# Patient Record
Sex: FEMALE | Race: WHITE | NOT HISPANIC OR LATINO | Employment: OTHER | URBAN - METROPOLITAN AREA
[De-identification: names, ages, dates, MRNs, and addresses within clinical notes are randomized per-mention and may not be internally consistent; named-entity substitution may affect disease eponyms.]

---

## 2017-01-20 ENCOUNTER — AMBULATORY - HEALTHEAST (OUTPATIENT)
Dept: NURSING | Facility: CLINIC | Age: 72
End: 2017-01-20

## 2017-01-20 DIAGNOSIS — M05.79 RHEUMATOID ARTHRITIS INVOLVING MULTIPLE SITES WITH POSITIVE RHEUMATOID FACTOR (H): ICD-10-CM

## 2017-02-16 ENCOUNTER — AMBULATORY - HEALTHEAST (OUTPATIENT)
Dept: NURSING | Facility: CLINIC | Age: 72
End: 2017-02-16

## 2017-02-16 ENCOUNTER — AMBULATORY - HEALTHEAST (OUTPATIENT)
Dept: LAB | Facility: CLINIC | Age: 72
End: 2017-02-16

## 2017-02-16 DIAGNOSIS — M05.9 RHEUMATOID ARTHRITIS WITH RHEUMATOID FACTOR (H): ICD-10-CM

## 2017-02-16 DIAGNOSIS — Z79.899 HIGH RISK MEDICATION USE: ICD-10-CM

## 2017-02-16 LAB
ALT SERPL W P-5'-P-CCNC: 15 U/L (ref 0–45)
CREAT SERPL-MCNC: 1.13 MG/DL (ref 0.6–1.1)
GFR SERPL CREATININE-BSD FRML MDRD: 48 ML/MIN/1.73M2

## 2017-03-16 ENCOUNTER — AMBULATORY - HEALTHEAST (OUTPATIENT)
Dept: NURSING | Facility: CLINIC | Age: 72
End: 2017-03-16

## 2017-03-30 ENCOUNTER — COMMUNICATION - HEALTHEAST (OUTPATIENT)
Dept: RHEUMATOLOGY | Facility: CLINIC | Age: 72
End: 2017-03-30

## 2017-03-30 DIAGNOSIS — M05.9 RHEUMATOID ARTHRITIS WITH RHEUMATOID FACTOR (H): ICD-10-CM

## 2017-04-13 ENCOUNTER — OFFICE VISIT - HEALTHEAST (OUTPATIENT)
Dept: RHEUMATOLOGY | Facility: CLINIC | Age: 72
End: 2017-04-13

## 2017-04-13 DIAGNOSIS — M05.79 RHEUMATOID ARTHRITIS INVOLVING MULTIPLE SITES WITH POSITIVE RHEUMATOID FACTOR (H): ICD-10-CM

## 2017-04-13 DIAGNOSIS — N18.30 CRF (CHRONIC RENAL FAILURE), STAGE 3 (MODERATE) (H): ICD-10-CM

## 2017-04-13 DIAGNOSIS — M05.9 RHEUMATOID ARTHRITIS WITH RHEUMATOID FACTOR (H): ICD-10-CM

## 2017-04-13 DIAGNOSIS — Z79.899 HIGH RISK MEDICATION USE: ICD-10-CM

## 2017-04-13 DIAGNOSIS — M54.50 CHRONIC MIDLINE LOW BACK PAIN WITHOUT SCIATICA: ICD-10-CM

## 2017-04-13 DIAGNOSIS — G89.29 CHRONIC MIDLINE LOW BACK PAIN WITHOUT SCIATICA: ICD-10-CM

## 2017-04-13 LAB
ALT SERPL W P-5'-P-CCNC: 18 U/L (ref 0–45)
CREAT SERPL-MCNC: 1.17 MG/DL (ref 0.6–1.1)
GFR SERPL CREATININE-BSD FRML MDRD: 46 ML/MIN/1.73M2

## 2017-05-11 ENCOUNTER — AMBULATORY - HEALTHEAST (OUTPATIENT)
Dept: NURSING | Facility: CLINIC | Age: 72
End: 2017-05-11

## 2017-05-26 ENCOUNTER — COMMUNICATION - HEALTHEAST (OUTPATIENT)
Dept: RHEUMATOLOGY | Facility: CLINIC | Age: 72
End: 2017-05-26

## 2017-05-26 DIAGNOSIS — M05.9 RHEUMATOID ARTHRITIS WITH RHEUMATOID FACTOR (H): ICD-10-CM

## 2017-06-05 ENCOUNTER — COMMUNICATION - HEALTHEAST (OUTPATIENT)
Dept: RHEUMATOLOGY | Facility: CLINIC | Age: 72
End: 2017-06-05

## 2017-06-05 DIAGNOSIS — M05.9 RHEUMATOID ARTHRITIS WITH RHEUMATOID FACTOR (H): ICD-10-CM

## 2017-06-05 DIAGNOSIS — M05.79 RHEUMATOID ARTHRITIS INVOLVING MULTIPLE SITES WITH POSITIVE RHEUMATOID FACTOR (H): ICD-10-CM

## 2017-06-08 ENCOUNTER — AMBULATORY - HEALTHEAST (OUTPATIENT)
Dept: LAB | Facility: CLINIC | Age: 72
End: 2017-06-08

## 2017-06-08 ENCOUNTER — AMBULATORY - HEALTHEAST (OUTPATIENT)
Dept: NURSING | Facility: CLINIC | Age: 72
End: 2017-06-08

## 2017-06-08 DIAGNOSIS — Z79.899 HIGH RISK MEDICATION USE: ICD-10-CM

## 2017-06-08 DIAGNOSIS — M05.9 RHEUMATOID ARTHRITIS WITH RHEUMATOID FACTOR (H): ICD-10-CM

## 2017-06-08 LAB
ALT SERPL W P-5'-P-CCNC: 14 U/L (ref 0–45)
CREAT SERPL-MCNC: 1.16 MG/DL (ref 0.6–1.1)
GFR SERPL CREATININE-BSD FRML MDRD: 46 ML/MIN/1.73M2

## 2017-07-06 ENCOUNTER — AMBULATORY - HEALTHEAST (OUTPATIENT)
Dept: NURSING | Facility: CLINIC | Age: 72
End: 2017-07-06

## 2017-07-06 DIAGNOSIS — M05.79 RHEUMATOID ARTHRITIS INVOLVING MULTIPLE SITES WITH POSITIVE RHEUMATOID FACTOR (H): ICD-10-CM

## 2017-08-10 ENCOUNTER — OFFICE VISIT - HEALTHEAST (OUTPATIENT)
Dept: RHEUMATOLOGY | Facility: CLINIC | Age: 72
End: 2017-08-10

## 2017-08-10 DIAGNOSIS — N18.30 CRF (CHRONIC RENAL FAILURE), STAGE 3 (MODERATE) (H): ICD-10-CM

## 2017-08-10 DIAGNOSIS — M05.79 RHEUMATOID ARTHRITIS INVOLVING MULTIPLE SITES WITH POSITIVE RHEUMATOID FACTOR (H): ICD-10-CM

## 2017-08-10 DIAGNOSIS — Z79.899 HIGH RISK MEDICATION USE: ICD-10-CM

## 2017-08-10 LAB
ALT SERPL W P-5'-P-CCNC: 17 U/L (ref 0–45)
CREAT SERPL-MCNC: 1.34 MG/DL (ref 0.6–1.1)
GFR SERPL CREATININE-BSD FRML MDRD: 39 ML/MIN/1.73M2

## 2017-08-10 ASSESSMENT — MIFFLIN-ST. JEOR: SCORE: 1419.14

## 2017-09-07 ENCOUNTER — AMBULATORY - HEALTHEAST (OUTPATIENT)
Dept: NURSING | Facility: CLINIC | Age: 72
End: 2017-09-07

## 2017-09-07 DIAGNOSIS — M05.79 RHEUMATOID ARTHRITIS INVOLVING MULTIPLE SITES WITH POSITIVE RHEUMATOID FACTOR (H): ICD-10-CM

## 2017-10-05 ENCOUNTER — AMBULATORY - HEALTHEAST (OUTPATIENT)
Dept: NURSING | Facility: CLINIC | Age: 72
End: 2017-10-05

## 2017-10-05 ENCOUNTER — AMBULATORY - HEALTHEAST (OUTPATIENT)
Dept: RHEUMATOLOGY | Facility: CLINIC | Age: 72
End: 2017-10-05

## 2017-10-05 DIAGNOSIS — M05.79 RHEUMATOID ARTHRITIS INVOLVING MULTIPLE SITES WITH POSITIVE RHEUMATOID FACTOR (H): ICD-10-CM

## 2017-10-05 LAB
ALT SERPL W P-5'-P-CCNC: 17 U/L (ref 0–45)
CREAT SERPL-MCNC: 1.11 MG/DL (ref 0.6–1.1)
GFR SERPL CREATININE-BSD FRML MDRD: 48 ML/MIN/1.73M2

## 2017-11-08 ENCOUNTER — AMBULATORY - HEALTHEAST (OUTPATIENT)
Dept: NURSING | Facility: CLINIC | Age: 72
End: 2017-11-08

## 2017-11-10 ENCOUNTER — RECORDS - HEALTHEAST (OUTPATIENT)
Dept: LAB | Facility: CLINIC | Age: 72
End: 2017-11-10

## 2017-11-10 LAB
CHOLEST SERPL-MCNC: 162 MG/DL
FASTING STATUS PATIENT QL REPORTED: YES
HDLC SERPL-MCNC: 54 MG/DL
LDLC SERPL CALC-MCNC: 83 MG/DL
TRIGL SERPL-MCNC: 124 MG/DL

## 2017-12-07 ENCOUNTER — COMMUNICATION - HEALTHEAST (OUTPATIENT)
Dept: RHEUMATOLOGY | Facility: CLINIC | Age: 72
End: 2017-12-07

## 2017-12-07 DIAGNOSIS — M05.79 RHEUMATOID ARTHRITIS INVOLVING MULTIPLE SITES WITH POSITIVE RHEUMATOID FACTOR (H): ICD-10-CM

## 2017-12-11 ENCOUNTER — AMBULATORY - HEALTHEAST (OUTPATIENT)
Dept: NURSING | Facility: CLINIC | Age: 72
End: 2017-12-11

## 2017-12-11 ENCOUNTER — AMBULATORY - HEALTHEAST (OUTPATIENT)
Dept: LAB | Facility: CLINIC | Age: 72
End: 2017-12-11

## 2017-12-11 DIAGNOSIS — M05.79 RHEUMATOID ARTHRITIS INVOLVING MULTIPLE SITES WITH POSITIVE RHEUMATOID FACTOR (H): ICD-10-CM

## 2017-12-11 LAB
ALT SERPL W P-5'-P-CCNC: 21 U/L (ref 0–45)
CREAT SERPL-MCNC: 1.16 MG/DL (ref 0.6–1.1)
GFR SERPL CREATININE-BSD FRML MDRD: 46 ML/MIN/1.73M2

## 2017-12-28 ENCOUNTER — AMBULATORY - HEALTHEAST (OUTPATIENT)
Dept: CARDIOLOGY | Facility: CLINIC | Age: 72
End: 2017-12-28

## 2017-12-28 ENCOUNTER — HOME CARE/HOSPICE - HEALTHEAST (OUTPATIENT)
Dept: HOME HEALTH SERVICES | Facility: HOME HEALTH | Age: 72
End: 2017-12-28

## 2017-12-28 DIAGNOSIS — I48.91 ATRIAL FIBRILLATION (H): ICD-10-CM

## 2018-01-04 ENCOUNTER — RECORDS - HEALTHEAST (OUTPATIENT)
Dept: LAB | Facility: CLINIC | Age: 73
End: 2018-01-04

## 2018-01-04 ENCOUNTER — HOSPITAL ENCOUNTER (OUTPATIENT)
Dept: CARDIOLOGY | Facility: CLINIC | Age: 73
Discharge: HOME | End: 2018-01-04
Attending: NURSE PRACTITIONER

## 2018-01-04 DIAGNOSIS — I48.91 ATRIAL FIBRILLATION (H): ICD-10-CM

## 2018-01-04 LAB
ANION GAP SERPL CALCULATED.3IONS-SCNC: 10 MMOL/L (ref 5–18)
BUN SERPL-MCNC: 37 MG/DL (ref 8–28)
CALCIUM SERPL-MCNC: 10.1 MG/DL (ref 8.5–10.5)
CHLORIDE BLD-SCNC: 102 MMOL/L (ref 98–107)
CO2 SERPL-SCNC: 29 MMOL/L (ref 22–31)
CREAT SERPL-MCNC: 1.66 MG/DL (ref 0.6–1.1)
GFR SERPL CREATININE-BSD FRML MDRD: 30 ML/MIN/1.73M2
GLUCOSE BLD-MCNC: 99 MG/DL (ref 70–125)
MAGNESIUM SERPL-MCNC: 2 MG/DL (ref 1.8–2.6)
POTASSIUM BLD-SCNC: 4.9 MMOL/L (ref 3.5–5)
SODIUM SERPL-SCNC: 141 MMOL/L (ref 136–145)
T4 FREE SERPL-MCNC: 1.4 NG/DL (ref 0.7–1.8)
TSH SERPL DL<=0.005 MIU/L-ACNC: 0.28 UIU/ML (ref 0.3–5)

## 2018-01-05 ENCOUNTER — RECORDS - HEALTHEAST (OUTPATIENT)
Dept: ADMINISTRATIVE | Facility: OTHER | Age: 73
End: 2018-01-05

## 2018-01-05 ENCOUNTER — AMBULATORY - HEALTHEAST (OUTPATIENT)
Dept: CARDIOLOGY | Facility: CLINIC | Age: 73
End: 2018-01-05

## 2018-01-12 ENCOUNTER — OFFICE VISIT - HEALTHEAST (OUTPATIENT)
Dept: CARDIOLOGY | Facility: CLINIC | Age: 73
End: 2018-01-12

## 2018-01-12 DIAGNOSIS — I48.0 PAROXYSMAL ATRIAL FIBRILLATION (H): ICD-10-CM

## 2018-01-12 DIAGNOSIS — I47.29 NONSUSTAINED VENTRICULAR TACHYCARDIA (H): ICD-10-CM

## 2018-01-12 ASSESSMENT — MIFFLIN-ST. JEOR: SCORE: 1334.14

## 2018-01-15 ENCOUNTER — OFFICE VISIT - HEALTHEAST (OUTPATIENT)
Dept: RHEUMATOLOGY | Facility: CLINIC | Age: 73
End: 2018-01-15

## 2018-01-15 DIAGNOSIS — D72.819 LEUKOPENIA: ICD-10-CM

## 2018-01-15 DIAGNOSIS — M05.9 SEROPOSITIVE RHEUMATOID ARTHRITIS (H): ICD-10-CM

## 2018-01-15 DIAGNOSIS — I48.0 PAROXYSMAL ATRIAL FIBRILLATION (H): ICD-10-CM

## 2018-01-15 DIAGNOSIS — M81.0 OSTEOPOROSIS: ICD-10-CM

## 2018-01-15 DIAGNOSIS — N28.9 RENAL INSUFFICIENCY: ICD-10-CM

## 2018-01-15 DIAGNOSIS — Z79.899 HIGH RISK MEDICATION USE: ICD-10-CM

## 2018-01-15 ASSESSMENT — MIFFLIN-ST. JEOR: SCORE: 1349.11

## 2018-01-23 ENCOUNTER — HOSPITAL ENCOUNTER (OUTPATIENT)
Dept: CARDIOLOGY | Facility: CLINIC | Age: 73
Discharge: HOME | End: 2018-01-23
Attending: NURSE PRACTITIONER

## 2018-01-29 ENCOUNTER — AMBULATORY - HEALTHEAST (OUTPATIENT)
Dept: CARDIOLOGY | Facility: CLINIC | Age: 73
End: 2018-01-29

## 2018-01-29 DIAGNOSIS — I48.0 PAROXYSMAL ATRIAL FIBRILLATION (H): ICD-10-CM

## 2018-02-01 ENCOUNTER — HOSPITAL ENCOUNTER (OUTPATIENT)
Dept: NUCLEAR MEDICINE | Facility: CLINIC | Age: 73
Discharge: HOME | End: 2018-02-01
Attending: NURSE PRACTITIONER

## 2018-02-01 ENCOUNTER — HOSPITAL ENCOUNTER (OUTPATIENT)
Dept: CARDIOLOGY | Facility: CLINIC | Age: 73
Discharge: HOME | End: 2018-02-01
Attending: NURSE PRACTITIONER

## 2018-02-01 DIAGNOSIS — I47.29 NONSUSTAINED VENTRICULAR TACHYCARDIA (H): ICD-10-CM

## 2018-02-01 DIAGNOSIS — I48.0 PAROXYSMAL ATRIAL FIBRILLATION (H): ICD-10-CM

## 2018-02-01 LAB
CV STRESS CURRENT BP HE: NORMAL
CV STRESS CURRENT HR HE: 101
CV STRESS CURRENT HR HE: 105
CV STRESS CURRENT HR HE: 106
CV STRESS CURRENT HR HE: 109
CV STRESS CURRENT HR HE: 109
CV STRESS CURRENT HR HE: 110
CV STRESS CURRENT HR HE: 110
CV STRESS CURRENT HR HE: 88
CV STRESS CURRENT HR HE: 90
CV STRESS CURRENT HR HE: 92
CV STRESS CURRENT HR HE: 93
CV STRESS CURRENT HR HE: 93
CV STRESS CURRENT HR HE: 95
CV STRESS CURRENT HR HE: 98
CV STRESS DEVIATION TIME HE: NORMAL
CV STRESS ECHO PERCENT HR HE: NORMAL
CV STRESS EXERCISE STAGE HE: NORMAL
CV STRESS FINAL RESTING BP HE: NORMAL
CV STRESS FINAL RESTING HR HE: 90
CV STRESS MAX HR HE: 111
CV STRESS MAX TREADMILL GRADE HE: 0
CV STRESS MAX TREADMILL SPEED HE: 0
CV STRESS PEAK DIA BP HE: NORMAL
CV STRESS PEAK SYS BP HE: NORMAL
CV STRESS PHASE HE: NORMAL
CV STRESS PROTOCOL HE: NORMAL
CV STRESS RESTING PT POSITION HE: NORMAL
CV STRESS ST DEVIATION AMOUNT HE: NORMAL
CV STRESS ST DEVIATION ELEVATION HE: NORMAL
CV STRESS ST EVELATION AMOUNT HE: NORMAL
CV STRESS TEST TYPE HE: NORMAL
CV STRESS TOTAL STAGE TIME MIN 1 HE: NORMAL
NUC STRESS EJECTION FRACTION: 75 %
STRESS ECHO BASELINE BP: NORMAL
STRESS ECHO BASELINE HR: 88
STRESS ECHO CALCULATED PERCENT HR: 74 %
STRESS ECHO LAST STRESS BP: NORMAL
STRESS ECHO LAST STRESS HR: 105

## 2018-02-14 ENCOUNTER — AMBULATORY - HEALTHEAST (OUTPATIENT)
Dept: LAB | Facility: CLINIC | Age: 73
End: 2018-02-14

## 2018-02-14 ENCOUNTER — AMBULATORY - HEALTHEAST (OUTPATIENT)
Dept: ENDOCRINOLOGY | Facility: CLINIC | Age: 73
End: 2018-02-14

## 2018-02-14 DIAGNOSIS — M05.79 RHEUMATOID ARTHRITIS INVOLVING MULTIPLE SITES WITH POSITIVE RHEUMATOID FACTOR (H): ICD-10-CM

## 2018-02-14 LAB
ALBUMIN SERPL-MCNC: 3.5 G/DL (ref 3.5–5)
ALT SERPL W P-5'-P-CCNC: 18 U/L (ref 0–45)
CREAT SERPL-MCNC: 1.02 MG/DL (ref 0.6–1.1)
ERYTHROCYTE [DISTWIDTH] IN BLOOD BY AUTOMATED COUNT: 12.9 % (ref 11–14.5)
GFR SERPL CREATININE-BSD FRML MDRD: 53 ML/MIN/1.73M2
HCT VFR BLD AUTO: 34.5 % (ref 35–47)
HGB BLD-MCNC: 11.8 G/DL (ref 12–16)
MCH RBC QN AUTO: 33.2 PG (ref 27–34)
MCHC RBC AUTO-ENTMCNC: 34.1 G/DL (ref 32–36)
MCV RBC AUTO: 97 FL (ref 80–100)
PLATELET # BLD AUTO: 336 THOU/UL (ref 140–440)
PMV BLD AUTO: 7.4 FL (ref 7–10)
RBC # BLD AUTO: 3.54 MILL/UL (ref 3.8–5.4)
WBC: 6 THOU/UL (ref 4–11)

## 2018-03-16 ENCOUNTER — COMMUNICATION - HEALTHEAST (OUTPATIENT)
Dept: ENDOCRINOLOGY | Facility: CLINIC | Age: 73
End: 2018-03-16

## 2018-03-16 ENCOUNTER — AMBULATORY - HEALTHEAST (OUTPATIENT)
Dept: ENDOCRINOLOGY | Facility: CLINIC | Age: 73
End: 2018-03-16

## 2018-04-13 ENCOUNTER — OFFICE VISIT - HEALTHEAST (OUTPATIENT)
Dept: CARDIOLOGY | Facility: CLINIC | Age: 73
End: 2018-04-13

## 2018-04-13 DIAGNOSIS — I48.0 PAROXYSMAL ATRIAL FIBRILLATION (H): ICD-10-CM

## 2018-04-13 ASSESSMENT — MIFFLIN-ST. JEOR: SCORE: 1385.39

## 2018-04-19 ENCOUNTER — AMBULATORY - HEALTHEAST (OUTPATIENT)
Dept: ENDOCRINOLOGY | Facility: CLINIC | Age: 73
End: 2018-04-19

## 2018-04-19 ENCOUNTER — AMBULATORY - HEALTHEAST (OUTPATIENT)
Dept: LAB | Facility: CLINIC | Age: 73
End: 2018-04-19

## 2018-04-19 DIAGNOSIS — M05.79 RHEUMATOID ARTHRITIS INVOLVING MULTIPLE SITES WITH POSITIVE RHEUMATOID FACTOR (H): ICD-10-CM

## 2018-04-19 LAB
ALBUMIN SERPL-MCNC: 3.4 G/DL (ref 3.5–5)
ALT SERPL W P-5'-P-CCNC: 14 U/L (ref 0–45)
CREAT SERPL-MCNC: 1.11 MG/DL (ref 0.6–1.1)
ERYTHROCYTE [DISTWIDTH] IN BLOOD BY AUTOMATED COUNT: 11.3 % (ref 11–14.5)
GFR SERPL CREATININE-BSD FRML MDRD: 48 ML/MIN/1.73M2
HCT VFR BLD AUTO: 37.9 % (ref 35–47)
HGB BLD-MCNC: 12.7 G/DL (ref 12–16)
MCH RBC QN AUTO: 32.6 PG (ref 27–34)
MCHC RBC AUTO-ENTMCNC: 33.6 G/DL (ref 32–36)
MCV RBC AUTO: 97 FL (ref 80–100)
PLATELET # BLD AUTO: 331 THOU/UL (ref 140–440)
PMV BLD AUTO: 7.2 FL (ref 7–10)
RBC # BLD AUTO: 3.9 MILL/UL (ref 3.8–5.4)
WBC: 7.1 THOU/UL (ref 4–11)

## 2018-05-03 ENCOUNTER — RECORDS - HEALTHEAST (OUTPATIENT)
Dept: LAB | Facility: CLINIC | Age: 73
End: 2018-05-03

## 2018-05-03 LAB
ANION GAP SERPL CALCULATED.3IONS-SCNC: 11 MMOL/L (ref 5–18)
BUN SERPL-MCNC: 28 MG/DL (ref 8–28)
CALCIUM SERPL-MCNC: 10.4 MG/DL (ref 8.5–10.5)
CHLORIDE BLD-SCNC: 103 MMOL/L (ref 98–107)
CO2 SERPL-SCNC: 26 MMOL/L (ref 22–31)
CREAT SERPL-MCNC: 1.25 MG/DL (ref 0.6–1.1)
GFR SERPL CREATININE-BSD FRML MDRD: 42 ML/MIN/1.73M2
GLUCOSE BLD-MCNC: 91 MG/DL (ref 70–125)
POTASSIUM BLD-SCNC: 4.2 MMOL/L (ref 3.5–5)
SODIUM SERPL-SCNC: 140 MMOL/L (ref 136–145)
T3 SERPL-MCNC: 120 NG/DL (ref 45–175)
T4 FREE SERPL-MCNC: 1.4 NG/DL (ref 0.7–1.8)
TSH SERPL DL<=0.005 MIU/L-ACNC: 0.48 UIU/ML (ref 0.3–5)

## 2018-05-17 ENCOUNTER — OFFICE VISIT - HEALTHEAST (OUTPATIENT)
Dept: RHEUMATOLOGY | Facility: CLINIC | Age: 73
End: 2018-05-17

## 2018-05-17 DIAGNOSIS — Z79.899 HIGH RISK MEDICATION USE: ICD-10-CM

## 2018-05-17 DIAGNOSIS — M05.79 RHEUMATOID ARTHRITIS INVOLVING MULTIPLE SITES WITH POSITIVE RHEUMATOID FACTOR (H): ICD-10-CM

## 2018-05-17 DIAGNOSIS — M19.90 OSTEOARTHRITIS: ICD-10-CM

## 2018-05-17 DIAGNOSIS — N18.30 CRF (CHRONIC RENAL FAILURE), STAGE 3 (MODERATE) (H): ICD-10-CM

## 2018-05-17 DIAGNOSIS — M05.9 SEROPOSITIVE RHEUMATOID ARTHRITIS (H): ICD-10-CM

## 2018-06-14 ENCOUNTER — AMBULATORY - HEALTHEAST (OUTPATIENT)
Dept: RHEUMATOLOGY | Facility: CLINIC | Age: 73
End: 2018-06-14

## 2018-07-12 ENCOUNTER — AMBULATORY - HEALTHEAST (OUTPATIENT)
Dept: RHEUMATOLOGY | Facility: CLINIC | Age: 73
End: 2018-07-12

## 2018-07-12 ENCOUNTER — AMBULATORY - HEALTHEAST (OUTPATIENT)
Dept: LAB | Facility: CLINIC | Age: 73
End: 2018-07-12

## 2018-07-12 DIAGNOSIS — M05.79 RHEUMATOID ARTHRITIS INVOLVING MULTIPLE SITES WITH POSITIVE RHEUMATOID FACTOR (H): ICD-10-CM

## 2018-07-12 LAB
ALBUMIN SERPL-MCNC: 3.7 G/DL (ref 3.5–5)
ALT SERPL W P-5'-P-CCNC: 15 U/L (ref 0–45)
CREAT SERPL-MCNC: 1.1 MG/DL (ref 0.6–1.1)
ERYTHROCYTE [DISTWIDTH] IN BLOOD BY AUTOMATED COUNT: 12.3 % (ref 11–14.5)
GFR SERPL CREATININE-BSD FRML MDRD: 49 ML/MIN/1.73M2
HCT VFR BLD AUTO: 39.9 % (ref 35–47)
HGB BLD-MCNC: 13.3 G/DL (ref 12–16)
MCH RBC QN AUTO: 31.7 PG (ref 27–34)
MCHC RBC AUTO-ENTMCNC: 33.4 G/DL (ref 32–36)
MCV RBC AUTO: 95 FL (ref 80–100)
PLATELET # BLD AUTO: 330 THOU/UL (ref 140–440)
PMV BLD AUTO: 7.3 FL (ref 7–10)
RBC # BLD AUTO: 4.19 MILL/UL (ref 3.8–5.4)
WBC: 5.9 THOU/UL (ref 4–11)

## 2018-08-09 ENCOUNTER — AMBULATORY - HEALTHEAST (OUTPATIENT)
Dept: RHEUMATOLOGY | Facility: CLINIC | Age: 73
End: 2018-08-09

## 2018-08-25 ENCOUNTER — COMMUNICATION - HEALTHEAST (OUTPATIENT)
Dept: RHEUMATOLOGY | Facility: CLINIC | Age: 73
End: 2018-08-25

## 2018-08-25 DIAGNOSIS — M05.79 RHEUMATOID ARTHRITIS INVOLVING MULTIPLE SITES WITH POSITIVE RHEUMATOID FACTOR (H): ICD-10-CM

## 2018-09-06 ENCOUNTER — AMBULATORY - HEALTHEAST (OUTPATIENT)
Dept: RHEUMATOLOGY | Facility: CLINIC | Age: 73
End: 2018-09-06

## 2018-10-04 ENCOUNTER — AMBULATORY - HEALTHEAST (OUTPATIENT)
Dept: LAB | Facility: CLINIC | Age: 73
End: 2018-10-04

## 2018-10-04 ENCOUNTER — AMBULATORY - HEALTHEAST (OUTPATIENT)
Dept: RHEUMATOLOGY | Facility: CLINIC | Age: 73
End: 2018-10-04

## 2018-10-04 DIAGNOSIS — M05.79 RHEUMATOID ARTHRITIS INVOLVING MULTIPLE SITES WITH POSITIVE RHEUMATOID FACTOR (H): ICD-10-CM

## 2018-10-04 LAB
ALBUMIN SERPL-MCNC: 3.4 G/DL (ref 3.5–5)
ALT SERPL W P-5'-P-CCNC: 14 U/L (ref 0–45)
CREAT SERPL-MCNC: 1.06 MG/DL (ref 0.6–1.1)
ERYTHROCYTE [DISTWIDTH] IN BLOOD BY AUTOMATED COUNT: 12 % (ref 11–14.5)
GFR SERPL CREATININE-BSD FRML MDRD: 51 ML/MIN/1.73M2
HCT VFR BLD AUTO: 36.7 % (ref 35–47)
HGB BLD-MCNC: 12.4 G/DL (ref 12–16)
MCH RBC QN AUTO: 32.5 PG (ref 27–34)
MCHC RBC AUTO-ENTMCNC: 33.6 G/DL (ref 32–36)
MCV RBC AUTO: 97 FL (ref 80–100)
PLATELET # BLD AUTO: 323 THOU/UL (ref 140–440)
PMV BLD AUTO: 7.4 FL (ref 7–10)
RBC # BLD AUTO: 3.8 MILL/UL (ref 3.8–5.4)
WBC: 7.4 THOU/UL (ref 4–11)

## 2018-10-19 ENCOUNTER — RECORDS - HEALTHEAST (OUTPATIENT)
Dept: LAB | Facility: CLINIC | Age: 73
End: 2018-10-19

## 2018-10-19 LAB
ALBUMIN SERPL-MCNC: 3.7 G/DL (ref 3.5–5)
ALP SERPL-CCNC: 81 U/L (ref 45–120)
ALT SERPL W P-5'-P-CCNC: 14 U/L (ref 0–45)
ANION GAP SERPL CALCULATED.3IONS-SCNC: 13 MMOL/L (ref 5–18)
AST SERPL W P-5'-P-CCNC: 16 U/L (ref 0–40)
BILIRUB SERPL-MCNC: 0.5 MG/DL (ref 0–1)
BUN SERPL-MCNC: 21 MG/DL (ref 8–28)
CALCIUM SERPL-MCNC: 10.2 MG/DL (ref 8.5–10.5)
CHLORIDE BLD-SCNC: 103 MMOL/L (ref 98–107)
CHOLEST SERPL-MCNC: 174 MG/DL
CO2 SERPL-SCNC: 26 MMOL/L (ref 22–31)
CREAT SERPL-MCNC: 1.1 MG/DL (ref 0.6–1.1)
FASTING STATUS PATIENT QL REPORTED: YES
GFR SERPL CREATININE-BSD FRML MDRD: 49 ML/MIN/1.73M2
GLUCOSE BLD-MCNC: 89 MG/DL (ref 70–125)
HDLC SERPL-MCNC: 67 MG/DL
LDLC SERPL CALC-MCNC: 90 MG/DL
POTASSIUM BLD-SCNC: 4.1 MMOL/L (ref 3.5–5)
PROT SERPL-MCNC: 6.3 G/DL (ref 6–8)
SODIUM SERPL-SCNC: 142 MMOL/L (ref 136–145)
TRIGL SERPL-MCNC: 86 MG/DL
TSH SERPL DL<=0.005 MIU/L-ACNC: 1.05 UIU/ML (ref 0.3–5)

## 2018-11-01 ENCOUNTER — COMMUNICATION - HEALTHEAST (OUTPATIENT)
Dept: ADMINISTRATIVE | Facility: CLINIC | Age: 73
End: 2018-11-01

## 2018-11-02 ENCOUNTER — AMBULATORY - HEALTHEAST (OUTPATIENT)
Dept: RHEUMATOLOGY | Facility: CLINIC | Age: 73
End: 2018-11-02

## 2018-11-27 ENCOUNTER — COMMUNICATION - HEALTHEAST (OUTPATIENT)
Dept: LAB | Facility: CLINIC | Age: 73
End: 2018-11-27

## 2018-11-27 DIAGNOSIS — M05.79 RHEUMATOID ARTHRITIS INVOLVING MULTIPLE SITES WITH POSITIVE RHEUMATOID FACTOR (H): ICD-10-CM

## 2018-11-30 ENCOUNTER — COMMUNICATION - HEALTHEAST (OUTPATIENT)
Dept: RHEUMATOLOGY | Facility: CLINIC | Age: 73
End: 2018-11-30

## 2018-11-30 DIAGNOSIS — M05.79 RHEUMATOID ARTHRITIS INVOLVING MULTIPLE SITES WITH POSITIVE RHEUMATOID FACTOR (H): ICD-10-CM

## 2018-12-03 ENCOUNTER — AMBULATORY - HEALTHEAST (OUTPATIENT)
Dept: LAB | Facility: CLINIC | Age: 73
End: 2018-12-03

## 2018-12-03 DIAGNOSIS — M05.79 RHEUMATOID ARTHRITIS INVOLVING MULTIPLE SITES WITH POSITIVE RHEUMATOID FACTOR (H): ICD-10-CM

## 2018-12-03 LAB
ALBUMIN SERPL-MCNC: 3.6 G/DL (ref 3.5–5)
ALT SERPL W P-5'-P-CCNC: 14 U/L (ref 0–45)
CREAT SERPL-MCNC: 0.94 MG/DL (ref 0.6–1.1)
ERYTHROCYTE [DISTWIDTH] IN BLOOD BY AUTOMATED COUNT: 12.1 % (ref 11–14.5)
GFR SERPL CREATININE-BSD FRML MDRD: 59 ML/MIN/1.73M2
HCT VFR BLD AUTO: 40.8 % (ref 35–47)
HGB BLD-MCNC: 13.5 G/DL (ref 12–16)
MCH RBC QN AUTO: 32.1 PG (ref 27–34)
MCHC RBC AUTO-ENTMCNC: 33.1 G/DL (ref 32–36)
MCV RBC AUTO: 97 FL (ref 80–100)
PLATELET # BLD AUTO: 309 THOU/UL (ref 140–440)
PMV BLD AUTO: 7.2 FL (ref 7–10)
RBC # BLD AUTO: 4.2 MILL/UL (ref 3.8–5.4)
WBC: 8.2 THOU/UL (ref 4–11)

## 2018-12-05 ENCOUNTER — OFFICE VISIT - HEALTHEAST (OUTPATIENT)
Dept: RHEUMATOLOGY | Facility: CLINIC | Age: 73
End: 2018-12-05

## 2018-12-05 DIAGNOSIS — N18.30 CRF (CHRONIC RENAL FAILURE), STAGE 3 (MODERATE) (H): ICD-10-CM

## 2018-12-05 DIAGNOSIS — Z79.899 HIGH RISK MEDICATION USE: ICD-10-CM

## 2018-12-05 DIAGNOSIS — M05.79 RHEUMATOID ARTHRITIS INVOLVING MULTIPLE SITES WITH POSITIVE RHEUMATOID FACTOR (H): ICD-10-CM

## 2018-12-05 DIAGNOSIS — M05.9 SEROPOSITIVE RHEUMATOID ARTHRITIS (H): ICD-10-CM

## 2019-01-07 ENCOUNTER — AMBULATORY - HEALTHEAST (OUTPATIENT)
Dept: RHEUMATOLOGY | Facility: CLINIC | Age: 74
End: 2019-01-07

## 2019-01-14 ENCOUNTER — AMBULATORY - HEALTHEAST (OUTPATIENT)
Dept: CARDIOLOGY | Facility: CLINIC | Age: 74
End: 2019-01-14

## 2019-02-04 ENCOUNTER — COMMUNICATION - HEALTHEAST (OUTPATIENT)
Dept: ADMINISTRATIVE | Facility: CLINIC | Age: 74
End: 2019-02-04

## 2019-02-07 ENCOUNTER — COMMUNICATION - HEALTHEAST (OUTPATIENT)
Dept: ADMINISTRATIVE | Facility: CLINIC | Age: 74
End: 2019-02-07

## 2019-02-11 ENCOUNTER — AMBULATORY - HEALTHEAST (OUTPATIENT)
Dept: RHEUMATOLOGY | Facility: CLINIC | Age: 74
End: 2019-02-11

## 2019-02-28 ENCOUNTER — COMMUNICATION - HEALTHEAST (OUTPATIENT)
Dept: RHEUMATOLOGY | Facility: CLINIC | Age: 74
End: 2019-02-28

## 2019-02-28 DIAGNOSIS — M05.79 RHEUMATOID ARTHRITIS INVOLVING MULTIPLE SITES WITH POSITIVE RHEUMATOID FACTOR (H): ICD-10-CM

## 2019-03-07 ENCOUNTER — COMMUNICATION - HEALTHEAST (OUTPATIENT)
Dept: ADMINISTRATIVE | Facility: CLINIC | Age: 74
End: 2019-03-07

## 2019-03-08 ENCOUNTER — COMMUNICATION - HEALTHEAST (OUTPATIENT)
Dept: RHEUMATOLOGY | Facility: CLINIC | Age: 74
End: 2019-03-08

## 2019-03-11 ENCOUNTER — AMBULATORY - HEALTHEAST (OUTPATIENT)
Dept: RHEUMATOLOGY | Facility: CLINIC | Age: 74
End: 2019-03-11

## 2019-03-11 ENCOUNTER — AMBULATORY - HEALTHEAST (OUTPATIENT)
Dept: LAB | Facility: CLINIC | Age: 74
End: 2019-03-11

## 2019-03-11 DIAGNOSIS — M05.79 RHEUMATOID ARTHRITIS INVOLVING MULTIPLE SITES WITH POSITIVE RHEUMATOID FACTOR (H): ICD-10-CM

## 2019-03-11 LAB
ALBUMIN SERPL-MCNC: 3.6 G/DL (ref 3.5–5)
ALT SERPL W P-5'-P-CCNC: 16 U/L (ref 0–45)
CREAT SERPL-MCNC: 1.01 MG/DL (ref 0.6–1.1)
ERYTHROCYTE [DISTWIDTH] IN BLOOD BY AUTOMATED COUNT: 12.7 % (ref 11–14.5)
GFR SERPL CREATININE-BSD FRML MDRD: 54 ML/MIN/1.73M2
HCT VFR BLD AUTO: 39.7 % (ref 35–47)
HGB BLD-MCNC: 13.6 G/DL (ref 12–16)
MCH RBC QN AUTO: 33.2 PG (ref 27–34)
MCHC RBC AUTO-ENTMCNC: 34.3 G/DL (ref 32–36)
MCV RBC AUTO: 97 FL (ref 80–100)
PLATELET # BLD AUTO: 293 THOU/UL (ref 140–440)
PMV BLD AUTO: 7.6 FL (ref 7–10)
RBC # BLD AUTO: 4.1 MILL/UL (ref 3.8–5.4)
WBC: 7.3 THOU/UL (ref 4–11)

## 2019-03-27 ENCOUNTER — RECORDS - HEALTHEAST (OUTPATIENT)
Dept: ADMINISTRATIVE | Facility: OTHER | Age: 74
End: 2019-03-27

## 2019-03-27 ENCOUNTER — AMBULATORY - HEALTHEAST (OUTPATIENT)
Dept: CARDIOLOGY | Facility: CLINIC | Age: 74
End: 2019-03-27

## 2019-04-08 ENCOUNTER — AMBULATORY - HEALTHEAST (OUTPATIENT)
Dept: RHEUMATOLOGY | Facility: CLINIC | Age: 74
End: 2019-04-08

## 2019-04-08 ENCOUNTER — OFFICE VISIT - HEALTHEAST (OUTPATIENT)
Dept: CARDIOLOGY | Facility: CLINIC | Age: 74
End: 2019-04-08

## 2019-04-08 DIAGNOSIS — I48.0 PAROXYSMAL ATRIAL FIBRILLATION (H): ICD-10-CM

## 2019-04-08 ASSESSMENT — MIFFLIN-ST. JEOR: SCORE: 1308.28

## 2019-04-22 ENCOUNTER — COMMUNICATION - HEALTHEAST (OUTPATIENT)
Dept: RHEUMATOLOGY | Facility: CLINIC | Age: 74
End: 2019-04-22

## 2019-04-22 DIAGNOSIS — M05.9 SEROPOSITIVE RHEUMATOID ARTHRITIS (H): ICD-10-CM

## 2019-04-26 ENCOUNTER — COMMUNICATION - HEALTHEAST (OUTPATIENT)
Dept: RHEUMATOLOGY | Facility: CLINIC | Age: 74
End: 2019-04-26

## 2019-05-03 ENCOUNTER — COMMUNICATION - HEALTHEAST (OUTPATIENT)
Dept: ADMINISTRATIVE | Facility: CLINIC | Age: 74
End: 2019-05-03

## 2019-05-13 ENCOUNTER — AMBULATORY - HEALTHEAST (OUTPATIENT)
Dept: RHEUMATOLOGY | Facility: CLINIC | Age: 74
End: 2019-05-13

## 2019-05-13 DIAGNOSIS — M05.9 SEROPOSITIVE RHEUMATOID ARTHRITIS (H): ICD-10-CM

## 2019-06-07 ENCOUNTER — COMMUNICATION - HEALTHEAST (OUTPATIENT)
Dept: RHEUMATOLOGY | Facility: CLINIC | Age: 74
End: 2019-06-07

## 2019-06-07 DIAGNOSIS — M05.79 RHEUMATOID ARTHRITIS INVOLVING MULTIPLE SITES WITH POSITIVE RHEUMATOID FACTOR (H): ICD-10-CM

## 2019-06-10 ENCOUNTER — AMBULATORY - HEALTHEAST (OUTPATIENT)
Dept: LAB | Facility: CLINIC | Age: 74
End: 2019-06-10

## 2019-06-10 ENCOUNTER — OFFICE VISIT - HEALTHEAST (OUTPATIENT)
Dept: RHEUMATOLOGY | Facility: CLINIC | Age: 74
End: 2019-06-10

## 2019-06-10 DIAGNOSIS — M15.0 PRIMARY OSTEOARTHRITIS INVOLVING MULTIPLE JOINTS: ICD-10-CM

## 2019-06-10 DIAGNOSIS — M05.79 RHEUMATOID ARTHRITIS INVOLVING MULTIPLE SITES WITH POSITIVE RHEUMATOID FACTOR (H): ICD-10-CM

## 2019-06-10 DIAGNOSIS — Z79.899 HIGH RISK MEDICATION USE: ICD-10-CM

## 2019-06-10 DIAGNOSIS — N18.30 CRF (CHRONIC RENAL FAILURE), STAGE 3 (MODERATE) (H): ICD-10-CM

## 2019-06-10 LAB
ALBUMIN SERPL-MCNC: 3.6 G/DL (ref 3.5–5)
ALT SERPL W P-5'-P-CCNC: 16 U/L (ref 0–45)
CREAT SERPL-MCNC: 1.02 MG/DL (ref 0.6–1.1)
ERYTHROCYTE [DISTWIDTH] IN BLOOD BY AUTOMATED COUNT: 11.8 % (ref 11–14.5)
GFR SERPL CREATININE-BSD FRML MDRD: 53 ML/MIN/1.73M2
HCT VFR BLD AUTO: 41 % (ref 35–47)
HGB BLD-MCNC: 13.7 G/DL (ref 12–16)
MCH RBC QN AUTO: 32.7 PG (ref 27–34)
MCHC RBC AUTO-ENTMCNC: 33.5 G/DL (ref 32–36)
MCV RBC AUTO: 98 FL (ref 80–100)
PLATELET # BLD AUTO: 330 THOU/UL (ref 140–440)
PMV BLD AUTO: 7.6 FL (ref 7–10)
RBC # BLD AUTO: 4.2 MILL/UL (ref 3.8–5.4)
WBC: 7.3 THOU/UL (ref 4–11)

## 2019-06-21 ENCOUNTER — RECORDS - HEALTHEAST (OUTPATIENT)
Dept: LAB | Facility: CLINIC | Age: 74
End: 2019-06-21

## 2019-06-21 LAB
ANION GAP SERPL CALCULATED.3IONS-SCNC: 10 MMOL/L (ref 5–18)
BUN SERPL-MCNC: 21 MG/DL (ref 8–28)
CALCIUM SERPL-MCNC: 10.1 MG/DL (ref 8.5–10.5)
CHLORIDE BLD-SCNC: 106 MMOL/L (ref 98–107)
CO2 SERPL-SCNC: 27 MMOL/L (ref 22–31)
CREAT SERPL-MCNC: 0.98 MG/DL (ref 0.6–1.1)
GFR SERPL CREATININE-BSD FRML MDRD: 56 ML/MIN/1.73M2
GLUCOSE BLD-MCNC: 83 MG/DL (ref 70–125)
POTASSIUM BLD-SCNC: 4.3 MMOL/L (ref 3.5–5)
SODIUM SERPL-SCNC: 143 MMOL/L (ref 136–145)

## 2019-07-08 ENCOUNTER — AMBULATORY - HEALTHEAST (OUTPATIENT)
Dept: RHEUMATOLOGY | Facility: CLINIC | Age: 74
End: 2019-07-08

## 2019-08-05 ENCOUNTER — AMBULATORY - HEALTHEAST (OUTPATIENT)
Dept: RHEUMATOLOGY | Facility: CLINIC | Age: 74
End: 2019-08-05

## 2019-09-04 ENCOUNTER — AMBULATORY - HEALTHEAST (OUTPATIENT)
Dept: RHEUMATOLOGY | Facility: CLINIC | Age: 74
End: 2019-09-04

## 2019-09-04 ENCOUNTER — AMBULATORY - HEALTHEAST (OUTPATIENT)
Dept: LAB | Facility: CLINIC | Age: 74
End: 2019-09-04

## 2019-09-04 DIAGNOSIS — M05.79 RHEUMATOID ARTHRITIS INVOLVING MULTIPLE SITES WITH POSITIVE RHEUMATOID FACTOR (H): ICD-10-CM

## 2019-09-04 LAB
ALBUMIN SERPL-MCNC: 3.6 G/DL (ref 3.5–5)
ALT SERPL W P-5'-P-CCNC: 14 U/L (ref 0–45)
CREAT SERPL-MCNC: 1.17 MG/DL (ref 0.6–1.1)
ERYTHROCYTE [DISTWIDTH] IN BLOOD BY AUTOMATED COUNT: 11.9 % (ref 11–14.5)
GFR SERPL CREATININE-BSD FRML MDRD: 45 ML/MIN/1.73M2
HCT VFR BLD AUTO: 40.5 % (ref 35–47)
HGB BLD-MCNC: 13.7 G/DL (ref 12–16)
MCH RBC QN AUTO: 32.9 PG (ref 27–34)
MCHC RBC AUTO-ENTMCNC: 33.8 G/DL (ref 32–36)
MCV RBC AUTO: 97 FL (ref 80–100)
PLATELET # BLD AUTO: 335 THOU/UL (ref 140–440)
PMV BLD AUTO: 7.4 FL (ref 7–10)
RBC # BLD AUTO: 4.16 MILL/UL (ref 3.8–5.4)
WBC: 6.6 THOU/UL (ref 4–11)

## 2019-09-06 ENCOUNTER — COMMUNICATION - HEALTHEAST (OUTPATIENT)
Dept: RHEUMATOLOGY | Facility: CLINIC | Age: 74
End: 2019-09-06

## 2019-09-06 DIAGNOSIS — M05.79 RHEUMATOID ARTHRITIS INVOLVING MULTIPLE SITES WITH POSITIVE RHEUMATOID FACTOR (H): ICD-10-CM

## 2019-10-02 ENCOUNTER — AMBULATORY - HEALTHEAST (OUTPATIENT)
Dept: RHEUMATOLOGY | Facility: CLINIC | Age: 74
End: 2019-10-02

## 2019-10-30 ENCOUNTER — AMBULATORY - HEALTHEAST (OUTPATIENT)
Dept: RHEUMATOLOGY | Facility: CLINIC | Age: 74
End: 2019-10-30

## 2019-11-04 ENCOUNTER — RECORDS - HEALTHEAST (OUTPATIENT)
Dept: LAB | Facility: CLINIC | Age: 74
End: 2019-11-04

## 2019-11-04 LAB
ALBUMIN SERPL-MCNC: 3.7 G/DL (ref 3.5–5)
ALP SERPL-CCNC: 78 U/L (ref 45–120)
ALT SERPL W P-5'-P-CCNC: 17 U/L (ref 0–45)
ANION GAP SERPL CALCULATED.3IONS-SCNC: 9 MMOL/L (ref 5–18)
AST SERPL W P-5'-P-CCNC: 23 U/L (ref 0–40)
BILIRUB SERPL-MCNC: 0.4 MG/DL (ref 0–1)
BUN SERPL-MCNC: 21 MG/DL (ref 8–28)
CALCIUM SERPL-MCNC: 9.7 MG/DL (ref 8.5–10.5)
CHLORIDE BLD-SCNC: 105 MMOL/L (ref 98–107)
CHOLEST SERPL-MCNC: 178 MG/DL
CO2 SERPL-SCNC: 28 MMOL/L (ref 22–31)
CREAT SERPL-MCNC: 1.01 MG/DL (ref 0.6–1.1)
FASTING STATUS PATIENT QL REPORTED: NORMAL
GFR SERPL CREATININE-BSD FRML MDRD: 54 ML/MIN/1.73M2
GLUCOSE BLD-MCNC: 89 MG/DL (ref 70–125)
HDLC SERPL-MCNC: 62 MG/DL
LDLC SERPL CALC-MCNC: 93 MG/DL
POTASSIUM BLD-SCNC: 3.9 MMOL/L (ref 3.5–5)
PROT SERPL-MCNC: 6.2 G/DL (ref 6–8)
SODIUM SERPL-SCNC: 142 MMOL/L (ref 136–145)
TRIGL SERPL-MCNC: 116 MG/DL
TSH SERPL DL<=0.005 MIU/L-ACNC: 0.47 UIU/ML (ref 0.3–5)

## 2019-11-21 ENCOUNTER — COMMUNICATION - HEALTHEAST (OUTPATIENT)
Dept: RHEUMATOLOGY | Facility: CLINIC | Age: 74
End: 2019-11-21

## 2019-11-21 DIAGNOSIS — M05.79 RHEUMATOID ARTHRITIS INVOLVING MULTIPLE SITES WITH POSITIVE RHEUMATOID FACTOR (H): ICD-10-CM

## 2019-12-04 ENCOUNTER — AMBULATORY - HEALTHEAST (OUTPATIENT)
Dept: ENDOCRINOLOGY | Facility: CLINIC | Age: 74
End: 2019-12-04

## 2019-12-30 ENCOUNTER — AMBULATORY - HEALTHEAST (OUTPATIENT)
Dept: LAB | Facility: CLINIC | Age: 74
End: 2019-12-30

## 2019-12-30 DIAGNOSIS — M05.79 RHEUMATOID ARTHRITIS INVOLVING MULTIPLE SITES WITH POSITIVE RHEUMATOID FACTOR (H): ICD-10-CM

## 2020-01-03 ENCOUNTER — AMBULATORY - HEALTHEAST (OUTPATIENT)
Dept: LAB | Facility: CLINIC | Age: 75
End: 2020-01-03

## 2020-01-03 ENCOUNTER — AMBULATORY - HEALTHEAST (OUTPATIENT)
Dept: ENDOCRINOLOGY | Facility: CLINIC | Age: 75
End: 2020-01-03

## 2020-01-03 DIAGNOSIS — M05.79 RHEUMATOID ARTHRITIS INVOLVING MULTIPLE SITES WITH POSITIVE RHEUMATOID FACTOR (H): ICD-10-CM

## 2020-01-03 LAB
ALBUMIN SERPL-MCNC: 3.7 G/DL (ref 3.5–5)
ALT SERPL W P-5'-P-CCNC: 20 U/L (ref 0–45)
CREAT SERPL-MCNC: 1.01 MG/DL (ref 0.6–1.1)
ERYTHROCYTE [DISTWIDTH] IN BLOOD BY AUTOMATED COUNT: 12.1 % (ref 11–14.5)
GFR SERPL CREATININE-BSD FRML MDRD: 54 ML/MIN/1.73M2
HCT VFR BLD AUTO: 41.1 % (ref 35–47)
HGB BLD-MCNC: 13.6 G/DL (ref 12–16)
MCH RBC QN AUTO: 33.1 PG (ref 27–34)
MCHC RBC AUTO-ENTMCNC: 33 G/DL (ref 32–36)
MCV RBC AUTO: 100 FL (ref 80–100)
PLATELET # BLD AUTO: 379 THOU/UL (ref 140–440)
PMV BLD AUTO: 7.4 FL (ref 7–10)
RBC # BLD AUTO: 4.1 MILL/UL (ref 3.8–5.4)
WBC: 7.4 THOU/UL (ref 4–11)

## 2020-01-04 ENCOUNTER — COMMUNICATION - HEALTHEAST (OUTPATIENT)
Dept: RHEUMATOLOGY | Facility: CLINIC | Age: 75
End: 2020-01-04

## 2020-01-04 DIAGNOSIS — M05.79 RHEUMATOID ARTHRITIS INVOLVING MULTIPLE SITES WITH POSITIVE RHEUMATOID FACTOR (H): ICD-10-CM

## 2020-01-05 ENCOUNTER — COMMUNICATION - HEALTHEAST (OUTPATIENT)
Dept: RHEUMATOLOGY | Facility: CLINIC | Age: 75
End: 2020-01-05

## 2020-01-05 DIAGNOSIS — M05.79 RHEUMATOID ARTHRITIS INVOLVING MULTIPLE SITES WITH POSITIVE RHEUMATOID FACTOR (H): ICD-10-CM

## 2020-01-13 ENCOUNTER — COMMUNICATION - HEALTHEAST (OUTPATIENT)
Dept: RHEUMATOLOGY | Facility: CLINIC | Age: 75
End: 2020-01-13

## 2020-01-13 DIAGNOSIS — M05.9 SEROPOSITIVE RHEUMATOID ARTHRITIS (H): ICD-10-CM

## 2020-01-23 ENCOUNTER — OFFICE VISIT - HEALTHEAST (OUTPATIENT)
Dept: RHEUMATOLOGY | Facility: CLINIC | Age: 75
End: 2020-01-23

## 2020-01-23 DIAGNOSIS — M15.0 PRIMARY OSTEOARTHRITIS INVOLVING MULTIPLE JOINTS: ICD-10-CM

## 2020-01-23 DIAGNOSIS — M05.79 RHEUMATOID ARTHRITIS INVOLVING MULTIPLE SITES WITH POSITIVE RHEUMATOID FACTOR (H): ICD-10-CM

## 2020-01-23 DIAGNOSIS — Z79.899 HIGH RISK MEDICATION USE: ICD-10-CM

## 2020-01-23 ASSESSMENT — MIFFLIN-ST. JEOR: SCORE: 1316.21

## 2020-01-31 ENCOUNTER — AMBULATORY - HEALTHEAST (OUTPATIENT)
Dept: ENDOCRINOLOGY | Facility: CLINIC | Age: 75
End: 2020-01-31

## 2020-02-14 ENCOUNTER — RECORDS - HEALTHEAST (OUTPATIENT)
Dept: ADMINISTRATIVE | Facility: OTHER | Age: 75
End: 2020-02-14

## 2020-02-14 ENCOUNTER — RECORDS - HEALTHEAST (OUTPATIENT)
Dept: LAB | Facility: CLINIC | Age: 75
End: 2020-02-14

## 2020-02-14 LAB
ANION GAP SERPL CALCULATED.3IONS-SCNC: 9 MMOL/L (ref 5–18)
BUN SERPL-MCNC: 20 MG/DL (ref 8–28)
CALCIUM SERPL-MCNC: 10.5 MG/DL (ref 8.5–10.5)
CHLORIDE BLD-SCNC: 104 MMOL/L (ref 98–107)
CO2 SERPL-SCNC: 27 MMOL/L (ref 22–31)
CREAT SERPL-MCNC: 1.06 MG/DL (ref 0.6–1.1)
GFR SERPL CREATININE-BSD FRML MDRD: 51 ML/MIN/1.73M2
GLUCOSE BLD-MCNC: 91 MG/DL (ref 70–125)
POTASSIUM BLD-SCNC: 4.3 MMOL/L (ref 3.5–5)
SODIUM SERPL-SCNC: 140 MMOL/L (ref 136–145)

## 2020-02-17 LAB — 25(OH)D3 SERPL-MCNC: 73.9 NG/ML (ref 30–80)

## 2020-03-02 ENCOUNTER — AMBULATORY - HEALTHEAST (OUTPATIENT)
Dept: RHEUMATOLOGY | Facility: CLINIC | Age: 75
End: 2020-03-02

## 2020-03-02 ENCOUNTER — RECORDS - HEALTHEAST (OUTPATIENT)
Dept: LAB | Facility: CLINIC | Age: 75
End: 2020-03-02

## 2020-03-02 LAB
FOLATE SERPL-MCNC: >20 NG/ML
VIT B12 SERPL-MCNC: 885 PG/ML (ref 213–816)

## 2020-03-03 ENCOUNTER — AMBULATORY - HEALTHEAST (OUTPATIENT)
Dept: RHEUMATOLOGY | Facility: CLINIC | Age: 75
End: 2020-03-03

## 2020-03-03 DIAGNOSIS — M05.79 RHEUMATOID ARTHRITIS INVOLVING MULTIPLE SITES WITH POSITIVE RHEUMATOID FACTOR (H): ICD-10-CM

## 2020-03-04 ENCOUNTER — COMMUNICATION - HEALTHEAST (OUTPATIENT)
Dept: RHEUMATOLOGY | Facility: CLINIC | Age: 75
End: 2020-03-04

## 2020-03-04 DIAGNOSIS — M05.9 SEROPOSITIVE RHEUMATOID ARTHRITIS (H): ICD-10-CM

## 2020-03-19 ENCOUNTER — COMMUNICATION - HEALTHEAST (OUTPATIENT)
Dept: RHEUMATOLOGY | Facility: CLINIC | Age: 75
End: 2020-03-19

## 2020-03-19 DIAGNOSIS — M05.79 RHEUMATOID ARTHRITIS INVOLVING MULTIPLE SITES WITH POSITIVE RHEUMATOID FACTOR (H): ICD-10-CM

## 2020-04-06 ENCOUNTER — AMBULATORY - HEALTHEAST (OUTPATIENT)
Dept: RHEUMATOLOGY | Facility: CLINIC | Age: 75
End: 2020-04-06

## 2020-05-05 ENCOUNTER — RECORDS - HEALTHEAST (OUTPATIENT)
Dept: ADMINISTRATIVE | Facility: OTHER | Age: 75
End: 2020-05-05

## 2020-05-07 ENCOUNTER — AMBULATORY - HEALTHEAST (OUTPATIENT)
Dept: ENDOCRINOLOGY | Facility: CLINIC | Age: 75
End: 2020-05-07

## 2020-05-27 ENCOUNTER — AMBULATORY - HEALTHEAST (OUTPATIENT)
Dept: CARDIOLOGY | Facility: CLINIC | Age: 75
End: 2020-05-27

## 2020-05-27 ENCOUNTER — RECORDS - HEALTHEAST (OUTPATIENT)
Dept: ADMINISTRATIVE | Facility: OTHER | Age: 75
End: 2020-05-27

## 2020-05-28 ENCOUNTER — OFFICE VISIT - HEALTHEAST (OUTPATIENT)
Dept: CARDIOLOGY | Facility: CLINIC | Age: 75
End: 2020-05-28

## 2020-05-28 DIAGNOSIS — I48.0 PAROXYSMAL ATRIAL FIBRILLATION (H): ICD-10-CM

## 2020-05-28 DIAGNOSIS — I10 BENIGN ESSENTIAL HYPERTENSION: ICD-10-CM

## 2020-06-03 ENCOUNTER — RECORDS - HEALTHEAST (OUTPATIENT)
Dept: LAB | Facility: CLINIC | Age: 75
End: 2020-06-03

## 2020-06-03 ENCOUNTER — AMBULATORY - HEALTHEAST (OUTPATIENT)
Dept: RHEUMATOLOGY | Facility: CLINIC | Age: 75
End: 2020-06-03

## 2020-06-03 ENCOUNTER — COMMUNICATION - HEALTHEAST (OUTPATIENT)
Dept: RHEUMATOLOGY | Facility: CLINIC | Age: 75
End: 2020-06-03

## 2020-06-03 ENCOUNTER — AMBULATORY - HEALTHEAST (OUTPATIENT)
Dept: LAB | Facility: CLINIC | Age: 75
End: 2020-06-03

## 2020-06-03 DIAGNOSIS — M05.79 RHEUMATOID ARTHRITIS INVOLVING MULTIPLE SITES WITH POSITIVE RHEUMATOID FACTOR (H): ICD-10-CM

## 2020-06-03 LAB
ALBUMIN SERPL-MCNC: 3.8 G/DL (ref 3.5–5)
ALT SERPL W P-5'-P-CCNC: 17 U/L (ref 0–45)
CK SERPL-CCNC: 36 U/L (ref 30–190)
CREAT SERPL-MCNC: 1.11 MG/DL (ref 0.6–1.1)
ERYTHROCYTE [DISTWIDTH] IN BLOOD BY AUTOMATED COUNT: 11.8 % (ref 11–14.5)
GFR SERPL CREATININE-BSD FRML MDRD: 48 ML/MIN/1.73M2
HCT VFR BLD AUTO: 39.7 % (ref 35–47)
HGB BLD-MCNC: 13.4 G/DL (ref 12–16)
MCH RBC QN AUTO: 33.2 PG (ref 27–34)
MCHC RBC AUTO-ENTMCNC: 33.7 G/DL (ref 32–36)
MCV RBC AUTO: 98 FL (ref 80–100)
PLATELET # BLD AUTO: 309 THOU/UL (ref 140–440)
PMV BLD AUTO: 7.6 FL (ref 7–10)
RBC # BLD AUTO: 4.03 MILL/UL (ref 3.8–5.4)
WBC: 7.1 THOU/UL (ref 4–11)

## 2020-06-04 ENCOUNTER — RECORDS - HEALTHEAST (OUTPATIENT)
Dept: LAB | Facility: CLINIC | Age: 75
End: 2020-06-04

## 2020-06-05 LAB
ERYTHROCYTE [DISTWIDTH] IN BLOOD BY AUTOMATED COUNT: 13.8 % (ref 11–14.5)
HCT VFR BLD AUTO: 41.3 % (ref 35–47)
HGB BLD-MCNC: 13.5 G/DL (ref 12–16)
MCH RBC QN AUTO: 32.7 PG (ref 27–34)
MCHC RBC AUTO-ENTMCNC: 32.7 G/DL (ref 32–36)
MCV RBC AUTO: 100 FL (ref 80–100)
PLATELET # BLD AUTO: 358 THOU/UL (ref 140–440)
PMV BLD AUTO: 10.7 FL (ref 8.5–12.5)
RBC # BLD AUTO: 4.13 MILL/UL (ref 3.8–5.4)
TSH SERPL DL<=0.005 MIU/L-ACNC: 0.93 UIU/ML (ref 0.3–5)
WBC: 6.7 THOU/UL (ref 4–11)

## 2020-06-23 ENCOUNTER — COMMUNICATION - HEALTHEAST (OUTPATIENT)
Dept: RHEUMATOLOGY | Facility: CLINIC | Age: 75
End: 2020-06-23

## 2020-06-23 ENCOUNTER — AMBULATORY - HEALTHEAST (OUTPATIENT)
Dept: PHARMACY | Facility: CLINIC | Age: 75
End: 2020-06-23

## 2020-06-23 DIAGNOSIS — M05.79 RHEUMATOID ARTHRITIS INVOLVING MULTIPLE SITES WITH POSITIVE RHEUMATOID FACTOR (H): ICD-10-CM

## 2020-07-01 ENCOUNTER — OFFICE VISIT - HEALTHEAST (OUTPATIENT)
Dept: RHEUMATOLOGY | Facility: CLINIC | Age: 75
End: 2020-07-01

## 2020-07-01 DIAGNOSIS — Z79.899 HIGH RISK MEDICATION USE: ICD-10-CM

## 2020-07-01 DIAGNOSIS — M05.79 RHEUMATOID ARTHRITIS INVOLVING MULTIPLE SITES WITH POSITIVE RHEUMATOID FACTOR (H): ICD-10-CM

## 2020-07-01 DIAGNOSIS — M15.0 PRIMARY OSTEOARTHRITIS INVOLVING MULTIPLE JOINTS: ICD-10-CM

## 2020-07-01 DIAGNOSIS — N18.30 CRF (CHRONIC RENAL FAILURE), STAGE 3 (MODERATE) (H): ICD-10-CM

## 2020-07-08 ENCOUNTER — COMMUNICATION - HEALTHEAST (OUTPATIENT)
Dept: RHEUMATOLOGY | Facility: CLINIC | Age: 75
End: 2020-07-08

## 2020-07-08 DIAGNOSIS — M05.9 SEROPOSITIVE RHEUMATOID ARTHRITIS (H): ICD-10-CM

## 2020-07-17 ENCOUNTER — RECORDS - HEALTHEAST (OUTPATIENT)
Dept: LAB | Facility: CLINIC | Age: 75
End: 2020-07-17

## 2020-07-17 LAB
ANION GAP SERPL CALCULATED.3IONS-SCNC: 6 MMOL/L (ref 5–18)
BUN SERPL-MCNC: 27 MG/DL (ref 8–28)
CALCIUM SERPL-MCNC: 10.7 MG/DL (ref 8.5–10.5)
CHLORIDE BLD-SCNC: 102 MMOL/L (ref 98–107)
CO2 SERPL-SCNC: 31 MMOL/L (ref 22–31)
CREAT SERPL-MCNC: 1.41 MG/DL (ref 0.6–1.1)
GFR SERPL CREATININE-BSD FRML MDRD: 36 ML/MIN/1.73M2
GLUCOSE BLD-MCNC: 101 MG/DL (ref 70–125)
POTASSIUM BLD-SCNC: 4.3 MMOL/L (ref 3.5–5)
SODIUM SERPL-SCNC: 139 MMOL/L (ref 136–145)

## 2020-07-29 ENCOUNTER — AMBULATORY - HEALTHEAST (OUTPATIENT)
Dept: RHEUMATOLOGY | Facility: CLINIC | Age: 75
End: 2020-07-29

## 2020-07-31 ENCOUNTER — RECORDS - HEALTHEAST (OUTPATIENT)
Dept: LAB | Facility: CLINIC | Age: 75
End: 2020-07-31

## 2020-07-31 LAB
ANION GAP SERPL CALCULATED.3IONS-SCNC: 10 MMOL/L (ref 5–18)
BUN SERPL-MCNC: 19 MG/DL (ref 8–28)
CALCIUM SERPL-MCNC: 9.7 MG/DL (ref 8.5–10.5)
CALCIUM, IONIZED MEASURED: 1.3 MMOL/L (ref 1.11–1.3)
CHLORIDE BLD-SCNC: 103 MMOL/L (ref 98–107)
CO2 SERPL-SCNC: 24 MMOL/L (ref 22–31)
CREAT SERPL-MCNC: 1.02 MG/DL (ref 0.6–1.1)
GFR SERPL CREATININE-BSD FRML MDRD: 53 ML/MIN/1.73M2
GLUCOSE BLD-MCNC: 79 MG/DL (ref 70–125)
ION CA PH 7.4: 1.22 MMOL/L (ref 1.11–1.3)
PH: 7.3 (ref 7.35–7.45)
POTASSIUM BLD-SCNC: 4.1 MMOL/L (ref 3.5–5)
PTH-INTACT SERPL-MCNC: 156 PG/ML (ref 10–86)
SODIUM SERPL-SCNC: 137 MMOL/L (ref 136–145)

## 2020-08-03 LAB — 25(OH)D3 SERPL-MCNC: 70.5 NG/ML (ref 30–80)

## 2020-08-26 ENCOUNTER — AMBULATORY - HEALTHEAST (OUTPATIENT)
Dept: RHEUMATOLOGY | Facility: CLINIC | Age: 75
End: 2020-08-26

## 2020-09-19 ENCOUNTER — COMMUNICATION - HEALTHEAST (OUTPATIENT)
Dept: RHEUMATOLOGY | Facility: CLINIC | Age: 75
End: 2020-09-19

## 2020-09-19 DIAGNOSIS — M05.79 RHEUMATOID ARTHRITIS INVOLVING MULTIPLE SITES WITH POSITIVE RHEUMATOID FACTOR (H): ICD-10-CM

## 2020-09-23 ENCOUNTER — AMBULATORY - HEALTHEAST (OUTPATIENT)
Dept: LAB | Facility: CLINIC | Age: 75
End: 2020-09-23

## 2020-09-23 ENCOUNTER — AMBULATORY - HEALTHEAST (OUTPATIENT)
Dept: RHEUMATOLOGY | Facility: CLINIC | Age: 75
End: 2020-09-23

## 2020-09-23 DIAGNOSIS — M05.79 RHEUMATOID ARTHRITIS INVOLVING MULTIPLE SITES WITH POSITIVE RHEUMATOID FACTOR (H): ICD-10-CM

## 2020-09-23 LAB
ALBUMIN SERPL-MCNC: 3.7 G/DL (ref 3.5–5)
ALT SERPL W P-5'-P-CCNC: 14 U/L (ref 0–45)
CREAT SERPL-MCNC: 0.99 MG/DL (ref 0.6–1.1)
ERYTHROCYTE [DISTWIDTH] IN BLOOD BY AUTOMATED COUNT: 10.8 % (ref 11–14.5)
GFR SERPL CREATININE-BSD FRML MDRD: 55 ML/MIN/1.73M2
HCT VFR BLD AUTO: 36.8 % (ref 35–47)
HGB BLD-MCNC: 12.3 G/DL (ref 12–16)
MCH RBC QN AUTO: 33.3 PG (ref 27–34)
MCHC RBC AUTO-ENTMCNC: 33.4 G/DL (ref 32–36)
MCV RBC AUTO: 100 FL (ref 80–100)
PLATELET # BLD AUTO: 307 THOU/UL (ref 140–440)
PMV BLD AUTO: 7.4 FL (ref 7–10)
RBC # BLD AUTO: 3.69 MILL/UL (ref 3.8–5.4)
WBC: 5.6 THOU/UL (ref 4–11)

## 2020-10-16 ENCOUNTER — RECORDS - HEALTHEAST (OUTPATIENT)
Dept: LAB | Facility: CLINIC | Age: 75
End: 2020-10-16

## 2020-10-16 LAB
CALCIUM, IONIZED MEASURED: 1.33 MMOL/L (ref 1.11–1.3)
ION CA PH 7.4: 1.26 MMOL/L (ref 1.11–1.3)
PH: 7.3 (ref 7.35–7.45)
PTH-INTACT SERPL-MCNC: 115 PG/ML (ref 10–86)

## 2020-10-19 LAB — 25(OH)D3 SERPL-MCNC: 58.2 NG/ML (ref 30–80)

## 2020-10-28 ENCOUNTER — AMBULATORY - HEALTHEAST (OUTPATIENT)
Dept: RHEUMATOLOGY | Facility: CLINIC | Age: 75
End: 2020-10-28

## 2020-10-28 DIAGNOSIS — M05.9 SEROPOSITIVE RHEUMATOID ARTHRITIS (H): ICD-10-CM

## 2020-11-18 ENCOUNTER — AMBULATORY - HEALTHEAST (OUTPATIENT)
Dept: RHEUMATOLOGY | Facility: CLINIC | Age: 75
End: 2020-11-18

## 2020-12-12 ENCOUNTER — COMMUNICATION - HEALTHEAST (OUTPATIENT)
Dept: RHEUMATOLOGY | Facility: CLINIC | Age: 75
End: 2020-12-12

## 2020-12-12 DIAGNOSIS — M05.79 RHEUMATOID ARTHRITIS INVOLVING MULTIPLE SITES WITH POSITIVE RHEUMATOID FACTOR (H): ICD-10-CM

## 2020-12-16 ENCOUNTER — AMBULATORY - HEALTHEAST (OUTPATIENT)
Dept: LAB | Facility: CLINIC | Age: 75
End: 2020-12-16

## 2020-12-16 ENCOUNTER — OFFICE VISIT - HEALTHEAST (OUTPATIENT)
Dept: RHEUMATOLOGY | Facility: CLINIC | Age: 75
End: 2020-12-16

## 2020-12-16 DIAGNOSIS — N18.30 CRF (CHRONIC RENAL FAILURE), STAGE 3 (MODERATE) (H): ICD-10-CM

## 2020-12-16 DIAGNOSIS — Z79.899 HIGH RISK MEDICATION USE: ICD-10-CM

## 2020-12-16 DIAGNOSIS — M05.79 RHEUMATOID ARTHRITIS INVOLVING MULTIPLE SITES WITH POSITIVE RHEUMATOID FACTOR (H): ICD-10-CM

## 2020-12-16 DIAGNOSIS — M15.0 PRIMARY OSTEOARTHRITIS INVOLVING MULTIPLE JOINTS: ICD-10-CM

## 2020-12-16 LAB
ALBUMIN SERPL-MCNC: 3.6 G/DL (ref 3.5–5)
ALT SERPL W P-5'-P-CCNC: 18 U/L (ref 0–45)
CREAT SERPL-MCNC: 0.96 MG/DL (ref 0.6–1.1)
ERYTHROCYTE [DISTWIDTH] IN BLOOD BY AUTOMATED COUNT: 11.1 % (ref 11–14.5)
GFR SERPL CREATININE-BSD FRML MDRD: 57 ML/MIN/1.73M2
HCT VFR BLD AUTO: 36.8 % (ref 35–47)
HGB BLD-MCNC: 12.5 G/DL (ref 12–16)
MCH RBC QN AUTO: 33.2 PG (ref 27–34)
MCHC RBC AUTO-ENTMCNC: 33.9 G/DL (ref 32–36)
MCV RBC AUTO: 98 FL (ref 80–100)
PLATELET # BLD AUTO: 302 THOU/UL (ref 140–440)
PMV BLD AUTO: 7.9 FL (ref 7–10)
RBC # BLD AUTO: 3.75 MILL/UL (ref 3.8–5.4)
WBC: 5.3 THOU/UL (ref 4–11)

## 2021-01-13 ENCOUNTER — RECORDS - HEALTHEAST (OUTPATIENT)
Dept: LAB | Facility: CLINIC | Age: 76
End: 2021-01-13

## 2021-01-13 ENCOUNTER — AMBULATORY - HEALTHEAST (OUTPATIENT)
Dept: RHEUMATOLOGY | Facility: CLINIC | Age: 76
End: 2021-01-13

## 2021-01-13 LAB
ALBUMIN SERPL-MCNC: 3.8 G/DL (ref 3.5–5)
ALP SERPL-CCNC: 95 U/L (ref 45–120)
ALT SERPL W P-5'-P-CCNC: 17 U/L (ref 0–45)
ANION GAP SERPL CALCULATED.3IONS-SCNC: 10 MMOL/L (ref 5–18)
AST SERPL W P-5'-P-CCNC: 22 U/L (ref 0–40)
BILIRUB SERPL-MCNC: 0.6 MG/DL (ref 0–1)
BUN SERPL-MCNC: 24 MG/DL (ref 8–28)
CALCIUM SERPL-MCNC: 9.9 MG/DL (ref 8.5–10.5)
CALCIUM, IONIZED MEASURED: 1.31 MMOL/L (ref 1.11–1.3)
CHLORIDE BLD-SCNC: 105 MMOL/L (ref 98–107)
CHOLEST SERPL-MCNC: 151 MG/DL
CO2 SERPL-SCNC: 27 MMOL/L (ref 22–31)
CREAT SERPL-MCNC: 0.93 MG/DL (ref 0.6–1.1)
FASTING STATUS PATIENT QL REPORTED: NORMAL
GFR SERPL CREATININE-BSD FRML MDRD: 59 ML/MIN/1.73M2
GLUCOSE BLD-MCNC: 94 MG/DL (ref 70–125)
HDLC SERPL-MCNC: 67 MG/DL
ION CA PH 7.4: 1.25 MMOL/L (ref 1.11–1.3)
LDLC SERPL CALC-MCNC: 66 MG/DL
PH: 7.33 (ref 7.35–7.45)
POTASSIUM BLD-SCNC: 3.9 MMOL/L (ref 3.5–5)
PROT SERPL-MCNC: 6.2 G/DL (ref 6–8)
PTH-INTACT SERPL-MCNC: 126 PG/ML (ref 10–86)
SODIUM SERPL-SCNC: 142 MMOL/L (ref 136–145)
TRIGL SERPL-MCNC: 92 MG/DL

## 2021-01-14 LAB — 25(OH)D3 SERPL-MCNC: 45.1 NG/ML (ref 30–80)

## 2021-02-08 ENCOUNTER — AMBULATORY - HEALTHEAST (OUTPATIENT)
Dept: RHEUMATOLOGY | Facility: CLINIC | Age: 76
End: 2021-02-08

## 2021-02-24 ENCOUNTER — COMMUNICATION - HEALTHEAST (OUTPATIENT)
Dept: RHEUMATOLOGY | Facility: CLINIC | Age: 76
End: 2021-02-24

## 2021-02-24 DIAGNOSIS — M05.9 SEROPOSITIVE RHEUMATOID ARTHRITIS (H): ICD-10-CM

## 2021-02-25 ENCOUNTER — COMMUNICATION - HEALTHEAST (OUTPATIENT)
Dept: RHEUMATOLOGY | Facility: CLINIC | Age: 76
End: 2021-02-25

## 2021-02-27 ENCOUNTER — COMMUNICATION - HEALTHEAST (OUTPATIENT)
Dept: RHEUMATOLOGY | Facility: CLINIC | Age: 76
End: 2021-02-27

## 2021-02-27 DIAGNOSIS — M05.79 RHEUMATOID ARTHRITIS INVOLVING MULTIPLE SITES WITH POSITIVE RHEUMATOID FACTOR (H): ICD-10-CM

## 2021-03-08 ENCOUNTER — AMBULATORY - HEALTHEAST (OUTPATIENT)
Dept: LAB | Facility: CLINIC | Age: 76
End: 2021-03-08

## 2021-03-08 ENCOUNTER — AMBULATORY - HEALTHEAST (OUTPATIENT)
Dept: RHEUMATOLOGY | Facility: CLINIC | Age: 76
End: 2021-03-08

## 2021-03-08 DIAGNOSIS — M05.79 RHEUMATOID ARTHRITIS INVOLVING MULTIPLE SITES WITH POSITIVE RHEUMATOID FACTOR (H): ICD-10-CM

## 2021-03-08 LAB
ALBUMIN SERPL-MCNC: 3.7 G/DL (ref 3.5–5)
ALT SERPL W P-5'-P-CCNC: 18 U/L (ref 0–45)
CREAT SERPL-MCNC: 0.99 MG/DL (ref 0.6–1.1)
ERYTHROCYTE [DISTWIDTH] IN BLOOD BY AUTOMATED COUNT: 14.5 % (ref 11–14.5)
GFR SERPL CREATININE-BSD FRML MDRD: 55 ML/MIN/1.73M2
HCT VFR BLD AUTO: 36.1 % (ref 35–47)
HGB BLD-MCNC: 11.9 G/DL (ref 12–16)
MCH RBC QN AUTO: 32.4 PG (ref 27–34)
MCHC RBC AUTO-ENTMCNC: 33 G/DL (ref 32–36)
MCV RBC AUTO: 98 FL (ref 80–100)
PLATELET # BLD AUTO: 266 THOU/UL (ref 140–440)
PMV BLD AUTO: 9.4 FL (ref 7–10)
RBC # BLD AUTO: 3.67 MILL/UL (ref 3.8–5.4)
WBC: 5.5 THOU/UL (ref 4–11)

## 2021-04-07 ENCOUNTER — AMBULATORY - HEALTHEAST (OUTPATIENT)
Dept: RHEUMATOLOGY | Facility: CLINIC | Age: 76
End: 2021-04-07

## 2021-04-30 ENCOUNTER — RECORDS - HEALTHEAST (OUTPATIENT)
Dept: LAB | Facility: CLINIC | Age: 76
End: 2021-04-30

## 2021-04-30 LAB
ANION GAP SERPL CALCULATED.3IONS-SCNC: 9 MMOL/L (ref 5–18)
BUN SERPL-MCNC: 19 MG/DL (ref 8–28)
CALCIUM SERPL-MCNC: 9.6 MG/DL (ref 8.5–10.5)
CALCIUM SERPL-MCNC: 9.6 MG/DL (ref 8.5–10.5)
CALCIUM, IONIZED MEASURED: 1.35 MMOL/L (ref 1.11–1.3)
CHLORIDE BLD-SCNC: 104 MMOL/L (ref 98–107)
CO2 SERPL-SCNC: 27 MMOL/L (ref 22–31)
CREAT SERPL-MCNC: 0.89 MG/DL (ref 0.6–1.1)
CREAT SERPL-MCNC: 0.89 MG/DL (ref 0.6–1.1)
GFR SERPL CREATININE-BSD FRML MDRD: >60 ML/MIN/1.73M2
GFR SERPL CREATININE-BSD FRML MDRD: >60 ML/MIN/1.73M2
GLUCOSE BLD-MCNC: 81 MG/DL (ref 70–125)
ION CA PH 7.4: 1.26 MMOL/L (ref 1.11–1.3)
PH: 7.27 (ref 7.35–7.45)
PHOSPHATE SERPL-MCNC: 3.3 MG/DL (ref 2.5–4.5)
POTASSIUM BLD-SCNC: 4.2 MMOL/L (ref 3.5–5)
PTH-INTACT SERPL-MCNC: 113 PG/ML (ref 10–86)
SODIUM SERPL-SCNC: 140 MMOL/L (ref 136–145)

## 2021-05-10 ENCOUNTER — AMBULATORY - HEALTHEAST (OUTPATIENT)
Dept: RHEUMATOLOGY | Facility: CLINIC | Age: 76
End: 2021-05-10

## 2021-05-11 ENCOUNTER — COMMUNICATION - HEALTHEAST (OUTPATIENT)
Dept: CARDIOLOGY | Facility: CLINIC | Age: 76
End: 2021-05-11

## 2021-05-12 ENCOUNTER — COMMUNICATION - HEALTHEAST (OUTPATIENT)
Dept: RHEUMATOLOGY | Facility: CLINIC | Age: 76
End: 2021-05-12

## 2021-05-13 ENCOUNTER — RECORDS - HEALTHEAST (OUTPATIENT)
Dept: RHEUMATOLOGY | Facility: CLINIC | Age: 76
End: 2021-05-13

## 2021-05-21 ENCOUNTER — COMMUNICATION - HEALTHEAST (OUTPATIENT)
Dept: RHEUMATOLOGY | Facility: CLINIC | Age: 76
End: 2021-05-21

## 2021-05-21 DIAGNOSIS — M05.9 SEROPOSITIVE RHEUMATOID ARTHRITIS (H): ICD-10-CM

## 2021-05-24 ENCOUNTER — RECORDS - HEALTHEAST (OUTPATIENT)
Dept: ADMINISTRATIVE | Facility: CLINIC | Age: 76
End: 2021-05-24

## 2021-05-24 ENCOUNTER — RECORDS - HEALTHEAST (OUTPATIENT)
Dept: ADMINISTRATIVE | Facility: OTHER | Age: 76
End: 2021-05-24

## 2021-05-25 ENCOUNTER — RECORDS - HEALTHEAST (OUTPATIENT)
Dept: ADMINISTRATIVE | Facility: CLINIC | Age: 76
End: 2021-05-25

## 2021-05-26 ENCOUNTER — OFFICE VISIT - HEALTHEAST (OUTPATIENT)
Dept: CARDIOLOGY | Facility: CLINIC | Age: 76
End: 2021-05-26

## 2021-05-26 ENCOUNTER — RECORDS - HEALTHEAST (OUTPATIENT)
Dept: ADMINISTRATIVE | Facility: CLINIC | Age: 76
End: 2021-05-26

## 2021-05-26 DIAGNOSIS — I10 BENIGN ESSENTIAL HYPERTENSION: ICD-10-CM

## 2021-05-26 DIAGNOSIS — I48.0 PAROXYSMAL ATRIAL FIBRILLATION (H): ICD-10-CM

## 2021-05-26 LAB
ATRIAL RATE - MUSE: 70 BPM
DIASTOLIC BLOOD PRESSURE - MUSE: NORMAL
INTERPRETATION ECG - MUSE: NORMAL
P AXIS - MUSE: 56 DEGREES
PR INTERVAL - MUSE: 214 MS
QRS DURATION - MUSE: 84 MS
QT - MUSE: 380 MS
QTC - MUSE: 410 MS
R AXIS - MUSE: 46 DEGREES
SYSTOLIC BLOOD PRESSURE - MUSE: NORMAL
T AXIS - MUSE: 18 DEGREES
VENTRICULAR RATE- MUSE: 70 BPM

## 2021-05-27 ENCOUNTER — RECORDS - HEALTHEAST (OUTPATIENT)
Dept: ADMINISTRATIVE | Facility: CLINIC | Age: 76
End: 2021-05-27

## 2021-05-27 NOTE — PATIENT INSTRUCTIONS - HE
Fatuma Henry,    It was a pleasure to see you today at the [facility].     My recommendations after this visit include:    Stop Xarelto 3 days before surgery and restart ASAP afterwards.  No bridging required.    Continue current medications.    Call if you have symptomatic A fib or resting heart rates consistently > 100.    Follow up in 1 year, or sooner if needed.    Teressa Cespedes, [facility], Electrophysiology  [phone]  Libby (nurse) [phone]

## 2021-05-28 NOTE — TELEPHONE ENCOUNTER
Medication: Cimzia  Sponsor: [employer]  Phone #: [phone]  Fax #: [phone]    [place] [facility].

## 2021-05-28 NOTE — TELEPHONE ENCOUNTER
Called into [employer] and spoke with Emili who confirmed that they received both fully completed application and the Cimzia prescription I faxed in today. Should have a determination by late tomorrow early Wednesday.

## 2021-05-28 NOTE — TELEPHONE ENCOUNTER
Cost of Care Estimate for Cimzia injected  In clinic through Medicare Part B: Copay estimate $655.36 per injection. Can check for supplemental insurance and also look into grants. Another idea is to go through Pharmacy benefits (test claim = $100.00). There are no grants available for RA at this time. Will Reach out to  to see if they can offer patient assistance.

## 2021-05-28 NOTE — TELEPHONE ENCOUNTER
[place] [facility] [place] informed application was denied because patient has Medicare Part D. Spoke with my 2 liaison leads at this didn't seem right to them either and was advised to submit letter of how patient cannot afford $100.00 copay along with a screenshot of pt's copay from the pharmacy's system. I faxed a letter along with the screenshot into [facility], as well as left message for Karyn Krause regional rep wendi Kennedy.

## 2021-05-28 NOTE — TELEPHONE ENCOUNTER
Pt received two letters from the free drug program, one states they need proof of pt and pts spouse income for the year. Pt is looking for her tax return from last year and will call back if she finds it and would like to bring it in so we can fax it to them. Pt will call back with an update.

## 2021-05-28 NOTE — TELEPHONE ENCOUNTER
Pt notified that we received Cimzia samples so she can continue medication until we get coverage sorted out. Pt coming in on Monday 5/13 at 11am for next Cimzia injection.    Lauren also informed me that pt was approved for Free Drug program and we will be getting first shipment here at the clinic on Tuesday 5/14/19. Pt was notified of this as well.

## 2021-05-28 NOTE — TELEPHONE ENCOUNTER
Submitted request for cost of care estimated online for clinic administered Cimzia. Turn around time is 24 -48 hours. I will call [phone], if no word by 4/24/19. No PA is required it is either covered or not covered, and from Medicare.gov it is covered.

## 2021-05-29 ENCOUNTER — RECORDS - HEALTHEAST (OUTPATIENT)
Dept: ADMINISTRATIVE | Facility: CLINIC | Age: 76
End: 2021-05-29

## 2021-05-29 NOTE — PROGRESS NOTES
ASSESSMENT AND PLAN:  Fatuma Henry 73 y.o. female is here for follow-up.  She has seropositive rheumatoid arthritis, osteoarthritis, intermittent renal impairment, on Cimzia, low-dose methotrexate, folic acid, doing great with this combination, recent labs done this morning only when she is going to check those labs in future couple days prior to her visit, she was given Cimzia injection here, she is to follow-up here in 6 months.  Labs every 3 months at [facility].  She is aware not to take nonsteroidals such as for OA related symptoms.      Diagnoses and all orders for this visit:    Rheumatoid arthritis involving multiple sites with positive rheumatoid factor (H)  -     methotrexate 2.5 MG tablet; TAKE 4 TABLETS BY MOUTH ONCE A WEEK  Dispense: 52 tablet; Refill: 0    CRF (chronic renal failure), stage 3 (moderate) (H)    High risk medication use    Primary osteoarthritis involving multiple joints      HISTORY OF PRESENTING ILLNESS:      Fatuma Henry, 73 y.o., female is here for seropositive rheumatoid arthritis.  This is associated with positive rheumatoid factor negative anti-CCP antibody.  She has osteoarthritis.  Intermittent renal impairment.  Overall she is doing great, she gets Cimzia injections here in the clinic and one was done today, she is on methotrexate modest dose, recent labs are reviewed, CBC is back in normal.  She does have renal impairment and aware of that and takes acetaminophen on as-needed basis.  For her back pain associated lumbar spondylosis she gets it more with activity.  Relief by rest.  She exercises and stretches.  She does not take nonsteroidals in view of the renal impairment.  She is aware of the option of taking acetaminophen.  She reports no mouth ulcers no photosensitivity there is no rash reported Symptoms.  She does not have a history of psoriasis herself for the family or that of inflammatory bowel disease.  She is a smoker for the past 20 years alcohol.  She does  not exercise.  She follows up at her primary physician's in [facility].  She has had a history of hypertension and cataracts. Further historical information and ADL limitations as noted in the multidimensional health assessment questionnaire attached in the EMR. She recalls it was approximately 6+ years ago when she started hurting.  She recalls  began to hurt her shoulders.  This was quite severe pain.  She then started hurting in her hands and wrists as well.  Fairly soon she was seen in rheumatology by Dr. Brooks labs were done.  She'll call for those suggested that she has rheumatoid arthritis.  She was given prednisone.  This did not help.  She remembers that all it did for her was to make her face swollen.  Then she took methotrexate for 3 months that was no good either.  She did tolerate this well.  She went on to have Orencia for 2 years.  That did very well during this time.  Than 1 time suddenly it stopped working she was then changed to Actemra.  Once again and the Actemra did the trick for her further to 2-1/2 years that she was on it.  Currently she is on Cimzia, and feels this has helped her quite nicely.   Of late she's been hurting in her lower back, legs.  This is worse with activity.  She wonders if this is because the current treatment is not working.  She also recalls an episode where she had slipped and landed on her back.  This was from a third step at her house.  And she lives with her .  Her sisters have had osteoarthritis and knee/hip replacement surgeries.  She rates her pain as moderately severe she still notices stiffness of 2 hours especially in the lower back.        ALLERGIES:Codeine    PAST MEDICAL/ACTIVE PROBLEMS/MEDICATION/ FAMILY HISTORY/SOCIAL DATA:  The patient has a family history of  Past Medical History:   Diagnosis Date     Atrial fibrillation (H)      CRF (chronic renal failure)      GERD (gastroesophageal reflux disease)      Hypertension       Hypovolemic shock (H)      Influenza A 2017     Rheumatoid arthritis (H)      Sepsis (H) 2017     Social History     Tobacco Use   Smoking Status Former Smoker     Packs/day: 0.50     Types: Cigarettes     Last attempt to quit: 2017     Years since quittin.4   Smokeless Tobacco Never Used     Patient Active Problem List   Diagnosis     Foot Pain (Soft Tissue)     Limb pain     Osteoarthritis     Osteoporosis dxa 2006      High risk medication use     Midline low back pain without sciatica     Rheumatoid arthritis involving multiple sites with positive rheumatoid factor (H)     Dyspnea     Reactive airway disease     Paroxysmal atrial fibrillation (H)     Nonsustained ventricular tachycardia (H)     CRF (chronic renal failure), stage 3 (moderate) (H)     Current Outpatient Medications   Medication Sig Dispense Refill     acetaminophen (TYLENOL) 500 MG tablet Take 1-2 tablets (500-1,000 mg total) by mouth every 6 (six) hours as needed.  0     calcium-vitamin D (CALCIUM-VITAMIN D) 500 mg(1,250mg) -200 unit per tablet Take 1 tablet by mouth 2 (two) times a day with meals.       certolizumab pegol (CIMZIA) 400 mg (200 mg x 2 vials) Kit injection Inject 400 mg under the skin every 28 days. 400 mg 5     compressor, for nebulizer Eva Nebulizer treatment qid prn 1 Device 0     flaxseed oil 1,000 mg cap Take 1 capsule by mouth daily.        folic acid (FOLVITE) 1 MG tablet Take one tab po qd except the day when taking methotrexate.. 90 tablet 1     FOLIC ACID/MULTIVITS-MIN/LUT (CENTRUM SILVER ORAL) Take 1 tablet by mouth daily.        furosemide (LASIX) 20 MG tablet Take 20 mg by mouth daily as needed (Leg swelling).       methotrexate 2.5 MG tablet TAKE 4 TABLETS BY MOUTH ONCE A WEEK 52 tablet 0     metoprolol succinate (TOPROL-XL) 100 MG 24 hr tablet Take 1 tablet (100 mg total) by mouth daily. 30 tablet 0     pravastatin (PRAVACHOL) 20 MG tablet Take 20 mg by mouth daily.       ranitidine (ZANTAC) 150  MG tablet Take 150 mg by mouth daily.        rivaroxaban (XARELTO) 20 mg Tab Take 1 tablet (20 mg total) by mouth daily. Take with a FULL meal. 90 tablet 3     ipratropium-albuterol (DUO-NEB) 0.5-2.5 mg/3 mL nebulizer Take 3 mL by nebulization every 4 (four) hours as needed (home reg). 30 vial 1     Current Facility-Administered Medications   Medication Dose Route Frequency Provider Last Rate Last Dose     certolizumab pegol injection 400 mg (CIMZIA)  400 mg Subcutaneous Q28 Days Bobby Rush MBBS   400 mg at 05/13/19 1146     DETAILED EXAMINATION  06/10/19  :  Vitals:    06/10/19 1141   BP: 130/68   Patient Site: Right Arm   Patient Position: Sitting   Cuff Size: Adult Large   Pulse: 80   Weight: 178 lb (80.7 kg)     Alert oriented. Head including the face is examined for malar rash, heliotropes, scarring, lupus pernio. Eyes examined for redness such as in episcleritis/scleritis, periorbital lesions.   Neck/ Face examined for parotid gland swelling, range of motion of neck.  Left upper and lower and right upper and lower extremities examined for tenderness, swelling, warmth of the appendicular joints, range of motion, edema, rash.  Some of the important findings included: she does not have evidence of synovitis in any of the palpable joints of the upper extremities.  No significant deformities of the digits.  No JLT effusion or warmth of the knees.  LAB / IMAGING DATA:  ALT   Date Value Ref Range Status   03/11/2019 16 0 - 45 U/L Final   12/03/2018 14 0 - 45 U/L Final   10/19/2018 14 0 - 45 U/L Final     Albumin   Date Value Ref Range Status   03/11/2019 3.6 3.5 - 5.0 g/dL Final   12/03/2018 3.6 3.5 - 5.0 g/dL Final   10/19/2018 3.7 3.5 - 5.0 g/dL Final     Creatinine   Date Value Ref Range Status   03/11/2019 1.01 0.60 - 1.10 mg/dL Final   12/03/2018 0.94 0.60 - 1.10 mg/dL Final   10/19/2018 1.10 0.60 - 1.10 mg/dL Final       WBC   Date Value Ref Range Status   03/11/2019 7.3 4.0 - 11.0 thou/uL Final   12/03/2018  8.2 4.0 - 11.0 thou/uL Final     Hemoglobin   Date Value Ref Range Status   03/11/2019 13.6 12.0 - 16.0 g/dL Final   12/03/2018 13.5 12.0 - 16.0 g/dL Final   10/04/2018 12.4 12.0 - 16.0 g/dL Final     Platelets   Date Value Ref Range Status   03/11/2019 293 140 - 440 thou/uL Final   12/03/2018 309 140 - 440 thou/uL Final   10/04/2018 323 140 - 440 thou/uL Final       Lab Results   Component Value Date    RF 62.5 (H) 03/02/2015    SEDRATE 34 (H) 12/09/2015

## 2021-05-30 VITALS — WEIGHT: 203 LBS | BODY MASS INDEX: 33.95 KG/M2

## 2021-05-31 ENCOUNTER — RECORDS - HEALTHEAST (OUTPATIENT)
Dept: ADMINISTRATIVE | Facility: CLINIC | Age: 76
End: 2021-05-31

## 2021-05-31 VITALS — BODY MASS INDEX: 30.6 KG/M2 | HEIGHT: 65 IN | WEIGHT: 183.7 LBS

## 2021-05-31 VITALS — BODY MASS INDEX: 31.16 KG/M2 | WEIGHT: 187 LBS | HEIGHT: 65 IN

## 2021-05-31 VITALS — WEIGHT: 203 LBS | BODY MASS INDEX: 33.82 KG/M2 | HEIGHT: 65 IN

## 2021-06-01 ENCOUNTER — RECORDS - HEALTHEAST (OUTPATIENT)
Dept: ADMINISTRATIVE | Facility: CLINIC | Age: 76
End: 2021-06-01

## 2021-06-01 VITALS — BODY MASS INDEX: 32.49 KG/M2 | WEIGHT: 195 LBS | HEIGHT: 65 IN

## 2021-06-01 VITALS — BODY MASS INDEX: 32.45 KG/M2 | WEIGHT: 195 LBS

## 2021-06-02 ENCOUNTER — RECORDS - HEALTHEAST (OUTPATIENT)
Dept: ADMINISTRATIVE | Facility: CLINIC | Age: 76
End: 2021-06-02

## 2021-06-02 ENCOUNTER — AMBULATORY - HEALTHEAST (OUTPATIENT)
Dept: LAB | Facility: CLINIC | Age: 76
End: 2021-06-02

## 2021-06-02 ENCOUNTER — OFFICE VISIT - HEALTHEAST (OUTPATIENT)
Dept: RHEUMATOLOGY | Facility: CLINIC | Age: 76
End: 2021-06-02

## 2021-06-02 VITALS — BODY MASS INDEX: 29.66 KG/M2 | WEIGHT: 178 LBS | HEIGHT: 65 IN

## 2021-06-02 VITALS — BODY MASS INDEX: 30.95 KG/M2 | WEIGHT: 186 LBS

## 2021-06-02 DIAGNOSIS — M15.0 PRIMARY OSTEOARTHRITIS INVOLVING MULTIPLE JOINTS: ICD-10-CM

## 2021-06-02 DIAGNOSIS — Z79.899 HIGH RISK MEDICATION USE: ICD-10-CM

## 2021-06-02 DIAGNOSIS — M05.9 SEROPOSITIVE RHEUMATOID ARTHRITIS (H): ICD-10-CM

## 2021-06-02 DIAGNOSIS — M05.79 RHEUMATOID ARTHRITIS INVOLVING MULTIPLE SITES WITH POSITIVE RHEUMATOID FACTOR (H): ICD-10-CM

## 2021-06-02 DIAGNOSIS — R76.8 RHEUMATOID FACTOR POSITIVE: ICD-10-CM

## 2021-06-02 LAB
ALBUMIN SERPL-MCNC: 3.8 G/DL (ref 3.5–5)
ALT SERPL W P-5'-P-CCNC: 17 U/L (ref 0–45)
CREAT SERPL-MCNC: 1.12 MG/DL (ref 0.6–1.1)
ERYTHROCYTE [DISTWIDTH] IN BLOOD BY AUTOMATED COUNT: 14.4 % (ref 11–14.5)
GFR SERPL CREATININE-BSD FRML MDRD: 48 ML/MIN/1.73M2
HCT VFR BLD AUTO: 40.7 % (ref 35–47)
HGB BLD-MCNC: 13.5 G/DL (ref 12–16)
MCH RBC QN AUTO: 32.8 PG (ref 27–34)
MCHC RBC AUTO-ENTMCNC: 33.2 G/DL (ref 32–36)
MCV RBC AUTO: 99 FL (ref 80–100)
PLATELET # BLD AUTO: 305 THOU/UL (ref 140–440)
PMV BLD AUTO: 9.4 FL (ref 7–10)
RBC # BLD AUTO: 4.11 MILL/UL (ref 3.8–5.4)
WBC: 5 THOU/UL (ref 4–11)

## 2021-06-02 RX ORDER — FOLIC ACID 1 MG/1
TABLET ORAL
Qty: 90 TABLET | Refills: 1 | Status: SHIPPED | OUTPATIENT
Start: 2021-06-02 | End: 2022-02-21

## 2021-06-02 ASSESSMENT — MIFFLIN-ST. JEOR: SCORE: 1237.73

## 2021-06-03 VITALS — WEIGHT: 178 LBS | BODY MASS INDEX: 29.62 KG/M2

## 2021-06-03 NOTE — TELEPHONE ENCOUNTER
Last office visit: 10/30/19    Last Lab: 9/4/19    Future office visit:1/23/2020    Future Lab: 12/30/2019

## 2021-06-04 VITALS
BODY MASS INDEX: 28.02 KG/M2 | DIASTOLIC BLOOD PRESSURE: 86 MMHG | WEIGHT: 171 LBS | RESPIRATION RATE: 18 BRPM | SYSTOLIC BLOOD PRESSURE: 128 MMHG

## 2021-06-04 VITALS
BODY MASS INDEX: 28.61 KG/M2 | HEIGHT: 66 IN | SYSTOLIC BLOOD PRESSURE: 132 MMHG | WEIGHT: 178 LBS | DIASTOLIC BLOOD PRESSURE: 62 MMHG

## 2021-06-04 VITALS — WEIGHT: 171 LBS | BODY MASS INDEX: 28.02 KG/M2 | SYSTOLIC BLOOD PRESSURE: 139 MMHG | DIASTOLIC BLOOD PRESSURE: 75 MMHG

## 2021-06-05 VITALS
DIASTOLIC BLOOD PRESSURE: 64 MMHG | SYSTOLIC BLOOD PRESSURE: 128 MMHG | BODY MASS INDEX: 28.19 KG/M2 | WEIGHT: 172 LBS | HEART RATE: 68 BPM

## 2021-06-05 NOTE — PROGRESS NOTES
ASSESSMENT AND PLAN:  Fatuma Henry 74 y.o. female is here for follow-up of rheumatoid arthritis, seropositive, osteoarthritis, doing very well with combination of Cimzia, methotrexate, folic acid.  Her recent labs are within acceptable range.  She is well aware of the management principles of OA.  Continue as now.  We will meet here in 6 months labs every 3.         Diagnoses and all orders for this visit:    Rheumatoid arthritis involving multiple sites with positive rheumatoid factor (H)  -     methotrexate 2.5 MG tablet; Take 4 tablets (10 mg total) by mouth once a week.  Dispense: 52 tablet; Refill: 0    Primary osteoarthritis involving multiple joints    High risk medication use      HISTORY OF PRESENTING ILLNESS:      Fatuma Henry, 74 y.o., female is here for seropositive rheumatoid arthritis.  This is associated with positive rheumatoid factor negative anti-CCP antibody.  She has osteoarthritis.  Intermittent renal impairment.  Overall she is doing great, she gets Cimzia injections here in the clinic, and gets methotrexate at 10 mg/week, folic acid.  Recent labs are within acceptable range.  She noted pain level to be 2.0/10.  Mild pain in the lower back, right elbow.  She is able to do all her day-to-day activities without difficulty with exception of recreational walking 2 miles.  She has noted stiffness in the morning lasting no more than a minute or 2.  In view of the intermittent renal impairment, she is aware of the option of taking acetaminophen.  She reports no mouth ulcers no photosensitivity there is no rash reported Symptoms.  She does not have a history of psoriasis herself for the family or that of inflammatory bowel disease.  She is a smoker for the past 20 years alcohol.  She does not exercise.  She follows up at her primary physician's in [facility].  She has had a history of hypertension and cataracts. Further historical information and ADL limitations as noted in the  multidimensional health assessment questionnaire attached in the EMR. She recalls it was approximately 6+ years ago when she started hurting.  She recalls  began to hurt her shoulders.  This was quite severe pain.  She then started hurting in her hands and wrists as well.  Fairly soon she was seen in rheumatology by Dr. Brooks labs were done.  She'll call for those suggested that she has rheumatoid arthritis.  She was given prednisone.  This did not help.  She remembers that all it did for her was to make her face swollen.  Then she took methotrexate for 3 months that was no good either.  She did tolerate this well.  She went on to have Orencia for 2 years.  That did very well during this time.  Than 1 time suddenly it stopped working she was then changed to Actemra.  Once again and the Actemra did the trick for her further to 2-1/2 years that she was on it.  Currently she is on Cimzia, and feels this has helped her quite nicely.   Of late she's been hurting in her lower back, legs.  This is worse with activity.  She wonders if this is because the current treatment is not working.  She also recalls an episode where she had slipped and landed on her back.  This was from a third step at her house.  And she lives with her .  Her sisters have had osteoarthritis and knee/hip replacement surgeries.  She rates her pain as moderately severe she still notices stiffness of 2 hours especially in the lower back.        ALLERGIES:Codeine    PAST MEDICAL/ACTIVE PROBLEMS/MEDICATION/ FAMILY HISTORY/SOCIAL DATA:  The patient has a family history of  Past Medical History:   Diagnosis Date     Atrial fibrillation (H)      CRF (chronic renal failure)      GERD (gastroesophageal reflux disease)      Hypertension      Hypovolemic shock (H)      Influenza A 12/2017     Rheumatoid arthritis (H)      Sepsis (H) 12/24/2017     Social History     Tobacco Use   Smoking Status Former Smoker     Packs/day: 0.50     Types: Cigarettes      Last attempt to quit: 2017     Years since quittin.0   Smokeless Tobacco Never Used     Patient Active Problem List   Diagnosis     Foot Pain (Soft Tissue)     Limb pain     Osteoporosis dxa 2006      High risk medication use     Midline low back pain without sciatica     Rheumatoid arthritis involving multiple sites with positive rheumatoid factor (H)     Dyspnea     Reactive airway disease     Paroxysmal atrial fibrillation (H)     Nonsustained ventricular tachycardia (H)     CRF (chronic renal failure), stage 3 (moderate) (H)     Primary osteoarthritis involving multiple joints     Current Outpatient Medications   Medication Sig Dispense Refill     acetaminophen (TYLENOL) 500 MG tablet Take 1-2 tablets (500-1,000 mg total) by mouth every 6 (six) hours as needed.  0     calcium-vitamin D (CALCIUM-VITAMIN D) 500 mg(1,250mg) -200 unit per tablet Take 1 tablet by mouth 2 (two) times a day with meals.       certolizumab pegol (CIMZIA) 400 mg (200 mg x 2 vials) Kit injection Inject 400 mg under the skin every 28 days. 400 mg 1     compressor, for nebulizer Eva Nebulizer treatment qid prn 1 Device 0     flaxseed oil 1,000 mg cap Take 1 capsule by mouth daily.        folic acid (FOLVITE) 1 MG tablet Take one tab po qd except the day when taking methotrexate.. 90 tablet 1     FOLIC ACID/MULTIVITS-MIN/LUT (CENTRUM SILVER ORAL) Take 1 tablet by mouth daily.        furosemide (LASIX) 20 MG tablet Take 20 mg by mouth daily as needed (Leg swelling).       ipratropium-albuterol (DUO-NEB) 0.5-2.5 mg/3 mL nebulizer Take 3 mL by nebulization every 4 (four) hours as needed (home reg). 30 vial 1     methotrexate 2.5 MG tablet TAKE 4 TABLETS BY MOUTH ONCE A WEEK 52 tablet 0     metoprolol succinate (TOPROL-XL) 100 MG 24 hr tablet Take 1 tablet (100 mg total) by mouth daily. 30 tablet 0     pravastatin (PRAVACHOL) 20 MG tablet Take 20 mg by mouth daily.       ranitidine (ZANTAC) 150 MG tablet Take 150 mg by mouth daily.  "       rivaroxaban (XARELTO) 20 mg Tab Take 1 tablet (20 mg total) by mouth daily. Take with a FULL meal. 90 tablet 3     Current Facility-Administered Medications   Medication Dose Route Frequency Provider Last Rate Last Dose     certolizumab pegol injection 400 mg (CIMZIA)  400 mg Subcutaneous Q28 Days Bobby RushMELODY   400 mg at 01/03/20 1412     DETAILED EXAMINATION  01/23/20  :  Vitals:    01/23/20 1127   BP: 132/62   Patient Site: Right Arm   Patient Position: Sitting   Cuff Size: Adult Regular   Weight: 178 lb (80.7 kg)   Height: 5' 5.5\" (1.664 m)     Alert oriented. Head including the face is examined for malar rash, heliotropes, scarring, lupus pernio. Eyes examined for redness such as in episcleritis/scleritis, periorbital lesions.   Neck/ Face examined for parotid gland swelling, range of motion of neck.  Left upper and lower and right upper and lower extremities examined for tenderness, swelling, warmth of the appendicular joints, range of motion, edema, rash.  Some of the important findings included:she does not have evidence of synovitis in the palpable joints of the upper extremities.  No significant deformities of the digits.  + [place] nodes.  Range of motion of the shoulders show full abduction.  No JLT effusion or warmth of the knees.          LAB / IMAGING DATA:  ALT   Date Value Ref Range Status   01/03/2020 20 0 - 45 U/L Final   11/04/2019 17 0 - 45 U/L Final   09/04/2019 14 0 - 45 U/L Final     Albumin   Date Value Ref Range Status   01/03/2020 3.7 3.5 - 5.0 g/dL Final   11/04/2019 3.7 3.5 - 5.0 g/dL Final   09/04/2019 3.6 3.5 - 5.0 g/dL Final     Creatinine   Date Value Ref Range Status   01/03/2020 1.01 0.60 - 1.10 mg/dL Final   11/04/2019 1.01 0.60 - 1.10 mg/dL Final   09/04/2019 1.17 (H) 0.60 - 1.10 mg/dL Final       WBC   Date Value Ref Range Status   01/03/2020 7.4 4.0 - 11.0 thou/uL Final   09/04/2019 6.6 4.0 - 11.0 thou/uL Final     Hemoglobin   Date Value Ref Range Status "   01/03/2020 13.6 12.0 - 16.0 g/dL Final   09/04/2019 13.7 12.0 - 16.0 g/dL Final   06/10/2019 13.7 12.0 - 16.0 g/dL Final     Platelets   Date Value Ref Range Status   01/03/2020 379 140 - 440 thou/uL Final   09/04/2019 335 140 - 440 thou/uL Final   06/10/2019 330 140 - 440 thou/uL Final       Lab Results   Component Value Date    RF 62.5 (H) 03/02/2015    SEDRATE 34 (H) 12/09/2015

## 2021-06-06 NOTE — TELEPHONE ENCOUNTER
Called in Cimzia rx per Dr Rush to [facility].     Set up delivery of Cimzia to be sent to [facility] clinic, [facility]

## 2021-06-08 NOTE — TELEPHONE ENCOUNTER
Called [facility] pharmacy to get refill of Cimzia sent to us at [facility], this is expected on 6/4/20

## 2021-06-08 NOTE — PATIENT INSTRUCTIONS - HE
Fatuma Henry,    It was a pleasure to see you today at the [facility].     My recommendations after this visit include:    Continue current medications    Call if heart rate/pulse is consistently > 100 at rest    Follow up in 1 year, or sooner if needed    Teressa Cespedes, CNP  [facility], Electrophysiology  [phone]  EP nurses [phone]

## 2021-06-09 NOTE — TELEPHONE ENCOUNTER
Refilled per [facility] RN refill protocol AND dR [place] TO [facility]

## 2021-06-09 NOTE — PROGRESS NOTES
ASSESSMENT AND PLAN:  Fatuma Henry 75 y.o. female is here for follow-up.  She has rheumatoid office, seropositive widespread osteoarthritis, given Cimzia today, on methotrexate folic acid.  She has renal impairment.  She recently moved, has been hurting in the lower back.  Undergoing physical therapy.  Management principles reviewed.  Will meet here in 6 months labs every 3 continue Cimzia and methotrexate along with folic acid as now.          Diagnoses and all orders for this visit:    Rheumatoid arthritis involving multiple sites with positive rheumatoid factor (H)  -     methotrexate 2.5 MG tablet; TAKE 4 TABLETS BY MOUTH ONCE A WEEK  Dispense: 52 tablet; Refill: 0    Primary osteoarthritis involving multiple joints    CRF (chronic renal failure), stage 3 (moderate) (H)    High risk medication use      HISTORY OF PRESENTING ILLNESS:      Fatuma Henry, 75 y.o., female is here for seropositive rheumatoid arthritis.  This is associated with positive rheumatoid factor negative anti-CCP antibody.  This is gone on for several years.  She finds current combination of Cimzia methotrexate helpful.  She has not had a flareup apart from the right lower back pain this is gone on for the past couple of weeks.  She was in the process of moving her house a lot of lifting bending packing stuff and then unloading and loading.  She is gone to physical therapy for this and making some improvement she rated that pain is moderately severe there is no numbness or tingling sphincter control problem.  She has noted morning stiffness of no more than 20 to 30 minutes recent labs are reviewed renal impairment noted she is aware not to take nonsteroidals.  Cimzia injection was done today.    She reports no mouth ulcers no photosensitivity there is no rash reported Symptoms.  She does not have a history of psoriasis herself for the family or that of inflammatory bowel disease.  She is a smoker for the past 20 years alcohol.  She  does not exercise.  She follows up at her primary physician's in [facility].  She has had a history of hypertension and cataracts. Further historical information and ADL limitations as noted in the multidimensional health assessment questionnaire attached in the EMR. She recalls it was approximately 6+ years ago when she started hurting.  She recalls  began to hurt her shoulders.  This was quite severe pain.  She then started hurting in her hands and wrists as well.  Fairly soon she was seen in rheumatology by Dr. Brooks labs were done.  She'll call for those suggested that she has rheumatoid arthritis.  She was given prednisone.  This did not help.  She remembers that all it did for her was to make her face swollen.  Then she took methotrexate for 3 months that was no good either.  She did tolerate this well.  She went on to have Orencia for 2 years.  That did very well during this time.  Than 1 time suddenly it stopped working she was then changed to Actemra.  Once again and the Actemra did the trick for her further to 2-1/2 years that she was on it.  Currently she is on Cimzia, and feels this has helped her quite nicely.   Of late she's been hurting in her lower back, legs.  This is worse with activity.  She wonders if this is because the current treatment is not working.  She also recalls an episode where she had slipped and landed on her back.  This was from a third step at her house.  And she lives with her .  Her sisters have had osteoarthritis and knee/hip replacement surgeries.  She rates her pain as moderately severe she still notices stiffness of 2 hours especially in the lower back.        ALLERGIES:Codeine    PAST MEDICAL/ACTIVE PROBLEMS/MEDICATION/ FAMILY HISTORY/SOCIAL DATA:  The patient has a family history of  Past Medical History:   Diagnosis Date     Atrial fibrillation (H)      CRF (chronic renal failure)      GERD (gastroesophageal reflux disease)      Hypertension       Hypovolemic shock (H)      Influenza A 2017     Rheumatoid arthritis (H)      Sepsis (H) 2017     Social History     Tobacco Use   Smoking Status Former Smoker     Packs/day: 0.50     Types: Cigarettes     Last attempt to quit: 2017     Years since quittin.5   Smokeless Tobacco Never Used     Patient Active Problem List   Diagnosis     Foot Pain (Soft Tissue)     Limb pain     Osteoporosis dxa 2006      High risk medication use     Midline low back pain without sciatica     Rheumatoid arthritis involving multiple sites with positive rheumatoid factor (H)     Dyspnea     Reactive airway disease     Paroxysmal atrial fibrillation (H)     Nonsustained ventricular tachycardia (H)     CRF (chronic renal failure), stage 3 (moderate) (H)     Primary osteoarthritis involving multiple joints     Benign essential hypertension     Current Outpatient Medications   Medication Sig Dispense Refill     acetaminophen (TYLENOL) 500 MG tablet Take 1-2 tablets (500-1,000 mg total) by mouth every 6 (six) hours as needed.  0     calcium-vitamin D (CALCIUM-VITAMIN D) 500 mg(1,250mg) -200 unit per tablet Take 1 tablet by mouth 2 (two) times a day with meals.       certolizumab pegoL (CIMZIA) 400 mg (200 mg x 2 vials) Kit injection Inject 400 mg under the skin every 28 days. 400 mg 3     compressor, for nebulizer Eva Nebulizer treatment qid prn 1 Device 0     flaxseed oil 1,000 mg cap Take 1 capsule by mouth daily.        folic acid (FOLVITE) 1 MG tablet Take one tab po qd except the day when taking methotrexate.. 90 tablet 1     FOLIC ACID/MULTIVITS-MIN/LUT (CENTRUM SILVER ORAL) Take 1 tablet by mouth daily.        furosemide (LASIX) 20 MG tablet Take 20 mg by mouth daily as needed (Leg swelling).       ipratropium-albuterol (DUO-NEB) 0.5-2.5 mg/3 mL nebulizer Take 3 mL by nebulization every 4 (four) hours as needed (home reg). 30 vial 1     methotrexate 2.5 MG tablet TAKE 4 TABLETS BY MOUTH ONCE A WEEK 52 tablet 0      metoprolol succinate (TOPROL-XL) 100 MG 24 hr tablet Take 1 tablet (100 mg total) by mouth daily. 30 tablet 0     pravastatin (PRAVACHOL) 20 MG tablet Take 20 mg by mouth daily.       rivaroxaban (XARELTO) 20 mg Tab Take 1 tablet (20 mg total) by mouth daily. Take with a FULL meal. 90 tablet 3     Current Facility-Administered Medications   Medication Dose Route Frequency Provider Last Rate Last Dose     certolizumab pegol injection 400 mg (CIMZIA)  400 mg Subcutaneous Q28 Days Bobby Rush MBBS   400 mg at 06/03/20 0925     DETAILED EXAMINATION  07/01/20  :  There were no vitals filed for this visit.  Alert oriented. Head including the face is examined for malar rash, heliotropes, scarring, lupus pernio. Eyes examined for redness such as in episcleritis/scleritis, periorbital lesions.   Neck/ Face examined for parotid gland swelling, range of motion of neck.  Left upper and lower and right upper and lower extremities examined for tenderness, swelling, warmth of the appendicular joints, range of motion, edema, rash.  Some of the important findings included:she she has Heberden's, there is no synovitis in palpable joints of upper extremities, but she has mild JLT of the right knee.  Minimal trochanteric area tenderness.  LAB / IMAGING DATA:  ALT   Date Value Ref Range Status   06/03/2020 17 0 - 45 U/L Final   01/03/2020 20 0 - 45 U/L Final   11/04/2019 17 0 - 45 U/L Final     Albumin   Date Value Ref Range Status   06/03/2020 3.8 3.5 - 5.0 g/dL Final   01/03/2020 3.7 3.5 - 5.0 g/dL Final   11/04/2019 3.7 3.5 - 5.0 g/dL Final     Creatinine   Date Value Ref Range Status   06/03/2020 1.11 (H) 0.60 - 1.10 mg/dL Final   02/14/2020 1.06 0.60 - 1.10 mg/dL Final   01/03/2020 1.01 0.60 - 1.10 mg/dL Final       WBC   Date Value Ref Range Status   06/04/2020 6.7 4.0 - 11.0 thou/uL Final   06/03/2020 7.1 4.0 - 11.0 thou/uL Final     Hemoglobin   Date Value Ref Range Status   06/04/2020 13.5 12.0 - 16.0 g/dL Final   06/03/2020  13.4 12.0 - 16.0 g/dL Final   01/03/2020 13.6 12.0 - 16.0 g/dL Final     Platelets   Date Value Ref Range Status   06/04/2020 358 140 - 440 thou/uL Final   06/03/2020 309 140 - 440 thou/uL Final   01/03/2020 379 140 - 440 thou/uL Final       Lab Results   Component Value Date    RF 62.5 (H) 03/02/2015    SEDRATE 34 (H) 12/09/2015

## 2021-06-10 NOTE — PROGRESS NOTES
ASSESSMENT AND PLAN:  Fatuma Henry 71 y.o. female has 0 positive rheumatoid arthritis, lumbar spondylosis, chronic renal impairment, on methotrexate and Cimzia.  She has not had had a flareup.  She continues to hurt in the lower back.  She is aware and this is reiterated today that she should not take nonsteroidals.  She is undergoing physical therapy.  There are no radicular symptoms.  She is due for labs done today.  Once I have had the lab review refills will be sent.  I will ask her to return for follow-up in 4 months.  Labs to be tested every 2.    Diagnoses and all orders for this visit:    Rheumatoid arthritis involving multiple sites with positive rheumatoid factor    High risk medication use    CRF (chronic renal failure), stage 3 (moderate)    Chronic midline low back pain without sciatica    HISTORY OF PRESENTING ILLNESS:      Fatuma Henry, 71 y.o., female is here for seropositive rheumatoid arthritis.  She reports a sense of tightness in her calves first thing in the morning as it is full of fluid.  There is no actual inflammation that she observes.  She has a discomfort in the back.  She is recently started undergoing physical therapy.  She is aware that she cannot take Aleve or ibuprofen or other nonsteroidals.  This is in view of her renal impairment.  She has Tylenol at home that she finds helpful.  She reports no mouth ulcers no photosensitivity there is no rash reported Symptoms.  She does not have a history of psoriasis herself for the family or that of inflammatory bowel disease.  She is a smoker for the past 20 years alcohol.  She does not exercise.  She follows up at her primary physician's in [facility].  She has had a history of hypertension and cataracts. Further historical information and ADL limitations as noted in the multidimensional health assessment questionnaire attached in the EMR.     She recalls it was approximately 6+ years ago when she started hurting.  She recalls   began to hurt her shoulders.  This was quite severe pain.  She then started hurting in her hands and wrists as well.  Fairly soon she was seen in rheumatology by Dr. Brooks labs were done.  She'll call for those suggested that she has rheumatoid arthritis.  She was given prednisone.  This did not help.  She remembers that all it did for her was to make her face swollen.  Then she took methotrexate for 3 months that was no good either.  She did tolerate this well.  She went on to have Orencia for 2 years.  That did very well during this time.  Than 1 time suddenly it stopped working she was then changed to Actemra.  Once again and the Actemra did the trick for her further to 2-1/2 years that she was on it.  Currently she is on Cimzia, and feels this has helped her quite nicely.   Of late she's been hurting in her lower back, legs.  This is worse with activity.  She wonders if this is because the current treatment is not working.  She also recalls an episode where she had slipped and landed on her back.  This was from a third step at her house.  And she lives with her .  Her sisters have had osteoarthritis and knee/hip replacement surgeries.  She rates her pain as moderately severe she still notices stiffness of 2 hours especially in the lower back.        ALLERGIES:Codeine    PAST MEDICAL/ACTIVE PROBLEMS/MEDICATION/ FAMILY HISTORY/SOCIAL DATA:  The patient has a family history of  No past medical history on file.  History   Smoking Status     Current Every Day Smoker     Packs/day: 0.50     Types: Cigarettes   Smokeless Tobacco     Not on file     Patient Active Problem List   Diagnosis     Foot Pain (Soft Tissue)     Limb pain     Osteoarthritis     Osteoporosis dxa 2006      High risk medication use     Osteopenia     Midline low back pain without sciatica     Rheumatoid arthritis involving multiple sites with positive rheumatoid factor     Current Outpatient Prescriptions   Medication Sig Dispense Refill      amLODIPine (NORVASC) 5 MG tablet Take 5 mg by mouth daily.        aspirin 81 mg chewable tablet 325 mg.        CALCIUM CARBONATE/VITAMIN D3 (CALCIUM 500 + D, D3, ORAL) 1 capsule.        CHOLECALCIFEROL, VITAMIN D3, (VITAMIN D3 ORAL) 5000U       flaxseed oil 1,000 mg cap        folic acid (FOLVITE) 1 MG tablet Take 1 mg by mouth daily.        FOLIC ACID/MULTIVITS-MIN/LUT (CENTRUM SILVER ORAL) Take by mouth.       GELATIN ORAL        methotrexate 2.5 MG tablet TAKE FOUR TABLETS BY MOUTH  ONCE DAILY 16 tablet 1     metoprolol (LOPRESSOR) 25 MG tablet Take 25 mg by mouth daily.        pravastatin (PRAVACHOL) 20 MG tablet Take 20 mg by mouth daily.       ranitidine (ZANTAC) 150 MG tablet Take 150 mg by mouth.        valsartan-hydrochlorothiazide (DIOVAN-HCT) 160-12.5 mg per tablet Take 1 tablet by mouth daily.        methotrexate 2.5 MG tablet Take 4 tablets (10 mg total) by mouth once a week. 48 tablet 0     Current Facility-Administered Medications   Medication Dose Route Frequency Provider Last Rate Last Dose     certolizumab pegol injection 400 mg (CIMZIA)  400 mg Subcutaneous Q28 Days MELODY Barnes   400 mg at 03/16/17 1521     DETAILED EXAMINATION  04/13/17  :  Vitals:    04/13/17 1317   Pulse: 92   Weight: 203 lb (92.1 kg)     Alert oriented. Head including the face is examined for malar rash, heliotropes, scarring, lupus pernio. Eyes examined for redness such as in episcleritis/scleritis, periorbital lesions.   Neck examined  for lymph nodes, range of motion Both upper and lower extremities (all four) examined for swollen, warm &/or  tender joints, range of motion, rash, muscle weakness, edema. The salient normal / abnormal findings are appended.  No appreciable synovitis in any of the palpable appendicular joints.      She has JLT of the knees.  She no longer has tenderness at her right third and second MCPs.  Is no impingement of the shoulders.  She has tenderness in the paraspinal region in the lumbar  area.  Her gait is intact.  There is no calf tenderness, swelling/edema.    LAB / IMAGING DATA:  ALT   Date Value Ref Range Status   02/16/2017 15 0 - 45 U/L Final   12/15/2016 17 0 - 45 U/L Final   11/17/2016 18 0 - 45 U/L Final     Albumin   Date Value Ref Range Status   02/16/2017 3.3 (L) 3.5 - 5.0 g/dL Final   12/15/2016 3.7 3.5 - 5.0 g/dL Final   11/17/2016 3.4 (L) 3.5 - 5.0 g/dL Final     Creatinine   Date Value Ref Range Status   03/27/2017 1.41 (H) 0.60 - 1.10 mg/dL Final   02/16/2017 1.13 (H) 0.60 - 1.10 mg/dL Final   12/15/2016 1.26 (H) 0.60 - 1.10 mg/dL Final       WBC   Date Value Ref Range Status   02/16/2017 8.1 4.0 - 11.0 thou/uL Final   12/15/2016 7.7 4.0 - 11.0 thou/uL Final     Hemoglobin   Date Value Ref Range Status   02/16/2017 12.4 12.0 - 16.0 g/dL Final   12/15/2016 13.1 12.0 - 16.0 g/dL Final   11/17/2016 13.2 12.0 - 16.0 g/dL Final     Platelets   Date Value Ref Range Status   02/16/2017 322 140 - 440 thou/uL Final   12/15/2016 329 140 - 440 thou/uL Final   11/17/2016 351 140 - 440 thou/uL Final       Lab Results   Component Value Date    RF 62.5 (H) 03/02/2015    SEDRATE 34 (H) 12/09/2015

## 2021-06-11 ENCOUNTER — RECORDS - HEALTHEAST (OUTPATIENT)
Dept: CARDIOLOGY | Facility: CLINIC | Age: 76
End: 2021-06-11

## 2021-06-12 NOTE — PROGRESS NOTES
ASSESSMENT AND PLAN:  Fatuma Henry 72 y.o. female is here for follow-up of seropositive rheumatoid arthritis, lumbar spondylosis, background of chronic renal impairment she is on low-dose methotrexate and Cimzia injections.  She continues to do well as far as the rheumatoid arthritis is concerned having tolerated the 2 well.  Time to check her labs today she is aware not to take nonsteroidals in view of the renal impairment.  Her low back pain is bothersome intermittently    Diagnoses and all orders for this visit:    Rheumatoid arthritis involving multiple sites with positive rheumatoid factor  -     methotrexate 2.5 MG tablet; Take 4 tablets (10 mg total) by mouth once a week.  Dispense: 48 tablet; Refill: 0    CRF (chronic renal failure), stage 3 (moderate)    High risk medication use  -     ALT (SGPT)  -     Albumin  -     HM2(CBC w/o Differential)  -     Creatinine    HISTORY OF PRESENTING ILLNESS:      Fatuma Henry, 72 y.o., female is here for seropositive rheumatoid arthritis.  For her back pain associated lumbar spondylosis she gets it more with activity.  Relief by rest.  She exercises and stretches.  She does not take nonsteroidals in view of the renal impairment.  She is aware of the option of taking acetaminophen.  She reports no mouth ulcers no photosensitivity there is no rash reported Symptoms.  She does not have a history of psoriasis herself for the family or that of inflammatory bowel disease.  She is a smoker for the past 20 years alcohol.  She does not exercise.  She follows up at her primary physician's in [facility].  She has had a history of hypertension and cataracts. Further historical information and ADL limitations as noted in the multidimensional health assessment questionnaire attached in the EMR. She recalls it was approximately 6+ years ago when she started hurting.  She recalls  began to hurt her shoulders.  This was quite severe pain.  She then started hurting in her  hands and wrists as well.  Fairly soon she was seen in rheumatology by Dr. Brooks labs were done.  She'll call for those suggested that she has rheumatoid arthritis.  She was given prednisone.  This did not help.  She remembers that all it did for her was to make her face swollen.  Then she took methotrexate for 3 months that was no good either.  She did tolerate this well.  She went on to have Orencia for 2 years.  That did very well during this time.  Than 1 time suddenly it stopped working she was then changed to Actemra.  Once again and the Actemra did the trick for her further to 2-1/2 years that she was on it.  Currently she is on Cimzia, and feels this has helped her quite nicely.   Of late she's been hurting in her lower back, legs.  This is worse with activity.  She wonders if this is because the current treatment is not working.  She also recalls an episode where she had slipped and landed on her back.  This was from a third step at her house.  And she lives with her .  Her sisters have had osteoarthritis and knee/hip replacement surgeries.  She rates her pain as moderately severe she still notices stiffness of 2 hours especially in the lower back.        ALLERGIES:Codeine    PAST MEDICAL/ACTIVE PROBLEMS/MEDICATION/ FAMILY HISTORY/SOCIAL DATA:  The patient has a family history of  No past medical history on file.  History   Smoking Status     Current Every Day Smoker     Packs/day: 0.50     Types: Cigarettes   Smokeless Tobacco     Not on file     Patient Active Problem List   Diagnosis     Foot Pain (Soft Tissue)     Limb pain     Osteoarthritis     Osteoporosis dxa 2006      High risk medication use     Osteopenia     Midline low back pain without sciatica     Rheumatoid arthritis involving multiple sites with positive rheumatoid factor     CRF (chronic renal failure), stage 3 (moderate)     Current Outpatient Prescriptions   Medication Sig Dispense Refill     aspirin 81 mg chewable tablet 325  "mg.        CALCIUM CARBONATE/VITAMIN D3 (CALCIUM 500 + D, D3, ORAL) 1 capsule.        CHOLECALCIFEROL, VITAMIN D3, (VITAMIN D3 ORAL) 5000U       flaxseed oil 1,000 mg cap        folic acid (FOLVITE) 1 MG tablet Take 1 mg by mouth daily.        FOLIC ACID/MULTIVITS-MIN/LUT (CENTRUM SILVER ORAL) Take by mouth.       GELATIN ORAL        methotrexate 2.5 MG tablet Take 4 tablets (10 mg total) by mouth once a week. 48 tablet 0     metoprolol (LOPRESSOR) 25 MG tablet Take 50 mg by mouth daily.        pravastatin (PRAVACHOL) 20 MG tablet Take 20 mg by mouth daily.       ranitidine (ZANTAC) 150 MG tablet Take 150 mg by mouth.        valsartan-hydrochlorothiazide (DIOVAN-HCT) 160-12.5 mg per tablet Take 1 tablet by mouth daily.        Current Facility-Administered Medications   Medication Dose Route Frequency Provider Last Rate Last Dose     certolizumab pegol injection 400 mg (CIMZIA)  400 mg Subcutaneous Q28 Days MELODY Barnes   400 mg at 07/06/17 1107     DETAILED EXAMINATION  08/10/17  :  Vitals:    08/10/17 1433   BP: 128/68   Patient Site: Right Arm   Patient Position: Sitting   Cuff Size: Adult Regular   Weight: 203 lb (92.1 kg)   Height: 5' 4.84\" (1.647 m)     Alert oriented. Head including the face is examined for malar rash, heliotropes, scarring, lupus pernio. Eyes examined for redness such as in episcleritis/scleritis, periorbital lesions.   Neck examined  for lymph nodes, range of motion Both upper and lower extremities (all four) examined for swollen, warm &/or  tender joints, range of motion, rash, muscle weakness, edema. The salient normal / abnormal findings are appended.  No appreciable synovitis in any of the palpable appendicular joints.      She has JLT of the knees.   No appreciable synovitis in any of the palpable appendicular joints.   Is no impingement of the shoulders.  She has tenderness in the paraspinal region in the lumbar area.  Her gait is intact.    LAB / IMAGING DATA:  ALT   Date Value " Ref Range Status   06/08/2017 14 0 - 45 U/L Final   04/13/2017 18 0 - 45 U/L Final   02/16/2017 15 0 - 45 U/L Final     Albumin   Date Value Ref Range Status   06/08/2017 3.4 (L) 3.5 - 5.0 g/dL Final   04/13/2017 3.5 3.5 - 5.0 g/dL Final   02/16/2017 3.3 (L) 3.5 - 5.0 g/dL Final     Creatinine   Date Value Ref Range Status   06/08/2017 1.16 (H) 0.60 - 1.10 mg/dL Final   04/13/2017 1.17 (H) 0.60 - 1.10 mg/dL Final   03/27/2017 1.41 (H) 0.60 - 1.10 mg/dL Final       WBC   Date Value Ref Range Status   06/08/2017 6.9 4.0 - 11.0 thou/uL Final   04/13/2017 7.6 4.0 - 11.0 thou/uL Final     Hemoglobin   Date Value Ref Range Status   06/08/2017 12.3 12.0 - 16.0 g/dL Final   04/13/2017 13.0 12.0 - 16.0 g/dL Final   02/16/2017 12.4 12.0 - 16.0 g/dL Final     Platelets   Date Value Ref Range Status   06/08/2017 330 140 - 440 thou/uL Final   04/13/2017 314 140 - 440 thou/uL Final   02/16/2017 322 140 - 440 thou/uL Final       Lab Results   Component Value Date    RF 62.5 (H) 03/02/2015    SEDRATE 34 (H) 12/09/2015

## 2021-06-13 NOTE — PROGRESS NOTES
ASSESSMENT AND PLAN:  Fatuma Henry 75 y.o. female is here for follow-up.  This is for rheumatoid arthritis, osteoarthritis, doing great on combination of Cimzia, methotrexate, today Cimzia was injected.  She had labs drawn earlier today, CBC is within acceptable range, rest are pending.  I have asked her to stay the course.  Follow-up in 6 months or sooner.         Diagnoses and all orders for this visit:    Rheumatoid arthritis involving multiple sites with positive rheumatoid factor (H)  -     methotrexate 2.5 MG tablet; Take 4 tablets (10 mg total) by mouth once a week.  Dispense: 52 tablet; Refill: 0    Primary osteoarthritis involving multiple joints    CRF (chronic renal failure), stage 3 (moderate)    High risk medication use      HISTORY OF PRESENTING ILLNESS:      Fatuma Henry, 75 y.o., female is here for follow-up.  This is for rheumatoid arthritis.  This is longstanding.  Affected multiple joints, moderately severe, well controlled with the current combination of Cimzia and methotrexate, positive for signals of autoimmunity, positive rheumatoid factor negative anti-CCP..  She has done remarkably well with this.She has noted morning stiffness of no more than 20 to 30 minutes recent labs are reviewed renal impairment noted she is aware not to take nonsteroidals.  Cimzia injection was done today.    She reports no mouth ulcers no photosensitivity there is no rash reported Symptoms.  She does not have a history of psoriasis herself for the family or that of inflammatory bowel disease.  She is a smoker for the past 20 years   She does not exercise.  She follows up at her primary physician's in [facility].  She has had a history of hypertension and cataracts.  She recalls it was approximately 6+ years ago when she started hurting.  She recalls  began to hurt her shoulders.  This was quite severe pain.  She then started hurting in her hands and wrists as well.  Fairly soon she was seen in  rheumatology by Dr. Brooks labs were done.  She'll call for those suggested that she has rheumatoid arthritis.  She was given prednisone.  This did not help.  She remembers that all it did for her was to make her face swollen.  Then she took methotrexate for 3 months that was no good either.  She did tolerate this well.  She went on to have Orencia for 2 years.  That did very well during this time.  Than 1 time suddenly it stopped working she was then changed to Actemra.  Once again and the Actemra did the trick for her further to 2-1/2 years that she was on it.  Currently she is on Cimzia, and feels this has helped her quite nicely.   Of late she's been hurting in her lower back, legs.  This is worse with activity.  She wonders if this is because the current treatment is not working.  She also recalls an episode where she had slipped and landed on her back.  This was from a third step at her house.  And she lives with her .  Her sisters have had osteoarthritis and knee/hip replacement surgeries.  She rates her pain as moderately severe she still notices stiffness of 2 hours especially in the lower back.   Further historical information and ADL limitations as noted in the multidimensional health assessment questionnaire attached in the EMR.     ALLERGIES:Codeine    PAST MEDICAL/ACTIVE PROBLEMS/MEDICATION/ FAMILY HISTORY/SOCIAL DATA:  The patient has a family history of  Past Medical History:   Diagnosis Date     Atrial fibrillation (H)      CRF (chronic renal failure)      GERD (gastroesophageal reflux disease)      Hypertension      Hypovolemic shock (H)      Influenza A 2017     Rheumatoid arthritis (H)      Sepsis (H) 2017     Social History     Tobacco Use   Smoking Status Current Some Day Smoker     Packs/day: 0.50     Types: Cigarettes     Last attempt to quit: 2017     Years since quittin.9   Smokeless Tobacco Never Used     Patient Active Problem List   Diagnosis     Foot Pain  (Soft Tissue)     Limb pain     Osteoporosis dxa 2006      High risk medication use     Midline low back pain without sciatica     Rheumatoid arthritis involving multiple sites with positive rheumatoid factor (H)     Dyspnea     Reactive airway disease     Paroxysmal atrial fibrillation (H)     Nonsustained ventricular tachycardia (H)     CRF (chronic renal failure), stage 3 (moderate)     Primary osteoarthritis involving multiple joints     Benign essential hypertension     Current Outpatient Medications   Medication Sig Dispense Refill     acetaminophen (TYLENOL) 500 MG tablet Take 1-2 tablets (500-1,000 mg total) by mouth every 6 (six) hours as needed.  0     calcium-vitamin D (CALCIUM-VITAMIN D) 500 mg(1,250mg) -200 unit per tablet Take 1 tablet by mouth 2 (two) times a day with meals.       certolizumab pegoL (CIMZIA) 400 mg (200 mg x 2 vials) Kit injection Inject 400 mg under the skin every 28 days. 400 mg 3     compressor, for nebulizer Eva Nebulizer treatment qid prn 1 Device 0     flaxseed oil 1,000 mg cap Take 1 capsule by mouth daily.        folic acid (FOLVITE) 1 MG tablet Take one tab po qd except the day when taking methotrexate.. 90 tablet 1     FOLIC ACID/MULTIVITS-MIN/LUT (CENTRUM SILVER ORAL) Take 1 tablet by mouth daily.        furosemide (LASIX) 20 MG tablet Take 20 mg by mouth daily as needed (Leg swelling).       ipratropium-albuterol (DUO-NEB) 0.5-2.5 mg/3 mL nebulizer Take 3 mL by nebulization every 4 (four) hours as needed (home reg). 30 vial 1     methotrexate 2.5 MG tablet TAKE 4 TABLETS BY MOUTH ONCE A WEEK 52 tablet 0     metoprolol succinate (TOPROL-XL) 100 MG 24 hr tablet Take 1 tablet (100 mg total) by mouth daily. 30 tablet 0     pravastatin (PRAVACHOL) 20 MG tablet Take 20 mg by mouth daily.       rivaroxaban (XARELTO) 20 mg Tab Take 1 tablet (20 mg total) by mouth daily. Take with a FULL meal. 90 tablet 3     Current Facility-Administered Medications   Medication Dose Route  Frequency Provider Last Rate Last Admin     certolizumab pegol injection 400 mg (CIMZIA)  400 mg Subcutaneous Q28 Days Bobby Rush MBBS   400 mg at 11/18/20 1307     DETAILED EXAMINATION  12/16/20  :  Vitals:    12/16/20 1117   BP: 128/64   Patient Site: Right Arm   Patient Position: Sitting   Cuff Size: Adult Regular   Pulse: 68   Weight: 172 lb (78 kg)     Alert oriented. Head including the face is examined for malar rash, heliotropes, scarring, lupus pernio. Eyes examined for redness such as in episcleritis/scleritis, periorbital lesions.   Neck/ Face examined for parotid gland swelling, range of motion of neck.  Left upper and lower and right upper and lower extremities examined for tenderness, swelling, warmth of the appendicular joints, range of motion, edema, rash.  Some of the important findings included:she she has Heberden's, there is no synovitis in palpable joints of upper extremities,   mild JLT of the right knee.          LAB / IMAGING DATA:  ALT   Date Value Ref Range Status   09/23/2020 14 0 - 45 U/L Final   06/03/2020 17 0 - 45 U/L Final   01/03/2020 20 0 - 45 U/L Final     Albumin   Date Value Ref Range Status   09/23/2020 3.7 3.5 - 5.0 g/dL Final   06/03/2020 3.8 3.5 - 5.0 g/dL Final   01/03/2020 3.7 3.5 - 5.0 g/dL Final     Creatinine   Date Value Ref Range Status   09/23/2020 0.99 0.60 - 1.10 mg/dL Final   07/31/2020 1.02 0.60 - 1.10 mg/dL Final   07/17/2020 1.41 (H) 0.60 - 1.10 mg/dL Final       WBC   Date Value Ref Range Status   12/16/2020 5.3 4.0 - 11.0 thou/uL Final   09/23/2020 5.6 4.0 - 11.0 thou/uL Final     Hemoglobin   Date Value Ref Range Status   12/16/2020 12.5 12.0 - 16.0 g/dL Final   09/23/2020 12.3 12.0 - 16.0 g/dL Final   06/04/2020 13.5 12.0 - 16.0 g/dL Final     Platelets   Date Value Ref Range Status   12/16/2020 302 140 - 440 thou/uL Final   09/23/2020 307 140 - 440 thou/uL Final   06/04/2020 358 140 - 440 thou/uL Final       Lab Results   Component Value Date    RF 62.5  (H) 03/02/2015    VASILETE 34 (H) 12/09/2015

## 2021-06-15 NOTE — PROGRESS NOTES
Fatuma D Henry who presents today with a chief complaint of  Consult, joint pains      Joint Pains: no  Location: none currently  Onset: chronic  Intensity:  0/10  AM Stiffness: 5-10 Minutes  Alleviating/Aggravating Factors: Medications have been helpful.  Tolerating Meds: yes  Other:      ROS:  Patient denies having any chest pain, shortness of breath, cough, abdominal pain, nausea, vomiting, rashes, fevers, oral ulcers and recent infections.  Patient admits to having a good appetite      Problem List:  Patient Active Problem List   Diagnosis     Foot Pain (Soft Tissue)     Limb pain     Osteoarthritis     Osteoporosis dxa 2006      High risk medication use     Midline low back pain without sciatica     Rheumatoid arthritis involving multiple sites with positive rheumatoid factor     Acute renal failure with acute tubular necrosis superimposed on stage 3 chronic kidney disease     Dyspnea     Reactive airway disease     Paroxysmal atrial fibrillation     Nonsustained ventricular tachycardia        PMH:   Past Medical History:   Diagnosis Date     Atrial fibrillation      CRF (chronic renal failure)      Hypertension      Hypovolemic shock      Influenza A 12/2017     Rheumatoid arthritis      Sepsis 12/24/2017       Surgical History:  Past Surgical History:   Procedure Laterality Date     APPENDECTOMY       BLADDER SUSPENSION       CHOLECYSTECTOMY         Family History:  Family History   Problem Relation Age of Onset     Heart failure Mother      Stroke Father      Kidney failure Sister      Stroke Brother        Social History:   reports that she quit smoking about 3 weeks ago. Her smoking use included Cigarettes. She smoked 0.50 packs per day. She has never used smokeless tobacco. She reports that she does not drink alcohol or use illicit drugs.    Allergies:  Allergies   Allergen Reactions     Codeine Nausea And Vomiting        Current Medications:  Current Outpatient Prescriptions   Medication Sig Dispense  "Refill     acetaminophen (TYLENOL) 500 MG tablet Take 1-2 tablets (500-1,000 mg total) by mouth every 6 (six) hours as needed.  0     calcium-vitamin D (CALCIUM-VITAMIN D) 500 mg(1,250mg) -200 unit per tablet Take 1 tablet by mouth 2 (two) times a day with meals.       cholecalciferol, vitamin D3, 5,000 unit Tab Take 1 tablet by mouth daily.       compressor, for nebulizer Eva Nebulizer treatment qid prn 1 Device 0     flaxseed oil 1,000 mg cap Take 1 capsule by mouth daily.        folic acid (FOLVITE) 1 MG tablet Take 1 mg by mouth daily.        FOLIC ACID/MULTIVITS-MIN/LUT (CENTRUM SILVER ORAL) Take 1 tablet by mouth daily.        furosemide (LASIX) 20 MG tablet Take 20 mg by mouth daily as needed (Leg swelling).       ipratropium-albuterol (DUO-NEB) 0.5-2.5 mg/3 mL nebulizer Take 3 mL by nebulization every 4 (four) hours as needed (home reg). 30 vial 1     Lactobacillus rhamnosus GG (CULTURELLE) 10-15 Billion cell capsule Take 1 capsule by mouth daily.       methotrexate 2.5 MG tablet Take 10 mg by mouth once a week.       metoprolol succinate (TOPROL-XL) 100 MG 24 hr tablet Take 1 tablet (100 mg total) by mouth daily. 30 tablet 0     pravastatin (PRAVACHOL) 20 MG tablet Take 20 mg by mouth daily.       ranitidine (ZANTAC) 150 MG tablet Take 150 mg by mouth daily.        rivaroxaban 15 mg Tab Take 1 tablet (15 mg total) by mouth daily with supper. 30 tablet 0     No current facility-administered medications for this visit.            Physical Exam:  /72  Pulse 70  Resp 10  Ht 5' 5\" (1.651 m)  Wt 187 lb (84.8 kg)  BMI 31.12 kg/m2  General: A & O x 3 in NAD  HEENT: EOMI, Non injected/non icteric sclera, no oral lesions noted  CV: s1s2 with RRR, no rubs appreciated   Lungs: CTA B/L, no wheezing , rales or rhonci appreciated  GI: Soft, NT/ND, no rebound, no guarding noted  MS: Patient demonstrated good passive/active ROM involving joints with no warmth, erythema, tenderness or synovitis " "noted.        Summary/Assessment:    Was a patient of Dr. Rush, last seen August 2017.    History that includes seropositive rheumatoid arthritis, osterarthritis.    Presents for follow-up visit.    Claims to be doing well on Cimzia and methotrexate.    Believes she was diagnosed with rheumatoid arthritis about 10-12 years ago.    Has been on Cimzia for the past 2 years and methotrexate for the past 8 months.    Methotrexate held a few weeks ago because of influenza, despite receiving flu vaccine, resulting in hospitalization at the end of December 2017.  She restarted methotrexate this past Sunday.    Was on prednisone during hospitalization.    Noted to have some abnormal labs (worsening renal insufficiency, leukopenia) perhaps related to hospitalization.    No clear signs of synovitis noted on exam today.    Pertinent rheumatology/past medical history (please refer to above for more detailed history):      Seropositive rheumatoid arthritis    High-risk medication use    Osteoarthritis    Osteoporosis (\"managed by another provider\")    Lumbar spondylosis    Renal insufficiency    Leukopenia    Paroxysmal A. Fib (on Xarelto, \"newly diagnosed\")        Rheumatology medications provided/suggested:    Cimzia  Methotrexate  Folic acid      Pertinent medication from other providers or from otc (please refer to above for more detailed med list):    Xarelto  Calcium  Vitamin D      Pertinent medications already tried:     Orencia      Pertinent lab history:    Positive rheumatoid factor    Noted to have negative/unremarkable: CCP antibody, JUANITA.  Hep C antibody.    Pertinent imaging/test history:          Other:    Standing order for labs placed every 3 months good through January 2019.    Plan:      Continue Cimzia 400 mg every 4 weeks.  Made aware to contact us prior to receiving next dose, for clearance based on repeat labs just prior.      Continue methotrexate 4 tablets weekly.    Continue folic acid 1 mg " daily.    Check labs in 4 weeks and every 3 months thereafter.    Follow-up in 3 months.          Procedure note:       Spent 45 minutes with greater than half of this time spent with the patient going over differential diagnosis, prognosis, treatment plan, medication side effects and  answering questions.    The patient is new to me however, is a follow-up to our rheumatology clinic.      This note was transcribed using Dragon voice recognition software as a result unintentional grammatical errors or word substitutions may have occurred. Please contact our Rheumatology department if you need any clarification or if you have any related inquiries.    Side effect profile of medications provided were discussed with the patient.      Jose Deutsch ....................  1/15/2018   4:14 PM

## 2021-06-15 NOTE — PROGRESS NOTES
[facility]--Electrophysiology    Assessment/Plan:      Problem List Items Addressed This Visit     Paroxysmal atrial fibrillation - Primary    Relevant Orders    NM Exercise Stress Test    Nonsustained ventricular tachycardia    Relevant Orders    NM Exercise Stress Test        1.  Paroxysmal atrial fibrillation: Newly diagnosed during a hospitalization for influenza A and acute illness.  She has had no symptomatology of recurrence, but was asymptomatic.  She had no atrial fibrillation on the 24 hour Holter monitor.  We reviewed the physiology and natural progression of atrial fibrillation as well as treatment options of rate control versus rhythm control depending upon the presence of symptoms.  She is familiar with this as I also see her  for atrial fibrillation.  As she was asymptomatic, recommend following a rate control strategy if she has recurrence.    She was reassured that atrial fibrillation is not life-threatening, but carries an increased risk for stroke.  She has a [id number] score of 3 for age 65-74, female gender, and hypertension.  Continue Xarelto 15 mg daily, dose adjusted for creatinine clearance, for stroke prophylaxis.  She has been taking this in the morning, but not necessarily with a full meal.  We discussed the importance of taking this with a full meal for proper absorption.  She will start taking it with lunch, as this is her most consistent meal of the day.    2.  Nonsustained ventricular tachycardia: Brief monomorphic NSVT documented on Holter monitor.  Echocardiogram showed normal left ventricular systolic function.  NM stress test to evaluate for ischemia.    3.  Hypertension: Blood pressure at target today.    Follow-up in 3 months.    Subjective:      Fatuma Henry, a very pleasant 72-year-old woman, comes in today for EP evaluation of atrial fibrillation.  She was newly diagnosed with paroxysmal atrial fibrillation during a hospitalization for influenza a and  hypovolemic shock over [place].  She was reportedly asymptomatic with the arrhythmia.  Ventricular response was controlled with metoprolol succinate 100 mg daily and she was started on Xarelto 15 mg daily for stroke prophylaxis.  Echocardiogram showed normal left ventricular systolic function with mild aortic stenosis.  Subsequent Holter monitor showed no atrial fibrillation, but a brief run of nonsustained ventricular tachycardia.    Fatuma states that she has been feeling better and continues to get stronger.  She has had no symptoms of arrhythmia.  She has some mild dyspnea on exertion, but denies chest discomfort, palpitations, shortness of breath at rest, paroxysmal nocturnal dyspnea, orthopnea, lightheadedness, dizziness, pre-syncope, or syncope.  She quit smoking with onset of the flu and states she has no desire or cravings to restart smoking.    Medical, surgical, family, social history, and medications were reviewed and updated as necessary.    Patient Active Problem List   Diagnosis     Foot Pain (Soft Tissue)     Limb pain     Osteoarthritis     Osteoporosis dxa 2006      High risk medication use     Midline low back pain without sciatica     Rheumatoid arthritis involving multiple sites with positive rheumatoid factor     Acute renal failure with acute tubular necrosis superimposed on stage 3 chronic kidney disease     Dyspnea     Reactive airway disease     Paroxysmal atrial fibrillation     Nonsustained ventricular tachycardia       Past Medical History:   Diagnosis Date     Atrial fibrillation      CRF (chronic renal failure)      Hypertension      Hypovolemic shock      Influenza A 12/2017     Rheumatoid arthritis      Sepsis 12/24/2017       Past Surgical History:   Procedure Laterality Date     APPENDECTOMY       BLADDER SUSPENSION       CHOLECYSTECTOMY         Allergies   Allergen Reactions     Codeine Nausea And Vomiting       Current Outpatient Prescriptions   Medication Sig Dispense Refill      calcium-vitamin D (CALCIUM-VITAMIN D) 500 mg(1,250mg) -200 unit per tablet Take 1 tablet by mouth 2 (two) times a day with meals.       cholecalciferol, vitamin D3, 5,000 unit Tab Take 1 tablet by mouth daily.       compressor, for nebulizer Eva Nebulizer treatment qid prn 1 Device 0     flaxseed oil 1,000 mg cap Take 1 capsule by mouth daily.        folic acid (FOLVITE) 1 MG tablet Take 1 mg by mouth daily.        FOLIC ACID/MULTIVITS-MIN/LUT (CENTRUM SILVER ORAL) Take 1 tablet by mouth daily.        furosemide (LASIX) 20 MG tablet Take 20 mg by mouth daily as needed (Leg swelling).       ipratropium-albuterol (DUO-NEB) 0.5-2.5 mg/3 mL nebulizer Take 3 mL by nebulization every 4 (four) hours as needed (home reg). 30 vial 1     methotrexate 2.5 MG tablet Take 10 mg by mouth once a week.       metoprolol succinate (TOPROL-XL) 100 MG 24 hr tablet Take 1 tablet (100 mg total) by mouth daily. 30 tablet 0     pravastatin (PRAVACHOL) 20 MG tablet Take 20 mg by mouth daily.       ranitidine (ZANTAC) 150 MG tablet Take 150 mg by mouth daily.        rivaroxaban 15 mg Tab Take 1 tablet (15 mg total) by mouth daily with supper. 30 tablet 0     acetaminophen (TYLENOL) 500 MG tablet Take 1-2 tablets (500-1,000 mg total) by mouth every 6 (six) hours as needed.  0     Current Facility-Administered Medications   Medication Dose Route Frequency Provider Last Rate Last Dose     certolizumab pegol injection 400 mg (CIMZIA)  400 mg Subcutaneous Q28 Days MELODY Barnes   400 mg at 12/11/17 1052       Family History   Problem Relation Age of Onset     Heart failure Mother      Stroke Father      Kidney failure Sister      Stroke Brother        Social History   Substance Use Topics     Smoking status: Former Smoker     Packs/day: 0.50     Types: Cigarettes     Quit date: 12/20/2017     Smokeless tobacco: Never Used     Alcohol use No       Review of Systems:   General: WNL  Eyes: WNL  Ears/Nose/Throat: WNL  Lungs: Shortness of  "Breath  Heart: WNL  Stomach: Diarrhea  Bladder: WNL  Muscle/Joints: WNL  Skin: WNL  Nervous System: WNL  Mental Health: WNL     Blood: WNL      Objective:    /78 (Patient Site: Right Arm, Patient Position: Sitting, Cuff Size: Adult Regular)  Pulse 86  Resp 16  Ht 5' 5\" (1.651 m)  Wt 183 lb 11.2 oz (83.3 kg)  BMI 30.57 kg/m2    Physical Exam  General Appearance:  Alert, well-appearing, in no acute distress.     HEENT:  Atraumatic, normocephalic; no scleral icterus, EOM intact, PERRL; the mucous membranes were pink and moist; no obvious thyromegaly.   Chest: Chest symmetric, spine straight.     Lungs:   Respirations unlabored; the lungs are clear to auscultation.   Cardiovascular:   Auscultation reveals normal first and second heart sounds with no murmurs, rubs, or gallops.  Regular rate and rhythm. No JVD.  Radial and posterior tibial pulses are intact.    Abdomen:  Soft, nontender, nondistended, bowel sounds present.     Extremities: No cyanosis, clubbing, or edema.   Musculoskeletal:  Moves all extremities.     Skin: No xanthelasma.   Neurologic: Mood and affect are appropriate. Oriented to person, place, time, and situation.       Cardiographics (personally reviewed)  EC2018 shows atrial fibrillation with elevated ventricular response at 102 bpm  ECG 2017 shows sinus tachycardia at 109 bpm.    24 hour Holter monitor worn 2018: Predominate rhythm sinus tachycardia with an average heart rate of 100 bpm.  Normal electrocardiographic intervals.  No significant bradycardia or pauses.  She did have some occasional junctional escape beats while asleep.  11 brief episodes of ectopic atrial tachycardia, longest 7 beats.  No sustained atrial tachycardia, atrial fibrillation, or other SVT.  A single 6 beat run of monomorphic nonsustained VT with a left bundle axis configuration at 170 bpm.  No sustained ventricular arrhythmia.    Echocardiogram: Done 2017    Mild left atrial " enlargement    Left ventricle ejection fraction is normal. The calculated left ventricular ejection fraction is 56% without wall motion abnormality.    Mild aortic valve stenosis with trace to mild regurgitation    Mild mitral and tricuspid regurgitation. Mild to moderate pulmonary hypertension noted.    No previous study for comparison.      Lab Review   Lab Results   Component Value Date    CREATININE 1.66 (H) 01/04/2018    BUN 37 (H) 01/04/2018     01/04/2018    K 4.9 01/04/2018     01/04/2018    CO2 29 01/04/2018     Lab Results   Component Value Date    TSH 0.28 (L) 01/04/2018     Lab Results   Component Value Date    WBC 3.5 (L) 12/26/2017    HGB 11.9 (L) 12/26/2017    HCT 35.8 12/26/2017    MCV 98 12/26/2017     12/28/2017         Greater than 45 minutes were spent face to face in this visit with more than 50% of the time discussing diagnoses as listed above, counseling, and coordination of care.      Teressa Cespedes, [facility] [place] [facility], Electrophysiology  [phone]    This note has been dictated using voice recognition software. Any grammatical, typographical, or context distortions are unintentional and inherent to the software.

## 2021-06-16 PROBLEM — I10 BENIGN ESSENTIAL HYPERTENSION: Status: ACTIVE | Noted: 2020-05-28

## 2021-06-16 PROBLEM — M15.0 PRIMARY OSTEOARTHRITIS INVOLVING MULTIPLE JOINTS: Status: ACTIVE | Noted: 2019-06-10

## 2021-06-16 PROBLEM — I47.29 NONSUSTAINED VENTRICULAR TACHYCARDIA (H): Status: ACTIVE | Noted: 2018-01-12

## 2021-06-16 NOTE — TELEPHONE ENCOUNTER
Telephone Encounter by Lauren Garcia at 5/7/2019  1:17 PM     Author: Lauren Garcia Service: -- Author Type: Financial Resource Guide    Filed: 5/9/2019  9:15 AM Encounter Date: 4/26/2019 Status: Addendum    : Lauren Garcia (Financial Resource Guide)    Related Notes: Original Note by Lauren Garcia (Financial Resource Guide) filed at 5/7/2019  2:22 PM       Effective Dates: 24 months  Patient Notified? Yes   Additional Information: First shipment to arrive 5/14

## 2021-06-17 NOTE — TELEPHONE ENCOUNTER
----- Message from UMER Shultz sent at 5/11/2021  2:56 PM CDT -----  Regarding: CHRIS PATIENT  General phone call:    Caller: HEBERT FROM [facility]    Detailed reason for call: NEEDING A 3 DAY HOLD ON rivaroxaban (XARELTO) 20 mg Tab, PRIOR TO COLONOSCOPY SCHEDULED FOR 6/10    Best phone number: [phone]    Best time to contact: ANY    Ok to leave a detailed message? YES, SECURE LINE    Device? NO    Additional Info: [phone]

## 2021-06-17 NOTE — TELEPHONE ENCOUNTER
Telephone Encounter by Lauren Garcia at 2/25/2021 11:57 AM     Author: Lauren Garcia Service: -- Author Type: Financial Resource Guide    Filed: 2/25/2021 11:59 AM Encounter Date: 2/25/2021 Status: Signed    : Lauren Garcia (Financial Resource Guide)       COVERED UNDER MEDICARE PART B - ANYTHING FURTHER NEEDS TO BE SENT TO CAM TEAM

## 2021-06-17 NOTE — TELEPHONE ENCOUNTER
Attempted to contact [facility], detailed message discussing the below with contact information was left per pt request.  5/11/2021 3:27 PM  Bridget Hernández RN

## 2021-06-17 NOTE — TELEPHONE ENCOUNTER
Telephone Encounter by Lauren Garcia at 5/13/2021 10:22 AM     Author: Lauren Garcia Service: -- Author Type: Financial Resource Guide    Filed: 5/13/2021 10:57 AM Encounter Date: 5/12/2021 Status: Signed    : Lauren Garcia (Financial Resource Guide)       # 6 and 7 seem to be very contradictory of each other. I suggest that this patient applies for the low income subsidy (LIS) with Medicare Part D. It would not only help with Cimzia but with all her meds covered by Part D. She can apply online at https://www.ssa.gov/benefits/medicare/prescriptionhelp/

## 2021-06-17 NOTE — TELEPHONE ENCOUNTER
Spoke with pt and informed her of this and offered to go the online application for her while she is on the phone with me. She would like that and will call me back once she has their total annual income amount.

## 2021-06-17 NOTE — TELEPHONE ENCOUNTER
Called [facility] pharmacy to get next Cimzia shipment sent to us, they informed me that pts application  at 21 with the free drug program and that pt should have never qualified for this program in 2019 being that she has Medicare. They told me that pt is no eligible to reapply if she still has Medicare.

## 2021-06-17 NOTE — TELEPHONE ENCOUNTER
Please start PA for Cimzia through Part B.     Pt comes into clinic to have this administered by nursing staff.

## 2021-06-17 NOTE — TELEPHONE ENCOUNTER
Telephone Encounter by Lauren Garcia at 2/25/2021 11:23 AM     Author: Lauren Garcia Service: -- Author Type: Financial Resource Guide    Filed: 2/25/2021 11:28 AM Encounter Date: 2/25/2021 Status: Signed    : Lauren Garcia (Financial Resource Guide)       PA Initiation  Medication: Cimzia 2 x 200mg/ml  QTY: 2 vial kits for 28 days  Insurance Company: [employer] Part D  Pharmacy Filing Rx: [facility]  Filling Pharmacy Phone: na  Filling Pharmacy Fax: na  Start Date: 2/25/21  Additional Information: currently enrolled in PAP but insurance has changed

## 2021-06-17 NOTE — TELEPHONE ENCOUNTER
Alex Gannon,     Pt last seen by you 5/2020 for paroxysmal Afib following a rate control strategy as it seems pt has asymptomatic with afib.   She has a [id number] score of 3 for age 65-74, female gender, and hypertension.    [facility] calling for hold orders on Xarelto, look ing for a 3 day hold and if bridging orders are needed.    Thank you!  Angelika

## 2021-06-18 NOTE — PROGRESS NOTES
ASSESSMENT AND PLAN:  Fatuma Henry 72 y.o. female is here for follow-up.  She has seropositive rheumatoid arthritis, rheumatoid factor positive anti-CCP negative.  She is doing great with her current combination of Cimzia and low-dose methotrexate.  No recent flareups.  She has intermittent renal impairment.  We talked about the management of osteoarthritis in this context avoid nonsteroidals.  She is to return for follow-up here in 6 months with labs every 3 months.  She would continue getting Cimzia injections on monthly basis.      Diagnoses and all orders for this visit:    Rheumatoid arthritis involving multiple sites with positive rheumatoid factor  -     methotrexate 2.5 MG tablet; Take 4 tablets po once a week (hold dose if you come down with an infection, until infection clears).  Dispense: 52 tablet; Refill: 0  -     certolizumab pegol injection 400 mg (CIMZIA); Inject 400 mg under the skin every 28 days.    High risk medication use    Seropositive rheumatoid arthritis    Osteoarthritis    CRF (chronic renal failure), stage 3 (moderate)      HISTORY OF PRESENTING ILLNESS:      Fatuma Henry, 72 y.o., female is here for seropositive rheumatoid arthritis.  This is associated with positive rheumatoid factor negative anti-CCP antibody.  She is doing great with methotrexate and Cimzia.  She noted the pain level at 1.0/10.  Recent labs reviewed.  She does have renal impairment and aware of that and takes acetaminophen on as-needed basis.  For her back pain associated lumbar spondylosis she gets it more with activity.  Relief by rest.  She exercises and stretches.  She does not take nonsteroidals in view of the renal impairment.  She is aware of the option of taking acetaminophen.  She reports no mouth ulcers no photosensitivity there is no rash reported Symptoms.  She does not have a history of psoriasis herself for the family or that of inflammatory bowel disease.  She is a smoker for the past 20 years  alcohol.  She does not exercise.  She follows up at her primary physician's in [facility].  She has had a history of hypertension and cataracts. Further historical information and ADL limitations as noted in the multidimensional health assessment questionnaire attached in the EMR. She recalls it was approximately 6+ years ago when she started hurting.  She recalls  began to hurt her shoulders.  This was quite severe pain.  She then started hurting in her hands and wrists as well.  Fairly soon she was seen in rheumatology by Dr. Brooks labs were done.  She'll call for those suggested that she has rheumatoid arthritis.  She was given prednisone.  This did not help.  She remembers that all it did for her was to make her face swollen.  Then she took methotrexate for 3 months that was no good either.  She did tolerate this well.  She went on to have Orencia for 2 years.  That did very well during this time.  Than 1 time suddenly it stopped working she was then changed to Actemra.  Once again and the Actemra did the trick for her further to 2-1/2 years that she was on it.  Currently she is on Cimzia, and feels this has helped her quite nicely.   Of late she's been hurting in her lower back, legs.  This is worse with activity.  She wonders if this is because the current treatment is not working.  She also recalls an episode where she had slipped and landed on her back.  This was from a third step at her house.  And she lives with her .  Her sisters have had osteoarthritis and knee/hip replacement surgeries.  She rates her pain as moderately severe she still notices stiffness of 2 hours especially in the lower back.        ALLERGIES:Codeine    PAST MEDICAL/ACTIVE PROBLEMS/MEDICATION/ FAMILY HISTORY/SOCIAL DATA:  The patient has a family history of  Past Medical History:   Diagnosis Date     Atrial fibrillation      CRF (chronic renal failure)      GERD (gastroesophageal reflux disease)      Hypertension       Hypovolemic shock      Influenza A 12/2017     Rheumatoid arthritis      Sepsis 12/24/2017     History   Smoking Status     Former Smoker     Packs/day: 0.50     Types: Cigarettes     Quit date: 12/20/2017   Smokeless Tobacco     Never Used     Patient Active Problem List   Diagnosis     Foot Pain (Soft Tissue)     Limb pain     Osteoarthritis     Osteoporosis dxa 2006      High risk medication use     Midline low back pain without sciatica     Rheumatoid arthritis involving multiple sites with positive rheumatoid factor     Acute renal failure with acute tubular necrosis superimposed on stage 3 chronic kidney disease     Dyspnea     Reactive airway disease     Paroxysmal atrial fibrillation     Nonsustained ventricular tachycardia     Current Outpatient Prescriptions   Medication Sig Dispense Refill     acetaminophen (TYLENOL) 500 MG tablet Take 1-2 tablets (500-1,000 mg total) by mouth every 6 (six) hours as needed.  0     calcium-vitamin D (CALCIUM-VITAMIN D) 500 mg(1,250mg) -200 unit per tablet Take 1 tablet by mouth 2 (two) times a day with meals.       compressor, for nebulizer Eva Nebulizer treatment qid prn 1 Device 0     flaxseed oil 1,000 mg cap Take 1 capsule by mouth daily.        folic acid (FOLVITE) 1 MG tablet Take one tab po qd except the day when taking methotrexate. 90 tablet 1     FOLIC ACID/MULTIVITS-MIN/LUT (CENTRUM SILVER ORAL) Take 1 tablet by mouth daily.        furosemide (LASIX) 20 MG tablet Take 20 mg by mouth daily as needed (Leg swelling).       ipratropium-albuterol (DUO-NEB) 0.5-2.5 mg/3 mL nebulizer Take 3 mL by nebulization every 4 (four) hours as needed (home reg). 30 vial 1     Lactobacillus rhamnosus GG (CULTURELLE) 10-15 Billion cell capsule Take 1 capsule by mouth daily.       methotrexate 2.5 MG tablet Take 4 tablets po once a week (hold dose if you come down with an infection, until infection clears). 52 tablet 0     metoprolol succinate (TOPROL-XL) 100 MG 24 hr tablet  Take 1 tablet (100 mg total) by mouth daily. 30 tablet 0     pravastatin (PRAVACHOL) 20 MG tablet Take 20 mg by mouth daily.       ranitidine (ZANTAC) 150 MG tablet Take 150 mg by mouth daily.        rivaroxaban (XARELTO) 20 mg Tab Take 1 tablet (20 mg total) by mouth daily. Take with a FULL meal. 90 tablet 3     No current facility-administered medications for this visit.      DETAILED EXAMINATION  05/17/18  :  Vitals:    05/17/18 1251   BP: 138/86   Resp: 16   Weight: 195 lb (88.5 kg)     Alert oriented. Head including the face is examined for malar rash, heliotropes, scarring, lupus pernio. Eyes examined for redness such as in episcleritis/scleritis, periorbital lesions.   Neck/ Face examined for parotid gland swelling, range of motion of neck.  Left upper and lower and right upper and lower extremities examined for tenderness, swelling, warmth of the appendicular joints, range of motion, edema, rash.  Some of the important findings included: She does not have synovitis in any of the palpable appendicular joints.  No impingement shows mild JLT.    LAB / IMAGING DATA:  ALT   Date Value Ref Range Status   04/19/2018 14 0 - 45 U/L Final   02/14/2018 18 0 - 45 U/L Final   12/29/2017 74 (H) 0 - 45 U/L Final     Albumin   Date Value Ref Range Status   04/19/2018 3.4 (L) 3.5 - 5.0 g/dL Final   02/14/2018 3.5 3.5 - 5.0 g/dL Final   12/29/2017 2.9 (L) 3.5 - 5.0 g/dL Final     Creatinine   Date Value Ref Range Status   05/03/2018 1.25 (H) 0.60 - 1.10 mg/dL Final   04/19/2018 1.11 (H) 0.60 - 1.10 mg/dL Final   02/14/2018 1.02 0.60 - 1.10 mg/dL Final       WBC   Date Value Ref Range Status   04/19/2018 7.1 4.0 - 11.0 thou/uL Final   02/14/2018 6.0 4.0 - 11.0 thou/uL Final     Hemoglobin   Date Value Ref Range Status   04/19/2018 12.7 12.0 - 16.0 g/dL Final   02/14/2018 11.8 (L) 12.0 - 16.0 g/dL Final   12/26/2017 11.9 (L) 12.0 - 16.0 g/dL Final     Platelets   Date Value Ref Range Status   04/19/2018 331 140 - 440 thou/uL  Final   02/14/2018 336 140 - 440 thou/uL Final   12/28/2017 244 140 - 440 thou/uL Final       Lab Results   Component Value Date    RF 62.5 (H) 03/02/2015    SEDRATE 34 (H) 12/09/2015

## 2021-06-22 NOTE — PROGRESS NOTES
ASSESSMENT AND PLAN:  Fatuma Henry 73 y.o. female is here for follow-up of her severe seropositive rheumatoid arthritis, this is stable positive, she has osteoarthritis.  She is on low-dose methotrexate plus Cimzia.  Intermittent renal function impairment.  She is once again reiterated to avoid nonsteroidals.  Continue the current management plan.  She was given Cimzia injection today.      Diagnoses and all orders for this visit:    Rheumatoid arthritis involving multiple sites with positive rheumatoid factor (H)    Seropositive rheumatoid arthritis (H)  -     folic acid (FOLVITE) 1 MG tablet; Take one tab po qd except the day when taking methotrexate..  Dispense: 90 tablet; Refill: 1    CRF (chronic renal failure), stage 3 (moderate) (H)    High risk medication use      HISTORY OF PRESENTING ILLNESS:      Fatuma Henry, 73 y.o., female is here for seropositive rheumatoid arthritis.  This is associated with positive rheumatoid factor negative anti-CCP antibody.  Overall she is doing great, she gets Cimzia injections here in the clinic, she is on methotrexate modest dose, recent labs are reviewed.  She does have renal impairment and aware of that and takes acetaminophen on as-needed basis.  For her back pain associated lumbar spondylosis she gets it more with activity.  Relief by rest.  She exercises and stretches.  She does not take nonsteroidals in view of the renal impairment.  She is aware of the option of taking acetaminophen.  She reports no mouth ulcers no photosensitivity there is no rash reported Symptoms.  She does not have a history of psoriasis herself for the family or that of inflammatory bowel disease.  She is a smoker for the past 20 years alcohol.  She does not exercise.  She follows up at her primary physician's in [facility].  She has had a history of hypertension and cataracts. Further historical information and ADL limitations as noted in the multidimensional health assessment  questionnaire attached in the EMR. She recalls it was approximately 6+ years ago when she started hurting.  She recalls  began to hurt her shoulders.  This was quite severe pain.  She then started hurting in her hands and wrists as well.  Fairly soon she was seen in rheumatology by Dr. Brooks labs were done.  She'll call for those suggested that she has rheumatoid arthritis.  She was given prednisone.  This did not help.  She remembers that all it did for her was to make her face swollen.  Then she took methotrexate for 3 months that was no good either.  She did tolerate this well.  She went on to have Orencia for 2 years.  That did very well during this time.  Than 1 time suddenly it stopped working she was then changed to Actemra.  Once again and the Actemra did the trick for her further to 2-1/2 years that she was on it.  Currently she is on Cimzia, and feels this has helped her quite nicely.   Of late she's been hurting in her lower back, legs.  This is worse with activity.  She wonders if this is because the current treatment is not working.  She also recalls an episode where she had slipped and landed on her back.  This was from a third step at her house.  And she lives with her .  Her sisters have had osteoarthritis and knee/hip replacement surgeries.  She rates her pain as moderately severe she still notices stiffness of 2 hours especially in the lower back.        ALLERGIES:Codeine    PAST MEDICAL/ACTIVE PROBLEMS/MEDICATION/ FAMILY HISTORY/SOCIAL DATA:  The patient has a family history of  Past Medical History:   Diagnosis Date     Atrial fibrillation (H)      CRF (chronic renal failure)      GERD (gastroesophageal reflux disease)      Hypertension      Hypovolemic shock (H)      Influenza A 12/2017     Rheumatoid arthritis (H)      Sepsis (H) 12/24/2017     Social History     Tobacco Use   Smoking Status Former Smoker     Packs/day: 0.50     Types: Cigarettes     Last attempt to quit: 12/20/2017      Years since quittin.9   Smokeless Tobacco Never Used     Patient Active Problem List   Diagnosis     Foot Pain (Soft Tissue)     Limb pain     Osteoarthritis     Osteoporosis dxa 2006      High risk medication use     Midline low back pain without sciatica     Rheumatoid arthritis involving multiple sites with positive rheumatoid factor (H)     Dyspnea     Reactive airway disease     Paroxysmal atrial fibrillation (H)     Nonsustained ventricular tachycardia (H)     CRF (chronic renal failure), stage 3 (moderate) (H)     Current Outpatient Medications   Medication Sig Dispense Refill     acetaminophen (TYLENOL) 500 MG tablet Take 1-2 tablets (500-1,000 mg total) by mouth every 6 (six) hours as needed.  0     calcium-vitamin D (CALCIUM-VITAMIN D) 500 mg(1,250mg) -200 unit per tablet Take 1 tablet by mouth 2 (two) times a day with meals.       compressor, for nebulizer Eva Nebulizer treatment qid prn 1 Device 0     flaxseed oil 1,000 mg cap Take 1 capsule by mouth daily.        folic acid (FOLVITE) 1 MG tablet Take one tab po qd except the day when taking methotrexate.. 90 tablet 1     FOLIC ACID/MULTIVITS-MIN/LUT (CENTRUM SILVER ORAL) Take 1 tablet by mouth daily.        furosemide (LASIX) 20 MG tablet Take 20 mg by mouth daily as needed (Leg swelling).       ipratropium-albuterol (DUO-NEB) 0.5-2.5 mg/3 mL nebulizer Take 3 mL by nebulization every 4 (four) hours as needed (home reg). 30 vial 1     Lactobacillus rhamnosus GG (CULTURELLE) 10-15 Billion cell capsule Take 1 capsule by mouth daily.       methotrexate 2.5 MG tablet TAKE 4 TABLETS BY MOUTH ONCE A WEEK 52 tablet 0     metoprolol succinate (TOPROL-XL) 100 MG 24 hr tablet Take 1 tablet (100 mg total) by mouth daily. 30 tablet 0     pravastatin (PRAVACHOL) 20 MG tablet Take 20 mg by mouth daily.       ranitidine (ZANTAC) 150 MG tablet Take 150 mg by mouth daily.        rivaroxaban (XARELTO) 20 mg Tab Take 1 tablet (20 mg total) by mouth daily. Take  with a FULL meal. 90 tablet 3     Current Facility-Administered Medications   Medication Dose Route Frequency Provider Last Rate Last Dose     certolizumab pegol injection 400 mg (CIMZIA)  400 mg Subcutaneous Q28 Days Bobby Rush MBBS   400 mg at 12/05/18 1234     DETAILED EXAMINATION  12/05/18  :  Vitals:    12/05/18 1201   BP: 124/72   Resp: 18   Weight: 186 lb (84.4 kg)     Alert oriented. Head including the face is examined for malar rash, heliotropes, scarring, lupus pernio. Eyes examined for redness such as in episcleritis/scleritis, periorbital lesions.   Neck/ Face examined for parotid gland swelling, range of motion of neck.  Left upper and lower and right upper and lower extremities examined for tenderness, swelling, warmth of the appendicular joints, range of motion, edema, rash.  Some of the important findings included: There is no synovitis of the palpable joints of upper extremities.  She has JLT of both knees worsening left side.  She has no effusion or warmth.  LAB / IMAGING DATA:  ALT   Date Value Ref Range Status   12/03/2018 14 0 - 45 U/L Final   10/19/2018 14 0 - 45 U/L Final   10/04/2018 14 0 - 45 U/L Final     Albumin   Date Value Ref Range Status   12/03/2018 3.6 3.5 - 5.0 g/dL Final   10/19/2018 3.7 3.5 - 5.0 g/dL Final   10/04/2018 3.4 (L) 3.5 - 5.0 g/dL Final     Creatinine   Date Value Ref Range Status   12/03/2018 0.94 0.60 - 1.10 mg/dL Final   10/19/2018 1.10 0.60 - 1.10 mg/dL Final   10/04/2018 1.06 0.60 - 1.10 mg/dL Final       WBC   Date Value Ref Range Status   12/03/2018 8.2 4.0 - 11.0 thou/uL Final   10/04/2018 7.4 4.0 - 11.0 thou/uL Final     Hemoglobin   Date Value Ref Range Status   12/03/2018 13.5 12.0 - 16.0 g/dL Final   10/04/2018 12.4 12.0 - 16.0 g/dL Final   07/12/2018 13.3 12.0 - 16.0 g/dL Final     Platelets   Date Value Ref Range Status   12/03/2018 309 140 - 440 thou/uL Final   10/04/2018 323 140 - 440 thou/uL Final   07/12/2018 330 140 - 440 thou/uL Final       Lab  Results   Component Value Date    RF 62.5 (H) 03/02/2015    SEDRATE 34 (H) 12/09/2015

## 2021-06-23 NOTE — TELEPHONE ENCOUNTER
Rescheduled pt to come in on Monday 2/11/19 at 1:30pm for nurse CSS only appt for Cimzia shots.     Please add to Rheum CSS schedule

## 2021-06-23 NOTE — PROGRESS NOTES
Pt's  was seen in clinic today with Teressa, wife and pt mentioned to Teressa she is going to have a back injection   Teressa reviewed with pt hold orders, incase we are called for this  Pt would hold Xarelto for 3 days prior and resume as soon as can, no bridging.
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
## 2021-06-23 NOTE — TELEPHONE ENCOUNTER
Date: 2/11/2019 Status: [facility]   Time: 1:30 PM Length: 30   Visit Type: NURSE VISIT [[id number]] Copay: $0.00   Provider: [facility] Department: [facility]   Referring Provider: SHANTANU SPENCER CSN: [id number]   Notes: chanelle Villegas
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
## 2021-06-24 NOTE — TELEPHONE ENCOUNTER
Patient is requesting a call back from [facility] to discuss Mondays injections.

## 2021-06-24 NOTE — TELEPHONE ENCOUNTER
Due to schedule change, pts appt moved back to [facility] today at noon for Cimzia injection. Pt notified and agrees with plan.

## 2021-06-25 NOTE — PROGRESS NOTES
ASSESSMENT AND PLAN:  Fatuma Henry 75 y.o. female is here for follow-up.  This is for rheumatoid arthritis, osteoarthritis, doing great on combination of Cimzia, methotrexate, today Cimzia was injected.  She had labs drawn earlier today, CBC is within acceptable range, rest are pending.  She noted intermittent discomfort such as in the right knee which she very likely has osteoarthritis.  Management principles were reviewed.  I have asked her to stay the course.  Follow-up in 6 months or sooner.         Diagnoses and all orders for this visit:    Seropositive rheumatoid arthritis (H)  -     methotrexate 2.5 MG tablet; TAKE 4 TABLETS BY MOUTH ONCE A WEEK  Dispense: 52 tablet; Refill: 0  -     folic acid (FOLVITE) 1 MG tablet; Take one tab po qd except the day when taking methotrexate.  Dispense: 90 tablet; Refill: 1    Primary osteoarthritis involving multiple joints    High risk medication use    Rheumatoid factor positive      HISTORY OF PRESENTING ILLNESS:      Fatuma Henry, 75 y.o., female is here for follow-up.  This is for rheumatoid arthritis.  This is longstanding.  Affected multiple joints, moderately severe, well controlled with the current combination of Cimzia and methotrexate, positive for signals of autoimmunity, positive rheumatoid factor negative anti-CCP..  She has done remarkably well with this.She has noted morning stiffness of no more than 20 to 30 minutes recent labs are reviewed renal impairment noted she is aware not to take nonsteroidals.  She is noted mild discomfort such as in the right knee.  She no longer has to go up and down the steps having moved to a single level accommodation.  Cimzia injection was done today.    She reports no mouth ulcers no photosensitivity there is no rash reported Symptoms.  She does not have a history of psoriasis herself for the family or that of inflammatory bowel disease.  She is a smoker for the past 20 years   She does not exercise.  She follows up  at her primary physician's in [facility].  She has had a history of hypertension and cataracts.  She recalls it was approximately 6+ years ago when she started hurting.  She recalls  began to hurt her shoulders.  This was quite severe pain.  She then started hurting in her hands and wrists as well.  Fairly soon she was seen in rheumatology by Dr. Brooks labs were done.  She'll call for those suggested that she has rheumatoid arthritis.  She was given prednisone.  This did not help.  She remembers that all it did for her was to make her face swollen.  Then she took methotrexate for 3 months that was no good either.  She did tolerate this well.  She went on to have Orencia for 2 years.  That did very well during this time.  Than 1 time suddenly it stopped working she was then changed to Actemra.  Once again and the Actemra did the trick for her further to 2-1/2 years that she was on it.  Currently she is on Cimzia, and feels this has helped her quite nicely.   Of late she's been hurting in her lower back, legs.  This is worse with activity.  She wonders if this is because the current treatment is not working.  She also recalls an episode where she had slipped and landed on her back.  This was from a third step at her house.  And she lives with her .  Her sisters have had osteoarthritis and knee/hip replacement surgeries.  She rates her pain as moderately severe she still notices stiffness of 2 hours especially in the lower back.   Further historical information and ADL limitations as noted in the multidimensional health assessment questionnaire attached in the EMR.     ALLERGIES:Codeine    PAST MEDICAL/ACTIVE PROBLEMS/MEDICATION/ FAMILY HISTORY/SOCIAL DATA:  The patient has a family history of  Past Medical History:   Diagnosis Date     Atrial fibrillation (H)      CRF (chronic renal failure)      GERD (gastroesophageal reflux disease)      Hypertension      Hypovolemic shock (H)      Influenza A  12/2017     Rheumatoid arthritis (H)      Sepsis (H) 12/24/2017     Social History     Tobacco Use   Smoking Status Former Smoker     Packs/day: 0.50     Types: Cigarettes     Quit date: 12/20/2017     Years since quitting: 3.4   Smokeless Tobacco Never Used     Patient Active Problem List   Diagnosis     Foot Pain (Soft Tissue)     Limb pain     Osteoporosis dxa 2006      High risk medication use     Midline low back pain without sciatica     Rheumatoid arthritis involving multiple sites with positive rheumatoid factor (H)     Dyspnea     Reactive airway disease     Paroxysmal atrial fibrillation (H)     Nonsustained ventricular tachycardia (H)     CRF (chronic renal failure), stage 3 (moderate)     Primary osteoarthritis involving multiple joints     Benign essential hypertension     Current Outpatient Medications   Medication Sig Dispense Refill     acetaminophen (TYLENOL) 500 MG tablet Take 1-2 tablets (500-1,000 mg total) by mouth every 6 (six) hours as needed.  0     certolizumab pegoL (CIMZIA) 400 mg (200 mg x 2 vials) Kit injection Inject 400 mg under the skin every 28 days. 1200 mg 1     compressor, for nebulizer Eva Nebulizer treatment qid prn 1 Device 0     flaxseed oil 1,000 mg cap Take 1 capsule by mouth daily.        folic acid (FOLVITE) 1 MG tablet Take one tab po qd except the day when taking methotrexate.. 90 tablet 1     FOLIC ACID/MULTIVITS-MIN/LUT (CENTRUM SILVER ORAL) Take 1 tablet by mouth daily.        furosemide (LASIX) 20 MG tablet Take 20 mg by mouth daily as needed (Leg swelling).       ipratropium-albuterol (DUO-NEB) 0.5-2.5 mg/3 mL nebulizer Take 3 mL by nebulization every 4 (four) hours as needed (home reg). 30 vial 1     methotrexate 2.5 MG tablet TAKE 4 TABLETS BY MOUTH ONCE A WEEK 52 tablet 0     metoprolol succinate (TOPROL-XL) 100 MG 24 hr tablet Take 1 tablet (100 mg total) by mouth daily. 30 tablet 0     pravastatin (PRAVACHOL) 20 MG tablet Take 20 mg by mouth daily.        "rivaroxaban ANTICOAGULANT (XARELTO) 20 mg tablet Take 1 tablet (20 mg total) by mouth daily. Take with a FULL meal. 90 tablet 3     Current Facility-Administered Medications   Medication Dose Route Frequency Provider Last Rate Last Admin     certolizumab pegol injection 400 mg (CIMZIA)  400 mg Subcutaneous Q28 Days Bobby Rush MBKRISHNA   400 mg at 05/10/21 1254     DETAILED EXAMINATION  06/02/21  :  Vitals:    06/02/21 1113   BP: 116/72   Pulse: 76   Weight: 161 lb 12.8 oz (73.4 kg)   Height: 5' 5.5\" (1.664 m)     Alert oriented. Head including the face is examined for malar rash, heliotropes, scarring, lupus pernio. Eyes examined for redness such as in episcleritis/scleritis, periorbital lesions.   Neck/ Face examined for parotid gland swelling, range of motion of neck.  Left upper and lower and right upper and lower extremities examined for tenderness, swelling, warmth of the appendicular joints, range of motion, edema, rash.  Some of the important findings included:she she has Heberden's, there is no synovitis in palpable joints of upper extremities,   mild JLT of the right knee, which is warmer than the left without detectable effusion.          LAB / IMAGING DATA:  ALT   Date Value Ref Range Status   03/08/2021 18 0 - 45 U/L Final   01/13/2021 17 0 - 45 U/L Final   12/16/2020 18 0 - 45 U/L Final     Albumin   Date Value Ref Range Status   03/08/2021 3.7 3.5 - 5.0 g/dL Final   01/13/2021 3.8 3.5 - 5.0 g/dL Final   12/16/2020 3.6 3.5 - 5.0 g/dL Final     Creatinine   Date Value Ref Range Status   04/30/2021 0.89 0.60 - 1.10 mg/dL Final   04/30/2021 0.89 0.60 - 1.10 mg/dL Final   03/08/2021 0.99 0.60 - 1.10 mg/dL Final       WBC   Date Value Ref Range Status   06/02/2021 5.0 4.0 - 11.0 thou/uL Final   03/08/2021 5.5 4.0 - 11.0 thou/uL Final     Hemoglobin   Date Value Ref Range Status   06/02/2021 13.5 12.0 - 16.0 g/dL Final   03/08/2021 11.9 (L) 12.0 - 16.0 g/dL Final   12/16/2020 12.5 12.0 - 16.0 g/dL Final "     Platelets   Date Value Ref Range Status   06/02/2021 305 140 - 440 thou/uL Final   03/08/2021 266 140 - 440 thou/uL Final   12/16/2020 302 140 - 440 thou/uL Final       Lab Results   Component Value Date    RF 62.5 (H) 03/02/2015    SEDRATE 34 (H) 12/09/2015

## 2021-06-25 NOTE — TELEPHONE ENCOUNTER
Called [facility]- they have not sent out Cimzia yet as they were waiting to hear back from the pt. I asked for them to send the Cimzia to our clinic however with holiday they would not be able to get it to us until Wednesday which is when the pts appt is.     I called pt to see if she could call  Specialty pharmacy to have them ship it to her home to be delivered tomorrow- Friday and then the pt will bring it in to us at her appt on Wednesday. Pt will call them and call us back if there are any issues.

## 2021-06-26 NOTE — PROGRESS NOTES
Progress Notes by Teressa Cespedes CNP at 4/13/2018 10:30 AM     Author: Teressa Cespedes CNP Service: -- Author Type: Nurse Practitioner    Filed: 4/13/2018  3:25 PM Encounter Date: 4/13/2018 Status: Signed    : Teressa Cespedes CNP (Nurse Practitioner)           Click to link to [facility]     [facility] HEART CARE NOTE      Assessment/Recommendations   Assessment/Plan:    1.  Paroxysmal atrial fibrillation: Asymptomatic, following a rate control strategy unless she has symptomatic recurrence.  Continue metoprolol succinate 100 mg daily.  She was reassured that atrial fibrillation is not life-threatening, but carries an increased risk for stroke.  She has a [id number] score of 3 for age 65-74, female gender, and hypertension.  She initially required renal dosing of Xarelto, but her renal function has stabilized and returned to baseline.  She no longer qualifies for renal adjustment.  We discussed alternatives of increasing Xarelto to 20 mg daily versus switching to Eliquis.  As most of her medications are once daily, she feels she would have difficulty remembering to take the second dose.  Additionally, her  is also on Xarelto.  Increase Xarelto to 20 mg daily which she takes with a full breakfast.    2.  Nonsustained ventricular tachycardia: Brief monomorphic NSVT was seen on previous Holter monitor.  As echocardiogram showed normal left ventricular systolic function and NM stress test showed no evidence for ischemia, no further intervention is recommended at this time.    3.  Hypertension: Blood pressure at target today.    Follow up in 1 year.     History of Present Illness    Ms. Fatuma Henry is a very pleasant 72 y.o. woman who comes in today for EP follow-up of atrial fibrillation.  He was newly diagnosed with paroxysmal atrial fibrillation during a hospitalization for influenza a and hypovolemic shock over Christmas 2017 with which she was asymptomatic.  Ventricular response was  controlled with metoprolol succinate 100 mg daily and she was started on Xarelto 15 mg daily for stroke prophylaxis.  Echocardiogram showed normal left ventricular systolic function with mild aortic stenosis.  Subsequent Holter monitor showed no atrial fibrillation, but a brief run of nonsustained ventricular tachycardia.  Subsequent NM stress test showed no evidence for inducible myocardial ischemia or infarction.    Fatuma states that she has been feeling well.  She denies chest discomfort, palpitations, abdominal fullness/bloating or peripheral edema, shortness of breath, paroxysmal nocturnal dyspnea, orthopnea, lightheadedness, dizziness, pre-syncope, or syncope.  She occasionally has some lower extremity edema for which she takes a dose of furosemide, but reports this is infrequent.    Cardiographics (personally reviewed):  EKG, Done 12/26/2018: Atrial fibrillation with rapid ventricular response at 102 bpm    24 hour Holter monitor worn 1/4/2018:  Predominate rhythm sinus tachycardia with an average heart rate of 100 bpm.  Normal electrocardiographic intervals.  No significant bradycardia or pauses.  She did have some occasional junctional escape beats while asleep.  11 brief episodes of ectopic atrial tachycardia, longest 7 beats.  No sustained atrial tachycardia, atrial fibrillation, or other SVT.  A single 6 beat run of monomorphic nonsustained VT with a left bundle axis configuration at 170 bpm.  No sustained ventricular arrhythmia.    ECHO, Done 12/27/2017:     Mild left atrial enlargement    Left ventricle ejection fraction is normal. The calculated left ventricular ejection fraction is 56% without wall motion abnormality.    Mild aortic valve stenosis with trace to mild regurgitation    Mild mitral and tricuspid regurgitation. Mild to moderate pulmonary hypertension noted.    No previous study for comparison.  NM stress test done on 2/1/2018:    The exercise nuclear stress test is negative for  inducible myocardial ischemia.    The left ventricular ejection fraction is 75%.    There is no prior study available.       Physical Examination Review of Systems   Vitals:    04/13/18 1048   BP: 118/78   Pulse: 80   Resp: 18     Body mass index is 32.45 kg/(m^2).  Wt Readings from Last 3 Encounters:   04/13/18 195 lb (88.5 kg)   01/15/18 187 lb (84.8 kg)   01/12/18 183 lb 11.2 oz (83.3 kg)     General:   Alert, well-appearing and in no acute distress.   HEENT: Atraumatic, normocephalic.  No scleral icterus, normal conjunctivae, EOM intact, PERRL; mucous membranes pink and moist.    Chest: Chest symmetric, spine straight.   Lungs:   Respirations unlabored: Lungs are clear to auscultation.   Cardiovascular:   Normal first and second heart sounds with no murmurs, rubs, or gallops.  Regular rate and rhythm.  Radial and posterior tibial pulses are intact, no edema.   Abdomen:  Soft, nontender, nondistended, bowel sounds present   Extremities: No cyanosis or clubbing   Musculoskeletal Moves all extremities   Skin: Warm, dry, intact.    Neurologic: Mood and affect are appropriate, alert and oriented to person, place, time, and situation    General: WNL  Eyes: WNL  Ears/Nose/Throat: WNL  Lungs: WNL  Heart: WNL  Stomach: WNL  Bladder: WNL  Muscle/Joints: WNL  Skin: WNL  Nervous System: WNL  Mental Health: WNL     Blood: WNL     Medical History  Surgical History Family History Social History   Past Medical History:   Diagnosis Date   ? Atrial fibrillation    ? CRF (chronic renal failure)    ? GERD (gastroesophageal reflux disease)    ? Hypertension    ? Hypovolemic shock    ? Influenza A 12/2017   ? Rheumatoid arthritis    ? Sepsis 12/24/2017    Past Surgical History:   Procedure Laterality Date   ? APPENDECTOMY     ? BLADDER SUSPENSION     ? CHOLECYSTECTOMY      Family History   Problem Relation Age of Onset   ? Heart failure Mother    ? Stroke Father    ? Kidney failure Sister    ? Stroke Brother     Social History      Social History   ? Marital status:      Spouse name: N/A   ? Number of children: N/A   ? Years of education: N/A     Occupational History   ? Not on file.     Social History Main Topics   ? Smoking status: Former Smoker     Packs/day: 0.50     Types: Cigarettes     Quit date: 12/20/2017   ? Smokeless tobacco: Never Used   ? Alcohol use No   ? Drug use: No   ? Sexual activity: Not Currently     Other Topics Concern   ? Not on file     Social History Narrative          Medications  Allergies   Current Outpatient Prescriptions   Medication Sig Dispense Refill   ? acetaminophen (TYLENOL) 500 MG tablet Take 1-2 tablets (500-1,000 mg total) by mouth every 6 (six) hours as needed.  0   ? calcium-vitamin D (CALCIUM-VITAMIN D) 500 mg(1,250mg) -200 unit per tablet Take 1 tablet by mouth 2 (two) times a day with meals.     ? compressor, for nebulizer Eva Nebulizer treatment qid prn 1 Device 0   ? flaxseed oil 1,000 mg cap Take 1 capsule by mouth daily.      ? folic acid (FOLVITE) 1 MG tablet Take one tab po qd except the day when taking methotrexate. 90 tablet 1   ? FOLIC ACID/MULTIVITS-MIN/LUT (CENTRUM SILVER ORAL) Take 1 tablet by mouth daily.      ? furosemide (LASIX) 20 MG tablet Take 20 mg by mouth daily as needed (Leg swelling).     ? ipratropium-albuterol (DUO-NEB) 0.5-2.5 mg/3 mL nebulizer Take 3 mL by nebulization every 4 (four) hours as needed (home reg). 30 vial 1   ? Lactobacillus rhamnosus GG (CULTURELLE) 10-15 Billion cell capsule Take 1 capsule by mouth daily.     ? methotrexate 2.5 MG tablet Take 4 tablets po once a week (hold dose if you come down with an infection, until infection clears). 52 tablet 0   ? metoprolol succinate (TOPROL-XL) 100 MG 24 hr tablet Take 1 tablet (100 mg total) by mouth daily. 30 tablet 0   ? pravastatin (PRAVACHOL) 20 MG tablet Take 20 mg by mouth daily.     ? ranitidine (ZANTAC) 150 MG tablet Take 150 mg by mouth daily.      ? rivaroxaban (XARELTO) 20 mg Tab Take 1  tablet (20 mg total) by mouth daily. Take with a FULL meal. 90 tablet 3     Current Facility-Administered Medications   Medication Dose Route Frequency Provider Last Rate Last Dose   ? certolizumab pegol injection 400 mg (CIMZIA)  400 mg Subcutaneous Q30 Days Jose Martinez La Nena, DO   400 mg at 03/16/18 1235      Allergies   Allergen Reactions   ? Codeine Nausea And Vomiting      Medical, surgical, family, social history, and medications were all reviewed and updated as necessary.   Lab Results    Chemistry CBC Other   Lab Results   Component Value Date    CREATININE 1.02 02/14/2018    BUN 37 (H) 01/04/2018     01/04/2018    K 4.9 01/04/2018     01/04/2018    CO2 29 01/04/2018     Creatinine (mg/dL)   Date Value   02/14/2018 1.02   01/04/2018 1.66 (H)   12/29/2017 1.07   12/28/2017 1.09    Lab Results   Component Value Date    WBC 6.0 02/14/2018    HGB 11.8 (L) 02/14/2018    HCT 34.5 (L) 02/14/2018    MCV 97 02/14/2018     02/14/2018    Lab Results   Component Value Date    TSH 0.28 (L) 01/04/2018            Greater than than 30 minutes were spent face to face in this visit with more than 50% spent discussing diagnoses as listed above, counseling, and coordination of care.    This note has been dictated using voice recognition software. Any grammatical, typographical, or context distortions are unintentional and inherent to the software.    Teressa Cespedes, [facility]   Electrophysiology

## 2021-06-26 NOTE — PATIENT INSTRUCTIONS - HE
Fatuma Henry,    It was a pleasure to see you today at the [facility].     My recommendations after this visit include:    Continue current medications.    If your balance worsens, consider left atrial appendage closure with the Watchman device as an alternative to blood thinner.    Follow up in 1 year, or sooner if needed    Teressa Cespedes CNP  [facility], Electrophysiology  [phone]  EP nurses [phone]

## 2021-06-27 NOTE — PROGRESS NOTES
Progress Notes by Teressa Cespedes CNP at 4/8/2019  8:30 AM     Author: Teressa Cespedes CNP Service: -- Author Type: Nurse Practitioner    Filed: 4/8/2019 10:01 AM Encounter Date: 4/8/2019 Status: Signed    : Teressa Cespedes CNP (Nurse Practitioner)           Click to link to [facility]     [facility] HEART CARE ELECTROPHYSIOLOGY NOTE      Assessment/Recommendations   Assessment/Plan:    1.  Paroxysmal atrial fibrillation: Asymptomatic, following a rate control strategy unless she has specifically symptomatic recurrence.  No symptomatology of A. fib.  Instructed to check her pulse daily and call for symptomatic recurrence of A. fib or heart rates consistently above 100 bpm at rest.  Continue metoprolol succinate 100 mg daily.    She was again reassured that atrial fibrillation is not life-threatening, but carries an increased risk for stroke.  She has a [id number] score of 3 for age 65-74, female gender, and hypertension.  She has not had any recent documentation of A. fib, but as she was asymptomatic with documented A. fib, recommend continuing oral anticoagulation therapy long-term.  Continue Xarelto 20 mg daily for stroke prophylaxis.  She denies any side effects, bleeding issues, or missed doses.    Okay to stop Xarelto 3 days prior to spine surgery and restart as soon as possible afterwards.  No bridging necessary.    2.  Hypertension: Blood pressure at target today.    Follow up in 1 year.     History of Present Illness    Ms. Fatuma Henry is a very pleasant 73 y.o. female who comes in today for EP follow-up of atrial fibrillation.  She was diagnosed with paroxysmal atrial fibrillation during a hospitalization for influenza and hypovolemic shock over Christmas 2017 with which she was asymptomatic.  Ventricular response was controlled with metoprolol succinate 100 mg daily and she was started on Xarelto for stroke prophylaxis.  Echo showed normal left ventricular systolic performance with mild  aortic stenosis.  Subsequent Holter monitor showed no A. fib, but a brief run of nonsustained ventricular tachycardia.  Nuclear stress testing showed no evidence for inducible myocardial ischemia or infarction, so the NSVT is considered benign.    Fatuma states that she has been feeling well.  She has not had any symptomatic episodes of A. fib or inappropriately elevated heart rate of which she is aware.  She denies chest discomfort, palpitations, abdominal fullness/bloating or peripheral edema, shortness of breath, paroxysmal nocturnal dyspnea, orthopnea, lightheadedness, dizziness, pre-syncope, or syncope.  She is in the process of scheduling a spine surgery.    Cardiographics (personally reviewed):  EKG, Done done 12/26/2018 shows atrial fibrillation with rapid ventricular response at 102 bpm    24-hour Holter monitor worn 1/4/2018: Predominant heart rhythm is sinus tachycardia with an average heart rate of 100 bpm.  Normal electrocardiographic intervals.  No significant bradycardia or pauses.  She did have some occasional junctional escape beats while asleep.  11 brief episodes of ectopic atrial tachycardia.  No sustained atrial tachycardia, atrial fibrillation, or other SVC.  A single 6 beat run of monomorphic nonsustained VT with a left bundle axis configuration at 170 bpm.  No sustained ventricular arrhythmia.    Echo done 12/27/2017:    Mild left atrial enlargement    Left ventricle ejection fraction is normal. The calculated left ventricular ejection fraction is 56% without wall motion abnormality.    Mild aortic valve stenosis with trace to mild regurgitation    Mild mitral and tricuspid regurgitation. Mild to moderate pulmonary hypertension noted.    No previous study for comparison.    NM pharmacologic stress test done 2/1/2018 is negative for inducible myocardial ischemia or infarction.  LVEF 75%.        Physical Examination Review of Systems   Vitals:    04/08/19 0830   BP: 124/78   Pulse: 64   Resp:  16     Body mass index is 29.62 kg/m .  Wt Readings from Last 3 Encounters:   04/08/19 178 lb (80.7 kg)   12/05/18 186 lb (84.4 kg)   05/17/18 195 lb (88.5 kg)     General Appearance:   Alert, well-appearing and in no acute distress.   HEENT: Atraumatic, normocephalic.  No scleral icterus, normal conjunctivae, EOM intact, PERRL; mucous membranes pink and moist.     Chest: Chest symmetric, spine straight.   Lungs:   Respirations unlabored: Lungs are clear to auscultation.   Cardiovascular:   Normal first and second heart sounds with no murmurs, rubs, or gallops.  Regular rate and rhythm.  Radial and posterior tibial pulses are intact, no edema.   Abdomen:  Soft, nontender, nondistended, bowel sounds present   Extremities: No cyanosis or clubbing   Musculoskeletal: Moves all extremities   Skin: Warm, dry, intact.    Neurologic: Mood and affect are appropriate, alert and oriented to person, place, time, and situation          Ears/Nose/Throat: WNL  Lungs: Shortness of Breath, Wheezing  Heart: Shortness of Breath with activity  Stomach: WNL  Bladder: WNL  Muscle/Joints: WNL  Skin: WNL  Nervous System: WNL  Mental Health: WNL     Blood: WNL     Medical History  Surgical History Family History Social History   Past Medical History:   Diagnosis Date   ? Atrial fibrillation (H)    ? CRF (chronic renal failure)    ? GERD (gastroesophageal reflux disease)    ? Hypertension    ? Hypovolemic shock (H)    ? Influenza A 12/2017   ? Rheumatoid arthritis (H)    ? Sepsis (H) 12/24/2017    Past Surgical History:   Procedure Laterality Date   ? APPENDECTOMY     ? BLADDER SUSPENSION     ? CHOLECYSTECTOMY      Family History   Problem Relation Age of Onset   ? Heart failure Mother    ? Stroke Father    ? Kidney failure Sister    ? Stroke Brother    ? Diabetes type II Brother     Social History     Socioeconomic History   ? Marital status:      Spouse name: Not on file   ? Number of children: Not on file   ? Years of education:  Not on file   ? Highest education level: Not on file   Occupational History   ? Not on file   Social Needs   ? Financial resource strain: Not on file   ? Food insecurity:     Worry: Not on file     Inability: Not on file   ? Transportation needs:     Medical: Not on file     Non-medical: Not on file   Tobacco Use   ? Smoking status: Former Smoker     Packs/day: 0.50     Types: Cigarettes     Last attempt to quit: 2017     Years since quittin.2   ? Smokeless tobacco: Never Used   Substance and Sexual Activity   ? Alcohol use: No   ? Drug use: No   ? Sexual activity: Not Currently   Lifestyle   ? Physical activity:     Days per week: Not on file     Minutes per session: Not on file   ? Stress: Not on file   Relationships   ? Social connections:     Talks on phone: Not on file     Gets together: Not on file     Attends Sikhism service: Not on file     Active member of club or organization: Not on file     Attends meetings of clubs or organizations: Not on file     Relationship status: Not on file   ? Intimate partner violence:     Fear of current or ex partner: Not on file     Emotionally abused: Not on file     Physically abused: Not on file     Forced sexual activity: Not on file   Other Topics Concern   ? Not on file   Social History Narrative   ? Not on file          Medications  Allergies   Current Outpatient Medications   Medication Sig Dispense Refill   ? acetaminophen (TYLENOL) 500 MG tablet Take 1-2 tablets (500-1,000 mg total) by mouth every 6 (six) hours as needed.  0   ? calcium-vitamin D (CALCIUM-VITAMIN D) 500 mg(1,250mg) -200 unit per tablet Take 1 tablet by mouth 2 (two) times a day with meals.     ? compressor, for nebulizer Eva Nebulizer treatment qid prn 1 Device 0   ? flaxseed oil 1,000 mg cap Take 1 capsule by mouth daily.      ? folic acid (FOLVITE) 1 MG tablet Take one tab po qd except the day when taking methotrexate.. 90 tablet 1   ? FOLIC ACID/MULTIVITS-MIN/LUT (CENTRUM SILVER  ORAL) Take 1 tablet by mouth daily.      ? methotrexate 2.5 MG tablet TAKE 4 TABLETS BY MOUTH ONCE A WEEK 52 tablet 0   ? metoprolol succinate (TOPROL-XL) 100 MG 24 hr tablet Take 1 tablet (100 mg total) by mouth daily. 30 tablet 0   ? pravastatin (PRAVACHOL) 20 MG tablet Take 20 mg by mouth daily.     ? ranitidine (ZANTAC) 150 MG tablet Take 150 mg by mouth daily.      ? rivaroxaban (XARELTO) 20 mg Tab Take 1 tablet (20 mg total) by mouth daily. Take with a FULL meal. 90 tablet 3   ? furosemide (LASIX) 20 MG tablet Take 20 mg by mouth daily as needed (Leg swelling).     ? ipratropium-albuterol (DUO-NEB) 0.5-2.5 mg/3 mL nebulizer Take 3 mL by nebulization every 4 (four) hours as needed (home reg). 30 vial 1     Current Facility-Administered Medications   Medication Dose Route Frequency Provider Last Rate Last Dose   ? certolizumab pegol injection 400 mg (CIMZIA)  400 mg Subcutaneous Q28 Days Bobby Rush MBBS   400 mg at 03/11/19 1227      Allergies   Allergen Reactions   ? Codeine Nausea And Vomiting      Medical, surgical, family, social history, and medications were all reviewed and updated as necessary.   Lab Results    Chemistry CBC Other   Lab Results   Component Value Date    CREATININE 1.01 03/11/2019    BUN 21 10/19/2018     10/19/2018    K 4.1 10/19/2018     10/19/2018    CO2 26 10/19/2018     Creatinine (mg/dL)   Date Value   03/11/2019 1.01   12/03/2018 0.94   10/19/2018 1.10   10/04/2018 1.06    Lab Results   Component Value Date    WBC 7.3 03/11/2019    HGB 13.6 03/11/2019    HCT 39.7 03/11/2019    MCV 97 03/11/2019     03/11/2019    No results found for: INR  Lab Results   Component Value Date    TSH 1.05 10/19/2018              This note has been dictated using voice recognition software. Any grammatical, typographical, or context distortions are unintentional and inherent to the software.    Teressa Cespedes, [facility]   Electrophysiology

## 2021-06-29 NOTE — PROGRESS NOTES
"Progress Notes by Teressa Cespedes CNP at 5/28/2020 10:30 AM     Author: Teressa Cespedes CNP Service: -- Author Type: Nurse Practitioner    Filed: 5/28/2020 11:23 AM Encounter Date: 5/28/2020 Status: Signed    : Teressa Cespedes CNP (Nurse Practitioner)           The patient has been notified of following:     \"This telephone visit will be conducted via a call between you and your physician/provider. We have found that certain health care needs can be provided without the need for a physical exam.  This service lets us provide the care you need with a phone conversation.  If a prescription is necessary we can send it directly to your pharmacy.  If lab work is needed we can place an order for that and you can then stop by our lab to have the test done at a later time. If during the course of the call the physician/provider feels a telephone visit is not appropriate, you will not be charged for this service.\" Verbal consent has been obtained for this service by care team member:         HEART CARE PHONE ENCOUNTER        The patient has chosen to have the visit conducted as a telephone visit, to reduce risk of exposure given the current status of Coronavirus in our community. This telephone visit is being conducted via a call between the patient and physician/provider. Health care needs are being provided without a physical exam.     Assessment/Recommendations   Assessment and Plan:    1.  Paroxysmal atrial fibrillation: Asymptomatic, following a rate control strategy unless she has specifically symptomatic recurrence.  No symptomatology of A. fib.  She is unable to feel her pulse, so was instructed to use her 's BP machine to check her heart rate.  Call for symptomatic recurrence of A. fib or heart rates consistently above 100 bpm at rest.  Continue metoprolol succinate 100 mg daily.     She was again reassured that atrial fibrillation is not life-threatening, but carries an increased risk for stroke.  She has " a [id number] score of 3 for age 65-74, female gender, and hypertension. We briefly discussed the alternative of left atrial appendage closure, but she is doing well on OAC, so will remain on medication.  Continue Xarelto 20 mg daily for stroke prophylaxis.  She denies any side effects, bleeding issues, or missed doses.      2.  Hypertension: Blood pressure at target per recent checks.  Continue metoprolol.    Follow Up Plan: Follow up in 1 year  I have reviewed the note as documented.  This accurately captures the substance of my conversation with the patient.    Total time of call between patient and provider was 10 minutes   Start Time:1054   Stop Time:1104       History of Present Illness/Subjective    Fatuma JAZMIN Henry is a 74 y.o. female who is being evaluated via a billable telephone visit.  She has a history or atrial fibrillation and hypertension.  She was diagnosed with paroxysmal atrial fibrillation during a hospitalization for influenza and hypovolemic shock over Christmas 2017 with which she was asymptomatic.  Ventricular response was controlled with metoprolol succinate 100 mg daily and she was started on Xarelto for stroke prophylaxis.  Echo showed normal left ventricular systolic performance with mild aortic stenosis.  Subsequent Holter monitor showed no A. fib, but a brief run of nonsustained ventricular tachycardia.  Nuclear stress testing showed no evidence for inducible myocardial ischemia or infarction, so the NSVT is considered benign.     Gert states that she continues to feel well.  She has not had any symptomatic episodes of A. fib or inappropriately elevated heart rate of which she is aware.  She denies chest discomfort, palpitations, abdominal fullness/bloating or peripheral edema, shortness of breath, paroxysmal nocturnal dyspnea, orthopnea, lightheadedness, dizziness, pre-syncope, or syncope.  She occasionally checks her blood pressures which have been 120s-130s/70s with heart rates in the  70s.       Cardiographics (EKG personally reviewed):  EKG, Done done 12/26/2018 shows atrial fibrillation with rapid ventricular response at 102 bpm     24-hour Holter monitor worn 1/4/2018: Predominant heart rhythm is sinus tachycardia with an average heart rate of 100 bpm.  Normal electrocardiographic intervals.  No significant bradycardia or pauses.  She did have some occasional junctional escape beats while asleep.  11 brief episodes of ectopic atrial tachycardia.  No sustained atrial tachycardia, atrial fibrillation, or other SVC.  A single 6 beat run of monomorphic nonsustained VT with a left bundle axis configuration at 170 bpm.  No sustained ventricular arrhythmia.     Echo done 12/27/2017:    Mild left atrial enlargement    Left ventricle ejection fraction is normal. The calculated left ventricular ejection fraction is 56% without wall motion abnormality.    Mild aortic valve stenosis with trace to mild regurgitation    Mild mitral and tricuspid regurgitation. Mild to moderate pulmonary hypertension noted.    No previous study for comparison.     NM pharmacologic stress test done 2/1/2018 is negative for inducible myocardial ischemia or infarction.  LVEF 75%.    I have reviewed and updated the patient's Past Medical History, Social History, Family History and Medication List.     Physical Examination not performed given phone encounter Review of Systems          See CMA note attached, personally reviewed                                      Medical History  Surgical History Family History Social History   Past Medical History:   Diagnosis Date   ? Atrial fibrillation (H)    ? CRF (chronic renal failure)    ? GERD (gastroesophageal reflux disease)    ? Hypertension    ? Hypovolemic shock (H)    ? Influenza A 12/2017   ? Rheumatoid arthritis (H)    ? Sepsis (H) 12/24/2017    Past Surgical History:   Procedure Laterality Date   ? APPENDECTOMY     ? BLADDER SUSPENSION     ? CHOLECYSTECTOMY      Family History    Problem Relation Age of Onset   ? Heart failure Mother    ? Stroke Father    ? Kidney failure Sister    ? Stroke Brother    ? Diabetes type II Brother     Social History     Socioeconomic History   ? Marital status:      Spouse name: Not on file   ? Number of children: Not on file   ? Years of education: Not on file   ? Highest education level: Not on file   Occupational History   ? Not on file   Social Needs   ? Financial resource strain: Not on file   ? Food insecurity     Worry: Not on file     Inability: Not on file   ? Transportation needs     Medical: Not on file     Non-medical: Not on file   Tobacco Use   ? Smoking status: Former Smoker     Packs/day: 0.50     Types: Cigarettes     Last attempt to quit: 2017     Years since quittin.4   ? Smokeless tobacco: Never Used   Substance and Sexual Activity   ? Alcohol use: No   ? Drug use: No   ? Sexual activity: Not Currently   Lifestyle   ? Physical activity     Days per week: Not on file     Minutes per session: Not on file   ? Stress: Not on file   Relationships   ? Social connections     Talks on phone: Not on file     Gets together: Not on file     Attends Jainism service: Not on file     Active member of club or organization: Not on file     Attends meetings of clubs or organizations: Not on file     Relationship status: Not on file   ? Intimate partner violence     Fear of current or ex partner: Not on file     Emotionally abused: Not on file     Physically abused: Not on file     Forced sexual activity: Not on file   Other Topics Concern   ? Not on file   Social History Narrative   ? Not on file          Medications  Allergies   Current Outpatient Medications   Medication Sig Dispense Refill   ? acetaminophen (TYLENOL) 500 MG tablet Take 1-2 tablets (500-1,000 mg total) by mouth every 6 (six) hours as needed.  0   ? calcium-vitamin D (CALCIUM-VITAMIN D) 500 mg(1,250mg) -200 unit per tablet Take 1 tablet by mouth 2 (two) times a day with  meals.     ? certolizumab pegoL (CIMZIA) 400 mg (200 mg x 2 vials) Kit injection Inject 400 mg under the skin every 28 days. 400 mg 3   ? compressor, for nebulizer Eva Nebulizer treatment qid prn 1 Device 0   ? flaxseed oil 1,000 mg cap Take 1 capsule by mouth daily.      ? folic acid (FOLVITE) 1 MG tablet Take one tab po qd except the day when taking methotrexate.. 90 tablet 1   ? FOLIC ACID/MULTIVITS-MIN/LUT (CENTRUM SILVER ORAL) Take 1 tablet by mouth daily.      ? furosemide (LASIX) 20 MG tablet Take 20 mg by mouth daily as needed (Leg swelling).     ? ipratropium-albuterol (DUO-NEB) 0.5-2.5 mg/3 mL nebulizer Take 3 mL by nebulization every 4 (four) hours as needed (home reg). 30 vial 1   ? methotrexate 2.5 MG tablet TAKE 4 TABLETS BY MOUTH ONCE A WEEK 52 tablet 0   ? metoprolol succinate (TOPROL-XL) 100 MG 24 hr tablet Take 1 tablet (100 mg total) by mouth daily. 30 tablet 0   ? pravastatin (PRAVACHOL) 20 MG tablet Take 20 mg by mouth daily.     ? rivaroxaban (XARELTO) 20 mg Tab Take 1 tablet (20 mg total) by mouth daily. Take with a FULL meal. 90 tablet 3     Current Facility-Administered Medications   Medication Dose Route Frequency Provider Last Rate Last Dose   ? certolizumab pegol injection 400 mg (CIMZIA)  400 mg Subcutaneous Q28 Days Bobby Rush MBBS   400 mg at 05/07/20 1227    Allergies   Allergen Reactions   ? Codeine Nausea And Vomiting         Lab Results    Chemistry/lipid CBC Cardiac Enzymes/BNP/TSH/INR   Lab Results   Component Value Date    CHOL 178 11/04/2019    HDL 62 11/04/2019    LDLCALC 93 11/04/2019    TRIG 116 11/04/2019    CREATININE 1.06 02/14/2020    BUN 20 02/14/2020    K 4.3 02/14/2020     02/14/2020     02/14/2020    CO2 27 02/14/2020    Lab Results   Component Value Date    WBC 7.4 01/03/2020    HGB 13.6 01/03/2020    HCT 41.1 01/03/2020     01/03/2020     01/03/2020    Lab Results   Component Value Date    TROPONINI 0.06 12/23/2017     12/25/2017     TSH 0.47 11/04/2019        Teressa Cespedes, [facility]  Cardiac Electrophysiology

## 2021-06-30 ENCOUNTER — AMBULATORY - HEALTHEAST (OUTPATIENT)
Dept: RHEUMATOLOGY | Facility: CLINIC | Age: 76
End: 2021-06-30

## 2021-07-03 NOTE — ADDENDUM NOTE
Addendum Note by Nelly Hernandez RN at 4/29/2019 12:15 PM     Author: Nelly Hernandez RN Service: -- Author Type: Registered Nurse    Filed: 4/29/2019 12:15 PM Encounter Date: 4/22/2019 Status: Signed    : Nelly Hernandez RN (Registered Nurse)    Addended by: NELLY HERNANDEZ on: 4/29/2019 12:15 PM        Modules accepted: Orders

## 2021-07-03 NOTE — ADDENDUM NOTE
Addendum Note by Nelly Hernandez RN at 10/28/2020 10:30 AM     Author: Nelly Hernandez RN Service: -- Author Type: Registered Nurse    Filed: 10/28/2020 10:58 AM Encounter Date: 10/28/2020 Status: Signed    : Nelly Hernandez RN (Registered Nurse)    Addended by: NELLY HERNANDEZ on: 10/28/2020 10:58 AM        Modules accepted: Orders

## 2021-07-03 NOTE — ADDENDUM NOTE
Addendum Note by Bobby Phipps MBBS at 5/17/2018  5:05 PM     Author: Bobby Phipps MBBS Service: -- Author Type: Physician    Filed: 5/17/2018  5:05 PM Encounter Date: 5/17/2018 Status: Signed    : Bobby Phipps MBBS (Physician)    Addended by: BOBBY PHIPPS on: 5/17/2018 05:05 PM        Modules accepted: Orders

## 2021-07-04 NOTE — PROGRESS NOTES
Progress Notes by Teressa Cespedes CNP at 5/26/2021  1:30 PM     Author: Teressa Cespedes CNP Service: -- Author Type: Nurse Practitioner    Filed: 5/26/2021  3:06 PM Encounter Date: 5/26/2021 Status: Signed    : Teressa Cespedes CNP (Nurse Practitioner)             Assessment/Recommendations   Assessment/Plan:    1.  Paroxysmal atrial fibrillation: Asymptomatic, following a rate control strategy unless she has specifically symptomatic recurrence.  No symptomatology or evidence of A. fib occurrence.   She was instructed to call for symptomatic recurrence of A. fib or heart rates consistently above 100 bpm at rest.  Continue metoprolol succinate 100 mg daily.     She was again reassured that atrial fibrillation is not life-threatening, but carries an increased risk for stroke.  She has a [id number] score of 3 for age 65-74, female gender, and hypertension.   She has issues with her balance which increases her risk for fall.  We briefly discussed the alternative of left atrial appendage closure.  She wishes to remain on medication at this time, but will consider LAAC if her balance worsens or she has issues with falling.  Continue Xarelto 20 mg daily for stroke prophylaxis.  She denies any side effects, bleeding issues, or missed doses.      2.  Hypertension: Blood pressure at target.  Continue metoprolol.    Follow up in 1 year       History of Present Illness/Subjective    Ms. Fatuma Henry is a 75 y.o. female who comes in today accompanied by her  for EP follow-up of atrial fibrillation.  She has a history or atrial fibrillation and hypertension.  She was diagnosed with paroxysmal atrial fibrillation during a hospitalization for influenza and hypovolemic shock over Christmas 2017 with which she was asymptomatic.  Ventricular response was controlled with metoprolol succinate 100 mg daily and she was started on Xarelto for stroke prophylaxis.  Echo showed normal left ventricular systolic performance with  mild aortic stenosis.  Subsequent Holter monitor showed no A. fib, but a brief run of nonsustained ventricular tachycardia.  Nuclear stress testing showed no evidence for inducible myocardial ischemia or infarction, so the NSVT is considered benign/low risk.     Gert states that she continues to feel well.  She has not had any symptomatic episodes of A. fib or inappropriately elevated heart rate.  She denies chest discomfort, palpitations, abdominal fullness/bloating or peripheral edema, shortness of breath, paroxysmal nocturnal dyspnea, orthopnea, lightheadedness, dizziness, pre-syncope, or syncope.  She has some mild dyspnea on exertion due to deconditioning.     Cardiographics (EKG personally reviewed):  EKG done 5/26/2021 shows sinus rhythm at 70 bpm, first-degree AV block, singular PVC, QT/QTc interval measures 380/410 ms  EKG, Done done 12/26/2018 shows atrial fibrillation with rapid ventricular response at 102 bpm     24-hour Holter monitor worn 1/4/2018: Predominant heart rhythm is sinus tachycardia with an average heart rate of 100 bpm.  Normal electrocardiographic intervals.  No significant bradycardia or pauses.  She did have some occasional junctional escape beats while asleep.  11 brief episodes of ectopic atrial tachycardia.  No sustained atrial tachycardia, atrial fibrillation, or other SVC.  A single 6 beat run of monomorphic nonsustained VT with a left bundle axis configuration at 170 bpm.  No sustained ventricular arrhythmia.     Echo done 12/27/2017:    Mild left atrial enlargement    Left ventricle ejection fraction is normal. The calculated left ventricular ejection fraction is 56% without wall motion abnormality.    Mild aortic valve stenosis with trace to mild regurgitation    Mild mitral and tricuspid regurgitation. Mild to moderate pulmonary hypertension noted.    No previous study for comparison.     NM pharmacologic stress test done 2/1/2018 is negative for inducible myocardial ischemia or  infarction.  LVEF 75%.    I have reviewed and updated the patient's Past Medical History, Social History, Family History and Medication List.     Physical Examination Review of Systems   Vitals:    05/26/21 1332   BP: 138/72   Pulse:    Resp:      Body mass index is 27.4 kg/m .  Wt Readings from Last 3 Encounters:   05/26/21 167 lb 3.2 oz (75.8 kg)   12/16/20 172 lb (78 kg)   07/01/20 171 lb (77.6 kg)       General Appearance:   Alert, well-appearing and in no acute distress.   HEENT: Atraumatic, normocephalic.  No scleral icterus, normal conjunctivae, EOMs intact, PERRL.  Wearing a mask.   Chest/Lungs:   Chest symmetric, spine straight.  Respirations unlabored.  Lungs are clear to auscultation.   Cardiovascular:   Regular rate and rhythm.  Normal first and second heart sounds with no murmurs, rubs, or gallops; radial and posterior tibial pulses are intact, No edema.   Abdomen:  Soft, nondistended, bowel sounds present.   Extremities: No cyanosis or clubbing.   Musculoskeletal: Moves all extremities.     Skin: Warm, dry, intact.    Neurologic: Mood and affect are appropriate.  Alert and oriented to person, place, time, and situation.    General: WNL  Eyes: WNL  Ears/Nose/Throat: WNL  Lungs: Cough, Shortness of Breath, Wheezing  Heart: WNL  Stomach: WNL  Bladder: WNL  Muscle/Joints: WNL  Skin: WNL  Nervous System: WNL  Mental Health: WNL     Blood: WNL       Medical History  Surgical History Family History Social History   Past Medical History:   Diagnosis Date   ? Atrial fibrillation (H)    ? CRF (chronic renal failure)    ? GERD (gastroesophageal reflux disease)    ? Hypertension    ? Hypovolemic shock (H)    ? Influenza A 12/2017   ? Rheumatoid arthritis (H)    ? Sepsis (H) 12/24/2017    Past Surgical History:   Procedure Laterality Date   ? APPENDECTOMY     ? BLADDER SUSPENSION     ? CHOLECYSTECTOMY      Family History   Problem Relation Age of Onset   ? Heart failure Mother    ? Stroke Father    ? Kidney failure  Sister    ? Stroke Brother    ? Diabetes type II Brother     Social History     Tobacco Use   ? Smoking status: Former Smoker     Packs/day: 0.50     Types: Cigarettes     Quit date: 12/20/2017     Years since quitting: 3.4   ? Smokeless tobacco: Never Used   Substance Use Topics   ? Alcohol use: No   ? Drug use: No          Medications  Allergies   Current Outpatient Medications   Medication Sig Dispense Refill   ? acetaminophen (TYLENOL) 500 MG tablet Take 1-2 tablets (500-1,000 mg total) by mouth every 6 (six) hours as needed.  0   ? certolizumab pegoL (CIMZIA) 400 mg (200 mg x 2 vials) Kit injection Inject 400 mg under the skin every 28 days. 1200 mg 1   ? compressor, for nebulizer Eva Nebulizer treatment qid prn 1 Device 0   ? flaxseed oil 1,000 mg cap Take 1 capsule by mouth daily.      ? folic acid (FOLVITE) 1 MG tablet Take one tab po qd except the day when taking methotrexate.. 90 tablet 1   ? FOLIC ACID/MULTIVITS-MIN/LUT (CENTRUM SILVER ORAL) Take 1 tablet by mouth daily.      ? ipratropium-albuterol (DUO-NEB) 0.5-2.5 mg/3 mL nebulizer Take 3 mL by nebulization every 4 (four) hours as needed (home reg). 30 vial 1   ? methotrexate 2.5 MG tablet TAKE 4 TABLETS BY MOUTH ONCE A WEEK 52 tablet 0   ? metoprolol succinate (TOPROL-XL) 100 MG 24 hr tablet Take 1 tablet (100 mg total) by mouth daily. 30 tablet 0   ? pravastatin (PRAVACHOL) 20 MG tablet Take 20 mg by mouth daily.     ? rivaroxaban ANTICOAGULANT (XARELTO) 20 mg tablet Take 1 tablet (20 mg total) by mouth daily. Take with a FULL meal. 90 tablet 3   ? calcium-vitamin D (CALCIUM-VITAMIN D) 500 mg(1,250mg) -200 unit per tablet Take 1 tablet by mouth 2 (two) times a day with meals.     ? furosemide (LASIX) 20 MG tablet Take 20 mg by mouth daily as needed (Leg swelling).       Current Facility-Administered Medications   Medication Dose Route Frequency Provider Last Rate Last Admin   ? certolizumab pegol injection 400 mg (CIMZIA)  400 mg Subcutaneous Q28  Days Bobby Rush MBBS   400 mg at 05/10/21 1254    Allergies   Allergen Reactions   ? Codeine Nausea And Vomiting         Lab Results    Chemistry/lipid CBC Other   Lab Results   Component Value Date    CREATININE 0.89 04/30/2021    CREATININE 0.89 04/30/2021    BUN 19 04/30/2021     04/30/2021    K 4.2 04/30/2021     04/30/2021    CO2 27 04/30/2021     Creatinine (mg/dL)   Date Value   04/30/2021 0.89   04/30/2021 0.89   03/08/2021 0.99   01/13/2021 0.93       Lab Results   Component Value Date    CHOL 151 01/13/2021    HDL 67 01/13/2021     Lab Results   Component Value Date    LDLCALC 66 01/13/2021      Lab Results   Component Value Date    WBC 5.5 03/08/2021    HGB 11.9 (L) 03/08/2021    HCT 36.1 03/08/2021    MCV 98 03/08/2021     03/08/2021    Lab Results   Component Value Date    CKTOTAL 36 06/03/2020    TROPONINI 0.06 12/23/2017     12/25/2017    TSH 0.93 06/04/2020     No results found for: INR       This note has been dictated using voice recognition software. Any grammatical, typographical, or context distortions are unintentional and inherent to the software.

## 2021-07-06 VITALS
BODY MASS INDEX: 27.4 KG/M2 | WEIGHT: 167.2 LBS | RESPIRATION RATE: 16 BRPM | HEART RATE: 68 BPM | SYSTOLIC BLOOD PRESSURE: 138 MMHG | DIASTOLIC BLOOD PRESSURE: 72 MMHG

## 2021-07-06 VITALS
WEIGHT: 161.8 LBS | DIASTOLIC BLOOD PRESSURE: 72 MMHG | SYSTOLIC BLOOD PRESSURE: 116 MMHG | HEIGHT: 66 IN | BODY MASS INDEX: 26 KG/M2 | HEART RATE: 76 BPM

## 2021-07-14 PROBLEM — N18.30 ACUTE RENAL FAILURE WITH ACUTE TUBULAR NECROSIS SUPERIMPOSED ON STAGE 3 CHRONIC KIDNEY DISEASE (H): Status: RESOLVED | Noted: 2017-04-13 | Resolved: 2018-05-17

## 2021-07-14 PROBLEM — N17.0 ACUTE RENAL FAILURE WITH ACUTE TUBULAR NECROSIS SUPERIMPOSED ON STAGE 3 CHRONIC KIDNEY DISEASE (H): Status: RESOLVED | Noted: 2017-04-13 | Resolved: 2018-05-17

## 2021-07-28 ENCOUNTER — ALLIED HEALTH/NURSE VISIT (OUTPATIENT)
Dept: RHEUMATOLOGY | Facility: CLINIC | Age: 76
End: 2021-07-28
Payer: COMMERCIAL

## 2021-07-28 DIAGNOSIS — M05.9 SEROPOSITIVE RHEUMATOID ARTHRITIS (H): Primary | ICD-10-CM

## 2021-07-28 PROCEDURE — 99207 PR NO CHARGE NURSE ONLY: CPT

## 2021-08-25 ENCOUNTER — ALLIED HEALTH/NURSE VISIT (OUTPATIENT)
Dept: RHEUMATOLOGY | Facility: CLINIC | Age: 76
End: 2021-08-25
Payer: COMMERCIAL

## 2021-08-25 ENCOUNTER — LAB (OUTPATIENT)
Dept: LAB | Facility: CLINIC | Age: 76
End: 2021-08-25
Payer: COMMERCIAL

## 2021-08-25 DIAGNOSIS — M05.79 RHEUMATOID ARTHRITIS INVOLVING MULTIPLE SITES WITH POSITIVE RHEUMATOID FACTOR (H): ICD-10-CM

## 2021-08-25 DIAGNOSIS — M05.79 RHEUMATOID ARTHRITIS INVOLVING MULTIPLE SITES WITH POSITIVE RHEUMATOID FACTOR (H): Primary | ICD-10-CM

## 2021-08-25 LAB
ALBUMIN SERPL-MCNC: 3.6 G/DL (ref 3.5–5)
ALT SERPL W P-5'-P-CCNC: 16 U/L (ref 0–45)
CREAT SERPL-MCNC: 0.93 MG/DL (ref 0.6–1.1)
ERYTHROCYTE [DISTWIDTH] IN BLOOD BY AUTOMATED COUNT: 13.7 % (ref 10–15)
GFR SERPL CREATININE-BSD FRML MDRD: 60 ML/MIN/1.73M2
HCT VFR BLD AUTO: 37.8 % (ref 35–47)
HGB BLD-MCNC: 12.5 G/DL (ref 11.7–15.7)
MCH RBC QN AUTO: 33.2 PG (ref 26.5–33)
MCHC RBC AUTO-ENTMCNC: 33.1 G/DL (ref 31.5–36.5)
MCV RBC AUTO: 101 FL (ref 78–100)
PLATELET # BLD AUTO: 287 10E3/UL (ref 150–450)
RBC # BLD AUTO: 3.76 10E6/UL (ref 3.8–5.2)
WBC # BLD AUTO: 5 10E3/UL (ref 4–11)

## 2021-08-25 PROCEDURE — 85027 COMPLETE CBC AUTOMATED: CPT

## 2021-08-25 PROCEDURE — 82565 ASSAY OF CREATININE: CPT

## 2021-08-25 PROCEDURE — 82040 ASSAY OF SERUM ALBUMIN: CPT

## 2021-08-25 PROCEDURE — 36415 COLL VENOUS BLD VENIPUNCTURE: CPT

## 2021-08-25 PROCEDURE — 84460 ALANINE AMINO (ALT) (SGPT): CPT

## 2021-08-25 PROCEDURE — 99207 PR NO CHARGE NURSE ONLY: CPT

## 2021-09-03 DIAGNOSIS — M05.9 SEROPOSITIVE RHEUMATOID ARTHRITIS (H): ICD-10-CM

## 2021-09-03 RX ORDER — METHOTREXATE 2.5 MG/1
TABLET ORAL
Qty: 52 TABLET | Refills: 0 | Status: SHIPPED | OUTPATIENT
Start: 2021-09-03 | End: 2021-12-03

## 2021-09-23 ENCOUNTER — ALLIED HEALTH/NURSE VISIT (OUTPATIENT)
Dept: RHEUMATOLOGY | Facility: CLINIC | Age: 76
End: 2021-09-23
Payer: COMMERCIAL

## 2021-09-23 DIAGNOSIS — M05.79 RHEUMATOID ARTHRITIS INVOLVING MULTIPLE SITES WITH POSITIVE RHEUMATOID FACTOR (H): Primary | ICD-10-CM

## 2021-09-23 PROCEDURE — 99207 PR NO CHARGE NURSE ONLY: CPT

## 2021-09-23 PROCEDURE — 96372 THER/PROPH/DIAG INJ SC/IM: CPT | Performed by: INTERNAL MEDICINE

## 2021-10-21 ENCOUNTER — ALLIED HEALTH/NURSE VISIT (OUTPATIENT)
Dept: RHEUMATOLOGY | Facility: CLINIC | Age: 76
End: 2021-10-21
Payer: COMMERCIAL

## 2021-10-21 DIAGNOSIS — M32.9 SLE (SYSTEMIC LUPUS ERYTHEMATOSUS) (H): Primary | ICD-10-CM

## 2021-10-21 PROCEDURE — 99207 PR NO CHARGE NURSE ONLY: CPT

## 2021-10-21 PROCEDURE — 96372 THER/PROPH/DIAG INJ SC/IM: CPT | Performed by: INTERNAL MEDICINE

## 2021-11-18 ENCOUNTER — LAB (OUTPATIENT)
Dept: LAB | Facility: CLINIC | Age: 76
End: 2021-11-18
Payer: COMMERCIAL

## 2021-11-18 DIAGNOSIS — M05.79 RHEUMATOID ARTHRITIS INVOLVING MULTIPLE SITES WITH POSITIVE RHEUMATOID FACTOR (H): ICD-10-CM

## 2021-11-18 LAB
ERYTHROCYTE [DISTWIDTH] IN BLOOD BY AUTOMATED COUNT: 14.3 % (ref 10–15)
HCT VFR BLD AUTO: 38 % (ref 35–47)
HGB BLD-MCNC: 12.4 G/DL (ref 11.7–15.7)
MCH RBC QN AUTO: 33.9 PG (ref 26.5–33)
MCHC RBC AUTO-ENTMCNC: 32.6 G/DL (ref 31.5–36.5)
MCV RBC AUTO: 104 FL (ref 78–100)
PLATELET # BLD AUTO: 300 10E3/UL (ref 150–450)
RBC # BLD AUTO: 3.66 10E6/UL (ref 3.8–5.2)
WBC # BLD AUTO: 5.7 10E3/UL (ref 4–11)

## 2021-11-18 PROCEDURE — 82040 ASSAY OF SERUM ALBUMIN: CPT

## 2021-11-18 PROCEDURE — 85027 COMPLETE CBC AUTOMATED: CPT

## 2021-11-18 PROCEDURE — 82565 ASSAY OF CREATININE: CPT

## 2021-11-18 PROCEDURE — 84460 ALANINE AMINO (ALT) (SGPT): CPT

## 2021-11-18 PROCEDURE — 36415 COLL VENOUS BLD VENIPUNCTURE: CPT

## 2021-11-19 ENCOUNTER — ALLIED HEALTH/NURSE VISIT (OUTPATIENT)
Dept: RHEUMATOLOGY | Facility: CLINIC | Age: 76
End: 2021-11-19
Payer: COMMERCIAL

## 2021-11-19 DIAGNOSIS — M05.9 SEROPOSITIVE RHEUMATOID ARTHRITIS (H): Primary | ICD-10-CM

## 2021-11-19 LAB
ALBUMIN SERPL-MCNC: 3.8 G/DL (ref 3.5–5)
ALT SERPL W P-5'-P-CCNC: 20 U/L (ref 0–45)
CREAT SERPL-MCNC: 1.14 MG/DL (ref 0.6–1.1)
GFR SERPL CREATININE-BSD FRML MDRD: 47 ML/MIN/1.73M2

## 2021-11-19 PROCEDURE — 96372 THER/PROPH/DIAG INJ SC/IM: CPT | Performed by: INTERNAL MEDICINE

## 2021-11-19 PROCEDURE — 99207 PR NO CHARGE NURSE ONLY: CPT

## 2021-12-03 DIAGNOSIS — M05.9 SEROPOSITIVE RHEUMATOID ARTHRITIS (H): ICD-10-CM

## 2021-12-03 RX ORDER — METHOTREXATE 2.5 MG/1
TABLET ORAL
Qty: 16 TABLET | Refills: 0 | Status: SHIPPED | OUTPATIENT
Start: 2021-12-03 | End: 2022-01-10

## 2021-12-21 ENCOUNTER — ALLIED HEALTH/NURSE VISIT (OUTPATIENT)
Dept: ENDOCRINOLOGY | Facility: CLINIC | Age: 76
End: 2021-12-21
Payer: COMMERCIAL

## 2021-12-21 DIAGNOSIS — M05.9 SEROPOSITIVE RHEUMATOID ARTHRITIS (H): Primary | ICD-10-CM

## 2021-12-21 PROCEDURE — 96372 THER/PROPH/DIAG INJ SC/IM: CPT | Performed by: INTERNAL MEDICINE

## 2022-01-01 ENCOUNTER — TELEPHONE (OUTPATIENT)
Dept: RHEUMATOLOGY | Facility: CLINIC | Age: 77
End: 2022-01-01

## 2022-01-01 ENCOUNTER — HOSPITAL ENCOUNTER (INPATIENT)
Facility: CLINIC | Age: 77
LOS: 3 days | Discharge: HOME HEALTH | DRG: 372 | End: 2022-11-10
Attending: FAMILY MEDICINE | Admitting: HOSPITALIST
Payer: COMMERCIAL

## 2022-01-01 ENCOUNTER — APPOINTMENT (OUTPATIENT)
Dept: CARDIOLOGY | Facility: HOSPITAL | Age: 77
DRG: 640 | End: 2022-01-01
Attending: FAMILY MEDICINE
Payer: COMMERCIAL

## 2022-01-01 ENCOUNTER — LAB REQUISITION (OUTPATIENT)
Dept: LAB | Facility: CLINIC | Age: 77
End: 2022-01-01
Payer: COMMERCIAL

## 2022-01-01 ENCOUNTER — TELEPHONE (OUTPATIENT)
Dept: RESPIRATORY THERAPY | Facility: HOSPITAL | Age: 77
End: 2022-01-01

## 2022-01-01 ENCOUNTER — APPOINTMENT (OUTPATIENT)
Dept: OCCUPATIONAL THERAPY | Facility: HOSPITAL | Age: 77
DRG: 871 | End: 2022-01-01
Attending: HOSPITALIST
Payer: COMMERCIAL

## 2022-01-01 ENCOUNTER — TRANSITIONAL CARE UNIT VISIT (OUTPATIENT)
Dept: GERIATRICS | Facility: CLINIC | Age: 77
End: 2022-01-01
Payer: COMMERCIAL

## 2022-01-01 ENCOUNTER — LAB (OUTPATIENT)
Dept: LAB | Facility: CLINIC | Age: 77
End: 2022-01-01
Payer: COMMERCIAL

## 2022-01-01 ENCOUNTER — APPOINTMENT (OUTPATIENT)
Dept: PHYSICAL THERAPY | Facility: HOSPITAL | Age: 77
DRG: 871 | End: 2022-01-01
Attending: HOSPITALIST
Payer: COMMERCIAL

## 2022-01-01 ENCOUNTER — TELEPHONE (OUTPATIENT)
Dept: RESPIRATORY THERAPY | Facility: CLINIC | Age: 77
End: 2022-01-01

## 2022-01-01 ENCOUNTER — APPOINTMENT (OUTPATIENT)
Dept: OCCUPATIONAL THERAPY | Facility: CLINIC | Age: 77
DRG: 372 | End: 2022-01-01
Attending: INTERNAL MEDICINE
Payer: COMMERCIAL

## 2022-01-01 ENCOUNTER — TELEPHONE (OUTPATIENT)
Dept: CARDIOLOGY | Facility: CLINIC | Age: 77
End: 2022-01-01

## 2022-01-01 ENCOUNTER — TRANSCRIBE ORDERS (OUTPATIENT)
Dept: OTHER | Age: 77
End: 2022-01-01

## 2022-01-01 ENCOUNTER — APPOINTMENT (OUTPATIENT)
Dept: RADIOLOGY | Facility: CLINIC | Age: 77
End: 2022-01-01
Attending: EMERGENCY MEDICINE
Payer: COMMERCIAL

## 2022-01-01 ENCOUNTER — OFFICE VISIT (OUTPATIENT)
Dept: PULMONOLOGY | Facility: OTHER | Age: 77
End: 2022-01-01
Payer: COMMERCIAL

## 2022-01-01 ENCOUNTER — HOSPITAL ENCOUNTER (INPATIENT)
Facility: HOSPITAL | Age: 77
LOS: 6 days | Discharge: NURSING, REHAB OR LONG-TERM CARE | DRG: 291 | End: 2023-01-03
Attending: EMERGENCY MEDICINE | Admitting: INTERNAL MEDICINE
Payer: COMMERCIAL

## 2022-01-01 ENCOUNTER — HOSPITAL ENCOUNTER (OUTPATIENT)
Dept: CT IMAGING | Facility: CLINIC | Age: 77
Discharge: HOME | End: 2022-07-21
Attending: INTERNAL MEDICINE | Admitting: INTERNAL MEDICINE
Payer: COMMERCIAL

## 2022-01-01 ENCOUNTER — TRANSFERRED RECORDS (OUTPATIENT)
Dept: HEALTH INFORMATION MANAGEMENT | Facility: CLINIC | Age: 77
End: 2022-01-01

## 2022-01-01 ENCOUNTER — HOSPITAL ENCOUNTER (EMERGENCY)
Facility: CLINIC | Age: 77
Discharge: HOME | End: 2022-11-02
Attending: EMERGENCY MEDICINE | Admitting: EMERGENCY MEDICINE
Payer: COMMERCIAL

## 2022-01-01 ENCOUNTER — OFFICE VISIT (OUTPATIENT)
Dept: RHEUMATOLOGY | Facility: CLINIC | Age: 77
End: 2022-01-01
Payer: COMMERCIAL

## 2022-01-01 ENCOUNTER — APPOINTMENT (OUTPATIENT)
Dept: OCCUPATIONAL THERAPY | Facility: HOSPITAL | Age: 77
DRG: 291 | End: 2022-01-01
Payer: COMMERCIAL

## 2022-01-01 ENCOUNTER — LAB REQUISITION (OUTPATIENT)
Dept: LAB | Facility: CLINIC | Age: 77
End: 2022-01-01

## 2022-01-01 ENCOUNTER — APPOINTMENT (OUTPATIENT)
Dept: CT IMAGING | Facility: CLINIC | Age: 77
DRG: 372 | End: 2022-01-01
Attending: FAMILY MEDICINE
Payer: COMMERCIAL

## 2022-01-01 ENCOUNTER — APPOINTMENT (OUTPATIENT)
Dept: ULTRASOUND IMAGING | Facility: HOSPITAL | Age: 77
DRG: 291 | End: 2022-01-01
Attending: FAMILY MEDICINE
Payer: COMMERCIAL

## 2022-01-01 ENCOUNTER — TELEPHONE (OUTPATIENT)
Dept: GERIATRICS | Facility: CLINIC | Age: 77
End: 2022-01-01

## 2022-01-01 ENCOUNTER — APPOINTMENT (OUTPATIENT)
Dept: RADIOLOGY | Facility: HOSPITAL | Age: 77
DRG: 291 | End: 2022-01-01
Attending: EMERGENCY MEDICINE
Payer: COMMERCIAL

## 2022-01-01 ENCOUNTER — APPOINTMENT (OUTPATIENT)
Dept: OCCUPATIONAL THERAPY | Facility: HOSPITAL | Age: 77
DRG: 640 | End: 2022-01-01
Payer: COMMERCIAL

## 2022-01-01 ENCOUNTER — HOSPITAL ENCOUNTER (INPATIENT)
Facility: HOSPITAL | Age: 77
LOS: 7 days | Discharge: NURSING, REHAB OR LONG-TERM CARE | DRG: 871 | End: 2022-12-20
Attending: EMERGENCY MEDICINE | Admitting: FAMILY MEDICINE
Payer: COMMERCIAL

## 2022-01-01 ENCOUNTER — PRE VISIT (OUTPATIENT)
Dept: ONCOLOGY | Facility: CLINIC | Age: 77
End: 2022-01-01

## 2022-01-01 ENCOUNTER — APPOINTMENT (OUTPATIENT)
Dept: RADIOLOGY | Facility: HOSPITAL | Age: 77
DRG: 640 | End: 2022-01-01
Attending: EMERGENCY MEDICINE
Payer: COMMERCIAL

## 2022-01-01 ENCOUNTER — APPOINTMENT (OUTPATIENT)
Dept: PHYSICAL THERAPY | Facility: CLINIC | Age: 77
DRG: 372 | End: 2022-01-01
Attending: INTERNAL MEDICINE
Payer: COMMERCIAL

## 2022-01-01 ENCOUNTER — VIRTUAL VISIT (OUTPATIENT)
Dept: PHARMACY | Facility: PHYSICIAN GROUP | Age: 77
End: 2022-01-01
Payer: COMMERCIAL

## 2022-01-01 ENCOUNTER — HOSPITAL ENCOUNTER (OUTPATIENT)
Dept: PET IMAGING | Facility: HOSPITAL | Age: 77
Discharge: HOME | End: 2022-09-07
Attending: INTERNAL MEDICINE | Admitting: INTERNAL MEDICINE
Payer: COMMERCIAL

## 2022-01-01 ENCOUNTER — HOSPITAL ENCOUNTER (INPATIENT)
Facility: HOSPITAL | Age: 77
LOS: 3 days | Discharge: NURSING, REHAB OR LONG-TERM CARE | DRG: 640 | End: 2022-12-04
Attending: EMERGENCY MEDICINE | Admitting: FAMILY MEDICINE
Payer: COMMERCIAL

## 2022-01-01 ENCOUNTER — HOSPITAL ENCOUNTER (EMERGENCY)
Facility: CLINIC | Age: 77
Discharge: HOME | End: 2022-09-16
Admitting: PHYSICIAN ASSISTANT
Payer: COMMERCIAL

## 2022-01-01 ENCOUNTER — APPOINTMENT (OUTPATIENT)
Dept: CT IMAGING | Facility: HOSPITAL | Age: 77
DRG: 291 | End: 2022-01-01
Attending: FAMILY MEDICINE
Payer: COMMERCIAL

## 2022-01-01 ENCOUNTER — TELEPHONE (OUTPATIENT)
Dept: PULMONOLOGY | Facility: OTHER | Age: 77
End: 2022-01-01

## 2022-01-01 ENCOUNTER — DOCUMENTATION ONLY (OUTPATIENT)
Dept: PULMONOLOGY | Facility: OTHER | Age: 77
End: 2022-01-01

## 2022-01-01 ENCOUNTER — OFFICE VISIT (OUTPATIENT)
Dept: NEUROLOGY | Facility: CLINIC | Age: 77
End: 2022-01-01
Attending: FAMILY MEDICINE
Payer: COMMERCIAL

## 2022-01-01 ENCOUNTER — APPOINTMENT (OUTPATIENT)
Dept: PHYSICAL THERAPY | Facility: HOSPITAL | Age: 77
DRG: 291 | End: 2022-01-01
Payer: COMMERCIAL

## 2022-01-01 ENCOUNTER — PATIENT OUTREACH (OUTPATIENT)
Dept: CARE COORDINATION | Facility: CLINIC | Age: 77
End: 2022-01-01

## 2022-01-01 ENCOUNTER — APPOINTMENT (OUTPATIENT)
Dept: RADIOLOGY | Facility: HOSPITAL | Age: 77
DRG: 871 | End: 2022-01-01
Attending: EMERGENCY MEDICINE
Payer: COMMERCIAL

## 2022-01-01 ENCOUNTER — APPOINTMENT (OUTPATIENT)
Dept: OCCUPATIONAL THERAPY | Facility: HOSPITAL | Age: 77
DRG: 291 | End: 2022-01-01
Attending: FAMILY MEDICINE
Payer: COMMERCIAL

## 2022-01-01 ENCOUNTER — APPOINTMENT (OUTPATIENT)
Dept: PHYSICAL THERAPY | Facility: HOSPITAL | Age: 77
DRG: 291 | End: 2022-01-01
Attending: FAMILY MEDICINE
Payer: COMMERCIAL

## 2022-01-01 ENCOUNTER — PATIENT OUTREACH (OUTPATIENT)
Dept: ONCOLOGY | Facility: CLINIC | Age: 77
End: 2022-01-01

## 2022-01-01 ENCOUNTER — APPOINTMENT (OUTPATIENT)
Dept: RADIOLOGY | Facility: HOSPITAL | Age: 77
DRG: 871 | End: 2022-01-01
Payer: COMMERCIAL

## 2022-01-01 ENCOUNTER — HOSPITAL ENCOUNTER (EMERGENCY)
Facility: CLINIC | Age: 77
Discharge: HOME | End: 2022-09-13
Attending: EMERGENCY MEDICINE | Admitting: EMERGENCY MEDICINE
Payer: COMMERCIAL

## 2022-01-01 ENCOUNTER — APPOINTMENT (OUTPATIENT)
Dept: CARDIOLOGY | Facility: HOSPITAL | Age: 77
DRG: 291 | End: 2022-01-01
Attending: FAMILY MEDICINE
Payer: COMMERCIAL

## 2022-01-01 ENCOUNTER — APPOINTMENT (OUTPATIENT)
Dept: OCCUPATIONAL THERAPY | Facility: HOSPITAL | Age: 77
DRG: 640 | End: 2022-01-01
Attending: FAMILY MEDICINE
Payer: COMMERCIAL

## 2022-01-01 ENCOUNTER — OFFICE VISIT (OUTPATIENT)
Dept: PULMONOLOGY | Facility: OTHER | Age: 77
End: 2022-01-01
Attending: INTERNAL MEDICINE
Payer: COMMERCIAL

## 2022-01-01 ENCOUNTER — HOSPITAL ENCOUNTER (OUTPATIENT)
Dept: RESPIRATORY THERAPY | Facility: CLINIC | Age: 77
Discharge: HOME | End: 2022-08-15
Attending: INTERNAL MEDICINE | Admitting: INTERNAL MEDICINE
Payer: COMMERCIAL

## 2022-01-01 ENCOUNTER — HOSPITAL ENCOUNTER (OUTPATIENT)
Dept: GENERAL RADIOLOGY | Facility: HOSPITAL | Age: 77
Discharge: HOME | End: 2022-10-12
Attending: INTERNAL MEDICINE | Admitting: INTERNAL MEDICINE
Payer: COMMERCIAL

## 2022-01-01 VITALS
HEART RATE: 84 BPM | BODY MASS INDEX: 25.83 KG/M2 | SYSTOLIC BLOOD PRESSURE: 127 MMHG | DIASTOLIC BLOOD PRESSURE: 66 MMHG | WEIGHT: 155 LBS | HEIGHT: 65 IN | TEMPERATURE: 98.1 F | OXYGEN SATURATION: 96 % | RESPIRATION RATE: 18 BRPM

## 2022-01-01 VITALS
RESPIRATION RATE: 20 BRPM | HEIGHT: 66 IN | SYSTOLIC BLOOD PRESSURE: 104 MMHG | BODY MASS INDEX: 23.98 KG/M2 | HEART RATE: 90 BPM | DIASTOLIC BLOOD PRESSURE: 60 MMHG | OXYGEN SATURATION: 99 % | WEIGHT: 149.2 LBS | TEMPERATURE: 97.3 F

## 2022-01-01 VITALS
RESPIRATION RATE: 20 BRPM | OXYGEN SATURATION: 96 % | BODY MASS INDEX: 25.01 KG/M2 | HEART RATE: 99 BPM | TEMPERATURE: 98.4 F | SYSTOLIC BLOOD PRESSURE: 106 MMHG | WEIGHT: 150.1 LBS | DIASTOLIC BLOOD PRESSURE: 54 MMHG | HEIGHT: 65 IN

## 2022-01-01 VITALS
HEART RATE: 72 BPM | BODY MASS INDEX: 25.49 KG/M2 | SYSTOLIC BLOOD PRESSURE: 130 MMHG | WEIGHT: 153 LBS | DIASTOLIC BLOOD PRESSURE: 66 MMHG | HEIGHT: 65 IN

## 2022-01-01 VITALS
DIASTOLIC BLOOD PRESSURE: 60 MMHG | HEART RATE: 90 BPM | RESPIRATION RATE: 20 BRPM | TEMPERATURE: 97.1 F | OXYGEN SATURATION: 98 % | BODY MASS INDEX: 23.51 KG/M2 | HEIGHT: 66 IN | SYSTOLIC BLOOD PRESSURE: 120 MMHG | WEIGHT: 146.3 LBS

## 2022-01-01 VITALS
HEART RATE: 84 BPM | BODY MASS INDEX: 26.25 KG/M2 | DIASTOLIC BLOOD PRESSURE: 70 MMHG | WEIGHT: 160.2 LBS | SYSTOLIC BLOOD PRESSURE: 140 MMHG

## 2022-01-01 VITALS
OXYGEN SATURATION: 97 % | HEART RATE: 94 BPM | BODY MASS INDEX: 24.99 KG/M2 | SYSTOLIC BLOOD PRESSURE: 114 MMHG | HEIGHT: 66 IN | RESPIRATION RATE: 16 BRPM | TEMPERATURE: 97 F | WEIGHT: 155.5 LBS | DIASTOLIC BLOOD PRESSURE: 49 MMHG

## 2022-01-01 VITALS
HEART RATE: 79 BPM | SYSTOLIC BLOOD PRESSURE: 137 MMHG | WEIGHT: 153.3 LBS | OXYGEN SATURATION: 95 % | DIASTOLIC BLOOD PRESSURE: 57 MMHG | BODY MASS INDEX: 25.51 KG/M2

## 2022-01-01 VITALS
SYSTOLIC BLOOD PRESSURE: 138 MMHG | HEART RATE: 76 BPM | BODY MASS INDEX: 25.56 KG/M2 | WEIGHT: 153.4 LBS | DIASTOLIC BLOOD PRESSURE: 72 MMHG | HEIGHT: 65 IN

## 2022-01-01 VITALS
RESPIRATION RATE: 18 BRPM | WEIGHT: 150.8 LBS | OXYGEN SATURATION: 94 % | BODY MASS INDEX: 24.23 KG/M2 | DIASTOLIC BLOOD PRESSURE: 53 MMHG | HEIGHT: 66 IN | HEART RATE: 83 BPM | TEMPERATURE: 97.1 F | SYSTOLIC BLOOD PRESSURE: 112 MMHG

## 2022-01-01 VITALS
BODY MASS INDEX: 25.23 KG/M2 | OXYGEN SATURATION: 97 % | SYSTOLIC BLOOD PRESSURE: 105 MMHG | HEIGHT: 66 IN | RESPIRATION RATE: 16 BRPM | HEART RATE: 94 BPM | WEIGHT: 157 LBS | DIASTOLIC BLOOD PRESSURE: 55 MMHG | TEMPERATURE: 97.2 F

## 2022-01-01 VITALS
HEIGHT: 65 IN | OXYGEN SATURATION: 97 % | SYSTOLIC BLOOD PRESSURE: 119 MMHG | WEIGHT: 152.9 LBS | RESPIRATION RATE: 18 BRPM | DIASTOLIC BLOOD PRESSURE: 63 MMHG | BODY MASS INDEX: 25.47 KG/M2 | TEMPERATURE: 97.3 F | HEART RATE: 105 BPM

## 2022-01-01 VITALS
HEIGHT: 66 IN | HEART RATE: 85 BPM | TEMPERATURE: 98.5 F | SYSTOLIC BLOOD PRESSURE: 98 MMHG | BODY MASS INDEX: 25.07 KG/M2 | RESPIRATION RATE: 16 BRPM | DIASTOLIC BLOOD PRESSURE: 53 MMHG | WEIGHT: 156 LBS | OXYGEN SATURATION: 99 %

## 2022-01-01 VITALS
WEIGHT: 152 LBS | OXYGEN SATURATION: 95 % | DIASTOLIC BLOOD PRESSURE: 67 MMHG | HEART RATE: 94 BPM | HEIGHT: 66 IN | BODY MASS INDEX: 24.43 KG/M2 | RESPIRATION RATE: 18 BRPM | SYSTOLIC BLOOD PRESSURE: 150 MMHG | TEMPERATURE: 96.8 F

## 2022-01-01 VITALS
DIASTOLIC BLOOD PRESSURE: 57 MMHG | RESPIRATION RATE: 16 BRPM | HEART RATE: 82 BPM | TEMPERATURE: 97.9 F | HEIGHT: 65 IN | BODY MASS INDEX: 24.99 KG/M2 | OXYGEN SATURATION: 96 % | WEIGHT: 150 LBS | SYSTOLIC BLOOD PRESSURE: 116 MMHG

## 2022-01-01 VITALS
WEIGHT: 157 LBS | RESPIRATION RATE: 18 BRPM | TEMPERATURE: 97.3 F | OXYGEN SATURATION: 96 % | HEART RATE: 101 BPM | DIASTOLIC BLOOD PRESSURE: 50 MMHG | HEIGHT: 66 IN | BODY MASS INDEX: 25.23 KG/M2 | SYSTOLIC BLOOD PRESSURE: 130 MMHG

## 2022-01-01 VITALS
TEMPERATURE: 97.6 F | HEIGHT: 65 IN | DIASTOLIC BLOOD PRESSURE: 75 MMHG | SYSTOLIC BLOOD PRESSURE: 132 MMHG | WEIGHT: 160.2 LBS | BODY MASS INDEX: 26.69 KG/M2 | OXYGEN SATURATION: 95 % | HEART RATE: 80 BPM | RESPIRATION RATE: 19 BRPM

## 2022-01-01 VITALS
DIASTOLIC BLOOD PRESSURE: 74 MMHG | BODY MASS INDEX: 26.66 KG/M2 | HEIGHT: 65 IN | TEMPERATURE: 97 F | RESPIRATION RATE: 14 BRPM | SYSTOLIC BLOOD PRESSURE: 139 MMHG | OXYGEN SATURATION: 96 % | HEART RATE: 63 BPM | WEIGHT: 160 LBS

## 2022-01-01 VITALS
WEIGHT: 153 LBS | SYSTOLIC BLOOD PRESSURE: 110 MMHG | DIASTOLIC BLOOD PRESSURE: 70 MMHG | HEART RATE: 88 BPM | BODY MASS INDEX: 25.46 KG/M2

## 2022-01-01 VITALS
TEMPERATURE: 98.1 F | WEIGHT: 154 LBS | RESPIRATION RATE: 20 BRPM | SYSTOLIC BLOOD PRESSURE: 111 MMHG | OXYGEN SATURATION: 95 % | HEART RATE: 97 BPM | BODY MASS INDEX: 24.86 KG/M2 | DIASTOLIC BLOOD PRESSURE: 56 MMHG

## 2022-01-01 VITALS
WEIGHT: 152 LBS | DIASTOLIC BLOOD PRESSURE: 80 MMHG | BODY MASS INDEX: 24.91 KG/M2 | OXYGEN SATURATION: 98 % | HEART RATE: 75 BPM | SYSTOLIC BLOOD PRESSURE: 132 MMHG

## 2022-01-01 DIAGNOSIS — D53.9 MACROCYTIC ANEMIA: ICD-10-CM

## 2022-01-01 DIAGNOSIS — U07.1 INFECTION DUE TO 2019 NOVEL CORONAVIRUS: ICD-10-CM

## 2022-01-01 DIAGNOSIS — D62 ACUTE POSTHEMORRHAGIC ANEMIA: ICD-10-CM

## 2022-01-01 DIAGNOSIS — K12.1 STOMATITIS AND MUCOSITIS: Primary | ICD-10-CM

## 2022-01-01 DIAGNOSIS — F32.89 OTHER DEPRESSION: ICD-10-CM

## 2022-01-01 DIAGNOSIS — E87.6 HYPOKALEMIA: ICD-10-CM

## 2022-01-01 DIAGNOSIS — R60.9 EDEMA, UNSPECIFIED TYPE: ICD-10-CM

## 2022-01-01 DIAGNOSIS — K12.30 STOMATITIS AND MUCOSITIS: Primary | ICD-10-CM

## 2022-01-01 DIAGNOSIS — J96.21 ACUTE ON CHRONIC RESPIRATORY FAILURE WITH HYPOXIA (H): ICD-10-CM

## 2022-01-01 DIAGNOSIS — R55 SYNCOPE: ICD-10-CM

## 2022-01-01 DIAGNOSIS — I95.9 HYPOTENSION, UNSPECIFIED HYPOTENSION TYPE: ICD-10-CM

## 2022-01-01 DIAGNOSIS — J96.21 ACUTE ON CHRONIC RESPIRATORY FAILURE WITH HYPOXIA (H): Primary | ICD-10-CM

## 2022-01-01 DIAGNOSIS — R53.81 PHYSICAL DECONDITIONING: ICD-10-CM

## 2022-01-01 DIAGNOSIS — G62.9 NEUROPATHY: Primary | ICD-10-CM

## 2022-01-01 DIAGNOSIS — R19.7 DIARRHEA: ICD-10-CM

## 2022-01-01 DIAGNOSIS — I10 HYPERTENSION, UNSPECIFIED TYPE: ICD-10-CM

## 2022-01-01 DIAGNOSIS — I48.0 PAROXYSMAL ATRIAL FIBRILLATION (H): Chronic | ICD-10-CM

## 2022-01-01 DIAGNOSIS — E83.42 HYPOMAGNESEMIA: ICD-10-CM

## 2022-01-01 DIAGNOSIS — I50.9 HEART FAILURE, UNSPECIFIED (H): ICD-10-CM

## 2022-01-01 DIAGNOSIS — G56.22 ULNAR NEUROPATHY AT ELBOW OF LEFT UPPER EXTREMITY: ICD-10-CM

## 2022-01-01 DIAGNOSIS — R53.81 MALAISE AND FATIGUE: ICD-10-CM

## 2022-01-01 DIAGNOSIS — I50.9 ACUTE ON CHRONIC CONGESTIVE HEART FAILURE, UNSPECIFIED HEART FAILURE TYPE (H): Primary | ICD-10-CM

## 2022-01-01 DIAGNOSIS — M1A.9XX1 CHRONIC TOPHACEOUS GOUT OF RIGHT FOOT: ICD-10-CM

## 2022-01-01 DIAGNOSIS — J18.9 COMMUNITY ACQUIRED PNEUMONIA OF RIGHT LOWER LOBE OF LUNG: ICD-10-CM

## 2022-01-01 DIAGNOSIS — J96.11 CHRONIC RESPIRATORY FAILURE WITH HYPOXIA (H): ICD-10-CM

## 2022-01-01 DIAGNOSIS — M05.9 SEROPOSITIVE RHEUMATOID ARTHRITIS (H): ICD-10-CM

## 2022-01-01 DIAGNOSIS — M79.674 PAIN IN RIGHT TOE(S): ICD-10-CM

## 2022-01-01 DIAGNOSIS — R91.8 PULMONARY NODULES: ICD-10-CM

## 2022-01-01 DIAGNOSIS — M15.0 PRIMARY OSTEOARTHRITIS INVOLVING MULTIPLE JOINTS: ICD-10-CM

## 2022-01-01 DIAGNOSIS — I48.91 UNSPECIFIED ATRIAL FIBRILLATION (H): ICD-10-CM

## 2022-01-01 DIAGNOSIS — R19.7 DIARRHEA OF PRESUMED INFECTIOUS ORIGIN: ICD-10-CM

## 2022-01-01 DIAGNOSIS — N18.30 CHRONIC KIDNEY DISEASE, STAGE 3 UNSPECIFIED (H): ICD-10-CM

## 2022-01-01 DIAGNOSIS — J44.9 CHRONIC OBSTRUCTIVE PULMONARY DISEASE, UNSPECIFIED COPD TYPE (H): Primary | ICD-10-CM

## 2022-01-01 DIAGNOSIS — I48.0 PAROXYSMAL ATRIAL FIBRILLATION (H): ICD-10-CM

## 2022-01-01 DIAGNOSIS — R76.8 RHEUMATOID FACTOR POSITIVE: ICD-10-CM

## 2022-01-01 DIAGNOSIS — J44.9 COPD (CHRONIC OBSTRUCTIVE PULMONARY DISEASE) (H): ICD-10-CM

## 2022-01-01 DIAGNOSIS — J96.90 RESPIRATORY FAILURE, UNSPECIFIED CHRONICITY, UNSPECIFIED WHETHER WITH HYPOXIA OR HYPERCAPNIA (H): ICD-10-CM

## 2022-01-01 DIAGNOSIS — J44.9 CHRONIC OBSTRUCTIVE PULMONARY DISEASE, UNSPECIFIED COPD TYPE (H): ICD-10-CM

## 2022-01-01 DIAGNOSIS — I50.9 ACUTE ON CHRONIC CONGESTIVE HEART FAILURE, UNSPECIFIED HEART FAILURE TYPE (H): ICD-10-CM

## 2022-01-01 DIAGNOSIS — J44.1 COPD EXACERBATION (H): Chronic | ICD-10-CM

## 2022-01-01 DIAGNOSIS — M05.79 RHEUMATOID ARTHRITIS INVOLVING MULTIPLE SITES WITH POSITIVE RHEUMATOID FACTOR (H): ICD-10-CM

## 2022-01-01 DIAGNOSIS — M10.9 GOUT, UNSPECIFIED CAUSE, UNSPECIFIED CHRONICITY, UNSPECIFIED SITE: ICD-10-CM

## 2022-01-01 DIAGNOSIS — R53.83 MALAISE AND FATIGUE: ICD-10-CM

## 2022-01-01 DIAGNOSIS — N30.01 ACUTE CYSTITIS WITH HEMATURIA: ICD-10-CM

## 2022-01-01 DIAGNOSIS — A04.71 RECURRENT CLOSTRIDIOIDES DIFFICILE DIARRHEA: ICD-10-CM

## 2022-01-01 DIAGNOSIS — K21.9 GASTROESOPHAGEAL REFLUX DISEASE WITHOUT ESOPHAGITIS: ICD-10-CM

## 2022-01-01 DIAGNOSIS — Z79.899 HIGH RISK MEDICATION USE: ICD-10-CM

## 2022-01-01 DIAGNOSIS — A04.72 C. DIFFICILE COLITIS: ICD-10-CM

## 2022-01-01 DIAGNOSIS — R09.02 HYPOXIA: ICD-10-CM

## 2022-01-01 DIAGNOSIS — R07.9 CHEST PAIN, UNSPECIFIED TYPE: ICD-10-CM

## 2022-01-01 DIAGNOSIS — M05.79 RHEUMATOID ARTHRITIS INVOLVING MULTIPLE SITES WITH POSITIVE RHEUMATOID FACTOR (H): Primary | ICD-10-CM

## 2022-01-01 DIAGNOSIS — J18.9 PNEUMONIA OF LEFT LUNG DUE TO INFECTIOUS ORGANISM, UNSPECIFIED PART OF LUNG: ICD-10-CM

## 2022-01-01 DIAGNOSIS — G47.00 INSOMNIA, UNSPECIFIED TYPE: ICD-10-CM

## 2022-01-01 DIAGNOSIS — I48.0 PAF (PAROXYSMAL ATRIAL FIBRILLATION) (H): ICD-10-CM

## 2022-01-01 DIAGNOSIS — M1A.9XX1 CHRONIC TOPHACEOUS GOUT OF RIGHT FOOT: Primary | ICD-10-CM

## 2022-01-01 DIAGNOSIS — I50.9 ACUTE DECOMPENSATED HEART FAILURE (H): Primary | ICD-10-CM

## 2022-01-01 DIAGNOSIS — A41.9 SEPSIS, DUE TO UNSPECIFIED ORGANISM, UNSPECIFIED WHETHER ACUTE ORGAN DYSFUNCTION PRESENT (H): ICD-10-CM

## 2022-01-01 DIAGNOSIS — I48.91 RAPID ATRIAL FIBRILLATION (H): ICD-10-CM

## 2022-01-01 DIAGNOSIS — N18.30 CRF (CHRONIC RENAL FAILURE), STAGE 3 (MODERATE) (H): ICD-10-CM

## 2022-01-01 DIAGNOSIS — Z78.9 TAKES DIETARY SUPPLEMENTS: ICD-10-CM

## 2022-01-01 DIAGNOSIS — R06.02 SOB (SHORTNESS OF BREATH): ICD-10-CM

## 2022-01-01 DIAGNOSIS — R19.7 DIARRHEA, UNSPECIFIED TYPE: ICD-10-CM

## 2022-01-01 DIAGNOSIS — I47.29 NONSUSTAINED VENTRICULAR TACHYCARDIA (H): ICD-10-CM

## 2022-01-01 DIAGNOSIS — L03.115 CELLULITIS OF RIGHT LOWER EXTREMITY: ICD-10-CM

## 2022-01-01 DIAGNOSIS — R06.02 SOB (SHORTNESS OF BREATH): Primary | ICD-10-CM

## 2022-01-01 DIAGNOSIS — M10.9 GOUT, UNSPECIFIED: ICD-10-CM

## 2022-01-01 DIAGNOSIS — M10.9 GOUT, UNSPECIFIED CAUSE, UNSPECIFIED CHRONICITY, UNSPECIFIED SITE: Primary | ICD-10-CM

## 2022-01-01 DIAGNOSIS — M25.474: ICD-10-CM

## 2022-01-01 DIAGNOSIS — A04.72 C. DIFFICILE COLITIS: Primary | ICD-10-CM

## 2022-01-01 DIAGNOSIS — L03.119 CELLULITIS OF FOOT: ICD-10-CM

## 2022-01-01 DIAGNOSIS — E21.3 HYPERPARATHYROIDISM, UNSPECIFIED (H): ICD-10-CM

## 2022-01-01 DIAGNOSIS — I65.22 INTERNAL CAROTID ARTERY STENOSIS, LEFT: Primary | ICD-10-CM

## 2022-01-01 DIAGNOSIS — G56.03 BILATERAL CARPAL TUNNEL SYNDROME: ICD-10-CM

## 2022-01-01 DIAGNOSIS — N18.9 CHRONIC KIDNEY DISEASE, UNSPECIFIED: ICD-10-CM

## 2022-01-01 DIAGNOSIS — I12.9 HYPERTENSIVE CHRONIC KIDNEY DISEASE WITH STAGE 1 THROUGH STAGE 4 CHRONIC KIDNEY DISEASE, OR UNSPECIFIED CHRONIC KIDNEY DISEASE: ICD-10-CM

## 2022-01-01 DIAGNOSIS — G56.21 ULNAR NEUROPATHY AT ELBOW OF RIGHT UPPER EXTREMITY: Primary | ICD-10-CM

## 2022-01-01 DIAGNOSIS — D64.9 ANEMIA, UNSPECIFIED: ICD-10-CM

## 2022-01-01 DIAGNOSIS — E78.5 HYPERLIPIDEMIA, UNSPECIFIED HYPERLIPIDEMIA TYPE: ICD-10-CM

## 2022-01-01 DIAGNOSIS — M62.81 GENERALIZED MUSCLE WEAKNESS: ICD-10-CM

## 2022-01-01 DIAGNOSIS — R91.8 PULMONARY NODULES: Primary | ICD-10-CM

## 2022-01-01 LAB
ALBUMIN SERPL BCG-MCNC: 3 G/DL (ref 3.5–5.2)
ALBUMIN SERPL BCG-MCNC: 3.4 G/DL (ref 3.5–5.2)
ALBUMIN SERPL BCG-MCNC: 4.1 G/DL (ref 3.5–5.2)
ALBUMIN SERPL BCG-MCNC: 4.2 G/DL (ref 3.5–5.2)
ALBUMIN SERPL BCG-MCNC: 4.3 G/DL (ref 3.5–5.2)
ALBUMIN SERPL-MCNC: 2.4 G/DL (ref 3.5–5)
ALBUMIN SERPL-MCNC: 2.6 G/DL (ref 3.5–5)
ALBUMIN SERPL-MCNC: 2.8 G/DL (ref 3.5–5)
ALBUMIN UR-MCNC: 20 MG/DL
ALBUMIN UR-MCNC: NEGATIVE MG/DL
ALBUMIN UR-MCNC: NEGATIVE MG/DL
ALP SERPL-CCNC: 113 U/L (ref 45–120)
ALP SERPL-CCNC: 116 U/L (ref 35–104)
ALP SERPL-CCNC: 127 U/L (ref 45–120)
ALP SERPL-CCNC: 157 U/L (ref 35–104)
ALP SERPL-CCNC: 163 U/L (ref 35–104)
ALP SERPL-CCNC: 187 U/L (ref 35–104)
ALP SERPL-CCNC: 83 U/L (ref 45–120)
ALP SERPL-CCNC: 95 U/L (ref 35–104)
ALT SERPL W P-5'-P-CCNC: 11 U/L (ref 0–45)
ALT SERPL W P-5'-P-CCNC: 13 U/L (ref 0–45)
ALT SERPL W P-5'-P-CCNC: 14 U/L (ref 0–45)
ALT SERPL W P-5'-P-CCNC: 14 U/L (ref 10–35)
ALT SERPL W P-5'-P-CCNC: 16 U/L (ref 10–35)
ALT SERPL W P-5'-P-CCNC: 16 U/L (ref 10–35)
ALT SERPL W P-5'-P-CCNC: 18 U/L (ref 10–35)
ALT SERPL W P-5'-P-CCNC: 20 U/L (ref 10–35)
ANION GAP SERPL CALCULATED.3IONS-SCNC: 10 MMOL/L (ref 5–18)
ANION GAP SERPL CALCULATED.3IONS-SCNC: 10 MMOL/L (ref 7–15)
ANION GAP SERPL CALCULATED.3IONS-SCNC: 11 MMOL/L (ref 5–18)
ANION GAP SERPL CALCULATED.3IONS-SCNC: 11 MMOL/L (ref 7–15)
ANION GAP SERPL CALCULATED.3IONS-SCNC: 12 MMOL/L (ref 5–18)
ANION GAP SERPL CALCULATED.3IONS-SCNC: 13 MMOL/L (ref 5–18)
ANION GAP SERPL CALCULATED.3IONS-SCNC: 13 MMOL/L (ref 7–15)
ANION GAP SERPL CALCULATED.3IONS-SCNC: 14 MMOL/L (ref 5–18)
ANION GAP SERPL CALCULATED.3IONS-SCNC: 14 MMOL/L (ref 7–15)
ANION GAP SERPL CALCULATED.3IONS-SCNC: 16 MMOL/L (ref 7–15)
ANION GAP SERPL CALCULATED.3IONS-SCNC: 17 MMOL/L (ref 7–15)
ANION GAP SERPL CALCULATED.3IONS-SCNC: 17 MMOL/L (ref 7–15)
ANION GAP SERPL CALCULATED.3IONS-SCNC: 7 MMOL/L (ref 7–15)
ANION GAP SERPL CALCULATED.3IONS-SCNC: 8 MMOL/L (ref 7–15)
ANION GAP SERPL CALCULATED.3IONS-SCNC: 9 MMOL/L (ref 5–18)
ANION GAP SERPL CALCULATED.3IONS-SCNC: 9 MMOL/L (ref 7–15)
APPEARANCE FLD: ABNORMAL
APPEARANCE UR: ABNORMAL
APPEARANCE UR: ABNORMAL
APPEARANCE UR: CLEAR
AST SERPL W P-5'-P-CCNC: 14 U/L (ref 10–35)
AST SERPL W P-5'-P-CCNC: 19 U/L (ref 0–40)
AST SERPL W P-5'-P-CCNC: 21 U/L (ref 0–40)
AST SERPL W P-5'-P-CCNC: 24 U/L (ref 10–35)
AST SERPL W P-5'-P-CCNC: 25 U/L (ref 10–35)
AST SERPL W P-5'-P-CCNC: 27 U/L (ref 0–40)
AST SERPL W P-5'-P-CCNC: 27 U/L (ref 10–35)
AST SERPL W P-5'-P-CCNC: 30 U/L (ref 10–35)
ATRIAL RATE - MUSE: 101 BPM
ATRIAL RATE - MUSE: 129 BPM
ATRIAL RATE - MUSE: 85 BPM
ATRIAL RATE - MUSE: 97 BPM
BACTERIA #/AREA URNS HPF: ABNORMAL /HPF
BACTERIA #/AREA URNS HPF: ABNORMAL /HPF
BACTERIA BLD CULT: NO GROWTH
BACTERIA BLD CULT: NO GROWTH
BACTERIA UR CULT: ABNORMAL
BACTERIA UR CULT: ABNORMAL
BASOPHILS # BLD AUTO: 0 10E3/UL (ref 0–0.2)
BASOPHILS # BLD AUTO: 0.1 10E3/UL (ref 0–0.2)
BASOPHILS NFR BLD AUTO: 0 %
BASOPHILS NFR BLD AUTO: 1 %
BASOPHILS NFR BLD AUTO: 2 %
BILIRUB DIRECT SERPL-MCNC: 0.2 MG/DL
BILIRUB DIRECT SERPL-MCNC: 0.3 MG/DL
BILIRUB DIRECT SERPL-MCNC: <0.2 MG/DL (ref 0–0.3)
BILIRUB SERPL-MCNC: 0.2 MG/DL
BILIRUB SERPL-MCNC: 0.2 MG/DL
BILIRUB SERPL-MCNC: 0.3 MG/DL
BILIRUB SERPL-MCNC: 0.5 MG/DL
BILIRUB SERPL-MCNC: 0.5 MG/DL
BILIRUB SERPL-MCNC: 0.6 MG/DL (ref 0–1)
BILIRUB SERPL-MCNC: 0.6 MG/DL (ref 0–1)
BILIRUB SERPL-MCNC: 0.7 MG/DL (ref 0–1)
BILIRUB UR QL STRIP: NEGATIVE
BNP SERPL-MCNC: 274 PG/ML (ref 0–140)
BUN SERPL-MCNC: 13 MG/DL (ref 8–28)
BUN SERPL-MCNC: 15.7 MG/DL (ref 8–23)
BUN SERPL-MCNC: 16 MG/DL (ref 8–28)
BUN SERPL-MCNC: 17 MG/DL (ref 8–23)
BUN SERPL-MCNC: 18.6 MG/DL (ref 8–23)
BUN SERPL-MCNC: 21 MG/DL (ref 8–28)
BUN SERPL-MCNC: 21 MG/DL (ref 8–28)
BUN SERPL-MCNC: 22.6 MG/DL (ref 8–23)
BUN SERPL-MCNC: 23.4 MG/DL (ref 8–23)
BUN SERPL-MCNC: 24 MG/DL (ref 8–28)
BUN SERPL-MCNC: 24.4 MG/DL (ref 8–23)
BUN SERPL-MCNC: 24.6 MG/DL (ref 8–23)
BUN SERPL-MCNC: 26 MG/DL (ref 8–23)
BUN SERPL-MCNC: 26.4 MG/DL (ref 8–23)
BUN SERPL-MCNC: 27.8 MG/DL (ref 8–23)
BUN SERPL-MCNC: 28 MG/DL (ref 8–28)
BUN SERPL-MCNC: 28.2 MG/DL (ref 8–23)
BUN SERPL-MCNC: 29 MG/DL (ref 8–23)
BUN SERPL-MCNC: 30.1 MG/DL (ref 8–23)
BUN SERPL-MCNC: 30.5 MG/DL (ref 8–23)
BUN SERPL-MCNC: 31.2 MG/DL (ref 8–23)
BUN SERPL-MCNC: 31.9 MG/DL (ref 8–23)
BUN SERPL-MCNC: 32.8 MG/DL (ref 8–23)
BUN SERPL-MCNC: 33.2 MG/DL (ref 8–23)
BUN SERPL-MCNC: 34.6 MG/DL (ref 8–23)
BUN SERPL-MCNC: 37.1 MG/DL (ref 8–23)
C COLI+JEJUNI+LARI FUSA STL QL NAA+PROBE: NOT DETECTED
C DIFF GDH STL QL IA: POSITIVE
C DIFF TOX A+B STL QL IA: POSITIVE
C DIFF TOX B STL QL: POSITIVE
CALCIUM SERPL-MCNC: 10 MG/DL (ref 8.8–10.2)
CALCIUM SERPL-MCNC: 10.1 MG/DL (ref 8.8–10.2)
CALCIUM SERPL-MCNC: 10.1 MG/DL (ref 8.8–10.2)
CALCIUM SERPL-MCNC: 10.3 MG/DL (ref 8.8–10.2)
CALCIUM SERPL-MCNC: 10.3 MG/DL (ref 8.8–10.2)
CALCIUM SERPL-MCNC: 10.4 MG/DL (ref 8.8–10.2)
CALCIUM SERPL-MCNC: 10.5 MG/DL (ref 8.8–10.2)
CALCIUM SERPL-MCNC: 10.7 MG/DL (ref 8.5–10.5)
CALCIUM SERPL-MCNC: 7.9 MG/DL (ref 8.5–10.5)
CALCIUM SERPL-MCNC: 8 MG/DL (ref 8.5–10.5)
CALCIUM SERPL-MCNC: 8.4 MG/DL (ref 8.5–10.5)
CALCIUM SERPL-MCNC: 8.8 MG/DL (ref 8.5–10.5)
CALCIUM SERPL-MCNC: 9.4 MG/DL (ref 8.5–10.5)
CALCIUM SERPL-MCNC: 9.4 MG/DL (ref 8.8–10.2)
CALCIUM SERPL-MCNC: 9.5 MG/DL (ref 8.8–10.2)
CALCIUM SERPL-MCNC: 9.6 MG/DL (ref 8.8–10.2)
CALCIUM SERPL-MCNC: 9.6 MG/DL (ref 8.8–10.2)
CALCIUM SERPL-MCNC: 9.8 MG/DL (ref 8.8–10.2)
CALCIUM SERPL-MCNC: 9.8 MG/DL (ref 8.8–10.2)
CALCIUM SERPL-MCNC: 9.9 MG/DL (ref 8.8–10.2)
CELL COUNT BODY FLUID SOURCE: ABNORMAL
CHLORIDE BLD-SCNC: 101 MMOL/L (ref 98–107)
CHLORIDE BLD-SCNC: 102 MMOL/L (ref 98–107)
CHLORIDE BLD-SCNC: 103 MMOL/L (ref 98–107)
CHLORIDE BLD-SCNC: 105 MMOL/L (ref 98–107)
CHLORIDE BLD-SCNC: 109 MMOL/L (ref 98–107)
CHLORIDE BLD-SCNC: 112 MMOL/L (ref 98–107)
CHLORIDE SERPL-SCNC: 100 MMOL/L (ref 98–107)
CHLORIDE SERPL-SCNC: 101 MMOL/L (ref 98–107)
CHLORIDE SERPL-SCNC: 102 MMOL/L (ref 98–107)
CHLORIDE SERPL-SCNC: 103 MMOL/L (ref 98–107)
CHLORIDE SERPL-SCNC: 103 MMOL/L (ref 98–107)
CHLORIDE SERPL-SCNC: 104 MMOL/L (ref 98–107)
CHLORIDE SERPL-SCNC: 105 MMOL/L (ref 98–107)
CHLORIDE SERPL-SCNC: 95 MMOL/L (ref 98–107)
CHLORIDE SERPL-SCNC: 96 MMOL/L (ref 98–107)
CHLORIDE SERPL-SCNC: 97 MMOL/L (ref 98–107)
CHLORIDE SERPL-SCNC: 97 MMOL/L (ref 98–107)
CHLORIDE SERPL-SCNC: 98 MMOL/L (ref 98–107)
CO2 SERPL-SCNC: 18 MMOL/L (ref 22–31)
CO2 SERPL-SCNC: 20 MMOL/L (ref 22–31)
CO2 SERPL-SCNC: 22 MMOL/L (ref 22–31)
CO2 SERPL-SCNC: 24 MMOL/L (ref 22–31)
CO2 SERPL-SCNC: 25 MMOL/L (ref 22–31)
CO2 SERPL-SCNC: 25 MMOL/L (ref 22–31)
COLOR FLD: ABNORMAL
COLOR UR AUTO: ABNORMAL
COLOR UR AUTO: COLORLESS
COLOR UR AUTO: YELLOW
CREAT SERPL-MCNC: 0.77 MG/DL (ref 0.6–1.1)
CREAT SERPL-MCNC: 0.82 MG/DL (ref 0.6–1.1)
CREAT SERPL-MCNC: 0.94 MG/DL (ref 0.51–0.95)
CREAT SERPL-MCNC: 0.95 MG/DL (ref 0.6–1.1)
CREAT SERPL-MCNC: 1 MG/DL (ref 0.51–0.95)
CREAT SERPL-MCNC: 1.06 MG/DL (ref 0.51–0.95)
CREAT SERPL-MCNC: 1.07 MG/DL (ref 0.51–0.95)
CREAT SERPL-MCNC: 1.07 MG/DL (ref 0.51–0.95)
CREAT SERPL-MCNC: 1.07 MG/DL (ref 0.6–1.1)
CREAT SERPL-MCNC: 1.09 MG/DL (ref 0.51–0.95)
CREAT SERPL-MCNC: 1.12 MG/DL (ref 0.51–0.95)
CREAT SERPL-MCNC: 1.14 MG/DL (ref 0.6–1.1)
CREAT SERPL-MCNC: 1.14 MG/DL (ref 0.6–1.1)
CREAT SERPL-MCNC: 1.15 MG/DL (ref 0.51–0.95)
CREAT SERPL-MCNC: 1.16 MG/DL (ref 0.51–0.95)
CREAT SERPL-MCNC: 1.18 MG/DL (ref 0.51–0.95)
CREAT SERPL-MCNC: 1.19 MG/DL (ref 0.51–0.95)
CREAT SERPL-MCNC: 1.21 MG/DL (ref 0.51–0.95)
CREAT SERPL-MCNC: 1.21 MG/DL (ref 0.51–0.95)
CREAT SERPL-MCNC: 1.23 MG/DL (ref 0.51–0.95)
CREAT SERPL-MCNC: 1.24 MG/DL (ref 0.51–0.95)
CREAT SERPL-MCNC: 1.25 MG/DL (ref 0.51–0.95)
CREAT SERPL-MCNC: 1.31 MG/DL (ref 0.51–0.95)
CREAT SERPL-MCNC: 1.45 MG/DL (ref 0.51–0.95)
CREAT SERPL-MCNC: 1.52 MG/DL (ref 0.51–0.95)
CREAT SERPL-MCNC: 1.56 MG/DL (ref 0.51–0.95)
CREAT SERPL-MCNC: 1.57 MG/DL (ref 0.51–0.95)
CREAT SERPL-MCNC: 1.6 MG/DL (ref 0.51–0.95)
CRP SERPL-MCNC: 151.2 MG/L
CRP SERPL-MCNC: 17.7 MG/L
CRP SERPL-MCNC: 22.1 MG/L
CRP SERPL-MCNC: 33 MG/L
CRP SERPL-MCNC: 6.5 MG/L
CRP SERPL-MCNC: 9.9 MG/L
CRYSTALS SNV MICRO: ABNORMAL
D DIMER PPP FEU-MCNC: 0.68 UG/ML FEU (ref 0–0.5)
D DIMER PPP FEU-MCNC: 0.7 UG/ML FEU (ref 0–0.5)
D DIMER PPP FEU-MCNC: 0.71 UG/ML FEU (ref 0–0.5)
D DIMER PPP FEU-MCNC: 0.72 UG/ML FEU (ref 0–0.5)
D DIMER PPP FEU-MCNC: 0.75 UG/ML FEU (ref 0–0.5)
D DIMER PPP FEU-MCNC: 0.77 UG/ML FEU (ref 0–0.5)
DEPRECATED CALCIDIOL+CALCIFEROL SERPL-MC: 67 UG/L (ref 20–75)
DEPRECATED HCO3 PLAS-SCNC: 20 MMOL/L (ref 22–29)
DEPRECATED HCO3 PLAS-SCNC: 22 MMOL/L (ref 22–29)
DEPRECATED HCO3 PLAS-SCNC: 23 MMOL/L (ref 22–29)
DEPRECATED HCO3 PLAS-SCNC: 24 MMOL/L (ref 22–29)
DEPRECATED HCO3 PLAS-SCNC: 25 MMOL/L (ref 22–29)
DEPRECATED HCO3 PLAS-SCNC: 26 MMOL/L (ref 22–29)
DEPRECATED HCO3 PLAS-SCNC: 26 MMOL/L (ref 22–29)
DEPRECATED HCO3 PLAS-SCNC: 27 MMOL/L (ref 22–29)
DEPRECATED HCO3 PLAS-SCNC: 27 MMOL/L (ref 22–29)
DEPRECATED HCO3 PLAS-SCNC: 28 MMOL/L (ref 22–29)
DEPRECATED HCO3 PLAS-SCNC: 29 MMOL/L (ref 22–29)
DEPRECATED HCO3 PLAS-SCNC: 29 MMOL/L (ref 22–29)
DEPRECATED HCO3 PLAS-SCNC: 30 MMOL/L (ref 22–29)
DEPRECATED HCO3 PLAS-SCNC: 32 MMOL/L (ref 22–29)
DIASTOLIC BLOOD PRESSURE - MUSE: 57 MMHG
DIASTOLIC BLOOD PRESSURE - MUSE: NORMAL MMHG
DLCOCOR-%PRED-PRE: 37 %
DLCOCOR-PRE: 7.58 ML/MIN/MMHG
DLCOUNC-%PRED-PRE: 35 %
DLCOUNC-PRE: 7.15 ML/MIN/MMHG
DLCOUNC-PRED: 20.03 ML/MIN/MMHG
EC STX1 GENE STL QL NAA+PROBE: NOT DETECTED
EC STX2 GENE STL QL NAA+PROBE: NOT DETECTED
EOSINOPHIL # BLD AUTO: 0 10E3/UL (ref 0–0.7)
EOSINOPHIL # BLD AUTO: 0.1 10E3/UL (ref 0–0.7)
EOSINOPHIL # BLD AUTO: 0.2 10E3/UL (ref 0–0.7)
EOSINOPHIL # BLD AUTO: 0.4 10E3/UL (ref 0–0.7)
EOSINOPHIL NFR BLD AUTO: 0 %
EOSINOPHIL NFR BLD AUTO: 1 %
EOSINOPHIL NFR BLD AUTO: 7 %
ERV-%PRED-PRE: 51 %
ERV-PRE: 0.34 L
ERV-PRED: 0.67 L
ERYTHROCYTE [DISTWIDTH] IN BLOOD BY AUTOMATED COUNT: 14.5 % (ref 10–15)
ERYTHROCYTE [DISTWIDTH] IN BLOOD BY AUTOMATED COUNT: 14.5 % (ref 10–15)
ERYTHROCYTE [DISTWIDTH] IN BLOOD BY AUTOMATED COUNT: 14.6 % (ref 10–15)
ERYTHROCYTE [DISTWIDTH] IN BLOOD BY AUTOMATED COUNT: 14.7 % (ref 10–15)
ERYTHROCYTE [DISTWIDTH] IN BLOOD BY AUTOMATED COUNT: 14.9 % (ref 10–15)
ERYTHROCYTE [DISTWIDTH] IN BLOOD BY AUTOMATED COUNT: 15.1 % (ref 10–15)
ERYTHROCYTE [DISTWIDTH] IN BLOOD BY AUTOMATED COUNT: 15.1 % (ref 10–15)
ERYTHROCYTE [DISTWIDTH] IN BLOOD BY AUTOMATED COUNT: 15.3 % (ref 10–15)
ERYTHROCYTE [DISTWIDTH] IN BLOOD BY AUTOMATED COUNT: 15.4 % (ref 10–15)
ERYTHROCYTE [DISTWIDTH] IN BLOOD BY AUTOMATED COUNT: 15.5 % (ref 10–15)
ERYTHROCYTE [DISTWIDTH] IN BLOOD BY AUTOMATED COUNT: 15.5 % (ref 10–15)
ERYTHROCYTE [DISTWIDTH] IN BLOOD BY AUTOMATED COUNT: 15.6 % (ref 10–15)
ERYTHROCYTE [DISTWIDTH] IN BLOOD BY AUTOMATED COUNT: 15.9 % (ref 10–15)
ERYTHROCYTE [DISTWIDTH] IN BLOOD BY AUTOMATED COUNT: 16 % (ref 10–15)
EXPTIME-PRE: 7.56 SEC
FEF2575-%PRED-POST: 50 %
FEF2575-%PRED-PRE: 15 %
FEF2575-POST: 0.87 L/SEC
FEF2575-PRE: 0.27 L/SEC
FEF2575-PRED: 1.73 L/SEC
FEFMAX-%PRED-PRE: 43 %
FEFMAX-PRE: 2.33 L/SEC
FEFMAX-PRED: 5.39 L/SEC
FEV1-%PRED-PRE: 32 %
FEV1-PRE: 0.7 L
FEV1FEV6-PRE: 50 %
FEV1FEV6-PRED: 78 %
FEV1FVC-PRE: 48 %
FEV1FVC-PRED: 77 %
FEV1SVC-PRE: 42 %
FEV1SVC-PRED: 69 %
FIFMAX-PRE: 1.71 L/SEC
FLUAV AG SPEC QL IA: NEGATIVE
FLUAV RNA SPEC QL NAA+PROBE: NEGATIVE
FLUBV AG SPEC QL IA: NEGATIVE
FLUBV RNA RESP QL NAA+PROBE: NEGATIVE
FOLATE SERPL-MCNC: 38.3 NG/ML (ref 4.6–34.8)
FRCPLETH-%PRED-PRE: 220 %
FRCPLETH-PRE: 6.17 L
FRCPLETH-PRED: 2.8 L
FVC-%PRED-PRE: 52 %
FVC-PRE: 1.46 L
FVC-PRED: 2.8 L
GFR SERPL CREATININE-BSD FRML MDRD: 33 ML/MIN/1.73M2
GFR SERPL CREATININE-BSD FRML MDRD: 34 ML/MIN/1.73M2
GFR SERPL CREATININE-BSD FRML MDRD: 34 ML/MIN/1.73M2
GFR SERPL CREATININE-BSD FRML MDRD: 35 ML/MIN/1.73M2
GFR SERPL CREATININE-BSD FRML MDRD: 37 ML/MIN/1.73M2
GFR SERPL CREATININE-BSD FRML MDRD: 42 ML/MIN/1.73M2
GFR SERPL CREATININE-BSD FRML MDRD: 44 ML/MIN/1.73M2
GFR SERPL CREATININE-BSD FRML MDRD: 45 ML/MIN/1.73M2
GFR SERPL CREATININE-BSD FRML MDRD: 45 ML/MIN/1.73M2
GFR SERPL CREATININE-BSD FRML MDRD: 46 ML/MIN/1.73M2
GFR SERPL CREATININE-BSD FRML MDRD: 46 ML/MIN/1.73M2
GFR SERPL CREATININE-BSD FRML MDRD: 47 ML/MIN/1.73M2
GFR SERPL CREATININE-BSD FRML MDRD: 48 ML/MIN/1.73M2
GFR SERPL CREATININE-BSD FRML MDRD: 49 ML/MIN/1.73M2
GFR SERPL CREATININE-BSD FRML MDRD: 49 ML/MIN/1.73M2
GFR SERPL CREATININE-BSD FRML MDRD: 50 ML/MIN/1.73M2
GFR SERPL CREATININE-BSD FRML MDRD: 50 ML/MIN/1.73M2
GFR SERPL CREATININE-BSD FRML MDRD: 51 ML/MIN/1.73M2
GFR SERPL CREATININE-BSD FRML MDRD: 52 ML/MIN/1.73M2
GFR SERPL CREATININE-BSD FRML MDRD: 54 ML/MIN/1.73M2
GFR SERPL CREATININE-BSD FRML MDRD: 58 ML/MIN/1.73M2
GFR SERPL CREATININE-BSD FRML MDRD: 62 ML/MIN/1.73M2
GFR SERPL CREATININE-BSD FRML MDRD: 63 ML/MIN/1.73M2
GFR SERPL CREATININE-BSD FRML MDRD: 74 ML/MIN/1.73M2
GFR SERPL CREATININE-BSD FRML MDRD: 80 ML/MIN/1.73M2
GLUCOSE BLD-MCNC: 104 MG/DL (ref 70–125)
GLUCOSE BLD-MCNC: 84 MG/DL (ref 70–125)
GLUCOSE BLD-MCNC: 86 MG/DL (ref 70–125)
GLUCOSE BLD-MCNC: 88 MG/DL (ref 70–125)
GLUCOSE BLD-MCNC: 93 MG/DL (ref 70–125)
GLUCOSE BLD-MCNC: 98 MG/DL (ref 70–125)
GLUCOSE BLDC GLUCOMTR-MCNC: 103 MG/DL (ref 70–99)
GLUCOSE BLDC GLUCOMTR-MCNC: 158 MG/DL (ref 70–99)
GLUCOSE SERPL-MCNC: 100 MG/DL (ref 70–99)
GLUCOSE SERPL-MCNC: 102 MG/DL (ref 70–99)
GLUCOSE SERPL-MCNC: 108 MG/DL (ref 70–99)
GLUCOSE SERPL-MCNC: 110 MG/DL (ref 70–99)
GLUCOSE SERPL-MCNC: 111 MG/DL (ref 70–99)
GLUCOSE SERPL-MCNC: 111 MG/DL (ref 70–99)
GLUCOSE SERPL-MCNC: 115 MG/DL (ref 70–99)
GLUCOSE SERPL-MCNC: 118 MG/DL (ref 70–99)
GLUCOSE SERPL-MCNC: 79 MG/DL (ref 70–99)
GLUCOSE SERPL-MCNC: 81 MG/DL (ref 70–99)
GLUCOSE SERPL-MCNC: 81 MG/DL (ref 70–99)
GLUCOSE SERPL-MCNC: 84 MG/DL (ref 70–99)
GLUCOSE SERPL-MCNC: 85 MG/DL (ref 70–99)
GLUCOSE SERPL-MCNC: 87 MG/DL (ref 70–99)
GLUCOSE SERPL-MCNC: 89 MG/DL (ref 70–99)
GLUCOSE SERPL-MCNC: 91 MG/DL (ref 70–99)
GLUCOSE SERPL-MCNC: 92 MG/DL (ref 70–99)
GLUCOSE SERPL-MCNC: 92 MG/DL (ref 70–99)
GLUCOSE SERPL-MCNC: 98 MG/DL (ref 70–99)
GLUCOSE SERPL-MCNC: 99 MG/DL (ref 70–99)
GLUCOSE UR STRIP-MCNC: NEGATIVE MG/DL
HCT VFR BLD AUTO: 28.3 % (ref 35–47)
HCT VFR BLD AUTO: 28.5 % (ref 35–47)
HCT VFR BLD AUTO: 29.2 % (ref 35–47)
HCT VFR BLD AUTO: 29.3 % (ref 35–47)
HCT VFR BLD AUTO: 29.7 % (ref 35–47)
HCT VFR BLD AUTO: 31 % (ref 35–47)
HCT VFR BLD AUTO: 31.2 % (ref 35–47)
HCT VFR BLD AUTO: 32.1 % (ref 35–47)
HCT VFR BLD AUTO: 32.1 % (ref 35–47)
HCT VFR BLD AUTO: 32.4 % (ref 35–47)
HCT VFR BLD AUTO: 32.6 % (ref 35–47)
HCT VFR BLD AUTO: 32.6 % (ref 35–47)
HCT VFR BLD AUTO: 32.8 % (ref 35–47)
HCT VFR BLD AUTO: 33.2 % (ref 35–47)
HCT VFR BLD AUTO: 34.5 % (ref 35–47)
HCT VFR BLD AUTO: 34.5 % (ref 35–47)
HCT VFR BLD AUTO: 35.1 % (ref 35–47)
HCT VFR BLD AUTO: 36.1 % (ref 35–47)
HCT VFR BLD AUTO: 36.8 % (ref 35–47)
HCT VFR BLD AUTO: 37.1 % (ref 35–47)
HGB BLD-MCNC: 10.1 G/DL (ref 11.7–15.7)
HGB BLD-MCNC: 10.2 G/DL (ref 11.7–15.7)
HGB BLD-MCNC: 10.3 G/DL (ref 11.7–15.7)
HGB BLD-MCNC: 10.3 G/DL (ref 11.7–15.7)
HGB BLD-MCNC: 10.4 G/DL (ref 11.7–15.7)
HGB BLD-MCNC: 10.4 G/DL (ref 11.7–15.7)
HGB BLD-MCNC: 10.5 G/DL (ref 11.7–15.7)
HGB BLD-MCNC: 10.7 G/DL (ref 11.7–15.7)
HGB BLD-MCNC: 10.7 G/DL (ref 11.7–15.7)
HGB BLD-MCNC: 10.8 G/DL (ref 11.7–15.7)
HGB BLD-MCNC: 10.9 G/DL (ref 11.7–15.7)
HGB BLD-MCNC: 11.4 G/DL (ref 11.7–15.7)
HGB BLD-MCNC: 11.7 G/DL (ref 11.7–15.7)
HGB BLD-MCNC: 11.8 G/DL (ref 11.7–15.7)
HGB BLD-MCNC: 8.7 G/DL (ref 11.7–15.7)
HGB BLD-MCNC: 9 G/DL (ref 11.7–15.7)
HGB BLD-MCNC: 9.2 G/DL (ref 11.7–15.7)
HGB BLD-MCNC: 9.6 G/DL (ref 11.7–15.7)
HGB BLD-MCNC: 9.7 G/DL (ref 11.7–15.7)
HGB BLD-MCNC: 9.8 G/DL (ref 11.7–15.7)
HGB UR QL STRIP: ABNORMAL
HGB UR QL STRIP: ABNORMAL
HGB UR QL STRIP: NEGATIVE
HOLD SPECIMEN: NORMAL
HYALINE CASTS: 1 /LPF
HYALINE CASTS: 3 /LPF
IC-%PRED-PRE: 54 %
IC-PRE: 1.35 L
IC-PRED: 2.46 L
IMM GRANULOCYTES # BLD: 0.1 10E3/UL
IMM GRANULOCYTES # BLD: 0.2 10E3/UL
IMM GRANULOCYTES # BLD: 0.2 10E3/UL
IMM GRANULOCYTES # BLD: 0.4 10E3/UL
IMM GRANULOCYTES # BLD: 0.5 10E3/UL
IMM GRANULOCYTES NFR BLD: 1 %
IMM GRANULOCYTES NFR BLD: 2 %
IMM GRANULOCYTES NFR BLD: 2 %
IMM GRANULOCYTES NFR BLD: 3 %
IMM GRANULOCYTES NFR BLD: 3 %
IMM GRANULOCYTES NFR BLD: 4 %
INR PPP: 1.62 (ref 0.85–1.15)
INTERPRETATION ECG - MUSE: NORMAL
KETONES UR STRIP-MCNC: NEGATIVE MG/DL
LACTATE SERPL-SCNC: 0.9 MMOL/L (ref 0.7–2)
LACTATE SERPL-SCNC: 1.7 MMOL/L (ref 0.7–2)
LEUKOCYTE ESTERASE UR QL STRIP: ABNORMAL
LEUKOCYTE ESTERASE UR QL STRIP: ABNORMAL
LEUKOCYTE ESTERASE UR QL STRIP: NEGATIVE
LIPASE SERPL-CCNC: 29 U/L (ref 13–60)
LIPASE SERPL-CCNC: 45 U/L (ref 0–52)
LVEF ECHO: NORMAL
LYMPHOCYTES # BLD AUTO: 0.4 10E3/UL (ref 0.8–5.3)
LYMPHOCYTES # BLD AUTO: 0.4 10E3/UL (ref 0.8–5.3)
LYMPHOCYTES # BLD AUTO: 0.6 10E3/UL (ref 0.8–5.3)
LYMPHOCYTES # BLD AUTO: 0.6 10E3/UL (ref 0.8–5.3)
LYMPHOCYTES # BLD AUTO: 0.7 10E3/UL (ref 0.8–5.3)
LYMPHOCYTES # BLD AUTO: 0.7 10E3/UL (ref 0.8–5.3)
LYMPHOCYTES # BLD AUTO: 1.2 10E3/UL (ref 0.8–5.3)
LYMPHOCYTES # BLD AUTO: 1.5 10E3/UL (ref 0.8–5.3)
LYMPHOCYTES NFR BLD AUTO: 24 %
LYMPHOCYTES NFR BLD AUTO: 3 %
LYMPHOCYTES NFR BLD AUTO: 4 %
LYMPHOCYTES NFR BLD AUTO: 5 %
LYMPHOCYTES NFR BLD AUTO: 6 %
LYMPHOCYTES NFR BLD AUTO: 7 %
LYMPHOCYTES NFR BLD AUTO: 9 %
LYMPHOCYTES NFR BLD AUTO: 9 %
MAGNESIUM SERPL-MCNC: 1.4 MG/DL (ref 1.7–2.3)
MAGNESIUM SERPL-MCNC: 1.4 MG/DL (ref 1.7–2.3)
MAGNESIUM SERPL-MCNC: 1.5 MG/DL (ref 1.7–2.3)
MAGNESIUM SERPL-MCNC: 1.5 MG/DL (ref 1.8–2.6)
MAGNESIUM SERPL-MCNC: 1.6 MG/DL (ref 1.7–2.3)
MAGNESIUM SERPL-MCNC: 1.7 MG/DL (ref 1.7–2.3)
MAGNESIUM SERPL-MCNC: 1.8 MG/DL (ref 1.7–2.3)
MAGNESIUM SERPL-MCNC: 1.8 MG/DL (ref 1.8–2.6)
MAGNESIUM SERPL-MCNC: 1.9 MG/DL (ref 1.7–2.3)
MAGNESIUM SERPL-MCNC: 1.9 MG/DL (ref 1.7–2.3)
MAGNESIUM SERPL-MCNC: 1.9 MG/DL (ref 1.8–2.6)
MAGNESIUM SERPL-MCNC: 2 MG/DL (ref 1.7–2.3)
MAGNESIUM SERPL-MCNC: 2.1 MG/DL (ref 1.7–2.3)
MAGNESIUM SERPL-MCNC: 2.3 MG/DL (ref 1.7–2.3)
MAGNESIUM SERPL-MCNC: 2.4 MG/DL (ref 1.7–2.3)
MAGNESIUM SERPL-MCNC: 2.4 MG/DL (ref 1.8–2.6)
MAGNESIUM SERPL-MCNC: 2.7 MG/DL (ref 1.7–2.3)
MAGNESIUM SERPL-MCNC: <0.8 MG/DL (ref 1.8–2.6)
MCH RBC QN AUTO: 32.2 PG (ref 26.5–33)
MCH RBC QN AUTO: 32.4 PG (ref 26.5–33)
MCH RBC QN AUTO: 32.5 PG (ref 26.5–33)
MCH RBC QN AUTO: 32.6 PG (ref 26.5–33)
MCH RBC QN AUTO: 32.6 PG (ref 26.5–33)
MCH RBC QN AUTO: 32.7 PG (ref 26.5–33)
MCH RBC QN AUTO: 32.7 PG (ref 26.5–33)
MCH RBC QN AUTO: 32.8 PG (ref 26.5–33)
MCH RBC QN AUTO: 32.9 PG (ref 26.5–33)
MCH RBC QN AUTO: 33 PG (ref 26.5–33)
MCH RBC QN AUTO: 33.1 PG (ref 26.5–33)
MCH RBC QN AUTO: 33.3 PG (ref 26.5–33)
MCH RBC QN AUTO: 33.4 PG (ref 26.5–33)
MCH RBC QN AUTO: 33.8 PG (ref 26.5–33)
MCH RBC QN AUTO: 33.9 PG (ref 26.5–33)
MCH RBC QN AUTO: 34.4 PG (ref 26.5–33)
MCHC RBC AUTO-ENTMCNC: 30.1 G/DL (ref 31.5–36.5)
MCHC RBC AUTO-ENTMCNC: 30.5 G/DL (ref 31.5–36.5)
MCHC RBC AUTO-ENTMCNC: 30.8 G/DL (ref 31.5–36.5)
MCHC RBC AUTO-ENTMCNC: 30.8 G/DL (ref 31.5–36.5)
MCHC RBC AUTO-ENTMCNC: 31 G/DL (ref 31.5–36.5)
MCHC RBC AUTO-ENTMCNC: 31 G/DL (ref 31.5–36.5)
MCHC RBC AUTO-ENTMCNC: 31.1 G/DL (ref 31.5–36.5)
MCHC RBC AUTO-ENTMCNC: 31.1 G/DL (ref 31.5–36.5)
MCHC RBC AUTO-ENTMCNC: 31.2 G/DL (ref 31.5–36.5)
MCHC RBC AUTO-ENTMCNC: 31.3 G/DL (ref 31.5–36.5)
MCHC RBC AUTO-ENTMCNC: 31.5 G/DL (ref 31.5–36.5)
MCHC RBC AUTO-ENTMCNC: 31.5 G/DL (ref 31.5–36.5)
MCHC RBC AUTO-ENTMCNC: 31.8 G/DL (ref 31.5–36.5)
MCHC RBC AUTO-ENTMCNC: 31.9 G/DL (ref 31.5–36.5)
MCHC RBC AUTO-ENTMCNC: 32 G/DL (ref 31.5–36.5)
MCHC RBC AUTO-ENTMCNC: 32.1 G/DL (ref 31.5–36.5)
MCHC RBC AUTO-ENTMCNC: 32.2 G/DL (ref 31.5–36.5)
MCHC RBC AUTO-ENTMCNC: 32.7 G/DL (ref 31.5–36.5)
MCHC RBC AUTO-ENTMCNC: 32.8 G/DL (ref 31.5–36.5)
MCHC RBC AUTO-ENTMCNC: 33 G/DL (ref 31.5–36.5)
MCHC RBC AUTO-ENTMCNC: 33.2 G/DL (ref 31.5–36.5)
MCHC RBC AUTO-ENTMCNC: 33.6 G/DL (ref 31.5–36.5)
MCV RBC AUTO: 100 FL (ref 78–100)
MCV RBC AUTO: 101 FL (ref 78–100)
MCV RBC AUTO: 102 FL (ref 78–100)
MCV RBC AUTO: 103 FL (ref 78–100)
MCV RBC AUTO: 104 FL (ref 78–100)
MCV RBC AUTO: 105 FL (ref 78–100)
MCV RBC AUTO: 106 FL (ref 78–100)
MCV RBC AUTO: 107 FL (ref 78–100)
MCV RBC AUTO: 108 FL (ref 78–100)
MCV RBC AUTO: 108 FL (ref 78–100)
MONOCYTES # BLD AUTO: 0.5 10E3/UL (ref 0–1.3)
MONOCYTES # BLD AUTO: 0.8 10E3/UL (ref 0–1.3)
MONOCYTES # BLD AUTO: 0.9 10E3/UL (ref 0–1.3)
MONOCYTES # BLD AUTO: 1.2 10E3/UL (ref 0–1.3)
MONOCYTES # BLD AUTO: 1.2 10E3/UL (ref 0–1.3)
MONOCYTES # BLD AUTO: 1.3 10E3/UL (ref 0–1.3)
MONOCYTES # BLD AUTO: 1.5 10E3/UL (ref 0–1.3)
MONOCYTES # BLD AUTO: 1.6 10E3/UL (ref 0–1.3)
MONOCYTES NFR BLD AUTO: 10 %
MONOCYTES NFR BLD AUTO: 11 %
MONOCYTES NFR BLD AUTO: 12 %
MONOCYTES NFR BLD AUTO: 13 %
MONOCYTES NFR BLD AUTO: 18 %
MONOCYTES NFR BLD AUTO: 7 %
MONOCYTES NFR BLD AUTO: 8 %
MONOCYTES NFR BLD AUTO: 9 %
MRSA DNA SPEC QL NAA+PROBE: NEGATIVE
MUCOUS THREADS #/AREA URNS LPF: PRESENT /LPF
MUCOUS THREADS #/AREA URNS LPF: PRESENT /LPF
NEUTROPHILS # BLD AUTO: 10 10E3/UL (ref 1.6–8.3)
NEUTROPHILS # BLD AUTO: 10 10E3/UL (ref 1.6–8.3)
NEUTROPHILS # BLD AUTO: 11.7 10E3/UL (ref 1.6–8.3)
NEUTROPHILS # BLD AUTO: 15 10E3/UL (ref 1.6–8.3)
NEUTROPHILS # BLD AUTO: 3 10E3/UL (ref 1.6–8.3)
NEUTROPHILS # BLD AUTO: 3.1 10E3/UL (ref 1.6–8.3)
NEUTROPHILS # BLD AUTO: 7.2 10E3/UL (ref 1.6–8.3)
NEUTROPHILS # BLD AUTO: 9.4 10E3/UL (ref 1.6–8.3)
NEUTROPHILS NFR BLD AUTO: 46 %
NEUTROPHILS NFR BLD AUTO: 74 %
NEUTROPHILS NFR BLD AUTO: 76 %
NEUTROPHILS NFR BLD AUTO: 79 %
NEUTROPHILS NFR BLD AUTO: 79 %
NEUTROPHILS NFR BLD AUTO: 85 %
NEUTROPHILS NFR BLD AUTO: 85 %
NEUTROPHILS NFR BLD AUTO: 87 %
NITRATE UR QL: NEGATIVE
NOROV GI+II ORF1-ORF2 JNC STL QL NAA+PR: NOT DETECTED
NRBC # BLD AUTO: 0 10E3/UL
NRBC BLD AUTO-RTO: 0 /100
NT-PROBNP SERPL-MCNC: 3087 PG/ML (ref 0–1800)
NT-PROBNP SERPL-MCNC: 3889 PG/ML (ref 0–1800)
NT-PROBNP SERPL-MCNC: 6091 PG/ML (ref 0–1800)
P AXIS - MUSE: 64 DEGREES
P AXIS - MUSE: 69 DEGREES
P AXIS - MUSE: 73 DEGREES
P AXIS - MUSE: NORMAL DEGREES
PH UR STRIP: 5.5 [PH] (ref 5–7)
PH UR STRIP: 6.5 [PH] (ref 5–7)
PH UR STRIP: 6.5 [PH] (ref 5–7)
PLATELET # BLD AUTO: 255 10E3/UL (ref 150–450)
PLATELET # BLD AUTO: 265 10E3/UL (ref 150–450)
PLATELET # BLD AUTO: 282 10E3/UL (ref 150–450)
PLATELET # BLD AUTO: 285 10E3/UL (ref 150–450)
PLATELET # BLD AUTO: 296 10E3/UL (ref 150–450)
PLATELET # BLD AUTO: 298 10E3/UL (ref 150–450)
PLATELET # BLD AUTO: 301 10E3/UL (ref 150–450)
PLATELET # BLD AUTO: 301 10E3/UL (ref 150–450)
PLATELET # BLD AUTO: 306 10E3/UL (ref 150–450)
PLATELET # BLD AUTO: 324 10E3/UL (ref 150–450)
PLATELET # BLD AUTO: 329 10E3/UL (ref 150–450)
PLATELET # BLD AUTO: 331 10E3/UL (ref 150–450)
PLATELET # BLD AUTO: 336 10E3/UL (ref 150–450)
PLATELET # BLD AUTO: 348 10E3/UL (ref 150–450)
PLATELET # BLD AUTO: 367 10E3/UL (ref 150–450)
PLATELET # BLD AUTO: 391 10E3/UL (ref 150–450)
PLATELET # BLD AUTO: 394 10E3/UL (ref 150–450)
PLATELET # BLD AUTO: 402 10E3/UL (ref 150–450)
PLATELET # BLD AUTO: 440 10E3/UL (ref 150–450)
PLATELET # BLD AUTO: 445 10E3/UL (ref 150–450)
PLATELET # BLD AUTO: 448 10E3/UL (ref 150–450)
PLATELET # BLD AUTO: 455 10E3/UL (ref 150–450)
POTASSIUM BLD-SCNC: 2.8 MMOL/L (ref 3.5–5)
POTASSIUM BLD-SCNC: 3.3 MMOL/L (ref 3.5–5)
POTASSIUM BLD-SCNC: 3.3 MMOL/L (ref 3.5–5)
POTASSIUM BLD-SCNC: 3.4 MMOL/L (ref 3.5–5)
POTASSIUM BLD-SCNC: 4 MMOL/L (ref 3.5–5)
POTASSIUM BLD-SCNC: 4.3 MMOL/L (ref 3.5–5)
POTASSIUM BLD-SCNC: 4.4 MMOL/L (ref 3.5–5)
POTASSIUM BLD-SCNC: 4.6 MMOL/L (ref 3.5–5)
POTASSIUM SERPL-SCNC: 3.6 MMOL/L (ref 3.4–5.3)
POTASSIUM SERPL-SCNC: 3.6 MMOL/L (ref 3.4–5.3)
POTASSIUM SERPL-SCNC: 3.8 MMOL/L (ref 3.4–5.3)
POTASSIUM SERPL-SCNC: 3.8 MMOL/L (ref 3.4–5.3)
POTASSIUM SERPL-SCNC: 3.9 MMOL/L (ref 3.4–5.3)
POTASSIUM SERPL-SCNC: 3.9 MMOL/L (ref 3.4–5.3)
POTASSIUM SERPL-SCNC: 4 MMOL/L (ref 3.4–5.3)
POTASSIUM SERPL-SCNC: 4.1 MMOL/L (ref 3.4–5.3)
POTASSIUM SERPL-SCNC: 4.1 MMOL/L (ref 3.4–5.3)
POTASSIUM SERPL-SCNC: 4.2 MMOL/L (ref 3.4–5.3)
POTASSIUM SERPL-SCNC: 4.2 MMOL/L (ref 3.4–5.3)
POTASSIUM SERPL-SCNC: 4.3 MMOL/L (ref 3.4–5.3)
POTASSIUM SERPL-SCNC: 4.4 MMOL/L (ref 3.4–5.3)
POTASSIUM SERPL-SCNC: 4.4 MMOL/L (ref 3.4–5.3)
POTASSIUM SERPL-SCNC: 4.7 MMOL/L (ref 3.4–5.3)
POTASSIUM SERPL-SCNC: 4.8 MMOL/L (ref 3.4–5.3)
PR INTERVAL - MUSE: 170 MS
PR INTERVAL - MUSE: 200 MS
PR INTERVAL - MUSE: 210 MS
PR INTERVAL - MUSE: NORMAL MS
PROCALCITONIN SERPL IA-MCNC: 0.11 NG/ML
PROCALCITONIN SERPL IA-MCNC: 0.39 NG/ML
PROT SERPL-MCNC: 5 G/DL (ref 6–8)
PROT SERPL-MCNC: 5.1 G/DL (ref 6–8)
PROT SERPL-MCNC: 5.4 G/DL (ref 6.4–8.3)
PROT SERPL-MCNC: 5.4 G/DL (ref 6–8)
PROT SERPL-MCNC: 5.7 G/DL (ref 6.4–8.3)
PROT SERPL-MCNC: 5.7 G/DL (ref 6.4–8.3)
PROT SERPL-MCNC: 6 G/DL (ref 6.4–8.3)
PROT SERPL-MCNC: 6.4 G/DL (ref 6.4–8.3)
PTH-INTACT SERPL-MCNC: 111 PG/ML (ref 15–65)
QRS DURATION - MUSE: 76 MS
QRS DURATION - MUSE: 76 MS
QRS DURATION - MUSE: 82 MS
QRS DURATION - MUSE: 88 MS
QT - MUSE: 312 MS
QT - MUSE: 328 MS
QT - MUSE: 340 MS
QT - MUSE: 374 MS
QTC - MUSE: 414 MS
QTC - MUSE: 416 MS
QTC - MUSE: 445 MS
QTC - MUSE: 464 MS
R AXIS - MUSE: 17 DEGREES
R AXIS - MUSE: 46 DEGREES
R AXIS - MUSE: 54 DEGREES
R AXIS - MUSE: 57 DEGREES
RBC # BLD AUTO: 2.7 10E6/UL (ref 3.8–5.2)
RBC # BLD AUTO: 2.75 10E6/UL (ref 3.8–5.2)
RBC # BLD AUTO: 2.82 10E6/UL (ref 3.8–5.2)
RBC # BLD AUTO: 2.84 10E6/UL (ref 3.8–5.2)
RBC # BLD AUTO: 2.85 10E6/UL (ref 3.8–5.2)
RBC # BLD AUTO: 2.94 10E6/UL (ref 3.8–5.2)
RBC # BLD AUTO: 3.03 10E6/UL (ref 3.8–5.2)
RBC # BLD AUTO: 3.05 10E6/UL (ref 3.8–5.2)
RBC # BLD AUTO: 3.09 10E6/UL (ref 3.8–5.2)
RBC # BLD AUTO: 3.11 10E6/UL (ref 3.8–5.2)
RBC # BLD AUTO: 3.12 10E6/UL (ref 3.8–5.2)
RBC # BLD AUTO: 3.14 10E6/UL (ref 3.8–5.2)
RBC # BLD AUTO: 3.16 10E6/UL (ref 3.8–5.2)
RBC # BLD AUTO: 3.19 10E6/UL (ref 3.8–5.2)
RBC # BLD AUTO: 3.21 10E6/UL (ref 3.8–5.2)
RBC # BLD AUTO: 3.21 10E6/UL (ref 3.8–5.2)
RBC # BLD AUTO: 3.26 10E6/UL (ref 3.8–5.2)
RBC # BLD AUTO: 3.3 10E6/UL (ref 3.8–5.2)
RBC # BLD AUTO: 3.35 10E6/UL (ref 3.8–5.2)
RBC # BLD AUTO: 3.46 10E6/UL (ref 3.8–5.2)
RBC # BLD AUTO: 3.53 10E6/UL (ref 3.8–5.2)
RBC # BLD AUTO: 3.61 10E6/UL (ref 3.8–5.2)
RBC URINE: 1 /HPF
RBC URINE: 6 /HPF
RSV RNA SPEC NAA+PROBE: NEGATIVE
RVA NSP5 STL QL NAA+PROBE: NOT DETECTED
RVPLETH-%PRED-PRE: 261 %
RVPLETH-PRE: 5.83 L
RVPLETH-PRED: 2.23 L
SA TARGET DNA: NEGATIVE
SALMONELLA SP RPOD STL QL NAA+PROBE: NOT DETECTED
SARS-COV-2 RNA RESP QL NAA+PROBE: NEGATIVE
SARS-COV-2 RNA RESP QL NAA+PROBE: POSITIVE
SHIGELLA SP+EIEC IPAH STL QL NAA+PROBE: NOT DETECTED
SODIUM SERPL-SCNC: 134 MMOL/L (ref 136–145)
SODIUM SERPL-SCNC: 134 MMOL/L (ref 136–145)
SODIUM SERPL-SCNC: 135 MMOL/L (ref 136–145)
SODIUM SERPL-SCNC: 136 MMOL/L (ref 136–145)
SODIUM SERPL-SCNC: 137 MMOL/L (ref 136–145)
SODIUM SERPL-SCNC: 138 MMOL/L (ref 136–145)
SODIUM SERPL-SCNC: 139 MMOL/L (ref 136–145)
SODIUM SERPL-SCNC: 140 MMOL/L (ref 136–145)
SODIUM SERPL-SCNC: 141 MMOL/L (ref 136–145)
SP GR UR STRIP: 1.01 (ref 1–1.03)
SP GR UR STRIP: 1.02 (ref 1–1.03)
SP GR UR STRIP: 1.02 (ref 1–1.03)
SQUAMOUS EPITHELIAL: 1 /HPF
SQUAMOUS EPITHELIAL: 1 /HPF
SYSTOLIC BLOOD PRESSURE - MUSE: 108 MMHG
SYSTOLIC BLOOD PRESSURE - MUSE: NORMAL MMHG
T AXIS - MUSE: 0 DEGREES
T AXIS - MUSE: 48 DEGREES
T AXIS - MUSE: 5 DEGREES
T AXIS - MUSE: 61 DEGREES
TLCPLETH-%PRED-PRE: 144 %
TLCPLETH-PRE: 7.52 L
TLCPLETH-PRED: 5.19 L
TRANSITIONAL EPI: 1 /HPF
TROPONIN I SERPL-MCNC: 0.04 NG/ML (ref 0–0.29)
TROPONIN T SERPL HS-MCNC: 23 NG/L
TROPONIN T SERPL HS-MCNC: 28 NG/L
TROPONIN T SERPL HS-MCNC: 34 NG/L
TROPONIN T SERPL HS-MCNC: 35 NG/L
TROPONIN T SERPL HS-MCNC: 35 NG/L
TROPONIN T SERPL HS-MCNC: 40 NG/L
TROPONIN T SERPL HS-MCNC: 47 NG/L
TSH SERPL DL<=0.005 MIU/L-ACNC: 3.12 UIU/ML (ref 0.3–4.2)
URATE SERPL-MCNC: 10.5 MG/DL (ref 2.4–5.7)
URATE SERPL-MCNC: 4.7 MG/DL (ref 2.4–5.7)
UROBILINOGEN UR STRIP-MCNC: <2 MG/DL
UROBILINOGEN UR STRIP-MCNC: <2 MG/DL
UROBILINOGEN UR STRIP-MCNC: NORMAL MG/DL
V CHOL+PARA RFBL+TRKH+TNAA STL QL NAA+PR: NOT DETECTED
VA-%PRED-PRE: 87 %
VA-PRE: 4.34 L
VC-%PRED-PRE: 53 %
VC-PRE: 1.69 L
VC-PRED: 3.13 L
VENTRICULAR RATE- MUSE: 106 BPM
VENTRICULAR RATE- MUSE: 112 BPM
VENTRICULAR RATE- MUSE: 85 BPM
VENTRICULAR RATE- MUSE: 97 BPM
VIT B12 SERPL-MCNC: 421 PG/ML (ref 232–1245)
WBC # BLD AUTO: 10.1 10E3/UL (ref 4–11)
WBC # BLD AUTO: 10.6 10E3/UL (ref 4–11)
WBC # BLD AUTO: 10.7 10E3/UL (ref 4–11)
WBC # BLD AUTO: 11 10E3/UL (ref 4–11)
WBC # BLD AUTO: 11 10E3/UL (ref 4–11)
WBC # BLD AUTO: 11.3 10E3/UL (ref 4–11)
WBC # BLD AUTO: 12.2 10E3/UL (ref 4–11)
WBC # BLD AUTO: 13.3 10E3/UL (ref 4–11)
WBC # BLD AUTO: 13.9 10E3/UL (ref 4–11)
WBC # BLD AUTO: 17.2 10E3/UL (ref 4–11)
WBC # BLD AUTO: 23.9 10E3/UL (ref 4–11)
WBC # BLD AUTO: 4.1 10E3/UL (ref 4–11)
WBC # BLD AUTO: 4.8 10E3/UL (ref 4–11)
WBC # BLD AUTO: 5.9 10E3/UL (ref 4–11)
WBC # BLD AUTO: 6.4 10E3/UL (ref 4–11)
WBC # BLD AUTO: 6.4 10E3/UL (ref 4–11)
WBC # BLD AUTO: 7.5 10E3/UL (ref 4–11)
WBC # BLD AUTO: 7.8 10E3/UL (ref 4–11)
WBC # BLD AUTO: 8.1 10E3/UL (ref 4–11)
WBC # BLD AUTO: 8.7 10E3/UL (ref 4–11)
WBC # BLD AUTO: 8.9 10E3/UL (ref 4–11)
WBC # BLD AUTO: 9.4 10E3/UL (ref 4–11)
WBC # FLD AUTO: ABNORMAL 10*3/UL
WBC # FLD AUTO: ABNORMAL 10*3/UL
WBC CLUMPS #/AREA URNS HPF: PRESENT /HPF
WBC URINE: 1 /HPF
WBC URINE: >182 /HPF
Y ENTERO RECN STL QL NAA+PROBE: NOT DETECTED

## 2022-01-01 PROCEDURE — 85027 COMPLETE CBC AUTOMATED: CPT | Performed by: FAMILY MEDICINE

## 2022-01-01 PROCEDURE — 210N000001 HC R&B IMCU HEART CARE

## 2022-01-01 PROCEDURE — 999N000032 HC STATISTIC CHRONIC DISEASE SPECIALIST RT CONSULT

## 2022-01-01 PROCEDURE — 83735 ASSAY OF MAGNESIUM: CPT | Performed by: FAMILY MEDICINE

## 2022-01-01 PROCEDURE — 250N000013 HC RX MED GY IP 250 OP 250 PS 637: Performed by: HOSPITALIST

## 2022-01-01 PROCEDURE — 99233 SBSQ HOSP IP/OBS HIGH 50: CPT | Performed by: INTERNAL MEDICINE

## 2022-01-01 PROCEDURE — 99222 1ST HOSP IP/OBS MODERATE 55: CPT | Performed by: INTERNAL MEDICINE

## 2022-01-01 PROCEDURE — 80048 BASIC METABOLIC PNL TOTAL CA: CPT | Performed by: INTERNAL MEDICINE

## 2022-01-01 PROCEDURE — 120N000001 HC R&B MED SURG/OB

## 2022-01-01 PROCEDURE — 250N000013 HC RX MED GY IP 250 OP 250 PS 637: Performed by: FAMILY MEDICINE

## 2022-01-01 PROCEDURE — 83735 ASSAY OF MAGNESIUM: CPT | Performed by: HOSPITALIST

## 2022-01-01 PROCEDURE — 250N000009 HC RX 250: Performed by: FAMILY MEDICINE

## 2022-01-01 PROCEDURE — 84550 ASSAY OF BLOOD/URIC ACID: CPT | Performed by: PHYSICIAN ASSISTANT

## 2022-01-01 PROCEDURE — 99310 SBSQ NF CARE HIGH MDM 45: CPT | Performed by: PHYSICIAN ASSISTANT

## 2022-01-01 PROCEDURE — 97110 THERAPEUTIC EXERCISES: CPT | Mod: GP

## 2022-01-01 PROCEDURE — 85379 FIBRIN DEGRADATION QUANT: CPT | Performed by: FAMILY MEDICINE

## 2022-01-01 PROCEDURE — 250N000013 HC RX MED GY IP 250 OP 250 PS 637: Performed by: INTERNAL MEDICINE

## 2022-01-01 PROCEDURE — 250N000011 HC RX IP 250 OP 636: Performed by: FAMILY MEDICINE

## 2022-01-01 PROCEDURE — 36415 COLL VENOUS BLD VENIPUNCTURE: CPT | Performed by: EMERGENCY MEDICINE

## 2022-01-01 PROCEDURE — 250N000011 HC RX IP 250 OP 636: Performed by: INTERNAL MEDICINE

## 2022-01-01 PROCEDURE — 83735 ASSAY OF MAGNESIUM: CPT | Performed by: INTERNAL MEDICINE

## 2022-01-01 PROCEDURE — 258N000003 HC RX IP 258 OP 636

## 2022-01-01 PROCEDURE — 250N000011 HC RX IP 250 OP 636: Performed by: EMERGENCY MEDICINE

## 2022-01-01 PROCEDURE — 99223 1ST HOSP IP/OBS HIGH 75: CPT | Performed by: HOSPITALIST

## 2022-01-01 PROCEDURE — 82248 BILIRUBIN DIRECT: CPT | Performed by: PHYSICIAN ASSISTANT

## 2022-01-01 PROCEDURE — 85027 COMPLETE CBC AUTOMATED: CPT

## 2022-01-01 PROCEDURE — 99233 SBSQ HOSP IP/OBS HIGH 50: CPT | Performed by: FAMILY MEDICINE

## 2022-01-01 PROCEDURE — 999N000157 HC STATISTIC RCP TIME EA 10 MIN

## 2022-01-01 PROCEDURE — 250N000012 HC RX MED GY IP 250 OP 636 PS 637: Performed by: FAMILY MEDICINE

## 2022-01-01 PROCEDURE — 78816 PET IMAGE W/CT FULL BODY: CPT | Mod: PI

## 2022-01-01 PROCEDURE — 84460 ALANINE AMINO (ALT) (SGPT): CPT

## 2022-01-01 PROCEDURE — 97535 SELF CARE MNGMENT TRAINING: CPT | Mod: GO

## 2022-01-01 PROCEDURE — 80053 COMPREHEN METABOLIC PANEL: CPT | Performed by: FAMILY MEDICINE

## 2022-01-01 PROCEDURE — 97116 GAIT TRAINING THERAPY: CPT | Mod: GP

## 2022-01-01 PROCEDURE — 94640 AIRWAY INHALATION TREATMENT: CPT

## 2022-01-01 PROCEDURE — 258N000003 HC RX IP 258 OP 636: Performed by: EMERGENCY MEDICINE

## 2022-01-01 PROCEDURE — 36415 COLL VENOUS BLD VENIPUNCTURE: CPT

## 2022-01-01 PROCEDURE — 71046 X-RAY EXAM CHEST 2 VIEWS: CPT

## 2022-01-01 PROCEDURE — 85025 COMPLETE CBC W/AUTO DIFF WBC: CPT | Performed by: PHYSICIAN ASSISTANT

## 2022-01-01 PROCEDURE — 83880 ASSAY OF NATRIURETIC PEPTIDE: CPT | Performed by: EMERGENCY MEDICINE

## 2022-01-01 PROCEDURE — 82248 BILIRUBIN DIRECT: CPT | Performed by: EMERGENCY MEDICINE

## 2022-01-01 PROCEDURE — 85004 AUTOMATED DIFF WBC COUNT: CPT | Performed by: HOSPITALIST

## 2022-01-01 PROCEDURE — 36415 COLL VENOUS BLD VENIPUNCTURE: CPT | Performed by: FAMILY MEDICINE

## 2022-01-01 PROCEDURE — P9604 ONE-WAY ALLOW PRORATED TRIP: HCPCS | Mod: ORL | Performed by: FAMILY MEDICINE

## 2022-01-01 PROCEDURE — 99232 SBSQ HOSP IP/OBS MODERATE 35: CPT | Performed by: HOSPITALIST

## 2022-01-01 PROCEDURE — 97161 PT EVAL LOW COMPLEX 20 MIN: CPT | Mod: GP

## 2022-01-01 PROCEDURE — 36415 COLL VENOUS BLD VENIPUNCTURE: CPT | Performed by: INTERNAL MEDICINE

## 2022-01-01 PROCEDURE — 82374 ASSAY BLOOD CARBON DIOXIDE: CPT | Performed by: EMERGENCY MEDICINE

## 2022-01-01 PROCEDURE — 36415 COLL VENOUS BLD VENIPUNCTURE: CPT | Performed by: PHYSICIAN ASSISTANT

## 2022-01-01 PROCEDURE — 84550 ASSAY OF BLOOD/URIC ACID: CPT | Performed by: FAMILY MEDICINE

## 2022-01-01 PROCEDURE — 250N000009 HC RX 250: Performed by: EMERGENCY MEDICINE

## 2022-01-01 PROCEDURE — P9604 ONE-WAY ALLOW PRORATED TRIP: HCPCS | Performed by: FAMILY MEDICINE

## 2022-01-01 PROCEDURE — 99239 HOSP IP/OBS DSCHRG MGMT >30: CPT | Performed by: INTERNAL MEDICINE

## 2022-01-01 PROCEDURE — 99605 MTMS BY PHARM NP 15 MIN: CPT | Performed by: PHARMACIST

## 2022-01-01 PROCEDURE — 87637 SARSCOV2&INF A&B&RSV AMP PRB: CPT | Performed by: EMERGENCY MEDICINE

## 2022-01-01 PROCEDURE — 99223 1ST HOSP IP/OBS HIGH 75: CPT | Performed by: FAMILY MEDICINE

## 2022-01-01 PROCEDURE — 93005 ELECTROCARDIOGRAM TRACING: CPT

## 2022-01-01 PROCEDURE — 255N000002 HC RX 255 OP 636: Performed by: FAMILY MEDICINE

## 2022-01-01 PROCEDURE — 85379 FIBRIN DEGRADATION QUANT: CPT | Performed by: EMERGENCY MEDICINE

## 2022-01-01 PROCEDURE — 87641 MR-STAPH DNA AMP PROBE: CPT | Performed by: FAMILY MEDICINE

## 2022-01-01 PROCEDURE — 250N000012 HC RX MED GY IP 250 OP 636 PS 637: Performed by: HOSPITALIST

## 2022-01-01 PROCEDURE — 86140 C-REACTIVE PROTEIN: CPT | Performed by: FAMILY MEDICINE

## 2022-01-01 PROCEDURE — 36415 COLL VENOUS BLD VENIPUNCTURE: CPT | Performed by: HOSPITALIST

## 2022-01-01 PROCEDURE — 84132 ASSAY OF SERUM POTASSIUM: CPT | Performed by: FAMILY MEDICINE

## 2022-01-01 PROCEDURE — 250N000012 HC RX MED GY IP 250 OP 636 PS 637: Performed by: INTERNAL MEDICINE

## 2022-01-01 PROCEDURE — 96365 THER/PROPH/DIAG IV INF INIT: CPT

## 2022-01-01 PROCEDURE — C9803 HOPD COVID-19 SPEC COLLECT: HCPCS

## 2022-01-01 PROCEDURE — 71045 X-RAY EXAM CHEST 1 VIEW: CPT

## 2022-01-01 PROCEDURE — 93005 ELECTROCARDIOGRAM TRACING: CPT | Performed by: FAMILY MEDICINE

## 2022-01-01 PROCEDURE — 82962 GLUCOSE BLOOD TEST: CPT

## 2022-01-01 PROCEDURE — 97166 OT EVAL MOD COMPLEX 45 MIN: CPT | Mod: GO

## 2022-01-01 PROCEDURE — 250N000011 HC RX IP 250 OP 636: Performed by: HOSPITALIST

## 2022-01-01 PROCEDURE — 85025 COMPLETE CBC W/AUTO DIFF WBC: CPT | Performed by: EMERGENCY MEDICINE

## 2022-01-01 PROCEDURE — 89060 EXAM SYNOVIAL FLUID CRYSTALS: CPT | Performed by: INTERNAL MEDICINE

## 2022-01-01 PROCEDURE — 85014 HEMATOCRIT: CPT | Performed by: FAMILY MEDICINE

## 2022-01-01 PROCEDURE — 84484 ASSAY OF TROPONIN QUANT: CPT | Performed by: EMERGENCY MEDICINE

## 2022-01-01 PROCEDURE — 999N000248 HC STATISTIC IV INSERT WITH US BY RN

## 2022-01-01 PROCEDURE — 85610 PROTHROMBIN TIME: CPT | Performed by: FAMILY MEDICINE

## 2022-01-01 PROCEDURE — 84443 ASSAY THYROID STIM HORMONE: CPT | Performed by: FAMILY MEDICINE

## 2022-01-01 PROCEDURE — 96361 HYDRATE IV INFUSION ADD-ON: CPT

## 2022-01-01 PROCEDURE — 82040 ASSAY OF SERUM ALBUMIN: CPT

## 2022-01-01 PROCEDURE — 99310 SBSQ NF CARE HIGH MDM 45: CPT | Mod: CS | Performed by: PHYSICIAN ASSISTANT

## 2022-01-01 PROCEDURE — 97110 THERAPEUTIC EXERCISES: CPT | Mod: GO

## 2022-01-01 PROCEDURE — 80048 BASIC METABOLIC PNL TOTAL CA: CPT | Performed by: FAMILY MEDICINE

## 2022-01-01 PROCEDURE — 84145 PROCALCITONIN (PCT): CPT | Performed by: EMERGENCY MEDICINE

## 2022-01-01 PROCEDURE — 99239 HOSP IP/OBS DSCHRG MGMT >30: CPT | Performed by: HOSPITALIST

## 2022-01-01 PROCEDURE — 85027 COMPLETE CBC AUTOMATED: CPT | Performed by: INTERNAL MEDICINE

## 2022-01-01 PROCEDURE — 99232 SBSQ HOSP IP/OBS MODERATE 35: CPT | Performed by: FAMILY MEDICINE

## 2022-01-01 PROCEDURE — 82607 VITAMIN B-12: CPT | Performed by: FAMILY MEDICINE

## 2022-01-01 PROCEDURE — 74177 CT ABD & PELVIS W/CONTRAST: CPT

## 2022-01-01 PROCEDURE — 20606 DRAIN/INJ JOINT/BURSA W/US: CPT | Mod: RT | Performed by: INTERNAL MEDICINE

## 2022-01-01 PROCEDURE — C8924 2D TTE W OR W/O FOL W/CON,FU: HCPCS

## 2022-01-01 PROCEDURE — 120N000004 HC R&B MS OVERFLOW

## 2022-01-01 PROCEDURE — 82310 ASSAY OF CALCIUM: CPT | Performed by: INTERNAL MEDICINE

## 2022-01-01 PROCEDURE — 87506 IADNA-DNA/RNA PROBE TQ 6-11: CPT | Performed by: FAMILY MEDICINE

## 2022-01-01 PROCEDURE — 99285 EMERGENCY DEPT VISIT HI MDM: CPT | Mod: 25

## 2022-01-01 PROCEDURE — 93010 ELECTROCARDIOGRAM REPORT: CPT | Performed by: INTERNAL MEDICINE

## 2022-01-01 PROCEDURE — 94640 AIRWAY INHALATION TREATMENT: CPT | Mod: 76

## 2022-01-01 PROCEDURE — 86140 C-REACTIVE PROTEIN: CPT | Performed by: EMERGENCY MEDICINE

## 2022-01-01 PROCEDURE — 94060 EVALUATION OF WHEEZING: CPT

## 2022-01-01 PROCEDURE — 84132 ASSAY OF SERUM POTASSIUM: CPT | Performed by: INTERNAL MEDICINE

## 2022-01-01 PROCEDURE — 81001 URINALYSIS AUTO W/SCOPE: CPT | Performed by: EMERGENCY MEDICINE

## 2022-01-01 PROCEDURE — 83735 ASSAY OF MAGNESIUM: CPT | Mod: ORL | Performed by: FAMILY MEDICINE

## 2022-01-01 PROCEDURE — 82310 ASSAY OF CALCIUM: CPT | Performed by: FAMILY MEDICINE

## 2022-01-01 PROCEDURE — 258N000003 HC RX IP 258 OP 636: Performed by: FAMILY MEDICINE

## 2022-01-01 PROCEDURE — 272N000452 HC KIT SHRLOCK 5FR POWER PICC TRIPLE LUMEN

## 2022-01-01 PROCEDURE — 80048 BASIC METABOLIC PNL TOTAL CA: CPT | Mod: ORL | Performed by: FAMILY MEDICINE

## 2022-01-01 PROCEDURE — 85004 AUTOMATED DIFF WBC COUNT: CPT | Performed by: EMERGENCY MEDICINE

## 2022-01-01 PROCEDURE — 85048 AUTOMATED LEUKOCYTE COUNT: CPT | Performed by: FAMILY MEDICINE

## 2022-01-01 PROCEDURE — 93321 DOPPLER ECHO F-UP/LMTD STD: CPT | Mod: 26 | Performed by: INTERNAL MEDICINE

## 2022-01-01 PROCEDURE — 82248 BILIRUBIN DIRECT: CPT | Performed by: FAMILY MEDICINE

## 2022-01-01 PROCEDURE — 99283 EMERGENCY DEPT VISIT LOW MDM: CPT

## 2022-01-01 PROCEDURE — XW043E5 INTRODUCTION OF REMDESIVIR ANTI-INFECTIVE INTO CENTRAL VEIN, PERCUTANEOUS APPROACH, NEW TECHNOLOGY GROUP 5: ICD-10-PCS | Performed by: FAMILY MEDICINE

## 2022-01-01 PROCEDURE — 80053 COMPREHEN METABOLIC PANEL: CPT | Performed by: PHYSICIAN ASSISTANT

## 2022-01-01 PROCEDURE — 36415 COLL VENOUS BLD VENIPUNCTURE: CPT | Mod: ORL | Performed by: FAMILY MEDICINE

## 2022-01-01 PROCEDURE — 250N000009 HC RX 250: Performed by: INTERNAL MEDICINE

## 2022-01-01 PROCEDURE — 99232 SBSQ HOSP IP/OBS MODERATE 35: CPT | Performed by: INTERNAL MEDICINE

## 2022-01-01 PROCEDURE — 250N000013 HC RX MED GY IP 250 OP 250 PS 637

## 2022-01-01 PROCEDURE — 87324 CLOSTRIDIUM AG IA: CPT | Performed by: FAMILY MEDICINE

## 2022-01-01 PROCEDURE — 83735 ASSAY OF MAGNESIUM: CPT | Performed by: EMERGENCY MEDICINE

## 2022-01-01 PROCEDURE — 93308 TTE F-UP OR LMTD: CPT | Mod: 26 | Performed by: INTERNAL MEDICINE

## 2022-01-01 PROCEDURE — 84132 ASSAY OF SERUM POTASSIUM: CPT | Performed by: HOSPITALIST

## 2022-01-01 PROCEDURE — 82306 VITAMIN D 25 HYDROXY: CPT | Performed by: FAMILY MEDICINE

## 2022-01-01 PROCEDURE — 999N000127 HC STATISTIC PERIPHERAL IV START W US GUIDANCE

## 2022-01-01 PROCEDURE — 93005 ELECTROCARDIOGRAM TRACING: CPT | Performed by: EMERGENCY MEDICINE

## 2022-01-01 PROCEDURE — U0003 INFECTIOUS AGENT DETECTION BY NUCLEIC ACID (DNA OR RNA); SEVERE ACUTE RESPIRATORY SYNDROME CORONAVIRUS 2 (SARS-COV-2) (CORONAVIRUS DISEASE [COVID-19]), AMPLIFIED PROBE TECHNIQUE, MAKING USE OF HIGH THROUGHPUT TECHNOLOGIES AS DESCRIBED BY CMS-2020-01-R: HCPCS | Performed by: FAMILY MEDICINE

## 2022-01-01 PROCEDURE — 999N000033 HC STATISTIC CHRONIC PULMONARY DISEASE SPECIALIST

## 2022-01-01 PROCEDURE — 96360 HYDRATION IV INFUSION INIT: CPT

## 2022-01-01 PROCEDURE — 97165 OT EVAL LOW COMPLEX 30 MIN: CPT | Mod: GO

## 2022-01-01 PROCEDURE — 93325 DOPPLER ECHO COLOR FLOW MAPG: CPT | Mod: 26 | Performed by: INTERNAL MEDICINE

## 2022-01-01 PROCEDURE — 36569 INSJ PICC 5 YR+ W/O IMAGING: CPT

## 2022-01-01 PROCEDURE — 82248 BILIRUBIN DIRECT: CPT | Performed by: HOSPITALIST

## 2022-01-01 PROCEDURE — 87040 BLOOD CULTURE FOR BACTERIA: CPT | Performed by: EMERGENCY MEDICINE

## 2022-01-01 PROCEDURE — 85025 COMPLETE CBC W/AUTO DIFF WBC: CPT | Performed by: FAMILY MEDICINE

## 2022-01-01 PROCEDURE — 999N000065 XR CHEST PORT 1 VIEW

## 2022-01-01 PROCEDURE — 70450 CT HEAD/BRAIN W/O DYE: CPT

## 2022-01-01 PROCEDURE — 82310 ASSAY OF CALCIUM: CPT | Performed by: EMERGENCY MEDICINE

## 2022-01-01 PROCEDURE — 99214 OFFICE O/P EST MOD 30 MIN: CPT | Performed by: INTERNAL MEDICINE

## 2022-01-01 PROCEDURE — 999N000208 ECHOCARDIOGRAM COMPLETE

## 2022-01-01 PROCEDURE — 94726 PLETHYSMOGRAPHY LUNG VOLUMES: CPT | Mod: 26 | Performed by: INTERNAL MEDICINE

## 2022-01-01 PROCEDURE — 85027 COMPLETE CBC AUTOMATED: CPT | Performed by: EMERGENCY MEDICINE

## 2022-01-01 PROCEDURE — 80048 BASIC METABOLIC PNL TOTAL CA: CPT | Performed by: EMERGENCY MEDICINE

## 2022-01-01 PROCEDURE — 85025 COMPLETE CBC W/AUTO DIFF WBC: CPT | Mod: ORL | Performed by: FAMILY MEDICINE

## 2022-01-01 PROCEDURE — 85027 COMPLETE CBC AUTOMATED: CPT | Mod: ORL | Performed by: FAMILY MEDICINE

## 2022-01-01 PROCEDURE — 84484 ASSAY OF TROPONIN QUANT: CPT | Performed by: FAMILY MEDICINE

## 2022-01-01 PROCEDURE — 94729 DIFFUSING CAPACITY: CPT | Mod: 26 | Performed by: INTERNAL MEDICINE

## 2022-01-01 PROCEDURE — 83970 ASSAY OF PARATHORMONE: CPT | Performed by: FAMILY MEDICINE

## 2022-01-01 PROCEDURE — P9603 ONE-WAY ALLOW PRORATED MILES: HCPCS | Mod: ORL | Performed by: FAMILY MEDICINE

## 2022-01-01 PROCEDURE — 84484 ASSAY OF TROPONIN QUANT: CPT

## 2022-01-01 PROCEDURE — 99284 EMERGENCY DEPT VISIT MOD MDM: CPT | Mod: 25

## 2022-01-01 PROCEDURE — 99233 SBSQ HOSP IP/OBS HIGH 50: CPT | Performed by: HOSPITALIST

## 2022-01-01 PROCEDURE — A9552 F18 FDG: HCPCS | Performed by: INTERNAL MEDICINE

## 2022-01-01 PROCEDURE — 82565 ASSAY OF CREATININE: CPT

## 2022-01-01 PROCEDURE — 99214 OFFICE O/P EST MOD 30 MIN: CPT | Mod: 25 | Performed by: INTERNAL MEDICINE

## 2022-01-01 PROCEDURE — 93306 TTE W/DOPPLER COMPLETE: CPT | Mod: 26 | Performed by: INTERNAL MEDICINE

## 2022-01-01 PROCEDURE — 250N000013 HC RX MED GY IP 250 OP 250 PS 637: Performed by: EMERGENCY MEDICINE

## 2022-01-01 PROCEDURE — 96375 TX/PRO/DX INJ NEW DRUG ADDON: CPT

## 2022-01-01 PROCEDURE — 80053 COMPREHEN METABOLIC PANEL: CPT

## 2022-01-01 PROCEDURE — 96376 TX/PRO/DX INJ SAME DRUG ADON: CPT

## 2022-01-01 PROCEDURE — 93880 EXTRACRANIAL BILAT STUDY: CPT

## 2022-01-01 PROCEDURE — 99607 MTMS BY PHARM ADDL 15 MIN: CPT | Performed by: PHARMACIST

## 2022-01-01 PROCEDURE — 94726 PLETHYSMOGRAPHY LUNG VOLUMES: CPT

## 2022-01-01 PROCEDURE — 73630 X-RAY EXAM OF FOOT: CPT | Mod: 50,FY

## 2022-01-01 PROCEDURE — 82746 ASSAY OF FOLIC ACID SERUM: CPT | Performed by: FAMILY MEDICINE

## 2022-01-01 PROCEDURE — 83605 ASSAY OF LACTIC ACID: CPT | Performed by: EMERGENCY MEDICINE

## 2022-01-01 PROCEDURE — 87637 SARSCOV2&INF A&B&RSV AMP PRB: CPT

## 2022-01-01 PROCEDURE — 94060 EVALUATION OF WHEEZING: CPT | Mod: 26 | Performed by: INTERNAL MEDICINE

## 2022-01-01 PROCEDURE — 87804 INFLUENZA ASSAY W/OPTIC: CPT

## 2022-01-01 PROCEDURE — 87086 URINE CULTURE/COLONY COUNT: CPT | Performed by: FAMILY MEDICINE

## 2022-01-01 PROCEDURE — 87493 C DIFF AMPLIFIED PROBE: CPT | Performed by: FAMILY MEDICINE

## 2022-01-01 PROCEDURE — 95912 NRV CNDJ TEST 11-12 STUDIES: CPT | Performed by: PSYCHIATRY & NEUROLOGY

## 2022-01-01 PROCEDURE — 83690 ASSAY OF LIPASE: CPT | Performed by: EMERGENCY MEDICINE

## 2022-01-01 PROCEDURE — P9604 ONE-WAY ALLOW PRORATED TRIP: HCPCS | Performed by: PHYSICIAN ASSISTANT

## 2022-01-01 PROCEDURE — 89050 BODY FLUID CELL COUNT: CPT | Performed by: INTERNAL MEDICINE

## 2022-01-01 PROCEDURE — 96367 TX/PROPH/DG ADDL SEQ IV INF: CPT

## 2022-01-01 PROCEDURE — 83605 ASSAY OF LACTIC ACID: CPT | Performed by: FAMILY MEDICINE

## 2022-01-01 PROCEDURE — 81003 URINALYSIS AUTO W/O SCOPE: CPT | Performed by: FAMILY MEDICINE

## 2022-01-01 PROCEDURE — 81001 URINALYSIS AUTO W/SCOPE: CPT | Performed by: FAMILY MEDICINE

## 2022-01-01 PROCEDURE — 95886 MUSC TEST DONE W/N TEST COMP: CPT | Mod: RT | Performed by: PSYCHIATRY & NEUROLOGY

## 2022-01-01 PROCEDURE — 84484 ASSAY OF TROPONIN QUANT: CPT | Performed by: HOSPITALIST

## 2022-01-01 PROCEDURE — 83690 ASSAY OF LIPASE: CPT | Performed by: FAMILY MEDICINE

## 2022-01-01 PROCEDURE — 343N000001 HC RX 343: Performed by: INTERNAL MEDICINE

## 2022-01-01 PROCEDURE — 85025 COMPLETE CBC W/AUTO DIFF WBC: CPT

## 2022-01-01 PROCEDURE — 71250 CT THORAX DX C-: CPT

## 2022-01-01 PROCEDURE — 94729 DIFFUSING CAPACITY: CPT

## 2022-01-01 PROCEDURE — 84145 PROCALCITONIN (PCT): CPT | Performed by: FAMILY MEDICINE

## 2022-01-01 RX ORDER — CEPHALEXIN 500 MG/1
500 CAPSULE ORAL 4 TIMES DAILY
Qty: 28 CAPSULE | Refills: 0 | Status: SHIPPED | OUTPATIENT
Start: 2022-01-01 | End: 2022-01-01

## 2022-01-01 RX ORDER — FOLIC ACID 1 MG/1
1 TABLET ORAL DAILY
Qty: 90 TABLET | Refills: 0
Start: 2022-01-01 | End: 2023-01-01

## 2022-01-01 RX ORDER — VANCOMYCIN HYDROCHLORIDE 125 MG/1
125 CAPSULE ORAL 4 TIMES DAILY
Status: DISCONTINUED | OUTPATIENT
Start: 2022-01-01 | End: 2022-01-01 | Stop reason: HOSPADM

## 2022-01-01 RX ORDER — MAGNESIUM SULFATE HEPTAHYDRATE 40 MG/ML
2 INJECTION, SOLUTION INTRAVENOUS ONCE
Status: COMPLETED | OUTPATIENT
Start: 2022-01-01 | End: 2022-01-01

## 2022-01-01 RX ORDER — FUROSEMIDE 10 MG/ML
40 INJECTION INTRAMUSCULAR; INTRAVENOUS EVERY 8 HOURS
Status: DISCONTINUED | OUTPATIENT
Start: 2022-01-01 | End: 2022-01-01

## 2022-01-01 RX ORDER — LEVOFLOXACIN 500 MG/1
500 TABLET, FILM COATED ORAL DAILY
Status: ON HOLD | COMMUNITY
End: 2022-01-01

## 2022-01-01 RX ORDER — ACETAMINOPHEN 325 MG/1
650 TABLET ORAL EVERY 4 HOURS PRN
Status: DISCONTINUED | OUTPATIENT
Start: 2022-01-01 | End: 2023-01-01 | Stop reason: HOSPADM

## 2022-01-01 RX ORDER — ALLOPURINOL 300 MG/1
300 TABLET ORAL DAILY
Qty: 60 TABLET | Refills: 0 | Status: SHIPPED | OUTPATIENT
Start: 2022-01-01

## 2022-01-01 RX ORDER — ALBUTEROL SULFATE 90 UG/1
2 AEROSOL, METERED RESPIRATORY (INHALATION) EVERY 4 HOURS PRN
Status: DISCONTINUED | OUTPATIENT
Start: 2022-01-01 | End: 2022-01-01 | Stop reason: HOSPADM

## 2022-01-01 RX ORDER — MAGNESIUM OXIDE 400 MG/1
400 TABLET ORAL EVERY 4 HOURS
Status: COMPLETED | OUTPATIENT
Start: 2022-01-01 | End: 2022-01-01

## 2022-01-01 RX ORDER — FUROSEMIDE 10 MG/ML
10 INJECTION INTRAMUSCULAR; INTRAVENOUS ONCE
Status: COMPLETED | OUTPATIENT
Start: 2022-01-01 | End: 2022-01-01

## 2022-01-01 RX ORDER — ONDANSETRON 4 MG/1
4 TABLET, ORALLY DISINTEGRATING ORAL EVERY 6 HOURS PRN
Status: DISCONTINUED | OUTPATIENT
Start: 2022-01-01 | End: 2022-01-01 | Stop reason: HOSPADM

## 2022-01-01 RX ORDER — DEXAMETHASONE 2 MG/1
6 TABLET ORAL DAILY
Status: DISCONTINUED | OUTPATIENT
Start: 2022-01-01 | End: 2022-01-01 | Stop reason: HOSPADM

## 2022-01-01 RX ORDER — PIPERACILLIN SODIUM, TAZOBACTAM SODIUM 3; .375 G/15ML; G/15ML
3.38 INJECTION, POWDER, LYOPHILIZED, FOR SOLUTION INTRAVENOUS EVERY 8 HOURS
Status: COMPLETED | OUTPATIENT
Start: 2022-01-01 | End: 2022-01-01

## 2022-01-01 RX ORDER — PRAVASTATIN SODIUM 20 MG
20 TABLET ORAL EVERY EVENING
Status: DISCONTINUED | OUTPATIENT
Start: 2022-01-01 | End: 2022-01-01 | Stop reason: HOSPADM

## 2022-01-01 RX ORDER — BUDESONIDE AND FORMOTEROL FUMARATE DIHYDRATE 160; 4.5 UG/1; UG/1
2 AEROSOL RESPIRATORY (INHALATION) 2 TIMES DAILY
Status: ON HOLD | COMMUNITY
End: 2023-01-01

## 2022-01-01 RX ORDER — PREDNISONE 5 MG/1
5 TABLET ORAL DAILY
Status: DISCONTINUED | OUTPATIENT
Start: 2022-01-01 | End: 2022-01-01 | Stop reason: HOSPADM

## 2022-01-01 RX ORDER — ONDANSETRON 4 MG/1
4 TABLET, ORALLY DISINTEGRATING ORAL EVERY 6 HOURS PRN
Status: DISCONTINUED | OUTPATIENT
Start: 2022-01-01 | End: 2023-01-01 | Stop reason: HOSPADM

## 2022-01-01 RX ORDER — FLUTICASONE FUROATE AND VILANTEROL 200; 25 UG/1; UG/1
1 POWDER RESPIRATORY (INHALATION) DAILY
Status: DISCONTINUED | OUTPATIENT
Start: 2022-01-01 | End: 2022-01-01 | Stop reason: HOSPADM

## 2022-01-01 RX ORDER — MAGNESIUM SULFATE 4 G/50ML
4 INJECTION INTRAVENOUS ONCE
Status: COMPLETED | OUTPATIENT
Start: 2022-01-01 | End: 2022-01-01

## 2022-01-01 RX ORDER — FUROSEMIDE 10 MG/ML
40 INJECTION INTRAMUSCULAR; INTRAVENOUS ONCE
Status: COMPLETED | OUTPATIENT
Start: 2022-01-01 | End: 2022-01-01

## 2022-01-01 RX ORDER — DULOXETIN HYDROCHLORIDE 20 MG/1
20 CAPSULE, DELAYED RELEASE ORAL DAILY
COMMUNITY
End: 2023-01-01

## 2022-01-01 RX ORDER — BENZONATATE 100 MG/1
100 CAPSULE ORAL 3 TIMES DAILY PRN
Status: DISCONTINUED | OUTPATIENT
Start: 2022-01-01 | End: 2023-01-01 | Stop reason: HOSPADM

## 2022-01-01 RX ORDER — FAMOTIDINE 20 MG/1
20 TABLET, FILM COATED ORAL 2 TIMES DAILY
Status: DISCONTINUED | OUTPATIENT
Start: 2022-01-01 | End: 2022-01-01

## 2022-01-01 RX ORDER — IPRATROPIUM BROMIDE AND ALBUTEROL SULFATE 2.5; .5 MG/3ML; MG/3ML
3 SOLUTION RESPIRATORY (INHALATION) EVERY 4 HOURS PRN
Status: DISCONTINUED | OUTPATIENT
Start: 2022-01-01 | End: 2022-01-01

## 2022-01-01 RX ORDER — COLCHICINE 0.6 MG/1
0.6 TABLET ORAL DAILY
Status: DISCONTINUED | OUTPATIENT
Start: 2022-01-01 | End: 2022-01-01 | Stop reason: HOSPADM

## 2022-01-01 RX ORDER — IPRATROPIUM BROMIDE AND ALBUTEROL SULFATE 2.5; .5 MG/3ML; MG/3ML
3 SOLUTION RESPIRATORY (INHALATION) EVERY 4 HOURS PRN
Status: DISCONTINUED | OUTPATIENT
Start: 2022-01-01 | End: 2022-01-01 | Stop reason: HOSPADM

## 2022-01-01 RX ORDER — LIDOCAINE 40 MG/G
CREAM TOPICAL
Status: DISCONTINUED | OUTPATIENT
Start: 2022-01-01 | End: 2023-01-01 | Stop reason: HOSPADM

## 2022-01-01 RX ORDER — ALLOPURINOL 300 MG/1
300 TABLET ORAL DAILY
Status: DISCONTINUED | OUTPATIENT
Start: 2022-01-01 | End: 2023-01-01 | Stop reason: HOSPADM

## 2022-01-01 RX ORDER — FAMOTIDINE 20 MG/1
20 TABLET, FILM COATED ORAL DAILY
Status: DISCONTINUED | OUTPATIENT
Start: 2022-01-01 | End: 2023-01-01 | Stop reason: HOSPADM

## 2022-01-01 RX ORDER — DULOXETIN HYDROCHLORIDE 20 MG/1
40 CAPSULE, DELAYED RELEASE ORAL DAILY
Status: DISCONTINUED | OUTPATIENT
Start: 2022-01-01 | End: 2023-01-01 | Stop reason: HOSPADM

## 2022-01-01 RX ORDER — METHOTREXATE 2.5 MG/1
15 TABLET ORAL WEEKLY
Status: DISCONTINUED | OUTPATIENT
Start: 2022-01-01 | End: 2022-01-01 | Stop reason: HOSPADM

## 2022-01-01 RX ORDER — HYDROCODONE BITARTRATE AND ACETAMINOPHEN 5; 325 MG/1; MG/1
1 TABLET ORAL EVERY 6 HOURS PRN
Qty: 8 TABLET | Refills: 0 | Status: SHIPPED | OUTPATIENT
Start: 2022-01-01 | End: 2022-01-01

## 2022-01-01 RX ORDER — IPRATROPIUM BROMIDE AND ALBUTEROL SULFATE 2.5; .5 MG/3ML; MG/3ML
3 SOLUTION RESPIRATORY (INHALATION) EVERY 4 HOURS PRN
Status: DISCONTINUED | OUTPATIENT
Start: 2022-01-01 | End: 2023-01-01 | Stop reason: HOSPADM

## 2022-01-01 RX ORDER — ENOXAPARIN SODIUM 100 MG/ML
40 INJECTION SUBCUTANEOUS EVERY 24 HOURS
Status: DISCONTINUED | OUTPATIENT
Start: 2022-01-01 | End: 2022-01-01

## 2022-01-01 RX ORDER — ALLOPURINOL 300 MG/1
TABLET ORAL
Qty: 53 TABLET | Refills: 0 | Status: SHIPPED | OUTPATIENT
Start: 2022-01-01 | End: 2022-01-01

## 2022-01-01 RX ORDER — CLINDAMYCIN HCL 150 MG
450 CAPSULE ORAL 3 TIMES DAILY
Qty: 63 CAPSULE | Refills: 0 | Status: SHIPPED | OUTPATIENT
Start: 2022-01-01 | End: 2022-01-01

## 2022-01-01 RX ORDER — FLUTICASONE FUROATE AND VILANTEROL 200; 25 UG/1; UG/1
1 POWDER RESPIRATORY (INHALATION) DAILY
Status: DISCONTINUED | OUTPATIENT
Start: 2022-01-01 | End: 2023-01-01 | Stop reason: HOSPADM

## 2022-01-01 RX ORDER — ALBUTEROL SULFATE 0.83 MG/ML
2.5 SOLUTION RESPIRATORY (INHALATION) ONCE
Status: COMPLETED | OUTPATIENT
Start: 2022-01-01 | End: 2022-01-01

## 2022-01-01 RX ORDER — ALBUTEROL SULFATE 90 UG/1
2 AEROSOL, METERED RESPIRATORY (INHALATION) EVERY 6 HOURS PRN
Status: DISCONTINUED | OUTPATIENT
Start: 2022-01-01 | End: 2022-01-01 | Stop reason: HOSPADM

## 2022-01-01 RX ORDER — COLCHICINE 0.6 MG/1
0.6 TABLET ORAL DAILY
Qty: 60 TABLET | Refills: 0 | Status: SHIPPED | OUTPATIENT
Start: 2022-01-01 | End: 2022-01-01

## 2022-01-01 RX ORDER — PREDNISONE 20 MG/1
TABLET ORAL
COMMUNITY
Start: 2022-01-01 | End: 2022-01-01

## 2022-01-01 RX ORDER — FOLIC ACID 1 MG/1
1 TABLET ORAL
Status: DISCONTINUED | OUTPATIENT
Start: 2022-01-01 | End: 2023-01-01 | Stop reason: HOSPADM

## 2022-01-01 RX ORDER — METHOTREXATE 2.5 MG/1
15 TABLET ORAL WEEKLY
Qty: 72 TABLET | Refills: 0 | Status: SHIPPED | OUTPATIENT
Start: 2022-01-01 | End: 2022-01-01

## 2022-01-01 RX ORDER — LACTOBACILLUS RHAMNOSUS GG 10B CELL
1 CAPSULE ORAL DAILY
COMMUNITY
End: 2022-01-01

## 2022-01-01 RX ORDER — ALLOPURINOL 300 MG/1
300 TABLET ORAL DAILY
COMMUNITY
End: 2022-01-01

## 2022-01-01 RX ORDER — MAGNESIUM OXIDE 400 MG/1
400 TABLET ORAL 2 TIMES DAILY
Status: DISCONTINUED | OUTPATIENT
Start: 2022-01-01 | End: 2022-01-01 | Stop reason: HOSPADM

## 2022-01-01 RX ORDER — PANTOPRAZOLE SODIUM 40 MG/1
40 TABLET, DELAYED RELEASE ORAL DAILY
Status: DISCONTINUED | OUTPATIENT
Start: 2022-01-01 | End: 2022-01-01 | Stop reason: HOSPADM

## 2022-01-01 RX ORDER — IOPAMIDOL 755 MG/ML
75 INJECTION, SOLUTION INTRAVASCULAR ONCE
Status: COMPLETED | OUTPATIENT
Start: 2022-01-01 | End: 2022-01-01

## 2022-01-01 RX ORDER — PREDNISONE 5 MG/1
5 TABLET ORAL DAILY
Qty: 60 TABLET | Refills: 0 | Status: SHIPPED | OUTPATIENT
Start: 2022-01-01 | End: 2022-01-01

## 2022-01-01 RX ORDER — PROCHLORPERAZINE MALEATE 5 MG
5 TABLET ORAL EVERY 6 HOURS PRN
Status: DISCONTINUED | OUTPATIENT
Start: 2022-01-01 | End: 2022-01-01 | Stop reason: HOSPADM

## 2022-01-01 RX ORDER — METHOTREXATE 2.5 MG/1
15 TABLET ORAL WEEKLY
Qty: 72 TABLET | Refills: 0 | COMMUNITY
Start: 2022-01-01 | End: 2022-01-01

## 2022-01-01 RX ORDER — PRAVASTATIN SODIUM 20 MG
20 TABLET ORAL DAILY
Status: DISCONTINUED | OUTPATIENT
Start: 2022-01-01 | End: 2022-01-01 | Stop reason: HOSPADM

## 2022-01-01 RX ORDER — ACETAMINOPHEN 500 MG
500-1000 TABLET ORAL EVERY 6 HOURS PRN
Status: DISCONTINUED | OUTPATIENT
Start: 2022-01-01 | End: 2022-01-01 | Stop reason: HOSPADM

## 2022-01-01 RX ORDER — METOPROLOL SUCCINATE 100 MG/1
100 TABLET, EXTENDED RELEASE ORAL DAILY
Status: DISCONTINUED | OUTPATIENT
Start: 2022-01-01 | End: 2023-01-01 | Stop reason: HOSPADM

## 2022-01-01 RX ORDER — CEFAZOLIN SODIUM 1 G/50ML
1250 SOLUTION INTRAVENOUS ONCE
Status: COMPLETED | OUTPATIENT
Start: 2022-01-01 | End: 2022-01-01

## 2022-01-01 RX ORDER — DULOXETIN HYDROCHLORIDE 20 MG/1
20 CAPSULE, DELAYED RELEASE ORAL DAILY
Qty: 30 CAPSULE | Refills: 3 | Status: ON HOLD | OUTPATIENT
Start: 2022-01-01 | End: 2023-01-01

## 2022-01-01 RX ORDER — LIDOCAINE 40 MG/G
CREAM TOPICAL
Status: DISCONTINUED | OUTPATIENT
Start: 2022-01-01 | End: 2022-01-01 | Stop reason: HOSPADM

## 2022-01-01 RX ORDER — IPRATROPIUM BROMIDE AND ALBUTEROL SULFATE 2.5; .5 MG/3ML; MG/3ML
3 SOLUTION RESPIRATORY (INHALATION)
Status: DISCONTINUED | OUTPATIENT
Start: 2022-01-01 | End: 2022-01-01

## 2022-01-01 RX ORDER — METOPROLOL TARTRATE 25 MG/1
25 TABLET, FILM COATED ORAL ONCE
Status: COMPLETED | OUTPATIENT
Start: 2022-01-01 | End: 2022-01-01

## 2022-01-01 RX ORDER — METHOTREXATE 2.5 MG/1
7.5 TABLET ORAL WEEKLY
Qty: 36 TABLET | Refills: 0 | Status: ON HOLD | OUTPATIENT
Start: 2022-01-01 | End: 2023-01-01

## 2022-01-01 RX ORDER — METHOTREXATE 2.5 MG/1
15 TABLET ORAL WEEKLY
Qty: 72 TABLET | Refills: 0 | Status: ON HOLD | OUTPATIENT
Start: 2022-01-01 | End: 2022-01-01

## 2022-01-01 RX ORDER — ACETAMINOPHEN 650 MG/1
650 SUPPOSITORY RECTAL EVERY 4 HOURS PRN
Status: DISCONTINUED | OUTPATIENT
Start: 2022-01-01 | End: 2023-01-01 | Stop reason: HOSPADM

## 2022-01-01 RX ORDER — SIMETHICONE 80 MG
80 TABLET,CHEWABLE ORAL 4 TIMES DAILY PRN
Status: DISCONTINUED | OUTPATIENT
Start: 2022-01-01 | End: 2023-01-01 | Stop reason: HOSPADM

## 2022-01-01 RX ORDER — DEXAMETHASONE 6 MG/1
6 TABLET ORAL DAILY
Qty: 3 TABLET | Refills: 0 | COMMUNITY
Start: 2022-01-01 | End: 2022-01-01

## 2022-01-01 RX ORDER — FUROSEMIDE 20 MG
20 TABLET ORAL DAILY
Status: DISCONTINUED | OUTPATIENT
Start: 2022-01-01 | End: 2022-01-01

## 2022-01-01 RX ORDER — CYCLOBENZAPRINE HCL 5 MG
5 TABLET ORAL EVERY 8 HOURS PRN
Status: DISCONTINUED | OUTPATIENT
Start: 2022-01-01 | End: 2022-01-01 | Stop reason: HOSPADM

## 2022-01-01 RX ORDER — ACETAMINOPHEN 500 MG
500 TABLET ORAL EVERY 4 HOURS PRN
Status: DISCONTINUED | OUTPATIENT
Start: 2022-01-01 | End: 2022-01-01 | Stop reason: HOSPADM

## 2022-01-01 RX ORDER — POTASSIUM CHLORIDE 7.45 MG/ML
10 INJECTION INTRAVENOUS ONCE
Status: COMPLETED | OUTPATIENT
Start: 2022-01-01 | End: 2022-01-01

## 2022-01-01 RX ORDER — FUROSEMIDE 10 MG/ML
20 INJECTION INTRAMUSCULAR; INTRAVENOUS ONCE
Status: COMPLETED | OUTPATIENT
Start: 2022-01-01 | End: 2022-01-01

## 2022-01-01 RX ORDER — MULTIPLE VITAMINS W/ MINERALS TAB 9MG-400MCG
1 TAB ORAL DAILY
COMMUNITY
End: 2022-01-01

## 2022-01-01 RX ORDER — ALLOPURINOL 300 MG/1
300 TABLET ORAL DAILY
Status: DISCONTINUED | OUTPATIENT
Start: 2022-01-01 | End: 2022-01-01 | Stop reason: HOSPADM

## 2022-01-01 RX ORDER — VANCOMYCIN HYDROCHLORIDE 125 MG/1
125 CAPSULE ORAL 4 TIMES DAILY
Qty: 48 CAPSULE | Refills: 0 | Status: SHIPPED | OUTPATIENT
Start: 2022-01-01 | End: 2022-01-01

## 2022-01-01 RX ORDER — NICOTINE POLACRILEX 4 MG/1
20 GUM, CHEWING ORAL 2 TIMES DAILY
Status: DISCONTINUED | OUTPATIENT
Start: 2022-01-01 | End: 2022-01-01

## 2022-01-01 RX ORDER — PIPERACILLIN SODIUM, TAZOBACTAM SODIUM 3; .375 G/15ML; G/15ML
3.38 INJECTION, POWDER, LYOPHILIZED, FOR SOLUTION INTRAVENOUS ONCE
Status: COMPLETED | OUTPATIENT
Start: 2022-01-01 | End: 2022-01-01

## 2022-01-01 RX ORDER — COLD-HOT PACK
1 EACH MISCELLANEOUS DAILY
COMMUNITY
End: 2022-01-01

## 2022-01-01 RX ORDER — LIDOCAINE 40 MG/G
CREAM TOPICAL
Status: ACTIVE | OUTPATIENT
Start: 2022-01-01 | End: 2022-01-01

## 2022-01-01 RX ORDER — ONDANSETRON 2 MG/ML
4 INJECTION INTRAMUSCULAR; INTRAVENOUS EVERY 6 HOURS PRN
Status: DISCONTINUED | OUTPATIENT
Start: 2022-01-01 | End: 2022-01-01 | Stop reason: HOSPADM

## 2022-01-01 RX ORDER — DEXTROMETHORPHAN POLISTIREX 30 MG/5ML
30 SUSPENSION ORAL
Status: DISCONTINUED | OUTPATIENT
Start: 2022-01-01 | End: 2023-01-01 | Stop reason: HOSPADM

## 2022-01-01 RX ORDER — METHOTREXATE 2.5 MG/1
7.5 TABLET ORAL
Status: DISCONTINUED | OUTPATIENT
Start: 2022-01-01 | End: 2022-01-01 | Stop reason: HOSPADM

## 2022-01-01 RX ORDER — ONDANSETRON 2 MG/ML
4 INJECTION INTRAMUSCULAR; INTRAVENOUS EVERY 6 HOURS PRN
Status: DISCONTINUED | OUTPATIENT
Start: 2022-01-01 | End: 2023-01-01 | Stop reason: HOSPADM

## 2022-01-01 RX ORDER — PANTOPRAZOLE SODIUM 40 MG/1
40 TABLET, DELAYED RELEASE ORAL
Status: DISCONTINUED | OUTPATIENT
Start: 2022-01-01 | End: 2022-01-01 | Stop reason: HOSPADM

## 2022-01-01 RX ORDER — PREDNISONE 5 MG/1
5 TABLET ORAL DAILY
Status: ON HOLD | COMMUNITY
Start: 2022-01-01 | End: 2023-01-01

## 2022-01-01 RX ORDER — PREDNISONE 5 MG/1
5 TABLET ORAL DAILY
Status: ON HOLD | COMMUNITY
End: 2022-01-01

## 2022-01-01 RX ORDER — DULOXETIN HYDROCHLORIDE 20 MG/1
20 CAPSULE, DELAYED RELEASE ORAL DAILY
Status: DISCONTINUED | OUTPATIENT
Start: 2022-01-01 | End: 2022-01-01 | Stop reason: HOSPADM

## 2022-01-01 RX ORDER — METOPROLOL SUCCINATE 25 MG/1
75 TABLET, EXTENDED RELEASE ORAL DAILY
Qty: 90 TABLET | Refills: 0 | Status: ON HOLD | DISCHARGE
Start: 2022-01-01 | End: 2023-01-01

## 2022-01-01 RX ORDER — LOPERAMIDE HCL 2 MG
2 CAPSULE ORAL ONCE
Status: COMPLETED | OUTPATIENT
Start: 2022-01-01 | End: 2022-01-01

## 2022-01-01 RX ORDER — METHOCARBAMOL 500 MG/1
500 TABLET, FILM COATED ORAL 4 TIMES DAILY PRN
Status: DISCONTINUED | OUTPATIENT
Start: 2022-01-01 | End: 2023-01-01 | Stop reason: HOSPADM

## 2022-01-01 RX ORDER — CALCIUM CARBONATE 500 MG/1
500 TABLET, CHEWABLE ORAL 3 TIMES DAILY PRN
Status: DISCONTINUED | OUTPATIENT
Start: 2022-01-01 | End: 2023-01-01 | Stop reason: HOSPADM

## 2022-01-01 RX ORDER — CIPROFLOXACIN 2 MG/ML
400 INJECTION, SOLUTION INTRAVENOUS EVERY 12 HOURS
Status: DISCONTINUED | OUTPATIENT
Start: 2022-01-01 | End: 2022-01-01

## 2022-01-01 RX ORDER — PREDNISONE 5 MG/1
5 TABLET ORAL DAILY
Status: DISCONTINUED | OUTPATIENT
Start: 2022-01-01 | End: 2023-01-01 | Stop reason: HOSPADM

## 2022-01-01 RX ORDER — BUDESONIDE AND FORMOTEROL FUMARATE DIHYDRATE 160; 4.5 UG/1; UG/1
2 AEROSOL RESPIRATORY (INHALATION) 2 TIMES DAILY
Qty: 10 G | Refills: 11 | Status: SHIPPED | OUTPATIENT
Start: 2022-01-01 | End: 2023-01-01

## 2022-01-01 RX ORDER — METHOCARBAMOL 500 MG/1
500 TABLET, FILM COATED ORAL EVERY 6 HOURS PRN
Status: DISCONTINUED | OUTPATIENT
Start: 2022-01-01 | End: 2022-01-01 | Stop reason: HOSPADM

## 2022-01-01 RX ORDER — DILTIAZEM HYDROCHLORIDE 5 MG/ML
5 INJECTION INTRAVENOUS ONCE
Status: DISCONTINUED | OUTPATIENT
Start: 2022-01-01 | End: 2022-01-01

## 2022-01-01 RX ORDER — METHOTREXATE 2.5 MG/1
15 TABLET ORAL WEEKLY
Status: DISCONTINUED | OUTPATIENT
Start: 2022-01-01 | End: 2022-01-01

## 2022-01-01 RX ORDER — FUROSEMIDE 10 MG/ML
40 INJECTION INTRAMUSCULAR; INTRAVENOUS EVERY 12 HOURS
Status: DISCONTINUED | OUTPATIENT
Start: 2022-01-01 | End: 2022-01-01

## 2022-01-01 RX ORDER — METHOCARBAMOL 500 MG/1
500 TABLET, FILM COATED ORAL DAILY PRN
Status: DISCONTINUED | OUTPATIENT
Start: 2022-01-01 | End: 2022-01-01

## 2022-01-01 RX ORDER — VITAMIN E 268 MG
1 CAPSULE ORAL DAILY
COMMUNITY
End: 2022-01-01

## 2022-01-01 RX ORDER — PANTOPRAZOLE SODIUM 40 MG/1
40 TABLET, DELAYED RELEASE ORAL
Status: DISCONTINUED | OUTPATIENT
Start: 2022-01-01 | End: 2022-01-01

## 2022-01-01 RX ORDER — METOPROLOL TARTRATE 1 MG/ML
5 INJECTION, SOLUTION INTRAVENOUS EVERY 5 MIN PRN
Status: DISCONTINUED | OUTPATIENT
Start: 2022-01-01 | End: 2023-01-01 | Stop reason: HOSPADM

## 2022-01-01 RX ORDER — METHOCARBAMOL 500 MG/1
500 TABLET, FILM COATED ORAL DAILY PRN
Status: ON HOLD | COMMUNITY
End: 2023-01-01

## 2022-01-01 RX ORDER — PANTOPRAZOLE SODIUM 20 MG/1
40 TABLET, DELAYED RELEASE ORAL DAILY
Status: DISCONTINUED | OUTPATIENT
Start: 2022-01-01 | End: 2022-01-01 | Stop reason: HOSPADM

## 2022-01-01 RX ORDER — COLCHICINE 0.6 MG/1
TABLET ORAL
Qty: 60 TABLET | Refills: 0 | Status: ON HOLD | OUTPATIENT
Start: 2022-01-01 | End: 2023-01-01

## 2022-01-01 RX ORDER — SODIUM CHLORIDE AND POTASSIUM CHLORIDE 150; 900 MG/100ML; MG/100ML
INJECTION, SOLUTION INTRAVENOUS CONTINUOUS
Status: DISCONTINUED | OUTPATIENT
Start: 2022-01-01 | End: 2022-01-01

## 2022-01-01 RX ORDER — MAGNESIUM OXIDE 400 MG/1
400 TABLET ORAL DAILY
Status: ON HOLD | COMMUNITY
End: 2023-01-01

## 2022-01-01 RX ORDER — LACTOBACILLUS RHAMNOSUS GG 10B CELL
1 CAPSULE ORAL DAILY
Status: DISCONTINUED | OUTPATIENT
Start: 2022-01-01 | End: 2022-01-01 | Stop reason: HOSPADM

## 2022-01-01 RX ORDER — VANCOMYCIN HYDROCHLORIDE 125 MG/1
125 CAPSULE ORAL 4 TIMES DAILY
Status: ON HOLD | COMMUNITY
End: 2023-01-01

## 2022-01-01 RX ORDER — PANTOPRAZOLE SODIUM 40 MG/1
40 TABLET, DELAYED RELEASE ORAL DAILY
COMMUNITY
End: 2022-01-01

## 2022-01-01 RX ORDER — FOLIC ACID 1 MG/1
TABLET ORAL
Qty: 90 TABLET | Refills: 0 | Status: SHIPPED | OUTPATIENT
Start: 2022-01-01 | End: 2022-01-01

## 2022-01-01 RX ORDER — METRONIDAZOLE 500 MG/100ML
500 INJECTION, SOLUTION INTRAVENOUS EVERY 12 HOURS
Status: DISCONTINUED | OUTPATIENT
Start: 2022-01-01 | End: 2022-01-01

## 2022-01-01 RX ORDER — METOPROLOL SUCCINATE 100 MG/1
100 TABLET, EXTENDED RELEASE ORAL DAILY
Status: DISCONTINUED | OUTPATIENT
Start: 2022-01-01 | End: 2022-01-01 | Stop reason: HOSPADM

## 2022-01-01 RX ORDER — DIPHENHYDRAMINE HYDROCHLORIDE AND LIDOCAINE HYDROCHLORIDE AND ALUMINUM HYDROXIDE AND MAGNESIUM HYDRO
10 KIT EVERY 6 HOURS PRN
Status: DISCONTINUED | OUTPATIENT
Start: 2022-01-01 | End: 2023-01-01 | Stop reason: HOSPADM

## 2022-01-01 RX ORDER — ALBUTEROL SULFATE 90 UG/1
2 AEROSOL, METERED RESPIRATORY (INHALATION) EVERY 6 HOURS
Qty: 18 G | Refills: 11 | Status: SHIPPED | OUTPATIENT
Start: 2022-01-01 | End: 2022-01-01

## 2022-01-01 RX ORDER — METHOCARBAMOL 500 MG/1
500 TABLET, FILM COATED ORAL DAILY PRN
COMMUNITY
End: 2022-01-01

## 2022-01-01 RX ORDER — ALBUTEROL SULFATE 90 UG/1
2 AEROSOL, METERED RESPIRATORY (INHALATION) EVERY 4 HOURS PRN
Status: DISCONTINUED | OUTPATIENT
Start: 2022-01-01 | End: 2023-01-01 | Stop reason: HOSPADM

## 2022-01-01 RX ORDER — POTASSIUM CHLORIDE 1.5 G/1.58G
20 POWDER, FOR SOLUTION ORAL ONCE
Status: COMPLETED | OUTPATIENT
Start: 2022-01-01 | End: 2022-01-01

## 2022-01-01 RX ORDER — VANCOMYCIN HYDROCHLORIDE 1 G/200ML
1000 INJECTION, SOLUTION INTRAVENOUS EVERY 24 HOURS
Status: DISCONTINUED | OUTPATIENT
Start: 2022-01-01 | End: 2022-01-01

## 2022-01-01 RX ORDER — FOLIC ACID 1 MG/1
1 TABLET ORAL DAILY
Status: DISCONTINUED | OUTPATIENT
Start: 2022-01-01 | End: 2022-01-01 | Stop reason: HOSPADM

## 2022-01-01 RX ORDER — FUROSEMIDE 20 MG
20 TABLET ORAL DAILY
Status: DISCONTINUED | OUTPATIENT
Start: 2022-01-01 | End: 2023-01-01 | Stop reason: HOSPADM

## 2022-01-01 RX ORDER — TRIAMCINOLONE ACETONIDE 40 MG/ML
20 INJECTION, SUSPENSION INTRA-ARTICULAR; INTRAMUSCULAR ONCE
Status: COMPLETED | OUTPATIENT
Start: 2022-01-01 | End: 2022-01-01

## 2022-01-01 RX ORDER — POTASSIUM CHLORIDE 1500 MG/1
40 TABLET, EXTENDED RELEASE ORAL ONCE
Status: COMPLETED | OUTPATIENT
Start: 2022-01-01 | End: 2022-01-01

## 2022-01-01 RX ORDER — DILTIAZEM HYDROCHLORIDE 5 MG/ML
2.5 INJECTION INTRAVENOUS ONCE
Status: DISCONTINUED | OUTPATIENT
Start: 2022-01-01 | End: 2022-01-01

## 2022-01-01 RX ORDER — ALBUTEROL SULFATE 90 UG/1
2 AEROSOL, METERED RESPIRATORY (INHALATION) EVERY 4 HOURS PRN
COMMUNITY

## 2022-01-01 RX ORDER — MAGNESIUM SULFATE HEPTAHYDRATE 500 MG/ML
2 INJECTION, SOLUTION INTRAMUSCULAR; INTRAVENOUS ONCE
Status: DISCONTINUED | OUTPATIENT
Start: 2022-01-01 | End: 2022-01-01

## 2022-01-01 RX ORDER — PROCHLORPERAZINE 25 MG
12.5 SUPPOSITORY, RECTAL RECTAL EVERY 12 HOURS PRN
Status: DISCONTINUED | OUTPATIENT
Start: 2022-01-01 | End: 2022-01-01 | Stop reason: HOSPADM

## 2022-01-01 RX ORDER — NICOTINE POLACRILEX 4 MG/1
20 GUM, CHEWING ORAL 2 TIMES DAILY
COMMUNITY
End: 2023-01-01

## 2022-01-01 RX ORDER — PRAVASTATIN SODIUM 20 MG
20 TABLET ORAL EVERY EVENING
Status: DISCONTINUED | OUTPATIENT
Start: 2022-01-01 | End: 2023-01-01 | Stop reason: HOSPADM

## 2022-01-01 RX ORDER — METHYLPREDNISOLONE SODIUM SUCCINATE 125 MG/2ML
125 INJECTION, POWDER, LYOPHILIZED, FOR SOLUTION INTRAMUSCULAR; INTRAVENOUS ONCE
Status: COMPLETED | OUTPATIENT
Start: 2022-01-01 | End: 2022-01-01

## 2022-01-01 RX ORDER — IPRATROPIUM BROMIDE AND ALBUTEROL SULFATE 2.5; .5 MG/3ML; MG/3ML
3 SOLUTION RESPIRATORY (INHALATION) ONCE
Status: COMPLETED | OUTPATIENT
Start: 2022-01-01 | End: 2022-01-01

## 2022-01-01 RX ORDER — METHOCARBAMOL 500 MG/1
500 TABLET, FILM COATED ORAL DAILY PRN
Status: DISCONTINUED | OUTPATIENT
Start: 2022-01-01 | End: 2022-01-01 | Stop reason: HOSPADM

## 2022-01-01 RX ADMIN — DULOXETINE HYDROCHLORIDE 20 MG: 20 CAPSULE, DELAYED RELEASE ORAL at 09:07

## 2022-01-01 RX ADMIN — SODIUM CHLORIDE 250 ML: 9 INJECTION, SOLUTION INTRAVENOUS at 14:30

## 2022-01-01 RX ADMIN — METOPROLOL SUCCINATE 100 MG: 25 TABLET, EXTENDED RELEASE ORAL at 09:06

## 2022-01-01 RX ADMIN — METHOCARBAMOL 500 MG: 500 TABLET, FILM COATED ORAL at 17:41

## 2022-01-01 RX ADMIN — PERFLUTREN 2 ML: 6.52 INJECTION, SUSPENSION INTRAVENOUS at 07:34

## 2022-01-01 RX ADMIN — PANTOPRAZOLE SODIUM 40 MG: 40 TABLET, DELAYED RELEASE ORAL at 09:15

## 2022-01-01 RX ADMIN — POTASSIUM CHLORIDE 20 MEQ: 1.5 FOR SOLUTION ORAL at 21:46

## 2022-01-01 RX ADMIN — ALBUTEROL SULFATE 2 PUFF: 90 AEROSOL, METERED RESPIRATORY (INHALATION) at 05:32

## 2022-01-01 RX ADMIN — PANTOPRAZOLE SODIUM 40 MG: 40 TABLET, DELAYED RELEASE ORAL at 06:32

## 2022-01-01 RX ADMIN — DULOXETINE HYDROCHLORIDE 20 MG: 20 CAPSULE, DELAYED RELEASE ORAL at 09:10

## 2022-01-01 RX ADMIN — FUROSEMIDE 40 MG: 10 INJECTION, SOLUTION INTRAMUSCULAR; INTRAVENOUS at 06:13

## 2022-01-01 RX ADMIN — ALLOPURINOL 300 MG: 300 TABLET ORAL at 09:30

## 2022-01-01 RX ADMIN — ALLOPURINOL 300 MG: 300 TABLET ORAL at 09:35

## 2022-01-01 RX ADMIN — DIPHENHYDRAMINE HYDROCHLORIDE AND LIDOCAINE HYDROCHLORIDE AND ALUMINUM HYDROXIDE AND MAGNESIUM HYDRO 10 ML: KIT at 19:56

## 2022-01-01 RX ADMIN — PANTOPRAZOLE SODIUM 40 MG: 40 TABLET, DELAYED RELEASE ORAL at 07:56

## 2022-01-01 RX ADMIN — DULOXETINE HYDROCHLORIDE 20 MG: 20 CAPSULE, DELAYED RELEASE ORAL at 08:46

## 2022-01-01 RX ADMIN — MAGNESIUM OXIDE TAB 400 MG (241.3 MG ELEMENTAL MG) 400 MG: 400 (241.3 MG) TAB at 08:59

## 2022-01-01 RX ADMIN — METOPROLOL SUCCINATE 75 MG: 25 TABLET, EXTENDED RELEASE ORAL at 08:41

## 2022-01-01 RX ADMIN — PRAVASTATIN SODIUM 20 MG: 20 TABLET ORAL at 19:18

## 2022-01-01 RX ADMIN — VANCOMYCIN HYDROCHLORIDE 125 MG: 125 CAPSULE ORAL at 09:06

## 2022-01-01 RX ADMIN — DEXAMETHASONE 6 MG: 2 TABLET ORAL at 13:10

## 2022-01-01 RX ADMIN — COLCHICINE 0.6 MG: 0.6 TABLET, FILM COATED ORAL at 08:55

## 2022-01-01 RX ADMIN — NITROGLYCERIN 15 MG: 20 OINTMENT TOPICAL at 05:39

## 2022-01-01 RX ADMIN — RIVAROXABAN 15 MG: 15 TABLET, FILM COATED ORAL at 09:29

## 2022-01-01 RX ADMIN — RIVAROXABAN 15 MG: 15 TABLET, FILM COATED ORAL at 16:19

## 2022-01-01 RX ADMIN — Medication 400 MG: at 12:00

## 2022-01-01 RX ADMIN — ALLOPURINOL 300 MG: 300 TABLET ORAL at 09:19

## 2022-01-01 RX ADMIN — SODIUM CHLORIDE 50 ML: 900 INJECTION INTRAVENOUS at 09:45

## 2022-01-01 RX ADMIN — FLUTICASONE FUROATE AND VILANTEROL TRIFENATATE 1 PUFF: 200; 25 POWDER RESPIRATORY (INHALATION) at 11:07

## 2022-01-01 RX ADMIN — ALLOPURINOL 300 MG: 300 TABLET ORAL at 10:56

## 2022-01-01 RX ADMIN — IPRATROPIUM BROMIDE AND ALBUTEROL SULFATE 3 ML: 2.5; .5 SOLUTION RESPIRATORY (INHALATION) at 20:38

## 2022-01-01 RX ADMIN — COLCHICINE 0.6 MG: 0.6 TABLET, FILM COATED ORAL at 08:49

## 2022-01-01 RX ADMIN — PANTOPRAZOLE SODIUM 40 MG: 40 TABLET, DELAYED RELEASE ORAL at 08:55

## 2022-01-01 RX ADMIN — VANCOMYCIN HYDROCHLORIDE 125 MG: 125 CAPSULE ORAL at 09:29

## 2022-01-01 RX ADMIN — COLCHICINE 0.6 MG: 0.6 TABLET, FILM COATED ORAL at 09:11

## 2022-01-01 RX ADMIN — MAGNESIUM OXIDE TAB 400 MG (241.3 MG ELEMENTAL MG) 400 MG: 400 (241.3 MG) TAB at 21:17

## 2022-01-01 RX ADMIN — ALLOPURINOL 300 MG: 300 TABLET ORAL at 08:46

## 2022-01-01 RX ADMIN — PRAVASTATIN SODIUM 20 MG: 20 TABLET ORAL at 09:07

## 2022-01-01 RX ADMIN — IOPAMIDOL 75 ML: 755 INJECTION, SOLUTION INTRAVENOUS at 19:52

## 2022-01-01 RX ADMIN — ALLOPURINOL 300 MG: 300 TABLET ORAL at 08:50

## 2022-01-01 RX ADMIN — PREDNISONE 5 MG: 5 TABLET ORAL at 08:30

## 2022-01-01 RX ADMIN — FOLIC ACID 1 MG: 1 TABLET ORAL at 08:53

## 2022-01-01 RX ADMIN — PANTOPRAZOLE SODIUM 40 MG: 40 TABLET, DELAYED RELEASE ORAL at 09:03

## 2022-01-01 RX ADMIN — RIVAROXABAN 15 MG: 15 TABLET, FILM COATED ORAL at 17:28

## 2022-01-01 RX ADMIN — MAGNESIUM OXIDE TAB 400 MG (241.3 MG ELEMENTAL MG) 400 MG: 400 (241.3 MG) TAB at 11:07

## 2022-01-01 RX ADMIN — METOPROLOL SUCCINATE 75 MG: 25 TABLET, EXTENDED RELEASE ORAL at 08:29

## 2022-01-01 RX ADMIN — PIPERACILLIN AND TAZOBACTAM 3.38 G: 3; .375 INJECTION, POWDER, LYOPHILIZED, FOR SOLUTION INTRAVENOUS at 17:27

## 2022-01-01 RX ADMIN — PREDNISONE 5 MG: 5 TABLET ORAL at 09:13

## 2022-01-01 RX ADMIN — COLCHICINE 0.6 MG: 0.6 TABLET, FILM COATED ORAL at 09:04

## 2022-01-01 RX ADMIN — PIPERACILLIN AND TAZOBACTAM 3.38 G: 3; .375 INJECTION, POWDER, LYOPHILIZED, FOR SOLUTION INTRAVENOUS at 02:20

## 2022-01-01 RX ADMIN — FOLIC ACID 1 MG: 1 TABLET ORAL at 09:20

## 2022-01-01 RX ADMIN — METHOCARBAMOL 500 MG: 500 TABLET, FILM COATED ORAL at 14:23

## 2022-01-01 RX ADMIN — ALUMINUM HYDROXIDE, MAGNESIUM HYDROXIDE, AND DIMETHICONE 30 ML: 200; 20; 200 SUSPENSION ORAL at 14:23

## 2022-01-01 RX ADMIN — RIVAROXABAN 15 MG: 15 TABLET, FILM COATED ORAL at 16:21

## 2022-01-01 RX ADMIN — FOLIC ACID 1 MG: 1 TABLET ORAL at 08:29

## 2022-01-01 RX ADMIN — MAGNESIUM SULFATE HEPTAHYDRATE 4 G: 80 INJECTION, SOLUTION INTRAVENOUS at 04:23

## 2022-01-01 RX ADMIN — RIVAROXABAN 15 MG: 15 TABLET, FILM COATED ORAL at 16:59

## 2022-01-01 RX ADMIN — POTASSIUM CHLORIDE 20 MEQ: 1.5 POWDER, FOR SOLUTION ORAL at 15:09

## 2022-01-01 RX ADMIN — PANTOPRAZOLE SODIUM 40 MG: 40 TABLET, DELAYED RELEASE ORAL at 16:31

## 2022-01-01 RX ADMIN — Medication 1 CAPSULE: at 09:18

## 2022-01-01 RX ADMIN — ALLOPURINOL 300 MG: 300 TABLET ORAL at 09:13

## 2022-01-01 RX ADMIN — PIPERACILLIN AND TAZOBACTAM 3.38 G: 3; .375 INJECTION, POWDER, LYOPHILIZED, FOR SOLUTION INTRAVENOUS at 20:04

## 2022-01-01 RX ADMIN — PRAVASTATIN SODIUM 20 MG: 20 TABLET ORAL at 20:10

## 2022-01-01 RX ADMIN — Medication 125 MCG: at 09:10

## 2022-01-01 RX ADMIN — MAGNESIUM OXIDE TAB 400 MG (241.3 MG ELEMENTAL MG) 400 MG: 400 (241.3 MG) TAB at 20:26

## 2022-01-01 RX ADMIN — FOLIC ACID 1 MG: 1 TABLET ORAL at 08:50

## 2022-01-01 RX ADMIN — FLUTICASONE FUROATE AND VILANTEROL TRIFENATATE 1 PUFF: 200; 25 POWDER RESPIRATORY (INHALATION) at 08:42

## 2022-01-01 RX ADMIN — VANCOMYCIN HYDROCHLORIDE 125 MG: 125 CAPSULE ORAL at 09:19

## 2022-01-01 RX ADMIN — FLUTICASONE FUROATE AND VILANTEROL TRIFENATATE 1 PUFF: 200; 25 POWDER RESPIRATORY (INHALATION) at 08:46

## 2022-01-01 RX ADMIN — PIPERACILLIN AND TAZOBACTAM 3.38 G: 3; .375 INJECTION, POWDER, LYOPHILIZED, FOR SOLUTION INTRAVENOUS at 10:26

## 2022-01-01 RX ADMIN — ALLOPURINOL 300 MG: 300 TABLET ORAL at 07:56

## 2022-01-01 RX ADMIN — DULOXETINE HYDROCHLORIDE 20 MG: 20 CAPSULE, DELAYED RELEASE ORAL at 09:15

## 2022-01-01 RX ADMIN — CIPROFLOXACIN 400 MG: 2 INJECTION, SOLUTION INTRAVENOUS at 23:34

## 2022-01-01 RX ADMIN — METOPROLOL SUCCINATE 100 MG: 100 TABLET, EXTENDED RELEASE ORAL at 09:37

## 2022-01-01 RX ADMIN — ALLOPURINOL 300 MG: 300 TABLET ORAL at 11:07

## 2022-01-01 RX ADMIN — VANCOMYCIN HYDROCHLORIDE 125 MG: 125 CAPSULE ORAL at 15:43

## 2022-01-01 RX ADMIN — FAMOTIDINE 20 MG: 20 TABLET ORAL at 09:35

## 2022-01-01 RX ADMIN — PANTOPRAZOLE SODIUM 40 MG: 20 TABLET, DELAYED RELEASE ORAL at 09:06

## 2022-01-01 RX ADMIN — VANCOMYCIN HYDROCHLORIDE 125 MG: 125 CAPSULE ORAL at 13:36

## 2022-01-01 RX ADMIN — FOLIC ACID 1 MG: 1 TABLET ORAL at 07:56

## 2022-01-01 RX ADMIN — METHOCARBAMOL 500 MG: 500 TABLET, FILM COATED ORAL at 19:55

## 2022-01-01 RX ADMIN — PRAVASTATIN SODIUM 20 MG: 20 TABLET ORAL at 20:41

## 2022-01-01 RX ADMIN — RIVAROXABAN 15 MG: 15 TABLET, FILM COATED ORAL at 17:45

## 2022-01-01 RX ADMIN — SODIUM CHLORIDE 1000 ML: 9 INJECTION, SOLUTION INTRAVENOUS at 14:01

## 2022-01-01 RX ADMIN — PIPERACILLIN AND TAZOBACTAM 3.38 G: 3; .375 INJECTION, POWDER, LYOPHILIZED, FOR SOLUTION INTRAVENOUS at 03:00

## 2022-01-01 RX ADMIN — PANTOPRAZOLE SODIUM 40 MG: 40 TABLET, DELAYED RELEASE ORAL at 16:21

## 2022-01-01 RX ADMIN — PRAVASTATIN SODIUM 20 MG: 20 TABLET ORAL at 20:45

## 2022-01-01 RX ADMIN — FUROSEMIDE 20 MG: 10 INJECTION, SOLUTION INTRAMUSCULAR; INTRAVENOUS at 14:42

## 2022-01-01 RX ADMIN — DEXAMETHASONE 6 MG: 2 TABLET ORAL at 11:50

## 2022-01-01 RX ADMIN — METHOCARBAMOL 500 MG: 500 TABLET, FILM COATED ORAL at 00:29

## 2022-01-01 RX ADMIN — FIDAXOMICIN 200 MG: 200 TABLET, FILM COATED ORAL at 19:47

## 2022-01-01 RX ADMIN — DULOXETINE HYDROCHLORIDE 20 MG: 20 CAPSULE, DELAYED RELEASE ORAL at 09:28

## 2022-01-01 RX ADMIN — Medication 1 CAPSULE: at 10:25

## 2022-01-01 RX ADMIN — ALLOPURINOL 300 MG: 300 TABLET ORAL at 08:01

## 2022-01-01 RX ADMIN — FUROSEMIDE 40 MG: 10 INJECTION, SOLUTION INTRAMUSCULAR; INTRAVENOUS at 18:24

## 2022-01-01 RX ADMIN — REMDESIVIR 100 MG: 100 INJECTION, POWDER, LYOPHILIZED, FOR SOLUTION INTRAVENOUS at 11:07

## 2022-01-01 RX ADMIN — FLUTICASONE FUROATE AND VILANTEROL TRIFENATATE 1 PUFF: 200; 25 POWDER RESPIRATORY (INHALATION) at 08:02

## 2022-01-01 RX ADMIN — IPRATROPIUM BROMIDE AND ALBUTEROL SULFATE 3 ML: 2.5; .5 SOLUTION RESPIRATORY (INHALATION) at 12:19

## 2022-01-01 RX ADMIN — VANCOMYCIN HYDROCHLORIDE 125 MG: 125 CAPSULE ORAL at 17:15

## 2022-01-01 RX ADMIN — MAGNESIUM OXIDE TAB 400 MG (241.3 MG ELEMENTAL MG) 400 MG: 400 (241.3 MG) TAB at 08:02

## 2022-01-01 RX ADMIN — SODIUM CHLORIDE 500 ML: 9 INJECTION, SOLUTION INTRAVENOUS at 02:32

## 2022-01-01 RX ADMIN — METOPROLOL SUCCINATE 100 MG: 25 TABLET, EXTENDED RELEASE ORAL at 09:29

## 2022-01-01 RX ADMIN — FLUTICASONE FUROATE AND VILANTEROL TRIFENATATE 1 PUFF: 200; 25 POWDER RESPIRATORY (INHALATION) at 14:09

## 2022-01-01 RX ADMIN — FIDAXOMICIN 200 MG: 200 TABLET, FILM COATED ORAL at 08:01

## 2022-01-01 RX ADMIN — DULOXETINE HYDROCHLORIDE 20 MG: 20 CAPSULE, DELAYED RELEASE ORAL at 08:42

## 2022-01-01 RX ADMIN — Medication 125 MCG: at 09:01

## 2022-01-01 RX ADMIN — FUROSEMIDE 40 MG: 10 INJECTION, SOLUTION INTRAMUSCULAR; INTRAVENOUS at 13:23

## 2022-01-01 RX ADMIN — METOPROLOL SUCCINATE 75 MG: 25 TABLET, EXTENDED RELEASE ORAL at 08:53

## 2022-01-01 RX ADMIN — METOPROLOL TARTRATE 5 MG: 5 INJECTION INTRAVENOUS at 14:47

## 2022-01-01 RX ADMIN — SODIUM CHLORIDE 50 ML: 900 INJECTION INTRAVENOUS at 12:43

## 2022-01-01 RX ADMIN — RIVAROXABAN 15 MG: 15 TABLET, FILM COATED ORAL at 20:59

## 2022-01-01 RX ADMIN — PRAVASTATIN SODIUM 20 MG: 20 TABLET ORAL at 20:28

## 2022-01-01 RX ADMIN — FLUTICASONE FUROATE AND VILANTEROL TRIFENATATE 1 PUFF: 200; 25 POWDER RESPIRATORY (INHALATION) at 08:48

## 2022-01-01 RX ADMIN — METHOCARBAMOL 500 MG: 500 TABLET, FILM COATED ORAL at 04:17

## 2022-01-01 RX ADMIN — DULOXETINE HYDROCHLORIDE 20 MG: 20 CAPSULE, DELAYED RELEASE ORAL at 09:19

## 2022-01-01 RX ADMIN — ALBUTEROL SULFATE 2 PUFF: 90 AEROSOL, METERED RESPIRATORY (INHALATION) at 21:01

## 2022-01-01 RX ADMIN — IPRATROPIUM BROMIDE AND ALBUTEROL SULFATE 3 ML: 2.5; .5 SOLUTION RESPIRATORY (INHALATION) at 07:53

## 2022-01-01 RX ADMIN — POTASSIUM CHLORIDE 40 MEQ: 1500 TABLET, EXTENDED RELEASE ORAL at 09:32

## 2022-01-01 RX ADMIN — MAGNESIUM SULFATE HEPTAHYDRATE 2 G: 40 INJECTION, SOLUTION INTRAVENOUS at 12:44

## 2022-01-01 RX ADMIN — FLUTICASONE FUROATE AND VILANTEROL TRIFENATATE 1 PUFF: 200; 25 POWDER RESPIRATORY (INHALATION) at 11:01

## 2022-01-01 RX ADMIN — ALLOPURINOL 300 MG: 300 TABLET ORAL at 08:30

## 2022-01-01 RX ADMIN — MAGNESIUM OXIDE TAB 400 MG (241.3 MG ELEMENTAL MG) 400 MG: 400 (241.3 MG) TAB at 08:30

## 2022-01-01 RX ADMIN — PIPERACILLIN AND TAZOBACTAM 3.38 G: 3; .375 INJECTION, POWDER, LYOPHILIZED, FOR SOLUTION INTRAVENOUS at 01:49

## 2022-01-01 RX ADMIN — METHOCARBAMOL 500 MG: 500 TABLET, FILM COATED ORAL at 12:42

## 2022-01-01 RX ADMIN — FLUTICASONE FUROATE AND VILANTEROL TRIFENATATE 1 PUFF: 200; 25 POWDER RESPIRATORY (INHALATION) at 09:20

## 2022-01-01 RX ADMIN — PANTOPRAZOLE SODIUM 40 MG: 40 TABLET, DELAYED RELEASE ORAL at 06:25

## 2022-01-01 RX ADMIN — VANCOMYCIN HYDROCHLORIDE 125 MG: 125 CAPSULE ORAL at 11:50

## 2022-01-01 RX ADMIN — PANTOPRAZOLE SODIUM 40 MG: 40 TABLET, DELAYED RELEASE ORAL at 06:55

## 2022-01-01 RX ADMIN — FLUTICASONE FUROATE AND VILANTEROL TRIFENATATE 1 PUFF: 200; 25 POWDER RESPIRATORY (INHALATION) at 09:08

## 2022-01-01 RX ADMIN — DULOXETINE HYDROCHLORIDE 20 MG: 20 CAPSULE, DELAYED RELEASE ORAL at 07:56

## 2022-01-01 RX ADMIN — VANCOMYCIN HYDROCHLORIDE 125 MG: 125 CAPSULE ORAL at 12:05

## 2022-01-01 RX ADMIN — VANCOMYCIN HYDROCHLORIDE 1250 MG: 5 INJECTION, POWDER, LYOPHILIZED, FOR SOLUTION INTRAVENOUS at 10:42

## 2022-01-01 RX ADMIN — Medication 125 MCG: at 10:56

## 2022-01-01 RX ADMIN — FOLIC ACID 1 MG: 1 TABLET ORAL at 15:09

## 2022-01-01 RX ADMIN — FIDAXOMICIN 200 MG: 200 TABLET, FILM COATED ORAL at 19:40

## 2022-01-01 RX ADMIN — DULOXETINE HYDROCHLORIDE 40 MG: 20 CAPSULE, DELAYED RELEASE ORAL at 08:32

## 2022-01-01 RX ADMIN — SODIUM CHLORIDE 250 ML: 9 INJECTION, SOLUTION INTRAVENOUS at 08:42

## 2022-01-01 RX ADMIN — ALLOPURINOL 300 MG: 300 TABLET ORAL at 09:18

## 2022-01-01 RX ADMIN — METRONIDAZOLE 500 MG: 500 INJECTION, SOLUTION INTRAVENOUS at 00:44

## 2022-01-01 RX ADMIN — FOLIC ACID 1 MG: 1 TABLET ORAL at 09:36

## 2022-01-01 RX ADMIN — BENZONATATE 100 MG: 100 CAPSULE ORAL at 14:23

## 2022-01-01 RX ADMIN — FLUTICASONE FUROATE AND VILANTEROL TRIFENATATE 1 PUFF: 200; 25 POWDER RESPIRATORY (INHALATION) at 17:12

## 2022-01-01 RX ADMIN — FOLIC ACID 1 MG: 1 TABLET ORAL at 09:00

## 2022-01-01 RX ADMIN — PIPERACILLIN AND TAZOBACTAM 3.38 G: 3; .375 INJECTION, POWDER, LYOPHILIZED, FOR SOLUTION INTRAVENOUS at 18:03

## 2022-01-01 RX ADMIN — Medication 125 MCG: at 09:35

## 2022-01-01 RX ADMIN — FUROSEMIDE 40 MG: 10 INJECTION, SOLUTION INTRAMUSCULAR; INTRAVENOUS at 18:05

## 2022-01-01 RX ADMIN — FUROSEMIDE 40 MG: 10 INJECTION, SOLUTION INTRAMUSCULAR; INTRAVENOUS at 05:38

## 2022-01-01 RX ADMIN — PRAVASTATIN SODIUM 20 MG: 20 TABLET ORAL at 20:54

## 2022-01-01 RX ADMIN — PRAVASTATIN SODIUM 20 MG: 20 TABLET ORAL at 20:26

## 2022-01-01 RX ADMIN — PRAVASTATIN SODIUM 20 MG: 20 TABLET ORAL at 21:50

## 2022-01-01 RX ADMIN — CYCLOBENZAPRINE HYDROCHLORIDE 5 MG: 5 TABLET, FILM COATED ORAL at 22:43

## 2022-01-01 RX ADMIN — DULOXETINE HYDROCHLORIDE 20 MG: 20 CAPSULE, DELAYED RELEASE ORAL at 08:49

## 2022-01-01 RX ADMIN — PRAVASTATIN SODIUM 20 MG: 20 TABLET ORAL at 21:17

## 2022-01-01 RX ADMIN — RIVAROXABAN 15 MG: 15 TABLET, FILM COATED ORAL at 17:49

## 2022-01-01 RX ADMIN — VANCOMYCIN HYDROCHLORIDE 125 MG: 125 CAPSULE ORAL at 19:47

## 2022-01-01 RX ADMIN — RIVAROXABAN 15 MG: 15 TABLET, FILM COATED ORAL at 16:33

## 2022-01-01 RX ADMIN — PANTOPRAZOLE SODIUM 40 MG: 20 TABLET, DELAYED RELEASE ORAL at 09:25

## 2022-01-01 RX ADMIN — Medication 1 CAPSULE: at 08:38

## 2022-01-01 RX ADMIN — PIPERACILLIN AND TAZOBACTAM 3.38 G: 3; .375 INJECTION, POWDER, LYOPHILIZED, FOR SOLUTION INTRAVENOUS at 02:48

## 2022-01-01 RX ADMIN — PRAVASTATIN SODIUM 20 MG: 20 TABLET ORAL at 19:40

## 2022-01-01 RX ADMIN — METHOCARBAMOL 500 MG: 500 TABLET, FILM COATED ORAL at 21:28

## 2022-01-01 RX ADMIN — REMDESIVIR 100 MG: 100 INJECTION, POWDER, LYOPHILIZED, FOR SOLUTION INTRAVENOUS at 09:45

## 2022-01-01 RX ADMIN — DULOXETINE HYDROCHLORIDE 20 MG: 20 CAPSULE, DELAYED RELEASE ORAL at 08:40

## 2022-01-01 RX ADMIN — FLUTICASONE FUROATE AND VILANTEROL TRIFENATATE 1 PUFF: 200; 25 POWDER RESPIRATORY (INHALATION) at 09:37

## 2022-01-01 RX ADMIN — VANCOMYCIN HYDROCHLORIDE 1000 MG: 1 INJECTION, SOLUTION INTRAVENOUS at 10:42

## 2022-01-01 RX ADMIN — RIVAROXABAN 15 MG: 15 TABLET, FILM COATED ORAL at 18:03

## 2022-01-01 RX ADMIN — PRAVASTATIN SODIUM 20 MG: 20 TABLET ORAL at 09:29

## 2022-01-01 RX ADMIN — MAGNESIUM OXIDE TAB 400 MG (241.3 MG ELEMENTAL MG) 400 MG: 400 (241.3 MG) TAB at 09:14

## 2022-01-01 RX ADMIN — FOLIC ACID 1 MG: 1 TABLET ORAL at 08:41

## 2022-01-01 RX ADMIN — POTASSIUM CHLORIDE AND SODIUM CHLORIDE: 900; 150 INJECTION, SOLUTION INTRAVENOUS at 22:31

## 2022-01-01 RX ADMIN — Medication 125 MCG: at 08:01

## 2022-01-01 RX ADMIN — PIPERACILLIN AND TAZOBACTAM 3.38 G: 3; .375 INJECTION, POWDER, LYOPHILIZED, FOR SOLUTION INTRAVENOUS at 10:24

## 2022-01-01 RX ADMIN — PANTOPRAZOLE SODIUM 40 MG: 40 TABLET, DELAYED RELEASE ORAL at 17:28

## 2022-01-01 RX ADMIN — COLCHICINE 0.6 MG: 0.6 TABLET, FILM COATED ORAL at 08:46

## 2022-01-01 RX ADMIN — METOPROLOL SUCCINATE 75 MG: 25 TABLET, EXTENDED RELEASE ORAL at 08:00

## 2022-01-01 RX ADMIN — FLUTICASONE FUROATE AND VILANTEROL TRIFENATATE 1 PUFF: 200; 25 POWDER RESPIRATORY (INHALATION) at 09:04

## 2022-01-01 RX ADMIN — METHOTREXATE 15 MG: 2.5 TABLET ORAL at 09:34

## 2022-01-01 RX ADMIN — FLUTICASONE FUROATE AND VILANTEROL TRIFENATATE 1 PUFF: 200; 25 POWDER RESPIRATORY (INHALATION) at 07:38

## 2022-01-01 RX ADMIN — Medication 1 CAPSULE: at 07:56

## 2022-01-01 RX ADMIN — FOLIC ACID 1 MG: 1 TABLET ORAL at 09:13

## 2022-01-01 RX ADMIN — METOPROLOL TARTRATE 25 MG: 25 TABLET, FILM COATED ORAL at 05:39

## 2022-01-01 RX ADMIN — RIVAROXABAN 15 MG: 15 TABLET, FILM COATED ORAL at 17:36

## 2022-01-01 RX ADMIN — PANTOPRAZOLE SODIUM 40 MG: 40 TABLET, DELAYED RELEASE ORAL at 16:33

## 2022-01-01 RX ADMIN — DEXAMETHASONE 6 MG: 2 TABLET ORAL at 12:46

## 2022-01-01 RX ADMIN — METOPROLOL TARTRATE 5 MG: 5 INJECTION INTRAVENOUS at 18:26

## 2022-01-01 RX ADMIN — COLCHICINE 0.6 MG: 0.6 TABLET, FILM COATED ORAL at 11:01

## 2022-01-01 RX ADMIN — DULOXETINE HYDROCHLORIDE 20 MG: 20 CAPSULE, DELAYED RELEASE ORAL at 09:02

## 2022-01-01 RX ADMIN — METOPROLOL SUCCINATE 75 MG: 25 TABLET, EXTENDED RELEASE ORAL at 08:49

## 2022-01-01 RX ADMIN — Medication 125 MCG: at 08:31

## 2022-01-01 RX ADMIN — PREDNISONE 5 MG: 5 TABLET ORAL at 09:36

## 2022-01-01 RX ADMIN — MAGNESIUM SULFATE HEPTAHYDRATE 2 G: 40 INJECTION, SOLUTION INTRAVENOUS at 08:57

## 2022-01-01 RX ADMIN — METHOCARBAMOL 500 MG: 500 TABLET, FILM COATED ORAL at 16:59

## 2022-01-01 RX ADMIN — PIPERACILLIN AND TAZOBACTAM 3.38 G: 3; .375 INJECTION, POWDER, LYOPHILIZED, FOR SOLUTION INTRAVENOUS at 20:26

## 2022-01-01 RX ADMIN — REMDESIVIR 200 MG: 100 INJECTION, POWDER, LYOPHILIZED, FOR SOLUTION INTRAVENOUS at 18:03

## 2022-01-01 RX ADMIN — DEXAMETHASONE 6 MG: 2 TABLET ORAL at 13:27

## 2022-01-01 RX ADMIN — DEXAMETHASONE 6 MG: 2 TABLET ORAL at 12:54

## 2022-01-01 RX ADMIN — MAGNESIUM OXIDE TAB 400 MG (241.3 MG ELEMENTAL MG) 400 MG: 400 (241.3 MG) TAB at 08:50

## 2022-01-01 RX ADMIN — POTASSIUM CHLORIDE 10 MEQ: 7.46 INJECTION, SOLUTION INTRAVENOUS at 21:36

## 2022-01-01 RX ADMIN — PIPERACILLIN AND TAZOBACTAM 3.38 G: 3; .375 INJECTION, POWDER, LYOPHILIZED, FOR SOLUTION INTRAVENOUS at 12:44

## 2022-01-01 RX ADMIN — SODIUM CHLORIDE 250 ML: 9 INJECTION, SOLUTION INTRAVENOUS at 10:38

## 2022-01-01 RX ADMIN — IPRATROPIUM BROMIDE AND ALBUTEROL SULFATE 3 ML: 2.5; .5 SOLUTION RESPIRATORY (INHALATION) at 13:43

## 2022-01-01 RX ADMIN — ACETAMINOPHEN 500 MG: 500 TABLET ORAL at 08:46

## 2022-01-01 RX ADMIN — FUROSEMIDE 40 MG: 10 INJECTION, SOLUTION INTRAVENOUS at 04:03

## 2022-01-01 RX ADMIN — POTASSIUM CHLORIDE AND SODIUM CHLORIDE: 900; 150 INJECTION, SOLUTION INTRAVENOUS at 23:48

## 2022-01-01 RX ADMIN — METOPROLOL SUCCINATE 75 MG: 25 TABLET, EXTENDED RELEASE ORAL at 07:56

## 2022-01-01 RX ADMIN — IPRATROPIUM BROMIDE AND ALBUTEROL SULFATE 3 ML: .5; 3 SOLUTION RESPIRATORY (INHALATION) at 02:36

## 2022-01-01 RX ADMIN — METOPROLOL SUCCINATE 75 MG: 25 TABLET, EXTENDED RELEASE ORAL at 08:46

## 2022-01-01 RX ADMIN — METOPROLOL SUCCINATE 75 MG: 25 TABLET, EXTENDED RELEASE ORAL at 09:14

## 2022-01-01 RX ADMIN — PREDNISONE 5 MG: 5 TABLET ORAL at 09:30

## 2022-01-01 RX ADMIN — FAMOTIDINE 20 MG: 20 TABLET ORAL at 19:39

## 2022-01-01 RX ADMIN — MAGNESIUM OXIDE TAB 400 MG (241.3 MG ELEMENTAL MG) 400 MG: 400 (241.3 MG) TAB at 20:41

## 2022-01-01 RX ADMIN — RIVAROXABAN 15 MG: 15 TABLET, FILM COATED ORAL at 09:20

## 2022-01-01 RX ADMIN — PIPERACILLIN AND TAZOBACTAM 3.38 G: 3; .375 INJECTION, POWDER, LYOPHILIZED, FOR SOLUTION INTRAVENOUS at 09:00

## 2022-01-01 RX ADMIN — IPRATROPIUM BROMIDE AND ALBUTEROL SULFATE 3 ML: 2.5; .5 SOLUTION RESPIRATORY (INHALATION) at 15:31

## 2022-01-01 RX ADMIN — FOLIC ACID 1 MG: 1 TABLET ORAL at 08:46

## 2022-01-01 RX ADMIN — COLCHICINE 0.6 MG: 0.6 TABLET, FILM COATED ORAL at 09:16

## 2022-01-01 RX ADMIN — FLUDEOXYGLUCOSE F-18 10.18 MCI.: 500 INJECTION, SOLUTION INTRAVENOUS at 11:28

## 2022-01-01 RX ADMIN — DULOXETINE HYDROCHLORIDE 20 MG: 20 CAPSULE, DELAYED RELEASE ORAL at 08:55

## 2022-01-01 RX ADMIN — PANTOPRAZOLE SODIUM 40 MG: 40 TABLET, DELAYED RELEASE ORAL at 09:19

## 2022-01-01 RX ADMIN — PANTOPRAZOLE SODIUM 40 MG: 40 TABLET, DELAYED RELEASE ORAL at 06:28

## 2022-01-01 RX ADMIN — PIPERACILLIN AND TAZOBACTAM 3.38 G: 3; .375 INJECTION, POWDER, LYOPHILIZED, FOR SOLUTION INTRAVENOUS at 10:42

## 2022-01-01 RX ADMIN — SODIUM CHLORIDE 50 ML: 900 INJECTION INTRAVENOUS at 09:23

## 2022-01-01 RX ADMIN — PANTOPRAZOLE SODIUM 40 MG: 40 TABLET, DELAYED RELEASE ORAL at 08:46

## 2022-01-01 RX ADMIN — MAGNESIUM OXIDE TAB 400 MG (241.3 MG ELEMENTAL MG) 400 MG: 400 (241.3 MG) TAB at 08:57

## 2022-01-01 RX ADMIN — PIPERACILLIN AND TAZOBACTAM 3.38 G: 3; .375 INJECTION, POWDER, LYOPHILIZED, FOR SOLUTION INTRAVENOUS at 08:52

## 2022-01-01 RX ADMIN — DULOXETINE HYDROCHLORIDE 40 MG: 20 CAPSULE, DELAYED RELEASE ORAL at 09:38

## 2022-01-01 RX ADMIN — MAGNESIUM OXIDE TAB 400 MG (241.3 MG ELEMENTAL MG) 400 MG: 400 (241.3 MG) TAB at 20:54

## 2022-01-01 RX ADMIN — ALBUTEROL SULFATE 2.5 MG: 2.5 SOLUTION RESPIRATORY (INHALATION) at 10:09

## 2022-01-01 RX ADMIN — ALLOPURINOL 300 MG: 300 TABLET ORAL at 08:55

## 2022-01-01 RX ADMIN — DULOXETINE HYDROCHLORIDE 40 MG: 20 CAPSULE, DELAYED RELEASE ORAL at 10:56

## 2022-01-01 RX ADMIN — REMDESIVIR 100 MG: 100 INJECTION, POWDER, LYOPHILIZED, FOR SOLUTION INTRAVENOUS at 09:46

## 2022-01-01 RX ADMIN — REMDESIVIR 100 MG: 100 INJECTION, POWDER, LYOPHILIZED, FOR SOLUTION INTRAVENOUS at 09:17

## 2022-01-01 RX ADMIN — TRIAMCINOLONE ACETONIDE 20 MG: 40 INJECTION, SUSPENSION INTRA-ARTICULAR; INTRAMUSCULAR at 16:19

## 2022-01-01 RX ADMIN — FUROSEMIDE 10 MG: 10 INJECTION, SOLUTION INTRAMUSCULAR; INTRAVENOUS at 20:06

## 2022-01-01 RX ADMIN — METOPROLOL SUCCINATE 75 MG: 25 TABLET, EXTENDED RELEASE ORAL at 09:18

## 2022-01-01 RX ADMIN — FLUTICASONE FUROATE AND VILANTEROL TRIFENATATE 1 PUFF: 200; 25 POWDER RESPIRATORY (INHALATION) at 09:13

## 2022-01-01 RX ADMIN — MAGNESIUM SULFATE HEPTAHYDRATE 2 G: 40 INJECTION, SOLUTION INTRAVENOUS at 05:39

## 2022-01-01 RX ADMIN — RIVAROXABAN 15 MG: 15 TABLET, FILM COATED ORAL at 16:31

## 2022-01-01 RX ADMIN — ONDANSETRON 4 MG: 2 INJECTION INTRAMUSCULAR; INTRAVENOUS at 22:43

## 2022-01-01 RX ADMIN — Medication 400 MG: at 08:01

## 2022-01-01 RX ADMIN — PRAVASTATIN SODIUM 20 MG: 20 TABLET ORAL at 09:20

## 2022-01-01 RX ADMIN — COLCHICINE 0.6 MG: 0.6 TABLET, FILM COATED ORAL at 08:41

## 2022-01-01 RX ADMIN — PRAVASTATIN SODIUM 20 MG: 20 TABLET ORAL at 19:47

## 2022-01-01 RX ADMIN — PREDNISONE 5 MG: 5 TABLET ORAL at 09:10

## 2022-01-01 RX ADMIN — LIDOCAINE HYDROCHLORIDE 2 ML: 10 INJECTION, SOLUTION EPIDURAL; INFILTRATION; INTRACAUDAL; PERINEURAL at 11:15

## 2022-01-01 RX ADMIN — METHOCARBAMOL 500 MG: 500 TABLET, FILM COATED ORAL at 18:56

## 2022-01-01 RX ADMIN — MAGNESIUM OXIDE TAB 400 MG (241.3 MG ELEMENTAL MG) 400 MG: 400 (241.3 MG) TAB at 09:19

## 2022-01-01 RX ADMIN — POTASSIUM CHLORIDE 40 MEQ: 1500 TABLET, EXTENDED RELEASE ORAL at 01:57

## 2022-01-01 RX ADMIN — ALLOPURINOL 300 MG: 300 TABLET ORAL at 09:00

## 2022-01-01 RX ADMIN — COLCHICINE 0.6 MG: 0.6 TABLET, FILM COATED ORAL at 12:59

## 2022-01-01 RX ADMIN — MAGNESIUM OXIDE TAB 400 MG (241.3 MG ELEMENTAL MG) 400 MG: 400 (241.3 MG) TAB at 08:54

## 2022-01-01 RX ADMIN — FLUTICASONE FUROATE AND VILANTEROL TRIFENATATE 1 PUFF: 200; 25 POWDER RESPIRATORY (INHALATION) at 08:57

## 2022-01-01 RX ADMIN — FIDAXOMICIN 200 MG: 200 TABLET, FILM COATED ORAL at 09:38

## 2022-01-01 RX ADMIN — MAGNESIUM SULFATE HEPTAHYDRATE 4 G: 4 INJECTION, SOLUTION INTRAVENOUS at 10:47

## 2022-01-01 RX ADMIN — IPRATROPIUM BROMIDE AND ALBUTEROL SULFATE 3 ML: 2.5; .5 SOLUTION RESPIRATORY (INHALATION) at 08:41

## 2022-01-01 RX ADMIN — METOPROLOL TARTRATE 5 MG: 5 INJECTION INTRAVENOUS at 23:16

## 2022-01-01 RX ADMIN — PREDNISONE 5 MG: 5 TABLET ORAL at 09:07

## 2022-01-01 RX ADMIN — MAGNESIUM SULFATE HEPTAHYDRATE 2 G: 40 INJECTION, SOLUTION INTRAVENOUS at 16:25

## 2022-01-01 RX ADMIN — FAMOTIDINE 20 MG: 20 TABLET ORAL at 08:31

## 2022-01-01 RX ADMIN — METOPROLOL SUCCINATE 75 MG: 25 TABLET, EXTENDED RELEASE ORAL at 11:01

## 2022-01-01 RX ADMIN — MAGNESIUM OXIDE TAB 400 MG (241.3 MG ELEMENTAL MG) 400 MG: 400 (241.3 MG) TAB at 19:40

## 2022-01-01 RX ADMIN — FIDAXOMICIN 200 MG: 200 TABLET, FILM COATED ORAL at 19:17

## 2022-01-01 RX ADMIN — IPRATROPIUM BROMIDE AND ALBUTEROL SULFATE 3 ML: 2.5; .5 SOLUTION RESPIRATORY (INHALATION) at 20:02

## 2022-01-01 RX ADMIN — MAGNESIUM OXIDE TAB 400 MG (241.3 MG ELEMENTAL MG) 400 MG: 400 (241.3 MG) TAB at 20:31

## 2022-01-01 RX ADMIN — METHYLPREDNISOLONE SODIUM SUCCINATE 125 MG: 125 INJECTION, POWDER, FOR SOLUTION INTRAMUSCULAR; INTRAVENOUS at 08:26

## 2022-01-01 RX ADMIN — SODIUM CHLORIDE 500 ML: 9 INJECTION, SOLUTION INTRAVENOUS at 08:42

## 2022-01-01 RX ADMIN — PANTOPRAZOLE SODIUM 40 MG: 20 TABLET, DELAYED RELEASE ORAL at 09:30

## 2022-01-01 RX ADMIN — FLUTICASONE FUROATE AND VILANTEROL TRIFENATATE 1 PUFF: 200; 25 POWDER RESPIRATORY (INHALATION) at 08:56

## 2022-01-01 RX ADMIN — ALLOPURINOL 300 MG: 300 TABLET ORAL at 08:41

## 2022-01-01 RX ADMIN — PANTOPRAZOLE SODIUM 40 MG: 40 TABLET, DELAYED RELEASE ORAL at 18:03

## 2022-01-01 RX ADMIN — PROCHLORPERAZINE EDISYLATE 5 MG: 5 INJECTION INTRAMUSCULAR; INTRAVENOUS at 21:26

## 2022-01-01 RX ADMIN — PIPERACILLIN AND TAZOBACTAM 3.38 G: 3; .375 INJECTION, POWDER, LYOPHILIZED, FOR SOLUTION INTRAVENOUS at 17:08

## 2022-01-01 RX ADMIN — DULOXETINE HYDROCHLORIDE 40 MG: 20 CAPSULE, DELAYED RELEASE ORAL at 08:01

## 2022-01-01 RX ADMIN — DULOXETINE HYDROCHLORIDE 20 MG: 20 CAPSULE, DELAYED RELEASE ORAL at 11:06

## 2022-01-01 RX ADMIN — FIDAXOMICIN 200 MG: 200 TABLET, FILM COATED ORAL at 08:32

## 2022-01-01 RX ADMIN — COLCHICINE 0.6 MG: 0.6 TABLET, FILM COATED ORAL at 07:56

## 2022-01-01 RX ADMIN — LOPERAMIDE HYDROCHLORIDE 2 MG: 2 CAPSULE ORAL at 21:17

## 2022-01-01 RX ADMIN — PANTOPRAZOLE SODIUM 40 MG: 40 TABLET, DELAYED RELEASE ORAL at 17:19

## 2022-01-01 RX ADMIN — DIPHENHYDRAMINE HYDROCHLORIDE AND LIDOCAINE HYDROCHLORIDE AND ALUMINUM HYDROXIDE AND MAGNESIUM HYDRO 10 ML: KIT at 06:41

## 2022-01-01 RX ADMIN — PIPERACILLIN AND TAZOBACTAM 3.38 G: 3; .375 INJECTION, POWDER, LYOPHILIZED, FOR SOLUTION INTRAVENOUS at 02:50

## 2022-01-01 RX ADMIN — PREDNISONE 5 MG: 5 TABLET ORAL at 09:00

## 2022-01-01 RX ADMIN — ALBUTEROL SULFATE 2 PUFF: 90 AEROSOL, METERED RESPIRATORY (INHALATION) at 18:38

## 2022-01-01 RX ADMIN — FUROSEMIDE 40 MG: 10 INJECTION, SOLUTION INTRAVENOUS at 19:40

## 2022-01-01 RX ADMIN — Medication 1 CAPSULE: at 11:01

## 2022-01-01 RX ADMIN — RIVAROXABAN 15 MG: 15 TABLET, FILM COATED ORAL at 17:12

## 2022-01-01 RX ADMIN — VANCOMYCIN HYDROCHLORIDE 125 MG: 125 CAPSULE ORAL at 22:08

## 2022-01-01 RX ADMIN — SODIUM CHLORIDE 50 ML: 900 INJECTION INTRAVENOUS at 10:29

## 2022-01-01 RX ADMIN — MAGNESIUM OXIDE TAB 400 MG (241.3 MG ELEMENTAL MG) 400 MG: 400 (241.3 MG) TAB at 20:10

## 2022-01-01 RX ADMIN — VANCOMYCIN HYDROCHLORIDE 1000 MG: 1 INJECTION, SOLUTION INTRAVENOUS at 10:26

## 2022-01-01 RX ADMIN — PIPERACILLIN AND TAZOBACTAM 3.38 G: 3; .375 INJECTION, POWDER, LYOPHILIZED, FOR SOLUTION INTRAVENOUS at 19:39

## 2022-01-01 RX ADMIN — PREDNISONE 5 MG: 5 TABLET ORAL at 09:20

## 2022-01-01 RX ADMIN — RIVAROXABAN 15 MG: 15 TABLET, FILM COATED ORAL at 09:06

## 2022-01-01 RX ADMIN — FOLIC ACID 1 MG: 1 TABLET ORAL at 11:06

## 2022-01-01 RX ADMIN — MAGNESIUM SULFATE HEPTAHYDRATE 4 G: 4 INJECTION, SOLUTION INTRAVENOUS at 21:41

## 2022-01-01 RX ADMIN — Medication 125 MCG: at 09:13

## 2022-01-01 RX ADMIN — RIVAROXABAN 15 MG: 15 TABLET, FILM COATED ORAL at 18:23

## 2022-01-01 RX ADMIN — FAMOTIDINE 20 MG: 20 TABLET ORAL at 08:01

## 2022-01-01 RX ADMIN — FIDAXOMICIN 200 MG: 200 TABLET, FILM COATED ORAL at 20:45

## 2022-01-01 RX ADMIN — MAGNESIUM SULFATE HEPTAHYDRATE 2 G: 40 INJECTION, SOLUTION INTRAVENOUS at 07:43

## 2022-01-01 RX ADMIN — DEXAMETHASONE 6 MG: 2 TABLET ORAL at 13:34

## 2022-01-01 RX ADMIN — PREDNISONE 5 MG: 5 TABLET ORAL at 08:01

## 2022-01-01 RX ADMIN — ALLOPURINOL 300 MG: 300 TABLET ORAL at 09:07

## 2022-01-01 RX ADMIN — DEXAMETHASONE 6 MG: 2 TABLET ORAL at 13:23

## 2022-01-01 RX ADMIN — FUROSEMIDE 20 MG: 10 INJECTION, SOLUTION INTRAMUSCULAR; INTRAVENOUS at 05:15

## 2022-01-01 RX ADMIN — SODIUM CHLORIDE 50 ML: 9 INJECTION, SOLUTION INTRAVENOUS at 18:49

## 2022-01-01 RX ADMIN — MAGNESIUM OXIDE TAB 400 MG (241.3 MG ELEMENTAL MG) 400 MG: 400 (241.3 MG) TAB at 08:44

## 2022-01-01 ASSESSMENT — ACTIVITIES OF DAILY LIVING (ADL)
ADLS_ACUITY_SCORE: 26
ADLS_ACUITY_SCORE: 30
ADLS_ACUITY_SCORE: 30
DOING_ERRANDS_INDEPENDENTLY_DIFFICULTY: YES
ADLS_ACUITY_SCORE: 29
ADLS_ACUITY_SCORE: 36
ADLS_ACUITY_SCORE: 30
ADLS_ACUITY_SCORE: 35
CHANGE_IN_FUNCTIONAL_STATUS_SINCE_ONSET_OF_CURRENT_ILLNESS/INJURY: YES
ADLS_ACUITY_SCORE: 29
ADLS_ACUITY_SCORE: 43
ADLS_ACUITY_SCORE: 35
ADLS_ACUITY_SCORE: 40
ADLS_ACUITY_SCORE: 37
ADLS_ACUITY_SCORE: 35
ADLS_ACUITY_SCORE: 43
ADLS_ACUITY_SCORE: 43
ADLS_ACUITY_SCORE: 35
ADLS_ACUITY_SCORE: 43
ADLS_ACUITY_SCORE: 36
ADLS_ACUITY_SCORE: 32
NUMBER_OF_TIMES_PATIENT_HAS_FALLEN_WITHIN_LAST_SIX_MONTHS: 7
DESCRIBE_HEARING_LOSS: BILATERAL HEARING LOSS
CHANGE_IN_FUNCTIONAL_STATUS_SINCE_ONSET_OF_CURRENT_ILLNESS/INJURY: YES
ADLS_ACUITY_SCORE: 35
ADLS_ACUITY_SCORE: 30
FALL_HISTORY_WITHIN_LAST_SIX_MONTHS: YES
ADLS_ACUITY_SCORE: 34
ADLS_ACUITY_SCORE: 26
ADLS_ACUITY_SCORE: 24
CHANGE_IN_FUNCTIONAL_STATUS_SINCE_ONSET_OF_CURRENT_ILLNESS/INJURY: YES
WALKING_OR_CLIMBING_STAIRS_DIFFICULTY: YES
ADLS_ACUITY_SCORE: 35
ADLS_ACUITY_SCORE: 29
ADLS_ACUITY_SCORE: 29
ADLS_ACUITY_SCORE: 43
ADLS_ACUITY_SCORE: 24
TRANSFERRING: 1-->ASSISTANCE (EQUIPMENT/PERSON) NEEDED
ADLS_ACUITY_SCORE: 30
ADLS_ACUITY_SCORE: 30
ADLS_ACUITY_SCORE: 41
ADLS_ACUITY_SCORE: 30
ADLS_ACUITY_SCORE: 43
ADLS_ACUITY_SCORE: 43
ADLS_ACUITY_SCORE: 31
NUMBER_OF_TIMES_PATIENT_HAS_FALLEN_WITHIN_LAST_SIX_MONTHS: 4
FALL_HISTORY_WITHIN_LAST_SIX_MONTHS: YES
ADLS_ACUITY_SCORE: 35
TOILETING_ISSUES: NO
DIFFICULTY_EATING/SWALLOWING: NO
ADLS_ACUITY_SCORE: 30
ADLS_ACUITY_SCORE: 30
ADLS_ACUITY_SCORE: 36
ADLS_ACUITY_SCORE: 35
ADLS_ACUITY_SCORE: 29
ADLS_ACUITY_SCORE: 30
ADLS_ACUITY_SCORE: 30
TOILETING_ISSUES: YES
ADLS_ACUITY_SCORE: 34
ADLS_ACUITY_SCORE: 40
ADLS_ACUITY_SCORE: 34
ADLS_ACUITY_SCORE: 40
ADLS_ACUITY_SCORE: 35
HEARING_DIFFICULTY_OR_DEAF: NO
ADLS_ACUITY_SCORE: 40
ADLS_ACUITY_SCORE: 37
ADLS_ACUITY_SCORE: 34
DOING_ERRANDS_INDEPENDENTLY_DIFFICULTY: NO
ADLS_ACUITY_SCORE: 34
ADLS_ACUITY_SCORE: 35
TRANSFERRING: 1-->ASSISTANCE (EQUIPMENT/PERSON) NEEDED (NOT DEVELOPMENTALLY APPROPRIATE)
ADLS_ACUITY_SCORE: 37
DEPENDENT_IADLS:: INDEPENDENT
ADLS_ACUITY_SCORE: 35
TRANSFERRING: 1-->ASSISTANCE (EQUIPMENT/PERSON) NEEDED
DOING_ERRANDS_INDEPENDENTLY_DIFFICULTY: YES
ADLS_ACUITY_SCORE: 30
ADLS_ACUITY_SCORE: 34
ADLS_ACUITY_SCORE: 34
ADLS_ACUITY_SCORE: 40
ADLS_ACUITY_SCORE: 30
ADLS_ACUITY_SCORE: 30
ADLS_ACUITY_SCORE: 31
ADLS_ACUITY_SCORE: 31
ADLS_ACUITY_SCORE: 26
ADLS_ACUITY_SCORE: 24
ADLS_ACUITY_SCORE: 35
DEPENDENT_IADLS:: CLEANING;MEDICATION MANAGEMENT
ADLS_ACUITY_SCORE: 30
NUMBER_OF_TIMES_PATIENT_HAS_FALLEN_WITHIN_LAST_SIX_MONTHS: 1
ADLS_ACUITY_SCORE: 37
ADLS_ACUITY_SCORE: 26
ADLS_ACUITY_SCORE: 31
ADLS_ACUITY_SCORE: 30
ADLS_ACUITY_SCORE: 37
ADLS_ACUITY_SCORE: 35
ADLS_ACUITY_SCORE: 35
ADLS_ACUITY_SCORE: 43
ADLS_ACUITY_SCORE: 43
ADLS_ACUITY_SCORE: 26
DRESSING/BATHING: BATHING DIFFICULTY, ASSISTANCE 1 PERSON
ADLS_ACUITY_SCORE: 30
ADLS_ACUITY_SCORE: 30
HEARING_DIFFICULTY_OR_DEAF: YES
DRESS: 1-->ASSISTANCE (EQUIPMENT/PERSON) NEEDED (NOT DEVELOPMENTALLY APPROPRIATE)
VISION_MANAGEMENT: DISTANCE AND READING
ADLS_ACUITY_SCORE: 35
HEARING_DIFFICULTY_OR_DEAF: NO
ADLS_ACUITY_SCORE: 26
ADLS_ACUITY_SCORE: 40
ADLS_ACUITY_SCORE: 31
FALL_HISTORY_WITHIN_LAST_SIX_MONTHS: YES
ADLS_ACUITY_SCORE: 40
DRESSING/BATHING_DIFFICULTY: NO
ADLS_ACUITY_SCORE: 43
ADLS_ACUITY_SCORE: 35
ADLS_ACUITY_SCORE: 29
ADLS_ACUITY_SCORE: 37
ADLS_ACUITY_SCORE: 34
ADLS_ACUITY_SCORE: 26
ADLS_ACUITY_SCORE: 30
ADLS_ACUITY_SCORE: 30
DRESSING/BATHING_DIFFICULTY: NO
ADLS_ACUITY_SCORE: 34
EQUIPMENT_CURRENTLY_USED_AT_HOME: WALKER, ROLLING
ADLS_ACUITY_SCORE: 26
ADLS_ACUITY_SCORE: 37
ADLS_ACUITY_SCORE: 30
ADLS_ACUITY_SCORE: 40
ADLS_ACUITY_SCORE: 35
TOILETING: 1-->ASSISTANCE (EQUIPMENT/PERSON) NEEDED
ADLS_ACUITY_SCORE: 28
ADLS_ACUITY_SCORE: 30
TRANSFERRING: 1-->ASSISTANCE (EQUIPMENT/PERSON) NEEDED
ADLS_ACUITY_SCORE: 36
ADLS_ACUITY_SCORE: 31
WALKING_OR_CLIMBING_STAIRS_DIFFICULTY: YES
ADLS_ACUITY_SCORE: 31
FALL_HISTORY_WITHIN_LAST_SIX_MONTHS: YES
TOILETING_ISSUES: NO
ADLS_ACUITY_SCORE: 35
ADLS_ACUITY_SCORE: 29
ADLS_ACUITY_SCORE: 29
ADLS_ACUITY_SCORE: 43
ADLS_ACUITY_SCORE: 43
ADLS_ACUITY_SCORE: 30
DIFFICULTY_COMMUNICATING: NO
VISION_MANAGEMENT: GLASSES
ADLS_ACUITY_SCORE: 35
ADLS_ACUITY_SCORE: 30
ADLS_ACUITY_SCORE: 43
NUMBER_OF_TIMES_PATIENT_HAS_FALLEN_WITHIN_LAST_SIX_MONTHS: 6
ADLS_ACUITY_SCORE: 37
ADLS_ACUITY_SCORE: 30
ADLS_ACUITY_SCORE: 35
ADLS_ACUITY_SCORE: 28
ADLS_ACUITY_SCORE: 34
ADLS_ACUITY_SCORE: 40
WEAR_GLASSES_OR_BLIND: YES
ADLS_ACUITY_SCORE: 37
BATHING: 1-->ASSISTANCE NEEDED
ADLS_ACUITY_SCORE: 35
ADLS_ACUITY_SCORE: 34
CONCENTRATING,_REMEMBERING_OR_MAKING_DECISIONS_DIFFICULTY: NO
WALKING_OR_CLIMBING_STAIRS: AMBULATION DIFFICULTY, REQUIRES EQUIPMENT
DRESS: 1-->ASSISTANCE (EQUIPMENT/PERSON) NEEDED
ADLS_ACUITY_SCORE: 30
ADLS_ACUITY_SCORE: 30
ADLS_ACUITY_SCORE: 43
ADLS_ACUITY_SCORE: 35
ADLS_ACUITY_SCORE: 37
WEAR_GLASSES_OR_BLIND: YES
WALKING_OR_CLIMBING_STAIRS_DIFFICULTY: YES
ADLS_ACUITY_SCORE: 43
CONCENTRATING,_REMEMBERING_OR_MAKING_DECISIONS_DIFFICULTY: NO
ADLS_ACUITY_SCORE: 28
ADLS_ACUITY_SCORE: 35
ADLS_ACUITY_SCORE: 30
ADLS_ACUITY_SCORE: 31
ADLS_ACUITY_SCORE: 30
ADLS_ACUITY_SCORE: 29
ADLS_ACUITY_SCORE: 30
ADLS_ACUITY_SCORE: 35
ADLS_ACUITY_SCORE: 35
ADLS_ACUITY_SCORE: 34
ADLS_ACUITY_SCORE: 26
ADLS_ACUITY_SCORE: 37
ADLS_ACUITY_SCORE: 28
ADLS_ACUITY_SCORE: 37
ADLS_ACUITY_SCORE: 34
DRESSING/BATHING_DIFFICULTY: NO
DIFFICULTY_COMMUNICATING: NO
ADLS_ACUITY_SCORE: 35
ADLS_ACUITY_SCORE: 35
ADLS_ACUITY_SCORE: 37
DRESSING/BATHING_DIFFICULTY: YES
ADLS_ACUITY_SCORE: 41
DIFFICULTY_COMMUNICATING: NO
ADLS_ACUITY_SCORE: 35
ADLS_ACUITY_SCORE: 30
CHANGE_IN_FUNCTIONAL_STATUS_SINCE_ONSET_OF_CURRENT_ILLNESS/INJURY: YES
ADLS_ACUITY_SCORE: 30
ADLS_ACUITY_SCORE: 40
PATIENT'S_PREFERRED_MEANS_OF_COMMUNICATION: ENGLISH SPEAKER WITH HEARING LOSS, NO SPEECH PROBLEMS.
ADLS_ACUITY_SCORE: 35
ADLS_ACUITY_SCORE: 26
ADLS_ACUITY_SCORE: 30
DEPENDENT_IADLS:: CLEANING;COOKING;LAUNDRY;SHOPPING;MEAL PREPARATION;MEDICATION MANAGEMENT;TRANSPORTATION
ADLS_ACUITY_SCORE: 35
TOILETING: 1-->ASSISTANCE (EQUIPMENT/PERSON) NEEDED (NOT DEVELOPMENTALLY APPROPRIATE)
ADLS_ACUITY_SCORE: 24
ADLS_ACUITY_SCORE: 26
ADLS_ACUITY_SCORE: 26
TOILETING_ASSISTANCE: TOILETING DIFFICULTY, REQUIRES EQUIPMENT
TRANSFERRING: 1-->ASSISTANCE (EQUIPMENT/PERSON) NEEDED (NOT DEVELOPMENTALLY APPROPRIATE)
ADLS_ACUITY_SCORE: 30
CONCENTRATING,_REMEMBERING_OR_MAKING_DECISIONS_DIFFICULTY: NO
ADLS_ACUITY_SCORE: 43
WALKING_OR_CLIMBING_STAIRS_DIFFICULTY: YES
DOING_ERRANDS_INDEPENDENTLY_DIFFICULTY: NO
ADLS_ACUITY_SCORE: 35
ADLS_ACUITY_SCORE: 40
ADLS_ACUITY_SCORE: 36
ADLS_ACUITY_SCORE: 30
ADLS_ACUITY_SCORE: 30
ADLS_ACUITY_SCORE: 40
WALKING_OR_CLIMBING_STAIRS: AMBULATION DIFFICULTY, REQUIRES EQUIPMENT;STAIR CLIMBING DIFFICULTY, REQUIRES EQUIPMENT
ADLS_ACUITY_SCORE: 30
ADLS_ACUITY_SCORE: 26
ADLS_ACUITY_SCORE: 35
TRANSFERRING: 1-->ASSISTANCE (EQUIPMENT/PERSON) NEEDED (NOT DEVELOPMENTALLY APPROPRIATE)
ADLS_ACUITY_SCORE: 30
ADLS_ACUITY_SCORE: 30
WALKING_OR_CLIMBING_STAIRS: AMBULATION DIFFICULTY, REQUIRES EQUIPMENT
TRANSFERRING: 1-->ASSISTANCE (EQUIPMENT/PERSON) NEEDED
ADLS_ACUITY_SCORE: 29
THE_FOLLOWING_AIDS_WERE_PROVIDED;: PATIENT DECLINED OFFER OF AUXILIARY AIDS
ADLS_ACUITY_SCORE: 30
DIFFICULTY_EATING/SWALLOWING: NO
ADLS_ACUITY_SCORE: 35
TRANSFERRING: 1-->ASSISTANCE (EQUIPMENT/PERSON) NEEDED (NOT DEVELOPMENTALLY APPROPRIATE)
ADLS_ACUITY_SCORE: 43
EQUIPMENT_CURRENTLY_USED_AT_HOME: WALKER, ROLLING;OTHER (SEE COMMENTS)
WERE_AUXILIARY_AIDS_OFFERED?: NO
ADLS_ACUITY_SCORE: 35
DIFFICULTY_EATING/SWALLOWING: NO
ADLS_ACUITY_SCORE: 26
ADLS_ACUITY_SCORE: 36
ADLS_ACUITY_SCORE: 30
TOILETING_ISSUES: NO
ADLS_ACUITY_SCORE: 30
ADLS_ACUITY_SCORE: 30
ADLS_ACUITY_SCORE: 35
ADLS_ACUITY_SCORE: 41
ADLS_ACUITY_SCORE: 29
EQUIPMENT_CURRENTLY_USED_AT_HOME: WALKER, STANDARD
ADLS_ACUITY_SCORE: 26
WEAR_GLASSES_OR_BLIND: YES
ADLS_ACUITY_SCORE: 40
ADLS_ACUITY_SCORE: 43
ADLS_ACUITY_SCORE: 37
CONCENTRATING,_REMEMBERING_OR_MAKING_DECISIONS_DIFFICULTY: YES
ADLS_ACUITY_SCORE: 31
DIFFICULTY_EATING/SWALLOWING: NO
ADLS_ACUITY_SCORE: 43
ADLS_ACUITY_SCORE: 43
ADLS_ACUITY_SCORE: 30
WEAR_GLASSES_OR_BLIND: YES
ADLS_ACUITY_SCORE: 26
ADLS_ACUITY_SCORE: 30
ADLS_ACUITY_SCORE: 30
ADLS_ACUITY_SCORE: 29
ADLS_ACUITY_SCORE: 30
ADLS_ACUITY_SCORE: 34
ADLS_ACUITY_SCORE: 36
ADLS_ACUITY_SCORE: 29
ADLS_ACUITY_SCORE: 43
ADLS_ACUITY_SCORE: 30
ADLS_ACUITY_SCORE: 43

## 2022-01-01 ASSESSMENT — ENCOUNTER SYMPTOMS
DIARRHEA: 0
BLOOD IN STOOL: 0
AGITATION: 0
FEVER: 1
FEVER: 0
DYSURIA: 0
COLOR CHANGE: 1
COLOR CHANGE: 0
CHILLS: 0
DIARRHEA: 1
HEADACHES: 0
NAUSEA: 0
LIGHT-HEADEDNESS: 1
FEVER: 1
VOMITING: 0
LIGHT-HEADEDNESS: 1
FEVER: 0
SORE THROAT: 0
FEVER: 0
APPETITE CHANGE: 1
ABDOMINAL PAIN: 0
COUGH: 0
RHINORRHEA: 0
SHORTNESS OF BREATH: 1
NAUSEA: 1
CHILLS: 0
SHORTNESS OF BREATH: 0
DIZZINESS: 0
DIARRHEA: 1
ACTIVITY CHANGE: 1
WOUND: 1
SHORTNESS OF BREATH: 0
VOMITING: 0
DIFFICULTY URINATING: 0
NERVOUS/ANXIOUS: 0
BRUISES/BLEEDS EASILY: 1
FEVER: 0
WEAKNESS: 1
VOMITING: 0
COUGH: 1
ABDOMINAL PAIN: 0
SHORTNESS OF BREATH: 0
PALPITATIONS: 0
RHINORRHEA: 0
NAUSEA: 0
SHORTNESS OF BREATH: 0
ABDOMINAL PAIN: 0
FATIGUE: 1
VOMITING: 0
MYALGIAS: 1
EYE REDNESS: 0
SHORTNESS OF BREATH: 1
ARTHRALGIAS: 0
DIARRHEA: 1
NAUSEA: 1
BLOOD IN STOOL: 0
COUGH: 1
CONFUSION: 0
NECK STIFFNESS: 0
NECK PAIN: 1
HEADACHES: 0
SORE THROAT: 0
ABDOMINAL PAIN: 0

## 2022-01-09 DIAGNOSIS — M05.9 SEROPOSITIVE RHEUMATOID ARTHRITIS (H): ICD-10-CM

## 2022-01-10 RX ORDER — METHOTREXATE 2.5 MG/1
TABLET ORAL
Qty: 16 TABLET | Refills: 0 | Status: SHIPPED | OUTPATIENT
Start: 2022-01-10 | End: 2022-01-26

## 2022-01-12 ENCOUNTER — TELEPHONE (OUTPATIENT)
Dept: RHEUMATOLOGY | Facility: CLINIC | Age: 77
End: 2022-01-12
Payer: COMMERCIAL

## 2022-01-14 NOTE — TELEPHONE ENCOUNTER
Central Prior Authorization Team   Phone: [phone]      Prior Authorization Not Needed per Insurance    01/14/2022  Medication: methotrexate sodium 2.5 MG TABS - NOT NEEDED  Insurance Company: [employer] - Phone [phone] Fax [phone]  Expected CoPay:      Pharmacy Filling the Rx: [facility] [address] [place] [address]  Pharmacy Notified: Yes  Patient Notified: Yes (**Instructed pharmacy to notify patient when script is ready to /ship.**)    Pharmacy received a paid claim.

## 2022-01-17 ENCOUNTER — APPOINTMENT (OUTPATIENT)
Dept: CT IMAGING | Facility: CLINIC | Age: 77
DRG: 190 | End: 2022-01-17
Attending: EMERGENCY MEDICINE
Payer: COMMERCIAL

## 2022-01-17 ENCOUNTER — HOSPITAL ENCOUNTER (INPATIENT)
Facility: CLINIC | Age: 77
LOS: 5 days | Discharge: HOME HEALTH | DRG: 190 | End: 2022-01-22
Attending: EMERGENCY MEDICINE | Admitting: HOSPITALIST
Payer: COMMERCIAL

## 2022-01-17 DIAGNOSIS — J44.1 COPD EXACERBATION (H): ICD-10-CM

## 2022-01-17 DIAGNOSIS — R91.8 PULMONARY NODULES: ICD-10-CM

## 2022-01-17 DIAGNOSIS — R26.89 IMPAIRMENT OF BALANCE: Primary | ICD-10-CM

## 2022-01-17 DIAGNOSIS — R19.7 DIARRHEA, UNSPECIFIED TYPE: ICD-10-CM

## 2022-01-17 DIAGNOSIS — J45.909 REACTIVE AIRWAY DISEASE WITHOUT COMPLICATION, UNSPECIFIED ASTHMA SEVERITY, UNSPECIFIED WHETHER PERSISTENT: ICD-10-CM

## 2022-01-17 DIAGNOSIS — J45.21 MILD INTERMITTENT REACTIVE AIRWAY DISEASE WITH ACUTE EXACERBATION: ICD-10-CM

## 2022-01-17 DIAGNOSIS — I48.0 PAROXYSMAL ATRIAL FIBRILLATION (H): ICD-10-CM

## 2022-01-17 DIAGNOSIS — M05.9 SEROPOSITIVE RHEUMATOID ARTHRITIS (H): ICD-10-CM

## 2022-01-17 LAB
ALBUMIN SERPL-MCNC: 3.5 G/DL (ref 3.5–5)
ALP SERPL-CCNC: 118 U/L (ref 45–120)
ALT SERPL W P-5'-P-CCNC: 13 U/L (ref 0–45)
ANION GAP SERPL CALCULATED.3IONS-SCNC: 13 MMOL/L (ref 5–18)
AST SERPL W P-5'-P-CCNC: 21 U/L (ref 0–40)
BASOPHILS # BLD AUTO: 0 10E3/UL (ref 0–0.2)
BASOPHILS NFR BLD AUTO: 0 %
BILIRUB SERPL-MCNC: 0.6 MG/DL (ref 0–1)
BNP SERPL-MCNC: 190 PG/ML (ref 0–137)
BUN SERPL-MCNC: 31 MG/DL (ref 8–28)
CALCIUM SERPL-MCNC: 11.6 MG/DL (ref 8.5–10.5)
CHLORIDE BLD-SCNC: 98 MMOL/L (ref 98–107)
CO2 SERPL-SCNC: 26 MMOL/L (ref 22–31)
CREAT SERPL-MCNC: 1.45 MG/DL (ref 0.6–1.1)
D DIMER PPP FEU-MCNC: 0.85 UG/ML FEU (ref 0–0.5)
EOSINOPHIL # BLD AUTO: 0 10E3/UL (ref 0–0.7)
EOSINOPHIL NFR BLD AUTO: 0 %
ERYTHROCYTE [DISTWIDTH] IN BLOOD BY AUTOMATED COUNT: 13.5 % (ref 10–15)
FLUAV RNA SPEC QL NAA+PROBE: NEGATIVE
FLUBV RNA RESP QL NAA+PROBE: NEGATIVE
GFR SERPL CREATININE-BSD FRML MDRD: 37 ML/MIN/1.73M2
GLUCOSE BLD-MCNC: 108 MG/DL (ref 70–125)
HCT VFR BLD AUTO: 38.8 % (ref 35–47)
HGB BLD-MCNC: 12.7 G/DL (ref 11.7–15.7)
HOLD SPECIMEN: NORMAL
IMM GRANULOCYTES # BLD: 0 10E3/UL
IMM GRANULOCYTES NFR BLD: 0 %
LYMPHOCYTES # BLD AUTO: 0.4 10E3/UL (ref 0.8–5.3)
LYMPHOCYTES NFR BLD AUTO: 8 %
MCH RBC QN AUTO: 32.7 PG (ref 26.5–33)
MCHC RBC AUTO-ENTMCNC: 32.7 G/DL (ref 31.5–36.5)
MCV RBC AUTO: 100 FL (ref 78–100)
MONOCYTES # BLD AUTO: 0.1 10E3/UL (ref 0–1.3)
MONOCYTES NFR BLD AUTO: 2 %
NEUTROPHILS # BLD AUTO: 4.1 10E3/UL (ref 1.6–8.3)
NEUTROPHILS NFR BLD AUTO: 90 %
NRBC # BLD AUTO: 0 10E3/UL
NRBC BLD AUTO-RTO: 0 /100
PLATELET # BLD AUTO: 296 10E3/UL (ref 150–450)
POTASSIUM BLD-SCNC: 4.2 MMOL/L (ref 3.5–5)
PROCALCITONIN SERPL-MCNC: 0.05 NG/ML (ref 0–0.49)
PROT SERPL-MCNC: 6.6 G/DL (ref 6–8)
RBC # BLD AUTO: 3.88 10E6/UL (ref 3.8–5.2)
SARS-COV-2 RNA RESP QL NAA+PROBE: NEGATIVE
SODIUM SERPL-SCNC: 137 MMOL/L (ref 136–145)
TROPONIN I SERPL-MCNC: 0.04 NG/ML (ref 0–0.29)
WBC # BLD AUTO: 4.6 10E3/UL (ref 4–11)

## 2022-01-17 PROCEDURE — 93005 ELECTROCARDIOGRAM TRACING: CPT | Performed by: EMERGENCY MEDICINE

## 2022-01-17 PROCEDURE — 120N000001 HC R&B MED SURG/OB

## 2022-01-17 PROCEDURE — C9803 HOPD COVID-19 SPEC COLLECT: HCPCS

## 2022-01-17 PROCEDURE — 250N000011 HC RX IP 250 OP 636: Performed by: EMERGENCY MEDICINE

## 2022-01-17 PROCEDURE — 96366 THER/PROPH/DIAG IV INF ADDON: CPT

## 2022-01-17 PROCEDURE — 71275 CT ANGIOGRAPHY CHEST: CPT

## 2022-01-17 PROCEDURE — 36415 COLL VENOUS BLD VENIPUNCTURE: CPT | Performed by: EMERGENCY MEDICINE

## 2022-01-17 PROCEDURE — 96365 THER/PROPH/DIAG IV INF INIT: CPT | Mod: 59

## 2022-01-17 PROCEDURE — 250N000012 HC RX MED GY IP 250 OP 636 PS 637: Performed by: HOSPITALIST

## 2022-01-17 PROCEDURE — 99223 1ST HOSP IP/OBS HIGH 75: CPT | Performed by: HOSPITALIST

## 2022-01-17 PROCEDURE — 80053 COMPREHEN METABOLIC PANEL: CPT | Performed by: EMERGENCY MEDICINE

## 2022-01-17 PROCEDURE — 85379 FIBRIN DEGRADATION QUANT: CPT | Performed by: EMERGENCY MEDICINE

## 2022-01-17 PROCEDURE — 87636 SARSCOV2 & INF A&B AMP PRB: CPT | Performed by: EMERGENCY MEDICINE

## 2022-01-17 PROCEDURE — 84484 ASSAY OF TROPONIN QUANT: CPT | Performed by: EMERGENCY MEDICINE

## 2022-01-17 PROCEDURE — 258N000003 HC RX IP 258 OP 636: Performed by: HOSPITALIST

## 2022-01-17 PROCEDURE — 250N000011 HC RX IP 250 OP 636: Performed by: HOSPITALIST

## 2022-01-17 PROCEDURE — 99285 EMERGENCY DEPT VISIT HI MDM: CPT | Mod: 25

## 2022-01-17 PROCEDURE — 83970 ASSAY OF PARATHORMONE: CPT | Performed by: HOSPITALIST

## 2022-01-17 PROCEDURE — 250N000009 HC RX 250: Performed by: EMERGENCY MEDICINE

## 2022-01-17 PROCEDURE — 83880 ASSAY OF NATRIURETIC PEPTIDE: CPT | Performed by: EMERGENCY MEDICINE

## 2022-01-17 PROCEDURE — 85025 COMPLETE CBC W/AUTO DIFF WBC: CPT | Performed by: EMERGENCY MEDICINE

## 2022-01-17 PROCEDURE — 87449 NOS EACH ORGANISM AG IA: CPT | Performed by: HOSPITALIST

## 2022-01-17 PROCEDURE — 84145 PROCALCITONIN (PCT): CPT | Performed by: EMERGENCY MEDICINE

## 2022-01-17 RX ORDER — PRAVASTATIN SODIUM 20 MG
20 TABLET ORAL DAILY
Status: DISCONTINUED | OUTPATIENT
Start: 2022-01-18 | End: 2022-01-22 | Stop reason: HOSPADM

## 2022-01-17 RX ORDER — SODIUM CHLORIDE 9 MG/ML
INJECTION, SOLUTION INTRAVENOUS CONTINUOUS
Status: ACTIVE | OUTPATIENT
Start: 2022-01-17 | End: 2022-01-18

## 2022-01-17 RX ORDER — ALBUTEROL SULFATE 0.83 MG/ML
2.5 SOLUTION RESPIRATORY (INHALATION) EVERY 6 HOURS PRN
Status: ON HOLD | COMMUNITY
Start: 2021-09-03 | End: 2022-01-21

## 2022-01-17 RX ORDER — FUROSEMIDE 10 MG/ML
20 INJECTION INTRAMUSCULAR; INTRAVENOUS ONCE
Status: DISCONTINUED | OUTPATIENT
Start: 2022-01-17 | End: 2022-01-18

## 2022-01-17 RX ORDER — IOPAMIDOL 755 MG/ML
100 INJECTION, SOLUTION INTRAVASCULAR ONCE
Status: COMPLETED | OUTPATIENT
Start: 2022-01-17 | End: 2022-01-17

## 2022-01-17 RX ORDER — IPRATROPIUM BROMIDE AND ALBUTEROL SULFATE 2.5; .5 MG/3ML; MG/3ML
3 SOLUTION RESPIRATORY (INHALATION) ONCE
Status: COMPLETED | OUTPATIENT
Start: 2022-01-17 | End: 2022-01-17

## 2022-01-17 RX ORDER — IPRATROPIUM BROMIDE AND ALBUTEROL SULFATE 2.5; .5 MG/3ML; MG/3ML
3 SOLUTION RESPIRATORY (INHALATION)
Status: DISCONTINUED | OUTPATIENT
Start: 2022-01-18 | End: 2022-01-22 | Stop reason: HOSPADM

## 2022-01-17 RX ORDER — ONDANSETRON 2 MG/ML
4 INJECTION INTRAMUSCULAR; INTRAVENOUS EVERY 6 HOURS PRN
Status: DISCONTINUED | OUTPATIENT
Start: 2022-01-17 | End: 2022-01-22 | Stop reason: HOSPADM

## 2022-01-17 RX ORDER — LIDOCAINE 40 MG/G
CREAM TOPICAL
Status: DISCONTINUED | OUTPATIENT
Start: 2022-01-17 | End: 2022-01-22 | Stop reason: HOSPADM

## 2022-01-17 RX ORDER — IPRATROPIUM BROMIDE AND ALBUTEROL SULFATE 2.5; .5 MG/3ML; MG/3ML
3 SOLUTION RESPIRATORY (INHALATION)
Status: DISCONTINUED | OUTPATIENT
Start: 2022-01-17 | End: 2022-01-17

## 2022-01-17 RX ORDER — METOPROLOL SUCCINATE 100 MG/1
150 TABLET, EXTENDED RELEASE ORAL DAILY
Status: ON HOLD | COMMUNITY
End: 2022-01-21

## 2022-01-17 RX ORDER — PREDNISONE 20 MG/1
20 TABLET ORAL DAILY
Status: DISCONTINUED | OUTPATIENT
Start: 2022-01-17 | End: 2022-01-22 | Stop reason: HOSPADM

## 2022-01-17 RX ORDER — ONDANSETRON 4 MG/1
4 TABLET, ORALLY DISINTEGRATING ORAL EVERY 6 HOURS PRN
Status: DISCONTINUED | OUTPATIENT
Start: 2022-01-17 | End: 2022-01-22 | Stop reason: HOSPADM

## 2022-01-17 RX ADMIN — PREDNISONE 20 MG: 20 TABLET ORAL at 23:58

## 2022-01-17 RX ADMIN — IOPAMIDOL 100 ML: 755 INJECTION, SOLUTION INTRAVENOUS at 19:41

## 2022-01-17 RX ADMIN — SULFAMETHOXAZOLE AND TRIMETHOPRIM 240 MG: 80; 16 INJECTION, SOLUTION, CONCENTRATE INTRAVENOUS at 23:59

## 2022-01-17 RX ADMIN — IPRATROPIUM BROMIDE AND ALBUTEROL SULFATE 3 ML: .5; 3 SOLUTION RESPIRATORY (INHALATION) at 21:19

## 2022-01-17 ASSESSMENT — ACTIVITIES OF DAILY LIVING (ADL)
ADLS_ACUITY_SCORE: 8

## 2022-01-17 NOTE — ED TRIAGE NOTES
Pt here with shortness of breath feeling fatigued, cough. Has been sick for 10 days. Diagnosed with influenza, not vane if it is A or B. Pt said at clinic her oxygen was in the 80's.

## 2022-01-17 NOTE — ED NOTES
Expected Patient Referral to ED  4:21 PM    Referring Clinic/Provider:  [facility]    Reason for referral/Clinical facts:  Influenza positive-done at [facility].  Suspected undiagnosed copd as long time smoker.  sats 80s with ambulation, lower 90s.  No cp and no ischemic changes.  cxr clear but hyperexpanded.  Feel needs admission for low sats with exertion with sob, but no beds available here.  Will need to present to ER for admission.  Patient aware that she will be in the ER for extended period of time waiting for a bed.  She is okay at rest and prefers private transport.  However, trop is borderline elevated on their machine just over 0.3, but she landon no ischemic changes or cp.  They have not done covid swab there yet.    Recommendations provided:  Contact inpatient service to discuss direct admission    Caller was informed that this institution does possess the capabilities and/or resources to provide for patient and should be transferred to our facility.  We have no inpatient cardiac tele beds currently available or m/s tele beds.    Discussed that if direct admit is sought and any hurdles are encountered, this ED would be happy to see the patient and evaluate.    Informed caller that recommendations provided are recommendations based only on the facts provided and that they responsible to accept or reject the advice, or to seek a formal in person consultation as needed and that this ED will see/treat patient should they arrive.      Asiya Kohler MD  Emergency Medicine  [facility] EMERGENCY ROOM  [address] [id number] [phone]       Asiya Kohler MD  01/17/22 [id number]

## 2022-01-18 ENCOUNTER — APPOINTMENT (OUTPATIENT)
Dept: CARDIOLOGY | Facility: CLINIC | Age: 77
DRG: 190 | End: 2022-01-18
Attending: HOSPITALIST
Payer: COMMERCIAL

## 2022-01-18 LAB
ALBUMIN SERPL-MCNC: 3.1 G/DL (ref 3.5–5)
ALP SERPL-CCNC: 100 U/L (ref 45–120)
ALT SERPL W P-5'-P-CCNC: 11 U/L (ref 0–45)
ANION GAP SERPL CALCULATED.3IONS-SCNC: 8 MMOL/L (ref 5–18)
AST SERPL W P-5'-P-CCNC: 18 U/L (ref 0–40)
ATRIAL RATE - MUSE: 76 BPM
BILIRUB SERPL-MCNC: 0.4 MG/DL (ref 0–1)
BUN SERPL-MCNC: 35 MG/DL (ref 8–28)
CALCIUM SERPL-MCNC: 11 MG/DL (ref 8.5–10.5)
CALCIUM, IONIZED MEASURED: 1.29 MMOL/L (ref 1.11–1.3)
CHLORIDE BLD-SCNC: 100 MMOL/L (ref 98–107)
CO2 SERPL-SCNC: 26 MMOL/L (ref 22–31)
CREAT SERPL-MCNC: 1.68 MG/DL (ref 0.6–1.1)
DIASTOLIC BLOOD PRESSURE - MUSE: NORMAL MMHG
ERYTHROCYTE [DISTWIDTH] IN BLOOD BY AUTOMATED COUNT: 13.4 % (ref 10–15)
GFR SERPL CREATININE-BSD FRML MDRD: 31 ML/MIN/1.73M2
GLUCOSE BLD-MCNC: 136 MG/DL (ref 70–125)
HCT VFR BLD AUTO: 36.7 % (ref 35–47)
HGB BLD-MCNC: 12.1 G/DL (ref 11.7–15.7)
INTERPRETATION ECG - MUSE: NORMAL
ION CA PH 7.4: 1.3 MMOL/L (ref 1.11–1.3)
LVEF ECHO: NORMAL
MCH RBC QN AUTO: 33.2 PG (ref 26.5–33)
MCHC RBC AUTO-ENTMCNC: 33 G/DL (ref 31.5–36.5)
MCV RBC AUTO: 101 FL (ref 78–100)
P AXIS - MUSE: NORMAL DEGREES
PH: 7.41 (ref 7.35–7.45)
PLATELET # BLD AUTO: 291 10E3/UL (ref 150–450)
POTASSIUM BLD-SCNC: 4.1 MMOL/L (ref 3.5–5)
PR INTERVAL - MUSE: 196 MS
PROT SERPL-MCNC: 6 G/DL (ref 6–8)
PTH-INTACT SERPL-MCNC: 31 PG/ML (ref 18–80)
QRS DURATION - MUSE: 84 MS
QT - MUSE: 366 MS
QTC - MUSE: 411 MS
R AXIS - MUSE: 121 DEGREES
RBC # BLD AUTO: 3.65 10E6/UL (ref 3.8–5.2)
SODIUM SERPL-SCNC: 134 MMOL/L (ref 136–145)
SYSTOLIC BLOOD PRESSURE - MUSE: NORMAL MMHG
T AXIS - MUSE: 93 DEGREES
VENTRICULAR RATE- MUSE: 76 BPM
WBC # BLD AUTO: 5.2 10E3/UL (ref 4–11)

## 2022-01-18 PROCEDURE — 255N000002 HC RX 255 OP 636: Performed by: INTERNAL MEDICINE

## 2022-01-18 PROCEDURE — 250N000011 HC RX IP 250 OP 636: Performed by: HOSPITALIST

## 2022-01-18 PROCEDURE — 82374 ASSAY BLOOD CARBON DIOXIDE: CPT | Performed by: HOSPITALIST

## 2022-01-18 PROCEDURE — 36415 COLL VENOUS BLD VENIPUNCTURE: CPT | Performed by: INTERNAL MEDICINE

## 2022-01-18 PROCEDURE — 99233 SBSQ HOSP IP/OBS HIGH 50: CPT | Performed by: INTERNAL MEDICINE

## 2022-01-18 PROCEDURE — 258N000003 HC RX IP 258 OP 636: Performed by: HOSPITALIST

## 2022-01-18 PROCEDURE — 250N000009 HC RX 250: Performed by: EMERGENCY MEDICINE

## 2022-01-18 PROCEDURE — 36415 COLL VENOUS BLD VENIPUNCTURE: CPT | Performed by: HOSPITALIST

## 2022-01-18 PROCEDURE — 82542 COL CHROMOTOGRAPHY QUAL/QUAN: CPT | Performed by: INTERNAL MEDICINE

## 2022-01-18 PROCEDURE — 250N000012 HC RX MED GY IP 250 OP 636 PS 637: Performed by: HOSPITALIST

## 2022-01-18 PROCEDURE — 999N000208 ECHOCARDIOGRAM COMPLETE

## 2022-01-18 PROCEDURE — 82330 ASSAY OF CALCIUM: CPT | Performed by: INTERNAL MEDICINE

## 2022-01-18 PROCEDURE — 250N000011 HC RX IP 250 OP 636: Performed by: INTERNAL MEDICINE

## 2022-01-18 PROCEDURE — 250N000013 HC RX MED GY IP 250 OP 250 PS 637: Performed by: HOSPITALIST

## 2022-01-18 PROCEDURE — 93306 TTE W/DOPPLER COMPLETE: CPT | Mod: 26 | Performed by: INTERNAL MEDICINE

## 2022-01-18 PROCEDURE — 120N000001 HC R&B MED SURG/OB

## 2022-01-18 PROCEDURE — 85027 COMPLETE CBC AUTOMATED: CPT | Performed by: HOSPITALIST

## 2022-01-18 RX ORDER — PIPERACILLIN SODIUM, TAZOBACTAM SODIUM 3; .375 G/15ML; G/15ML
3.38 INJECTION, POWDER, LYOPHILIZED, FOR SOLUTION INTRAVENOUS EVERY 8 HOURS
Status: DISCONTINUED | OUTPATIENT
Start: 2022-01-18 | End: 2022-01-19

## 2022-01-18 RX ORDER — HEPARIN SODIUM 5000 [USP'U]/.5ML
5000 INJECTION, SOLUTION INTRAVENOUS; SUBCUTANEOUS EVERY 12 HOURS
Status: DISCONTINUED | OUTPATIENT
Start: 2022-01-18 | End: 2022-01-19

## 2022-01-18 RX ADMIN — SULFAMETHOXAZOLE AND TRIMETHOPRIM 240 MG: 80; 16 INJECTION, SOLUTION, CONCENTRATE INTRAVENOUS at 14:08

## 2022-01-18 RX ADMIN — IPRATROPIUM BROMIDE AND ALBUTEROL SULFATE 3 ML: .5; 3 SOLUTION RESPIRATORY (INHALATION) at 12:24

## 2022-01-18 RX ADMIN — PERFLUTREN 3 ML: 6.52 INJECTION, SUSPENSION INTRAVENOUS at 09:50

## 2022-01-18 RX ADMIN — SULFAMETHOXAZOLE AND TRIMETHOPRIM 240 MG: 80; 16 INJECTION, SOLUTION, CONCENTRATE INTRAVENOUS at 08:43

## 2022-01-18 RX ADMIN — IPRATROPIUM BROMIDE AND ALBUTEROL SULFATE 3 ML: .5; 3 SOLUTION RESPIRATORY (INHALATION) at 21:42

## 2022-01-18 RX ADMIN — SULFAMETHOXAZOLE AND TRIMETHOPRIM 240 MG: 80; 16 INJECTION, SOLUTION, CONCENTRATE INTRAVENOUS at 21:07

## 2022-01-18 RX ADMIN — HEPARIN SODIUM 5000 UNITS: 5000 INJECTION, SOLUTION INTRAVENOUS; SUBCUTANEOUS at 14:08

## 2022-01-18 RX ADMIN — PREDNISONE 20 MG: 20 TABLET ORAL at 08:06

## 2022-01-18 RX ADMIN — PIPERACILLIN AND TAZOBACTAM 3.38 G: 3; .375 INJECTION, POWDER, LYOPHILIZED, FOR SOLUTION INTRAVENOUS at 19:23

## 2022-01-18 RX ADMIN — Medication 1 MG: at 02:24

## 2022-01-18 RX ADMIN — SODIUM CHLORIDE: 9 INJECTION, SOLUTION INTRAVENOUS at 00:13

## 2022-01-18 RX ADMIN — PRAVASTATIN SODIUM 20 MG: 20 TABLET ORAL at 08:06

## 2022-01-18 RX ADMIN — IPRATROPIUM BROMIDE AND ALBUTEROL SULFATE 3 ML: .5; 3 SOLUTION RESPIRATORY (INHALATION) at 17:32

## 2022-01-18 RX ADMIN — ONDANSETRON 4 MG: 4 TABLET, ORALLY DISINTEGRATING ORAL at 19:52

## 2022-01-18 RX ADMIN — IPRATROPIUM BROMIDE AND ALBUTEROL SULFATE 3 ML: .5; 3 SOLUTION RESPIRATORY (INHALATION) at 08:21

## 2022-01-18 RX ADMIN — METOPROLOL SUCCINATE 150 MG: 50 TABLET, EXTENDED RELEASE ORAL at 08:06

## 2022-01-18 RX ADMIN — PIPERACILLIN AND TAZOBACTAM 3.38 G: 3; .375 INJECTION, POWDER, LYOPHILIZED, FOR SOLUTION INTRAVENOUS at 11:58

## 2022-01-18 ASSESSMENT — ACTIVITIES OF DAILY LIVING (ADL)
ADLS_ACUITY_SCORE: 5
DIFFICULTY_COMMUNICATING: NO
DOING_ERRANDS_INDEPENDENTLY_DIFFICULTY: NO
ADLS_ACUITY_SCORE: 8
WALKING_OR_CLIMBING_STAIRS_DIFFICULTY: YES
ADLS_ACUITY_SCORE: 8
ADLS_ACUITY_SCORE: 8
FALL_HISTORY_WITHIN_LAST_SIX_MONTHS: NO
ADLS_ACUITY_SCORE: 8
DRESSING/BATHING_DIFFICULTY: NO
ADLS_ACUITY_SCORE: 8
DIFFICULTY_EATING/SWALLOWING: NO
WEAR_GLASSES_OR_BLIND: YES
ADLS_ACUITY_SCORE: 8
CONCENTRATING,_REMEMBERING_OR_MAKING_DECISIONS_DIFFICULTY: NO
ADLS_ACUITY_SCORE: 8
HEARING_DIFFICULTY_OR_DEAF: NO
ADLS_ACUITY_SCORE: 8
TOILETING_ISSUES: NO
ADLS_ACUITY_SCORE: 8
ADLS_ACUITY_SCORE: 8

## 2022-01-18 ASSESSMENT — MIFFLIN-ST. JEOR: SCORE: 1225.94

## 2022-01-18 NOTE — ED PROVIDER NOTES
EMERGENCY DEPARTMENT ENCOUNTER      NAME: Fatuma Henry  AGE: 76 year old female  YOB: 1945  MRN: [id number]  EVALUATION DATE & TIME: 1/17/2022  6:04 PM    PCP: Tory Wise    ED PROVIDER: Micaela Barnett M.D.      Chief Complaint   Patient presents with     Cough     Shortness of Breath     Fatigue         FINAL IMPRESSION:  1. COPD exacerbation (H)    2. Pulmonary nodules          ED COURSE & MEDICAL DECISION MAKING:    ED Course as of 01/17/22 2040 Mon Jan 17, 2022   1832 Pt here with desat into 80s and SOB with exertion with sat 97% RA while at rest, pt amenable to repeat EKG/troponin (sees cardiology here with troponin 0.3 at urgency room with EKG normal), BNP and CTA pending r/o PE although unlikely with pt on xarelto, COVID19 PCR pending and certainly on Ddx, and repeat EKG reassuring at this time, pt amenable to plan to admit after CT and will d/w her daughter plan   2009 Ddimer elevated, bNP in nebulous range at 190. Troponin here reassuringly 0.04 (negative) and EKG unchanged compared to remote prior, influenza and COVID19 negative. Will admit for overnight observation and albuterol with COPD exacerbation likely (speculated COPD) and patient with pulmonary nodule aware, and plan to f/u in outpaitent overall setting. Cardiology and hosptialist paged   2029 I spoke with cardiology Domingo here who agrees ok to admit to [facility] given troponin negative, EKG reassuring   2037 Pt endorsed to hospitalist Dada to med surg tele, he notes make inpatient       6:23 PM I met with the patient, obtained history, performed an initial exam, and discussed options and plan for diagnostics and treatment here in the ED.  6:36 PM I reviewed and interpreted patient's EKG.  8:28 PM I spoke with Dr. Ferris, Cardiology, who agrees with plan of care.     Pertinent Labs & Imaging studies reviewed. (See chart for details)    N95 worn  A face shield was worn also  COVID PPE      At the conclusion  of the encounter I discussed the results of all of the tests and the disposition. The questions were answered. The patient or family acknowledged understanding and was agreeable with the care plan.     MEDICATIONS GIVEN IN THE EMERGENCY:  Medications   ipratropium - albuterol 0.5 mg/2.5 mg/3 mL (DUONEB) neb solution 3 mL (has no administration in time range)   iopamidol (ISOVUE-370) solution 100 mL (100 mLs Intravenous Given 1/17/22 1941)       NEW PRESCRIPTIONS STARTED AT TODAY'S ER VISIT  New Prescriptions    No medications on file          =================================================================    HPI      Fatuma Henry is a 76 year old female with PMHx of atrial fibrillation (on Xarelto and is followed by [facility]) who presents to the ED today via private car. with cough, shortness of breath and fatigue.     Per EMR, patient visited [facility] today for evaluation of worsening cough and shortness of breath. Patient is influenza positive.  Her x-ray does not show any evidence of pneumonia, and her white count is normal.Creatinine is slightly elevated at 1.40. It looks like it was most recently 1.14 in November. She says it tends to go up and down. Her troponin is slightly elevated at 0.035. Her EKG does not show any ischemic changes.Attending provided attempted direct admission to [facility], but was unable to do so. Instead patient was referred to [facility] ED for further evaluation with plan for admission.     Patient states that she has been experiencing shortness of breath for about a year. She is not formally diagnosed with COPD.. For the past few days, she is more fatigued, feeling sick. About 10 days ago (1/7), she was diagnosed with influenza at [facility]. She is vaccinated against both influenza and COVID-19. She visited urgent care today, as she was feeling more shortness of breath with exertion. When she is laying still, she does not have shortness of  breath. When she exerts her self, she can't do much of anything without feeling overwhelmingly short of breath. No new fevers or cough. At urgent care today, she did test for COVID-19, however, results are still pending. No known sick contacts. At urgent care, she was given prednisone, asprin and nebs, which helped improve her symptoms somewhat. No chest pain, lightheadedness, palpitations. No history of heart attacks, blood clots or s/p cardiac stenting. She is a former smoker. She states she is here in the ED because her O2 sats were low while ambulating.      REVIEW OF SYSTEMS   All other systems reviewed and are negative except as noted above in HPI.    PAST MEDICAL HISTORY:  Past Medical History:   Diagnosis Date     Atrial fibrillation (H)      CRF (chronic renal failure)      GERD (gastroesophageal reflux disease)      Hypertension      Hypovolemic shock (H)      Influenza A 12/2017     Rheumatoid arthritis (H)      Sepsis (H) 12/24/2017       PAST SURGICAL HISTORY:  Past Surgical History:   Procedure Laterality Date     APPENDECTOMY       BLADDER SUSPENSION       CHOLECYSTECTOMY         CURRENT MEDICATIONS:    acetaminophen (TYLENOL) 500 MG tablet  albuterol (PROVENTIL) (2.5 MG/3ML) 0.083% neb solution  certolizumab pegol (CIMZIA PREFILLED) 2 X 200 MG/ML KIT 2 syringes/kit  compressor, for nebulizer Eva  flaxseed oil 1,000 mg cap  folic acid (FOLVITE) 1 MG tablet  FOLIC ACID/MULTIVITS-MIN/LUT (CENTRUM SILVER ORAL)  furosemide (LASIX) 20 MG tablet  methotrexate sodium 2.5 MG TABS  metoprolol succinate ER (TOPROL-XL) 100 MG 24 hr tablet  pravastatin (PRAVACHOL) 20 MG tablet  rivaroxaban ANTICOAGULANT (XARELTO) 20 mg tablet        ALLERGIES:  Allergies   Allergen Reactions     Codeine Nausea and Vomiting       FAMILY HISTORY:  Family History   Problem Relation Age of Onset     Heart Failure Mother      Cerebrovascular Disease Father      Kidney failure Sister      Cerebrovascular Disease Brother      Diabetes  Type 2  Brother        SOCIAL HISTORY:   Social History     Socioeconomic History     Marital status:      Spouse name: Not on file     Number of children: Not on file     Years of education: Not on file     Highest education level: Not on file   Occupational History     Not on file   Tobacco Use     Smoking status: Former Smoker     Packs/day: 0.50     Types: Cigarettes     Quit date: 2017     Years since quittin.0     Smokeless tobacco: Never Used   Substance and Sexual Activity     Alcohol use: No     Drug use: No     Sexual activity: Not Currently   Other Topics Concern     Not on file   Social History Narrative     Not on file     Social Determinants of Health     Financial Resource Strain: Not on file   Food Insecurity: Not on file   Transportation Needs: Not on file   Physical Activity: Not on file   Stress: Not on file   Social Connections: Not on file   Intimate Partner Violence: Not on file   Housing Stability: Not on file       VITALS:  Patient Vitals for the past 24 hrs:   BP Temp Pulse Resp SpO2 Weight   22 1815 110/55 -- 73 19 94 % --   22 1716 -- -- -- -- -- 72.6 kg (160 lb)   22 1714 126/56 97.5  F (36.4  C) 81 20 92 % --       PHYSICAL EXAM    GENERAL: Awake, alert.  In no acute distress.   HEENT: Normocephalic, atraumatic.  Pupils equal, round and reactive.  Conjunctiva normal.  EOMI.  NECK: No stridor or apparent deformity.  PULMONARY: Speaking in full sentences, SpO2 at 97% at rest. Symmetrical breath sounds without distress.  Lungs clear to auscultation bilaterally without wheezes, rhonchi or rales.  CARDIO: Regular rate and rhythm.  No significant murmur, rub or gallop.  Radial pulses strong and symmetrical. No leg swelling.   ABDOMINAL: Abdomen soft, non-distended and non-tender to palpation.  No CVAT, no palpable hepatosplenomegaly.  EXTREMITIES: No lower extremity swelling or edema.    NEURO: Alert and oriented to person, place and time.  Cranial nerves  grossly intact.  No focal motor deficit.  PSYCH: Normal mood and affect  SKIN: No rashes       LAB:  All pertinent labs reviewed and interpreted.  Results for orders placed or performed during the hospital encounter of 01/17/22   CT Chest Pulmonary Embolism w Contrast    Impression    IMPRESSION:    1.  No pulmonary embolism.    2.  Mild pulmonary trunk enlargement; correlate for pulmonary hypertension.    3.  Nonaneurysmal aorta without dissection.    4.  Mild emphysema. Mild airway thickening.    5.  Left upper lobe / lingular groundglass and cystic focus with thickening, indeterminate. Adenocarcinoma type lesions can have this appearance. Post infectious or inflammatory change versus scarring also possible differentials. Recommend 6 month   follow-up per guidelines below.    6.  Moderate coronary calcifications.      REFERENCE:  Guidelines for Management of Incidental Pulmonary Nodules Detected on CT Images: From the Fleischner Society 2017.   Guidelines apply to incidental nodules in patients who are 35 years or older.  Guidelines do not apply to lung cancer screening, patients with immunosuppression, or patients with known primary cancer.    SUBSOLID NODULES  Ground glass  Nodule size <6 mm  No routine follow-up. If suspicious, consider follow-up at 2 and 4 years.    Nodule size 6 mm or greater  CT at 6-12 months to confirm persistence, then CT every 2 years until 5 years.    Part solid  Nodule size <6 mm  No routine follow-up.    Nodule size 6 mm or greater  CT at 3-6 months to confirm persistence. If unchanged and solid component remains <6 mm, annual CT for 5 years.    Multiple  CT at 3-6 months. If stable, consider CT at 2 and 4 years. Management based on the most suspicious nodule.     Symptomatic; Unknown Influenza A/B & SARS-CoV2 (COVID-19) Virus PCR Multiplex Nasopharyngeal    Specimen: Nasopharyngeal; Swab   Result Value Ref Range    Influenza A PCR Negative Negative    Influenza B PCR Negative  Negative    SARS CoV2 PCR Negative Negative   Result Value Ref Range    Troponin I 0.04 0.00 - 0.29 ng/mL   Extra Blue Top Tube   Result Value Ref Range    Hold Specimen JIC    Extra Red Top Tube   Result Value Ref Range    Hold Specimen JIC    Extra Green Top (Lithium Heparin) Tube   Result Value Ref Range    Hold Specimen JIC    Extra Purple Top Tube   Result Value Ref Range    Hold Specimen JIC    Comprehensive metabolic panel   Result Value Ref Range    Sodium 137 136 - 145 mmol/L    Potassium 4.2 3.5 - 5.0 mmol/L    Chloride 98 98 - 107 mmol/L    Carbon Dioxide (CO2) 26 22 - 31 mmol/L    Anion Gap 13 5 - 18 mmol/L    Urea Nitrogen 31 (H) 8 - 28 mg/dL    Creatinine 1.45 (H) 0.60 - 1.10 mg/dL    Calcium 11.6 (H) 8.5 - 10.5 mg/dL    Glucose 108 70 - 125 mg/dL    Alkaline Phosphatase 118 45 - 120 U/L    AST 21 0 - 40 U/L    ALT 13 0 - 45 U/L    Protein Total 6.6 6.0 - 8.0 g/dL    Albumin 3.5 3.5 - 5.0 g/dL    Bilirubin Total 0.6 0.0 - 1.0 mg/dL    GFR Estimate 37 (L) >60 mL/min/1.73m2   B-Type Natriuretic Peptide ([facility] Only)   Result Value Ref Range     (H) 0 - 137 pg/mL   D dimer quantitative   Result Value Ref Range    D-Dimer Quantitative 0.85 (H) 0.00 - 0.50 ug/mL FEU   Result Value Ref Range    Procalcitonin 0.05 0.00 - 0.49 ng/mL       RADIOLOGY:  Reviewed all pertinent imaging. Please see official radiology report.  CT Chest Pulmonary Embolism w Contrast   Final Result   IMPRESSION:      1.  No pulmonary embolism.      2.  Mild pulmonary trunk enlargement; correlate for pulmonary hypertension.      3.  Nonaneurysmal aorta without dissection.      4.  Mild emphysema. Mild airway thickening.      5.  Left upper lobe / lingular groundglass and cystic focus with thickening, indeterminate. Adenocarcinoma type lesions can have this appearance. Post infectious or inflammatory change versus scarring also possible differentials. Recommend 6 month    follow-up per guidelines below.      6.  Moderate coronary  calcifications.         REFERENCE:   Guidelines for Management of Incidental Pulmonary Nodules Detected on CT Images: From the Fleischner Society 2017.    Guidelines apply to incidental nodules in patients who are 35 years or older.   Guidelines do not apply to lung cancer screening, patients with immunosuppression, or patients with known primary cancer.      SUBSOLID NODULES   Ground glass   Nodule size <6 mm   No routine follow-up. If suspicious, consider follow-up at 2 and 4 years.      Nodule size 6 mm or greater   CT at 6-12 months to confirm persistence, then CT every 2 years until 5 years.      Part solid   Nodule size <6 mm   No routine follow-up.      Nodule size 6 mm or greater   CT at 3-6 months to confirm persistence. If unchanged and solid component remains <6 mm, annual CT for 5 years.      Multiple   CT at 3-6 months. If stable, consider CT at 2 and 4 years. Management based on the most suspicious nodule.               EKG:    Reviewed and interpreted as: 17-Jan-2022 17:22:00. Vent. Rate: 76 BPM. Sinus rhythm. Left posterior fascicular block. When compared with ECG of 26-May-2021 13:16, no significant change was found. Negative for STEMI.       I have independently reviewed and interpreted the EKG(s) documented above.        I, Sara Behmanesh , am serving as a scribe to document services personally performed by Dr. Micaela Barnett based on my observation and the provider's statements to me. I, Micaela Barnett MD attest that Sara Behmanesh is acting in a scribe capacity, has observed my performance of the services and has documented them in accordance with my direction.     Micaela Barnett MD  01/17/22 2041

## 2022-01-18 NOTE — PROGRESS NOTES
[facility] PROGRESS NOTE      Identification/Summary:  Fatuma Henry is a 76 year old female who has past medical history of Atrial fibrillation (H), CRF (chronic renal failure), GERD (gastroesophageal reflux disease), Hypertension, Hypovolemic shock (H), Influenza A (12/2017), Rheumatoid arthritis (H), and Sepsis presented with shortness of breath, cough for 2 weeks and admitted for abnormal CT chest with cystic lesion, groundglass opacities in the left upper lobe.  On Zosyn, Bactrim given immunosuppressive status.  Awaiting pulmonary input.      Assessment and Plan  Acute hypoxemic respiratory failure  Cystic lung lesion on CT  COPD exacerbation  Emphysema  Former smoker  Immunocompromised  Hx of methotrexate (chronic), former smoker  Start Zosyn  Bactrim started for possible pneumocystis pending pulmonary consult  Pulmonology consult, defer to pulm whether this Tx should be continued  Check sputum cultures, Fungitell pending.  Continue nebs, low-dose prednisone  On 2 L oxygen  Wean off as tolerated.     Hypercalcemia  Calcium normal less than a year ago  Giving IVF & lasix  PTH pending    Acute kidney injury  Baseline Cr < 1  Continue IV fluids  Received 1 dose of IV Lasix for hypercalcemia  Monitor BMP  Avoid nephrotoxic medications    Moderate pulmonary hypertension  Echo this admission EF 65% mild aortic stenosis, mild to moderate Aortic regurgitation      Seropositive rheumatoid arthritis  Takes Cimzia, methotrexate    Diet: Combination Diet Regular Diet Adult  DVT Prophylaxis: Start subcu heparin  Code Status: No CPR- Do NOT Intubate    Anticipated possible discharge in few days once clinical improvement milestones are met.    Interval History/Subjective:  She is doing better today.  Her breathing is stable.  Occasional cough.  No chest pain.  No other nausea vomiting abdominal pain, urinary complaints.    Physical Exam/Objective:  Vitals I/O   Vital signs:  Temp: 97.5  F (36.4  C)   BP:  "(!) 142/62 Pulse: 79   Resp: 22 SpO2: 98 % O2 Device: Nasal cannula Oxygen Delivery: 2 LPM   Weight: 72.6 kg (160 lb)  Estimated body mass index is 26.22 kg/m  as calculated from the following:    Height as of 6/2/21: 1.664 m (5' 5.5\").    Weight as of this encounter: 72.6 kg (160 lb).     No intake/output data recorded.     Body mass index is 26.22 kg/m .    General Appearance:  Alert, cooperative, no distress   Head:  Normocephalic, atraumatic   Eyes:  PERRL    Throat:  mucosa; moist   Neck: No JVD, thyromegaly   Lungs:    Decreased breath sounds bilaterally, respirations unlabored   Chest Wall:  No tenderness or deformity   Heart:  Regular rate and rhythm, S1, S2 normal,no murmur   Abdomen:   Soft, non tender, non distended, bowel sounds present, no guarding or rigidity   Extremities: No edema, no joint swelling   Skin: Skin color, texture, turgor normal, no rashes or lesions   Neurologic: Alert and oriented X 3, Moves all 4 extremities       Medications:   Personally Reviewed.    certolizumab pegol  400 mg Subcutaneous Q28 Days     ipratropium - albuterol 0.5 mg/2.5 mg/3 mL  3 mL Nebulization 4x daily     metoprolol succinate ER  150 mg Oral Daily     piperacillin-tazobactam  3.375 g Intravenous Q8H     pravastatin  20 mg Oral Daily     predniSONE  20 mg Oral Daily     sodium chloride (PF)  3 mL Intracatheter Q8H     sulfamethoxazole-trimethoprim  240 mg Intravenous Q6H       Data reviewed today: I personally reviewed all new medications, labs, imaging/diagnostics reports over the past 24 hours. Pertinent findings include    Labs:  Most Recent 3 CBC's:Recent Labs   Lab Test 01/18/22  0633 01/17/22  2123 11/18/21  1312   WBC 5.2 4.6 5.7   HGB 12.1 12.7 12.4   * 100 104*    296 300     Most Recent 3 BMP's:Recent Labs   Lab Test 01/18/22  0633 01/17/22  1756 11/18/21  1312 06/02/21  1053 04/30/21  1104   * 137  --   --  140   POTASSIUM 4.1 4.2  --   --  4.2   CHLORIDE 100 98  --   --  104   CO2 " 26 26  --   --  27   BUN 35* 31*  --   --  19   CR 1.68* 1.45* 1.14*   < > 0.89  0.89   ANIONGAP 8 13  --   --  9   SUNI 11.0* 11.6*  --   --  9.6  9.6   * 108  --   --  81    < > = values in this interval not displayed.       Imaging:   Recent Results (from the past 24 hour(s))   CT Chest Pulmonary Embolism w Contrast    Narrative    EXAM: CT CHEST PULMONARY EMBOLISM W CONTRAST  LOCATION: [facility]  DATE/TIME: 1/17/2022 7:40 PM    INDICATION: Cough. Shortness breath.  COMPARISON: None.  TECHNIQUE: CT chest pulmonary angiogram during arterial phase injection of IV contrast. Multiplanar reformats and MIP reconstructions were performed. Dose reduction techniques were used.   CONTRAST: 100 mL Isovue 370.    FINDINGS:  ANGIOGRAM CHEST: No pulmonary embolism. Mild pulmonary trunk enlargement 3.1 cm. Aortic valvular calcifications. Nonaneurysmal aorta without dissection. Mild-moderate atherosclerosis.    LUNGS AND PLEURA: Mild emphysema. 12 mm groundglass focus in the lingula (series 6, image 124). Heterogeneous cystic focus with thickening measuring 15 mm in the infrahilar lingula (image 129). Mild airway thickening. No pleural effusion or pneumothorax.    MEDIASTINUM/AXILLAE: No adenopathy. No pericardial effusion.    CORONARY ARTERY CALCIFICATION: Moderate.    UPPER ABDOMEN: Nothing acute.    MUSCULOSKELETAL: Bony demineralization.      Impression    IMPRESSION:    1.  No pulmonary embolism.    2.  Mild pulmonary trunk enlargement; correlate for pulmonary hypertension.    3.  Nonaneurysmal aorta without dissection.    4.  Mild emphysema. Mild airway thickening.    5.  Left upper lobe / lingular groundglass and cystic focus with thickening, indeterminate. Adenocarcinoma type lesions can have this appearance. Post infectious or inflammatory change versus scarring also possible differentials. Recommend 6 month   follow-up per guidelines below.    6.  Moderate coronary  calcifications.      REFERENCE:  Guidelines for Management of Incidental Pulmonary Nodules Detected on CT Images: From the Fleischner Society 2017.   Guidelines apply to incidental nodules in patients who are 35 years or older.  Guidelines do not apply to lung cancer screening, patients with immunosuppression, or patients with known primary cancer.    SUBSOLID NODULES  Ground glass  Nodule size <6 mm  No routine follow-up. If suspicious, consider follow-up at 2 and 4 years.    Nodule size 6 mm or greater  CT at 6-12 months to confirm persistence, then CT every 2 years until 5 years.    Part solid  Nodule size <6 mm  No routine follow-up.    Nodule size 6 mm or greater  CT at 3-6 months to confirm persistence. If unchanged and solid component remains <6 mm, annual CT for 5 years.    Multiple  CT at 3-6 months. If stable, consider CT at 2 and 4 years. Management based on the most suspicious nodule.         Tatianna Solis MD  Hospitalist  [facility]

## 2022-01-18 NOTE — ED NOTES
Up to bedside commode without issue. Food, drink, pillows, and blankets provided. Remains on monitor.

## 2022-01-18 NOTE — ED NOTES
Rounding done on pt. Pt had breakfast. Does not like BP cuff so she took it off. Pt helped to change into new depend by Samantha JAIME PCA.

## 2022-01-18 NOTE — H&P
"Hospital Medicine Service History and Physical  [facility]: [facility]    Fatuma Henry is a 76 year old female who  has a past medical history of Atrial fibrillation (H), CRF (chronic renal failure), GERD (gastroesophageal reflux disease), Hypertension, Hypovolemic shock (H), Influenza A (12/2017), Rheumatoid arthritis (H), and Sepsis (H) (12/24/2017).   Chief Complaint: shortness of breath, cough    Assessment and Plan  COPD exacerbation  Emphysema  Former smoker  Immunocompromised  Indeterminate lung lesion on CT  Hx of methotrexate (chronic), former smoker  Check Beta-D-glucan  Empiric Pneumocystis jirovecii coverage, discussed with pharmD  Pulmonology consult, defer to pulm whether this Tx should be continued  Prednisone (pt gets swelling, prefers lower dose)  Continue scheduled DuoNeb  No respiratory distress or respiratory failure    Hypercalcemia  Calcium normal less than a year ago  Consider malignancy given above  Giving IVF & lasix  Check PTH to start    Acute kidney injury  Baseline Cr < 1  Fluids and diuresis for hypercalcemia as above  CMP ordered for the morning    Pulmonary trunk enlargement  Last echo 2017 did show Pulmonary hypertension  Repeat Echo    Seropositive rheumatoid arthritis  Takes Cimzia, methotrexate    DVTP: Home anticoagulation  Code Status: No Order DNR  Disposition: Inpatient   Estimated body mass index is 26.22 kg/m  as calculated from the following:    Height as of 6/2/21: 1.664 m (5' 5.5\").    Weight as of this encounter: 72.6 kg (160 lb).    History of Present Illness  Fatuma Henry has been feeling short of breath with cough for 10 days.  Describes the cough as \"raspy\" and associated with clear phlegm production.  She was seen in outpatient clinic and diagnosed with influenza treated with Tamiflu 1 week ago., reported to have resting O2 saturation in the 80s at that time.  She does not take home oxygen at baseline.  She has a history of tobacco abuse and is no " "longer smoker.  Says she had pulmonary function testing done \"years ago\" and was surprised that she was not diagnosed with COPD at that time.  She has been taking methotrexate weekly for 2 years.  Additionally she reports a 60 pound weight loss unintentionally over around an 8-month period timeframe.  She has had a decreased appetite.  Other than that, no fever, cp, abd pain, and having normal urinations & BM  All other systems reviewed and are negative  In the ED, patient is tachypneic, otherwise vitally stable.  Calcium 11.6, creatinine 1.45.  D-dimer 0.85.  CT of the chest negative for PE, suspicion for pulmonary hypertension, mild emphysema, left upper lobe lingular groundglass and cystic focus with thickening indeterminate.  She received a DuoNeb treatment    Appears comfortable  Anicteric conjunctiva, PERRL  Moist mucous membranes, normocephalic, atraumatic  Trachea midline, JVD minimal  Poor air movement bilaterally, Respiratory effort normal on room air  Occasional PVC, otherwise regular rate and rhythm, no murmur  Abdomen soft, nondistended, nontender  No edema, clubbing of nails minimally present  Skin normal temperature, dry  Normal affect, alert  Vital signs reviewed by me    Wt Readings from Last 4 Encounters:   01/17/22 72.6 kg (160 lb)   06/02/21 73.4 kg (161 lb 12.8 oz)   05/26/21 75.8 kg (167 lb 3.2 oz)   12/16/20 78 kg (172 lb)        reports that she quit smoking about 4 years ago. Her smoking use included cigarettes. She smoked 0.50 packs per day. She has never used smokeless tobacco. She reports that she does not drink alcohol and does not use drugs.  family history includes Cerebrovascular Disease in her brother and father; Diabetes Type 2  in her brother; Heart Failure in her mother; Kidney failure in her sister.   has a past surgical history that includes Cholecystectomy; appendectomy; and Bladder Suspension.   Allergies   Allergen Reactions     Codeine Nausea and Vomiting       Nickolas Hurst, " MD, MPH  [facility]  [facility]   Phone: #[phone]

## 2022-01-18 NOTE — PHARMACY-ADMISSION MEDICATION HISTORY
Pharmacy Note - Admission Medication History    Pertinent Provider Information:     Patient completed 5 day course of Tamiflu 75 mg twice daily prescribed 1/10/22.      ______________________________________________________________________    Prior To Admission (PTA) med list completed and updated in EMR.       Current Facility-Administered Medications for the 1/17/22 encounter (Hospital Encounter)   Medication     certolizumab pegol (CIMZIA) injection 400 mg     PTA Med List   Medication Sig Note Last Dose     acetaminophen (TYLENOL) 500 MG tablet [ACETAMINOPHEN (TYLENOL) 500 MG TABLET] Take 1-2 tablets (500-1,000 mg total) by mouth every 6 (six) hours as needed.  Past Month at Unknown time     albuterol (PROVENTIL) (2.5 MG/3ML) 0.083% neb solution Take 2.5 mg by nebulization every 6 hours as needed for shortness of breath / dyspnea or wheezing   Past Month at Unknown time     certolizumab pegol (CIMZIA PREFILLED) 2 X 200 MG/ML KIT 2 syringes/kit Inject 400 mg Subcutaneous every 28 days  12/21/2021 at 1406     compressor, for nebulizer Saran [COMPRESSOR, FOR NEBULIZER SARAN] Nebulizer treatment qid prn  Unknown at Unknown time     flaxseed oil 1,000 mg cap [FLAXSEED OIL 1,000 MG CAP] Take 1 capsule by mouth daily.   1/7/2022     folic acid (FOLVITE) 1 MG tablet [FOLIC ACID (FOLVITE) 1 MG TABLET] Take one tab po qd except the day when taking methotrexate.  1/17/2022 at Unknown time     FOLIC ACID/MULTIVITS-MIN/LUT (CENTRUM SILVER ORAL) [FOLIC ACID/MULTIVITS-MIN/LUT (CENTRUM SILVER ORAL)] Take 1 tablet by mouth daily.   1/17/2022 at Unknown time     furosemide (LASIX) 20 MG tablet [FUROSEMIDE (LASIX) 20 MG TABLET] Take 20 mg by mouth daily as needed (Leg swelling).  1/17/2022 at Unknown time     methotrexate sodium 2.5 MG TABS TAKE 4 TABLETS BY MOUTH ONCE A WEEK 1/17/2022: Sundays 1/16/2022     metoprolol succinate ER (TOPROL-XL) 100 MG 24 hr tablet Take 150 mg by mouth daily 1/17/2022: 1.5 tabs 1/17/2022 at Unknown  time     pravastatin (PRAVACHOL) 20 MG tablet [PRAVASTATIN (PRAVACHOL) 20 MG TABLET] Take 20 mg by mouth daily.  1/17/2022 at Unknown time     rivaroxaban ANTICOAGULANT (XARELTO) 20 mg tablet [RIVAROXABAN ANTICOAGULANT (XARELTO) 20 MG TABLET] Take 1 tablet (20 mg total) by mouth daily. Take with a FULL meal.  1/17/2022 at Unknown time       Information source(s): Patient and CareEverywhere/SureScripts  Method of interview communication: phone    Summary of Changes to PTA Med List  New: albuterol neb  Discontinued: ipratopium-albuterol neb, Tamiflu   Changed: methotrexate is once weekly on Sundays, metoprolol succinate is 1.5 x 100 mg or 150 mg dose not 100 mg dose    Patient was asked about OTC/herbal products specifically.  PTA med list reflects this.    In the past week, patient estimated taking medication this percent of the time:  greater than 90%. Takes all meds in the morning, took today.    Allergies were reviewed, assessed, and updated with the patient.      Patient does not anticipate needing any multi-use medications during admission.    The information provided in this note is only as accurate as the sources available at the time of the update(s).    Thank you for the opportunity to participate in the care of this patient.    Shirley Ro [facility]  1/17/2022 7:14 PM

## 2022-01-19 ENCOUNTER — TELEPHONE (OUTPATIENT)
Dept: RHEUMATOLOGY | Facility: CLINIC | Age: 77
End: 2022-01-19
Payer: COMMERCIAL

## 2022-01-19 DIAGNOSIS — R91.8 PULMONARY NODULES: Primary | ICD-10-CM

## 2022-01-19 LAB
1,3 BETA GLUCAN SER-MCNC: 252 PG/ML
CREAT SERPL-MCNC: 1.72 MG/DL (ref 0.6–1.1)
GFR SERPL CREATININE-BSD FRML MDRD: 30 ML/MIN/1.73M2
OBSERVATION IMP: POSITIVE

## 2022-01-19 PROCEDURE — 250N000013 HC RX MED GY IP 250 OP 250 PS 637: Performed by: INTERNAL MEDICINE

## 2022-01-19 PROCEDURE — 99233 SBSQ HOSP IP/OBS HIGH 50: CPT | Performed by: INTERNAL MEDICINE

## 2022-01-19 PROCEDURE — 120N000001 HC R&B MED SURG/OB

## 2022-01-19 PROCEDURE — 258N000003 HC RX IP 258 OP 636: Performed by: INTERNAL MEDICINE

## 2022-01-19 PROCEDURE — 999N000157 HC STATISTIC RCP TIME EA 10 MIN

## 2022-01-19 PROCEDURE — 36415 COLL VENOUS BLD VENIPUNCTURE: CPT | Performed by: INTERNAL MEDICINE

## 2022-01-19 PROCEDURE — 258N000003 HC RX IP 258 OP 636: Performed by: HOSPITALIST

## 2022-01-19 PROCEDURE — 250N000009 HC RX 250: Performed by: EMERGENCY MEDICINE

## 2022-01-19 PROCEDURE — 250N000011 HC RX IP 250 OP 636: Performed by: HOSPITALIST

## 2022-01-19 PROCEDURE — 250N000013 HC RX MED GY IP 250 OP 250 PS 637: Performed by: HOSPITALIST

## 2022-01-19 PROCEDURE — 250N000012 HC RX MED GY IP 250 OP 636 PS 637: Performed by: HOSPITALIST

## 2022-01-19 PROCEDURE — 82565 ASSAY OF CREATININE: CPT | Performed by: INTERNAL MEDICINE

## 2022-01-19 PROCEDURE — 250N000011 HC RX IP 250 OP 636: Performed by: INTERNAL MEDICINE

## 2022-01-19 PROCEDURE — 94640 AIRWAY INHALATION TREATMENT: CPT | Mod: 76

## 2022-01-19 PROCEDURE — 99222 1ST HOSP IP/OBS MODERATE 55: CPT | Performed by: INTERNAL MEDICINE

## 2022-01-19 PROCEDURE — 99207 PR CONSULT E&M CHANGED TO INITIAL LEVEL: CPT | Performed by: INTERNAL MEDICINE

## 2022-01-19 RX ORDER — SODIUM CHLORIDE 9 MG/ML
INJECTION, SOLUTION INTRAVENOUS CONTINUOUS
Status: DISCONTINUED | OUTPATIENT
Start: 2022-01-19 | End: 2022-01-20

## 2022-01-19 RX ORDER — PREDNISONE 20 MG/1
20 TABLET ORAL DAILY
Status: DISCONTINUED | OUTPATIENT
Start: 2022-01-19 | End: 2022-01-19

## 2022-01-19 RX ADMIN — IPRATROPIUM BROMIDE AND ALBUTEROL SULFATE 3 ML: .5; 3 SOLUTION RESPIRATORY (INHALATION) at 08:17

## 2022-01-19 RX ADMIN — RIVAROXABAN 20 MG: 10 TABLET, FILM COATED ORAL at 17:25

## 2022-01-19 RX ADMIN — PRAVASTATIN SODIUM 20 MG: 20 TABLET ORAL at 09:55

## 2022-01-19 RX ADMIN — SODIUM CHLORIDE: 9 INJECTION, SOLUTION INTRAVENOUS at 15:52

## 2022-01-19 RX ADMIN — IPRATROPIUM BROMIDE AND ALBUTEROL SULFATE 3 ML: .5; 3 SOLUTION RESPIRATORY (INHALATION) at 12:55

## 2022-01-19 RX ADMIN — SULFAMETHOXAZOLE AND TRIMETHOPRIM 240 MG: 80; 16 INJECTION, SOLUTION, CONCENTRATE INTRAVENOUS at 02:10

## 2022-01-19 RX ADMIN — HEPARIN SODIUM 5000 UNITS: 5000 INJECTION, SOLUTION INTRAVENOUS; SUBCUTANEOUS at 00:12

## 2022-01-19 RX ADMIN — PREDNISONE 20 MG: 20 TABLET ORAL at 09:55

## 2022-01-19 RX ADMIN — HEPARIN SODIUM 5000 UNITS: 5000 INJECTION, SOLUTION INTRAVENOUS; SUBCUTANEOUS at 13:46

## 2022-01-19 RX ADMIN — PIPERACILLIN AND TAZOBACTAM 3.38 G: 3; .375 INJECTION, POWDER, LYOPHILIZED, FOR SOLUTION INTRAVENOUS at 03:03

## 2022-01-19 RX ADMIN — METOPROLOL SUCCINATE 150 MG: 50 TABLET, EXTENDED RELEASE ORAL at 09:54

## 2022-01-19 RX ADMIN — SULFAMETHOXAZOLE AND TRIMETHOPRIM 240 MG: 80; 16 INJECTION, SOLUTION, CONCENTRATE INTRAVENOUS at 10:06

## 2022-01-19 RX ADMIN — PIPERACILLIN AND TAZOBACTAM 3.38 G: 3; .375 INJECTION, POWDER, LYOPHILIZED, FOR SOLUTION INTRAVENOUS at 10:07

## 2022-01-19 RX ADMIN — IPRATROPIUM BROMIDE AND ALBUTEROL SULFATE 3 ML: .5; 3 SOLUTION RESPIRATORY (INHALATION) at 17:27

## 2022-01-19 RX ADMIN — ONDANSETRON 4 MG: 4 TABLET, ORALLY DISINTEGRATING ORAL at 10:05

## 2022-01-19 ASSESSMENT — ACTIVITIES OF DAILY LIVING (ADL)
ADLS_ACUITY_SCORE: 5
DEPENDENT_IADLS:: INDEPENDENT
ADLS_ACUITY_SCORE: 5

## 2022-01-19 ASSESSMENT — MIFFLIN-ST. JEOR: SCORE: 1225.94

## 2022-01-19 NOTE — PLAN OF CARE
Problem: Adult Inpatient Plan of Care  Goal: Plan of Care Review  Outcome: No Change     Problem: Gas Exchange Impaired  Goal: Optimal Gas Exchange  Outcome: No Change     Pt A&O x4. On 2L NC with sats in low to mid-90s. IV Abx continued. Had one episode of N/V with 50 mL emesis this evening - given Zofran PO with reported symptom improvement. Up with SBA and walker.

## 2022-01-19 NOTE — PROGRESS NOTES
"CLINICAL NUTRITION SERVICES - ASSESSMENT NOTE     Nutrition Prescription    RECOMMENDATIONS FOR MDs/PROVIDERS TO ORDER:  None at this time     Malnutrition Status:    % Weight Loss:  Weight loss does not meet criteria for malnutrition   % Intake:  <75% for >/= 1 month (moderate malnutrition)  Subcutaneous Fat Loss:  Orbital region moderate depletion  Muscle Loss:  Clavicle bone region moderate depletion  Fluid Retention:  None noted    Malnutrition Diagnosis: Moderate malnutrition  In Context of:  Chronic illness or disease    Recommendations already ordered by Registered Dietitian (RD):  Gelatein plus daily     Future/Additional Recommendations:  None at this time      REASON FOR ASSESSMENT  Fatuma Henry is a/an 76 year old female assessed by the dietitian for Admission Nutrition Risk Screen for wt loss, reduced po     Pt admitted with resp failure, cystic lung lesions, COPD, emphysema, YESICA     NUTRITION HISTORY  NKFA  Pt states appetite has been down for some time  Doesn't like Ensure, open to trying Gelatein-plus    CURRENT NUTRITION ORDERS  Diet: Regular  Intake/Tolerance: just bites     LABS  Labs reviewed; Na-134, BUN-72, Cr-30    MEDICATIONS  Medications reviewed; prednisone    ANTHROPOMETRICS  Height: 166.4 cm (5' 5.5\")  Most Recent Weight: 72.2 kg (159 lb 3.2 oz)    IBW: 57 kg  BMI: Overweight BMI 25-29.9  Weight History:   Wt Readings from Last 5 Encounters:   01/19/22 72.2 kg (159 lb 3.2 oz)   06/02/21 73.4 kg (161 lb 12.8 oz)   05/26/21 75.8 kg (167 lb 3.2 oz)   12/16/20 78 kg (172 lb)   07/01/20 77.6 kg (171 lb)     Pt down 13# in the past year (8%)   Dosing Weight: 57 kg    ASSESSED NUTRITION NEEDS  Estimated Energy Needs: 1297-2532 kcals/day (25 - 30 kcals/kg)  Justification: Maintenance  Estimated Protein Needs: 57-68 grams protein/day (1 - 1.2 grams of pro/kg)  Justification: Maintenance  Estimated Fluid Needs: 5288-9376 mL/day (25 - 30 mL/kg)   Justification: Maintenance    PHYSICAL FINDINGS  See " malnutrition section below.  Per flowsheet   GI: WDL  SKIN: no edema or skin breakdown noted       MALNUTRITION:  % Weight Loss:  Weight loss does not meet criteria for malnutrition   % Intake:  <75% for >/= 1 month (moderate malnutrition)  Subcutaneous Fat Loss:  Orbital region moderate depletion  Muscle Loss:  Clavicle bone region moderate depletion  Fluid Retention:  None noted    Malnutrition Diagnosis: Moderate malnutrition  In Context of:  Chronic illness or disease    NUTRITION DIAGNOSIS  Malnutrition related to chronic as evidenced by fat and muscle loss, reduced po, mild wt loss       INTERVENTIONS  Implementation  Pt open to trying Gelatien plus. Does not like Ensure     Goals  Patient to consume % of nutritionally adequate meals three times per day, or the equivalent with supplements/snacks.     Monitoring/Evaluation  Progress toward goals will be monitored and evaluated per protocol.

## 2022-01-19 NOTE — PROGRESS NOTES
"Care Management Initial Consult    General Information  Assessment completed with: Patient, Fatuma  Type of CM/SW Visit: Initial Assessment    Primary Care Provider verified and updated as needed: Yes   Readmission within the last 30 days: no previous admission in last 30 days   Reason for Consult: discharge planning  Advance Care Planning: Advance Care Planning Reviewed:  (has HCD- \"daughters know where it is\")         Communication Assessment  Patient's communication style: spoken language (English or Bilingual)    Hearing Difficulty or Deaf: no   Wear Glasses or Blind: yes    Cognitive  Cognitive/Neuro/Behavioral: WDL                      Living Environment:   People in home:  ([place])  Current living Arrangements: other condo- 1 level     Able to return to prior arrangements: Anticipates return home after hospital discharge.        Family/Social Support:  Care provided by: self  Provides care for: no one  Marital Status:   ,Children  ([place]) and (2 daughters)    Description of Support System: Supportive,Involved    Support Assessment: Adequate social supports    Current Resources:   Patient receiving home care services: No     Community Resources: None  Equipment currently used at home: walker, 4 wheels and a seat  Supplies currently used at home: None    Employment/Financial:  Employment Status: retired     Employment/ Comments: n/a  Financial Concerns: No concerns identified   Referral to Financial Counselor: No     Functional Status:  Prior to admission patient needed assistance:   Dependent ADLs:: Independent  Dependent IADLs:: Independent  Assesssment of Functional Status: At functional baseline (\"don't do much\")    Mental Health Status:  Mental Health Status: No Current Concerns (denies)       Chemical Dependency Status:  Chemical Dependency Status: No Current Concerns (denies)             Values/Beliefs:  Spiritual, Cultural Beliefs, Faith Practices, Values " that affect care: no               Additional Information:  CM met with patient for initial assessment. Patient lives with  in [facility]. Independent with all ADL's at baseline including driving. She does use a 4 wheel walker. No prior home care services. Denies needs from CM.     Pamela Howe RN

## 2022-01-19 NOTE — PROGRESS NOTES
"[facility] Medicine PROGRESS NOTE      Identification/Summary:  Fatuma Henry is a 76 year old female who has past medical history of Atrial fibrillation (H), CRF (chronic renal failure), GERD (gastroesophageal reflux disease), Hypertension, Hypovolemic shock (H), Influenza A (12/2017), Rheumatoid arthritis (H), and Sepsis presented with shortness of breath, cough for 2 weeks and admitted for abnormal CT chest with cystic lesion, groundglass opacities in the left upper lobe.  On Zosyn, Bactrim given immunosuppressive status.  Awaiting pulmonary input.      Assessment and Plan  Acute hypoxemic respiratory failure  Cystic lung lesion on CT consistent with likely adenocarcinoma  COPD exacerbation  Emphysema  Former smoker  Immunocompromised  Hx of methotrexate (chronic), former smoker  Will stop antibiotics as no indication for antibiotics  Bactrim started for possible pneumocystis pending pulmonary consult, but no evidence of PJP as the \"cystic lesion\" is most likely due adenocarcinoma  Pulmonology consult when able  Check sputum cultures, Fungitell pending, still pending.  Continue nebs, low-dose prednisone with 20 mg daily x5 days  On 2 L oxygen  Wean off as tolerated.      Hypercalcemia  Calcium normal less than a year ago, has been elevated at 10.7, with mildly elevated PTH  Giving IVF & lasix, improving  PTH now normal  PTHrP is still pending, but not likely to be related to malignancy  Previously taking exogenous Calcium but stopped some time ago  Continue IVF and trend    Acute kidney injury  Baseline Cr < 1  Continue IV fluids  Received 1 dose of IV Lasix for hypercalcemia  Monitor BMP  Avoid nephrotoxic medications    Moderate pulmonary hypertension  Echo this admission EF 65% mild aortic stenosis, mild to moderate Aortic regurgitation   LA dilation as well  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    Interval History/Subjective:  She is doing better today.  Her breathing is stable.  Occasional cough.  No chest pain.  No other nausea vomiting abdominal pain, urinary complaints.    Physical Exam/Objective:  Vitals I/O   Vital signs:  Temp: 98.9  F (37.2  C) Temp src: Oral BP: 112/50 Pulse: 77   Resp: 18 SpO2: 95 % O2 Device: Nasal cannula Oxygen Delivery: 2 LPM Height: 166.4 cm (5' 5.5\") Weight: 72.2 kg (159 lb 3.2 oz)  Estimated body mass index is 26.09 kg/m  as calculated from the following:    Height as of this encounter: 1.664 m (5' 5.5\").    Weight as of this encounter: 72.2 kg (159 lb 3.2 oz).     I/O last 3 completed shifts:  In: 380 [P.O.:380]  Out: 5650 [Urine:5600; Emesis/NG output:50]     Body mass index is 26.09 kg/m .    General Appearance:  Alert, cooperative, no distress   Head:  Normocephalic, atraumatic   Eyes:  PERRL    Throat:  mucosa; moist   Neck: No JVD, thyromegaly   Lungs:    Decreased breath sounds bilaterally, respirations unlabored   Chest Wall:  No tenderness or deformity   Heart:  Regular rate and rhythm, S1, S2 normal,no murmur   Abdomen:   Soft, non tender, non distended, bowel sounds present, no guarding or rigidity   Extremities: No edema, no joint swelling   Skin: Skin color, texture, turgor normal, no rashes or lesions   Neurologic: Alert and oriented X 3, Moves all 4 extremities       Medications:   Personally Reviewed.    heparin ANTICOAGULANT  5,000 Units Subcutaneous Q12H     ipratropium - albuterol 0.5 " mg/2.5 mg/3 mL  3 mL Nebulization 4x daily     metoprolol succinate ER  150 mg Oral Daily     pravastatin  20 mg Oral Daily     predniSONE  20 mg Oral Daily     predniSONE  20 mg Oral Daily     sodium chloride (PF)  3 mL Intracatheter Q8H       Data reviewed today: I personally reviewed all new medications, labs, imaging/diagnostics reports over the past 24 hours. Pertinent findings include    Labs:  Most Recent 3 CBC's:  Recent Labs   Lab Test 01/18/22  0633 01/17/22  2123 11/18/21  1312   WBC 5.2 4.6 5.7   HGB 12.1 12.7 12.4   * 100 104*    296 300     Most Recent 3 BMP's:  Recent Labs   Lab Test 01/19/22  0707 01/18/22  0633 01/17/22  1756 06/02/21  1053 04/30/21  1104   NA  --  134* 137  --  140   POTASSIUM  --  4.1 4.2  --  4.2   CHLORIDE  --  100 98  --  104   CO2  --  26 26  --  27   BUN  --  35* 31*  --  19   CR 1.72* 1.68* 1.45*   < > 0.89  0.89   ANIONGAP  --  8 13  --  9   SUNI  --  11.0* 11.6*  --  9.6  9.6   GLC  --  136* 108  --  81    < > = values in this interval not displayed.       Imaging:   No results found for this or any previous visit (from the past 24 hour(s)).    El Doyle DO  Hospitalist  [facility]

## 2022-01-19 NOTE — PROGRESS NOTES
Surgery    Consult received  Reviewed chart  Patient has a normal pth and normal ionized calcium  I can defer consult at this time for hyperparathyroid     Tried to reach out the the primary.  You can call me at my cell    [phone]    Thanks        Rich Johnson MD  General Surgery [phone]  Vascular Surgery [phone]

## 2022-01-19 NOTE — PROGRESS NOTES
RESPIRATORY CARE NOTE     Patient Name: Fatuma Henry  Today's Date: 1/19/2022       Pt continues to receive Duo neb QID. BS are exp wheezes and diminished to clear and diminshed. Pt is on 2 lpm of oxygen via NC, SpO2 is 91-97 %. Post treatment there is increased aeration. Pt also perceives improvement.  Pt does not have O2 or inhalers at home, but does Duo nebs BID at home.RT encouraged deep breathing and coughing techniques .  RT will continue to monitor and assess.   Meaghan Daniel, RT

## 2022-01-19 NOTE — PLAN OF CARE
Problem: Gas Exchange Impaired  Goal: Optimal Gas Exchange  Outcome: No Change   See progress note.

## 2022-01-19 NOTE — CONSULTS
"PULMONARY MEDICINE CONSULT  1/19/2022      Admit Date: 1/17/2022  CODE: No CPR- Do NOT Intubate    Reason for Consult: acute respiratory failure with hypoxemia  Ground glass nodule      Assessment/Plan:   76 female with recent flu B infection, former smoker, RA, presents with weakness, SOB and respiratory failure. Found to have ground glass lesion in SHAKEEL and hypercalcemia. Started on treatment for COPD exacerbation and possible PJP with bactrim.  CT scan was reviewed with her and her daughter in detail. The SHAKEEL lesion is small, ground glass and apparently has been present before. 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 Radiology recommends follow up in 6 months per Flesichner 2017 guidelines and I would agree with this.  I don't see any evidence of acute pneumonia, COPD exacerbation or PJP infection. Would stop antibiotics and monitor.  Would wean her off oxygen for a goal SpO2 of 92% or greater.    Her  sees Dr. Parth Rosario in our pulmonary clinic and she would like to follow up with Dr. Rosario. I will arrange for this along with a follow up CT chest in 6 months time.  No other recommendations from pulmonary standpoint. Will sign off. Please call with additional questions.    [place] (Garland) MD Jb  [facility]monary [facility] Critical [facility]  Pager [phone]  Clinic [phone]  Fax [phone]                                                                                                                                                         HPI:   CCx: acute respiratory failure with hypoxemia  Ground glass nodule    HPI: 76 female with recent flu B infection, former smoker, RA, presents with weakness, SOB and respiratory failure. Found to have ground glass lesion in SHAKEEL, described as \"cystic\" appearing and c/f adenoCa. 6 month follow up scan was recommended.   She has had some issues with hypercalcemia; receiving fluids   She says her PMD has been " following this nodule for some time. I do not have access to any prior CT scans.   No hemoptysis, fevers or chills.                                                                                                                                                         Past Medical History:  Past Medical History:   Diagnosis Date     Atrial fibrillation (H)      CRF (chronic renal failure)      GERD (gastroesophageal reflux disease)      Hypertension      Hypovolemic shock (H)      Influenza A 12/2017     Rheumatoid arthritis (H)      Sepsis (H) 12/24/2017       Past Surgical History  Past Surgical History:   Procedure Laterality Date     APPENDECTOMY       BLADDER SUSPENSION       CHOLECYSTECTOMY         Allergies:  Allergies   Allergen Reactions     Codeine Nausea and Vomiting       PTA medications:  Facility-Administered Medications Prior to Admission   Medication Dose Route Frequency Provider Last Rate Last Admin     certolizumab pegol (CIMZIA) injection 400 mg  400 mg Subcutaneous Q28 Days Bobby Rush MBBS   400 mg at 12/21/21 1406     Medications Prior to Admission   Medication Sig Dispense Refill Last Dose     acetaminophen (TYLENOL) 500 MG tablet [ACETAMINOPHEN (TYLENOL) 500 MG TABLET] Take 1-2 tablets (500-1,000 mg total) by mouth every 6 (six) hours as needed.  0 Past Month at Unknown time     albuterol (PROVENTIL) (2.5 MG/3ML) 0.083% neb solution Take 2.5 mg by nebulization every 6 hours as needed for shortness of breath / dyspnea or wheezing    Past Month at Unknown time     certolizumab pegol (CIMZIA PREFILLED) 2 X 200 MG/ML KIT 2 syringes/kit Inject 400 mg Subcutaneous every 28 days 1 each 2 12/21/2021 at 1406     compressor, for nebulizer Saran [COMPRESSOR, FOR NEBULIZER SARAN] Nebulizer treatment qid prn 1 Device 0 Unknown at Unknown time     flaxseed oil 1,000 mg cap [FLAXSEED OIL 1,000 MG CAP] Take 1 capsule by mouth daily.    1/7/2022     folic acid (FOLVITE) 1 MG tablet [FOLIC ACID (FOLVITE) 1 MG  TABLET] Take one tab po qd except the day when taking methotrexate. 90 tablet 1 2022 at Unknown time     FOLIC ACID/MULTIVITS-MIN/LUT (CENTRUM SILVER ORAL) [FOLIC ACID/MULTIVITS-MIN/LUT (CENTRUM SILVER ORAL)] Take 1 tablet by mouth daily.    2022 at Unknown time     furosemide (LASIX) 20 MG tablet [FUROSEMIDE (LASIX) 20 MG TABLET] Take 20 mg by mouth daily as needed (Leg swelling).   2022 at Unknown time     methotrexate sodium 2.5 MG TABS TAKE 4 TABLETS BY MOUTH ONCE A WEEK 16 tablet 0 2022     metoprolol succinate ER (TOPROL-XL) 100 MG 24 hr tablet Take 150 mg by mouth daily   2022 at Unknown time     pravastatin (PRAVACHOL) 20 MG tablet [PRAVASTATIN (PRAVACHOL) 20 MG TABLET] Take 20 mg by mouth daily.   2022 at Unknown time     rivaroxaban ANTICOAGULANT (XARELTO) 20 mg tablet [RIVAROXABAN ANTICOAGULANT (XARELTO) 20 MG TABLET] Take 1 tablet (20 mg total) by mouth daily. Take with a FULL meal. 90 tablet 3 2022 at Unknown time       Family Hx:  Family History   Problem Relation Age of Onset     Heart Failure Mother      Cerebrovascular Disease Father      Kidney failure Sister      Cerebrovascular Disease Brother      Diabetes Type 2  Brother        Social Hx:  Social History     Socioeconomic History     Marital status:      Spouse name: Not on file     Number of children: Not on file     Years of education: Not on file     Highest education level: Not on file   Occupational History     Not on file   Tobacco Use     Smoking status: Former Smoker     Packs/day: 0.50     Types: Cigarettes     Quit date: 2017     Years since quittin.0     Smokeless tobacco: Never Used   Substance and Sexual Activity     Alcohol use: No     Drug use: No     Sexual activity: Not Currently   Other Topics Concern     Not on file   Social History Narrative     Not on file     Social Determinants of Health     Financial Resource Strain: Not on file   Food Insecurity: Not on file  "  Transportation Needs: Not on file   Physical Activity: Not on file   Stress: Not on file   Social Connections: Not on file   Intimate Partner Violence: Not on file   Housing Stability: Not on file       Exam/Data:     Vitals  /50 (BP Location: Right arm)   Pulse 77   Temp 98.9  F (37.2  C) (Oral)   Resp 18   Ht 1.664 m (5' 5.5\")   Wt 72.2 kg (159 lb 3.2 oz)   SpO2 95%   BMI 26.09 kg/m       I/O last 3 completed shifts:  In: 380 [P.O.:380]  Out: 5650 [Urine:5600; Emesis/NG output:50]  Weight change: -0.363 kg (-12.8 oz)  [unfilled]  EXAM:  /50 (BP Location: Right arm)   Pulse 77   Temp 98.9  F (37.2  C) (Oral)   Resp 18   Ht 1.664 m (5' 5.5\")   Wt 72.2 kg (159 lb 3.2 oz)   SpO2 95%   BMI 26.09 kg/m      Intake/Output last 3 shifts:  I/O last 3 completed shifts:  In: 380 [P.O.:380]  Out: 5650 [Urine:5600; Emesis/NG output:50]  Intake/Output this shift:  No intake/output data recorded.    Physical Exam  Gen: awake, alert, oriented, no distress  HEENT: NT, no TATY  CV: RRR, no m/g/r  Resp: diminished no wheezing.   Abd: soft, nontender, BS+  Skin: no rashes or lesions  Ext: no edema  Neuro: PERRL, nonfocal exam    ROS: A complete 10-system review of systems was obtained and is negative with the exception of what is noted in the history of present illness.    Medications:       - MEDICATION INSTRUCTIONS -       sodium chloride         heparin ANTICOAGULANT  5,000 Units Subcutaneous Q12H     ipratropium - albuterol 0.5 mg/2.5 mg/3 mL  3 mL Nebulization 4x daily     metoprolol succinate ER  150 mg Oral Daily     pravastatin  20 mg Oral Daily     predniSONE  20 mg Oral Daily     predniSONE  20 mg Oral Daily     sodium chloride (PF)  3 mL Intracatheter Q8H         DATA  All laboratory and radiology has been personally reviewed by myself today.  Recent Labs   Lab 01/18/22  0633   WBC 5.2   HGB 12.1   HCT 36.7        Recent Labs   Lab 01/18/22  0633 01/17/22  1756   * 137   CO2 26 26 "   BUN 35* 31*   ALKPHOS 100 118   ALT 11 13   AST 18 21         PFT DATA  None available      IMAGING:   Personally reviewed

## 2022-01-20 ENCOUNTER — APPOINTMENT (OUTPATIENT)
Dept: PHYSICAL THERAPY | Facility: CLINIC | Age: 77
DRG: 190 | End: 2022-01-20
Attending: INTERNAL MEDICINE
Payer: COMMERCIAL

## 2022-01-20 ENCOUNTER — APPOINTMENT (OUTPATIENT)
Dept: CT IMAGING | Facility: CLINIC | Age: 77
DRG: 190 | End: 2022-01-20
Payer: COMMERCIAL

## 2022-01-20 ENCOUNTER — APPOINTMENT (OUTPATIENT)
Dept: OCCUPATIONAL THERAPY | Facility: CLINIC | Age: 77
DRG: 190 | End: 2022-01-20
Attending: INTERNAL MEDICINE
Payer: COMMERCIAL

## 2022-01-20 LAB
ANION GAP SERPL CALCULATED.3IONS-SCNC: 6 MMOL/L (ref 5–18)
BUN SERPL-MCNC: 29 MG/DL (ref 8–28)
C REACTIVE PROTEIN LHE: 0.3 MG/DL (ref 0–0.8)
CALCIUM SERPL-MCNC: 9.5 MG/DL (ref 8.5–10.5)
CHLORIDE BLD-SCNC: 108 MMOL/L (ref 98–107)
CO2 SERPL-SCNC: 24 MMOL/L (ref 22–31)
CREAT SERPL-MCNC: 1.55 MG/DL (ref 0.6–1.1)
ERYTHROCYTE [SEDIMENTATION RATE] IN BLOOD BY WESTERGREN METHOD: 23 MM/HR (ref 0–20)
GFR SERPL CREATININE-BSD FRML MDRD: 35 ML/MIN/1.73M2
GLUCOSE BLD-MCNC: 79 MG/DL (ref 70–125)
POTASSIUM BLD-SCNC: 3.8 MMOL/L (ref 3.5–5)
SODIUM SERPL-SCNC: 138 MMOL/L (ref 136–145)

## 2022-01-20 PROCEDURE — 97162 PT EVAL MOD COMPLEX 30 MIN: CPT | Mod: GP

## 2022-01-20 PROCEDURE — 87449 NOS EACH ORGANISM AG IA: CPT

## 2022-01-20 PROCEDURE — 87305 ASPERGILLUS AG IA: CPT

## 2022-01-20 PROCEDURE — 97530 THERAPEUTIC ACTIVITIES: CPT | Mod: GP

## 2022-01-20 PROCEDURE — 999N000203 HC STATISTICAL VASC ACCESS NURSE TIME, 16-31 MINUTES

## 2022-01-20 PROCEDURE — 250N000009 HC RX 250: Performed by: HOSPITALIST

## 2022-01-20 PROCEDURE — 99222 1ST HOSP IP/OBS MODERATE 55: CPT

## 2022-01-20 PROCEDURE — 258N000003 HC RX IP 258 OP 636: Performed by: INTERNAL MEDICINE

## 2022-01-20 PROCEDURE — 250N000012 HC RX MED GY IP 250 OP 636 PS 637: Performed by: HOSPITALIST

## 2022-01-20 PROCEDURE — 36415 COLL VENOUS BLD VENIPUNCTURE: CPT

## 2022-01-20 PROCEDURE — 97116 GAIT TRAINING THERAPY: CPT | Mod: GP

## 2022-01-20 PROCEDURE — 87486 CHLMYD PNEUM DNA AMP PROBE: CPT | Performed by: INTERNAL MEDICINE

## 2022-01-20 PROCEDURE — 80048 BASIC METABOLIC PNL TOTAL CA: CPT | Performed by: INTERNAL MEDICINE

## 2022-01-20 PROCEDURE — 86698 HISTOPLASMA ANTIBODY: CPT

## 2022-01-20 PROCEDURE — 87205 SMEAR GRAM STAIN: CPT | Performed by: INTERNAL MEDICINE

## 2022-01-20 PROCEDURE — 97535 SELF CARE MNGMENT TRAINING: CPT | Mod: GO

## 2022-01-20 PROCEDURE — 250N000013 HC RX MED GY IP 250 OP 250 PS 637: Performed by: INTERNAL MEDICINE

## 2022-01-20 PROCEDURE — 99233 SBSQ HOSP IP/OBS HIGH 50: CPT | Performed by: INTERNAL MEDICINE

## 2022-01-20 PROCEDURE — 120N000001 HC R&B MED SURG/OB

## 2022-01-20 PROCEDURE — 97166 OT EVAL MOD COMPLEX 45 MIN: CPT | Mod: GO

## 2022-01-20 PROCEDURE — 999N000127 HC STATISTIC PERIPHERAL IV START W US GUIDANCE

## 2022-01-20 PROCEDURE — 87899 AGENT NOS ASSAY W/OPTIC: CPT

## 2022-01-20 PROCEDURE — 70486 CT MAXILLOFACIAL W/O DYE: CPT

## 2022-01-20 PROCEDURE — 250N000009 HC RX 250: Performed by: EMERGENCY MEDICINE

## 2022-01-20 PROCEDURE — 36415 COLL VENOUS BLD VENIPUNCTURE: CPT | Performed by: INTERNAL MEDICINE

## 2022-01-20 PROCEDURE — 87385 HISTOPLASMA CAPSUL AG IA: CPT

## 2022-01-20 PROCEDURE — 87633 RESP VIRUS 12-25 TARGETS: CPT | Performed by: INTERNAL MEDICINE

## 2022-01-20 PROCEDURE — 85652 RBC SED RATE AUTOMATED: CPT

## 2022-01-20 PROCEDURE — 86140 C-REACTIVE PROTEIN: CPT

## 2022-01-20 PROCEDURE — 250N000013 HC RX MED GY IP 250 OP 250 PS 637: Performed by: HOSPITALIST

## 2022-01-20 RX ORDER — BENZONATATE 100 MG/1
200 CAPSULE ORAL 3 TIMES DAILY PRN
Status: DISCONTINUED | OUTPATIENT
Start: 2022-01-20 | End: 2022-01-22 | Stop reason: HOSPADM

## 2022-01-20 RX ORDER — BENZONATATE 100 MG/1
100 CAPSULE ORAL 3 TIMES DAILY PRN
Status: DISCONTINUED | OUTPATIENT
Start: 2022-01-20 | End: 2022-01-20

## 2022-01-20 RX ORDER — SODIUM CHLORIDE 9 MG/ML
INJECTION, SOLUTION INTRAVENOUS CONTINUOUS
Status: ACTIVE | OUTPATIENT
Start: 2022-01-20 | End: 2022-01-20

## 2022-01-20 RX ORDER — IPRATROPIUM BROMIDE AND ALBUTEROL SULFATE 2.5; .5 MG/3ML; MG/3ML
3 SOLUTION RESPIRATORY (INHALATION) EVERY 4 HOURS PRN
Status: DISCONTINUED | OUTPATIENT
Start: 2022-01-20 | End: 2022-01-22 | Stop reason: HOSPADM

## 2022-01-20 RX ADMIN — IPRATROPIUM BROMIDE AND ALBUTEROL SULFATE 3 ML: .5; 3 SOLUTION RESPIRATORY (INHALATION) at 06:04

## 2022-01-20 RX ADMIN — IPRATROPIUM BROMIDE AND ALBUTEROL SULFATE 3 ML: .5; 3 SOLUTION RESPIRATORY (INHALATION) at 13:28

## 2022-01-20 RX ADMIN — SODIUM CHLORIDE: 9 INJECTION, SOLUTION INTRAVENOUS at 08:38

## 2022-01-20 RX ADMIN — BENZONATATE 200 MG: 100 CAPSULE ORAL at 22:02

## 2022-01-20 RX ADMIN — PRAVASTATIN SODIUM 20 MG: 20 TABLET ORAL at 08:19

## 2022-01-20 RX ADMIN — RIVAROXABAN 15 MG: 15 TABLET, FILM COATED ORAL at 17:47

## 2022-01-20 RX ADMIN — SODIUM CHLORIDE: 9 INJECTION, SOLUTION INTRAVENOUS at 00:01

## 2022-01-20 RX ADMIN — IPRATROPIUM BROMIDE AND ALBUTEROL SULFATE 3 ML: .5; 3 SOLUTION RESPIRATORY (INHALATION) at 01:20

## 2022-01-20 RX ADMIN — GUAIFENESIN 10 ML: 100 SOLUTION ORAL at 10:57

## 2022-01-20 RX ADMIN — IPRATROPIUM BROMIDE AND ALBUTEROL SULFATE 3 ML: .5; 3 SOLUTION RESPIRATORY (INHALATION) at 17:46

## 2022-01-20 RX ADMIN — SODIUM CHLORIDE: 9 INJECTION, SOLUTION INTRAVENOUS at 18:36

## 2022-01-20 RX ADMIN — METOPROLOL SUCCINATE 150 MG: 50 TABLET, EXTENDED RELEASE ORAL at 08:19

## 2022-01-20 RX ADMIN — IPRATROPIUM BROMIDE AND ALBUTEROL SULFATE 3 ML: .5; 3 SOLUTION RESPIRATORY (INHALATION) at 19:51

## 2022-01-20 RX ADMIN — BENZONATATE 100 MG: 100 CAPSULE ORAL at 05:17

## 2022-01-20 RX ADMIN — IPRATROPIUM BROMIDE AND ALBUTEROL SULFATE 3 ML: .5; 3 SOLUTION RESPIRATORY (INHALATION) at 08:19

## 2022-01-20 RX ADMIN — PREDNISONE 20 MG: 20 TABLET ORAL at 08:19

## 2022-01-20 ASSESSMENT — ACTIVITIES OF DAILY LIVING (ADL)
ADLS_ACUITY_SCORE: 7
ADLS_ACUITY_SCORE: 5
ADLS_ACUITY_SCORE: 5
ADLS_ACUITY_SCORE: 7

## 2022-01-20 ASSESSMENT — MIFFLIN-ST. JEOR: SCORE: 1226.39

## 2022-01-20 NOTE — CONSULTS
Consultation - [facility]  Fatuma Henry,  1945, MRN [id number]    [facility]  Pulmonary nodules [R91.8]  COPD exacerbation (H) [J44.1]    PCP: Tory Wise, [phone]   Code status:  No CPR- Do NOT Intubate       Extended Emergency Contact Information  Primary Emergency Contact: Enid Dove  Home Phone: [phone]  Relation: Daughter  Secondary Emergency Contact: Bolivar Henry  Home Phone: [phone]  Relation: Spouse       Assessment and Plan   Active Problems:    Pulmonary nodules    COPD exacerbation (H)    Impression: +1,3D beta glucan test of uncertain significance.  Patient at risk for invasive fungal infection or pneumocystis infection because she is on immunosuppressant treatment for her seropositive rheumatoid arthritis.  However, no acute findings on her chest CT. she does complain of chronic cough which raises the question of sinusitis which could be a fungal infection.    She was admitted for some shortness of breath remains on 2 L of oxygen    Recommendations: Repeat the one 3D beta glucan and add a Aspergillus galactomannan study.  Check other fungal test such as blastomycosis or histoplasmosis, although typically they do not raise the 13D beta glucan.  Check cryptococcal antigen.    CT paranasal sinuses to check for source of cough and or fungal infection.    In the absence of any compelling evidence of fungal infection or pneumocystis infection, would not start antifungal treatments at this time.    Okay to discharge at any time from infectious diseases standpoint    Happy to follow-up in clinic in a few weeks as some of these test results take days to weeks to come back.    Thank you for consulting [facility]    Mode Garcia MD  [phone]     Chief Complaint <principal problem not specified>       HPI   We have been requested by Cookie Leigh to  evaluate Fatuma Henry for an abnormal test result who is a 76 year old year old female.    Patient is a 76-year-old woman with a history of seropositive rheumatoid arthritis under the care of Dr. Rush, with treatments including Cimzia and methotrexate.    She presented to the emergency department 3 days ago with shortness of breath fatigue and cough.  She had previously been diagnosed with influenza.  She says she took some Tamiflu but did not take any other antibiotic treatments symptoms included persistent cough occasionally productive.    In the ED, CT scan was done which showed some left upper lobe groundglass opacification.  In retrospect it appears this has been there for months and is relatively stable.    However, as part of the initial work-up, a 1.3 D beta glucan test was ordered and has returned positive.  No other test specific for pneumocystis or fungal infections have been ordered.    Patient has been seen by pulmonary medicine service and is felt to not have acute pneumonic infection at this time.    She has longstanding smoking history.    She was born and raised in [place] never lived elsewhere.  Never lived on a farm.  She did work in industrial plant.  She is  and her  has COPD and emphysema.    No family history of tuberculosis.       Medical History  Patient Active Problem List   Diagnosis     Foot Pain (Soft Tissue)     Seropositive rheumatoid arthritis (H)     Limb pain     Osteoporosis dxa 2006      High risk medication use     Midline low back pain without sciatica     Rheumatoid arthritis involving multiple sites with positive rheumatoid factor (H)     Dyspnea     Reactive airway disease     Paroxysmal atrial fibrillation (H)     Nonsustained ventricular tachycardia (H)     CRF (chronic renal failure), stage 3 (moderate) (H)     Primary osteoarthritis involving multiple joints     Benign essential hypertension     Pulmonary nodules     COPD exacerbation (H)     Past  Medical History:   Diagnosis Date     Atrial fibrillation (H)      CRF (chronic renal failure)      GERD (gastroesophageal reflux disease)      Hypertension      Hypovolemic shock (H)      Influenza A 12/2017     Rheumatoid arthritis (H)      Sepsis (H) 12/24/2017    Surgical History  She  has a past surgical history that includes Cholecystectomy; appendectomy; and Bladder Suspension.   Social History  Reviewed, and she  reports that she quit smoking about 4 years ago. Her smoking use included cigarettes. She smoked 0.50 packs per day. She has never used smokeless tobacco. She reports that she does not drink alcohol and does not use drugs.   Allergies  Allergies   Allergen Reactions     Codeine Nausea and Vomiting    Family History  Noncontributory to current problem except as mentioned above    Psychosocial Needs  Social History     Social History Narrative     Not on file     Additional psychosocial needs reviewed per nursing assessment.     Prior to Admission Medications   Facility-Administered Medications Prior to Admission   Medication Dose Route Frequency Provider Last Rate Last Admin     certolizumab pegol (CIMZIA) injection 400 mg  400 mg Subcutaneous Q28 Days Bobby Rush MBBS   400 mg at 12/21/21 1406     Medications Prior to Admission   Medication Sig Dispense Refill Last Dose     acetaminophen (TYLENOL) 500 MG tablet [ACETAMINOPHEN (TYLENOL) 500 MG TABLET] Take 1-2 tablets (500-1,000 mg total) by mouth every 6 (six) hours as needed.  0 Past Month at Unknown time     albuterol (PROVENTIL) (2.5 MG/3ML) 0.083% neb solution Take 2.5 mg by nebulization every 6 hours as needed for shortness of breath / dyspnea or wheezing    Past Month at Unknown time     certolizumab pegol (CIMZIA PREFILLED) 2 X 200 MG/ML KIT 2 syringes/kit Inject 400 mg Subcutaneous every 28 days 1 each 2 12/21/2021 at 1406     compressor, for nebulizer Saran [COMPRESSOR, FOR NEBULIZER SARAN] Nebulizer treatment qid prn 1 Device 0 Unknown at  "Unknown time     flaxseed oil 1,000 mg cap [FLAXSEED OIL 1,000 MG CAP] Take 1 capsule by mouth daily.    1/7/2022     folic acid (FOLVITE) 1 MG tablet [FOLIC ACID (FOLVITE) 1 MG TABLET] Take one tab po qd except the day when taking methotrexate. 90 tablet 1 1/17/2022 at Unknown time     FOLIC ACID/MULTIVITS-MIN/LUT (CENTRUM SILVER ORAL) [FOLIC ACID/MULTIVITS-MIN/LUT (CENTRUM SILVER ORAL)] Take 1 tablet by mouth daily.    1/17/2022 at Unknown time     furosemide (LASIX) 20 MG tablet [FUROSEMIDE (LASIX) 20 MG TABLET] Take 20 mg by mouth daily as needed (Leg swelling).   1/17/2022 at Unknown time     methotrexate sodium 2.5 MG TABS TAKE 4 TABLETS BY MOUTH ONCE A WEEK 16 tablet 0 1/16/2022     metoprolol succinate ER (TOPROL-XL) 100 MG 24 hr tablet Take 150 mg by mouth daily   1/17/2022 at Unknown time     pravastatin (PRAVACHOL) 20 MG tablet [PRAVASTATIN (PRAVACHOL) 20 MG TABLET] Take 20 mg by mouth daily.   1/17/2022 at Unknown time     rivaroxaban ANTICOAGULANT (XARELTO) 20 mg tablet [RIVAROXABAN ANTICOAGULANT (XARELTO) 20 MG TABLET] Take 1 tablet (20 mg total) by mouth daily. Take with a FULL meal. 90 tablet 3 1/17/2022 at Unknown time          Review of Systems:  Pertinent items are noted in HPI., otherwise all others negative. Physical Exam:  Temp:  [97.6  F (36.4  C)-98.2  F (36.8  C)] 98.2  F (36.8  C)  Pulse:  [76-79] 79  Resp:  [18-20] 18  BP: (101-119)/(53-59) 119/59  SpO2:  [93 %-98 %] 98 %    /59 (BP Location: Right arm)   Pulse 79   Temp 98.2  F (36.8  C) (Oral)   Resp 18   Ht 1.664 m (5' 5.5\")   Wt 72.3 kg (159 lb 4.8 oz)   SpO2 98%   BMI 26.11 kg/m    General appearance: alert, appears stated age and cooperative  Head: Normocephalic, without obvious abnormality, atraumatic  Throat: lips, mucosa, and tongue normal; teeth and gums normal  Neck: no adenopathy and supple, symmetrical, trachea midline  Lungs: clear to auscultation bilaterally  Heart: Regular rate and rhythm, no murmur  Abdomen: " soft, non-tender; bowel sounds normal; no masses,  no organomegaly  Extremities: Warm and dry, some changes consistent with history of rheumatoid arthritis  Skin: Skin color, texture, turgor normal. No rashes or lesions  Neurologic: Grossly normal       Pertinent Labs  Lab Results: personally reviewed.   WBC Count   Date/Time Value Ref Range Status   01/18/2022 06:33 AM 5.2 4.0 - 11.0 10e3/uL Final   01/17/2022 09:23 PM 4.6 4.0 - 11.0 10e3/uL Final   11/18/2021 01:12 PM 5.7 4.0 - 11.0 10e3/uL Final     Hemoglobin   Date/Time Value Ref Range Status   01/18/2022 06:33 AM 12.1 11.7 - 15.7 g/dL Final   01/17/2022 09:23 PM 12.7 11.7 - 15.7 g/dL Final   11/18/2021 01:12 PM 12.4 11.7 - 15.7 g/dL Final     Hematocrit   Date/Time Value Ref Range Status   01/18/2022 06:33 AM 36.7 35.0 - 47.0 % Final   01/17/2022 09:23 PM 38.8 35.0 - 47.0 % Final   11/18/2021 01:12 PM 38.0 35.0 - 47.0 % Final     Platelet Count   Date/Time Value Ref Range Status   01/18/2022 06:33  150 - 450 10e3/uL Final   01/17/2022 09:23  150 - 450 10e3/uL Final   11/18/2021 01:12  150 - 450 10e3/uL Final        Sodium   Date/Time Value Ref Range Status   01/20/2022 08:44  136 - 145 mmol/L Final   01/18/2022 06:33  (L) 136 - 145 mmol/L Final   01/17/2022 05:56  136 - 145 mmol/L Final     Carbon Dioxide (CO2)   Date/Time Value Ref Range Status   01/20/2022 08:44 AM 24 22 - 31 mmol/L Final   01/18/2022 06:33 AM 26 22 - 31 mmol/L Final   01/17/2022 05:56 PM 26 22 - 31 mmol/L Final     Urea Nitrogen   Date/Time Value Ref Range Status   01/20/2022 08:44 AM 29 (H) 8 - 28 mg/dL Final   01/18/2022 06:33 AM 35 (H) 8 - 28 mg/dL Final   01/17/2022 05:56 PM 31 (H) 8 - 28 mg/dL Final     No results found for: CRP  No results found for: SED    01/20/2022 0545 01/20/2022 1052 Respiratory Aerobic Bacterial Culture [[id number]]   Sputum from Expectorate    Preliminary result Component Value   Gram Stain Result <10 Squamous epithelial  cells/low power field P    <25 PMNs/low power field P    No organisms seen P           2022 1756 2022 1844 Symptomatic; Unknown Influenza A/B & SARS-CoV2 (COVID-19) Virus PCR Multiplex Nasopharyngeal [[id number]]    Swab from Nasopharyngeal    Final result Component Value   Influenza A PCR Negative   Influenza B PCR Negative   SARS CoV2 PCR Negative   NEGATIVE: SARS-CoV-2 (COVID-19) RNA not detected, presumed negative.           2022 2319 1,3 Beta D glucan fungitell [[id number]]   (Abnormal)   Blood from Line, venous    Final result Component Value Units   (1,3)-Beta-D-Glucan 252 pg/mL   B-D GLUCAN INTERPRETATION (1,3) Positive Abnormal     INTERPRETIVE INFORMATION: (1,3)-beta-D-glucan (Fungitell)       Less than 31 pg/mL ................... Negative     31-59 pg/mL .......................... Negative     60-79 pg/mL .......................... Indeterminate     Greater than or equal to 80 pg/mL .... Positive     The Fungitell test is indicated for presumptive diagnosis   of fungal infection and should be used in conjunction with   other diagnostic procedures. This test does not detect   certain fungal species such as Cryptococcus, which produce   very low levels of (1,3)-beta-D-glucan. This test will not   detect the zygomycetes, such as Absidia, Mucor, and   Rhizopus, which are not known to produce   (1,3)-beta-D-glucan. In addition, the yeast phase of   Blastomyces dermatitidis produces little   (1,3)-beta-D-glucan and may not be detected by the assay.   Performed By: [facility]   [address] [place] [id number]   : Rebeca Tabor MD             Pertinent Radiology  Radiology Results: Chest CT personally reviewed    Echocardiogram Complete    Result Date: 2022  [id number] [address]  Name: HEATHER DOYLE MRN: [id number] : 1945 Study  Date: 01/18/2022 09:55 AM Age: 76 yrs Gender: Female Patient Location: [facility] Reason For Study: Pulmonary Hypertension Ordering Physician: TETE SANTANA Performed By:   BSA: 1.8 m2 Height: 66 in Weight: 160 lb HR: 81 ______________________________________________________________________________ Procedure Complete Portable Echo Adult. Definity (NDC #20378-166) given intravenously. There is no comparison study available. ______________________________________________________________________________ Interpretation Summary  1.Left ventricular size, wall motion and function are normal. The ejection fraction is 60-65%. 2.Normal right ventricle size and systolic function. 3.The left atrium is moderately dilated. 4.There is moderate mitral annular calcification. 5.Thickened aortic valve leaflets, Mild valvular aortic stenosis.Mean gradient 12 mmHg with peak velocity of 2.4 m/s at 76 bpm. 6.There is mild to moderate (1-2+) aortic regurgitation. 7.Right ventricular systolic pressure is elevated, consistent with moderate pulmonary hypertension. 8.There is no comparison study available. ______________________________________________________________________________ I      WMSI = 1.00     % Normal = 100  X - Cannot   0 -                      (2) - Mildly 2 -          Segments  Size Interpret    Hyperkinetic 1 - Normal  Hypokinetic  Hypokinetic  1-2     small                                                    7 -          3-5    moderate 3 - Akinetic 4 -          5 -         6 - Akinetic Dyskinetic   6-14    large              Dyskinetic   Aneurysmal  w/scar       w/scar       15-16   diffuse  Left Ventricle Left ventricular size, wall motion and function are normal. The ejection fraction is 60-65%. There is borderline concentric left ventricular hypertrophy. Left ventricular diastolic function is normal. No regional wall motion abnormalities noted.  Right Ventricle Normal right ventricle size and systolic function. TAPSE is  normal, which is consistent with normal right ventricular systolic function.  Atria The left atrium is moderately dilated. Right atrial size is normal. There is no color Doppler evidence of an atrial shunt.  Mitral Valve Mitral valve leaflets appear normal. There is moderate mitral annular calcification. There is physiologic mitral regurgitation. There is no mitral valve stenosis.  Tricuspid Valve The tricuspid valve is not well visualized, but is grossly normal. There is mild (1+) tricuspid regurgitation. Right ventricular systolic pressure is elevated, consistent with moderate pulmonary hypertension.  Aortic Valve The aortic valve is trileaflet. Thickened aortic valve leaflets. There is mild to moderate (1-2+) aortic regurgitation. Mild valvular aortic stenosis.  Pulmonic Valve The pulmonic valve is not well seen, but is grossly normal. This degree of valvular regurgitation is within normal limits. There is no pulmonic valvular stenosis.  Vessels The aorta root is normal. Normal size ascending aorta. IVC diameter <2.1 cm collapsing >50% with sniff suggests a normal RA pressure of 3 mmHg.  Pericardium There is no pericardial effusion.  Rhythm Sinus rhythm was noted.  ______________________________________________________________________________ MMode/2D Measurements & Calculations IVSd: 1.1 cm LVIDd: 2.9 cm LVIDs: 2.1 cm LVPWd: 1.1 cm  FS: 27.7 % LV mass(C)d: 93.0 grams LV mass(C)dI: 51.1 grams/m2 Ao root diam: 3.0 cm asc Aorta Diam: 2.9 cm LVOT diam: 1.9 cm LVOT area: 2.7 cm2 LA Volume (BP): 74.0 ml LA Volume Indexed (AL/bp): 42.7 ml/m2 RWT: 0.79  Time Measurements MM HR: 82.0 BPM  Doppler Measurements & Calculations MV E max ash: 110.0 cm/sec MV A max ash: 142.1 cm/sec MV E/A: 0.77 MV max P.6 mmHg MV mean PG: 3.4 mmHg MV V2 VTI: 33.9 cm MVA(VTI): 2.5 cm2 MV dec slope: 355.4 cm/sec2 MV dec time: 0.31 sec Ao V2 max: 235.3 cm/sec Ao max P.0 mmHg Ao V2 mean: 162.8 cm/sec Ao mean P.2 mmHg Ao V2 VTI: 55.2  cm NA(I,D): 1.5 cm2 NA(V,D): 1.3 cm2 LV V1 max P.7 mmHg LV V1 max: 108.7 cm/sec LV V1 VTI: 30.5 cm SV(LVOT): 83.6 ml SI(LVOT): 45.9 ml/m2 PA acc time: 0.09 sec TR max chente: 349.2 cm/sec TR max P.8 mmHg AV Chente Ratio (DI): 0.46 NA Index (cm2/m2): 0.83 E/E': 15.9 E/E' avg: 15.1 Lateral E/e': 14.2  Medial E/e': 16.0 Peak E' Chente: 6.9 cm/sec  ______________________________________________________________________________ Report approved by: Derrick Kern 2022 01:09 PM       CT Chest Pulmonary Embolism w Contrast    Result Date: 2022  EXAM: CT CHEST PULMONARY EMBOLISM W CONTRAST LOCATION: [facility] DATE/TIME: 2022 7:40 PM INDICATION: Cough. Shortness breath. COMPARISON: None. TECHNIQUE: CT chest pulmonary angiogram during arterial phase injection of IV contrast. Multiplanar reformats and MIP reconstructions were performed. Dose reduction techniques were used. CONTRAST: 100 mL Isovue 370. FINDINGS: ANGIOGRAM CHEST: No pulmonary embolism. Mild pulmonary trunk enlargement 3.1 cm. Aortic valvular calcifications. Nonaneurysmal aorta without dissection. Mild-moderate atherosclerosis. LUNGS AND PLEURA: Mild emphysema. 12 mm groundglass focus in the lingula (series 6, image 124). Heterogeneous cystic focus with thickening measuring 15 mm in the infrahilar lingula (image 129). Mild airway thickening. No pleural effusion or pneumothorax. MEDIASTINUM/AXILLAE: No adenopathy. No pericardial effusion. CORONARY ARTERY CALCIFICATION: Moderate. UPPER ABDOMEN: Nothing acute. MUSCULOSKELETAL: Bony demineralization.     IMPRESSION: 1.  No pulmonary embolism. 2.  Mild pulmonary trunk enlargement; correlate for pulmonary hypertension. 3.  Nonaneurysmal aorta without dissection. 4.  Mild emphysema. Mild airway thickening. 5.  Left upper lobe / lingular groundglass and cystic focus with thickening, indeterminate. Adenocarcinoma type lesions can have this appearance. Post infectious or  inflammatory change versus scarring also possible differentials. Recommend 6 month follow-up per guidelines below. 6.  Moderate coronary calcifications. REFERENCE: Guidelines for Management of Incidental Pulmonary Nodules Detected on CT Images: From the Fleischner Society 2017. Guidelines apply to incidental nodules in patients who are 35 years or older. Guidelines do not apply to lung cancer screening, patients with immunosuppression, or patients with known primary cancer. SUBSOLID NODULES Ground glass Nodule size <6 mm No routine follow-up. If suspicious, consider follow-up at 2 and 4 years. Nodule size 6 mm or greater CT at 6-12 months to confirm persistence, then CT every 2 years until 5 years. Part solid Nodule size <6 mm No routine follow-up. Nodule size 6 mm or greater CT at 3-6 months to confirm persistence. If unchanged and solid component remains <6 mm, annual CT for 5 years. Multiple CT at 3-6 months. If stable, consider CT at 2 and 4 years. Management based on the most suspicious nodule.

## 2022-01-20 NOTE — PROVIDER NOTIFICATION
Paged MD regarding: Patient with c/o coughing attacks making her breathing labored. LS diminished. 02 sats 95% on 2 liters. Can anything be ordered for her? Thanks so much!  [phone]     MD ordered Tessalon Rani FISHERN

## 2022-01-20 NOTE — PROVIDER NOTIFICATION
Paged MD: Could PRN neds be ordered for patient too? She has them scheduled right now only. Thanks! [phone]    MD ordered PRN nebs

## 2022-01-20 NOTE — PLAN OF CARE
Problem: Gas Exchange Impaired  Goal: Optimal Gas Exchange  Outcome: No Change  Intervention: Optimize Oxygenation and Ventilation  Recent Flowsheet Documentation  Taken 1/20/2022 0121 by Neda Mcginnis, RN  Head of Bed (HOB) Positioning: HOB at 20-30 degrees     A&Ox4, pleasant. Denied any pain overnight. VSS, continues on 2 liters via NC sating in low-mid 90's. Infrequent deep congested non-productive cough; patient's breathing will become labored with the cough and make her gag. MD called; new order for Tessalon Pearls added and did give a dose. Also PRN neds ordered, which was one given. Patient feels like she's coming down with a cold. Lungs feel tight with the coughing. Sputum sample collected on shift. LS diminished; no wheezing or coarse sounds heard. PIV infusing NS @ 125 mL/hr. Up A1&W. Discharge pending recovery.

## 2022-01-20 NOTE — PROGRESS NOTES
01/20/22 1050   Quick Adds   Type of Visit Initial Occupational Therapy Evaluation   Living Environment   People in home spouse   Current Living Arrangements condominium   Living Environment Comments tub/shower with GB, RTS with tub on the L side.   Self-Care   Equipment Currently Used at Home walker, rolling;cane, straight   Disability/Function   Hearing Difficulty or Deaf   (Pt reports decreased hearing but not HA)   Wear Glasses or Blind yes   Vision Management glasses   General Information   Onset of Illness/Injury or Date of Surgery 01/17/22   Referring Physician Dr. Cookie Leigh   Patient/Family Therapy Goal Statement (OT) To return home   Cognitive Status Examination   Orientation Status person;place;time   Range of Motion Comprehensive   General Range of Motion no range of motion deficits identified   Bed Mobility   Bed Mobility supine-sit   Supine-Sit [place] (Bed Mobility) modified independence   Assistive Device (Bed Mobility) bed rails;other (see comments)  (HOB elevated)   Transfers   Transfers bed-chair transfer;sit-stand transfer;toilet transfer   Transfer Skill: Bed to Chair/Chair to Bed   Bed-Chair [place] (Transfers) modified independence   Assistive Device (Bed-Chair Transfers) rolling walker   Sit-Stand Transfer   Sit-Stand [place] (Transfers) modified independence   Assistive Device (Sit-Stand Transfers) walker, 4-wheeled   Toilet Transfer   Type (Toilet Transfer) sit-stand;stand-sit   [place] Level (Toilet Transfer) modified independence   Assistive Device (Toilet Transfer) walker, 4-wheeled   Balance   Balance Assessment standing balance: dynamic   Standing Balance: Dynamic WNL   Activities of Daily Living   BADL Assessment lower body dressing;grooming   Lower Body Dressing Assessment   [place] Level (Lower Body Dressing) verbal cues;supervision  (for PLB)   Position (Lower Body Dressing) edge of bed sitting   Grooming Assessment   [place] Level  (Grooming) modified independence   Position (Grooming) supported standing   Clinical Impression   Criteria for Skilled Therapeutic Interventions Met (OT) yes   OT Diagnosis decreased indep with ADLs due to COPD exac.   OT Problem List-Impairments impacting ADL mobility   Assessment of Occupational Performance 3-5 Performance Deficits   Identified Performance Deficits dressing, toileting, G/H and trsfs.   Planned Therapy Interventions (OT) ADL retraining;home program guidelines;progressive activity/exercise   Clinical Decision Making Complexity (OT) moderate complexity   Therapy Frequency (OT) Daily   Predicted Duration of Therapy 3 days   Risk & Benefits of therapy have been explained patient   OT Discharge Planning    OT Discharge Recommendation (DC Rec) Home with assist   OT Rationale for DC Rec Pt will have assist fro family -  or Dtr or Grandkids   Total Evaluation Time (Minutes)   Total Evaluation Time (Minutes) 15

## 2022-01-20 NOTE — PROGRESS NOTES
Care Management Follow Up    Length of Stay (days): 3    Expected Discharge Date: 01/23/2022     Concerns to be Addressed: discharge planning, Home Care referral, Oxygen needs    Patient plan of care discussed at interdisciplinary rounds: Yes    Anticipated Discharge Disposition:  Home   Disposition Comments: CM met with patient. Initial assessment done.     Anticipated Discharge Services: Home Care  Anticipated Discharge DME: None    Patient/family educated on Medicare website which has current facility and service quality ratings:    Education Provided on the Discharge Plan:    Patient/Family in Agreement with the Plan: yes    Referrals Placed by CM/SW:  Home Care  Private pay costs discussed: Not applicable    Additional Information:  Chart reviewed and plan of care discussed with hospitalist.  Pt remains on Oxygen at 2L, IVF, nebs and Telemetry.  ID has been consulted and Pulmonary has signed off.    Pt resides in a condo with her spouse.  Spouse is currently at their cabin in [place]. Pt uses provides cooking and cleaning for spouse so she states he is planning to stay at cabin until pt has improved enough to provide those cares for him again. She states he does not want her to feel like she has to work when she gets home. She states she has 2 daughters who can provide assistance.  Discussed recommendation for Home PT and pt is agreeable and does not have a preference for agency.  Referrals sent.  Pt states her daughter, Enid will provide transportation home.    3:39 PM  Received call back from [facility], stating they can accept pt for services and will anticipate discharge on 1/22/2022.    Teena Yadav RN

## 2022-01-20 NOTE — PROGRESS NOTES
"A&Ox4. Up A1/SBA FWW gb. IV abx infused per orders.  ml/h. Dyspnea on exertion, O2 2 LPM NC, sched nebs. Pleasant and cooperative. Hourly rounding completed, continuing to monitor.    Blood pressure 113/53, pulse 76, temperature 98.2  F (36.8  C), temperature source Oral, resp. rate 18, height 1.664 m (5' 5.5\"), weight 72.2 kg (159 lb 3.2 oz), SpO2 95 %.    Gisela Boucher RN  "

## 2022-01-20 NOTE — PROGRESS NOTES
"[facility]  Hospitalist Progress Note    Admit Date:  1/17/2022  Date of Service (when I saw the patient): 01/20/2022   Provider:  Cookie Leigh DO    Identification/Summary:  Fatuma Henry is a 76 year old female who has past medical history of Atrial fibrillation (H), CRF (chronic renal failure), GERD (gastroesophageal reflux disease), Hypertension, Hypovolemic shock (H), Influenza A (12/2017), Rheumatoid arthritis (H), and Sepsis presented with shortness of breath, cough for 2 weeks and admitted for abnormal CT chest with cystic lesion, groundglass opacities in the left upper lobe.  She was initially placed on IV Zosyn, Bactrim given immunosuppressive status, but has been taken off antibiotics from pulmonary medicine.      COVID and influenza initially negative; awaiting respiratory PCR viral panel.  Noted to have positive fungitell test now from 1/17/22.    Assessment & Plan     1. Acute hypoxemic respiratory failure  Cystic lung lesion on CT consistent with likely adenocarcinoma  COPD exacerbation  Emphysema  Former smoker  Immunocompromised  Hx of methotrexate (chronic), former smoker  No further antibiotics as no indication for antibiotics  Bactrim had been started for possible pneumocystis pending pulmonary consult, but no evidence of PJP as the \"cystic lesion\" is most likely due adenocarcinoma  Pulmonology consulted for nodule - recommend 6 month follow-up    Sputum cx pending  fungitell now positive from 1/17/22 - will ask ID for further recommendations      Continue nebs, low-dose prednisone with 20 mg daily x5 days  On 2 L oxygen - has been desating to the 80's this am with coughing/activity    Cough worse today and so will add tessalon pearls and robitussin prn    2. Hypercalcemia - resolved  Calcium normal less than a year ago, has been elevated at 10.7, with mildly elevated PTH  PTH now normal  PTHrP is still pending, but not likely to be related to " malignancy  Previously taking exogenous Calcium but stopped some time ago    Calcium level normal this am  Will saline lock IVF  BMP in the am     3. Acute kidney injury  Creat improving with IVF given  Will decrease IVF rate this afternoon and saline lock this evening.  Avoid nephrotoxic agents  BMP in the am    Hold diuretics - she tells me that she only take lasix prn (as listed on her home med list) for leg swelling and not on a scheduled basis    4. Moderate pulmonary hypertension  Echo this admission EF 65% mild aortic stenosis, mild to moderate Aortic regurgitation   LA dilation as well     5. Seropositive rheumatoid arthritis  Takes Cimzia, methotrexate  No evidence of methotrexate injury     6. Likely COPD  Exam and history would certainly suggest COPD  Continue with prednisone and duonebs  Would benefit from outpatient pulmonary follow-up (would like to follow-up with Dr. Parth Rosario in the pulmonary medicine clinic)     7. Chronic a fib    Resume home xarelto     Diet: Combination Diet Regular Diet Adult  Snacks/Supplements Adult: Gelatein Plus; With Meals  DVT Prophylaxis: Start subcu heparin  Code Status: No CPR- Do NOT Intubate     Disposition -   Await PT/OT recs  D/W RN care coordinator   is out of town for several days  Await ID recommendations today     Addendum - Pt will be discharge with home care services.  CT of the sinsus pending.  Anticipate that she will be able to discharge 1/21/22    Diet: Combination Diet Regular Diet Adult  Snacks/Supplements Adult: Gelatein Plus; With Meals    DVT Prophylaxis: xarelto  Brandon Catheter: Not present  Code Status: No CPR- Do NOT Intubate          Entered: Cookie Leigh DO 01/20/2022, 7:10 AM       The patient's care was discussed with the Bedside Nurse and Patient.    We are operating under sub optimal conditions in the setting of a world wide pandemic, hospitals are running at full capacity with limited bed availability. We are  "providing the best possible patient care with limited resources.    Interval History   \"I am feeling worse today\".  More of a course cough today and sinus congestion.  No HA, N/V.  Feeling very weak, tired.  No F/C.  Has not yet been up out of bed.  Denies taking lasix daily at home.    -Data reviewed today: I reviewed all new labs and imaging results over the last 24 hours. I personally reviewed no images or EKG's today.    Physical Exam   Temp: 97.6  F (36.4  C) Temp src: Oral BP: 101/53 Pulse: 78   Resp: 20 SpO2: 93 % O2 Device: Nasal cannula Oxygen Delivery: 2 LPM  Vitals:    01/18/22 1535 01/19/22 0432 01/20/22 0622   Weight: 72.2 kg (159 lb 3.2 oz) 72.2 kg (159 lb 3.2 oz) 72.3 kg (159 lb 4.8 oz)     Vital Signs with Ranges  Temp:  [97.6  F (36.4  C)-98.9  F (37.2  C)] 97.6  F (36.4  C)  Pulse:  [75-78] 78  Resp:  [18-20] 20  BP: (101-113)/(50-53) 101/53  SpO2:  [91 %-97 %] 93 %  I/O last 3 completed shifts:  In: 1881 [I.V.:1881]  Out: 200 [Urine:200]    GEN:  Alert, oriented x 3, elderly female who appears ill, coughing, sitting up in bed.  HEENT:  Normocephalic/atraumatic, no scleral icterus, no nasal discharge, mouth and membranes appear fairly moist  NECK:  No clear thyromegaly or JVD  CV:  Somewhat distant but regular rate and rhythm, no loud murmur to ausc.  S1 + S2 noted, no S3 or S4.  LUNGS:  Scattered end-expiratory wheezing and rhonchi ant/post that clears somewhat with deep coughing.  No significant  rales auscultated bilaterally.  Mild costal retractions bilaterally.  Symmetric chest rise on inhalation noted.  ABD:  Active bowel sounds, soft, non-tender, not really distended.  No clear rebound/guarding/rigidity.  No masses palpated.  No obvious HSM to exam.  EXT:  No pretibial edema or cyanosis bilaterally. No joint synovitis noted.  No calf-tenderness or asymmetry noted.  SKIN:  Dry to touch, no rashes or jaundice noted.  PSYCH:  Mood slightly anxious, Not tearful or depressed.  Maintains direct " eye contact, overall.  NEURO:  No tremors at rest, speech is clear and appropriate.  Following basic directions without difficulty    Data   Labs:  Recent Labs   Lab 01/20/22  0844 01/19/22  0707 01/18/22  0633 01/17/22  1756     --  134* 137   POTASSIUM 3.8  --  4.1 4.2   CHLORIDE 108*  --  100 98   CO2 24  --  26 26   ANIONGAP 6  --  8 13   GLC 79  --  136* 108   BUN 29*  --  35* 31*   CR 1.55* 1.72* 1.68* 1.45*   GFRESTIMATED 35* 30* 31* 37*   SUNI 9.5  --  11.0* 11.6*     Recent Labs   Lab 01/18/22  0633 01/17/22  2123   WBC 5.2 4.6   HGB 12.1 12.7   HCT 36.7 38.8   * 100    296     Recent Labs   Lab 01/20/22  0844 01/19/22  0707 01/18/22  0633 01/17/22  1756     --  134* 137   POTASSIUM 3.8  --  4.1 4.2   CHLORIDE 108*  --  100 98   CO2 24  --  26 26   ANIONGAP 6  --  8 13   GLC 79  --  136* 108   BUN 29*  --  35* 31*   CR 1.55* 1.72* 1.68* 1.45*   GFRESTIMATED 35* 30* 31* 37*   SUNI 9.5  --  11.0* 11.6*   PROTTOTAL  --   --  6.0 6.6   ALBUMIN  --   --  3.1* 3.5   BILITOTAL  --   --  0.4 0.6   ALKPHOS  --   --  100 118   AST  --   --  18 21   ALT  --   --  11 13      Recent Imaging:   No results found for this or any previous visit (from the past 24 hour(s)).    Medications     - MEDICATION INSTRUCTIONS -       sodium chloride 125 mL/hr at 01/20/22 0001       ipratropium - albuterol 0.5 mg/2.5 mg/3 mL  3 mL Nebulization 4x daily     metoprolol succinate ER  150 mg Oral Daily     pravastatin  20 mg Oral Daily     predniSONE  20 mg Oral Daily     rivaroxaban ANTICOAGULANT  20 mg Oral Daily with supper     sodium chloride (PF)  3 mL Intracatheter Q8H

## 2022-01-21 ENCOUNTER — APPOINTMENT (OUTPATIENT)
Dept: PHYSICAL THERAPY | Facility: CLINIC | Age: 77
DRG: 190 | End: 2022-01-21
Payer: COMMERCIAL

## 2022-01-21 LAB
ANION GAP SERPL CALCULATED.3IONS-SCNC: 5 MMOL/L (ref 5–18)
BUN SERPL-MCNC: 23 MG/DL (ref 8–28)
C PNEUM DNA SPEC QL NAA+PROBE: NOT DETECTED
CALCIUM SERPL-MCNC: 9.8 MG/DL (ref 8.5–10.5)
CHLORIDE BLD-SCNC: 109 MMOL/L (ref 98–107)
CO2 SERPL-SCNC: 24 MMOL/L (ref 22–31)
CREAT SERPL-MCNC: 1.26 MG/DL (ref 0.6–1.1)
CRYPTOC AG SPEC QL: NEGATIVE
FLUAV H1 2009 PAND RNA SPEC QL NAA+PROBE: NOT DETECTED
FLUAV H1 RNA SPEC QL NAA+PROBE: NOT DETECTED
FLUAV H3 RNA SPEC QL NAA+PROBE: NOT DETECTED
FLUAV RNA SPEC QL NAA+PROBE: NOT DETECTED
FLUBV RNA SPEC QL NAA+PROBE: NOT DETECTED
GFR SERPL CREATININE-BSD FRML MDRD: 44 ML/MIN/1.73M2
GLUCOSE BLD-MCNC: 76 MG/DL (ref 70–125)
HADV DNA SPEC QL NAA+PROBE: NOT DETECTED
HCOV PNL SPEC NAA+PROBE: NOT DETECTED
HMPV RNA SPEC QL NAA+PROBE: NOT DETECTED
HPIV1 RNA SPEC QL NAA+PROBE: NOT DETECTED
HPIV2 RNA SPEC QL NAA+PROBE: NOT DETECTED
HPIV3 RNA SPEC QL NAA+PROBE: NOT DETECTED
HPIV4 RNA SPEC QL NAA+PROBE: NOT DETECTED
M PNEUMO DNA SPEC QL NAA+PROBE: NOT DETECTED
PATH INTERP SPEC-IMP: NORMAL
POTASSIUM BLD-SCNC: 4.2 MMOL/L (ref 3.5–5)
RSV RNA SPEC QL NAA+PROBE: NOT DETECTED
RSV RNA SPEC QL NAA+PROBE: NOT DETECTED
RV+EV RNA SPEC QL NAA+PROBE: NOT DETECTED
SODIUM SERPL-SCNC: 138 MMOL/L (ref 136–145)

## 2022-01-21 PROCEDURE — 250N000009 HC RX 250: Performed by: EMERGENCY MEDICINE

## 2022-01-21 PROCEDURE — 80048 BASIC METABOLIC PNL TOTAL CA: CPT | Performed by: INTERNAL MEDICINE

## 2022-01-21 PROCEDURE — 250N000012 HC RX MED GY IP 250 OP 636 PS 637: Performed by: HOSPITALIST

## 2022-01-21 PROCEDURE — 999N000157 HC STATISTIC RCP TIME EA 10 MIN

## 2022-01-21 PROCEDURE — 94640 AIRWAY INHALATION TREATMENT: CPT | Mod: 76

## 2022-01-21 PROCEDURE — 258N000003 HC RX IP 258 OP 636: Performed by: INTERNAL MEDICINE

## 2022-01-21 PROCEDURE — 97530 THERAPEUTIC ACTIVITIES: CPT | Mod: GP

## 2022-01-21 PROCEDURE — 250N000013 HC RX MED GY IP 250 OP 250 PS 637: Performed by: INTERNAL MEDICINE

## 2022-01-21 PROCEDURE — 120N000001 HC R&B MED SURG/OB

## 2022-01-21 PROCEDURE — 99233 SBSQ HOSP IP/OBS HIGH 50: CPT | Performed by: INTERNAL MEDICINE

## 2022-01-21 PROCEDURE — 36415 COLL VENOUS BLD VENIPUNCTURE: CPT | Performed by: INTERNAL MEDICINE

## 2022-01-21 PROCEDURE — 94640 AIRWAY INHALATION TREATMENT: CPT

## 2022-01-21 PROCEDURE — 250N000013 HC RX MED GY IP 250 OP 250 PS 637: Performed by: HOSPITALIST

## 2022-01-21 PROCEDURE — 97116 GAIT TRAINING THERAPY: CPT | Mod: GP

## 2022-01-21 RX ORDER — IPRATROPIUM BROMIDE AND ALBUTEROL SULFATE 2.5; .5 MG/3ML; MG/3ML
3 SOLUTION RESPIRATORY (INHALATION) EVERY 4 HOURS PRN
Qty: 90 ML | Refills: 0 | Status: SHIPPED | OUTPATIENT
Start: 2022-01-21 | End: 2023-01-01

## 2022-01-21 RX ORDER — BENZONATATE 200 MG/1
200 CAPSULE ORAL 3 TIMES DAILY PRN
Qty: 60 CAPSULE | Refills: 0 | Status: SHIPPED | OUTPATIENT
Start: 2022-01-21 | End: 2022-04-20

## 2022-01-21 RX ORDER — METOPROLOL SUCCINATE 100 MG/1
100 TABLET, EXTENDED RELEASE ORAL DAILY
Status: DISCONTINUED | OUTPATIENT
Start: 2022-01-22 | End: 2022-01-22 | Stop reason: HOSPADM

## 2022-01-21 RX ORDER — METOPROLOL SUCCINATE 50 MG/1
100 TABLET, EXTENDED RELEASE ORAL DAILY
Qty: 60 TABLET | Refills: 0 | Status: ON HOLD | OUTPATIENT
Start: 2022-01-22 | End: 2022-01-01

## 2022-01-21 RX ADMIN — RIVAROXABAN 15 MG: 15 TABLET, FILM COATED ORAL at 16:29

## 2022-01-21 RX ADMIN — IPRATROPIUM BROMIDE AND ALBUTEROL SULFATE 3 ML: .5; 3 SOLUTION RESPIRATORY (INHALATION) at 15:45

## 2022-01-21 RX ADMIN — PRAVASTATIN SODIUM 20 MG: 20 TABLET ORAL at 09:13

## 2022-01-21 RX ADMIN — IPRATROPIUM BROMIDE AND ALBUTEROL SULFATE 3 ML: .5; 3 SOLUTION RESPIRATORY (INHALATION) at 12:44

## 2022-01-21 RX ADMIN — BENZONATATE 200 MG: 100 CAPSULE ORAL at 21:58

## 2022-01-21 RX ADMIN — SODIUM CHLORIDE 250 ML: 9 INJECTION, SOLUTION INTRAVENOUS at 16:27

## 2022-01-21 RX ADMIN — GUAIFENESIN 10 ML: 100 SOLUTION ORAL at 09:16

## 2022-01-21 RX ADMIN — METOPROLOL SUCCINATE 150 MG: 50 TABLET, EXTENDED RELEASE ORAL at 09:13

## 2022-01-21 RX ADMIN — IPRATROPIUM BROMIDE AND ALBUTEROL SULFATE 3 ML: .5; 3 SOLUTION RESPIRATORY (INHALATION) at 08:03

## 2022-01-21 RX ADMIN — IPRATROPIUM BROMIDE AND ALBUTEROL SULFATE 3 ML: .5; 3 SOLUTION RESPIRATORY (INHALATION) at 22:35

## 2022-01-21 RX ADMIN — PREDNISONE 20 MG: 20 TABLET ORAL at 09:13

## 2022-01-21 ASSESSMENT — ACTIVITIES OF DAILY LIVING (ADL)
ADLS_ACUITY_SCORE: 7
ADLS_ACUITY_SCORE: 7
ADLS_ACUITY_SCORE: 10
ADLS_ACUITY_SCORE: 8
ADLS_ACUITY_SCORE: 7
ADLS_ACUITY_SCORE: 7
ADLS_ACUITY_SCORE: 9
ADLS_ACUITY_SCORE: 10
ADLS_ACUITY_SCORE: 7
ADLS_ACUITY_SCORE: 8
ADLS_ACUITY_SCORE: 10
ADLS_ACUITY_SCORE: 9
ADLS_ACUITY_SCORE: 7
ADLS_ACUITY_SCORE: 9
ADLS_ACUITY_SCORE: 7
ADLS_ACUITY_SCORE: 10
ADLS_ACUITY_SCORE: 10
ADLS_ACUITY_SCORE: 9
ADLS_ACUITY_SCORE: 7
ADLS_ACUITY_SCORE: 7
ADLS_ACUITY_SCORE: 9
ADLS_ACUITY_SCORE: 10
ADLS_ACUITY_SCORE: 9
ADLS_ACUITY_SCORE: 10

## 2022-01-21 NOTE — PROGRESS NOTES
Care Management Follow Up    Length of Stay (days): 4        Patient plan of care discussed at interdisciplinary rounds: Yes     Expected Discharge Date:   1/21/2022 (1/22/22)       Concerns to be Addressed / Barriers to Discharge: medical management, home health eval, (symptomatic low blood pressure)     Anticipated Discharge Disposition: home    Anticipated Discharge DME:  May need home oxygen     Additional Information:  1/21/2022  Per ID progress note 1/21/2022, cleared from their standpoint to discharge so ID signed off.  Noted Hospitalist note today 1/21, anticipate discharge today after home oxygen evaluation completed.     9:54 AM Called and spoke with Letitia in intake @ [facility] Re: potential discharge today. Confirmed fax number for discharge orders of [phone].    2:08 PM Received update- patient had hypotension with light headedness. discharge on hold.

## 2022-01-21 NOTE — PROGRESS NOTES
"[facility]  Hospitalist Progress Note    Admit Date:  1/17/2022  Date of Service (when I saw the patient): 01/21/2022   Provider:  Cookie Leigh DO    Identification/Summary:  Fatuma Henry is a 76 year old female who has past medical history of Atrial fibrillation (H), CRF (chronic renal failure), GERD (gastroesophageal reflux disease), Hypertension, Hypovolemic shock (H), Influenza A (12/2017), Rheumatoid arthritis (H), and Sepsis presented with shortness of breath, cough for 2 weeks (and recent influenza B infection during that time) and admitted for abnormal CT chest with cystic lesion, groundglass opacities in the left upper lobe.  She was initially placed on IV Zosyn, Bactrim given immunosuppressive status, but has been taken off antibiotics from pulmonary medicine.      COVID and influenza initially negative; awaiting respiratory PCR viral panel.  Noted to have positive fungitell test now from 1/17/22.  ID consult requested; CT of the sinuses negative and will need clinic f/u in 2 wks.    Assessment & Plan     1. Acute hypoxemic respiratory failure  Cystic lung lesion on CT consistent with likely adenocarcinoma  COPD exacerbation  Emphysema  Former smoker  Immunocompromised  Recent influenza B infection in last two weeks - treated with tamiflu outpt, per her report    Hx of methotrexate (chronic), former smoker  No further antibiotics as no indication for antibiotics  Bactrim had been started for possible pneumocystis pending pulmonary consult, but no evidence of PJP as the \"cystic lesion\" is most likely due adenocarcinoma  Pulmonology consulted for nodule - recommend 6 month follow-up    Sputum cx pending  fungitell now positive from 1/17/22 - appreciate ID consult re: this test 1/20/21  Plan for additional fungal testing and clinic f/u to review these results in the next 2 wks    CT of the sinuses 1/20/21 negative for acute pathology.    Continue nebs scheduled (will " "order duonemily to replace albuterol nebs for home use)  low-dose prednisone with 20 mg daily x5 days (last dose today)    Full respiratory panel reviewed and negative 1/20/22    Home oxygen evaluation today - pending    2. Hypercalcemia - resolved  Calcium normal less than a year ago, has been elevated at 10.7, with mildly elevated PTH  PTH now normal  PTHrP is still pending, but not likely to be related to malignancy  Previously taking exogenous Calcium but stopped some time ago    Calcium level normal this am  IVF has been saline locked     3. Acute kidney injury  Creat continuing to improve today  IVF have been saline locked  Continue to hold po lasix (uses prn for leg swelling only at home) until clinic f/up and BMP    4. Moderate pulmonary hypertension  Echo this admission EF 65% mild aortic stenosis, mild to moderate Aortic regurgitation   LA dilation as well     5. Seropositive rheumatoid arthritis  Takes Cimzia, methotrexate  No evidence of methotrexate injury    Missed scheduled rheum appointment 1/20/22  Would consider holding methotrexate dose scheduled for this weekend     6. Likely COPD  Exam and history would certainly suggest COPD  Continue with prednisone and angela  Would benefit from outpatient pulmonary follow-up (would like to follow-up with Dr. Parth Rosario in the pulmonary medicine clinic)     7. Chronic a fib   Resume home xarelto - dose has been reduced during her hospital stay to 15mg/day d/t decreased GFR   heart rate control medicine is metoprolol 150mg/day     Diet: Combination Diet Regular Diet Adult  Snacks/Supplements Adult: Gelatein Plus; With Meals  DVT Prophylaxis: Start subcu heparin  Code Status: No CPR- Do NOT Intubate     Disposition -   Hope to be able to discharge home later today.       Addendum - 1330  Hypotensive   Pt felt weak when walking to bathroom and \"legs nearly gave up\", per RN.  SBP in the 80's but increased to 104 now with laying down.    Did receive her home " "dose of metoprolol 150mg this am  Will decrease metoprolol dose to 100mg in the am with BP parameters    No IVF bolus at this time as BP has improved with just position.  Encourage po intake    Pt is nervous about going home alone ( out of town).  D/W RN - will reassess clinical status in the next few hours and decide about discharge    Addendum - 1600  Pt remains hypotensive with positive orthostatic BP drop with position change  Will given NS bolus 25cc/2hours  Fall precautions  Recheck orthostatics in am  Decreased daily metoprolol to 100mg/day (as above) with holding parameters - not due for this medication until the am  Will cancel hospital discharge for today.0    Diet: Combination Diet Regular Diet Adult  Snacks/Supplements Adult: Gelatein Plus; With Meals  Diet    DVT Prophylaxis: xarelto  Brandon Catheter: Not present  Code Status: No CPR- Do NOT Intubate          Entered: Cookie Leigh DO 01/21/2022, 1:38 PM       The patient's care was discussed with the Bedside Nurse and Patient.    We are operating under sub optimal conditions in the setting of a world wide pandemic, hospitals are running at full capacity with limited bed availability. We are providing the best possible patient care with limited resources.    Interval History   Feeling better, overall, today. Cough is improving.  Feels appetite still poor and feels \"weak\" - but no N/V/D.  No CP or dizziness.  Nervous about discharge today as  is out of town.  Awaiting home oxygen evaluation - does not have chronic oxygen at home but does have     Tells me that she did have influenza B infection \"sometime since Christmas\" and received tamiflu.      -Data reviewed today: I reviewed all new labs and imaging results over the last 24 hours. I personally reviewed no images or EKG's today.    Physical Exam   Temp: 98.4  F (36.9  C) Temp src: Oral BP: 103/49 Pulse: 72   Resp: 12 SpO2: 92 % O2 Device: None (Room air) Oxygen Delivery: 1 " LPM  Vitals:    01/18/22 1535 01/19/22 0432 01/20/22 0622   Weight: 72.2 kg (159 lb 3.2 oz) 72.2 kg (159 lb 3.2 oz) 72.3 kg (159 lb 4.8 oz)     Vital Signs with Ranges  Temp:  [97.8  F (36.6  C)-98.4  F (36.9  C)] 98.4  F (36.9  C)  Pulse:  [72-81] 72  Resp:  [12-18] 12  BP: ()/(49-57) 103/49  Cuff Mean (mmHg):  [60-80] 80  SpO2:  [91 %-100 %] 92 %  I/O last 3 completed shifts:  In: 540 [P.O.:540]  Out: 1000 [Urine:1000]    GEN:  Alert, oriented x 3, elderly female appears less ill today, not coughing,sitting up in bed.  Continues with intermittent pursed lip breathing.  HEENT:  Normocephalic/atraumatic, no scleral icterus, no nasal discharge, mouth and membranes appear fairly moist  NECK:  No clear thyromegaly or JVD  CV:  Somewhat distant slightly irreg rate and rhythm, no loud murmur to ausc.  S1 + S2 noted, no S3 or S4.  LUNGS:  Clear to ausc ant/lat/post today.  No scattered end-expiratory wheezing, rales or rhonchi. Very slightly diminished at the bases bilaterally but no significant  rales auscultated bilaterally.  Less to min costal retractions bilaterally.  Symmetric chest rise on inhalation noted.  ABD:  Active bowel sounds, soft, non-tender, not distended.  No clear rebound/guarding/rigidity.  No masses palpated.  No obvious HSM to exam.  EXT:  No pretibial edema or cyanosis bilaterally. No joint synovitis noted.  No calf-tenderness or asymmetry noted.  SKIN:  Dry to touch, no rashes or jaundice noted.  PSYCH:  Mood flattened, less anxious today.  NEURO:  No tremors at rest, speech is clear and appropriate.  Following basic directions without difficulty    Data   Labs:  Recent Labs   Lab 01/21/22  0800 01/20/22  0844 01/19/22  0707 01/18/22  0633    138  --  134*   POTASSIUM 4.2 3.8  --  4.1   CHLORIDE 109* 108*  --  100   CO2 24 24  --  26   ANIONGAP 5 6  --  8   GLC 76 79  --  136*   BUN 23 29*  --  35*   CR 1.26* 1.55* 1.72* 1.68*   GFRESTIMATED 44* 35* 30* 31*   SUNI 9.8 9.5  --  11.0*      Recent Labs   Lab 01/18/22  0633 01/17/22  2123   WBC 5.2 4.6   HGB 12.1 12.7   HCT 36.7 38.8   * 100    296     Recent Labs   Lab 01/21/22  0800 01/20/22  0844 01/19/22  0707 01/18/22  0633 01/17/22  1756    138  --  134* 137   POTASSIUM 4.2 3.8  --  4.1 4.2   CHLORIDE 109* 108*  --  100 98   CO2 24 24  --  26 26   ANIONGAP 5 6  --  8 13   GLC 76 79  --  136* 108   BUN 23 29*  --  35* 31*   CR 1.26* 1.55* 1.72* 1.68* 1.45*   GFRESTIMATED 44* 35* 30* 31* 37*   SUNI 9.8 9.5  --  11.0* 11.6*   PROTTOTAL  --   --   --  6.0 6.6   ALBUMIN  --   --   --  3.1* 3.5   BILITOTAL  --   --   --  0.4 0.6   ALKPHOS  --   --   --  100 118   AST  --   --   --  18 21   ALT  --   --   --  11 13      Recent Imaging:   Recent Results (from the past 24 hour(s))   CT Sinus w/o Contrast    Narrative    EXAM: CT SINUS W/O CONTRAST  LOCATION: [facility]  DATE/TIME: 1/20/2022 4:39 PM    INDICATION: Sinusitis, chronic or recurrent.  COMPARISON: None.  TECHNIQUE: Routine without contrast. Multiplanar reformats. Dose reduction techniques were used.    FINDINGS:     FRONTAL SINUSES:  No significant mucosal thickening or air-fluid levels.    ETHMOID SINUSES:  No significant mucosal thickening or air-fluid levels.    SPHENOID SINUSES: No significant mucosal thickening or air-fluid levels.     MAXILLARY SINUSES: Small probable retention cyst in the alveolar recess of the right maxillary sinus. Otherwise, no significant mucosal thickening or air-fluid levels. The floors of the maxillary sinuses are intact.    NASAL CAVITY/SKULL BASE: Mild sigmoid nasal septal curvature. No focal septal defect. Unremarkable nasal turbinates. The cribriform plate is intact and symmetric. The anterior clinoid processes are not pneumatized.    PARANASAL SINUS DRAINAGE PATHWAYS:  The paranasal sinus drainage pathways are patent.    NON-SINUS STRUCTURES: Bilateral lens implants. The visualized orbits otherwise appear within  normal limits. Intracranial atherosclerotic calcifications are present involving the carotid siphons.      Impression    IMPRESSION:  1.  No significant paranasal sinus mucosal thickening or air-fluid levels.  2.  Patent paranasal sinus drainage pathways.       Medications     - MEDICATION INSTRUCTIONS -         ipratropium - albuterol 0.5 mg/2.5 mg/3 mL  3 mL Nebulization 4x daily     metoprolol succinate ER  150 mg Oral Daily     pravastatin  20 mg Oral Daily     predniSONE  20 mg Oral Daily     rivaroxaban ANTICOAGULANT  15 mg Oral Daily with supper     sodium chloride (PF)  3 mL Intracatheter Q8H

## 2022-01-21 NOTE — PLAN OF CARE
Pt is oriented by four. On 1.5 L of 02 to maintain sats 94+ overnight. Awaiting respiratory panel results. Denies pain. Cough remains. Gave tessalon pearls. Home 02 expected today.     Problem: Gas Exchange Impaired  Goal: Optimal Gas Exchange  Outcome: Improving

## 2022-01-21 NOTE — PROGRESS NOTES
Infectious Disease Chart Check    Oxygen needs slightly improved.    Cryptococcal antigen negative.    Other than respiratory pathogen PCR, other results won't be back until next week.    OK to discharge to home anytime from Infectious Disease standpoint.    Follow-up in our clinic if not improved.    We'll sign off.  Please call if questions or problems.     Mode Garcia MD     Progress Note    Consults    Subjective    Patient seen and examined,  Finished 7 days of Eliquis 10 BID  Review of Systems  Review of Systems    Denies fever, chills, or weight loss  no change of vision  no hearing loss  denies chest pain  denies shortness of breath, orthopnea, LINDSEY, or PND  denies nausea, vomiting, abdominal pain, or change in bowel habits  Reports RLE wound  no change in mental status, no depression, no SI or HI    Past Medical History  Past Medical History:   Diagnosis Date   • Pneumonia    • Pulmonary emboli (CMS/HCC)    • Wrist fracture, left     when 5 years old        Surgical History  History reviewed. No pertinent surgical history.     Social History  Social History     Tobacco Use   • Smoking status: Never Smoker   • Smokeless tobacco: Never Used   Substance Use Topics   • Alcohol use: Yes     Comment: every other day vodka   • Drug use: Never       Family History  History reviewed. No pertinent family history.     Allergies  ALLERGIES:  Patient has no known allergies.    Medications  Medications Prior to Admission   Medication Sig Dispense Refill   • [DISCONTINUED] apixaBAN (ELIQUIS) 5 MG Tab Take 1 tablet by mouth every 12 hours. Take 2 tablets by mouth every 12 hours For 7 days. Then 1 tablet by mouth for 30 days. 74 tablet 0         Physical Exam  Physical Exam  General: awake, alert, not in any distress  neck: supple, non tender, no congested neck veins  lungs: clear breath sounds, equal air entry bilateral  heart: s1, s2, no murmur  abdomen: soft, non tender  extremities: no edema, leg wound covered with clean dressing  Neurological: intact, non-focalGeneral: awake, alert, not in any distress    Last Recorded Vitals  Blood pressure 118/67, pulse 80, temperature 98.4 °F (36.9 °C), temperature source Oral, resp. rate 16, height 5' 7\" (1.702 m), weight 88.1 kg (194 lb 3.6 oz), SpO2 98 %.    Relevant Results  CT chest   1.  Extensive bilateral pulmonary emboli with associated multifocal  pulmonary infarcts.  Superimposed infiltrate in the areas of multifocal infarcts in the lungs cannot be excluded.  Infarct in the superior segment right lower lobe likely corresponds to   previously demonstrated right perihilar opacity on chest radiograph 08/25/2020.  No definite CT evidence of right ventricular heart strain, however, correlation with echocardiogram should be considered if clinically indicated.  2.  Enlarged heterogeneous left thyroid lobe with substernal extension and local mass effect resulting in rightward tracheal deviation.  Thyroid ultrasound is advised.  3.  Additional findings as described above.     Findings related to bilateral pulmonary emboli with multifocal pulmonary infarcts were discussed with Dr. Goode by Dr. Albarran via [employer] at 8:17 AM 08/26/2020, as well as the patient's nurse Melanie Jacobson RN at the time of this dictation.       Thyroid US  IMPRESSION:     1.  5.6 cm TI-RADS 4 nodule in the left lower pole. Fine-needle aspiration is recommended as described below which can be performed through interventional radiology.  Appointments can be made at [phone].  2.  1.1 cm TI-RADS 4 nodule in the left upper pole. Follow-up is recommended as described below.  3.  1.0 cm TI-RADS 4 nodule in the right lower pole. Follow-up is recommended as described below.         Assessment & Plan       Assessment and Plan    Pulmonary embolism and DVT  CT as above, received a dose of LMWH in the ED, will start Eliquis here,   Patient has 30 days card for free Eliquis, but since this si considered unprovoked DVT/PE, she will need long term anticoagulation. She clearly can not afford the Eliquis.   I had very long discussion with her about warfarin, she will need to go to the lab on regular basis to check her INR. She seems to think this is more doable than paying $200 for the Eliquis.   Discussed with the patient she need work up to rule out malignancy, mammogram, Colonoscopy and gyne exam.    [facility] following, appreciate recs, patient promised will follow up with them  Continue Eliquis, starting today 5 mg BID.     Thyroid nodules as above,   Endocrine wanted to get FNA after discontinuation of anticoagulation.     ? Cognitive Impairment  Unclear what is her baseline, she seems able to understands her diagnosis, she verbalized understanding of her condition last admission, she understands that blood thinner can makes her bleed so she should be careful, but now, presenting again not remembering why she was in the hospital couple of days ago!!  Psych on board, will admit to Geriatric psych     Lower extremity wounds, wound care and dressing    Social service on board    No problem-specific Assessment & Plan notes found for this encounter.          Code Status    Code Status: Full Resuscitation      Gaudencio Goode MD   9/6/2020

## 2022-01-22 ENCOUNTER — DOCUMENTATION ONLY (OUTPATIENT)
Dept: HOME HEALTH SERVICES | Facility: CLINIC | Age: 77
End: 2022-01-22
Payer: COMMERCIAL

## 2022-01-22 VITALS
HEIGHT: 66 IN | WEIGHT: 162.3 LBS | DIASTOLIC BLOOD PRESSURE: 60 MMHG | TEMPERATURE: 97.5 F | HEART RATE: 76 BPM | SYSTOLIC BLOOD PRESSURE: 126 MMHG | BODY MASS INDEX: 26.08 KG/M2 | RESPIRATION RATE: 20 BRPM | OXYGEN SATURATION: 94 %

## 2022-01-22 LAB
BACTERIA SPT CULT: NO GROWTH
GRAM STAIN RESULT: NORMAL

## 2022-01-22 PROCEDURE — 250N000012 HC RX MED GY IP 250 OP 636 PS 637: Performed by: HOSPITALIST

## 2022-01-22 PROCEDURE — 99239 HOSP IP/OBS DSCHRG MGMT >30: CPT | Performed by: INTERNAL MEDICINE

## 2022-01-22 PROCEDURE — 250N000013 HC RX MED GY IP 250 OP 250 PS 637: Performed by: INTERNAL MEDICINE

## 2022-01-22 PROCEDURE — 250N000009 HC RX 250: Performed by: EMERGENCY MEDICINE

## 2022-01-22 PROCEDURE — 250N000013 HC RX MED GY IP 250 OP 250 PS 637: Performed by: FAMILY MEDICINE

## 2022-01-22 PROCEDURE — 250N000013 HC RX MED GY IP 250 OP 250 PS 637: Performed by: HOSPITALIST

## 2022-01-22 RX ORDER — LOPERAMIDE HCL 2 MG
4 CAPSULE ORAL ONCE
Status: DISCONTINUED | OUTPATIENT
Start: 2022-01-22 | End: 2022-01-22 | Stop reason: HOSPADM

## 2022-01-22 RX ORDER — LOPERAMIDE HYDROCHLORIDE 2 MG/1
4 TABLET ORAL 4 TIMES DAILY PRN
COMMUNITY
Start: 2022-01-22 | End: 2022-02-07

## 2022-01-22 RX ORDER — FAMOTIDINE 10 MG
10 TABLET ORAL 2 TIMES DAILY
Status: DISCONTINUED | OUTPATIENT
Start: 2022-01-22 | End: 2022-01-22 | Stop reason: HOSPADM

## 2022-01-22 RX ADMIN — METOPROLOL SUCCINATE 100 MG: 100 TABLET, EXTENDED RELEASE ORAL at 09:03

## 2022-01-22 RX ADMIN — PREDNISONE 20 MG: 20 TABLET ORAL at 09:03

## 2022-01-22 RX ADMIN — BENZONATATE 200 MG: 100 CAPSULE ORAL at 03:41

## 2022-01-22 RX ADMIN — PRAVASTATIN SODIUM 20 MG: 20 TABLET ORAL at 09:03

## 2022-01-22 RX ADMIN — FAMOTIDINE 10 MG: 10 TABLET ORAL at 09:03

## 2022-01-22 RX ADMIN — FAMOTIDINE 10 MG: 10 TABLET ORAL at 04:17

## 2022-01-22 RX ADMIN — IPRATROPIUM BROMIDE AND ALBUTEROL SULFATE 3 ML: .5; 3 SOLUTION RESPIRATORY (INHALATION) at 09:03

## 2022-01-22 ASSESSMENT — ACTIVITIES OF DAILY LIVING (ADL)
ADLS_ACUITY_SCORE: 7
ADLS_ACUITY_SCORE: 8
ADLS_ACUITY_SCORE: 7

## 2022-01-22 ASSESSMENT — MIFFLIN-ST. JEOR: SCORE: 1240

## 2022-01-22 NOTE — PROGRESS NOTES
1002am:  Received initial oxygen intake.  Reviewed chart and Rx is in and no other documentation  1014am:  Placed a call to [facility] - left message for nurse to return my call.   1030am:  Walk test in chart  1114am: O2 F2F notes in chart  1126am:  Placed called to patient to offer choice.  Pt agrees to [facility] servicing for O2.  Pt states her  is also on oxygen so she's familiar with equipment.  Let her know that our  will deliver to her room for discharge.  Pt understands.  Pt has questions in regards to her husbands unit.  I recommended pt call back during business hours next week, unless his unit is not working?  Pt states it's working.

## 2022-01-22 NOTE — DISCHARGE SUMMARY
[facility]  Hospitalist Discharge Summary      Date of Admission:  1/17/2022  Date of Discharge:  1/22/2022  Discharging Provider: Marina Garcia MD, [facility]  Discharge Service: Hospitalist Service    Discharge Diagnoses   Acute hypoxemic failure now resolved  Chronic respiratory failure requiring supplemental oxygen with activity  DNR/DNI status  Atrial fibrillation  Rheumatoid arthritis  CKD stage II      Follow-ups Needed After Discharge   Follow-up Appointments     Follow-up and recommended labs and tests       Reschedule appt missed with rheumatology in the next 1 wk  F/up with PCP for hospital f/up in 7-10days with preclinical BMP  F/up with ID (Dr. Garcia) in 2 wks for hospital f/up  F/up with PULM in 6months with repeat chest CT            Unresulted Labs Ordered in the Past 30 Days of this Admission     Date and Time Order Name Status Description    1/20/2022  3:57 PM Histoplasma Capsulatum Agn Non Blood In process     1/20/2022  3:57 PM Histoplasma Antigen Quantitative by EIA In process     1/20/2022  3:57 PM Blastomyces Agn Quant EIA Blood In process     1/20/2022  3:57 PM Blastomyces Agn Quant EIA Non Blood In process     1/20/2022  3:57 PM Aspergillus Galactomannan Antigen In process     1/20/2022  3:57 PM 1,3 Beta D glucan fungitell In process     1/18/2022  1:23 PM PTH Related Peptide Test In process       These results will be followed up by PCP    Discharge Disposition   Discharged to home  Condition at discharge: Stable      Hospital Course   Fatuma Henry is a 76 year old female who has past medical history of Atrial fibrillation (H), CRF (chronic renal failure), GERD (gastroesophageal reflux disease), Hypertension, Hypovolemic shock (H), Influenza A (12/2017), Rheumatoid arthritis (H), and Sepsis presented with shortness of breath, cough for 2 weeks (and recent influenza B infection during that time) and admitted for abnormal CT chest with cystic lesion, groundglass  opacities in the left upper lobe.  She was initially placed on IV Zosyn, Bactrim given immunosuppressive status, but was taken off antibiotics from pulmonary medicine.    Cultures have stayed sterile till date.  However patient has required supplemental oxygen for use with activity as her O2 sats would drop with activity.  Hence she has been discharged home on the same.  She did develop some diarrhea prior to discharge for which she has been prescribed Imodium        Consultations This Hospital Stay   PULMONARY IP CONSULT  SOCIAL WORK IP CONSULT  SURGERY GENERAL IP CONSULT  PHYSICAL THERAPY ADULT IP CONSULT  OCCUPATIONAL THERAPY ADULT IP CONSULT  SOCIAL WORK IP CONSULT  INFECTIOUS DISEASES IP CONSULT    Code Status   No CPR- Do NOT Intubate    Time Spent on this Encounter   I, Marina Garcia MD, A, personally saw the patient today and spent greater than 30 minutes discharging this patient.       Marina Garcia MD, ANNA  [address] [address]  Phone: [phone]  Fax: [phone]  ______________________________________________________________________    Physical Exam   Vital Signs: Temp: 97.7  F (36.5  C) Temp src: Oral BP: 114/57 Pulse: 98   Resp: 16 SpO2: 94 % O2 Device: Nasal cannula Oxygen Delivery: 1 LPM  Weight: 162 lbs 4.8 oz  General Appearance: Alert awake oriented x3 elderly lady, frail  Respiratory: CTA  Cardiovascular: S1-S2 regular rate and rhythm  GI: Soft, nontender nondistended bowel sounds are present  Skin: No rashes or lesions  Other: Trace lower extremity edema  Gait is steady       Primary Care Physician   Tory Wise    Discharge Orders      Adult Pulmonary Medicine Referral      Physical Therapy Referral      Home care nursing referral      Home Care PT Referral for Hospital Discharge      Home Care OT Referral for Hospital Discharge      MD face to face encounter    Documentation of Face to Face and Certification for  Home Health Services    I certify that patient: Fatuma Henry is under my care and that I, or a nurse practitioner or physician's assistant working with me, had a face-to-face encounter that meets the physician face-to-face encounter requirements with this patient on: 1/21/2022.    This encounter with the patient was in whole, or in part, for the following medical condition, which is the primary reason for home health care: weakness, deconditioning  .    I certify that, based on my findings, the following services are medically necessary home health services: Nursing, Occupational Therapy, and Physical Therapy.    My clinical findings support the need for the above services because: Nurse is needed: To assess oxygen needs and vitals after changes in medications or other medical regimen.., Occupational Therapy Services are needed to assess and treat cognitive ability and address ADL safety due to impairment in weakness, and Physical Therapy Services are needed to assess and treat the following functional impairments: weakness and deconditioning.    Further, I certify that my clinical findings support that this patient is homebound (i.e. absences from home require considerable and taxing effort and are for medical reasons or [employer] services or infrequently or of short duration when for other reasons) because: Leaving home is medically contraindicated for the following reason(s): Dyspnea on exertion that makes it so they cannot leave their home for needed services without clinical deterioration...    Based on the above findings. I certify that this patient is confined to the home and needs intermittent skilled nursing care, physical therapy and/or speech therapy.  The patient is under my care, and I have initiated the establishment of the plan of care.  This patient will be followed by a physician who will periodically review the plan of care.  Physician/Provider to provide follow up care: Tory Wise  Erick    Attending hospital physician (the Medicare certified [place] provider): Cookie Leigh DO  Physician Signature: See electronic signature associated with these discharge orders.  Date: 1/21/2022     Reason for your hospital stay    You were admitted for SOB and COPD exacerbation after recent influenza infection     Activity    Your activity upon discharge: activity as tolerated     Follow-up and recommended labs and tests     Reschedule appt missed with rheumatology in the next 1 wk  F/up with PCP for hospital f/up in 7-10days with preclinical BMP  F/up with ID (Dr. Garcia) in 2 wks for hospital f/up  F/up with PULM in 6months with repeat chest CT     Discharge Instructions    DO not take prn lasix until f/up with PCP and lab review - you do not have to discontinue forever  Skip scheduled methotrexate dose on 1/22/22 only and then as directed per rheumatology  Your new xarelto dose is due to a reduced GFR (kidney function) - this will be rechecked in clinic f/up.     Diet    Follow this diet upon discharge: resume home diet       Significant Results and Procedures   Most Recent 3 CBC's:Recent Labs   Lab Test 01/18/22  0633 01/17/22  2123 11/18/21  1312   WBC 5.2 4.6 5.7   HGB 12.1 12.7 12.4   * 100 104*    296 300     Most Recent 3 BMP's:Recent Labs   Lab Test 01/21/22  0800 01/20/22  0844 01/19/22  0707 01/18/22  0633    138  --  134*   POTASSIUM 4.2 3.8  --  4.1   CHLORIDE 109* 108*  --  100   CO2 24 24  --  26   BUN 23 29*  --  35*   CR 1.26* 1.55* 1.72* 1.68*   ANIONGAP 5 6  --  8   SUNI 9.8 9.5  --  11.0*   GLC 76 79  --  136*     Most Recent 2 LFT's:Recent Labs   Lab Test 01/18/22  0633 01/17/22  1756   AST 18 21   ALT 11 13   ALKPHOS 100 118   BILITOTAL 0.4 0.6   ,   Results for orders placed or performed during the hospital encounter of 01/17/22   CT Chest Pulmonary Embolism w Contrast    Narrative    EXAM: CT CHEST PULMONARY EMBOLISM W CONTRAST  LOCATION: [facility]  DATE/TIME: 1/17/2022 7:40 PM    INDICATION: Cough. Shortness breath.  COMPARISON: None.  TECHNIQUE: CT chest pulmonary angiogram during arterial phase injection of IV contrast. Multiplanar reformats and MIP reconstructions were performed. Dose reduction techniques were used.   CONTRAST: 100 mL Isovue 370.    FINDINGS:  ANGIOGRAM CHEST: No pulmonary embolism. Mild pulmonary trunk enlargement 3.1 cm. Aortic valvular calcifications. Nonaneurysmal aorta without dissection. Mild-moderate atherosclerosis.    LUNGS AND PLEURA: Mild emphysema. 12 mm groundglass focus in the lingula (series 6, image 124). Heterogeneous cystic focus with thickening measuring 15 mm in the infrahilar lingula (image 129). Mild airway thickening. No pleural effusion or pneumothorax.    MEDIASTINUM/AXILLAE: No adenopathy. No pericardial effusion.    CORONARY ARTERY CALCIFICATION: Moderate.    UPPER ABDOMEN: Nothing acute.    MUSCULOSKELETAL: Bony demineralization.      Impression    IMPRESSION:    1.  No pulmonary embolism.    2.  Mild pulmonary trunk enlargement; correlate for pulmonary hypertension.    3.  Nonaneurysmal aorta without dissection.    4.  Mild emphysema. Mild airway thickening.    5.  Left upper lobe / lingular groundglass and cystic focus with thickening, indeterminate. Adenocarcinoma type lesions can have this appearance. Post infectious or inflammatory change versus scarring also possible differentials. Recommend 6 month   follow-up per guidelines below.    6.  Moderate coronary calcifications.      REFERENCE:  Guidelines for Management of Incidental Pulmonary Nodules Detected on CT Images: From the Fleischner Society 2017.   Guidelines apply to incidental nodules in patients who are 35 years or older.  Guidelines do not apply to lung cancer screening, patients with immunosuppression, or patients with known primary cancer.    SUBSOLID NODULES  Ground glass  Nodule size <6 mm  No routine follow-up. If  suspicious, consider follow-up at 2 and 4 years.    Nodule size 6 mm or greater  CT at 6-12 months to confirm persistence, then CT every 2 years until 5 years.    Part solid  Nodule size <6 mm  No routine follow-up.    Nodule size 6 mm or greater  CT at 3-6 months to confirm persistence. If unchanged and solid component remains <6 mm, annual CT for 5 years.    Multiple  CT at 3-6 months. If stable, consider CT at 2 and 4 years. Management based on the most suspicious nodule.     CT Sinus w/o Contrast    Narrative    EXAM: CT SINUS W/O CONTRAST  LOCATION: [facility]  DATE/TIME: 1/20/2022 4:39 PM    INDICATION: Sinusitis, chronic or recurrent.  COMPARISON: None.  TECHNIQUE: Routine without contrast. Multiplanar reformats. Dose reduction techniques were used.    FINDINGS:     FRONTAL SINUSES:  No significant mucosal thickening or air-fluid levels.    ETHMOID SINUSES:  No significant mucosal thickening or air-fluid levels.    SPHENOID SINUSES: No significant mucosal thickening or air-fluid levels.     MAXILLARY SINUSES: Small probable retention cyst in the alveolar recess of the right maxillary sinus. Otherwise, no significant mucosal thickening or air-fluid levels. The floors of the maxillary sinuses are intact.    NASAL CAVITY/SKULL BASE: Mild sigmoid nasal septal curvature. No focal septal defect. Unremarkable nasal turbinates. The cribriform plate is intact and symmetric. The anterior clinoid processes are not pneumatized.    PARANASAL SINUS DRAINAGE PATHWAYS:  The paranasal sinus drainage pathways are patent.    NON-SINUS STRUCTURES: Bilateral lens implants. The visualized orbits otherwise appear within normal limits. Intracranial atherosclerotic calcifications are present involving the carotid siphons.      Impression    IMPRESSION:  1.  No significant paranasal sinus mucosal thickening or air-fluid levels.  2.  Patent paranasal sinus drainage pathways.   Echocardiogram Complete     Value     LVEF  60-65%    [facility]    [id number]  [id number]  [id number]  [id number]^MAX^TETE^[place] [address]     Name: HEATHER DOYLE  MRN: [id number]  : 1945  Study Date: 2022 09:55 AM  Age: 76 yrs  Gender: Female  Patient Location: [facility]  Reason For Study: Pulmonary Hypertension  Ordering Physician: TETE SANTANA  Performed By:      BSA: 1.8 m2  Height: 66 in  Weight: 160 lb  HR: 81  ______________________________________________________________________________  Procedure  Complete Portable Echo Adult. Definity (NDC #17179-313) given intravenously.  There is no comparison study available.  ______________________________________________________________________________  Interpretation Summary     1.Left ventricular size, wall motion and function are normal. The ejection  fraction is 60-65%.  2.Normal right ventricle size and systolic function.  3.The left atrium is moderately dilated.  4.There is moderate mitral annular calcification.  5.Thickened aortic valve leaflets, Mild valvular aortic stenosis.Mean gradient  12 mmHg with peak velocity of 2.4 m/s at 76 bpm.  6.There is mild to moderate (1-2+) aortic regurgitation.  7.Right ventricular systolic pressure is elevated, consistent with moderate  pulmonary hypertension.  8.There is no comparison study available.  ______________________________________________________________________________  I      WMSI = 1.00     % Normal = 100     X - Cannot   0 -                      (2) - Mildly 2 -          Segments  Size  Interpret    Hyperkinetic 1 - Normal  Hypokinetic  Hypokinetic  1-2     small                                                     7 -          3-5      moderate  3 - Akinetic 4 -          5 -         6 - Akinetic Dyskinetic   6-14    large               Dyskinetic   Aneurysmal  w/scar       w/scar       15-16   diffuse     Left Ventricle  Left ventricular size, wall motion and function are normal.  The ejection  fraction is 60-65%. There is borderline concentric left ventricular  hypertrophy. Left ventricular diastolic function is normal. No regional wall  motion abnormalities noted.     Right Ventricle  Normal right ventricle size and systolic function. TAPSE is normal, which is  consistent with normal right ventricular systolic function.     Atria  The left atrium is moderately dilated. Right atrial size is normal. There is  no color Doppler evidence of an atrial shunt.     Mitral Valve  Mitral valve leaflets appear normal. There is moderate mitral annular  calcification. There is physiologic mitral regurgitation. There is no mitral  valve stenosis.     Tricuspid Valve  The tricuspid valve is not well visualized, but is grossly normal. There is  mild (1+) tricuspid regurgitation. Right ventricular systolic pressure is  elevated, consistent with moderate pulmonary hypertension.     Aortic Valve  The aortic valve is trileaflet. Thickened aortic valve leaflets. There is mild  to moderate (1-2+) aortic regurgitation. Mild valvular aortic stenosis.     Pulmonic Valve  The pulmonic valve is not well seen, but is grossly normal. This degree of  valvular regurgitation is within normal limits. There is no pulmonic valvular  stenosis.     Vessels  The aorta root is normal. Normal size ascending aorta. IVC diameter <2.1 cm  collapsing >50% with sniff suggests a normal RA pressure of 3 mmHg.     Pericardium  There is no pericardial effusion.     Rhythm  Sinus rhythm was noted.     ______________________________________________________________________________  MMode/2D Measurements & Calculations  IVSd: 1.1 cm  LVIDd: 2.9 cm  LVIDs: 2.1 cm  LVPWd: 1.1 cm     FS: 27.7 %  LV mass(C)d: 93.0 grams  LV mass(C)dI: 51.1 grams/m2  Ao root diam: 3.0 cm  asc Aorta Diam: 2.9 cm  LVOT diam: 1.9 cm  LVOT area: 2.7 cm2  LA Volume (BP): 74.0 ml  LA Volume Indexed (AL/bp): 42.7 ml/m2  RWT: 0.79     Time Measurements  MM HR: 82.0 BPM      Doppler Measurements & Calculations  MV E max chente: 110.0 cm/sec  MV A max chente: 142.1 cm/sec  MV E/A: 0.77  MV max P.6 mmHg  MV mean PG: 3.4 mmHg  MV V2 VTI: 33.9 cm  MVA(VTI): 2.5 cm2  MV dec slope: 355.4 cm/sec2  MV dec time: 0.31 sec  Ao V2 max: 235.3 cm/sec  Ao max P.0 mmHg  Ao V2 mean: 162.8 cm/sec  Ao mean P.2 mmHg  Ao V2 VTI: 55.2 cm  NA(I,D): 1.5 cm2  NA(V,D): 1.3 cm2  LV V1 max P.7 mmHg  LV V1 max: 108.7 cm/sec  LV V1 VTI: 30.5 cm  SV(LVOT): 83.6 ml  SI(LVOT): 45.9 ml/m2  PA acc time: 0.09 sec  TR max chente: 349.2 cm/sec  TR max P.8 mmHg  AV Chente Ratio (DI): 0.46  NA Index (cm2/m2): 0.83  E/E': 15.9  E/E' avg: 15.1  Lateral E/e': 14.2     Medial E/e': 16.0  Peak E' Chente: 6.9 cm/sec     ______________________________________________________________________________  Report approved by: Derrick Kern 2022 01:09 PM               Discharge Medications   Discharge Medication List as of 2022 12:55 PM      START taking these medications    Details   benzonatate (TESSALON) 200 MG capsule Take 1 capsule (200 mg) by mouth 3 times daily as needed for cough, Disp-60 capsule, R-0, E-Prescribe      guaiFENesin (ROBITUSSIN) 20 mg/mL SOLN solution Take 10 mLs by mouth every 4 hours as needed for cough, Disp-180 mL, R-0, E-Prescribe      ipratropium - albuterol 0.5 mg/2.5 mg/3 mL (DUONEB) 0.5-2.5 (3) MG/3ML neb solution Take 1 vial (3 mLs) by nebulization every 4 hours as needed for wheezing or shortness of breath / dyspnea, Disp-90 mL, R-0, E-Prescribe      loperamide (IMODIUM A-D) 2 MG tablet Take 2 tablets (4 mg) by mouth 4 times daily as needed for diarrhea, OTC         CONTINUE these medications which have CHANGED    Details   metoprolol succinate ER (TOPROL-XL) 50 MG 24 hr tablet Take 2 tablets (100 mg) by mouth daily, Disp-60 tablet, R-0, E-PrescribeHold if sbp <105 or hr <60      rivaroxaban ANTICOAGULANT (XARELTO) 15 MG TABS tablet Take 1 tablet (15 mg) by mouth daily (with  dinner), Disp-30 tablet, R-0, E-Prescribe         CONTINUE these medications which have NOT CHANGED    Details   acetaminophen (TYLENOL) 500 MG tablet [ACETAMINOPHEN (TYLENOL) 500 MG TABLET] Take 1-2 tablets (500-1,000 mg total) by mouth every 6 (six) hours as needed., R-0, OTC      certolizumab pegol (CIMZIA PREFILLED) 2 X 200 MG/ML KIT 2 syringes/kit Inject 400 mg Subcutaneous every 28 days, Disp-1 each, R-2, E-Prescribe      compressor, for nebulizer Saran [COMPRESSOR, FOR NEBULIZER SARAN] Nebulizer treatment qid prn, Disp-1 Device, R-0, Normal      flaxseed oil 1,000 mg cap [FLAXSEED OIL 1,000 MG CAP] Take 1 capsule by mouth daily. , Historical      folic acid (FOLVITE) 1 MG tablet [FOLIC ACID (FOLVITE) 1 MG TABLET] Take one tab po qd except the day when taking methotrexate., Disp-90 tablet, R-1, Jaslrd48 day supply provided.      FOLIC ACID/MULTIVITS-MIN/LUT (CENTRUM SILVER ORAL) [FOLIC ACID/MULTIVITS-MIN/LUT (CENTRUM SILVER ORAL)] Take 1 tablet by mouth daily. , Historical      methotrexate sodium 2.5 MG TABS TAKE 4 TABLETS BY MOUTH ONCE A WEEK, Disp-16 tablet, R-0, E-Prescribe      pravastatin (PRAVACHOL) 20 MG tablet [PRAVASTATIN (PRAVACHOL) 20 MG TABLET] Take 20 mg by mouth daily., Historical         STOP taking these medications       albuterol (PROVENTIL) (2.5 MG/3ML) 0.083% neb solution Comments:   Reason for Stopping:         furosemide (LASIX) 20 MG tablet Comments:   Reason for Stopping:             Allergies   Allergies   Allergen Reactions     Codeine Nausea and Vomiting

## 2022-01-22 NOTE — PLAN OF CARE
Occupational Therapy Discharge Summary    Reason for therapy discharge:    Discharged to home.    Progress towards therapy goal(s). See goals on Care Plan in [facility] electronic health record for goal details.  Goals partially met.  Barriers to achieving goals:   discharge from facility.    Therapy recommendation(s):    Appropriate for discharge home with family assist for ADLs/IADLs as needed.    Delmis Avila OTR/L 1/22/2022

## 2022-01-22 NOTE — DISCHARGE INSTRUCTIONS
Oxygen Provider:  Arranged through [employer], contact number [phone].  If you have any questions or concerns please call the oxygen company directly.    Home Care Services have been arranged for you.  Home Care Agency:  [facility]  Home Care Agency Telephone:  [phone]  Services:  Physical Therapy  Instructions:  Home Care agency will call you to schedule initial visit.      Dr Wise on January 28th at 4:00 pm. ( it was the only opening. The week after she was out of the office. ) please bring all discharge paper work . Call the IG office if you have any questions or need to cancel.     Dr Brasher on February 7th at 10:40 am ( he is only in the office Monday mornings. ) Please bring discharge paper work. Call the ID clinic @ [phone] if you need to reschedule or have questions.

## 2022-01-22 NOTE — PROGRESS NOTES
I certify that this patient, Fatuma Henry has been under my care (or a nurse practitioner or physican's assistant working with me). This is the face-to-face encounter for oxygen medical necessity.      Fatuma Henry is now in a chronic stable state and continues to require supplemental oxygen. Patient has continued oxygen desaturation due to Chronic Respiratory Failure with Hypoxia J93.11  COPD J44.9.    Alternative treatment(s) tried or considered and deemed clinically infective for treatment of Chronic Respiratory Failure with Hypoxia J93.11  COPD J44.9 include nebulizers, steroids and pulmonary rehab.  If portability is ordered, is the patient mobile within the home? yes    **Patients who qualify for home O2 coverage under the CMS guidelines require ABG tests or O2 sat readings obtained closest to, but no earlier than 2 days prior to the discharge, as evidence of the need for home oxygen therapy. Testing must be performed while patient is in the chronic stable state. See notes for O2 sats.**

## 2022-01-22 NOTE — PLAN OF CARE
Discharge cancelled as hypotensive at rest and positive orthostatic BP's. 250 ml IV fluid bolus given - BP improved to 108/55 post bolus. Attempted to do home O2 eval - as of this afternoon, requiring 1L of O2 at rest. O2 needs with activity still need to be assessed prior to discharge. Night shift RN aware.     Pleasant patient to care for. Appointment scheduled with Dr. Garcia for ID follow-up. Has appt with her Rheumatology already for next week. Hoping for discharge tomorrow.

## 2022-01-22 NOTE — PLAN OF CARE
Physical Therapy Discharge Summary    Reason for therapy discharge:    Discharged to home with home therapy.    Progress towards therapy goal(s). See goals on Care Plan in [facility] electronic health record for goal details.  Goals partially met.  Barriers to achieving goals:   discharge from facility.    Therapy recommendation(s):    Continued therapy is recommended.  Rationale/Recommendations:  home with home PT.

## 2022-01-22 NOTE — PROVIDER NOTIFICATION
Notified MD at 3 PM regarding diarrhea.      Spoke with: Dr Garcia    Orders were obtained.    Comments: Imodium ordered

## 2022-01-22 NOTE — PROGRESS NOTES
Pt sats >90% on 1-2 L O2 overnight. Pt had stable BP throughout the shift. ID is following. Only complaint overnight was a cough and heartburn from dinner. Pt could possibly discontinue home today with home PT/OT and home RN pending stable BP.

## 2022-01-22 NOTE — PROGRESS NOTES
Patient cleared for discharge. Qualified for home O2 so once portable tank delivered will be able to discharge.     Orders faxed to [facility]. Called and spoke with intake @ [facility].  Re: patient cleared for discharge today and to confirm they received discharge orders (yes)     Care Management Discharge Note    Discharge Date: 01/22/2022       Discharge Disposition:  home    Discharge Services: [facility]      Discharge DME: None    Discharge Transportation: daughter

## 2022-01-22 NOTE — PROGRESS NOTES
Patient has been assessed for Home Oxygen needs.     Pulse oximetry (SpO2) and Oxygen flow readings:    SpO2 = 93% on room air at rest while awake.    SpO2 improved to 92% on 1 liters/minute at rest.    SpO2 = 88 (towards end of walk)% on room air during activity/with exercise.    *SpO2 improved to  94% on   liters/minute during activity/with exercise.    Qualifies for home oxygen. Informed MD. Order placed.

## 2022-01-22 NOTE — PLAN OF CARE
All aspects of paperwork reviewed with patient. Questions answered. Follow ups scheduled and instructed which follow ups for her to schedule. Daughter at bedside understanding. Patient refused imodium at prior to discharge. IV removed by NA.

## 2022-01-23 LAB
1,3 BETA GLUCAN SER-MCNC: 165 PG/ML
OBSERVATION IMP: POSITIVE

## 2022-01-24 ENCOUNTER — PATIENT OUTREACH (OUTPATIENT)
Dept: CARE COORDINATION | Facility: CLINIC | Age: 77
End: 2022-01-24
Payer: COMMERCIAL

## 2022-01-24 DIAGNOSIS — Z71.89 OTHER SPECIFIED COUNSELING: ICD-10-CM

## 2022-01-24 LAB
H CAPSUL AG SER IA-ACNC: NOT DETECTED
H CAPSUL AG SER QL IA: NOT DETECTED
SCANNED LAB RESULT: NORMAL
SCANNED LAB RESULT: NORMAL

## 2022-01-24 NOTE — PROGRESS NOTES
Clinic Care Coordination Contact  Care Team Conversations    Patient identified for care management outreach, however Anjel RN staff has already followed up with patient to ensure they are following up with PCP and have needs and resources met. Clinic RN will refer back to STEPHANIE GA if needed/appropriate.    BRY Moore  Social Work Care Coordinator - [facility] [place]  Care Coordination  [email or web address]  Cell Phone: [phone]  Gender pronouns: she/her  Employed by [facility]

## 2022-01-25 LAB
GALACTOMANNAN AG SERPL QL IA: NEGATIVE
GALACTOMANNAN AG SPEC IA-ACNC: 0.02
SCANNED LAB RESULT: NORMAL

## 2022-01-26 ENCOUNTER — OFFICE VISIT (OUTPATIENT)
Dept: RHEUMATOLOGY | Facility: CLINIC | Age: 77
End: 2022-01-26
Payer: COMMERCIAL

## 2022-01-26 ENCOUNTER — TELEPHONE (OUTPATIENT)
Dept: RHEUMATOLOGY | Facility: CLINIC | Age: 77
End: 2022-01-26

## 2022-01-26 VITALS
HEART RATE: 80 BPM | DIASTOLIC BLOOD PRESSURE: 72 MMHG | SYSTOLIC BLOOD PRESSURE: 144 MMHG | BODY MASS INDEX: 26.66 KG/M2 | HEIGHT: 66 IN | WEIGHT: 165.9 LBS

## 2022-01-26 DIAGNOSIS — M05.79 RHEUMATOID ARTHRITIS INVOLVING MULTIPLE SITES WITH POSITIVE RHEUMATOID FACTOR (H): Primary | ICD-10-CM

## 2022-01-26 DIAGNOSIS — R76.8 RHEUMATOID FACTOR POSITIVE: ICD-10-CM

## 2022-01-26 DIAGNOSIS — Z79.899 HIGH RISK MEDICATION USE: ICD-10-CM

## 2022-01-26 DIAGNOSIS — N18.30 CRF (CHRONIC RENAL FAILURE), STAGE 3 (MODERATE) (H): ICD-10-CM

## 2022-01-26 DIAGNOSIS — M15.0 PRIMARY OSTEOARTHRITIS INVOLVING MULTIPLE JOINTS: ICD-10-CM

## 2022-01-26 PROCEDURE — 99214 OFFICE O/P EST MOD 30 MIN: CPT | Performed by: INTERNAL MEDICINE

## 2022-01-26 RX ORDER — METHOTREXATE 2.5 MG/1
TABLET ORAL
Qty: 48 TABLET | Refills: 0 | Status: SHIPPED | OUTPATIENT
Start: 2022-02-04 | End: 2022-04-07

## 2022-01-26 ASSESSMENT — MIFFLIN-ST. JEOR: SCORE: 1256.33

## 2022-01-26 NOTE — PROGRESS NOTES
Rheumatology follow-up visit note     Fatuma is a 76 year old female presents today for follow-up.    Fatuma was seen today for recheck.    Diagnoses and all orders for this visit:    Rheumatoid arthritis involving multiple sites with positive rheumatoid factor (H)  -     methotrexate sodium 2.5 MG TABS; TAKE 4 TABLETS BY MOUTH ONCE A WEEK    Primary osteoarthritis involving multiple joints    High risk medication use    Rheumatoid factor positive    CRF (chronic renal failure), stage 3 (moderate) (H)        This patient's rheumatoid arthritis is very well controlled we discussed how both methotrexate and rheumatoid arthritis can cause pulmonary pathologies the likelihood that her CT scan reported left upper lobe lesion is associated with either would appear to be remote.  She can resume these pending further pulmonary work-up and definitive diagnosis is.    Follow up in 3 months.    HPI    Fatuma Henry is a 76 year old female is here for follow-up of rheumatoid arthritis.  This is longstanding.  Affected multiple joints, moderately severe, well controlled with the current combination of Cimzia and methotrexate, positive for signals of autoimmunity, positive rheumatoid factor negative anti-CCP..  She has done remarkably well with this.She has noted morning stiffness of no more than 20 to 30 minutes recent labs are reviewed renal impairment noted she is aware not to take nonsteroidals.    She has been just discharged from the hospital where she was admitted with what was initially thought to be a flulike illness, during hospitalization she was found to have left upper lobe lesion copy of that CT scan report is attached to the chart.  She has undergone work-up for pneumonitis and is due to have a repeat CT scan before she sees pulmonary.  During hospitalization the methotrexate was held.  She has noted numbness in her hands bilaterally.  She feels that she is dropping things.    Following is the excerpt  from a previous note:     She reports no mouth ulcers no photosensitivity there is no rash reported Symptoms.  She does not have a history of psoriasis herself for the family or that of inflammatory bowel disease.  She is a smoker for the past 20 years   She does not exercise.  She follows up at her primary physician's in [facility].  She has had a history of hypertension and cataracts.  She recalls it was approximately 6+ years ago when she started hurting.  She recalls  began to hurt her shoulders.  This was quite severe pain.  She then started hurting in her hands and wrists as well.  Fairly soon she was seen in rheumatology by Dr. Brooks labs were done.  She'll call for those suggested that she has rheumatoid arthritis.  She was given prednisone.  This did not help.  She remembers that all it did for her was to make her face swollen.  Then she took methotrexate for 3 months that was no good either.  She did tolerate this well.  She went on to have Orencia for 2 years.  That did very well during this time.  Than 1 time suddenly it stopped working she was then changed to Actemra.  Once again and the Actemra did the trick for her further to 2-1/2 years that she was on it.  Currently she is on Cimzia, and feels this has helped her quite nicely.   Of late she's been hurting in her lower back, legs.  This is worse with activity.  She wonders if this is because the current treatment is not working.  She also recalls an episode where she had slipped and landed on her back.  This was from a third step at her house.  And she lives with her .  Her sisters have had osteoarthritis and knee/hip replacement surgeries.  She rates her pain as moderately severe she still notices stiffness of 2 hours especially in the lower back.   Further historical information and ADL limitations as noted in the multidimensional health assessment questionnaire attached in the EMR.             Impression       "IMPRESSION:     1.  No pulmonary embolism.     2.  Mild pulmonary trunk enlargement; correlate for pulmonary hypertension.     3.  Nonaneurysmal aorta without dissection.     4.  Mild emphysema. Mild airway thickening.     5.  Left upper lobe / lingular groundglass and cystic focus with thickening, indeterminate. Adenocarcinoma type lesions can have this appearance. Post infectious or inflammatory change versus scarring also possible differentials. Recommend 6 month   follow-up per guidelines below.     6.  Moderate coronary calcifications.         DETAILED EXAMINATION  01/26/22  :    Vitals:    01/26/22 1424   BP: (!) 144/72   BP Location: Right arm   Patient Position: Sitting   Cuff Size: Adult Regular   Pulse: 80   Weight: 75.3 kg (165 lb 14.4 oz)   Height: 1.664 m (5' 5.5\")     Alert oriented.  She is accompanied by her daughter Enid.  Head including the face is examined for malar rash, heliotropes, scarring, lupus pernio. Eyes examined for redness such as in episcleritis/scleritis, periorbital lesions.   Neck/ Face examined for parotid gland swelling, range of motion of neck.  Left upper and lower and right upper and lower extremities examined for tenderness, swelling, warmth of the appendicular joints, range of motion, edema, rash.  Some of the important findings included: she does not have evidence of synovitis in the palpable joints of the upper extremities.  No significant deformities of the digits.  No Heberden nodes.  Range of motion of the shoulders  show full abduction.  No JLT effusion or warmth of the knees.  she does not have dactylitis of the digits.     Patient Active Problem List    Diagnosis Date Noted     Pulmonary nodules 01/17/2022     Priority: Medium     COPD exacerbation (H) 01/17/2022     Priority: Medium     Benign essential hypertension 05/28/2020     Priority: Medium     Primary osteoarthritis involving multiple joints 06/10/2019     Priority: Medium     CRF (chronic renal failure), " stage 3 (moderate) (H) 05/17/2018     Priority: Medium     Nonsustained ventricular tachycardia (H) 01/12/2018     Priority: Medium     Paroxysmal atrial fibrillation (H)      Priority: Medium     Reactive airway disease      Priority: Medium     Dyspnea      Priority: Medium     Rheumatoid arthritis involving multiple sites with positive rheumatoid factor (H) 12/15/2016     Priority: Medium     Midline low back pain without sciatica 06/15/2016     Priority: Medium     High risk medication use 10/07/2015     Priority: Medium     Osteoporosis dxa 2006  09/22/2015     Priority: Medium     Seropositive rheumatoid arthritis (H) 03/02/2015     Priority: Medium     Limb pain 03/02/2015     Priority: Medium     Foot Pain (Soft Tissue)      Priority: Medium     Created by Conversion         Past Surgical History:   Procedure Laterality Date     APPENDECTOMY       BLADDER SUSPENSION       CHOLECYSTECTOMY        Past Medical History:   Diagnosis Date     Atrial fibrillation (H)      CRF (chronic renal failure)      GERD (gastroesophageal reflux disease)      Hypertension      Hypovolemic shock (H)      Influenza A 12/2017     Rheumatoid arthritis (H)      Sepsis (H) 12/24/2017     Allergies   Allergen Reactions     Codeine Nausea and Vomiting     Current Outpatient Medications   Medication Sig Dispense Refill     acetaminophen (TYLENOL) 500 MG tablet [ACETAMINOPHEN (TYLENOL) 500 MG TABLET] Take 1-2 tablets (500-1,000 mg total) by mouth every 6 (six) hours as needed.  0     benzonatate (TESSALON) 200 MG capsule Take 1 capsule (200 mg) by mouth 3 times daily as needed for cough 60 capsule 0     certolizumab pegol (CIMZIA PREFILLED) 2 X 200 MG/ML KIT 2 syringes/kit Inject 400 mg Subcutaneous every 28 days 1 each 2     compressor, for nebulizer Saran [COMPRESSOR, FOR NEBULIZER SARAN] Nebulizer treatment qid prn 1 Device 0     flaxseed oil 1,000 mg cap [FLAXSEED OIL 1,000 MG CAP] Take 1 capsule by mouth daily.        folic acid  (FOLVITE) 1 MG tablet [FOLIC ACID (FOLVITE) 1 MG TABLET] Take one tab po qd except the day when taking methotrexate. 90 tablet 1     FOLIC ACID/MULTIVITS-MIN/LUT (CENTRUM SILVER ORAL) [FOLIC ACID/MULTIVITS-MIN/LUT (CENTRUM SILVER ORAL)] Take 1 tablet by mouth daily.        guaiFENesin (ROBITUSSIN) 20 mg/mL SOLN solution Take 10 mLs by mouth every 4 hours as needed for cough 180 mL 0     ipratropium - albuterol 0.5 mg/2.5 mg/3 mL (DUONEB) 0.5-2.5 (3) MG/3ML neb solution Take 1 vial (3 mLs) by nebulization every 4 hours as needed for wheezing or shortness of breath / dyspnea 90 mL 0     loperamide (IMODIUM A-D) 2 MG tablet Take 2 tablets (4 mg) by mouth 4 times daily as needed for diarrhea       methotrexate sodium 2.5 MG TABS TAKE 4 TABLETS BY MOUTH ONCE A WEEK 16 tablet 0     metoprolol succinate ER (TOPROL-XL) 50 MG 24 hr tablet Take 2 tablets (100 mg) by mouth daily 60 tablet 0     pravastatin (PRAVACHOL) 20 MG tablet [PRAVASTATIN (PRAVACHOL) 20 MG TABLET] Take 20 mg by mouth daily.       rivaroxaban ANTICOAGULANT (XARELTO) 15 MG TABS tablet Take 1 tablet (15 mg) by mouth daily (with dinner) 30 tablet 0     family history includes Cerebrovascular Disease in her brother and father; Diabetes Type 2  in her brother; Heart Failure in her mother; Kidney failure in her sister.  Social Connections: Not on file          WBC Count   Date Value Ref Range Status   01/18/2022 5.2 4.0 - 11.0 10e3/uL Final     RBC Count   Date Value Ref Range Status   01/18/2022 3.65 (L) 3.80 - 5.20 10e6/uL Final     Hemoglobin   Date Value Ref Range Status   01/18/2022 12.1 11.7 - 15.7 g/dL Final     Hematocrit   Date Value Ref Range Status   01/18/2022 36.7 35.0 - 47.0 % Final     MCV   Date Value Ref Range Status   01/18/2022 101 (H) 78 - 100 fL Final     MCH   Date Value Ref Range Status   01/18/2022 33.2 (H) 26.5 - 33.0 pg Final     Platelet Count   Date Value Ref Range Status   01/18/2022 291 150 - 450 10e3/uL Final     % Lymphocytes    Date Value Ref Range Status   01/17/2022 8 % Final     AST   Date Value Ref Range Status   01/18/2022 18 0 - 40 U/L Final     ALT   Date Value Ref Range Status   01/18/2022 11 0 - 45 U/L Final     Albumin   Date Value Ref Range Status   01/18/2022 3.1 (L) 3.5 - 5.0 g/dL Final     Alkaline Phosphatase   Date Value Ref Range Status   01/18/2022 100 45 - 120 U/L Final     Creatinine   Date Value Ref Range Status   01/21/2022 1.26 (H) 0.60 - 1.10 mg/dL Final     GFR Estimate   Date Value Ref Range Status   01/21/2022 44 (L) >60 mL/min/1.73m2 Final     Comment:     Effective December 21, 2021 eGFRcr in adults is calculated using the 2021 CKD-EPI creatinine equation which includes age and gender (Salbador miller al., NEJM, DOI: 10.1056/NOAOiq3961922)   06/02/2021 48 (L) >60 mL/min/1.73m2 Final     GFR Estimate If Black   Date Value Ref Range Status   06/02/2021 58 (L) >60 mL/min/1.73m2 Final     Erythrocyte Sedimentation Rate   Date Value Ref Range Status   01/20/2022 23 (H) 0 - 20 mm/hr Final     CRP   Date Value Ref Range Status   01/20/2022 0.3 0.0-<0.8 mg/dL Final

## 2022-01-28 ENCOUNTER — LAB REQUISITION (OUTPATIENT)
Dept: LAB | Facility: CLINIC | Age: 77
End: 2022-01-28

## 2022-01-28 DIAGNOSIS — E21.3 HYPERPARATHYROIDISM, UNSPECIFIED (H): ICD-10-CM

## 2022-01-28 DIAGNOSIS — E78.5 HYPERLIPIDEMIA, UNSPECIFIED: ICD-10-CM

## 2022-01-28 DIAGNOSIS — N28.9 DISORDER OF KIDNEY AND URETER, UNSPECIFIED: ICD-10-CM

## 2022-01-28 LAB
ANION GAP SERPL CALCULATED.3IONS-SCNC: 9 MMOL/L (ref 5–18)
BUN SERPL-MCNC: 20 MG/DL (ref 8–28)
CALCIUM SERPL-MCNC: 9.5 MG/DL (ref 8.5–10.5)
CALCIUM, IONIZED MEASURED: 1.35 MMOL/L (ref 1.11–1.3)
CHLORIDE BLD-SCNC: 110 MMOL/L (ref 98–107)
CHOLEST SERPL-MCNC: 154 MG/DL
CO2 SERPL-SCNC: 21 MMOL/L (ref 22–31)
CREAT SERPL-MCNC: 1.29 MG/DL (ref 0.6–1.1)
GFR SERPL CREATININE-BSD FRML MDRD: 43 ML/MIN/1.73M2
GLUCOSE BLD-MCNC: 85 MG/DL (ref 70–125)
HDLC SERPL-MCNC: 65 MG/DL
ION CA PH 7.4: 1.26 MMOL/L (ref 1.11–1.3)
LDLC SERPL CALC-MCNC: 63 MG/DL
PH: 7.28 (ref 7.35–7.45)
POTASSIUM BLD-SCNC: 4 MMOL/L (ref 3.5–5)
PTH-INTACT SERPL-MCNC: 140 PG/ML (ref 10–86)
SODIUM SERPL-SCNC: 140 MMOL/L (ref 136–145)
TRIGL SERPL-MCNC: 132 MG/DL

## 2022-01-28 PROCEDURE — 82330 ASSAY OF CALCIUM: CPT | Performed by: FAMILY MEDICINE

## 2022-01-28 PROCEDURE — 80048 BASIC METABOLIC PNL TOTAL CA: CPT | Performed by: FAMILY MEDICINE

## 2022-01-28 PROCEDURE — 83970 ASSAY OF PARATHORMONE: CPT | Performed by: FAMILY MEDICINE

## 2022-01-28 PROCEDURE — 80061 LIPID PANEL: CPT | Performed by: FAMILY MEDICINE

## 2022-01-30 LAB — PTH RELATED PROT SERPL-SCNC: 2.7 PMOL/L

## 2022-02-01 NOTE — TELEPHONE ENCOUNTER
PA Initiation    Medication: Cimzia- Pending  Insurance Company: [email or web address] - Phone [phone] Fax [phone]  Pharmacy Filling the Rx: [facility] - [place], [place] - [facility] [address]  Filling Pharmacy Phone:    Filling Pharmacy Fax:    Start Date: 1/26/2022

## 2022-02-03 ENCOUNTER — TELEPHONE (OUTPATIENT)
Dept: RHEUMATOLOGY | Facility: CLINIC | Age: 77
End: 2022-02-03
Payer: COMMERCIAL

## 2022-02-03 DIAGNOSIS — M32.9 SLE (SYSTEMIC LUPUS ERYTHEMATOSUS) (H): ICD-10-CM

## 2022-02-03 NOTE — TELEPHONE ENCOUNTER
Spoke with pt and informed her We were waiting for a PA due to patient switching insurances. Informed patient pharmacy needs her to call them, as they need the updated information and may have a copay.     Pt informed and is calling pharmacy.

## 2022-02-03 NOTE — TELEPHONE ENCOUNTER
[facility] Call Center    Phone Message    May a detailed message be left on voicemail: yes     Reason for Call: Other: Co-pay assistance    Pt states her copay will be $100 and she is wondering if there is some sort of copay assistance to help cover the cost?  Pt states in the past Nelly had usually helped her with this?     Pt would like a call back to discuss this.

## 2022-02-03 NOTE — TELEPHONE ENCOUNTER
[facility] Call Center    Phone Message    May a detailed message be left on voicemail: yes, please call patient's home phone number    Reason for Call: Other: Patient reports that in the past, her Cimzia has always been mailed to the clinic, and then she would come in for an appointment for one of the nurses to administer it for her. She is wondering if Dr. Rush's clinic has received her medication yet so she can come in for it? Says it was not there yet when she had her visit with him on 1/26/22. FYI-she reports that Nelly has always been the one to arrange this in the past, so if there's an issue or any confusion with how this needs to be done, Nelly would be the person to refer to.      Action Taken: Message routed to:  Other: RHEUMATOLOGY SUPPORT POOL    Travel Screening: Not Applicable

## 2022-02-03 NOTE — TELEPHONE ENCOUNTER
Prior Authorization Approval    Authorization Effective Date:  01/01/2022  Authorization Expiration Date:  02/01/2023  Medication: Cimzia- Approved  Approved Dose/Quantity: 2 X 200mg PFS/ 2 per 28 days  Reference #: CMM Key: [id number]   Insurance Company: [employer] - Phone [phone] Fax [phone]  Expected CoPay:    $100.00   CoPay Card Available:      Foundation Assistance Needed:    Which Pharmacy is filling the prescription (Not needed for infusion/clinic administered): [facility] - [place], [place] - [address]  Pharmacy Notified:  Yes  Patient Notified:  Yes

## 2022-02-07 ENCOUNTER — OFFICE VISIT (OUTPATIENT)
Dept: INFECTIOUS DISEASES | Facility: CLINIC | Age: 77
End: 2022-02-07
Payer: COMMERCIAL

## 2022-02-07 ENCOUNTER — TELEPHONE (OUTPATIENT)
Dept: RHEUMATOLOGY | Facility: CLINIC | Age: 77
End: 2022-02-07

## 2022-02-07 VITALS — TEMPERATURE: 97.7 F | DIASTOLIC BLOOD PRESSURE: 62 MMHG | HEART RATE: 76 BPM | SYSTOLIC BLOOD PRESSURE: 112 MMHG

## 2022-02-07 DIAGNOSIS — R91.8 PULMONARY NODULES: Primary | ICD-10-CM

## 2022-02-07 PROCEDURE — 99213 OFFICE O/P EST LOW 20 MIN: CPT

## 2022-02-07 RX ORDER — FAMOTIDINE 20 MG/1
20 TABLET, FILM COATED ORAL 2 TIMES DAILY
COMMUNITY
End: 2022-01-01

## 2022-02-07 RX ORDER — MULTIVIT-MIN/IRON/FOLIC ACID/K 18-600-40
50 CAPSULE ORAL DAILY
COMMUNITY
End: 2022-01-01

## 2022-02-07 RX ORDER — FUROSEMIDE 20 MG
20 TABLET ORAL DAILY PRN
COMMUNITY
Start: 2022-02-03 | End: 2022-01-01

## 2022-02-07 NOTE — PROGRESS NOTES
Infectious diseases follow-up    Patient: Fatuma Henry  YOB: 1945, MRN: [id number]  Date of Admission:    Date of Consult: 02/07/2022    Admission Diagnosis: Concern for pneumonia elevated 1, 3 beta D glucan      ID Assessment & Plan   ASSESSMENT: No evidence for active fungal infection.  Follow-up with pulmonary medicine scheduled    DISCUSSION: Follow-up fungal studies were improved are negative at time of discharge    RECOMMENDATION:  1.  No intervention from infectious disease needed at this time.  Follow-up with Dr. Parth Rosario.    The patient's care was discussed with the Patient and Patient's niece.    ALDEN FARNSWORTH MD    ______________________________________________________________________    Chief Complaint   Hospital follow-up    History is obtained from the patient    History of Present Illness   Fatuma Henry is a 76 year old female who I met at [facility] several weeks ago when she was admitted for some respiratory distress following influenza B infection.    During the work-up in the hospital she had a CT scan of the chest which suggested possible left upper lobe pneumonia.  She also had an elevated 1 3 beta D glucan.    Patient was seen by pulmonary medicine who noted that the abnormality on the CT scan was chronic.  She has a follow-up scheduled with Dr. Parth Rosario in the pulmonary medicine clinic.    The 1 3 beta D glucan was elevated and repeat test was lower without any intervening treatment.    Aspergillus galactomannan was negative as were other fungal studies.    Patient states she feels quite good.  She still takes 1 1/2 L of oxygen at night.    As noted in my previous consultation, she has a history of rheumatoid arthritis and takes Cimzia monthly and methotrexate weekly with good control of her arthritis.    Review of Systems   The 5 point Review of Systems is negative other than noted in the HPI or here.     Past Medical History    Past Medical  History:   Diagnosis Date     Atrial fibrillation (H)      CRF (chronic renal failure)      GERD (gastroesophageal reflux disease)      Hypertension      Hypovolemic shock (H)      Influenza A 2017     Rheumatoid arthritis (H)      Sepsis (H) 2017       Past Surgical History   Past Surgical History:   Procedure Laterality Date     APPENDECTOMY       BLADDER SUSPENSION       CHOLECYSTECTOMY         Social History   Social History     Tobacco Use     Smoking status: Former Smoker     Packs/day: 0.50     Types: Cigarettes     Quit date: 2017     Years since quittin.1     Smokeless tobacco: Never Used   Substance Use Topics     Alcohol use: No     Drug use: No       Family History   I have reviewed this patient's family history and updated it with pertinent information if needed.  Family History   Problem Relation Age of Onset     Heart Failure Mother      Cerebrovascular Disease Father      Kidney failure Sister      Cerebrovascular Disease Brother      Diabetes Type 2  Brother        Medications   I have reviewed this patient's current medications    Allergies   Allergies   Allergen Reactions     Codeine Nausea and Vomiting       Physical Exam   Vital Signs: Temp: 97.7  F (36.5  C)   BP: 112/62 Pulse: 76            Weight: 0 lbs 0 oz    General appearance: alert, appears stated age and cooperative  Head: Normocephalic, without obvious abnormality, atraumatic    Neck: no adenopathy and supple, symmetrical, trachea midline  Lungs:  Normal respiratory pattern, not using any supplemental oxygen    Extremities: Warm and dry, some changes consistent with history of rheumatoid arthritis  Skin: Skin color, texture, turgor normal. No rashes or lesions  Neurologic: Grossly normal    Data   Inflammatory Markers   Recent Labs   Lab Test 22  1628 22  0844   SED 23*  --    CRP  --  0.3        Hematology Studies   Recent Labs   Lab Test 22  0633 22  2123 21  1312 21  1243  06/02/21  1053 03/08/21  1234   WBC 5.2 4.6 5.7 5.0 5.0 5.5   HGB 12.1 12.7 12.4 12.5 13.5 11.9*   * 100 104* 101* 99 98    296 300 287 305 266       Metabolic Studies   Recent Labs   Lab Test 01/28/22  1640 01/21/22  0800 01/20/22  0844 01/19/22  0707 01/18/22  0633 01/17/22  1756    138 138  --  134* 137   POTASSIUM 4.0 4.2 3.8  --  4.1 4.2   CHLORIDE 110* 109* 108*  --  100 98   CO2 21* 24 24  --  26 26   BUN 20 23 29*  --  35* 31*   CR 1.29* 1.26* 1.55* 1.72* 1.68* 1.45*   GFRESTIMATED 43* 44* 35* 30* 31* 37*       Hepatic Studies    Recent Labs   Lab Test 01/18/22  0633 01/17/22  1756 11/18/21  1312 08/25/21  1243 06/02/21  1053 03/08/21  1234 01/13/21  1017 01/03/20  1338 11/04/19  1430 12/03/18  1321 10/19/18  1024   BILITOTAL 0.4 0.6  --   --   --   --  0.6  --  0.4  --  0.5   ALKPHOS 100 118  --   --   --   --  95  --  78  --  81   ALBUMIN 3.1* 3.5 3.8 3.6 3.8 3.7 3.8   < > 3.7   < > 3.7   AST 18 21  --   --   --   --  22  --  23  --  16   ALT 11 13 20 16 17 18 17   < > 17   < > 14    < > = values in this interval not displayed.       Most Recent 6 Bacteria Isolates From Any Culture (See EPIC Reports for Culture Details):No lab results found.  \Results for HEATHER DOYLE (MRN [id number]) as of 2/7/2022 11:25   Ref. Range 1/17/2022 17:56 1/20/2022 16:28   (1,3)-Beta-D-Glucan Latest Units: pg/mL 252 165   Results for HEATHER DOYLE (MRN [id number]) as of 2/7/2022 11:25   Ref. Range 1/20/2022 16:28   Aspergillus Galact AG Latest Ref Range: Negative  Negative   ASPERGILLUS GALACTOMANNAN ANTIGEN Unknown Rpt   Aspergillus Galactomannan Index Unknown 0.02   B-D GLUCAN INTERPRETATION (1,3) Latest Ref Range: Negative  Positive (A)   Cryptococcal Antigen Test Latest Ref Range: Negative  Negative   Histoplasma Antigen Latest Ref Range: Not Detected  Not Detected   Histoplasma Interp Latest Ref Range: Not Detected  Not Detected     Urine Studies  No lab results found.    Vancomycin Levels  No lab  results found.    Invalid input(s): VANCO    Hepatitis B Testing No lab results found.  Hepatitis C Testing     Hepatitis C Antibody   Date Value Ref Range Status   03/02/2015 Negative Negative Final     HIVTesting No lab results found.    Respiratory Virus Testing    No results found for: RS, FLUAG  COVID-19 Antibody Results, Testing for Immunity    COVID-19 Antibody Results, Testing for Immunity   No data to display.         COVID-19 PCR Results    COVID-19 PCR Results 1/17/22   SARS CoV2 PCR Negative      Comments are available for some flowsheets but are not being displayed.

## 2022-02-07 NOTE — TELEPHONE ENCOUNTER
Health Call Center    Phone Message    May a detailed message be left on voicemail: yes     Reason for Call: Other: Nurse appointment for Cimzia injection     Pt states she will be due for her Cimzia injections on 2/9.  Pt states she usually comes in and has a nurse administer the injections.    Please review and advise and contact the Pt back if okay to schedule for a nurse appointment.

## 2022-02-10 ENCOUNTER — ALLIED HEALTH/NURSE VISIT (OUTPATIENT)
Dept: ENDOCRINOLOGY | Facility: CLINIC | Age: 77
End: 2022-02-10
Payer: COMMERCIAL

## 2022-02-10 DIAGNOSIS — M05.79 RHEUMATOID ARTHRITIS INVOLVING MULTIPLE SITES WITH POSITIVE RHEUMATOID FACTOR (H): Primary | ICD-10-CM

## 2022-02-10 PROCEDURE — 96372 THER/PROPH/DIAG INJ SC/IM: CPT | Performed by: INTERNAL MEDICINE

## 2022-02-10 NOTE — TELEPHONE ENCOUNTER
Copay is too high for patient, seeking copay assistance or free drug. However patient has Medicare which makes her ineligible for copay assistance and free drug. Routing message to nurse for follow-up.

## 2022-02-17 ENCOUNTER — LAB REQUISITION (OUTPATIENT)
Dept: LAB | Facility: CLINIC | Age: 77
End: 2022-02-17

## 2022-02-17 DIAGNOSIS — N18.31 CHRONIC KIDNEY DISEASE, STAGE 3A (H): ICD-10-CM

## 2022-02-17 LAB
ANION GAP SERPL CALCULATED.3IONS-SCNC: 12 MMOL/L (ref 5–18)
BUN SERPL-MCNC: 38 MG/DL (ref 8–28)
CALCIUM SERPL-MCNC: 10.3 MG/DL (ref 8.5–10.5)
CHLORIDE BLD-SCNC: 101 MMOL/L (ref 98–107)
CO2 SERPL-SCNC: 28 MMOL/L (ref 22–31)
CREAT SERPL-MCNC: 1.56 MG/DL (ref 0.6–1.1)
GFR SERPL CREATININE-BSD FRML MDRD: 34 ML/MIN/1.73M2
GLUCOSE BLD-MCNC: 120 MG/DL (ref 70–125)
POTASSIUM BLD-SCNC: 3.8 MMOL/L (ref 3.5–5)
SODIUM SERPL-SCNC: 141 MMOL/L (ref 136–145)

## 2022-02-17 PROCEDURE — 80048 BASIC METABOLIC PNL TOTAL CA: CPT | Performed by: FAMILY MEDICINE

## 2022-02-19 DIAGNOSIS — M32.9 SLE (SYSTEMIC LUPUS ERYTHEMATOSUS) (H): ICD-10-CM

## 2022-02-19 DIAGNOSIS — M05.9 SEROPOSITIVE RHEUMATOID ARTHRITIS (H): ICD-10-CM

## 2022-02-21 RX ORDER — FOLIC ACID 1 MG/1
TABLET ORAL
Qty: 90 TABLET | Refills: 1 | Status: SHIPPED | OUTPATIENT
Start: 2022-02-21 | End: 2022-01-01

## 2022-02-24 NOTE — TELEPHONE ENCOUNTER
Cimzia RX called into  pharmacy for # 1, as an RX should be released electronically. I have called in one RX so I can set up delivery for her 3/10 appt.

## 2022-02-25 NOTE — TELEPHONE ENCOUNTER
Call received from [facility] Auth dept-they are trying to clarify if clinic is requesting the powder form or the pre-filled syringe?     Representative reports that they have a completed (and approved) prior authorization on file for the pre-filled syringe.   If it's the powder form being requested, then they would need some additional information because the powder form is only covered under 's Medicare Part B coverage.     Please call On license of [facility] department at [phone], and choose option #2.

## 2022-03-03 ENCOUNTER — ANCILLARY PROCEDURE (OUTPATIENT)
Dept: BONE DENSITY | Facility: CLINIC | Age: 77
End: 2022-03-03
Attending: FAMILY MEDICINE
Payer: COMMERCIAL

## 2022-03-03 DIAGNOSIS — M81.0 OSTEOPOROSIS: ICD-10-CM

## 2022-03-03 PROCEDURE — 77080 DXA BONE DENSITY AXIAL: CPT | Mod: TC | Performed by: RADIOLOGY

## 2022-03-08 NOTE — TELEPHONE ENCOUNTER
I called the pt and discussed that she was approved through medicare part B, however her cost would be about 500$ per injection. The pt will pay the co-pay via pharmacy benefits March and April and discuss potential alternatives with Dr Rush at her appt in May. Piotr will call [facility] to pay her co-pay.

## 2022-03-10 ENCOUNTER — ALLIED HEALTH/NURSE VISIT (OUTPATIENT)
Dept: ENDOCRINOLOGY | Facility: CLINIC | Age: 77
End: 2022-03-10
Payer: COMMERCIAL

## 2022-03-10 DIAGNOSIS — M05.79 RHEUMATOID ARTHRITIS INVOLVING MULTIPLE SITES WITH POSITIVE RHEUMATOID FACTOR (H): Primary | ICD-10-CM

## 2022-03-10 PROCEDURE — 99207 PR NO CHARGE NURSE ONLY: CPT

## 2022-03-10 PROCEDURE — 96372 THER/PROPH/DIAG INJ SC/IM: CPT | Performed by: INTERNAL MEDICINE

## 2022-03-10 NOTE — TELEPHONE ENCOUNTER
Administered Cimzia to pt. Rescheduled pts appt with Dr Rsuh to 4/7/22 to discuss other med options that could be cheaper for pt. Pt is having to pay $100 OOP for Cimzia now.

## 2022-04-03 DIAGNOSIS — M05.79 RHEUMATOID ARTHRITIS INVOLVING MULTIPLE SITES WITH POSITIVE RHEUMATOID FACTOR (H): ICD-10-CM

## 2022-04-07 RX ORDER — METHOTREXATE 2.5 MG/1
TABLET ORAL
Qty: 48 TABLET | Refills: 0 | Status: SHIPPED | OUTPATIENT
Start: 2022-04-07 | End: 2022-01-01

## 2022-04-20 ENCOUNTER — APPOINTMENT (OUTPATIENT)
Dept: RADIOLOGY | Facility: CLINIC | Age: 77
DRG: 190 | End: 2022-04-20
Attending: EMERGENCY MEDICINE
Payer: COMMERCIAL

## 2022-04-20 ENCOUNTER — HOSPITAL ENCOUNTER (INPATIENT)
Facility: CLINIC | Age: 77
LOS: 6 days | Discharge: HOME | DRG: 190 | End: 2022-04-26
Attending: EMERGENCY MEDICINE | Admitting: STUDENT IN AN ORGANIZED HEALTH CARE EDUCATION/TRAINING PROGRAM
Payer: COMMERCIAL

## 2022-04-20 DIAGNOSIS — J44.1 COPD EXACERBATION (H): ICD-10-CM

## 2022-04-20 DIAGNOSIS — N30.00 ACUTE CYSTITIS WITHOUT HEMATURIA: Primary | ICD-10-CM

## 2022-04-20 LAB
ALBUMIN UR-MCNC: NEGATIVE MG/DL
ANION GAP SERPL CALCULATED.3IONS-SCNC: 12 MMOL/L (ref 5–18)
APPEARANCE UR: CLEAR
APTT PPP: 50 SECONDS (ref 22–38)
ATRIAL RATE - MUSE: 98 BPM
BACTERIA #/AREA URNS HPF: ABNORMAL /HPF
BASE EXCESS BLDV CALC-SCNC: 0.7 MMOL/L
BASOPHILS # BLD AUTO: 0 10E3/UL (ref 0–0.2)
BASOPHILS NFR BLD AUTO: 1 %
BILIRUB UR QL STRIP: NEGATIVE
BNP SERPL-MCNC: 165 PG/ML (ref 0–137)
BUN SERPL-MCNC: 30 MG/DL (ref 8–28)
CALCIUM SERPL-MCNC: 10.3 MG/DL (ref 8.5–10.5)
CHLORIDE BLD-SCNC: 102 MMOL/L (ref 98–107)
CO2 SERPL-SCNC: 26 MMOL/L (ref 22–31)
COLOR UR AUTO: YELLOW
CREAT SERPL-MCNC: 1.14 MG/DL (ref 0.6–1.1)
DIASTOLIC BLOOD PRESSURE - MUSE: 61 MMHG
EOSINOPHIL # BLD AUTO: 0.2 10E3/UL (ref 0–0.7)
EOSINOPHIL NFR BLD AUTO: 4 %
ERYTHROCYTE [DISTWIDTH] IN BLOOD BY AUTOMATED COUNT: 14 % (ref 10–15)
FLUAV RNA SPEC QL NAA+PROBE: NEGATIVE
FLUBV RNA RESP QL NAA+PROBE: NEGATIVE
GFR SERPL CREATININE-BSD FRML MDRD: 50 ML/MIN/1.73M2
GLUCOSE BLD-MCNC: 81 MG/DL (ref 70–125)
GLUCOSE UR STRIP-MCNC: NEGATIVE MG/DL
HCO3 BLDV-SCNC: 23 MMOL/L (ref 24–30)
HCT VFR BLD AUTO: 40.1 % (ref 35–47)
HGB BLD-MCNC: 12.9 G/DL (ref 11.7–15.7)
HGB UR QL STRIP: NEGATIVE
HOLD SPECIMEN: NORMAL
IMM GRANULOCYTES # BLD: 0 10E3/UL
IMM GRANULOCYTES NFR BLD: 0 %
INR PPP: 1.77 (ref 0.85–1.15)
INTERPRETATION ECG - MUSE: NORMAL
KETONES UR STRIP-MCNC: NEGATIVE MG/DL
LEUKOCYTE ESTERASE UR QL STRIP: ABNORMAL
LYMPHOCYTES # BLD AUTO: 0.7 10E3/UL (ref 0.8–5.3)
LYMPHOCYTES NFR BLD AUTO: 16 %
MAGNESIUM SERPL-MCNC: 1.7 MG/DL (ref 1.8–2.6)
MCH RBC QN AUTO: 33.1 PG (ref 26.5–33)
MCHC RBC AUTO-ENTMCNC: 32.2 G/DL (ref 31.5–36.5)
MCV RBC AUTO: 103 FL (ref 78–100)
MONOCYTES # BLD AUTO: 0.6 10E3/UL (ref 0–1.3)
MONOCYTES NFR BLD AUTO: 15 %
NEUTROPHILS # BLD AUTO: 2.8 10E3/UL (ref 1.6–8.3)
NEUTROPHILS NFR BLD AUTO: 64 %
NITRATE UR QL: NEGATIVE
NRBC # BLD AUTO: 0 10E3/UL
NRBC BLD AUTO-RTO: 0 /100
OXYHGB MFR BLDV: 30.9 % (ref 70–75)
P AXIS - MUSE: 69 DEGREES
PCO2 BLDV: 44 MM HG (ref 35–50)
PH BLDV: 7.38 [PH] (ref 7.35–7.45)
PH UR STRIP: 5.5 [PH] (ref 5–7)
PLATELET # BLD AUTO: 272 10E3/UL (ref 150–450)
PO2 BLDV: 19 MM HG (ref 25–47)
POTASSIUM BLD-SCNC: 4 MMOL/L (ref 3.5–5)
POTASSIUM BLD-SCNC: 4.5 MMOL/L (ref 3.5–5)
PR INTERVAL - MUSE: 190 MS
QRS DURATION - MUSE: 80 MS
QT - MUSE: 354 MS
QTC - MUSE: 451 MS
R AXIS - MUSE: 61 DEGREES
RBC # BLD AUTO: 3.9 10E6/UL (ref 3.8–5.2)
RBC URINE: 0 /HPF
SAO2 % BLDV: 31.6 % (ref 70–75)
SARS-COV-2 RNA RESP QL NAA+PROBE: NEGATIVE
SODIUM SERPL-SCNC: 140 MMOL/L (ref 136–145)
SP GR UR STRIP: 1.01 (ref 1–1.03)
SQUAMOUS EPITHELIAL: 0 /HPF
SYSTOLIC BLOOD PRESSURE - MUSE: 135 MMHG
T AXIS - MUSE: 61 DEGREES
TROPONIN I SERPL-MCNC: 0.03 NG/ML (ref 0–0.29)
UROBILINOGEN UR STRIP-MCNC: NORMAL MG/DL
VENTRICULAR RATE- MUSE: 98 BPM
WBC # BLD AUTO: 4.3 10E3/UL (ref 4–11)
WBC URINE: 1 /HPF

## 2022-04-20 PROCEDURE — 94640 AIRWAY INHALATION TREATMENT: CPT

## 2022-04-20 PROCEDURE — 87636 SARSCOV2 & INF A&B AMP PRB: CPT | Performed by: EMERGENCY MEDICINE

## 2022-04-20 PROCEDURE — 96375 TX/PRO/DX INJ NEW DRUG ADDON: CPT

## 2022-04-20 PROCEDURE — 80048 BASIC METABOLIC PNL TOTAL CA: CPT | Performed by: EMERGENCY MEDICINE

## 2022-04-20 PROCEDURE — 999N000157 HC STATISTIC RCP TIME EA 10 MIN

## 2022-04-20 PROCEDURE — 93005 ELECTROCARDIOGRAM TRACING: CPT | Performed by: EMERGENCY MEDICINE

## 2022-04-20 PROCEDURE — 83735 ASSAY OF MAGNESIUM: CPT | Performed by: STUDENT IN AN ORGANIZED HEALTH CARE EDUCATION/TRAINING PROGRAM

## 2022-04-20 PROCEDURE — 250N000013 HC RX MED GY IP 250 OP 250 PS 637: Performed by: STUDENT IN AN ORGANIZED HEALTH CARE EDUCATION/TRAINING PROGRAM

## 2022-04-20 PROCEDURE — 250N000009 HC RX 250: Performed by: EMERGENCY MEDICINE

## 2022-04-20 PROCEDURE — 81001 URINALYSIS AUTO W/SCOPE: CPT | Performed by: EMERGENCY MEDICINE

## 2022-04-20 PROCEDURE — 96365 THER/PROPH/DIAG IV INF INIT: CPT

## 2022-04-20 PROCEDURE — 120N000004 HC R&B MS OVERFLOW

## 2022-04-20 PROCEDURE — 71045 X-RAY EXAM CHEST 1 VIEW: CPT

## 2022-04-20 PROCEDURE — 99285 EMERGENCY DEPT VISIT HI MDM: CPT | Mod: 25

## 2022-04-20 PROCEDURE — 99223 1ST HOSP IP/OBS HIGH 75: CPT | Performed by: STUDENT IN AN ORGANIZED HEALTH CARE EDUCATION/TRAINING PROGRAM

## 2022-04-20 PROCEDURE — 250N000009 HC RX 250: Performed by: STUDENT IN AN ORGANIZED HEALTH CARE EDUCATION/TRAINING PROGRAM

## 2022-04-20 PROCEDURE — 250N000011 HC RX IP 250 OP 636: Performed by: EMERGENCY MEDICINE

## 2022-04-20 PROCEDURE — C9803 HOPD COVID-19 SPEC COLLECT: HCPCS

## 2022-04-20 PROCEDURE — 83880 ASSAY OF NATRIURETIC PEPTIDE: CPT | Performed by: EMERGENCY MEDICINE

## 2022-04-20 PROCEDURE — 84484 ASSAY OF TROPONIN QUANT: CPT | Performed by: EMERGENCY MEDICINE

## 2022-04-20 PROCEDURE — 85025 COMPLETE CBC W/AUTO DIFF WBC: CPT | Performed by: EMERGENCY MEDICINE

## 2022-04-20 PROCEDURE — 250N000011 HC RX IP 250 OP 636: Performed by: STUDENT IN AN ORGANIZED HEALTH CARE EDUCATION/TRAINING PROGRAM

## 2022-04-20 PROCEDURE — 82805 BLOOD GASES W/O2 SATURATION: CPT | Performed by: EMERGENCY MEDICINE

## 2022-04-20 PROCEDURE — 94640 AIRWAY INHALATION TREATMENT: CPT | Mod: 76

## 2022-04-20 PROCEDURE — 84132 ASSAY OF SERUM POTASSIUM: CPT | Performed by: STUDENT IN AN ORGANIZED HEALTH CARE EDUCATION/TRAINING PROGRAM

## 2022-04-20 PROCEDURE — 85730 THROMBOPLASTIN TIME PARTIAL: CPT | Performed by: EMERGENCY MEDICINE

## 2022-04-20 PROCEDURE — 36415 COLL VENOUS BLD VENIPUNCTURE: CPT | Performed by: STUDENT IN AN ORGANIZED HEALTH CARE EDUCATION/TRAINING PROGRAM

## 2022-04-20 PROCEDURE — 87186 SC STD MICRODIL/AGAR DIL: CPT | Performed by: EMERGENCY MEDICINE

## 2022-04-20 PROCEDURE — 36415 COLL VENOUS BLD VENIPUNCTURE: CPT | Performed by: EMERGENCY MEDICINE

## 2022-04-20 PROCEDURE — 85610 PROTHROMBIN TIME: CPT | Performed by: EMERGENCY MEDICINE

## 2022-04-20 RX ORDER — ACETAMINOPHEN 650 MG/1
650 SUPPOSITORY RECTAL EVERY 6 HOURS PRN
Status: DISCONTINUED | OUTPATIENT
Start: 2022-04-20 | End: 2022-04-26 | Stop reason: HOSPADM

## 2022-04-20 RX ORDER — ACETAMINOPHEN 325 MG/1
650 TABLET ORAL EVERY 6 HOURS PRN
Status: DISCONTINUED | OUTPATIENT
Start: 2022-04-20 | End: 2022-04-26 | Stop reason: HOSPADM

## 2022-04-20 RX ORDER — IPRATROPIUM BROMIDE AND ALBUTEROL SULFATE 2.5; .5 MG/3ML; MG/3ML
3 SOLUTION RESPIRATORY (INHALATION) ONCE
Status: COMPLETED | OUTPATIENT
Start: 2022-04-20 | End: 2022-04-20

## 2022-04-20 RX ORDER — IPRATROPIUM BROMIDE AND ALBUTEROL SULFATE 2.5; .5 MG/3ML; MG/3ML
3 SOLUTION RESPIRATORY (INHALATION)
Status: DISCONTINUED | OUTPATIENT
Start: 2022-04-20 | End: 2022-04-26 | Stop reason: HOSPADM

## 2022-04-20 RX ORDER — METOPROLOL SUCCINATE 100 MG/1
100 TABLET, EXTENDED RELEASE ORAL DAILY
Status: DISCONTINUED | OUTPATIENT
Start: 2022-04-21 | End: 2022-04-26 | Stop reason: HOSPADM

## 2022-04-20 RX ORDER — FUROSEMIDE 20 MG
20 TABLET ORAL DAILY
Status: DISCONTINUED | OUTPATIENT
Start: 2022-04-20 | End: 2022-04-26 | Stop reason: HOSPADM

## 2022-04-20 RX ORDER — ONDANSETRON 2 MG/ML
4 INJECTION INTRAMUSCULAR; INTRAVENOUS EVERY 6 HOURS PRN
Status: DISCONTINUED | OUTPATIENT
Start: 2022-04-20 | End: 2022-04-26 | Stop reason: HOSPADM

## 2022-04-20 RX ORDER — METHYLPREDNISOLONE SODIUM SUCCINATE 125 MG/2ML
125 INJECTION, POWDER, LYOPHILIZED, FOR SOLUTION INTRAMUSCULAR; INTRAVENOUS ONCE
Status: COMPLETED | OUTPATIENT
Start: 2022-04-20 | End: 2022-04-20

## 2022-04-20 RX ORDER — LIDOCAINE 40 MG/G
CREAM TOPICAL
Status: DISCONTINUED | OUTPATIENT
Start: 2022-04-20 | End: 2022-04-26 | Stop reason: HOSPADM

## 2022-04-20 RX ORDER — IPRATROPIUM BROMIDE AND ALBUTEROL SULFATE 2.5; .5 MG/3ML; MG/3ML
3 SOLUTION RESPIRATORY (INHALATION)
Status: DISCONTINUED | OUTPATIENT
Start: 2022-04-20 | End: 2022-04-20

## 2022-04-20 RX ORDER — ALBUTEROL SULFATE 0.83 MG/ML
5 SOLUTION RESPIRATORY (INHALATION) ONCE
Status: COMPLETED | OUTPATIENT
Start: 2022-04-20 | End: 2022-04-20

## 2022-04-20 RX ORDER — PREDNISONE 20 MG/1
40 TABLET ORAL DAILY
Status: COMPLETED | OUTPATIENT
Start: 2022-04-21 | End: 2022-04-25

## 2022-04-20 RX ORDER — GUAIFENESIN 600 MG/1
600 TABLET, EXTENDED RELEASE ORAL 2 TIMES DAILY
Status: DISCONTINUED | OUTPATIENT
Start: 2022-04-20 | End: 2022-04-21

## 2022-04-20 RX ORDER — LIDOCAINE 40 MG/G
CREAM TOPICAL
Status: DISCONTINUED | OUTPATIENT
Start: 2022-04-20 | End: 2022-04-21

## 2022-04-20 RX ORDER — PRAVASTATIN SODIUM 20 MG
20 TABLET ORAL DAILY
Status: DISCONTINUED | OUTPATIENT
Start: 2022-04-21 | End: 2022-04-26 | Stop reason: HOSPADM

## 2022-04-20 RX ORDER — FUROSEMIDE 10 MG/ML
40 INJECTION INTRAMUSCULAR; INTRAVENOUS ONCE
Status: COMPLETED | OUTPATIENT
Start: 2022-04-20 | End: 2022-04-20

## 2022-04-20 RX ORDER — FOLIC ACID 1 MG/1
1 TABLET ORAL DAILY
Status: DISCONTINUED | OUTPATIENT
Start: 2022-04-21 | End: 2022-04-26 | Stop reason: HOSPADM

## 2022-04-20 RX ORDER — IPRATROPIUM BROMIDE AND ALBUTEROL SULFATE 2.5; .5 MG/3ML; MG/3ML
3 SOLUTION RESPIRATORY (INHALATION) EVERY 4 HOURS PRN
Status: DISCONTINUED | OUTPATIENT
Start: 2022-04-20 | End: 2022-04-26 | Stop reason: HOSPADM

## 2022-04-20 RX ORDER — FAMOTIDINE 20 MG/1
20 TABLET, FILM COATED ORAL EVERY 24 HOURS
Status: DISCONTINUED | OUTPATIENT
Start: 2022-04-20 | End: 2022-04-24

## 2022-04-20 RX ORDER — ONDANSETRON 4 MG/1
4 TABLET, ORALLY DISINTEGRATING ORAL EVERY 6 HOURS PRN
Status: DISCONTINUED | OUTPATIENT
Start: 2022-04-20 | End: 2022-04-26 | Stop reason: HOSPADM

## 2022-04-20 RX ORDER — MAGNESIUM SULFATE HEPTAHYDRATE 40 MG/ML
2 INJECTION, SOLUTION INTRAVENOUS ONCE
Status: COMPLETED | OUTPATIENT
Start: 2022-04-20 | End: 2022-04-20

## 2022-04-20 RX ADMIN — IPRATROPIUM BROMIDE AND ALBUTEROL SULFATE 3 ML: 2.5; .5 SOLUTION RESPIRATORY (INHALATION) at 11:39

## 2022-04-20 RX ADMIN — Medication 1 MG: at 22:27

## 2022-04-20 RX ADMIN — IPRATROPIUM BROMIDE AND ALBUTEROL SULFATE 3 ML: 2.5; .5 SOLUTION RESPIRATORY (INHALATION) at 20:09

## 2022-04-20 RX ADMIN — FAMOTIDINE 20 MG: 20 TABLET ORAL at 16:29

## 2022-04-20 RX ADMIN — GUAIFENESIN 600 MG: 600 TABLET ORAL at 20:02

## 2022-04-20 RX ADMIN — FUROSEMIDE 40 MG: 10 INJECTION, SOLUTION INTRAVENOUS at 16:29

## 2022-04-20 RX ADMIN — MAGNESIUM SULFATE HEPTAHYDRATE 2 G: 40 INJECTION, SOLUTION INTRAVENOUS at 13:43

## 2022-04-20 RX ADMIN — METHYLPREDNISOLONE SODIUM SUCCINATE 125 MG: 125 INJECTION, POWDER, FOR SOLUTION INTRAMUSCULAR; INTRAVENOUS at 11:32

## 2022-04-20 RX ADMIN — ALBUTEROL SULFATE 5 MG: 2.5 SOLUTION RESPIRATORY (INHALATION) at 13:30

## 2022-04-20 ASSESSMENT — ACTIVITIES OF DAILY LIVING (ADL)
ADLS_ACUITY_SCORE: 7
ADLS_ACUITY_SCORE: 11
DOING_ERRANDS_INDEPENDENTLY_DIFFICULTY: NO
EQUIPMENT_CURRENTLY_USED_AT_HOME: WALKER, ROLLING
VISION_MANAGEMENT: GLASSES
NUMBER_OF_TIMES_PATIENT_HAS_FALLEN_WITHIN_LAST_SIX_MONTHS: 1
DRESSING/BATHING_DIFFICULTY: NO
DEPENDENT_IADLS:: CLEANING;MEDICATION MANAGEMENT
ADLS_ACUITY_SCORE: 10
WALKING_OR_CLIMBING_STAIRS: AMBULATION DIFFICULTY, REQUIRES EQUIPMENT;STAIR CLIMBING DIFFICULTY, REQUIRES EQUIPMENT
ADLS_ACUITY_SCORE: 7
CHANGE_IN_FUNCTIONAL_STATUS_SINCE_ONSET_OF_CURRENT_ILLNESS/INJURY: NO
FALL_HISTORY_WITHIN_LAST_SIX_MONTHS: YES
DIFFICULTY_COMMUNICATING: NO
ADLS_ACUITY_SCORE: 11
HEARING_DIFFICULTY_OR_DEAF: NO
ADLS_ACUITY_SCORE: 9
ADLS_ACUITY_SCORE: 9
WALKING_OR_CLIMBING_STAIRS_DIFFICULTY: YES
DIFFICULTY_EATING/SWALLOWING: NO
CONCENTRATING,_REMEMBERING_OR_MAKING_DECISIONS_DIFFICULTY: NO
WEAR_GLASSES_OR_BLIND: YES
ADLS_ACUITY_SCORE: 9
ADLS_ACUITY_SCORE: 11
TOILETING_ISSUES: NO
ADLS_ACUITY_SCORE: 7

## 2022-04-20 ASSESSMENT — ENCOUNTER SYMPTOMS
DIAPHORESIS: 1
VOMITING: 0
LIGHT-HEADEDNESS: 0
SHORTNESS OF BREATH: 1
DIARRHEA: 1
CHEST TIGHTNESS: 1
CHILLS: 1
NAUSEA: 0

## 2022-04-20 NOTE — PROGRESS NOTES
Pt seen by RT at 1030 and 1430 in ED for nebulizer treatments. Pt extremely short of breath at rest. Pt is on 2L NC (home O2) with O2 sats >91%. Pt felt little relief post nebulizer treatments. Nebulizers are prn and RT will assess when needed.     Mary Sierra, RT

## 2022-04-20 NOTE — CONSULTS
Care Management Initial Consult    General Information  Assessment completed with: Children, Daughter Enid via phone in ED #4  Type of CM/SW Visit: Initial Assessment    Primary Care Provider verified and updated as needed: Yes   Readmission within the last 30 days: no previous admission in last 30 days      Reason for Consult: discharge planning  Advance Care Planning: Advance Care Planning Reviewed: no concerns identified, other (see comments) (Declined Honoring Choices information has at home)     General Information Comments: Lives with  Bolivar    Communication Assessment  Patient's communication style: spoken language (English or Bilingual)    Hearing Difficulty or Deaf: no   Wear Glasses or Blind: yes    Living Environment:   People in home: spouse     Current living Arrangements: condominium      Able to return to prior arrangements: yes       Family/Social Support:  Care provided by: self  Provides care for: spouse  Marital Status:   , Sal Lutz       Description of Support System: Supportive, Involved    Support Assessment: Adequate family and caregiver support, Caregiver experiencing high stress    Current Resources:   Patient receiving home care services: No     Community Resources: DME  Equipment currently used at home: walker, rolling  Supplies currently used at home: Oxygen Tubing/Supplies    Employment/Financial:  Employment Status: retired        Financial Concerns: No concerns identified           Lifestyle & Psychosocial Needs:  Social Determinants of Health     Tobacco Use: Medium Risk     Smoking Tobacco Use: Former Smoker     Smokeless Tobacco Use: Never Used   Alcohol Use: Not on file   Financial Resource Strain: Not on file   Food Insecurity: Not on file   Transportation Needs: Not on file   Physical Activity: Not on file   Stress: Not on file   Social Connections: Not on file   Intimate Partner Violence: Not on file   Depression: Not on file   Housing Stability:  Not on file       Functional Status:  Prior to admission patient needed assistance:   Dependent ADLs:: Ambulation-walker  Dependent IADLs:: Cleaning, Medication Management  Assesssment of Functional Status: Not at  functional baseline, Needs assistance with laundry/houskeeping, Needs assistance with medications, Needs assistive devices (DME)    Mental Health Status:          Chemical Dependency Status:                Values/Beliefs:  Spiritual, Cultural Beliefs, Hindu Practices, Values that affect care:                 Additional Information:  Information gathered via phone in patient s room ED #4, from patient s daughter Enid Dove ([phone]). Patient lives with spouse Bolivar in a private residence. Uses a roller-walker; no home care services; does drive. Has PRN oxygen via cannula, that she has been using 24/7 for past few days due to shortness of breath. 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 Is having difficulty completing day-to-day tasks in last few weeks. Daughters have been assisting patient with I/ADLs as needed. Plan is for patient to return home with daughter Enid transporting patient on discharge. Other needs pending PT/OT assessment and clinical progress.      JAYESH ROMAN, RN/CM

## 2022-04-20 NOTE — H&P
[facility] MEDICINE ADMISSION HISTORY AND PHYSICAL     Assessment & Plan       Fatuma Henry is a 76 year old old female with history of COPD, CHF, RA, Afib, HTN, HLD presented with acute hypoxic respiratory failure.    Acute hypoxic respiratory failure  COPD exacerbation  Concern for HF exacerbation (TTE on 1/17 showed EF 60%)  Patient only uses O2 at night through CPAP. She has had progressive SOB. COPD exacerbation vs HF exacerbation.  on admission. B/l pitting edema of LE. Patient had not been taking her lasix regularly.  -received methylpredisolone 125mg IV in ED  -prednisone 40mg x5 days  -mucinex BID  -holding off on abx at this time, no increased sputum production  -duonebs QID and PRN  -CPAP at night with home settings  -O2 to keep sats >90%  -continuous O2 monitoring  -lasix 40mg IV once  -continue PTA lasix 20mg  -I+Os  -daily weights  -culture if spike fever and start abx    diarrhea  -enteric panel pending    hypomagnesemia  -electrolyte replacement protocol    Afib  HTN  -continue PTA lasix 20mg  -continue PTA metoprolol 100mg  -continue PTA rivaroxaban 15mg     rheumatoid arthritis  She is on cerolizubab 400mg y20ptiy and methotrexate 10mg qWEEK.  -continue PTA folic acid 1mg daily except when taking MTX    GERD  -continue PTA famotidine 20mg    HLD  -continue PTA pravastatin 20mg    CKD  Cr on admission was 1.14.  -avoid nephrotoxic drugs when possible     # Coagulation Defect: home medication list includes an anticoagulant medication          DVTP: Direct Oral Anticagulants   Code Status: Prior  Disposition: Inpatient   Expected LOS: 3+ days   Goals for the hospitalization: decreased O2 demand  Disposition Plan   Expected Discharge: home        The patient's care was discussed with the Patient and Patient's Family.  Chief Complaint dyspnea     HISTORY     Fatuma Henry is a 76 year old old female with h/o COPD, CHF, RA, Afib, HTN, HLD p/w acute hypoxic respiratory  failure. Patient has had progressive SOB and dyspnea over the last week. She had chills but does not believe she had a fever. Does not know of any sick contacts. Her albuterol inhaler has only mildly helped. Patient sleeps on an elevated bed and cannot lay flat. She has had watery diarrhea without blood over the last day. No nausea or vomiting. Patient is vaccinated against COVID-19. In the ED, she required 2LO2 to maintain sats in 90s. She was tachypneic. Her VBG was significant for pH 7.38 and pO2 19 and bicarb of 23. No leukocytosis. Her BNP was 165. CXR without focal findings. Patient received steroids and nebs in ED with some improvement.    Past Medical History     Past Medical History:  No date: Atrial fibrillation (H)  No date: CRF (chronic renal failure)  No date: GERD (gastroesophageal reflux disease)  No date: Hypertension  No date: Hypovolemic shock (H)  2017: Influenza A  No date: Rheumatoid arthritis (H)  2017: Sepsis (H)     Surgical History     Past Surgical History:   Procedure Laterality Date     APPENDECTOMY       BLADDER SUSPENSION       CHOLECYSTECTOMY       Family History    Reviewed, and   Family History   Problem Relation Age of Onset     Heart Failure Mother      Cerebrovascular Disease Father      Kidney failure Sister      Cerebrovascular Disease Brother      Diabetes Type 2  Brother         Social History      Social History     Tobacco Use     Smoking status: Former Smoker     Packs/day: 0.50     Types: Cigarettes     Quit date: 2017     Years since quittin.3     Smokeless tobacco: Never Used   Substance Use Topics     Alcohol use: No     Drug use: No      Allergies     Allergies   Allergen Reactions     Codeine Nausea and Vomiting     Prior to Admission Medications      Prior to Admission Medications   Prescriptions Last Dose Informant Patient Reported? Taking?   Cholecalciferol (VITAMIN D) 50 MCG (2000) CAPS   Yes No   Si tab   FOLIC ACID/MULTIVITS-MIN/LUT  (CENTRUM SILVER ORAL)   Yes No   Sig: [FOLIC ACID/MULTIVITS-MIN/LUT (CENTRUM SILVER ORAL)] Take 1 tablet by mouth daily.    acetaminophen (TYLENOL) 500 MG tablet   No No   Sig: [ACETAMINOPHEN (TYLENOL) 500 MG TABLET] Take 1-2 tablets (500-1,000 mg total) by mouth every 6 (six) hours as needed.   Patient not taking: Reported on 2/7/2022   benzonatate (TESSALON) 200 MG capsule   No No   Sig: Take 1 capsule (200 mg) by mouth 3 times daily as needed for cough   Patient not taking: Reported on 2/7/2022   certolizumab pegol (CIMZIA PREFILLED) 2 X 200 MG/ML KIT 2 syringes/kit   No No   Sig: Inject 400 mg Subcutaneous every 28 days   compressor, for nebulizer Saran   No No   Sig: [COMPRESSOR, FOR NEBULIZER SARAN] Nebulizer treatment qid prn   famotidine (PEPCID) 20 MG tablet   Yes No   Sig: Take 20 mg by mouth 2 times daily   folic acid (FOLVITE) 1 MG tablet   No No   Sig: TAKE 1 TABLET BY MOUTH ONCE DAILY EXCEPT  THE  DAY  WHEN  TAKING  METHOTREXATE   furosemide (LASIX) 20 MG tablet   Yes No   Sig: TAKE 1 TABLET BY MOUTH ONCE DAILY AS NEEDED   ipratropium - albuterol 0.5 mg/2.5 mg/3 mL (DUONEB) 0.5-2.5 (3) MG/3ML neb solution   No No   Sig: Take 1 vial (3 mLs) by nebulization every 4 hours as needed for wheezing or shortness of breath / dyspnea   methotrexate sodium 2.5 MG TABS   No No   Sig: TAKE 4 TABLETS BY MOUTH ONCE A WEEK   metoprolol succinate ER (TOPROL-XL) 50 MG 24 hr tablet   No No   Sig: Take 2 tablets (100 mg) by mouth daily   pravastatin (PRAVACHOL) 20 MG tablet   Yes No   Sig: [PRAVASTATIN (PRAVACHOL) 20 MG TABLET] Take 20 mg by mouth daily.   rivaroxaban ANTICOAGULANT (XARELTO) 15 MG TABS tablet   No No   Sig: Take 1 tablet (15 mg) by mouth daily (with dinner)      Facility-Administered Medications Last Administration Doses Remaining   certolizumab pegol (CIMZIA) injection 400 mg 3/10/2022  2:36 PM          Review of Systems     A 12 point comprehensive review of systems was negative except as noted above in  HPI.    PHYSICAL EXAMINATION       Vitals      Temp:  [97.4  F (36.3  C)] 97.4  F (36.3  C)  Pulse:  [] 97  Resp:  [18-28] 28  BP: (114-135)/(56-61) 114/56  SpO2:  [97 %-100 %] 97 %    Examination     Exam:  Constitutional: tripoding in moderate distress, breathing through pursed lips  Head: NC/AT  Neck: supple  ENT: EOMI, PERRL, no scleral icterus, MMM  Cardiovascular: RRR, +2 pitting edema of b/l LE to midshin  Respiratory: inspiratory and expiratory wheezes b/l, increased WOB  Gastrointestinal: soft, nontender, nondistended, bowel sounds present  Musculoskeletal: moves all 4 extremities, no gross deformities  Skin: warm and well perfused  Neurologic: alert and oriented, no focal neurologic deficits  Psychiatric: anxious    Pertinent Lab     Most Recent 3 CBC's:Recent Labs   Lab Test 04/20/22  1046 01/18/22  0633 01/17/22  2123   WBC 4.3 5.2 4.6   HGB 12.9 12.1 12.7   * 101* 100    291 296     Most Recent 3 BMP's:Recent Labs   Lab Test 04/20/22  1540 04/20/22  1046 02/17/22  1046 01/28/22  1640   NA  --  140 141 140   POTASSIUM 4.5 4.0 3.8 4.0   CHLORIDE  --  102 101 110*   CO2  --  26 28 21*   BUN  --  30* 38* 20   CR  --  1.14* 1.56* 1.29*   ANIONGAP  --  12 12 9   SUNI  --  10.3 10.3 9.5   GLC  --  81 120 85         Pertinent Radiology     Radiology Results:   Recent Results (from the past 24 hour(s))   XR Chest Port 1 View    Narrative    EXAM: XR CHEST PORT 1 VIEW  LOCATION: [facility]  DATE/TIME: 4/20/2022 11:10 AM    INDICATION: short of breath  COMPARISON: 01/17/2022      Impression    IMPRESSION: Negative chest.     EKG Results: Sinus rhythm with Premature supraventricular complexes   Otherwise normal ECG when compared with ECG of 17-JAN-2022.    Advance Care Planning        Micaela Pagan MD  Hospitalist  [facility]  [facility]   Phone: #[phone]

## 2022-04-20 NOTE — ED PROVIDER NOTES
EMERGENCY DEPARTMENT ENCOUNTER      NAME: Fatuma Henry  AGE: 76 year old female  YOB: 1945  MRN: [id number]  EVALUATION DATE & TIME: 4/20/2022 10:37 AM    PCP: Tory Wise    ED PROVIDER: Estela Daugherty MD      Chief Complaint   Patient presents with     Chest Pain     Shortness of Breath     Diarrhea         FINAL IMPRESSION:  1. COPD exacerbation (H)          ED COURSE & MEDICAL DECISION MAKING:    Pertinent Labs & Imaging studies reviewed. (See chart for details)  76 year old female presents to the Emergency Department for evaluation of chest pain and shortness of breath.  Patient with a history of previous COPD, paroxysmal A. fib presenting with shortness of breath.  She has had progression of cough, congestion, fever, chills, sweats, shortness of breath over the last week.  She has tried using her albuterol inhaler without much relief.  She has had ongoing chest tightness.  Patient received 2 DuoNeb's, continue to have significant shortness of breath.  We will provide a third albuterol neb.  Patient also given IV Solu-Medrol.  Given the patient's persistent tachypnea, will admit the patient for COPD exacerbation and continued management.    At the conclusion of the encounter I discussed the results of all of the tests and the disposition. The questions were answered. The patient or family acknowledged understanding and was agreeable with the care plan.       11:20 AM I met with the patient, obtained history, performed an initial exam, and discussed options and plan for diagnostics and treatment here in the ED.    12:46 PM Patient had 2 duonebs, still tachypneic and short of breath.     1:04 PM I spoke with the hospitalist, agree with admission.     1:25 PM Patient continued to be tachypneic. Awaiting third neb treatment. Magnesium also ordered.     PPE worn: Eye protection, N95 & surgical mask, gloves    30 minutes of critical care time     MEDICATIONS GIVEN IN THE  EMERGENCY:  Medications   albuterol (PROVENTIL) neb solution 5 mg (has no administration in time range)   ipratropium - albuterol 0.5 mg/2.5 mg/3 mL (DUONEB) neb solution 3 mL (3 mLs Nebulization Given 4/20/22 1139)   ipratropium - albuterol 0.5 mg/2.5 mg/3 mL (DUONEB) neb solution 3 mL (3 mLs Nebulization Given 4/20/22 1139)   methylPREDNISolone sodium succinate (solu-MEDROL) injection 125 mg (125 mg Intravenous Given 4/20/22 1132)       NEW PRESCRIPTIONS STARTED AT TODAY'S ER VISIT  New Prescriptions    No medications on file          =================================================================    HPI    Patient information was obtained from: Patient    Use of : N/A         Fatuma WU Carl is a 76 year old female with a pertinent history of CRF, dyspnea, reactive airway disease, paroxysmal afib, nonsustained ventricular tachycardia, hypertension, COPD exacerbation, who presents to this ED via private car for evaluation of chest pain, diarrhea and shortness of breath.     Patient reports symptoms starting last week, including cough congestion, fevers, chills, sweats, and shortness of breath that gets worse with activity. She also has been having persistent chest tightness. She is using her albuterol inhaler. She says he had similar symptoms in January when she had Influenza - B.     She also reports diarrhea that started last night. Her stool has been loose and watery, and she has had about 7 episodes so far.     She says she doesn't have an appetite, and denies any nausea, vomiting, lightheadedness. She notes being a former smoker. She denies a history of CHF. She is vaccinated against COVID.          REVIEW OF SYSTEMS   Review of Systems   Constitutional: Positive for chills and diaphoresis.   HENT: Positive for congestion.    Respiratory: Positive for chest tightness and shortness of breath.    Gastrointestinal: Positive for diarrhea. Negative for nausea and vomiting.   Neurological: Negative for  light-headedness.   All other systems reviewed and are negative.       PAST MEDICAL HISTORY:  Past Medical History:   Diagnosis Date     Atrial fibrillation (H)      CRF (chronic renal failure)      GERD (gastroesophageal reflux disease)      Hypertension      Hypovolemic shock (H)      Influenza A 2017     Rheumatoid arthritis (H)      Sepsis (H) 2017       PAST SURGICAL HISTORY:  Past Surgical History:   Procedure Laterality Date     APPENDECTOMY       BLADDER SUSPENSION       CHOLECYSTECTOMY             CURRENT MEDICATIONS:    acetaminophen (TYLENOL) 500 MG tablet  benzonatate (TESSALON) 200 MG capsule  certolizumab pegol (CIMZIA PREFILLED) 2 X 200 MG/ML KIT 2 syringes/kit  Cholecalciferol (VITAMIN D) 50 MCG ( [place]) CAPS  compressor, for nebulizer Eva  famotidine (PEPCID) 20 MG tablet  folic acid (FOLVITE) 1 MG tablet  FOLIC ACID/MULTIVITS-MIN/LUT (CENTRUM SILVER ORAL)  furosemide (LASIX) 20 MG tablet  ipratropium - albuterol 0.5 mg/2.5 mg/3 mL (DUONEB) 0.5-2.5 (3) MG/3ML neb solution  methotrexate sodium 2.5 MG TABS  metoprolol succinate ER (TOPROL-XL) 50 MG 24 hr tablet  pravastatin (PRAVACHOL) 20 MG tablet  rivaroxaban ANTICOAGULANT (XARELTO) 15 MG TABS tablet        ALLERGIES:  Allergies   Allergen Reactions     Codeine Nausea and Vomiting       FAMILY HISTORY:  Family History   Problem Relation Age of Onset     Heart Failure Mother      Cerebrovascular Disease Father      Kidney failure Sister      Cerebrovascular Disease Brother      Diabetes Type 2  Brother        SOCIAL HISTORY:   Social History     Socioeconomic History     Marital status:    Tobacco Use     Smoking status: Former Smoker     Packs/day: 0.50     Types: Cigarettes     Quit date: 2017     Years since quittin.3     Smokeless tobacco: Never Used   Substance and Sexual Activity     Alcohol use: No     Drug use: No     Sexual activity: Not Currently       VITALS:  /56   Pulse 100   Temp 97.4  F (36.3  C)  (Temporal)   Resp (!) 39   Wt 74.9 kg (165 lb 1.6 oz)   SpO2 95%   BMI 27.06 kg/m      PHYSICAL EXAM    Constitutional: Well developed, Well nourished, NAD  HENT: Normocephalic, Atraumatic, Bilateral external ears normal, Oropharynx normal, mucous membranes moist, Nose normal.   Neck- Normal range of motion, No tenderness, Supple, No stridor.  Eyes: PERRL, EOMI, Conjunctiva normal, No discharge.   Respiratory: No respiratory distress. Expiratory wheezing, rhonchi in left lower base.   Cardiovascular: Normal heart rate, Regular rhythm. 2+ peripheral edema.   GI: Bowel sounds normal, Soft, No tenderness,   Musculoskeletal: No edema. Good range of motion in all major joints. No tenderness to palpation or major deformities noted.   Integument: Warm, Dry, No erythema, No rash  Neurologic: Alert & oriented x 3, Normal motor function, Normal sensory function, No focal deficits noted. Normal gait.   Psychiatric: Affect normal, Judgment normal, Mood normal.        LAB:  All pertinent labs reviewed and interpreted.  Results for orders placed or performed during the hospital encounter of 04/20/22   XR Chest Port 1 View    Impression    IMPRESSION: Negative chest.   Extra Blue Top Tube   Result Value Ref Range    Hold Specimen JIC    Extra Green Top (Lithium Heparin) Tube   Result Value Ref Range    Hold Specimen JIC    Extra Purple Top Tube   Result Value Ref Range    Hold Specimen JIC    Basic metabolic panel   Result Value Ref Range    Sodium 140 136 - 145 mmol/L    Potassium 4.0 3.5 - 5.0 mmol/L    Chloride 102 98 - 107 mmol/L    Carbon Dioxide (CO2) 26 22 - 31 mmol/L    Anion Gap 12 5 - 18 mmol/L    Urea Nitrogen 30 (H) 8 - 28 mg/dL    Creatinine 1.14 (H) 0.60 - 1.10 mg/dL    Calcium 10.3 8.5 - 10.5 mg/dL    Glucose 81 70 - 125 mg/dL    GFR Estimate 50 (L) >60 mL/min/1.73m2   Result Value Ref Range    Troponin I 0.03 0.00 - 0.29 ng/mL   B-Type Natriuretic Peptide ([facility] Only)   Result Value Ref Range     (H) 0  - 137 pg/mL   CBC with platelets and differential   Result Value Ref Range    WBC Count 4.3 4.0 - 11.0 10e3/uL    RBC Count 3.90 3.80 - 5.20 10e6/uL    Hemoglobin 12.9 11.7 - 15.7 g/dL    Hematocrit 40.1 35.0 - 47.0 %     (H) 78 - 100 fL    MCH 33.1 (H) 26.5 - 33.0 pg    MCHC 32.2 31.5 - 36.5 g/dL    RDW 14.0 10.0 - 15.0 %    Platelet Count 272 150 - 450 10e3/uL    % Neutrophils 64 %    % Lymphocytes 16 %    % Monocytes 15 %    % Eosinophils 4 %    % Basophils 1 %    % Immature Granulocytes 0 %    NRBCs per 100 WBC 0 <1 /100    Absolute Neutrophils 2.8 1.6 - 8.3 10e3/uL    Absolute Lymphocytes 0.7 (L) 0.8 - 5.3 10e3/uL    Absolute Monocytes 0.6 0.0 - 1.3 10e3/uL    Absolute Eosinophils 0.2 0.0 - 0.7 10e3/uL    Absolute Basophils 0.0 0.0 - 0.2 10e3/uL    Absolute Immature Granulocytes 0.0 <=0.4 10e3/uL    Absolute NRBCs 0.0 10e3/uL   UA with Microscopic reflex to Culture    Specimen: Urine, Midstream   Result Value Ref Range    Color Urine Yellow Colorless, Straw, Light Yellow, Yellow    Appearance Urine Clear Clear    Glucose Urine Negative Negative mg/dL    Bilirubin Urine Negative Negative    Ketones Urine Negative Negative mg/dL    Specific Gravity Urine 1.015 1.003 - 1.035    Blood Urine Negative Negative    pH Urine 5.5 5.0 - 7.0    Protein Albumin Urine Negative Negative mg/dL    Urobilinogen Urine Normal Normal, 2.0 mg/dL    Nitrite Urine Negative Negative    Leukocyte Esterase Urine Moderate (A) Negative    Bacteria Urine None Seen None Seen /HPF    RBC Urine 0 <=2 /HPF    WBC Urine 1 <=5 /HPF    Squamous Epithelials Urine 0 <=1 /HPF   Result Value Ref Range    INR 1.77 (H) 0.85 - 1.15   ECG 12-LEAD WITH MUSE ([facility])   Result Value Ref Range    Systolic Blood Pressure 135 mmHg    Diastolic Blood Pressure 61 mmHg    Ventricular Rate 98 BPM    Atrial Rate 98 BPM    KY Interval 190 ms    QRS Duration 80 ms     ms    QTc 451 ms    P Axis 69 degrees    R AXIS 61 degrees    T Axis 61 degrees     Interpretation ECG       Sinus rhythm with Premature supraventricular complexes  Otherwise normal ECG  When compared with ECG of 17-JAN-2022 17:22,  Premature supraventricular complexes are now Present  Left posterior fascicular block is no longer Present  T wave inversion no longer evident in Lateral leads  Confirmed by SEE ED PROVIDER NOTE FOR, ECG INTERPRETATION ([id number]),  ARNULFOROMANTD BROWN ([id number]) on 4/20/2022 11:24:22 AM         RADIOLOGY:  Reviewed all pertinent imaging. Please see official radiology report.  XR Chest Port 1 View   Final Result   IMPRESSION: Negative chest.          EKG:    Performed at: 10:40 AM    Impression: sinus rhythm with premature supraventricular complexes. Otherwise normal ECG.     Rate: 98 BPM  Rhythm: 190 ms  Axis: 69 61 61  W[place] Interval: 190 ms  QRS Interval: 80 ms  QTc Interval: 451 ms  ST Changes: Premature supraventricular complexes are now present. Left posterior fascicular block is no longer present. T wave inversion no longer evident in Lateral leads.   Comparison: 1/17/22    I have independently reviewed and interpreted the EKG(s) documented above.    I, Antwon Mac, am serving as a scribe to document services personally performed by Estela Daugherty, based on my observation and the provider's statements to me. I, Estela Daugherty MD, attest that Antwon Mac is acting in a scribe capacity, has observed my performance of the services and has documented them in accordance with my direction.    Estela Daugherty MD  Emergency Medicine  [facility] EMERGENCY ROOM  [facility] [id number] [phone]     Estela Daugherty MD  04/20/22 [id number]

## 2022-04-20 NOTE — PLAN OF CARE
"Goal Outcome Evaluation: Ongoing.     Problem: Plan of Care - These are the overarching goals to be used throughout the patient stay.    Goal: Plan of Care Review/Shift Note  Description: The Plan of Care Review/Shift note should be completed every shift.  The Outcome Evaluation is a brief statement about your assessment that the patient is improving, declining, or no change.  This information will be displayed automatically on your shift note.  Outcome: Ongoing, Progressing  Goal: Patient-Specific Goal (Individualized)  Description: You can add care plan individualizations to a care plan. Examples of Individualization might be:  \"Parent requests to be called daily at 9am for status\", \"I have a hard time hearing out of my right ear\", or \"Do not touch me to wake me up as it startles me\".  Outcome: Ongoing, Progressing  Goal: Absence of Hospital-Acquired Illness or Injury  Outcome: Ongoing, Progressing  Intervention: Identify and Manage Fall Risk  Recent Flowsheet Documentation  Taken 4/20/2022 1530 by Jada Reid RN  Safety Promotion/Fall Prevention:   activity supervised   lighting adjusted   mobility aid in reach   room door open   room near nurse's station   room organization consistent   safety round/check completed   supervised activity   toileting scheduled  Intervention: Prevent Skin Injury  Recent Flowsheet Documentation  Taken 4/20/2022 1530 by Jada Reid RN  Body Position: position changed independently  Intervention: Prevent and Manage VTE (Venous Thromboembolism) Risk  Recent Flowsheet Documentation  Taken 4/20/2022 1530 by Jada Reid, RN  Activity Management: activity adjusted per tolerance  Goal: Optimal Comfort and Wellbeing  Outcome: Ongoing, Progressing  Intervention: Monitor Pain and Promote Comfort  Recent Flowsheet Documentation  Taken 4/20/2022 1535 by Jada Reid RN  Pain Management Interventions: declines  Goal: Readiness for Transition of Care  Outcome: " Ongoing, Progressing  Intervention: Mutually Develop Transition Plan  Recent Flowsheet Documentation  Taken 4/20/2022 1500 by Jada Reid, RN  Equipment Currently Used at Home: walker, rolling         Gave pt one time dose of IV lasix. Pt voiding via bathroom per request with walker (SBA). Pt denies any pain besides some chest tightness which pt came in with. Pt stated that feels like breathing is getting slightly better currently on 3L via NC, on oxygen at home. Continuous pulse ox on as order, poor reading d/t circulation. Pts daughter would like to be updated in AM with what the plan is.

## 2022-04-20 NOTE — PHARMACY-ADMISSION MEDICATION HISTORY
Pharmacy Note - Admission Medication History    Pertinent Provider Information:  None     ______________________________________________________________________    Prior To Admission (PTA) med list completed and updated in EMR.       Current Facility-Administered Medications for the 4/20/22 encounter (Hospital Encounter)   Medication     certolizumab pegol (CIMZIA) injection 400 mg     PTA Med List   Medication Sig Note Last Dose     acetaminophen (TYLENOL) 500 MG tablet [ACETAMINOPHEN (TYLENOL) 500 MG TABLET] Take 1-2 tablets (500-1,000 mg total) by mouth every 6 (six) hours as needed. 4/20/2022: , prn     certolizumab pegol (CIMZIA PREFILLED) 2 X 200 MG/ML KIT 2 syringes/kit Inject 400 mg Subcutaneous every 28 days  Unknown at Unknown time     Cholecalciferol (VITAMIN D) 50 MCG (2000 UT) CAPS Take 50 mcg by mouth daily  4/19/2022 at Unknown time     famotidine (PEPCID) 20 MG tablet Take 20 mg by mouth 2 times daily  4/19/2022 at Unknown time     folic acid (FOLVITE) 1 MG tablet TAKE 1 TABLET BY MOUTH ONCE DAILY EXCEPT  THE  DAY  WHEN  TAKING  METHOTREXATE  4/20/2022 at Unknown time     FOLIC ACID/MULTIVITS-MIN/LUT (CENTRUM SILVER ORAL) [FOLIC ACID/MULTIVITS-MIN/LUT (CENTRUM SILVER ORAL)] Take 1 tablet by mouth daily.   4/19/2022 at Unknown time     furosemide (LASIX) 20 MG tablet Take 20 mg by mouth daily as needed  prn     ipratropium - albuterol 0.5 mg/2.5 mg/3 mL (DUONEB) 0.5-2.5 (3) MG/3ML neb solution Take 1 vial (3 mLs) by nebulization every 4 hours as needed for wheezing or shortness of breath / dyspnea  4/19/2022 at Unknown time     methotrexate sodium 2.5 MG TABS TAKE 4 TABLETS BY MOUTH ONCE A WEEK  4/17/2022     metoprolol succinate ER (TOPROL-XL) 50 MG 24 hr tablet Take 2 tablets (100 mg) by mouth daily  4/20/2022 at Unknown time     pravastatin (PRAVACHOL) 20 MG tablet [PRAVASTATIN (PRAVACHOL) 20 MG TABLET] Take 20 mg by mouth daily.  4/20/2022 at Unknown time     rivaroxaban ANTICOAGULANT (XARELTO) 15  MG TABS tablet Take 1 tablet (15 mg) by mouth daily (with dinner) 4/20/2022: Takes in the am 4/20/2022 at Unknown time       Information source(s): Patient and CareEverywhere/SureScripts  Method of interview communication: phone    Summary of Changes to PTA Med List  New: None  Discontinued: benzonatate  Changed: None    Patient was asked about OTC/herbal products specifically.  PTA med list reflects this.    In the past week, patient estimated taking medication this percent of the time:  Unable to assess    Allergies were reviewed, assessed, and updated with the patient.      Patient does not use any multi-dose medications prior to admission.    The information provided in this note is only as accurate as the sources available at the time of the update(s).    Thank you for the opportunity to participate in the care of this patient.    Charmaine Sinclair [facility]  4/20/2022 2:00 PM

## 2022-04-20 NOTE — ED TRIAGE NOTES
"Pt here with shortness of breath, chest pain, 4/10 and diarrhea. Started feeling unwell last Friday and diarrhea started today. When asked about shortness of breath and chest pain she replied, \"all week.\"  "

## 2022-04-21 DIAGNOSIS — J44.9 COPD (CHRONIC OBSTRUCTIVE PULMONARY DISEASE) (H): Primary | ICD-10-CM

## 2022-04-21 LAB
ANION GAP SERPL CALCULATED.3IONS-SCNC: 12 MMOL/L (ref 5–18)
BACTERIA UR CULT: ABNORMAL
BACTERIA UR CULT: ABNORMAL
BASOPHILS # BLD AUTO: 0 10E3/UL (ref 0–0.2)
BASOPHILS NFR BLD AUTO: 0 %
BUN SERPL-MCNC: 37 MG/DL (ref 8–28)
CALCIUM SERPL-MCNC: 9.7 MG/DL (ref 8.5–10.5)
CHLORIDE BLD-SCNC: 105 MMOL/L (ref 98–107)
CO2 SERPL-SCNC: 21 MMOL/L (ref 22–31)
CREAT SERPL-MCNC: 1.14 MG/DL (ref 0.6–1.1)
EOSINOPHIL # BLD AUTO: 0 10E3/UL (ref 0–0.7)
EOSINOPHIL NFR BLD AUTO: 0 %
ERYTHROCYTE [DISTWIDTH] IN BLOOD BY AUTOMATED COUNT: 14 % (ref 10–15)
GFR SERPL CREATININE-BSD FRML MDRD: 50 ML/MIN/1.73M2
GLUCOSE BLD-MCNC: 123 MG/DL (ref 70–125)
HCT VFR BLD AUTO: 34.6 % (ref 35–47)
HGB BLD-MCNC: 11.4 G/DL (ref 11.7–15.7)
IMM GRANULOCYTES # BLD: 0 10E3/UL
IMM GRANULOCYTES NFR BLD: 0 %
LYMPHOCYTES # BLD AUTO: 0.4 10E3/UL (ref 0.8–5.3)
LYMPHOCYTES NFR BLD AUTO: 11 %
MAGNESIUM SERPL-MCNC: 2.2 MG/DL (ref 1.8–2.6)
MCH RBC QN AUTO: 32.9 PG (ref 26.5–33)
MCHC RBC AUTO-ENTMCNC: 32.9 G/DL (ref 31.5–36.5)
MCV RBC AUTO: 100 FL (ref 78–100)
MONOCYTES # BLD AUTO: 0.4 10E3/UL (ref 0–1.3)
MONOCYTES NFR BLD AUTO: 11 %
NEUTROPHILS # BLD AUTO: 2.8 10E3/UL (ref 1.6–8.3)
NEUTROPHILS NFR BLD AUTO: 78 %
NRBC # BLD AUTO: 0 10E3/UL
NRBC BLD AUTO-RTO: 0 /100
PLATELET # BLD AUTO: 232 10E3/UL (ref 150–450)
POTASSIUM BLD-SCNC: 3.9 MMOL/L (ref 3.5–5)
RBC # BLD AUTO: 3.47 10E6/UL (ref 3.8–5.2)
SODIUM SERPL-SCNC: 138 MMOL/L (ref 136–145)
WBC # BLD AUTO: 3.6 10E3/UL (ref 4–11)

## 2022-04-21 PROCEDURE — 36415 COLL VENOUS BLD VENIPUNCTURE: CPT | Performed by: STUDENT IN AN ORGANIZED HEALTH CARE EDUCATION/TRAINING PROGRAM

## 2022-04-21 PROCEDURE — 99233 SBSQ HOSP IP/OBS HIGH 50: CPT | Performed by: INTERNAL MEDICINE

## 2022-04-21 PROCEDURE — 250N000012 HC RX MED GY IP 250 OP 636 PS 637: Performed by: STUDENT IN AN ORGANIZED HEALTH CARE EDUCATION/TRAINING PROGRAM

## 2022-04-21 PROCEDURE — G0463 HOSPITAL OUTPT CLINIC VISIT: HCPCS

## 2022-04-21 PROCEDURE — 94640 AIRWAY INHALATION TREATMENT: CPT | Mod: 76

## 2022-04-21 PROCEDURE — 120N000004 HC R&B MS OVERFLOW

## 2022-04-21 PROCEDURE — 80048 BASIC METABOLIC PNL TOTAL CA: CPT | Performed by: STUDENT IN AN ORGANIZED HEALTH CARE EDUCATION/TRAINING PROGRAM

## 2022-04-21 PROCEDURE — 999N000157 HC STATISTIC RCP TIME EA 10 MIN

## 2022-04-21 PROCEDURE — 999N000032 HC STATISTIC CHRONIC DISEASE SPECIALIST RT CONSULT

## 2022-04-21 PROCEDURE — 250N000013 HC RX MED GY IP 250 OP 250 PS 637: Performed by: STUDENT IN AN ORGANIZED HEALTH CARE EDUCATION/TRAINING PROGRAM

## 2022-04-21 PROCEDURE — 85004 AUTOMATED DIFF WBC COUNT: CPT | Performed by: STUDENT IN AN ORGANIZED HEALTH CARE EDUCATION/TRAINING PROGRAM

## 2022-04-21 PROCEDURE — 83735 ASSAY OF MAGNESIUM: CPT | Performed by: HOSPITALIST

## 2022-04-21 PROCEDURE — 250N000013 HC RX MED GY IP 250 OP 250 PS 637: Performed by: INTERNAL MEDICINE

## 2022-04-21 PROCEDURE — 250N000011 HC RX IP 250 OP 636: Performed by: INTERNAL MEDICINE

## 2022-04-21 PROCEDURE — 250N000009 HC RX 250: Performed by: STUDENT IN AN ORGANIZED HEALTH CARE EDUCATION/TRAINING PROGRAM

## 2022-04-21 RX ORDER — METHYLPREDNISOLONE SODIUM SUCCINATE 125 MG/2ML
62.5 INJECTION, POWDER, LYOPHILIZED, FOR SOLUTION INTRAMUSCULAR; INTRAVENOUS EVERY 8 HOURS
Status: COMPLETED | OUTPATIENT
Start: 2022-04-21 | End: 2022-04-21

## 2022-04-21 RX ORDER — GUAIFENESIN 600 MG/1
1200 TABLET, EXTENDED RELEASE ORAL 2 TIMES DAILY
Status: DISCONTINUED | OUTPATIENT
Start: 2022-04-21 | End: 2022-04-26 | Stop reason: HOSPADM

## 2022-04-21 RX ORDER — ALBUTEROL SULFATE 5 MG/ML
2.5 SOLUTION RESPIRATORY (INHALATION) ONCE
Status: DISCONTINUED | OUTPATIENT
Start: 2022-04-21 | End: 2022-04-26 | Stop reason: HOSPADM

## 2022-04-21 RX ORDER — LORAZEPAM 0.5 MG/1
0.25 TABLET ORAL EVERY 4 HOURS PRN
Status: DISCONTINUED | OUTPATIENT
Start: 2022-04-21 | End: 2022-04-26 | Stop reason: HOSPADM

## 2022-04-21 RX ADMIN — METHYLPREDNISOLONE SODIUM SUCCINATE 62.5 MG: 125 INJECTION, POWDER, FOR SOLUTION INTRAMUSCULAR; INTRAVENOUS at 10:41

## 2022-04-21 RX ADMIN — METOPROLOL SUCCINATE 100 MG: 100 TABLET, EXTENDED RELEASE ORAL at 09:07

## 2022-04-21 RX ADMIN — IPRATROPIUM BROMIDE AND ALBUTEROL SULFATE 3 ML: 2.5; .5 SOLUTION RESPIRATORY (INHALATION) at 09:00

## 2022-04-21 RX ADMIN — FUROSEMIDE 20 MG: 20 TABLET ORAL at 09:07

## 2022-04-21 RX ADMIN — PREDNISONE 40 MG: 20 TABLET ORAL at 09:07

## 2022-04-21 RX ADMIN — IPRATROPIUM BROMIDE AND ALBUTEROL SULFATE 3 ML: 2.5; .5 SOLUTION RESPIRATORY (INHALATION) at 21:05

## 2022-04-21 RX ADMIN — IPRATROPIUM BROMIDE AND ALBUTEROL SULFATE 3 ML: 2.5; .5 SOLUTION RESPIRATORY (INHALATION) at 11:21

## 2022-04-21 RX ADMIN — FAMOTIDINE 20 MG: 20 TABLET ORAL at 16:23

## 2022-04-21 RX ADMIN — IPRATROPIUM BROMIDE AND ALBUTEROL SULFATE 3 ML: 2.5; .5 SOLUTION RESPIRATORY (INHALATION) at 17:13

## 2022-04-21 RX ADMIN — Medication 1 MG: at 23:38

## 2022-04-21 RX ADMIN — PRAVASTATIN SODIUM 20 MG: 20 TABLET ORAL at 09:12

## 2022-04-21 RX ADMIN — GUAIFENESIN 600 MG: 600 TABLET ORAL at 09:09

## 2022-04-21 RX ADMIN — GUAIFENESIN 1200 MG: 600 TABLET ORAL at 20:29

## 2022-04-21 RX ADMIN — FOLIC ACID 1 MG: 1 TABLET ORAL at 09:09

## 2022-04-21 RX ADMIN — METHYLPREDNISOLONE SODIUM SUCCINATE 62.5 MG: 125 INJECTION, POWDER, FOR SOLUTION INTRAMUSCULAR; INTRAVENOUS at 18:46

## 2022-04-21 RX ADMIN — RIVAROXABAN 15 MG: 15 TABLET, FILM COATED ORAL at 09:12

## 2022-04-21 ASSESSMENT — ACTIVITIES OF DAILY LIVING (ADL)
ADLS_ACUITY_SCORE: 9

## 2022-04-21 NOTE — PROGRESS NOTES
"/61 (BP Location: Left arm)   Pulse 96   Temp 98.1  F (36.7  C) (Oral)   Resp 18   Ht 1.651 m (5' 5\")   Wt 72.8 kg (160 lb 8 oz)   SpO2 96%   BMI 26.71 kg/m      Patient on scheduled duonebs 4 times a day. Patient was feeling short of breath this morning, but improved during the day. Breath sounds are clear/diminished. Strong non productive cough. On 1L NC. RT continue to follow.     Macy Quinn, RT    "

## 2022-04-21 NOTE — PROGRESS NOTES
"[facility] Medicine PROGRESS NOTE      Identification/Summary:      Fatuma Henry is a 76 year old female with a past medical history of COPD, A. fib, hypertension, rheumatoid arthritis, GERD, HLD, CKD who was admitted on 4/20/2022 for dyspnea.     Assessment & Plan      COPD exacerbation  Acute on chronic hypoxic respiratory failure baseline 3 L oxygen at nighttime  Nasal congestion  Continues to be short of breath despite DuoNeb this morning, will give Solu-Medrol 60 mg IV every 8 hours for 2 dosages  Continue prednisone 40 mg daily for 5 days  Mucinex 20 mg twice daily  Additional albuterol neb x1  Continue DuoNebs 4 times daily and as needed  Ativan every 4 hours as needed for dyspnea/anxiety, max 3 doses ordered  Continue nightly oxygen/CPAP    Chronic diastolic CHF  Moderate pulmonary hypertension  Recent echo with a EF 60%, mild aortic stenosis, moderate aortic regurgitation  , mild bilateral lower extremity pitting edema, no evidence of congestion on the chest x-ray or exam  Continue Lasix 20 mg p.o. daily, did receive 40 mg IV x1  on admission    Diarrhea  Resolved    Hypomagnesemia  Replete per protocol    A. Fib/hypertension  Long-term use anticoagulant medication  Metoprolol  Rivaroxaban  Lasix    rheumatoid arthritis  She is on cerolizubab 400mg [id number] and methotrexate 10mg qWEEK.  -continue PTA folic acid 1mg daily except when taking MTX     GERD  -continue PTA famotidine 20mg     HLD  -continue PTA pravastatin 20mg     CKD 3  Cr on admission was 1.14.  -avoid nephrotoxic drugs when possible    Clinically Significant Risk Factors Present on Admission               #    # Overweight: Estimated body mass index is 26.71 kg/m  as calculated from the following:    Height as of this encounter: 1.651 m (5' 5\").    Weight as of this encounter: 72.8 kg (160 lb 8 oz).         Diet: Regular Diet Adult  DVT Prophylaxis: Direct Oral Anticagulants   Brandon Catheter: Not present  Central Lines: " "None    Code Status: No CPR- Do NOT Intubate    Discharge Planning   Anticipated Discharge in :2 days   Expected Discharge Destination: Home   Milestones/Criteria For Discharge:improved breathing   Disposition Plan   Expected Discharge: 04/25/2022     Anticipated discharge location: home with family;home with help/services         The patient's care was discussed with the Bedside Nurse and Patient.      Interval History/Subjective:     Feels short of breath, neb did not help, feels nasal congestion, had a lot of nasal secretions, secretions were clear  No fever chills  No chest pain  No nausea or vomiting    Physical Exam/Objective:     Vitals I/O   Vital signs:  Temp: 98.2  F (36.8  C) Temp src: Oral BP: 101/89 Pulse: 83   Resp: 18 SpO2: 98 % O2 Device: Nasal cannula Oxygen Delivery: 1/2 LPM Height: 165.1 cm (5' 5\") Weight: 72.8 kg (160 lb 8 oz)  Estimated body mass index is 26.71 kg/m  as calculated from the following:    Height as of this encounter: 1.651 m (5' 5\").    Weight as of this encounter: 72.8 kg (160 lb 8 oz).       I/O last 3 completed shifts:  In: 430 [P.O.:420; I.V.:10]  Out: 700 [Urine:700]     Vitals:    04/20/22 1042 04/20/22 1511 04/21/22 0136   Weight: 74.9 kg (165 lb 1.6 oz) 73.8 kg (162 lb 12.8 oz) 72.8 kg (160 lb 8 oz)      Weight change:    Body mass index is 26.71 kg/m .    General: Awake alert and slight tachypnea, use of accessory muscles when talking  Lungs: Decreased breath sounds bilaterally especially lower half with a faint expiratory rhonchi  Heart: S1, S2 without murmurs  Abdomen: Soft nontender, bowel sounds positive  CNS: Nonfocal  Extremities: Mild bilateral ankle edema      Medications:     Medications     - MEDICATION INSTRUCTIONS -         albuterol  2.5 mg Nebulization Once     famotidine  20 mg Oral Q24H     folic acid  1 mg Oral Daily     furosemide  20 mg Oral Daily     guaiFENesin  1,200 mg Oral BID     ipratropium - albuterol 0.5 mg/2.5 mg/3 mL  3 mL Nebulization 4x " daily     methylPREDNISolone  62.5 mg Intravenous Q8H     metoprolol succinate ER  100 mg Oral Daily     pravastatin  20 mg Oral Daily     predniSONE  40 mg Oral Daily     rivaroxaban ANTICOAGULANT  15 mg Oral Daily with breakfast     sodium chloride (PF)  3 mL Intracatheter Q8H     sodium chloride (PF)  3 mL Intracatheter Q8H       Data Reviewed   I personally reviewed all new medications, labs, imaging/diagnostics reports over the past 24 hours.     Pertinent findings include.     Labs    Recent Labs   Lab 04/21/22  0532 04/20/22  1540 04/20/22  1046   WBC 3.6*  --  4.3   HGB 11.4*  --  12.9     --  103*     --  272   INR  --   --  1.77*     --  140   POTASSIUM 3.9 4.5 4.0   CHLORIDE 105  --  102   CO2 21*  --  26   BUN 37*  --  30*   CR 1.14*  --  1.14*   ANIONGAP 12  --  12   SUNI 9.7  --  10.3     --  81       Imaging   Recent Results (from the past 24 hour(s))   XR Chest Port 1 View    Narrative    EXAM: XR CHEST PORT 1 VIEW  LOCATION: [facility]  DATE/TIME: 4/20/2022 11:10 AM    INDICATION: short of breath  COMPARISON: 01/17/2022      Impression    IMPRESSION: Negative chest.         EKG:      Timothy Masterson MD  Internal Medicine / Hospital medicine   [facility]  Securely message with the Vocera Web Console (learn more here)  Text page via [employer] ([facility])

## 2022-04-21 NOTE — PROGRESS NOTES
Patient prn neb for shortness of breath. Pt was on 2L NC with sats of 95% and diminished breath sounds throughout.      Mahdi VENKAT Patton, RT

## 2022-04-21 NOTE — CONSULTS
"COPD Initial Interview, Education, and Consult  4/21/2022, 4:35 PM    Reason for Consult: COPD Exacerbation  Patient Admitted for: COPD exacerbation (H) [J44.1] on 4/20/2022     History of Present Illness: Patient with a history of CHF, CKD, former smoker, COPD unconfirmed by PFTs, Lung nodule, Afib, HTN, and RA who presented with increased shortness of breath and hypoxia on RA while awake. CXR negative for acute findings. BNP elevated with some pitting edema. Patient has received treatment with Bipap, supplementary oxygen, Lasix, DuoNebs, steroids, and Antibiotics.    Last PFT:  None  Patient agreed to having outpatient PFT and Pulmonary consult appointments set up. She has been scheduled these on July 19 at the [facility] and will be seeing Dr. Rosario    Home Respiratory Medications:  Bronchodilators:   -DuoNebs PRN (Pt reports that she takes these about 3 times a day as part of her regular routine.     Assessment: Patient is on 1 LPM via nasal cannula and comfortable at rest. Pt is able to speak in full sentences. /61 (BP Location: Left arm)   Pulse 96   Temp 98.1  F (36.7  C) (Oral)   Resp 18   Ht 1.651 m (5' 5\")   Wt 72.8 kg (160 lb 8 oz)   SpO2 97%   BMI 26.71 kg/m        Education done during visit:  -COPD Action Plan, discussed and patient instructed to take it to her Pulmonary appointment for discussion with her Pulmonologist.  -Information on COPD and tools to help cope  -Disease process and irritants; discussed, questions answered  -Issued patient a COPD information and resource folder  -Discussed the importance of PFT testing to assure proper diagnosis and optimal treatment.    Recommendations:  -Continue current inpatient therapy, per [facility] protocols  -Follow up appointment with pulmonology along with an update PFT/Spirometry testing. She has been scheduled for testing on 7/19/22 at 1 PM along with a pulmonary appointment with Dr. Rosario at 2:30PM at the [facility].      Gert will participate in our call back program. Will continue to follow patient throughout hospital stay along with educating patient and family.    Total time spend with patient 25 minutes and 50 minutes spent in chart review, care coordination, and documentation.    Leonardo Guadarrama RT, Chronic Pulmonary Disease Specialist  Phone [phone]

## 2022-04-21 NOTE — PROGRESS NOTES
"CLINICAL NUTRITION SERVICES - ASSESSMENT NOTE     Nutrition Prescription    RECOMMENDATIONS FOR MDs/PROVIDERS TO ORDER:  Recommend daily multivitamin    Malnutrition Status:    % Weight Loss:  Weight loss does not meet criteria for malnutrition   % Intake:  <75% for >/= 1 month (moderate malnutrition)  Subcutaneous Fat Loss:  Orbital region mild depletion  Muscle Loss:  Clavicle bone region mild depletion and Acromion bone region mild depletion  Fluid Retention:  Mild     Malnutrition Diagnosis: Moderate malnutrition  In Context of:  Chronic illness or disease    Recommendations already ordered by Registered Dietitian (RD):  None at this time    Future/Additional Recommendations:  None at this time     REASON FOR ASSESSMENT  Fatuma Henry is a/an 76 year old female assessed by the dietitian for Admission Nutrition Risk Screen for positive wt loss and reduced PO.    Patient admitted for COPD exacerbation with history of A Fib, HTN, rheumatoid arthritis, and CKD3.    NUTRITION HISTORY  Patient reports low appetite for several weeks. Feels too fatigued to prepare meals. Normally eats 2 meals/day but have been small lately. Does not like supplements and does not want to try any while here.  NKFA    CURRENT NUTRITION ORDERS  Diet: Regular  Intake/Tolerance: 75% of adequately sized, balanced dinner last night    LABS  Labs reviewed: Cr 1.14, BUN 37    MEDICATIONS  Medications reviewed: folic acid, lasix, prednisone    ANTHROPOMETRICS  Height: 165.1 cm (5' 5\")  Most Recent Weight: 72.8 kg (160 lb 8 oz)    IBW: 57 kg  BMI: Overweight BMI 25-29.9  Weight History:   Wt Readings from Last 10 Encounters:   04/21/22 72.8 kg (160 lb 8 oz)   01/26/22 75.3 kg (165 lb 14.4 oz)   01/22/22 73.6 kg (162 lb 4.8 oz)   06/02/21 73.4 kg (161 lb 12.8 oz)   05/26/21 75.8 kg (167 lb 3.2 oz)   12/16/20 78 kg (172 lb)   07/01/20 77.6 kg (171 lb)   05/28/20 77.6 kg (171 lb)   01/23/20 80.7 kg (178 lb)   06/10/19 80.7 kg (178 lb)   Weight " change: -5 lb (3%) in 3 months, -7 lb (4%) in 1 year; neither clinically significant.     Dosing Weight: 57 kg    ASSESSED NUTRITION NEEDS  Estimated Energy Needs: 7795-7180 kcals/day (25 - 30 kcals/kg)  Justification: Maintenance  Estimated Protein Needs: 33-46 grams protein/day (0.6 - 0.8 grams of pro/kg)  Justification: CKD  Estimated Fluid Needs: 6280-1586 mL/day (25 - 30 mL/kg)   Justification: Maintenance    PHYSICAL FINDINGS  See malnutrition section below.  Per flowsheet  Edema: +1 BLE  Skin: no breakdown noted  GI: last BM 4/20      MALNUTRITION:  % Weight Loss:  Weight loss does not meet criteria for malnutrition   % Intake:  <75% for >/= 1 month (moderate malnutrition)  Subcutaneous Fat Loss:  Orbital region mild depletion  Muscle Loss:  Clavicle bone region mild depletion and Acromion bone region mild depletion  Fluid Retention:  Mild     Malnutrition Diagnosis: Moderate malnutrition  In Context of:  Chronic illness or disease    NUTRITION DIAGNOSIS  Malnutrition related to reduced appetite and difficulty preparing meals as evidenced by pt reporting reduced PO intake due to fatigue and evidence of fat and muscle loss.    INTERVENTIONS  Implementation  Nutrition Education: Provided education on easy to prepare meals when appetite and energy is low. Suggested adding small snack at lunchtime to avoid further unintentional weight loss.  Multivitamin/mineral supplement therapy: recommend multivitamin  Encouraged good PO intake while here; likely to meet needs through diet alone and pt not interested in supplements.    Goals  Patient to consume % of nutritionally adequate meals three times per day, or the equivalent with supplements/snacks.     Monitoring/Evaluation  Progress toward goals will be monitored and evaluated per protocol.

## 2022-04-21 NOTE — PLAN OF CARE
Problem: Hypertension Comorbidity  Goal: Blood Pressure in Desired Range  Outcome: Ongoing, Progressing  Intervention: Maintain Blood Pressure Management  Recent Flowsheet Documentation  Taken 4/21/2022 0030 by Deysi Villatoro RN  Medication Review/Management: medications reviewed     Problem: COPD (Chronic Obstructive Pulmonary Disease) Comorbidity  Goal: Maintenance of COPD Symptom Control  Outcome: Ongoing, Progressing  Intervention: Maintain COPD-Symptom Control  Recent Flowsheet Documentation  Taken 4/21/2022 0030 by Deysi Villatoro RN  Medication Review/Management: medications reviewed   Goal Outcome Evaluation:      Per patient SOB improved. Continues with congested cough and c/o slight chest tightness due to coughing. Dyspnea on exertion. O2 2L/NC.

## 2022-04-22 ENCOUNTER — APPOINTMENT (OUTPATIENT)
Dept: OCCUPATIONAL THERAPY | Facility: CLINIC | Age: 77
DRG: 190 | End: 2022-04-22
Attending: INTERNAL MEDICINE
Payer: COMMERCIAL

## 2022-04-22 ENCOUNTER — TELEPHONE (OUTPATIENT)
Dept: RHEUMATOLOGY | Facility: CLINIC | Age: 77
End: 2022-04-22
Payer: COMMERCIAL

## 2022-04-22 ENCOUNTER — APPOINTMENT (OUTPATIENT)
Dept: PHYSICAL THERAPY | Facility: CLINIC | Age: 77
DRG: 190 | End: 2022-04-22
Attending: INTERNAL MEDICINE
Payer: COMMERCIAL

## 2022-04-22 PROBLEM — Z66 DNR (DO NOT RESUSCITATE): Chronic | Status: ACTIVE | Noted: 2022-04-22

## 2022-04-22 PROBLEM — Z86.16 HISTORY OF 2019 NOVEL CORONAVIRUS DISEASE (COVID-19): Chronic | Status: ACTIVE | Noted: 2022-04-22

## 2022-04-22 PROBLEM — J45.909 ASTHMA WITHOUT STATUS ASTHMATICUS: Status: ACTIVE | Noted: 2022-04-22

## 2022-04-22 PROBLEM — Z86.16 HISTORY OF 2019 NOVEL CORONAVIRUS DISEASE (COVID-19): Status: ACTIVE | Noted: 2022-04-22

## 2022-04-22 PROBLEM — N18.30 CRF (CHRONIC RENAL FAILURE), STAGE 3 (MODERATE) (H): Chronic | Status: ACTIVE | Noted: 2018-05-17

## 2022-04-22 PROBLEM — Z79.01 CHRONIC ANTICOAGULATION: Status: ACTIVE | Noted: 2022-04-22

## 2022-04-22 PROBLEM — J44.1 COPD EXACERBATION (H): Chronic | Status: ACTIVE | Noted: 2022-01-17

## 2022-04-22 PROBLEM — Z66 DNR (DO NOT RESUSCITATE): Status: ACTIVE | Noted: 2022-04-22

## 2022-04-22 PROBLEM — Z92.25 PERSONAL HISTORY OF IMMUNOSUPPRESSIVE THERAPY: Status: ACTIVE | Noted: 2022-04-22

## 2022-04-22 PROBLEM — N18.31 CHRONIC KIDNEY DISEASE, STAGE 3A (H): Status: ACTIVE | Noted: 2022-04-22

## 2022-04-22 PROBLEM — N18.30 CRF (CHRONIC RENAL FAILURE), STAGE 3 (MODERATE) (H): Status: ACTIVE | Noted: 2018-05-17

## 2022-04-22 LAB
MAGNESIUM SERPL-MCNC: 2.1 MG/DL (ref 1.8–2.6)
POTASSIUM BLD-SCNC: 4.4 MMOL/L (ref 3.5–5)

## 2022-04-22 PROCEDURE — 250N000013 HC RX MED GY IP 250 OP 250 PS 637: Performed by: INTERNAL MEDICINE

## 2022-04-22 PROCEDURE — 250N000013 HC RX MED GY IP 250 OP 250 PS 637: Performed by: STUDENT IN AN ORGANIZED HEALTH CARE EDUCATION/TRAINING PROGRAM

## 2022-04-22 PROCEDURE — 250N000009 HC RX 250: Performed by: INTERNAL MEDICINE

## 2022-04-22 PROCEDURE — 99232 SBSQ HOSP IP/OBS MODERATE 35: CPT | Performed by: INTERNAL MEDICINE

## 2022-04-22 PROCEDURE — 94640 AIRWAY INHALATION TREATMENT: CPT | Mod: 76

## 2022-04-22 PROCEDURE — 120N000004 HC R&B MS OVERFLOW

## 2022-04-22 PROCEDURE — 999N000157 HC STATISTIC RCP TIME EA 10 MIN

## 2022-04-22 PROCEDURE — 250N000012 HC RX MED GY IP 250 OP 636 PS 637: Performed by: STUDENT IN AN ORGANIZED HEALTH CARE EDUCATION/TRAINING PROGRAM

## 2022-04-22 PROCEDURE — 94640 AIRWAY INHALATION TREATMENT: CPT

## 2022-04-22 PROCEDURE — 97162 PT EVAL MOD COMPLEX 30 MIN: CPT | Mod: GP

## 2022-04-22 PROCEDURE — 97166 OT EVAL MOD COMPLEX 45 MIN: CPT | Mod: GO

## 2022-04-22 PROCEDURE — 83735 ASSAY OF MAGNESIUM: CPT | Performed by: FAMILY MEDICINE

## 2022-04-22 PROCEDURE — 97535 SELF CARE MNGMENT TRAINING: CPT | Mod: GO

## 2022-04-22 PROCEDURE — 84132 ASSAY OF SERUM POTASSIUM: CPT | Performed by: FAMILY MEDICINE

## 2022-04-22 PROCEDURE — 36415 COLL VENOUS BLD VENIPUNCTURE: CPT | Performed by: FAMILY MEDICINE

## 2022-04-22 PROCEDURE — 250N000009 HC RX 250: Performed by: STUDENT IN AN ORGANIZED HEALTH CARE EDUCATION/TRAINING PROGRAM

## 2022-04-22 PROCEDURE — 97116 GAIT TRAINING THERAPY: CPT | Mod: GP

## 2022-04-22 RX ORDER — OXYMETAZOLINE HYDROCHLORIDE 0.05 G/100ML
2 SPRAY NASAL 3 TIMES DAILY
Status: COMPLETED | OUTPATIENT
Start: 2022-04-22 | End: 2022-04-24

## 2022-04-22 RX ORDER — CEFUROXIME AXETIL 250 MG/1
250 TABLET ORAL EVERY 12 HOURS SCHEDULED
Status: DISCONTINUED | OUTPATIENT
Start: 2022-04-22 | End: 2022-04-26 | Stop reason: HOSPADM

## 2022-04-22 RX ORDER — ACETAMINOPHEN 500 MG
500-1000 TABLET ORAL EVERY 6 HOURS PRN
Status: DISCONTINUED | OUTPATIENT
Start: 2022-04-22 | End: 2022-04-26 | Stop reason: HOSPADM

## 2022-04-22 RX ADMIN — CEFUROXIME AXETIL 250 MG: 250 TABLET ORAL at 14:30

## 2022-04-22 RX ADMIN — RIVAROXABAN 15 MG: 15 TABLET, FILM COATED ORAL at 08:59

## 2022-04-22 RX ADMIN — METOPROLOL SUCCINATE 100 MG: 100 TABLET, EXTENDED RELEASE ORAL at 08:59

## 2022-04-22 RX ADMIN — IPRATROPIUM BROMIDE AND ALBUTEROL SULFATE 3 ML: 2.5; .5 SOLUTION RESPIRATORY (INHALATION) at 20:24

## 2022-04-22 RX ADMIN — PREDNISONE 40 MG: 20 TABLET ORAL at 08:59

## 2022-04-22 RX ADMIN — Medication 2 SPRAY: at 20:01

## 2022-04-22 RX ADMIN — CEFUROXIME AXETIL 250 MG: 250 TABLET ORAL at 20:00

## 2022-04-22 RX ADMIN — Medication 2 SPRAY: at 14:25

## 2022-04-22 RX ADMIN — IPRATROPIUM BROMIDE AND ALBUTEROL SULFATE 3 ML: 2.5; .5 SOLUTION RESPIRATORY (INHALATION) at 16:21

## 2022-04-22 RX ADMIN — FUROSEMIDE 20 MG: 20 TABLET ORAL at 08:59

## 2022-04-22 RX ADMIN — GUAIFENESIN 1200 MG: 600 TABLET ORAL at 20:00

## 2022-04-22 RX ADMIN — PRAVASTATIN SODIUM 20 MG: 20 TABLET ORAL at 08:59

## 2022-04-22 RX ADMIN — FAMOTIDINE 20 MG: 20 TABLET ORAL at 21:17

## 2022-04-22 RX ADMIN — IPRATROPIUM BROMIDE AND ALBUTEROL SULFATE 3 ML: 2.5; .5 SOLUTION RESPIRATORY (INHALATION) at 07:43

## 2022-04-22 RX ADMIN — FOLIC ACID 1 MG: 1 TABLET ORAL at 08:59

## 2022-04-22 RX ADMIN — GUAIFENESIN 1200 MG: 600 TABLET ORAL at 08:59

## 2022-04-22 RX ADMIN — IPRATROPIUM BROMIDE AND ALBUTEROL SULFATE 3 ML: 2.5; .5 SOLUTION RESPIRATORY (INHALATION) at 11:44

## 2022-04-22 RX ADMIN — ACETAMINOPHEN 650 MG: 325 TABLET ORAL at 04:28

## 2022-04-22 ASSESSMENT — ACTIVITIES OF DAILY LIVING (ADL)
ADLS_ACUITY_SCORE: 9

## 2022-04-22 NOTE — PROGRESS NOTES
Care Management Follow Up    Length of Stay (days): 2    Expected Discharge Date: 04/23/2022     Concerns to be Addressed: too weak to go home and still has paroxysms of disabling coughing    Patient plan of care discussed at interdisciplinary rounds: Yes    Anticipated Discharge Disposition: TBD     Anticipated Discharge Services: None    Anticipated Discharge DME: Oxygen    Education Provided on the Discharge Plan:  yes    Patient/Family in Agreement with the Plan: yes          Additional Information:  Chart reviewed. CM met with patient. Patient uses 02 at home and states her 02 is arranged. No additional needs for home 02 at this time. Patient lives with her . Patient states her daughter (Kelly) lives in the same condo complex and helps as needed. Another daughter is helpful and adult grandchildren can help out (but these people don't live near the patient).  Patient states that she is independent with most ADL's but requesting assistance for housekeeping. CM gave the patient a [employer] brochure. Patient states she uses a walker at baseline. She does drive. CM will follow for possible additional discharge needs.     OT recommendations: home with assist  daughter lives in same building and can assist as needed    Pamela Howe RN

## 2022-04-22 NOTE — PROGRESS NOTES
04/22/22 1015   Quick Adds   Type of Visit Initial Occupational Therapy Evaluation   Living Environment   People in Home spouse   Current Living Arrangements condominium   Home Accessibility no concerns   Transportation Anticipated family or friend will provide   Living Environment Comments Pt has all needs met on main level. Pt has 4WW she uses periodically at baseline. Pt has tub shower w/ shower chair and grab bars and elevateed toilet.   Self-Care   Usual Activity Tolerance moderate   Current Activity Tolerance moderate   Equipment Currently Used at Home grab bar, toilet;grab bar, tub/shower;raised toilet seat;shower chair;walker, rolling   Fall history within last six months yes   Number of times patient has fallen within last six months 1   Activity/Exercise/Self-Care Comment Pt is IND w/ ADLs and IADLs   General Information   Onset of Illness/Injury or Date of Surgery 04/20/22   Referring Physician El Vigil MD   Additional Occupational Profile Info/Pertinent History of Current Problem IND w/ ADLs and IADLs at baseline   Existing Precautions/Restrictions fall;oxygen therapy device and L/min  (1L)   Cognitive Status Examination   Orientation Status orientation to person, place and time   Visual Perception   Visual Impairment/Limitations corrective lenses full-time   Sensory   Sensory Comments numbness in both feet at baseline   Pain Assessment   Patient Currently in Pain No   Integumentary/Edema   Integumentary/Edema no deficits were identifed   Posture   Posture forward head position   Range of Motion Comprehensive   General Range of Motion no range of motion deficits identified   Strength Comprehensive (MMT)   General Manual Muscle Testing (MMT) Assessment no strength deficits identified   Muscle Tone Assessment   Muscle Tone Quick Adds No deficits were identified   Coordination   Upper Extremity Coordination No deficits were identified   Bed Mobility   Bed Mobility rolling left;rolling  right;scooting/bridging   Rolling Left [place] (Bed Mobility) supervision   Comment (Bed Mobility) SBA w/ all bed mobility   Transfers   Transfers bed-chair transfer;sit-stand transfer;toilet transfer   Transfer Comments SBA-CGA for all transfers   Activities of Daily Living   BADL Assessment/Intervention lower body dressing;grooming;toileting   Lower Body Dressing Assessment/Training   [place] Level (Lower Body Dressing) supervision   Grooming Assessment/Training   [place] Level (Grooming) modified independence   Toileting   [place] Level (Toileting) supervision   Clinical Impression   Criteria for Skilled Therapeutic Interventions Met (OT) Yes, treatment indicated   OT Diagnosis decreased ind w/ ADLs   Influenced by the following impairments COPD   OT Problem List-Impairments impacting ADL activity tolerance impaired;balance;strength   Assessment of Occupational Performance 3-5 Performance Deficits   Identified Performance Deficits standing tolerance for ADLs, tub shower trasnfer   Planned Therapy Interventions (OT) ADL retraining;transfer training;strengthening   Clinical Decision Making Complexity (OT) moderate complexity   Risk & Benefits of therapy have been explained care plan/treatment goals reviewed;patient   OT Discharge Planning   OT Discharge Recommendation (DC Rec) (S)  home with assist   OT Rationale for DC Rec Pt close to baseline and currently on 1L O2. Pt has all needs met on 1 level in condo -she has tub shower w/ shower chair and grab bars. Pt's  will occassionally assist w/ daily tasks but her daughter lives in same building and can assist as needed   Total Evaluation Time (Minutes)   Total Evaluation Time (Minutes) 10   OT Goals   Therapy Frequency (OT) Daily   OT Predicted Duration/Target Date for Goal Attainment 04/25/22   OT Goals Bed Mobility;Toilet Transfer/Toileting   OT: Bed Mobility Modified independent;supine to/from sitting;rolling;bridging   OT: Toilet  Transfer/Toileting Modified independent;toilet transfer;cleaning and garment management

## 2022-04-22 NOTE — PLAN OF CARE
Physical Therapy Discharge Summary    Reason for therapy discharge:    All goals and outcomes met, no further needs identified.    Progress towards therapy goal(s). See goals on Care Plan in [facility] electronic health record for goal details.  Goals met    Therapy recommendation(s):    Continue home exercise program.

## 2022-04-22 NOTE — PLAN OF CARE
Problem: COPD (Chronic Obstructive Pulmonary Disease) Comorbidity  Goal: Maintenance of COPD Symptom Control  Outcome: Ongoing, Progressing  Intervention: Maintain COPD-Symptom Control  Recent Flowsheet Documentation  Taken 4/22/2022 0000 by Debi Woodard RN  Medication Review/Management: medications reviewed  Taken 4/21/2022 2000 by Debi Woodard RN  Medication Review/Management: medications reviewed     Problem: Hypertension Comorbidity  Goal: Blood Pressure in Desired Range  Outcome: Ongoing, Progressing  Intervention: Maintain Blood Pressure Management  Recent Flowsheet Documentation  Taken 4/22/2022 0000 by Debi Woodard RN  Medication Review/Management: medications reviewed  Taken 4/21/2022 2000 by Debi Woodard RN  Medication Review/Management: medications reviewed   Goal Outcome Evaluation:           Pt sleeping intermittently.  C/O cough and some soreness in her throat when waking.  Pt medicated with Tylenol.  Pt with nasal cannula at 1 liter.  No stools overnight.  Have not collected specimen for C-diff.

## 2022-04-22 NOTE — PLAN OF CARE
Problem: COPD (Chronic Obstructive Pulmonary Disease) Comorbidity  Goal: Maintenance of COPD Symptom Control  Outcome: Ongoing, Progressing   Goal Outcome Evaluation:    RESPIRATORY CARE NOTE     Patient Name: Fatuma Henry  Today's Date: 4/22/2022       Pt continues to receive Duoneb. BS are diminished and faint expiratory wheeze. Pt is on 2-4 lpm of oxygen via NC, SpO2 is 95%. Post treatment there is increased aeration. Pt also perceives improvement.  RT encouraged deep breathing and coughing techniques .  RT will continue to monitor and assess.      Kajal Saldivar, RT

## 2022-04-22 NOTE — PLAN OF CARE
Problem: Plan of Care - These are the overarching goals to be used throughout the patient stay.    Goal: Absence of Hospital-Acquired Illness or Injury    Goal: Optimal Comfort and Wellbeing  Outcome: Ongoing, Progressing     Problem: COPD (Chronic Obstructive Pulmonary Disease) Comorbidity  Goal: Maintenance of COPD Symptom Control  Outcome: Ongoing, Progressing    Pt resting comfortably this shift. Vitals stable. Pt remains on 2L O2 NC; does desat with movement and asks to be temporarily turned up on O2 (4L) for a few minutes which she catches her breath. PT/OT evaluated today, signed off. Still unable to collect stool sample this shift. Afrin given for pt complaints of congestion. Calling appropriately before getting up. Frequent rounding and checks for safety. Will continue to monitor.

## 2022-04-22 NOTE — TELEPHONE ENCOUNTER
[facility] calling to see if patient needs an order for Cimzia. They contacted the patient directly to see if she needed it and the patient informed them that Nelly normally sets it up and has it sent to the clinic. If it is needed please let the pharmacy know. If not no need to call them.

## 2022-04-22 NOTE — PROGRESS NOTES
Summary/Update  76-year-old woman with rheumatoid arthritis history of chronic anticoagulation chronic immunosuppressive therapy and COPD with exacerbation causing recent admission  She continues to cough significantly her sputum is getting looser she has sinus congestion  Problem list updated and edited    Plan continue same medications but add Afrin for nasal congestion and sinus clearance  And Ceftin for antibiotic coverage  Continue with prednisone duo nebs etc.  She is still much too weak to go home and still has paroxysms of disabling coughing      Assessment/Plan/Narrative    Active Hospital Problems    COPD exacerbation (H)      DNR (do not resuscitate)      Chronic anticoagulation      Personal history of immunosuppressive therapy      Seropositive rheumatoid arthritis (H)      History of 2019 novel coronavirus disease (COVID-19)      CRF (chronic renal failure), stage 3 (moderate) (H)      Paroxysmal atrial fibrillation (H)          Code Status DNR    Discharge Planning Home    Subjective:  She had a rough night and started to cough up more sputum we will add some antibiotics to her regimen she also complains of her nose being stuffed with some Afrin for sinus congestion  She has paroxysms of intractable coughing    Objective:  Less than 10 mL of urine   Vital signs in last 24 hours  Temp:  [98.1  F (36.7  C)-98.3  F (36.8  C)] 98.2  F (36.8  C)  Pulse:  [] 86  Resp:  [16-18] 16  BP: (123-136)/(57-63) 123/57  SpO2:  [96 %-97 %] 96 %  Weight:       Intake/Output last 3 shifts  I/O last 3 completed shifts:  In: 300 [P.O.:300]  Out: 1275 [Urine:1275]  Intake/Output this shift:  No intake/output data recorded.      Physical Exam  General: She was up in a chair chronically ill-appearing cachectic violent coughing not bringing anything up this morning but definitely it sounds loose  VS-see above  HEENT:  Lungs: Upper airway sounds  Cor: Regular  ABD:  Ext :  Neuro:    Pertinent Labs   Lab Results    Component Value Date    WBC 3.6 (L) 04/21/2022    HGB 11.4 (L) 04/21/2022    HCT 34.6 (L) 04/21/2022     04/21/2022     04/21/2022     Lab Results   Component Value Date    BUN 37 (H) 04/21/2022     04/21/2022    CO2 21 (L) 04/21/2022         El Vigil MD.

## 2022-04-22 NOTE — PROGRESS NOTES
04/22/22 1538   Quick Adds   Type of Visit Initial PT Evaluation   Living Environment   People in Home spouse   Current Living Arrangements condominium   Home Accessibility   (no stairs)   Transportation Anticipated family or friend will provide   Living Environment Comments see OT notes this section   Self-Care   Regular Exercise Yes   Activity/Exercise Type   (balance)   Exercise Amount/Frequency daily;10 mins   Equipment Currently Used at Home   (4ww)   Fall history within last six months   (landed on the sofa)   Activity/Exercise/Self-Care Comment see OT notest this section   General Information   Onset of Illness/Injury or Date of Surgery 04/20/22   Referring Physician ana paula dye   Patient/Family Therapy Goals Statement (PT) get back to doing things without exhaustion   Existing Precautions/Restrictions   (enteric prec; supplemental O2)   Cognition   Orientation Status (Cognition) oriented x 4   Pain Assessment   Patient Currently in Pain No   Range of Motion (ROM)   ROM Comment WFL   Strength (Manual Muscle Testing)   Strength (Manual Muscle Testing)   (hip fl 4-; abd 3's. quads, DF  4's)   Bed Mobility   Comment, (Bed Mobility) sup<>sit indep. (HOB elevated like home bed)   Transfers   Comment, (Transfers) indep.   Gait/Stairs (Locomotion)   Distance in Feet (Required for LE Total Joints) 230 x2   Comment, (Gait/Stairs) see treatament sheet   Sensory Examination   Sensory Perception   (chronic numbness in feet)   Clinical Impression   Criteria for Skilled Therapeutic Intervention Yes, treatment indicated   PT Diagnosis (PT) impaired functional mobility   Influenced by the following impairments low activity tolerance   Functional limitations due to impairments amb   Clinical Presentation (PT Evaluation Complexity) Stable/Uncomplicated   Clinical Presentation Rationale presents as medically diagnosed   Clinical Decision Making (Complexity) low complexity   Planned Therapy Interventions (PT) gait training    Anticipated Equipment Needs at Discharge (PT)   (4ww)   Risk & Benefits of therapy have been explained evaluation/treatment results reviewed;care plan/treatment goals reviewed;patient   PT Discharge Planning   PT Discharge Recommendation (DC Rec) home with assist   PT Rationale for DC Rec safe mobility with 4ww   Total Evaluation Time   Total Evaluation Time (Minutes) 22   Physical Therapy Goals   PT Frequency One time eval and treatment only   PT Predicted Duration/Target Date for Goal Attainment 04/22/22   PT Goals Gait   PT: Gait Modified independent;Goal Met;Completed;Greater than 200 feet

## 2022-04-22 NOTE — TELEPHONE ENCOUNTER
Pt had wanted to wait and discuss medication options with Dr Rush as pt has $100 copay/month for the Cimzia. Pt rescheduled f/u appt to 5/5 with Dr Rush

## 2022-04-23 ENCOUNTER — APPOINTMENT (OUTPATIENT)
Dept: OCCUPATIONAL THERAPY | Facility: CLINIC | Age: 77
DRG: 190 | End: 2022-04-23
Payer: COMMERCIAL

## 2022-04-23 LAB
MAGNESIUM SERPL-MCNC: 2 MG/DL (ref 1.8–2.6)
POTASSIUM BLD-SCNC: 4.3 MMOL/L (ref 3.5–5)

## 2022-04-23 PROCEDURE — 97535 SELF CARE MNGMENT TRAINING: CPT | Mod: GO

## 2022-04-23 PROCEDURE — 36415 COLL VENOUS BLD VENIPUNCTURE: CPT | Performed by: INTERNAL MEDICINE

## 2022-04-23 PROCEDURE — 94640 AIRWAY INHALATION TREATMENT: CPT

## 2022-04-23 PROCEDURE — 250N000009 HC RX 250: Performed by: STUDENT IN AN ORGANIZED HEALTH CARE EDUCATION/TRAINING PROGRAM

## 2022-04-23 PROCEDURE — 250N000013 HC RX MED GY IP 250 OP 250 PS 637: Performed by: INTERNAL MEDICINE

## 2022-04-23 PROCEDURE — 250N000012 HC RX MED GY IP 250 OP 636 PS 637: Performed by: STUDENT IN AN ORGANIZED HEALTH CARE EDUCATION/TRAINING PROGRAM

## 2022-04-23 PROCEDURE — 99232 SBSQ HOSP IP/OBS MODERATE 35: CPT | Performed by: INTERNAL MEDICINE

## 2022-04-23 PROCEDURE — 120N000004 HC R&B MS OVERFLOW

## 2022-04-23 PROCEDURE — 94640 AIRWAY INHALATION TREATMENT: CPT | Mod: 76

## 2022-04-23 PROCEDURE — 97110 THERAPEUTIC EXERCISES: CPT | Mod: GO

## 2022-04-23 PROCEDURE — 84132 ASSAY OF SERUM POTASSIUM: CPT | Performed by: INTERNAL MEDICINE

## 2022-04-23 PROCEDURE — 250N000013 HC RX MED GY IP 250 OP 250 PS 637: Performed by: STUDENT IN AN ORGANIZED HEALTH CARE EDUCATION/TRAINING PROGRAM

## 2022-04-23 PROCEDURE — 999N000157 HC STATISTIC RCP TIME EA 10 MIN

## 2022-04-23 PROCEDURE — 83735 ASSAY OF MAGNESIUM: CPT | Performed by: INTERNAL MEDICINE

## 2022-04-23 RX ADMIN — PREDNISONE 40 MG: 20 TABLET ORAL at 08:24

## 2022-04-23 RX ADMIN — Medication 2 SPRAY: at 08:25

## 2022-04-23 RX ADMIN — FAMOTIDINE 20 MG: 20 TABLET ORAL at 15:37

## 2022-04-23 RX ADMIN — FOLIC ACID 1 MG: 1 TABLET ORAL at 08:25

## 2022-04-23 RX ADMIN — Medication 2 SPRAY: at 15:36

## 2022-04-23 RX ADMIN — Medication 2 SPRAY: at 21:09

## 2022-04-23 RX ADMIN — GUAIFENESIN 1200 MG: 600 TABLET ORAL at 08:24

## 2022-04-23 RX ADMIN — RIVAROXABAN 15 MG: 15 TABLET, FILM COATED ORAL at 08:24

## 2022-04-23 RX ADMIN — FUROSEMIDE 20 MG: 20 TABLET ORAL at 08:24

## 2022-04-23 RX ADMIN — IPRATROPIUM BROMIDE AND ALBUTEROL SULFATE 3 ML: 2.5; .5 SOLUTION RESPIRATORY (INHALATION) at 15:49

## 2022-04-23 RX ADMIN — IPRATROPIUM BROMIDE AND ALBUTEROL SULFATE 3 ML: 2.5; .5 SOLUTION RESPIRATORY (INHALATION) at 20:14

## 2022-04-23 RX ADMIN — Medication 1 MG: at 22:31

## 2022-04-23 RX ADMIN — GUAIFENESIN 1200 MG: 600 TABLET ORAL at 21:07

## 2022-04-23 RX ADMIN — CEFUROXIME AXETIL 250 MG: 250 TABLET ORAL at 21:07

## 2022-04-23 RX ADMIN — Medication 1 MG: at 00:01

## 2022-04-23 RX ADMIN — IPRATROPIUM BROMIDE AND ALBUTEROL SULFATE 3 ML: 2.5; .5 SOLUTION RESPIRATORY (INHALATION) at 11:14

## 2022-04-23 RX ADMIN — IPRATROPIUM BROMIDE AND ALBUTEROL SULFATE 3 ML: 2.5; .5 SOLUTION RESPIRATORY (INHALATION) at 07:52

## 2022-04-23 RX ADMIN — PRAVASTATIN SODIUM 20 MG: 20 TABLET ORAL at 08:25

## 2022-04-23 RX ADMIN — METOPROLOL SUCCINATE 100 MG: 100 TABLET, EXTENDED RELEASE ORAL at 08:25

## 2022-04-23 RX ADMIN — CEFUROXIME AXETIL 250 MG: 250 TABLET ORAL at 08:25

## 2022-04-23 ASSESSMENT — ACTIVITIES OF DAILY LIVING (ADL)
ADLS_ACUITY_SCORE: 9

## 2022-04-23 NOTE — PLAN OF CARE
Patient A&OX4. Patient continues on 1-2L of O2 nasal canula. Patient was turned down to 1.5L prior to bed, but then had to increase while patient was at rest. Patient was up to the bathroom brushing her teeth, washing her face, and toileting while off the O2.  Patient tolerated well. Once the CPOX was attached to the patient getting back into bed, her sats went right up the upper 90s. Patient ambulated very well also with little shortness of breath.  Patient did complain of a sore throat and requested to gargle with salt water. She said that helped her throat a lot. VSS.  Patient did request to have her O2 increased around 0500. Even though she was in the upper 90s, she felt like she wasn't getting enough air, so this writer turned the O2 up to 3L.      Mag and K+ re-check in the morning.

## 2022-04-23 NOTE — PROGRESS NOTES
Summary/Update  76-year-old woman with advanced COPD and smoker and admitted for exacerbation multiple other problems see problem list including rheumatoid arthritis on immunosuppressive therapy      Assessment/Plan/Narrative    Active Hospital Problems    COPD exacerbation (H)      DNR (do not resuscitate)      Chronic anticoagulation      Personal history of immunosuppressive therapy      Seropositive rheumatoid arthritis (H)      History of 2019 novel coronavirus disease (COVID-19)      CRF (chronic renal failure), stage 3 (moderate) (H)      Paroxysmal atrial fibrillation (H)          Code Status DNR    Discharge Planning Home when able    Subjective:  She is making some slow progress but she still has paroxysms of terrible coughing she has some sore throat from coughing she is on Ceftin now which would cover any type of infection.  No fever or chills she still weak getting a bit discouraged but she feels she is headed in the right direction    Objective:    Vital signs in last 24 hours  Temp:  [97.5  F (36.4  C)-98  F (36.7  C)] 98  F (36.7  C)  Pulse:  [75-89] 86  Resp:  [15-22] 22  BP: (117-133)/(55-60) 117/55  SpO2:  [92 %-100 %] 97 %  Weight:       Intake/Output last 3 shifts  I/O last 3 completed shifts:  In: 600 [P.O.:600]  Out: 1100 [Urine:1100]  Intake/Output this shift:  No intake/output data recorded.      Physical Exam  General: Harsh paroxysm of coughing which is easily heard in the hallway  VS-see above  HEENT:  Lungs:  Cor:  ABD:  Ext :  Neuro:    Pertinent Labs   Lab Results   Component Value Date    WBC 3.6 (L) 04/21/2022    HGB 11.4 (L) 04/21/2022    HCT 34.6 (L) 04/21/2022     04/21/2022     04/21/2022     Lab Results   Component Value Date    BUN 37 (H) 04/21/2022     04/21/2022    CO2 21 (L) 04/21/2022         El Vigil MD.

## 2022-04-23 NOTE — PROVIDER NOTIFICATION
RCAT Treatment Plan    Patient Score: 8    Patient Acuity: 4    Clinical Indication for Therapy: COPD    Therapy Ordered: qid    Assessment Summary:  RCAT complete. Pt qualifies for PRN nebs. However pt states that she does do nebs at home about three to four times a day. BS diminished pre and post neb. Pt wears oxygen at home at night 2.5-3 lpm. Pt states she usually doesn't need the oxygen during the day. Pt is currently on 1 lpm and tolerating it well. RT will keep orders as is due to home regime. RT will continue to monitor.       04/23/22 1054   RCAT Assessment   Reason for Assessment COPD   Pulmonary Status 4   Surgical Status 0   Chest X-ray 0   Respiratory Pattern 0   Mental Status 0   Breath Sounds 2   Cough Effectiveness 0   Level of Activity 1   O2 Required for SpO2>=92% 1   Acuity Level (points) 8   Acuity Level  4         Kajal Saldivar, RT    4/23/2022

## 2022-04-23 NOTE — PROGRESS NOTES
RESPIRATORY CARE NOTE     Patient Name: Fatuma Henry  Today's Date: 4/23/2022       Pt continues to receive duoneb. BS are diminished. Pt is on 1 lpm of oxygen via NC, SpO2 is 96%. Post treatment there is increased aeration. Pt also perceives improvement.  RT encouraged deep breathing and coughing techniques .  RT will continue to monitor and assess.     Kajal Saldivar, RT

## 2022-04-23 NOTE — PLAN OF CARE
Goal: Optimal Comfort and Wellbeing  Outcome: Ongoing, Progressing     Problem: COPD (Chronic Obstructive Pulmonary Disease) Comorbidity  Goal: Maintenance of COPD Symptom Control  Outcome: Ongoing, Progressing     Problem: Hypertension Comorbidity  Goal: Blood Pressure in Desired Range     Pt resting comfortably this shift. Remains on 1L NC, tolerating well. Pt A&O and calling appropriately. Still experiencing coughing fits infrequently and desats to 84 - 86 during, but recovers quickly. Vitals stable, bed alarm and frequent rounding utilized for pt safety. Will continue to monitor closely.

## 2022-04-24 ENCOUNTER — APPOINTMENT (OUTPATIENT)
Dept: RADIOLOGY | Facility: CLINIC | Age: 77
DRG: 190 | End: 2022-04-24
Attending: INTERNAL MEDICINE
Payer: COMMERCIAL

## 2022-04-24 LAB
MAGNESIUM SERPL-MCNC: 1.9 MG/DL (ref 1.8–2.6)
POTASSIUM BLD-SCNC: 4.3 MMOL/L (ref 3.5–5)

## 2022-04-24 PROCEDURE — 250N000013 HC RX MED GY IP 250 OP 250 PS 637: Performed by: INTERNAL MEDICINE

## 2022-04-24 PROCEDURE — 250N000009 HC RX 250: Performed by: STUDENT IN AN ORGANIZED HEALTH CARE EDUCATION/TRAINING PROGRAM

## 2022-04-24 PROCEDURE — 99232 SBSQ HOSP IP/OBS MODERATE 35: CPT | Performed by: INTERNAL MEDICINE

## 2022-04-24 PROCEDURE — 36415 COLL VENOUS BLD VENIPUNCTURE: CPT | Performed by: INTERNAL MEDICINE

## 2022-04-24 PROCEDURE — 250N000012 HC RX MED GY IP 250 OP 636 PS 637: Performed by: INTERNAL MEDICINE

## 2022-04-24 PROCEDURE — 250N000012 HC RX MED GY IP 250 OP 636 PS 637: Performed by: STUDENT IN AN ORGANIZED HEALTH CARE EDUCATION/TRAINING PROGRAM

## 2022-04-24 PROCEDURE — 99222 1ST HOSP IP/OBS MODERATE 55: CPT | Performed by: INTERNAL MEDICINE

## 2022-04-24 PROCEDURE — 999N000157 HC STATISTIC RCP TIME EA 10 MIN

## 2022-04-24 PROCEDURE — 71045 X-RAY EXAM CHEST 1 VIEW: CPT

## 2022-04-24 PROCEDURE — 120N000004 HC R&B MS OVERFLOW

## 2022-04-24 PROCEDURE — 94640 AIRWAY INHALATION TREATMENT: CPT

## 2022-04-24 PROCEDURE — 84132 ASSAY OF SERUM POTASSIUM: CPT | Performed by: INTERNAL MEDICINE

## 2022-04-24 PROCEDURE — 250N000013 HC RX MED GY IP 250 OP 250 PS 637: Performed by: STUDENT IN AN ORGANIZED HEALTH CARE EDUCATION/TRAINING PROGRAM

## 2022-04-24 PROCEDURE — 83735 ASSAY OF MAGNESIUM: CPT | Performed by: INTERNAL MEDICINE

## 2022-04-24 PROCEDURE — 94640 AIRWAY INHALATION TREATMENT: CPT | Mod: 76

## 2022-04-24 RX ORDER — PREDNISONE 10 MG/1
10 TABLET ORAL DAILY
Status: DISCONTINUED | OUTPATIENT
Start: 2022-04-30 | End: 2022-04-26 | Stop reason: HOSPADM

## 2022-04-24 RX ORDER — PANTOPRAZOLE SODIUM 20 MG/1
40 TABLET, DELAYED RELEASE ORAL
Status: DISCONTINUED | OUTPATIENT
Start: 2022-04-24 | End: 2022-04-26 | Stop reason: HOSPADM

## 2022-04-24 RX ORDER — PREDNISONE 20 MG/1
20 TABLET ORAL DAILY
Status: DISCONTINUED | OUTPATIENT
Start: 2022-04-26 | End: 2022-04-26 | Stop reason: HOSPADM

## 2022-04-24 RX ADMIN — Medication 2 SPRAY: at 09:12

## 2022-04-24 RX ADMIN — FUROSEMIDE 20 MG: 20 TABLET ORAL at 09:08

## 2022-04-24 RX ADMIN — IPRATROPIUM BROMIDE AND ALBUTEROL SULFATE 3 ML: 2.5; .5 SOLUTION RESPIRATORY (INHALATION) at 11:31

## 2022-04-24 RX ADMIN — IPRATROPIUM BROMIDE AND ALBUTEROL SULFATE 3 ML: 2.5; .5 SOLUTION RESPIRATORY (INHALATION) at 07:21

## 2022-04-24 RX ADMIN — CEFUROXIME AXETIL 250 MG: 250 TABLET ORAL at 20:47

## 2022-04-24 RX ADMIN — PREDNISONE 40 MG: 20 TABLET ORAL at 09:08

## 2022-04-24 RX ADMIN — METHOTREXATE 10 MG: 2.5 TABLET ORAL at 10:46

## 2022-04-24 RX ADMIN — PANTOPRAZOLE SODIUM 40 MG: 20 TABLET, DELAYED RELEASE ORAL at 16:41

## 2022-04-24 RX ADMIN — CEFUROXIME AXETIL 250 MG: 250 TABLET ORAL at 09:08

## 2022-04-24 RX ADMIN — IPRATROPIUM BROMIDE AND ALBUTEROL SULFATE 3 ML: 2.5; .5 SOLUTION RESPIRATORY (INHALATION) at 15:33

## 2022-04-24 RX ADMIN — METOPROLOL SUCCINATE 100 MG: 100 TABLET, EXTENDED RELEASE ORAL at 09:08

## 2022-04-24 RX ADMIN — RIVAROXABAN 15 MG: 15 TABLET, FILM COATED ORAL at 09:08

## 2022-04-24 RX ADMIN — GUAIFENESIN 1200 MG: 600 TABLET ORAL at 09:08

## 2022-04-24 RX ADMIN — PRAVASTATIN SODIUM 20 MG: 20 TABLET ORAL at 09:08

## 2022-04-24 RX ADMIN — IPRATROPIUM BROMIDE AND ALBUTEROL SULFATE 3 ML: 2.5; .5 SOLUTION RESPIRATORY (INHALATION) at 20:17

## 2022-04-24 RX ADMIN — Medication 1 MG: at 22:12

## 2022-04-24 RX ADMIN — GUAIFENESIN 1200 MG: 600 TABLET ORAL at 20:47

## 2022-04-24 ASSESSMENT — ACTIVITIES OF DAILY LIVING (ADL)
ADLS_ACUITY_SCORE: 9

## 2022-04-24 NOTE — PROGRESS NOTES
Patient on O2 NC 1LPM. Sats 94-95%. BS diminished, expiratory wheezing after neb. Patient received treatment as order, tolerated treatment well.    Cristobal Rogel, RT

## 2022-04-24 NOTE — CONSULTS
PULMONARY / CRITICAL CARE CONSULT NOTE    Date / Time of Admission:  4/20/2022 10:37 AM    Assessment:     Fatuma Henry is a 76 year old female with history of HTN, atrial fibrillation anticoagulated, seropositive rheumatoid arthritis on MTX and certolizumab, CKD stage 3, GERD, former smoker, mild emphysematous changes, SHAKEEL GGO/cystic lesion.   Patient presents to ED on 4/20 for evaluation of generalized weakness, shortness of breath, chest pain, diarrhea. Patient reports increase frequency of albuterol inhaler.   Patient was hemodynamically stable, afebrile, on exam she had expiratory wheezes and ronchi both HT, in addition to mild swelling of LE, pitting edema was documented.   Labs showed normal WBC and diff, mild increase BUN/Cr, negative U/A, CXR showed no infiltrates.   COVID19 PCR, influenza A and B were negative.   Patient was started on IV steroids, scheduled bronchodilators. Received IV diuretics.   Follow up urine cx grew [id number] E coli, cefuroxime was added to treatment.   Patient reports improvement of shortness of breath and chest tightness, persistent dry cough.   Pulmonary service consulted.     1. COPD exacerbation   Reactive airway symptoms per ED and admission H&P  Previous tobacco user, mild emphysematous changes in CT scan.   Taper down systemic steroids. Continue bronchodilators.   PFTs as outpatient to stage disease.   2. Persistent cough  Negative CXR for infiltrates on admission.   Reactive airway symptoms have improved, but persistent dry cough.   Afebrile, clear lung fields, normal WBC. No sinus tenderness or postnasal drip. Recent sinus CT scan showed no mucosal thickening or air fluid level.   Patient reports significant acid reflux in the past, symptoms improved with H2 blocker.   Recommend a follow up CXR. Continue steroid taper. Change H2 blocker to PPI.   If negative work up, add cough suppressant medications.   3. Left upper lobe GGO/cystic lesion in chest CT scan  2/2022  Previous tobacco user. Patient states having chest CT scans in the past, recommend to load outside images in our system.  Patient is scheduled to be seen by Dr. Rosario in the pulmonary clinic.   4. E coli UTI  5. Seropositive rheumatoid arthritis on methotrexate and certolizumab  6. HTN  7. Atrial fibrillation rate control anticoagulated  8. CKD stage 3    Advance Directives:  DNR    Plan:   1. Scheduled bronchodilators  2. Taper down systemic steroids  3. Follow up CXR  4. Continue cefuroxime for UTI  5. Avoid eating close to bed time, raise the head of the bed  6. Change H2 blocker to PPI daily   7. DVT prophylaxis, patient is anticoagulated  8. Follow up in the pulmonary clinic with Dr. Rosario as schedule, patient will need PFTs prior to appointment.     Please contact me if you have any questions.    Cooper Schreiber  Pulmonary / Critical Care  04/24/2022  12:10 PM      Reason for consult: cough     HPI:  Fatuma Henry is a 76 year old female with history of HTN, atrial fibrillation anticoagulated, rheumatoid arthritis on MTX and certolizumab, CKD stage 3, GERD, former smoker, mild emphysematous changes, SHAKEEL GGO/cystic lesion.   Patient presents to ED on 4/20 for evaluation of generalized weakness, shortness of breath, chest pain, diarrhea. Patient reports increase frequency of albuterol inhaler.   Patient was hemodynamically stable, afebrile, on exam she had expiratory wheezes and ronchi both HT, in addition to mild swelling of LE, pitting edema was documented.   Labs showed normal WBC and diff, mild increase BUN/Cr, negative U/A, CXR showed no infiltrates.   COVID19 PCR, influenza A and B were negative.   Patient was started on IV steroids, scheduled bronchodilators. Received IV diuretics.   Follow up urine cx grew [id number] E coli, cefuroxime was added to treatment.   Patient reports improvement of shortness of breath and chest tightness, persistent dry cough.   Pulmonary service  "consulted.     Past Medical History:   Diagnosis Date     Atrial fibrillation (H)      CRF (chronic renal failure)      GERD (gastroesophageal reflux disease)      Hypertension      Hypovolemic shock (H)      Influenza A 2017     Rheumatoid arthritis (H)      Sepsis (H) 2017     Allergies: Codeine and Fosamax [alendronic acid]     MEDS:    Social History     Socioeconomic History     Marital status:      Spouse name: Not on file     Number of children: Not on file     Years of education: Not on file     Highest education level: Not on file   Occupational History     Not on file   Tobacco Use     Smoking status: Former Smoker     Packs/day: 0.50     Types: Cigarettes     Quit date: 2017     Years since quittin.3     Smokeless tobacco: Never Used   Substance and Sexual Activity     Alcohol use: No     Drug use: No     Sexual activity: Not Currently   Other Topics Concern     Not on file   Social History Narrative     Not on file     Social Determinants of Health     Financial Resource Strain: Not on file   Food Insecurity: Not on file   Transportation Needs: Not on file   Physical Activity: Not on file   Stress: Not on file   Social Connections: Not on file   Intimate Partner Violence: Not on file   Housing Stability: Not on file     Family History   Problem Relation Age of Onset     Heart Failure Mother      Cerebrovascular Disease Father      Kidney failure Sister      Cerebrovascular Disease Brother      Diabetes Type 2  Brother      ROS  - Twelve point review of systems were discussed with patient, positive findings in HPI    Objective:   VITALS:  /57   Pulse 65   Temp 97.3  F (36.3  C) (Axillary)   Resp 22   Ht 1.651 m (5' 5\")   Wt 73.3 kg (161 lb 11.2 oz)   SpO2 94%   BMI 26.91 kg/m    VENT:  Resp: 22    EXAM:   Gen: awake, alert, no distress  HEENT: pink conjunctiva, moist mucosa, Mallampati II/IV  Neck: no thyromegaly, masses or JVD  Lungs: clear  CV: regular, no murmurs " or gallops appreciated  Abdomen: soft, NT, BS wnl  Ext: no edema  Neuro: CN II-XII intact, non focal       Data Review:  Recent Labs   Lab 04/21/22  0532 04/20/22  1046    81      04/21/22 05:32   Sodium 138   Potassium 3.9   Chloride 105   Carbon Dioxide 21 (L)   Urea Nitrogen 37 (H)   Creatinine 1.14 (H)   GFR Estimate 50 (L) [1]   Calcium 9.7   Anion Gap 12   Magnesium 2.2   Glucose 123   WBC 3.6 (L)   Hemoglobin 11.4 (L)   Hematocrit 34.6 (L)   Platelet Count 232   RBC Count 3.47 (L)      MCH 32.9   MCHC 32.9   RDW 14.0   % Neutrophils 78   % Lymphocytes 11   % Monocytes 11   % Eosinophils 0   % Basophils 0      04/20/22 12:57   SARS CoV2 PCR Negative [1]   Influenza A Negative   Influenza B Negative     Urine Culture 10,000-50,000 CFU/mL Escherichia coli Abnormal        <10,000 CFU/mL Urogenital jamee         XR CHEST PORT 1 VIEW  LOCATION: [facility]  DATE/TIME: 4/20/2022 11:10 AM  INDICATION: short of breath  COMPARISON: 01/17/2022  IMPRESSION: Negative chest.    Chest CT scan 1/17/22  IMPRESSION:  1.  No pulmonary embolism.  2.  Mild pulmonary trunk enlargement; correlate for pulmonary hypertension.  3.  Nonaneurysmal aorta without dissection.  4.  Mild emphysema. Mild airway thickening.  5.  Left upper lobe / lingular groundglass and cystic focus with thickening, indeterminate. Adenocarcinoma type lesions can have this appearance. Post infectious or inflammatory change versus scarring also possible differentials. Recommend 6 month follow-up per guidelines below.   6.  Moderate coronary calcifications.    By:  Cooper Schreiber MD, 04/24/2022  12:10 PM    Primary Care Physician:  Tory Wise

## 2022-04-24 NOTE — PROGRESS NOTES
RESPIRATORY CARE NOTE     Patient Name: Fatuma Henry  Today's Date: 4/24/2022       Pt continues to receive Duoneb. BS are coarse diminished. Pt is on 1-2 lpm of oxygen via NC, SpO2 is 97%. Post treatment there is increased aeration. Pt also perceives improvement.  RT encouraged deep breathing and coughing techniques .  RT will continue to monitor and assess.     Kajal Saldivar, RT

## 2022-04-24 NOTE — PLAN OF CARE
"Problem: Plan of Care - These are the overarching goals to be used throughout the patient stay.    Goal: Absence of Hospital-Acquired Illness or Injury    Goal: Optimal Comfort and Wellbeing  Outcome: Ongoing, Progressing     Problem: Risk for Delirium  Goal: Optimal Coping  Outcome: Ongoing, Progressing     Problem: COPD (Chronic Obstructive Pulmonary Disease) Comorbidity  Goal: Maintenance of COPD Symptom Control  Outcome: Ongoing, Progressing    Pt resting comfortably this shift. O2 increased to 2L NC this afternoon per pt request to help her \"breathe better\", now tolerating well. Pt A&O and calling appropriately. Still experiencing coughing fits during which she says she \"is worried she can't catch her breath\". Pulmonology consulted today. Vitals stable, bed alarm and frequent rounding utilized for pt safety. Will continue to monitor closely.   "

## 2022-04-24 NOTE — PROGRESS NOTES
Summary/Update  76-year-old woman with oxygen dependent COPD history of cigarette smoking rheumatoid arthritis on methotrexate and Cimzia who is admitted to the hospital for progressive cough shortness of breath etc. she has had a downtrending course since the beginning of this year when oxygen was started at home      Assessment/Plan/Narrative  Continue with antibiotics steroids and will need to get pulmonary consultation I am concerned that she may have some rheumatoid lung and/or pulmonary fibrosis and she is on methotrexate which can be a pulmonary toxin        Active Hospital Problems    COPD exacerbation (H)      DNR (do not resuscitate)      Chronic anticoagulation      Personal history of immunosuppressive therapy      Seropositive rheumatoid arthritis (H)      History of 2019 novel coronavirus disease (COVID-19)      CRF (chronic renal failure), stage 3 (moderate) (H)      Paroxysmal atrial fibrillation (H)          Code Status DNR    Discharge Planning Home in few days     Subjective:   she continues to have a dry harsh hacking cough chest x-ray not too impressive but there is concern now that perhaps she has some type of pulmonary fibrosis or rheumatoid lung and she is on methotrexate she is not ready to go home today we will get pulmonary consultation        Objective:    Vital signs in last 24 hours  Temp:  [97.3  F (36.3  C)-98  F (36.7  C)] 97.3  F (36.3  C)  Pulse:  [65-86] 65  Resp:  [16-22] 22  BP: (117-129)/(55-77) 126/57  SpO2:  [94 %-99 %] 94 %  Weight:           Physical Exam  General: Chronically ill-appearing she is not having any paroxysms or cough this morning but had some earlier during the night and had a rough night  VS-see above  HEENT:  Lungs:  Cor:  ABD:  Ext :  Neuro:    Pertinent Labs   Lab Results   Component Value Date    WBC 3.6 (L) 04/21/2022    HGB 11.4 (L) 04/21/2022    HCT 34.6 (L) 04/21/2022     04/21/2022     04/21/2022     Lab Results   Component Value Date     BUN 37 (H) 04/21/2022     04/21/2022    CO2 21 (L) 04/21/2022         El Vigil MD.

## 2022-04-24 NOTE — PLAN OF CARE
Patient A&Ox4. Patient ambulated to the bathroom, washed her face, brushed her teeth without oxygen, and tolerated well.  Patient continues on 1 to 2L of O2 via NC. Patient felt that she needed the 2L while sleeping Patient does have some bouts of coughing that is congested, but non-productive. Patient states that her chest hurts when she coughs.  Patient rested well through-out the night.  VSS.    Potassium and magnesium: rechecks for tomorrow 4/25

## 2022-04-25 ENCOUNTER — APPOINTMENT (OUTPATIENT)
Dept: OCCUPATIONAL THERAPY | Facility: CLINIC | Age: 77
DRG: 190 | End: 2022-04-25
Payer: COMMERCIAL

## 2022-04-25 LAB
MAGNESIUM SERPL-MCNC: 1.9 MG/DL (ref 1.8–2.6)
POTASSIUM BLD-SCNC: 4.5 MMOL/L (ref 3.5–5)

## 2022-04-25 PROCEDURE — 250N000009 HC RX 250: Performed by: STUDENT IN AN ORGANIZED HEALTH CARE EDUCATION/TRAINING PROGRAM

## 2022-04-25 PROCEDURE — 250N000013 HC RX MED GY IP 250 OP 250 PS 637: Performed by: INTERNAL MEDICINE

## 2022-04-25 PROCEDURE — 94640 AIRWAY INHALATION TREATMENT: CPT | Mod: 76

## 2022-04-25 PROCEDURE — 250N000013 HC RX MED GY IP 250 OP 250 PS 637: Performed by: STUDENT IN AN ORGANIZED HEALTH CARE EDUCATION/TRAINING PROGRAM

## 2022-04-25 PROCEDURE — 36415 COLL VENOUS BLD VENIPUNCTURE: CPT | Performed by: INTERNAL MEDICINE

## 2022-04-25 PROCEDURE — 87205 SMEAR GRAM STAIN: CPT | Performed by: INTERNAL MEDICINE

## 2022-04-25 PROCEDURE — 99232 SBSQ HOSP IP/OBS MODERATE 35: CPT | Performed by: INTERNAL MEDICINE

## 2022-04-25 PROCEDURE — 84132 ASSAY OF SERUM POTASSIUM: CPT | Performed by: INTERNAL MEDICINE

## 2022-04-25 PROCEDURE — 97535 SELF CARE MNGMENT TRAINING: CPT | Mod: GO

## 2022-04-25 PROCEDURE — 83735 ASSAY OF MAGNESIUM: CPT | Performed by: INTERNAL MEDICINE

## 2022-04-25 PROCEDURE — 120N000004 HC R&B MS OVERFLOW

## 2022-04-25 PROCEDURE — 999N000157 HC STATISTIC RCP TIME EA 10 MIN

## 2022-04-25 PROCEDURE — 94640 AIRWAY INHALATION TREATMENT: CPT

## 2022-04-25 PROCEDURE — 250N000012 HC RX MED GY IP 250 OP 636 PS 637: Performed by: STUDENT IN AN ORGANIZED HEALTH CARE EDUCATION/TRAINING PROGRAM

## 2022-04-25 RX ORDER — PREDNISONE 10 MG/1
20 TABLET ORAL DAILY
Qty: 12 TABLET | Refills: 0 | Status: SHIPPED | OUTPATIENT
Start: 2022-04-26 | End: 2022-05-05

## 2022-04-25 RX ORDER — CEFUROXIME AXETIL 250 MG/1
250 TABLET ORAL EVERY 12 HOURS
Qty: 8 TABLET | Refills: 0 | Status: SHIPPED | OUTPATIENT
Start: 2022-04-25 | End: 2022-04-29

## 2022-04-25 RX ORDER — PANTOPRAZOLE SODIUM 40 MG/1
40 TABLET, DELAYED RELEASE ORAL
Qty: 30 TABLET | Refills: 0 | Status: SHIPPED | OUTPATIENT
Start: 2022-04-25 | End: 2022-05-25

## 2022-04-25 RX ADMIN — PRAVASTATIN SODIUM 20 MG: 20 TABLET ORAL at 08:36

## 2022-04-25 RX ADMIN — FOLIC ACID 1 MG: 1 TABLET ORAL at 08:36

## 2022-04-25 RX ADMIN — IPRATROPIUM BROMIDE AND ALBUTEROL SULFATE 3 ML: 2.5; .5 SOLUTION RESPIRATORY (INHALATION) at 08:10

## 2022-04-25 RX ADMIN — CEFUROXIME AXETIL 250 MG: 250 TABLET ORAL at 08:36

## 2022-04-25 RX ADMIN — GUAIFENESIN 1200 MG: 600 TABLET ORAL at 20:22

## 2022-04-25 RX ADMIN — PANTOPRAZOLE SODIUM 40 MG: 20 TABLET, DELAYED RELEASE ORAL at 08:36

## 2022-04-25 RX ADMIN — Medication 1 MG: at 23:12

## 2022-04-25 RX ADMIN — GUAIFENESIN 1200 MG: 600 TABLET ORAL at 08:36

## 2022-04-25 RX ADMIN — CEFUROXIME AXETIL 250 MG: 250 TABLET ORAL at 20:22

## 2022-04-25 RX ADMIN — METOPROLOL SUCCINATE 100 MG: 100 TABLET, EXTENDED RELEASE ORAL at 08:37

## 2022-04-25 RX ADMIN — IPRATROPIUM BROMIDE AND ALBUTEROL SULFATE 3 ML: 2.5; .5 SOLUTION RESPIRATORY (INHALATION) at 11:43

## 2022-04-25 RX ADMIN — IPRATROPIUM BROMIDE AND ALBUTEROL SULFATE 3 ML: 2.5; .5 SOLUTION RESPIRATORY (INHALATION) at 16:19

## 2022-04-25 RX ADMIN — FUROSEMIDE 20 MG: 20 TABLET ORAL at 08:37

## 2022-04-25 RX ADMIN — RIVAROXABAN 15 MG: 15 TABLET, FILM COATED ORAL at 08:36

## 2022-04-25 RX ADMIN — LORAZEPAM 0.25 MG: 0.5 TABLET ORAL at 11:10

## 2022-04-25 RX ADMIN — IPRATROPIUM BROMIDE AND ALBUTEROL SULFATE 3 ML: 2.5; .5 SOLUTION RESPIRATORY (INHALATION) at 19:53

## 2022-04-25 RX ADMIN — PREDNISONE 40 MG: 20 TABLET ORAL at 08:36

## 2022-04-25 ASSESSMENT — ACTIVITIES OF DAILY LIVING (ADL)
ADLS_ACUITY_SCORE: 9

## 2022-04-25 NOTE — PROVIDER NOTIFICATION
11:16 AM Text Paged Dr. Solis:  Pt SATs >95% while walking. Feeling very SOB, Ativan given for dyspnea. She does not feel safe to go home. Can you come talk with her? Waiting for response. TOYIN HERRERA RN Callback #[phone]     11:23 AM Spoke with Dr. Solis, explained situation. She will come evaluate pt later this afternoon.

## 2022-04-25 NOTE — PROGRESS NOTES
Pt was given home regimen neb, BS coarse/diminished, 2-3L NC per home regimen, no complications. RT following.

## 2022-04-25 NOTE — PROGRESS NOTES
[facility] Medicine PROGRESS NOTE      Identification/Summary:   Fatuma Henry is a 76 year old female with a past medical history of COPD, A. fib, hypertension, rheumatoid arthritis, GERD, HLD, CKD who was admitted on 4/20/2022 for dyspnea and admitted for COPD exacerbation, continues with coughing spells and dyspnea on exertion.  She will likely discharge tomorrow.     Assessment & Plan       COPD exacerbation  Acute on chronic hypoxic respiratory failure baseline 3 L oxygen at nighttime  Nasal congestion  S/p IV Solu-Medrol  Continue prednisone taper per pulmonary  Mucinex 20 mg twice daily  Additional albuterol neb x1  Continue DuoNebs 4 times daily and as needed  Ativan as needed for anxiety  Continue nightly oxygen/CPAP  Started on PPI for chronic cough.    Chronic diastolic CHF  Moderate pulmonary hypertension  Recent echo with a EF 60%, mild aortic stenosis, moderate aortic regurgitation  , mild bilateral lower extremity pitting edema, no evidence of congestion on the chest x-ray or exam  Continue Lasix 20 mg p.o. daily, did receive 40 mg IV x1  on admission     Diarrhea  Resolved     Hypomagnesemia  Replete per protocol     A. Fib/hypertension  Long-term use anticoagulant medication  Metoprolol  Rivaroxaban  Lasix     rheumatoid arthritis  She is on cerolizubab 400mg [id number] and methotrexate 10mg qWEEK.  -continue PTA folic acid 1mg daily except when taking MTX     GERD  -continue PTA famotidine 20mg     HLD  -continue PTA pravastatin 20mg     CKD 3  Cr on admission was 1.14.  -avoid nephrotoxic drugs when possible    Diet: Regular Diet Adult  Diet  DVT Prophylaxis: On Xarelto  Code Status: No CPR- Do NOT Intubate    Anticipated possible discharge in 1 days once clinical improvement milestones are met.    Interval History/Subjective:  She is continues with coughing spells, shortness of breath with exertion.  No chest pain.  No other urinary or bowel complaints.    Physical  "Exam/Objective:  Vitals I/O   Vital signs:  Temp: 96.8  F (36  C) Temp src: Oral BP: 111/52 Pulse: 96   Resp: 19 SpO2: 97 % O2 Device: None (Room air) Oxygen Delivery: 2.5 LPM Height: 165.1 cm (5' 5\") Weight: 73.7 kg (162 lb 6.4 oz)  Estimated body mass index is 27.02 kg/m  as calculated from the following:    Height as of this encounter: 1.651 m (5' 5\").    Weight as of this encounter: 73.7 kg (162 lb 6.4 oz). I/O last 3 completed shifts:  In: 1193 [P.O.:1190; I.V.:3]  Out: 0      Body mass index is 27.02 kg/m .    General Appearance:  Alert, cooperative, no distress   Head:  Normocephalic, atraumatic   Eyes:  PERRL    Throat:  mucosa; moist   Neck: No JVD, thyromegaly   Lungs:    Mild expiratory wheezes respirations unlabored   Chest Wall:  No tenderness or deformity   Heart:  Regular rate and rhythm, S1, S2 normal,no murmur   Abdomen:   Soft, non tender, non distended, bowel sounds present, no guarding or rigidity   Extremities: No edema, no joint swelling   Skin: Skin color, texture, turgor normal, no rashes or lesions   Neurologic: Alert and oriented X 3, Moves all 4 extremities       Medications:   Personally Reviewed.    albuterol  2.5 mg Nebulization Once     cefuroxime  250 mg Oral Q12H RIYA     folic acid  1 mg Oral Daily     furosemide  20 mg Oral Daily     guaiFENesin  1,200 mg Oral BID     ipratropium - albuterol 0.5 mg/2.5 mg/3 mL  3 mL Nebulization 4x daily     methotrexate  10 mg Oral Q7 Days     metoprolol succinate ER  100 mg Oral Daily     pantoprazole  40 mg Oral QAM AC     pravastatin  20 mg Oral Daily     [START ON 4/26/2022] predniSONE  20 mg Oral Daily    Followed by     [START ON 4/30/2022] predniSONE  10 mg Oral Daily     rivaroxaban ANTICOAGULANT  15 mg Oral Daily with breakfast     sodium chloride (PF)  3 mL Intracatheter Q8H     sodium chloride (PF)  3 mL Intracatheter Q8H       Data reviewed today: I personally reviewed all new medications, labs, imaging/diagnostics reports over the past " 24 hours. Pertinent findings include    Labs:  Most Recent 3 BMP's:Recent Labs   Lab Test 04/25/22  0421 04/24/22  0423 04/23/22  0536 04/22/22  0519 04/21/22  0532 04/20/22  1540 04/20/22  1046 02/17/22  1046   NA  --   --   --   --  138  --  140 141   POTASSIUM 4.5 4.3 4.3   < > 3.9   < > 4.0 3.8   CHLORIDE  --   --   --   --  105  --  102 101   CO2  --   --   --   --  21*  --  26 28   BUN  --   --   --   --  37*  --  30* 38*   CR  --   --   --   --  1.14*  --  1.14* 1.56*   ANIONGAP  --   --   --   --  12  --  12 12   SUNI  --   --   --   --  9.7  --  10.3 10.3   GLC  --   --   --   --  123  --  81 120    < > = values in this interval not displayed.       Imaging:   Recent Results (from the past 24 hour(s))   XR Chest 1 View    Narrative    EXAM: XR CHEST 1 VIEW  LOCATION: [facility]  DATE/TIME: 4/24/2022 2:24 PM    INDICATION: Persistent cough  COMPARISON: 4/20/2022, 1/17/2022      Impression    IMPRESSION: Heart size is within normal limits for technique. Normal pulmonary vascularity. No focal infiltrates. Unremarkable portable chest.       Tatianna Solis MD  Hospitalist  [facility]

## 2022-04-25 NOTE — PROGRESS NOTES
Care Management Discharge Note    Discharge Date: 04/25/2022       Discharge Disposition: Home    Discharge Services: None    Discharge DME: None (nothing new- has 02 at home prior to admit)    Discharge Transportation: family or friend will provide (sujit Rossi [phone])    Private pay costs discussed: None indicated.      Education Provided on the Discharge Plan: Patient and daughter confirm plan for discharge home, no HC services needed. AVS per bedside RN.     Persons Notified of Discharge Plans: patient   Patient/Family in Agreement with the Plan: yes    Handoff Referral Completed: CM coordinated with patient and daughter. CM updated bedside RN. Daughter will transport when patient is ready to go.       Additional Information:  Chart reviewed. PT and OT recommendations for home with assist. CM met with patient. She denies needs from CM. States daughter Enid can be called and updated as she is the person will be providing the transportation home. CM spoke to sujit Rossi. She confirms no Home Care services are needed. She confirms either herself or a cousin will transport, but they want patient ready to go before they come. CM provided phone number to unit per request.     [facility] referral sent per protocol.     2:07 PM  Per MD, will delay discharge until tomorrow. Anticipate early AM discharge. Charge RN aware.     Alejandrina Milan, RN

## 2022-04-25 NOTE — PROGRESS NOTES
Pt seen for scheduled nebulizer treatments. Pt on room air all day with O2 sats >95%. Lungs are diminished with exp wheezes. Pt to discharge in the AM.     Mary Sierra, RT

## 2022-04-25 NOTE — PLAN OF CARE
Patient A&Ox4. Patient feeling very congested prior to bedtime.  Patient continues to have some coughing spells. Patient on 1-2L nasal canula on this shift, with O2 sats staying well above 90%.  Patient was up to brush her teeth, wash her face, and went to the bathroom without oxygen, and tolerated well.  Patient rested well overnight. VSS.

## 2022-04-25 NOTE — PLAN OF CARE
Pt on room air during shift, oxygen saturations >94% at rest and with ambulation. Pt has a intermittent cough with minimal production. She has episodes of feeling like she is unable to take a deep breath. During these episodes, her oxygenation remains normal. She responded well to a PRN ativan dose. Pt resting comfortable for remainder of shift. Hopeful for discharge tomorrow morning

## 2022-04-26 VITALS
RESPIRATION RATE: 20 BRPM | HEIGHT: 65 IN | DIASTOLIC BLOOD PRESSURE: 56 MMHG | TEMPERATURE: 97.6 F | BODY MASS INDEX: 26.98 KG/M2 | SYSTOLIC BLOOD PRESSURE: 111 MMHG | HEART RATE: 75 BPM | WEIGHT: 161.9 LBS | OXYGEN SATURATION: 99 %

## 2022-04-26 LAB
MAGNESIUM SERPL-MCNC: 1.9 MG/DL (ref 1.8–2.6)
POTASSIUM BLD-SCNC: 4.4 MMOL/L (ref 3.5–5)

## 2022-04-26 PROCEDURE — 250N000013 HC RX MED GY IP 250 OP 250 PS 637: Performed by: STUDENT IN AN ORGANIZED HEALTH CARE EDUCATION/TRAINING PROGRAM

## 2022-04-26 PROCEDURE — 99239 HOSP IP/OBS DSCHRG MGMT >30: CPT | Performed by: INTERNAL MEDICINE

## 2022-04-26 PROCEDURE — 36415 COLL VENOUS BLD VENIPUNCTURE: CPT | Performed by: INTERNAL MEDICINE

## 2022-04-26 PROCEDURE — 83735 ASSAY OF MAGNESIUM: CPT | Performed by: INTERNAL MEDICINE

## 2022-04-26 PROCEDURE — 84132 ASSAY OF SERUM POTASSIUM: CPT | Performed by: INTERNAL MEDICINE

## 2022-04-26 PROCEDURE — 250N000013 HC RX MED GY IP 250 OP 250 PS 637: Performed by: INTERNAL MEDICINE

## 2022-04-26 PROCEDURE — 250N000012 HC RX MED GY IP 250 OP 636 PS 637: Performed by: INTERNAL MEDICINE

## 2022-04-26 PROCEDURE — 250N000009 HC RX 250: Performed by: STUDENT IN AN ORGANIZED HEALTH CARE EDUCATION/TRAINING PROGRAM

## 2022-04-26 PROCEDURE — 94640 AIRWAY INHALATION TREATMENT: CPT

## 2022-04-26 RX ADMIN — PRAVASTATIN SODIUM 20 MG: 20 TABLET ORAL at 08:57

## 2022-04-26 RX ADMIN — CEFUROXIME AXETIL 250 MG: 250 TABLET ORAL at 08:57

## 2022-04-26 RX ADMIN — PANTOPRAZOLE SODIUM 40 MG: 20 TABLET, DELAYED RELEASE ORAL at 06:55

## 2022-04-26 RX ADMIN — GUAIFENESIN 1200 MG: 600 TABLET ORAL at 08:57

## 2022-04-26 RX ADMIN — PREDNISONE 20 MG: 20 TABLET ORAL at 08:56

## 2022-04-26 RX ADMIN — FOLIC ACID 1 MG: 1 TABLET ORAL at 08:57

## 2022-04-26 RX ADMIN — FUROSEMIDE 20 MG: 20 TABLET ORAL at 08:57

## 2022-04-26 RX ADMIN — RIVAROXABAN 15 MG: 15 TABLET, FILM COATED ORAL at 08:57

## 2022-04-26 RX ADMIN — IPRATROPIUM BROMIDE AND ALBUTEROL SULFATE 3 ML: 2.5; .5 SOLUTION RESPIRATORY (INHALATION) at 07:47

## 2022-04-26 RX ADMIN — METOPROLOL SUCCINATE 100 MG: 100 TABLET, EXTENDED RELEASE ORAL at 08:57

## 2022-04-26 ASSESSMENT — ACTIVITIES OF DAILY LIVING (ADL)
ADLS_ACUITY_SCORE: 9

## 2022-04-26 NOTE — PROGRESS NOTES
Patient received scheduled nebs.  Pt is on room air with sats of 95%, breath sounds were diminished with expiratory wheeze.  RT will follow.        Mahdi VENKAT Patton, RT

## 2022-04-26 NOTE — PLAN OF CARE
Goal: Optimal Comfort and Wellbeing  Outcome: Ongoing, Progressing    Pt expressed a little anxiousness about coughing and her chest tightening up. Told pt t take deep breaths and that seems to calm her down. Pt denies pain and requested PRN melatonin. Pt resting will continue to monitor.

## 2022-04-26 NOTE — PROGRESS NOTES
Care Management Discharge Note    Discharge Date: 04/26/2022       Discharge Disposition: Home    Discharge Services: None    Discharge DME: None (nothing new- has 02 at home prior to admit)    Discharge Transportation: family or friend will provide (daughter Enid [phone])    Handoff Referral Completed: CM coordinated with patient and daughter. CM updated bedside RN.     Additional Information:  Chart reviewed. MD placed discharge orders. CM spoke with patient and daughter Enid and provided update. Plan for daughter to be at hospital between 10-10:30am as she would like to be present for discharge instructions. She will also be providing the transportation home.         Alejandrina Milan, RN

## 2022-04-26 NOTE — PROGRESS NOTES
Occupational Therapy Discharge Summary    Reason for therapy discharge:    Discharged to home.    Progress towards therapy goal(s). See goals on Care Plan in [facility] electronic health record for goal details.  Goals partially met.  Barriers to achieving goals:   discharge from facility.    Therapy recommendation(s):    No further therapy is recommended.

## 2022-04-26 NOTE — TELEPHONE ENCOUNTER
Pt would like to wait until 5/5 appt with Dr Rush to discuss other med options for her RA that are maybe less aggressive and cheaper. Pt recently just got out of hospital and is still on antibiotics. Pt will keep lab and f/u appt next week.

## 2022-04-26 NOTE — DISCHARGE SUMMARY
[facility] MEDICINE  DISCHARGE SUMMARY     Primary Care Physician: Tory Wise  Admission Date: 4/20/2022   Discharge Provider: Tatianna Solis MD Discharge Date: 4/26/2022   Diet: Orders Placed This Encounter      Regular Diet Adult      Diet      Code Status: No CPR- Do NOT Intubate   Activity: activity as tolerated   [facility]      Condition at Discharge: Stable      REASON FOR PRESENTATION(See Admission Note for Details)   Shortness of breath    PRINCIPAL & ACTIVE DISCHARGE DIAGNOSES     Active Problems:    COPD exacerbation (H)    Acute on chronic hypoxemic respiratory failure  Hypomagnesemia replaced    Acute cystitis  Chronic diastolic CHF    Seropositive rheumatoid arthritis (H)    Paroxysmal atrial fibrillation (H)    CRF (chronic renal failure), stage 3 (moderate) (H)  History of 2019 novel coronavirus disease (COVID-19)    DNR (do not resuscitate)    Chronic anticoagulation    Personal history of immunosuppressive therapy      SIGNIFICANT FINDINGS (Imaging, labs):   Chest x-ray was negative      PENDING LABS         PROCEDURES ( this hospitalization only)          RECOMMENDATION FOR F/U VISIT   Wean off oxygen as tolerated.    DISPOSITION     Home    SUMMARY OF HOSPITAL COURSE:     76 year old old female with h/o COPD, CHF chronically on oxygen at night, RA, Afib, HTN presented to the emergency room with complaints of worsening shortness of breath for a week.  No associated sick contacts.  She was requiring 2 L of oxygen to maintain sats above 90.  Chest x-ray did not reveal any acute changes.  She was admitted for COPD exacerbation.  She was treated with IV Solu-Medrol, nebs, antibiotics.  She had slow improvement.  She was seen by pulmonary.  She did report chronic cough.  Started on PPI.  She will follow-up with pulmonary as outpatient.  She was weaned off of the oxygen at discharge.  She also has diagnosed with acute cystitis.  Urine culture  returned positive for E. coli.  Discharging on cefuroxime.    Discharge Medications with Med changes:        Review of your medicines      START taking      Dose / Directions   cefuroxime 250 MG tablet  Commonly known as: CEFTIN  Indication: Community Acquired Pneumonia  Used for: Acute cystitis without hematuria      Dose: 250 mg  Take 1 tablet (250 mg) by mouth every 12 hours for 4 days  Quantity: 8 tablet  Refills: 0     pantoprazole 40 MG EC tablet  Commonly known as: PROTONIX  Used for: COPD exacerbation (H)      Dose: 40 mg  Take 1 tablet (40 mg) by mouth every morning (before breakfast)  Quantity: 30 tablet  Refills: 0     predniSONE 10 MG tablet  Commonly known as: DELTASONE  Used for: COPD exacerbation (H)      Dose: 20 mg  Take 2 tablets (20 mg) by mouth daily 20mg 4 days, then 10 mg 4 days  Quantity: 12 tablet  Refills: 0        CONTINUE these medicines which have NOT CHANGED      Dose / Directions   acetaminophen 500 MG tablet  Commonly known as: TYLENOL  Used for: Acute bronchitis      Dose: 500-1,000 mg  [ACETAMINOPHEN (TYLENOL) 500 MG TABLET] Take 1-2 tablets (500-1,000 mg total) by mouth every 6 (six) hours as needed.  Refills: 0     CENTRAVITES 50 PLUS PO      Dose: 1 tablet  [FOLIC ACID/MULTIVITS-MIN/LUT (CENTRUM SILVER ORAL)] Take 1 tablet by mouth daily.  Refills: 0     Cimzia Prefilled 2 X 200 MG/ML Kit 2 syringes/kit  Used for: SLE (systemic lupus erythematosus) (H)  Generic drug: certolizumab pegol      Dose: 400 mg  Inject 400 mg Subcutaneous every 28 days  Quantity: 1 each  Refills: 4     famotidine 20 MG tablet  Commonly known as: PEPCID      Dose: 20 mg  Take 20 mg by mouth 2 times daily  Refills: 0     folic acid 1 MG tablet  Commonly known as: FOLVITE  Used for: Seropositive rheumatoid arthritis (H)      TAKE 1 TABLET BY MOUTH ONCE DAILY EXCEPT  THE  DAY  WHEN  TAKING  METHOTREXATE  Quantity: 90 tablet  Refills: 1     furosemide 20 MG tablet  Commonly known as: LASIX      Dose: 20  mg  Take 20 mg by mouth daily as needed  Refills: 0     ipratropium - albuterol 0.5 mg/2.5 mg/3 mL 0.5-2.5 (3) MG/3ML neb solution  Commonly known as: DUONEB  Used for: Mild intermittent reactive airway disease with acute exacerbation      Dose: 3 mL  Take 1 vial (3 mLs) by nebulization every 4 hours as needed for wheezing or shortness of breath / dyspnea  Quantity: 90 mL  Refills: 0     methotrexate sodium 2.5 MG Tabs  Used for: Rheumatoid arthritis involving multiple sites with positive rheumatoid factor (H)      TAKE 4 TABLETS BY MOUTH ONCE A WEEK  Quantity: 48 tablet  Refills: 0     metoprolol succinate ER 50 MG 24 hr tablet  Commonly known as: TOPROL-XL  Used for: Paroxysmal atrial fibrillation (H)      Dose: 100 mg  Take 2 tablets (100 mg) by mouth daily  Quantity: 60 tablet  Refills: 0     Nebulizer Compressor Misc  Used for: Reactive airway disease      [COMPRESSOR, FOR NEBULIZER SARAN] Nebulizer treatment qid prn  Quantity: 1 Device  Refills: 0     pravastatin 20 MG tablet  Commonly known as: PRAVACHOL      Dose: 20 mg  [PRAVASTATIN (PRAVACHOL) 20 MG TABLET] Take 20 mg by mouth daily.  Refills: 0     rivaroxaban ANTICOAGULANT 15 MG Tabs tablet  Commonly known as: XARELTO  Indication: Atrial Fibrillation Not Caused By A Heart Valve Problem  Used for: Paroxysmal atrial fibrillation (H)      Dose: 15 mg  Take 1 tablet (15 mg) by mouth daily (with dinner)  Quantity: 30 tablet  Refills: 0     vitamin D 50 MCG (2000 UT) Caps      Dose: 50 mcg  Take 50 mcg by mouth daily  Refills: 0           Where to get your medicines      These medications were sent to [facility] [address] [facility] [address] [address]    Phone: [phone]     cefuroxime 250 MG tablet    pantoprazole 40 MG EC tablet    predniSONE 10 MG tablet              Rationale for medication changes:    Prednisone taper, Protonix for COPD exacerbation, chronic cough  Cefuroxime for  "cystitis        Consults   Pulmonary critical care      Immunizations given this encounter         Discharge Procedure Orders   Primary Care - Care Coordination Referral   Standing Status: Future   Referral Priority: Routine: Next available opening Referral Type: Care Coordination   Number of Visits Requested: 1     Reason for your hospital stay   Order Comments: Copd exacerbation UTI     Follow-up and recommended labs and tests   Order Comments: Follow up with primary care provider, Tory Wise, within 7 days for hospital follow- up.   Follow up with pulmonary as scheduled     Activity   Order Comments: Your activity upon discharge: activity as tolerated     Order Specific Question Answer Comments   Is discharge order? Yes      Diet   Order Comments: Follow this diet upon discharge: Orders Placed This Encounter      Regular Diet Adult       Order Specific Question Answer Comments   Is discharge order? Yes      Examination     Vital Signs in last 24 hours:   Vital signs:  Temp: 97.6  F (36.4  C) Temp src: Oral BP: 111/56 Pulse: 75   Resp: 20 SpO2: 99 % O2 Device: None (Room air) Oxygen Delivery: 2 LPM (Baseline O2 at night) Height: 165.1 cm (5' 5\") Weight: 73.4 kg (161 lb 14.4 oz)  Estimated body mass index is 26.94 kg/m  as calculated from the following:    Height as of this encounter: 1.651 m (5' 5\").    Weight as of this encounter: 73.4 kg (161 lb 14.4 oz).       Pertinent positives on exam:  Lungs: Improved wheezing bilaterally.    Please see EMR for more detailed significant labs, imaging, consultant notes etc.  Total time spent on discharge: > 35 minutes    Tatianna Solis MD   [facility]: Ph:[phone]    CC:Tory Wise      "

## 2022-04-26 NOTE — PROGRESS NOTES
Pt discharged from unit per wheelchair. Escorted to main entrance per CARMENCITA Burris. Pt will meet daughter Enid there.

## 2022-04-27 ENCOUNTER — TELEPHONE (OUTPATIENT)
Dept: RESPIRATORY THERAPY | Facility: CLINIC | Age: 77
End: 2022-04-27
Payer: COMMERCIAL

## 2022-04-27 LAB
BACTERIA SPT CULT: NORMAL
GRAM STAIN RESULT: NORMAL

## 2022-04-27 NOTE — TELEPHONE ENCOUNTER
Spoke with Piotr, doing well, no questions for me to day. no issues getting and/or taking their medications, reminded her to take out the action plan and start checking it, no issues going home from the hospital yesterday.  Reminded when we will call again and to call before if there is a question.  Shea De León, RT, Chronic Pulmonary Disease Specialist

## 2022-04-29 ENCOUNTER — TELEPHONE (OUTPATIENT)
Dept: RESPIRATORY THERAPY | Facility: CLINIC | Age: 77
End: 2022-04-29
Payer: COMMERCIAL

## 2022-04-29 NOTE — TELEPHONE ENCOUNTER
Spoke with Gert, doing well, no questions for me to day. no issues getting and/or taking their medications, reviewed their action plan, in their green zone.  Reminded when we will call again and to call before if there is a question.  Shea De León, RT, Chronic Pulmonary Disease Specialist

## 2022-05-02 ENCOUNTER — LAB (OUTPATIENT)
Dept: LAB | Facility: CLINIC | Age: 77
End: 2022-05-02
Payer: COMMERCIAL

## 2022-05-02 DIAGNOSIS — M05.79 RHEUMATOID ARTHRITIS INVOLVING MULTIPLE SITES WITH POSITIVE RHEUMATOID FACTOR (H): ICD-10-CM

## 2022-05-02 LAB
ALBUMIN SERPL-MCNC: 3.2 G/DL (ref 3.5–5)
ALT SERPL W P-5'-P-CCNC: 30 U/L (ref 0–45)
CREAT SERPL-MCNC: 1.27 MG/DL (ref 0.6–1.1)
ERYTHROCYTE [DISTWIDTH] IN BLOOD BY AUTOMATED COUNT: 14.3 % (ref 10–15)
GFR SERPL CREATININE-BSD FRML MDRD: 44 ML/MIN/1.73M2
HCT VFR BLD AUTO: 38 % (ref 35–47)
HGB BLD-MCNC: 12.3 G/DL (ref 11.7–15.7)
MCH RBC QN AUTO: 33 PG (ref 26.5–33)
MCHC RBC AUTO-ENTMCNC: 32.4 G/DL (ref 31.5–36.5)
MCV RBC AUTO: 102 FL (ref 78–100)
PLATELET # BLD AUTO: 334 10E3/UL (ref 150–450)
RBC # BLD AUTO: 3.73 10E6/UL (ref 3.8–5.2)
WBC # BLD AUTO: 13.6 10E3/UL (ref 4–11)

## 2022-05-02 PROCEDURE — 36415 COLL VENOUS BLD VENIPUNCTURE: CPT

## 2022-05-02 PROCEDURE — 84460 ALANINE AMINO (ALT) (SGPT): CPT

## 2022-05-02 PROCEDURE — 82565 ASSAY OF CREATININE: CPT

## 2022-05-02 PROCEDURE — 85027 COMPLETE CBC AUTOMATED: CPT

## 2022-05-02 PROCEDURE — 82040 ASSAY OF SERUM ALBUMIN: CPT

## 2022-05-03 ENCOUNTER — TELEPHONE (OUTPATIENT)
Dept: RESPIRATORY THERAPY | Facility: HOSPITAL | Age: 77
End: 2022-05-03
Payer: COMMERCIAL

## 2022-05-03 NOTE — TELEPHONE ENCOUNTER
COPD Education follow up call:  Left message with call back number should pt have questions or concerns and COPD Action Plan reminder.  Leonardo Guadarrama, RT, TTS, Chronic Pulmonary Disease Specialist

## 2022-05-04 ENCOUNTER — TRANSFERRED RECORDS (OUTPATIENT)
Dept: HEALTH INFORMATION MANAGEMENT | Facility: CLINIC | Age: 77
End: 2022-05-04
Payer: COMMERCIAL

## 2022-05-05 ENCOUNTER — OFFICE VISIT (OUTPATIENT)
Dept: RHEUMATOLOGY | Facility: CLINIC | Age: 77
End: 2022-05-05
Payer: COMMERCIAL

## 2022-05-05 VITALS
DIASTOLIC BLOOD PRESSURE: 76 MMHG | HEART RATE: 84 BPM | HEIGHT: 65 IN | WEIGHT: 161 LBS | SYSTOLIC BLOOD PRESSURE: 120 MMHG | BODY MASS INDEX: 26.82 KG/M2

## 2022-05-05 DIAGNOSIS — M15.0 PRIMARY OSTEOARTHRITIS INVOLVING MULTIPLE JOINTS: ICD-10-CM

## 2022-05-05 DIAGNOSIS — Z79.899 HIGH RISK MEDICATION USE: ICD-10-CM

## 2022-05-05 DIAGNOSIS — R76.8 RHEUMATOID FACTOR POSITIVE: ICD-10-CM

## 2022-05-05 DIAGNOSIS — M05.79 RHEUMATOID ARTHRITIS INVOLVING MULTIPLE SITES WITH POSITIVE RHEUMATOID FACTOR (H): Primary | ICD-10-CM

## 2022-05-05 DIAGNOSIS — N18.30 CRF (CHRONIC RENAL FAILURE), STAGE 3 (MODERATE) (H): ICD-10-CM

## 2022-05-05 PROCEDURE — 96372 THER/PROPH/DIAG INJ SC/IM: CPT | Performed by: INTERNAL MEDICINE

## 2022-05-05 PROCEDURE — 99214 OFFICE O/P EST MOD 30 MIN: CPT | Mod: 25 | Performed by: INTERNAL MEDICINE

## 2022-05-05 NOTE — PROGRESS NOTES
"      Rheumatology follow-up visit note     Fatuma is a 76 year old female presents today for follow-up.    Fatuma was seen today for recheck.    Diagnoses and all orders for this visit:    Rheumatoid arthritis involving multiple sites with positive rheumatoid factor (H)    Primary osteoarthritis involving multiple joints    High risk medication use    Rheumatoid factor positive    CRF (chronic renal failure), stage 3 (moderate) (H)        Her seropositive rheumatoid arthritis is well controlled her residual polyarthralgias are likely due to osteoarthritis, with the recent changes in insurance she wonders if she could look into the options vis-à-vis Cimzia that she has to come here for injection and would not result in a bill for her.  We discussed various options.  For now we were both in agreement that we can see how a trial without Cimzia might evolve for her.  She is to stay with methotrexate as now with folic acid.  We will meet here in 2 months.    Follow up in 2 months.    [place]    Fatuma Henry is a 76 year old female is here for follow-up of rheumatoid arthritis.  This is longstanding.  Affected multiple joints, moderately severe, well controlled with the current combination of Cimzia and methotrexate, positive for signals of autoimmunity, positive rheumatoid factor negative anti-CCP..  She has done remarkably well with this.She has noted morning stiffness of no more than 20 to 30 minutes recent labs are reviewed renal impairment noted she is aware not to take nonsteroidals and is on a low-dose methotrexate for this reason.    She was in the hospital again with \"a viral\" illness.  The CT chest abnormalities deemed not likely to be \"fatal for her\".  There is a change in insurance.  She will have to pay $100 each time she comes in for Cimzia injection.  She wonders if there are alternatives.  We discussed various options as noted above.  Following is the excerpt from a previous note:     She reports no " mouth ulcers no photosensitivity there is no rash reported Symptoms.  She does not have a history of psoriasis herself for the family or that of inflammatory bowel disease.  She is a smoker for the past 20 years   She does not exercise.  She follows up at her primary physician's in [facility].  She has had a history of hypertension and cataracts.  She recalls it was approximately 6+ years ago when she started hurting.  She recalls  began to hurt her shoulders.  This was quite severe pain.  She then started hurting in her hands and wrists as well.  Fairly soon she was seen in rheumatology by Dr. Brooks labs were done.  She'll call for those suggested that she has rheumatoid arthritis.  She was given prednisone.  This did not help.  She remembers that all it did for her was to make her face swollen.  Then she took methotrexate for 3 months that was no good either.  She did tolerate this well.  She went on to have Orencia for 2 years.  That did very well during this time.  Than 1 time suddenly it stopped working she was then changed to Actemra.  Once again and the Actemra did the trick for her further to 2-1/2 years that she was on it.  Currently she is on Cimzia, and feels this has helped her quite nicely.   Of late she's been hurting in her lower back, legs.  This is worse with activity.  She wonders if this is because the current treatment is not working.  She also recalls an episode where she had slipped and landed on her back.  This was from a third step at her house.  And she lives with her .  Her sisters have had osteoarthritis and knee/hip replacement surgeries.  She rates her pain as moderately severe she still notices stiffness of 2 hours especially in the lower back.   Further historical information and ADL limitations as noted in the multidimensional health assessment questionnaire attached in the EMR.          DETAILED EXAMINATION  05/05/22  :    Vitals:    05/05/22 1401   BP: 120/76  "  BP Location: Right arm   Patient Position: Sitting   Cuff Size: Adult Regular   Pulse: 84   Weight: 73 kg (161 lb)   Height: 1.651 m (5' 5\")     Alert oriented. Head including the face is examined for malar rash, heliotropes, scarring, lupus pernio. Eyes examined for redness such as in episcleritis/scleritis, periorbital lesions.   Neck/ Face examined for parotid gland swelling, range of motion of neck.  Left upper and lower and right upper and lower extremities examined for tenderness, swelling, warmth of the appendicular joints, range of motion, edema, rash.  Some of the important findings included: she does not have evidence of synovitis in the palpable joints of the upper extremities.  No significant deformities of the digits.  small Heberden nodes.  Range of motion of the shoulders  show full abduction.  No JLT effusion or warmth of the knees.  she does not have dactylitis of the digits.     Patient Active Problem List    Diagnosis Date Noted     Asthma without status asthmaticus 04/22/2022     Priority: Medium     Chronic kidney disease, stage 3a (H) 04/22/2022     Priority: Medium     History of 2019 novel coronavirus disease (COVID-19) 04/22/2022     Priority: Medium     DNR (do not resuscitate) 04/22/2022     Priority: Medium     Chronic anticoagulation 04/22/2022     Priority: Medium     Eliquis- parox atrial fib       Personal history of immunosuppressive therapy 04/22/2022     Priority: Medium     Cimzia for RA;   certolizumab pegol (CIMZIA) injection 400 mg  subcut every 28 days             Pulmonary nodules 01/17/2022     Priority: Medium     COPD exacerbation (H) 01/17/2022     Priority: Medium     Benign essential hypertension 05/28/2020     Priority: Medium     Primary osteoarthritis involving multiple joints 06/10/2019     Priority: Medium     CRF (chronic renal failure), stage 3 (moderate) (H) 05/17/2018     Priority: Medium     Nonsustained ventricular tachycardia (H) 01/12/2018     Priority: " Medium     Paroxysmal atrial fibrillation (H)      Priority: Medium     Reactive airway disease      Priority: Medium     Dyspnea      Priority: Medium     Rheumatoid arthritis involving multiple sites with positive rheumatoid factor (H) 12/15/2016     Priority: Medium     High risk medication use 10/07/2015     Priority: Medium     Osteoporosis dxa 2006  09/22/2015     Priority: Medium     Seropositive rheumatoid arthritis (H) 03/02/2015     Priority: Medium     Past Surgical History:   Procedure Laterality Date     APPENDECTOMY       BLADDER SUSPENSION       CHOLECYSTECTOMY        Past Medical History:   Diagnosis Date     Atrial fibrillation (H)      CRF (chronic renal failure)      GERD (gastroesophageal reflux disease)      Hypertension      Hypovolemic shock (H)      Influenza A 12/2017     Rheumatoid arthritis (H)      Sepsis (H) 12/24/2017     Allergies   Allergen Reactions     Codeine Nausea and Vomiting     Fosamax [Alendronic Acid] Diarrhea     Current Outpatient Medications   Medication Sig Dispense Refill     acetaminophen (TYLENOL) 500 MG tablet [ACETAMINOPHEN (TYLENOL) 500 MG TABLET] Take 1-2 tablets (500-1,000 mg total) by mouth every 6 (six) hours as needed.  0     certolizumab pegol (CIMZIA PREFILLED) 2 X 200 MG/ML KIT 2 syringes/kit Inject 400 mg Subcutaneous every 28 days 1 each 4     Cholecalciferol (VITAMIN D) 50 MCG (2000 UT) CAPS Take 50 mcg by mouth daily       compressor, for nebulizer Saran [COMPRESSOR, FOR NEBULIZER SARAN] Nebulizer treatment qid prn 1 Device 0     famotidine (PEPCID) 20 MG tablet Take 20 mg by mouth 2 times daily       folic acid (FOLVITE) 1 MG tablet TAKE 1 TABLET BY MOUTH ONCE DAILY EXCEPT  THE  DAY  WHEN  TAKING  METHOTREXATE 90 tablet 1     FOLIC ACID/MULTIVITS-MIN/LUT (CENTRUM SILVER ORAL) [FOLIC ACID/MULTIVITS-MIN/LUT (CENTRUM SILVER ORAL)] Take 1 tablet by mouth daily.        furosemide (LASIX) 20 MG tablet Take 20 mg by mouth daily as needed       ipratropium -  albuterol 0.5 mg/2.5 mg/3 mL (DUONEB) 0.5-2.5 (3) MG/3ML neb solution Take 1 vial (3 mLs) by nebulization every 4 hours as needed for wheezing or shortness of breath / dyspnea 90 mL 0     methotrexate sodium 2.5 MG TABS TAKE 4 TABLETS BY MOUTH ONCE A WEEK 48 tablet 0     metoprolol succinate ER (TOPROL-XL) 50 MG 24 hr tablet Take 2 tablets (100 mg) by mouth daily 60 tablet 0     pantoprazole (PROTONIX) 40 MG EC tablet Take 1 tablet (40 mg) by mouth every morning (before breakfast) 30 tablet 0     pravastatin (PRAVACHOL) 20 MG tablet [PRAVASTATIN (PRAVACHOL) 20 MG TABLET] Take 20 mg by mouth daily.       predniSONE (DELTASONE) 10 MG tablet Take 2 tablets (20 mg) by mouth daily 20mg 4 days, then 10 mg 4 days 12 tablet 0     rivaroxaban ANTICOAGULANT (XARELTO) 15 MG TABS tablet Take 1 tablet (15 mg) by mouth daily (with dinner) 30 tablet 0     family history includes Cerebrovascular Disease in her brother and father; Diabetes Type 2  in her brother; Heart Failure in her mother; Kidney failure in her sister.  Social Connections: Not on file          WBC Count   Date Value Ref Range Status   05/02/2022 13.6 (H) 4.0 - 11.0 10e3/uL Final     RBC Count   Date Value Ref Range Status   05/02/2022 3.73 (L) 3.80 - 5.20 10e6/uL Final     Hemoglobin   Date Value Ref Range Status   05/02/2022 12.3 11.7 - 15.7 g/dL Final     Hematocrit   Date Value Ref Range Status   05/02/2022 38.0 35.0 - 47.0 % Final     MCV   Date Value Ref Range Status   05/02/2022 102 (H) 78 - 100 fL Final     MCH   Date Value Ref Range Status   05/02/2022 33.0 26.5 - 33.0 pg Final     Platelet Count   Date Value Ref Range Status   05/02/2022 334 150 - 450 10e3/uL Final     % Lymphocytes   Date Value Ref Range Status   04/21/2022 11 % Final     AST   Date Value Ref Range Status   01/18/2022 18 0 - 40 U/L Final     ALT   Date Value Ref Range Status   05/02/2022 30 0 - 45 U/L Final     Albumin   Date Value Ref Range Status   05/02/2022 3.2 (L) 3.5 - 5.0 g/dL  Final     Alkaline Phosphatase   Date Value Ref Range Status   01/18/2022 100 45 - 120 U/L Final     Creatinine   Date Value Ref Range Status   05/02/2022 1.27 (H) 0.60 - 1.10 mg/dL Final     GFR Estimate   Date Value Ref Range Status   05/02/2022 44 (L) >60 mL/min/1.73m2 Final     Comment:     Effective December 21, 2021 eGFRcr in adults is calculated using the 2021 CKD-EPI creatinine equation which includes age and gender (Salbador et al., NE, DOI: 10.1056/WGLWvp4766756)   06/02/2021 48 (L) >60 mL/min/1.73m2 Final     GFR Estimate If Black   Date Value Ref Range Status   06/02/2021 58 (L) >60 mL/min/1.73m2 Final     Erythrocyte Sedimentation Rate   Date Value Ref Range Status   01/20/2022 23 (H) 0 - 20 mm/hr Final     CRP   Date Value Ref Range Status   01/20/2022 0.3 0.0-<0.8 mg/dL Final

## 2022-05-09 ENCOUNTER — MEDICAL CORRESPONDENCE (OUTPATIENT)
Dept: HEALTH INFORMATION MANAGEMENT | Facility: CLINIC | Age: 77
End: 2022-05-09
Payer: COMMERCIAL

## 2022-05-10 ENCOUNTER — TELEPHONE (OUTPATIENT)
Dept: RESPIRATORY THERAPY | Facility: HOSPITAL | Age: 77
End: 2022-05-10
Payer: COMMERCIAL

## 2022-05-10 NOTE — TELEPHONE ENCOUNTER
COPD Education follow up call:  Left message with call back number should pt have questions or concerns and COPD Action Plan reminder. Reminded patient of upcoming PFT and Pulmonary appointments.  Leonardo Guadarrama, RT, TTS, Chronic Pulmonary Disease Specialist

## 2022-05-11 ENCOUNTER — TRANSCRIBE ORDERS (OUTPATIENT)
Dept: OTHER | Age: 77
End: 2022-05-11

## 2022-05-11 DIAGNOSIS — R20.0 HAND NUMBNESS: Primary | ICD-10-CM

## 2022-05-25 ENCOUNTER — OFFICE VISIT (OUTPATIENT)
Dept: CARDIOLOGY | Facility: CLINIC | Age: 77
End: 2022-05-25
Payer: COMMERCIAL

## 2022-05-25 VITALS
SYSTOLIC BLOOD PRESSURE: 112 MMHG | HEART RATE: 95 BPM | DIASTOLIC BLOOD PRESSURE: 64 MMHG | HEIGHT: 66 IN | BODY MASS INDEX: 26.84 KG/M2 | WEIGHT: 167 LBS | RESPIRATION RATE: 14 BRPM

## 2022-05-25 DIAGNOSIS — Z79.01 CHRONIC ANTICOAGULATION: Chronic | ICD-10-CM

## 2022-05-25 DIAGNOSIS — I10 BENIGN ESSENTIAL HYPERTENSION: ICD-10-CM

## 2022-05-25 DIAGNOSIS — I48.0 PAROXYSMAL ATRIAL FIBRILLATION (H): Primary | Chronic | ICD-10-CM

## 2022-05-25 PROCEDURE — 99214 OFFICE O/P EST MOD 30 MIN: CPT | Performed by: NURSE PRACTITIONER

## 2022-05-25 RX ORDER — DIMENHYDRINATE 50 MG
1 TABLET ORAL DAILY
COMMUNITY
End: 2022-01-01

## 2022-05-25 NOTE — LETTER
5/25/2022    Tory Wise MD  [facility] [address] [address]    RE: Fatuma Henry       Dear Colleague,     I had the pleasure of seeing Fatuma Henry in the ealth [facility].    HEART CARE ELECTROPHYSIOLOGY NOTE      [facility]  [phone]      Assessment/Recommendations   Assessment/Plan:  1.  Paroxysmal atrial fibrillation: Asymptomatic.  We reviewed treatment options of rate control versus rhythm control depending upon the presence of symptoms.  Continue rate control strategy unless she has specifically symptomatic recurrence.  No symptomatology or evidence of A. fib occurrence.   She was instructed to call for symptomatic recurrence of A. fib or heart rates consistently above 100 bpm at rest.  Continue metoprolol succinate 100 mg daily.     She was again reassured that atrial fibrillation is not life-threatening, but carries an increased risk for stroke.  She has a [id number] score of 3 for age 65-74, female gender, and hypertension.   She has issues with her balance which increases her risk for fall.  We have previously discussed the alternative of left atrial appendage closure and she wishes to remain on medication, but will consider LAAC if her balance worsens or she has issues with falling.  Continue Xarelto 15 mg daily for stroke prophylaxis, dose adjusted for creatinine clearance <50.  She denies any side effects, bleeding issues, or missed doses.      2.  Hypertension: Blood pressure at target.  Continue metoprolol.    Follow up in 1 year     History of Present Illness/Subjective    HPI: Fatuma Henry is a 76 year old female who comes in today for EP follow-up of atrial fibrillation.  She has a history or atrial fibrillation, nonsustained ventricular tachycardia, hypertension, rheumatoid arthritis, stage III chronic kidney disease, GERD, and COPD on oxygen at night.      She was diagnosed with paroxysmal atrial fibrillation  during a hospitalization for influenza and hypovolemic shock over Christmas 2017 with which she was asymptomatic.  Ventricular response was controlled with metoprolol succinate 100 mg daily and she was started on Xarelto for stroke prophylaxis.  Echo showed normal left ventricular systolic performance with mild aortic stenosis.  Subsequent Holter monitor showed no A. fib, but a brief run of nonsustained ventricular tachycardia.  Nuclear stress testing showed no evidence for inducible myocardial ischemia or infarction, so the NSVT is considered low risk.     Gert states that she continues to feel well, though is consistently short of breath and is using supplemental oxygen at night.  She has an upcoming evaluation with pulmonology.  She has not had any symptomatic episodes of A. fib or inappropriately elevated heart rate.  She denies chest discomfort, palpitations, abdominal fullness/bloating or significant peripheral edema,  paroxysmal nocturnal dyspnea, orthopnea, lightheadedness, dizziness, pre-syncope, or syncope.  She uses furosemide as needed for mild lower extremity edema.     Cardiographics (EKG personally reviewed):  EKG done 4/20/2022 shows sinus rhythm at 98 bpm, PACs, QT/QTc interval 354/451 ms  EKG done 5/26/2021 shows sinus rhythm at 70 bpm, first-degree AV block, singular PVC, QT/QTc interval measures 380/410 ms  EKG done done 12/26/2018 shows atrial fibrillation with rapid ventricular response at 102 bpm     24-hour Holter monitor worn 1/4/2018: Predominant heart rhythm is sinus tachycardia with an average heart rate of 100 bpm.  Normal electrocardiographic intervals.  No significant bradycardia or pauses.  She did have some occasional junctional escape beats while asleep.  11 brief episodes of ectopic atrial tachycardia.  No sustained atrial tachycardia, atrial fibrillation, or other SVC.  A single 6 beat run of monomorphic nonsustained VT with a left bundle axis configuration at 170 bpm.  No  "sustained ventricular arrhythmia.     Echo done 1/18/2022:  1.Left ventricular size, wall motion and function are normal. The ejection  fraction is 60-65%.  2.Normal right ventricle size and systolic function.  3.The left atrium is moderately dilated.  4.There is moderate mitral annular calcification.  5.Thickened aortic valve leaflets, Mild valvular aortic stenosis.Mean gradient  12 mmHg with peak velocity of 2.4 m/s at 76 bpm.  6.There is mild to moderate (1-2+) aortic regurgitation.  7.Right ventricular systolic pressure is elevated, consistent with moderate  pulmonary hypertension.  8.There is no comparison study available.     NM pharmacologic stress test done 2/1/2018 is negative for inducible myocardial ischemia or infarction.  LVEF 75%.    I have reviewed and updated the patient's Past Medical History, Social History, Family History and Medication List.  Outside records personally reviewed.     Physical Examination  Review of Systems   Vitals: /64 (BP Location: Left arm, Patient Position: Sitting, Cuff Size: Adult Regular)   Pulse 95   Resp 14   Ht 1.664 m (5' 5.5\")   Wt 75.8 kg (167 lb)   BMI 27.37 kg/m    BMI= Body mass index is 27.37 kg/m .  Wt Readings from Last 3 Encounters:   05/25/22 75.8 kg (167 lb)   05/05/22 73 kg (161 lb)   04/26/22 73.4 kg (161 lb 14.4 oz)       General Appearance:   Alert, well-appearing and in no acute distress.   HEENT: Atraumatic, normocephalic.  No scleral icterus, normal conjunctivae, EOMs intact, PERRL.  Wearing a mask.   Chest/Lungs:   Chest symmetric, spine straight.  Respirations unlabored.  Lungs are clear to auscultation.   Cardiovascular:   Regular rate and rhythm.  Normal first and second heart sounds with no murmurs, rubs, or gallops; radial and posterior tibial pulses are intact, trace bilateral lower extremity edema.   Abdomen:  Soft, nondistended, bowel sounds present.   Extremities: No cyanosis or clubbing.   Musculoskeletal: Moves all extremities.  "   Skin: Warm, dry, intact.    Neurologic: Mood and affect are appropriate.  Alert and oriented to person, place, time, and situation.     ROS: 10 point ROS neg other than the symptoms noted above in the HPI.         Medical History  Surgical History Family History Social History   Past Medical History:   Diagnosis Date     Atrial fibrillation (H)      CRF (chronic renal failure)      GERD (gastroesophageal reflux disease)      Hypertension      Hypovolemic shock (H)      Influenza A 2017     Rheumatoid arthritis (H)      Sepsis (H) 2017     Past Surgical History:   Procedure Laterality Date     APPENDECTOMY       BLADDER SUSPENSION       CHOLECYSTECTOMY       Family History   Problem Relation Age of Onset     Heart Failure Mother      Cerebrovascular Disease Father      Kidney failure Sister      Cerebrovascular Disease Brother      Diabetes Type 2  Brother         Social History     Socioeconomic History     Marital status:      Spouse name: Not on file     Number of children: Not on file     Years of education: Not on file     Highest education level: Not on file   Occupational History     Not on file   Tobacco Use     Smoking status: Former Smoker     Packs/day: 0.50     Types: Cigarettes     Quit date: 2017     Years since quittin.4     Smokeless tobacco: Never Used   Substance and Sexual Activity     Alcohol use: No     Drug use: No     Sexual activity: Not Currently   Other Topics Concern     Not on file   Social History Narrative     Not on file     Social Determinants of Health     Financial Resource Strain: Not on file   Food Insecurity: Not on file   Transportation Needs: Not on file   Physical Activity: Not on file   Stress: Not on file   Social Connections: Not on file   Intimate Partner Violence: Not on file   Housing Stability: Not on file           Medications  Allergies   Current Outpatient Medications   Medication Sig Dispense Refill     acetaminophen (TYLENOL) 500 MG  tablet [ACETAMINOPHEN (TYLENOL) 500 MG TABLET] Take 1-2 tablets (500-1,000 mg total) by mouth every 6 (six) hours as needed.  0     Cholecalciferol (VITAMIN D) 50 MCG (2000 UT) CAPS Take 50 mcg by mouth daily       compressor, for nebulizer Saran [COMPRESSOR, FOR NEBULIZER SARAN] Nebulizer treatment qid prn 1 Device 0     famotidine (PEPCID) 20 MG tablet Take 20 mg by mouth 2 times daily       Flaxseed, Linseed, (FLAX SEED OIL) 1000 MG capsule Take 1 capsule by mouth daily       folic acid (FOLVITE) 1 MG tablet TAKE 1 TABLET BY MOUTH ONCE DAILY EXCEPT  THE  DAY  WHEN  TAKING  METHOTREXATE 90 tablet 1     FOLIC ACID/MULTIVITS-MIN/LUT (CENTRUM SILVER ORAL) [FOLIC ACID/MULTIVITS-MIN/LUT (CENTRUM SILVER ORAL)] Take 1 tablet by mouth daily.        furosemide (LASIX) 20 MG tablet Take 20 mg by mouth daily as needed       ipratropium - albuterol 0.5 mg/2.5 mg/3 mL (DUONEB) 0.5-2.5 (3) MG/3ML neb solution Take 1 vial (3 mLs) by nebulization every 4 hours as needed for wheezing or shortness of breath / dyspnea 90 mL 0     methotrexate sodium 2.5 MG TABS TAKE 4 TABLETS BY MOUTH ONCE A WEEK 48 tablet 0     metoprolol succinate ER (TOPROL-XL) 50 MG 24 hr tablet Take 2 tablets (100 mg) by mouth daily 60 tablet 0     pantoprazole (PROTONIX) 40 MG EC tablet Take 1 tablet (40 mg) by mouth every morning (before breakfast) 30 tablet 0     pravastatin (PRAVACHOL) 20 MG tablet [PRAVASTATIN (PRAVACHOL) 20 MG TABLET] Take 20 mg by mouth daily.       rivaroxaban ANTICOAGULANT (XARELTO) 15 MG TABS tablet Take 1 tablet (15 mg) by mouth daily (with dinner) 90 tablet 3     certolizumab pegol (CIMZIA PREFILLED) 2 X 200 MG/ML KIT 2 syringes/kit Inject 400 mg Subcutaneous every 28 days (Patient not taking: Reported on 5/25/2022) 1 each 4       Allergies   Allergen Reactions     Codeine Nausea and Vomiting     Fosamax [Alendronic Acid] Diarrhea          Lab Results    Chemistry/lipid CBC Cardiac Enzymes/BNP/TSH/INR   Recent Labs   Lab Test  01/28/22  1640   CHOL 154   HDL 65   LDL 63   TRIG 132     Recent Labs   Lab Test 01/28/22  1640 01/13/21  1017 11/04/19  1430   LDL 63 66 93     Recent Labs   Lab Test 05/02/22  1256 04/26/22  0453 04/22/22  0519 04/21/22  0532   NA  --   --   --  138   POTASSIUM  --  4.4   < > 3.9   CHLORIDE  --   --   --  105   CO2  --   --   --  21*   GLC  --   --   --  123   BUN  --   --   --  37*   CR 1.27*  --   --  1.14*   GFRESTIMATED 44*  --   --  50*   SUNI  --   --   --  9.7    < > = values in this interval not displayed.     Recent Labs   Lab Test 05/02/22  1256 04/21/22  0532 04/20/22  1046   CR 1.27* 1.14* 1.14*      Recent Labs   Lab Test 05/02/22  1256   WBC 13.6*   HGB 12.3   HCT 38.0   *        Recent Labs   Lab Test 05/02/22  1256 04/21/22  0532 04/20/22  1046   HGB 12.3 11.4* 12.9    Recent Labs   Lab Test 04/20/22  1046 01/17/22  1756   TROPONINI 0.03 0.04     Recent Labs   Lab Test 04/20/22  1046 01/17/22  1756   * 190*     Recent Labs   Lab Test 06/04/20  1754   TSH 0.93     Recent Labs   Lab Test 04/20/22  1046   INR 1.77*                Thank you for allowing me to participate in the care of your patient.      Sincerely,     FREDY Lynn CNP     [facility]  cc:   FREDY Lynn CNP  [address] [place] [address]

## 2022-05-25 NOTE — PROGRESS NOTES
HEART CARE ELECTROPHYSIOLOGY NOTE      [facility]  [phone]      Assessment/Recommendations   Assessment/Plan:  1.  Paroxysmal atrial fibrillation: Asymptomatic.  We reviewed treatment options of rate control versus rhythm control depending upon the presence of symptoms.  Continue rate control strategy unless she has specifically symptomatic recurrence.  No symptomatology or evidence of A. fib occurrence.   She was instructed to call for symptomatic recurrence of A. fib or heart rates consistently above 100 bpm at rest.  Continue metoprolol succinate 100 mg daily.     She was again reassured that atrial fibrillation is not life-threatening, but carries an increased risk for stroke.  She has a [id number] score of 3 for age 65-74, female gender, and hypertension.   She has issues with her balance which increases her risk for fall.  We have previously discussed the alternative of left atrial appendage closure and she wishes to remain on medication, but will consider LAAC if her balance worsens or she has issues with falling.  Continue Xarelto 15 mg daily for stroke prophylaxis, dose adjusted for creatinine clearance <50.  She denies any side effects, bleeding issues, or missed doses.      2.  Hypertension: Blood pressure at target.  Continue metoprolol.    Follow up in 1 year     History of Present Illness/Subjective    HPI: Fatuma Henry is a 76 year old female who comes in today for EP follow-up of atrial fibrillation.  She has a history or atrial fibrillation, nonsustained ventricular tachycardia, hypertension, rheumatoid arthritis, stage III chronic kidney disease, GERD, and COPD on oxygen at night.      She was diagnosed with paroxysmal atrial fibrillation during a hospitalization for influenza and hypovolemic shock over Christmas 2017 with which she was asymptomatic.  Ventricular response was controlled with metoprolol succinate 100 mg daily and she was started on Xarelto for stroke  prophylaxis.  Echo showed normal left ventricular systolic performance with mild aortic stenosis.  Subsequent Holter monitor showed no A. fib, but a brief run of nonsustained ventricular tachycardia.  Nuclear stress testing showed no evidence for inducible myocardial ischemia or infarction, so the NSVT is considered low risk.     Piotr states that she continues to feel well, though is consistently short of breath and is using supplemental oxygen at night.  She has an upcoming evaluation with pulmonology.  She has not had any symptomatic episodes of A. fib or inappropriately elevated heart rate.  She denies chest discomfort, palpitations, abdominal fullness/bloating or significant peripheral edema,  paroxysmal nocturnal dyspnea, orthopnea, lightheadedness, dizziness, pre-syncope, or syncope.  She uses furosemide as needed for mild lower extremity edema.     Cardiographics (EKG personally reviewed):  EKG done 4/20/2022 shows sinus rhythm at 98 bpm, PACs, QT/QTc interval 354/451 ms  EKG done 5/26/2021 shows sinus rhythm at 70 bpm, first-degree AV block, singular PVC, QT/QTc interval measures 380/410 ms  EKG done done 12/26/2018 shows atrial fibrillation with rapid ventricular response at 102 bpm     24-hour Holter monitor worn 1/4/2018: Predominant heart rhythm is sinus tachycardia with an average heart rate of 100 bpm.  Normal electrocardiographic intervals.  No significant bradycardia or pauses.  She did have some occasional junctional escape beats while asleep.  11 brief episodes of ectopic atrial tachycardia.  No sustained atrial tachycardia, atrial fibrillation, or other SVC.  A single 6 beat run of monomorphic nonsustained VT with a left bundle axis configuration at 170 bpm.  No sustained ventricular arrhythmia.     Echo done 1/18/2022:  1.Left ventricular size, wall motion and function are normal. The ejection  fraction is 60-65%.  2.Normal right ventricle size and systolic function.  3.The left atrium is  "moderately dilated.  4.There is moderate mitral annular calcification.  5.Thickened aortic valve leaflets, Mild valvular aortic stenosis.Mean gradient  12 mmHg with peak velocity of 2.4 m/s at 76 bpm.  6.There is mild to moderate (1-2+) aortic regurgitation.  7.Right ventricular systolic pressure is elevated, consistent with moderate  pulmonary hypertension.  8.There is no comparison study available.     NM pharmacologic stress test done 2/1/2018 is negative for inducible myocardial ischemia or infarction.  LVEF 75%.    I have reviewed and updated the patient's Past Medical History, Social History, Family History and Medication List.  Outside records personally reviewed.     Physical Examination  Review of Systems   Vitals: /64 (BP Location: Left arm, Patient Position: Sitting, Cuff Size: Adult Regular)   Pulse 95   Resp 14   Ht 1.664 m (5' 5.5\")   Wt 75.8 kg (167 lb)   BMI 27.37 kg/m    BMI= Body mass index is 27.37 kg/m .  Wt Readings from Last 3 Encounters:   05/25/22 75.8 kg (167 lb)   05/05/22 73 kg (161 lb)   04/26/22 73.4 kg (161 lb 14.4 oz)       General Appearance:   Alert, well-appearing and in no acute distress.   HEENT: Atraumatic, normocephalic.  No scleral icterus, normal conjunctivae, EOMs intact, PERRL.  Wearing a mask.   Chest/Lungs:   Chest symmetric, spine straight.  Respirations unlabored.  Lungs are clear to auscultation.   Cardiovascular:   Regular rate and rhythm.  Normal first and second heart sounds with no murmurs, rubs, or gallops; radial and posterior tibial pulses are intact, trace bilateral lower extremity edema.   Abdomen:  Soft, nondistended, bowel sounds present.   Extremities: No cyanosis or clubbing.   Musculoskeletal: Moves all extremities.    Skin: Warm, dry, intact.    Neurologic: Mood and affect are appropriate.  Alert and oriented to person, place, time, and situation.     ROS: 10 point ROS neg other than the symptoms noted above in the HPI.         Medical History  " Surgical History Family History Social History   Past Medical History:   Diagnosis Date     Atrial fibrillation (H)      CRF (chronic renal failure)      GERD (gastroesophageal reflux disease)      Hypertension      Hypovolemic shock (H)      Influenza A 2017     Rheumatoid arthritis (H)      Sepsis (H) 2017     Past Surgical History:   Procedure Laterality Date     APPENDECTOMY       BLADDER SUSPENSION       CHOLECYSTECTOMY       Family History   Problem Relation Age of Onset     Heart Failure Mother      Cerebrovascular Disease Father      Kidney failure Sister      Cerebrovascular Disease Brother      Diabetes Type 2  Brother         Social History     Socioeconomic History     Marital status:      Spouse name: Not on file     Number of children: Not on file     Years of education: Not on file     Highest education level: Not on file   Occupational History     Not on file   Tobacco Use     Smoking status: Former Smoker     Packs/day: 0.50     Types: Cigarettes     Quit date: 2017     Years since quittin.4     Smokeless tobacco: Never Used   Substance and Sexual Activity     Alcohol use: No     Drug use: No     Sexual activity: Not Currently   Other Topics Concern     Not on file   Social History Narrative     Not on file     Social Determinants of Health     Financial Resource Strain: Not on file   Food Insecurity: Not on file   Transportation Needs: Not on file   Physical Activity: Not on file   Stress: Not on file   Social Connections: Not on file   Intimate Partner Violence: Not on file   Housing Stability: Not on file           Medications  Allergies   Current Outpatient Medications   Medication Sig Dispense Refill     acetaminophen (TYLENOL) 500 MG tablet [ACETAMINOPHEN (TYLENOL) 500 MG TABLET] Take 1-2 tablets (500-1,000 mg total) by mouth every 6 (six) hours as needed.  0     Cholecalciferol (VITAMIN D) 50 MCG ( UT) CAPS Take 50 mcg by mouth daily       compressor, for  nebulizer Saran [COMPRESSOR, FOR NEBULIZER SARAN] Nebulizer treatment qid prn 1 Device 0     famotidine (PEPCID) 20 MG tablet Take 20 mg by mouth 2 times daily       Flaxseed, Linseed, (FLAX SEED OIL) 1000 MG capsule Take 1 capsule by mouth daily       folic acid (FOLVITE) 1 MG tablet TAKE 1 TABLET BY MOUTH ONCE DAILY EXCEPT  THE  DAY  WHEN  TAKING  METHOTREXATE 90 tablet 1     FOLIC ACID/MULTIVITS-MIN/LUT (CENTRUM SILVER ORAL) [FOLIC ACID/MULTIVITS-MIN/LUT (CENTRUM SILVER ORAL)] Take 1 tablet by mouth daily.        furosemide (LASIX) 20 MG tablet Take 20 mg by mouth daily as needed       ipratropium - albuterol 0.5 mg/2.5 mg/3 mL (DUONEB) 0.5-2.5 (3) MG/3ML neb solution Take 1 vial (3 mLs) by nebulization every 4 hours as needed for wheezing or shortness of breath / dyspnea 90 mL 0     methotrexate sodium 2.5 MG TABS TAKE 4 TABLETS BY MOUTH ONCE A WEEK 48 tablet 0     metoprolol succinate ER (TOPROL-XL) 50 MG 24 hr tablet Take 2 tablets (100 mg) by mouth daily 60 tablet 0     pantoprazole (PROTONIX) 40 MG EC tablet Take 1 tablet (40 mg) by mouth every morning (before breakfast) 30 tablet 0     pravastatin (PRAVACHOL) 20 MG tablet [PRAVASTATIN (PRAVACHOL) 20 MG TABLET] Take 20 mg by mouth daily.       rivaroxaban ANTICOAGULANT (XARELTO) 15 MG TABS tablet Take 1 tablet (15 mg) by mouth daily (with dinner) 90 tablet 3     certolizumab pegol (CIMZIA PREFILLED) 2 X 200 MG/ML KIT 2 syringes/kit Inject 400 mg Subcutaneous every 28 days (Patient not taking: Reported on 5/25/2022) 1 each 4       Allergies   Allergen Reactions     Codeine Nausea and Vomiting     Fosamax [Alendronic Acid] Diarrhea          Lab Results    Chemistry/lipid CBC Cardiac Enzymes/BNP/TSH/INR   Recent Labs   Lab Test 01/28/22  1640   CHOL 154   HDL 65   LDL 63   TRIG 132     Recent Labs   Lab Test 01/28/22  1640 01/13/21  1017 11/04/19  1430   LDL 63 66 93     Recent Labs   Lab Test 05/02/22  1256 04/26/22  0453 04/22/22  0519 04/21/22  0532   NA  --    --   --  138   POTASSIUM  --  4.4   < > 3.9   CHLORIDE  --   --   --  105   CO2  --   --   --  21*   GLC  --   --   --  123   BUN  --   --   --  37*   CR 1.27*  --   --  1.14*   GFRESTIMATED 44*  --   --  50*   SUNI  --   --   --  9.7    < > = values in this interval not displayed.     Recent Labs   Lab Test 05/02/22  1256 04/21/22  0532 04/20/22  1046   CR 1.27* 1.14* 1.14*      Recent Labs   Lab Test 05/02/22  1256   WBC 13.6*   HGB 12.3   HCT 38.0   *        Recent Labs   Lab Test 05/02/22  1256 04/21/22  0532 04/20/22  1046   HGB 12.3 11.4* 12.9    Recent Labs   Lab Test 04/20/22  1046 01/17/22  1756   TROPONINI 0.03 0.04     Recent Labs   Lab Test 04/20/22  1046 01/17/22  1756   * 190*     Recent Labs   Lab Test 06/04/20  1754   TSH 0.93     Recent Labs   Lab Test 04/20/22  1046   INR 1.77*

## 2022-05-25 NOTE — PATIENT INSTRUCTIONS
Fatuma Henry,    It was a pleasure to see you today at the [facility].     My recommendations after this visit include:    Continue Xarelto 15 mg daily with a full meal    Call if you frequently or consistently have a resting heart rate > 100    Follow up in 1 year, or sooner if needed    Teressa Cespedes, CNP  [facility], Electrophysiology  [phone]  EP nurses [phone]

## 2022-05-26 ENCOUNTER — TELEPHONE (OUTPATIENT)
Dept: RESPIRATORY THERAPY | Facility: CLINIC | Age: 77
End: 2022-05-26
Payer: COMMERCIAL

## 2022-05-26 NOTE — TELEPHONE ENCOUNTER
Left message for them call back with any questions they may have, our phone number, reminders, and when we plan to call again.    Shea De León, RT, Chronic Pulmonary Disease Specialist

## 2022-06-11 ENCOUNTER — MEDICAL CORRESPONDENCE (OUTPATIENT)
Dept: ADMINISTRATIVE | Facility: CLINIC | Age: 77
End: 2022-06-11

## 2022-06-27 ENCOUNTER — TELEPHONE (OUTPATIENT)
Dept: RESPIRATORY THERAPY | Facility: CLINIC | Age: 77
End: 2022-06-27

## 2022-06-27 NOTE — TELEPHONE ENCOUNTER
Spoke with Gert, doing well, no questions for me to day. no issues getting and/or taking their medications, reviewed their action plan, in their green zone.  Reminded when we will call again and to call before if there is a question.  Shea De León, RT, TTS, Chronic Pulmonary Disease Specialist

## 2022-07-08 NOTE — LETTER
7/8/2022         RE: Fatuma Henry  [address] [place] [address]        Dear Colleague,    Thank you for referring your patient, Fatuma Henry, to the [facility] [place]. Please see a copy of my visit note below.    See EMG report      Again, thank you for allowing me to participate in the care of your patient.        Sincerely,        Gadiel Diez MD

## 2022-07-08 NOTE — PROCEDURES
[facility]     [facility], [address]  [place] [address]  Tel: [phone]  Fax: [phone]          Full Name: Piotr Henry Gender: Female  Patient ID: [id number] YOB: 1945      Visit Date: 7/8/2022 13:24  Age: 77 Years 0 Months Old  Interpreted By: Gadiel Diez M.D.   Ref Dr.: Tory Wise MD  Tech:    Height: 5 feet 5 inch  Reason for referral: Evaluate bilateral uppers. c/o tingling, numbness, weakness in both hands > 6 months. Right = Left.       Motor NCS      Nerve / Sites Lat Amp Dist Chente    ms mV cm m/s   R Median - APB      Wrist 3.85 7.7 7       Elbow 8.54 7.7 23 49   L Median - APB      Wrist 3.49 5.6 7       Elbow 7.76 4.2 21 49   R Ulnar - ADM      Wrist 3.54 7.9 7       B.Elbow 7.66 6.8 20 49      A.Elbow 11.51 6.2 11 29   L Ulnar - ADM      Wrist 3.39 7.7 7       B.Elbow 7.60 7.6 20 47      A.Elbow 11.56 7.1 11 28       F  Wave      Nerve Fmin    ms   R Ulnar - ADM 34.06   L Ulnar - ADM 35.16       Sensory NCS      Nerve / Sites Pk Lat Amp.1-2 Dist Lat Diff    ms  V cm ms   R Median - II (Antidr)      Wrist 3.54 4.4 13    L Median - II (Antidr)      Wrist 3.70 4.0 13    R Ulnar - V (Antidr)      Wrist 3.23 2.9 11    L Ulnar - V (Antidr)      Wrist 2.86 1.7 11    R Median, Ulnar - Transcarpal comparison      Median Palm 3.33 6.1 8       Ulnar Palm 2.50 7.3 8        0.83   L Median, Ulnar - Transcarpal comparison      Median Palm 2.40 2.6 8       Ulnar Palm 2.50 3.4 8        -0.10       EMG Summary Table     Spontaneous MUAP Rcmt Note   Muscle Fib PSW Fasc IA # Amp Dur PPP Rate Type   R. Brachioradialis None None None N N N N N N N   R. Pronator teres None None None N N N N N N N   R. Biceps brachii None None None N N N N N N N   R. Deltoid None None None N N N N N N N   R. Triceps brachii None None None N N N N N N N   R. Flexor carpi ulnaris None None None N N N N N N N   R. Abductor digiti  minimi (manus) None None None N Sl Red Incr Incr N N N   R. First dorsal interosseous None None None N Sl Red Incr Incr N N N   R. Abductor pollicis brevis None None None N N N N N N N   L. Brachioradialis None None None N N N N N N N   L. Pronator teres None None None N N N N N N N   L. Biceps brachii None None None N N N N N N N   L. Deltoid None None None N N N N N N N   L. Triceps brachii None None None N N N N N N N   L. Flexor carpi ulnaris None None None N N N N N N N   L. First dorsal interosseous None None None N Sl Red Incr Incr N N N   L. Abductor pollicis brevis None None None N N N N N N N       Right median motor conduction study is normal   Right median snap potential at the upper limit of normal 3.5 ms  Right median palmar latency prolonged 3.33 ms    Left median motor Study normal  Left median snap potential prolonged 3.7 ms  Left median palmar latency at the upper limit of normal      Right ulnar motor conduction study focal slowing across the elbow at 29 m/s  Right ulnar F wave latency mildly prolonged due to above  Right ulnar snap potential normal  Right ulnar palmar latency within normal limits    Left ulnar motor conduction study focal slowing across the elbow at 28 m/s  Left ulnar F-wave latency prolonged due to above  Left ulnar snap potential normal  Left ulnar palmar latency within normal limits    Needle examination right upper extremity, mild chronic motor changes right ADM/FDI no active denervation    Needle examination left upper extremity, mild chronic motor changes FDI no active denervation    Impression    1.  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       ulnar neuropathy moderate in degree electrophysiologically on the right.    2.  Left ulnar mononeuropathy with focal slowing across the elbow at 28 m/s, mild chronic motor changes in the FDI.       Ulnar neuropathy moderate in degree  electrophysiologically on the left.      3.  Right median neuropathy at or distal to the wrist consistent with the carpal tunnel syndrome mild in degree electrophysiologically    4.  Left median neuropathy at or distal to the wrist consistent with carpal tunnel syndrome very mild in degree electrophysiologically

## 2022-07-20 NOTE — PROGRESS NOTES
Rheumatology follow-up visit note     Fatuma is a 77 year old female presents today for follow-up.    Fatuma was seen today for recheck.    Diagnoses and all orders for this visit:    Rheumatoid arthritis involving multiple sites with positive rheumatoid factor (H)  -     methotrexate sodium 2.5 MG TABS; Take 6 tablets (15 mg) by mouth once a week for 90 days    Primary osteoarthritis involving multiple joints    High risk medication use    Rheumatoid factor positive    CRF (chronic renal failure), stage 3 (moderate) (H)        She has not noted worsening of her joint symptoms as she has stopped taking Cimzia, tolerated methotrexate will go to the next dose, renal impairment would limit how far we can go there she is aware not to take nonsteroidals.  We recommended principles of OA.    Follow up in 3 months.  Labs every 6 weeks.  HPI    Fatuma Henry is a 77 year old female is here for follow-up of rheumatoid arthritis.  This is longstanding.  Affected multiple joints, moderately severe, she used to be on Cimzia and methotrexate however because of insurance reasons, having to pay $100 each time she came in for an injection on,  she is on methotrexate is the only thing she is left with Cimzia had to be discontinued, during the past 2 months and has been no significant worsening that is identifiable from her perspective.  Recent labs are reviewed there is remained stable.      Following is the excerpt from a previous note:     She reports no mouth ulcers no photosensitivity there is no rash reported Symptoms.  She does not have a history of psoriasis herself for the family or that of inflammatory bowel disease.  She is a smoker for the past 20 years   She does not exercise.  She follows up at her primary physician's in [facility].  She has had a history of hypertension and cataracts.  She recalls it was approximately 6+ years ago when she started hurting.  She recalls  began to hurt her shoulders.   This was quite severe pain.  She then started hurting in her hands and wrists as well.  Fairly soon she was seen in rheumatology by Dr. Brooks labs were done.  She'll call for those suggested that she has rheumatoid arthritis.  She was given prednisone.  This did not help.  She remembers that all it did for her was to make her face swollen.  Then she took methotrexate for 3 months that was no good either.  She did tolerate this well.  She went on to have Orencia for 2 years.  That did very well during this time.  Than 1 time suddenly it stopped working she was then changed to Actemra.  Once again and the Actemra did the trick for her further to 2-1/2 years that she was on it.  Currently she is on Cimzia, and feels this has helped her quite nicely.   Of late she's been hurting in her lower back, legs.  This is worse with activity.  She wonders if this is because the current treatment is not working.  She also recalls an episode where she had slipped and landed on her back.  This was from a third step at her house.  And she lives with her .  Her sisters have had osteoarthritis and knee/hip replacement surgeries.  She rates her pain as moderately severe she still notices stiffness of 2 hours especially in the lower back.   Further historical information and ADL limitations as noted in the multidimensional health assessment questionnaire attached in the EMR.      DETAILED EXAMINATION  07/20/22  :    Vitals:    07/20/22 1247   BP: (!) 140/70   Pulse: 84   Weight: 72.7 kg (160 lb 3.2 oz)     Alert oriented. Head including the face is examined for malar rash, heliotropes, scarring, lupus pernio. Eyes examined for redness such as in episcleritis/scleritis, periorbital lesions.   Neck/ Face examined for parotid gland swelling, range of motion of neck.  Left upper and lower and right upper and lower extremities examined for tenderness, swelling, warmth of the appendicular joints, range of motion, edema, rash.  Some  of the important findings included: she does not have evidence of synovitis in the palpable joints of the upper extremities.  No significant deformities of the digits.  + Heberden nodes.  Range of motion of the shoulders  show full abduction.  No JLT effusion or warmth of the knees.  she does not have dactylitis of the digits.     Patient Active Problem List    Diagnosis Date Noted     Asthma without status asthmaticus 04/22/2022     Priority: Medium     Chronic kidney disease, stage 3a (H) 04/22/2022     Priority: Medium     History of 2019 novel coronavirus disease (COVID-19) 04/22/2022     Priority: Medium     DNR (do not resuscitate) 04/22/2022     Priority: Medium     Chronic anticoagulation 04/22/2022     Priority: Medium     Eliquis- parox atrial fib       Personal history of immunosuppressive therapy 04/22/2022     Priority: Medium     Cimzia for RA;   certolizumab pegol (CIMZIA) injection 400 mg  subcut every 28 days             Pulmonary nodules 01/17/2022     Priority: Medium     COPD exacerbation (H) 01/17/2022     Priority: Medium     Benign essential hypertension 05/28/2020     Priority: Medium     Primary osteoarthritis involving multiple joints 06/10/2019     Priority: Medium     CRF (chronic renal failure), stage 3 (moderate) (H) 05/17/2018     Priority: Medium     Nonsustained ventricular tachycardia (H) 01/12/2018     Priority: Medium     Paroxysmal atrial fibrillation (H)      Priority: Medium     Reactive airway disease      Priority: Medium     Dyspnea      Priority: Medium     Rheumatoid arthritis involving multiple sites with positive rheumatoid factor (H) 12/15/2016     Priority: Medium     High risk medication use 10/07/2015     Priority: Medium     Osteoporosis dxa 2006  09/22/2015     Priority: Medium     Seropositive rheumatoid arthritis (H) 03/02/2015     Priority: Medium     Past Surgical History:   Procedure Laterality Date     APPENDECTOMY       BLADDER SUSPENSION        CHOLECYSTECTOMY        Past Medical History:   Diagnosis Date     Atrial fibrillation (H)      CRF (chronic renal failure)      GERD (gastroesophageal reflux disease)      Hypertension      Hypovolemic shock (H)      Influenza A 12/2017     Rheumatoid arthritis (H)      Sepsis (H) 12/24/2017     Allergies   Allergen Reactions     Codeine Nausea and Vomiting     Fosamax [Alendronic Acid] Diarrhea     Current Outpatient Medications   Medication Sig Dispense Refill     acetaminophen (TYLENOL) 500 MG tablet [ACETAMINOPHEN (TYLENOL) 500 MG TABLET] Take 1-2 tablets (500-1,000 mg total) by mouth every 6 (six) hours as needed.  0     Cholecalciferol (VITAMIN D) 50 MCG (2000 UT) CAPS Take 50 mcg by mouth daily       compressor, for nebulizer Saran [COMPRESSOR, FOR NEBULIZER SARAN] Nebulizer treatment qid prn 1 Device 0     famotidine (PEPCID) 20 MG tablet Take 20 mg by mouth 2 times daily       Flaxseed, Linseed, (FLAX SEED OIL) 1000 MG capsule Take 1 capsule by mouth daily       folic acid (FOLVITE) 1 MG tablet TAKE 1 TABLET BY MOUTH ONCE DAILY EXCEPT  THE  DAY  WHEN  TAKING  METHOTREXATE 90 tablet 1     FOLIC ACID/MULTIVITS-MIN/LUT (CENTRUM SILVER ORAL) [FOLIC ACID/MULTIVITS-MIN/LUT (CENTRUM SILVER ORAL)] Take 1 tablet by mouth daily.        furosemide (LASIX) 20 MG tablet Take 20 mg by mouth daily as needed       ipratropium - albuterol 0.5 mg/2.5 mg/3 mL (DUONEB) 0.5-2.5 (3) MG/3ML neb solution Take 1 vial (3 mLs) by nebulization every 4 hours as needed for wheezing or shortness of breath / dyspnea 90 mL 0     methotrexate sodium 2.5 MG TABS TAKE 4 TABLETS BY MOUTH ONCE A WEEK 48 tablet 0     metoprolol succinate ER (TOPROL-XL) 50 MG 24 hr tablet Take 2 tablets (100 mg) by mouth daily 60 tablet 0     pravastatin (PRAVACHOL) 20 MG tablet [PRAVASTATIN (PRAVACHOL) 20 MG TABLET] Take 20 mg by mouth daily.       rivaroxaban ANTICOAGULANT (XARELTO) 15 MG TABS tablet Take 1 tablet (15 mg) by mouth daily (with dinner) 90 tablet 3      certolizumab pegol (CIMZIA PREFILLED) 2 X 200 MG/ML KIT 2 syringes/kit Inject 400 mg Subcutaneous every 28 days (Patient not taking: No sig reported) 1 each 4     family history includes Cerebrovascular Disease in her brother and father; Diabetes Type 2  in her brother; Heart Failure in her mother; Kidney failure in her sister.  Social Connections: Not on file          WBC Count   Date Value Ref Range Status   07/18/2022 5.9 4.0 - 11.0 10e3/uL Final     RBC Count   Date Value Ref Range Status   07/18/2022 3.61 (L) 3.80 - 5.20 10e6/uL Final     Hemoglobin   Date Value Ref Range Status   07/18/2022 11.7 11.7 - 15.7 g/dL Final     Hematocrit   Date Value Ref Range Status   07/18/2022 37.1 35.0 - 47.0 % Final     MCV   Date Value Ref Range Status   07/18/2022 103 (H) 78 - 100 fL Final     MCH   Date Value Ref Range Status   07/18/2022 32.4 26.5 - 33.0 pg Final     Platelet Count   Date Value Ref Range Status   07/18/2022 255 150 - 450 10e3/uL Final     % Lymphocytes   Date Value Ref Range Status   04/21/2022 11 % Final     AST   Date Value Ref Range Status   01/18/2022 18 0 - 40 U/L Final     ALT   Date Value Ref Range Status   07/18/2022 14 10 - 35 U/L Final     Albumin   Date Value Ref Range Status   07/18/2022 4.1 3.5 - 5.2 g/dL Final   05/02/2022 3.2 (L) 3.5 - 5.0 g/dL Final     Alkaline Phosphatase   Date Value Ref Range Status   01/18/2022 100 45 - 120 U/L Final     Creatinine   Date Value Ref Range Status   07/18/2022 1.21 (H) 0.51 - 0.95 mg/dL Final     GFR Estimate   Date Value Ref Range Status   07/18/2022 46 (L) >60 mL/min/1.73m2 Final     Comment:     Effective December 21, 2021 eGFRcr in adults is calculated using the 2021 CKD-EPI creatinine equation which includes age and gender (Salbador miller al., NEJM, DOI: 10.1056/HEQDik3661882)   06/02/2021 48 (L) >60 mL/min/1.73m2 Final     GFR Estimate If Black   Date Value Ref Range Status   06/02/2021 58 (L) >60 mL/min/1.73m2 Final     Erythrocyte Sedimentation Rate    Date Value Ref Range Status   01/20/2022 23 (H) 0 - 20 mm/hr Final     CRP   Date Value Ref Range Status   01/20/2022 0.3 0.0-<0.8 mg/dL Final

## 2022-07-22 NOTE — TELEPHONE ENCOUNTER
Per Dr Muhammad:  Please let Piotr know I reviewed her CT scan. There has been a slight increase in the size of the nodule in the left lung that we've been keeping an eye on. I will review the scan and the next steps with her in more detail at her appointment next month. I believe she wanted to see Dr. Rosario in the clinic so she can feel free to reschedule her appointment with me to see Dr. Rosario but I'm happy to see her as well.   I did initially request follow up with Dr. Rosario per her wishes    Called and updated patient. She has an appointment set with Dr. Rosario set on 8/29/22.

## 2022-07-22 NOTE — TELEPHONE ENCOUNTER
----- Message from Phoenix Muhammad MD sent at 7/21/2022  4:27 PM CDT -----  Hi  Please let Piotr know I reviewed her CT scan. There has been a slight increase in the size of the nodule in the left lung that we've been keeping an eye on. I will review the scan and the next steps with her in more detail at her appointment next month. I believe she wanted to see Dr. Rosario in the clinic so she can feel free to reschedule her appointment with me to see Dr. Rosario but I'm happy to see her as well.   I did initially request follow up with Dr. Rosario per her wishes.  Thanks,  AS

## 2022-08-15 NOTE — PROGRESS NOTES
RESPIRATORY CARE NOTE    Complete Pulmonary Function Test completed by patient.  Good patient effort and cooperation. Albuterol 2.5 mg neb given for bronchodilation.  The results of this test meet the ATS standards for acceptability and repeatability. PT returned to baseline and left in no distress.    Bev Hector, RT  8/15/2022

## 2022-08-29 NOTE — PROGRESS NOTES
Assessment and Plan:Fatuma Henry is a 77 year old female with a past medical history significant for lung nodule and COPD who presents to clinic today to establish outpatient care for her pulmonary issues.  We see her  and through him I have known her.  She has severe COPD based on her breathing studies with a robust bronchodilator response.  I would like to start symbicort on her and she can continue duonebs or albuterol MDI. She may be a candidate for triple therapy, but will make sure she tolerates the LABA/ICS first.  She also has a lung nodule growing on Chest CT.  This is a GGO with cystic space in the middle, suggestive of an adenocarcinoma.  We should perform a PET scan to better determine our next steps.    1) COPD - severe- need to measure ambulatory oxygen in clinic.  Start symbicort 160/4.5 BID, provide albuterol inhaler.  Continue duonebs as current.    2) Chronic hypoxic respiratory failure - sleeps with 3 L oxygen at night.  Determining if she needs ambulatory oxygen now.  3) Lung nodule - Growing in the lingula.  PET scan ordered  4) RTC in 3 months to discuss PET results and results with symbicort    CCx: COPD    HPI: Ms. Henry is a 77 year old female with a history of COPD and hypertension and arthritis who presents for evaluation of her COPD.  She had PFTs on 8/15 as well as a follow up CT for a lung nodule on 7/21.  She was in the hospital on 4/20 with a COPD exacerbation.  This is her first visit to clinic but was seen in the hospital by our team.  She gets winded with simple ambulation.  She has a chronic productive cough.  She uses duonebs 2-3 times a day and finds these very helpful.      ROS:  Review of Systems - History obtained from the patient  General ROS: negative  Psychological ROS: negative  ENT ROS: negative  Allergy and Immunology ROS: negative  Endocrine ROS: negative  Respiratory ROS: positive for - cough, shortness of breath and sputum changes  negative for - stridor,  tachypnea or wheezing  Cardiovascular ROS: no chest pain or palpitations  Gastrointestinal ROS: no abdominal pain, change in bowel habits, or black or bloody stools  Genito-Urinary ROS: no dysuria, trouble voiding, or hematuria  Musculoskeletal ROS: negative  Neurological ROS: no TIA or stroke symptoms  Dermatological ROS: negative      Current Meds:  Current Outpatient Medications   Medication Sig Dispense Refill     acetaminophen (TYLENOL) 500 MG tablet [ACETAMINOPHEN (TYLENOL) 500 MG TABLET] Take 1-2 tablets (500-1,000 mg total) by mouth every 6 (six) hours as needed.  0     albuterol (PROAIR HFA/PROVENTIL HFA/VENTOLIN HFA) 108 (90 Base) MCG/ACT inhaler Inhale 2 puffs into the lungs every 6 hours 18 g 11     budesonide-formoterol (SYMBICORT) 160-4.5 MCG/ACT Inhaler Inhale 2 puffs into the lungs 2 times daily for 30 days 10 g 11     Cholecalciferol (VITAMIN D) 50 MCG (2000 UT) CAPS Take 50 mcg by mouth daily       compressor, for nebulizer Saran [COMPRESSOR, FOR NEBULIZER SARAN] Nebulizer treatment qid prn 1 Device 0     famotidine (PEPCID) 20 MG tablet Take 20 mg by mouth 2 times daily       Flaxseed, Linseed, (FLAX SEED OIL) 1000 MG capsule Take 1 capsule by mouth daily       folic acid (FOLVITE) 1 MG tablet TAKE 1 TABLET BY MOUTH ONCE DAILY EXCEPT  THE  DAY  WHEN  TAKING  METHOTREXATE 90 tablet 0     FOLIC ACID/MULTIVITS-MIN/LUT (CENTRUM SILVER ORAL) [FOLIC ACID/MULTIVITS-MIN/LUT (CENTRUM SILVER ORAL)] Take 1 tablet by mouth daily.        furosemide (LASIX) 20 MG tablet Take 20 mg by mouth daily as needed       ipratropium - albuterol 0.5 mg/2.5 mg/3 mL (DUONEB) 0.5-2.5 (3) MG/3ML neb solution Take 1 vial (3 mLs) by nebulization every 4 hours as needed for wheezing or shortness of breath / dyspnea 90 mL 0     methotrexate sodium 2.5 MG TABS Take 6 tablets (15 mg) by mouth once a week for 90 days 72 tablet 0     metoprolol succinate ER (TOPROL-XL) 50 MG 24 hr tablet Take 2 tablets (100 mg) by mouth daily 60 tablet  0     pravastatin (PRAVACHOL) 20 MG tablet [PRAVASTATIN (PRAVACHOL) 20 MG TABLET] Take 20 mg by mouth daily.       rivaroxaban ANTICOAGULANT (XARELTO) 15 MG TABS tablet Take 1 tablet (15 mg) by mouth daily (with dinner) 90 tablet 3       Labs:  @clab@  Lab Results   Component Value Date    WBC 5.9 07/18/2022    HGB 11.7 07/18/2022    HCT 37.1 07/18/2022     07/18/2022     08/04/2022    POTASSIUM 4.1 08/04/2022    CHLORIDE 100 08/04/2022    CO2 26 08/04/2022     (H) 08/04/2022    BUN 28.2 (H) 08/04/2022    CR 1.45 (H) 08/04/2022    MAG 1.9 04/26/2022    INR 1.77 (H) 04/20/2022    BILITOTAL 0.5 08/04/2022    AST 27 08/04/2022    ALT 18 08/04/2022    ALKPHOS 95 08/04/2022    PROTTOTAL 6.4 08/04/2022    ALBUMIN 4.3 08/04/2022       I have personally reviewed all pertinent imaging studies and PFT results unless otherwise noted.    Imaging studies:  Narrative & Impression   EXAM: CT CHEST W/O CONTRAST  LOCATION: [facility]  DATE/TIME: 7/21/2022 1:58 PM     INDICATION: Lung nodule, > 8mm  COMPARISON: 1/17/2022 CTA chest  TECHNIQUE: CT chest without IV contrast. Multiplanar reformats were obtained. Dose reduction techniques were used.  CONTRAST: None.     FINDINGS:   LUNGS AND PLEURA: A 10 mm  pure groundglass nodule in the lingula (series 5, image 81) is unchanged.      A heterogeneous subsolid nodule with internal cystic in the infrahilar lingula (image 84) has increased from 16 x 15 mm to 19 x 18 mm today.      There are 5 mm and 4 mm subpleural nodules in the left upper lobe that in retrospect are unchanged (images 81 and 92).     Manifestations of COPD including hyperinflation of the lungs, mild to moderate mid and upper lung centrilobular emphysema, chronic bronchial wall thickening and segments of endobronchial mucosal thickening and secretions. No pleural effusion.     MEDIASTINUM/AXILLAE: No lymphadenopathy. Heart size normal. No pericardial effusion. Aortic valve  leaflet calcification. Calcified atheromatous plaque throughout the normal caliber thoracic aorta and at the origin of brachiocephalic arteries.     CORONARY ARTERY CALCIFICATION: Moderate to severe three-vessel coronary artery calcification.      UPPER ABDOMEN: Calcified atheromatous plaque at the origin of the renal arteries.     MUSCULOSKELETAL: No bone lesions.                                                                      IMPRESSION:      1. A heterogeneous subsolid nodule with internal cystic in the infrahilar lingula has increased from 16 x 15 mm to 19 x 18 mm today therefore is suspicious for lepidic adenocarcinoma.  2. A 10 mm  pure groundglass nodule in the lingula and several diminutive left upper lobe nodules are unchanged.   3.  Manifestations of COPD including hyperinflation of the lungs, mild to moderate mid and upper lung centrilobular emphysema, chronic bronchial wall thickening and segments of endobronchial mucosal thickening and secretions.   4. Heart size normal with aortic valve leaflet calcification and moderate to severe three-vessel coronary artery calcification.        PFTs demonstrate severe obstruction with reversibility.  There is air trapping and severe reduction in DLCO      Physical Exam:  /80 (BP Location: Left arm, Patient Position: Chair, Cuff Size: Adult Regular)   Pulse 75   Wt 68.9 kg (152 lb)   SpO2 98%   BMI 24.91 kg/m    General - Well nourished  Ears/Mouth -  Deferred given mask use during pandemic  Neck - no cervical lymphadenopathy  Lungs - Clear to ausculation bilaterally but faint  CVS - regular rhythm with no murmurs, rubs or gallups  Abdomen - soft, NT, ND, NABS  Ext - no cyanosis, clubbing or edema  Skin - no rash  Psychology - alert and oriented, answers appropriate        Electronically signed by:    Parth Rosario MD PhD  [facility]monary [facility] Critical [facility]

## 2022-08-29 NOTE — LETTER
8/29/2022       RE: Fatuma Henry  [address] [place] [address]     Dear Colleague,    Thank you for referring your patient, Fatuma Henry, to the [facility] at [facility]. Please see a copy of my visit note below.    Assessment and Plan:Fatuma Henry is a 77 year old female with a past medical history significant for lung nodule and COPD who presents to clinic today to establish outpatient care for her pulmonary issues.  We see her  and through him I have known her.  She has severe COPD based on her breathing studies with a robust bronchodilator response.  I would like to start symbicort on her and she can continue duonebs or albuterol MDI. She may be a candidate for triple therapy, but will make sure she tolerates the LABA/ICS first.  She also has a lung nodule growing on Chest CT.  This is a GGO with cystic space in the middle, suggestive of an adenocarcinoma.  We should perform a PET scan to better determine our next steps.    1) COPD - severe- need to measure ambulatory oxygen in clinic.  Start symbicort 160/4.5 BID, provide albuterol inhaler.  Continue duonebs as current.    2) Chronic hypoxic respiratory failure - sleeps with 3 L oxygen at night.  Determining if she needs ambulatory oxygen now.  3) Lung nodule - Growing in the lingula.  PET scan ordered  4) RTC in 3 months to discuss PET results and results with symbicort    CCx: COPD    HPI: Ms. Henry is a 77 year old female with a history of COPD and hypertension and arthritis who presents for evaluation of her COPD.  She had PFTs on 8/15 as well as a follow up CT for a lung nodule on 7/21.  She was in the hospital on 4/20 with a COPD exacerbation.  This is her first visit to clinic but was seen in the hospital by our team.  She gets winded with simple ambulation.  She has a chronic productive cough.  She uses duonebs 2-3 times a day and finds these very  helpful.      ROS:  Review of Systems - History obtained from the patient  General ROS: negative  Psychological ROS: negative  ENT ROS: negative  Allergy and Immunology ROS: negative  Endocrine ROS: negative  Respiratory ROS: positive for - cough, shortness of breath and sputum changes  negative for - stridor, tachypnea or wheezing  Cardiovascular ROS: no chest pain or palpitations  Gastrointestinal ROS: no abdominal pain, change in bowel habits, or black or bloody stools  Genito-Urinary ROS: no dysuria, trouble voiding, or hematuria  Musculoskeletal ROS: negative  Neurological ROS: no TIA or stroke symptoms  Dermatological ROS: negative      Current Meds:  Current Outpatient Medications   Medication Sig Dispense Refill     acetaminophen (TYLENOL) 500 MG tablet [ACETAMINOPHEN (TYLENOL) 500 MG TABLET] Take 1-2 tablets (500-1,000 mg total) by mouth every 6 (six) hours as needed.  0     albuterol (PROAIR HFA/PROVENTIL HFA/VENTOLIN HFA) 108 (90 Base) MCG/ACT inhaler Inhale 2 puffs into the lungs every 6 hours 18 g 11     budesonide-formoterol (SYMBICORT) 160-4.5 MCG/ACT Inhaler Inhale 2 puffs into the lungs 2 times daily for 30 days 10 g 11     Cholecalciferol (VITAMIN D) 50 MCG (2000 UT) CAPS Take 50 mcg by mouth daily       compressor, for nebulizer Saran [COMPRESSOR, FOR NEBULIZER SARAN] Nebulizer treatment qid prn 1 Device 0     famotidine (PEPCID) 20 MG tablet Take 20 mg by mouth 2 times daily       Flaxseed, Linseed, (FLAX SEED OIL) 1000 MG capsule Take 1 capsule by mouth daily       folic acid (FOLVITE) 1 MG tablet TAKE 1 TABLET BY MOUTH ONCE DAILY EXCEPT  THE  DAY  WHEN  TAKING  METHOTREXATE 90 tablet 0     FOLIC ACID/MULTIVITS-MIN/LUT (CENTRUM SILVER ORAL) [FOLIC ACID/MULTIVITS-MIN/LUT (CENTRUM SILVER ORAL)] Take 1 tablet by mouth daily.        furosemide (LASIX) 20 MG tablet Take 20 mg by mouth daily as needed       ipratropium - albuterol 0.5 mg/2.5 mg/3 mL (DUONEB) 0.5-2.5 (3) MG/3ML neb solution Take 1 vial  (3 mLs) by nebulization every 4 hours as needed for wheezing or shortness of breath / dyspnea 90 mL 0     methotrexate sodium 2.5 MG TABS Take 6 tablets (15 mg) by mouth once a week for 90 days 72 tablet 0     metoprolol succinate ER (TOPROL-XL) 50 MG 24 hr tablet Take 2 tablets (100 mg) by mouth daily 60 tablet 0     pravastatin (PRAVACHOL) 20 MG tablet [PRAVASTATIN (PRAVACHOL) 20 MG TABLET] Take 20 mg by mouth daily.       rivaroxaban ANTICOAGULANT (XARELTO) 15 MG TABS tablet Take 1 tablet (15 mg) by mouth daily (with dinner) 90 tablet 3       Labs:  @clab@  Lab Results   Component Value Date    WBC 5.9 07/18/2022    HGB 11.7 07/18/2022    HCT 37.1 07/18/2022     07/18/2022     08/04/2022    POTASSIUM 4.1 08/04/2022    CHLORIDE 100 08/04/2022    CO2 26 08/04/2022     (H) 08/04/2022    BUN 28.2 (H) 08/04/2022    CR 1.45 (H) 08/04/2022    MAG 1.9 04/26/2022    INR 1.77 (H) 04/20/2022    BILITOTAL 0.5 08/04/2022    AST 27 08/04/2022    ALT 18 08/04/2022    ALKPHOS 95 08/04/2022    PROTTOTAL 6.4 08/04/2022    ALBUMIN 4.3 08/04/2022       I have personally reviewed all pertinent imaging studies and PFT results unless otherwise noted.    Imaging studies:  Narrative & Impression   EXAM: CT CHEST W/O CONTRAST  LOCATION: [facility]  DATE/TIME: 7/21/2022 1:58 PM     INDICATION: Lung nodule, > 8mm  COMPARISON: 1/17/2022 CTA chest  TECHNIQUE: CT chest without IV contrast. Multiplanar reformats were obtained. Dose reduction techniques were used.  CONTRAST: None.     FINDINGS:   LUNGS AND PLEURA: A 10 mm  pure groundglass nodule in the lingula (series 5, image 81) is unchanged.      A heterogeneous subsolid nodule with internal cystic in the infrahilar lingula (image 84) has increased from 16 x 15 mm to 19 x 18 mm today.      There are 5 mm and 4 mm subpleural nodules in the left upper lobe that in retrospect are unchanged (images 81 and 92).     Manifestations of COPD including  hyperinflation of the lungs, mild to moderate mid and upper lung centrilobular emphysema, chronic bronchial wall thickening and segments of endobronchial mucosal thickening and secretions. No pleural effusion.     MEDIASTINUM/AXILLAE: No lymphadenopathy. Heart size normal. No pericardial effusion. Aortic valve leaflet calcification. Calcified atheromatous plaque throughout the normal caliber thoracic aorta and at the origin of brachiocephalic arteries.     CORONARY ARTERY CALCIFICATION: Moderate to severe three-vessel coronary artery calcification.      UPPER ABDOMEN: Calcified atheromatous plaque at the origin of the renal arteries.     MUSCULOSKELETAL: No bone lesions.                                                                      IMPRESSION:      1. A heterogeneous subsolid nodule with internal cystic in the infrahilar lingula has increased from 16 x 15 mm to 19 x 18 mm today therefore is suspicious for lepidic adenocarcinoma.  2. A 10 mm  pure groundglass nodule in the lingula and several diminutive left upper lobe nodules are unchanged.   3.  Manifestations of COPD including hyperinflation of the lungs, mild to moderate mid and upper lung centrilobular emphysema, chronic bronchial wall thickening and segments of endobronchial mucosal thickening and secretions.   4. Heart size normal with aortic valve leaflet calcification and moderate to severe three-vessel coronary artery calcification.        PFTs demonstrate severe obstruction with reversibility.  There is air trapping and severe reduction in DLCO      Physical Exam:  /80 (BP Location: Left arm, Patient Position: Chair, Cuff Size: Adult Regular)   Pulse 75   Wt 68.9 kg (152 lb)   SpO2 98%   BMI 24.91 kg/m    General - Well nourished  Ears/Mouth -  Deferred given mask use during pandemic  Neck - no cervical lymphadenopathy  Lungs - Clear to ausculation bilaterally but faint  CVS - regular rhythm with no murmurs, rubs or gallups  Abdomen -  soft, NT, ND, NABS  Ext - no cyanosis, clubbing or edema  Skin - no rash  Psychology - alert and oriented, answers appropriate        Electronically signed by:    Parth Rosario MD PhD  [facility]      Again, thank you for allowing me to participate in the care of your patient.      Sincerely,    Parth Rosario MD

## 2022-08-29 NOTE — PROGRESS NOTES
Patient oxygen saturation on RA at rest is 97%.  Oxygen saturation after ambulating 150ft on RA is 91%.    Oxygen saturation after ambulating 150ft on RA is 89%.    [facility]

## 2022-08-29 NOTE — PATIENT INSTRUCTIONS
Regarding your breathing test   You have severe COPD based on your breathing test.  You should be on a long acting breathing medicine.  I have given you symbicort to start using twice a day.  Use this regardless of how you feel.     I have also given you an albuterol inhaler to have for your purse. This is short acting like your nebulizer.  This can be used as needed up to every four hours.    Regarding the lung nodule   It seems to be growing.  I think we need to do a PET scan to get to the bottom of it.  I think we will need to biopsy it or something near it, but the PET scan is needed to determine which.

## 2022-08-30 NOTE — TELEPHONE ENCOUNTER
Per Dr. Rosario,   I would wait a day and try it again.  If it happens again, we will have to try something else.     Called and gave recommendations per Dr. Rosario. She agrees to try it again on Thursday morning.

## 2022-08-30 NOTE — TELEPHONE ENCOUNTER
Piotr called to report starting the Symbicort last night at 9:30pm. She woke at midnight with dizziness, diarrhea, legs hurting, heart racing, fatigue, and nausea. She has not taken it since last night and feels better. Please advise any new recommendations ?

## 2022-09-07 NOTE — TELEPHONE ENCOUNTER
Pt states she is having more joint pain and swelling all over body. Pt states her arms are sore, has hard time fixing her hair because she can't lift her arms very high. Her right foot is swollen and painful on the outer side and her left toes are swollen and sore. Pt states these symptoms have been getting worse since stopping Cimzia, pt is still taking methotrexate. Pt wonders what she can do for her symptoms

## 2022-09-07 NOTE — TELEPHONE ENCOUNTER
"[facility] Call Center    Phone Message    May a detailed message be left on voicemail: yes, please call pt's home phone    Reason for Call: Pt reports she was told that the next time she was having a flare, that she should contact our office and try to get in right away. No appointments available over the next few days.   Patient reports she is \"hurting real bad\" because she has not been on any medications for a while, would like some direction from Dr. Rush regarding how she should handle this.     Action Taken: Message routed to:  Other: RHEUMATOLOGY SUPPORT POOL    Travel Screening: Not Applicable  "

## 2022-09-08 NOTE — TELEPHONE ENCOUNTER
Per Dr Rush pt can take prednisone 7.5mg daily for two weeks to help the flare up and pt should be seen sooner to discuss starting other medications again.     Pt notified and states she has some 5mg tablets of prednisone at home from a previous prescription so she doesn't need a new rx sent to the pharmacy. Pt scheduled to come in on 9/21 at 3pm with Dr Rush to discuss other med options and her symptoms. Pt agrees with plan.

## 2022-09-13 NOTE — ED TRIAGE NOTES
"Pt presents with bilateral foot swelling and pain, right worse than left. Pt has been taking \"water pill for 2 weeks without improvement     Triage Assessment     Row Name 09/13/22 0832       Triage Assessment (Adult)    Airway WDL WDL       Respiratory WDL    Respiratory WDL WDL       Skin Circulation/Temperature WDL    Skin Circulation/Temperature WDL WDL       Cardiac WDL    Cardiac WDL WDL       Peripheral/Neurovascular WDL    Peripheral Neurovascular WDL WDL       Cognitive/Neuro/Behavioral WDL    Cognitive/Neuro/Behavioral WDL WDL              "

## 2022-09-13 NOTE — ED PROVIDER NOTES
EMERGENCY DEPARTMENT ENCOUnter      NAME: Fatuma Henry  AGE: 77 year old female  YOB: 1945  MRN: [id number]  EVALUATION DATE & TIME: 2022  9:04 AM    PCP: Tory Wise    ED PROVIDER: Chaz Ramesh DO      Chief Complaint   Patient presents with     Leg Swelling     Foot Pain         FINAL IMPRESSION:  1. Cellulitis of foot          ED COURSE & MEDICAL DECISION MAKIN:06 AM I met with the patient in room surge #5 and performed my initial interview and exam     The patient presented to the emergency department today with complaints of right foot pain, swelling, and redness.  This is been present for the past few days.  On exam, there is erythema and swelling throughout the right foot consistent with cellulitis.  Laboratory testing is unremarkable.  Chest x-ray was obtained as well given that she noticed some mild shortness of breath recently but this was normal.  Plan will be for discharge home with antibiotics and instructions for close follow-up.  She is comfortable with this plan.    At the conclusion of the encounter I discussed the results of all of the tests and the disposition. The questions were answered. The patient or family acknowledged understanding and was agreeable with the care plan.         NEW PRESCRIPTIONS STARTED AT TODAY'S ER VISIT  Discharge Medication List as of 2022 11:34 AM      START taking these medications    Details   cephALEXin (KEFLEX) 500 MG capsule Take 1 capsule (500 mg) by mouth 4 times daily for 7 days, Disp-28 capsule, R-0, E-Prescribe      HYDROcodone-acetaminophen (NORCO) 5-325 MG tablet Take 1 tablet by mouth every 6 hours as needed for pain, Disp-8 tablet, R-0, E-Prescribe                =================================================================    HPI        Fatuma Henry is a 77 year old female with a pertinent history of CKD stage 3, COPD, HTN, paroxysmal Afib, nonsustained ventricular tachycardia, and rheumatoid  "arthritis who presents to this ED via walk-in for evaluation of right foot swelling.    Patient reports that for the past two weeks her right foot has been red, swollen and it \"really hurts\". She describes her pain as sharp, stabbing pains that started last night. She notes her left foot is slightly swollen, but less so than the right. She states that it is so swollen she feels as though the \"skin was gonna pop right open\". Patient notes that she is having more trouble breathing today than she's had in a long time. Patient denies fever, chills, or any other symptoms or complaints.       REVIEW OF SYSTEMS     Constitutional:  Denies fever or chills  HENT:  Denies sore throat   Respiratory:  Denies cough or shortness of breath   Cardiovascular:  Denies chest pain or palpitations  GI:  Denies abdominal pain, nausea, or vomiting  Musculoskeletal:  Right foot swelling, pain, and redness   Skin:  Denies rash   Neurologic:  Denies headache, focal weakness or sensory changes    All other systems reviewed and are negative      PAST MEDICAL HISTORY:  Past Medical History:   Diagnosis Date     Atrial fibrillation (H)      CRF (chronic renal failure)      GERD (gastroesophageal reflux disease)      Hypertension      Hypovolemic shock (H)      Influenza A 12/2017     Rheumatoid arthritis (H)      Sepsis (H) 12/24/2017       PAST SURGICAL HISTORY:  Past Surgical History:   Procedure Laterality Date     APPENDECTOMY       BLADDER SUSPENSION       CHOLECYSTECTOMY             CURRENT MEDICATIONS:    cephALEXin (KEFLEX) 500 MG capsule  HYDROcodone-acetaminophen (NORCO) 5-325 MG tablet  acetaminophen (TYLENOL) 500 MG tablet  albuterol (PROAIR HFA/PROVENTIL HFA/VENTOLIN HFA) 108 (90 Base) MCG/ACT inhaler  budesonide-formoterol (SYMBICORT) 160-4.5 MCG/ACT Inhaler  Cholecalciferol (VITAMIN D) 50 MCG (2000 UT) CAPS  compressor, for nebulizer Eva  famotidine (PEPCID) 20 MG tablet  Flaxseed, Linseed, (FLAX SEED OIL) 1000 MG capsule  folic " "acid (FOLVITE) 1 MG tablet  FOLIC ACID/MULTIVITS-MIN/LUT (CENTRUM SILVER ORAL)  furosemide (LASIX) 20 MG tablet  ipratropium - albuterol 0.5 mg/2.5 mg/3 mL (DUONEB) 0.5-2.5 (3) MG/3ML neb solution  methotrexate sodium 2.5 MG TABS  metoprolol succinate ER (TOPROL-XL) 50 MG 24 hr tablet  pravastatin (PRAVACHOL) 20 MG tablet  rivaroxaban ANTICOAGULANT (XARELTO) 15 MG TABS tablet        ALLERGIES:  Allergies   Allergen Reactions     Codeine Nausea and Vomiting     Fosamax [Alendronic Acid] Diarrhea       FAMILY HISTORY:  Family History   Problem Relation Age of Onset     Heart Failure Mother      Cerebrovascular Disease Father      Kidney failure Sister      Cerebrovascular Disease Brother      Diabetes Type 2  Brother        SOCIAL HISTORY:   Social History     Socioeconomic History     Marital status:    Tobacco Use     Smoking status: Former Smoker     Packs/day: 0.50     Types: Cigarettes     Quit date: 2017     Years since quittin.7     Smokeless tobacco: Never Used   Substance and Sexual Activity     Alcohol use: No     Drug use: No     Sexual activity: Not Currently       VITALS:  Patient Vitals for the past 24 hrs:   BP Temp Temp src Pulse Resp SpO2 Height Weight   22 1146 98/53 -- -- 85 -- 99 % -- --   22 0833 108/55 98.5  F (36.9  C) Oral 82 16 100 % 1.676 m (5' 6\") 70.8 kg (156 lb)       PHYSICAL EXAM    Constitutional:  Well developed, Well nourished,  HENT:  Normocephalic, Atraumatic, Bilateral external ears normal, Oropharynx moist, Nose normal.   Neck:  Normal range of motion, No meningismus, No stridor.   Eyes:  EOMI, Conjunctiva normal, No discharge.   Respiratory:  Normal breath sounds, No respiratory distress, No wheezing, No chest tenderness.   Cardiovascular:  Normal heart rate, Normal rhythm, No murmurs. Equal distal pulse  GI:  Soft, No tenderness, No guarding, No CVA tenderness.   Musculoskeletal:  Neurovascularly intact distally, No tenderness, No cyanosis, Good " range of motion in all major joints. Moderate swelling and erythema throughout right foot. No calf tenderness, no breaks in the skin   Neurologic:  Alert & oriented x 3, Normal motor function, Normal sensory function, No focal deficits noted.   Psychiatric:  Affect normal, Judgment normal, Mood normal.      LAB:  All pertinent labs reviewed and interpreted.  Results for orders placed or performed during the hospital encounter of 09/13/22              CBC with platelets   Result Value Ref Range    WBC Count 9.4 4.0 - 11.0 10e3/uL    RBC Count 3.19 (L) 3.80 - 5.20 10e6/uL    Hemoglobin 10.8 (L) 11.7 - 15.7 g/dL    Hematocrit 32.1 (L) 35.0 - 47.0 %     (H) 78 - 100 fL    MCH 33.9 (H) 26.5 - 33.0 pg    MCHC 33.6 31.5 - 36.5 g/dL    RDW 14.5 10.0 - 15.0 %    Platelet Count 265 150 - 450 10e3/uL   Basic metabolic panel   Result Value Ref Range    Sodium 140 136 - 145 mmol/L    Potassium 4.0 3.5 - 5.0 mmol/L    Chloride 102 98 - 107 mmol/L    Carbon Dioxide (CO2) 25 22 - 31 mmol/L    Anion Gap 13 5 - 18 mmol/L    Urea Nitrogen 21 8 - 28 mg/dL    Creatinine 1.14 (H) 0.60 - 1.10 mg/dL    Calcium 10.7 (H) 8.5 - 10.5 mg/dL    Glucose 98 70 - 125 mg/dL    GFR Estimate 50 (L) >60 mL/min/1.73m2   B-Type Natriuretic Peptide ([facility] Only)   Result Value Ref Range     (H) 0 - 140 pg/mL       RADIOLOGY:  I have independently reviewed and interpreted the above imaging, pending the final radiology read.  XR Chest 2 Views   Final Result   IMPRESSION: No pleural fluid or pneumothorax. No airspace disease or edema. Normal size of the heart. The main pulmonary artery is prominent, which could be seen with pulmonary hypertension.          I, Hillary Blackwell, am serving as a scribe to document services personally performed by Dr. Ramesh based on my observation and the provider's statements to me. I, Chaz Ramesh, DO attest that Hillary Blackwell is acting in a scribe capacity, has observed my performance of the services and has  documented them in accordance with my direction.    Chaz Ramesh,   Emergency Medicine  [facility] EMERGENCY ROOM  [address] [address] [phone]  Dept: [phone]     Chaz Ramesh MD  09/13/22 8584

## 2022-09-13 NOTE — DISCHARGE INSTRUCTIONS
Your symptoms today appear to be due to cellulitis.  Take the prescribed antibiotics as directed and follow-up closely with your primary care doctor.  Return to the ER for any worsening symptoms or other concerns.

## 2022-09-16 NOTE — ED NOTES
Expected Patient Referral to ED  2:12 PM    Referring Clinic/Provider:  Urgent care    Reason for referral/Clinical facts:  Lower leg cellulitis, worsening, on cephalexin.  Cannot add bactrim due to methotrexate use    Recommendations provided:  Send to ED for further evaluation    Caller was informed that this institution does possess the capabilities and/or resources to provide for patient and should be transferred to our facility.    Discussed that if direct admit is sought and any hurdles are encountered, this ED would be happy to see the patient and evaluate.    Informed caller that recommendations provided are recommendations based only on the facts provided and that they responsible to accept or reject the advice, or to seek a formal in person consultation as needed and that this ED will see/treat patient should they arrive.      Rocio Encinas DO  [facility] EMERGENCY ROOM  [address] [address]  [phone]       Rocio Encinas DO  09/16/22 1413

## 2022-09-16 NOTE — ED TRIAGE NOTES
Seen in ED for a right foot cellulitis.  Redness has been getting worse despite the antibiotics.     Triage Assessment     Row Name 09/16/22 1518       Triage Assessment (Adult)    Airway WDL WDL       Respiratory WDL    Respiratory WDL WDL       Skin Circulation/Temperature WDL    Skin Circulation/Temperature WDL WDL       Cardiac WDL    Cardiac WDL WDL       Peripheral/Neurovascular WDL    Peripheral Neurovascular WDL WDL       Cognitive/Neuro/Behavioral WDL    Cognitive/Neuro/Behavioral WDL WDL

## 2022-09-16 NOTE — DISCHARGE INSTRUCTIONS
Lets try an additional antibiotic in addition to the Keflex that you are already taking.  We will do clindamycin 450 mg 3 times a day for 1 week.  You need to see your primary care provider on Monday to have this rechecked.  If you are not able to see your primary care provider return to the ER or urgent care.  If you develop new or worsening symptoms over the weekend including fevers, worsening redness or swelling you need to return to the ER for likely admission and IV antibiotics.

## 2022-09-19 NOTE — PATIENT INSTRUCTIONS
1) Your lung nodule is indeterminate on your PET scan and will be hard to clearly give you an answer  2) I'd like to watch this with another CT in 6 months.  If it gets bigger or more solid, it will be easier to biopsy  3) There is no spread on your PET scan to suggest its trying to go into your body.  4) I will see you back in November for routine COPD care

## 2022-09-19 NOTE — PROGRESS NOTES
Assessment and Plan:Fatuma Henry is a 77 year old female with a past medical history significant for COPD and a lung nodule who presents to clinic today for follow up of her lung nodule after her PET scan.  The nodule is semisolid with mild FDG activity.  This was discussed with Dr. Martínez regarding biopsy feasibility.  Given the nature of this with no spreading, we have opted to follow this with serial imaging as a biopsy may be low yield.  Given severe COPD she is at risk of sedation or surgery.  She agrees with this plan.  Incidentally she feels the symbicort we prescribed on her last visit is working.    1) Lung nodule - repeat CT in another 6 months.  If this becomes more solid or grows consider biopsy at that time, consider fiducial placement as well as not a great candidate for surgery  2) COPD - improving on symbicort, continue her current regimen  3) Cellulitis of foot -  Not responding to antibiotics, but also not spreading.  Has appt with primary care after our viist.    4) RTC in November for her COPD as previously scheduled.           CCx: lung nodule    HPI: Ms. Henry returns to discuss her lung nodule findings after PET scan.  She is wondering what the next steps will be.  She feels better since I saw her last as she successfully started on symbicort.  She was in the ER twice last week with right foot cellulitis.  She isn't sure the antibiotics are helping this at this point, but has been on them only 2.5 days so far.    ROS:  Review of Systems - History obtained from the patient  General ROS: negative  Psychological ROS: negative  ENT ROS: negative  Allergy and Immunology ROS: negative  Endocrine ROS: negative  Respiratory ROS: positive for - shortness of breath  negative for - sputum changes, stridor, tachypnea or wheezing  Cardiovascular ROS: no chest pain or palpitations  Gastrointestinal ROS: no abdominal pain, change in bowel habits, or black or bloody stools  Genito-Urinary ROS: no dysuria,  trouble voiding, or hematuria  Musculoskeletal ROS: negative  Neurological ROS: no TIA or stroke symptoms  Dermatological ROS: skin infection of right foot    Current Meds:  Current Outpatient Medications   Medication Sig Dispense Refill     acetaminophen (TYLENOL) 500 MG tablet [ACETAMINOPHEN (TYLENOL) 500 MG TABLET] Take 1-2 tablets (500-1,000 mg total) by mouth every 6 (six) hours as needed.  0     albuterol (PROAIR HFA/PROVENTIL HFA/VENTOLIN HFA) 108 (90 Base) MCG/ACT inhaler Inhale 2 puffs into the lungs every 6 hours 18 g 11     budesonide-formoterol (SYMBICORT) 160-4.5 MCG/ACT Inhaler Inhale 2 puffs into the lungs 2 times daily for 30 days 10 g 11     Cholecalciferol (VITAMIN D) 50 MCG (2000 UT) CAPS Take 50 mcg by mouth daily       clindamycin (CLEOCIN) 150 MG capsule Take 3 capsules (450 mg) by mouth 3 times daily for 7 days 63 capsule 0     compressor, for nebulizer Saran [COMPRESSOR, FOR NEBULIZER SARAN] Nebulizer treatment qid prn 1 Device 0     famotidine (PEPCID) 20 MG tablet Take 20 mg by mouth 2 times daily       Flaxseed, Linseed, (FLAX SEED OIL) 1000 MG capsule Take 1 capsule by mouth daily       folic acid (FOLVITE) 1 MG tablet TAKE 1 TABLET BY MOUTH ONCE DAILY EXCEPT  THE  DAY  WHEN  TAKING  METHOTREXATE 90 tablet 0     FOLIC ACID/MULTIVITS-MIN/LUT (CENTRUM SILVER ORAL) [FOLIC ACID/MULTIVITS-MIN/LUT (CENTRUM SILVER ORAL)] Take 1 tablet by mouth daily.        furosemide (LASIX) 20 MG tablet Take 20 mg by mouth daily as needed       ipratropium - albuterol 0.5 mg/2.5 mg/3 mL (DUONEB) 0.5-2.5 (3) MG/3ML neb solution Take 1 vial (3 mLs) by nebulization every 4 hours as needed for wheezing or shortness of breath / dyspnea 90 mL 0     methotrexate sodium 2.5 MG TABS Take 6 tablets (15 mg) by mouth once a week for 90 days 72 tablet 0     metoprolol succinate ER (TOPROL-XL) 50 MG 24 hr tablet Take 2 tablets (100 mg) by mouth daily 60 tablet 0     pravastatin (PRAVACHOL) 20 MG tablet [PRAVASTATIN  (PRAVACHOL) 20 MG TABLET] Take 20 mg by mouth daily.       rivaroxaban ANTICOAGULANT (XARELTO) 15 MG TABS tablet Take 1 tablet (15 mg) by mouth daily (with dinner) 90 tablet 3       Labs:  @clab@  Lab Results   Component Value Date    WBC 9.4 09/13/2022    HGB 10.8 (L) 09/13/2022    HCT 32.1 (L) 09/13/2022     09/13/2022     09/13/2022    POTASSIUM 4.0 09/13/2022    CHLORIDE 102 09/13/2022    CO2 25 09/13/2022    GLC 98 09/13/2022    BUN 21 09/13/2022    CR 1.14 (H) 09/13/2022    MAG 1.9 04/26/2022    INR 1.77 (H) 04/20/2022    BILITOTAL 0.5 08/04/2022    AST 27 08/04/2022    ALT 18 08/29/2022    ALKPHOS 95 08/04/2022    PROTTOTAL 6.4 08/04/2022    ALBUMIN 4.2 08/29/2022       I have personally reviewed all pertinent imaging studies and PFT results unless otherwise noted.    Imaging studies:  No results found.      Physical Exam:  /57 (BP Location: Right arm)   Pulse 79   Wt 69.5 kg (153 lb 4.8 oz)   SpO2 95%   BMI 25.51 kg/m    General - Well nourished  Ears/Mouth -  Deferred given mask use during pandemic  Neck - no cervical lymphadenopathy  Lungs - Clear to ausculation bilaterally   CVS - regular rhythm with no murmurs, rubs or gallups  Abdomen - soft, NT, ND, NABS  Ext - no cyanosis, clubbing or edema  Skin - right foot is warm, firm and erythematous over the dorsal surface to toes, it does not extend into the lower leg.  Psychology - alert and oriented, answers appropriate        Electronically signed by:    Parth Rosario MD PhD  [facility]monary and Critical [facility] Medicine

## 2022-09-21 NOTE — PROGRESS NOTES
Rheumatology follow-up visit note     Fatuma is a 77 year old female presents today for follow-up.    Fatuma was seen today for recheck.    Diagnoses and all orders for this visit:    Rheumatoid arthritis involving multiple sites with positive rheumatoid factor (H)  -     methotrexate sodium 2.5 MG TABS; Take 6 tablets (15 mg) by mouth once a week    Primary osteoarthritis involving multiple joints  -     DULoxetine (CYMBALTA) 20 MG capsule; Take 1 capsule (20 mg) by mouth daily for 30 days    CRF (chronic renal failure), stage 3 (moderate) (H)    High risk medication use        This patient with seropositive rheumatoid arthritis is here for follow-up and she has polyarthralgias almost in all joint areas, we discussed the coley question of if this represents RA or is it noninflammatory such as OA related issue.  She was on Cimzia at 1 point however when she was on Cimzia and she still had similar profile as far as the and joint symptoms are concerned.  The likelihood on today's evaluation appears to be low that her current symptoms are secondary to uncontrolled RA.  We discussed duloxetine.  She is aware not to take nonsteroidals in view of the renal impairment.    Follow up in 3 months.    CHARLOTTE Burris JAZMIN Henry is a 77 year old female is here for follow-up of  rheumatoid arthritis.  This is longstanding.  Affected multiple joints, moderately severe, she used to be on Cimzia and methotrexate however because of insurance reasons, having to pay $100 each time she came in for an injection on,  she is on methotrexate only and 15 mg/week.  She has noted intermittent discomfort in almost all her joint areas.  There is no swelling.  There is stiffness in the morning that can last about an hour.          DETAILED EXAMINATION  09/21/22  :    Vitals:    09/21/22 1523   BP: 138/72   BP Location: Right arm   Patient Position: Sitting   Cuff Size: Adult Regular   Pulse: 76   Weight: 69.6 kg (153 lb 6.4 oz)   Height:  "1.651 m (5' 5\")     Alert oriented. Head including the face is examined for malar rash, heliotropes, scarring, lupus pernio. Eyes examined for redness such as in episcleritis/scleritis, periorbital lesions.   Neck/ Face examined for parotid gland swelling, range of motion of neck.  Left upper and lower and right upper and lower extremities examined for tenderness, swelling, warmth of the appendicular joints, range of motion, edema, rash.  Some of the important findings included: she does not have evidence of synovitis in the palpable joints of the upper extremities.  No significant deformities of the digits.  + [place]ber[place] nodes.  Range of motion of the shoulders  show full abduction.  No JLT effusion or warmth of the knees.  she does not have dactylitis of the digits.     Patient Active Problem List    Diagnosis Date Noted     Asthma without status asthmaticus 04/22/2022     Priority: Medium     Chronic kidney disease, stage 3a (H) 04/22/2022     Priority: Medium     History of 2019 novel coronavirus disease (COVID-19) 04/22/2022     Priority: Medium     DNR (do not resuscitate) 04/22/2022     Priority: Medium     Chronic anticoagulation 04/22/2022     Priority: Medium     Eliquis- parox atrial fib       Personal history of immunosuppressive therapy 04/22/2022     Priority: Medium     Cimzia for RA;   certolizumab pegol (CIMZIA) injection 400 mg  subcut every 28 days             Pulmonary nodules 01/17/2022     Priority: Medium     COPD exacerbation (H) 01/17/2022     Priority: Medium     Benign essential hypertension 05/28/2020     Priority: Medium     Primary osteoarthritis involving multiple joints 06/10/2019     Priority: Medium     CRF (chronic renal failure), stage 3 (moderate) (H) 05/17/2018     Priority: Medium     Nonsustained ventricular tachycardia (H) 01/12/2018     Priority: Medium     Paroxysmal atrial fibrillation (H)      Priority: Medium     Reactive airway disease      Priority: Medium     Dyspnea  "     Priority: Medium     Rheumatoid arthritis involving multiple sites with positive rheumatoid factor (H) 12/15/2016     Priority: Medium     High risk medication use 10/07/2015     Priority: Medium     Osteoporosis dxa 2006  09/22/2015     Priority: Medium     Seropositive rheumatoid arthritis (H) 03/02/2015     Priority: Medium     Past Surgical History:   Procedure Laterality Date     APPENDECTOMY       BLADDER SUSPENSION       CHOLECYSTECTOMY        Past Medical History:   Diagnosis Date     Atrial fibrillation (H)      CRF (chronic renal failure)      GERD (gastroesophageal reflux disease)      Hypertension      Hypovolemic shock (H)      Influenza A 12/2017     Rheumatoid arthritis (H)      Sepsis (H) 12/24/2017     Allergies   Allergen Reactions     Codeine Nausea and Vomiting     Fosamax [Alendronic Acid] Diarrhea     Current Outpatient Medications   Medication Sig Dispense Refill     acetaminophen (TYLENOL) 500 MG tablet [ACETAMINOPHEN (TYLENOL) 500 MG TABLET] Take 1-2 tablets (500-1,000 mg total) by mouth every 6 (six) hours as needed.  0     albuterol (PROAIR HFA/PROVENTIL HFA/VENTOLIN HFA) 108 (90 Base) MCG/ACT inhaler Inhale 2 puffs into the lungs every 6 hours 18 g 11     budesonide-formoterol (SYMBICORT) 160-4.5 MCG/ACT Inhaler Inhale 2 puffs into the lungs 2 times daily for 30 days 10 g 11     Cholecalciferol (VITAMIN D) 50 MCG (2000 UT) CAPS Take 50 mcg by mouth daily       clindamycin (CLEOCIN) 150 MG capsule Take 3 capsules (450 mg) by mouth 3 times daily for 7 days 63 capsule 0     compressor, for nebulizer Saran [COMPRESSOR, FOR NEBULIZER SARAN] Nebulizer treatment qid prn 1 Device 0     famotidine (PEPCID) 20 MG tablet Take 20 mg by mouth 2 times daily       Flaxseed, Linseed, (FLAX SEED OIL) 1000 MG capsule Take 1 capsule by mouth daily       folic acid (FOLVITE) 1 MG tablet TAKE 1 TABLET BY MOUTH ONCE DAILY EXCEPT  THE  DAY  WHEN  TAKING  METHOTREXATE 90 tablet 0     FOLIC  ACID/MULTIVITS-MIN/LUT (CENTRUM SILVER ORAL) [FOLIC ACID/MULTIVITS-MIN/LUT (CENTRUM SILVER ORAL)] Take 1 tablet by mouth daily.        furosemide (LASIX) 20 MG tablet Take 20 mg by mouth daily as needed       ipratropium - albuterol 0.5 mg/2.5 mg/3 mL (DUONEB) 0.5-2.5 (3) MG/3ML neb solution Take 1 vial (3 mLs) by nebulization every 4 hours as needed for wheezing or shortness of breath / dyspnea 90 mL 0     methotrexate sodium 2.5 MG TABS Take 6 tablets (15 mg) by mouth once a week for 90 days 72 tablet 0     metoprolol succinate ER (TOPROL-XL) 50 MG 24 hr tablet Take 2 tablets (100 mg) by mouth daily 60 tablet 0     pravastatin (PRAVACHOL) 20 MG tablet [PRAVASTATIN (PRAVACHOL) 20 MG TABLET] Take 20 mg by mouth daily.       rivaroxaban ANTICOAGULANT (XARELTO) 15 MG TABS tablet Take 1 tablet (15 mg) by mouth daily (with dinner) 90 tablet 3     family history includes Cerebrovascular Disease in her brother and father; Diabetes Type 2  in her brother; Heart Failure in her mother; Kidney failure in her sister.  Social Connections: Not on file          WBC Count   Date Value Ref Range Status   09/13/2022 9.4 4.0 - 11.0 10e3/uL Final     RBC Count   Date Value Ref Range Status   09/13/2022 3.19 (L) 3.80 - 5.20 10e6/uL Final     Hemoglobin   Date Value Ref Range Status   09/13/2022 10.8 (L) 11.7 - 15.7 g/dL Final     Hematocrit   Date Value Ref Range Status   09/13/2022 32.1 (L) 35.0 - 47.0 % Final     MCV   Date Value Ref Range Status   09/13/2022 101 (H) 78 - 100 fL Final     MCH   Date Value Ref Range Status   09/13/2022 33.9 (H) 26.5 - 33.0 pg Final     Platelet Count   Date Value Ref Range Status   09/13/2022 265 150 - 450 10e3/uL Final     % Lymphocytes   Date Value Ref Range Status   04/21/2022 11 % Final     AST   Date Value Ref Range Status   08/04/2022 27 10 - 35 U/L Final     ALT   Date Value Ref Range Status   08/29/2022 18 10 - 35 U/L Final     Albumin   Date Value Ref Range Status   08/29/2022 4.2 3.5 - 5.2  g/dL Final   05/02/2022 3.2 (L) 3.5 - 5.0 g/dL Final     Alkaline Phosphatase   Date Value Ref Range Status   08/04/2022 95 35 - 104 U/L Final     Creatinine   Date Value Ref Range Status   09/13/2022 1.14 (H) 0.60 - 1.10 mg/dL Final     GFR Estimate   Date Value Ref Range Status   09/13/2022 50 (L) >60 mL/min/1.73m2 Final     Comment:     Effective December 21, 2021 eGFRcr in adults is calculated using the 2021 CKD-EPI creatinine equation which includes age and gender (Salbador et al., NEJM, DOI: 10.1056/YUPWfd6224382)   06/02/2021 48 (L) >60 mL/min/1.73m2 Final     GFR Estimate If Black   Date Value Ref Range Status   06/02/2021 58 (L) >60 mL/min/1.73m2 Final     Erythrocyte Sedimentation Rate   Date Value Ref Range Status   01/20/2022 23 (H) 0 - 20 mm/hr Final     CRP   Date Value Ref Range Status   01/20/2022 0.3 0.0-<0.8 mg/dL Final

## 2022-09-27 NOTE — TELEPHONE ENCOUNTER
Returned call, no questions or concerns this month.  Shea De León, RT, CTTS, Chronic Pulmonary Disease Specialist

## 2022-09-27 NOTE — TELEPHONE ENCOUNTER
Pt called stating that she is having right foot/toe pain, seen her PCP yesterday for it and was told it was gout. Pt had uric acid drawn yesterday, results are not back yet. Pt would like to see Dr Rush for his and possibly have him aspirate it if possible. Added pt to 9/28 at 8:45am with Dr Rush.

## 2022-09-28 NOTE — PROGRESS NOTES
"      Rheumatology follow-up visit note     Fatuma is a 77 year old female presents today for follow-up.    Fatuma was seen today for recheck.    Diagnoses and all orders for this visit:    Chronic tophaceous gout of right foot  -     triamcinolone (KENALOG-40) injection 20 mg  -     WA ARTHROCENTESIS ASPIR&/INJ INTERM JT/BURS W/US    Effusion of toe joint, right  -     Synovial fluid Aerobic Bacterial Culture Routine  -     Gram stain  -     Cell count with differential fluid  -     Crystal ID Synovial Fluid  -     triamcinolone (KENALOG-40) injection 20 mg  -     WA ARTHROCENTESIS ASPIR&/INJ INTERM JT/BURS W/US    CRF (chronic renal failure), stage 3 (moderate) (H)        This patient has acute inflammatory arthropathy of the right first MTP with associated no tophi previously treated as cellulitis elsewhere, she feels that the \"white spots have come on during the past few days.  We discussed options she is agreeable to aspiration which was done under ultrasound guidance and aseptic technique aspiration of toothpaste like material obtained along with effusion, 1 mL, sent for further analysis including crystal identification, 20 mg of Kenalog injected through the same portal..    Follow up in 2 months.    HPI    Fatuma Henry is a 77 year old female is here for follow-up sooner than planned.  She reports that she has not had any improvement in her right foot pain for which she was treated as she had cellulitis elsewhere.  She has noted no history of trauma.  She does not have history of gout.  She has hyperuricemia.  She noted the pain to be moderately severe.  She was started on prednisone by her primary physician.  This was on Monday.  She has background of rheumatoid arthritis and osteoarthritis for which she was seen here recently.     DETAILED EXAMINATION  09/28/22  :    Vitals:    09/28/22 0859   BP: 130/66   BP Location: Right arm   Patient Position: Sitting   Cuff Size: Adult Regular   Pulse: 72 " "  Weight: 69.4 kg (153 lb)   Height: 1.651 m (5' 5\")     Alert oriented. Head including the face is examined for malar rash, heliotropes, scarring, lupus pernio. Eyes examined for redness such as in episcleritis/scleritis, periorbital lesions.   Neck/ Face examined for parotid gland swelling, range of motion of neck.  Left upper and lower and right upper and lower extremities examined for tenderness, swelling, warmth of the appendicular joints, range of motion, edema, rash.  Some of the important findings included: She has acute inflammation of the right first MTP and surrounding tissues.  She has whitish deposits just subcutaneous, consistent with tophi in the region of right fourth on the medial aspect of the proximal to the MTP #1     Patient Active Problem List    Diagnosis Date Noted     Asthma without status asthmaticus 04/22/2022     Priority: Medium     Chronic kidney disease, stage 3a (H) 04/22/2022     Priority: Medium     History of 2019 novel coronavirus disease (COVID-19) 04/22/2022     Priority: Medium     DNR (do not resuscitate) 04/22/2022     Priority: Medium     Chronic anticoagulation 04/22/2022     Priority: Medium     Eliquis- parox atrial fib       Personal history of immunosuppressive therapy 04/22/2022     Priority: Medium     Cimzia for RA;   certolizumab pegol (CIMZIA) injection 400 mg  subcut every 28 days             Pulmonary nodules 01/17/2022     Priority: Medium     COPD exacerbation (H) 01/17/2022     Priority: Medium     Benign essential hypertension 05/28/2020     Priority: Medium     Primary osteoarthritis involving multiple joints 06/10/2019     Priority: Medium     CRF (chronic renal failure), stage 3 (moderate) (H) 05/17/2018     Priority: Medium     Nonsustained ventricular tachycardia (H) 01/12/2018     Priority: Medium     Paroxysmal atrial fibrillation (H)      Priority: Medium     Reactive airway disease      Priority: Medium     Dyspnea      Priority: Medium     " Rheumatoid arthritis involving multiple sites with positive rheumatoid factor (H) 12/15/2016     Priority: Medium     High risk medication use 10/07/2015     Priority: Medium     Osteoporosis dxa 2006  09/22/2015     Priority: Medium     Seropositive rheumatoid arthritis (H) 03/02/2015     Priority: Medium     Past Surgical History:   Procedure Laterality Date     APPENDECTOMY       BLADDER SUSPENSION       CHOLECYSTECTOMY        Past Medical History:   Diagnosis Date     Atrial fibrillation (H)      CRF (chronic renal failure)      GERD (gastroesophageal reflux disease)      Hypertension      Hypovolemic shock (H)      Influenza A 12/2017     Rheumatoid arthritis (H)      Sepsis (H) 12/24/2017     Allergies   Allergen Reactions     Codeine Nausea and Vomiting     Fosamax [Alendronic Acid] Diarrhea     Current Outpatient Medications   Medication Sig Dispense Refill     acetaminophen (TYLENOL) 500 MG tablet [ACETAMINOPHEN (TYLENOL) 500 MG TABLET] Take 1-2 tablets (500-1,000 mg total) by mouth every 6 (six) hours as needed.  0     albuterol (PROAIR HFA/PROVENTIL HFA/VENTOLIN HFA) 108 (90 Base) MCG/ACT inhaler Inhale 2 puffs into the lungs every 6 hours 18 g 11     budesonide-formoterol (SYMBICORT) 160-4.5 MCG/ACT Inhaler Inhale 2 puffs into the lungs 2 times daily for 30 days 10 g 11     Cholecalciferol (VITAMIN D) 50 MCG (2000 UT) CAPS Take 50 mcg by mouth daily       compressor, for nebulizer Saran [COMPRESSOR, FOR NEBULIZER SARAN] Nebulizer treatment qid prn 1 Device 0     DULoxetine (CYMBALTA) 20 MG capsule Take 1 capsule (20 mg) by mouth daily for 30 days 30 capsule 3     famotidine (PEPCID) 20 MG tablet Take 20 mg by mouth 2 times daily       Flaxseed, Linseed, (FLAX SEED OIL) 1000 MG capsule Take 1 capsule by mouth daily       folic acid (FOLVITE) 1 MG tablet TAKE 1 TABLET BY MOUTH ONCE DAILY EXCEPT  THE  DAY  WHEN  TAKING  METHOTREXATE 90 tablet 0     FOLIC ACID/MULTIVITS-MIN/LUT (CENTRUM SILVER ORAL) [FOLIC  ACID/MULTIVITS-MIN/LUT (CENTRUM SILVER ORAL)] Take 1 tablet by mouth daily.        furosemide (LASIX) 20 MG tablet Take 20 mg by mouth daily as needed       ipratropium - albuterol 0.5 mg/2.5 mg/3 mL (DUONEB) 0.5-2.5 (3) MG/3ML neb solution Take 1 vial (3 mLs) by nebulization every 4 hours as needed for wheezing or shortness of breath / dyspnea 90 mL 0     methotrexate sodium 2.5 MG TABS Take 6 tablets (15 mg) by mouth once a week 72 tablet 0     metoprolol succinate ER (TOPROL-XL) 50 MG 24 hr tablet Take 2 tablets (100 mg) by mouth daily 60 tablet 0     pravastatin (PRAVACHOL) 20 MG tablet [PRAVASTATIN (PRAVACHOL) 20 MG TABLET] Take 20 mg by mouth daily.       predniSONE (DELTASONE) 20 MG tablet TAKE 3 TABLETS BY MOUTH ONCE DAILY FOR 3 DAYS THEN 2 ONCE DAILY FOR 3 DAYS THEN 1 ONCE DAILY FOR 3 DAYS FOR A TOTAL OF 9 DAYS       rivaroxaban ANTICOAGULANT (XARELTO) 15 MG TABS tablet Take 1 tablet (15 mg) by mouth daily (with dinner) 90 tablet 3     family history includes Cerebrovascular Disease in her brother and father; Diabetes Type 2  in her brother; Heart Failure in her mother; Kidney failure in her sister.  Social Connections: Not on file          WBC Count   Date Value Ref Range Status   09/13/2022 9.4 4.0 - 11.0 10e3/uL Final     RBC Count   Date Value Ref Range Status   09/13/2022 3.19 (L) 3.80 - 5.20 10e6/uL Final     Hemoglobin   Date Value Ref Range Status   09/13/2022 10.8 (L) 11.7 - 15.7 g/dL Final     Hematocrit   Date Value Ref Range Status   09/13/2022 32.1 (L) 35.0 - 47.0 % Final     MCV   Date Value Ref Range Status   09/13/2022 101 (H) 78 - 100 fL Final     MCH   Date Value Ref Range Status   09/13/2022 33.9 (H) 26.5 - 33.0 pg Final     Platelet Count   Date Value Ref Range Status   09/13/2022 265 150 - 450 10e3/uL Final     % Lymphocytes   Date Value Ref Range Status   04/21/2022 11 % Final     AST   Date Value Ref Range Status   08/04/2022 27 10 - 35 U/L Final     ALT   Date Value Ref Range Status    08/29/2022 18 10 - 35 U/L Final     Albumin   Date Value Ref Range Status   08/29/2022 4.2 3.5 - 5.2 g/dL Final   05/02/2022 3.2 (L) 3.5 - 5.0 g/dL Final     Alkaline Phosphatase   Date Value Ref Range Status   08/04/2022 95 35 - 104 U/L Final     Creatinine   Date Value Ref Range Status   09/13/2022 1.14 (H) 0.60 - 1.10 mg/dL Final     GFR Estimate   Date Value Ref Range Status   09/13/2022 50 (L) >60 mL/min/1.73m2 Final     Comment:     Effective December 21, 2021 eGFRcr in adults is calculated using the 2021 CKD-EPI creatinine equation which includes age and gender (Salbador et al., NEJ, DOI: 10.1056/VDTSpr0997282)   06/02/2021 48 (L) >60 mL/min/1.73m2 Final     GFR Estimate If Black   Date Value Ref Range Status   06/02/2021 58 (L) >60 mL/min/1.73m2 Final     Erythrocyte Sedimentation Rate   Date Value Ref Range Status   01/20/2022 23 (H) 0 - 20 mm/hr Final     CRP   Date Value Ref Range Status   01/20/2022 0.3 0.0-<0.8 mg/dL Final

## 2022-10-12 NOTE — TELEPHONE ENCOUNTER
[facility] calling because they received an rx for colchicine and there is an interaction with prevastatin.

## 2022-10-12 NOTE — TELEPHONE ENCOUNTER
Per Dr Rush, he is aware of possible interaction and that is way he prescribed colchicine 0.6mg once daily instead of twice daily. Pharmacist notified.

## 2022-10-12 NOTE — PROGRESS NOTES
"      Rheumatology follow-up visit note     Fatuma is a 77 year old female presents today for follow-up.    Fatuma was seen today for recheck.    Diagnoses and all orders for this visit:    Chronic tophaceous gout of right foot  -     colchicine (COLCYRS) 0.6 MG tablet; Take 1 tablet (0.6 mg) by mouth daily for 60 days  -     predniSONE (DELTASONE) 5 MG tablet; Take 1 tablet (5 mg) by mouth daily for 60 days  -     allopurinol (ZYLOPRIM) 300 MG tablet; Take 0.5 tablets (150 mg) by mouth daily for 15 days, THEN 1 tablet (300 mg) daily for 45 days.  -     XR Foot Bilateral G/E 3 Views; Future  -     Uric acid; Standing  -     Creatinine; Standing  -     CBC with platelets; Standing  -     ALT; Standing    CRF (chronic renal failure), stage 3 (moderate) (H)    Primary osteoarthritis involving multiple joints  -     XR Foot Bilateral G/E 3 Views; Future    Rheumatoid arthritis involving multiple sites with positive rheumatoid factor (H)        This patient has crystal proven gout, in the background of seropositive rheumatoid arthritis and osteoarthritis.  She has noted \"50%\" improvement with the recent corticosteroid injection into her left first MTP.  She has residual inflammation however.  We discussed options going forward.  She is going to start allopurinol with colchicine and the latter at a low dose given her current statin intake, and low-dose prednisone at 5 mg daily major side effects is reviewed.  We will meet here in 2 months.      HPI    Fatuma Henry is a 77 year old female is here for follow-up.  She was seen here recently with a painful swollen right big toe area with surrounding redness, she had aspiration, she had both intracellular and extracellular MSU crystals.  With a corticosteroid injection and the prednisone that she had from elsewhere she noted there was 50% improvement however she has residual pain which is mild to moderately severe.  She has not had kidney stones.  DETAILED EXAMINATION  " 10/12/22  :    Vitals:    10/12/22 1425   Weight: 69.4 kg (153 lb)     Alert oriented. Head including the face is examined for malar rash, heliotropes, scarring, lupus pernio. Eyes examined for redness such as in episcleritis/scleritis, periorbital lesions.   Neck/ Face examined for parotid gland swelling, range of motion of neck.  Left upper and lower and right upper and lower extremities examined for tenderness, swelling, warmth of the appendicular joints, range of motion, edema, rash.  Some of the important findings included: She has residual redness tenderness in the left first MTP and surrounding area, and the left big toe IP joint region..     Patient Active Problem List    Diagnosis Date Noted     Asthma without status asthmaticus 04/22/2022     Priority: Medium     Chronic kidney disease, stage 3a (H) 04/22/2022     Priority: Medium     History of 2019 novel coronavirus disease (COVID-19) 04/22/2022     Priority: Medium     DNR (do not resuscitate) 04/22/2022     Priority: Medium     Chronic anticoagulation 04/22/2022     Priority: Medium     Eliquis- parox atrial fib       Personal history of immunosuppressive therapy 04/22/2022     Priority: Medium     Cimzia for RA;   certolizumab pegol (CIMZIA) injection 400 mg  subcut every 28 days             Pulmonary nodules 01/17/2022     Priority: Medium     COPD exacerbation (H) 01/17/2022     Priority: Medium     Benign essential hypertension 05/28/2020     Priority: Medium     Primary osteoarthritis involving multiple joints 06/10/2019     Priority: Medium     CRF (chronic renal failure), stage 3 (moderate) (H) 05/17/2018     Priority: Medium     Nonsustained ventricular tachycardia 01/12/2018     Priority: Medium     Paroxysmal atrial fibrillation (H)      Priority: Medium     Reactive airway disease      Priority: Medium     Dyspnea      Priority: Medium     Rheumatoid arthritis involving multiple sites with positive rheumatoid factor (H) 12/15/2016      Priority: Medium     High risk medication use 10/07/2015     Priority: Medium     Osteoporosis dxa 2006  09/22/2015     Priority: Medium     Seropositive rheumatoid arthritis (H) 03/02/2015     Priority: Medium     Past Surgical History:   Procedure Laterality Date     APPENDECTOMY       BLADDER SUSPENSION       CHOLECYSTECTOMY        Past Medical History:   Diagnosis Date     Atrial fibrillation (H)      CRF (chronic renal failure)      GERD (gastroesophageal reflux disease)      Hypertension      Hypovolemic shock (H)      Influenza A 12/2017     Rheumatoid arthritis (H)      Sepsis (H) 12/24/2017     Allergies   Allergen Reactions     Codeine Nausea and Vomiting     Fosamax [Alendronic Acid] Diarrhea     Current Outpatient Medications   Medication Sig Dispense Refill     acetaminophen (TYLENOL) 500 MG tablet [ACETAMINOPHEN (TYLENOL) 500 MG TABLET] Take 1-2 tablets (500-1,000 mg total) by mouth every 6 (six) hours as needed.  0     albuterol (PROAIR HFA/PROVENTIL HFA/VENTOLIN HFA) 108 (90 Base) MCG/ACT inhaler Inhale 2 puffs into the lungs every 6 hours 18 g 11     Cholecalciferol (VITAMIN D) 50 MCG (2000 UT) CAPS Take 50 mcg by mouth daily       compressor, for nebulizer Saran [COMPRESSOR, FOR NEBULIZER SARAN] Nebulizer treatment qid prn 1 Device 0     DULoxetine (CYMBALTA) 20 MG capsule Take 1 capsule (20 mg) by mouth daily for 30 days 30 capsule 3     famotidine (PEPCID) 20 MG tablet Take 20 mg by mouth 2 times daily       Flaxseed, Linseed, (FLAX SEED OIL) 1000 MG capsule Take 1 capsule by mouth daily       folic acid (FOLVITE) 1 MG tablet TAKE 1 TABLET BY MOUTH ONCE DAILY EXCEPT  THE  DAY  WHEN  TAKING  METHOTREXATE 90 tablet 0     FOLIC ACID/MULTIVITS-MIN/LUT (CENTRUM SILVER ORAL) [FOLIC ACID/MULTIVITS-MIN/LUT (CENTRUM SILVER ORAL)] Take 1 tablet by mouth daily.        furosemide (LASIX) 20 MG tablet Take 20 mg by mouth daily as needed       ipratropium - albuterol 0.5 mg/2.5 mg/3 mL (DUONEB) 0.5-2.5 (3)  MG/3ML neb solution Take 1 vial (3 mLs) by nebulization every 4 hours as needed for wheezing or shortness of breath / dyspnea 90 mL 0     methotrexate sodium 2.5 MG TABS Take 6 tablets (15 mg) by mouth once a week 72 tablet 0     metoprolol succinate ER (TOPROL-XL) 50 MG 24 hr tablet Take 2 tablets (100 mg) by mouth daily 60 tablet 0     pravastatin (PRAVACHOL) 20 MG tablet [PRAVASTATIN (PRAVACHOL) 20 MG TABLET] Take 20 mg by mouth daily.       predniSONE (DELTASONE) 20 MG tablet TAKE 3 TABLETS BY MOUTH ONCE DAILY FOR 3 DAYS THEN 2 ONCE DAILY FOR 3 DAYS THEN 1 ONCE DAILY FOR 3 DAYS FOR A TOTAL OF 9 DAYS       rivaroxaban ANTICOAGULANT (XARELTO) 15 MG TABS tablet Take 1 tablet (15 mg) by mouth daily (with dinner) 90 tablet 3     budesonide-formoterol (SYMBICORT) 160-4.5 MCG/ACT Inhaler Inhale 2 puffs into the lungs 2 times daily for 30 days 10 g 11     family history includes Cerebrovascular Disease in her brother and father; Diabetes Type 2  in her brother; Heart Failure in her mother; Kidney failure in her sister.  Social Connections: Not on file          WBC Count   Date Value Ref Range Status   09/13/2022 9.4 4.0 - 11.0 10e3/uL Final     RBC Count   Date Value Ref Range Status   09/13/2022 3.19 (L) 3.80 - 5.20 10e6/uL Final     Hemoglobin   Date Value Ref Range Status   09/13/2022 10.8 (L) 11.7 - 15.7 g/dL Final     Hematocrit   Date Value Ref Range Status   09/13/2022 32.1 (L) 35.0 - 47.0 % Final     MCV   Date Value Ref Range Status   09/13/2022 101 (H) 78 - 100 fL Final     MCH   Date Value Ref Range Status   09/13/2022 33.9 (H) 26.5 - 33.0 pg Final     Platelet Count   Date Value Ref Range Status   09/13/2022 265 150 - 450 10e3/uL Final     % Lymphocytes   Date Value Ref Range Status   04/21/2022 11 % Final     AST   Date Value Ref Range Status   08/04/2022 27 10 - 35 U/L Final     ALT   Date Value Ref Range Status   08/29/2022 18 10 - 35 U/L Final     Albumin   Date Value Ref Range Status   08/29/2022 4.2  3.5 - 5.2 g/dL Final   05/02/2022 3.2 (L) 3.5 - 5.0 g/dL Final     Alkaline Phosphatase   Date Value Ref Range Status   08/04/2022 95 35 - 104 U/L Final     Creatinine   Date Value Ref Range Status   09/13/2022 1.14 (H) 0.60 - 1.10 mg/dL Final     GFR Estimate   Date Value Ref Range Status   09/13/2022 50 (L) >60 mL/min/1.73m2 Final     Comment:     Effective December 21, 2021 eGFRcr in adults is calculated using the 2021 CKD-EPI creatinine equation which includes age and gender (Salbador et al., NEJM, DOI: 10.1056/QBZWky9488408)   06/02/2021 48 (L) >60 mL/min/1.73m2 Final     GFR Estimate If Black   Date Value Ref Range Status   06/02/2021 58 (L) >60 mL/min/1.73m2 Final     Erythrocyte Sedimentation Rate   Date Value Ref Range Status   01/20/2022 23 (H) 0 - 20 mm/hr Final     CRP   Date Value Ref Range Status   01/20/2022 0.3 0.0-<0.8 mg/dL Final

## 2022-10-13 NOTE — TELEPHONE ENCOUNTER
[facility] Call Center    Phone Message    May a detailed message be left on voicemail: yes     Reason for Call: Medication Question or concern regarding medication   Prescription Clarification  Name of Medication: allopurinol and colchicine  Prescribing Provider:Dr Rush      What on the order needs clarification?    Pt requesting a call back, needs clarification on how much medication to take.           Action Taken: Other: Mplw Rheum    Travel Screening: Not Applicable

## 2022-10-14 NOTE — PROGRESS NOTES
Medication Therapy Management (MTM) Encounter    ASSESSMENT:                            Medication Adherence/Access: No issues identified    Gout: Improving. Patient is on appropriate therapy with plan per rheumatology.     Afib, Hypertension, Edema: Blood pressure is at goal of <130/80 mmHg and pulse is stable. Need to clarify with provider dose of metoprolol but given lower blood pressure readings and stable pulse it seems appropriate to continue dose at 100 mg daily. Reviewed that diuretic use can exacerbate gout symptoms but since gout has improved it is reasonable to continue current regimen.     COPD: Stable.     Rheumatoid arthritis: Stable.     Hyperlipidemia: Stable. LDL is at goal of <70 mg/dL.     GERD: Stable. Chronic PPI use is appropriate given ongoing DOAC therapy for Afib.     Supplement/OTC: Stable.       PLAN:                            1. I will check with your doctor about the correct metoprolol dose you should be taking  2. Continue current medications    Follow-up: 1 year for annual medication review    SUBJECTIVE/OBJECTIVE:                          Fatuma Henry is a 77 year old female called for an initial visit. She was referred to me from [employer].      Reason for visit: Comprehensive medication review.    Allergies/ADRs: Reviewed in chart  Past Medical History: Reviewed in chart  Tobacco: She reports that she quit smoking about 4 years ago. Her smoking use included cigarettes. She smoked an average of .5 packs per day. She has never used smokeless tobacco.  Alcohol: none      Medication Adherence/Access: no issues reported    Gout:   Current medication(s):   Allopurinol 150 mg x15 days, then 300 mg daily  Colchicine 0.6 mg daily x60 days  Prednisone 5 mg daily x30 days  She was recently seen by rheumatology who started new medications for gout. She has started taking them. She feels like the steroid makes her swell and gain weight but overall no other side effects reported.  Her symptoms are primarily in her foot but are slowly starting to get better. She does have a follow-up visit planned with rheumatology.     Uric Acid   Date Value Ref Range Status   09/26/2022 10.5 (H) 2.4 - 5.7 mg/dL Final     Creatinine   Date Value Ref Range Status   09/13/2022 1.14 (H) 0.60 - 1.10 mg/dL Final     GFR Estimate   Date Value Ref Range Status   09/13/2022 50 (L) >60 mL/min/1.73m2 Final     Comment:     Effective December 21, 2021 eGFRcr in adults is calculated using the 2021 CKD-EPI creatinine equation which includes age and gender (Salbador et al., NE, DOI: 10.1056/HMSXzi5107552)   06/02/2021 48 (L) >60 mL/min/1.73m2 Final     Afib, Hypertension, Edema:   Current medications:  Antithrombotic: Xarelto 15mg daily  Rate/Rhythm Control: metoprolol succinate 100 mg daily  Furosemide 20 mg daily as needed   She has only been taking one metoprolol 100 mg tablet daily but she noticed on her bottle it says two 100 mg tablets daily. She isn't sure what she should be taking. Her [facility] medication list says 100 mg daily but [facility] chart says 200 mg daily. She plans to bring bottle into discuss with her provider next visit. She does check BP at home, typically 115-123/65-70, HR 70s. She has been taking furosemide daily lately because she has swelling in her legs. No signs or symptoms of significant bruising or bleeding with Xarelto.     BP Readings from Last 3 Encounters:   10/12/22 110/70   09/28/22 130/66   09/21/22 138/72       COPD:   Current medications:   ICS/LABA- Symbicort 2 puff(s) twice daily  Short-Acting Bronchodilator: Ipratropium/Albuterol Nebs and pt reports using 3 times per day.  She recently started using budesonide-salmeterol inhaler and reports it has really helped her breathing. She follows with pulmonology. She does rinse her mouth after using the inhaler. She does still use her nebulizer about three times a day. She prefers this over her albuterol inhaler. No side effects or issues  reported.     Rheumatoid arthritis:   Current medication(s):   methotrexate 6x 2.5 mg tablets once a week (splits 3 tabs AM and 3 PM) on Sundays   folic acid 1 mg daily   duloxetine 20 mg daily   acetaminophen 500 mg as needed pain   She follows with Dr. Rush for rheumatology. She was most recently started on duloxetine to help with pain and does feel like it has been helpful. She feels her RA symptoms have been pretty well controlled lately. No side effects reported. She takes acetaminophen if she needs any OTC pain reliever, knows to avoid NSAIDs.     Hyperlipidemia:   Current medication(s): pravastatin 20 mg daily.    Patient reports no significant myalgias or other side effects.    Recent Labs   Lab Test 01/28/22  1640 01/13/21  1017   CHOL 154 151   HDL 65 67   LDL 63 66   TRIG 132 92     GERD:   Current medication: pantoprazole 40 mg daily  She does think this helps with GI and reflux symptoms. She is currently taking rivaroxaban daily. No side effects reported.     Supplement/OTC:   Currently taking:   Probiotic 1 capsule daily   She recently started taking this to help with digestive health. She isn't sure if it is helpful yet since she just started it. She stopped taking flaxseed oil and centrum mvi because it got to be too many pills.     Today's Vitals: There were no vitals taken for this visit.  ----------------      I spent 26 minutes with this patient today (an extra 15 minutes was spent creating the Medication Action Plan). All changes were made via collaborative practice agreement with Tory Wise MD. A copy of the visit note was provided to the patient's provider(s).    The patient was mailed a summary of these recommendations.     Judith Healy, PharmD, BCACP  Medication Therapy Management Pharmacist  [facility]  Pager: [phone]      Telemedicine Visit Details  Type of service:  Telephone visit  Start Time: 10:03 AM  End Time: 10:29 AM  Originating Location (pt. Location):  Home      Distant Location (provider location):  On-site  Provider has received verbal consent for a visit from the patient? Yes     Medication Therapy Recommendations  No medication therapy recommendations to display

## 2022-10-14 NOTE — LETTER
October 19, 2022  Fatuma Henry  [address] [place] [address]    Dear Ms. Henry, [facility]     Thank you for talking with me on Oct 14, 2022 about your health and medications. As a follow-up to our conversation, I have included two documents:      1. Your Recommended To-Do List has steps you should take to get the best results from your medications.  2. Your Medication List will help you keep track of your medications and how to take them.    If you want to talk about these documents, please call Judith Healy PharmD at phone: [phone], Monday-Friday 8-4:30pm.    I look forward to working with you and your doctors to make sure your medications work well for you.    Sincerely,  Judith Healy, PharmD  [facility] Pharmacist, [facility]

## 2022-10-14 NOTE — LETTER
"Recommended To-Do List      Prepared on: Oct 14, 2022       You can get the best results from your medications by completing the items on this \"To-Do List.\"      Bring your To-Do List when you go to your doctor. And, share it with your family or caregivers.    My To-Do List:  What we talked about: What I should do:    What my medicines are for, how to know if my medicines are working, made sure my medicines are safe for me and reviewed how to take my medicines.    Take my medicines every day                  "

## 2022-10-14 NOTE — LETTER
_  Medication List        Prepared on: Oct 14, 2022     Bring your Medication List when you go to the doctor, hospital, or   emergency room. And, share it with your family or caregivers.     Note any changes to how you take your medications.  Cross out medications when you no longer use them.    Medication How I take it Why I use it Prescriber   acetaminophen (TYLENOL) 500 MG tablet  Take 1-2 tablets (500-1,000 mg total) by mouth every 6 (six) hours as needed. Pain Michaela Maddox MD   albuterol (PROAIR HFA/PROVENTIL HFA/VENTOLIN HFA) 108 (90 Base) MCG/ACT inhaler Inhale 2 puffs into the lungs every 6 hours Chronic obstructive pulmonary disease Parth Rosario MD   allopurinol (ZYLOPRIM) 300 MG tablet Take 0.5 tablets (150 mg) by mouth daily for 15 days, THEN 1 tablet (300 mg) daily for 45 days. Gout MELODY Barnes   budesonide-formoterol (SYMBICORT) 160-4.5 MCG/ACT Inhaler Inhale 2 puffs into the lungs 2 times daily for 30 days Chronic obstructive pulmonary disease Parth Rosario MD   Cholecalciferol (VITAMIN D) 50 MCG (2000 UT) CAPS Take 50 mcg by mouth daily General Health Self   colchicine (COLCYRS) 0.6 MG tablet Take 1 tablet (0.6 mg) by mouth daily for 60 days Gout MELODY Barnes   DULoxetine (CYMBALTA) 20 MG capsule Take 1 capsule (20 mg) by mouth daily for 30 days Primary osteoarthritis involving multiple joints MELODY Barnes   folic acid (FOLVITE) 1 MG tablet TAKE 1 TABLET BY MOUTH ONCE DAILY EXCEPT  THE  DAY  WHEN  TAKING  METHOTREXATE Rheumatoid arthritis MELODY Barnes   furosemide (LASIX) 20 MG tablet Take 20 mg by mouth daily as needed (swellling) Edema Tory Wise MD   ipratropium - albuterol 0.5 mg/2.5 mg/3 mL (DUONEB) 0.5-2.5 (3) MG/3ML neb solution Take 1 vial (3 mLs) by nebulization every 4 hours as needed for wheezing or shortness of breath / dyspnea Chronic obstructive pulmonary disease Cookie Leigh,    methotrexate sodium 2.5 MG TABS Take 6 tablets (15  mg) by mouth once a week Rheumatoid arthritis MELODY Barnes   metoprolol succinate ER (TOPROL-XL) 50 MG 24 hr tablet Take 2 tablets (100 mg) by mouth daily Atrial Fibrillation Cookie Leigh DO   pantoprazole (PROTONIX) 40 MG EC tablet Take 40 mg by mouth daily Gastroesophageal Reflux Disease Tory Wise MD   pravastatin (PRAVACHOL) 20 MG tablet Take 20 mg by mouth daily. High Cholesterol Tory Wise MD   predniSONE (DELTASONE) 5 MG tablet Take 1 tablet (5 mg) by mouth daily for 60 days Gout MELODY Barnes   Probiotic Product (PROBIOTIC DIGESTIVE SUPP) CAPS Take 1 capsule by mouth daily General Health Self   rivaroxaban ANTICOAGULANT (XARELTO) 15 MG TABS tablet Take 1 tablet (15 mg) by mouth daily (with dinner) Atrial Fibrillation  FREDY Lynn CNP         Add new medications, over-the-counter drugs, herbals, vitamins, or  minerals in the blank rows below.    Medication How I take it Why I use it Prescriber                          Allergies:      codeine; fosamax [alendronic acid]        Side effects I have had:               Other Information:              My notes and questions:

## 2022-10-19 NOTE — PATIENT INSTRUCTIONS
"Recommendations from today's MTM visit:                                                       1. I will check with your doctor about the correct metoprolol dose you should be taking  2. Continue current medications    Follow-up: 1 year for annual medication review    It was great speaking with you today.  I value your experience and would be very thankful for your time in providing feedback in our clinic survey. In the next few days, you may receive an email or text message from [employer] with a link to a survey related to your  clinical pharmacist.\"     To schedule another MTM appointment, please call the MTM scheduling line at [phone].    My Clinical Pharmacist's contact information:                                                      Please feel free to contact me with any questions or concerns you have.      Judith Healy, PharmD, BCACP  Medication Therapy Management Pharmacist  [facility]  Pager: [phone]         "

## 2022-10-28 NOTE — TELEPHONE ENCOUNTER
I spoke with Gert. She is feeling well today and at baseline as far as her breathing is concerned. She is able to get and take her medications and is compliant in taking them. Reviewed COPD Action Plan. Gert had no problems or questions for me today.    Leonardo Guadarrama, RT, TTS, Chronic Pulmonary Disease Specialist

## 2022-11-02 NOTE — DISCHARGE INSTRUCTIONS
Your labs including your electrolytes, COVID, influenza, troponin and kidney function and EKG are reassuring.     Do not take your Lasix medication until your illness has resolved as this can lower your blood pressure.  Recommend slow movements from laying to sitting and sitting to standing.  Maintain good hydration and nutrition.    Follow-up with your primary care doctor as needed.

## 2022-11-02 NOTE — ED PROVIDER NOTES
EMERGENCY DEPARTMENT ENCOUNTER      NAME: Fatuma Henry  AGE: 77 year old female  YOB: 1945  MRN: [id number]  EVALUATION DATE & TIME: 11/2/2022 12:52 PM    PCP: Tory Wise    ED PROVIDER: Tory Bueno MD    Chief Complaint   Patient presents with     Hypotension     Syncope     FINAL IMPRESSION:  1. Syncope    2. Diarrhea    3. Hypotension, unspecified hypotension type      ED COURSE & MEDICAL DECISION MAKING:    Pertinent Labs & Imaging studies reviewed. (See chart for details)  77 year old female presents to the Emergency Department for evaluation of presumed syncope in the setting of 3 days of nausea, diarrhea, weakness and fever.  Patient was initially found to be hypotensive to 62/42 per EMS after syncopal and received a 500 cc bolus en route to ED with vitals on arrival to ED within normal limits and afebrile.  Physical exam rather unremarkable.  Presume syncopal episode is secondary to orthostatic hypotension given patient's excessive diarrhea and poor intake.  She also has been taking 20 mg Lasix daily (prescribed as needed for lower extremity swelling). Patient does have history of atrial fibrillation on Xarelto and 100 mg metoprolol daily with EKG demonstrating sinus arrythmia with rate of 85 and troponin 0.04.  Low suspicion for cardiac etiology of syncopal episode. C-diff and enteric panel was ordered given reported fever and frequency of diarrhea however patient was not able to produce sample while in ED. Respiratory viral panel negative for influenza or COVID. Symptoms likely secondary to viral illness which overall seem to be improving. Labs significant for hemoglobin 9.8, hypokalemia of 3.3 and renal insufficiency at baseline with GFR in the 50s . Patient received 1 L normal saline with repeat blood pressure hypertensive to 140-180 systolic. Patient ambulated without difficulty prior to discharge. Recommended holding lasix PRN at discharge.    At the conclusion  of the encounter I discussed the results of all of the tests and the disposition. The questions were answered. The patient or family acknowledged understanding and was agreeable with the care plan.       MEDICATIONS GIVEN IN THE EMERGENCY:  Medications   0.9% sodium chloride BOLUS (0 mLs Intravenous Stopped 11/2/22 1518)       NEW PRESCRIPTIONS STARTED AT TODAY'S ER VISIT  New Prescriptions    No medications on file     =================================================================    HPI    Patient information was obtained from: patient, patient's daughter and chart    Use of : N/A    Fatuma Henry is a 77 year old female with a pertinent history of paroxysmal A. fib on Xarelto, COPD on 3 L oxygen overnight, CKD 3 and hypertension who presents to this ED via EMS for evaluation of presumed syncope in the setting of 3 days of nausea, diarrhea, weakness and fever. Patient also has been having nonbloody diarrhea 4-5 times a day.  She denies any abdominal pain.  2 days ago patient had a fever to 101 which resolved without any intervention.  Patient has had decreased intake but states she has been drinking water.      Earlier today patient was in her home attempting to ambulate from the kitchen table to the bathroom with her walker when she became very lightheaded and reportedly fell forward onto her walker and lost consciousness and was assisted to the ground by her daughter.  Patient denies hitting her head.  Patient estimates she lost consciousness for about 1 minute.    On EMS arrival, patient was found to have a blood pressure of 62/42.  Patient received 500 cc bolus of normal saline related to emergency department.    Patient denies starting any new medications.  She was seen earlier this month for an upper respiratory tract infection with symptomatic and supportive treatment recommended.  She also had received Keflex and clindamycin in September for an episode of cellulitis of her right toe.   Patient is otherwise not been in the hospital nor she received any other antibiotics recently.    Patient lives in a condo with her .  Her daughter who is accompanying her lives 2 floors above her.  Patient states that she uses her walker to ambulate around the home but does not use a walker outside the home.  No one else in the household is sick and she has no sick contacts.      REVIEW OF SYSTEMS   Review of Systems   Constitutional: Positive for activity change, appetite change, fatigue and fever.   HENT: Negative for congestion, rhinorrhea and sore throat.    Respiratory: Negative for cough and shortness of breath.    Cardiovascular: Positive for leg swelling. Negative for chest pain and palpitations.   Gastrointestinal: Positive for diarrhea and nausea. Negative for abdominal pain, blood in stool and vomiting.   Genitourinary: Negative for dysuria and urgency.   Musculoskeletal: Positive for myalgias and neck pain.   Skin: Positive for wound (Small abrasion to right knee).   Neurological: Positive for syncope, weakness and light-headedness. Negative for dizziness and headaches.   Psychiatric/Behavioral: Negative for agitation. The patient is not nervous/anxious.        PAST MEDICAL HISTORY:  Past Medical History:   Diagnosis Date     Atrial fibrillation (H)      CRF (chronic renal failure)      GERD (gastroesophageal reflux disease)      Hypertension      Hypovolemic shock (H)      Influenza A 12/2017     Rheumatoid arthritis (H)      Sepsis (H) 12/24/2017       PAST SURGICAL HISTORY:  Past Surgical History:   Procedure Laterality Date     APPENDECTOMY       BLADDER SUSPENSION       CHOLECYSTECTOMY         CURRENT MEDICATIONS:    acetaminophen (TYLENOL) 500 MG tablet  albuterol (PROAIR HFA/PROVENTIL HFA/VENTOLIN HFA) 108 (90 Base) MCG/ACT inhaler  allopurinol (ZYLOPRIM) 300 MG tablet  budesonide-formoterol (SYMBICORT) 160-4.5 MCG/ACT Inhaler  Cholecalciferol (VITAMIN D) 50 MCG (2000 UT) CAPS  colchicine  "(COLCYRS) 0.6 MG tablet  compressor, for nebulizer Eva  DULoxetine (CYMBALTA) 20 MG capsule  folic acid (FOLVITE) 1 MG tablet  furosemide (LASIX) 20 MG tablet  ipratropium - albuterol 0.5 mg/2.5 mg/3 mL (DUONEB) 0.5-2.5 (3) MG/3ML neb solution  methotrexate sodium 2.5 MG TABS  metoprolol succinate ER (TOPROL-XL) 50 MG 24 hr tablet  pantoprazole (PROTONIX) 40 MG EC tablet  pravastatin (PRAVACHOL) 20 MG tablet  predniSONE (DELTASONE) 20 MG tablet  predniSONE (DELTASONE) 5 MG tablet  Probiotic Product (PROBIOTIC DIGESTIVE SUPP) CAPS  rivaroxaban ANTICOAGULANT (XARELTO) 15 MG TABS tablet        ALLERGIES:  Allergies   Allergen Reactions     Codeine Nausea and Vomiting     Fosamax [Alendronic Acid] Diarrhea       FAMILY HISTORY:  Family History   Problem Relation Age of Onset     Heart Failure Mother      Cerebrovascular Disease Father      Kidney failure Sister      Cerebrovascular Disease Brother      Diabetes Type 2  Brother        SOCIAL HISTORY:   Social History     Socioeconomic History     Marital status:    Tobacco Use     Smoking status: Former     Packs/day: 0.50     Types: Cigarettes     Quit date: 2017     Years since quittin.8     Smokeless tobacco: Never   Substance and Sexual Activity     Alcohol use: No     Drug use: No     Sexual activity: Not Currently       VITALS:  BP (!) 145/62   Pulse 86   Temp 97.5  F (36.4  C) (Oral)   Resp 14   Ht 1.651 m (5' 5\")   Wt 70.3 kg (155 lb)   SpO2 95%   BMI 25.79 kg/m      PHYSICAL EXAM    Constitutional: Well developed, Well nourished, NAD  HENT: Normocephalic, Atraumatic, Bilateral external ears normal, Oropharynx normal, mucous membranes moist, Nose normal. Neck-  Normal range of motion, No tenderness to palpation, Supple, No stridor.   Eyes: PERRL, EOMI, Conjunctiva normal, No discharge.   Respiratory: Normal breath sounds, No respiratory distress, No wheezing, Speaks full sentences easily. No cough.  Cardiovascular: Normal heart rate, " irregular rhythm, No murmurs, No rubs, No gallops. Chest wall nontender.   GI: No excessive obesity. Bowel sounds normal, Soft, No tenderness, No masses, No flank tenderness. No rebound or guarding.    Musculoskeletal: 2+ DP pulses. No edema. No cyanosis, No clubbing. Good range of motion in all major joints. No tenderness to palpation or major deformities noted.   Integument: Warm, Dry, No erythema, No rash. No petechiae.   Neurologic: Alert & oriented x 3, Normal motor function, Normal sensory function, No focal deficits noted.  Psychiatric: Affect normal, Judgment normal, Mood normal. Cooperative.     LAB:  All pertinent labs reviewed and interpreted.  Results for orders placed or performed during the hospital encounter of 11/02/22   Extra Blue Top Tube   Result Value Ref Range    Hold Specimen JIC    Extra Red Top Tube   Result Value Ref Range    Hold Specimen JIC    Extra Green Top (Lithium Heparin) Tube   Result Value Ref Range    Hold Specimen JIC    Extra Purple Top Tube   Result Value Ref Range    Hold Specimen JIC    Comprehensive metabolic panel   Result Value Ref Range    Sodium 139 136 - 145 mmol/L    Potassium 3.3 (L) 3.5 - 5.0 mmol/L    Chloride 101 98 - 107 mmol/L    Carbon Dioxide (CO2) 24 22 - 31 mmol/L    Anion Gap 14 5 - 18 mmol/L    Urea Nitrogen 21 8 - 28 mg/dL    Creatinine 1.14 (H) 0.60 - 1.10 mg/dL    Calcium 8.8 8.5 - 10.5 mg/dL    Glucose 104 70 - 125 mg/dL    Alkaline Phosphatase 83 45 - 120 U/L    AST 21 0 - 40 U/L    ALT 11 0 - 45 U/L    Protein Total 5.4 (L) 6.0 - 8.0 g/dL    Albumin 2.8 (L) 3.5 - 5.0 g/dL    Bilirubin Total 0.7 0.0 - 1.0 mg/dL    GFR Estimate 50 (L) >60 mL/min/1.73m2   Symptomatic; Yes; 10/30/2022 Influenza A/B & SARS-CoV2 (COVID-19) Virus PCR Multiplex Nasopharyngeal    Specimen: Nasopharyngeal; Swab   Result Value Ref Range    Influenza A PCR Negative Negative    Influenza B PCR Negative Negative    RSV PCR Negative Negative    SARS CoV2 PCR Negative Negative    Result Value Ref Range    Troponin I 0.04 0.00 - 0.29 ng/mL   CBC with platelets and differential   Result Value Ref Range    WBC Count 4.1 4.0 - 11.0 10e3/uL    RBC Count 2.85 (L) 3.80 - 5.20 10e6/uL    Hemoglobin 9.8 (L) 11.7 - 15.7 g/dL    Hematocrit 29.7 (L) 35.0 - 47.0 %     (H) 78 - 100 fL    MCH 34.4 (H) 26.5 - 33.0 pg    MCHC 33.0 31.5 - 36.5 g/dL    RDW 14.9 10.0 - 15.0 %    Platelet Count 296 150 - 450 10e3/uL    % Neutrophils 74 %    % Lymphocytes 9 %    % Monocytes 13 %    % Eosinophils 1 %    % Basophils 1 %    % Immature Granulocytes 2 %    NRBCs per 100 WBC 0 <1 /100    Absolute Neutrophils 3.1 1.6 - 8.3 10e3/uL    Absolute Lymphocytes 0.4 (L) 0.8 - 5.3 10e3/uL    Absolute Monocytes 0.5 0.0 - 1.3 10e3/uL    Absolute Eosinophils 0.0 0.0 - 0.7 10e3/uL    Absolute Basophils 0.0 0.0 - 0.2 10e3/uL    Absolute Immature Granulocytes 0.1 <=0.4 10e3/uL    Absolute NRBCs 0.0 10e3/uL   ECG 12-LEAD WITH MUSE (LHE)   Result Value Ref Range    Systolic Blood Pressure 108 mmHg    Diastolic Blood Pressure 57 mmHg    Ventricular Rate 85 BPM    Atrial Rate 85 BPM    DE Interval 170 ms    QRS Duration 88 ms     ms    QTc 445 ms    P Axis 69 degrees    R AXIS 54 degrees    T Axis 61 degrees    Interpretation ECG       Sinus rhythm with marked sinus arrhythmia with occasional , and consecutive Premature ventricular complexes  Abnormal ECG  When compared with ECG of 20-APR-2022 10:40,  Premature ventricular complexes are now Present  Premature supraventricular complexes are no longer Present  Confirmed by SEE ED PROVIDER NOTE FOR, ECG INTERPRETATION (4000),  Reginaldo Curran ([id number]) on 11/2/2022 2:05:16 PM         RADIOLOGY:  Reviewed all pertinent imaging. Please see official radiology report.  No orders to display       EKG:    Performed at: 1401    Impression: sinus arrhythmia with occasional PVC    Rate: Irregular, documented 84  Rhythm: sinus arrythmia  Axis: Normal  DE Interval: 170  QRS Interval:  88  QTc Interval: 445  ST Changes: None  Comparison: 4/20/22    I have independently reviewed and interpreted the EKG(s) documented above.    PROCEDURES:   None      [facility] Documentation:   CMS Diagnoses: None         Tory Bueno MD  [facility] EMERGENCY ROOM  [address] [place] [id number]  [phone]     Tory Bueno MD  11/02/22 0510

## 2022-11-02 NOTE — ED TRIAGE NOTES
Pt arrives via EMS for weakness, dizziness, nausea, and diarrhea starting on Sunday. Pt was ambulating to bathroom and was assisted to the floor. Pt does not remember episode. Denies hitting head. Pt on Xarelto. When EMS arrived, BP found to be 65/42.     EMS started IV and 500mL bolus given.        Baseline, pt wears 3L of supplemental oxygen at night.        Triage Assessment     Row Name 11/02/22 1255       Triage Assessment (Adult)    Airway WDL WDL       Respiratory WDL    Respiratory WDL WDL       Skin Circulation/Temperature WDL    Skin Circulation/Temperature WDL WDL       Cardiac WDL    Cardiac WDL WDL       Peripheral/Neurovascular WDL    Peripheral Neurovascular WDL WDL       Cognitive/Neuro/Behavioral WDL    Cognitive/Neuro/Behavioral WDL WDL       Moises Coma Scale    Best Eye Response 4-->(E4) spontaneous    Best Motor Response 6-->(M6) obeys commands    Best Verbal Response 5-->(V5) oriented    Moises Coma Scale Score 15

## 2022-11-02 NOTE — ED PROVIDER NOTES
"Emergency Department Midlevel Supervisory Note     I personally saw the patient and performed a substantive portion of the visit including all aspects of the medical decision making.    ED Course:  1:00 PM The resident, Dr. Tory Bueno staffed patient with me. I agree with their assessment and plan of management, and I will see the patient.  2:11 PM I met with the patient to introduce myself, gather additional history, perform my initial exam, and discuss the plan.     Brief HPI:     Fatuma Henry is a 77 year old female who presents for evaluation of syncope. Patient endorses generalized weakness, dizziness, nausea since 10/31 (3 days ago). 2 days ago, she started to develop a fever to 101F which resolved spontaneously. Patient has been having decreased oral intake, but has been drinking water.    Earlier today, the patient was attempting to ambulate from the kitchen table to the bathroom with her walker when she became very lightheaded and reportedly fell forwards unto her walker and lost consciousness, but was assisted to the ground by her daughter. Patient denies hitting her head. Patient estimates she was unconscious for ~1 minute.      Brief Physical Exam: /66 (BP Location: Right arm, Patient Position: Semi-Sy's, Cuff Size: Adult Regular)   Pulse 84   Temp 98.1  F (36.7  C) (Oral)   Resp 18   Ht 1.651 m (5' 5\")   Wt 70.3 kg (155 lb)   SpO2 96%   BMI 25.79 kg/m    Constitutional:  Alert, in no acute distress  EYES: Conjunctivae clear  HENT:  Atraumatic, normocephalic  Respiratory:  Respirations even, unlabored, in no acute respiratory distress  Cardiovascular:  Regular rate and rhythm, good peripheral perfusion  GI: Soft, nondistended, nontender, no palpable masses, no rebound, no guarding   Musculoskeletal:  No edema. No cyanosis. Range of motion major extremities intact.    Integument: Warm, Dry, No erythema, No rash.   Neurologic:  Alert & oriented, no focal deficits noted  Psych: Normal " mood and affect     MDM:  Syncope, hypotension.  Patient notes a recent history of diarrhea, decreased oral intake.  She went to ambulate today, felt light headed and was lowered to the floor by her daughter.  No seizure like activity.  She denies any chest pain or headache before or after this episode.  BP low per EMS.  Improved here.  Patient notes diarrhea seemed to improve.  However, she continues to take her as needed lasix because she thought her legs looked swollen.  She has no abdominal pain here.  No recent hospitalizations or antibiotics within the last month.  She was given IV fluids here and felt much improved.  Screening EKG without evidence of arrhythmia or ischemia.  She did not hit her head, have a headache or AMS so head CT was deferred.  Labs reassuring.  Repeat abdominal exams without pain.  Ordered enteric stool testing, however patient without any diarrhea here and notes this is improving.  Patient ambulated without difficulty.  Only mild symptoms initially with standing.  Discussed results with patient and family at bed side.  They feel comfortable with discharge.  Patient will be with family.  Discussed return precautions, encouraged her to hold her lasix and close PCP follow up.       1. Syncope    2. Diarrhea    3. Hypotension, unspecified hypotension type        Labs and Imaging:  Results for orders placed or performed during the hospital encounter of 11/02/22   Extra Blue Top Tube   Result Value Ref Range    Hold Specimen JIC    Extra Red Top Tube   Result Value Ref Range    Hold Specimen JIC    Extra Green Top (Lithium Heparin) Tube   Result Value Ref Range    Hold Specimen JIC    Extra Purple Top Tube   Result Value Ref Range    Hold Specimen JIC    Comprehensive metabolic panel   Result Value Ref Range    Sodium 139 136 - 145 mmol/L    Potassium 3.3 (L) 3.5 - 5.0 mmol/L    Chloride 101 98 - 107 mmol/L    Carbon Dioxide (CO2) 24 22 - 31 mmol/L    Anion Gap 14 5 - 18 mmol/L    Urea  Nitrogen 21 8 - 28 mg/dL    Creatinine 1.14 (H) 0.60 - 1.10 mg/dL    Calcium 8.8 8.5 - 10.5 mg/dL    Glucose 104 70 - 125 mg/dL    Alkaline Phosphatase 83 45 - 120 U/L    AST 21 0 - 40 U/L    ALT 11 0 - 45 U/L    Protein Total 5.4 (L) 6.0 - 8.0 g/dL    Albumin 2.8 (L) 3.5 - 5.0 g/dL    Bilirubin Total 0.7 0.0 - 1.0 mg/dL    GFR Estimate 50 (L) >60 mL/min/1.73m2   Symptomatic; Yes; 10/30/2022 Influenza A/B & SARS-CoV2 (COVID-19) Virus PCR Multiplex Nasopharyngeal    Specimen: Nasopharyngeal; Swab   Result Value Ref Range    Influenza A PCR Negative Negative    Influenza B PCR Negative Negative    RSV PCR Negative Negative    SARS CoV2 PCR Negative Negative   Result Value Ref Range    Troponin I 0.04 0.00 - 0.29 ng/mL   CBC with platelets and differential   Result Value Ref Range    WBC Count 4.1 4.0 - 11.0 10e3/uL    RBC Count 2.85 (L) 3.80 - 5.20 10e6/uL    Hemoglobin 9.8 (L) 11.7 - 15.7 g/dL    Hematocrit 29.7 (L) 35.0 - 47.0 %     (H) 78 - 100 fL    MCH 34.4 (H) 26.5 - 33.0 pg    MCHC 33.0 31.5 - 36.5 g/dL    RDW 14.9 10.0 - 15.0 %    Platelet Count 296 150 - 450 10e3/uL    % Neutrophils 74 %    % Lymphocytes 9 %    % Monocytes 13 %    % Eosinophils 1 %    % Basophils 1 %    % Immature Granulocytes 2 %    NRBCs per 100 WBC 0 <1 /100    Absolute Neutrophils 3.1 1.6 - 8.3 10e3/uL    Absolute Lymphocytes 0.4 (L) 0.8 - 5.3 10e3/uL    Absolute Monocytes 0.5 0.0 - 1.3 10e3/uL    Absolute Eosinophils 0.0 0.0 - 0.7 10e3/uL    Absolute Basophils 0.0 0.0 - 0.2 10e3/uL    Absolute Immature Granulocytes 0.1 <=0.4 10e3/uL    Absolute NRBCs 0.0 10e3/uL   ECG 12-LEAD WITH MUSE (LHE)   Result Value Ref Range    Systolic Blood Pressure 108 mmHg    Diastolic Blood Pressure 57 mmHg    Ventricular Rate 85 BPM    Atrial Rate 85 BPM    OH Interval 170 ms    QRS Duration 88 ms     ms    QTc 445 ms    P Axis 69 degrees    R AXIS 54 degrees    T Axis 61 degrees    Interpretation ECG       Sinus rhythm with marked sinus  arrhythmia with occasional , and consecutive Premature ventricular complexes  Abnormal ECG  When compared with ECG of 20-APR-2022 10:40,  Premature ventricular complexes are now Present  Premature supraventricular complexes are no longer Present  Confirmed by SEE ED PROVIDER NOTE FOR, ECG INTERPRETATION (4000),  Reginaldo Curran ([id number]) on 11/2/2022 2:05:16 PM       I have reviewed the relevant laboratory and radiology studies    Procedures:  I was present for the key portions of this procedure: none      I, Quinn Nelson, am serving as a scribe to document services personally performed by Dr. Rocio Encinas, based on my observations and the provider's statements to me.  I, Rocio Encinas DO, attest that Quinn Nelson is acting in a scribe capacity, has observed my performance of the services and has documented them in accordance with my direction.     Rocio Encinas DO  [facility] EMERGENCY ROOM  Critical access hospital[facility] [id number] [phone]       Rocio Encinas DO  11/03/22 0901

## 2022-11-07 PROBLEM — E83.42 HYPOMAGNESEMIA: Status: ACTIVE | Noted: 2022-01-01

## 2022-11-07 PROBLEM — E87.6 HYPOKALEMIA: Status: ACTIVE | Noted: 2022-01-01

## 2022-11-08 NOTE — PHARMACY-ADMISSION MEDICATION HISTORY
Pharmacy Note - Admission Medication History    Pertinent Provider Information: N/A     ______________________________________________________________________    Prior To Admission (PTA) med list completed and updated in EMR.       PTA Med List   Medication Sig Note Last Dose     acetaminophen (TYLENOL) 500 MG tablet [ACETAMINOPHEN (TYLENOL) 500 MG TABLET] Take 1-2 tablets (500-1,000 mg total) by mouth every 6 (six) hours as needed.  Unknown     albuterol (PROAIR HFA/PROVENTIL HFA/VENTOLIN HFA) 108 (90 Base) MCG/ACT inhaler Inhale 2 puffs into the lungs every 6 hours (Patient taking differently: Inhale 2 puffs into the lungs every 6 hours as needed)  Unknown     allopurinol (ZYLOPRIM) 300 MG tablet Take 0.5 tablets (150 mg) by mouth daily for 15 days, THEN 1 tablet (300 mg) daily for 45 days. 11/7/2022: Now taking 300 mg daily 11/7/2022 at AM, 300 mg     budesonide-formoterol (SYMBICORT) 160-4.5 MCG/ACT Inhaler Inhale 2 puffs into the lungs 2 times daily for 30 days  11/7/2022     Cholecalciferol (VITAMIN D) 50 MCG (2000 UT) CAPS Take 50 mcg by mouth daily  11/7/2022     colchicine (COLCYRS) 0.6 MG tablet Take 1 tablet (0.6 mg) by mouth daily for 60 days  11/7/2022 at AM     DULoxetine (CYMBALTA) 20 MG capsule Take 1 capsule (20 mg) by mouth daily for 30 days  11/6/2022     Flaxseed, Linseed, (FLAXSEED OIL) 1000 MG CAPS Take 1 capsule by mouth daily  11/7/2022 at AM     folic acid (FOLVITE) 1 MG tablet TAKE 1 TABLET BY MOUTH ONCE DAILY EXCEPT  THE  DAY  WHEN  TAKING  METHOTREXATE  11/7/2022     furosemide (LASIX) 20 MG tablet Take 20 mg by mouth daily as needed (swellling)  Unknown     ipratropium - albuterol 0.5 mg/2.5 mg/3 mL (DUONEB) 0.5-2.5 (3) MG/3ML neb solution Take 1 vial (3 mLs) by nebulization every 4 hours as needed for wheezing or shortness of breath / dyspnea  Unknown     lactobacillus rhamnosus, GG, (CULTURELL) capsule Take 1 capsule by mouth daily  11/7/2022 at AM     methotrexate sodium 2.5 MG TABS  Take 6 tablets (15 mg) by mouth once a week 11/7/2022: Takes 3 tablets twice daily on Sundays 11/6/2022 at 3 tabs BID on Sundays     metoprolol succinate ER (TOPROL-XL) 50 MG 24 hr tablet Take 2 tablets (100 mg) by mouth daily  11/7/2022 at AM     multivitamin w/minerals (THERA-VIT-M) tablet Take 1 tablet by mouth daily  11/7/2022 at AM     pantoprazole (PROTONIX) 40 MG EC tablet Take 40 mg by mouth daily  11/7/2022     pravastatin (PRAVACHOL) 20 MG tablet [PRAVASTATIN (PRAVACHOL) 20 MG TABLET] Take 20 mg by mouth daily.  11/7/2022 at AM     predniSONE (DELTASONE) 5 MG tablet Take 1 tablet (5 mg) by mouth daily for 60 days  11/7/2022 at AM     rivaroxaban ANTICOAGULANT (XARELTO) 15 MG TABS tablet Take 1 tablet (15 mg) by mouth daily (with dinner) (Patient taking differently: Take 15 mg by mouth daily (with breakfast))  11/7/2022 at AM       Information source(s): Patient and [facility]  Method of interview communication: in-person    Summary of Changes to PTA Med List  New: flaxseed, MVI  Discontinued: N/A  Changed: N/A    Patient was asked about OTC/herbal products specifically.  PTA med list reflects this.    In the past week, patient estimated taking medication this percent of the time:  greater than 90%.    Allergies were reviewed, assessed, and updated with the patient.      Patient did not bring any medications to the hospital and can't retrieve from home. No multi-dose medications are available for use during hospital stay.     The information provided in this note is only as accurate as the sources available at the time of the update(s).    Thank you for the opportunity to participate in the care of this patient.    Maco Dorado [facility]  11/7/2022 8:44 PM

## 2022-11-08 NOTE — ED PROVIDER NOTES
EMERGENCY DEPARTMENT ENCOUNTER      NAME: Fatuma Henry  AGE: 77 year old female  YOB: 1945  MRN: [id number]  EVALUATION DATE & TIME: 11/7/2022  6:47 PM    PCP: Tory Wise    ED PROVIDER: Felice Myers M.D.    Chief Complaint   Patient presents with     Generalized Weakness     Diarrhea       FINAL IMPRESSION:  1. Hypokalemia    2. Hypomagnesemia    3. Diarrhea, unspecified type        ED COURSE & MEDICAL DECISION MAKING:    Pertinent Labs & Imaging studies personally reviewed and interpreted by me. (See chart for details)  7:15 PM Patient seen and examined, prior records reviewed.  Patient presents with diarrhea and abdominal pain that been going on for about a week, she was seen for this several days ago and has not had any improvement.  On exam here, awake alert, pale.  Mild diffuse abdominal tenderness.  Concern for possible electrolyte disturbance, anemia, colitis or diverticulitis.  Labs, CT scan ordered.  8:13 PM notified of critical low magnesium of less than 0.8, also potassium is low.  These will be replaced and patient will be admitted for electrolyte replacement and further evaluation and treatment.  8:24 PM EKG demonstrates pancolitis.  Mildly elevated white blood cell count and so this could be infectious, lactate is normal and patient has minimal pain and tenderness, ischemic colitis unlikely.  8:58 PM rechecked patient and discussed diagnosis and plan.  She is having more nausea but given her hypomagnesemia would avoid Zofran for now.  Compazine IV is ordered.  9:05 PM care discussed with Dr. Espinosa, hospitalist for admission.    At the conclusion of the encounter I discussed the results of all of the tests and the disposition. The questions were answered. The patient or family acknowledged understanding and was agreeable with the care plan.     Medical Decision Making    Supplemental history from: Family Member    External Record(s) Reviewed: Outpatient  Record    Differential Diagnosis: See [facility] charting for differential considered.     I performed an independent interpretation of the: EKG: See my documentation. and Other: CT abd/pelvis    Discussed with radiology regarding test interpretation: CT Results    Discussion of management with another provider: Hospitalist    The following testing was considered but ultimately not selected: None    I considered prescription management with: Symptomatic Management    The patient's care impacted: Chronic Lung Disease and Heart Disease    Consideration of Admission/Observation: N/A - Patient admitted    Care significantly affected by Social Determinants of Health including: N/A    PROCEDURES:   Procedures    Critical Care     Performed by: Dr Felice Myers  Total critical care time: 110 minutes  Critical care was necessary to treat or prevent imminent or life-threatening deterioration of the following conditions:  Diarrhea, magnesium less than 1.5  Critical care was time spent personally by me on the following activities: development of treatment plan with patient or surrogate, discussions with consultants, examination of patient, evaluation of patient's response to treatment, obtaining history from patient or surrogate, ordering and performing treatments and interventions, ordering and review of laboratory studies, ordering and review of radiographic studies, re-evaluation of patient's condition and monitoring for potential decompensation.  Critical care time was exclusive of separately billable procedures and treating other patients.      MEDICATIONS GIVEN IN THE EMERGENCY:  Medications   magnesium sulfate 4 g in 50 mL sterile water (premade) (has no administration in time range)   potassium chloride 10 mEq in 100 mL sterile water infusion (has no administration in time range)   potassium chloride (KLOR-CON) Packet 20 mEq (has no administration in time range)   prochlorperazine (COMPAZINE) injection 5 mg (has no  administration in time range)   iopamidol (ISOVUE-370) solution 75 mL (75 mLs Intravenous Given 11/7/22 1952)       NEW PRESCRIPTIONS STARTED AT TODAY'S ER VISIT  New Prescriptions    No medications on file       =================================================================    HPI    Patient information was obtained from: Patient      Fatuma Henry is a 77 year old female with a pertinent history of atrial fibrillation, COPD, arthritis, hypertension and stage 3a kidney disease who presents to this ED for evaluation of generalized weakness    Since 10/31 the patient has been experiencing constant diarrhea, nausea, fevers, and lightheadedness. She denies trying any medication for her symptoms. The patient lives in a condo with her  and reports that her  does not have similar symptoms. She reports having her appendix and gallbladder removed. She denies any blood in her stool, chest pain, shortness of breath, vomiting, urinary problems, or any other complaints.     REVIEW OF SYSTEMS   Review of Systems   Constitutional: Positive for fever.   Respiratory: Negative for shortness of breath.    Cardiovascular: Negative for chest pain.   Gastrointestinal: Positive for diarrhea and nausea. Negative for blood in stool and vomiting.   Neurological: Positive for light-headedness.   All other systems reviewed and are negative.     All other systems reviewed and negative    PAST MEDICAL HISTORY:  Past Medical History:   Diagnosis Date     Atrial fibrillation (H)      CRF (chronic renal failure)      GERD (gastroesophageal reflux disease)      Hypertension      Hypovolemic shock (H)      Influenza A 12/2017     Rheumatoid arthritis (H)      Sepsis (H) 12/24/2017       PAST SURGICAL HISTORY:  Past Surgical History:   Procedure Laterality Date     APPENDECTOMY       BLADDER SUSPENSION       CHOLECYSTECTOMY         CURRENT MEDICATIONS:    Current Facility-Administered Medications   Medication     magnesium sulfate 4  g in 50 mL sterile water (premade)     potassium chloride (KLOR-CON) Packet 20 mEq     potassium chloride 10 mEq in 100 mL sterile water infusion     prochlorperazine (COMPAZINE) injection 5 mg     Current Outpatient Medications   Medication     acetaminophen (TYLENOL) 500 MG tablet     albuterol (PROAIR HFA/PROVENTIL HFA/VENTOLIN HFA) 108 (90 Base) MCG/ACT inhaler     allopurinol (ZYLOPRIM) 300 MG tablet     budesonide-formoterol (SYMBICORT) 160-4.5 MCG/ACT Inhaler     Cholecalciferol (VITAMIN D) 50 MCG (2000) CAPS     colchicine (COLCYRS) 0.6 MG tablet     DULoxetine (CYMBALTA) 20 MG capsule     Flaxseed, Linseed, (FLAXSEED OIL) 1000 MG CAPS     folic acid (FOLVITE) 1 MG tablet     furosemide (LASIX) 20 MG tablet     ipratropium - albuterol 0.5 mg/2.5 mg/3 mL (DUONEB) 0.5-2.5 (3) MG/3ML neb solution     lactobacillus rhamnosus, GG, (CULTURELL) capsule     methotrexate sodium 2.5 MG TABS     metoprolol succinate ER (TOPROL-XL) 50 MG 24 hr tablet     multivitamin w/minerals (THERA-VIT-M) tablet     pantoprazole (PROTONIX) 40 MG EC tablet     pravastatin (PRAVACHOL) 20 MG tablet     predniSONE (DELTASONE) 5 MG tablet     rivaroxaban ANTICOAGULANT (XARELTO) 15 MG TABS tablet     compressor, for nebulizer Eva       ALLERGIES:  Allergies   Allergen Reactions     Codeine Nausea and Vomiting     Fosamax [Alendronic Acid] Diarrhea       FAMILY HISTORY:  Family History   Problem Relation Age of Onset     Heart Failure Mother      Cerebrovascular Disease Father      Kidney failure Sister      Cerebrovascular Disease Brother      Diabetes Type 2  Brother        SOCIAL HISTORY:   Social History     Socioeconomic History     Marital status:    Tobacco Use     Smoking status: Former     Packs/day: 0.50     Types: Cigarettes     Quit date: 2017     Years since quittin.8     Smokeless tobacco: Never   Substance and Sexual Activity     Alcohol use: No     Drug use: No     Sexual activity: Not Currently  "      VITALS:  /56   Pulse 83   Temp 98.5  F (36.9  C) (Oral)   Resp 20   Ht 1.651 m (5' 5\")   Wt 70.3 kg (155 lb)   SpO2 95%   BMI 25.79 kg/m      PHYSICAL EXAM:  Physical Exam  Vitals and nursing note reviewed.   Constitutional:       Appearance: Normal appearance.      Comments: Conjunctival pallor    HENT:      Head: Normocephalic and atraumatic.      Right Ear: External ear normal.      Left Ear: External ear normal.      Nose: Nose normal.      Mouth/Throat:      Mouth: Mucous membranes are moist.   Eyes:      Extraocular Movements: Extraocular movements intact.      Conjunctiva/sclera: Conjunctivae normal.      Pupils: Pupils are equal, round, and reactive to light.   Cardiovascular:      Rate and Rhythm: Normal rate and regular rhythm.   Pulmonary:      Effort: Pulmonary effort is normal.      Breath sounds: Normal breath sounds. No wheezing or rales.   Abdominal:      General: Abdomen is flat. There is no distension.      Palpations: Abdomen is soft.      Tenderness: There is no abdominal tenderness. There is no guarding.      Comments: Mild diffuse abdominal tenderness   Musculoskeletal:         General: Normal range of motion.      Cervical back: Normal range of motion and neck supple.      Right lower leg: No edema.      Left lower leg: No edema.      Comments: Mild bilateral lower extremity edema to left shin    Lymphadenopathy:      Cervical: No cervical adenopathy.   Skin:     General: Skin is warm and dry.   Neurological:      General: No focal deficit present.      Mental Status: She is alert and oriented to person, place, and time. Mental status is at baseline.      Comments: No gross focal neurologic deficits   Psychiatric:         Mood and Affect: Mood normal.         Behavior: Behavior normal.         Thought Content: Thought content normal.          LAB:  All pertinent labs reviewed and interpreted.  Results for orders placed or performed during the hospital encounter of 11/07/22 "   CT Abdomen Pelvis w Contrast    Impression    IMPRESSION:   1.  Pancolitis, correlate for infectious, inflammatory, and ischemic etiologies.   Basic metabolic panel   Result Value Ref Range    Sodium 140 136 - 145 mmol/L    Potassium 2.8 (LL) 3.5 - 5.0 mmol/L    Chloride 103 98 - 107 mmol/L    Carbon Dioxide (CO2) 25 22 - 31 mmol/L    Anion Gap 12 5 - 18 mmol/L    Urea Nitrogen 28 8 - 28 mg/dL    Creatinine 1.07 0.60 - 1.10 mg/dL    Calcium 7.9 (L) 8.5 - 10.5 mg/dL    Glucose 88 70 - 125 mg/dL    GFR Estimate 54 (L) >60 mL/min/1.73m2   Lactic acid whole blood   Result Value Ref Range    Lactic Acid 0.9 0.7 - 2.0 mmol/L   Result Value Ref Range    Lipase 45 0 - 52 U/L   Result Value Ref Range    Magnesium <0.8 (LL) 1.8 - 2.6 mg/dL   Hepatic function panel   Result Value Ref Range    Bilirubin Total 0.6 0.0 - 1.0 mg/dL    Bilirubin Direct 0.2 <=0.5 mg/dL    Protein Total 5.1 (L) 6.0 - 8.0 g/dL    Albumin 2.6 (L) 3.5 - 5.0 g/dL    Alkaline Phosphatase 113 45 - 120 U/L    AST 19 0 - 40 U/L    ALT 13 0 - 45 U/L   CBC with platelets and differential   Result Value Ref Range    WBC Count 13.9 (H) 4.0 - 11.0 10e3/uL    RBC Count 3.09 (L) 3.80 - 5.20 10e6/uL    Hemoglobin 10.3 (L) 11.7 - 15.7 g/dL    Hematocrit 31.0 (L) 35.0 - 47.0 %     78 - 100 fL    MCH 33.3 (H) 26.5 - 33.0 pg    MCHC 33.2 31.5 - 36.5 g/dL    RDW 15.4 (H) 10.0 - 15.0 %    Platelet Count 402 150 - 450 10e3/uL    % Neutrophils 85 %    % Lymphocytes 3 %    % Monocytes 11 %    % Eosinophils 0 %    % Basophils 0 %    % Immature Granulocytes 1 %    NRBCs per 100 WBC 0 <1 /100    Absolute Neutrophils 11.7 (H) 1.6 - 8.3 10e3/uL    Absolute Lymphocytes 0.4 (L) 0.8 - 5.3 10e3/uL    Absolute Monocytes 1.6 (H) 0.0 - 1.3 10e3/uL    Absolute Eosinophils 0.0 0.0 - 0.7 10e3/uL    Absolute Basophils 0.1 0.0 - 0.2 10e3/uL    Absolute Immature Granulocytes 0.1 <=0.4 10e3/uL    Absolute NRBCs 0.0 10e3/uL       RADIOLOGY:  Reviewed all pertinent imaging. Please see  official radiology report.  CT Abdomen Pelvis w Contrast   Final Result   IMPRESSION:    1.  Pancolitis, correlate for infectious, inflammatory, and ischemic etiologies.        EKG:    Performed at: 8:57 PM  Impression: Sinus tachycardia with PACs  Rate: 112  Rhythm: Sinus with PACs  Axis: Normal  LA Interval: 200  QRS Interval: 82  QTc Interval: 464  ST Changes: No acute ischemic changes  Comparison: Prior of November 2, PVCs previously seen are absent, PACs are present    I have independently reviewed and interpreted the EKG(s) documented above.    I, Radha Naylor, am serving as a scribe to document services personally performed by Dr. Myers based on my observation and the provider's statements to me. I, Felice Myers MD attest that Radha Naylor is acting in a scribe capacity, has observed my performance of the services and has documented them in accordance with my direction.    Felice Myers M.D.  Emergency Medicine  [facility] EMERGENCY ROOM  [address] [facility] [phone]  Dept: [phone]       Felice Myers MD  11/07/22 9124

## 2022-11-08 NOTE — PROGRESS NOTES
Hospitalist follow up note:    C diff pos, will start oral vanco, cancel GI consult.    Harjeet Espinosa, DO   Pager #: [phone]

## 2022-11-08 NOTE — ED TRIAGE NOTES
Arrives to ED via [facility] with c/o weakness and diarrhea. Diarrhea x1 week. Reports 12 episodes of watery diarrhea today. Denies melena/blood. EMS reports hypotension on arrival. SBP 70-80. IV placed x2 and IV fluids given by EMS. Alert and calm on arrival.      Triage Assessment     Row Name 11/07/22 6969       Triage Assessment (Adult)    Airway WDL WDL       Respiratory WDL    Respiratory WDL WDL       Skin Circulation/Temperature WDL    Skin Circulation/Temperature WDL X;temperature    Skin Temperature cool       Cardiac WDL    Cardiac WDL WDL       Peripheral/Neurovascular WDL    Peripheral Neurovascular WDL WDL       Cognitive/Neuro/Behavioral WDL    Cognitive/Neuro/Behavioral WDL WDL

## 2022-11-08 NOTE — UTILIZATION REVIEW
Admission Status; Secondary Review Determination   Under the authority of the Utilization Management Committee, the utilization review process indicated a secondary review on Fatuma Henry. The review outcome is based on review of the medical records, discussions with staff, and applying clinical experience noted on the date of the review.   (x) Inpatient Status Appropriate - This patient's medical care is consistent with medical management for inpatient care and reasonable inpatient medical practice.     RATIONALE FOR DETERMINATION   77-year-old female with a history of chronic kidney disease, COPD, rheumatoid arthritis and atrial fibrillation admitted with C. difficile colitis and significant electrolyte abnormalities with potassium of 2.8 and magnesium level less than 0.8.  This morning is hypotensive and on 3 L of oxygen.  Treated with IV fluids and initiation of oral vancomycin along with continued electrolyte replacement and close monitoring on telemetry.    At the time of admission with the information available to the attending physician more than 2 nights Hospital complex care was anticipated, based on patient risk of adverse outcome if treated as outpatient and complex care required. Inpatient admission is appropriate based on the Medicare guidelines.   The information on this document is developed by the utilization review team in order for the business office to ensure compliance. This only denotes the appropriateness of proper admission status and does not reflect the quality of care rendered.   The definitions of Inpatient Status and Observation Status used in making the determination above are those provided in the CMS Coverage Manual, Chapter 1 and Chapter 6, section 70.4.   Sincerely,   Jose Martinez MD  Utilization Review  Physician Advisor  [facility]

## 2022-11-08 NOTE — CONSULTS
Care Management Initial Consult    General Information  Assessment completed with: Patient,    Type of CM/SW Visit: Initial Assessment    Primary Care Provider verified and updated as needed: Yes   Readmission within the last 30 days: no previous admission in last 30 days      Reason for Consult: discharge planning  Advance Care Planning: Advance Care Planning Reviewed: other (see comments) (Declined [facility]oring Choices)        Communication Assessment  Patient's communication style: spoken language (English or Bilingual)    Hearing Difficulty or Deaf: no   Wear Glasses or Blind: yes    Cognitive  Cognitive/Neuro/Behavioral: WDL                      Living Environment:   People in home: spouse     Current living Arrangements: condominium      Able to return to prior arrangements: yes  Living Arrangement Comments: Lives with  [place]    Family/Social Support:  Care provided by: self  Provides care for: no one  Marital Status:   , Children  [place]       Description of Support System: Supportive, Involved    Support Assessment: Adequate family and caregiver support    Current Resources:   Patient receiving home care services: No     Community Resources: DME  Equipment currently used at home: walker, rolling, other (see comments) (Oxygen provided by Home Health)  Supplies currently used at home: Oxygen Tubing/Supplies    Employment/Financial:  Employment Status: retired        Financial Concerns: No concerns identified         Lifestyle & Psychosocial Needs:  Social Determinants of Health     Tobacco Use: Medium Risk     Smoking Tobacco Use: Former     Smokeless Tobacco Use: Never     Passive Exposure: Not on file   Alcohol Use: Not on file   Financial Resource Strain: Not on file   Food Insecurity: Not on file   Transportation Needs: Not on file   Physical Activity: Not on file   Stress: Not on file   Social Connections: Not on file   Intimate Partner Violence: Not on file   Depression: Not on file    Housing Stability: Not on file       Functional Status:  Prior to admission patient needed assistance:   Dependent ADLs:: Ambulation-walker  Dependent IADLs:: Cleaning, Medication Management  Assesssment of Functional Status: Not at  functional baseline    Mental Health Status:          Chemical Dependency Status:              Values/Beliefs:  Spiritual, Cultural Beliefs, Taoist Practices, Values that affect care:                 Additional Information:  Patient arrives to  ED via [facility] for evaluation of weakness and diarrhea for one week.  Reports 12 episodes of watery diarrhea today. Denies melena/blood. EMS reports hypotension on arrival. SBP 70-80. IV placed x2 and IV fluids given by EMS. Alert and calm on arrival .    Patient lives in a condo with  [place]. States is independent with most I/ADLs. Uses a roller-walker; no home care services; on 3 liters of oxygen via cannula at night; does drive. Declined [employer] Choices. Plans to return home with daughter Enid transporting patient on discharge. Other needs pending clinical progress.      JAYESH ROMAN RN/CM

## 2022-11-08 NOTE — PROGRESS NOTES
"[facility] Internal Medicine Progress Note    Fatuma Henry is a 77 year old female who presented with complaints of weakness and diarrhea for about 1 week.  Found to have C diff, diarrhea with pan-colitis on CT.       Assessment plan:  Diarrhea - confirmed C diff positive  Cont vancomycin PO, 125mg qid, for 10-14 days for first occurrence.   Hold colchicine  Continue IVF until taking PO more consistently.      Hypokalemia, hypomagnesemia  Replace per protocols  Monitor on telemetry     Rheumatoid arthritis  Typically on methotrexate, low-dose prednisone - continue     Gout  Has been on colchicine recently  Hold colchicine for now given diarrhea  Cont PTA allopurinol     Atrial fibrillation  Essential hypertension  Typically on Xarelto, metoprolol - continue   Monitor on telemetry  Typically on Lasix as needed edema, hold this for now as we are giving IVF     COPD  Monitor pulmonary status, seems to be at baseline for now  Uses oxygen at bedtime  Only uses her nebs once or twice a day  Cont PTA ICS/LABA  (breo in place of symbicort)    Other chronic issues:  Cont PTA cymbalta  Cont PTA PPI for GERD  Cont PTA statin      Clinically Significant Risk Factors Present on Admission          # Hypokalemia: Lowest K = 2.8 mmol/L (Ref range: 3.4-5.3) in last 2 days, will replace as needed     # Hypomagnesemia: Lowest Mg = 0.72 mg/dL (Ref range: 1.7-2.3) in last 2 days, will replace as needed   # Hypoalbuminemia: Lowest albumin = 2.6 g/dL (Ref range: 3.5-5.2) at 11/7/2022  7:32 PM, will monitor as appropriate  # Drug Induced Coagulation Defect: home medication list includes an anticoagulant medication        # Overweight: Estimated body mass index is 25.79 kg/m  as calculated from the following:    Height as of this encounter: 1.651 m (5' 5\").    Weight as of this encounter: 70.3 kg (155 lb).            DVTP: Direct Oral Anticagulants   Code Status: Prior  Disposition: Inpatient   Expected LOS: 1-2 days   Goals for the " "hospitalization: Improvement in GI symptoms  Diet: Clear liquid  Fluids: Normal saline at 75/h, with KCL. Continue until taking mor PO consistently.             Phoenix Muhammad MD (Avi)  [facility]  Clinic [phone]  Fax [phone]                -------------------------------------------------------------------------------------------------------------  SUBJECTIVE: feels a little better. Still not taking much PO.   Abdominal pain mild, improved.   Diarrhea improved compared to yesterday.    Exam:  /54 (BP Location: Left arm)   Pulse 88   Temp 97.9  F (36.6  C) (Oral)   Resp 16   Ht 1.651 m (5' 5\")   Wt 70.3 kg (155 lb)   SpO2 99%   BMI 25.79 kg/m    General: NAD  RESPIRATORY: Breathing nonlabored  CARDIOVASCULAR: No le edema bilat.   ABDOMEN: soft and non-tender  MUSCULOSKELETAL: Muscle and joints without inflammation.  NEUROLOGIC: Non focal, motor and sensory intact, speech clear  PSYCHIATRIC: Oriented X 3, without confusion or delirium, behavior appropriate, affect normal    Diagnostics Reviewed:      Recent Results (from the past 24 hour(s))   Basic metabolic panel    Collection Time: 11/07/22  7:32 PM   Result Value Ref Range    Sodium 140 136 - 145 mmol/L    Potassium 2.8 (LL) 3.5 - 5.0 mmol/L    Chloride 103 98 - 107 mmol/L    Carbon Dioxide (CO2) 25 22 - 31 mmol/L    Anion Gap 12 5 - 18 mmol/L    Urea Nitrogen 28 8 - 28 mg/dL    Creatinine 1.07 0.60 - 1.10 mg/dL    Calcium 7.9 (L) 8.5 - 10.5 mg/dL    Glucose 88 70 - 125 mg/dL    GFR Estimate 54 (L) >60 mL/min/1.73m2   Lactic acid whole blood    Collection Time: 11/07/22  7:32 PM   Result Value Ref Range    Lactic Acid 0.9 0.7 - 2.0 mmol/L   Lipase    Collection Time: 11/07/22  7:32 PM   Result Value Ref Range    Lipase 45 0 - 52 U/L   Magnesium    Collection Time: 11/07/22  7:32 PM   Result Value Ref Range    Magnesium <0.8 (LL) 1.8 - 2.6 mg/dL   Hepatic function panel    Collection Time: " 11/07/22  7:32 PM   Result Value Ref Range    Bilirubin Total 0.6 0.0 - 1.0 mg/dL    Bilirubin Direct 0.2 <=0.5 mg/dL    Protein Total 5.1 (L) 6.0 - 8.0 g/dL    Albumin 2.6 (L) 3.5 - 5.0 g/dL    Alkaline Phosphatase 113 45 - 120 U/L    AST 19 0 - 40 U/L    ALT 13 0 - 45 U/L   CBC with platelets and differential    Collection Time: 11/07/22  7:32 PM   Result Value Ref Range    WBC Count 13.9 (H) 4.0 - 11.0 10e3/uL    RBC Count 3.09 (L) 3.80 - 5.20 10e6/uL    Hemoglobin 10.3 (L) 11.7 - 15.7 g/dL    Hematocrit 31.0 (L) 35.0 - 47.0 %     78 - 100 fL    MCH 33.3 (H) 26.5 - 33.0 pg    MCHC 33.2 31.5 - 36.5 g/dL    RDW 15.4 (H) 10.0 - 15.0 %    Platelet Count 402 150 - 450 10e3/uL    % Neutrophils 85 %    % Lymphocytes 3 %    % Monocytes 11 %    % Eosinophils 0 %    % Basophils 0 %    % Immature Granulocytes 1 %    NRBCs per 100 WBC 0 <1 /100    Absolute Neutrophils 11.7 (H) 1.6 - 8.3 10e3/uL    Absolute Lymphocytes 0.4 (L) 0.8 - 5.3 10e3/uL    Absolute Monocytes 1.6 (H) 0.0 - 1.3 10e3/uL    Absolute Eosinophils 0.0 0.0 - 0.7 10e3/uL    Absolute Basophils 0.1 0.0 - 0.2 10e3/uL    Absolute Immature Granulocytes 0.1 <=0.4 10e3/uL    Absolute NRBCs 0.0 10e3/uL   Enteric Bacteria and Virus Panel by ALBIN Stool    Collection Time: 11/07/22  8:32 PM    Specimen: Per Rectum; Stool   Result Value Ref Range    Campylobacter group Not Detected Not Detected    Salmonella species Not Detected Not Detected    Shigella species Not Detected Not Detected    Vibrio group Not Detected Not Detected    Rotavirus Not Detected Not Detected    Shiga toxin 1 gene Not Detected Not Detected    Shiga toxin 2 gene Not Detected Not Detected    Norovirus I and II Not Detected Not Detected    Yersinia enterocolitica Not Detected Not Detected   C. difficile Toxin B PCR with reflex to C. difficile Antigen and Toxins A/B EIA    Collection Time: 11/07/22  8:32 PM    Specimen: Per Rectum; Stool   Result Value Ref Range    C Difficile Toxin B by PCR  Positive (A) Negative   C. difficile Antigen and Toxins A/B by Enzyme Immunoassay    Collection Time: 11/07/22  8:32 PM    Specimen: Per Rectum; Stool   Result Value Ref Range    C. difficile GDH Antigen Positive (A) Negative    C. difficile Toxin Positive (A) Negative   ECG 12-LEAD WITH MUSE (LHE)    Collection Time: 11/07/22  8:57 PM   Result Value Ref Range    Systolic Blood Pressure  mmHg    Diastolic Blood Pressure  mmHg    Ventricular Rate 112 BPM    Atrial Rate 129 BPM    KS Interval 200 ms    QRS Duration 82 ms     ms    QTc 464 ms    P Axis 64 degrees    R AXIS 57 degrees    T Axis 48 degrees    Interpretation ECG       Sinus tachycardia with Premature atrial complexes  Otherwise normal ECG  When compared with ECG of 02-NOV-2022 14:01,  Premature ventricular complexes are no longer Present  Premature atrial complexes are now Present  Confirmed by SEE ED PROVIDER NOTE FOR, ECG INTERPRETATION ([id number]),  Jasmyn Mendez ([id number]) on 11/7/2022 9:27:44 PM     Asymptomatic COVID-19 Virus (Coronavirus) by PCR Nasopharyngeal    Collection Time: 11/07/22  9:51 PM    Specimen: Nasopharyngeal; Swab   Result Value Ref Range    SARS CoV2 PCR Negative Negative   Potassium    Collection Time: 11/08/22  1:10 AM   Result Value Ref Range    Potassium 3.3 (L) 3.5 - 5.0 mmol/L   Magnesium    Collection Time: 11/08/22  1:10 AM   Result Value Ref Range    Magnesium 1.8 1.8 - 2.6 mg/dL   Basic metabolic panel    Collection Time: 11/08/22  6:05 AM   Result Value Ref Range    Sodium 138 136 - 145 mmol/L    Potassium 3.4 (L) 3.5 - 5.0 mmol/L    Chloride 105 98 - 107 mmol/L    Carbon Dioxide (CO2) 22 22 - 31 mmol/L    Anion Gap 11 5 - 18 mmol/L    Urea Nitrogen 24 8 - 28 mg/dL    Creatinine 0.95 0.60 - 1.10 mg/dL    Calcium 8.0 (L) 8.5 - 10.5 mg/dL    Glucose 84 70 - 125 mg/dL    GFR Estimate 62 >60 mL/min/1.73m2   Hepatic panel    Collection Time: 11/08/22  6:05 AM   Result Value Ref Range    Bilirubin Total 0.6 0.0 - 1.0  mg/dL    Bilirubin Direct 0.3 <=0.5 mg/dL    Protein Total 5.0 (L) 6.0 - 8.0 g/dL    Albumin 2.4 (L) 3.5 - 5.0 g/dL    Alkaline Phosphatase 127 (H) 45 - 120 U/L    AST 27 0 - 40 U/L    ALT 14 0 - 45 U/L   CBC with platelets and differential    Collection Time: 11/08/22  6:05 AM   Result Value Ref Range    WBC Count 11.0 4.0 - 11.0 10e3/uL    RBC Count 2.84 (L) 3.80 - 5.20 10e6/uL    Hemoglobin 9.6 (L) 11.7 - 15.7 g/dL    Hematocrit 29.3 (L) 35.0 - 47.0 %     (H) 78 - 100 fL    MCH 33.8 (H) 26.5 - 33.0 pg    MCHC 32.8 31.5 - 36.5 g/dL    RDW 15.5 (H) 10.0 - 15.0 %    Platelet Count 391 150 - 450 10e3/uL    % Neutrophils 85 %    % Lymphocytes 6 %    % Monocytes 7 %    % Eosinophils 1 %    % Basophils 0 %    % Immature Granulocytes 1 %    NRBCs per 100 WBC 0 <1 /100    Absolute Neutrophils 9.4 (H) 1.6 - 8.3 10e3/uL    Absolute Lymphocytes 0.7 (L) 0.8 - 5.3 10e3/uL    Absolute Monocytes 0.8 0.0 - 1.3 10e3/uL    Absolute Eosinophils 0.1 0.0 - 0.7 10e3/uL    Absolute Basophils 0.0 0.0 - 0.2 10e3/uL    Absolute Immature Granulocytes 0.1 <=0.4 10e3/uL    Absolute NRBCs 0.0 10e3/uL

## 2022-11-08 NOTE — H&P
[facility] MEDICINE ADMISSION HISTORY AND PHYSICAL     Assessment & Plan       Fatuma Henry is a 77 year old female who presented with complaints of weakness and diarrhea for about 1 week.    Medical history is notable for atrial fibrillation, chronic kidney disease, reflux, hypertension, rheumatoid arthritis, COPD.    Outpatient meds include Xarelto, prednisone, pravastatin, Protonix, metoprolol, methotrexate, DuoNebs, Lasix only as needed, folic acid, Cymbalta, colchicine, Symbicort, allopurinol.    Initial evaluation revealed normal vital signs, very low potassium of 2.8 and a magnesium level less than 0.8.  White count mildly elevated at 13.9, hemoglobin 10.3.  CT abdomen with pancolitis.  Lactic acid was normal.    Initial treatment included IV fluids, magnesium, potassium.      Assessment plan:  Diarrhea  Infectious versus inflammatory versus ischemic  Not clear about recent antibiotics, patient says she did take some for a week for gout?  Follow stool studies  Hold colchicine  Treat empirically with IV cipro and flagyl for now    Hypokalemia, hypomagnesemia  Replace per protocols  Monitor on telemetry    Rheumatoid arthritis  Typically on methotrexate, low-dose prednisone.    Gout  Has been on colchicine recently  Hold colchicine for now given diarrhea    Atrial fibrillation  Essential hypertension  Typically on Xarelto, metoprolol  Monitor on telemetry  Continue metoprolol with hold parameters  Typically on Lasix as needed edema, hold this for now    COPD  Monitor pulmonary status, seems to be at baseline for now  Uses oxygen at bedtime  Only uses her nebs once or twice a day    Clinically Significant Risk Factors Present on Admission        # Hypokalemia: Lowest K = 2.8 mmol/L (Ref range: 3.4-5.3) in last 2 days, will replace as needed     # Hypomagnesemia: Lowest Mg = 0.72 mg/dL (Ref range: 1.7-2.3) in last 2 days, will replace as needed   # Hypoalbuminemia: Lowest albumin =  "2.6 g/dL (Ref range: 3.5-5.2) at 11/7/2022  7:32 PM, will monitor as appropriate  # Drug Induced Coagulation Defect: home medication list includes an anticoagulant medication        # Overweight: Estimated body mass index is 25.79 kg/m  as calculated from the following:    Height as of this encounter: 1.651 m (5' 5\").    Weight as of this encounter: 70.3 kg (155 lb).             DVTP: Direct Oral Anticagulants   Code Status: Prior  Disposition: Inpatient   Expected LOS: 2 days   Goals for the hospitalization: Improvement in GI symptoms  Diet: Clear liquid  Fluids: Normal saline at 75/h    Disposition Plan      Expected Discharge Date: 11/09/2022               Chief Complaint  diarrhea     HISTORY   Fatuma Henry is a 77 year old female who presented with complaints of diarrhea for about 1 week.  Symptoms have been worsening.  Associated with some nausea and vomiting that started today.  Also has some generalized abdominal pain.  Denies any dysuria.  No chest pain.  Says that she is always short of breath.  No melena or hematochezia.  No hematemesis.  She feels that she did have a low-grade fever as well.  She has some lower extremity swelling.  She has not really been eating much at all for the past several days but has been trying to get liquids to drink.    Per ED provider:  Since 10/31 the patient has been experiencing constant diarrhea, nausea, fevers, and lightheadedness. She denies trying any medication for her symptoms. The patient lives in a condo with her  and reports that her  does not have similar symptoms. She reports having her appendix and gallbladder removed. She denies any blood in her stool, chest pain, shortness of breath, vomiting, urinary problems, or any other complaints.  Past Medical History     Past Medical History:  No date: Atrial fibrillation (H)  No date: CRF (chronic renal failure)  No date: GERD (gastroesophageal reflux disease)  No date: Hypertension  No date: " Hypovolemic shock (H)  2017: Influenza A  No date: Rheumatoid arthritis (H)  2017: Sepsis (H)     Surgical History     Past Surgical History:   Procedure Laterality Date     APPENDECTOMY       BLADDER SUSPENSION       CHOLECYSTECTOMY       Family History      Family History   Problem Relation Age of Onset     Heart Failure Mother      Cerebrovascular Disease Father      Kidney failure Sister      Cerebrovascular Disease Brother      Diabetes Type 2  Brother       Social History      Social History     Tobacco Use     Smoking status: Former     Packs/day: 0.50     Types: Cigarettes     Quit date: 2017     Years since quittin.8     Smokeless tobacco: Never   Substance Use Topics     Alcohol use: No     Drug use: No      Allergies     Allergies   Allergen Reactions     Codeine Nausea and Vomiting     Fosamax [Alendronic Acid] Diarrhea     Prior to Admission Medications      Prior to Admission Medications   Prescriptions Last Dose Informant Patient Reported? Taking?   Cholecalciferol (VITAMIN D) 50 MCG (2000 UT) CAPS 2022  Yes Yes   Sig: Take 50 mcg by mouth daily   DULoxetine (CYMBALTA) 20 MG capsule 2022  No Yes   Sig: Take 1 capsule (20 mg) by mouth daily for 30 days   Flaxseed, Linseed, (FLAXSEED OIL) 1000 MG CAPS 2022 at AM  Yes Yes   Sig: Take 1 capsule by mouth daily   acetaminophen (TYLENOL) 500 MG tablet Unknown  No Yes   Sig: [ACETAMINOPHEN (TYLENOL) 500 MG TABLET] Take 1-2 tablets (500-1,000 mg total) by mouth every 6 (six) hours as needed.   albuterol (PROAIR HFA/PROVENTIL HFA/VENTOLIN HFA) 108 (90 Base) MCG/ACT inhaler Unknown  No Yes   Sig: Inhale 2 puffs into the lungs every 6 hours   Patient taking differently: Inhale 2 puffs into the lungs every 6 hours as needed   allopurinol (ZYLOPRIM) 300 MG tablet 2022 at AM, 300 mg  No Yes   Sig: Take 0.5 tablets (150 mg) by mouth daily for 15 days, THEN 1 tablet (300 mg) daily for 45 days.   budesonide-formoterol (SYMBICORT)  160-4.5 MCG/ACT Inhaler 11/7/2022  No Yes   Sig: Inhale 2 puffs into the lungs 2 times daily for 30 days   colchicine (COLCYRS) 0.6 MG tablet 11/7/2022 at AM  No Yes   Sig: Take 1 tablet (0.6 mg) by mouth daily for 60 days   compressor, for nebulizer Saran   No No   Sig: [COMPRESSOR, FOR NEBULIZER SARAN] Nebulizer treatment qid prn   folic acid (FOLVITE) 1 MG tablet 11/7/2022  No Yes   Sig: TAKE 1 TABLET BY MOUTH ONCE DAILY EXCEPT  THE  DAY  WHEN  TAKING  METHOTREXATE   furosemide (LASIX) 20 MG tablet Unknown  Yes Yes   Sig: Take 20 mg by mouth daily as needed (swellling)   ipratropium - albuterol 0.5 mg/2.5 mg/3 mL (DUONEB) 0.5-2.5 (3) MG/3ML neb solution Unknown  No Yes   Sig: Take 1 vial (3 mLs) by nebulization every 4 hours as needed for wheezing or shortness of breath / dyspnea   lactobacillus rhamnosus, GG, (CULTURELL) capsule 11/7/2022 at AM  Yes Yes   Sig: Take 1 capsule by mouth daily   methotrexate sodium 2.5 MG TABS 11/6/2022 at 3 tabs BID on Sundays  No Yes   Sig: Take 6 tablets (15 mg) by mouth once a week   metoprolol succinate ER (TOPROL-XL) 50 MG 24 hr tablet 11/7/2022 at AM  No Yes   Sig: Take 2 tablets (100 mg) by mouth daily   multivitamin w/minerals (THERA-VIT-M) tablet 11/7/2022 at AM  Yes Yes   Sig: Take 1 tablet by mouth daily   pantoprazole (PROTONIX) 40 MG EC tablet 11/7/2022  Yes Yes   Sig: Take 40 mg by mouth daily   pravastatin (PRAVACHOL) 20 MG tablet 11/7/2022 at AM  Yes Yes   Sig: [PRAVASTATIN (PRAVACHOL) 20 MG TABLET] Take 20 mg by mouth daily.   predniSONE (DELTASONE) 5 MG tablet 11/7/2022 at AM  No Yes   Sig: Take 1 tablet (5 mg) by mouth daily for 60 days   rivaroxaban ANTICOAGULANT (XARELTO) 15 MG TABS tablet 11/7/2022 at AM  No Yes   Sig: Take 1 tablet (15 mg) by mouth daily (with dinner)   Patient taking differently: Take 15 mg by mouth daily (with breakfast)      Facility-Administered Medications: None      Review of Systems     A 12 point comprehensive review of systems was  negative except as noted above in HPI.    PHYSICAL EXAMINATION     Vitals      Temp:  [98.5  F (36.9  C)] 98.5  F (36.9  C)  Pulse:  [83] 83  Resp:  [20] 20  BP: (107)/(56) 107/56  SpO2:  [95 %] 95 %    Examination   Physical Exam:    Gen: no acute distress, comfortable but ill-appearing, tired  ENT: no scleral icterus  Pulm: lungs are diffusely diminished, breathing comfortably at rest with head slightly elevated  CV: Sinus rhythm, mildly tachycardic with frequent ectopy, trace lower extremity pitting edema  GI: abdomen is soft, nondistended, no guarding to palpation  MSK: no obvious deformities of the extremities  Derm: Not pale, no jaundice  Psych: appropriate affect      Pertinent Radiology     Radiology Results:   Recent Results (from the past 24 hour(s))   CT Abdomen Pelvis w Contrast    Narrative    EXAM: CT ABDOMEN PELVIS W CONTRAST  LOCATION: [facility]  DATE/TIME: 11/7/2022 7:49 PM    INDICATION: diarrhea, abd pain  COMPARISON: None.  TECHNIQUE: CT scan of the abdomen and pelvis was performed following injection of IV contrast. Multiplanar reformats were obtained. Dose reduction techniques were used.  CONTRAST: 100ml Isovue 370    FINDINGS:   LOWER CHEST: Normal.    HEPATOBILIARY: Normal.    PANCREAS: Normal.    SPLEEN: Normal.    ADRENAL GLANDS: Normal.    KIDNEYS/BLADDER: No obstructing renal or ureteral stones. No hydroureteronephrosis.    BOWEL: Diffuse mural thickening of the colon, correlate for colitis. No obstruction.    LYMPH NODES: Normal.    VASCULATURE: Atherosclerotic calcifications of the aorta.    PELVIC ORGANS: Normal.    MUSCULOSKELETAL: Diffuse osteopenia. Multilevel discogenic degenerative change.      Impression    IMPRESSION:   1.  Pancolitis, correlate for infectious, inflammatory, and ischemic etiologies.     EKG Results: personally reviewed.     Advance Care Planning        Harjeet Espinosa,   [facility] Medicine  [facility]  [facility]   Phone: #[phone]

## 2022-11-08 NOTE — ED NOTES
Patient is c-diff positive on oral vancomycin first 2 doses administered this morning. Loose stools x4 this morning. Potassium and magnesium replacement protocol. Last k 3.4 replaced with 40 meq recheck k at  1400.    Dillon Noriega RN  11/8/2022  3:32 PM

## 2022-11-08 NOTE — PROGRESS NOTES
11/08/22 1530   Appointment Info   Signing Clinician's Name / Credentials (PT) Charline Noe, PT, DPT   Living Environment   People in Home spouse   Current Living Arrangements condominium   Home Accessibility no concerns   Self-Care   Equipment Currently Used at Home walker, rolling  (4WW)   Fall history within last six months yes   Number of times patient has fallen within last six months 1   Activity/Exercise/Self-Care Comment Has cane, uses 3L 02 at night only. Uses 4WW in condo but not in community   General Information   Onset of Illness/Injury or Date of Surgery 11/07/22   Referring Physician Phoenix Muhammad MD   Patient/Family Therapy Goals Statement (PT) Go home, get stronger   Pertinent History of Current Problem (include personal factors and/or comorbidities that impact the POC) Diarrhea, RA, A. fib   Existing Precautions/Restrictions fall   Weight-Bearing Status - LLE full weight-bearing   Weight-Bearing Status - RLE full weight-bearing   Bed Mobility   Bed Mobility supine-sit   Supine-Sit [place] (Bed Mobility) modified independence   Transfers   Transfers sit-stand transfer   Maintains Weight-bearing Status (Transfers) able to maintain   Sit-Stand Transfer   Sit-Stand [place] (Transfers) supervision;verbal cues   Assistive Device (Sit-Stand Transfers) walker, front-wheeled   Gait/Stairs (Locomotion)   [place] Level (Gait) contact guard;verbal cues   Assistive Device (Gait) walker, front-wheeled   Distance in Feet (Required for LE Total Joints) 5   Distance in Feet (Gait) 20   Pattern (Gait) step-through   Deviations/Abnormal Patterns (Gait) gait speed decreased;yoly decreased   Maintains Weight-bearing Status (Gait) able to maintain   Clinical Impression   Criteria for Skilled Therapeutic Intervention Yes, treatment indicated   PT Diagnosis (PT) Impaired functional mobility   Influenced by the following impairments weakness   Functional limitations due to impairments  transfers, gait   Clinical Presentation (PT Evaluation Complexity) Stable/Uncomplicated   Clinical Presentation Rationale Pt presents as medically diagnosed   Clinical Decision Making (Complexity) low complexity   Planned Therapy Interventions (PT) gait training;home exercise program;patient/family education;strengthening;transfer training   Anticipated Equipment Needs at Discharge (PT) walker, rolling   Risk & Benefits of therapy have been explained evaluation/treatment results reviewed;care plan/treatment goals reviewed;patient   PT Total Evaluation Time   PT Eval, Low Complexity Minutes (00970) 10   Physical Therapy Goals   PT Frequency Daily   PT Predicted Duration/Target Date for Goal Attainment 11/11/22   PT Goals Gait;Transfers   PT: Transfers Independent;Sit to/from stand   PT: Gait Modified independent;Rolling walker;100 feet   Interventions   Interventions Quick Adds Therapeutic Activity;Gait Training   Therapeutic Activity   Symptoms Noted During/After Treatment None   Treatment Detail/Skilled Intervention Supine <> sit, supervision. Sit<>Stand with FWW, SBA   Gait Training   Gait Training Minutes (30814) 10   Symptoms Noted During/After Treatment (Gait Training) fatigue   Treatment Detail/Skilled Intervention safe ambulation with FWW. Sp02 on 2L after ambulation 97%. Education on use of 4WW in community for safety due to weakness.   [place] Level (Gait Training) stand-by assist   Physical Assistance Level (Gait Training) verbal cues   Weight Bearing (Gait Training) full weight-bearing   Assistive Device (Gait Training) rolling walker   Pattern Analysis (Gait Training) swing-through gait   Gait Analysis Deviations decreased yoly;decreased step length   Impairments (Gait Analysis/Training) strength decreased   PT Discharge Planning   PT Plan Gait endurance, LE exercises   PT Discharge Recommendation (DC Rec) (S)  home with home care physical therapy   PT Rationale for DC Rec Patient ambulating  safely with FWW, SBA   PT Brief overview of current status Amb 20ft with JESENIA, SBA   Total Session Time   Timed Code Treatment Minutes 10   Total Session Time (sum of timed and untimed services) 20

## 2022-11-08 NOTE — PROGRESS NOTES
11/08/22 1400   Appointment Info   Signing Clinician's Name / Credentials (OT) Estela Richardson, OTR/L   Living Environment   People in Home spouse  (daughter lives in same building)   Current Living Arrangements condominium   Home Accessibility no concerns  (no steps, all one level living)   Living Environment Comments has a rts with bars, tub shower wiht bars and chair   Self-Care   Usual Activity Tolerance good   Current Activity Tolerance moderate   Equipment Currently Used at Home walker, rolling  (with four wheels and a seat)   Activity/Exercise/Self-Care Comment pt ind with all adls, iadls.   [place]   General Information   Onset of Illness/Injury or Date of Surgery 11/07/22   Referring Physician Phoenix Muhammad   Patient/Family Therapy Goal Statement (OT) stop all this diarrhea   Additional Occupational Profile Info/Pertinent History of Current Problem Pt here iwth diarrhea, tested positive for C Diff.  H/o recent gout, left foot., on 3 liters oxygen at night at home   Existing Precautions/Restrictions oxygen therapy device and L/min   Cognitive Status Examination   Affect/Mental Status (Cognitive) WNL   Range of Motion Comprehensive   General Range of Motion no range of motion deficits identified   Strength Comprehensive (MMT)   General Manual Muscle Testing (MMT) Assessment no strength deficits identified   Bed Mobility   Comment (Bed Mobility) sba/verbal cues from ED cart   Transfers   Transfer Comments sba/verbal cues for safe hand placement   Activities of Daily Living   BADL Assessment/Intervention lower body dressing;toileting   Lower Body Dressing Assessment/Training   [place] Level (Lower Body Dressing) verbal cues   Toileting   [place] Level (Toileting) contact guard assist;verbal cues   Clinical Impression   Criteria for Skilled Therapeutic Interventions Met (OT) Yes, treatment indicated   OT Diagnosis decreased ind with adls   OT Problem List-Impairments  impacting ADL activity tolerance impaired;balance;strength   Assessment of Occupational Performance 1-3 Performance Deficits   Identified Performance Deficits le dressing, toileting, bed mob   Planned Therapy Interventions (OT) ADL retraining;bed mobility training;transfer training   Clinical Decision Making Complexity (OT) moderate complexity   Risk & Benefits of therapy have been explained evaluation/treatment results reviewed;care plan/treatment goals reviewed;risks/benefits reviewed;current/potential barriers reviewed;participants voiced agreement with care plan;participants included;patient   OT Total Evaluation Time   OT Eval, Moderate Complexity Minutes (45788) 15   OT Goals   Therapy Frequency (OT) One time eval and treatment   OT Goals Lower Body Dressing;Bed Mobility;Toilet Transfer/Toileting   OT: Lower Body Dressing Modified independent;Goal Met;Completed   OT: Bed Mobility Modified independent;supine to/from sitting;rolling;within precautions;Goal Met;Completed   OT: Toilet Transfer/Toileting Modified independent;toilet transfer;cleaning and garment management;Goal Met;Completed   Interventions   Interventions Quick Adds Self-Care/Home Management   Self-Care/Home Management   Self-Care/Home Mgmt/ADL, Compensatory, Meal Prep Minutes (59986) 23   Symptoms Noted During/After Treatment (Meal Preparation/Planning Training) fatigue   Treatment Detail/Skilled Intervention taught pt walker safety skills with adls including safe correct hand placement for transfers with a FWW to bed, chair and toilet.  Also gave pt verbal cues and education on safe techniques for toileting, le dressing and bed mobility.  Pt mod i with all at end of session. Pt fatigued quickly.  Taught her PLB to help her body relax when stressed with activity.   OT Discharge Planning   OT Plan dc OT-goals met   OT Discharge Recommendation (DC Rec) (S)  home   OT Rationale for DC Rec pt moving well and has good support at home   Total Session  Time   Timed Code Treatment Minutes 23   Total Session Time (sum of timed and untimed services) 38

## 2022-11-09 NOTE — PLAN OF CARE
Problem: Diarrhea  Goal: Effective Diarrhea Management  Outcome: Progressing  Intervention: Manage Diarrhea  Recent Flowsheet Documentation  Taken 11/9/2022 0400 by Neda Mcginnis, RN  Medication Review/Management: medications reviewed      Patient boarding in ED. A&Ox4. VSS, on 2 liters O2 via NC sating in mid-high 90's. C/o SOB and LAGUNAS. LS diminished, no wheezing heard. Denied any pain overnight. Up SBA. No loose stools on shift; on PO Vanco for C-diff. PIV infusing NS+20KCL @ 100 mL/hr. On Mag and K protocol; both being rechecked this AM. Discharge pending improvement in GI symptoms.

## 2022-11-09 NOTE — PROGRESS NOTES
[facility] Medicine Progress Note    Fatuma Henry is a 77 year old female who presented with complaints of weakness and diarrhea for about 1 week.  Found to have C diff, diarrhea with pan-colitis on CT.     Assessment plan:  Diarrhea - confirmed C diff positive  Cont vancomycin PO, 125mg qid, for 10 days for first occurrence.  Hold colchicine  Stop IVF, encourage PO intake.      Hypokalemia, hypomagnesemia. Mild hyperchloremic metabolic acidosis with normal AG, due to saline administration.   Replace per protocols  No need for tele monitoring at this point, lytes corrected. Low risk for cardiac events.   Recheck BMP in AM to follow serum bicarb     Rheumatoid arthritis  Typically on methotrexate, low-dose prednisone - continue     Gout  Has been on colchicine recently  Hold colchicine for now given diarrhea, resume upon discharge.   Cont PTA allopurinol     Atrial fibrillation  Essential hypertension  HLD  Typically on Xarelto, metoprolol - continue   No indication for tele monitoring, as above  Just stopped IVF, cont to hold Lasix. Resume next 1-2 days if labs stable.   Cont PTA statin     COPD  Monitor pulmonary status, seems to be at baseline for now  Uses oxygen at bedtime  Only uses her nebs once or twice a day  Cont PTA ICS/LABA  (breo in place of symbicort)    Other chronic issues:  Cont PTA cymbalta  Cont PTA PPI for GERD     Clinically Significant Risk Factors Present on Admission          # Hypokalemia: Lowest K = 2.8 mmol/L (Ref range: 3.4-5.3) in last 2 days, will replace as needed     # Hypomagnesemia: Lowest Mg = 0.72 mg/dL (Ref range: 1.7-2.3) in last 2 days, will replace as needed   # Hypoalbuminemia: Lowest albumin = 2.6 g/dL (Ref range: 3.5-5.2) at 11/7/2022  7:32 PM, will monitor as appropriate  # Drug Induced Coagulation Defect: home medication list includes an anticoagulant medication        # Overweight: Estimated body mass index is 25.79 kg/m  as calculated from the following:    Height as  "of this encounter: 1.651 m (5' 5\").    Weight as of this encounter: 70.3 kg (155 lb).            DVTP: Direct Oral Anticagulants   Code Status: Prior  Disposition: Inpatient   Expected LOS: 1-2 days   Goals for the hospitalization: Improvement in GI symptoms  Diet: Clear liquid  Fluids: Normal saline at 75/h, with KCL. Continue until taking mor PO consistently.             Phoenix Muhammad MD (Avi)  [facility]  Clinic [phone]  Fax [phone]                -------------------------------------------------------------------------------------------------------------  SUBJECTIVE: feels a little better. Doesn't feel like she can manage at home quite yet.  Appetite improving and tolerating regular diet.   Diarrhea improving daily.     Exam:  BP (!) 159/69 (BP Location: Left arm)   Pulse 76   Temp 97.6  F (36.4  C) (Oral)   Resp 18   Ht 1.651 m (5' 5\")   Wt 70.3 kg (155 lb)   SpO2 98%   BMI 25.79 kg/m    General: NAD  RESPIRATORY: Breathing nonlabored  CARDIOVASCULAR: No le edema bilat.   ABDOMEN: soft and non-tender  MUSCULOSKELETAL: Muscle and joints without inflammation.  NEUROLOGIC: Non focal, motor and sensory intact, speech clear  PSYCHIATRIC: Oriented X 3, without confusion or delirium, behavior appropriate, affect normal    Diagnostics Reviewed:      Recent Results (from the past 24 hour(s))   Potassium    Collection Time: 11/08/22  3:04 PM   Result Value Ref Range    Potassium 4.4 3.5 - 5.0 mmol/L   Magnesium    Collection Time: 11/09/22  7:25 AM   Result Value Ref Range    Magnesium 1.5 (L) 1.8 - 2.6 mg/dL   Basic metabolic panel    Collection Time: 11/09/22  7:25 AM   Result Value Ref Range    Sodium 140 136 - 145 mmol/L    Potassium 4.3 3.5 - 5.0 mmol/L    Chloride 112 (H) 98 - 107 mmol/L    Carbon Dioxide (CO2) 18 (L) 22 - 31 mmol/L    Anion Gap 10 5 - 18 mmol/L    Urea Nitrogen 16 8 - 28 mg/dL    Creatinine 0.82 0.60 - 1.10 mg/dL    Calcium 8.4 " (L) 8.5 - 10.5 mg/dL    Glucose 93 70 - 125 mg/dL    GFR Estimate 74 >60 mL/min/1.73m2   Extra Purple Top Tube    Collection Time: 11/09/22  7:25 AM   Result Value Ref Range    Hold Specimen JIC

## 2022-11-10 NOTE — DISCHARGE INSTRUCTIONS
Home care services have been arranged for the patient.  Home Care Agency: [facility]  Home Care Phone Number: [phone]  Services: Home PT/OT  Instructions: They will call you to set up a time to come see you.

## 2022-11-10 NOTE — PROGRESS NOTES
Physical Therapy Discharge Summary    Reason for therapy discharge:    Discharged to home.    Progress towards therapy goal(s). See goals on Care Plan in [facility] electronic health record for goal details.  Goals not met.  Barriers to achieving goals:   discharge from facility.    Therapy recommendation(s):    Continued therapy is recommended.  Rationale/Recommendations:  Home PT for continued strengthening .

## 2022-11-10 NOTE — PROGRESS NOTES
Discharge:  Pt is a/ox4. Pt has met care plan. Pt denied any pain and SOB. Discharge home with daughter.  and daughter will be able to assist pt at home.

## 2022-11-10 NOTE — DISCHARGE SUMMARY
[facility] MEDICINE  DISCHARGE SUMMARY     Primary Care Physician: Tory Wise  Admission Date: 11/7/2022   Discharge Provider: Tatianna Solis MD Discharge Date: 11/10/2022   Diet: Orders Placed This Encounter      Regular Diet Adult      Diet      Code Status: No CPR- Do NOT Intubate   Activity: activity as tolerated   [facility]      Condition at Discharge: Stable      REASON FOR PRESENTATION(See Admission Note for Details)   Diarrhea    PRINCIPAL & ACTIVE DISCHARGE DIAGNOSES     Principal Problem:  C. difficile colitis  Active Problems:    Hypokalemia    Hypomagnesemia  History of rheumatoid arthritis  Gout  History of paroxysmal A. fib  Hypertension  History of COPD    SIGNIFICANT FINDINGS (Imaging, labs):     CT abdomen pelvis showed pancolitis    Stool for C. difficile positive.    PENDING LABS         PROCEDURES ( this hospitalization only)          RECOMMENDATION FOR F/U VISIT       DISPOSITION     Home With home care    SUMMARY OF HOSPITAL COURSE:    77-year-old lady past medical history significant for her rheumatoid arthritis on low-dose prednisone, paroxysmal atrial fibrillation, CKD hypertension COPD,  on oxygen at night presented to the emergency room with complaints of diarrhea for about a week.  She was recently treated with antibiotics for cellulitis.  Her stool for C. difficile came back positive.  CT scan of the abdomen pelvis showed pancolitis.  Started on oral vancomycin.  She was improving at the time of the discharge.  Tolerated regular diet.  Stools are becoming formed.  She is discharged home with home care in stable condition.  She had electrolyte disturbances those were corrected.    Discharge Medications with Med changes:        Review of your medicines      START taking      Dose / Directions   vancomycin 125 MG capsule  Commonly known as: VANCOCIN  Indication: Clostridium difficile Bacteria  Used for: C. difficile colitis      Dose:  125 mg  Take 1 capsule (125 mg) by mouth 4 times daily for 12 days  Quantity: 48 capsule  Refills: 0        CONTINUE these medicines which may have CHANGED, or have new prescriptions. If we are uncertain of the size of tablets/capsules you have at home, strength may be listed as something that might have changed.      Dose / Directions   albuterol 108 (90 Base) MCG/ACT inhaler  Commonly known as: PROAIR HFA/PROVENTIL HFA/VENTOLIN HFA  This may have changed:     when to take this    reasons to take this  Used for: Chronic obstructive pulmonary disease, unspecified COPD type (H)      Dose: 2 puff  Inhale 2 puffs into the lungs every 6 hours  Quantity: 18 g  Refills: 11     rivaroxaban ANTICOAGULANT 15 MG Tabs tablet  Commonly known as: XARELTO  Indication: Atrial Fibrillation Not Caused By A Heart Valve Problem  This may have changed: when to take this  Used for: Paroxysmal atrial fibrillation (H)      Dose: 15 mg  Take 1 tablet (15 mg) by mouth daily (with dinner)  Quantity: 90 tablet  Refills: 3        CONTINUE these medicines which have NOT CHANGED      Dose / Directions   acetaminophen 500 MG tablet  Commonly known as: TYLENOL  Used for: Acute bronchitis      Dose: 500-1,000 mg  [ACETAMINOPHEN (TYLENOL) 500 MG TABLET] Take 1-2 tablets (500-1,000 mg total) by mouth every 6 (six) hours as needed.  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  Quantity: 53 tablet  Refills: 0     budesonide-formoterol 160-4.5 MCG/ACT Inhaler  Commonly known as: SYMBICORT  Used for: Chronic obstructive pulmonary disease, unspecified COPD type (H)      Dose: 2 puff  Inhale 2 puffs into the lungs 2 times daily for 30 days  Quantity: 10 g  Refills: 11     colchicine 0.6 MG tablet  Commonly known as: COLCYRS  Used for: Chronic  tophaceous gout of right foot      Dose: 0.6 mg  Take 1 tablet (0.6 mg) by mouth daily for 60 days  Quantity: 60 tablet  Refills: 0     DULoxetine 20 MG capsule  Commonly known as: CYMBALTA  Used for: Primary osteoarthritis involving multiple joints      Dose: 20 mg  Take 1 capsule (20 mg) by mouth daily for 30 days  Quantity: 30 capsule  Refills: 3     flaxseed oil 1000 MG Caps      Dose: 1 capsule  Take 1 capsule by mouth daily  Refills: 0     folic acid 1 MG tablet  Commonly known as: FOLVITE  Used for: Seropositive rheumatoid arthritis (H)      TAKE 1 TABLET BY MOUTH ONCE DAILY EXCEPT  THE  DAY  WHEN  TAKING  METHOTREXATE  Quantity: 90 tablet  Refills: 0     furosemide 20 MG tablet  Commonly known as: LASIX  Indication: Edema      Dose: 20 mg  Take 20 mg by mouth daily as needed (swellling)  Refills: 0     ipratropium - albuterol 0.5 mg/2.5 mg/3 mL 0.5-2.5 (3) MG/3ML neb solution  Commonly known as: DUONEB  Used for: Mild intermittent reactive airway disease with acute exacerbation      Dose: 3 mL  Take 1 vial (3 mLs) by nebulization every 4 hours as needed for wheezing or shortness of breath / dyspnea  Quantity: 90 mL  Refills: 0     lactobacillus rhamnosus (GG) capsule      Dose: 1 capsule  Take 1 capsule by mouth daily  Refills: 0     methotrexate sodium 2.5 MG Tabs  Used for: Rheumatoid arthritis involving multiple sites with positive rheumatoid factor (H)      Dose: 15 mg  Take 6 tablets (15 mg) by mouth once a week  Quantity: 72 tablet  Refills: 0     metoprolol succinate ER 50 MG 24 hr tablet  Commonly known as: TOPROL XL  Used for: Paroxysmal atrial fibrillation (H)      Dose: 100 mg  Take 2 tablets (100 mg) by mouth daily  Quantity: 60 tablet  Refills: 0     multivitamin w/minerals tablet      Dose: 1 tablet  Take 1 tablet by mouth daily  Refills: 0     Nebulizer Compressor Misc  Used for: Reactive airway disease      [COMPRESSOR, FOR NEBULIZER SARAN] Nebulizer treatment qid prn  Quantity: 1  Device  Refills: 0     pantoprazole 40 MG EC tablet  Commonly known as: PROTONIX  Indication: Gastroesophageal Reflux Disease      Dose: 40 mg  Take 40 mg by mouth daily  Refills: 0     pravastatin 20 MG tablet  Commonly known as: PRAVACHOL      Dose: 20 mg  [PRAVASTATIN (PRAVACHOL) 20 MG TABLET] Take 20 mg by mouth daily.  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 difficile        Consults         Immunizations given this encounter     [unfilled]      Discharge Procedure Orders   Home care nursing referral   Referral Priority: Routine: Next available opening Referral Type: Home Health Therapies & Aides   Number of Visits Requested: 1     Reason for your hospital stay   Order Comments: Cdiff diarrhea     Follow-up and recommended labs and tests   Order Comments: Follow up with primary care provider, Tory Wise, within 7 days for hospital follow- up.  The following labs/tests are recommended: .     Activity   Order Comments: Your activity upon discharge: activity as tolerated     Order Specific Question Answer Comments   Is discharge order? Yes      Diet   Order Comments: Follow this diet upon discharge: Orders Placed This Encounter      Regular Diet Adult  Drink plenty of fluids with electrolytes     Order Specific Question Answer Comments   Is discharge order? Yes      Examination     Vital Signs in last  "24 hours:   Vital signs:  Temp: 97.6  F (36.4  C) Temp src: Oral BP: 132/75 Pulse: 80   Resp: 19 SpO2: 95 % O2 Device: Nasal cannula Oxygen Delivery: 3 LPM Height: 165.1 cm (5' 5\") Weight: 72.7 kg (160 lb 3.2 oz)  Estimated body mass index is 26.66 kg/m  as calculated from the following:    Height as of this encounter: 1.651 m (5' 5\").    Weight as of this encounter: 72.7 kg (160 lb 3.2 oz).       Pertinent positives on exam:  Abdomen: Soft nontender nondistended bowel sounds present no guarding or rigidity.    Please see EMR for more detailed significant labs, imaging, consultant notes etc.  Total time spent on discharge: > 35 minutes    Tatianna Solis MD   [facility] Hospitalist Service: Ph:[phone]    CC:Tory Wise    "

## 2022-11-10 NOTE — PROGRESS NOTES
"[facility] Medicine PROGRESS NOTE      Identification/Summary:   Fatuma Henry is a 77 year old female who presented with complaints of weakness and diarrhea for about 1 week.  Found to have C diff, diarrhea with pan colitis on CT.     Assessment plan:  C. difficile colitis  Cont vancomycin PO, 125mg qid, for 10 days for first occurrence.  Hold colchicine  Stop IVF, encourage PO intake.   She has abdominal pain with eating  Stools are becoming somewhat formed.     Hypokalemia, hypomagnesemia. Mild hyperchloremic metabolic acidosis with normal AG, due to saline administration.   Replace per protocols     Rheumatoid arthritis  Typically on methotrexate, low-dose prednisone - continue     Gout  Has been on colchicine recently  Hold colchicine for now given diarrhea, resume upon discharge.   Cont PTA allopurinol     Atrial fibrillation  Essential hypertension  HLD  Typically on Xarelto, metoprolol - continue   No indication for tele monitoring, as above  Cont PTA statin  Lasix on hold    COPD  Monitor pulmonary status, seems to be at baseline for now  Uses oxygen at bedtime  Only uses her nebs once or twice a day  Cont PTA ICS/LABA  (breo in place of symbicort)     Other chronic issues:  Cont PTA cymbalta  Cont PTA PPI for GERD    Diet: Regular Diet Adult  Diet  DVT Prophylaxis: On Xarelto  Code Status: No CPR- Do NOT Intubate    Anticipated possible discharge today versus tomorrow once tolerating diet milestones are met.    Interval History/Subjective:  Complains of loose stools last night, abdominal pain with eating.  She is eating breakfast this morning.  No nausea or vomiting.  No blood in the stools.  No shortness of breath, chest pain.    Physical Exam/Objective:  Vitals I/O   Vital signs:  Temp: 97.6  F (36.4  C) Temp src: Oral BP: 132/75 Pulse: 80   Resp: 19 SpO2: 95 % O2 Device: Nasal cannula Oxygen Delivery: 3 LPM Height: 165.1 cm (5' 5\") Weight: 72.7 kg (160 lb 3.2 oz)  Estimated body mass index is " "26.66 kg/m  as calculated from the following:    Height as of this encounter: 1.651 m (5' 5\").    Weight as of this encounter: 72.7 kg (160 lb 3.2 oz). I/O last 3 completed shifts:  In: 2974 [P.O.:240; I.V.:2684; IV Piggyback:50]  Out: -      Body mass index is 26.66 kg/m .    General Appearance:  Alert, cooperative, no distress   Head:  Normocephalic, atraumatic   Eyes:  PERRL    Throat:  mucosa; moist   Neck: No JVD, thyromegaly   Lungs:   Clear to auscultation bilaterally, respirations unlabored   Chest Wall:  No tenderness or deformity   Heart:  Regular rate and rhythm, S1, S2 normal,no murmur   Abdomen:   Soft, non tender, non distended, bowel sounds present, no guarding or rigidity   Extremities: No edema, no joint swelling   Skin: Skin color, texture, turgor normal, no rashes or lesions   Neurologic: Alert and oriented X 3, Moves all 4 extremities       Medications:   Personally Reviewed.    allopurinol  300 mg Oral Daily     DULoxetine  20 mg Oral Daily     fluticasone-vilanterol  1 puff Inhalation Daily     [START ON 11/13/2022] methotrexate sodium  7.5 mg Oral Once per day on Sun     [START ON 11/13/2022] methotrexate  7.5 mg Oral Once per day on Sun     metoprolol succinate ER  100 mg Oral Daily     pantoprazole  40 mg Oral Daily     pravastatin  20 mg Oral Daily     predniSONE  5 mg Oral Daily     rivaroxaban ANTICOAGULANT  15 mg Oral Daily with breakfast     sodium chloride (PF)  3 mL Intracatheter Q8H     vancomycin  125 mg Oral 4x Daily       Data reviewed today: I personally reviewed all new medications, labs, imaging/diagnostics reports over the past 24 hours. Pertinent findings include    Labs:  Most Recent 3 CBC's:Recent Labs   Lab Test 11/08/22  0605 11/07/22  1932 11/02/22  1312   WBC 11.0 13.9* 4.1   HGB 9.6* 10.3* 9.8*   * 100 104*    402 296     Most Recent 3 BMP's:Recent Labs   Lab Test 11/10/22  0743 11/09/22  0725 11/08/22  1504 11/08/22  0605    140  --  138 "   POTASSIUM 4.6 4.3 4.4 3.4*   CHLORIDE 109* 112*  --  105   CO2 20* 18*  --  22   BUN 13 16  --  24   CR 0.77 0.82  --  0.95   ANIONGAP 9 10  --  11   SUNI 9.4 8.4*  --  8.0*   GLC 86 93  --  84     Most Recent 2 LFT's:Recent Labs   Lab Test 11/08/22  0605 11/07/22  1932   AST 27 19   ALT 14 13   ALKPHOS 127* 113   BILITOTAL 0.6 0.6     Most Recent 3 INR's:Recent Labs   Lab Test 04/20/22  1046   INR 1.77*       Imaging:   No results found for this or any previous visit (from the past 24 hour(s)).      Tatianna Solis MD  Hospitalist  [facility]

## 2022-11-10 NOTE — PLAN OF CARE
Problem: Plan of Care - These are the overarching goals to be used throughout the patient stay.    Goal: Optimal Comfort and Wellbeing  Outcome: Adequate for Care Transition     Problem: Plan of Care - These are the overarching goals to be used throughout the patient stay.    Goal: Readiness for Transition of Care  Outcome: Adequate for Care Transition     Problem: Diarrhea  Goal: Effective Diarrhea Management  Outcome: Adequate for Care Transition  Intervention: Manage Diarrhea  Recent Flowsheet Documentation  Taken 11/10/2022 0029 by Shirley Cho RN  Medication Review/Management: medications reviewed   Goal Outcome Evaluation:    Patient is A&Ox4. VSS. Denied pain throughout the shift. On 3L NC at night, baseline. Did not have any loose stools during the night. On K and Mag protocols. Standby assist with walker and gait belt. Expected discharge today. 11/10/2022.

## 2022-11-22 NOTE — PATIENT INSTRUCTIONS
PHYSICIAN ORDERS    BMP, CBC, Magnesium on 11/25 dx CKD, anemia    Jorge Luis Henry PA-C  11/22/2022, 2:22 PM

## 2022-11-22 NOTE — PROGRESS NOTES
[facility] GERIATRICS    PRIMARY CARE PROVIDER AND CLINIC:  Tory Wise MD, [address] [facility] / [facility] [address]  Chief Complaint   Patient presents with     Hospital F/U      [place] Medical Record Number:  [id number]  Place of Service where encounter took place:  [facility] (SNF) [[id number]]    Fatuma Henry  is a 77 year old  (1945), admitted to the above facility from  [facility] . Hospital stay 11/17/22 through 11/21/22..   HPI:    Patient is a 77-year-old female with past medical history of CKD, paroxysmal A. fib, hypertension, COPD on nocturnal oxygen, RA on chronic low-dose prednisone who was admitted at [facility] from 11/17/2022 - 11/21/2022 after presenting with increased falls and generalized weakness.  She was notably recently admitted at [facility] from 11/7/2022 - 11/10/2022 after presenting with acute onset diarrhea in the context of recently completed antibiotic therapy for cellulitis, diagnosed with C. difficile colitis and started on oral vancomycin with improvement.  She was ultimately discharged to home, was seen in follow-up with primary care provider on 11/15/2022 with notes indicating low blood pressures, her metoprolol was reduced from 100 to 50 mg daily and Lasix stopped.  Patient had ongoing weakness and falls with persistent diarrhea which prompted her to present to [facility] for evaluation.  She had findings of dehydation with YESICA, electrolyte abnormalities, and soft blood pressures. She was also identified to have progressively worsening macrocytic anemia with a decrease in hemoglobin from 11/12--8.8 since September 2022.  Iron studies indicated anemia of chronic disease, folate and B12 are normal.  Peripheral smear, MMA, and SPEP are pending at time of discharge.  Etiology was felt to most likely be bone marrow suppression from unclear etiology, upon discussion with hematology she was recommended to follow-up as an outpatient once  acute medical issues resolve.  She did receive 1 unit of PRBCs for symptomatic anemia. Symptoms improved with IV hydration and repletion of electrolytes. Blood pressure meds were adjusted. Following therapy evaluations, she was referred to TCU for continued rehab and medical management.    Patient is seen today as initial visit. She reports overall doing well, slightly tired. Diarrhea is continuing to improve, stools are more formed lately. She denies lightheadedness/dizziness, no sensation of passing out. No cp/sob.    CODE STATUS/ADVANCE DIRECTIVES DISCUSSION:  Prior DNR/DNI  ALLERGIES:   Allergies   Allergen Reactions     Codeine Nausea and Vomiting     Fosamax [Alendronic Acid] Diarrhea      PAST MEDICAL HISTORY:   Past Medical History:   Diagnosis Date     Atrial fibrillation (H)      CRF (chronic renal failure)      GERD (gastroesophageal reflux disease)      Hypertension      Hypovolemic shock (H)      Influenza A 12/2017     Rheumatoid arthritis (H)      Sepsis (H) 12/24/2017      PAST SURGICAL HISTORY:   has a past surgical history that includes Cholecystectomy; appendectomy; and Bladder Suspension.  FAMILY HISTORY: family history includes Cerebrovascular Disease in her brother and father; Diabetes Type 2  in her brother; Heart Failure in her mother; Kidney failure in her sister.  SOCIAL HISTORY:   reports that she quit smoking about 4 years ago. Her smoking use included cigarettes. She smoked an average of .5 packs per day. She has never used smokeless tobacco. She reports that she does not drink alcohol and does not use drugs.  Patient's living condition: lives with spouse    Post Discharge Medication Reconciliation Status:   MED REC REQUIRED  Post Medication Reconciliation Status: discharge medications reconciled and changed, per note/orders       Current Outpatient Medications   Medication Sig     Acetaminophen (ACETAMIN PO) Take 500 mg by mouth every 4 hours as needed     albuterol (PROAIR  HFA/PROVENTIL HFA/VENTOLIN HFA) 108 (90 Base) MCG/ACT inhaler Inhale 2 puffs into the lungs every 4 hours as needed for shortness of breath / dyspnea or wheezing     allopurinol (ZYLOPRIM) 300 MG tablet Take 300 mg by mouth daily gout     colchicine (COLCYRS) 0.6 MG tablet Take 1 tablet (0.6 mg) by mouth daily for 60 days     folic acid (FOLVITE) 1 MG tablet TAKE 1 TABLET BY MOUTH ONCE DAILY EXCEPT  THE  DAY  WHEN  TAKING  METHOTREXATE     ipratropium - albuterol 0.5 mg/2.5 mg/3 mL (DUONEB) 0.5-2.5 (3) MG/3ML neb solution Take 1 vial (3 mLs) by nebulization every 4 hours as needed for wheezing or shortness of breath / dyspnea     magnesium oxide (MAG-OX) 400 MG tablet Take 400 mg by mouth 2 times daily     methotrexate sodium 2.5 MG TABS Take 6 tablets (15 mg) by mouth once a week     metoprolol succinate ER (TOPROL-XL) 50 MG 24 hr tablet Take 2 tablets (100 mg) by mouth daily (Patient taking differently: Take 25 mg by mouth daily)     pantoprazole (PROTONIX) 40 MG EC tablet Take 40 mg by mouth daily     pravastatin (PRAVACHOL) 20 MG tablet [PRAVASTATIN (PRAVACHOL) 20 MG TABLET] Take 20 mg by mouth daily.     predniSONE (DELTASONE) 5 MG tablet Take 1 tablet (5 mg) by mouth daily for 60 days     rivaroxaban ANTICOAGULANT (XARELTO) 15 MG TABS tablet Take by mouth daily (with dinner)     Vitamin D3 (CHOLECALCIFEROL) 125 MCG (5000 UT) tablet Take by mouth daily     acetaminophen (TYLENOL) 500 MG tablet [ACETAMINOPHEN (TYLENOL) 500 MG TABLET] Take 1-2 tablets (500-1,000 mg total) by mouth every 6 (six) hours as needed. (Patient not taking: Reported on 11/22/2022)     albuterol (PROAIR HFA/PROVENTIL HFA/VENTOLIN HFA) 108 (90 Base) MCG/ACT inhaler Inhale 2 puffs into the lungs every 6 hours (Patient not taking: Reported on 11/22/2022)     allopurinol (ZYLOPRIM) 300 MG tablet Take 0.5 tablets (150 mg) by mouth daily for 15 days, THEN 1 tablet (300 mg) daily for 45 days. (Patient not taking: Reported on 11/22/2022)      "budesonide-formoterol (SYMBICORT) 160-4.5 MCG/ACT Inhaler Inhale 2 puffs into the lungs 2 times daily for 30 days     Cholecalciferol (VITAMIN D) 50 MCG (2000 UT) CAPS Take 50 mcg by mouth daily (Patient not taking: Reported on 11/22/2022)     compressor, for nebulizer Saran [COMPRESSOR, FOR NEBULIZER SARAN] Nebulizer treatment qid prn (Patient not taking: Reported on 11/22/2022)     DULoxetine (CYMBALTA) 20 MG capsule Take 1 capsule (20 mg) by mouth daily for 30 days     Flaxseed (Linseed) (FLAXSEED OIL) 1000 MG CAPS Take 1 capsule by mouth daily (Patient not taking: Reported on 11/25/2022)     furosemide (LASIX) 20 MG tablet Take 20 mg by mouth daily as needed (swellling) (Patient not taking: Reported on 11/22/2022)     lactobacillus rhamnosus (GG) (CULTURELL) capsule Take 1 capsule by mouth daily (Patient not taking: Reported on 11/22/2022)     multivitamin w/minerals (THERA-VIT-M) tablet Take 1 tablet by mouth daily (Patient not taking: Reported on 11/22/2022)     rivaroxaban ANTICOAGULANT (XARELTO) 15 MG TABS tablet Take 1 tablet (15 mg) by mouth daily (with dinner) (Patient not taking: Reported on 11/22/2022)     No current facility-administered medications for this visit.       ROS:  4 point ROS including Respiratory, CV, GI and , other than that noted in the HPI,  is negative    Vitals:  /74   Pulse 63   Temp 97  F (36.1  C)   Resp 14   Ht 1.651 m (5' 5\")   Wt 72.6 kg (160 lb)   SpO2 96%   BMI 26.63 kg/m    Exam:    GEN: well-developed, thin female, appears comfortable  HEENT: NCAT, EOM intact bilaterally, sclera clear, conjunctiva normal, nose & mouth patent, mucous membranes moist  CHEST: lungs CTA bilaterally, no increased work of breathing, no wheeze, crackles, rhonchi  HEART: irregularly irregular, S1 & S2, no murmur  ABD: soft, nontender, nondistended, no guarding or rigidity, +BS in all 4 quadrants  MSK: AROM bilateral UE/LE, pedal & radial pulses 2+ bilaterally  NEURO: awake, alert, " oriented to name, place, and time. CN II-XII grossly intact. Sensation grossly intact to light touch.   SKIN: warm & dry without rash, no pedal edema    Lab/Diagnostic data:  Recent labs in [facility] reviewed by me today.     ASSESSMENT/PLAN:    Recurrent falls  Physical deconditioning  Generalized weakness  Likely multifactorial in setting of C.diff colitis, anemia, electrolyte abnormalities.  -PT/OT  -SW for safe discharge planning    Cdiff colitis, improving  Diagnosed 11/7, CT with findings of pancolitis. Started on PO vancomycin x14 days. Stools are improving, more formed and less frequent.  -Continues on PO vancomycin 125mg QID    Anemia, macrocytic  Noted to have progressively worsening macrocytic anemia upon discharge, with decrease in Hgb from 11/12 to 8.8 on most recent admission since 09/2022. Workup at [facility] suggestive of bone marrow supression from unknown etiology; recurrent infections vs other. Has had colonoscopy screening recently. Folate/B12 values wnl. Iron studies c/w chronic disease. HIV/carlene tests neg. Received 1u PRBCs while inpatient for suspected symptomatic anemia, value at discharge 11.6.  -Follow-up with hematology following resolution of acute illness  -Peripheral smear, MMA, SPEP pending  -Monitor CBC periodically    Hypokalemia, resolved  Hypomagnesemia, resolved  Noted while inpatient, 2/2 GI losses in setting of above. Resolved at time of discharge.  -Continues on Mag Ox 400mg BID  -BMP, Mg on 11/25 to ensure stability    Acute on chronic kidney insufficiency, resolved  Baseline Cr 0.7-0.9. up to 1.37 on admission, likely prerenal, improved with IV hydration. Value at discharge 0.95.  -BMP on 11/25    Paroxysmal afib  HTN / HLD  Pt with hypotension in setting of above. PTA metoprolol had been reduced from 100 to 50mg/d and lasix held in days preceding admission. Metoprolol held at time of discharge due to ongoing soft BPs and controlled HRs. HRs  since arrival at [facility], pt reports  intermittent sensation of palpitations.  -Resume metoprolol at low-dose of 25mg daily  -Continues on rivaroxaban for CVA proph, statin  -Monitor BP, HR trend    RA  Without evidence of flare.  -Continue methotrexate, prednisone 5mg daily    Gout  Had recently been on colchicine for flare.  -Continue allopurinol, colchicine    COPD, on nocturnal oxygen  Chronic and stable.  -Continue oxygen per home use  -Continues on symbicort, albuterol    GERD  -PTA PPI    Depression  -Continue cymbalta    Orders:  -BMP, CBC, Mg on 11/25    Total time spent with patient visit at the skilled nursing facility was 35 min including patient visit and review of past records. Greater than 50% of total time spent with counseling and coordinating care due to medical complexity.     Electronically signed by:  Jorge Luis Henry PA-C

## 2022-11-25 NOTE — TELEPHONE ENCOUNTER
[facility] Call Center    Phone Message    May a detailed message be left on voicemail: yes     Reason for Call: Order(s): Other:   Reason for requested: Lab orders to be faxed to TCU pt is currently at.    Date needed: 11/25  Provider name: Dr Rush   Please fax lab orders to [facility]  Fax number: [phone]  Ph: [phone]    Action Taken: Message routed to:  Other: Rheumatology Support Pool     Travel Screening: Not Applicable

## 2022-11-25 NOTE — PROGRESS NOTES
[facility] GERIATRICS    Chief Complaint   Patient presents with     RECHECK     HPI:  Fatuma Henry is a 77 year old  (1945), who is being seen today for an episodic care visit at: [facility] ([facility]) [[id number]].     Brief summary: Patient is a 77-year-old female with past medical history of CKD, paroxysmal A. fib, hypertension, COPD on nocturnal oxygen, RA on chronic low-dose prednisone who was admitted at [facility] from 11/17/2022 - 11/21/2022 after presenting with increased falls and generalized weakness.  She was notably recently admitted at [facility] from 11/7/2022 - 11/10/2022 after presenting with acute onset diarrhea in the context of recently completed antibiotic therapy for cellulitis, diagnosed with C. difficile colitis and started on oral vancomycin with improvement.  She was ultimately discharged to home, was seen in follow-up with primary care provider on 11/15/2022 with notes indicating low blood pressures, her metoprolol was reduced from 100 to 50 mg daily and Lasix stopped.  Patient had ongoing weakness and falls with persistent diarrhea which prompted her to present to [facility] for evaluation.  She had findings of dehydation with YESICA, electrolyte abnormalities, and soft blood pressures. She was also identified to have progressively worsening macrocytic anemia with a decrease in hemoglobin from 11/12--8.8 since September 2022.  Iron studies indicated anemia of chronic disease, folate and B12 are normal.  Peripheral smear, MMA, and SPEP are pending at time of discharge.  Etiology was felt to most likely be bone marrow suppression from unclear etiology, upon discussion with hematology she was recommended to follow-up as an outpatient once acute medical issues resolve.  She did receive 1 unit of PRBCs for symptomatic anemia. Symptoms improved with IV hydration and repletion of electrolytes. Blood pressure meds were adjusted. Following therapy evaluations, she was  "referred to TCU for continued rehab and medical management.    Pt seen in follow-up, sitting at edge of bed. Improving, states bowel movements remain loose but are slightly formed. No abdominal pain, n/v. Is continuing to experience intermittent sensation of palpitations, occurring infequently and do not interfere with therapies. No overt chest pain. Has chronic sob, does not feel it is worsened lately. Had a nice visit yesterday for the holiday with family and 1yo granddaughter, was also able to visit with  who she has not seen in a few weeks due to hospitalizations.    Allergies, and PMH/PSH reviewed in [facility] today.  REVIEW OF SYSTEMS:  4 point ROS including Respiratory, CV, GI and , other than that noted in the HPI,  is negative    Objective:   /63   Pulse 105   Temp 97.3  F (36.3  C)   Resp 18   Ht 1.651 m (5' 5\")   Wt 69.4 kg (152 lb 14.4 oz)   SpO2 97%   BMI 25.44 kg/m      GEN: well-developed, thin female, appears comfortable  HEENT: NCAT, EOM intact bilaterally, sclera clear, conjunctiva normal, nose & mouth patent, mucous membranes moist  CHEST: lungs CTA bilaterally, no increased work of breathing, no wheeze, crackles, rhonchi  HEART: irregularly irregular, S1 & S2, no murmur  ABD: soft, nontender, nondistended, no guarding or rigidity, +BS in all 4 quadrants  MSK: AROM bilateral UE/LE, pedal & radial pulses 2+ bilaterally  NEURO: awake, alert, oriented to name, place, and time. CN II-XII grossly intact. Sensation grossly intact to light touch.   SKIN: warm & dry without rash, no pedal edema      Most Recent 3 CBC's:  Recent Labs   Lab Test 11/25/22  0555 11/08/22  0605 11/07/22  1932   WBC 4.8 11.0 13.9*   HGB 9.7* 9.6* 10.3*   * 103* 100    391 402     Most Recent 3 BMP's:  Recent Labs   Lab Test 11/25/22  0555 11/10/22  0743 11/09/22  0725    138 140   POTASSIUM 4.0 4.6 4.3   CHLORIDE 103 109* 112*   CO2 27 20* 18*   BUN 18.6 13 16   CR 1.00* 0.77 0.82   ANIONGAP " 11 9 10   SUNI 9.4 9.4 8.4*   GLC 79 86 93       Assessment/Plan:    Recurrent falls  Physical deconditioning  Generalized weakness  Likely multifactorial in setting of C.diff colitis, anemia, electrolyte abnormalities.  -PT/OT  -SW for safe discharge planning     Cdiff colitis, improving  Diagnosed 11/7, CT with findings of pancolitis. Started on PO vancomycin x14 days. Stools are improving, more formed and less frequent.  -Continues on PO vancomycin 125mg QID     Anemia, macrocytic  Noted to have progressively worsening macrocytic anemia upon discharge, with decrease in Hgb from 11/12 to 8.8 on most recent admission since 09/2022. Workup at [facility] suggestive of bone marrow supression from unknown etiology; recurrent infections vs other. Has had colonoscopy screening recently. Folate/B12 values wnl. Iron studies c/w chronic disease. HIV/carlene tests neg. Received 1u PRBCs while inpatient for suspected symptomatic anemia, value at discharge 11.6. Repeat value 9.7 on 11/25, . Stable from most recent in [facility] system (9.6 on 11/8).  -Follow-up with hematology following resolution of acute illness  -Peripheral smear, MMA, SPEP pending  -Hematology follow-up     Hypokalemia, resolved  Hypomagnesemia, resolved  Noted while inpatient, 2/2 GI losses in setting of above. Resolved at time of discharge. Repeat value 1.4 on 11/25  -Continues on Mag Ox 400mg BID  -Repeat value early next week     Acute on chronic kidney insufficiency, resolved  Baseline Cr 0.7-0.9. up to 1.37 on admission, likely prerenal, improved with IV hydration. Value at discharge 0.95. Repeat value 11/25 1.  -Encourage PO intake  -Monitor periodically     Paroxysmal afib  HTN / HLD  Pt with hypotension in setting of above. PTA metoprolol had been reduced from 100 to 50mg/d and lasix held in days preceding admission. Metoprolol held at time of discharge due to ongoing soft BPs and controlled HRs. Recently had metoprolol resumed at low-dose of 25mg  daily, pt continues to report low-grade palpitations. HRs ranging . BPs 140s.  -Increase metoprolol to 50mg/d   -Continues on rivaroxaban for CVA proph, statin  -Monitor BP, HR trend     RA  Without evidence of flare.  -Continue methotrexate, prednisone 5mg daily     Gout  Had recently been on colchicine for flare.  -Continue allopurinol, colchicine     COPD, on nocturnal oxygen  Chronic and stable.  -Continue oxygen per home use  -Continues on symbicort, albuterol     GERD  -Continue Protonix daily     Depression  -Continue cymbalta    MED REC REQUIRED  Post Medication Reconciliation Status: medication reconcilation previously completed during another office visit      Orders:  -Increase metoprolol to 50mg/d    Electronically signed by: Jorge Luis Henry PA-C

## 2022-11-25 NOTE — LETTER
11/25/2022        RE: Fatuma Henry  [address] [place] [address]        [facility] GERIATRICS    Chief Complaint   Patient presents with     RECHECK     HPI:  Fatuma Henry is a 77 year old  (1945), who is being seen today for an episodic care visit at: [facility]) [[id number]].     Brief summary: Patient is a 77-year-old female with past medical history of CKD, paroxysmal A. fib, hypertension, COPD on nocturnal oxygen, RA on chronic low-dose prednisone who was admitted at [facility] from 11/17/2022 - 11/21/2022 after presenting with increased falls and generalized weakness.  She was notably recently admitted at [facility] from 11/7/2022 - 11/10/2022 after presenting with acute onset diarrhea in the context of recently completed antibiotic therapy for cellulitis, diagnosed with C. difficile colitis and started on oral vancomycin with improvement.  She was ultimately discharged to home, was seen in follow-up with primary care provider on 11/15/2022 with notes indicating low blood pressures, her metoprolol was reduced from 100 to 50 mg daily and Lasix stopped.  Patient had ongoing weakness and falls with persistent diarrhea which prompted her to present to [facility] for evaluation.  She had findings of dehydation with YESICA, electrolyte abnormalities, and soft blood pressures. She was also identified to have progressively worsening macrocytic anemia with a decrease in hemoglobin from 11/12--8.8 since September 2022.  Iron studies indicated anemia of chronic disease, folate and B12 are normal.  Peripheral smear, MMA, and SPEP are pending at time of discharge.  Etiology was felt to most likely be bone marrow suppression from unclear etiology, upon discussion with hematology she was recommended to follow-up as an outpatient once acute medical issues resolve.  She did receive 1 unit of PRBCs for symptomatic anemia. Symptoms improved with IV hydration and  "repletion of electrolytes. Blood pressure meds were adjusted. Following therapy evaluations, she was referred to TCU for continued rehab and medical management.    Pt seen in follow-up, sitting at edge of bed. Improving, states bowel movements remain loose but are slightly formed. No abdominal pain, n/v. Is continuing to experience intermittent sensation of palpitations, occurring infequently and do not interfere with therapies. No overt chest pain. Has chronic sob, does not feel it is worsened lately. Had a nice visit yesterday for the holiday with family and 1yo granddaughter, was also able to visit with  who she has not seen in a few weeks due to hospitalizations.    Allergies, and PMH/PSH reviewed in EPIC today.  REVIEW OF SYSTEMS:  4 point ROS including Respiratory, CV, GI and , other than that noted in the HPI,  is negative    Objective:   /63   Pulse 105   Temp 97.3  F (36.3  C)   Resp 18   Ht 1.651 m (5' 5\")   Wt 69.4 kg (152 lb 14.4 oz)   SpO2 97%   BMI 25.44 kg/m      GEN: well-developed, thin female, appears comfortable  HEENT: NCAT, EOM intact bilaterally, sclera clear, conjunctiva normal, nose & mouth patent, mucous membranes moist  CHEST: lungs CTA bilaterally, no increased work of breathing, no wheeze, crackles, rhonchi  HEART: irregularly irregular, S1 & S2, no murmur  ABD: soft, nontender, nondistended, no guarding or rigidity, +BS in all 4 quadrants  MSK: AROM bilateral UE/LE, pedal & radial pulses 2+ bilaterally  NEURO: awake, alert, oriented to name, place, and time. CN II-XII grossly intact. Sensation grossly intact to light touch.   SKIN: warm & dry without rash, no pedal edema      Most Recent 3 CBC's:  Recent Labs   Lab Test 11/25/22  0555 11/08/22  0605 11/07/22  1932   WBC 4.8 11.0 13.9*   HGB 9.7* 9.6* 10.3*   * 103* 100    391 402     Most Recent 3 BMP's:  Recent Labs   Lab Test 11/25/22  0555 11/10/22  0743 11/09/22  0725    138 140   POTASSIUM " 4.0 4.6 4.3   CHLORIDE 103 109* 112*   CO2 27 20* 18*   BUN 18.6 13 16   CR 1.00* 0.77 0.82   ANIONGAP 11 9 10   SUNI 9.4 9.4 8.4*   GLC 79 86 93       Assessment/Plan:    Recurrent falls  Physical deconditioning  Generalized weakness  Likely multifactorial in setting of C.diff colitis, anemia, electrolyte abnormalities.  -PT/OT  -SW for safe discharge planning     Cdiff colitis, improving  Diagnosed 11/7, CT with findings of pancolitis. Started on PO vancomycin x14 days. Stools are improving, more formed and less frequent.  -Continues on PO vancomycin 125mg QID     Anemia, macrocytic  Noted to have progressively worsening macrocytic anemia upon discharge, with decrease in Hgb from 11/12 to 8.8 on most recent admission since 09/2022. Workup at [facility] suggestive of bone marrow supression from unknown etiology; recurrent infections vs other. Has had colonoscopy screening recently. Folate/B12 values wnl. Iron studies c/w chronic disease. HIV/carlene tests neg. Received 1u PRBCs while inpatient for suspected symptomatic anemia, value at discharge 11.6. Repeat value 9.7 on 11/25, . Stable from most recent in [facility] system (9.6 on 11/8).  -Follow-up with hematology following resolution of acute illness  -Peripheral smear, MMA, SPEP pending  -Hematology follow-up     Hypokalemia, resolved  Hypomagnesemia, resolved  Noted while inpatient, 2/2 GI losses in setting of above. Resolved at time of discharge. Repeat value 1.4 on 11/25  -Continues on Mag Ox 400mg BID  -Repeat value early next week     Acute on chronic kidney insufficiency, resolved  Baseline Cr 0.7-0.9. up to 1.37 on admission, likely prerenal, improved with IV hydration. Value at discharge 0.95. Repeat value 11/25 1.  -Encourage PO intake  -Monitor periodically     Paroxysmal afib  HTN / HLD  Pt with hypotension in setting of above. PTA metoprolol had been reduced from 100 to 50mg/d and lasix held in days preceding admission. Metoprolol held at time of  discharge due to ongoing soft BPs and controlled HRs. Recently had metoprolol resumed at low-dose of 25mg daily, pt continues to report low-grade palpitations. HRs ranging . BPs 140s.  -Increase metoprolol to 50mg/d   -Continues on rivaroxaban for CVA proph, statin  -Monitor BP, HR trend     RA  Without evidence of flare.  -Continue methotrexate, prednisone 5mg daily     Gout  Had recently been on colchicine for flare.  -Continue allopurinol, colchicine     COPD, on nocturnal oxygen  Chronic and stable.  -Continue oxygen per home use  -Continues on symbicort, albuterol     GERD  -Continue Protonix daily     Depression  -Continue cymbalta    MED REC REQUIRED  Post Medication Reconciliation Status: medication reconcilation previously completed during another office visit      Orders:  -Increase metoprolol to 50mg/d    Electronically signed by: Jorge Luis Henry PA-C             Sincerely,        Jorge Luis Henry PA-C

## 2022-11-29 NOTE — LETTER
11/29/2022        RE: Fatuma Henry  [address] [place] [address]        [facility] GERIATRICS    Chief Complaint   Patient presents with     RECHECK     HPI:  Fatuma Henry is a 77 year old  (1945), who is being seen today for an episodic care visit at: [facility]) [[id number]].     Brief summary: Patient is a 77-year-old female with past medical history of CKD, paroxysmal A. fib, hypertension, COPD on nocturnal oxygen, RA on chronic low-dose prednisone who was admitted at [facility] from 11/17/2022 - 11/21/2022 after presenting with increased falls and generalized weakness.  She was notably recently admitted at [facility] from 11/7/2022 - 11/10/2022 after presenting with acute onset diarrhea in the context of recently completed antibiotic therapy for cellulitis, diagnosed with C. difficile colitis and started on oral vancomycin with improvement.  She was ultimately discharged to home, was seen in follow-up with primary care provider on 11/15/2022 with notes indicating low blood pressures, her metoprolol was reduced from 100 to 50 mg daily and Lasix stopped.  Patient had ongoing weakness and falls with persistent diarrhea which prompted her to present to [facility] for evaluation.  She had findings of dehydation with YESICA, electrolyte abnormalities, and soft blood pressures. She was also identified to have progressively worsening macrocytic anemia with a decrease in hemoglobin from 11/12--8.8 since September 2022.  Iron studies indicated anemia of chronic disease, folate and B12 are normal.  Peripheral smear, MMA, and SPEP are pending at time of discharge.  Etiology was felt to most likely be bone marrow suppression from unclear etiology, upon discussion with hematology she was recommended to follow-up as an outpatient once acute medical issues resolve.  She did receive 1 unit of PRBCs for symptomatic anemia. Symptoms improved with IV hydration and  "repletion of electrolytes. Blood pressure meds were adjusted. Following therapy evaluations, she was referred to TCU for continued rehab and medical management.    Patient is seen today in follow-up. She is sitting up in bed, reading newspaper on arrival. Feels well. Continues to have loose stools, not characterized as diarrhea but semi-formed. Denies abdominal pain. No blood in stool. Tolerating oral intake. She mentions today that her feet are a little puffy, she used to take lasix on an as-needed basis for edema but reports this was held while in the hospital due to acute illness. Is worried this may be a sign of volume overload, would like a dose of lasix if possible. She reports taking 10mg if needed. She does admit to ongoing intermittent sensations of palpitations, most commonly at night. No chest pain, sob. She reports the sensations are almost as if she has indigestion and are more annoying than anything. Metoprolol was recently increased to 50mg/d from 25, pt has noted decreased sensation of tachycardia. She also confirms history of solely using oxygen at night prior to admission, while at TCU it has been in place continuously, staff is asking if it can be weaned.    Allergies, and PMH/PSH reviewed in EPIC today.  REVIEW OF SYSTEMS:  4 point ROS including Respiratory, CV, GI and , other than that noted in the HPI,  is negative    Objective:   BP (!) 150/67   Pulse 94   Temp 96.8  F (36  C)   Resp 18   Ht 1.676 m (5' 6\")   Wt 68.9 kg (152 lb)   SpO2 95%   BMI 24.53 kg/m      GEN: well-developed, well-nourished, appears comfortable  HEENT: NCAT, EOM intact bilaterally, sclera clear, conjunctiva normal, nose & mouth patent, mucous membranes moist  CHEST: lungs CTA bilaterally, no increased work of breathing, no wheeze, crackles, rhonchi  HEART: irregularly irregular, tachy, S1 & S2  ABD: soft, nontender, nondistended, no guarding or rigidity, +BS in all 4 quadrants  MSK: AROM bilateral UE/LE, pedal & " radial pulses 2+ bilaterally  NEURO: awake, alert, oriented to name, place, and time. CN II-XII grossly intact. Sensation grossly intact to light touch.   SKIN: warm & dry without rash, 1+ pitting pedal edema bilaterally      Most Recent 3 CBC's:  Recent Labs   Lab Test 11/28/22  0803 11/25/22  0555 11/08/22  0605   WBC 6.4 4.8 11.0   HGB 10.1* 9.7* 9.6*   * 106* 103*   * 394 391     Most Recent 3 BMP's:  Recent Labs   Lab Test 11/28/22  0803 11/25/22  0555 11/10/22  0743    141 138   POTASSIUM 4.8 4.0 4.6   CHLORIDE 101 103 109*   CO2 28 27 20*   BUN 15.7 18.6 13   CR 0.94 1.00* 0.77   ANIONGAP 11 11 9   SUNI 10.0 9.4 9.4   GLC 85 79 86       Assessment/Plan:    Recurrent falls  Physical deconditioning  Generalized weakness  Likely multifactorial in setting of C.diff colitis, anemia, electrolyte abnormalities. Overall improving.  -PT/OT  -SW for safe discharge planning     Cdiff colitis, improving  Diagnosed 11/7, CT with findings of pancolitis. Started on PO vancomycin x14 days. Stools are improving, more formed and less frequent. Has completed course of Abx.  -Monitor symptoms     Anemia, macrocytic  Noted to have progressively worsening macrocytic anemia upon discharge, with decrease in Hgb from 11/12 to 8.8 on most recent admission since 09/2022. Workup at [facility] suggestive of bone marrow supression from unknown etiology; recurrent infections vs other. Has had colonoscopy screening recently. Folate/B12 values wnl. Iron studies c/w chronic disease. HIV/carlene tests neg. Received 1u PRBCs while inpatient for suspected symptomatic anemia, value at discharge 11.6. Repeat values 10.1 (9.7) on 11/28,  (106).  *Peripheral smear from 11/18 neg for hemolysis/atypia, MMA 0.24, SPEP with hypogammaglobulinemia  -Follow-up with hematology following resolution of acute illness  -Hematology referral through  system, pt in agreement     Hypokalemia, resolved  Hypomagnesemia, resolved  Noted while  inpatient, 2/2 GI losses in setting of above. Resolved at time of discharge. Repeat value 1.4 on 11/25.  -Continues on Mag Ox 400mg BID     Acute on chronic kidney insufficiency, resolved  Baseline Cr 0.7-0.9. up to 1.37 on admission, likely prerenal, improved with IV hydration. Value at discharge 0.95. Repeat value 11/25 1.  -Encourage PO intake  -Monitor periodically     Paroxysmal afib  HTN / HLD  Pt with hypotension in setting of above. PTA metoprolol had been reduced from 100 to 50mg/d and lasix held in days preceding admission. Metoprolol held at time of discharge due to ongoing soft BPs and controlled HRs. Recently had metoprolol increased from 25 to 50mg/d, pt continues to report low-grade palpitations. HRs range in facility EMR improved, 89-94. BPs 128-164. Pt also reporting low-grade edema to bilateral feet, appreciated on exam today.   -Increase metoprolol to 75mg/d with hold parameters   -Continues on rivaroxaban for CVA proph, statin  -Monitor BP, HR trend, increase to qshift x48h to monitor HR trend  -PO lasix 10mg x1 today for edema     RA  Without evidence of flare.  -Continue methotrexate, prednisone 5mg daily     Gout  Had recently been on colchicine for flare.  -Continue allopurinol, colchicine     COPD, on nocturnal oxygen  Chronic and stable.  -Continue oxygen per home use wean daytime as able to maintain sats >90%  -Continues on symbicort, albuterol     GERD  -Continue Protonix daily     Depression  -Continue cymbalta    MED REC REQUIRED  Post Medication Reconciliation Status: discharge medications reconciled and changed, per note/orders      Orders:  -Increased metoprolol to 75mg/d with hold parameters  -PO lasix 10mg x1 today for edema  -BP, HR monitoring qshift x48h  -Wean daytime oxygen as able to home regimen  -Hematology follow-up    Electronically signed by: Jorge Luis Henry PA-C             Sincerely,        Jorge Luis Henry PA-C

## 2022-11-29 NOTE — PROGRESS NOTES
[facility] GERIATRICS    Chief Complaint   Patient presents with     RECHECK     HPI:  Fatuma Henry is a 77 year old  (1945), who is being seen today for an episodic care visit at: [facility] ([facility]) [[id number]].     Brief summary: Patient is a 77-year-old female with past medical history of CKD, paroxysmal A. fib, hypertension, COPD on nocturnal oxygen, RA on chronic low-dose prednisone who was admitted at [facility] from 11/17/2022 - 11/21/2022 after presenting with increased falls and generalized weakness.  She was notably recently admitted at [facility] from 11/7/2022 - 11/10/2022 after presenting with acute onset diarrhea in the context of recently completed antibiotic therapy for cellulitis, diagnosed with C. difficile colitis and started on oral vancomycin with improvement.  She was ultimately discharged to home, was seen in follow-up with primary care provider on 11/15/2022 with notes indicating low blood pressures, her metoprolol was reduced from 100 to 50 mg daily and Lasix stopped.  Patient had ongoing weakness and falls with persistent diarrhea which prompted her to present to [facility] for evaluation.  She had findings of dehydation with YESICA, electrolyte abnormalities, and soft blood pressures. She was also identified to have progressively worsening macrocytic anemia with a decrease in hemoglobin from 11/12--8.8 since September 2022.  Iron studies indicated anemia of chronic disease, folate and B12 are normal.  Peripheral smear, MMA, and SPEP are pending at time of discharge.  Etiology was felt to most likely be bone marrow suppression from unclear etiology, upon discussion with hematology she was recommended to follow-up as an outpatient once acute medical issues resolve.  She did receive 1 unit of PRBCs for symptomatic anemia. Symptoms improved with IV hydration and repletion of electrolytes. Blood pressure meds were adjusted. Following therapy evaluations, she was  "referred to TCU for continued rehab and medical management.    Patient is seen today in follow-up. She is sitting up in bed, reading newspaper on arrival. Feels well. Continues to have loose stools, not characterized as diarrhea but semi-formed. Denies abdominal pain. No blood in stool. Tolerating oral intake. She mentions today that her feet are a little puffy, she used to take lasix on an as-needed basis for edema but reports this was held while in the hospital due to acute illness. Is worried this may be a sign of volume overload, would like a dose of lasix if possible. She reports taking 10mg if needed. She does admit to ongoing intermittent sensations of palpitations, most commonly at night. No chest pain, sob. She reports the sensations are almost as if she has indigestion and are more annoying than anything. Metoprolol was recently increased to 50mg/d from 25, pt has noted decreased sensation of tachycardia. She also confirms history of solely using oxygen at night prior to admission, while at TCU it has been in place continuously, staff is asking if it can be weaned.    Allergies, and PMH/PSH reviewed in EPIC today.  REVIEW OF SYSTEMS:  4 point ROS including Respiratory, CV, GI and , other than that noted in the HPI,  is negative    Objective:   BP (!) 150/67   Pulse 94   Temp 96.8  F (36  C)   Resp 18   Ht 1.676 m (5' 6\")   Wt 68.9 kg (152 lb)   SpO2 95%   BMI 24.53 kg/m      GEN: well-developed, well-nourished, appears comfortable  HEENT: NCAT, EOM intact bilaterally, sclera clear, conjunctiva normal, nose & mouth patent, mucous membranes moist  CHEST: lungs CTA bilaterally, no increased work of breathing, no wheeze, crackles, rhonchi  HEART: irregularly irregular, tachy, S1 & S2  ABD: soft, nontender, nondistended, no guarding or rigidity, +BS in all 4 quadrants  MSK: AROM bilateral UE/LE, pedal & radial pulses 2+ bilaterally  NEURO: awake, alert, oriented to name, place, and time. CN II-XII " grossly intact. Sensation grossly intact to light touch.   SKIN: warm & dry without rash, 1+ pitting pedal edema bilaterally      Most Recent 3 CBC's:  Recent Labs   Lab Test 11/28/22  0803 11/25/22  0555 11/08/22  0605   WBC 6.4 4.8 11.0   HGB 10.1* 9.7* 9.6*   * 106* 103*   * 394 391     Most Recent 3 BMP's:  Recent Labs   Lab Test 11/28/22  0803 11/25/22  0555 11/10/22  0743    141 138   POTASSIUM 4.8 4.0 4.6   CHLORIDE 101 103 109*   CO2 28 27 20*   BUN 15.7 18.6 13   CR 0.94 1.00* 0.77   ANIONGAP 11 11 9   SUNI 10.0 9.4 9.4   GLC 85 79 86       Assessment/Plan:    Recurrent falls  Physical deconditioning  Generalized weakness  Likely multifactorial in setting of C.diff colitis, anemia, electrolyte abnormalities. Overall improving.  -PT/OT  -SW for safe discharge planning     Cdiff colitis, improving  Diagnosed 11/7, CT with findings of pancolitis. Started on PO vancomycin x14 days. Stools are improving, more formed and less frequent. Has completed course of Abx.  -Monitor symptoms     Anemia, macrocytic  Noted to have progressively worsening macrocytic anemia upon discharge, with decrease in Hgb from 11/12 to 8.8 on most recent admission since 09/2022. Workup at [facility] suggestive of bone marrow supression from unknown etiology; recurrent infections vs other. Has had colonoscopy screening recently. Folate/B12 values wnl. Iron studies c/w chronic disease. HIV/carlene tests neg. Received 1u PRBCs while inpatient for suspected symptomatic anemia, value at discharge 11.6. Repeat values 10.1 (9.7) on 11/28,  (106).  *Peripheral smear from 11/18 neg for hemolysis/atypia, MMA 0.24, SPEP with hypogammaglobulinemia  -Follow-up with hematology following resolution of acute illness  -Hematology referral through  system, pt in agreement     Hypokalemia, resolved  Hypomagnesemia, resolved  Noted while inpatient, 2/2 GI losses in setting of above. Resolved at time of discharge. Repeat value 1.4 on  11/25.  -Continues on Mag Ox 400mg BID     Acute on chronic kidney insufficiency, resolved  Baseline Cr 0.7-0.9. up to 1.37 on admission, likely prerenal, improved with IV hydration. Value at discharge 0.95. Repeat value 11/25 1.  -Encourage PO intake  -Monitor periodically     Paroxysmal afib  HTN / HLD  Pt with hypotension in setting of above. PTA metoprolol had been reduced from 100 to 50mg/d and lasix held in days preceding admission. Metoprolol held at time of discharge due to ongoing soft BPs and controlled HRs. Recently had metoprolol increased from 25 to 50mg/d, pt continues to report low-grade palpitations. HRs range in facility EMR improved, 89-94. BPs 128-164. Pt also reporting low-grade edema to bilateral feet, appreciated on exam today.   -Increase metoprolol to 75mg/d with hold parameters   -Continues on rivaroxaban for CVA proph, statin  -Monitor BP, HR trend, increase to qshift x48h to monitor HR trend  -PO lasix 10mg x1 today for edema     RA  Without evidence of flare.  -Continue methotrexate, prednisone 5mg daily     Gout  Had recently been on colchicine for flare.  -Continue allopurinol, colchicine     COPD, on nocturnal oxygen  Chronic and stable.  -Continue oxygen per home use wean daytime as able to maintain sats >90%  -Continues on symbicort, albuterol     GERD  -Continue Protonix daily     Depression  -Continue cymbalta    MED REC REQUIRED  Post Medication Reconciliation Status: discharge medications reconciled and changed, per note/orders      Orders:  -Increased metoprolol to 75mg/d with hold parameters  -PO lasix 10mg x1 today for edema  -BP, HR monitoring qshift x48h  -Wean daytime oxygen as able to home regimen  -Hematology follow-up    Electronically signed by: Jorge Luis Henry PA-C

## 2022-12-01 PROBLEM — I50.9 ACUTE ON CHRONIC CONGESTIVE HEART FAILURE, UNSPECIFIED HEART FAILURE TYPE (H): Status: ACTIVE | Noted: 2022-01-01

## 2022-12-01 PROBLEM — R07.9 CHEST PAIN, UNSPECIFIED TYPE: Status: ACTIVE | Noted: 2022-01-01

## 2022-12-01 NOTE — ED TRIAGE NOTES
Pt presents from nursing home via [facility] after chest pain/pressure starting in the middle of the night. Given 1 nitroglycerin by facility staff after which pain partially subsided. Pt on chronic 2L NC for COPD. SOB at baseline, states that SOB is not worse than usual.     Triage Assessment     Row Name 12/01/22 1243       Triage Assessment (Adult)    Airway WDL WDL       Respiratory WDL    Respiratory WDL X;rhythm/pattern    Rhythm/Pattern, Respiratory shortness of breath       Skin Circulation/Temperature WDL    Skin Circulation/Temperature WDL WDL       Cardiac WDL    Cardiac WDL chest pain;X       Chest Pain Assessment    Chest Pain Location midsternal    Character pressure       Peripheral/Neurovascular WDL    Peripheral Neurovascular WDL WDL       Cognitive/Neuro/Behavioral WDL    Cognitive/Neuro/Behavioral WDL WDL

## 2022-12-01 NOTE — PHARMACY-ADMISSION MEDICATION HISTORY
Pharmacy Note - Admission Medication History    Pertinent Provider Information: Patient was previously taking methotrexate once weekly on Sundays. This medication is not on medication list or living facility MAR, however 11/25/22 progress note indicates plan to continue. Left 2 messages with living facility to clarify.     ______________________________________________________________________    Prior To Admission (PTA) med list completed and updated in EMR.       PTA Med List   Medication Sig Last Dose    Acetaminophen (ACETAMIN PO) Take 500 mg by mouth every 4 hours as needed (pain or fever) Unknown    albuterol (PROAIR HFA/PROVENTIL HFA/VENTOLIN HFA) 108 (90 Base) MCG/ACT inhaler Inhale 2 puffs into the lungs every 4 hours as needed for shortness of breath / dyspnea or wheezing Unknown    allopurinol (ZYLOPRIM) 300 MG tablet Take 300 mg by mouth daily gout 11/30/2022 at am    budesonide-formoterol (SYMBICORT) 160-4.5 MCG/ACT Inhaler Inhale 2 puffs into the lungs 2 times daily for 30 days 12/1/2022 at am    colchicine (COLCYRS) 0.6 MG tablet Take 1 tablet (0.6 mg) by mouth daily for 60 days 12/1/2022 at am    DULoxetine (CYMBALTA) 20 MG capsule Take 1 capsule (20 mg) by mouth daily for 30 days 12/1/2022 at am    folic acid (FOLVITE) 1 MG tablet TAKE 1 TABLET BY MOUTH ONCE DAILY EXCEPT  THE  DAY  WHEN  TAKING  METHOTREXATE (Patient taking differently: Take 1 mg by mouth daily Daily) 12/1/2022 at am    ipratropium - albuterol 0.5 mg/2.5 mg/3 mL (DUONEB) 0.5-2.5 (3) MG/3ML neb solution Take 1 vial (3 mLs) by nebulization every 4 hours as needed for wheezing or shortness of breath / dyspnea Unknown    magnesium oxide (MAG-OX) 400 MG tablet Take 400 mg by mouth 2 times daily 12/1/2022 at am    methotrexate sodium 2.5 MG TABS Take 6 tablets (15 mg) by mouth once a week Unknown    metoprolol succinate ER (TOPROL-XL) 50 MG 24 hr tablet Take 2 tablets (100 mg) by mouth daily (Patient taking differently: Take 75 mg by mouth  daily Hold if sbp < 100 or HR < 55) 12/1/2022 at am    pantoprazole (PROTONIX) 40 MG EC tablet Take 40 mg by mouth daily 12/1/2022 at am    pravastatin (PRAVACHOL) 20 MG tablet Take 20 mg by mouth every evening 11/30/2022 at pm    predniSONE (DELTASONE) 5 MG tablet Take 1 tablet (5 mg) by mouth daily for 60 days 12/1/2022 at am    rivaroxaban ANTICOAGULANT (XARELTO) 15 MG TABS tablet Take 1 tablet (15 mg) by mouth daily (with dinner) 11/30/2022 at 1700    Vitamin D3 (CHOLECALCIFEROL) 125 MCG (5000 UT) tablet Take 125 mcg by mouth daily 12/1/2022 at am       Information source(s): Facility ([facility]/) medication list/MAR  Method of interview communication: phone    Summary of Changes to PTA Med List  New: none  Discontinued: flaxseed, Culturelle, multivit  Changed: hold parameters for metoprolol     Patient was asked about OTC/herbal products specifically.  PTA med list reflects this.    In the past week, patient estimated taking medication this percent of the time:  greater than 90%.    Allergies were reviewed, assessed, and updated with the patient.      Patient did not bring any medications to the hospital and can't retrieve from home. No multi-dose medications are available for use during hospital stay.     The information provided in this note is only as accurate as the sources available at the time of the update(s).    Thank you for the opportunity to participate in the care of this patient.    Rajani Chandra RPH  12/1/2022 1:47 PM

## 2022-12-01 NOTE — PROGRESS NOTES
Referral received for benign heme services, see below.    Referral reason: anemia    Current abnormal labs: Available in chart review and in CareEverywhere    Outreach: Call not placed to patient regarding referral.    Plan: Triage instructions updated and sent to NPS for completion.

## 2022-12-01 NOTE — ED NOTES
Bed: JNED-15  Expected date:   Expected time:   Means of arrival: Ambulance  Comments:  [facility] Chest pain, SOB

## 2022-12-01 NOTE — ED PROVIDER NOTES
EMERGENCY DEPARTMENT ENCOUNTER     NAME: Fatuma Henry   AGE: 77 year old female   YOB: 1945   MRN: [id number]   EVALUATION DATE & TIME: 12/1/2022 12:37 PM   PCP: Tory Wise     Chief Complaint   Patient presents with     Chest Pain   :    FINAL IMPRESSION       1. Acute on chronic congestive heart failure, unspecified heart failure type (H)    2. Chest pain, unspecified type           ED COURSE & MEDICAL DECISION MAKING      12:36 AM I met with the patient to gather history and to perform my initial exam. We discussed plans for the ED course, including diagnostic testing and treatment.  2:18 PM I spoke with the hospitalist, Dr. Bronson, who accepts the patient for admission.         Pertinent Labs & Imaging studies reviewed. (See chart for details)   77 year old female  presents to the Emergency Department for evaluation of chest pain that resolved with nitroglycerin.  She has a history of chronic renal failure, COPD and is oxygen dependent but no CHF history.  She is anticoagulated. Initial Vitals Reviewed. Initial exam notable for chronically ill-appearing patient who is 95% on her home 2 L nasal cannula.  She does have significant lower extremity edema which per patient is new.  I considered ACS especially with the improvement with nitroglycerin, new CHF, fluid overload.  No concern for PE given the anticoagulation but I also considered something like pneumonia, COVID, influenza even though she does not have infectious symptoms.  Labs showed BNP of over 3000 and I suspect she has undiagnosed CHF and shows signs of clinical fluid overload, troponin is above the normal range and will likely need to be trended, but her EKG is nonischemic and this troponin may also be secondary to her creatinine and not ACS.  I discussed the case with hospitalist Dr. Nurys Molina, and we will plan to start some IV Lasix and admit for further management.           At the conclusion of the  encounter I discussed the results of all of the tests and the disposition. The questions were answered. The patient or family acknowledged understanding and was agreeable with the care plan.         MEDICATIONS GIVEN IN THE EMERGENCY:   Medications - No data to display   NEW PRESCRIPTIONS STARTED AT TODAY'S ER VISIT   New Prescriptions    No medications on file     Medical Decision Making    History:    Supplemental history from: N/A    External Record(s) reviewed: Documented in HPI, if applicable.    Work Up:    Chart documentation includes differential considered and any EKGs or imaging interpreted by provider.    In additional to work up documented, I considered the following work up: See chart documentation, if applicable.    External consultation:    Discussion of management with another provider: See chart documentation, if applicable    Complicating factors:    Care impacted by chronic illness: None    Care affected by social determinants of health: N/A    Disposition considerations: Admit.      ================================================================   HISTORY OF PRESENT ILLNESS       Patient information was obtained from: patient   Use of Intrepreter: N/A    Fatuma JAZMIN Henry is a 77 year old female with history of atrial fibrillation, hypertension, hypovolemic shock, sepsis, chronic renal failure, rheumatoid arthritis, reactive airway disease, pulmonary nodes, and chronic obstructive pulmonary disease who presents with chest pain.    Per chart review, patient presented to [facility] on 11/17/2022 for evaluation of a fall. She additionally presented with hypomagnesemia, macrocytic anemia, diarrhea, hypotension, and YESICA. After several days of falling w/o LOC, patient presented with concern that her hypotension or electrolyte irregularities were causing this. With paramedics today, her BP was 88/45 which came up to 130s after 500 cc. Reason for concern given recent falls on head with blood  "thinners but imaging demonstrated no acute bleed or injury. Continued replacing magnesium. Ordered PT/OT. Per diarrhea, given recent discharge for C.Diff, continue treating with oral vanco and replace fluids and electrolytes. Encouraged appetite. Magnesium Replacement. Continued oral Vancomycin. Per YESICA, creatinine at 1.3 from baseline of 0.8. Most likely due to hypotension and renal hypoperfusion secondary to diarrhea. Continued to trend Creat and BUN. Continued IV saline to improve renal perfusion. Per macrocytic anemia, Hbg of 8.8 while she was high 9 to mid 10 on most recent hospitalization 7 days ago. Due to hemoconcentration, her current HGB may have been inaccurately elevated. While no blood seen in BM, concern for bleeding with ongoing diarrhea.     Patient presents with chest pain that is described as a \"heaviness\" that began in the middle of the night last night as well as more-than-normal leg swelling. She reports the chest pain feels like an \"elephant is sitting on [her] chest.\" The pain partially went away en route to ED after being given nebulizer's. She is currently feeling the chest pain a little now. She also reports of chronic trouble breathing. She is on Xarelto. She received a blood transfusion on 11/27/2022, and was told to see a hemoglobin specialist. Patient reports she is on O2 at home.      Patient does not report of any other medical concerns or complaints at this time.       ================================================================    REVIEW OF SYSTEMS       Review of Systems   Constitutional: Negative for fever.   HENT: Negative for congestion.    Eyes: Negative for redness.   Respiratory: Negative for shortness of breath.         Positive for trouble breathing (chronic).   Cardiovascular: Positive for chest pain (pressure) and leg swelling.   Gastrointestinal: Negative for abdominal pain.   Genitourinary: Negative for difficulty urinating.   Musculoskeletal: Negative for " arthralgias and neck stiffness.   Skin: Negative for color change.   Neurological: Negative for headaches.   Psychiatric/Behavioral: Negative for confusion.         PAST HISTORY     PAST MEDICAL HISTORY:   Past Medical History:   Diagnosis Date     Atrial fibrillation (H)      CRF (chronic renal failure)      GERD (gastroesophageal reflux disease)      Hypertension      Hypovolemic shock (H)      Influenza A 12/2017     Rheumatoid arthritis (H)      Sepsis (H) 12/24/2017      PAST SURGICAL HISTORY:   Past Surgical History:   Procedure Laterality Date     APPENDECTOMY       BLADDER SUSPENSION       CHOLECYSTECTOMY        CURRENT MEDICATIONS:   Acetaminophen (ACETAMIN PO)  albuterol (PROAIR HFA/PROVENTIL HFA/VENTOLIN HFA) 108 (90 Base) MCG/ACT inhaler  allopurinol (ZYLOPRIM) 300 MG tablet  budesonide-formoterol (SYMBICORT) 160-4.5 MCG/ACT Inhaler  colchicine (COLCYRS) 0.6 MG tablet  DULoxetine (CYMBALTA) 20 MG capsule  folic acid (FOLVITE) 1 MG tablet  ipratropium - albuterol 0.5 mg/2.5 mg/3 mL (DUONEB) 0.5-2.5 (3) MG/3ML neb solution  magnesium oxide (MAG-OX) 400 MG tablet  methotrexate sodium 2.5 MG TABS  metoprolol succinate ER (TOPROL-XL) 50 MG 24 hr tablet  pantoprazole (PROTONIX) 40 MG EC tablet  pravastatin (PRAVACHOL) 20 MG tablet  predniSONE (DELTASONE) 5 MG tablet  rivaroxaban ANTICOAGULANT (XARELTO) 15 MG TABS tablet  Vitamin D3 (CHOLECALCIFEROL) 125 MCG (5000 UT) tablet  compressor, for nebulizer Eva      ALLERGIES:   Allergies   Allergen Reactions     Codeine Nausea and Vomiting     Fosamax [Alendronic Acid] Diarrhea      FAMILY HISTORY:   Family History   Problem Relation Age of Onset     Heart Failure Mother      Cerebrovascular Disease Father      Kidney failure Sister      Cerebrovascular Disease Brother      Diabetes Type 2  Brother       SOCIAL HISTORY:   Social History     Socioeconomic History     Marital status:    Tobacco Use     Smoking status: Former     Packs/day: 0.50     Types:  Cigarettes     Quit date: 2017     Years since quittin.9     Smokeless tobacco: Never   Substance and Sexual Activity     Alcohol use: No     Drug use: No     Sexual activity: Not Currently        VITALS  Patient Vitals for the past 24 hrs:   BP Temp Temp src Pulse Resp SpO2   22 1400 131/61 -- -- 93 25 95 %   22 1345 133/58 -- -- 92 (!) 32 98 %   22 1330 128/61 -- -- 90 29 97 %   22 1315 115/56 -- -- 90 22 96 %   22 1300 117/57 -- -- 89 27 97 %   22 1245 118/58 -- -- 86 -- 100 %   22 1239 119/59 98.3  F (36.8  C) Oral 87 -- 100 %        ================================================================    PHYSICAL EXAM     VITAL SIGNS: /61   Pulse 93   Temp 98.3  F (36.8  C) (Oral)   Resp 25   SpO2 95%    Constitutional:  Awake, no acute distress   HENT:  Atraumatic, oropharynx without exudate or erythema, membranes moist  Lymph:  No adenopathy  Eyes: EOM intact, PERRL, no injection  Neck: Supple  Respiratory:  Clear to auscultation bilaterally, no wheezes or crackles. On 2 L nasal canula.   Cardiovascular:  Regular rate and rhythm, single S1 and S2   GI:  Soft, nontender, nondistended, no rebound or guarding   Musculoskeletal:  Moves all extremities, no deformities. 1+ pitting edema bilaterally.     Skin:  Warm, dry  Neurologic:  Alert and oriented x3, no focal deficits noted       ================================================================  LAB       All pertinent labs reviewed and interpreted.   Labs Ordered and Resulted from Time of ED Arrival to Time of ED Departure   BASIC METABOLIC PANEL - Abnormal       Result Value    Sodium 141      Potassium 4.8      Chloride 103      Carbon Dioxide (CO2) 30 (*)     Anion Gap 8      Urea Nitrogen 22.6      Creatinine 1.09 (*)     Calcium 9.9      Glucose 100 (*)     GFR Estimate 52 (*)    TROPONIN T, HIGH SENSITIVITY - Abnormal    Troponin T, High Sensitivity 28 (*)    NT PROBNP INPATIENT - Abnormal    N  terminal Pro BNP Inpatient 3,087 (*)    CBC WITH PLATELETS AND DIFFERENTIAL - Abnormal    WBC Count 8.9      RBC Count 3.03 (*)     Hemoglobin 10.1 (*)     Hematocrit 32.4 (*)      (*)     MCH 33.3 (*)     MCHC 31.2 (*)     RDW 15.5 (*)     Platelet Count 445      % Neutrophils 79      % Lymphocytes 7      % Monocytes 10      % Eosinophils 1      % Basophils 1      % Immature Granulocytes 2      NRBCs per 100 WBC 0      Absolute Neutrophils 7.2      Absolute Lymphocytes 0.6 (*)     Absolute Monocytes 0.9      Absolute Eosinophils 0.1      Absolute Basophils 0.1      Absolute Immature Granulocytes 0.2      Absolute NRBCs 0.0     INFLUENZA A/B & SARS-COV2 PCR MULTIPLEX - Normal    Influenza A PCR Negative      Influenza B PCR Negative      RSV PCR Negative      SARS CoV2 PCR Negative          ===============================================================  RADIOLOGY       Reviewed all pertinent imaging. Please see official radiology report.   XR Chest 2 Views    (Results Pending)         ================================================================  EKG     EKG reviewed interpreted by me shows sinus rhythm with rate of 86, normal axis,  with no acute ST or T wave changes since November 7      I have independently reviewed and interpreted the EKG(s) documented above.     ================================================================  PROCEDURES         I, Georgi Norris, am serving as a scribe to document services personally performed by Dr. Scott based on my observation and the provider's statements to me. I, Cinthia Scott MD attest that Georgi Norris is acting in a scribe capacity, has observed my performance of the services and has documented them in accordance with my direction.   Cinthia Scott M.D.   Emergency Medicine   [facility] EMERGENCY DEPARTMENT  [address] [place] [id number] [phone]  Dept: [phone]       Cinthia Scott MD  12/01/22 [id number]

## 2022-12-01 NOTE — H&P
[facility]    History and Physical - Hospitalist Service       Date of Admission:  12/1/2022    Assessment & Plan      Fatuma Henry is a 77 year old female admitted on 12/1/2022. She has nocturnal dependent oxygen COPD, HTN, CKD3, paroxysmal Afib, HTN, pulmonary HTN and recent cellulitis with infected gouty arthritis and C diff who presented from TCU with chest pain and progressive SOB.      Volume overload with LE edema  Pulmonary HTN  Elevated BNP and mild elevated troponin  Chest pain  --noted 3 days of progressive SOB and new LE edema  --pain resolved with nitroglycerin---monitor, may need to consider stress testing  --IV lasix  --trend BNP, troponin  --ECHO to assess further  --may need cardiology consultation if deemed CHF  --CXR with no obvious pulmonary edema  --telemetry monitoring  -- Interesting the timing of her increased shortness of breath and increased lower extremity edema occurred around the time of a transfusion for anemia on 11/27/2022    Hemoptysis  --recent per patient  --known pulmonary nodule--see below  --monitor and treat suspected volume overload  --may need Pulmonary consultation  --is on anticoagulation for paroxysmal Afib, may need to hold if continues significant    Acute hypoxic respiratory failure  Nocturnal oxygen dependent COPD baseline 2L NC at HS  --oxygen titration  --not hypoxic but quite dyspneic with RR>40s with any activity  --lasix and trend response  --may need CT/PE eval but on anticoagulation for afib  --CXR with infiltrate vs atelectasis, no clear infection and no pulmonary edema    Recent C diff infection  --reports stools formed at this time and no incontinence and completed treatment    Known pulmonary nodule  --being monitored by Pulmonary with plan for repeat CT March 2023 with possible biopsy pending change    CKD3  --avoid nephrotoxins  --monitor closely with diuresis  --trend Cr    Rheumatoid arthritis  --home methotrexate    Paroxysmal  "Afib  --EKG NSR rate 86 with no acute ST/T changes when compared with 11/7/22  --continue home rate/anticoagulation meds    Anemia  -- Recently transfused on 11/27/2022 with at that time a plan for outpatient hematology consultation  -- Hemoglobin currently 10.1 and will follow    Respiratory illness screening 12/1/22--negative for COVID, Influenza A/B and RSV.     Diet:   low sodium  DVT Prophylaxis: DOAC  Brandon Catheter: Not present  Central Lines: None  Cardiac Monitoring: None  Code Status:   DNR/DNI, discussed with patient    Clinically Significant Risk Factors Present on Admission               # Drug Induced Coagulation Defect: home medication list includes an anticoagulant medication                Disposition Plan      Expected Discharge Date: 12/03/2022                The patient's care was discussed with the Bedside Nurse and Patient.    Shannan Bronson MD  Hospitalist Service  [facility]  Securely message with the Vocera Web Console (learn more here)  Text page via [employer] Paging/Directory         ______________________________________________________________________    Chief Complaint   Shortness of breath and chest pain    History is obtained from the patient    History of Present Illness   Fatuma Henry is a 77 year old female who has a history of paroxysmal atrial fibrillation on anticoagulation, hypertension, shock and sepsis, chronic kidney disease stage III, rheumatoid arthritis, COPD on chronic nocturnal oxygen and recent admission with C. difficile colitis who has been residing at transitional care unit presented to the ED on 12/1/2022 with chest pain and shortness of breath.    She describes the pain as a heaviness in the middle of her chest.  Its been going on over the last couple of days with progressive shortness of breath.  She is also had increasing lower extremity edema over the last 2 to 3 days.  Describes the chest pain as \"elephant sitting on her chest\".  " The pain was improved with nebulizer treatments in route.  She was given nitroglycerin with improvement.  She recently had a blood transfusion on 2022 for anemia and is supposed to be seeing a hematologist soon.    I when I see her in the emergency room she is a little dyspneic but reports she is feeling a little bit better.  She has not yet received her furosemide.  She denies any chest pain.  As mentioned she has been recovering at a transitional care center since her most recent hospital stay in early November for C. difficile.    Review of Systems    The 10 point Review of Systems is negative other than noted in the HPI or here.     Past Medical History    I have reviewed this patient's medical history and updated it with pertinent information if needed.   Past Medical History:   Diagnosis Date     Atrial fibrillation (H)      CKD (chronic kidney disease) stage 3, GFR 30-59 ml/min (H)      COPD (chronic obstructive pulmonary disease) (H)     nocturnal oxygen     CRF (chronic renal failure)      GERD (gastroesophageal reflux disease)      Hypertension      Hypovolemic shock (H)      Influenza A 2017     Pulmonary hypertension (H)      Pulmonary nodules      Rheumatoid arthritis (H)      Sepsis (H) 2017       Past Surgical History   I have reviewed this patient's surgical history and updated it with pertinent information if needed.  Past Surgical History:   Procedure Laterality Date     APPENDECTOMY       BLADDER SUSPENSION       CHOLECYSTECTOMY         Social History   I have reviewed this patient's social history and updated it with pertinent information if needed.  Social History     Tobacco Use     Smoking status: Former     Packs/day: 0.50     Types: Cigarettes     Quit date: 2017     Years since quittin.9     Smokeless tobacco: Never   Substance Use Topics     Alcohol use: No     Drug use: No       Family History   I have reviewed this patient's family history and updated it with  pertinent information if needed.  Family History   Problem Relation Age of Onset     Heart Failure Mother      Cerebrovascular Disease Father      Kidney failure Sister      Cerebrovascular Disease Brother      Diabetes Type 2  Brother        Prior to Admission Medications   Prior to Admission Medications   Prescriptions Last Dose Informant Patient Reported? Taking?   Acetaminophen (ACETAMIN PO) Unknown  Yes Yes   Sig: Take 500 mg by mouth every 4 hours as needed (pain or fever)   DULoxetine (CYMBALTA) 20 MG capsule 12/1/2022 at am  No Yes   Sig: Take 1 capsule (20 mg) by mouth daily for 30 days   Vitamin D3 (CHOLECALCIFEROL) 125 MCG (5000 UT) tablet 12/1/2022 at am  Yes Yes   Sig: Take 125 mcg by mouth daily   albuterol (PROAIR HFA/PROVENTIL HFA/VENTOLIN HFA) 108 (90 Base) MCG/ACT inhaler Unknown  Yes Yes   Sig: Inhale 2 puffs into the lungs every 4 hours as needed for shortness of breath / dyspnea or wheezing   allopurinol (ZYLOPRIM) 300 MG tablet 11/30/2022 at am  Yes Yes   Sig: Take 300 mg by mouth daily gout   budesonide-formoterol (SYMBICORT) 160-4.5 MCG/ACT Inhaler 12/1/2022 at am  No Yes   Sig: Inhale 2 puffs into the lungs 2 times daily for 30 days   colchicine (COLCYRS) 0.6 MG tablet 12/1/2022 at am  No Yes   Sig: Take 1 tablet (0.6 mg) by mouth daily for 60 days   compressor, for nebulizer Saran   No No   Sig: [COMPRESSOR, FOR NEBULIZER SARAN] Nebulizer treatment qid prn   folic acid (FOLVITE) 1 MG tablet 12/1/2022 at am  No Yes   Sig: TAKE 1 TABLET BY MOUTH ONCE DAILY EXCEPT  THE  DAY  WHEN  TAKING  METHOTREXATE   Patient taking differently: Take 1 mg by mouth daily Daily   ipratropium - albuterol 0.5 mg/2.5 mg/3 mL (DUONEB) 0.5-2.5 (3) MG/3ML neb solution Unknown  No Yes   Sig: Take 1 vial (3 mLs) by nebulization every 4 hours as needed for wheezing or shortness of breath / dyspnea   magnesium oxide (MAG-OX) 400 MG tablet 12/1/2022 at am  Yes Yes   Sig: Take 400 mg by mouth 2 times daily   methotrexate  sodium 2.5 MG TABS Unknown  No Yes   Sig: Take 6 tablets (15 mg) by mouth once a week   metoprolol succinate ER (TOPROL-XL) 50 MG 24 hr tablet 12/1/2022 at am  No Yes   Sig: Take 2 tablets (100 mg) by mouth daily   Patient taking differently: Take 75 mg by mouth daily Hold if sbp < 100 or HR < 55   pantoprazole (PROTONIX) 40 MG EC tablet 12/1/2022 at am  Yes Yes   Sig: Take 40 mg by mouth daily   pravastatin (PRAVACHOL) 20 MG tablet 11/30/2022 at pm  Yes Yes   Sig: Take 20 mg by mouth every evening   predniSONE (DELTASONE) 5 MG tablet 12/1/2022 at am  No Yes   Sig: Take 1 tablet (5 mg) by mouth daily for 60 days   rivaroxaban ANTICOAGULANT (XARELTO) 15 MG TABS tablet 11/30/2022 at 1700  No Yes   Sig: Take 1 tablet (15 mg) by mouth daily (with dinner)      Facility-Administered Medications: None     Allergies   Allergies   Allergen Reactions     Codeine Nausea and Vomiting     Fosamax [Alendronic Acid] Diarrhea       Physical Exam   Vital Signs: Temp: 98.3  F (36.8  C) Temp src: Oral BP: (!) 182/90 Pulse: 95   Resp: (!) 43 SpO2: 99 % O2 Device: Nasal cannula Oxygen Delivery: 2 LPM (Chronic)  Weight: 0 lbs 0 oz    Physical Exam  Constitutional:       General: She is not in acute distress.     Appearance: Normal appearance.   HENT:      Head: Normocephalic and atraumatic.      Right Ear: External ear normal.      Left Ear: External ear normal.      Nose: Nose normal.      Mouth/Throat:      Pharynx: Oropharynx is clear.   Eyes:      General:         Right eye: No discharge.         Left eye: No discharge.      Extraocular Movements: Extraocular movements intact.      Conjunctiva/sclera: Conjunctivae normal.      Pupils: Pupils are equal, round, and reactive to light.   Neck:      Vascular: No carotid bruit.   Cardiovascular:      Rate and Rhythm: Normal rate and regular rhythm.      Pulses: Normal pulses.      Heart sounds: Normal heart sounds. No murmur heard.    No friction rub. No gallop.   Pulmonary:      Effort:  Respiratory distress present.      Breath sounds: No wheezing or rhonchi.      Comments: Some dyspnea with movement on gurney, improved as resting, lungs pretty diminished at bases and clear otherwise  Abdominal:      General: Bowel sounds are normal. There is no distension.      Palpations: Abdomen is soft.      Tenderness: There is no abdominal tenderness.   Musculoskeletal:      Cervical back: Normal range of motion and neck supple.      Right lower leg: Edema present.      Left lower leg: Edema present.      Comments: 2+ doughy edema bilaterally   Skin:     General: Skin is warm and dry.   Neurological:      General: No focal deficit present.      Mental Status: She is alert and oriented to person, place, and time. Mental status is at baseline.   Psychiatric:         Mood and Affect: Mood normal.         Behavior: Behavior normal.         Thought Content: Thought content normal.         Judgment: Judgment normal.           Data   Data reviewed today: I reviewed all medications, new labs and imaging results over the last 24 hours. I personally reviewed the EKG tracing showing NSR rate 84, no acute ST/T changes.    Recent Labs   Lab 12/01/22  1303 11/28/22  0803 11/25/22  0555   WBC 8.9 6.4 4.8   HGB 10.1* 10.1* 9.7*   * 108* 106*    455* 394    140 141   POTASSIUM 4.8 4.8 4.0   CHLORIDE 103 101 103   CO2 30* 28 27   BUN 22.6 15.7 18.6   CR 1.09* 0.94 1.00*   ANIONGAP 8 11 11   SUNI 9.9 10.0 9.4   * 85 79     Recent Results (from the past 24 hour(s))   XR Chest 2 Views    Narrative    EXAM: XR CHEST 2 VIEWS  LOCATION: [facility]  DATE/TIME: 12/1/2022 2:41 PM    INDICATION: Short of breath  COMPARISON: 09/13/2022      Impression    IMPRESSION: Patchy right basilar linear opacities likely representing atelectasis. Lungs are otherwise clear. No effusions. Heart size is at the upper limits of normal. No pulmonary edema. No acute osseous findings.

## 2022-12-01 NOTE — TELEPHONE ENCOUNTER
Notified by nursing this morning that patient woke in the middle of the night with chest pressure, has persisted overnight and into this morning. I briefly visited with patient, confirms the pressure woke her from sleep. Denies pain, admits it feels as if someone is sitting on her chest. No sob. BLE edema has worsened over the past few days, she also feels extremely fatigued with low energy levels. Heart tones are irregularly irregular, no murmur appreciated. Lungs clear. Vitals reviewed, she is hypertensive with normal sats. Pain improved, nearly resolved with SL nitroglycerin x1.     Clinical concern for possible ACS, discussed my concerns with patient. Unable to obtain workup in expedited manner at TCU, recommend ED visit to rule-out. Pt in agreement, prefers [facility] as that is where her cardiologist practices (follows for hx afib). Notified staff, pt will transport to ED for cardiac eval. May return if cleared.    Jorge Luis Henry PA-C  12/1/2022, 12:11 PM

## 2022-12-02 NOTE — PROGRESS NOTES
[facility]    Medicine Progress Note - Hospitalist Service    Date of Admission:  12/1/2022    Assessment & Plan   Fatuma Henry is a 77 year old female admitted on 12/1/2022. She has nocturnal dependent oxygen COPD, HTN, CKD3, paroxysmal Afib, HTN, pulmonary HTN and recent cellulitis with infected gouty arthritis and C diff who presented from TCU with chest pain and progressive SOB.      Volume overload with LE edema- improving  Pulmonary HTN  Elevated BNP and mild elevated troponin  Chest pain- resolved  -noted 3 days of progressive SOB and new LE edema  -pain resolved with nitroglycerin---monitor, may need to consider stress testing  -IV lasix  -trend BNP, troponin  -ECHO 12/2: EF 60 to 65%, mild AS, mild AI, mild RV enlargement with normal function, RVSP estimated at 50  -CXR with no obvious pulmonary edema  - Interesting the timing of her increased shortness of breath and increased lower extremity edema occurred around the time of a transfusion for anemia on 11/27/2022    Hemoptysis  -recent per patient  -known pulmonary nodule--see below  -monitor and treat suspected volume overload  -may need Pulmonary consultation  -is on anticoagulation for paroxysmal Afib, may need to hold if continues significant    Acute hypoxic respiratory failure  Nocturnal oxygen dependent COPD baseline 2L NC at HS  -oxygen titration  -not hypoxic but quite dyspneic with RR>40s with any activity  -lasix and trend response  -may need CT/PE eval but on anticoagulation for afib  -CXR with infiltrate vs atelectasis, no clear infection and no pulmonary edema    Recent C diff infection  -reports stools formed at this time and no incontinence and completed treatment    Known pulmonary nodule  -being monitored by Pulmonary with plan for repeat CT March 2023 with possible biopsy pending change    CKD3  -avoid nephrotoxins  -monitor closely with diuresis  -trend Cr    Rheumatoid arthritis  -home  methotrexate    Paroxysmal Afib  -EKG NSR rate 86 with no acute ST/T changes when compared with 11/7/22  -continue home rate/anticoagulation meds    Anemia  - Recently transfused on 11/27/2022 with at that time a plan for outpatient hematology consultation  - Hemoglobin currently 10.1 and will follow    Respiratory illness screening 12/1/22--negative for COVID, Influenza A/B and RSV.       Diet: 2 Gram Sodium Diet Other - please comment    DVT Prophylaxis: DOAC  Brandon Catheter: Not present  Central Lines: None  Cardiac Monitoring: ACTIVE order. Indication: Acute decompensated heart failure (48 hours)  Code Status: No CPR- Do NOT Intubate      Disposition Plan     Expected Discharge Date: 12/03/2022    Discharge Delays: IV Medication - consider oral or Home Infusion  Echo Result Pending (enter specific test & time in comments)            The patient's care was discussed with the Patient and Patient's Family.    Mary Pascal MD  Hospitalist Service  [facility]  Securely message with the [employer]oc[employer] Web Console (learn more here)  Text page via [email or web address] Paging/Directory         Clinically Significant Risk Factors Present on Admission            # Hypomagnesemia: Lowest Mg = 1.5 mg/dL (Ref range: 1.7-2.3) in last 2 days, will replace as needed    # Drug Induced Coagulation Defect: home medication list includes an anticoagulant medication                ______________________________________________________________________    Interval History   Ms. Henry is doing better this morning.  She is not having chest pain any longer.  She is on a daily inhaler at home but is having to use her nebulizer multiple times a day.  She also has an albuterol inhaler at home but does not use it very often.  I am concerned about the frequency of her nebulizer use.  She states she does not remember having PFTs done in the past but follows pulmonology outpatient.  Her granddaughter who is a nurse was at bedside today.   She does have a known pulmonary nodule that has been followed by serial CT scans.  She is also having some hemoptysis.  She is due for a follow-up CT for the pulmonary nodule in March.  If hemoptysis continues will consider pulmonology consult in house.  Otherwise will recommend close follow-up with pulmonary outpatient.  She sees Dr. Rosario at the [facility] here in [place].    Data reviewed today: I reviewed all medications, new labs and imaging results over the last 24 hours. I personally reviewed no images or EKG's today.    Physical Exam   Vital Signs: Temp: 98  F (36.7  C) Temp src: Oral BP: 132/61 Pulse: 93   Resp: 22 SpO2: 100 % O2 Device: Nasal cannula Oxygen Delivery: 2 LPM  Weight: 0 lbs 0 oz  General Appearance: Awake, alert, in no acute distress  Respiratory: CTAB, no wheeze  Cardiovascular: RRR, no murmur noted, 1+ edema in the bilateral lower extremities mostly on the posterior aspect  GI: soft, nontender, non distended, normal bowel sounds  Skin: no jaundice, no rash        Data   Recent Labs   Lab 22  0745 22  0129 22  1303 22  0803   WBC 11.0  --  8.9 6.4   HGB 11.4*  --  10.1* 10.1*   *  --  107* 108*     --  445 455*     --  141 140   POTASSIUM 4.3 4.4 4.8 4.8   CHLORIDE 96*  --  103 101   CO2 27  --  30* 28   BUN 24.4*  --  22.6 15.7   CR 1.19*  --  1.09* 0.94   ANIONGAP 14  --  8 11   SUNI 10.3*  --  9.9 10.0   GLC 92  --  100* 85     Recent Results (from the past 24 hour(s))   Echocardiogram Complete    Narrative    [id number]  [id number]  [id number]  [id number]^ARTUR^BRADFORD^L     [place] [id number]     Name: HEATHER DOYLE  MRN: [id number]  : 1945  Study Date: 2022 09:51 AM  Age: 77 yrs  Gender: Female  Patient Location: [facility]  Reason For Study: Chest Discomfort  Ordering Physician: BRADFORD SIDDIQUI  Referring Physician: BRADFORD SIDDIQUI  Performed By: LOI     BSA: 1.8  m2  Height: 66 in  Weight: 152 lb  HR: 86  ______________________________________________________________________________  Procedure  Complete Echo Adult.  ______________________________________________________________________________  Interpretation Summary     1. Normal left ventricular size and systolic performance with a visually  estimated ejection fraction of 60-65%.  2. There is mild aortic stenosis.  3. There is mild aortic insufficiency.  4. Mild right ventricular enlargement with normal right ventricular systolic  performance.  5. There is moderate left atrial enlargement. There is mild right atrial  enlargement.  6. Right ventricular systolic pressure relative to right atrial pressure is  moderately increased. The pulmonary artery pressure is estimated to be 50 mmHg  plus right atrial pressure (the IVC is of normal caliber).     When compared to the prior real-time echocardiogram dated the degree of aortic  insufficiency appears slightly less on the current study. The findings are  otherwise felt to be fairly similar on both examinations.  ______________________________________________________________________________  Left ventricle:  Normal left ventricular size and systolic performance with a visually  estimated ejection fraction of 60-65%. There is normal regional wall motion.  Left ventricular wall thickness is normal.     Assessment of LV Diastolic Function: The evaluation of diastolic filling is  hampered by the presence of significant mitral annular calcification.     Right ventricle:  Mild right ventricular enlargement with normal right ventricular systolic  performance.     Left atrium:  There is moderate left atrial enlargement.     Right atrium:  There is mild right atrial enlargement.     IVC:  The IVC is of normal caliber.     Aortic valve:  The aortic valve is comprised of three cusps. There is mild aortic stenosis.  There is mild aortic insufficiency.     Mitral valve:  There is  moderate-severe mitral annular calcification.There is trace mitral  insufficiency.     Tricuspid valve:  The tricuspid valve is grossly morphologically normal. There is trace-mild  tricuspid insufficiency.     Pulmonic valve:  The pulmonic valve is grossly morphologically normal.     Thoracic aorta:  The aortic root and proximal ascending aorta are of normal dimension.     Pericardium:  There is no significant pericardial effusion.  ______________________________________________________________________________  ______________________________________________________________________________  MMode/2D Measurements & Calculations  IVSd: 0.89 cm  LVIDd: 4.6 cm  LVIDs: 3.4 cm  LVPWd: 0.83 cm  FS: 26.5 %  LV mass(C)d: 130.5 grams  LV mass(C)dI: 73.3 grams/m2  Ao root diam: 3.0 cm  LA dimension: 4.6 cm  asc Aorta Diam: 2.8 cm  LA/Ao: 1.5  LVOT diam: 2.0 cm  LVOT area: 3.1 cm2  LA Volume Indexed (AL/bp): 41.2 ml/m2     RWT: 0.36     Time Measurements  MM HR: 86.0 BPM     Doppler Measurements & Calculations  MV E max chente: 69.5 cm/sec  MV A max chente: 108.0 cm/sec  MV E/A: 0.64  MV max P.6 mmHg  MV mean PG: 3.0 mmHg  MV V2 VTI: 26.0 cm  MVA(VTI): 2.4 cm2  MV dec slope: 316.0 cm/sec2  MV dec time: 0.22 sec  Ao V2 max: 219.5 cm/sec  Ao max P.0 mmHg  Ao V2 mean: 147.0 cm/sec  Ao mean PG: 10.0 mmHg  Ao V2 VTI: 40.8 cm  NA(I,D): 1.5 cm2  NA(V,D): 1.4 cm2  LV V1 max PG: 3.6 mmHg  LV V1 max: 95.0 cm/sec  LV V1 VTI: 19.9 cm  SV(LVOT): 62.5 ml  SI(LVOT): 35.1 ml/m2  PA acc time: 0.10 sec  TR max chente: 360.0 cm/sec  TR max P.8 mmHg  AV Chente Ratio (DI): 0.43  NA Index (cm2/m2): 0.86     E/E' avg: 10.8  Lateral E/e': 9.5  Medial E/e': 12.0     ______________________________________________________________________________  Report approved by: Derrick Singh 2022 11:18 AM           Medications     - MEDICATION INSTRUCTIONS -         allopurinol  300 mg Oral Daily     colchicine  0.6 mg Oral Daily     DULoxetine   20 mg Oral Daily     fluticasone-vilanterol  1 puff Inhalation Daily     folic acid  1 mg Oral Daily     furosemide  40 mg Intravenous Q12H     magnesium oxide  400 mg Oral BID     [START ON 12/4/2022] methotrexate sodium  15 mg Oral Weekly     metoprolol succinate ER  75 mg Oral Daily     pantoprazole  40 mg Oral Daily     perflutren lipid microsphere  3 mL Intravenous Once     pravastatin  20 mg Oral QPM     predniSONE  5 mg Oral Daily     rivaroxaban ANTICOAGULANT  15 mg Oral Daily with supper     Vitamin D3  125 mcg Oral Daily

## 2022-12-02 NOTE — ED NOTES
ER NURSING BOARDING NOTE  Fatuma Henry MRN: [id number]      Admitting dx:   (I50.9) Acute on chronic congestive heart failure, unspecified heart failure type (H)  Comment: elevated BNP.  Received a total of 60mg of IV lasix in ED, at this time.  Pt is making urine.  Breathing has improved.      (R07.9) Chest pain, unspecified type       Current length of ER stay: ~5.5 hours    Back story: ER visit on 12/1/2022 for c/o CP that started last night.  Baseline SOB, 2-3L nocturnal O2 via NC for hx of COPD.      Medical Hx:   Past Medical History:   Diagnosis Date     Atrial fibrillation (H)      CKD (chronic kidney disease) stage 3, GFR 30-59 ml/min (H)      COPD (chronic obstructive pulmonary disease) (H)     nocturnal oxygen     CRF (chronic renal failure)      GERD (gastroesophageal reflux disease)      Hypertension      Hypovolemic shock (H)      Influenza A 12/01/2017     Pulmonary hypertension (H)      Pulmonary nodules      Rheumatoid arthritis (H)      Sepsis (H) 12/24/2017        Current status:    Neuro: alert and oriented  Cardiac: on cardiac monitor.  NSR.  No current chest pain.    Respiratory: purse lip breathing noted.  Eventually took oxygen off to RA on her own after lasix helped produce urine, breathing became easier.  Satting in 90s.    COVID swab: negative  Digestive: is hungry, dinner ordered  Urinary: purewick in place  Mobility: has not been out of bed at this time  Skin: no active skin impairments  IV Access site: 18g to posterior right forearm  Psych: calm/cooperative  Current living status: lives at home with   Visitor at bedside: none at this time  Preferred language: english  VS obtained: BP (!) 148/67   Pulse 88   Temp 98.3  F (36.8  C) (Oral)   Resp 22   SpO2 95% .  Plan: Admitting to: CARDs  Consults: none ordered at this time

## 2022-12-02 NOTE — PROGRESS NOTES
Met with patient  to review role of care management, progression of care and possible need for services at discharge, including OP services, home care, or skilled nursing care. Patient alert, oriented and engaged in the conversation.     Assessed, comes from [facility] and would like to return there. Pt lives w/spouse and family may want to transport at discharge. CM to follow for needs. Pt states bedhold is for 24 hours and may not hold bed after that.

## 2022-12-02 NOTE — ED NOTES
Paged Dr. Wilkes regarding pt crying in pain 10/10 leg cramps. Pt has voided almost over 3000cc since 1900 yesterday evening.

## 2022-12-02 NOTE — PROGRESS NOTES
12/02/22 0730   Appointment Info   Signing Clinician's Name / Credentials (OT) Yoli Guardado, OTR/L   Living Environment   People in Home spouse   Current Living Arrangements condominium   Home Accessibility no concerns   Living Environment Comments 3L O2 PM   Self-Care   Equipment Currently Used at Home shower chair;walker, rolling;raised toilet seat;grab bar, toilet;grab bar, tub/shower   Activity/Exercise/Self-Care Comment prior to september, ind for all BADLs   Instrumental Activities of Daily Living (IADL)   IADL Comments prior to september, ind for all IADLs, completed majority of housework d/t spouse needing 24/7 O2   General Information   Onset of Illness/Injury or Date of Surgery 12/01/22   Referring Physician Shannan Bronson MD   Patient/Family Therapy Goal Statement (OT) return to TCU   Additional Occupational Profile Info/Pertinent History of Current Problem admitted with SOB and chest pain, acute on chronic CHF; recent c-diff and frequent falls, multiple hospitalizations in last 2 months, from TCU   Existing Precautions/Restrictions fall;cardiac;oxygen therapy device and L/min   General Observations and Info SOB on 2L upon arrival, increased to 3L and RN notified   Cognitive Status Examination   Orientation Status orientation to person, place and time   Affect/Mental Status (Cognitive) WFL   Follows Commands WFL   Pain Assessment   Patient Currently in Pain Yes, see Vital Sign flowsheet   Range of Motion Comprehensive   General Range of Motion bilateral upper extremity ROM WFL   Strength Comprehensive (MMT)   Comment, General Manual Muscle Testing (MMT) Assessment weakness BUE   Bed Mobility   Bed Mobility supine-sit;sit-supine   Comment (Bed Mobility) SBA, bed rails   Transfers   Transfer Comments NT d/t symptomatic orthostatic hypostension with sitting. 82/58 sitting EOB, 134/63 semi-hobson   Balance   Balance Comments sup EOB sitting balance   Clinical Impression   Criteria for Skilled  Therapeutic Interventions Met (OT) Yes, treatment indicated   OT Diagnosis dec ADL indep and health management   OT Problem List-Impairments impacting ADL problems related to;activity tolerance impaired;balance;mobility;strength   Assessment of Occupational Performance 3-5 Performance Deficits   Identified Performance Deficits dressing, toileting, grooming, bathing, household mobility   Planned Therapy Interventions (OT) ADL retraining;IADL retraining;strengthening;transfer training;home program guidelines;progressive activity/exercise;risk factor education   Clinical Decision Making Complexity (OT) moderate complexity   Risk & Benefits of therapy have been explained evaluation/treatment results reviewed;care plan/treatment goals reviewed;risks/benefits reviewed;current/potential barriers reviewed;participants included;patient   OT Total Evaluation Time   OT Eval, Moderate Complexity Minutes (41081) 20   OT Goals   Therapy Frequency (OT) Daily   OT Predicted Duration/Target Date for Goal Attainment 12/09/22   OT Goals Hygiene/Grooming;Lower Body Dressing;Toilet Transfer/Toileting;Cardiac Phase 1   OT: Hygiene/Grooming supervision/stand-by assist;while standing   OT: Lower Body Dressing Supervision/stand-by assist   OT: Bed Mobility Completed   OT: Toilet Transfer/Toileting Supervision/stand-by assist;toilet transfer;cleaning and garment management   OT: Understanding of cardiac education to maximize quality of life, condition management, and health outcomes Patient;Verbalize;Demonstrate   OT: Perform aerobic activity with stable cardiovascular response intermittent;10 minutes;ambulation   OT: Functional/aerobic ambulation tolerance with stable cardiovascular response in order to return to home and community environment Supervision/SBA;Rolling walker;100 feet   OT Discharge Planning   OT Plan initiate mobility - orthostatic at eval, request PT eval pending mobility?? act sudarshan during ADLs   OT Discharge Recommendation  (DC Rec) Transitional Care Facility   OT Rationale for DC Rec unable to ambulate today d/t orthostatic hypotension. will continue to assess but currently recommend pt return to TCU d/t being unable to ambulate.   OT Brief overview of current status orthostatic hypotension sitting EOB, provided CHF educ   Total Session Time   Total Session Time (sum of timed and untimed services) 20

## 2022-12-02 NOTE — CONSULTS
CLINICAL NUTRITION SERVICES NOTE    Received consult to provide low sodium diet education.  Pt declines education.  Her  follows a low sodium diet.  She shops and cooks.  She has written information at home.  They do no add salt -lots of pepper. She buys healthy choice microwave meals for him with sodium between 500-600 mg per meal. She reads labels.  Pt is not currently on a fluid restriction.

## 2022-12-03 NOTE — CONSULTS
Care Management Follow Up    Length of Stay (days): 2    Expected Discharge Date: 12/05/2022     Concerns to be Addressed: Discharge Planning       Patient plan of care discussed at interdisciplinary rounds: Yes    Anticipated Discharge Disposition:  TCU  Anticipated Discharge Services: TBD  Anticipated Discharge DME: TBD    Patient/family educated on Medicare website which has current facility and service quality ratings: Yes  Education Provided on the Discharge Plan: Per Team      Patient/Family in Agreement with the Plan: Yes    Referrals Placed by CM/SW: [email or web address]  Private pay costs discussed: Not applicable    Additional Information:  RADHA contacted [facility] to inquire on status of bed hold. Staff returned call stating that Pt released the bed. However, in the case that Pt is ready to discharge tomorrow 12/4, their TCU could accept Pt again. CM will continue to follow and assist with discharge planning. Transport TBD.    MATEO Gallegos

## 2022-12-03 NOTE — PROGRESS NOTES
[facility]    Medicine Progress Note - Hospitalist Service    Date of Admission:  12/1/2022    Assessment & Plan   Fatuma Henry is a 77 year old female admitted on 12/1/2022. She has nocturnal dependent oxygen COPD, HTN, CKD3, paroxysmal Afib, HTN, pulmonary HTN and recent cellulitis with infected gouty arthritis and C diff who presented from TCU with chest pain and progressive SOB.      Volume overload with LE edema likely related to recent blood transfusion  Pulmonary HTN  Elevated BNP and mild elevated troponin  Chest pain- resolved  -noted 3 days of progressive SOB and new LE edema  -Interesting the timing of her increased shortness of breath and increased lower extremity edema occurred around the time of a transfusion for anemia on 11/27/2022  -chest pain resolved with nitroglycerin-monitoring  -IV lasix with parameters stopped  -ECHO 12/2: EF 60 to 65%, mild AS, mild AI, mild RV enlargement with normal function, RVSP estimated at 50  -CXR with no obvious pulmonary edema    Hemoptysis  -recent per patient  -known pulmonary nodule--see below  -may need Pulmonary consultation  -is on anticoagulation for paroxysmal Afib, may need to hold if continues significant    Known pulmonary nodules  -being monitored by Pulmonary, Dr. Rosario with plan for repeat CT March 2023 with possible biopsy pending change  -Most recent CT of the nodules from 7/21/2022 showed 1. 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  A 10 mm.  Groundglass nodule in the lingula and several diminutive left upper lobe nodules that were unchanged    Acute hypoxic respiratory failure  Nocturnal oxygen dependent COPD baseline 2L NC at HS  -oxygen titration  -not hypoxic but quite dyspneic with RR>40s with any activity  -lasix and trend response  -may need CT/PE eval but on anticoagulation for  "afib  -CXR with infiltrate vs atelectasis, no clear infection and no pulmonary edema    Recent C diff infection with pancolitis-   -reports stools formed at this time and no incontinence and completed treatment    CKD3  -avoid nephrotoxins  -monitor closely with diuresis  -trend Cr    Rheumatoid arthritis  -home methotrexate    Paroxysmal Afib  -EKG NSR rate 86 with no acute ST/T changes when compared with 11/7/22  -continue home rate/anticoagulation meds    Anemia  - Recently transfused on 11/27/2022 with at that time a plan for outpatient hematology consultation  - Hemoglobin currently 10.1 and will follow    Hypoalbuminemia  -Albumin 11/8:2.4  -May have contributed to volume overload associated with transfusion     Diet: 2 Gram Sodium Diet Other - please comment    DVT Prophylaxis: DOAC  Brandon Catheter: Not present  Central Lines: None  Cardiac Monitoring: ACTIVE order. Indication: Acute decompensated heart failure (48 hours)  Code Status: No CPR- Do NOT Intubate      Disposition Plan     Expected Discharge Date: 12/03/2022    Discharge Delays: IV Medication - consider oral or Home Infusion  Echo Result Pending (enter specific test & time in comments)            The patient's care was discussed with the Patient and Patient's Family.    Mary Pascal MD  Hospitalist Service  [facility]  Securely message with the Vocera Web Console (learn more here)  Text page via [employer] Paging/Directory         Clinically Significant Risk Factors           # Hypercalcemia: Highest Ca = 10.3 mg/dL (Ref range: 8.5 - 10.1 mg/dL) in last 2 days, will monitor as appropriate  # Hypomagnesemia: Lowest Mg = 1.5 mg/dL (Ref range: 1.7-2.3) in last 2 days, will replace as needed            # Overweight: Estimated body mass index is 25.79 kg/m  as calculated from the following:    Height as of this encounter: 1.651 m (5' 5\").    Weight as of this encounter: 70.3 kg (155 lb)., PRESENT ON ADMISSION     "     ______________________________________________________________________    Interval History   Mrs. Henry is doing well today. She has no complaints. She denies chest pain or tightness. I spoke with her while her daughter was at bedside. We discussed her TCU and possible discharge tomorrow.     Data reviewed today: I reviewed all medications, new labs and imaging results over the last 24 hours. I personally reviewed no images or EKG's today.    Physical Exam   Vital Signs: Temp: 98.6  F (37  C) Temp src: Oral BP: 99/55 Pulse: 89   Resp: 16 SpO2: 100 % O2 Device: Nasal cannula Oxygen Delivery: 2 LPM  Weight: 155 lbs 0 oz  General Appearance: Awake, alert, in no acute distress  Respiratory: CTAB, no wheeze  Cardiovascular: RRR, no murmur noted, trace edema  GI: soft, nontender, non distended, normal bowel sounds  Skin: no jaundice, no rash      Data   Recent Labs   Lab 12/03/22  0523 12/02/22  0745 12/02/22  0129 12/01/22  1303 11/28/22  0803   WBC  --  11.0  --  8.9 6.4   HGB  --  11.4*  --  10.1* 10.1*   MCV  --  106*  --  107* 108*   PLT  --  440  --  445 455*    137  --  141 140   POTASSIUM 3.9  4.0 4.3 4.4 4.8 4.8   CHLORIDE 98 96*  --  103 101   CO2 28 27  --  30* 28   BUN 33.2* 24.4*  --  22.6 15.7   CR 1.56* 1.19*  --  1.09* 0.94   ANIONGAP 14 14  --  8 11   SUNI 10.1 10.3*  --  9.9 10.0   GLC 84 92  --  100* 85     Medications     - MEDICATION INSTRUCTIONS -         allopurinol  300 mg Oral Daily     colchicine  0.6 mg Oral Daily     DULoxetine  20 mg Oral Daily     fluticasone-vilanterol  1 puff Inhalation Daily     folic acid  1 mg Oral Daily     magnesium oxide  400 mg Oral BID     [START ON 12/4/2022] methotrexate sodium  15 mg Oral Weekly     metoprolol succinate ER  75 mg Oral Daily     pantoprazole  40 mg Oral Daily     perflutren lipid microsphere  3 mL Intravenous Once     pravastatin  20 mg Oral QPM     predniSONE  5 mg Oral Daily     rivaroxaban ANTICOAGULANT  15 mg Oral Daily with supper      Vitamin D3  125 mcg Oral Daily

## 2022-12-03 NOTE — PLAN OF CARE
Pt alertx4, lung sounds coarse, SpO2 92%+ on 2 L nasal cannula. Frequent loose, cough. Pt continues to have dizziness, light-headedness with ambulation. Did NOT give scheduled po metoprolol succinate ER 75 mg in morning r/t BP 92/49 and not within parameters (not to give is SBP < 110). Bilateral legs and feet +2 edema.     Protocols:  K+: 4.0, am recheck, 12/04.  Magnesium: 2.3, am recheck, 12/04.     Telemetry reads Normal Sinus Rhythm with frequent PACs.      Problem: COPD (Chronic Obstructive Pulmonary Disease) Comorbidity  Goal: Maintenance of COPD Symptom Control  Outcome: Progressing  Intervention: Maintain COPD-Symptom Control  Recent Flowsheet Documentation  Taken 12/3/2022 0800 by Татьяна Waite RN  Medication Review/Management: medications reviewed     Problem: Osteoarthritis Comorbidity  Goal: Maintenance of Osteoarthritis Symptom Control  Outcome: Progressing  Intervention: Maintain Osteoarthritis Symptom Control  Recent Flowsheet Documentation  Taken 12/3/2022 0859 by Татьяна Wiate RN  Assistive Device Utilized: walker  Activity Management: activity adjusted per tolerance  Taken 12/3/2022 0800 by Татьяна Waite RN  Activity Management:   activity adjusted per tolerance   activity encouraged  Medication Review/Management: medications reviewed     Problem: Pain Acute  Goal: Optimal Pain Control and Function  Outcome: Progressing  Intervention: Prevent or Manage Pain  Recent Flowsheet Documentation  Taken 12/3/2022 0800 by Татьяна Waite RN  Medication Review/Management: medications reviewed     Problem: Fluid Volume Excess  Goal: Fluid Balance  Outcome: Progressing

## 2022-12-03 NOTE — PLAN OF CARE
Goal Outcome Evaluation:         Problem: Risk for Delirium  Goal: Optimal Coping  Outcome: Progressing     Problem: COPD (Chronic Obstructive Pulmonary Disease) Comorbidity  Goal: Maintenance of COPD Symptom Control  Outcome: Progressing  Intervention: Maintain COPD-Symptom Control  Recent Flowsheet Documentation  Taken 12/3/2022 0315 by Kelli Lea, RN  Medication Review/Management: medications reviewed       Problem: Fluid Volume Excess  Goal: Fluid Balance  Outcome: Progressing       Pt alert and oriented, SBP in the 90s, on 2L NC, scheduled iv lasix, c/o of intermittent leg cramp, denies CP, mikel LE +2 edema, fall precaution in place, call light within reach, calls out appropriately.

## 2022-12-03 NOTE — PLAN OF CARE
Pt arrive on P3 unit at 1600. Alertx4. Lung sounds coarse, crackles. Frequent good, productive cough. SpO2 100% on 3 L nasal cannula. Pt sob with exertion. Pt continues to have Positive orthostatic blood pressure. Pt has +3 edema on bilateral legs, +2 on bilateral feet.    Protocols:  K+: 4.3, am recheck, 12/03.  Magnesium: 2.7, am recheck, 12/03.    Telemetry reads Normal Sinus Rhythm.      Problem: COPD (Chronic Obstructive Pulmonary Disease) Comorbidity  Goal: Maintenance of COPD Symptom Control  Outcome: Progressing     Problem: Osteoarthritis Comorbidity  Goal: Maintenance of Osteoarthritis Symptom Control  Outcome: Progressing     Problem: Pain Acute  Goal: Optimal Pain Control and Function  Outcome: Progressing

## 2022-12-04 PROBLEM — R07.9 CHEST PAIN, UNSPECIFIED TYPE: Status: RESOLVED | Noted: 2022-01-01 | Resolved: 2022-01-01

## 2022-12-04 PROBLEM — I50.9 ACUTE ON CHRONIC CONGESTIVE HEART FAILURE, UNSPECIFIED HEART FAILURE TYPE (H): Status: RESOLVED | Noted: 2022-01-01 | Resolved: 2022-01-01

## 2022-12-04 NOTE — PLAN OF CARE
Occupational Therapy Discharge Summary    Reason for therapy discharge:    Discharged to transitional care facility.    Progress towards therapy goal(s). See goals on Care Plan in [facility] electronic health record for goal details.  Goals partially met.  Barriers to achieving goals:   limited tolerance for therapy.    Therapy recommendation(s):    Continued therapy is recommended.  Rationale/Recommendations:  d/c to TCU OT recommending continued therapy to improve overall ADL ind. .    Goal Outcome Evaluation:

## 2022-12-04 NOTE — PLAN OF CARE
Pt alertx4, lung sounds coarse, SpO2 92%+ on 2 L nasal cannula. Frequent loose, wet cough. Pt continues to have dizziness, light-headedness with ambulation, instructed to sit up and dangle feet, stand up in place and wait before ambulating to do anything. Pt calls for assistance appropriately.  Bilateral legs and feet +1/+2 edema. Spoke/discussed with Dr. Pascal about pt orthostatic bp issues, she placed Cardiology follow up to discuss BP medications and orthostatic issues. Called Enid (daughter) discussed this with her and the importance of a cardiology appt and follow up, pt hasn't seen cardiologist since early 2021 for afib not related to orthostatic bp's. Pt also states she doesn't wear O2 with ambulation and needs to be wearing O2 most if not the entire day to maintain SpO2 90% to 94%. Pt has home O2. Discussed O2, medications and appointments with pt and daughter Enid. Ppt to discharge to TCU at 1400, ride provided by daughterEnid (caretaker)     Telemetry reads Sinus Tachycardia/Normal Sinus Rhythm with frequent PACs.  ( to 105, mainly 70 to 90's).         Problem: COPD (Chronic Obstructive Pulmonary Disease) Comorbidity  Goal: Maintenance of COPD Symptom Control  Outcome: Progressing  Intervention: Maintain COPD-Symptom Control  Recent Flowsheet Documentation  Taken 12/4/2022 1200 by Татьяна Waite RN  Medication Review/Management: medications reviewed  Taken 12/4/2022 0800 by Татьяна Waite RN  Medication Review/Management: medications reviewed     Problem: Osteoarthritis Comorbidity  Goal: Maintenance of Osteoarthritis Symptom Control  Outcome: Progressing  Intervention: Maintain Osteoarthritis Symptom Control  Recent Flowsheet Documentation  Taken 12/4/2022 1200 by Татьяна Waite, RN  Assistive Device Utilized: walker  Activity Management: activity adjusted per tolerance  Medication Review/Management: medications reviewed  Taken 12/4/2022 0800 by Татьяна Waite RN  Assistive Device  Utilized: walker  Activity Management: activity adjusted per tolerance  Medication Review/Management: medications reviewed     Problem: Pain Acute  Goal: Optimal Pain Control and Function  Outcome: Progressing  Intervention: Prevent or Manage Pain  Recent Flowsheet Documentation  Taken 12/4/2022 1200 by Татьяна Waite, RN  Medication Review/Management: medications reviewed  Taken 12/4/2022 0800 by Татьяна Waite, RN  Medication Review/Management: medications reviewed

## 2022-12-04 NOTE — PROGRESS NOTES
Care Management Discharge Note    Discharge Date: 12/04/2022    Discharge Disposition: TCU    Discharge Services: N/A    Discharge DME: N/A    Discharge Transportation: Family to transport    Private pay costs discussed: Not applicable    PAS Confirmation Code: Previously obtained, re-admit to TCU  Patient/family educated on Medicare website which has current facility and service quality ratings: Yes     Education Provided on the Discharge Plan: Yes      Persons Notified of Discharge Plans: Patient, Family, TCU  Patient/Family in Agreement with the Plan: Yes    Handoff Referral Completed: Yes    Additional Information:  [facility]. Confirmed with daughter Enid that she will transport Pt to TCU and will pick her up this afternoon at 2:00. Enid has portable oxygen equipment that Pt can use on the ride. Confirmed arrival time with TCU and notified staff. No other CM needs at this time.    SISSY GallegosSW

## 2022-12-04 NOTE — PLAN OF CARE
Problem: COPD (Chronic Obstructive Pulmonary Disease) Comorbidity  Goal: Maintenance of COPD Symptom Control  Outcome: Progressing    Problem: Pain Acute  Goal: Optimal Pain Control and Function  Outcome: Progressing     Problem: Fluid Volume Excess  Goal: Fluid Balance  Outcome: Progressing     Problem: COPD (Chronic Obstructive Pulmonary Disease) Comorbidity  Goal: Maintenance of COPD Symptom Control    Problem: Fluid Volume Excess  Goal: Fluid Balance  Outcome: Progressing    Goal Outcome Evaluation:Patient on 2 L NC O2 at start of evening shift; this increased to 2.5 L NC while eating as O2 sat dropped to 82-84% on 2 L while eating. Patient reportedly on 2-3 L NC O2 at home. Did not c/o dyspnea while eating.  Patient with decreased lung sounds throughout; has frequent, coarse, congested nonproductive cough. Denies pain. Output > intake thus far today. Fall precautions maintained; bed alarm on and patient instructed to call before getting OOB.

## 2022-12-04 NOTE — PLAN OF CARE
"  Problem: Plan of Care - These are the overarching goals to be used throughout the patient stay.    Goal: Plan of Care Review  Description: The Plan of Care Review/Shift note should be completed every shift.  The Outcome Evaluation is a brief statement about your assessment that the patient is improving, declining, or no change.  This information will be displayed automatically on your shift note.  Outcome: Met  Goal: Patient-Specific Goal (Individualized)  Description: You can add care plan individualizations to a care plan. Examples of Individualization might be:  \"Parent requests to be called daily at 9am for status\", \"I have a hard time hearing out of my right ear\", or \"Do not touch me to wake me up as it startles me\".  Outcome: Met  Goal: Absence of Hospital-Acquired Illness or Injury  Outcome: Met  Intervention: Identify and Manage Fall Risk  Recent Flowsheet Documentation  Taken 12/4/2022 1200 by Татьяна Waite RN  Safety Promotion/Fall Prevention:   activity supervised   bed alarm on   fall prevention program maintained   clutter free environment maintained   safety round/check completed   patient and family education  Taken 12/4/2022 0800 by Татьяна Waite RN  Safety Promotion/Fall Prevention:   activity supervised   bed alarm on   fall prevention program maintained   clutter free environment maintained   safety round/check completed   patient and family education  Intervention: Prevent Skin Injury  Recent Flowsheet Documentation  Taken 12/4/2022 1200 by Татьяна Waite RN  Body Position:   position changed independently   weight shifting  Taken 12/4/2022 0800 by Татьяна Waite RN  Body Position:   position changed independently   weight shifting  Intervention: Prevent and Manage VTE (Venous Thromboembolism) Risk  Recent Flowsheet Documentation  Taken 12/4/2022 1200 by Татьяна Waite RN  VTE Prevention/Management: SCDs (sequential compression devices) off  Taken 12/4/2022 0800 by Татьяна Waite, " RN  VTE Prevention/Management: SCDs (sequential compression devices) off  Goal: Optimal Comfort and Wellbeing  Outcome: Met  Intervention: Provide Person-Centered Care  Recent Flowsheet Documentation  Taken 12/4/2022 1200 by Татьяна Waite RN  Trust Relationship/Rapport:   care explained   choices provided   emotional support provided   empathic listening provided   questions answered   questions encouraged   reassurance provided   thoughts/feelings acknowledged  Taken 12/4/2022 0800 by Татьяна Waite RN  Trust Relationship/Rapport:   care explained   choices provided   emotional support provided   empathic listening provided   questions answered   questions encouraged   reassurance provided   thoughts/feelings acknowledged  Goal: Readiness for Transition of Care  Outcome: Met     Problem: Risk for Delirium  Goal: Optimal Coping  Outcome: Met  Goal: Improved Behavioral Control  Outcome: Met  Intervention: Minimize Safety Risk  Recent Flowsheet Documentation  Taken 12/4/2022 1200 by Татьяна Waite RN  Enhanced Safety Measures:   room near unit station   bed alarm set  Trust Relationship/Rapport:   care explained   choices provided   emotional support provided   empathic listening provided   questions answered   questions encouraged   reassurance provided   thoughts/feelings acknowledged  Taken 12/4/2022 0800 by Татьяна Waite RN  Enhanced Safety Measures:   room near unit station   bed alarm set  Trust Relationship/Rapport:   care explained   choices provided   emotional support provided   empathic listening provided   questions answered   questions encouraged   reassurance provided   thoughts/feelings acknowledged  Goal: Improved Attention and Thought Clarity  Outcome: Met  Goal: Improved Sleep  Outcome: Met     Problem: COPD (Chronic Obstructive Pulmonary Disease) Comorbidity  Goal: Maintenance of COPD Symptom Control  12/4/2022 1403 by Татьяна Waite RN  Outcome: Met  12/4/2022 1300 by Татьяна Waite  RN  Outcome: Progressing  Intervention: Maintain COPD-Symptom Control  Recent Flowsheet Documentation  Taken 12/4/2022 1200 by Татьяна Waite RN  Medication Review/Management: medications reviewed  Taken 12/4/2022 0800 by Татьяна Waite RN  Medication Review/Management: medications reviewed     Problem: Osteoarthritis Comorbidity  Goal: Maintenance of Osteoarthritis Symptom Control  12/4/2022 1403 by Татьяна Waite RN  Outcome: Met  12/4/2022 1300 by Татьяна Waite RN  Outcome: Progressing  Intervention: Maintain Osteoarthritis Symptom Control  Recent Flowsheet Documentation  Taken 12/4/2022 1200 by Татьяна Waite RN  Assistive Device Utilized: walker  Activity Management: activity adjusted per tolerance  Medication Review/Management: medications reviewed  Taken 12/4/2022 0800 by Татьяна Waite RN  Assistive Device Utilized: walker  Activity Management: activity adjusted per tolerance  Medication Review/Management: medications reviewed     Problem: Pain Acute  Goal: Optimal Pain Control and Function  12/4/2022 1403 by Татьяна Waite RN  Outcome: Met  12/4/2022 1300 by Татьяна Waite RN  Outcome: Progressing  Intervention: Prevent or Manage Pain  Recent Flowsheet Documentation  Taken 12/4/2022 1200 by Татьяна Waite RN  Medication Review/Management: medications reviewed  Taken 12/4/2022 0800 by Татьяна Waite RN  Medication Review/Management: medications reviewed     Problem: Fluid Volume Excess  Goal: Fluid Balance  12/4/2022 1403 by Татьяна Waite RN  Outcome: Met  12/4/2022 1300 by Татьяна Waite RN  Outcome: Progressing

## 2022-12-04 NOTE — PLAN OF CARE
Problem: COPD (Chronic Obstructive Pulmonary Disease) Comorbidity  Goal: Maintenance of COPD Symptom Control  Outcome: Progressing  Intervention: Maintain COPD-Symptom Control  Recent Flowsheet Documentation  Taken 12/4/2022 0443 by Jada Aranda RN  Medication Review/Management: medications reviewed  Taken 12/4/2022 0010 by Jada Aranda RN  Medication Review/Management: medications reviewed     Problem: Risk for Delirium  Goal: Improved Sleep  Outcome: Progressing     Problem: Pain Acute  Goal: Optimal Pain Control and Function  Outcome: Progressing  Intervention: Prevent or Manage Pain  Recent Flowsheet Documentation  Taken 12/4/2022 0443 by Jada Aranda RN  Medication Review/Management: medications reviewed  Taken 12/4/2022 0010 by Jada Aranda RN  Medication Review/Management: medications reviewed     Goal Outcome Evaluation:  VSS overnight.  Pt. Alert and oriented x4.  No complaints of pain or feeling short of breath  Pt. Needing to pause before standing but self initiated the pause and was able to stand with out issue.

## 2022-12-05 NOTE — PROGRESS NOTES
[facility] GERIATRICS    PRIMARY CARE PROVIDER AND CLINIC:  Tory Wise MD, [address] [facility] [place] [address]  Chief Complaint   Patient presents with     Hospital F/U      [place] Medical Record Number:  [id number]  Place of Service where encounter took place:  [facility] ([facility]) [[id number]]    Fatuma Henry  is a 77 year old  (1945), admitted to the above facility from  [facility]. Hospital stay 12/1/22 through 12/2/22..   HPI:  Patient is a 77-year-old female with past medical history of CKD, paroxysmal A. fib, hypertension, COPD on nocturnal oxygen, RA, Gout, and recent admission 11/7 - 11/10, 2022 with acute C.diff infection who was admitted at [facility] from 11/17/2022 - 11/21/2022 after presenting with increased falls and generalized weakness. She had findings of dehydation with YESICA, electrolyte abnormalities, and soft blood pressures. She was also identified to have progressively worsening macrocytic anemia with a decrease in hemoglobin from 11/12--8.8 since September 2022.  Workup was concerning for bone marrow etiology and she was recommended to follow-up as an outpatient once acute medical issues resolve.  She did receive 1 unit of PRBCs for symptomatic anemia on 11/17. Symptoms improved with IV hydration and repletion of electrolytes. Blood pressure meds were adjusted, metoprolol discontinued altogether due to soft bps (PTA dose 100mg/d). Following therapy evaluations, she was referred to TCU for continued rehab and medical management.    While at TCU, she completed treatment with oral vancomycin and diarrhea improved. Her heart rates were elevated with symptoms of intermittent palpitations and metoprolol up-titrated to 75mg/d. She noted mild pedal edema for which she typically uses PRN lasix.    On 12/1, she reported waking overnight with chest pressure and sensation of an elephant sitting on her chest with worsening BLE edema, BP  170s. For this, she was referred to ED. EKG was nonischemic, troponin 28--23, BNP 3087, CXR clear. Symptoms were suspected 2/2 mild CHF exacerbation. She was admitted at [facility] from 12/1 - 12/4, 2022. She was treated with IV diuresis, TTE with preserved LVEF and unchanged from prior. Ultimately was discharged back to TCU without medication changes.    Patient is seen today in hospital follow-up. She is sitting up at edge of bed with therapy at bedside just finishing up session. She feels well, denies fatigue or lethargy. Denies any persistent chest pressure or pain, no sob. Per staff, oxygen has been intermittently turned off during daytime as sats have been persistently above 90%. Reports she had a significant amount of diuresis in hospital, ankles are without any significant swelling.     CODE STATUS/ADVANCE DIRECTIVES DISCUSSION:  No CPR- Do NOT Intubate  DNR / DNI  ALLERGIES:   Allergies   Allergen Reactions     Codeine Nausea and Vomiting     Fosamax [Alendronic Acid] Diarrhea      PAST MEDICAL HISTORY:   Past Medical History:   Diagnosis Date     Atrial fibrillation (H)      CKD (chronic kidney disease) stage 3, GFR 30-59 ml/min (H)      COPD (chronic obstructive pulmonary disease) (H)     nocturnal oxygen     CRF (chronic renal failure)      GERD (gastroesophageal reflux disease)      Hypertension      Hypovolemic shock (H)      Influenza A 12/01/2017     Pulmonary hypertension (H)      Pulmonary nodules      Rheumatoid arthritis (H)      Sepsis (H) 12/24/2017      PAST SURGICAL HISTORY:   has a past surgical history that includes Cholecystectomy; appendectomy; and Bladder Suspension.  FAMILY HISTORY: family history includes Cerebrovascular Disease in her brother and father; Diabetes Type 2  in her brother; Heart Failure in her mother; Kidney failure in her sister.  SOCIAL HISTORY:   reports that she quit smoking about 4 years ago. Her smoking use included cigarettes. She smoked an average  of .5 packs per day. She has never used smokeless tobacco. She reports that she does not drink alcohol and does not use drugs.  Patient's living condition: lives with spouse    Post Discharge Medication Reconciliation Status:   MED REC REQUIRED  Post Medication Reconciliation Status: discharge medications reconciled, continue medications without change       Current Outpatient Medications   Medication Sig     Acetaminophen (ACETAMIN PO) Take 500 mg by mouth every 4 hours as needed (pain or fever)     albuterol (PROAIR HFA/PROVENTIL HFA/VENTOLIN HFA) 108 (90 Base) MCG/ACT inhaler Inhale 2 puffs into the lungs every 4 hours as needed for shortness of breath / dyspnea or wheezing     allopurinol (ZYLOPRIM) 300 MG tablet Take 300 mg by mouth daily gout     budesonide-formoterol (SYMBICORT) 160-4.5 MCG/ACT Inhaler Inhale 2 puffs into the lungs 2 times daily for 30 days     colchicine (COLCYRS) 0.6 MG tablet Take 1 tablet (0.6 mg) by mouth daily for 60 days     compressor, for nebulizer Saran [COMPRESSOR, FOR NEBULIZER SARAN] Nebulizer treatment qid prn     DULoxetine (CYMBALTA) 20 MG capsule Take 1 capsule (20 mg) by mouth daily for 30 days     folic acid (FOLVITE) 1 MG tablet TAKE 1 TABLET BY MOUTH ONCE DAILY EXCEPT  THE  DAY  WHEN  TAKING  METHOTREXATE (Patient taking differently: Take 1 mg by mouth daily Daily)     ipratropium - albuterol 0.5 mg/2.5 mg/3 mL (DUONEB) 0.5-2.5 (3) MG/3ML neb solution Take 1 vial (3 mLs) by nebulization every 4 hours as needed for wheezing or shortness of breath / dyspnea     magnesium oxide (MAG-OX) 400 MG tablet Take 400 mg by mouth 2 times daily     methotrexate sodium 2.5 MG TABS Take 6 tablets (15 mg) by mouth once a week     metoprolol succinate ER (TOPROL XL) 25 MG 24 hr tablet Take 3 tablets (75 mg) by mouth daily for 30 days     pantoprazole (PROTONIX) 40 MG EC tablet Take 40 mg by mouth daily     pravastatin (PRAVACHOL) 20 MG tablet Take 20 mg by mouth every evening     predniSONE  "(DELTASONE) 5 MG tablet Take 1 tablet (5 mg) by mouth daily for 60 days     rivaroxaban ANTICOAGULANT (XARELTO) 15 MG TABS tablet Take 1 tablet (15 mg) by mouth daily (with dinner)     Vitamin D3 (CHOLECALCIFEROL) 125 MCG (5000 UT) tablet Take 125 mcg by mouth daily     No current facility-administered medications for this visit.       ROS:  4 point ROS including Respiratory, CV, GI and , other than that noted in the HPI,  is negative    Vitals:  /53   Pulse 83   Temp 97.1  F (36.2  C)   Resp 18   Ht 1.676 m (5' 6\")   Wt 68.4 kg (150 lb 12.8 oz)   SpO2 94%   BMI 24.34 kg/m    Exam:    GEN: well-developed, well-nourished, appears comfortable  HEENT: NCAT, EOM intact bilaterally, sclera clear, conjunctiva normal, nose with NC present in nares, mouth patent, mucous membranes dry  CHEST: lungs CTA bilaterally, no increased work of breathing, no wheeze, crackles, rhonchi  HEART: RRR, S1 & S2  ABD: soft, nontender, nondistended, no guarding or rigidity  MSK: AROM bilateral UE/LE, pedal & radial pulses 2+ bilaterally  NEURO: awake, alert, oriented to name, place, and time. CN II-XII grossly intact. Sensation grossly intact to light touch.   SKIN: warm & dry without rash, no pedal edema    Lab/Diagnostic data:    Most Recent 3 CBC's:  Recent Labs   Lab Test 12/04/22  0426 12/02/22  0745 12/01/22  1303   WBC 10.1 11.0 8.9   HGB 10.4* 11.4* 10.1*   * 106* 107*    440 445     Most Recent 3 BMP's:  Recent Labs   Lab Test 12/04/22  0426 12/03/22  0523 12/02/22  0745    140 137   POTASSIUM 4.2 3.9  4.0 4.3   CHLORIDE 97* 98 96*   CO2 32* 28 27   BUN 37.1* 33.2* 24.4*   CR 1.52* 1.56* 1.19*   ANIONGAP 8 14 14   SUNI 9.8 10.1 10.3*   GLC 98 84 92     Most Recent 2 LFT's:  Recent Labs   Lab Test 11/08/22  0605 11/07/22  1932   AST 27 19   ALT 14 13   ALKPHOS 127* 113   BILITOTAL 0.6 0.6     Most Recent 3 Troponin's:No lab results found.  Most Recent 3 BNP's:  Recent Labs   Lab Test 12/01/22  1303 "   NTBNPI 3,087*       ASSESSMENT/PLAN:    Recurrent falls  Physical deconditioning  Generalized weakness  Likely multifactorial in setting of C.diff colitis, anemia, electrolyte abnormalities, multiple hospitalizations. Overall improving.  -PT/OT  -SW for safe discharge planning     Cdiff colitis, improving  Diagnosed 11/7, CT with findings of pancolitis. Started on PO vancomycin x14 days. Stools are improving, more formed and less frequent. Has completed course of Abx.  -Monitor symptoms     Anemia, macrocytic  Noted to have progressively worsening macrocytic anemia upon discharge, with decrease in Hgb from 11/12--8.8 in 2mo. Workup suggestive of bone marrow supression from unknown etiology; recurrent infections vs other. Has had colonoscopy screening recently. Folate/B12 values wnl. Iron studies c/w chronic disease. HIV/carlene tests neg. Received 1u PRBCs while inpatient for suspected symptomatic anemia, value at discharge 11.6. Repeat values ranging 10-11 with persistent macrocytosis.  *Peripheral smear from 11/18 neg for hemolysis/atypia, MMA 0.24, SPEP with hypogammaglobulinemia  -Follow-up with hematology following resolution of acute illness, [facility] hematology referral placed     Hypokalemia, resolved  Hypomagnesemia, resolved  Noted while inpatient with C.diff, 2/2 GI losses in setting of above. Resolved at time of discharge, remained wnl during recent hospitalization.  -Continues on Mag Ox 400mg BID     Acute on chronic kidney insufficiency  Baseline Cr 0.7-0.9. Value 1.09 on admission, increased to 1.56 with IV diuresis. Discharge value 1.52.  -Holding lasix  -BMP on 12/8     Paroxysmal afib  HTN / HLD  Hx pulmonary htn  HFpEF exacerbation, improved  PTA metoprolol 100mg/d, lasix 20mg PRN edema. Pt initially with hypotension in setting of acute Cdiff infection. PTA metoprolol and PRN lasix held. With improvement of acute infection, HRs elevated with palpitations, metoprolol up-titrated to 75mg/d. Received PRN  lasix x1 for edema at TCU. Admission as noted above for chest pressure felt 2/2 fluid overload, improved with IV diuresis.  -Continue metoprolol at 75mg/d  -Continues on rivaroxaban for CVA proph, statin  -Holding PRN lasix given YESICA  -Daily weights, monitor trend if daily lasix is indicated  -Follow up with cardiology as outpatient     RA  Without evidence of flare.  -Continue methotrexate     Gout  Seen by rheumatology in 09/2022 with concerns of gout, started on 60-day prescription for colchicine and prednisone as well as allopurinol loading  -Continue allopurinol daily  -Continue colchicine, prednisone for intended 60d (end date 12/12)     COPD, on nocturnal oxygen  Chronic and stable.  -Continue oxygen per home use, wean daytime as able to maintain sats >90%  -Continues on symbicort, albuterol     GERD  -Continue Protonix daily     Depression  -Continue cymbalta    Orders:  -Daily weights  -Resumed robaxin  -Added end-date to prednisone/colchicine of 12/12  -BMP on 12/8    Total time spent with patient visit at the skilled nursing facility was 35 min including patient visit and review of past records. Greater than 50% of total time spent with counseling and coordinating care due to medical complexity.     Electronically signed by:  Jorge Luis Henry PA-C

## 2022-12-05 NOTE — LETTER
12/5/2022        RE: Fatuma Henry  [address]  [address]  [place] [address]        [facility] GERIATRICS    PRIMARY CARE PROVIDER AND CLINIC:  Tory Wise MD, [id number] include 234 beds at the [facility] / [facility] [address]  Chief Complaint   Patient presents with     Hospital F/U      [place] Medical Record Number:  [id number]  Place of Service where encounter took place:  [facility] ([facility]) [[id number]]    Fatuma Henry  is a 77 year old  (1945), admitted to the above facility from  [facility]. Hospital stay 12/1/22 through 12/2/22..   HPI:  Patient is a 77-year-old female with past medical history of CKD, paroxysmal A. fib, hypertension, COPD on nocturnal oxygen, RA, Gout, and recent admission 11/7 - 11/10, 2022 with acute C.diff infection who was admitted at [facility] from 11/17/2022 - 11/21/2022 after presenting with increased falls and generalized weakness. She had findings of dehydation with YESICA, electrolyte abnormalities, and soft blood pressures. She was also identified to have progressively worsening macrocytic anemia with a decrease in hemoglobin from 11/12--8.8 since September 2022.  Workup was concerning for bone marrow etiology and she was recommended to follow-up as an outpatient once acute medical issues resolve.  She did receive 1 unit of PRBCs for symptomatic anemia on 11/17. Symptoms improved with IV hydration and repletion of electrolytes. Blood pressure meds were adjusted, metoprolol discontinued altogether due to soft bps (PTA dose 100mg/d). Following therapy evaluations, she was referred to TCU for continued rehab and medical management.    While at TCU, she completed treatment with oral vancomycin and diarrhea improved. Her heart rates were elevated with symptoms of intermittent palpitations and metoprolol up-titrated to 75mg/d. She noted mild pedal edema for which she typically uses PRN lasix.    On 12/1, she reported waking overnight  with chest pressure and sensation of an elephant sitting on her chest with worsening BLE edema, BP 170s. For this, she was referred to ED. EKG was nonischemic, troponin 28--23, BNP 3087, CXR clear. Symptoms were suspected 2/2 mild CHF exacerbation. She was admitted at [facility] from 12/1 - 12/4, 2022. She was treated with IV diuresis, TTE with preserved LVEF and unchanged from prior. Ultimately was discharged back to TCU without medication changes.    Patient is seen today in hospital follow-up. She is sitting up at edge of bed with therapy at bedside just finishing up session. She feels well, denies fatigue or lethargy. Denies any persistent chest pressure or pain, no sob. Per staff, oxygen has been intermittently turned off during daytime as sats have been persistently above 90%. Reports she had a significant amount of diuresis in hospital, ankles are without any significant swelling.     CODE STATUS/ADVANCE DIRECTIVES DISCUSSION:  No CPR- Do NOT Intubate  DNR / DNI  ALLERGIES:   Allergies   Allergen Reactions     Codeine Nausea and Vomiting     Fosamax [Alendronic Acid] Diarrhea      PAST MEDICAL HISTORY:   Past Medical History:   Diagnosis Date     Atrial fibrillation (H)      CKD (chronic kidney disease) stage 3, GFR 30-59 ml/min (H)      COPD (chronic obstructive pulmonary disease) (H)     nocturnal oxygen     CRF (chronic renal failure)      GERD (gastroesophageal reflux disease)      Hypertension      Hypovolemic shock (H)      Influenza A 12/01/2017     Pulmonary hypertension (H)      Pulmonary nodules      Rheumatoid arthritis (H)      Sepsis (H) 12/24/2017      PAST SURGICAL HISTORY:   has a past surgical history that includes Cholecystectomy; appendectomy; and Bladder Suspension.  FAMILY HISTORY: family history includes Cerebrovascular Disease in her brother and father; Diabetes Type 2  in her brother; Heart Failure in her mother; Kidney failure in her sister.  SOCIAL HISTORY:   reports  that she quit smoking about 4 years ago. Her smoking use included cigarettes. She smoked an average of .5 packs per day. She has never used smokeless tobacco. She reports that she does not drink alcohol and does not use drugs.  Patient's living condition: lives with spouse    Post Discharge Medication Reconciliation Status:   MED REC REQUIRED  Post Medication Reconciliation Status: discharge medications reconciled, continue medications without change       Current Outpatient Medications   Medication Sig     Acetaminophen (ACETAMIN PO) Take 500 mg by mouth every 4 hours as needed (pain or fever)     albuterol (PROAIR HFA/PROVENTIL HFA/VENTOLIN HFA) 108 (90 Base) MCG/ACT inhaler Inhale 2 puffs into the lungs every 4 hours as needed for shortness of breath / dyspnea or wheezing     allopurinol (ZYLOPRIM) 300 MG tablet Take 300 mg by mouth daily gout     budesonide-formoterol (SYMBICORT) 160-4.5 MCG/ACT Inhaler Inhale 2 puffs into the lungs 2 times daily for 30 days     colchicine (COLCYRS) 0.6 MG tablet Take 1 tablet (0.6 mg) by mouth daily for 60 days     compressor, for nebulizer Saran [COMPRESSOR, FOR NEBULIZER SARAN] Nebulizer treatment qid prn     DULoxetine (CYMBALTA) 20 MG capsule Take 1 capsule (20 mg) by mouth daily for 30 days     folic acid (FOLVITE) 1 MG tablet TAKE 1 TABLET BY MOUTH ONCE DAILY EXCEPT  THE  DAY  WHEN  TAKING  METHOTREXATE (Patient taking differently: Take 1 mg by mouth daily Daily)     ipratropium - albuterol 0.5 mg/2.5 mg/3 mL (DUONEB) 0.5-2.5 (3) MG/3ML neb solution Take 1 vial (3 mLs) by nebulization every 4 hours as needed for wheezing or shortness of breath / dyspnea     magnesium oxide (MAG-OX) 400 MG tablet Take 400 mg by mouth 2 times daily     methotrexate sodium 2.5 MG TABS Take 6 tablets (15 mg) by mouth once a week     metoprolol succinate ER (TOPROL XL) 25 MG 24 hr tablet Take 3 tablets (75 mg) by mouth daily for 30 days     pantoprazole (PROTONIX) 40 MG EC tablet Take 40 mg by  "mouth daily     pravastatin (PRAVACHOL) 20 MG tablet Take 20 mg by mouth every evening     predniSONE (DELTASONE) 5 MG tablet Take 1 tablet (5 mg) by mouth daily for 60 days     rivaroxaban ANTICOAGULANT (XARELTO) 15 MG TABS tablet Take 1 tablet (15 mg) by mouth daily (with dinner)     Vitamin D3 (CHOLECALCIFEROL) 125 MCG (5000 UT) tablet Take 125 mcg by mouth daily     No current facility-administered medications for this visit.       ROS:  4 point ROS including Respiratory, CV, GI and , other than that noted in the HPI,  is negative    Vitals:  /53   Pulse 83   Temp 97.1  F (36.2  C)   Resp 18   Ht 1.676 m (5' 6\")   Wt 68.4 kg (150 lb 12.8 oz)   SpO2 94%   BMI 24.34 kg/m    Exam:    GEN: well-developed, well-nourished, appears comfortable  HEENT: NCAT, EOM intact bilaterally, sclera clear, conjunctiva normal, nose with NC present in nares, mouth patent, mucous membranes dry  CHEST: lungs CTA bilaterally, no increased work of breathing, no wheeze, crackles, rhonchi  HEART: RRR, S1 & S2  ABD: soft, nontender, nondistended, no guarding or rigidity  MSK: AROM bilateral UE/LE, pedal & radial pulses 2+ bilaterally  NEURO: awake, alert, oriented to name, place, and time. CN II-XII grossly intact. Sensation grossly intact to light touch.   SKIN: warm & dry without rash, no pedal edema    Lab/Diagnostic data:    Most Recent 3 CBC's:  Recent Labs   Lab Test 12/04/22  0426 12/02/22  0745 12/01/22  1303   WBC 10.1 11.0 8.9   HGB 10.4* 11.4* 10.1*   * 106* 107*    440 445     Most Recent 3 BMP's:  Recent Labs   Lab Test 12/04/22  0426 12/03/22  0523 12/02/22  0745    140 137   POTASSIUM 4.2 3.9  4.0 4.3   CHLORIDE 97* 98 96*   CO2 32* 28 27   BUN 37.1* 33.2* 24.4*   CR 1.52* 1.56* 1.19*   ANIONGAP 8 14 14   SUNI 9.8 10.1 10.3*   GLC 98 84 92     Most Recent 2 LFT's:  Recent Labs   Lab Test 11/08/22  0605 11/07/22 1932   AST 27 19   ALT 14 13   ALKPHOS 127* 113   BILITOTAL 0.6 0.6     Most " Recent 3 Troponin's:No lab results found.  Most Recent 3 BNP's:  Recent Labs   Lab Test 12/01/22  1303   NTBNPI 3,087*       ASSESSMENT/PLAN:    Recurrent falls  Physical deconditioning  Generalized weakness  Likely multifactorial in setting of C.diff colitis, anemia, electrolyte abnormalities, multiple hospitalizations. Overall improving.  -PT/OT  -SW for safe discharge planning     Cdiff colitis, improving  Diagnosed 11/7, CT with findings of pancolitis. Started on PO vancomycin x14 days. Stools are improving, more formed and less frequent. Has completed course of Abx.  -Monitor symptoms     Anemia, macrocytic  Noted to have progressively worsening macrocytic anemia upon discharge, with decrease in Hgb from 11/12--8.8 in 2mo. Workup suggestive of bone marrow supression from unknown etiology; recurrent infections vs other. Has had colonoscopy screening recently. Folate/B12 values wnl. Iron studies c/w chronic disease. HIV/carlene tests neg. Received 1u PRBCs while inpatient for suspected symptomatic anemia, value at discharge 11.6. Repeat values ranging 10-11 with persistent macrocytosis.  *Peripheral smear from 11/18 neg for hemolysis/atypia, MMA 0.24, SPEP with hypogammaglobulinemia  -Follow-up with hematology following resolution of acute illness, [facility] hematology referral placed     Hypokalemia, resolved  Hypomagnesemia, resolved  Noted while inpatient with C.diff, 2/2 GI losses in setting of above. Resolved at time of discharge, remained wnl during recent hospitalization.  -Continues on Mag Ox 400mg BID     Acute on chronic kidney insufficiency  Baseline Cr 0.7-0.9. Value 1.09 on admission, increased to 1.56 with IV diuresis. Discharge value 1.52.  -Holding lasix  -BMP on 12/8     Paroxysmal afib  HTN / HLD  Hx pulmonary htn  HFpEF exacerbation, improved  PTA metoprolol 100mg/d, lasix 20mg PRN edema. Pt initially with hypotension in setting of acute Cdiff infection. PTA metoprolol and PRN lasix held. With  improvement of acute infection, HRs elevated with palpitations, metoprolol up-titrated to 75mg/d. Received PRN lasix x1 for edema at TCU. Admission as noted above for chest pressure felt 2/2 fluid overload, improved with IV diuresis.  -Continue metoprolol at 75mg/d  -Continues on rivaroxaban for CVA proph, statin  -Holding PRN lasix given YESICA  -Daily weights, monitor trend if daily lasix is indicated  -Follow up with cardiology as outpatient     RA  Without evidence of flare.  -Continue methotrexate     Gout  Seen by rheumatology in 09/2022 with concerns of gout, started on 60-day prescription for colchicine and prednisone as well as allopurinol loading  -Continue allopurinol daily  -Continue colchicine, prednisone for intended 60d (end date 12/12)     COPD, on nocturnal oxygen  Chronic and stable.  -Continue oxygen per home use, wean daytime as able to maintain sats >90%  -Continues on symbicort, albuterol     GERD  -Continue Protonix daily     Depression  -Continue cymbalta    Orders:  -Daily weights  -Resumed robaxin  -Added end-date to prednisone/colchicine of 12/12  -BMP on 12/8    Total time spent with patient visit at the skilled nursing facility was 35 min including patient visit and review of past records. Greater than 50% of total time spent with counseling and coordinating care due to medical complexity.     Electronically signed by:  Jorge Luis Henry PA-C                         Sincerely,        Jorge Luis Henry PA-C

## 2022-12-08 NOTE — PROGRESS NOTES
[facility] GERIATRICS    Chief Complaint   Patient presents with     RECHECK     HPI:  Fatuma Henry is a 77 year old  (1945), who is being seen today for an episodic care visit at: [facility] ([facility]) [[id number]].    Patient is a 77-year-old female with past medical history of CKD, paroxysmal A. fib, hypertension, COPD on nocturnal oxygen, RA, Gout, and recent admission 11/7 - 11/10, 2022 with acute C.diff infection who was admitted at [facility] from 11/17/2022 - 11/21/2022 after presenting with increased falls and generalized weakness. She had findings of dehydation with YESICA, electrolyte abnormalities, and soft blood pressures. She was also identified to have progressively worsening macrocytic anemia with a decrease in hemoglobin from 11/12--8.8 since September 2022.  Workup was concerning for bone marrow etiology and she was recommended to follow-up as an outpatient once acute medical issues resolve.  She did receive 1 unit of PRBCs for symptomatic anemia on 11/17. Symptoms improved with IV hydration and repletion of electrolytes. Blood pressure meds were adjusted, metoprolol discontinued altogether due to soft bps (PTA dose 100mg/d). Following therapy evaluations, she was referred to TCU for continued rehab and medical management.     While at TCU, she completed treatment with oral vancomycin and diarrhea improved. Her metoprolol was up-titrated to 75mg/d and PTA PRN lasix resumed. She was readmitted at [facility] from 12/1 - 12/4 after she reported waking overnight with chest pressure and sensation of an elephant sitting on her chest with worsening BLE edema, BP 170s. For this, she was referred to ED. EKG was nonischemic, troponin 28--23, BNP 3087, CXR clear. Symptoms were suspected 2/2 mild CHF exacerbation. She was treated with IV diuresis, TTE with preserved LVEF and unchanged from prior. Ultimately was discharged back to TCU without medication changes.    Patient is seen today  "in follow-up. On initial visit, she was feeling well and without ongoing symptoms, had been able to wean to room air for the majority of daytime hours. Over the past few days, she has been experiencing symptoms of fatigue, generalized malaise, and LAGUNAS with increased oxygen needs upon physical activity. COVID-19 and Influenza swabs have been negative. She denies recurrent symptoms of chest pressure/pain. Admits to anorexia, denies nausea/vomiting. Had a canker sore which limited her ability to tolerate oral intake, this has since improved with salt water gargles. Bowel movements have nearly returned to normal. Oral intake has been slightly decreased due to symptoms. She has had softer blood pressures with heart rates in 90s-100s, improved over the past 24 hours with ability to take metoprolol. Continues to deny palpitations, lightheadedness/dizziness.    Allergies, and PMH/PSH reviewed in EPIC today.  REVIEW OF SYSTEMS:  4 point ROS including Respiratory, CV, GI and , other than that noted in the HPI,  is negative    Objective:   /60   Pulse 90   Temp 97.3  F (36.3  C)   Resp 20   Ht 1.676 m (5' 6\")   Wt 67.7 kg (149 lb 3.2 oz)   SpO2 99%   BMI 24.08 kg/m      GEN: well-developed, elderly female, appears comfortable  HEENT: NCAT, EOM intact bilaterally, sclera clear, conjunctiva normal, nose & mouth patent, mucous membranes dry, mouth sore to buccal mucosa on mandibular gumline underneath dentures, clear fluid-filled lesion with erythematous base  CHEST: lungs CTA bilaterally, no increased work of breathing, no wheeze  HEART: irregularly irregular, S1 & S2  ABD: soft, nontender, nondistended, no guarding or rigidity, +BS in all 4 quadrants  MSK: AROM bilateral UE/LE, pedal & radial pulses 2+ bilaterally  NEURO: awake, alert, oriented to name, place, and time. CN II-XII grossly intact. Sensation grossly intact to light touch.   SKIN: warm & dry without rash, no pedal edema      Most Recent 3 " CBC's:  Recent Labs   Lab Test 12/04/22  0426 12/02/22  0745 12/01/22  1303   WBC 10.1 11.0 8.9   HGB 10.4* 11.4* 10.1*   * 106* 107*    440 445     Most Recent 3 BMP's:  Recent Labs   Lab Test 12/08/22  0606 12/04/22  0426 12/03/22  0523    137 140   POTASSIUM 4.7 4.2 3.9  4.0   CHLORIDE 101 97* 98   CO2 22 32* 28   BUN 31.2* 37.1* 33.2*   CR 1.21* 1.52* 1.56*   ANIONGAP 17* 8 14   SUNI 10.5* 9.8 10.1   GLC 92 98 84       Assessment/Plan:    Generalized malaise, fatigue  Nausea, poor po intake  Reporting 2-3d history of generalized malaise, anorexia, LAGUNAS and hypoxic with activity. ddx considered include acute HF, acute abdominal process, pneurmonia, COPD exacerbation, viral URI, among others. Weights are down, clinically appears dehydrated to euvolemic, HF felt less likely. Pt with hx of chetna/appy, has been afebrile. Possible component of gastritis/PUD although is chronically on PPI 1x daily. Lungs clear without wheeze to suggest COPD exacerbation. PE not likely in setting of AC. Influenza/COVID swabs neg.  -Liver panel, CBC on 12/12  -2-view CXR  -UA/UC  -Change PPI to omeprazole BID  -Monitor symptoms, oxygen saturations    Stomatitis  Reporting painful oral lesions underlying dentures.  -Magic mouthwash PRN  -Orajel PRN    Recurrent falls  Physical deconditioning  Generalized weakness  Likely multifactorial in setting of C.diff colitis, anemia, electrolyte abnormalities, multiple hospitalizations.  -PT/OT  -SW for safe discharge planning     Cdiff colitis, improving  Diagnosed 11/7, CT with findings of pancolitis. Started on PO vancomycin x14 days. Stools are improving, more formed and less frequent. Has completed course of Abx.  -Monitor symptoms     Anemia, macrocytic  Noted to have progressively worsening macrocytic anemia upon discharge, with decrease in Hgb from 11/12--8.8 in 2mo. Workup suggestive of bone marrow supression from unknown etiology; recurrent infections vs other. Has had  colonoscopy screening recently. Folate/B12 values wnl. Iron studies c/w chronic disease. HIV/carlene tests neg. Received 1u PRBCs while inpatient for suspected symptomatic anemia, value at discharge 11.6. Repeat values ranging 10-11 with persistent macrocytosis.  *Peripheral smear from 11/18 neg for hemolysis/atypia, MMA 0.24, SPEP with hypogammaglobulinemia  -Follow-up with hematology following resolution of acute illness, [facility] hematology referral placed  -CBC on 12/12 to ensure stability     Hypokalemia, resolved  Hypomagnesemia, resolved  Noted while inpatient with C.diff, 2/2 GI losses in setting of above. Resolved at time of discharge, remained wnl during recent hospitalization.  -Continues on Mag Ox 400mg BID     Acute on chronic kidney insufficiency  Baseline Cr 0.7-0.9. Value 1.09 on admission, increased to 1.56 with IV diuresis. Discharge value 1.52. Improving to 1.21 on today's labs.  -Holding lasix  -Encourage PO intake  -Monitor periodically     Paroxysmal afib  HTN / HLD  Hx pulmonary htn  HFpEF exacerbation, improved  PTA metoprolol 100mg/d, lasix 20mg PRN edema. Pt initially with hypotension in setting of acute Cdiff infection. PTA metoprolol and PRN lasix held. With improvement of acute infection, HRs elevated with palpitations, metoprolol up-titrated to 75mg/d. Received PRN lasix x1 for edema at TCU. Admission as noted above for chest pressure felt 2/2 fluid overload, improved with IV diuresis.   *Weight 141 today, down 8# from yesterday and 9# from 11/22. BPs ranging , HRs , primarily in 80s  -Continue metoprolol at 75mg/d, hold parameters for SBP < 95, HR < 55  -Continues on rivaroxaban for CVA proph, statin  -Holding lasix given soft BPs, downtrending weights  -Daily weights  -Follow up with cardiology as outpatient, scheduled for 12/21     RA  Without evidence of flare.  -Continue methotrexate     Gout  Seen by rheumatology in 09/2022 with concerns of gout, started on 60-day  prescription for colchicine and prednisone as well as allopurinol loading  -Continue allopurinol daily  -Continue colchicine, prednisone for intended 60d (end date 12/12)     COPD, on nocturnal oxygen  Chronic and stable.  -Continue oxygen per home use, wean daytime as able to maintain sats >90%  -Continues on symbicort, albuterol     GERD  -Continue PPI, omeprazole as above     Depression  -Continue cymbalta    MED REC REQUIRED  Post Medication Reconciliation Status: medication reconcilation previously completed during another office visit      Orders:  -CBC, liver panel on 12/12  -CXR  -UA/UC  -Changed to omeprazole BID    Electronically signed by: Jorge Luis Henry PA-C

## 2022-12-08 NOTE — LETTER
12/8/2022        RE: Fatuma Henry  [address] [facility]  [address] [place] [address]        [facility] GERIATRICS    Chief Complaint   Patient presents with     RECHECK     HPI:  Fatuma Henry is a 77 year old  (1945), who is being seen today for an episodic care visit at: [facility]) [[id number]].    Patient is a 77-year-old female with past medical history of CKD, paroxysmal A. fib, hypertension, COPD on nocturnal oxygen, RA, Gout, and recent admission 11/7 - 11/10, 2022 with acute C.diff infection who was admitted at [facility] from 11/17/2022 - 11/21/2022 after presenting with increased falls and generalized weakness. She had findings of dehydation with YESICA, electrolyte abnormalities, and soft blood pressures. She was also identified to have progressively worsening macrocytic anemia with a decrease in hemoglobin from 11/12--8.8 since September 2022.  Workup was concerning for bone marrow etiology and she was recommended to follow-up as an outpatient once acute medical issues resolve.  She did receive 1 unit of PRBCs for symptomatic anemia on 11/17. Symptoms improved with IV hydration and repletion of electrolytes. Blood pressure meds were adjusted, metoprolol discontinued altogether due to soft bps (PTA dose 100mg/d). Following therapy evaluations, she was referred to TCU for continued rehab and medical management.     While at TCU, she completed treatment with oral vancomycin and diarrhea improved. Her metoprolol was up-titrated to 75mg/d and PTA PRN lasix resumed. She was readmitted at [facility] from 12/1 - 12/4 after she reported waking overnight with chest pressure and sensation of an elephant sitting on her chest with worsening BLE edema, BP 170s. For this, she was referred to ED. EKG was nonischemic, troponin 28--23, BNP 3087, CXR clear. Symptoms were suspected 2/2 mild CHF exacerbation. She was treated with IV diuresis, TTE with preserved LVEF and unchanged  "from prior. Ultimately was discharged back to TCU without medication changes.    Patient is seen today in follow-up. On initial visit, she was feeling well and without ongoing symptoms, had been able to wean to room air for the majority of daytime hours. Over the past few days, she has been experiencing symptoms of fatigue, generalized malaise, and LAGUNAS with increased oxygen needs upon physical activity. COVID-19 and Influenza swabs have been negative. She denies recurrent symptoms of chest pressure/pain. Admits to anorexia, denies nausea/vomiting. Had a canker sore which limited her ability to tolerate oral intake, this has since improved with salt water gargles. Bowel movements have nearly returned to normal. Oral intake has been slightly decreased due to symptoms. She has had softer blood pressures with heart rates in 90s-100s, improved over the past 24 hours with ability to take metoprolol. Continues to deny palpitations, lightheadedness/dizziness.    Allergies, and PMH/PSH reviewed in EPIC today.  REVIEW OF SYSTEMS:  4 point ROS including Respiratory, CV, GI and , other than that noted in the HPI,  is negative    Objective:   /60   Pulse 90   Temp 97.3  F (36.3  C)   Resp 20   Ht 1.676 m (5' 6\")   Wt 67.7 kg (149 lb 3.2 oz)   SpO2 99%   BMI 24.08 kg/m      GEN: well-developed, elderly female, appears comfortable  HEENT: NCAT, EOM intact bilaterally, sclera clear, conjunctiva normal, nose & mouth patent, mucous membranes dry, mouth sore to buccal mucosa on mandibular gumline underneath dentures, clear fluid-filled lesion with erythematous base  CHEST: lungs CTA bilaterally, no increased work of breathing, no wheeze  HEART: irregularly irregular, S1 & S2  ABD: soft, nontender, nondistended, no guarding or rigidity, +BS in all 4 quadrants  MSK: AROM bilateral UE/LE, pedal & radial pulses 2+ bilaterally  NEURO: awake, alert, oriented to name, place, and time. CN II-XII grossly intact. Sensation " grossly intact to light touch.   SKIN: warm & dry without rash, no pedal edema      Most Recent 3 CBC's:  Recent Labs   Lab Test 12/04/22  0426 12/02/22  0745 12/01/22  1303   WBC 10.1 11.0 8.9   HGB 10.4* 11.4* 10.1*   * 106* 107*    440 445     Most Recent 3 BMP's:  Recent Labs   Lab Test 12/08/22  0606 12/04/22  0426 12/03/22  0523    137 140   POTASSIUM 4.7 4.2 3.9  4.0   CHLORIDE 101 97* 98   CO2 22 32* 28   BUN 31.2* 37.1* 33.2*   CR 1.21* 1.52* 1.56*   ANIONGAP 17* 8 14   SUNI 10.5* 9.8 10.1   GLC 92 98 84       Assessment/Plan:    Generalized malaise, fatigue  Nausea, poor po intake  Reporting 2-3d history of generalized malaise, anorexia, LAGUNAS and hypoxic with activity. ddx considered include acute HF, acute abdominal process, pneurmonia, COPD exacerbation, viral URI, among others. Weights are down, clinically appears dehydrated to euvolemic, HF felt less likely. Pt with hx of chetna/appy, has been afebrile. Possible component of gastritis/PUD although is chronically on PPI 1x daily. Lungs clear without wheeze to suggest COPD exacerbation. PE not likely in setting of AC. Influenza/COVID swabs neg.  -Liver panel, CBC on 12/12  -2-view CXR  -UA/UC  -Change PPI to omeprazole BID  -Monitor symptoms, oxygen saturations    Stomatitis  Reporting painful oral lesions underlying dentures.  -Magic mouthwash PRN  -Orajel PRN    Recurrent falls  Physical deconditioning  Generalized weakness  Likely multifactorial in setting of C.diff colitis, anemia, electrolyte abnormalities, multiple hospitalizations.  -PT/OT  -SW for safe discharge planning     Cdiff colitis, improving  Diagnosed 11/7, CT with findings of pancolitis. Started on PO vancomycin x14 days. Stools are improving, more formed and less frequent. Has completed course of Abx.  -Monitor symptoms     Anemia, macrocytic  Noted to have progressively worsening macrocytic anemia upon discharge, with decrease in Hgb from 11/12--8.8 in 2mo. Workup  suggestive of bone marrow supression from unknown etiology; recurrent infections vs other. Has had colonoscopy screening recently. Folate/B12 values wnl. Iron studies c/w chronic disease. HIV/carlene tests neg. Received 1u PRBCs while inpatient for suspected symptomatic anemia, value at discharge 11.6. Repeat values ranging 10-11 with persistent macrocytosis.  *Peripheral smear from 11/18 neg for hemolysis/atypia, MMA 0.24, SPEP with hypogammaglobulinemia  -Follow-up with hematology following resolution of acute illness, [facility] hematology referral placed  -CBC on 12/12 to ensure stability     Hypokalemia, resolved  Hypomagnesemia, resolved  Noted while inpatient with C.diff, 2/2 GI losses in setting of above. Resolved at time of discharge, remained wnl during recent hospitalization.  -Continues on Mag Ox 400mg BID     Acute on chronic kidney insufficiency  Baseline Cr 0.7-0.9. Value 1.09 on admission, increased to 1.56 with IV diuresis. Discharge value 1.52. Improving to 1.21 on today's labs.  -Holding lasix  -Encourage PO intake  -Monitor periodically     Paroxysmal afib  HTN / HLD  Hx pulmonary htn  HFpEF exacerbation, improved  PTA metoprolol 100mg/d, lasix 20mg PRN edema. Pt initially with hypotension in setting of acute Cdiff infection. PTA metoprolol and PRN lasix held. With improvement of acute infection, HRs elevated with palpitations, metoprolol up-titrated to 75mg/d. Received PRN lasix x1 for edema at TCU. Admission as noted above for chest pressure felt 2/2 fluid overload, improved with IV diuresis.   *Weight 141 today, down 8# from yesterday and 9# from 11/22. BPs ranging , HRs , primarily in 80s  -Continue metoprolol at 75mg/d, hold parameters for SBP < 95, HR < 55  -Continues on rivaroxaban for CVA proph, statin  -Holding lasix given soft BPs, downtrending weights  -Daily weights  -Follow up with cardiology as outpatient, scheduled for 12/21     RA  Without evidence of flare.  -Continue  methotrexate     Gout  Seen by rheumatology in 09/2022 with concerns of gout, started on 60-day prescription for colchicine and prednisone as well as allopurinol loading  -Continue allopurinol daily  -Continue colchicine, prednisone for intended 60d (end date 12/12)     COPD, on nocturnal oxygen  Chronic and stable.  -Continue oxygen per home use, wean daytime as able to maintain sats >90%  -Continues on symbicort, albuterol     GERD  -Continue PPI, omeprazole as above     Depression  -Continue cymbalta    MED REC REQUIRED  Post Medication Reconciliation Status: medication reconcilation previously completed during another office visit      Orders:  -CBC, liver panel on 12/12  -CXR  -UA/UC  -Changed to omeprazole BID    Electronically signed by: Jorge Luis Henry PA-C             Sincerely,        Jorge Luis Henry PA-C

## 2022-12-09 NOTE — DISCHARGE SUMMARY
[facility]  Hospitalist Discharge Summary      Date of Admission:  12/1/2022  Date of Discharge:  12/4/2022  2:10 PM  Discharging Provider: Mary Pascal MD  Discharge Service: Hospitalist Service    Discharge Diagnoses   Volume overload secondary to blood transfusion  Pulmonary hypertension  Hemoptysis  Pulmonary nodules  Acute hypoxic respiratory failure  COPD with chronic hypoxic respiratory failure at night requiring 2 L nasal cannula  CKD stage III  Rheumatoid arthritis  Paroxysmal atrial fibrillation  Chronic anemia  Hypoalbuminemia    Follow-ups Needed After Discharge   Follow-up Appointments     Follow Up and recommended labs and tests      Follow up with correction physician.  The following labs/tests are   recommended: blood pressure checks.             Unresulted Labs Ordered in the Past 30 Days of this Admission     No orders found from 11/1/2022 to 12/2/2022.      These results will be followed up by Mary Pascal    Discharge Disposition   Discharged to short-term care facility  Condition at discharge: Stable    Hospital Course   Fatuma Henry is a 77 year old female admitted on 12/1/2022. She has nocturnal dependent oxygen COPD, HTN, CKD3, paroxysmal Afib, HTN, pulmonary HTN and recent cellulitis with infected gouty arthritis and C diff who presented from TCU with chest pain and progressive SOB.       Volume overload with LE edema likely related to recent blood transfusion  Pulmonary HTN  Elevated BNP and mild elevated troponin  -noted 3 days of progressive SOB and new LE edema post transfusion 11/27/2022  -IV lasix with parameters stopped  -ECHO 12/2: EF 60 to 65%, mild AS, mild AI, mild RV enlargement with normal function, RVSP estimated at 50  -CXR with no obvious pulmonary edema     Hemoptysis  -recent per patient  -known pulmonary nodule--see below     Known pulmonary nodules  -being monitored by Pulmonary, Dr. Rosario with plan for repeat CT March 2023 with  possible biopsy pending change  -Most recent CT of the nodules from 7/21/2022 showed 1. 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  A 10 mm.  Groundglass nodule in the lingula and several diminutive left upper lobe nodules that were unchanged     Acute on chronic hypoxic respiratory failure- resolved  Nocturnal oxygen dependent COPD baseline 2L NC at HS  -oxygen titrated  -CXR with infiltrate vs atelectasis, no clear infection and no pulmonary edema     Recent C diff infection with pancolitis  -reports stools formed at this time and no incontinence and completed treatment     CKD3  -avoided nephrotoxins  -monitored closely with diuresis  -trended Cr     Rheumatoid arthritis  -home methotrexate     Paroxysmal Afib  -EKG NSR rate 86 with no acute ST/T changes when compared with 11/7/22  -continue home rate/anticoagulation meds     Anemia  - Recently transfused on 11/27/2022  - plan for outpatient hematology consultation  - Hemoglobin currently 10.1 and will follow     Hypoalbuminemia  -Albumin 11/8:2.4    Consultations This Hospital Stay   CORE CLINIC EVALUATION IP CONSULT  OCCUPATIONAL THERAPY ADULT IP CONSULT  NUTRITION SERVICES ADULT IP CONSULT  CARE MANAGEMENT / SOCIAL WORK IP CONSULT  GASTROENTEROLOGY IP CONSULT  PHYSICAL THERAPY ADULT IP CONSULT  OCCUPATIONAL THERAPY ADULT IP CONSULT    Code Status   No CPR- Do NOT Intubate    Time Spent on this Encounter   I, Mary Pascal MD, personally saw the patient today and spent greater than 30 minutes discharging this patient.       Mary Pascal MD  [facility]  [address] [place] [id number]  Phone: [phone]  Fax: [phone]  ______________________________________________________________________    Physical Exam   Vital Signs:                    Weight: 150 lbs 1.6 oz  General  Appearance: Awake, alert, in no acute distress  Respiratory: CTAB, no wheeze  Cardiovascular: RRR, no murmur noted  GI: soft, nontender, non distended, normal bowel sounds  Skin: no jaundice, no rash         Primary Care Physician   Tory Wise    Discharge Orders      Adult Cardiology Benjamín Manning Referral      General info for SNF    Length of Stay Estimate: Short Term Care: Estimated # of Days <30  Condition at Discharge: Improving  Level of care:skilled   Rehabilitation Potential: Good  Admission H&P remains valid and up-to-date: Yes  Recent Chemotherapy: N/A  Use Nursing Home Standing Orders: Yes     Mantoux instructions    Give two-step Mantoux (PPD) Per Facility Policy Yes     Follow Up and recommended labs and tests    Follow up with [facility] physician.  The following labs/tests are recommended: blood pressure checks.     Reason for your hospital stay    Volume overload due to blood transfusion     Activity - Up with nursing assistance    Needs 1-2 assist at times for dizziness     Activity - Up with assistive device     No CPR- Do NOT Intubate     Physical Therapy Adult Consult    Evaluate and treat as clinically indicated.    Reason:  transfers, mobility, gait training     Occupational Therapy Adult Consult    Evaluate and treat as clinically indicated.    Reason: mobility, transfers, ADLs     Oxygen (SNF/TCU) Discharge     Fall precautions     Diet    Follow this diet upon discharge: 2 Gram Sodium Diet Other       Significant Results and Procedures   Most Recent 3 CBC's:Recent Labs   Lab Test 12/04/22  0426 12/02/22  0745 12/01/22  1303   WBC 10.1 11.0 8.9   HGB 10.4* 11.4* 10.1*   * 106* 107*    440 445     Most Recent 3 BMP's:Recent Labs   Lab Test 12/04/22  0426 12/03/22  0523 12/02/22  0745    140 137   POTASSIUM 4.2 3.9  4.0 4.3   CHLORIDE 97* 98 96*   CO2 32* 28 27   BUN 37.1* 33.2* 24.4*   CR 1.52* 1.56* 1.19*   ANIONGAP 8 14 14   SUNI 9.8 10.1 10.3*   GLC 98 84 92      Most Recent 2 LFT's:Recent Labs   Lab Test 22  0605 22   AST 27 19   ALT 14 13   ALKPHOS 127* 113   BILITOTAL 0.6 0.6   ,   Results for orders placed or performed during the hospital encounter of 22   XR Chest 2 Views    Narrative    EXAM: XR CHEST 2 VIEWS  LOCATION: [facility]  DATE/TIME: 2022 2:41 PM    INDICATION: Short of breath  COMPARISON: 2022      Impression    IMPRESSION: Patchy right basilar linear opacities likely representing atelectasis. Lungs are otherwise clear. No effusions. Heart size is at the upper limits of normal. No pulmonary edema. No acute osseous findings.   Echocardiogram Complete    Narrative    [id number]  [id number]  [id number]  [id number]^ARTUR^[place] [address]     Name: HEATHER DOYLE  MRN: [id number]  : 1945  Study Date: 2022 09:51 AM  Age: 77 yrs  Gender: Female  Patient Location: [facility]  Reason For Study: Chest Discomfort  Ordering Physician: BRADFORD SIDDIQUI  Referring Physician: BRADFORD SIDDIQUI  Performed By: LOI     BSA: 1.8 m2  Height: 66 in  Weight: 152 lb  HR: 86  ______________________________________________________________________________  Procedure  Complete Echo Adult.  ______________________________________________________________________________  Interpretation Summary     1. Normal left ventricular size and systolic performance with a visually  estimated ejection fraction of 60-65%.  2. There is mild aortic stenosis.  3. There is mild aortic insufficiency.  4. Mild right ventricular enlargement with normal right ventricular systolic  performance.  5. There is moderate left atrial enlargement. There is mild right atrial  enlargement.  6. Right ventricular systolic pressure relative to right atrial pressure is  moderately increased. The pulmonary artery pressure is estimated to be 50 mmHg  plus right atrial pressure (the IVC is of  normal caliber).     When compared to the prior real-time echocardiogram dated the degree of aortic  insufficiency appears slightly less on the current study. The findings are  otherwise felt to be fairly similar on both examinations.  ______________________________________________________________________________  Left ventricle:  Normal left ventricular size and systolic performance with a visually  estimated ejection fraction of 60-65%. There is normal regional wall motion.  Left ventricular wall thickness is normal.     Assessment of LV Diastolic Function: The evaluation of diastolic filling is  hampered by the presence of significant mitral annular calcification.     Right ventricle:  Mild right ventricular enlargement with normal right ventricular systolic  performance.     Left atrium:  There is moderate left atrial enlargement.     Right atrium:  There is mild right atrial enlargement.     IVC:  The IVC is of normal caliber.     Aortic valve:  The aortic valve is comprised of three cusps. There is mild aortic stenosis.  There is mild aortic insufficiency.     Mitral valve:  There is moderate-severe mitral annular calcification.There is trace mitral  insufficiency.     Tricuspid valve:  The tricuspid valve is grossly morphologically normal. There is trace-mild  tricuspid insufficiency.     Pulmonic valve:  The pulmonic valve is grossly morphologically normal.     Thoracic aorta:  The aortic root and proximal ascending aorta are of normal dimension.     Pericardium:  There is no significant pericardial effusion.  ______________________________________________________________________________  ______________________________________________________________________________  MMode/2D Measurements & Calculations  IVSd: 0.89 cm  LVIDd: 4.6 cm  LVIDs: 3.4 cm  LVPWd: 0.83 cm  FS: 26.5 %  LV mass(C)d: 130.5 grams  LV mass(C)dI: 73.3 grams/m2  Ao root diam: 3.0 cm  LA dimension: 4.6 cm  asc Aorta Diam: 2.8 cm  LA/Ao:  1.5  LVOT diam: 2.0 cm  LVOT area: 3.1 cm2  LA Volume Indexed (AL/bp): 41.2 ml/m2     RWT: 0.36     Time Measurements  MM HR: 86.0 BPM     Doppler Measurements & Calculations  MV E max chente: 69.5 cm/sec  MV A max chente: 108.0 cm/sec  MV E/A: 0.64  MV max P.6 mmHg  MV mean PG: 3.0 mmHg  MV V2 VTI: 26.0 cm  MVA(VTI): 2.4 cm2  MV dec slope: 316.0 cm/sec2  MV dec time: 0.22 sec  Ao V2 max: 219.5 cm/sec  Ao max P.0 mmHg  Ao V2 mean: 147.0 cm/sec  Ao mean PG: 10.0 mmHg  Ao V2 VTI: 40.8 cm  NA(I,D): 1.5 cm2  NA(V,D): 1.4 cm2  LV V1 max PG: 3.6 mmHg  LV V1 max: 95.0 cm/sec  LV V1 VTI: 19.9 cm  SV(LVOT): 62.5 ml  SI(LVOT): 35.1 ml/m2  PA acc time: 0.10 sec  TR max chente: 360.0 cm/sec  TR max P.8 mmHg  AV Chente Ratio (DI): 0.43  NA Index (cm2/m2): 0.86     E/E' avg: 10.8  Lateral E/e': 9.5  Medial E/e': 12.0     ______________________________________________________________________________  Report approved by: Derrick Singh 2022 11:18 AM               Discharge Medications   Discharge Medication List as of 2022  1:41 PM      CONTINUE these medications which have CHANGED    Details   metoprolol succinate ER (TOPROL XL) 25 MG 24 hr tablet Take 3 tablets (75 mg) by mouth daily for 30 days, Disp-90 tablet, R-0, Transitional         CONTINUE these medications which have NOT CHANGED    Details   Acetaminophen (ACETAMIN PO) Take 500 mg by mouth every 4 hours as needed (pain or fever), Historical      albuterol (PROAIR HFA/PROVENTIL HFA/VENTOLIN HFA) 108 (90 Base) MCG/ACT inhaler Inhale 2 puffs into the lungs every 4 hours as needed for shortness of breath / dyspnea or wheezing, Historical      allopurinol (ZYLOPRIM) 300 MG tablet Take 300 mg by mouth daily gout, Historical      budesonide-formoterol (SYMBICORT) 160-4.5 MCG/ACT Inhaler Inhale 2 puffs into the lungs 2 times daily for 30 days, Disp-10 g, R-11, E-Prescribe      colchicine (COLCYRS) 0.6 MG tablet Take 1 tablet (0.6 mg) by mouth daily for 60  days, Disp-60 tablet, R-0, E-Prescribe      DULoxetine (CYMBALTA) 20 MG capsule Take 1 capsule (20 mg) by mouth daily for 30 days, Disp-30 capsule, R-3, E-Prescribe      folic acid (FOLVITE) 1 MG tablet TAKE 1 TABLET BY MOUTH ONCE DAILY EXCEPT  THE  DAY  WHEN  TAKING  METHOTREXATE, Disp-90 tablet, R-0, E-Prescribe      ipratropium - albuterol 0.5 mg/2.5 mg/3 mL (DUONEB) 0.5-2.5 (3) MG/3ML neb solution Take 1 vial (3 mLs) by nebulization every 4 hours as needed for wheezing or shortness of breath / dyspnea, Disp-90 mL, R-0, E-Prescribe      magnesium oxide (MAG-OX) 400 MG tablet Take 400 mg by mouth 2 times daily, Historical      methotrexate sodium 2.5 MG TABS Take 6 tablets (15 mg) by mouth once a week, Disp-72 tablet, R-0, E-Prescribe      pantoprazole (PROTONIX) 40 MG EC tablet Take 40 mg by mouth daily, Historical      pravastatin (PRAVACHOL) 20 MG tablet Take 20 mg by mouth every evening, Historical      predniSONE (DELTASONE) 5 MG tablet Take 1 tablet (5 mg) by mouth daily for 60 days, Disp-60 tablet, R-0, E-Prescribe      rivaroxaban ANTICOAGULANT (XARELTO) 15 MG TABS tablet Take 1 tablet (15 mg) by mouth daily (with dinner), Disp-90 tablet, R-3, E-Prescribe      Vitamin D3 (CHOLECALCIFEROL) 125 MCG (5000 UT) tablet Take 125 mcg by mouth daily, Historical      compressor, for nebulizer Saran [COMPRESSOR, FOR NEBULIZER SARAN] Nebulizer treatment qid prn, Disp-1 Device, R-0, Normal           Allergies   Allergies   Allergen Reactions     Codeine Nausea and Vomiting     Fosamax [Alendronic Acid] Diarrhea

## 2022-12-10 NOTE — PROGRESS NOTES
Patient is a 77 year-old with history of COPD, presenting with increased shortness of breath. Remains on 3 L of baseline oxygen O2 sats greater than 92%. Recent chest x-ray demonstrated pneumonia of the right lower lung. Patient has allergies to penicillin. Recent labs reviewed.    Plan   -Levaquin 750 mg POQ day times five days  -BMP next lab day.    Sandra Alexis CNP

## 2022-12-12 NOTE — PROGRESS NOTES
Staff called after hours with todays lab results including CBC BMP and Preliminary UC positive for Proteus Mirabalis - WBCs elevated to 13, suspect 2/2 UTI    Will give one time dose of Rocephin 1g + lidocaine x1 dose  Recheck CBC Wednesday     Dr Agnes DANIEL DNP

## 2022-12-12 NOTE — PROGRESS NOTES
[facility] GERIATRICS    Chief Complaint   Patient presents with     RECHECK     HPI:  Fatuma Henry is a 77 year old  (1945), who is being seen today for an episodic care visit at: [facility]) [[id number]].     Brief summary: Patient is a 77-year-old female with past medical history of CKD, paroxysmal A. fib, hypertension, COPD on nocturnal oxygen, RA, Gout, and recent admission 11/7 - 11/10, 2022 with acute C.diff infection who was admitted at [facility] from 11/17/2022 - 11/21/2022 after presenting with increased falls and generalized weakness. She had findings of dehydation with YESICA, electrolyte abnormalities, and soft blood pressures. She was also identified to have progressively worsening macrocytic anemia with a decrease in hemoglobin from 11/12--8.8 since September 2022.  Workup was concerning for bone marrow etiology and she was recommended to follow-up as an outpatient once acute medical issues resolve.  She did receive 1 unit of PRBCs for symptomatic anemia on 11/17. Symptoms improved with IV hydration and repletion of electrolytes. Blood pressure meds were adjusted, metoprolol discontinued altogether due to soft bps (PTA dose 100mg/d). Following therapy evaluations, she was referred to TCU for continued rehab and medical management.    While at TCU, she completed treatment with oral vancomycin and diarrhea improved. Her metoprolol was up-titrated to 75mg/d and PTA PRN lasix resumed. She was readmitted at [facility] from 12/1 - 12/4 after she reported waking overnight with chest pressure and sensation of an elephant sitting on her chest with worsening BLE edema, BP 170s. For this, she was referred to ED. EKG was nonischemic, troponin 28--23, BNP 3087, CXR clear. Symptoms were suspected 2/2 mild CHF exacerbation. She was treated with IV diuresis, TTE with preserved LVEF and unchanged from prior. Ultimately was discharged back to TCU without medication changes.    Patient  "is seen today in follow-up. Last visit patient reported 2-3 days of symptoms with generalized malaise, LAGUNAS, fatigue, and nursing facility noted soft blood pressures. She was afebrile without localizing s/s on exam, given clinical history and exam infectious workup was pursued. Patient was identified to have a community-acquired pneumonia as well as UTI. She was started on levaquin over the weekend (12/10) for pneumonia, urine culture today resulted with proteus mirabilis UTI. In visiting with patient, she reports feeling slightly better but is very nervous to be back on antibiotics as that is how she developed Cdiff and ended up in TCU initially. She reports her appetite is slowly improving, she is able to get out of bed and ambulate to chair/bathroom in her room. She continues with malaise and fatigue. Hemodynamics are improved, she remains afebrile. Coughing has been painful for her, she admits to productive cough. She declines cough suppressants at this time.    Allergies, and PMH/PSH reviewed in EPIC today.  REVIEW OF SYSTEMS:  4 point ROS including Respiratory, CV, GI and , other than that noted in the HPI,  is negative    Objective:   /60   Pulse 90   Temp 97.1  F (36.2  C)   Resp 20   Ht 1.676 m (5' 6\")   Wt 66.4 kg (146 lb 4.8 oz)   SpO2 98%   BMI 23.61 kg/m      GEN: well-developed, well-nourished, appears uncomfortable  HEENT: NCAT, EOM intact bilaterally, sclera clear, conjunctiva normal, nose & mouth patent, mucous membranes dry  CHEST: lungs CTA bilaterally; no wheeze, crackles, rhonchi; no increased work of breathing  HEART: RRR, S1 & S2  ABD: soft, nontender, nondistended, no guarding or rigidity, +BS in all 4 quadrants, no CVA tenderness  MSK: AROM bilateral UE/LE, pedal & radial pulses 2+ bilaterally  NEURO: awake, alert, oriented to name, place, and time. CN II-XII grossly intact. Sensation grossly intact to light touch.   SKIN: warm & dry without rash, no pedal edema      Most " Recent 3 CBC's:  Recent Labs   Lab Test 12/12/22  0650 12/04/22  0426 12/02/22  0745   WBC 13.3* 10.1 11.0   HGB 10.1* 10.4* 11.4*   * 105* 106*    448 440     Most Recent 3 BMP's:  Recent Labs   Lab Test 12/12/22  0650 12/08/22  0606 12/04/22  0426    140 137   POTASSIUM 4.4 4.7 4.2   CHLORIDE 105 101 97*   CO2 28 22 32*   BUN 29.0* 31.2* 37.1*   CR 1.24* 1.21* 1.52*   ANIONGAP 8 17* 8   SUNI 10.4* 10.5* 9.8   GLC 81 92 98     Most Recent Urinalysis:  Recent Labs   Lab Test 12/10/22  1300   COLOR Yellow   APPEARANCE Slightly Cloudy*   URINEGLC Negative   URINEBILI Negative   URINEKETONE Negative   SG 1.017   UBLD Trace*   URINEPH 6.5   PROTEIN 20*   NITRITE Negative   LEUKEST Large*   RBCU 6*   WBCU >182*       Assessment/Plan:    CAP  UTI, Proteus mirabilis  Reporting 2-3d history of generalized malaise, anorexia, LAGUNAS and hypoxic with activity. Workup at TCU included CXR which revealed RLL pneumonia, CBC/BMP/liver panel, UA/Uc. Pt initiated on Levaquin 12/10 for CAP. UA c/w infection, UCx today with Proteus. [facility] notified & pt received addt'l dose of IM Rocepin 1gm. CBC with leukocytosis to 13.3.  -Continue on Levaquin daily, appropriate treatment for both CAP and UTI, treatment course of 5 days  -Supplemental oxygen to maintain saturations >90%  -Monitor fever curve, hemodynamics  -Continues on PPI for GI proph     Stomatitis, improving  Reporting painful oral lesions underlying dentures. Improving, has been able to tolerate dentures in place for meals.  -Magic mouthwash PRN  -Orajel PRN     Recurrent falls  Physical deconditioning  Generalized weakness  Likely multifactorial in setting of C.diff colitis, anemia, electrolyte abnormalities, multiple hospitalizations.  -PT/OT  -SW for safe discharge planning     Cdiff colitis, resolved  Diagnosed 11/7, CT with findings of pancolitis. Started on PO vancomycin x14 days. Stools are improving, more formed and less frequent. Has completed course of  Abx.  -Lactobacillus daily while undergoing tx for above  -Monitor symptoms     Anemia, macrocytic  Noted to have progressively worsening macrocytic anemia upon discharge, with decrease in Hgb from 11/12--8.8 in 2mo. Workup suggestive of bone marrow supression from unknown etiology; recurrent infections vs other. Has had colonoscopy screening recently. Folate/B12 values wnl. Iron studies c/w chronic disease. HIV/carlene tests neg. Received 1u PRBCs while inpatient for suspected symptomatic anemia, value at discharge 11.6. Repeat values ranging 10-11 with persistent macrocytosis.  *Peripheral smear from 11/18 neg for hemolysis/atypia, MMA 0.24, SPEP with hypogammaglobulinemia  -Follow-up with hematology following resolution of acute illness, [facility] hematology referral placed     Hypokalemia, resolved  Hypomagnesemia, resolved  Noted while inpatient with C.diff, 2/2 GI losses in setting of above. Resolved at time of discharge, remained wnl during recent hospitalization.  -Continues on Mag Ox 400mg BID     Acute on chronic kidney insufficiency  Baseline Cr 0.7-0.9. Value 1.09 on admission, increased to 1.56 with IV diuresis. Discharge value 1.52. Improving to 1.21 on repeat. Stable at 1.24 today.  -Holding lasix  -Encourage PO intake  -Monitor periodically     Paroxysmal afib  HTN / HLD  Hx pulmonary htn  HFpEF exacerbation, improved  PTA metoprolol 100mg/d, lasix 20mg PRN edema. Pt initially with hypotension in setting of acute Cdiff infection. PTA metoprolol and PRN lasix held. With improvement of acute infection, HRs elevated with palpitations, metoprolol up-titrated to 75mg/d. Received PRN lasix x1 for edema at TCU. Admission as noted above for chest pressure felt 2/2 fluid overload, improved with IV diuresis.   *Weight 150 today, trend is stable. BPs ranging 100-116, HRs 84-94  -Continue metoprolol at 75mg/d, hold parameters for SBP < 95, HR < 55  -Continues on rivaroxaban for CVA proph, statin  -Continue holding lasix  during acute infection, monitor volume status  -Daily weights  -Follow up with cardiology as outpatient, scheduled for 12/21     RA  Without evidence of flare.  -Methotrexate held during acute infection     Gout  Seen by rheumatology in 09/2022 with concerns of gout, started on 60-day prescription for colchicine and prednisone as well as allopurinol loading  -Continue allopurinol daily  -Continue colchicine, prednisone for intended 60d (end date 12/12)     COPD, on nocturnal oxygen (2L)  No evidence of acute exacerbation today. With increased oxygen needs over weekend in setting of CAP.  -Continue oxygen as needed to maintain saturations >90%  -Pulmonary hygiene, encourage OOB, ambulation, IS  -Continues on symbicort, albuterol     GERD  -Continue PPI, omeprazole as above     Depression  -Continue cymbalta    MED REC REQUIRED  Post Medication Reconciliation Status: discharge medications reconciled and changed, per note/orders      Orders:  NNO    Electronically signed by: Jorge Luis Henry PA-C

## 2022-12-12 NOTE — LETTER
12/12/2022        RE: Fatuma Henry  [address]  [address] [place] [address]        [facility] GERIATRICS    Chief Complaint   Patient presents with     RECHECK     HPI:  Fatuma Henry is a 77 year old  (1945), who is being seen today for an episodic care visit at: [facility]) [[id number]].     Brief summary: Patient is a 77-year-old female with past medical history of CKD, paroxysmal A. fib, hypertension, COPD on nocturnal oxygen, RA, Gout, and recent admission 11/7 - 11/10, 2022 with acute C.diff infection who was admitted at [facility] from 11/17/2022 - 11/21/2022 after presenting with increased falls and generalized weakness. She had findings of dehydation with YESICA, electrolyte abnormalities, and soft blood pressures. She was also identified to have progressively worsening macrocytic anemia with a decrease in hemoglobin from 11/12--8.8 since September 2022.  Workup was concerning for bone marrow etiology and she was recommended to follow-up as an outpatient once acute medical issues resolve.  She did receive 1 unit of PRBCs for symptomatic anemia on 11/17. Symptoms improved with IV hydration and repletion of electrolytes. Blood pressure meds were adjusted, metoprolol discontinued altogether due to soft bps (PTA dose 100mg/d). Following therapy evaluations, she was referred to TCU for continued rehab and medical management.    While at TCU, she completed treatment with oral vancomycin and diarrhea improved. Her metoprolol was up-titrated to 75mg/d and PTA PRN lasix resumed. She was readmitted at [facility] from 12/1 - 12/4 after she reported waking overnight with chest pressure and sensation of an elephant sitting on her chest with worsening BLE edema, BP 170s. For this, she was referred to ED. EKG was nonischemic, troponin 28--23, BNP 3087, CXR clear. Symptoms were suspected 2/2 mild CHF exacerbation. She was treated with IV diuresis, TTE with preserved LVEF  "and unchanged from prior. Ultimately was discharged back to TCU without medication changes.    Patient is seen today in follow-up. Last visit patient reported 2-3 days of symptoms with generalized malaise, LAGUNAS, fatigue, and nursing facility noted soft blood pressures. She was afebrile without localizing s/s on exam, given clinical history and exam infectious workup was pursued. Patient was identified to have a community-acquired pneumonia as well as UTI. She was started on levaquin over the weekend (12/10) for pneumonia, urine culture today resulted with proteus mirabilis UTI. In visiting with patient, she reports feeling slightly better but is very nervous to be back on antibiotics as that is how she developed Cdiff and ended up in TCU initially. She reports her appetite is slowly improving, she is able to get out of bed and ambulate to chair/bathroom in her room. She continues with malaise and fatigue. Hemodynamics are improved, she remains afebrile. Coughing has been painful for her, she admits to productive cough. She declines cough suppressants at this time.    Allergies, and PMH/PSH reviewed in EPIC today.  REVIEW OF SYSTEMS:  4 point ROS including Respiratory, CV, GI and , other than that noted in the HPI,  is negative    Objective:   /60   Pulse 90   Temp 97.1  F (36.2  C)   Resp 20   Ht 1.676 m (5' 6\")   Wt 66.4 kg (146 lb 4.8 oz)   SpO2 98%   BMI 23.61 kg/m      GEN: well-developed, well-nourished, appears uncomfortable  HEENT: NCAT, EOM intact bilaterally, sclera clear, conjunctiva normal, nose & mouth patent, mucous membranes dry  CHEST: lungs CTA bilaterally; no wheeze, crackles, rhonchi; no increased work of breathing  HEART: RRR, S1 & S2  ABD: soft, nontender, nondistended, no guarding or rigidity, +BS in all 4 quadrants, no CVA tenderness  MSK: AROM bilateral UE/LE, pedal & radial pulses 2+ bilaterally  NEURO: awake, alert, oriented to name, place, and time. CN II-XII grossly intact. " Sensation grossly intact to light touch.   SKIN: warm & dry without rash, no pedal edema      Most Recent 3 CBC's:  Recent Labs   Lab Test 12/12/22  0650 12/04/22  0426 12/02/22  0745   WBC 13.3* 10.1 11.0   HGB 10.1* 10.4* 11.4*   * 105* 106*    448 440     Most Recent 3 BMP's:  Recent Labs   Lab Test 12/12/22  0650 12/08/22  0606 12/04/22  0426    140 137   POTASSIUM 4.4 4.7 4.2   CHLORIDE 105 101 97*   CO2 28 22 32*   BUN 29.0* 31.2* 37.1*   CR 1.24* 1.21* 1.52*   ANIONGAP 8 17* 8   SUNI 10.4* 10.5* 9.8   GLC 81 92 98     Most Recent Urinalysis:  Recent Labs   Lab Test 12/10/22  1300   COLOR Yellow   APPEARANCE Slightly Cloudy*   URINEGLC Negative   URINEBILI Negative   URINEKETONE Negative   SG 1.017   UBLD Trace*   URINEPH 6.5   PROTEIN 20*   NITRITE Negative   LEUKEST Large*   RBCU 6*   WBCU >182*       Assessment/Plan:    CAP  UTI, Proteus mirabilis  Reporting 2-3d history of generalized malaise, anorexia, LAGUNAS and hypoxic with activity. Workup at TCU included CXR which revealed RLL pneumonia, CBC/BMP/liver panel, UA/Uc. Pt initiated on Levaquin 12/10 for CAP. UA c/w infection, UCx today with Proteus. [facility] notified & pt received addt'l dose of IM Rocepin 1gm. CBC with leukocytosis to 13.3.  -Continue on Levaquin daily, appropriate treatment for both CAP and UTI, treatment course of 5 days  -Supplemental oxygen to maintain saturations >90%  -Monitor fever curve, hemodynamics  -Continues on PPI for GI proph     Stomatitis, improving  Reporting painful oral lesions underlying dentures. Improving, has been able to tolerate dentures in place for meals.  -Magic mouthwash PRN  -Orajel PRN     Recurrent falls  Physical deconditioning  Generalized weakness  Likely multifactorial in setting of C.diff colitis, anemia, electrolyte abnormalities, multiple hospitalizations.  -PT/OT  -SW for safe discharge planning     Cdiff colitis, resolved  Diagnosed 11/7, CT with findings of pancolitis. Started on PO  vancomycin x14 days. Stools are improving, more formed and less frequent. Has completed course of Abx.  -Lactobacillus daily while undergoing tx for above  -Monitor symptoms     Anemia, macrocytic  Noted to have progressively worsening macrocytic anemia upon discharge, with decrease in Hgb from 11/12--8.8 in 2mo. Workup suggestive of bone marrow supression from unknown etiology; recurrent infections vs other. Has had colonoscopy screening recently. Folate/B12 values wnl. Iron studies c/w chronic disease. HIV/carlene tests neg. Received 1u PRBCs while inpatient for suspected symptomatic anemia, value at discharge 11.6. Repeat values ranging 10-11 with persistent macrocytosis.  *Peripheral smear from 11/18 neg for hemolysis/atypia, MMA 0.24, SPEP with hypogammaglobulinemia  -Follow-up with hematology following resolution of acute illness, [facility]ology referral placed     Hypokalemia, resolved  Hypomagnesemia, resolved  Noted while inpatient with C.diff, 2/2 GI losses in setting of above. Resolved at time of discharge, remained wnl during recent hospitalization.  -Continues on Mag Ox 400mg BID     Acute on chronic kidney insufficiency  Baseline Cr 0.7-0.9. Value 1.09 on admission, increased to 1.56 with IV diuresis. Discharge value 1.52. Improving to 1.21 on repeat. Stable at 1.24 today.  -Holding lasix  -Encourage PO intake  -Monitor periodically     Paroxysmal afib  HTN / HLD  Hx pulmonary htn  HFpEF exacerbation, improved  PTA metoprolol 100mg/d, lasix 20mg PRN edema. Pt initially with hypotension in setting of acute Cdiff infection. PTA metoprolol and PRN lasix held. With improvement of acute infection, HRs elevated with palpitations, metoprolol up-titrated to 75mg/d. Received PRN lasix x1 for edema at TCU. Admission as noted above for chest pressure felt 2/2 fluid overload, improved with IV diuresis.   *Weight 150 today, trend is stable. BPs ranging 100-116, HRs 84-94  -Continue metoprolol at 75mg/d, hold  parameters for SBP < 95, HR < 55  -Continues on rivaroxaban for CVA proph, statin  -Continue holding lasix during acute infection, monitor volume status  -Daily weights  -Follow up with cardiology as outpatient, scheduled for 12/21     RA  Without evidence of flare.  -Methotrexate held during acute infection     Gout  Seen by rheumatology in 09/2022 with concerns of gout, started on 60-day prescription for colchicine and prednisone as well as allopurinol loading  -Continue allopurinol daily  -Continue colchicine, prednisone for intended 60d (end date 12/12)     COPD, on nocturnal oxygen (2L)  No evidence of acute exacerbation today. With increased oxygen needs over weekend in setting of CAP.  -Continue oxygen as needed to maintain saturations >90%  -Pulmonary hygiene, encourage OOB, ambulation, IS  -Continues on symbicort, albuterol     GERD  -Continue PPI, omeprazole as above     Depression  -Continue cymbalta    MED REC REQUIRED  Post Medication Reconciliation Status: discharge medications reconciled and changed, per note/orders      Orders:  NNO    Electronically signed by: Jorge Luis Henry PA-C             Sincerely,        Jorge Luis Henry PA-C

## 2022-12-13 PROBLEM — R06.02 SOB (SHORTNESS OF BREATH): Status: ACTIVE | Noted: 2022-01-01

## 2022-12-13 PROBLEM — U07.1 INFECTION DUE TO 2019 NOVEL CORONAVIRUS: Status: ACTIVE | Noted: 2022-01-01

## 2022-12-13 PROBLEM — I48.91 RAPID ATRIAL FIBRILLATION (H): Status: ACTIVE | Noted: 2022-01-01

## 2022-12-13 PROBLEM — R09.02 HYPOXIA: Status: ACTIVE | Noted: 2022-01-01

## 2022-12-13 NOTE — H&P
[facility]    History and Physical - Hospitalist Service       Date of Admission:  12/13/2022    Assessment & Plan      Fatuma Henry is a 77 year old female admitted on 12/13/2022. She presented with SOB.  She was being treated with oral abx for a UTI and Pneumonia at TCU. Found to have acute COVID positive and was hypotensive and hypoxic on presentation.    Acute hypoxic respiratory failure  Acute COVID19 infection  Pneumonia unclear if COVID related or due to HCAP given hospitalized x 2 in last few weeks and resident of TCU  --decadron  --remdesivir  --underlying COPD---treated with solumedrol---transition to decadron as above  --high risk for decompensation  --recent admission for acute heart failure---monitor   # Confirmed COVID-19 infection    # Acute Hypoxic Respiratory Failure secondary to COVID-19 infection     Symptom Onset 12/11/22   Date of 1st Positive Test 12/13/22   Vaccination Status Fully Vaccinated       - COVID-19 special precautions, continuous pulse-ox  - Oxygen: continue current support with nasal cannula at 1-2 L/min; titrate to keep SpO2 between 90-96%  - Labs: Standard COVID admission labs ordered (CBC with diff, CMP, INR, D-dimer, CRP).   - Imaging: no additional imaging needed at this time  - Breathing treatments: albuterol inhaler four times a day scheduled and PRN; avoid nebulizers in favor of MDIs   - IV fluids: not indicated at this time  - Antibiotics: indicated due to concern of bacterial superinfection; zosyn/vanco ordered   - COVID-Focused Medications: Dexamethasone 6 mg x 10 days or until hospital discharge, started on 12/14/22 and Remdesivir x 5 days or until hospital discharge, started on 12/13/22  - DVT Prophylaxis:       - At high risk of thrombotic complications due to COVID-19 (DDimer = 0.72 ug/mL FEU (Ref range: 0.00 - 0.50 ug/mL FEU) )         - Already on therapeutic anticoagulation with DOAC     Chronic kidney disease stage3  --Avoid  nephrotoxins  -- Renally dose medications    Hypertension with hypotension on presentation  --Home medications with as needed hold parameter    Atrial fibrillation  -- Did have RVR on presentation however improved  -- Home medications with as needed hold parameters  -- Continue DOAC    Rheumatoid arthritis  -- Hold methotrexate    Heart failure with preserved ejection fraction  Pulmonary hypertension  -- Recent hospital stay with volume overload related to transfusion with echo on 12/2/22 with an ejection fraction of 60 to 65%  -- Home medications  -- Caution closely given labile volume status with IV fluids given for hypotension on presentation    Recent C. difficile infection  -- Reports intermittent loose stool  -- Monitor    Known pulmonary nodule  -- Being monitored by pulmonary with plan for repeat CT March 2023     Diet: Combination Diet Regular Diet Adult    DVT Prophylaxis: DOAC  Brandon Catheter: Not present  Central Lines: PRESENT  PICC 12/13/22 Triple Lumen Right Brachial vein medial access/medications-Site Assessment: WDL  Cardiac Monitoring: None  Code Status: No CPR- Do NOT Intubate      Clinically Significant Risk Factors Present on Admission         # Hyponatremia: Lowest Na = 134 mmol/L in last 2 days, will monitor as appropriate   # Hypercalcemia: Highest Ca = 10.4 mg/dL in last 2 days, will monitor as appropriate  # Hypomagnesemia: Lowest Mg = 1.4 mg/dL in last 2 days, will replace as needed   # Hypoalbuminemia: Lowest albumin = 3.4 g/dL at 12/13/2022  8:36 AM, will monitor as appropriate  # Drug Induced Coagulation Defect: home medication list includes an anticoagulant medication      # Acute Respiratory Failure: Documented O2 saturation < 91%.  Continue supplemental oxygen as needed             Disposition Plan      Expected Discharge Date: 12/15/2022                The patient's care was discussed with the Bedside Nurse and Patient.    Shannan Bronson MD  Hospitalist Located within [facility]  [facility]  Securely message with the [employer] Web Console (learn more here)  Text page via [employer] [place] ([employer] Paging/Directory         ______________________________________________________________________    Chief Complaint   Shortness of breath    History is obtained from the patient    History of Present Illness   Fatuma Henry is a 77 year old female who is a history of atrial fibrillation, urinary tract infections, COPD on nocturnal oxygen at 3 L, chronic kidney disease and recent diagnosis of pneumonia and UTI 2 days ago at [facility] [place] who presented with shortness of breath.    She had a recent hospitalization in November for C. difficile.  Was found to have anemia at that time and was transfused.  She presented then shortly thereafter to [facility] with acute CHF exacerbation.  She was discharged to transitional care unit where she has been residing.  She was found to have community-acquired pneumonia and UTI on 12/12/2022 and started on Levaquin.  Urinary tract infection has grown Proteus.    She reported on arrival to the emergency room that she had worsening shortness of breath over the night.  She has not noticed any fever or chest pain however.  She has not had any abdominal pain or vomiting.  She notes the cough.  She was hypoxic on arrival requiring initially 6 L oxygen mask and is now down to 2 L nasal cannula.  Her respiratory rate and heart rate have improved.  She was also hypotensive and needed a PICC line and has received judicious fluids totaling approximately 800 cc.    Review of Systems    The 10 point Review of Systems is negative other than noted in the HPI or here.     Past Medical History    I have reviewed this patient's medical history and updated it with pertinent information if needed.   Past Medical History:   Diagnosis Date     Atrial fibrillation (H)      CKD (chronic kidney disease) stage 3, GFR 30-59 ml/min (H)      COPD (chronic obstructive pulmonary disease) (H)      nocturnal oxygen     CRF (chronic renal failure)      GERD (gastroesophageal reflux disease)      Hypertension      Hypovolemic shock (H)      Influenza A 2017     Pulmonary hypertension (H)      Pulmonary nodules      Rheumatoid arthritis (H)      Sepsis (H) 2017       Past Surgical History   I have reviewed this patient's surgical history and updated it with pertinent information if needed.  Past Surgical History:   Procedure Laterality Date     APPENDECTOMY       BLADDER SUSPENSION       CHOLECYSTECTOMY       PICC TRIPLE LUMEN PLACEMENT  2022            Social History   I have reviewed this patient's social history and updated it with pertinent information if needed.  Social History     Tobacco Use     Smoking status: Former     Packs/day: 0.50     Types: Cigarettes     Quit date: 2017     Years since quittin.9     Smokeless tobacco: Never   Substance Use Topics     Alcohol use: No     Drug use: No       Family History   I have reviewed this patient's family history and updated it with pertinent information if needed.  Family History   Problem Relation Age of Onset     Heart Failure Mother      Cerebrovascular Disease Father      Kidney failure Sister      Cerebrovascular Disease Brother      Diabetes Type 2  Brother        Prior to Admission Medications   Prior to Admission Medications   Prescriptions Last Dose Informant Patient Reported? Taking?   Acetaminophen (ACETAMIN PO) Past Month  Yes Yes   Sig: Take 500 mg by mouth every 4 hours as needed (pain or fever)   DULoxetine (CYMBALTA) 20 MG capsule 2022  No Yes   Sig: Take 1 capsule (20 mg) by mouth daily for 30 days   Vitamin D3 (CHOLECALCIFEROL) 125 MCG (5000 UT) tablet 2022  Yes Yes   Sig: Take 125 mcg by mouth daily   albuterol (PROAIR HFA/PROVENTIL HFA/VENTOLIN HFA) 108 (90 Base) MCG/ACT inhaler 2022 at 0300  Yes Yes   Sig: Inhale 2 puffs into the lungs every 4 hours as needed for shortness of breath /  dyspnea or wheezing   allopurinol (ZYLOPRIM) 300 MG tablet 12/12/2022  No Yes   Sig: Take 1 tablet (300 mg) by mouth daily   benzocaine (ANBESOL) 10 % gel Unknown  Yes Yes   Sig: Take by mouth every 6 hours as needed for mouth sores   budesonide-formoterol (SYMBICORT) 160-4.5 MCG/ACT Inhaler 12/12/2022  No Yes   Sig: Inhale 2 puffs into the lungs 2 times daily for 30 days   colchicine (COLCYRS) 0.6 MG tablet 12/12/2022  No Yes   Sig: Take 1 tablet by mouth once daily   compressor, for nebulizer Saran   No No   Sig: [COMPRESSOR, FOR NEBULIZER SARAN] Nebulizer treatment qid prn   folic acid (FOLVITE) 1 MG tablet 12/12/2022 at [place] sundays  No Yes   Sig: TAKE 1 TABLET BY MOUTH ONCE DAILY EXCEPT  THE  DAY  WHEN  TAKING  METHOTREXATE   ipratropium - albuterol 0.5 mg/2.5 mg/3 mL (DUONEB) 0.5-2.5 (3) MG/3ML neb solution Past Month  No Yes   Sig: Take 1 vial (3 mLs) by nebulization every 4 hours as needed for wheezing or shortness of breath / dyspnea   lactobacillus rhamnosus, GG, (CULTURELL) capsule 12/12/2022  Yes Yes   Sig: Take 1 capsule by mouth daily Give 1 capsule while on antibiotics for 7 days (start 12/11/22)   levofloxacin (LEVAQUIN) 500 MG tablet 12/12/2022  Yes Yes   Sig: Take 500 mg by mouth daily   magic mouthwash suspension (diphenhydrAMINE, lidocaine, aluminum-magnesium & simethicone) Past Week  No Yes   Sig: Swish and swallow 10 mLs in mouth every 6 hours as needed for mouth sores   magnesium oxide (MAG-OX) 400 MG tablet 12/12/2022  Yes Yes   Sig: Take 400 mg by mouth 2 times daily   methocarbamol (ROBAXIN) 500 MG tablet Past Week  Yes Yes   Sig: Take 500 mg by mouth daily as needed for muscle spasms   methotrexate sodium 2.5 MG TABS Past Month at Sundays  No Yes   Sig: Take 6 tablets (15 mg) by mouth once a week   metoprolol succinate ER (TOPROL XL) 25 MG 24 hr tablet 12/12/2022  No Yes   Sig: Take 3 tablets (75 mg) by mouth daily for 30 days   omeprazole 20 MG tablet 12/12/2022  Yes Yes   Sig: Take 20 mg  by mouth 2 times daily   pravastatin (PRAVACHOL) 20 MG tablet 12/12/2022  Yes Yes   Sig: Take 20 mg by mouth every evening   predniSONE (DELTASONE) 5 MG tablet 12/12/2022  Yes Yes   Sig: Take 5 mg by mouth daily   rivaroxaban ANTICOAGULANT (XARELTO) 15 MG TABS tablet 12/12/2022  No Yes   Sig: Take 1 tablet (15 mg) by mouth daily (with dinner)      Facility-Administered Medications: None     Allergies   Allergies   Allergen Reactions     Codeine Nausea and Vomiting     Fosamax [Alendronic Acid] Diarrhea     Morphine Nausea and Vomiting       Physical Exam   Vital Signs: Temp: 98.5  F (36.9  C) Temp src: Axillary BP: 122/59 Pulse: 97   Resp: 15 SpO2: 96 % O2 Device: Nasal cannula Oxygen Delivery: 1 LPM  Weight: 0 lbs 0 oz    Physical Exam  Vitals and nursing note reviewed.   Constitutional:       General: She is not in acute distress.     Appearance: Normal appearance. She is normal weight. She is ill-appearing. She is not toxic-appearing or diaphoretic.   HENT:      Head: Normocephalic and atraumatic.      Right Ear: External ear normal.      Left Ear: External ear normal.      Nose: Nose normal.      Mouth/Throat:      Mouth: Mucous membranes are moist.      Pharynx: Oropharynx is clear.   Eyes:      Conjunctiva/sclera: Conjunctivae normal.   Cardiovascular:      Rate and Rhythm: Tachycardia present.      Pulses: Normal pulses.      Heart sounds: No murmur heard.  Pulmonary:      Comments: First visit---diffuse expiratory wheezes, Second visit---lungs more clear and does not appear in distress however during visit begins to comment how she isn't feeling better and starts purse-lip breathing with saturations 100% on 1L.  Abdominal:      General: Bowel sounds are normal. There is no distension.      Palpations: Abdomen is soft.      Tenderness: There is no abdominal tenderness.   Musculoskeletal:         General: No swelling or tenderness.      Cervical back: Normal range of motion and neck supple. No rigidity.       Right lower leg: No edema.      Left lower leg: No edema.   Skin:     General: Skin is warm and dry.      Capillary Refill: Capillary refill takes less than 2 seconds.   Neurological:      General: No focal deficit present.      Mental Status: She is alert and oriented to person, place, and time. Mental status is at baseline.      Cranial Nerves: No cranial nerve deficit.      Motor: No weakness.           Data   Data reviewed today: I reviewed all medications, new labs and imaging results over the last 24 hours. I personally reviewed the EKG tracing showing A. fib with RVR with a rate of 135 no acute ST or T wave changes heart rate incidentally has slowed on the telemetry monitor and is approximately 100 during my visits.    Recent Labs   Lab 12/13/22  1803 12/13/22  0836 12/12/22  0650 12/08/22  0606   WBC  --  12.2* 13.3*  --    HGB  --  10.7* 10.1*  --    MCV  --  105* 106*  --    PLT  --  329 367  --    INR 1.62*  --   --   --    NA  --  134* 141 140   POTASSIUM  --  4.2 4.4 4.7   CHLORIDE  --  95* 105 101   CO2  --  26 28 22   BUN  --  23.4* 29.0* 31.2*   CR  --  1.25* 1.24* 1.21*   ANIONGAP  --  13 8 17*   SUNI  --  10.1 10.4* 10.5*   GLC  --  108* 81 92   ALBUMIN  --  3.4* 3.0*  --    PROTTOTAL  --  6.0* 5.4*  --    BILITOTAL  --  0.3 0.2  --    ALKPHOS  --  187* 163*  --    ALT  --  20 18  --    AST  --  30 25  --    LIPASE  --  29  --   --      12.2 (H)    \    10.7 (L)    /    329   N 79    L N/A    134 (L)    95 (L)    23.4 (H) /   ------------------------------------ 108 (H)   ALT 20   AST 30    (H)   ALB 3.4 (L)   Ca 10.1  4.2    26    1.25 (H) \    % RETIC N/A    LDH N/A  Troponin N/A    BNP N/A    CK N/A  INR 1.62 (H)   PTT N/A    D-dimer 0.72 (H)    Fibrinogen N/A    Antithrombin N/A  Ferritin N/A  CRP 22.10 (H)    IL-6 N/A  Recent Results (from the past 24 hour(s))   Chest XR,  PA & LAT    Narrative    EXAM: XR CHEST 2 VIEWS  LOCATION: [facility]  DATE/TIME:  12/13/2022 9:50 AM    INDICATION: SOB  COMPARISON: None.      Impression    IMPRESSION: There is subtle increased opacity over the heart on the lateral view which is consistent with right middle lobe early pneumonia. There is bronchial thickening in both lower lungs. No pneumothorax or pleural effusion normal heart size and   pulmonary vascularity.    NOTE: ABNORMAL REPORT    THE DICTATION ABOVE DESCRIBES AN ABNORMALITY FOR WHICH FOLLOW-UP IS NEEDED.    XR Chest Port 1 View    Narrative    EXAM: XR CHEST PORT 1 VIEW  LOCATION: [facility]  DATE/TIME: 12/13/2022 12:04 PM    INDICATION: RN placed PICC   verify tip placement  COMPARISON: 12/13/2022, 0936 hours      Impression    IMPRESSION: Right upper extremity PICC terminates at the SVC-RA junction. Middle lobe opacity is not as well visualized without a lateral view. No pneumothorax or pleural effusion. Normal cardiomediastinal silhouette.

## 2022-12-13 NOTE — PROCEDURES
"PICC Line Insertion Procedure Note  Pt. Name: Fatuma Henry  MRN:        [id number]    Procedure: Insertion of a  triple Lumen  5 fr  Bard SOLO (valved) Power PICC, Lot number [id number]    Indications: access/medications/potential vasopressors    Contraindications : none    Procedure Details     Patient identified with 2 identifiers and \"Time Out\" conducted.  .     Central line insertion bundle followed: hand hygiene performed prior to procedure, site cleansed with cholraprep, hat, mask, sterile gloves, sterile gown worn, patient draped with maximum barrier head to toe drape, sterile field maintained.    The vein was assessed and found to be compressible and of adequate size.     Lidocaine 1% 2 ml administered sq to the insertion site. A 5 Fr PICC was inserted into the BRACHIAL vein of the right arm with ultrasound guidance. 1 attempt(s) required to access vein.   Catheter threaded with difficulty. DIFFICULTY PASSING PICC THROUGH THE SHOULDER AREA. Good blood return noted.    Modified Seldinger Technique used for insertion.    The 8 sharps that are included in the PICC insertion kit were accounted for and disposed of in the sharps container prior to breakdown of the sterile field.    Catheter secured with Statlock, biopatch and Tegaderm dressing applied.    Findings:    Total catheter length  40 cm, with 1 cm exposed. Mid upper arm circumference is 30 cm. Catheter was flushed with 30 cc NS. Patient  tolerated procedure well.    Tip placement verified by xray. Xray read by Dr. Junior . Tip placement at the SVC-RA junction.    CLABSI prevention brochure left at bedside.    Patient's primary RN notified PICC is ready for use.      Comments:        Hollie Lombardo RN  Vascular Access - [facility]        "

## 2022-12-13 NOTE — ED NOTES
Pt's BP decreased and IV infiltrated.  Attempted US guided IV x 1 and was unsuccessful.  Provider talked to pt about PICC insertion and was able to get consent.  Pt denies pain and feels that breathing is better

## 2022-12-13 NOTE — PHARMACY-VANCOMYCIN DOSING SERVICE
"Pharmacy Vancomycin Initial Note  Date of Service 2022  Patient's  1945  77 year old, female    Indication: [facility] Pneumonia    Current estimated CrCl = Estimated Creatinine Clearance: 40.1 mL/min (A) (based on SCr of 1.25 mg/dL (H)).    Creatinine for last 3 days  2022:  6:50 AM Creatinine 1.24 mg/dL  2022:  8:36 AM Creatinine 1.25 mg/dL    Recent Vancomycin Level(s) for last 3 days  No results found for requested labs within last 72 hours.      Vancomycin IV Administrations (past 72 hours)                   vancomycin (VANCOCIN) 1,250 mg in sodium chloride 0.9 % 250 mL intermittent infusion (mg) 1,250 mg New Bag 22 1042                Nephrotoxins and other renal medications (From now, onward)    Start     Dose/Rate Route Frequency Ordered Stop    22 1800  piperacillin-tazobactam (ZOSYN) 3.375 g vial to attach to  mL bag        Note to Pharmacy: For [facility], [facility] and [facility]: For Zosyn-naive patients, use the \"Zosyn initial dose + extended infusion\" order panel.    3.375 g  over 240 Minutes Intravenous EVERY 8 HOURS 22 1704            Contrast Orders - past 72 hours (72h ago, onward)    None          [employer] Prediction of Planned Initial Vancomycin Regimen  Loading dose: N/A  Regimen: 1000 mg IV every 24 hours.  Start time: 10:00 on 2022  Exposure target: AUC24 (range)400-600 mg/L.hr   AUC24,ss: 517 mg/L.hr  Probability of AUC24 > 400: 78 %  Ctrough,ss: 16.3 mg/L  Probability of Ctrough,ss > 20: 30 %  Probability of nephrotoxicity (Lodise ARIA ): 12 %        Plan:  1. Start vancomycin  1000 mg IV q24h.   2. Vancomycin monitoring method: AUC  3. Vancomycin therapeutic monitoring goal: 400-600 mg*h/L  4. Pharmacy will check vancomycin levels as appropriate in 1-3 Days.    5. Serum creatinine levels will be ordered daily for the first week of therapy and at least twice weekly for subsequent weeks.      Maria Fernanda Powell, [facility]    "

## 2022-12-13 NOTE — ED NOTES
Bed: JNED-06  Expected date: 12/13/22  Expected time: 7:22 AM  Means of arrival: Ambulance  Comments:  77 F Sob Pneumonia  [facility]

## 2022-12-13 NOTE — TELEPHONE ENCOUNTER
RECORDS STATUS - ALL OTHER DIAGNOSIS      RECORDS RECEIVED FROM: [facility]   DATE RECEIVED: 12/13   NOTES STATUS DETAILS   OFFICE NOTE from referring provider Jorge Luis Pack PA-C in  GERIATRICS   DISCHARGE SUMMARY from hospital Epic/[facility] 12/1/22, 11/7/22, 4/20/22, 1/17/22, 12/23/17: Epic    11/17/22: [place]   DISCHARGE REPORT from the ER Epic/[facility] - [place] 12/13/22, 11/2/22, 9/16/22, 9/13/22: Epic    10/7/22, 1/17/22: [place]   MEDICATION LIST [facility]    LABS     ANYTHING RELATED TO DIAGNOSIS Epic 12/12/22

## 2022-12-13 NOTE — ED NOTES
Writer spoke with daughter Enid to give update, daughter was grateful for information and will attempt to call pt on her cell phone.

## 2022-12-13 NOTE — ED NOTES
PICC in process for insertion.  Dr. Acevedo informed of improved BP after total of 500 ml IV NS bolus given.  Agreed to hold the last 250 ml NS bolus ordered and just infuse if BP decreases.

## 2022-12-13 NOTE — ED TRIAGE NOTES
Arrived [facility] from [place].  Dx to have pneumonia and still has cdiff.  Hx of COPD. Afib.  O2 sats were 70's at 4 LPMper NC and now 10 LPM mask NB and LS diminished on right side.  Pt purselip breathing.  Has productive yellowish to greenish phlegm.  covid was negative

## 2022-12-13 NOTE — ED PROVIDER NOTES
EMERGENCY DEPARTMENT ENCOUNTER      NAME: Fatuma Henry  AGE: 77 year old female  YOB: 1945  MRN: [id number]  EVALUATION DATE & TIME: 2022  7:27 AM    PCP: Tory Wise    ED PROVIDER: Ivet Acevedo M.D.      Chief Complaint   Patient presents with     Shortness of Breath       FINAL IMPRESSION:    1. Infection due to 2019 novel coronavirus    2. Hypoxia    3. SOB (shortness of breath)    4. Hypomagnesemia    5. Sepsis, due to unspecified organism, unspecified whether acute organ dysfunction present (H)    6. Rapid atrial fibrillation (H)    7. Pneumonia of left lung due to infectious organism, unspecified part of lung          MEDICAL DECISION MAKIN year old female COPD, CHF, A. fib, who presents the emergency department with shortness of breath.  When patient first arrived she was very short of breath requiring oxime mask.  We have since been able to wean her down to nasal cannula oxygen.  She received IV antibiotics, recent hospitalization so shows hospital-acquired pneumonia coverage and sepsis, IV fluids, and oxygen supplementation.  She looks significantly better.  She has been weaned down from 6 L oxygen mask on to 2 L nasal cannula.  Blood pressure and heart rate have improved.  At this time we feel she is safe for cardiac telemetry admission.  PICC line was placed when patient was still having hypoglycemia and nursing was having a difficult time getting an adequate IV.  At this time she is not requiring pressors.  Patient agrees with the plan for admission and all of her questions have been answered.      ED COURSE:  7:59 AM  I met with the patient to gather history and perform my exam. ED course and treatment discussed.    10:28 AM  Patient has been consented for a PICC line.  Nursing having difficulty getting IV access and patient persistently hypotensive.  She has a tenuous cardiac status and has recently been hospitalized for fluid overload and  therefore need to be judicious with IV fluids in combination with her sepsis.  She will likely need pressors.  After discussion with the patient the plan is to place a PICC line and she gave verbal consent.  She was able to understand the risks and benefits.  I will update family as well and this plan. RN was present at bedside.    10:45 AM  Patient's family is here in the waiting room. I updated Samantha and Lisbeth on the results and plan.  They understand her condition and since she is COVID-positive we are limiting visitors in the room.  They had been with the patient initially when she first arrived and were able to see her.  They are aware that she may require ICU level care and may even require transfer to another facility to look for a bed and they are in agreement and understand.  I spoke with him about the PICC line and they agree with the patient's decision.    12:01 PM  Discussed with Dr. Nolasco, intensivist.  At this time patient appears to be having adequate response to therapies and we feel the patient does not require ICU level care and can go to cardiac telemetry monitored bed.  Certainly if her condition changes or worsens we can reassess at that time.    12:08 PM  Rechecked and reevaluated the patient. She is looking a lot better. I updated her on the results and plan. She says she is willing to transfer anywhere in the [facility] for a cardiac tele bed.    1:25 PM   Discussed with Dr. Bronson, hospitalist, who accepts patient for admission.  There are no beds available in the [facility] within or outside her health system.  She was accepted by our hospitalist will go to cardiac telemetry bed.  This is probably a little MAC multifactorial, but probably COVID and bacterial pneumonia as the headliner cause of her symptoms.  There could also be a component of COPD exacerbation as well and therefore she was given Solu-Medrol.  Also BNP a bit elevated though she is requiring IV fluids at this time and even with the  IV fluids her respiratory status continues to improve and blood pressure and heart rate improved.  Therefore I think heart failure is not playing a primary role at this point though certainly we will have to continue to monitor her fluid status.  Ultimately her rapid A. fib corrected itself with IV fluids.  She never received diltiazem or any other rate control medicines.    I do not think that this represents rib fractures, allergic reaction, myocarditis, pericarditis, endocarditis, ACS, PE, ruptured AAA, pneumothorax, aortic dissection, bowel obstruction, or other such etiologies at this time.      COVID-19 PPE worn during patient evaluation:  Mask: n95 and homemade masks   Eye Protection: goggles   Gown: none   Hair cover: yes  Face shield: none   Patient wearing a mask: no    At the conclusion of the encounter I discussed the results of all of the tests and the disposition. Their questions were answered. The patient (and any family present) acknowledged understanding and were agreeable with the care plan.        Total critical care time: 60 minutes  Critical care time was exclusive of separately billable procedures and treating other patients.  Critical care was necessary to treat or prevent imminent or life-threatening deterioration of the following conditions: hypotension, rapid atrial fib, hypoxia  Critical care was time spent personally by me on the following activities: development of treatment plan with patient or surrogate, discussions with consultants, examination of patient, evaluation of patient's response to treatment, obtaining history from patient or surrogate, ordering and performing treatments and interventions, ordering and review of laboratory studies, ordering and review of radiographic studies and re-evaluation of patient's condition, this excludes any separately billable procedures.      CONSULTANTS:  Intensivist - Dr. Nolasco  Hospitalist - Dr. Bronson      MEDICATIONS GIVEN IN THE  EMERGENCY:  Medications   remdesivir 200 mg in sodium chloride 0.9 % 250 mL intermittent infusion (200 mg Intravenous New Bag 12/13/22 1244)     Followed by   0.9% sodium chloride BOLUS (has no administration in time range)   remdesivir 100 mg in sodium chloride 0.9 % 250 mL intermittent infusion (has no administration in time range)     And   0.9% sodium chloride BOLUS (has no administration in time range)   magnesium sulfate 2 g in water intermittent infusion (2 g Intravenous New Bag 12/13/22 1244)   lidocaine 1 % 0.1-5 mL (2 mLs Other Given 12/13/22 1115)   lidocaine (LMX4) cream (has no administration in time range)   sodium chloride (PF) 0.9% PF flush 10-40 mL (has no administration in time range)   0.9% sodium chloride BOLUS (has no administration in time range)   methylPREDNISolone sodium succinate (solu-MEDROL) injection 125 mg (125 mg Intravenous Given 12/13/22 0826)   0.9% sodium chloride BOLUS (0 mLs Intravenous Stopped 12/13/22 1041)   piperacillin-tazobactam (ZOSYN) 3.375 g vial to attach to  mL bag (3.375 g Intravenous Given 12/13/22 0852)   vancomycin (VANCOCIN) 1,250 mg in sodium chloride 0.9 % 250 mL intermittent infusion (0 mg Intravenous Stopped 12/13/22 1211)   0.9% sodium chloride BOLUS (0 mLs Intravenous Stopped 12/13/22 1155)         NEW PRESCRIPTIONS STARTED AT TODAY'S ER VISIT     Medication List      ASK your doctor about these medications    omeprazole 20 MG tablet  Ask about: Which instructions should I use?            CONDITION:  Stable, improving      DISPOSITION:  Card tele ip as accepted by Dr. Bronson, hospitalist      =================================================================  =================================================================  TRIAGE ASSESSMENT:  Arrived Via [facility] from TCU.  Dx to have pneumonia and still has cdiff.  Hx of COPD. Afib.  O2 sats were 70's at 4 LPMper NC and now 10 LPM mask NB and LS diminished on right side.  Pt purselip  breathing.  Has productive yellowish to greenish phlegm.  covid was negative    ED Triage Vitals   Enc Vitals Group      BP       Pulse       Resp       Temp       Temp src       SpO2       Weight       Height       Head Circumference       Peak Flow       Pain Score       Pain Loc       Pain Edu?       Excl. in GC?         ================================================================  ================================================================    HPI    Patient information was obtained from: patient, family, and chart review    Use of Intrepreter: N/A     Fatuma JAZMIN Henry is a 77 year old female with history of afib, PNA, UTIs, COPD on nocturnal oxygen, CKD,  who presents to the ER with complaints of shortness of breath.     Per chart review, patient with acute C. diff infection was admitted to [facility] from 11/7 - 11/10/22 after presenting with increased falls and generalized weakness. She had findings of dehydation with YESICA, electrolyte abnormalities, and soft blood pressures. She was also identified to have progressively worsening macrocytic anemia with a decrease in hemoglobin from 11/12--8.8 since September 2022.  Workup was concerning for bone marrow etiology and she was recommended to follow-up as an outpatient once acute medical issues resolve.  She did receive 1 unit of PRBCs for symptomatic anemia on 11/17. Symptoms improved with IV hydration and repletion of electrolytes. Blood pressure meds were adjusted, metoprolol discontinued altogether due to soft bps (PTA dose 100mg/d). Following therapy evaluations, she was referred to TCU for continued rehab and medical management.    While at TCU, she completed treatment with oral vancomycin and diarrhea improved. Her metoprolol was up-titrated to 75mg/d and PTA PRN lasix resumed. She was readmitted at [facility] from 12/1 - 12/4 after she reported waking overnight with chest pressure and sensation of an elephant sitting on her chest with  worsening BLE edema, BP 170s. For this, she was referred to ED. EKG was nonischemic, troponin 28--23, BNP 3087, CXR clear, no obvious pulmonary edema. Symptoms were suspected 2/2 mild CHF exacerbation. She was treated with IV diuresis, TTE with preserved LVEF and unchanged from prior. Ultimately was discharged back to TCU on 12/4 without medication changes.    Patient was evaluated in TCU on 12/5/22 for follow up, she was feeling well and without ongoing symptoms, had been able to wean to room air for the majority of daytime hours. Evaluated again on 12/8 and reported that over the past few days she had been experiencing symptoms of fatigue, generalized malaise, and L[place]S with increased oxygen needs upon physical activity. COVID-19 and Influenza swabs have been negative. She denied recurrent symptoms of chest pressure/pain. Evaluated again yesterday (12/12) at TCU, infectious workup pursued after previous workup and was identified to have a community-acquired pneumonia as well as UTI. Started on Levaquin on 12/10 for pneumonia, urine culture resulted with proteus mirabilis UTI.     Patient reports that her breathing became worse last night.  She is on antibiotics for UTI possible pneumonia.  She reports that she has C. difficile but is no longer on those antibiotics.     She otherwise denies any fevers, chest pain, abdominal pain, vomiting.  She does have cough and shortness of breath.  She uses oxygen at nighttime but is now requiring it during the day.  By EMS she did receive nebulizer treatments.      REVIEW OF SYSTEMS  Review of Systems   Constitutional: Negative for fever.   Respiratory: Positive for cough and shortness of breath.    Cardiovascular: Negative for chest pain.   Gastrointestinal: Negative for abdominal pain, nausea and vomiting.   Skin: Negative for rash.   Allergic/Immunologic: Negative for immunocompromised state.   All other systems reviewed and are negative.      PAST MEDICAL HISTORY:  Past  Medical History:   Diagnosis Date     Atrial fibrillation (H)      CKD (chronic kidney disease) stage 3, GFR 30-59 ml/min (H)      COPD (chronic obstructive pulmonary disease) (H)     nocturnal oxygen     CRF (chronic renal failure)      GERD (gastroesophageal reflux disease)      Hypertension      Hypovolemic shock (H)      Influenza A 12/01/2017     Pulmonary hypertension (H)      Pulmonary nodules      Rheumatoid arthritis (H)      Sepsis (H) 12/24/2017       PAST SURGICAL HISTORY:  Past Surgical History:   Procedure Laterality Date     APPENDECTOMY       BLADDER SUSPENSION       CHOLECYSTECTOMY       PICC TRIPLE LUMEN PLACEMENT  12/13/2022            CURRENT MEDICATIONS:    Prior to Admission medications    Medication Sig Start Date End Date Taking? Authorizing Provider   Acetaminophen (ACETAMIN PO) Take 500 mg by mouth every 4 hours as needed (pain or fever)    Reported, Patient   albuterol (PROAIR HFA/PROVENTIL HFA/VENTOLIN HFA) 108 (90 Base) MCG/ACT inhaler Inhale 2 puffs into the lungs every 4 hours as needed for shortness of breath / dyspnea or wheezing    Reported, Patient   allopurinol (ZYLOPRIM) 300 MG tablet Take 1 tablet (300 mg) by mouth daily 12/9/22   Bobby Rush MBBS   budesonide-formoterol (SYMBICORT) 160-4.5 MCG/ACT Inhaler Inhale 2 puffs into the lungs 2 times daily for 30 days 8/29/22 12/1/22  Parth Rosario MD   colchicine (COLCYRS) 0.6 MG tablet Take 1 tablet by mouth once daily 12/9/22   Bobby Rush MBBS   compressor, for nebulizer Eva [COMPRESSOR, FOR NEBULIZER EVA] Nebulizer treatment qid prn 12/29/17   Michaela Maddox MD   DULoxetine (CYMBALTA) 20 MG capsule Take 1 capsule (20 mg) by mouth daily for 30 days 9/21/22 12/1/22  Bobby Rush MBBS   folic acid (FOLVITE) 1 MG tablet TAKE 1 TABLET BY MOUTH ONCE DAILY EXCEPT  THE  DAY  WHEN  TAKING  METHOTREXATE  Patient taking differently: Take 1 mg by mouth daily Daily 8/22/22   Bobby Rush MBBS   ipratropium - albuterol 0.5  mg/2.5 mg/3 mL (DUONEB) 0.5-2.5 (3) MG/3ML neb solution Take 1 vial (3 mLs) by nebulization every 4 hours as needed for wheezing or shortness of breath / dyspnea 22   Cookie Leigh DO   levofloxacin (LEVAQUIN) 750 MG tablet Take 750 mg by mouth daily    Reported, Patient   magic mouthwash suspension (diphenhydrAMINE, lidocaine, aluminum-magnesium & simethicone) Swish and swallow 10 mLs in mouth every 6 hours as needed for mouth sores 22   Jorge Luis Henry PA-C   magnesium oxide (MAG-OX) 400 MG tablet Take 400 mg by mouth 2 times daily    Reported, Patient   methotrexate sodium 2.5 MG TABS Take 6 tablets (15 mg) by mouth once a week 22   Bobby Rush MBBS   metoprolol succinate ER (TOPROL XL) 25 MG 24 hr tablet Take 3 tablets (75 mg) by mouth daily for 30 days 22  Mary Pascal MD   omeprazole (PRILOSEC) 20 MG DR capsule Take 20 mg by mouth 2 times daily    Reported, Patient   pravastatin (PRAVACHOL) 20 MG tablet Take 20 mg by mouth every evening 3/2/15   Provider, Historical   rivaroxaban ANTICOAGULANT (XARELTO) 15 MG TABS tablet Take 1 tablet (15 mg) by mouth daily (with dinner) 22   Teressa Cespedes APRN CNP   Vitamin D3 (CHOLECALCIFEROL) 125 MCG (5000 UT) tablet Take 125 mcg by mouth daily    Reported, Patient         ALLERGIES:  Allergies   Allergen Reactions     Codeine Nausea and Vomiting     Fosamax [Alendronic Acid] Diarrhea     Morphine Nausea and Vomiting         FAMILY HISTORY:  Family History   Problem Relation Age of Onset     Heart Failure Mother      Cerebrovascular Disease Father      Kidney failure Sister      Cerebrovascular Disease Brother      Diabetes Type 2  Brother          SOCIAL HISTORY:  Social History     Socioeconomic History     Marital status:    Tobacco Use     Smoking status: Former     Packs/day: 0.50     Types: Cigarettes     Quit date: 2017     Years since quittin.9     Smokeless tobacco: Never   Substance and Sexual  Activity     Alcohol use: No     Drug use: No     Sexual activity: Not Currently         VITALS:  Patient Vitals for the past 24 hrs:   BP Temp Temp src Pulse Resp SpO2   12/13/22 1315 106/54 -- -- 90 22 94 %   12/13/22 1309 -- -- -- -- -- 99 %   12/13/22 1307 -- -- -- -- -- (!) 88 %   12/13/22 1242 -- -- -- 93 -- --   12/13/22 1241 -- -- -- 94 -- --   12/13/22 1223 -- -- -- 96 -- --   12/13/22 1215 93/64 -- -- 100 23 100 %   12/13/22 1208 -- -- -- 101 -- 100 %   12/13/22 1145 124/54 -- -- (!) 144 -- --   12/13/22 1130 113/55 -- -- 117 18 98 %   12/13/22 1115 95/50 -- -- 112 16 98 %   12/13/22 1113 -- -- -- 98 -- --   12/13/22 1100 113/51 -- -- 114 -- --   12/13/22 1030 90/53 -- -- (!) 126 22 95 %   12/13/22 0915 98/55 -- -- (!) 123 (!) 38 99 %   12/13/22 0900 96/68 -- -- (!) 128 24 100 %   12/13/22 0850 93/61 -- -- (!) 122 26 100 %   12/13/22 0825 106/60 -- -- (!) 136 29 100 %   12/13/22 0800 100/58 -- -- (!) 126 29 100 %   12/13/22 0746 -- 98.5  F (36.9  C) Axillary -- (!) 35 97 %   12/13/22 0745 98/54 -- -- (!) 129 (!) 33 97 %   12/13/22 0740 -- -- -- (!) 135 (!) 42 (!) 89 %   12/13/22 0735 -- -- -- (!) 136 29 99 %   12/13/22 0733 -- -- -- (!) 130 -- --   12/13/22 0730 118/67 -- -- (!) 158 (!) 41 --       Wt Readings from Last 3 Encounters:   12/12/22 66.4 kg (146 lb 4.8 oz)   12/08/22 67.7 kg (149 lb 3.2 oz)   12/05/22 68.4 kg (150 lb 12.8 oz)       Estimated Creatinine Clearance: 40.1 mL/min (A) (based on SCr of 1.25 mg/dL (H)).    PHYSICAL EXAM    Constitutional:  Well developed, Well nourished, NAD, GCS 15  HENT:  Normocephalic, Atraumatic, Bilateral external ears normal, Nose normal. Neck- Supple, No stridor.   Eyes:  PERRL, EOMI, Conjunctiva normal, No discharge.  Respiratory:  Normal breath sounds, moderate respiratory distress, No wheezing, Speaks full sentences easily. No cough.   Cardiovascular:  tachy heart rate, irregular rhythm, No rubs, No gallops. Chest wall nontender.  GI:  No excessive obesity.   Bowel sounds normal, Soft, No tenderness, No masses, No flank tenderness. No rebound or guarding.   : deferred  Musculoskeletal: 2+ DP pulses.  No cyanosis, No clubbing. Good range of motion in all major joints. No major deformities noted.   Integument:  Warm, Dry, No erythema, No rash.  No petechiae.   Neurologic:  Alert & oriented x 3, No focal deficits noted.   Psychiatric:  Affect normal, Cooperative         LAB:  All pertinent labs reviewed and interpreted.  Recent Results (from the past 24 hour(s))   Symptomatic Influenza A/B & SARS-CoV2 (COVID-19) Virus PCR Multiplex Nose    Collection Time: 12/13/22  8:14 AM    Specimen: Nose; Swab   Result Value Ref Range    Influenza A PCR Negative Negative    Influenza B PCR Negative Negative    RSV PCR Negative Negative    SARS CoV2 PCR Positive (A) Negative   Basic metabolic panel    Collection Time: 12/13/22  8:36 AM   Result Value Ref Range    Sodium 134 (L) 136 - 145 mmol/L    Potassium 4.2 3.4 - 5.3 mmol/L    Chloride 95 (L) 98 - 107 mmol/L    Carbon Dioxide (CO2) 26 22 - 29 mmol/L    Anion Gap 13 7 - 15 mmol/L    Urea Nitrogen 23.4 (H) 8.0 - 23.0 mg/dL    Creatinine 1.25 (H) 0.51 - 0.95 mg/dL    Calcium 10.1 8.8 - 10.2 mg/dL    Glucose 108 (H) 70 - 99 mg/dL    GFR Estimate 44 (L) >60 mL/min/1.73m2   Procalcitonin    Collection Time: 12/13/22  8:36 AM   Result Value Ref Range    Procalcitonin 0.11 (H) <0.05 ng/mL   Nt probnp inpatient    Collection Time: 12/13/22  8:36 AM   Result Value Ref Range    N terminal Pro BNP Inpatient 3,889 (H) 0 - 1,800 pg/mL   Troponin T, High Sensitivity (now)    Collection Time: 12/13/22  8:36 AM   Result Value Ref Range    Troponin T, High Sensitivity 35 (H) <=14 ng/L   Extra Blue Top Tube    Collection Time: 12/13/22  8:36 AM   Result Value Ref Range    Hold Specimen [facility]    Extra Green Top (Lithium Heparin) Tube    Collection Time: 12/13/22  8:36 AM   Result Value Ref Range    Hold Specimen [facility]    Extra Purple Top Tube     Collection Time: 12/13/22  8:36 AM   Result Value Ref Range    Hold Specimen [facility]    CBC with platelets and differential    Collection Time: 12/13/22  8:36 AM   Result Value Ref Range    WBC Count 12.2 (H) 4.0 - 11.0 10e3/uL    RBC Count 3.30 (L) 3.80 - 5.20 10e6/uL    Hemoglobin 10.7 (L) 11.7 - 15.7 g/dL    Hematocrit 34.5 (L) 35.0 - 47.0 %     (H) 78 - 100 fL    MCH 32.4 26.5 - 33.0 pg    MCHC 31.0 (L) 31.5 - 36.5 g/dL    RDW 14.6 10.0 - 15.0 %    Platelet Count 329 150 - 450 10e3/uL    % Neutrophils 79 %    % Lymphocytes 5 %    % Monocytes 12 %    % Eosinophils 0 %    % Basophils 1 %    % Immature Granulocytes 3 %    NRBCs per 100 WBC 0 <1 /100    Absolute Neutrophils 10.0 (H) 1.6 - 8.3 10e3/uL    Absolute Lymphocytes 0.6 (L) 0.8 - 5.3 10e3/uL    Absolute Monocytes 1.5 (H) 0.0 - 1.3 10e3/uL    Absolute Eosinophils 0.0 0.0 - 0.7 10e3/uL    Absolute Basophils 0.1 0.0 - 0.2 10e3/uL    Absolute Immature Granulocytes 0.4 <=0.4 10e3/uL    Absolute NRBCs 0.0 10e3/uL   Lactic acid whole blood    Collection Time: 12/13/22  8:36 AM   Result Value Ref Range    Lactic Acid 1.7 0.7 - 2.0 mmol/L   Hepatic function panel    Collection Time: 12/13/22  8:36 AM   Result Value Ref Range    Protein Total 6.0 (L) 6.4 - 8.3 g/dL    Albumin 3.4 (L) 3.5 - 5.2 g/dL    Bilirubin Total 0.3 <=1.2 mg/dL    Alkaline Phosphatase 187 (H) 35 - 104 U/L    AST 30 10 - 35 U/L    ALT 20 10 - 35 U/L    Bilirubin Direct <0.20 0.00 - 0.30 mg/dL   Lipase    Collection Time: 12/13/22  8:36 AM   Result Value Ref Range    Lipase 29 13 - 60 U/L   Magnesium    Collection Time: 12/13/22  8:36 AM   Result Value Ref Range    Magnesium 1.4 (L) 1.7 - 2.3 mg/dL       No results found for: ABORH        RADIOLOGY:  Reviewed all pertinent imaging. Please see official radiology report.    XR Chest Port 1 View   Final Result   IMPRESSION: Right upper extremity PICC terminates at the SVC-RA junction. Middle lobe opacity is not as well visualized without a lateral  view. No pneumothorax or pleural effusion. Normal cardiomediastinal silhouette.      Chest XR,  PA & LAT   Final Result   IMPRESSION: There is subtle increased opacity over the heart on the lateral view which is consistent with right middle lobe early pneumonia. There is bronchial thickening in both lower lungs. No pneumothorax or pleural effusion normal heart size and    pulmonary vascularity.      NOTE: ABNORMAL REPORT      THE DICTATION ABOVE DESCRIBES AN ABNORMALITY FOR WHICH FOLLOW-UP IS NEEDED.             EKG:    Indication: SOB    Performed at: 07:37am  Impression: Atrial fibrillation at 135 bpm.  Flipped T waves noted in lead aVR and possibly V1.  QRS 76, QTc 429 ms.  EKG shows atrial fibrillation at this time when she was in prior sinus rhythm on December 1, 2022, otherwise morphology of QRS intervals do appear similar.  No obvious ST elevation today.      I have independently reviewed and interpreted the EKG(s) documented above.        PROCEDURES:  PICC line by RN    Medical Decision Making    History:    Supplemental history from: N/A    External Record(s) reviewed: Documented in HPI, if applicable.    Work Up:    Chart documentation includes differential considered and any EKGs or imaging interpreted by provider.    In additional to work up documented, I considered the following work up: See chart documentation, if applicable.    External consultation:    Discussion of management with another provider: See chart documentation, if applicable    Complicating factors:    Care impacted by chronic illness: Other: h/o CHF, because of this history I was judicious with IV fluids and did in 250 cc increments of resuscitation.    Care affected by social determinants of health: N/A    Disposition considerations: Admit.        Andrea MENARD, am serving as a scribe to document services personally performed by Dr. Ivet Acevedo based on my observation and the provider's statements to me. I, Dr. Ivet Acevedo,  MD attest that Andrea Ayala is acting in a scribe capacity, has observed my performance of the services and has documented them in accordance with my direction.        Ivet Acevedo M.D. [facility]  Emergency Medicine and Medical Toxicology  [facility] EMERGENCY DEPARTMENT  [address] [place] [id number] [phone]  Dept: [phone]           Ivet Acevedo MD  12/13/22 5539

## 2022-12-13 NOTE — PHARMACY-ADMISSION MEDICATION HISTORY
Pharmacy Note - Admission Medication History    Pertinent Provider Information: Utilized MAR from facility ([facility]). Note that pt received IM Ceftriaxone 1 gram on 12/12/22 for possible UTI.   ______________________________________________________________________    Prior To Admission (PTA) med list completed and updated in EMR.       PTA Med List   Medication Sig Note Last Dose     Acetaminophen (ACETAMIN PO) Take 500 mg by mouth every 4 hours as needed (pain or fever)  Past Month     albuterol (PROAIR HFA/PROVENTIL HFA/VENTOLIN HFA) 108 (90 Base) MCG/ACT inhaler Inhale 2 puffs into the lungs every 4 hours as needed for shortness of breath / dyspnea or wheezing  12/13/2022 at 0300     allopurinol (ZYLOPRIM) 300 MG tablet Take 1 tablet (300 mg) by mouth daily  12/12/2022     benzocaine (ANBESOL) 10 % gel Take by mouth every 6 hours as needed for mouth sores  Unknown     budesonide-formoterol (SYMBICORT) 160-4.5 MCG/ACT Inhaler Inhale 2 puffs into the lungs 2 times daily for 30 days  12/12/2022     colchicine (COLCYRS) 0.6 MG tablet Take 1 tablet by mouth once daily  12/12/2022     DULoxetine (CYMBALTA) 20 MG capsule Take 1 capsule (20 mg) by mouth daily for 30 days  12/12/2022     folic acid (FOLVITE) 1 MG tablet TAKE 1 TABLET BY MOUTH ONCE DAILY EXCEPT  THE  DAY  WHEN  TAKING  METHOTREXATE 12/13/2022: Daily except Sundays 12/12/2022 at [facility] sundays     ipratropium - albuterol 0.5 mg/2.5 mg/3 mL (DUONEB) 0.5-2.5 (3) MG/3ML neb solution Take 1 vial (3 mLs) by nebulization every 4 hours as needed for wheezing or shortness of breath / dyspnea  Past Month     lactobacillus rhamnosus, GG, (CULTURELL) capsule Take 1 capsule by mouth daily Give 1 capsule while on antibiotics for 7 days (start 12/11/22) 12/13/2022: Give 1 capsule while on antibiotics for 7 days (start 12/11/22) 12/12/2022     levofloxacin (LEVAQUIN) 500 MG tablet Take 500 mg by mouth daily 12/13/2022: 12/10/22 - 12/14/22 course  12/12/2022     magic mouthwash suspension (diphenhydrAMINE, lidocaine, aluminum-magnesium & simethicone) Swish and swallow 10 mLs in mouth every 6 hours as needed for mouth sores  Past Week     magnesium oxide (MAG-OX) 400 MG tablet Take 400 mg by mouth 2 times daily  12/12/2022     methocarbamol (ROBAXIN) 500 MG tablet Take 500 mg by mouth daily as needed for muscle spasms  Past Week     methotrexate sodium 2.5 MG TABS Take 6 tablets (15 mg) by mouth once a week 12/13/2022: Dose held by provider on 12/11/252 Past Month at Sundays     metoprolol succinate ER (TOPROL XL) 25 MG 24 hr tablet Take 3 tablets (75 mg) by mouth daily for 30 days  12/12/2022     omeprazole 20 MG tablet Take 20 mg by mouth 2 times daily 12/13/2022: Switched from pantoprazole to omeprazole on 12/12/22 12/12/2022     pravastatin (PRAVACHOL) 20 MG tablet Take 20 mg by mouth every evening  12/12/2022     predniSONE (DELTASONE) 5 MG tablet Take 5 mg by mouth daily  12/12/2022     rivaroxaban ANTICOAGULANT (XARELTO) 15 MG TABS tablet Take 1 tablet (15 mg) by mouth daily (with dinner)  12/12/2022     Vitamin D3 (CHOLECALCIFEROL) 125 MCG (5000 UT) tablet Take 125 mcg by mouth daily  12/12/2022       Information source(s): Facility ([facility] medication list/MAR  Method of interview communication: N/A    Summary of Changes to PTA Med List  New: -  Discontinued: -  Changed: -    Patient was asked about OTC/herbal products specifically.  PTA med list reflects this.    In the past week, patient estimated taking medication this percent of the time:  greater than 90%.    Allergies were reviewed, assessed, and updated with the patient.      Patient did not bring any medications to the hospital and can't retrieve from home. No multi-dose medications are available for use during hospital stay.     The information provided in this note is only as accurate as the sources available at the time of the update(s).    Thank you for the opportunity to participate in  the care of this patient.    Elizabeth Gustafson, PharmD  12/13/2022 10:43 AM

## 2022-12-13 NOTE — PHARMACY-VANCOMYCIN DOSING SERVICE
"Pharmacy Vancomycin Initial Note  Date of Service 2022  Patient's  1945  77 year old, female    Indication: Sepsis    Current estimated CrCl = Estimated Creatinine Clearance: 40.5 mL/min (A) (based on SCr of 1.24 mg/dL (H)).    Creatinine for last 3 days  2022:  6:50 AM Creatinine 1.24 mg/dL    Recent Vancomycin Level(s) for last 3 days  No results found for requested labs within last 72 hours.      Vancomycin IV Administrations (past 72 hours)      No vancomycin orders with administrations in past 72 hours.                Nephrotoxins and other renal medications (From now, onward)    Start     Dose/Rate Route Frequency Ordered Stop    22 0900  vancomycin (VANCOCIN) 1,250 mg in sodium chloride 0.9 % 250 mL intermittent infusion         1,250 mg  over 90 Minutes Intravenous ONCE 22 0842      22 0830  piperacillin-tazobactam (ZOSYN) 3.375 g vial to attach to  mL bag        Note to Pharmacy: For [facility], [facility] and [facility]: For Zosyn-naive patients, use the \"Zosyn initial dose + extended infusion\" order panel.    3.375 g  over 30 Minutes Intravenous ONCE 22 0807            Contrast Orders - past 72 hours (72h ago, onward)    None              Plan:  1. Start vancomycin 1250 mg IV once.   2. Please reconsult pharmacy to dose once admitted if needed.    Elizabeth Gustafson, PharmD    "

## 2022-12-14 NOTE — ED NOTES
Bed: JNED-22  Expected date:   Expected time:   Means of arrival:   Comments:  ED 6 transferring here

## 2022-12-14 NOTE — ED NOTES
Received report, care assumed of pt. Assessment completed, pt rests in bed w/o distress, denies pain, call light in reach, will continue to monitor.

## 2022-12-14 NOTE — ED NOTES
[facility] ED Handoff Report    ED Chief Complaint: SOB    ED Diagnosis:  (U07.1) Infection due to 2019 novel coronavirus  (R09.02) Hypoxia  (R06.02) SOB (shortness of breath)  (E83.42) Hypomagnesemia  (A41.9) Sepsis, due to unspecified organism, unspecified whether acute organ dysfunction present (H)  (I48.91) Rapid atrial fibrillation (H)  (J18.9) Pneumonia of left lung due to infectious organism, unspecified part of lung         PMH:    Past Medical History:   Diagnosis Date    Atrial fibrillation (H)     CKD (chronic kidney disease) stage 3, GFR 30-59 ml/min (H)     COPD (chronic obstructive pulmonary disease) (H)     nocturnal oxygen    CRF (chronic renal failure)     GERD (gastroesophageal reflux disease)     Hypertension     Hypovolemic shock (H)     Influenza A 12/01/2017    Pulmonary hypertension (H)     Pulmonary nodules     Rheumatoid arthritis (H)     Sepsis (H) 12/24/2017        Code Status:  No CPR- Do NOT Intubate     Falls Risk: Yes Band: Applied    Current Living Situation/Residence: lives in a skilled nursing facility     Elimination Status: Continent: Purewick     Activity Level: SBA w/ walker    Patients Preferred Language:  English     Needed: No    Vital Signs:  /58 (BP Location: Left arm, Patient Position: Semi-Sy's)   Pulse 80   Temp 97.8  F (36.6  C) (Oral)   Resp 15   SpO2 95%      Cardiac Rhythm: A-fib    Pain Score: 0/10    Is the Patient Confused:  No    Last Food or Drink: 12/14/22    Focused Assessment:  Pt alert and able to appropriately verbalize needs. Pt has hx of COPD. Uses 1 LPM O2 in the day and 3 LPM O2 via NC at HS. Triple lumen PICC and peripheral IV. LS coarse. IV lasix x1 today. Purewick in place. Pt stated she ambulates at home with a 4 wheel walker. Pt hasn't been ambulating in ER.     Tests Performed: Done: Labs and Imaging    Treatments Provided:  Rest/Abx    Family Dynamics/Concerns: No    Family Updated On Visitor Policy: Yes    Plan  of Care Communicated to Family: Yes    Who Was Updated about Plan of Care: Pt will update family    Belongings Checklist Done and Signed by Patient: Yes    Medications sent with patient: N/A    Covid: symptomatic, positive    Additional Information: Pt lives in TCU has hx of COPD    RN: Geri Carrera RN 12/14/2022 5:24 PM

## 2022-12-14 NOTE — CONSULTS
Care Management Initial Consult    General Information  Assessment completed with: Family,    Type of CM/SW Visit: Initial Assessment    Primary Care Provider verified and updated as needed: Yes   Readmission within the last 30 days:           Advance Care Planning:            Communication Assessment  Patient's communication style: spoken language (English or Bilingual)             Cognitive  Cognitive/Neuro/Behavioral: WDL, orientation        Orientation: oriented x 4             Living Environment:   People in home: spouse     Current living Arrangements: condominium      Able to return to prior arrangements: no       Family/Social Support:  Care provided by: spouse/significant other  Provides care for: no one  Marital Status:   , Children          Description of Support System: Supportive    Support Assessment: Adequate family and caregiver support    Current Resources:   Patient receiving home care services: No     Community Resources: None  Equipment currently used at home: walker, rolling  Supplies currently used at home: Oxygen Tubing/Supplies    Employment/Financial:  Employment Status:          Financial Concerns:     Referral to Financial Worker: No       Lifestyle & Psychosocial Needs:  Social Determinants of Health     Tobacco Use: Medium Risk     Smoking Tobacco Use: Former     Smokeless Tobacco Use: Never     Passive Exposure: Not on file   Alcohol Use: Not on file   Financial Resource Strain: Not on file   Food Insecurity: Not on file   Transportation Needs: Not on file   Physical Activity: Not on file   Stress: Not on file   Social Connections: Not on file   Intimate Partner Violence: Not on file   Depression: Not on file   Housing Stability: Not on file       Functional Status:  Prior to admission patient needed assistance:   Dependent ADLs:: Independent  Dependent IADLs:: Independent       Mental Health Status:  Mental Health Status: No Current Concerns       Chemical Dependency  Status:  Chemical Dependency Status: No Current Concerns             Values/Beliefs:  Spiritual, Cultural Beliefs, Shinto Practices, Values that affect care:                 Additional Information:  Chart reviewed. Patient comes from TCU at [facility]. Patient did not hold her bed, per daughter Enid and facility.   Anticipate pt will be able to return to the TCU pending acceptance and bed availability- CM will need to send a new referral.      At baseline, pt lives with her , she does use oxygen and has a portable tank-unknown oxygen company.    Daily Chavira, ROMYSW

## 2022-12-14 NOTE — ED NOTES
Pt reports gradual worsening of SOB. Given PRN albuterol inhaler with some relief. MD informed via text page.

## 2022-12-14 NOTE — PROGRESS NOTES
Hospitalist Progress Note        CODE STATUS:  No CPR- Do NOT Intubate    Assessment/Plan:  Fatuma Henry is a 77 year old female with history of Atrial fibrillation on xarelto, COPD on nocturnal O2 of 3LPM, CKD, recurrent UTIs who presented on 12/13 from TCU w/ SOB. She was being treated with oral abx for a UTI and Pneumonia at TCU. Found to have acute COVID positive and was hypotensive and hypoxic on presentation.     Acute hypoxic respiratory failure  Acute COVID- 19 infection  Hx of COPD  - Sx started 12/11. Covid tested positive 12/13. Fully vaccinated.   - Currently requiring 2 LPM  - Continue Decadron x 10 days (started 12/13)  - Continue Remdesivir x 5 days   - Pneumonia unclear if COVID related or due to HCAP given hospitalized x 2 in last few weeks and resident of TCU  - Continue Vanc/Zosyn for now. Pending MRSA  & clinical progress, will de-escalate  - Already on DOAC   - Wean O2 as tolerated  - Continue Isolation      Chronic kidney disease stage 3  - Avoid nephrotoxins  - Renally dose medications     Hypertension with hypotension on presentation  - Monitor. BP readings seem to now be higher.     Atrial fibrillation  - RVR on presentation however improved  - Continue Toprol XL w/ hold parameters  - Continue DOAC     UTI   - Started on PO Levaquin on 12/12 for UTI/PNA. UTI w/ proteus.  - Abx as above    Rheumatoid arthritis  - Hold methotrexate     Heart failure with preserved ejection fraction  Pulmonary hypertension  - Recent hospital stay with volume overload related to transfusion with echo on 12/2/22 with an ejection fraction of 60 to 65%  - Home medications  - Caution closely given labile volume status with IV fluids given for hypotension on presentation  - Will give lasix 40 mg x 1 now to help move her towards drier side      Recent C. difficile infection  - Reported intermittent loose stool  - Monitor     Known pulmonary nodule  - Being monitored by pulmonary with plan for repeat CT March  2023      DVT Prophylaxis: On Xarelto    Disposition/Barrier to discharge; Not medically stable. Likely back to TCU at discharge.       Subjective:  Interval History:   Patient seen and examined.  Encounter was hindered by frequent coughing which seems very distressing to her.   She feels her breathing is worse that when she came in.  Reports chest pain with coughing.  She is afebrile.     Review of Systems:   As mentioned in subjective.    Patient Active Problem List   Diagnosis     Seropositive rheumatoid arthritis (H)     Osteoporosis dxa 2006      High risk medication use     Rheumatoid arthritis involving multiple sites with positive rheumatoid factor (H)     Dyspnea     Reactive airway disease     Paroxysmal atrial fibrillation (H)     Nonsustained ventricular tachycardia     CRF (chronic renal failure), stage 3 (moderate) (H)     Primary osteoarthritis involving multiple joints     Benign essential hypertension     Pulmonary nodules     COPD exacerbation (H)     Asthma without status asthmaticus     Chronic kidney disease, stage 3a (H)     History of 2019 novel coronavirus disease (COVID-19)     DNR (do not resuscitate)     Chronic anticoagulation     Personal history of immunosuppressive therapy     Hypokalemia     Hypomagnesemia     Diarrhea, unspecified type     SOB (shortness of breath)     Hypoxia     Rapid atrial fibrillation (H)     Sepsis, due to unspecified organism, unspecified whether acute organ dysfunction present (H)     Infection due to 2019 novel coronavirus       Scheduled Meds:    remdesivir  100 mg Intravenous Q24H    And     sodium chloride 0.9%  50 mL Intravenous Q24H     allopurinol  300 mg Oral Daily     colchicine  0.6 mg Oral Daily     dexamethasone  6 mg Oral Daily     DULoxetine  20 mg Oral Daily     fluticasone-vilanterol  1 puff Inhalation Daily     folic acid  1 mg Oral Daily     lactobacillus rhamnosus (GG)  1 capsule Oral Daily     magnesium oxide  400 mg Oral BID      metoprolol succinate ER  75 mg Oral Daily     pantoprazole  40 mg Oral BID AC     piperacillin-tazobactam  3.375 g Intravenous Q8H     pravastatin  20 mg Oral QPM     rivaroxaban ANTICOAGULANT  15 mg Oral Daily with supper     sodium chloride (PF)  3 mL Intracatheter Q8H     vancomycin  1,000 mg Intravenous Q24H     Continuous Infusions:    - MEDICATION INSTRUCTIONS -       PRN Meds:.acetaminophen (TYLENOL) tablet 500 mg, albuterol, lidocaine 4%, lidocaine 4%, lidocaine (buffered or not buffered), lidocaine (buffered or not buffered), lidocaine visc 2% 2.5mL/5mL & maalox/mylanta w/ simeth 2.5mL/5mL & diphenhydrAMINE 5mg/5mL, - MEDICATION INSTRUCTIONS -, methocarbamol, ondansetron **OR** ondansetron, sodium chloride (PF), sodium chloride (PF)    Objective:  Vital signs in last 24 hours:  Pulse:  [] 82  Resp:  [14-38] 20  BP: ()/(46-99) 162/72  SpO2:  [79 %-100 %] 99 %      Physical Exam:  General: In NAD  HEENT: NCAT & EOMI  CV: Fast  rate  Lungs: Scattered wheezes/rhonchi  Abdomen: Soft, NT, ND, +BS  Extremities: No LE edema b/l  Neuro: AAOx3      Lab Results:    Recent Results (from the past 24 hour(s))   UA with Microscopic reflex to Culture    Collection Time: 12/13/22  4:04 PM    Specimen: Urine, Catheter   Result Value Ref Range    Color Urine Light Yellow Colorless, Straw, Light Yellow, Yellow    Appearance Urine Turbid (A) Clear    Glucose Urine Negative Negative mg/dL    Bilirubin Urine Negative Negative    Ketones Urine Negative Negative mg/dL    Specific Gravity Urine 1.020 1.001 - 1.030    Blood Urine 0.03 mg/dL (A) Negative    pH Urine 5.5 5.0 - 7.0    Protein Albumin Urine Negative Negative mg/dL    Urobilinogen Urine <2.0 <2.0 mg/dL    Nitrite Urine Negative Negative    Leukocyte Esterase Urine 25 Lorenzo/uL (A) Negative    Bacteria Urine Few (A) None Seen /HPF    Mucus Urine Present (A) None Seen /LPF    RBC Urine 1 <=2 /HPF    WBC Urine 1 <=5 /HPF    Squamous Epithelials Urine 1 <=1 /HPF     Hyaline Casts Urine 1 <=2 /LPF   INR    Collection Time: 12/13/22  6:03 PM   Result Value Ref Range    INR 1.62 (H) 0.85 - 1.15   D dimer quantitative    Collection Time: 12/13/22  6:03 PM   Result Value Ref Range    D-Dimer Quantitative 0.72 (H) 0.00 - 0.50 ug/mL FEU   Troponin T, High Sensitivity    Collection Time: 12/13/22  6:03 PM   Result Value Ref Range    Troponin T, High Sensitivity 34 (H) <=14 ng/L   CBC with platelets    Collection Time: 12/14/22  6:35 AM   Result Value Ref Range    WBC Count 8.1 4.0 - 11.0 10e3/uL    RBC Count 3.35 (L) 3.80 - 5.20 10e6/uL    Hemoglobin 10.9 (L) 11.7 - 15.7 g/dL    Hematocrit 35.1 35.0 - 47.0 %     (H) 78 - 100 fL    MCH 32.5 26.5 - 33.0 pg    MCHC 31.1 (L) 31.5 - 36.5 g/dL    RDW 14.6 10.0 - 15.0 %    Platelet Count 298 150 - 450 10e3/uL   Basic metabolic panel    Collection Time: 12/14/22  6:35 AM   Result Value Ref Range    Sodium 137 136 - 145 mmol/L    Potassium 4.3 3.4 - 5.3 mmol/L    Chloride 101 98 - 107 mmol/L    Carbon Dioxide (CO2) 25 22 - 29 mmol/L    Anion Gap 11 7 - 15 mmol/L    Urea Nitrogen 24.6 (H) 8.0 - 23.0 mg/dL    Creatinine 1.12 (H) 0.51 - 0.95 mg/dL    Calcium 9.6 8.8 - 10.2 mg/dL    Glucose 115 (H) 70 - 99 mg/dL    GFR Estimate 51 (L) >60 mL/min/1.73m2   CRP inflammation    Collection Time: 12/14/22  6:35 AM   Result Value Ref Range    CRP Inflammation 33.00 (H) <5.00 mg/L   D dimer quantitative    Collection Time: 12/14/22  6:35 AM   Result Value Ref Range    D-Dimer Quantitative 0.77 (H) 0.00 - 0.50 ug/mL FEU   Hepatic panel    Collection Time: 12/14/22  6:35 AM   Result Value Ref Range    Protein Total 5.7 (L) 6.4 - 8.3 g/dL    Albumin 3.0 (L) 3.5 - 5.2 g/dL    Bilirubin Total 0.2 <=1.2 mg/dL    Alkaline Phosphatase 157 (H) 35 - 104 U/L    AST 24 10 - 35 U/L    ALT 16 10 - 35 U/L    Bilirubin Direct <0.20 0.00 - 0.30 mg/dL       Serum Glucose range:   Recent Labs   Lab 12/14/22  0635 12/13/22  0836 12/12/22  0650 12/08/22  0606   *  108* 81 92     ABG: No lab results found in last 7 days.  CBC:   Recent Labs   Lab 12/14/22  0635 12/13/22  0836 12/12/22  0650   WBC 8.1 12.2* 13.3*   HGB 10.9* 10.7* 10.1*   HCT 35.1 34.5* 32.8*   * 105* 106*    329 367   NEUTROPHIL  --  79 76   LYMPH  --  5 9   MONOCYTE  --  12 9   EOSINOPHIL  --  0 1     Chemistry:   Recent Labs   Lab 12/14/22  0635 12/13/22  0836 12/12/22  0650    134* 141   POTASSIUM 4.3 4.2 4.4   CHLORIDE 101 95* 105   CO2 25 26 28   BUN 24.6* 23.4* 29.0*   CR 1.12* 1.25* 1.24*   GFRESTIMATED 51* 44* 45*   SUNI 9.6 10.1 10.4*   MAG  --  1.4*  --    PROTTOTAL 5.7* 6.0* 5.4*   ALBUMIN 3.0* 3.4* 3.0*   AST 24 30 25   ALT 16 20 18   ALKPHOS 157* 187* 163*   BILITOTAL 0.2 0.3 0.2     Coags:  Recent Labs   Lab 12/13/22  1803   INR 1.62*     Cardiac Markers:  No results for input(s): CKTOTAL, TROPONINI in the last 168 hours.               12/14/2022   Lydia Gold MD  Hospitalist  Pager: [phone]

## 2022-12-15 NOTE — PROGRESS NOTES
Admitted from the Emergency room with chief complaints  Of SOB    And Hypotension  BP on transfer was  90/55  Verbalized that she had fallen 7 times in the last 6 months  encouraged to call with activity  Patient on the purewick   claimed that she had been having an intermittent  Diarrhea placed on the  Enteric precoution    As she also had a Hx of  C diff,

## 2022-12-15 NOTE — PROGRESS NOTES
"CLINICAL NUTRITION SERVICES - ASSESSMENT NOTE     Nutrition Prescription    RECOMMENDATIONS FOR MDs/PROVIDERS TO ORDER:    Malnutrition Status:    Does not meet 2 criteria for malnutrition    Recommendations already ordered by Registered Dietitian (RD):  Pt declines nutritional supplements at this time  Encouraged po intake    Future/Additional Recommendations:  Follow po intake, weight, labs, poc     REASON FOR ASSESSMENT  Fatuma Henry is a/an 77 year old female assessed by the dietitian for Admission Nutrition Risk Screen for positive weight loss and poor po PTA    Pt with a past medical history of A fib (on xarelto), COPD (on nocturnal O2 of 3L), CKD, recurrent UTIs who was admitted on 12/13 from TCU with shortness of breath,  She was being treated with oral antibiotics for a UTI and pneumonia at TCU and was found to have acute COVID infection. She was also hypotensive and hypoxic on admit.    NUTRITION HISTORY  Pt states she followed a low sodium diet because her  was on it.  She states she thinks she lost weight due to stressors and feels she was eating healthier.  She did not take any protein drinks or supplements.  She states having a weight loss of 30-40 pounds in the last 6-8 months     CURRENT NUTRITION ORDERS  Diet: Regular  Intake/Tolerance: pt states she's eating well. Per records, she ate 100% lunch on 12/14/2022    LABS  Labs reviewed: Na 135, K 3.8, urea nitrogen 34.6, Cr 1.18, Mg 1.6, CRP 17.7, Glu 115, 118    MEDICATIONS  Medications reviewed: decadron, folic acid, culturell, Mag-Ox, pantoprazole, zosyn, remdesivir, vancocin    ANTHROPOMETRICS  Height: 5'6\"  Most Recent Weight: 68.4 kg (150 lb 11.2 oz)    IBW: 59 kg  BMI: Normal BMI  Weight History:   Wt Readings from Last 20 Encounters:   12/15/22 68.4 kg (150 lb 11.2 oz)   12/12/22 66.4 kg (146 lb 4.8 oz)   12/08/22 67.7 kg (149 lb 3.2 oz)   12/05/22 68.4 kg (150 lb 12.8 oz)   12/04/22 68.1 kg (150 lb 1.6 oz)   11/29/22 68.9 kg (152 lb) "   11/25/22 69.4 kg (152 lb 14.4 oz)   11/22/22 72.6 kg (160 lb)   11/10/22 72.7 kg (160 lb 3.2 oz)   11/02/22 70.3 kg (155 lb)   10/12/22 69.4 kg (153 lb)   09/28/22 69.4 kg (153 lb)   09/21/22 69.6 kg (153 lb 6.4 oz)   09/19/22 69.5 kg (153 lb 4.8 oz)   09/16/22 68 kg (150 lb)   09/13/22 70.8 kg (156 lb)   08/29/22 68.9 kg (152 lb)   07/20/22 72.7 kg (160 lb 3.2 oz)   05/25/22 75.8 kg (167 lb)   05/05/22 73 kg (161 lb)   Per above, pt has lost 11 lb over the last 7 months (6.8% weight loss) which is not clinically significant    Dosing Weight: 68.4 kg    ASSESSED NUTRITION NEEDS  Estimated Energy Needs: 1710 kcals/day (25 Kcal/Kg)  Justification: Maintenance  Estimated Protein Needs: 68 grams protein/day (1 g/Kg)  Justification: Maintenance  Estimated Fluid Needs: 6201-6341 mL/day (25 - 30 mL/kg)   Justification: Maintenance    PHYSICAL FINDINGS  See malnutrition section below.  Skin: WDL  Dmitriy score=17, nutrition noted as adequate  GI: diarrhea  Last BM 12/14/2022 per nurse    MALNUTRITION:  % Weight Loss:  Weight loss does not meet criteria for malnutrition   % Intake:  Decreased intake does not meet criteria for malnutrition Pt states she was eating healthier  Subcutaneous Fat Loss:  Unable to assess due to COVID  Muscle Loss:  Unable to assess  Fluid Retention:  Mild 2+    Malnutrition Diagnosis: Patient does not meet two of the above criteria necessary for diagnosing malnutrition    NUTRITION DIAGNOSIS  Unintended weight loss related to eating healthier as evidenced by weight loss in the past 7 months      INTERVENTIONS  Implementation  Continue current diet  Pt declines nutritional supplements at this time and feels she can meet her nutritional needs with current diet    Goals  Patient to consume % of nutritionally adequate meals three times per day, or the equivalent with supplements/snacks.     Monitoring/Evaluation  Progress toward goals will be monitored and evaluated per protocol.

## 2022-12-15 NOTE — PLAN OF CARE
Problem: Infection  Goal: Absence of Infection Signs and Symptoms  Outcome: Not Progressing     Problem: Dysrhythmia  Goal: Normalized Cardiac Rhythm  Outcome: Not Progressing   Goal Outcome Evaluation:    Cardiac:  A-Fib with rate control/RVR.  Trending 's.  Respiratory:  Rate 16-20, non-labored.  Sat's: Maintaining mid 90's at RA.  LS: diminished in the bases, bilat.  Neuro:  WNL.  GI: Passing flatus.  BS: Present X4 quadrants.  Multiple loose stools throughout HS/NOC.   :  Good UOP (see I/O).  Pain: Denies.  Ambulation:  Up ad ricarda with SBA X1.  Lab:  AM labs pending.  Plan:  IV Abs.  Remdesivir X5 day course.  Decadron X10 day course.

## 2022-12-15 NOTE — PROGRESS NOTES
Hospitalist Progress Note        CODE STATUS:  No CPR- Do NOT Intubate    Assessment/Plan:  Fatuma Henry is a 77 year old female with history of Atrial fibrillation on xarelto, COPD on nocturnal O2 of 3LPM, CKD, recurrent UTIs who presented on 12/13 from TCU w/ SOB. She was being treated with oral abx for a UTI and Pneumonia at TCU. Found to have acute COVID positive and was hypotensive and hypoxic on presentation.     Acute hypoxic respiratory failure  Acute COVID- 19 infection  Hx of COPD  - Sx started 12/11. Covid tested positive 12/13. Fully vaccinated.   - Symptomatically & clinically improved today  - Currently requiring 2 LPM but can likely be weaned down or off.  - Continue Decadron x 10 days (started 12/13)  - Continue Remdesivir x 5 days   - Pneumonia likely HCAP given hospitalized x 2 in last few weeks and resident of TCU  - MRSA neg so vancomycin discontinued. Continue Zosyn for now.   - Already on DOAC   - Wean O2 as tolerated  - Continue Isolation      Chronic kidney disease stage 3  - Avoid nephrotoxins  - Renally dose medications     Hypertension with hypotension on presentation  - BP readings now acceptable     Atrial fibrillation  - RVR on presentation however improved  - Continue Toprol XL w/ hold parameters  - Continue DOAC     UTI   - Started on PO Levaquin on 12/12 for UTI/PNA. UTI w/ proteus.  - Abx as above    Hypomagnesemia  - Replace with IV    Rheumatoid arthritis  - Hold methotrexate     Heart failure with preserved ejection fraction  Pulmonary hypertension  - Recent hospital stay with volume overload related to transfusion with echo on 12/2/22 with an ejection fraction of 60 to 65%  - Monitor      Recent C. difficile infection  - Reported intermittent loose stool. Less likely acute c-diff infection but will monitor     Known pulmonary nodule  - Being monitored by pulmonary with plan for repeat CT March 2023      DVT Prophylaxis: On Xarelto    Disposition/Barrier to discharge; Not  medically stable. Likely back to TCU at discharge.       Subjective:  Interval History:   Patient seen and examined.  No events overnight.  She is feeling remarkably better today.     Review of Systems:   As mentioned in subjective.    Patient Active Problem List   Diagnosis     Seropositive rheumatoid arthritis (H)     Osteoporosis dxa 2006      High risk medication use     Rheumatoid arthritis involving multiple sites with positive rheumatoid factor (H)     Dyspnea     Reactive airway disease     Paroxysmal atrial fibrillation (H)     Nonsustained ventricular tachycardia     CRF (chronic renal failure), stage 3 (moderate) (H)     Primary osteoarthritis involving multiple joints     Benign essential hypertension     Pulmonary nodules     COPD exacerbation (H)     Asthma without status asthmaticus     Chronic kidney disease, stage 3a (H)     History of 2019 novel coronavirus disease (COVID-19)     DNR (do not resuscitate)     Chronic anticoagulation     Personal history of immunosuppressive therapy     Hypokalemia     Hypomagnesemia     Diarrhea, unspecified type     SOB (shortness of breath)     Hypoxia     Rapid atrial fibrillation (H)     Sepsis, due to unspecified organism, unspecified whether acute organ dysfunction present (H)     Infection due to 2019 novel coronavirus       Scheduled Meds:    remdesivir  100 mg Intravenous Q24H    And     sodium chloride 0.9%  50 mL Intravenous Q24H     allopurinol  300 mg Oral Daily     colchicine  0.6 mg Oral Daily     dexamethasone  6 mg Oral Daily     DULoxetine  20 mg Oral Daily     fluticasone-vilanterol  1 puff Inhalation Daily     folic acid  1 mg Oral Daily     lactobacillus rhamnosus (GG)  1 capsule Oral Daily     magnesium oxide  400 mg Oral BID     magnesium sulfate  2 g Intravenous Once     metoprolol succinate ER  75 mg Oral Daily     pantoprazole  40 mg Oral BID AC     piperacillin-tazobactam  3.375 g Intravenous Q8H     pravastatin  20 mg Oral QPM      rivaroxaban ANTICOAGULANT  15 mg Oral Daily with supper     sodium chloride (PF)  3 mL Intracatheter Q8H     vancomycin  1,000 mg Intravenous Q24H     Continuous Infusions:    - MEDICATION INSTRUCTIONS -       PRN Meds:.acetaminophen (TYLENOL) tablet 500 mg, albuterol, lidocaine 4%, lidocaine 4%, lidocaine (buffered or not buffered), lidocaine (buffered or not buffered), lidocaine visc 2% 2.5mL/5mL & maalox/mylanta w/ simeth 2.5mL/5mL & diphenhydrAMINE 5mg/5mL, - MEDICATION INSTRUCTIONS -, methocarbamol, ondansetron **OR** ondansetron, sodium chloride (PF), sodium chloride (PF)    Objective:  Vital signs in last 24 hours:  Temp:  [97.5  F (36.4  C)-98.1  F (36.7  C)] 97.8  F (36.6  C)  Pulse:  [] 98  Resp:  [12-30] 16  BP: ()/(54-72) 117/57  SpO2:  [88 %-100 %] 99 %      Physical Exam:  General: In NAD  HEENT: NCAT & EOMI  CV: Fast  rate  Lungs: Diminished b/l. Very faint expiratory wheezes.  Abdomen: Soft, NT, ND, +BS  Extremities: No LE edema b/l  Neuro: AAOx3      Lab Results:    Recent Results (from the past 24 hour(s))   CBC with platelets    Collection Time: 12/15/22  4:54 AM   Result Value Ref Range    WBC Count 10.7 4.0 - 11.0 10e3/uL    RBC Count 3.21 (L) 3.80 - 5.20 10e6/uL    Hemoglobin 10.5 (L) 11.7 - 15.7 g/dL    Hematocrit 32.8 (L) 35.0 - 47.0 %     (H) 78 - 100 fL    MCH 32.7 26.5 - 33.0 pg    MCHC 32.0 31.5 - 36.5 g/dL    RDW 14.6 10.0 - 15.0 %    Platelet Count 324 150 - 450 10e3/uL   Basic metabolic panel    Collection Time: 12/15/22  4:54 AM   Result Value Ref Range    Sodium 135 (L) 136 - 145 mmol/L    Potassium 3.8 3.4 - 5.3 mmol/L    Chloride 97 (L) 98 - 107 mmol/L    Carbon Dioxide (CO2) 30 (H) 22 - 29 mmol/L    Anion Gap 8 7 - 15 mmol/L    Urea Nitrogen 34.6 (H) 8.0 - 23.0 mg/dL    Creatinine 1.18 (H) 0.51 - 0.95 mg/dL    Calcium 9.5 8.8 - 10.2 mg/dL    Glucose 118 (H) 70 - 99 mg/dL    GFR Estimate 48 (L) >60 mL/min/1.73m2   CRP inflammation    Collection Time: 12/15/22   4:54 AM   Result Value Ref Range    CRP Inflammation 17.70 (H) <5.00 mg/L   D dimer quantitative    Collection Time: 12/15/22  4:54 AM   Result Value Ref Range    D-Dimer Quantitative 0.70 (H) 0.00 - 0.50 ug/mL FEU   Magnesium    Collection Time: 12/15/22  4:54 AM   Result Value Ref Range    Magnesium 1.6 (L) 1.7 - 2.3 mg/dL       Serum Glucose range:   Recent Labs   Lab 12/15/22  0454 12/14/22  0635 12/13/22  0836 12/12/22  0650   * 115* 108* 81     ABG: No lab results found in last 7 days.  CBC:   Recent Labs   Lab 12/15/22  0454 12/14/22  0635 12/13/22  0836 12/12/22  0650   WBC 10.7 8.1 12.2* 13.3*   HGB 10.5* 10.9* 10.7* 10.1*   HCT 32.8* 35.1 34.5* 32.8*   * 105* 105* 106*    298 329 367   NEUTROPHIL  --   --  79 76   LYMPH  --   --  5 9   MONOCYTE  --   --  12 9   EOSINOPHIL  --   --  0 1     Chemistry:   Recent Labs   Lab 12/15/22  0454 12/14/22  0635 12/13/22  0836 12/12/22  0650   * 137 134* 141   POTASSIUM 3.8 4.3 4.2 4.4   CHLORIDE 97* 101 95* 105   CO2 30* 25 26 28   BUN 34.6* 24.6* 23.4* 29.0*   CR 1.18* 1.12* 1.25* 1.24*   GFRESTIMATED 48* 51* 44* 45*   SUNI 9.5 9.6 10.1 10.4*   MAG 1.6* 1.9 1.4*  --    PROTTOTAL  --  5.7* 6.0* 5.4*   ALBUMIN  --  3.0* 3.4* 3.0*   AST  --  24 30 25   ALT  --  16 20 18   ALKPHOS  --  157* 187* 163*   BILITOTAL  --  0.2 0.3 0.2     Coags:  Recent Labs   Lab 12/13/22  1803   INR 1.62*     Cardiac Markers:  No results for input(s): CKTOTAL, TROPONINI in the last 168 hours.               12/15/2022   Lydia Gold MD  Hospitalist  Pager: [phone]

## 2022-12-15 NOTE — PLAN OF CARE
"  Problem: Plan of Care - These are the overarching goals to be used throughout the patient stay.    Goal: Plan of Care Review  Description: The Plan of Care Review/Shift note should be completed every shift.  The Outcome Evaluation is a brief statement about your assessment that the patient is improving, declining, or no change.  This information will be displayed automatically on your shift note.  Outcome: Progressing  Goal: Patient-Specific Goal (Individualized)  Description: You can add care plan individualizations to a care plan. Examples of Individualization might be:  \"Parent requests to be called daily at 9am for status\", \"I have a hard time hearing out of my right ear\", or \"Do not touch me to wake me up as it startles me\".  Outcome: Progressing  Goal: Absence of Hospital-Acquired Illness or Injury  Outcome: Progressing  Intervention: Identify and Manage Fall Risk  Recent Flowsheet Documentation  Taken 12/15/2022 1621 by Sugar Chandra RN  Safety Promotion/Fall Prevention: activity supervised  Taken 12/15/2022 1211 by Sugar Chandra RN  Safety Promotion/Fall Prevention: activity supervised  Taken 12/15/2022 0853 by Sugar Chandra RN  Safety Promotion/Fall Prevention: activity supervised  Intervention: Prevent Skin Injury  Recent Flowsheet Documentation  Taken 12/15/2022 1621 by Sugar Chandra RN  Body Position: weight shifting  Taken 12/15/2022 1211 by Sugar Chandra RN  Body Position: weight shifting  Goal: Optimal Comfort and Wellbeing  Outcome: Progressing  Goal: Readiness for Transition of Care  Outcome: Progressing     Problem: Risk for Delirium  Goal: Improved Behavioral Control  Intervention: Minimize Safety Risk  Recent Flowsheet Documentation  Taken 12/15/2022 1621 by Sugar Chandra RN  Enhanced Safety Measures: bed alarm set  Taken 12/15/2022 1211 by Sugar Chandra RN  Enhanced Safety Measures: bed alarm set  Taken 12/15/2022 0853 by Sugar Chandra RN  Enhanced " Safety Measures: bed alarm set     Problem: Risk for Delirium  Goal: Improved Attention and Thought Clarity  Outcome: Progressing  Intervention: Maximize Cognitive Function  Recent Flowsheet Documentation  Taken 12/15/2022 1621 by Sugar Chandra RN  Sensory Stimulation Regulation: care clustered  Taken 12/15/2022 1211 by Sugar Chandra RN  Sensory Stimulation Regulation: care clustered  Taken 12/15/2022 0853 by Sugar Chandra RN  Sensory Stimulation Regulation: care clustered     Problem: Risk for Delirium  Goal: Improved Attention and Thought Clarity  Intervention: Maximize Cognitive Function  Recent Flowsheet Documentation  Taken 12/15/2022 1621 by Sugar Chandra RN  Sensory Stimulation Regulation: care clustered  Taken 12/15/2022 1211 by Sugar Chandra RN  Sensory Stimulation Regulation: care clustered  Taken 12/15/2022 0853 by Sugar Chandra RN  Sensory Stimulation Regulation: care clustered     Problem: Infection  Goal: Absence of Infection Signs and Symptoms  Outcome: Progressing     Problem: Dysrhythmia  Goal: Normalized Cardiac Rhythm  Outcome: Progressing   Goal Outcome Evaluation:  IN and out of Sinus Dysrythmia to Atrial fibrillation Atrial flutter  no complaints of any chest discomfort . Have frequent productive  cough  O2 saturation stable at 1 L of O2 during the day  .  No signs of  respiratory distress noted or verbalized

## 2022-12-16 NOTE — PROGRESS NOTES
Called to bedside.  C/o new onset mid sternal radiating to back 5/10 pain.  Pain difficult to define.  However, denies sensation as heavy, tight, crushing, or sharp.  Maintaining sat's in the mid 90's at 2 L NC.  VSS.  LS: Diminished all fields bilat.  Observe occasional non-productive wet cough.  But, pt denies cough exacerbates or relieves pain.    Had pt sit in orthopnea position and admin PRN Albuterol inhaler.  House Resident (Eduardo) notified of aforementioned.    New order: Stat CXR.  Star Troponin (add on to AM labs).   Informed pt to notify nursing staff if any further changes occur.    Awaiting imaging and lab results.

## 2022-12-16 NOTE — PROGRESS NOTES
Hospitalist Progress Note        CODE STATUS:  No CPR- Do NOT Intubate    Assessment/Plan:  Fatuma Henry is a 77 year old female with history of Atrial fibrillation on xarelto, COPD on nocturnal O2 of 3LPM, CKD, recurrent UTIs who presented on 12/13 from TCU w/ SOB. She was being treated with oral abx for a UTI and Pneumonia at TCU. Found to have acute COVID positive and was hypotensive and hypoxic on presentation.     Acute hypoxic respiratory failure  Acute COVID- 19 infection  Hx of COPD  - Sx started 12/11. Covid tested positive 12/13. Fully vaccinated.   - Symptomatically & clinically improved today  - Weaned off of supplemental O2 today successfully  - Continue Decadron x 10 days (started 12/13)  - Continue Remdesivir x 5 days   - Pneumonia likely HCAP given hospitalized x 2 in last few weeks and resident of TCU  - MRSA neg so vancomycin discontinued. Continue Zosyn for now x total 5 days.  - Already on DOAC   - Wean O2 as tolerated  - Continue Isolation     Overnight patient with chest discomfort.   EKG and troponin levels were unremarkable. Likely chest discomfort due to pneumonia.     Chronic kidney disease stage 3  - Avoid nephrotoxins  - Renally dose medications     Hypertension with hypotension on presentation  - BP readings now acceptable     Atrial fibrillation  - RVR on presentation however improved  - Continue Toprol XL w/ hold parameters  - Continue DOAC     UTI   - Started on PO Levaquin on 12/12 for UTI/PNA. UTI w/ proteus.  - Abx as above    Hypomagnesemia  - Replace with IV    Rheumatoid arthritis  - Hold methotrexate     Heart failure with preserved ejection fraction  Pulmonary hypertension  - Recent hospital stay with volume overload related to transfusion with echo on 12/2/22 with an ejection fraction of 60 to 65%  - Monitor      Recent C. difficile infection  - Reported intermittent loose stool. Less likely acute c-diff infection but will monitor     Known pulmonary nodule  - Being  monitored by pulmonary with plan for repeat CT March 2023      DVT Prophylaxis: On Xarelto    Disposition/Barrier to discharge; Not medically stable. Likely back to TCU at discharge.       Subjective:  Interval History:   Patient seen and examined.  Overnight patient with chest discomfort.   This morning, she appears comfortable and continues to feel improved.   Spoke to daughter, Enid, over the phone in patient's room.    Review of Systems:   As mentioned in subjective.    Patient Active Problem List   Diagnosis     Seropositive rheumatoid arthritis (H)     Osteoporosis dxa 2006      High risk medication use     Rheumatoid arthritis involving multiple sites with positive rheumatoid factor (H)     Dyspnea     Reactive airway disease     Paroxysmal atrial fibrillation (H)     Nonsustained ventricular tachycardia     CRF (chronic renal failure), stage 3 (moderate) (H)     Primary osteoarthritis involving multiple joints     Benign essential hypertension     Pulmonary nodules     COPD exacerbation (H)     Asthma without status asthmaticus     Chronic kidney disease, stage 3a (H)     History of 2019 novel coronavirus disease (COVID-19)     DNR (do not resuscitate)     Chronic anticoagulation     Personal history of immunosuppressive therapy     Hypokalemia     Hypomagnesemia     Diarrhea, unspecified type     SOB (shortness of breath)     Hypoxia     Rapid atrial fibrillation (H)     Sepsis, due to unspecified organism, unspecified whether acute organ dysfunction present (H)     Infection due to 2019 novel coronavirus       Scheduled Meds:    remdesivir  100 mg Intravenous Q24H    And     sodium chloride 0.9%  50 mL Intravenous Q24H     allopurinol  300 mg Oral Daily     colchicine  0.6 mg Oral Daily     dexamethasone  6 mg Oral Daily     DULoxetine  20 mg Oral Daily     fluticasone-vilanterol  1 puff Inhalation Daily     folic acid  1 mg Oral Daily     lactobacillus rhamnosus (GG)  1 capsule Oral Daily     magnesium  oxide  400 mg Oral BID     metoprolol succinate ER  75 mg Oral Daily     pantoprazole  40 mg Oral BID AC     piperacillin-tazobactam  3.375 g Intravenous Q8H     pravastatin  20 mg Oral QPM     rivaroxaban ANTICOAGULANT  15 mg Oral Daily with supper     sodium chloride (PF)  3 mL Intracatheter Q8H     Continuous Infusions:    - MEDICATION INSTRUCTIONS -       PRN Meds:.acetaminophen (TYLENOL) tablet 500 mg, albuterol, lidocaine 4%, lidocaine 4%, lidocaine (buffered or not buffered), lidocaine (buffered or not buffered), lidocaine visc 2% 2.5mL/5mL & maalox/mylanta w/ simeth 2.5mL/5mL & diphenhydrAMINE 5mg/5mL, - MEDICATION INSTRUCTIONS -, methocarbamol, ondansetron **OR** ondansetron, sodium chloride (PF), sodium chloride (PF)    Objective:  Vital signs in last 24 hours:  Temp:  [97.2  F (36.2  C)-98.1  F (36.7  C)] 98  F (36.7  C)  Pulse:  [] 104  Resp:  [16-18] 16  BP: (103-135)/(51-70) 108/55  SpO2:  [94 %-99 %] 94 %      Physical Exam:  General: In NAD  HEENT: NCAT & EOMI  CV: Fast  rate  Lungs: CTAB. Respirations easy.  Abdomen: Soft, NT, ND, +BS  Extremities: No LE edema b/l  Neuro: AAOx3      Lab Results:    Recent Results (from the past 24 hour(s))   CBC with platelets    Collection Time: 12/16/22  5:13 AM   Result Value Ref Range    WBC Count 11.3 (H) 4.0 - 11.0 10e3/uL    RBC Count 3.16 (L) 3.80 - 5.20 10e6/uL    Hemoglobin 10.3 (L) 11.7 - 15.7 g/dL    Hematocrit 32.1 (L) 35.0 - 47.0 %     (H) 78 - 100 fL    MCH 32.6 26.5 - 33.0 pg    MCHC 32.1 31.5 - 36.5 g/dL    RDW 14.6 10.0 - 15.0 %    Platelet Count 331 150 - 450 10e3/uL   Basic metabolic panel    Collection Time: 12/16/22  5:13 AM   Result Value Ref Range    Sodium 137 136 - 145 mmol/L    Potassium 3.8 3.4 - 5.3 mmol/L    Chloride 100 98 - 107 mmol/L    Carbon Dioxide (CO2) 30 (H) 22 - 29 mmol/L    Anion Gap 7 7 - 15 mmol/L    Urea Nitrogen 31.9 (H) 8.0 - 23.0 mg/dL    Creatinine 1.07 (H) 0.51 - 0.95 mg/dL    Calcium 9.5 8.8 - 10.2 mg/dL     Glucose 110 (H) 70 - 99 mg/dL    GFR Estimate 54 (L) >60 mL/min/1.73m2   CRP inflammation    Collection Time: 12/16/22  5:13 AM   Result Value Ref Range    CRP Inflammation 9.90 (H) <5.00 mg/L   D dimer quantitative    Collection Time: 12/16/22  5:13 AM   Result Value Ref Range    D-Dimer Quantitative 0.71 (H) 0.00 - 0.50 ug/mL FEU   Magnesium    Collection Time: 12/16/22  5:13 AM   Result Value Ref Range    Magnesium 1.7 1.7 - 2.3 mg/dL   Troponin T, High Sensitivity    Collection Time: 12/16/22  5:13 AM   Result Value Ref Range    Troponin T, High Sensitivity 40 (H) <=14 ng/L       Serum Glucose range:   Recent Labs   Lab 12/16/22  0513 12/15/22  0454 12/14/22  0635 12/13/22  0836   * 118* 115* 108*     ABG: No lab results found in last 7 days.  CBC:   Recent Labs   Lab 12/16/22  0513 12/15/22  0454 12/14/22  0635 12/13/22  0836 12/12/22  0650   WBC 11.3* 10.7 8.1 12.2* 13.3*   HGB 10.3* 10.5* 10.9* 10.7* 10.1*   HCT 32.1* 32.8* 35.1 34.5* 32.8*   * 102* 105* 105* 106*    324 298 329 367   NEUTROPHIL  --   --   --  79 76   LYMPH  --   --   --  5 9   MONOCYTE  --   --   --  12 9   EOSINOPHIL  --   --   --  0 1     Chemistry:   Recent Labs   Lab 12/16/22  0513 12/15/22  0454 12/14/22  0635 12/13/22  0836 12/12/22  0650 12/12/22  0650    135* 137 134*  --  141   POTASSIUM 3.8 3.8 4.3 4.2  --  4.4   CHLORIDE 100 97* 101 95*  --  105   CO2 30* 30* 25 26  --  28   BUN 31.9* 34.6* 24.6* 23.4*  --  29.0*   CR 1.07* 1.18* 1.12* 1.25*  --  1.24*   GFRESTIMATED 54* 48* 51* 44*  --  45*   SUNI 9.5 9.5 9.6 10.1  --  10.4*   MAG 1.7 1.6* 1.9 1.4*   < >  --    PROTTOTAL  --   --  5.7* 6.0*  --  5.4*   ALBUMIN  --   --  3.0* 3.4*  --  3.0*   AST  --   --  24 30  --  25   ALT  --   --  16 20  --  18   ALKPHOS  --   --  157* 187*  --  163*   BILITOTAL  --   --  0.2 0.3  --  0.2    < > = values in this interval not displayed.     Coags:  Recent Labs   Lab 12/13/22  1803   INR 1.62*     Cardiac  Markers:  No results for input(s): CKTOTAL, TROPONINI in the last 168 hours.               12/16/2022   Lydia Gold MD  Hospitalist  Pager: [phone]

## 2022-12-16 NOTE — PLAN OF CARE
Problem: Infection  Goal: Absence of Infection Signs and Symptoms  Outcome: Not Progressing  Intervention: Prevent or Manage Infection  Recent Flowsheet Documentation  Taken 12/16/2022 0400 by Theo Mae, RN  Isolation Precautions:   contact precautions maintained   droplet precautions maintained   airborne precautions maintained  Taken 12/16/2022 0000 by Theo Mae RN  Isolation Precautions:   contact precautions maintained   droplet precautions maintained   airborne precautions maintained  Taken 12/15/2022 2000 by Theo Mae, RN  Isolation Precautions:   contact precautions maintained   droplet precautions maintained   airborne precautions maintained     Problem: Dysrhythmia  Goal: Normalized Cardiac Rhythm  Outcome: Not Progressing   Goal Outcome Evaluation:    See note regarding new onset chest pain.  Remains in A-Fib/flutter, SD, rate trending 70-90's.  VSS.   Continue with Remdesivir and Decadron for COVID.  IV Abx for PNA and UTI.

## 2022-12-16 NOTE — PROGRESS NOTES
Care Management Follow Up    Length of Stay (days): 3    Expected Discharge Date: 12/17/2022     Concerns to be Addressed: medical progresion  Patient plan of care discussed at interdisciplinary rounds: Yes    Anticipated Discharge Disposition: Transitional Care     Anticipated Discharge Services:  TBD  Anticipated Discharge DME:      Patient/family educated on Medicare website which has current facility and service quality ratings:    Education Provided on the Discharge Plan:    Patient/Family in Agreement with the Plan:      Referrals Placed by CM/SW:    Private pay costs discussed: Not applicable    Additional Information:  Chart review:  At baseline pt lives with her  and uses home O2. She comes from [facility]. There is no bed hold so a new referral will need to be sent and pt will need to be accepted before we can send back. RNCM to follow for medical progression, recommendations, and final discharge plan.        Lynn Camarena RN

## 2022-12-16 NOTE — PLAN OF CARE
Problem: Plan of Care - These are the overarching goals to be used throughout the patient stay.    Goal: Plan of Care Review  Description: The Plan of Care Review/Shift note should be completed every shift.  The Outcome Evaluation is a brief statement about your assessment that the patient is improving, declining, or no change.  This information will be displayed automatically on your shift note.  Outcome: Progressing   Goal Outcome Evaluation:       Pt alert and orientated X4, able to make needs known, uses call light approp. Pt denies pain aside from earlier episode of chest tightness. Appetite good. Had soft BM this am. On abx and a probiotic. Has been having frequent soft loose stools per pt. Pulmonary toilet encouarged. Room air sats mid 90s. Pt continues on IV abx and remdesivir for pna and covid. IS and flutter valve in room and pt instructed on use and frequency. Pt does have a congested nonproductive cough and is at times able to expectorate thick yellow sputum. Will continue with current interventions.

## 2022-12-16 NOTE — PROVIDER NOTIFICATION
Around 0900 pt c/o chest tightness after returning back to bed from the bathroom 5/10. Pt stated she had similar pain around 0600 when at rest. No SOB or accompanying sx. MD YARELIS notified. EKG ordered and repeat troponin. Pt does not appear in acute distress, Episode lasted about 10 mins and then went away. Prior to pain episode pt had been coughing a lot.     1015: MD notified regarding EKG and trop level. NO new orders. Not concerning per MD. Pt currently does not have chest discomfort and has not since earlier episode. Pt is on protonix and prn tyelnol given for chest discomfort from frequent coughing.

## 2022-12-17 NOTE — PROGRESS NOTES
Hospitalist Progress Note        CODE STATUS:  No CPR- Do NOT Intubate    Assessment/Plan:  Fatuma Henry is a 77 year old female with history of Atrial fibrillation on xarelto, COPD on nocturnal O2 of 3LPM, CKD, recurrent UTIs who presented on 12/13 from TCU w/ SOB. She was being treated with oral abx for a UTI and Pneumonia at TCU. Found to have acute COVID positive and was hypotensive and hypoxic on presentation.     Acute hypoxic respiratory failure  Acute COVID- 19 infection  Hx of COPD  - Sx started 12/11. Covid tested positive 12/13. Fully vaccinated.   - Clinically improved   - Weaned off of supplemental O2 today successfully  - Continue Decadron x 10 days (started 12/13)  - Completed Remdesivir x 5 days   - Pneumonia likely HCAP given hospitalized x 2 in last few weeks and resident of TCU  - MRSA neg so vancomycin discontinued. Continue Zosyn for now x total 5 days. Stop tomorrow afternoon.  - Already on DOAC   - Wean O2 as tolerated  - Continue Isolation      Hypomagnesemia  - Replace with IV    Chronic kidney disease stage 3  - Avoid nephrotoxins  - Renally dose medications     Hypertension with hypotension on presentation  - BP readings now acceptable     Atrial fibrillation  - RVR on presentation however improved  - Continue Toprol XL w/ hold parameters  - Continue DOAC     UTI   - Started on PO Levaquin on 12/12 for UTI/PNA. UTI w/ proteus.  - Abx as above    Hypomagnesemia  - Replace with IV    Rheumatoid arthritis  - Hold methotrexate     Heart failure with preserved ejection fraction  Pulmonary hypertension  - Recent hospital stay with volume overload related to transfusion with echo on 12/2/22 with an ejection fraction of 60 to 65%  - Monitor      Recent C. difficile infection  - Reported intermittent loose stool. Less likely acute c-diff infection but will monitor     Known pulmonary nodule  - Being monitored by pulmonary with plan for repeat CT March 2023      DVT Prophylaxis: On  Xarelto    Disposition/Barrier to discharge; Not medically stable. Likely back to TCU at discharge.       Subjective:  Interval History:   Patient seen and examined.  No further episodes of chest discomfort.   Doing well.     Review of Systems:   As mentioned in subjective.    Patient Active Problem List   Diagnosis     Seropositive rheumatoid arthritis (H)     Osteoporosis dxa 2006      High risk medication use     Rheumatoid arthritis involving multiple sites with positive rheumatoid factor (H)     Dyspnea     Reactive airway disease     Paroxysmal atrial fibrillation (H)     Nonsustained ventricular tachycardia     CRF (chronic renal failure), stage 3 (moderate) (H)     Primary osteoarthritis involving multiple joints     Benign essential hypertension     Pulmonary nodules     COPD exacerbation (H)     Asthma without status asthmaticus     Chronic kidney disease, stage 3a (H)     History of 2019 novel coronavirus disease (COVID-19)     DNR (do not resuscitate)     Chronic anticoagulation     Personal history of immunosuppressive therapy     Hypokalemia     Hypomagnesemia     Diarrhea, unspecified type     SOB (shortness of breath)     Hypoxia     Rapid atrial fibrillation (H)     Sepsis, due to unspecified organism, unspecified whether acute organ dysfunction present (H)     Infection due to 2019 novel coronavirus       Scheduled Meds:    remdesivir  100 mg Intravenous Q24H    And     sodium chloride 0.9%  50 mL Intravenous Q24H     allopurinol  300 mg Oral Daily     colchicine  0.6 mg Oral Daily     dexamethasone  6 mg Oral Daily     DULoxetine  20 mg Oral Daily     fluticasone-vilanterol  1 puff Inhalation Daily     folic acid  1 mg Oral Daily     lactobacillus rhamnosus (GG)  1 capsule Oral Daily     magnesium oxide  400 mg Oral BID     metoprolol succinate ER  75 mg Oral Daily     pantoprazole  40 mg Oral BID AC     piperacillin-tazobactam  3.375 g Intravenous Q8H     pravastatin  20 mg Oral QPM      rivaroxaban ANTICOAGULANT  15 mg Oral Daily with supper     sodium chloride (PF)  3 mL Intracatheter Q8H     Continuous Infusions:    - MEDICATION INSTRUCTIONS -       PRN Meds:.acetaminophen (TYLENOL) tablet 500 mg, albuterol, lidocaine 4%, lidocaine (buffered or not buffered), lidocaine visc 2% 2.5mL/5mL & maalox/mylanta w/ simeth 2.5mL/5mL & diphenhydrAMINE 5mg/5mL, - MEDICATION INSTRUCTIONS -, methocarbamol, ondansetron **OR** ondansetron, sodium chloride (PF)    Objective:  Vital signs in last 24 hours:  Temp:  [97.8  F (36.6  C)-98.9  F (37.2  C)] 98.1  F (36.7  C)  Pulse:  [75-96] 91  Resp:  [18-20] 20  BP: ()/(51-90) 123/60  SpO2:  [91 %-100 %] 95 %      Physical Exam:  General: In NAD  HEENT: NCAT & EOMI  CV: Fast  rate  Lungs: CTAB. Respirations easy.  Abdomen: Soft, NT, ND, +BS  Extremities: No LE edema b/l  Neuro: AAOx3      Lab Results:    Recent Results (from the past 24 hour(s))   ECG 12-LEAD WITH MUSE ([facility])    Collection Time: 12/16/22  9:17 AM   Result Value Ref Range    Systolic Blood Pressure  mmHg    Diastolic Blood Pressure  mmHg    Ventricular Rate 97 BPM    Atrial Rate 97 BPM    DC Interval 210 ms    QRS Duration 76 ms     ms    QTc 416 ms    P Axis 73 degrees    R AXIS 17 degrees    T Axis 0 degrees    Interpretation ECG       Sinus rhythm with 1st degree A-V block with occasional Premature ventricular complexes  Otherwise normal ECG  When compared with ECG of 13-DEC-2022 07:37,  Sinus rhythm has replaced Atrial fibrillation  T wave inversion now evident in Inferior leads  Confirmed by NAYLA HUGHES MD LOC:[facility] ([id number]) on 12/16/2022 4:23:39 PM     Troponin T, High Sensitivity    Collection Time: 12/16/22  9:27 AM   Result Value Ref Range    Troponin T, High Sensitivity 47 (H) <=14 ng/L   CBC with platelets    Collection Time: 12/17/22  6:39 AM   Result Value Ref Range    WBC Count 10.6 4.0 - 11.0 10e3/uL    RBC Count 3.26 (L) 3.80 - 5.20 10e6/uL    Hemoglobin 10.7 (L) 11.7 - 15.7  g/dL    Hematocrit 33.2 (L) 35.0 - 47.0 %     (H) 78 - 100 fL    MCH 32.8 26.5 - 33.0 pg    MCHC 32.2 31.5 - 36.5 g/dL    RDW 14.6 10.0 - 15.0 %    Platelet Count 348 150 - 450 10e3/uL       Serum Glucose range:   Recent Labs   Lab 12/16/22  0513 12/15/22  0454 12/14/22  0635 12/13/22  0836   * 118* 115* 108*     ABG: No lab results found in last 7 days.  CBC:   Recent Labs   Lab 12/17/22  0639 12/16/22  0513 12/15/22  0454 12/14/22  0635 12/13/22  0836 12/12/22  0650   WBC 10.6 11.3* 10.7   < > 12.2* 13.3*   HGB 10.7* 10.3* 10.5*   < > 10.7* 10.1*   HCT 33.2* 32.1* 32.8*   < > 34.5* 32.8*   * 102* 102*   < > 105* 106*    331 324   < > 329 367   NEUTROPHIL  --   --   --   --  79 76   LYMPH  --   --   --   --  5 9   MONOCYTE  --   --   --   --  12 9   EOSINOPHIL  --   --   --   --  0 1    < > = values in this interval not displayed.     Chemistry:   Recent Labs   Lab 12/16/22  0513 12/15/22  0454 12/14/22  0635 12/13/22  0836 12/12/22  0650 12/12/22  0650    135* 137 134*  --  141   POTASSIUM 3.8 3.8 4.3 4.2  --  4.4   CHLORIDE 100 97* 101 95*  --  105   CO2 30* 30* 25 26  --  28   BUN 31.9* 34.6* 24.6* 23.4*  --  29.0*   CR 1.07* 1.18* 1.12* 1.25*  --  1.24*   GFRESTIMATED 54* 48* 51* 44*  --  45*   SUNI 9.5 9.5 9.6 10.1  --  10.4*   MAG 1.7 1.6* 1.9 1.4*   < >  --    PROTTOTAL  --   --  5.7* 6.0*  --  5.4*   ALBUMIN  --   --  3.0* 3.4*  --  3.0*   AST  --   --  24 30  --  25   ALT  --   --  16 20  --  18   ALKPHOS  --   --  157* 187*  --  163*   BILITOTAL  --   --  0.2 0.3  --  0.2    < > = values in this interval not displayed.     Coags:  Recent Labs   Lab 12/13/22  1803   INR 1.62*     Cardiac Markers:  No results for input(s): CKTOTAL, TROPONINI in the last 168 hours.               12/17/2022   Lydia Gold MD  Hospitalist  Pager: [phone]

## 2022-12-17 NOTE — PLAN OF CARE
Problem: Pneumonia  Goal: Fluid Balance  Outcome: Progressing  Goal: Effective Oxygenation and Ventilation   Goal Outcome Evaluation:    A&O x4. No c/o pain. CHG bath complete and linen changed. Uses IS and flutter valve appropriately. Up with SBA, walker, and gait belt. Purewick in place. Tele- A-Flutter.

## 2022-12-17 NOTE — PROGRESS NOTES
Patient is COVID +, so will await end of isolation period so that she can transition to TCU. Patient comes from TCU at [facility]. Patient did not hold her bed, per daughter Enid and facility.   Anticipate pt will be able to return to the TCU pending acceptance and bed availability. Referral was sent and we are awaiting acceptance.      At baseline, pt lives with her , she does use oxygen and has a portable tank-unknown oxygen company.

## 2022-12-17 NOTE — PLAN OF CARE
Goal Outcome Evaluation:               Problem: Infection  Goal: Absence of Infection Signs and Symptoms  Outcome: Progressing  Intervention: Prevent or Manage Infection  Recent Flowsheet Documentation  Taken 12/17/2022 0051 by Kellen Be RN  Isolation Precautions: special precautions maintained     Problem: Pneumonia  Goal: Fluid Balance  Outcome: Progressing  Goal: Resolution of Infection Signs and Symptoms  Outcome: Progressing  Intervention: Prevent Infection Progression  Recent Flowsheet Documentation  Taken 12/17/2022 0051 by Kellen Be RN  Isolation Precautions: special precautions maintained  Goal: Effective Oxygenation and Ventilation  Outcome: Adequate for Care Transition          Patient is alert and oriented.  Received scheduled zosyn.  Denied pain.  Ambulated to restroom with assistance of staff after incontinence episode. Patient able to maintain adequate oxygen saturation on room air.  Special precautions in place.  Will continue to monitor.

## 2022-12-17 NOTE — PLAN OF CARE
Problem: Plan of Care - These are the overarching goals to be used throughout the patient stay.    Goal: Optimal Comfort and Wellbeing  Outcome: Progressing  Goal: Readiness for Transition of Care  Outcome: Progressing     Problem: Pneumonia  Goal: Fluid Balance  Outcome: Progressing  Goal: Resolution of Infection Signs and Symptoms  Outcome: Progressing  Intervention: Prevent Infection Progression  Recent Flowsheet Documentation  Taken 12/17/2022 0900 by Tory Morrell, RN  Isolation Precautions: special precautions maintained   Goal Outcome Evaluation:       Pt a/o x4, pleasant, cooperative. Ate well for meals. Up to bathroom w/ stand by assist and walker. Gait steady w/ walker. Pt incont of stool and also went in toilet. Writer assisted w/ ramandeep care. 3 loose stools this shift. Tele tracing a-flutter. Pt had 2.59 second cardiac pause at 1245---writer notified Dr Gold--no new orders. Pt up in chair most of shift. Pt received iv remdesivir and zosyn as ordered. Occasional cough noted. Pt reported thin phlem, clear. Reminded pt to use call light for needs. Continue to monitor pt.

## 2022-12-18 NOTE — PROGRESS NOTES
Care Management Follow Up    Length of Stay (days): 5    Expected Discharge Date: 12/19/2022     Concerns to be Addressed: Medical Management. Discharge Planning-TCU    Patient plan of care discussed at interdisciplinary rounds: Yes    Anticipated Discharge Disposition: Transitional Care     Anticipated Discharge Services:  Requested PT/OT eval  Anticipated Discharge DME:  To be determined     Patient/family educated on Medicare website which has current facility and service quality ratings:  Not at this time  Education Provided on the Discharge Plan:  Per Team  Patient/Family in Agreement with the Plan:  Yes    Referrals Placed by CM/SW:  [facility]  Private pay costs discussed: Not applicable    Additional Information:  Chart reviewed. Patient admitted from [facility]; will likely need to be out to isolation to return. Per previous note daughter Enid reports facility did not hold bed. Confirmed with facility bed was not held, they do have some discharges planned this upcoming week, so may have availability. Referral previously sent to [facility]; will keep facility updated.     At baseline from home with spouse. Uses home oxygen at baseline and has a portable tank, company name unknown. If returning to [facility] will not need PAS. Transport to be determined. Care manager to follow.       Ivet Page RN

## 2022-12-18 NOTE — PLAN OF CARE
Goal Outcome Evaluation:           Problem: Pneumonia  Goal: Fluid Balance  Outcome: Adequate for Care Transition  Goal: Resolution of Infection Signs and Symptoms  Outcome: Adequate for Care Transition  Intervention: Prevent Infection Progression  Recent Flowsheet Documentation  Taken 12/18/2022 0045 by Kellen Be RN  Isolation Precautions: special precautions maintained     Problem: Infection  Goal: Absence of Infection Signs and Symptoms  Outcome: Adequate for Care Transition  Intervention: Prevent or Manage Infection  Recent Flowsheet Documentation  Taken 12/18/2022 0045 by Kellen Be RN  Isolation Precautions: special precautions maintained                Patient is alert and oriented.  Denied pain.  Received oxygen via nasal cannula 1 L/min for most of night, then removed it and kept her on room air.  Continuous pulse oximeter on and functioning.  IV zosyn given.  Patient pleasant with staff.  She ambulated to restroom.  Will continue to monitor

## 2022-12-18 NOTE — PROGRESS NOTES
Hospitalist Progress Note        CODE STATUS:  No CPR- Do NOT Intubate    Assessment/Plan:  Fatuma Henry is a 77 year old female with history of Atrial fibrillation on xarelto, COPD on nocturnal O2 of 3LPM, CKD, recurrent UTIs who presented on 12/13 from TCU w/ SOB. She was being treated with oral abx for a UTI and Pneumonia at TCU. Found to have acute COVID positive and was hypotensive and hypoxic on presentation.     Acute hypoxic respiratory failure  Acute COVID- 19 infection  Hx of COPD  - Sx started 12/11. Covid tested positive 12/13. Fully vaccinated.   - Clinically improved   - Weaned off of supplemental O2 today successfully  - Continue Decadron x 10 days (started 12/13)  - Completed Remdesivir x 5 days   - Pneumonia likely HCAP given hospitalized x 2 in last few weeks and resident of TCU  - MRSA neg so vancomycin discontinued. Continue Zosyn for now x total 5 days. Last dose today.   - Already on DOAC   - Wean O2 as tolerated  - Continue Isolation      Hypomagnesemia  - Replace with IV    Chronic kidney disease stage 3  - Avoid nephrotoxins  - Renally dose medications     Hypertension with hypotension on presentation  - BP readings now acceptable     Atrial fibrillation  - RVR on presentation however improved  - Continue Toprol XL w/ hold parameters  - Continue DOAC     UTI   - Started on PO Levaquin on 12/12 for UTI/PNA. UTI w/ proteus.  - Abx as above    Hypomagnesemia  - Replaced with IV    Rheumatoid arthritis  - Hold methotrexate     Heart failure with preserved ejection fraction  Pulmonary hypertension  - Recent hospital stay with volume overload related to transfusion with echo on 12/2/22 with an ejection fraction of 60 to 65%  - Monitor      Recent C. difficile infection  - Reported intermittent loose stool. Less likely acute c-diff infection but will monitor     Known pulmonary nodule  - Being monitored by pulmonary with plan for repeat CT March 2023      DVT Prophylaxis: On  Xarelto    Disposition/Barrier to discharge; Medically stable to discharge to TCU.      Subjective:  Interval History:   Patient seen and examined.  No events overnight.     Review of Systems:   As mentioned in subjective.    Patient Active Problem List   Diagnosis     Seropositive rheumatoid arthritis (H)     Osteoporosis dxa 2006      High risk medication use     Rheumatoid arthritis involving multiple sites with positive rheumatoid factor (H)     Dyspnea     Reactive airway disease     Paroxysmal atrial fibrillation (H)     Nonsustained ventricular tachycardia     CRF (chronic renal failure), stage 3 (moderate) (H)     Primary osteoarthritis involving multiple joints     Benign essential hypertension     Pulmonary nodules     COPD exacerbation (H)     Asthma without status asthmaticus     Chronic kidney disease, stage 3a (H)     History of 2019 novel coronavirus disease (COVID-19)     DNR (do not resuscitate)     Chronic anticoagulation     Personal history of immunosuppressive therapy     Hypokalemia     Hypomagnesemia     Diarrhea, unspecified type     SOB (shortness of breath)     Hypoxia     Rapid atrial fibrillation (H)     Sepsis, due to unspecified organism, unspecified whether acute organ dysfunction present (H)     Infection due to 2019 novel coronavirus       Scheduled Meds:    allopurinol  300 mg Oral Daily     colchicine  0.6 mg Oral Daily     dexamethasone  6 mg Oral Daily     DULoxetine  20 mg Oral Daily     fluticasone-vilanterol  1 puff Inhalation Daily     folic acid  1 mg Oral Daily     lactobacillus rhamnosus (GG)  1 capsule Oral Daily     magnesium oxide  400 mg Oral BID     metoprolol succinate ER  75 mg Oral Daily     pantoprazole  40 mg Oral BID AC     piperacillin-tazobactam  3.375 g Intravenous Q8H     pravastatin  20 mg Oral QPM     rivaroxaban ANTICOAGULANT  15 mg Oral Daily with supper     sodium chloride (PF)  3 mL Intracatheter Q8H     Continuous Infusions:    - MEDICATION  INSTRUCTIONS -       PRN Meds:.acetaminophen (TYLENOL) tablet 500 mg, albuterol, lidocaine 4%, lidocaine (buffered or not buffered), lidocaine visc 2% 2.5mL/5mL & maalox/mylanta w/ simeth 2.5mL/5mL & diphenhydrAMINE 5mg/5mL, - MEDICATION INSTRUCTIONS -, methocarbamol, ondansetron **OR** ondansetron, sodium chloride (PF)    Objective:  Vital signs in last 24 hours:  Temp:  [97.7  F (36.5  C)-98.1  F (36.7  C)] 97.9  F (36.6  C)  Pulse:  [76-98] 93  Resp:  [18-20] 20  BP: (114-133)/(54-68) 114/60  SpO2:  [93 %-100 %] 96 %      Physical Exam:  General: In NAD  HEENT: NCAT & EOMI  CV: Fast  rate  Lungs: CTAB. Respirations easy.  Abdomen: Soft, NT, ND, +BS  Extremities: No LE edema b/l  Neuro: AAOx3      Lab Results:    Recent Results (from the past 24 hour(s))   Magnesium    Collection Time: 12/18/22  6:33 AM   Result Value Ref Range    Magnesium 1.8 1.7 - 2.3 mg/dL       Serum Glucose range:   Recent Labs   Lab 12/17/22  0639 12/16/22  0513 12/15/22  0454 12/14/22  0635   GLC 87 110* 118* 115*     ABG: No lab results found in last 7 days.  CBC:   Recent Labs   Lab 12/17/22  0639 12/16/22  0513 12/15/22  0454 12/14/22  0635 12/13/22  0836 12/12/22  0650   WBC 10.6 11.3* 10.7   < > 12.2* 13.3*   HGB 10.7* 10.3* 10.5*   < > 10.7* 10.1*   HCT 33.2* 32.1* 32.8*   < > 34.5* 32.8*   * 102* 102*   < > 105* 106*    331 324   < > 329 367   NEUTROPHIL  --   --   --   --  79 76   LYMPH  --   --   --   --  5 9   MONOCYTE  --   --   --   --  12 9   EOSINOPHIL  --   --   --   --  0 1    < > = values in this interval not displayed.     Chemistry:   Recent Labs   Lab 12/18/22  0633 12/17/22  0639 12/16/22  0513 12/15/22  0454 12/14/22  0635 12/13/22  0836 12/12/22  0650 12/12/22  0650   NA  --  136 137 135* 137 134*  --  141   POTASSIUM  --  3.9 3.8 3.8 4.3 4.2  --  4.4   CHLORIDE  --  100 100 97* 101 95*  --  105   CO2  --  29 30* 30* 25 26  --  28   BUN  --  30.1* 31.9* 34.6* 24.6* 23.4*  --  29.0*   CR  --  1.15* 1.07*  1.18* 1.12* 1.25*  --  1.24*   GFRESTIMATED  --  49* 54* 48* 51* 44*  --  45*   SUNI  --  9.8 9.5 9.5 9.6 10.1  --  10.4*   MAG 1.8 1.6* 1.7 1.6* 1.9 1.4*   < >  --    PROTTOTAL  --   --   --   --  5.7* 6.0*  --  5.4*   ALBUMIN  --   --   --   --  3.0* 3.4*  --  3.0*   AST  --   --   --   --  24 30  --  25   ALT  --   --   --   --  16 20  --  18   ALKPHOS  --   --   --   --  157* 187*  --  163*   BILITOTAL  --   --   --   --  0.2 0.3  --  0.2    < > = values in this interval not displayed.     Coags:  Recent Labs   Lab 12/13/22  1803   INR 1.62*     Cardiac Markers:  No results for input(s): CKTOTAL, TROPONINI in the last 168 hours.               12/18/2022   Lydia Gold MD  Hospitalist  Pager: [phone]

## 2022-12-18 NOTE — PLAN OF CARE
Problem: Pneumonia  Goal: Fluid Balance  Outcome: Progressing   Goal Outcome Evaluation:    A&O x4. No c/o pain. Up to the bathroom w/ SBA and walker. Uses IS and flutter valve with encouragement. VSS. Non-tele.

## 2022-12-19 NOTE — PLAN OF CARE
Assumed care 1500 to approximately 2100. A&O x 4. Stand by assist with a walker. Denies pain. Up to toilet. Call light within reach, able to make needs known. Bed alarm on for safety.    Problem: Pneumonia  Goal: Effective Oxygenation and Ventilation  Intervention: Promote Airway Secretion Clearance  Recent Flowsheet Documentation  Taken 12/18/2022 1730 by NORMAN ARNOLD  Cough And Deep Breathing: done independently per patient

## 2022-12-19 NOTE — PLAN OF CARE
Problem: Infection  Goal: Absence of Infection Signs and Symptoms  Outcome: Adequate for Care Transition  Intervention: Prevent or Manage Infection  Recent Flowsheet Documentation  Taken 12/19/2022 9454 by Ivan Rosario RN  Isolation Precautions: special precautions maintained   Goal Outcome Evaluation:    Patient is A/O x4. VSS. Patient on RA and is doing some noticeable pursed lip breathing, but otherwise stable. Incontinent of bowel this morning. Patient ambulates with SBA of 1.     No c/o of pain this shift.

## 2022-12-19 NOTE — PROGRESS NOTES
12/19/22 1000   Appointment Info   Signing Clinician's Name / Credentials (OT) WANDA Patel, OTR/L   Living Environment   People in Home spouse   Current Living Arrangements condominium   Home Accessibility no concerns   Transportation Anticipated family or friend will provide;health plan transportation   Living Environment Comments RA   Self-Care   Usual Activity Tolerance good   Current Activity Tolerance moderate   Equipment Currently Used at Home cane, straight;other (see comments)  (4WW - uses FWW)   Fall history within last six months yes   Number of times patient has fallen within last six months 5   Activity/Exercise/Self-Care Comment I with ADLs before September, caregiver for . Daughter lives nearby and helps frequently   Instrumental Activities of Daily Living (IADL)   Previous Responsibilities driving;laundry;meal prep;finances;housekeeping  (med management)   IADL Comments hesband does not need physical assistance   General Information   Onset of Illness/Injury or Date of Surgery 12/13/22   Referring Physician Lydia Gold MD   Patient/Family Therapy Goal Statement (OT) reduce fatigue   General Observations and Info Currently on RA   Cognitive Status Examination   Orientation Status orientation to person, place and time   Visual Perception   Visual Impairment/Limitations WFL   Pain Assessment   Patient Currently in Pain No   Range of Motion Comprehensive   General Range of Motion bilateral upper extremity ROM WFL   Strength Comprehensive (MMT)   General Manual Muscle Testing (MMT) Assessment no strength deficits identified   Comment, General Manual Muscle Testing (MMT) Assessment generalized deconditioning   Bed Mobility   Comment (Bed Mobility) SBA supine to sit   Transfers   Transfers sit-stand transfer   Transfer Comments SBA sit to stand   Activities of Daily Living   BADL Assessment/Intervention toileting   Clinical Impression   Criteria for Skilled Therapeutic Interventions  Met (OT) Yes, treatment indicated   OT Diagnosis decreased ADL independence   Influenced by the following impairments generalized deconditioning   OT Problem List-Impairments impacting ADL problems related to;activity tolerance impaired;strength   ADL comments/analysis Assist of 1   Assessment of Occupational Performance 1-3 Performance Deficits   Identified Performance Deficits toileting, Fx mobility   Planned Therapy Interventions (OT) ADL retraining;IADL retraining;strengthening;transfer training;progressive activity/exercise   Clinical Decision Making Complexity (OT) low complexity   Anticipated Equipment Needs Upon Discharge (OT)   (no equipment needs at this time)   Risk & Benefits of therapy have been explained evaluation/treatment results reviewed;care plan/treatment goals reviewed;risks/benefits reviewed;participants included;patient   Clinical Impression Comments Pt is a good candidate for continued OT to promote return to baseline ADL fx and increase fx endurance.   OT Total Evaluation Time   OT Eval, Low Complexity Minutes (84679) 15   OT Goals   Therapy Frequency (OT) Daily   OT Predicted Duration/Target Date for Goal Attainment 12/25/22   OT Goals Hygiene/Grooming;Toilet Transfer/Toileting   OT: Hygiene/Grooming supervision/stand-by assist;while standing   OT: Toilet Transfer/Toileting Supervision/stand-by assist;toilet transfer;cleaning and garment management   Self-Care/Home Management   Self-Care/Home Mgmt/ADL, Compensatory, Meal Prep Minutes (41750) 10   Symptoms Noted During/After Treatment (Meal Preparation/Planning Training) fatigue   Treatment Detail/Skilled Intervention OT guided pt in ambulation to bathroom with SBA and FWW. Pt completed toileting tasks with min A. She then washed her hands at the sink with SBA. Pt ambulated back to bed with FWW and SBA. She then took a brief break before participating in fx ROM in for 45 seconds per movement with breaks in between to promote fx endurance.    OT Discharge Planning   OT Plan [place] grooming, toileting, therx, Fx mobility   OT Discharge Recommendation (DC Rec) home with assist;home with home care occupational therapy   OT Rationale for DC Rec Pt currently Ax1 for ADLs and Fx mobility. Would benefit from further therapy to promote return to baseline Fx and assess safety in home.   OT Brief overview of current status SBA with FWW for mobility, Min A for toileting   Total Session Time   Timed Code Treatment Minutes 10   Total Session Time (sum of timed and untimed services) 25

## 2022-12-19 NOTE — PROGRESS NOTES
Hospitalist Progress Note        CODE STATUS:  No CPR- Do NOT Intubate    Assessment/Plan:  Fatuma Henry is a 77 year old female with history of Atrial fibrillation on xarelto, COPD on nocturnal O2 of 3LPM, CKD, recurrent UTIs who presented on 12/13 from TCU w/ SOB. She was being treated with oral abx for a UTI and Pneumonia at TCU. Found to have acute COVID positive and was hypotensive and hypoxic on presentation.     Acute hypoxic respiratory failure  Acute COVID- 19 infection  Hx of COPD  - Sx started 12/11. Covid tested positive 12/13. Fully vaccinated.   - Clinically improved   - Weaned off of supplemental O2 today successfully  - Continue Decadron x 10 days (started 12/13)  - Completed Remdesivir x 5 days   - Pneumonia likely HCAP given hospitalized x 2 in last few weeks and resident of TCU  - MRSA neg so vancomycin discontinued. Completed Zosyn x total 5 days.  - Already on DOAC   - Wean O2 as tolerated  - Continue Isolation      Hypomagnesemia  - Replaced with IV    Chronic kidney disease stage 3  - Avoid nephrotoxins  - Renally dose medications     Hypertension with hypotension on presentation  - BP readings now acceptable     Atrial fibrillation  - RVR on presentation however improved  - Continue Toprol XL w/ hold parameters  - Continue DOAC     UTI   - Started on PO Levaquin on 12/12 for UTI/PNA. UTI w/ proteus.  - Completed treatment w/ zosyn as above    Hypomagnesemia  - Replaced with IV    Rheumatoid arthritis  - Hold methotrexate     Heart failure with preserved ejection fraction  Pulmonary hypertension  - Recent hospital stay with volume overload related to transfusion with echo on 12/2/22 with an ejection fraction of 60 to 65%  - Monitor      Recent C. difficile infection  - Reported intermittent loose stool. Less likely acute c-diff infection but will monitor     Known pulmonary nodule  - Being monitored by pulmonary with plan for repeat CT March 2023      DVT Prophylaxis: On  Xarelto    Disposition/Barrier to discharge; Medically stable to discharge to TCU.      Subjective:  Interval History:   Patient seen and examined.  No events overnight.     Review of Systems:   As mentioned in subjective.    Patient Active Problem List   Diagnosis     Seropositive rheumatoid arthritis (H)     Osteoporosis dxa 2006      High risk medication use     Rheumatoid arthritis involving multiple sites with positive rheumatoid factor (H)     Dyspnea     Reactive airway disease     Paroxysmal atrial fibrillation (H)     Nonsustained ventricular tachycardia     CRF (chronic renal failure), stage 3 (moderate) (H)     Primary osteoarthritis involving multiple joints     Benign essential hypertension     Pulmonary nodules     COPD exacerbation (H)     Asthma without status asthmaticus     Chronic kidney disease, stage 3a (H)     History of 2019 novel coronavirus disease (COVID-19)     DNR (do not resuscitate)     Chronic anticoagulation     Personal history of immunosuppressive therapy     Hypokalemia     Hypomagnesemia     Diarrhea, unspecified type     SOB (shortness of breath)     Hypoxia     Rapid atrial fibrillation (H)     Sepsis, due to unspecified organism, unspecified whether acute organ dysfunction present (H)     Infection due to 2019 novel coronavirus       Scheduled Meds:    allopurinol  300 mg Oral Daily     colchicine  0.6 mg Oral Daily     dexamethasone  6 mg Oral Daily     DULoxetine  20 mg Oral Daily     fluticasone-vilanterol  1 puff Inhalation Daily     folic acid  1 mg Oral Daily     lactobacillus rhamnosus (GG)  1 capsule Oral Daily     magnesium oxide  400 mg Oral BID     metoprolol succinate ER  75 mg Oral Daily     pantoprazole  40 mg Oral BID AC     pravastatin  20 mg Oral QPM     rivaroxaban ANTICOAGULANT  15 mg Oral Daily with supper     sodium chloride (PF)  3 mL Intracatheter Q8H     Continuous Infusions:    - MEDICATION INSTRUCTIONS -       PRN Meds:.acetaminophen (TYLENOL) tablet  500 mg, albuterol, lidocaine 4%, lidocaine (buffered or not buffered), lidocaine visc 2% 2.5mL/5mL & maalox/mylanta w/ simeth 2.5mL/5mL & diphenhydrAMINE 5mg/5mL, - MEDICATION INSTRUCTIONS -, methocarbamol, ondansetron **OR** ondansetron, sodium chloride (PF)    Objective:  Vital signs in last 24 hours:  Temp:  [97.5  F (36.4  C)-98.5  F (36.9  C)] 97.5  F (36.4  C)  Pulse:  [] 97  Resp:  [16-20] 16  BP: (113-126)/(58-64) 124/63  SpO2:  [96 %-100 %] 97 %      Physical Exam:  General: In NAD  HEENT: NCAT & EOMI  CV: Fast  rate  Lungs: CTAB. Respirations easy.  Abdomen: Soft, NT, ND, +BS  Extremities: No LE edema b/l  Neuro: AAOx3      Lab Results:    Recent Results (from the past 24 hour(s))   Magnesium    Collection Time: 12/19/22  6:32 AM   Result Value Ref Range    Magnesium 1.8 1.7 - 2.3 mg/dL       Serum Glucose range:   Recent Labs   Lab 12/17/22  0639 12/16/22  0513 12/15/22  0454 12/14/22  0635   GLC 87 110* 118* 115*     ABG: No lab results found in last 7 days.  CBC:   Recent Labs   Lab 12/17/22  0639 12/16/22  0513 12/15/22  0454 12/14/22  0635 12/13/22  0836   WBC 10.6 11.3* 10.7   < > 12.2*   HGB 10.7* 10.3* 10.5*   < > 10.7*   HCT 33.2* 32.1* 32.8*   < > 34.5*   * 102* 102*   < > 105*    331 324   < > 329   NEUTROPHIL  --   --   --   --  79   LYMPH  --   --   --   --  5   MONOCYTE  --   --   --   --  12   EOSINOPHIL  --   --   --   --  0    < > = values in this interval not displayed.     Chemistry:   Recent Labs   Lab 12/19/22  0632 12/18/22  0633 12/17/22  0639 12/16/22  0513 12/15/22  0454 12/14/22  0635 12/13/22  0836   NA  --   --  136 137 135* 137 134*   POTASSIUM  --   --  3.9 3.8 3.8 4.3 4.2   CHLORIDE  --   --  100 100 97* 101 95*   CO2  --   --  29 30* 30* 25 26   BUN  --   --  30.1* 31.9* 34.6* 24.6* 23.4*   CR  --   --  1.15* 1.07* 1.18* 1.12* 1.25*   GFRESTIMATED  --   --  49* 54* 48* 51* 44*   SUNI  --   --  9.8 9.5 9.5 9.6 10.1   MAG 1.8 1.8 1.6* 1.7 1.6* 1.9 1.4*    PROTTOTAL  --   --   --   --   --  5.7* 6.0*   ALBUMIN  --   --   --   --   --  3.0* 3.4*   AST  --   --   --   --   --  24 30   ALT  --   --   --   --   --  16 20   ALKPHOS  --   --   --   --   --  157* 187*   BILITOTAL  --   --   --   --   --  0.2 0.3     Coags:  Recent Labs   Lab 12/13/22  1803   INR 1.62*     Cardiac Markers:  No results for input(s): CKTOTAL, TROPONINI in the last 168 hours.               12/19/2022   Lydia Gold MD  Hospitalist  Pager: [phone]

## 2022-12-19 NOTE — PLAN OF CARE
Problem: Plan of Care - These are the overarching goals to be used throughout the patient stay.    Goal: Plan of Care Review  Description: The Plan of Care Review/Shift note should be completed every shift.  The Outcome Evaluation is a brief statement about your assessment that the patient is improving, declining, or no change.  This information will be displayed automatically on your shift note.  12/19/2022 0452 by Ramandeep Del Real RN  Outcome: Progressing     Problem: Infection  Goal: Absence of Infection Signs and Symptoms  Outcome: Progressing   Goal Outcome Evaluation:       Pt arrived P4 at 21:20 from P3 unit. A&O x4, on Room air. Vital signs stable. IV zosyn running. Reoriented pt to room. Able to make needs known, ambulating with walker. Needs attended. Special Precautions maintained.

## 2022-12-20 NOTE — PLAN OF CARE
Problem: Risk for Delirium  Goal: Improved Behavioral Control  Intervention: Minimize Safety Risk  Recent Flowsheet Documentation  Taken 12/19/2022 1617 by Gibson Urrutia RN  Enhanced Safety Measures: bed alarm set     Problem: Infection  Goal: Absence of Infection Signs and Symptoms  Intervention: Prevent or Manage Infection  Recent Flowsheet Documentation  Taken 12/19/2022 1617 by Gibson Urrutia RN  Isolation Precautions: airborne precautions maintained     Problem: Pneumonia  Goal: Resolution of Infection Signs and Symptoms  Intervention: Prevent Infection Progression  Recent Flowsheet Documentation  Taken 12/19/2022 1617 by Gibson Urrutia RN  Isolation Precautions: airborne precautions maintained   Goal Outcome Evaluation:         Patient A and O times 4. Assist of 1 to and from bathroom. On room air, sats in mid 90s. On Covid precautions. Triple lumen PICC in URE. Patient complained of loose stool, imodium ordered by provider and given. Results pending. Methocarbamol given for pain.

## 2022-12-20 NOTE — DISCHARGE SUMMARY
[facility] MEDICINE  DISCHARGE SUMMARY     Primary Care Physician: Tory Wise  Admission Date: 12/13/2022   Discharge Provider: Lydia Gold MD Discharge Date: 12/20/2022   Diet: REgular   Code Status: No CPR- Do NOT Intubate   Activity: DCACTIVITY: Activity as tolerated        Condition at Discharge: Stable     REASON FOR PRESENTATION(See Admission Note for Details)   Shortness of Breath  Respiratory Failure     PRINCIPAL & ACTIVE DISCHARGE DIAGNOSES     Principal Problem:    Rapid atrial fibrillation (H)  Active Problems:    Hypomagnesemia    SOB (shortness of breath)    Hypoxia    Sepsis, due to unspecified organism, unspecified whether acute organ dysfunction present (H)    Infection due to 2019 novel coronavirus      PENDING LABS     Unresulted Labs Ordered in the Past 30 Days of this Admission     No orders found from 11/13/2022 to 12/14/2022.            PROCEDURES ( this hospitalization only)          RECOMMENDATIONS TO OUTPATIENT PROVIDER FOR F/U VISIT     Follow-up Appointments     Follow Up and recommended labs and tests      Follow up with TCU physician.  Follow up with primary care provider  7 days after discharge from TCU.                 DISPOSITION     Skilled Nursing Facility    SUMMARY OF HOSPITAL COURSE:    Fatuma Henry is a 77 year old female with history of Atrial fibrillation on xarelto, COPD on nocturnal O2 of 3LPM, CKD, recurrent UTIs who presented on 12/13 from TCU w/ SOB. She was being treated with oral abx for a UTI and Pneumonia at TCU. Found to have acute COVID positive and was hypotensive and hypoxic on presentation.     Acute hypoxic respiratory failure - Resolved  Acute COVID- 19 infection  Hx of COPD  - Sx started 12/11. Covid tested positive 12/13. Fully vaccinated.   - Clinically improved   - Weaned off of supplemental O2 today successfully  - Complete remainder of Decadron x 10 days   - Completed Remdesivir x 5 days   -  Pneumonia likely HCAP given hospitalized x 2 in last few weeks and resident of TCU  - MRSA neg so vancomycin discontinued. Completed Zosyn x total 5 days.  - Already on DOAC      Hypomagnesemia  - Replaced as needed     Chronic kidney disease stage 3  - Stable     Hypertension with hypotension on presentation  - BP readings now acceptable     Atrial fibrillation  - RVR on presentation however improved  - Continue Toprol XL   - Continue DOAC     UTI   - Started on PO Levaquin on 12/12 for UTI/PNA. UTI w/ proteus.  - Completed treatment w/ zosyn as above     Rheumatoid arthritis  - Hold methotrexate until okay to start per rheumatologist     Heart failure with preserved ejection fraction without exacerbation   Pulmonary hypertension  - Recent hospital stay with volume overload related to transfusion with echo on 12/2/22 with an ejection fraction of 60 to 65%    Recent C. difficile infection  - Reported intermittent loose stool. Less likely acute c-diff infection.     Known pulmonary nodule  - Being monitored by pulmonary with plan for repeat CT March 2023    Discharge Medications with Med changes:     Discharge Medication List as of 12/20/2022  2:33 PM      START taking these medications    Details   dexamethasone (DECADRON) 6 MG tablet Take 1 tablet (6 mg) by mouth daily, Disp-3 tablet, R-0, Historical         CONTINUE these medications which have CHANGED    Details   methotrexate sodium 2.5 MG TABS Take 6 tablets (15 mg) by mouth once a week Held on 12/11/22. Needs to be clarified with rheumatologist when this can be restarted., Disp-72 tablet, R-0, Historical      predniSONE (DELTASONE) 5 MG tablet Take 1 tablet (5 mg) by mouth daily Holding until decadron course for COVID is completed., Historical         CONTINUE these medications which have NOT CHANGED    Details   Acetaminophen (ACETAMIN PO) Take 500 mg by mouth every 4 hours as needed (pain or fever), Historical      albuterol (PROAIR HFA/PROVENTIL  HFA/VENTOLIN HFA) 108 (90 Base) MCG/ACT inhaler Inhale 2 puffs into the lungs every 4 hours as needed for shortness of breath / dyspnea or wheezing, Historical      allopurinol (ZYLOPRIM) 300 MG tablet Take 1 tablet (300 mg) by mouth daily, Disp-60 tablet, R-0, E-Prescribe      benzocaine (ANBESOL) 10 % gel Take by mouth every 6 hours as needed for mouth soresHistorical      budesonide-formoterol (SYMBICORT) 160-4.5 MCG/ACT Inhaler Inhale 2 puffs into the lungs 2 times daily for 30 days, Disp-10 g, R-11, E-Prescribe      colchicine (COLCYRS) 0.6 MG tablet Take 1 tablet by mouth once daily, Disp-60 tablet, R-0, E-Prescribe      compressor, for nebulizer Saran [COMPRESSOR, FOR NEBULIZER SARAN] Nebulizer treatment qid prn, Disp-1 Device, R-0, Normal      DULoxetine (CYMBALTA) 20 MG capsule Take 1 capsule (20 mg) by mouth daily for 30 days, Disp-30 capsule, R-3, E-Prescribe      folic acid (FOLVITE) 1 MG tablet TAKE 1 TABLET BY MOUTH ONCE DAILY EXCEPT  THE  DAY  WHEN  TAKING  METHOTREXATE, Disp-90 tablet, R-0, E-Prescribe      ipratropium - albuterol 0.5 mg/2.5 mg/3 mL (DUONEB) 0.5-2.5 (3) MG/3ML neb solution Take 1 vial (3 mLs) by nebulization every 4 hours as needed for wheezing or shortness of breath / dyspnea, Disp-90 mL, R-0, E-Prescribe      lactobacillus rhamnosus, GG, (CULTURELL) capsule Take 1 capsule by mouth daily Give 1 capsule while on antibiotics for 7 days (start 12/11/22), Historical      magic mouthwash suspension (diphenhydrAMINE, lidocaine, aluminum-magnesium & simethicone) Swish and swallow 10 mLs in mouth every 6 hours as needed for mouth sores, Disp-120 mL, R-0, E-Prescribe      magnesium oxide (MAG-OX) 400 MG tablet Take 400 mg by mouth 2 times daily, Historical      methocarbamol (ROBAXIN) 500 MG tablet Take 500 mg by mouth daily as needed for muscle spasms, Historical      metoprolol succinate ER (TOPROL XL) 25 MG 24 hr tablet Take 3 tablets (75 mg) by mouth daily for 30 days, Disp-90 tablet, R-0,  Transitional      omeprazole 20 MG tablet Take 20 mg by mouth 2 times daily, Historical      pravastatin (PRAVACHOL) 20 MG tablet Take 20 mg by mouth every evening, Historical      rivaroxaban ANTICOAGULANT (XARELTO) 15 MG TABS tablet Take 1 tablet (15 mg) by mouth daily (with dinner), Disp-90 tablet, R-3, E-Prescribe      Vitamin D3 (CHOLECALCIFEROL) 125 MCG (5000 UT) tablet Take 125 mcg by mouth daily, Historical         STOP taking these medications       levofloxacin (LEVAQUIN) 500 MG tablet Comments:   Reason for Stopping:                     Consults   None    Immunizations given this encounter     Most Recent Immunizations   Administered Date(s) Administered     COVID-19 Vaccine 18+ (Moderna) 11/13/2021     COVID-19 Vaccine Bivalent Booster 12+ (Pfizer) 10/11/2022     Flu, Unspecified 11/10/2017     Influenza (H1N1) 11/19/2009     Influenza (High Dose) 3 valent vaccine 10/24/2019     Influenza (intradermal) 11/10/2021     Influenza Vaccine 65+ (Fluzone HD) 10/11/2022     Influenza Vaccine, 6+MO IM (QUADRIVALENT W/PRESERVATIVES) 09/23/2016     Pneumo Conj 13-V (2010&after) 04/10/2015     Pneumococcal 23 valent 09/24/2012     TDAP Vaccine (Adacel) 10/12/2010     Td (Adult), Adsorbed 08/09/2000     Td,adult,historic,unspecified 1946     Tdap (Adacel,Boostrix) 10/28/2022     Zoster vaccine recombinant adjuvanted (SHINGRIX) 12/26/2019     Zoster vaccine, live 12/26/2019          SIGNIFICANT IMAGING FINDINGS     Results for orders placed or performed during the hospital encounter of 12/13/22   Chest XR,  PA & LAT    Impression    IMPRESSION: There is subtle increased opacity over the heart on the lateral view which is consistent with right middle lobe early pneumonia. There is bronchial thickening in both lower lungs. No pneumothorax or pleural effusion normal heart size and   pulmonary vascularity.    NOTE: ABNORMAL REPORT    THE DICTATION ABOVE DESCRIBES AN ABNORMALITY FOR WHICH FOLLOW-UP IS NEEDED.    XR  Chest Port 1 View    Impression    IMPRESSION: Right upper extremity PICC terminates at the SVC-RA junction. Middle lobe opacity is not as well visualized without a lateral view. No pneumothorax or pleural effusion. Normal cardiomediastinal silhouette.   XR Chest Port 1 View    Impression    IMPRESSION: Right PICC tip in the lower SVC. Mild bibasilar atelectasis. No pneumothorax or pleural fluid. Normal heart size and pulmonary vascularity. Calcified plaque of the aortic arch.       SIGNIFICANT LABORATORY FINDINGS     Most Recent 3 CBC's:Recent Labs   Lab Test 12/17/22  0639 12/16/22  0513 12/15/22  0454   WBC 10.6 11.3* 10.7   HGB 10.7* 10.3* 10.5*   * 102* 102*    331 324     Most Recent 3 BMP's:Recent Labs   Lab Test 12/17/22  0639 12/16/22  0513 12/15/22  0454    137 135*   POTASSIUM 3.9 3.8 3.8   CHLORIDE 100 100 97*   CO2 29 30* 30*   BUN 30.1* 31.9* 34.6*   CR 1.15* 1.07* 1.18*   ANIONGAP 7 7 8   SUNI 9.8 9.5 9.5   GLC 87 110* 118*         Discharge Orders        General info for SNF    Length of Stay Estimate: Short Term Care: Estimated # of Days <30  Condition at Discharge: Improving  Level of care:skilled   Rehabilitation Potential: Excellent  Admission H&P remains valid and up-to-date: Yes  Recent Chemotherapy: N/A  Use Nursing Home Standing Orders: Yes     Mantoux instructions    Give two-step Mantoux (PPD) Per Facility Policy Yes     Follow Up and recommended labs and tests    Follow up with TCU physician.  Follow up with primary care provider  7 days after discharge from TCU.     Reason for your hospital stay    Acute Respiratory Failure due to COVID pneumonia   Super imposed bacterial Pneumonia     Activity - Up ad ricarda     Physical Therapy Adult Consult    Evaluate and treat as clinically indicated.     Occupational Therapy Adult Consult    Evaluate and treat as clinically indicated.     Diet    Follow this diet upon discharge: Regular Diet Adult       Examination   General: In  NAD  HEENT: NCAT & EOMI  CV: Fast  rate  Lungs: CTAB. Respirations easy.  Abdomen: Soft, NT, ND, +BS  Extremities: No LE edema b/l  Neuro: AAOx3    Please see EMR for more detailed significant labs, imaging, consultant notes etc.    Lydia MENARD MD, personally saw the patient today and spent greater than 30 minutes discharging this patient.    Lydia Gold MD  [facility]    CC:Tory Wise

## 2022-12-20 NOTE — PROGRESS NOTES
Care Management Discharge Note    Discharge Date: 12/20/2022       Discharge Disposition: Transitional Care    Discharge Services:  TCU    Discharge DME:  n/a    Discharge Transportation: family or friend will provide, health plan transportation    Private pay costs discussed: transportation costs and Not applicable    PAS Confirmation Code:  n/a  Patient/family educated on Medicare website which has current facility and service quality ratings:  yes    Education Provided on the Discharge Plan:  yes  Persons Notified of Discharge Plans: yes  Patient/Family in Agreement with the Plan:  yes    Handoff Referral Completed: Yes    Additional Information:  Pt accepted back to [facility] (accepted day 7 post COVID diagnisis); all parties aware and agreeable to discharge plan. Pt's daughter is going to transport at 1500.  2:28 PM    TONI Alston  12/20/2022

## 2022-12-20 NOTE — PLAN OF CARE
Problem: Plan of Care - These are the overarching goals to be used throughout the patient stay.    Goal: Plan of Care Review  Description: The Plan of Care Review/Shift note should be completed every shift.  The Outcome Evaluation is a brief statement about your assessment that the patient is improving, declining, or no change.  This information will be displayed automatically on your shift note.  Recent Flowsheet Documentation  Taken 12/20/2022 1151 by Shayy Ramesh, RN  Plan of Care Reviewed With: patient     Problem: Pneumonia  Goal: Effective Oxygenation and Ventilation  Intervention: Optimize Oxygenation and Ventilation  Recent Flowsheet Documentation  Taken 12/20/2022 0845 by Shayy Ramesh, RN  Head of Bed (HOB) Positioning: HOB at 20-30 degrees   Goal Outcome Evaluation:  On room air  Problem: Diarrhea  Goal: Effective Diarrhea Management  Outcome: Progressing-having diarrhea and will monitor  Intervention: Manage Diarrhea  Recent Flowsheet Documentation  Taken 12/20/2022 0845 by Shayy Ramesh, RN  Medication Review/Management: medications reviewed       Plan of Care Reviewed With: patient

## 2022-12-20 NOTE — PLAN OF CARE
Physical Therapy Discharge Summary    Reason for therapy discharge:    Discharged to transitional care facility.    Progress towards therapy goal(s). See goals on Care Plan in [facility] electronic health record for goal details.  Goals not met.  Barriers to achieving goals:   discharge from facility.    Therapy recommendation(s):    Continued therapy is recommended.  Rationale/Recommendations:  To improve mobility and strength. .

## 2022-12-20 NOTE — PLAN OF CARE
Problem: Pneumonia  Goal: Effective Oxygenation and Ventilation  Intervention: Optimize Oxygenation and Ventilation  Recent Flowsheet Documentation  Taken 12/20/2022 0210 by Ivet Nuñez RN  Head of Bed ([facility]) Positioning: HOB at 20 degrees     Problem: Gas Exchange Impaired  Goal: Optimal Gas Exchange  Outcome: Progressing  Intervention: Optimize Oxygenation and Ventilation  Recent Flowsheet Documentation  Taken 12/20/2022 0210 by Ivet Nuñez RN  Head of Bed ([facility]) Positioning: HOB at 20 degrees   Goal Outcome Evaluation:  No new issues overnight.  Denied pain.  Up to bathroom with assist.  Reports some dyspnea on exertion though recovers quickly.

## 2022-12-20 NOTE — PLAN OF CARE
Occupational Therapy Discharge Summary    Reason for therapy discharge:    Discharged to TCU.    Progress towards therapy goal(s). See goals on Care Plan in [facility] electronic health record for goal details.  Progressing towards goals.    Therapy recommendation(s):    Recommend continued OT @ TCU.

## 2022-12-21 NOTE — LETTER
12/21/2022        RE: Fatuma Henry  [address]  [address]  [place] [address]        [facility] GERIATRICS    PRIMARY CARE PROVIDER AND CLINIC:  Tory Wise MD, [address] [facility] [address]  Chief Complaint   Patient presents with     Hospital F/U      [facility] Medical Record Number:  [id number]  Place of Service where encounter took place:  [facility] ([facility] [[id number]]    Fatuma Henry  is a 77 year old  (1945), admitted to the above facility from  [facility]. Hospital stay 12/13/22 through 12/20/22..   HPI:    Patient is a 77-year-old female with past medical history of CKD, paroxysmal A. fib, hypertension, COPD on nocturnal oxygen, RA, Gout, and multiple recent hospitalizations as outlined below due to C.diff and HF exacerbations who was most recently admitted at [facility] after developing 2-3 days of SOB, generalized malaise, and increased oxygen needs while at TCU. She had been started on Abx for pneumonia and Proteus UTI, found to be acutely positive for COVID-19 infection and was hypotensive, hypoxic on presentation. She was treated with remdesivir x 5 days and decadron x 10 days, treated for likely HCAP pneumonia with IV zosyn. She was able to wean off daytime oxygen and return to her baseline nocturnal requirements. She was referred back to TCU for ongoing rehab, medical management.    Summarization of recent hospitalizations:  11/7/2022 - 11/10/2022: admission at [facility] with acute C.diff infection, stabilized and discharged home.   11/17/2022 - 11/21/2022: admission at [facility] after presenting with increased falls and generalized weakness. She had findings of dehydation with YESICA, electrolyte abnormalities, and soft blood pressures. She was also identified to have progressively worsening macrocytic anemia with a decrease in hemoglobin from 11/12--8.8 since 10/2022.  Workup was  concerning for bone marrow etiology and she was recommended to follow-up as an outpatient once acute medical issues resolve.  She did receive 1 unit of PRBCs for symptomatic anemia on 11/17. Symptoms improved with IV hydration and repletion of electrolytes. Blood pressure meds were adjusted, metoprolol discontinued altogether due to soft bps (PTA dose 100mg/d). Following therapy evaluations, she was discharged to TCU. While at TCU, she completed treatment with oral vancomycin and diarrhea improved. Her metoprolol was up-titrated to 75mg/d and PTA PRN lasix resumed.   12/1/2022 - 12/4/2022: admission at [facility] after she reported waking overnight with chest pressure and sensation of an elephant sitting on her chest with worsening BLE edema, BP 170s. EKG was nonischemic, troponin 28--23, BNP 3087, CXR clear. Symptoms were suspected 2/2 mild CHF exacerbation. She was treated with IV diuresis, TTE with preserved LVEF and unchanged from prior. Ultimately was discharged back to TCU without medication changes.    Patient is seen today as hospital follow-up visit. She is sitting up in chair, conversing with OT on arrival. Reports she feels weak, has been getting up and walking in room with assistance. She remains off daytime oxygen, admits to mild shortness of breath with exertion which resolves with rest, indicates she is near baseline. Denies abdominal discomfort, nausea/vomiting. Has not been experiencing any chest discomfort. She feels her legs are not swollen. Admits to mild intermittent loose stools, no fecal incontinence. Afebrile.    CODE STATUS/ADVANCE DIRECTIVES DISCUSSION:  Prior  DNR / DNI  ALLERGIES:   Allergies   Allergen Reactions     Codeine Nausea and Vomiting     Fosamax [Alendronic Acid] Diarrhea     Morphine Nausea and Vomiting      PAST MEDICAL HISTORY:   Past Medical History:   Diagnosis Date     Atrial fibrillation (H)      CKD (chronic kidney disease) stage 3, GFR 30-59 ml/min (H)      COPD  (chronic obstructive pulmonary disease) (H)     nocturnal oxygen     CRF (chronic renal failure)      GERD (gastroesophageal reflux disease)      Hypertension      Hypovolemic shock (H)      Influenza A 12/01/2017     Pulmonary hypertension (H)      Pulmonary nodules      Rheumatoid arthritis (H)      Sepsis (H) 12/24/2017      PAST SURGICAL HISTORY:   has a past surgical history that includes Cholecystectomy; appendectomy; Bladder Suspension; and PICC/Midline Placement (12/13/2022).  FAMILY HISTORY: family history includes Cerebrovascular Disease in her brother and father; Diabetes Type 2  in her brother; Heart Failure in her mother; Kidney failure in her sister.  SOCIAL HISTORY:   reports that she quit smoking about 5 years ago. Her smoking use included cigarettes. She smoked an average of .5 packs per day. She has never used smokeless tobacco. She reports that she does not drink alcohol and does not use drugs.  Patient's living condition: lives with spouse    Post Discharge Medication Reconciliation Status:   MED REC REQUIRED  Post Medication Reconciliation Status: discharge medications reconciled and changed, per note/orders       Current Outpatient Medications   Medication Sig     Acetaminophen (ACETAMIN PO) Take 500 mg by mouth every 4 hours as needed (pain or fever)     albuterol (PROAIR HFA/PROVENTIL HFA/VENTOLIN HFA) 108 (90 Base) MCG/ACT inhaler Inhale 2 puffs into the lungs every 4 hours as needed for shortness of breath / dyspnea or wheezing     allopurinol (ZYLOPRIM) 300 MG tablet Take 1 tablet (300 mg) by mouth daily     benzocaine (ANBESOL) 10 % gel Take by mouth every 6 hours as needed for mouth sores     colchicine (COLCYRS) 0.6 MG tablet Take 1 tablet by mouth once daily     compressor, for nebulizer Saran [COMPRESSOR, FOR NEBULIZER SARAN] Nebulizer treatment qid prn     dexamethasone (DECADRON) 6 MG tablet Take 1 tablet (6 mg) by mouth daily     folic acid (FOLVITE) 1 MG tablet TAKE 1 TABLET BY  "MOUTH ONCE DAILY EXCEPT  THE  DAY  WHEN  TAKING  METHOTREXATE     ipratropium - albuterol 0.5 mg/2.5 mg/3 mL (DUONEB) 0.5-2.5 (3) MG/3ML neb solution Take 1 vial (3 mLs) by nebulization every 4 hours as needed for wheezing or shortness of breath / dyspnea     magnesium oxide (MAG-OX) 400 MG tablet Take 400 mg by mouth 2 times daily     methocarbamol (ROBAXIN) 500 MG tablet Take 500 mg by mouth 4 times daily as needed for muscle spasms     metoprolol succinate ER (TOPROL XL) 25 MG 24 hr tablet Take 3 tablets (75 mg) by mouth daily for 30 days     omeprazole 20 MG tablet Take 20 mg by mouth 2 times daily     pravastatin (PRAVACHOL) 20 MG tablet Take 20 mg by mouth every evening     rivaroxaban ANTICOAGULANT (XARELTO) 15 MG TABS tablet Take 1 tablet (15 mg) by mouth daily (with dinner)     Vitamin D3 (CHOLECALCIFEROL) 125 MCG (5000 UT) tablet Take 125 mcg by mouth daily     budesonide-formoterol (SYMBICORT) 160-4.5 MCG/ACT Inhaler Inhale 2 puffs into the lungs 2 times daily for 30 days     DULoxetine (CYMBALTA) 20 MG capsule Take 1 capsule (20 mg) by mouth daily for 30 days     methotrexate sodium 2.5 MG TABS Take 6 tablets (15 mg) by mouth once a week Held on 12/11/22. Needs to be clarified with rheumatologist when this can be restarted. (Patient not taking: Reported on 12/22/2022)     predniSONE (DELTASONE) 5 MG tablet Take 1 tablet (5 mg) by mouth daily Holding until decadron course for COVID is completed. (Patient not taking: Reported on 12/22/2022)     No current facility-administered medications for this visit.       ROS:  4 point ROS including Respiratory, CV, GI and , other than that noted in the HPI,  is negative    Vitals:  /49   Pulse 94   Temp 97  F (36.1  C)   Resp 16   Ht 1.676 m (5' 6\")   Wt 70.5 kg (155 lb 8 oz)   SpO2 97%   BMI 25.10 kg/m    Exam:  GEN: well-developed, frail elderly female, appears comfortable  HEENT: NCAT, EOM intact bilaterally, nose & mouth patent, mucous membranes " moist  CHEST: lungs CTA bilaterally, no increased work of breathing, no wheeze, crackles, rhonchi  HEART: irregularly irregular, S1 & S2  ABD: soft, nontender, nondistended, no guarding or rigidity, +BS in all 4 quadrants  MSK: AROM bilateral UE/LE, pedal & radial pulses 2+ bilaterally  NEURO: awake, alert, oriented to name, place, and time. CN II-XII grossly intact. Sensation grossly intact to light touch.   SKIN: warm & dry without rash, trace bilateral pedal edema      Lab/Diagnostic data:    Most Recent 3 CBC's:  Recent Labs   Lab Test 12/17/22  0639 12/16/22  0513 12/15/22  0454   WBC 10.6 11.3* 10.7   HGB 10.7* 10.3* 10.5*   * 102* 102*    331 324     Most Recent 3 BMP's:  Recent Labs   Lab Test 12/17/22  0639 12/16/22  0513 12/15/22  0454    137 135*   POTASSIUM 3.9 3.8 3.8   CHLORIDE 100 100 97*   CO2 29 30* 30*   BUN 30.1* 31.9* 34.6*   CR 1.15* 1.07* 1.18*   ANIONGAP 7 7 8   SUNI 9.8 9.5 9.5   GLC 87 110* 118*       ASSESSMENT/PLAN:    Acute COVID-19 infection  Acute hypoxic respiratory failure, resolved  HCAP, resolved  PNA diagnosed at TCU, likely HCAP given recent hospitalizations. Completed treatment with IV zosyn while inpatient. COVID positive 12/13, s/p treatment with remdesivir x5d, initiated on treatment with dexamethasone x10d, to complete course at TCU. Initially hypoxic, requiring supplemental oxygen which was weaned prior to discharge.  -Continue dexamethasone 6mg daily to complete 10-day course (end date 12/24)  -Pulmonary hygiene, encourage OOB, IS use  -Monitor oxygen levels    UTI, proteus, resolved  Diagnosed at TCU, Cx positive. Completed tx while inpatient.    Generalized weakness  Physical deconditioning  Multifactorial in setting of repeat hospitalizations, acute infections, anemia, electrolyte abnormalities.  -PT/OT  -SW for safe discharge planning    Hypomagnesemia  -Continues on MagOx 400mg BID  -Monitor periodically    Anemia, macrocytic  Noted to have  progressively worsening macrocytic anemia with decrease in Hgb from 11/12--8.8 in 2mo. Workup suggestive of bone marrow supression from unknown etiology; recurrent infections vs other. Has had colonoscopy screening recently. Folate/B12 values wnl. Iron studies c/w chronic disease. HIV/carlene tests neg. Received 1u PRBCs while inpatient for suspected symptomatic anemia, value at discharge 11.6. Repeat values ranging 10-11 with persistent macrocytosis.  *Peripheral smear from 11/18 neg for hemolysis/atypia, MMA 0.24, SPEP with hypogammaglobulinemia  -Follow-up with hematology, [facility] hematology referral previously placed, appt scheduled 12/28    CKD-3  Baseline Cr 0.7-0.9, most recently had been in range of 1.2. Value at discharge 1.15.  -Monitor BMP periodically    Paroxysmal afib  HTN / HLD  Hx pulmonary htn  HFpEF exacerbation, improved  PTA metoprolol 75mg/d, recent hospitalization as noted above for HF exacerbation improved with diuresis, not discharged on chronic diuretic therapy. Previously on 20mg lasix daily PRN. BPs ranging 113-117, HRs 86-96. Weight on re-admission 155#, increased from 150# on most recent admission.  -Continue metoprolol at 75mg/d, hold parameters for SBP < 95, HR < 55  -Continues on rivaroxaban for CVA proph, statin  -Continue holding lasix at this time, may consider resuming at 20mg/d pending volume status  -Daily weights  -Follow up with cardiology as outpatient     RA  Without evidence of flare.  -Methotrexate held during acute infection, follow-up with rheumatology regarding when to resume, scheduled for 12/27     Gout  Seen by rheumatology in 09/2022 with concerns of gout, started on 60-day prescription for colchicine and prednisone as well as allopurinol loading  -Continue allopurinol 300mg daily  -Continue colchicine  -Prednisone held in setting of dexamethasone for COVID, resume following treatment course completion  -Follow up with rheumatology, scheduled for 12/27     COPD, on  nocturnal oxygen (2L)  No evidence of acute exacerbation today.  -Continue nocturnal oxygen as per home use  -Pulmonary hygiene, encourage OOB, ambulation, IS  -Continues on symbicort, albuterol     GERD  -Continue omeprazole 20mg BID     Depression  Appears compensated.  -Continue cymbalta    Pulmonary nodules  Followed by pulmonology, Dr. Rosario. 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 and a 10 mm groundglass nodule in the lingula and several diminutive left upper lobe nodules that were unchanged.  -Follow with pulmonology for repeat CT scan as scheduled in 03/2023    Orders:  NNO    Total time spent with patient visit at the [facility] was 35 min including patient visit and review of past records. Greater than 50% of total time spent with counseling and coordinating care due to medical complexity.     Electronically signed by:  Jorge Luis Henry PA-C                       Sincerely,        Jorge Luis Henry PA-C

## 2022-12-21 NOTE — PROGRESS NOTES
[facility] GERIATRICS    PRIMARY CARE PROVIDER AND CLINIC:  Tory Wise MD, [id number] [facility] [place] [address]  Chief Complaint   Patient presents with     Hospital F/U      [place] Medical Record Number:  [id number]  Place of Service where encounter took place:  [facility] ([facility]) [[id number]]    Fatuma Henry  is a 77 year old  (1945), admitted to the above facility from  [facility]. Hospital stay 12/13/22 through 12/20/22..   HPI:    Patient is a 77-year-old female with past medical history of CKD, paroxysmal A. fib, hypertension, COPD on nocturnal oxygen, RA, Gout, and multiple recent hospitalizations as outlined below due to C.diff and HF exacerbations who was most recently admitted at [facility] after developing 2-3 days of SOB, generalized malaise, and increased oxygen needs while at TCU. She had been started on Abx for pneumonia and Proteus UTI, found to be acutely positive for COVID-19 infection and was hypotensive, hypoxic on presentation. She was treated with remdesivir x 5 days and decadron x 10 days, treated for likely HCAP pneumonia with IV zosyn. She was able to wean off daytime oxygen and return to her baseline nocturnal requirements. She was referred back to TCU for ongoing rehab, medical management.    Summarization of recent hospitalizations:  11/7/2022 - 11/10/2022: admission at [facility] with acute C.diff infection, stabilized and discharged home.   11/17/2022 - 11/21/2022: admission at [facility] after presenting with increased falls and generalized weakness. She had findings of dehydation with YESICA, electrolyte abnormalities, and soft blood pressures. She was also identified to have progressively worsening macrocytic anemia with a decrease in hemoglobin from 11/12--8.8 since 10/2022.  Workup was concerning for bone marrow etiology and she was recommended to follow-up as an outpatient once acute medical  issues resolve.  She did receive 1 unit of PRBCs for symptomatic anemia on 11/17. Symptoms improved with IV hydration and repletion of electrolytes. Blood pressure meds were adjusted, metoprolol discontinued altogether due to soft bps (PTA dose 100mg/d). Following therapy evaluations, she was discharged to TCU. While at TCU, she completed treatment with oral vancomycin and diarrhea improved. Her metoprolol was up-titrated to 75mg/d and PTA PRN lasix resumed.   12/1/2022 - 12/4/2022: admission at [facility] after she reported waking overnight with chest pressure and sensation of an elephant sitting on her chest with worsening BLE edema, BP 170s. EKG was nonischemic, troponin 28--23, BNP 3087, CXR clear. Symptoms were suspected 2/2 mild CHF exacerbation. She was treated with IV diuresis, TTE with preserved LVEF and unchanged from prior. Ultimately was discharged back to TCU without medication changes.    Patient is seen today as hospital follow-up visit. She is sitting up in chair, conversing with OT on arrival. Reports she feels weak, has been getting up and walking in room with assistance. She remains off daytime oxygen, admits to mild shortness of breath with exertion which resolves with rest, indicates she is near baseline. Denies abdominal discomfort, nausea/vomiting. Has not been experiencing any chest discomfort. She feels her legs are not swollen. Admits to mild intermittent loose stools, no fecal incontinence. Afebrile.    CODE STATUS/ADVANCE DIRECTIVES DISCUSSION:  Prior  DNR / DNI  ALLERGIES:   Allergies   Allergen Reactions     Codeine Nausea and Vomiting     Fosamax [Alendronic Acid] Diarrhea     Morphine Nausea and Vomiting      PAST MEDICAL HISTORY:   Past Medical History:   Diagnosis Date     Atrial fibrillation (H)      CKD (chronic kidney disease) stage 3, GFR 30-59 ml/min (H)      COPD (chronic obstructive pulmonary disease) (H)     nocturnal oxygen     CRF (chronic renal failure)      GERD  (gastroesophageal reflux disease)      Hypertension      Hypovolemic shock (H)      Influenza A 12/01/2017     Pulmonary hypertension (H)      Pulmonary nodules      Rheumatoid arthritis (H)      Sepsis (H) 12/24/2017      PAST SURGICAL HISTORY:   has a past surgical history that includes Cholecystectomy; appendectomy; Bladder Suspension; and PICC/Midline Placement (12/13/2022).  FAMILY HISTORY: family history includes Cerebrovascular Disease in her brother and father; Diabetes Type 2  in her brother; Heart Failure in her mother; Kidney failure in her sister.  SOCIAL HISTORY:   reports that she quit smoking about 5 years ago. Her smoking use included cigarettes. She smoked an average of .5 packs per day. She has never used smokeless tobacco. She reports that she does not drink alcohol and does not use drugs.  Patient's living condition: lives with spouse    Post Discharge Medication Reconciliation Status:   MED REC REQUIRED  Post Medication Reconciliation Status: discharge medications reconciled and changed, per note/orders       Current Outpatient Medications   Medication Sig     Acetaminophen (ACETAMIN PO) Take 500 mg by mouth every 4 hours as needed (pain or fever)     albuterol (PROAIR HFA/PROVENTIL HFA/VENTOLIN HFA) 108 (90 Base) MCG/ACT inhaler Inhale 2 puffs into the lungs every 4 hours as needed for shortness of breath / dyspnea or wheezing     allopurinol (ZYLOPRIM) 300 MG tablet Take 1 tablet (300 mg) by mouth daily     benzocaine (ANBESOL) 10 % gel Take by mouth every 6 hours as needed for mouth sores     colchicine (COLCYRS) 0.6 MG tablet Take 1 tablet by mouth once daily     compressor, for nebulizer Saran [COMPRESSOR, FOR NEBULIZER SARAN] Nebulizer treatment qid prn     dexamethasone (DECADRON) 6 MG tablet Take 1 tablet (6 mg) by mouth daily     folic acid (FOLVITE) 1 MG tablet TAKE 1 TABLET BY MOUTH ONCE DAILY EXCEPT  THE  DAY  WHEN  TAKING  METHOTREXATE     ipratropium - albuterol 0.5 mg/2.5 mg/3 mL  "(DUONEB) 0.5-2.5 (3) MG/3ML neb solution Take 1 vial (3 mLs) by nebulization every 4 hours as needed for wheezing or shortness of breath / dyspnea     magnesium oxide (MAG-OX) 400 MG tablet Take 400 mg by mouth 2 times daily     methocarbamol (ROBAXIN) 500 MG tablet Take 500 mg by mouth 4 times daily as needed for muscle spasms     metoprolol succinate ER (TOPROL XL) 25 MG 24 hr tablet Take 3 tablets (75 mg) by mouth daily for 30 days     omeprazole 20 MG tablet Take 20 mg by mouth 2 times daily     pravastatin (PRAVACHOL) 20 MG tablet Take 20 mg by mouth every evening     rivaroxaban ANTICOAGULANT (XARELTO) 15 MG TABS tablet Take 1 tablet (15 mg) by mouth daily (with dinner)     Vitamin D3 (CHOLECALCIFEROL) 125 MCG (5000 UT) tablet Take 125 mcg by mouth daily     budesonide-formoterol (SYMBICORT) 160-4.5 MCG/ACT Inhaler Inhale 2 puffs into the lungs 2 times daily for 30 days     DULoxetine (CYMBALTA) 20 MG capsule Take 1 capsule (20 mg) by mouth daily for 30 days     methotrexate sodium 2.5 MG TABS Take 6 tablets (15 mg) by mouth once a week Held on 12/11/22. Needs to be clarified with rheumatologist when this can be restarted. (Patient not taking: Reported on 12/22/2022)     predniSONE (DELTASONE) 5 MG tablet Take 1 tablet (5 mg) by mouth daily Holding until decadron course for COVID is completed. (Patient not taking: Reported on 12/22/2022)     No current facility-administered medications for this visit.       ROS:  4 point ROS including Respiratory, CV, GI and , other than that noted in the HPI,  is negative    Vitals:  /49   Pulse 94   Temp 97  F (36.1  C)   Resp 16   Ht 1.676 m (5' 6\")   Wt 70.5 kg (155 lb 8 oz)   SpO2 97%   BMI 25.10 kg/m    Exam:  GEN: well-developed, frail elderly female, appears comfortable  HEENT: NCAT, EOM intact bilaterally, nose & mouth patent, mucous membranes moist  CHEST: lungs CTA bilaterally, no increased work of breathing, no wheeze, crackles, rhonchi  HEART: " irregularly irregular, S1 & S2  ABD: soft, nontender, nondistended, no guarding or rigidity, +BS in all 4 quadrants  MSK: AROM bilateral UE/LE, pedal & radial pulses 2+ bilaterally  NEURO: awake, alert, oriented to name, place, and time. CN II-XII grossly intact. Sensation grossly intact to light touch.   SKIN: warm & dry without rash, trace bilateral pedal edema      Lab/Diagnostic data:    Most Recent 3 CBC's:  Recent Labs   Lab Test 12/17/22  0639 12/16/22  0513 12/15/22  0454   WBC 10.6 11.3* 10.7   HGB 10.7* 10.3* 10.5*   * 102* 102*    331 324     Most Recent 3 BMP's:  Recent Labs   Lab Test 12/17/22 0639 12/16/22 0513 12/15/22  0454    137 135*   POTASSIUM 3.9 3.8 3.8   CHLORIDE 100 100 97*   CO2 29 30* 30*   BUN 30.1* 31.9* 34.6*   CR 1.15* 1.07* 1.18*   ANIONGAP 7 7 8   SUNI 9.8 9.5 9.5   GLC 87 110* 118*       ASSESSMENT/PLAN:    Acute COVID-19 infection  Acute hypoxic respiratory failure, resolved  HCAP, resolved  PNA diagnosed at TCU, likely HCAP given recent hospitalizations. Completed treatment with IV zosyn while inpatient. COVID positive 12/13, s/p treatment with remdesivir x5d, initiated on treatment with dexamethasone x10d, to complete course at TCU. Initially hypoxic, requiring supplemental oxygen which was weaned prior to discharge.  -Continue dexamethasone 6mg daily to complete 10-day course (end date 12/24)  -Pulmonary hygiene, encourage OOB, IS use  -Monitor oxygen levels    UTI, proteus, resolved  Diagnosed at TCU, Cx positive. Completed tx while inpatient.    Generalized weakness  Physical deconditioning  Multifactorial in setting of repeat hospitalizations, acute infections, anemia, electrolyte abnormalities.  -PT/OT  -SW for safe discharge planning    Hypomagnesemia  -Continues on MagOx 400mg BID  -Monitor periodically    Anemia, macrocytic  Noted to have progressively worsening macrocytic anemia with decrease in Hgb from 11/12--8.8 in 2mo. Workup suggestive of bone  marrow supression from unknown etiology; recurrent infections vs other. Has had colonoscopy screening recently. Folate/B12 values wnl. Iron studies c/w chronic disease. HIV/carlene tests neg. Received 1u PRBCs while inpatient for suspected symptomatic anemia, value at discharge 11.6. Repeat values ranging 10-11 with persistent macrocytosis.  *Peripheral smear from 11/18 neg for hemolysis/atypia, MMA 0.24, SPEP with hypogammaglobulinemia  -Follow-up with hematology, [facility] hematology referral previously placed, appt scheduled 12/28    CKD-3  Baseline Cr 0.7-0.9, most recently had been in range of 1.2. Value at discharge 1.15.  -Monitor BMP periodically    Paroxysmal afib  HTN / HLD  Hx pulmonary htn  HFpEF exacerbation, improved  PTA metoprolol 75mg/d, recent hospitalization as noted above for HF exacerbation improved with diuresis, not discharged on chronic diuretic therapy. Previously on 20mg lasix daily PRN. BPs ranging 113-117, HRs 86-96. Weight on re-admission 155#, increased from 150# on most recent admission.  -Continue metoprolol at 75mg/d, hold parameters for SBP < 95, HR < 55  -Continues on rivaroxaban for CVA proph, statin  -Continue holding lasix at this time, may consider resuming at 20mg/d pending volume status  -Daily weights  -Follow up with cardiology as outpatient     RA  Without evidence of flare.  -Methotrexate held during acute infection, follow-up with rheumatology regarding when to resume, scheduled for 12/27     Gout  Seen by rheumatology in 09/2022 with concerns of gout, started on 60-day prescription for colchicine and prednisone as well as allopurinol loading  -Continue allopurinol 300mg daily  -Continue colchicine  -Prednisone held in setting of dexamethasone for COVID, resume following treatment course completion  -Follow up with rheumatology, scheduled for 12/27     COPD, on nocturnal oxygen (2L)  No evidence of acute exacerbation today.  -Continue nocturnal oxygen as per home use  -Pulmonary  hygiene, encourage OOB, ambulation, IS  -Continues on symbicort, albuterol     GERD  -Continue omeprazole 20mg BID     Depression  Appears compensated.  -Continue cymbalta    Pulmonary nodules  Followed by pulmonology, Dr. Rosario. 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 and a 10 mm groundglass nodule in the lingula and several diminutive left upper lobe nodules that were unchanged.  -Follow with pulmonology for repeat CT scan as scheduled in 03/2023    Orders:  NNO    Total time spent with patient visit at the [facility] nursing facility was 35 min including patient visit and review of past records. Greater than 50% of total time spent with counseling and coordinating care due to medical complexity.     Electronically signed by:  Jorge Luis Henry PA-C

## 2022-12-23 PROBLEM — R29.6 RECURRENT FALLS: Status: ACTIVE | Noted: 2022-01-01

## 2022-12-23 PROBLEM — J44.9 CHRONIC OBSTRUCTIVE PULMONARY DISEASE, UNSPECIFIED COPD TYPE (H): Status: ACTIVE | Noted: 2022-01-17

## 2022-12-23 PROBLEM — J18.9 COMMUNITY ACQUIRED PNEUMONIA OF RIGHT LOWER LOBE OF LUNG: Status: ACTIVE | Noted: 2022-01-01

## 2022-12-23 PROBLEM — K12.30 STOMATITIS AND MUCOSITIS: Status: ACTIVE | Noted: 2022-01-01

## 2022-12-23 PROBLEM — K12.1 STOMATITIS AND MUCOSITIS: Status: ACTIVE | Noted: 2022-01-01

## 2022-12-23 PROBLEM — N30.01 ACUTE CYSTITIS WITH HEMATURIA: Status: ACTIVE | Noted: 2022-01-01

## 2022-12-23 PROBLEM — N17.9 AKI (ACUTE KIDNEY INJURY) (H): Status: ACTIVE | Noted: 2022-01-01

## 2022-12-23 PROBLEM — D62 ACUTE BLOOD LOSS ANEMIA: Status: ACTIVE | Noted: 2022-01-01

## 2022-12-23 PROBLEM — M62.81 GENERALIZED MUSCLE WEAKNESS: Status: ACTIVE | Noted: 2022-01-01

## 2022-12-23 PROBLEM — J96.21 ACUTE ON CHRONIC RESPIRATORY FAILURE WITH HYPOXIA (H): Status: ACTIVE | Noted: 2022-01-01

## 2022-12-23 PROBLEM — J96.90 RESPIRATORY FAILURE, UNSPECIFIED CHRONICITY, UNSPECIFIED WHETHER WITH HYPOXIA OR HYPERCAPNIA (H): Status: ACTIVE | Noted: 2022-01-01

## 2022-12-23 PROBLEM — R55 SYNCOPE: Status: ACTIVE | Noted: 2022-01-01

## 2022-12-23 PROBLEM — J43.8 OTHER EMPHYSEMA (H): Status: ACTIVE | Noted: 2022-01-17

## 2022-12-23 PROBLEM — M79.609 PAIN IN LIMB: Status: ACTIVE | Noted: 2022-01-01

## 2022-12-23 PROBLEM — D53.9 MACROCYTIC ANEMIA: Status: ACTIVE | Noted: 2022-01-01

## 2022-12-23 NOTE — PROGRESS NOTES
Clinic Care Coordination Contact  Care Team Conversations    Patient identified for care management outreach, however [facility] RN staff has already followed up with patient to ensure they are following up with PCP and have needs and resources met. Clinic RN will refer back to STEPHANIE GA if needed/appropriate.    Emilie Lindsay, [facility]  Social Work Care Coordinator - [facility]  Care Coordination  [email or web address]  Cell Phone: [phone]  Gender pronouns: she/her  Employed by [employer]

## 2022-12-23 NOTE — TELEPHONE ENCOUNTER
Spoke to pts daughter Enid (emeg contact)- pt has had to cancel last couple appts with Dr Rush as she has been in and out of the hospital with several different infections- UTI, sepsis, pneumonia, C. Diff, and COVID. Pt is now in a TCU but has missed several weeks of methotrexate. Pt asked Enid to call to see what Dr Rush recommends doing with her RA meds or when she can resume these. Enid states she thinks pt is doing okay symptom-wise with her RA and is not sure if pt is currently on oral antibiotics, Enid will ask at the TCU this weekend and let us know. Informed Enid that Dr Rush is out of the office until next week but I would ask him then what he recommends and call her back to let her know.

## 2022-12-23 NOTE — LETTER
12/23/2022        RE: Fatuma Henry  [address]  [address] [place] [address]        [facility] GERIATRICS    Chief Complaint   Patient presents with     RECHECK     HPI:  Fatuma Henry is a 77 year old  (1945), who is being seen today for an episodic care visit at: [facility]) [[id number]].     Patient is a 77-year-old female with past medical history of CKD, paroxysmal A. fib, hypertension, COPD on nocturnal oxygen, RA, Gout, and multiple recent hospitalizations as outlined below due to C.diff and HF exacerbations who was most recently admitted at [facility] after developing 2-3 days of SOB, generalized malaise, and increased oxygen needs while at TCU. She had been started on Abx for pneumonia and Proteus UTI, found to be acutely positive for COVID-19 infection and was hypotensive, hypoxic on presentation. She was treated with remdesivir x 5 days and decadron x 10 days, treated for likely HCAP pneumonia with IV zosyn. She was able to wean off daytime oxygen and return to her baseline nocturnal requirements. She was referred back to TCU for ongoing rehab, medical management.     Summarization of recent hospitalizations:  11/7/2022 - 11/10/2022: admission at [facility] with acute C.diff infection, stabilized and discharged home.   11/17/2022 - 11/21/2022: admission at [facility] after presenting with increased falls and generalized weakness. She had findings of dehydation with YESICA, electrolyte abnormalities, and soft blood pressures. She was also identified to have progressively worsening macrocytic anemia with a decrease in hemoglobin from 11/12--8.8 since 10/2022.  Workup was concerning for bone marrow etiology and she was recommended to follow-up as an outpatient once acute medical issues resolve.  She did receive 1 unit of PRBCs for symptomatic anemia on 11/17. Symptoms improved with IV hydration and repletion of electrolytes. Blood  "pressure meds were adjusted, metoprolol discontinued altogether due to soft bps (PTA dose 100mg/d). Following therapy evaluations, she was discharged to TCU. While at TCU, she completed treatment with oral vancomycin and diarrhea improved. Her metoprolol was up-titrated to 75mg/d and PTA PRN lasix resumed.   12/1/2022 - 12/4/2022: admission at [facility] after she reported waking overnight with chest pressure and sensation of an elephant sitting on her chest with worsening BLE edema, BP 170s. EKG was nonischemic, troponin 28--23, BNP 3087, CXR clear. Symptoms were suspected 2/2 mild CHF exacerbation. She was treated with IV diuresis, TTE with preserved LVEF and unchanged from prior. Ultimately was discharged back to TCU without medication changes.    Patient is seen today in follow-up. She is resting in recliner at bedside, states she physically feels well but asks for a medication for depression. She admits to feeling \"the blues\" and is intermittently with sense of helplessness. We discussed situational depression, which she believes is the case. Declines ACP referral, would like a medication if possible. We reviewed she is on an antidepressant at present, duloxetine, which could be increased to help with symptoms. She is agreeable to this as an initial step, would like to avoid adding medications if at all possible. She feels her breathing is at baseline, continues to be winded with activity but acknowledges her oxygen levels have been adequate. She asks about how to use the IS - we reviewed the different aspects of the device. She has been eating, denies cp or sensation of chest tightness/indigestion. No concerns from facility staff at this time.    Allergies, and PMH/PSH reviewed in EPIC today.  REVIEW OF SYSTEMS:  4 point ROS including Respiratory, CV, GI and , other than that noted in the HPI,  is negative    Objective:   /50   Pulse 101   Temp 97.3  F (36.3  C)   Resp 18   Ht 1.676 m (5' 6\")  "  Wt 71.2 kg (157 lb)   SpO2 96%   BMI 25.34 kg/m      GEN: well-developed, frail elderly female, appears comfortable  HEENT: NCAT, EOM intact bilaterally, nose & mouth patent, mucous membranes moist  CHEST: lungs CTA bilaterally, no increased work of breathing, no wheeze, crackles, rhonchi  HEART: irregularly irregular, S1 & S2  ABD: soft, nontender, nondistended, no guarding or rigidity, +BS in all 4 quadrants  MSK: AROM bilateral UE/LE, pedal & radial pulses 2+ bilaterally  NEURO: awake, alert, oriented to name, place, and time. CN II-XII grossly intact. Sensation grossly intact to light touch.   SKIN: warm & dry without rash, 2+ pitting bilateral pedal edema      Most Recent 3 CBC's:  Recent Labs   Lab Test 12/17/22  0639 12/16/22  0513 12/15/22  0454   WBC 10.6 11.3* 10.7   HGB 10.7* 10.3* 10.5*   * 102* 102*    331 324     Most Recent 3 BMP's:  Recent Labs   Lab Test 12/17/22  0639 12/16/22  0513 12/15/22  0454    137 135*   POTASSIUM 3.9 3.8 3.8   CHLORIDE 100 100 97*   CO2 29 30* 30*   BUN 30.1* 31.9* 34.6*   CR 1.15* 1.07* 1.18*   ANIONGAP 7 7 8   SUNI 9.8 9.5 9.5   GLC 87 110* 118*       Assessment/Plan:    Acute COVID-19 infection  Acute hypoxic respiratory failure, resolved  HCAP, resolved  PNA diagnosed at TCU, likely HCAP given recent hospitalizations. Completed treatment with IV zosyn while inpatient. COVID positive 12/13, s/p treatment with remdesivir x5d, initiated on treatment with dexamethasone x10d, to complete course at TCU. Initially hypoxic, requiring supplemental oxygen which was weaned prior to discharge.  -Continue dexamethasone 6mg daily to complete 10-day course (end date 12/24)  -Pulmonary hygiene, encourage OOB, IS use  -Monitor oxygen levels     UTI, proteus, resolved  Diagnosed at TCU, Cx positive. Completed tx while inpatient.     Generalized weakness  Physical deconditioning  Multifactorial in setting of repeat hospitalizations, acute infections, anemia,  electrolyte abnormalities.  -PT/OT  -SW for safe discharge planning     Hypomagnesemia  -Continues on MagOx 400mg BID  -Monitor periodically     Anemia, macrocytic  Noted to have progressively worsening macrocytic anemia with decrease in Hgb from 11/12--8.8 in 2mo. Workup suggestive of bone marrow supression from unknown etiology; recurrent infections vs other. Has had colonoscopy screening recently. Folate/B12 values wnl. Iron studies c/w chronic disease. HIV/carlene tests neg. Received 1u PRBCs while inpatient for suspected symptomatic anemia, value at discharge 11.6. Repeat values ranging 10-11 with persistent macrocytosis.  *Peripheral smear from 11/18 neg for hemolysis/atypia, MMA 0.24, SPEP with hypogammaglobulinemia  -Follow-up with hematology, [facility] hemat[facility] referral previously placed, appt scheduled 12/28     CKD-3  Baseline Cr 0.7-0.9, most recently had been in range of 1.2. Value at discharge 1.15.  -Monitor BMP periodically     Paroxysmal afib  HTN / HLD  Hx pulmonary htn  HFpEF exacerbation, improved  PTA metoprolol 75mg/d, recent hospitalization as noted above for HF exacerbation improved with diuresis, not discharged on chronic diuretic therapy. Previously on 20mg lasix daily PRN. BPs ranging 113-117, HRs 86-96. Weight on re-admission 155#, increased from 150# on most recent admission, continues to trend up with value 157 today. Clinically with 2+ pitting pedal edema - new from admission eval.  -Continue metoprolol at 75mg/d, hold parameters for SBP < 95, HR < 55  -Continues on rivaroxaban for CVA proph, statin  -Resume lasix 20mg daily  -Add compression stockings to BLE  -Daily weights  -Follow up with cardiology as outpatient     RA  Without evidence of flare.  -Methotrexate held during acute infection, follow-up with rheumatology regarding when to resume, scheduled for 12/27     Gout  Seen by rheumatology in 09/2022 with concerns of gout, started on 60-day prescription for colchicine and prednisone  as well as allopurinol loading  -Continue allopurinol 300mg daily  -Continue colchicine  -Prednisone held in setting of dexamethasone for COVID, resume following treatment course completion  -Follow up with rheumatology, scheduled for 12/27     COPD, on nocturnal oxygen (2L)  No evidence of acute exacerbation today. Continues to have saturations >92% during daytime. Lungs clear.  -Continue nocturnal oxygen as per home use  -Pulmonary hygiene, encourage OOB, ambulation, IS  -Continues on symbicort, albuterol     GERD  -Continue omeprazole 20mg BID     Depression, acute on chronic  Admits to exacerbation, situational depression given multiple hospitalizations and now having to spend two major holidays in facility.  -Continue cymbalta at increased dose of 40mg/d     Pulmonary nodules  Followed by pulmonology, Dr. Rosario. 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 and a 10 mm groundglass nodule in the lingula and several diminutive left upper lobe nodules that were unchanged.  -Follow with pulmonology for repeat CT scan as scheduled in 03/2023    MED REC REQUIRED  Post Medication Reconciliation Status: discharge medications reconciled and changed, per note/orders      Orders:  -Increase duloxetine to 40mg daily  -Add compression stockings  -Add lasix 20mg daily    Electronically signed by: Jorge Luis Henry PA-C             Sincerely,        Jorge Luis Henry PA-C

## 2022-12-23 NOTE — PROGRESS NOTES
[facility] GERIATRICS    Chief Complaint   Patient presents with     RECHECK     HPI:  Fatuma Henry is a 77 year old  (1945), who is being seen today for an episodic care visit at: [facility] ([facility]) [[id number]].     Patient is a 77-year-old female with past medical history of CKD, paroxysmal A. fib, hypertension, COPD on nocturnal oxygen, RA, Gout, and multiple recent hospitalizations as outlined below due to C.diff and HF exacerbations who was most recently admitted at [facility] after developing 2-3 days of SOB, generalized malaise, and increased oxygen needs while at TCU. She had been started on Abx for pneumonia and Proteus UTI, found to be acutely positive for COVID-19 infection and was hypotensive, hypoxic on presentation. She was treated with remdesivir x 5 days and decadron x 10 days, treated for likely HCAP pneumonia with IV zosyn. She was able to wean off daytime oxygen and return to her baseline nocturnal requirements. She was referred back to TCU for ongoing rehab, medical management.     Summarization of recent hospitalizations:  11/7/2022 - 11/10/2022: admission at [facility] with acute C.diff infection, stabilized and discharged home.   11/17/2022 - 11/21/2022: admission at [facility] after presenting with increased falls and generalized weakness. She had findings of dehydation with YESICA, electrolyte abnormalities, and soft blood pressures. She was also identified to have progressively worsening macrocytic anemia with a decrease in hemoglobin from 11/12--8.8 since 10/2022.  Workup was concerning for bone marrow etiology and she was recommended to follow-up as an outpatient once acute medical issues resolve.  She did receive 1 unit of PRBCs for symptomatic anemia on 11/17. Symptoms improved with IV hydration and repletion of electrolytes. Blood pressure meds were adjusted, metoprolol discontinued altogether due to soft bps (PTA dose 100mg/d). Following  "therapy evaluations, she was discharged to TCU. While at TCU, she completed treatment with oral vancomycin and diarrhea improved. Her metoprolol was up-titrated to 75mg/d and PTA PRN lasix resumed.   12/1/2022 - 12/4/2022: admission at [facility] after she reported waking overnight with chest pressure and sensation of an elephant sitting on her chest with worsening BLE edema, BP 170s. EKG was nonischemic, troponin 28--23, BNP 3087, CXR clear. Symptoms were suspected 2/2 mild CHF exacerbation. She was treated with IV diuresis, TTE with preserved LVEF and unchanged from prior. Ultimately was discharged back to TCU without medication changes.    Patient is seen today in follow-up. She is resting in recliner at bedside, states she physically feels well but asks for a medication for depression. She admits to feeling \"the blues\" and is intermittently with sense of helplessness. We discussed situational depression, which she believes is the case. Declines ACP referral, would like a medication if possible. We reviewed she is on an antidepressant at present, duloxetine, which could be increased to help with symptoms. She is agreeable to this as an initial step, would like to avoid adding medications if at all possible. She feels her breathing is at baseline, continues to be winded with activity but acknowledges her oxygen levels have been adequate. She asks about how to use the IS - we reviewed the different aspects of the device. She has been eating, denies cp or sensation of chest tightness/indigestion. No concerns from facility staff at this time.    Allergies, and PMH/PSH reviewed in EPIC today.  REVIEW OF SYSTEMS:  4 point ROS including Respiratory, CV, GI and , other than that noted in the HPI,  is negative    Objective:   /50   Pulse 101   Temp 97.3  F (36.3  C)   Resp 18   Ht 1.676 m (5' 6\")   Wt 71.2 kg (157 lb)   SpO2 96%   BMI 25.34 kg/m      GEN: well-developed, frail elderly female, appears " comfortable  HEENT: NCAT, EOM intact bilaterally, nose & mouth patent, mucous membranes moist  CHEST: lungs CTA bilaterally, no increased work of breathing, no wheeze, crackles, rhonchi  HEART: irregularly irregular, S1 & S2  ABD: soft, nontender, nondistended, no guarding or rigidity, +BS in all 4 quadrants  MSK: AROM bilateral UE/LE, pedal & radial pulses 2+ bilaterally  NEURO: awake, alert, oriented to name, place, and time. CN II-XII grossly intact. Sensation grossly intact to light touch.   SKIN: warm & dry without rash, 2+ pitting bilateral pedal edema      Most Recent 3 CBC's:  Recent Labs   Lab Test 12/17/22  0639 12/16/22  0513 12/15/22  0454   WBC 10.6 11.3* 10.7   HGB 10.7* 10.3* 10.5*   * 102* 102*    331 324     Most Recent 3 BMP's:  Recent Labs   Lab Test 12/17/22  0639 12/16/22  0513 12/15/22  0454    137 135*   POTASSIUM 3.9 3.8 3.8   CHLORIDE 100 100 97*   CO2 29 30* 30*   BUN 30.1* 31.9* 34.6*   CR 1.15* 1.07* 1.18*   ANIONGAP 7 7 8   SUNI 9.8 9.5 9.5   GLC 87 110* 118*       Assessment/Plan:    Acute COVID-19 infection  Acute hypoxic respiratory failure, resolved  HCAP, resolved  PNA diagnosed at TCU, likely HCAP given recent hospitalizations. Completed treatment with IV zosyn while inpatient. COVID positive 12/13, s/p treatment with remdesivir x5d, initiated on treatment with dexamethasone x10d, to complete course at TCU. Initially hypoxic, requiring supplemental oxygen which was weaned prior to discharge.  -Continue dexamethasone 6mg daily to complete 10-day course (end date 12/24)  -Pulmonary hygiene, encourage OOB, IS use  -Monitor oxygen levels     UTI, proteus, resolved  Diagnosed at TCU, Cx positive. Completed tx while inpatient.     Generalized weakness  Physical deconditioning  Multifactorial in setting of repeat hospitalizations, acute infections, anemia, electrolyte abnormalities.  -PT/OT  -SW for safe discharge planning     Hypomagnesemia  -Continues on MagOx 400mg  BID  -Monitor periodically     Anemia, macrocytic  Noted to have progressively worsening macrocytic anemia with decrease in Hgb from 11/12--8.8 in 2mo. Workup suggestive of bone marrow supression from unknown etiology; recurrent infections vs other. Has had colonoscopy screening recently. Folate/B12 values wnl. Iron studies c/w chronic disease. HIV/carlene tests neg. Received 1u PRBCs while inpatient for suspected symptomatic anemia, value at discharge 11.6. Repeat values ranging 10-11 with persistent macrocytosis.  *Peripheral smear from 11/18 neg for hemolysis/atypia, MMA 0.24, SPEP with hypogammaglobulinemia  -Follow-up with hematology, [facility] hematology referral previously placed, appt scheduled 12/28     CKD-3  Baseline Cr 0.7-0.9, most recently had been in range of 1.2. Value at discharge 1.15.  -Monitor BMP periodically     Paroxysmal afib  HTN / HLD  Hx pulmonary htn  HFpEF exacerbation, improved  PTA metoprolol 75mg/d, recent hospitalization as noted above for HF exacerbation improved with diuresis, not discharged on chronic diuretic therapy. Previously on 20mg lasix daily PRN. BPs ranging 113-117, HRs 86-96. Weight on re-admission 155#, increased from 150# on most recent admission, continues to trend up with value 157 today. Clinically with 2+ pitting pedal edema - new from admission eval.  -Continue metoprolol at 75mg/d, hold parameters for SBP < 95, HR < 55  -Continues on rivaroxaban for CVA proph, statin  -Resume lasix 20mg daily  -Add compression stockings to BLE  -Daily weights  -Follow up with cardiology as outpatient     RA  Without evidence of flare.  -Methotrexate held during acute infection, follow-up with rheumatology regarding when to resume, scheduled for 12/27     Gout  Seen by rheumatology in 09/2022 with concerns of gout, started on 60-day prescription for colchicine and prednisone as well as allopurinol loading  -Continue allopurinol 300mg daily  -Continue colchicine  -Prednisone held in  setting of dexamethasone for COVID, resume following treatment course completion  -Follow up with rheumatology, scheduled for 12/27     COPD, on nocturnal oxygen (2L)  No evidence of acute exacerbation today. Continues to have saturations >92% during daytime. Lungs clear.  -Continue nocturnal oxygen as per home use  -Pulmonary hygiene, encourage OOB, ambulation, IS  -Continues on symbicort, albuterol     GERD  -Continue omeprazole 20mg BID     Depression, acute on chronic  Admits to exacerbation, situational depression given multiple hospitalizations and now having to spend two major holidays in facility.  -Continue cymbalta at increased dose of 40mg/d     Pulmonary nodules  Followed by pulmonology, Dr. Rosario. 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 and a 10 mm groundglass nodule in the lingula and several diminutive left upper lobe nodules that were unchanged.  -Follow with pulmonology for repeat CT scan as scheduled in 03/2023    MED REC REQUIRED  Post Medication Reconciliation Status: discharge medications reconciled and changed, per note/orders      Orders:  -Increase duloxetine to 40mg daily  -Add compression stockings  -Add lasix 20mg daily    Electronically signed by: Jorge Luis Henry PA-C

## 2022-12-26 NOTE — PROGRESS NOTES
[facility] GERIATRICS    Chief Complaint   Patient presents with     RECHECK     HPI:  Fatuma Henry is a 77 year old  (1945), who is being seen today for an episodic care visit at: [facility] ([facility]) [[id number]].     Patient is a 77-year-old female with past medical history of CKD, paroxysmal A. fib, hypertension, COPD on nocturnal oxygen, RA, Gout, and multiple recent hospitalizations as outlined below due to C.diff and HF exacerbations who was most recently admitted at [facility] after developing 2-3 days of SOB, generalized malaise, and increased oxygen needs while at TCU. She had been started on Abx for pneumonia and Proteus UTI, found to be acutely positive for COVID-19 infection and was hypotensive, hypoxic on presentation. She was treated with remdesivir x 5 days and decadron x 10 days, treated for likely HCAP pneumonia with IV zosyn. She was able to wean off daytime oxygen and return to her baseline nocturnal requirements. She was referred back to TCU for ongoing rehab, medical management.     Summarization of recent hospitalizations:  11/7/2022 - 11/10/2022: admission at [facility] with acute C.diff infection, stabilized and discharged home.   11/17/2022 - 11/21/2022: admission at [facility] after presenting with increased falls and generalized weakness. She had findings of dehydation with YESICA, electrolyte abnormalities, and soft blood pressures. She was also identified to have progressively worsening macrocytic anemia with a decrease in hemoglobin from 11/12--8.8 since 10/2022.  Workup was concerning for bone marrow etiology and she was recommended to follow-up as an outpatient once acute medical issues resolve.  She did receive 1 unit of PRBCs for symptomatic anemia on 11/17. Symptoms improved with IV hydration and repletion of electrolytes. Blood pressure meds were adjusted, metoprolol discontinued altogether due to soft bps (PTA dose 100mg/d). Following  "therapy evaluations, she was discharged to TCU. While at TCU, she completed treatment with oral vancomycin and diarrhea improved. Her metoprolol was up-titrated to 75mg/d and PTA PRN lasix resumed.   12/1/2022 - 12/4/2022: admission at [facility] after she reported waking overnight with chest pressure and sensation of an elephant sitting on her chest with worsening BLE edema, BP 170s. EKG was nonischemic, troponin 28--23, BNP 3087, CXR clear. Symptoms were suspected 2/2 mild CHF exacerbation. She was treated with IV diuresis, TTE with preserved LVEF and unchanged from prior. Ultimately was discharged back to TCU without medication changes.     Patient is seen today in follow-up. Recently with increasing weights, lasix was resumed. On today's interview, she reports having a terrible weekend with recurrence of diarrhea. She describes having an episode about 1x per hour beginning abruptly 36 hours ago. Prior to that, her diarrhea had been intermittent and improving. She now reports limited appetite, intermittent abdominal pain. No nausea or vomiting. She is afebrile. Denies sob outside of her baseline.    Allergies, and PMH/PSH reviewed in EPIC today.  REVIEW OF SYSTEMS:  4 point ROS including Respiratory, CV, GI and , other than that noted in the HPI,  is negative    Objective:   /55   Pulse 94   Temp 97.2  F (36.2  C)   Resp 16   Ht 1.676 m (5' 6\")   Wt 71.2 kg (157 lb)   SpO2 97%   BMI 25.34 kg/m      GEN: well-developed, frail elderly female, appears uncomfortable  HEENT: NCAT, EOM intact bilaterally, nose & mouth patent, mucous membranes moist  CHEST: lungs CTA bilaterally, no increased work of breathing, no wheeze, crackles, rhonchi  HEART: irregularly irregular, S1 & S2  ABD: soft, nontender, nondistended, no guarding or rigidity, +BS in all 4 quadrants-hyperactive  MSK: AROM bilateral UE/LE, pedal & radial pulses 2+ bilaterally  NEURO: awake, alert, oriented to name, place, and time. CN " II-XII grossly intact. Sensation grossly intact to light touch.   SKIN: warm & dry without rash, 1-2+ pitting bilateral pedal edema      Most Recent 3 CBC's:  Recent Labs   Lab Test 12/17/22  0639 12/16/22  0513 12/15/22  0454   WBC 10.6 11.3* 10.7   HGB 10.7* 10.3* 10.5*   * 102* 102*    331 324     Most Recent 3 BMP's:  Recent Labs   Lab Test 12/17/22  0639 12/16/22  0513 12/15/22  0454    137 135*   POTASSIUM 3.9 3.8 3.8   CHLORIDE 100 100 97*   CO2 29 30* 30*   BUN 30.1* 31.9* 34.6*   CR 1.15* 1.07* 1.18*   ANIONGAP 7 7 8   SUNI 9.8 9.5 9.5   GLC 87 110* 118*       Assessment/Plan:    Diarrhea, acute  Hx Cdiff  Pt reports abrupt onset of watery nonbloody diarrhea appx 36h ago, voiding nearly every hour. At increased risk of Cdiff infection with recent hospitalizations, use of IV antibiotics. Hemodynamics are stable, BPs soft in mid-90s. Pt clinically appears unwell. Concern of possible early dehydration given ongoing diarrhea and recently resuming lasix.  -CBC, BMP in AM  -Stool Cx for Cdiff today  -Hold lasix, encourage PO intake  -Empirically start vanco 125mcg QID AFTER stool culture obtained    Acute COVID-19 infection  Acute hypoxic respiratory failure, resolved  HCAP, resolved  PNA diagnosed at TCU, likely HCAP given recent hospitalizations. Completed treatment with IV zosyn while inpatient. COVID positive 12/13, s/p treatment with remdesivir x5d, initiated on treatment with dexamethasone x10d, to complete course at TCU. Initially hypoxic, requiring supplemental oxygen which was weaned prior to discharge. Completed decadron at TCU.  -Pulmonary hygiene, encourage OOB, IS use  -Monitor oxygen levels     UTI, proteus, resolved  Diagnosed at TCU, Cx positive. Completed tx while inpatient.     Generalized weakness  Physical deconditioning  Multifactorial in setting of repeat hospitalizations, acute infections, anemia, electrolyte abnormalities.  -PT/OT  -SW for safe discharge  planning     Hypomagnesemia  -Continues on MagOx 400mg BID  -Monitor periodically     Anemia, macrocytic  Noted to have progressively worsening macrocytic anemia with decrease in Hgb from 11/12--8.8 in 2mo. Workup suggestive of bone marrow supression from unknown etiology; recurrent infections vs other. Has had colonoscopy screening recently. Folate/B12 values wnl. Iron studies c/w chronic disease. HIV/carlene tests neg. Received 1u PRBCs while inpatient for suspected symptomatic anemia, value at discharge 11.6. Repeat values ranging 10-11 with persistent macrocytosis.  *Peripheral smear from 11/18 neg for hemolysis/atypia, MMA 0.24, SPEP with hypogammaglobulinemia  -Follow-up with hematology, [facility] hemat[facility] referral previously placed, appt scheduled 12/28     CKD-3  Baseline Cr 0.7-0.9, most recently had been in range of 1.2. Value at discharge 1.15.  -BMP as above for volume loss     Paroxysmal afib  HTN / HLD  Hx pulmonary htn  HFpEF exacerbation, improved  PTA metoprolol 75mg/d, recent hospitalization as noted above for HF exacerbation improved with diuresis, not discharged on chronic diuretic therapy. Previously on 20mg lasix daily PRN. BPs ranging 113-117, HRs 86-96. Weight on re-admission 155#, increased from 150#, trend 157--159. Clinically with increasing edema.  -Continue metoprolol at 75mg/d, hold parameters for SBP < 95, HR < 55  -Continues on rivaroxaban for CVA proph, statin  -HOLD lasix 20mg daily in setting of acute diarrhea, volume loss  -Add compression stockings to BLE  -Daily weights  -Follow up with cardiology as outpatient     RA  Without evidence of flare.  -Methotrexate held during acute infection, follow-up with rheumatology regarding when to resume, scheduled for 12/27     Gout  Seen by rheumatology in 09/2022 with concerns of gout, started on 60-day prescription for colchicine and prednisone as well as allopurinol loading  -Continue allopurinol 300mg daily  -Continue colchicine  -Resumed on  prednisone 5mg daily 12/23  -Follow up with rheumatology, scheduled for 12/27     COPD, on nocturnal oxygen (2L)  No evidence of acute exacerbation today. Continues to have saturations >92% during daytime. Lungs clear.  -Continue nocturnal oxygen as per home use  -Pulmonary hygiene, encourage OOB, ambulation, IS  -Continues on symbicort, albuterol     GERD  -Continue omeprazole 20mg BID     Depression, acute on chronic  Admits to exacerbation, situational depression given multiple hospitalizations and now having to spend two major holidays in facility.  -Continue cymbalta at increased dose of 40mg/d     Pulmonary nodules  Followed by pulmonology, Dr. Rosario. 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 and a 10 mm groundglass nodule in the lingula and several diminutive left upper lobe nodules that were unchanged.  -Follow with pulmonology for repeat CT scan as scheduled in 03/2023    MED REC REQUIRED  Post Medication Reconciliation Status: medication reconcilation previously completed during another office visit      Orders:  -Enteric precautions  -Stool culture for Cdiff  -Empiric PO vanco  -CBC, BMP in AM  -Hold lasix, encourage oral hydration    Electronically signed by: Jorge Luis Henry PA-C

## 2022-12-26 NOTE — LETTER
12/26/2022        RE: Fatuma Henry  [address]  [address] [place] [address]        [facility] GERIATRICS    Chief Complaint   Patient presents with     RECHECK     HPI:  Fatuma Henry is a 77 year old  (1945), who is being seen today for an episodic care visit at: [facility]) [[id number]].     Patient is a 77-year-old female with past medical history of CKD, paroxysmal A. fib, hypertension, COPD on nocturnal oxygen, RA, Gout, and multiple recent hospitalizations as outlined below due to C.diff and HF exacerbations who was most recently admitted at [facility] after developing 2-3 days of SOB, generalized malaise, and increased oxygen needs while at TCU. She had been started on Abx for pneumonia and Proteus UTI, found to be acutely positive for COVID-19 infection and was hypotensive, hypoxic on presentation. She was treated with remdesivir x 5 days and decadron x 10 days, treated for likely HCAP pneumonia with IV zosyn. She was able to wean off daytime oxygen and return to her baseline nocturnal requirements. She was referred back to TCU for ongoing rehab, medical management.     Summarization of recent hospitalizations:  11/7/2022 - 11/10/2022: admission at [facility] with acute C.diff infection, stabilized and discharged home.   11/17/2022 - 11/21/2022: admission at [facility] after presenting with increased falls and generalized weakness. She had findings of dehydation with YESICA, electrolyte abnormalities, and soft blood pressures. She was also identified to have progressively worsening macrocytic anemia with a decrease in hemoglobin from 11/12--8.8 since 10/2022.  Workup was concerning for bone marrow etiology and she was recommended to follow-up as an outpatient once acute medical issues resolve.  She did receive 1 unit of PRBCs for symptomatic anemia on 11/17. Symptoms improved with IV hydration and repletion of electrolytes. Blood  "pressure meds were adjusted, metoprolol discontinued altogether due to soft bps (PTA dose 100mg/d). Following therapy evaluations, she was discharged to TCU. While at TCU, she completed treatment with oral vancomycin and diarrhea improved. Her metoprolol was up-titrated to 75mg/d and PTA PRN lasix resumed.   12/1/2022 - 12/4/2022: admission at [facility] after she reported waking overnight with chest pressure and sensation of an elephant sitting on her chest with worsening BLE edema, BP 170s. EKG was nonischemic, troponin 28--23, BNP 3087, CXR clear. Symptoms were suspected 2/2 mild CHF exacerbation. She was treated with IV diuresis, TTE with preserved LVEF and unchanged from prior. Ultimately was discharged back to TCU without medication changes.     Patient is seen today in follow-up. Recently with increasing weights, lasix was resumed. On today's interview, she reports having a terrible weekend with recurrence of diarrhea. She describes having an episode about 1x per hour beginning abruptly 36 hours ago. Prior to that, her diarrhea had been intermittent and improving. She now reports limited appetite, intermittent abdominal pain. No nausea or vomiting. She is afebrile. Denies sob outside of her baseline.    Allergies, and PMH/PSH reviewed in EPIC today.  REVIEW OF SYSTEMS:  4 point ROS including Respiratory, CV, GI and , other than that noted in the HPI,  is negative    Objective:   /55   Pulse 94   Temp 97.2  F (36.2  C)   Resp 16   Ht 1.676 m (5' 6\")   Wt 71.2 kg (157 lb)   SpO2 97%   BMI 25.34 kg/m      GEN: well-developed, frail elderly female, appears uncomfortable  HEENT: NCAT, EOM intact bilaterally, nose & mouth patent, mucous membranes moist  CHEST: lungs CTA bilaterally, no increased work of breathing, no wheeze, crackles, rhonchi  HEART: irregularly irregular, S1 & S2  ABD: soft, nontender, nondistended, no guarding or rigidity, +BS in all 4 quadrants-hyperactive  MSK: AROM bilateral " UE/LE, pedal & radial pulses 2+ bilaterally  NEURO: awake, alert, oriented to name, place, and time. CN II-XII grossly intact. Sensation grossly intact to light touch.   SKIN: warm & dry without rash, 1-2+ pitting bilateral pedal edema      Most Recent 3 CBC's:  Recent Labs   Lab Test 12/17/22  0639 12/16/22  0513 12/15/22  0454   WBC 10.6 11.3* 10.7   HGB 10.7* 10.3* 10.5*   * 102* 102*    331 324     Most Recent 3 BMP's:  Recent Labs   Lab Test 12/17/22  0639 12/16/22  0513 12/15/22  0454    137 135*   POTASSIUM 3.9 3.8 3.8   CHLORIDE 100 100 97*   CO2 29 30* 30*   BUN 30.1* 31.9* 34.6*   CR 1.15* 1.07* 1.18*   ANIONGAP 7 7 8   SUNI 9.8 9.5 9.5   GLC 87 110* 118*       Assessment/Plan:    Diarrhea, acute  Hx Cdiff  Pt reports abrupt onset of watery nonbloody diarrhea appx 36h ago, voiding nearly every hour. At increased risk of Cdiff infection with recent hospitalizations, use of IV antibiotics. Hemodynamics are stable, BPs soft in mid-90s. Pt clinically appears unwell. Concern of possible early dehydration given ongoing diarrhea and recently resuming lasix.  -CBC, BMP in AM  -Stool Cx for Cdiff today  -Hold lasix, encourage PO intake  -Empirically start vanco 125mcg QID AFTER stool culture obtained    Acute COVID-19 infection  Acute hypoxic respiratory failure, resolved  HCAP, resolved  PNA diagnosed at TCU, likely HCAP given recent hospitalizations. Completed treatment with IV zosyn while inpatient. COVID positive 12/13, s/p treatment with remdesivir x5d, initiated on treatment with dexamethasone x10d, to complete course at TCU. Initially hypoxic, requiring supplemental oxygen which was weaned prior to discharge. Completed decadron at TCU.  -Pulmonary hygiene, encourage OOB, IS use  -Monitor oxygen levels     UTI, proteus, resolved  Diagnosed at TCU, Cx positive. Completed tx while inpatient.     Generalized weakness  Physical deconditioning  Multifactorial in setting of repeat  hospitalizations, acute infections, anemia, electrolyte abnormalities.  -PT/OT  -SW for safe discharge planning     Hypomagnesemia  -Continues on MagOx 400mg BID  -Monitor periodically     Anemia, macrocytic  Noted to have progressively worsening macrocytic anemia with decrease in Hgb from 11/12--8.8 in 2mo. Workup suggestive of bone marrow supression from unknown etiology; recurrent infections vs other. Has had colonoscopy screening recently. Folate/B12 values wnl. Iron studies c/w chronic disease. HIV/carlene tests neg. Received 1u PRBCs while inpatient for suspected symptomatic anemia, value at discharge 11.6. Repeat values ranging 10-11 with persistent macrocytosis.  *Peripheral smear from 11/18 neg for hemolysis/atypia, MMA 0.24, SPEP with hypogammaglobulinemia  -Follow-up with hematology, [facility] referral previously placed, appt scheduled 12/28     CKD-3  Baseline Cr 0.7-0.9, most recently had been in range of 1.2. Value at discharge 1.15.  -BMP as above for volume loss     Paroxysmal afib  HTN / HLD  Hx pulmonary htn  HFpEF exacerbation, improved  PTA metoprolol 75mg/d, recent hospitalization as noted above for HF exacerbation improved with diuresis, not discharged on chronic diuretic therapy. Previously on 20mg lasix daily PRN. BPs ranging 113-117, HRs 86-96. Weight on re-admission 155#, increased from 150#, trend 157--159. Clinically with increasing edema.  -Continue metoprolol at 75mg/d, hold parameters for SBP < 95, HR < 55  -Continues on rivaroxaban for CVA proph, statin  -HOLD lasix 20mg daily in setting of acute diarrhea, volume loss  -Add compression stockings to BLE  -Daily weights  -Follow up with cardiology as outpatient     RA  Without evidence of flare.  -Methotrexate held during acute infection, follow-up with rheumatology regarding when to resume, scheduled for 12/27     Gout  Seen by rheumatology in 09/2022 with concerns of gout, started on 60-day prescription for colchicine and  prednisone as well as allopurinol loading  -Continue allopurinol 300mg daily  -Continue colchicine  -Resumed on prednisone 5mg daily 12/23  -Follow up with rheumatology, scheduled for 12/27     COPD, on nocturnal oxygen (2L)  No evidence of acute exacerbation today. Continues to have saturations >92% during daytime. Lungs clear.  -Continue nocturnal oxygen as per home use  -Pulmonary hygiene, encourage OOB, ambulation, IS  -Continues on symbicort, albuterol     GERD  -Continue omeprazole 20mg BID     Depression, acute on chronic  Admits to exacerbation, situational depression given multiple hospitalizations and now having to spend two major holidays in facility.  -Continue cymbalta at increased dose of 40mg/d     Pulmonary nodules  Followed by pulmonology, Dr. Rosario. 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 and a 10 mm groundglass nodule in the lingula and several diminutive left upper lobe nodules that were unchanged.  -Follow with pulmonology for repeat CT scan as scheduled in 03/2023    MED REC REQUIRED  Post Medication Reconciliation Status: medication reconcilation previously completed during another office visit      Orders:  -Enteric precautions  -Stool culture for Cdiff  -Empiric PO vanco  -CBC, BMP in AM  -Hold lasix, encourage oral hydration    Electronically signed by: Jorge Luis Henry PA-C             Sincerely,        Jorge Luis Henry PA-C

## 2022-12-27 NOTE — TELEPHONE ENCOUNTER
Spoke to Brittney-nurse at [facility]- gave verbal telephone order for methotrexate 7.5mg weekly, folic acid 1mg daily and to have labs checked in 4 weeks (CBC, ALT, creatinine, Albumin). We will send updated methotrexate rx to [facility] pharmacy.

## 2022-12-27 NOTE — TELEPHONE ENCOUNTER
Per Dr Rush- pt can resume methotrexate at a lower dose of 7.5mg weekly. Recheck labs in 4 weeks.     Spoke to pts daughter Enid and informed her of the above. I also called [facility] ( [phone]) to give them these orders- nurse was busy but left my phone# for nurse to call me back. Enid states pt is no longer on antibiotics since she has been discharged from hospital.    Enid or pt will call to schedule f/u with Dr Rush in the next few weeks when pt if feeling up to coming in, we can add her in sooner to be seen with Dr Rush to discuss her RA meds and recent infections, hospitalizations.

## 2022-12-28 PROBLEM — E87.1 HYPONATREMIA: Status: ACTIVE | Noted: 2022-01-01

## 2022-12-28 PROBLEM — G47.33 OSA (OBSTRUCTIVE SLEEP APNEA): Status: ACTIVE | Noted: 2022-01-01

## 2022-12-28 PROBLEM — N18.30 CKD (CHRONIC KIDNEY DISEASE) STAGE 3, GFR 30-59 ML/MIN (H): Status: ACTIVE | Noted: 2022-04-22

## 2022-12-28 PROBLEM — E78.5 HLD (HYPERLIPIDEMIA): Status: ACTIVE | Noted: 2022-01-01

## 2022-12-28 PROBLEM — F39 MOOD DISORDER (H): Status: ACTIVE | Noted: 2022-01-01

## 2022-12-28 PROBLEM — D72.829 LEUKOCYTOSIS: Status: ACTIVE | Noted: 2022-01-01

## 2022-12-28 PROBLEM — A04.71 RECURRENT CLOSTRIDIOIDES DIFFICILE DIARRHEA: Status: ACTIVE | Noted: 2022-01-01

## 2022-12-28 NOTE — PHARMACY-ADMISSION MEDICATION HISTORY
Pharmacy Note - Admission Medication History    Pertinent Provider Information: was on lasix 20mg qday x 3 days(12/24-12/26)- listed as on hold at [facility] center. To restart methotrexate on 12/29/22. Started on oral vanco 12/26 pending culture results.     ______________________________________________________________________    Prior To Admission (PTA) med list completed and updated in EMR.       PTA Med List   Medication Sig Note Last Dose     Acetaminophen (ACETAMIN PO) Take 500 mg by mouth every 4 hours as needed (pain or fever)  >2 weeks ago     albuterol (PROAIR HFA/PROVENTIL HFA/VENTOLIN HFA) 108 (90 Base) MCG/ACT inhaler Inhale 2 puffs into the lungs every 4 hours as needed for shortness of breath / dyspnea or wheezing  2 weeks ago     allopurinol (ZYLOPRIM) 300 MG tablet Take 1 tablet (300 mg) by mouth daily  12/27/2022 at am     benzocaine (ANBESOL) 10 % gel Take by mouth every 6 hours as needed for mouth sores  not recently     budesonide-formoterol (SYMBICORT) 160-4.5 MCG/ACT Inhaler Inhale 2 puffs into the lungs 2 times daily  12/27/2022 at pm     colchicine (COLCYRS) 0.6 MG tablet Take 1 tablet by mouth once daily  12/27/2022 at am     DULoxetine (CYMBALTA) 20 MG capsule Take 40 mg by mouth daily  12/27/2022 at am     folic acid (FOLVITE) 1 MG tablet Take 1 tablet (1 mg) by mouth daily (Patient taking differently: Take 1 mg by mouth See Admin Instructions 6 days/week, Daily except no dose on Thursday (when taking methotrexate))  12/27/2022 at am     ipratropium - albuterol 0.5 mg/2.5 mg/3 mL (DUONEB) 0.5-2.5 (3) MG/3ML neb solution Take 1 vial (3 mLs) by nebulization every 4 hours as needed for wheezing or shortness of breath / dyspnea  2 weeks ago     magic mouthwash suspension (diphenhydrAMINE, lidocaine, aluminum-magnesium & simethicone) Swish and swallow 10 mLs in mouth every 6 hours as needed for mouth sores  not recently     magnesium oxide (MAG-OX) 400 MG tablet Take 400 mg by mouth 2 times daily   12/27/2022 at pm     methocarbamol (ROBAXIN) 500 MG tablet Take 500 mg by mouth daily as needed for muscle spasms  12/25/2022 at pm     methotrexate sodium 2.5 MG TABS Take 3 tablets (7.5 mg) by mouth once a week 12/28/2022: Next dose due 12/29/22(Thursday) Unknown     metoprolol succinate ER (TOPROL XL) 25 MG 24 hr tablet Take 3 tablets (75 mg) by mouth daily for 30 days  12/27/2022 at am     omeprazole 20 MG tablet Take 20 mg by mouth 2 times daily  12/27/2022     pravastatin (PRAVACHOL) 20 MG tablet Take 20 mg by mouth every evening  12/27/2022 at pm     predniSONE (DELTASONE) 5 MG tablet Take 5 mg by mouth daily Holding until decadron course for COVID is completed.  12/27/2022 at am     rivaroxaban ANTICOAGULANT (XARELTO) 15 MG TABS tablet Take 1 tablet (15 mg) by mouth daily (with dinner)  12/27/2022 at 1700     vancomycin (VANCOCIN) 125 MG capsule Take 125 mg by mouth 4 times daily Started 12/26/22 pending cultures  12/27/2022 at hs     Vitamin D3 (CHOLECALCIFEROL) 125 MCG (5000 UT) tablet Take 125 mcg by mouth daily  12/27/2022 at am       Information source(s): Facility ([facility] medication list/MAR([facility])  Method of interview communication: N/A    Summary of Changes to PTA Med List  New: oral vancomycin, restarting methotrexate, restarted prednisone  Discontinued: dexamethasone,   Changed: duloxetine 20mg to 40mg, folic acid daily to 6 days/week with the restarting of methotrexate(thursday's), prn methocarbamol q6hprn to qdayprn.    Patient was asked about OTC/herbal products specifically.  PTA med list reflects this.    In the past week, patient estimated taking medication this percent of the time:  greater than 90%.    Allergies were reviewed, assessed, and updated with the patient.      Patient did not bring any medications to the hospital and can't retrieve from home. No multi-dose medications are available for use during hospital stay.     The information provided in this  note is only as accurate as the sources available at the time of the update(s).    Thank you for the opportunity to participate in the care of this patient.    Reinaldo Esqueda [facility]  12/28/2022 8:22 AM

## 2022-12-28 NOTE — ED TRIAGE NOTES
Thomas Mandaen resident complaining of 2-3 days SOB, and some diarrhea. She is normally on 2 liters O2. Staff report having to turn O2 up to 3-4 liters. She has Hx of AFIB, is in AFIB with CVR. Denies CP or SOB now. A&O, VSS.     Triage Assessment     Row Name 12/28/22 0111       Triage Assessment (Adult)    Airway WDL WDL       Respiratory WDL    Respiratory WDL WDL       Skin Circulation/Temperature WDL    Skin Circulation/Temperature WDL WDL       Cardiac WDL    Cardiac WDL X  Afib       Peripheral/Neurovascular WDL    Peripheral Neurovascular WDL WDL       Cognitive/Neuro/Behavioral WDL    Cognitive/Neuro/Behavioral WDL WDL

## 2022-12-28 NOTE — PLAN OF CARE
Goal Outcome Evaluation:     Assume pt care from ED Nurse, pt is alert and oriented and on 3 liters o2 NC. Sats in the mid to low 90's. Notify provider of frequent afib on tele monitor. Prn iv metoprolol was ordered, vss did  not meet parameter for prn iv metoprolol.

## 2022-12-28 NOTE — CONSULTS
"  HEART CARE CONSULTATON NOTE        Assessment/Recommendations   Assessment:   1.  Acute on chronic congestive heart failure with preserved ejection fraction  2.  Pulmonary hypertension  3.  COPD with acute exacerbation  4.  Uncontrolled hypertension  5.  Paroxysmal A. fib now with rapid ventricular response  6.  Anemia    Plan:   1.  IV Lasix 40 mg every 8 hours.  2.  Continue metoprolol XL 25 mg daily  3.  Continue Xarelto 50 mg daily  4.  Complete echocardiogram, pending  5.  Recommend treating for acute COPD exacerbation as well    Will follow       History of Present Illness/Subjective    HPI: Fatuma Henry is a 77 year old female severe COPD on home oxygen, pulm hypertension, heart failure with preserved ejection fraction, paroxysmal A. fib who presents to [facility] with acute hypoxia and dyspnea.  Found to have evidence of fluid overload and markedly elevated BNP level.  Corded patient she has been noticing worsening dyspnea past few days along with orthopnea.    On arrival to the patient was noted to be hypoxic requiring increased oxygen level.  She was in A. fib with rapid trickle response.  Blood pressure was markedly elevated.  Labs demonstrate elevated BNP level.    Since arrival she is been given IV Lasix.  Blood pressure remains elevated.    Most recent echo demonstrated normal left ventricular ejection fraction, mild left ventricular perjury, moderate pulmonary hypertension.     Twelve-lead EKG was reviewed.  Demonstrates A. fib with rapid trickle response.  No significant ST or T wave abnormalities.       Physical Examination  Review of Systems   VITALS: BP (!) 136/98 (BP Location: Left arm, Patient Position: Semi-Sy's)   Pulse 103   Temp 97.5  F (36.4  C) (Oral)   Resp 19   Ht 1.651 m (5' 5\")   Wt 68 kg (150 lb)   SpO2 98%   BMI 24.96 kg/m    BMI: Body mass index is 24.96 kg/m .  Wt Readings from Last 3 Encounters:   12/28/22 68 kg (150 lb)   12/26/22 71.2 kg (157 lb) "   12/23/22 71.2 kg (157 lb)     No intake or output data in the 24 hours ending 12/28/22 1620  General Appearance:    Mild distress, normal body habitus   ENT/Mouth: membranes moist, no oral lesions or bleeding gums.      EYES:  no scleral icterus, normal conjunctivae   Neck: no carotid bruits or thyromegaly   Chest/Lungs:    Poor inspiratory effort.  Diffuse wheezing.  Fine crackles left base   Cardiovascular:    Regular, rapid faint systolic murmur.  Jugular venous pressure elevated, trivial edema bilaterally    Abdomen:  no organomegaly, masses, bruits, or tenderness; bowel sounds are present   Extremities: no cyanosis or clubbing   Skin: no xanthelasma, warm.    Neurologic: normal  bilateral, no tremors     Psychiatric: alert and oriented x3, calm     Review Of Systems  Skin: negative  Eyes: negative  Ears/Nose/Throat: negative  Respiratory: Positive for dyspnea, cough  Cardiovascular: Positive for orthopnea.  No chest pain.  Gastrointestinal: negative  Genitourinary: negative  Musculoskeletal: Joint pain  Neurologic: negative  Psychiatric: negative  Hematologic/Lymphatic/Immunologic: negative  Endocrine: negative          Lab Results    Chemistry/lipid CBC Cardiac Enzymes/BNP/TSH/INR   Recent Labs   Lab Test 01/28/22  1640   CHOL 154   HDL 65   LDL 63   TRIG 132     Recent Labs   Lab Test 01/28/22  1640 01/13/21  1017 11/04/19  1430   LDL 63 66 93     Recent Labs   Lab Test 12/28/22  0231   *   POTASSIUM 3.6   CHLORIDE 100   CO2 24   *   BUN 27.8*   CR 1.16*   GFRESTIMATED 49*   SUNI 9.5     Recent Labs   Lab Test 12/28/22  0231 12/27/22  0834 12/17/22  0639   CR 1.16* 1.06* 1.15*     No results for input(s): A1C in the last 89867 hours.       Recent Labs   Lab Test 12/28/22  0231   WBC 17.2*   HGB 9.2*   HCT 29.2*   *        Recent Labs   Lab Test 12/28/22  0231 12/27/22  0834 12/17/22  0639   HGB 9.2* 10.4* 10.7*    Recent Labs   Lab Test 11/02/22  1312 04/20/22  1046  22  1756   TROPONINI 0.04 0.03 0.04     Recent Labs   Lab Test 22  0231 22  0836 22  1303 22  0854 22  1046 22  1756   BNP  --   --   --  274* 165* 190*   NTBNPI 6,091* 3,889* 3,087*  --   --   --      Recent Labs   Lab Test 22  0913   TSH 3.12     Recent Labs   Lab Test 22  1803 22  1046   INR 1.62* 1.77*        Medical History  Surgical History Family History Social History   Past Medical History:   Diagnosis Date     Atrial fibrillation (H)      CKD (chronic kidney disease) stage 3, GFR 30-59 ml/min (H)      COPD (chronic obstructive pulmonary disease) (H)     nocturnal oxygen     CRF (chronic renal failure)      GERD (gastroesophageal reflux disease)      Hypertension      Hypovolemic shock (H)      Influenza A 2017     Pulmonary hypertension (H)      Pulmonary nodules      Rheumatoid arthritis (H)      Sepsis (H) 2017     Past Surgical History:   Procedure Laterality Date     APPENDECTOMY       BLADDER SUSPENSION       CHOLECYSTECTOMY       PICC TRIPLE LUMEN PLACEMENT  2022          Family History   Problem Relation Age of Onset     Heart Failure Mother      Cerebrovascular Disease Father      Kidney failure Sister      Cerebrovascular Disease Brother      Diabetes Type 2  Brother         Social History     Socioeconomic History     Marital status:      Spouse name: Not on file     Number of children: Not on file     Years of education: Not on file     Highest education level: Not on file   Occupational History     Not on file   Tobacco Use     Smoking status: Former     Packs/day: 0.50     Types: Cigarettes     Quit date: 2017     Years since quittin.0     Smokeless tobacco: Never   Substance and Sexual Activity     Alcohol use: No     Drug use: No     Sexual activity: Not Currently   Other Topics Concern     Not on file   Social History Narrative     Not on file     Social Determinants of Health     Financial  Resource Strain: Not on file   Food Insecurity: Not on file   Transportation Needs: Not on file   Physical Activity: Not on file   Stress: Not on file   Social Connections: Not on file   Intimate Partner Violence: Not on file   Housing Stability: Not on file         Medications  Allergies   Current Outpatient Medications   Medication Sig Dispense Refill     Acetaminophen (ACETAMIN PO) Take 500 mg by mouth every 4 hours as needed (pain or fever)       albuterol (PROAIR HFA/PROVENTIL HFA/VENTOLIN HFA) 108 (90 Base) MCG/ACT inhaler Inhale 2 puffs into the lungs every 4 hours as needed for shortness of breath / dyspnea or wheezing       allopurinol (ZYLOPRIM) 300 MG tablet Take 1 tablet (300 mg) by mouth daily 60 tablet 0     benzocaine (ANBESOL) 10 % gel Take by mouth every 6 hours as needed for mouth sores       budesonide-formoterol (SYMBICORT) 160-4.5 MCG/ACT Inhaler Inhale 2 puffs into the lungs 2 times daily       colchicine (COLCYRS) 0.6 MG tablet Take 1 tablet by mouth once daily 60 tablet 0     DULoxetine (CYMBALTA) 20 MG capsule Take 40 mg by mouth daily       folic acid (FOLVITE) 1 MG tablet Take 1 tablet (1 mg) by mouth daily (Patient taking differently: Take 1 mg by mouth See Admin Instructions 6 days/week, Daily except no dose on Thursday (when taking methotrexate)) 90 tablet 0     ipratropium - albuterol 0.5 mg/2.5 mg/3 mL (DUONEB) 0.5-2.5 (3) MG/3ML neb solution Take 1 vial (3 mLs) by nebulization every 4 hours as needed for wheezing or shortness of breath / dyspnea 90 mL 0     magic mouthwash suspension (diphenhydrAMINE, lidocaine, aluminum-magnesium & simethicone) Swish and swallow 10 mLs in mouth every 6 hours as needed for mouth sores       magnesium oxide (MAG-OX) 400 MG tablet Take 400 mg by mouth 2 times daily       methocarbamol (ROBAXIN) 500 MG tablet Take 500 mg by mouth daily as needed for muscle spasms       methotrexate sodium 2.5 MG TABS Take 3 tablets (7.5 mg) by mouth once a week 36  tablet 0     metoprolol succinate ER (TOPROL XL) 25 MG 24 hr tablet Take 3 tablets (75 mg) by mouth daily for 30 days 90 tablet 0     omeprazole 20 MG tablet Take 20 mg by mouth 2 times daily       pravastatin (PRAVACHOL) 20 MG tablet Take 20 mg by mouth every evening       predniSONE (DELTASONE) 5 MG tablet Take 5 mg by mouth daily Holding until decadron course for COVID is completed.       rivaroxaban ANTICOAGULANT (XARELTO) 15 MG TABS tablet Take 1 tablet (15 mg) by mouth daily (with dinner) 90 tablet 3     vancomycin (VANCOCIN) 125 MG capsule Take 125 mg by mouth 4 times daily Started 12/26/22 pending cultures       Vitamin D3 (CHOLECALCIFEROL) 125 MCG (5000 UT) tablet Take 125 mcg by mouth daily       budesonide-formoterol (SYMBICORT) 160-4.5 MCG/ACT Inhaler Inhale 2 puffs into the lungs 2 times daily for 30 days 10 g 11     compressor, for nebulizer Saran [COMPRESSOR, FOR NEBULIZER SARAN] Nebulizer treatment qid prn 1 Device 0     DULoxetine (CYMBALTA) 20 MG capsule Take 1 capsule (20 mg) by mouth daily for 30 days (Patient not taking: Reported on 12/28/2022) 30 capsule 3        Allergies   Allergen Reactions     Codeine Nausea and Vomiting     Fosamax [Alendronic Acid] Diarrhea     Morphine Nausea and Vomiting     Other reaction(s): Vomiting         Julien Elliott DO

## 2022-12-28 NOTE — CONSULTS
"Care Management Initial Consult    General Information  Assessment completed with: Patient, Caregiver, Gert, staff at TCU  Type of CM/SW Visit: Initial Assessment    Primary Care Provider verified and updated as needed: Yes   Readmission within the last 30 days: previous discharge plan unsuccessful (12/13/22 - 12/20/22)   Return Category: Exacerbation of disease  Reason for Consult: discharge planning  Advance Care Planning: Advance Care Planning Reviewed: no concerns identified, questions answered (Declined Honoring Choices)          Communication Assessment  Patient's communication style: spoken language (English or Bilingual)                        Living Environment:   People in home: facility resident     Current living Arrangements: extended care facility  Name of Facility: [facility]   Able to return to prior arrangements: yes       Family/Social Support:  Care provided by: other (see comments), self, spouse/significant other (TCU staff)  Provides care for: no one, unable/limited ability to care for self  Marital Status:   Facility resident(s)/Staff, , Children  [place]       Description of Support System: Supportive, Involved    Support Assessment: Adequate family and caregiver support, Adequate social supports, Patient communicates needs well met    Current Resources:   Patient receiving home care services: No     Community Resources: Skilled Nursing Facility, [facility]; [employer])  Equipment currently used at home: walker, rolling, cane, straight (4ww)  Supplies currently used at home: Oxygen Tubing/Supplies, Nebulizer tubing (\"Oxygen from unknown agency, has a portable tank. Needing increased O2\".)    Employment/Financial:  Employment Status: retired        Financial Concerns: No concerns identified   Referral to Financial Worker: No       Lifestyle & Psychosocial Needs:  Social Determinants of Health     Tobacco Use: Medium Risk     Smoking " Tobacco Use: Former     Smokeless Tobacco Use: Never     Passive Exposure: Not on file   Alcohol Use: Not on file   Financial Resource Strain: Not on file   Food Insecurity: Not on file   Transportation Needs: Not on file   Physical Activity: Not on file   Stress: Not on file   Social Connections: Not on file   Intimate Partner Violence: Not on file   Depression: Not on file   Housing Stability: Not on file       Functional Status:  Prior to admission patient needed assistance:   Dependent ADLs:: Ambulation-walker, Ambulation-cane, Bathing, Dressing, Grooming, Transfers, Positioning, Toileting  Dependent IADLs:: Cleaning, Cooking, Laundry, Shopping, Meal Preparation, Medication Management, Transportation  Assesssment of Functional Status: Not at baseline with ADL Functioning, Not at baseline with mobility, Not at  functional baseline    Mental Health Status:  Mental Health Status: Past Concern  Mental Health Management: Medication    Chemical Dependency Status:                Values/Beliefs:  Spiritual, Cultural Beliefs, Sikhism Practices, Values that affect care:                 Additional Information:  Piotr normally lives with her  in a condo. She has been back and forth to the hospital a lot in the last 2 months and is staying in TCU at [facility] and will need to return for continued rehab at discharge.    She uses Home Oxygen and has a portable tank. It is from an unknown agency. She is needing increased oxygen levels.     Health transport at discharge.    Brinda Chandra RN

## 2022-12-28 NOTE — PROGRESS NOTES
Assessment & Plan   77-year-old female with chronic HFpEF, COPD, chronic oxygen use of 2 L, atrial fibrillation presents with acute on chronic HFpEF, acute on chronic hypoxic respiratory failure, leukocytosis.    Principal Problem:    COPD (chronic obstructive pulmonary disease) (H)  Active Problems:    Rheumatoid arthritis of multiple sites without rheumatoid factor (H)    Paroxysmal atrial fibrillation (H)    Benign essential hypertension    CKD (chronic kidney disease) stage 3, GFR 30-59 ml/min (H)    Hypomagnesemia    Acute on chronic congestive heart failure, unspecified heart failure type (H)    Acute on chronic respiratory failure with hypoxia (H)    Macrocytic anemia    Recurrent Clostridioides difficile diarrhea    Leukocytosis    HLD (hyperlipidemia)    Gastroesophageal reflux disease without esophagitis    Gout    Mood disorder (H)    JHOANNA (obstructive sleep apnea)    Hyponatremia    Present on Admission:    Hypomagnesemia    Acute on chronic congestive heart failure, unspecified heart failure type (H)    Acute on chronic respiratory failure with hypoxia (H)    Paroxysmal atrial fibrillation (H)    Benign essential hypertension    Macrocytic anemia      Acute HFpEF, acute on chronic hypoxic respiratory failure  -Patient presents with dyspnea, increasing oxygen needs from 2 L to 4, proBNP greater than 6000, mildly elevated troponin.  Chest x-ray 12/20/2022 without any acute abnormalities.  TTE 12/1/2022 with ejection fraction of 60 to 65% and mild aortic stenosis is insufficiency, moderate left atrial enlargement and mild right atrial enlargement with increased pulmonary pressure.  -Telemetry  -Start furosemide 40 mg IV twice daily  -Check limited TTE  -Daily weights, strict I's and O's  -Incentive spirometry    Recurrent C. Difficile diarrhea  -Patient had C. difficile 11/7/2022 and was treated with oral vancomycin and does currently have some loose stools.  I do see patient was started on preemptive  vancomycin on 12/26 while C. difficile culture was pending.  Now that C. difficile toxin and antigen positive we will count this as a C. difficile recurrence as it appears patient's diarrhea had resolved and then returned once again within 2 months of the initial diagnosis.  -Discontinue home vancomycin  -Start fidaxomicin 200 mg p.o. twice daily x10 days, preferred treatment for first C. difficile recurrence    Leukocytosis  -Patient presented with elevated white blood cell count as well as high procalcitonin.  Although patient is on chronic prednisone she did not have a leukocytosis prior to this admission so is likely reactive to infection.  Chest x-ray did not show any consolidations but C. difficile toxin and antigen both positive.  Highly likely WBC elevation is from C. difficile infection.  -Check urinalysis    Atrial fibrillation  -Currently rate controlled  -Metoprolol XL 75 mg p.o. daily  -Rivaroxaban 15 mg p.o. q. bedtime    CKD 3  -Baseline creatinine approximately 1.2    COPD  -Would not treat for an acute exacerbation as respiratory symptoms appear to be more cardiac in nature.  If oxygen does not improve with appropriate diuresis will initiate COPD treatment   -Breo Ellipta daily  -DuoNeb as needed    Rheumatoid arthritis  -Methotrexate 25 mg p.o. q. Thursday  -Prednisone 5 mg p.o. daily    HTN  -Metoprolol-XL 75 mg p.o. daily    HLD  -Pravastatin 20 mg p.o. daily    COVID 19 recovered  -With positive COVID-19 test on 12/13/2022.  Patient did receive 5 doses of IV remdesivir and 10 days of dexamethasone and had been discharged to TCU.  -No special precautions needed    GERD  -Will use famotidine in place of pantoprazole, as PPI associated with c diff infections    Gout  -Allopurinol 300 mg p.o. daily    Mood disorder  -Duloxetine 40 mg p.o. daily    JOHANNA  -CPAP    Hyponatremia  -Likely hypervolemic hyponatremia in the setting of fluid overload and acute heart failure    Macrocytic anemia  -Hemoglobin  9.2 with MCV of 104.  Highly likely from methotrexate use, mixed anemia of chronic disease  -Check TSH, B12, folic acid     Clinically Significant Risk Factors Present on Admission         # Hyponatremia: Lowest Na = 134 mmol/L in last 2 days, will monitor as appropriate    # Hypomagnesemia: Lowest Mg = 1.6 mg/dL in last 2 days, will replace as needed    # Drug Induced Coagulation Defect: home medication list includes an anticoagulant medication                 Electrolytes: Sodium 134  Fluids: P.o.  Diet: Cardiac  VTE prophylaxis: Rivaroxaban       Expected Discharge Date: 12/30/2022      Destination: inpatient rehabilitation facility            Subjective  Cc: shortness of breath, diarrhea    Please see full physical exam and review of systems as documented by Dr Ramon on 12/28/2022    Objective    Temp:  [97.5  F (36.4  C)-98.3  F (36.8  C)] 97.5  F (36.4  C)  Pulse:  [] 103  Resp:  [13-33] 19  BP: ()/(51-98) 136/98  SpO2:  [85 %-100 %] 98 %    No intake or output data in the 24 hours ending 12/28/22 0908    Results    Recent Results (from the past 24 hour(s))   C. difficile Toxin B PCR with reflex to C. difficile Antigen and Toxins A/B EIA    Collection Time: 12/27/22  5:30 PM    Specimen: Per Rectum; Stool   Result Value Ref Range    C Difficile Toxin B by PCR Positive (A) Negative   C. difficile Antigen and Toxins A/B by Enzyme Immunoassay    Collection Time: 12/27/22  5:30 PM    Specimen: Per Rectum; Stool   Result Value Ref Range    C. difficile GDH Antigen Positive (A) Negative    C. difficile Toxin Positive (A) Negative   Basic metabolic panel    Collection Time: 12/28/22  2:31 AM   Result Value Ref Range    Sodium 134 (L) 136 - 145 mmol/L    Potassium 3.6 3.4 - 5.3 mmol/L    Chloride 100 98 - 107 mmol/L    Carbon Dioxide (CO2) 24 22 - 29 mmol/L    Anion Gap 10 7 - 15 mmol/L    Urea Nitrogen 27.8 (H) 8.0 - 23.0 mg/dL    Creatinine 1.16 (H) 0.51 - 0.95 mg/dL    Calcium 9.5 8.8 - 10.2 mg/dL     Glucose 111 (H) 70 - 99 mg/dL    GFR Estimate 49 (L) >60 mL/min/1.73m2   Magnesium    Collection Time: 12/28/22  2:31 AM   Result Value Ref Range    Magnesium 1.6 (L) 1.7 - 2.3 mg/dL   CRP inflammation    Collection Time: 12/28/22  2:31 AM   Result Value Ref Range    CRP Inflammation 151.20 (H) <5.00 mg/L   Nt probnp inpatient (BNP)    Collection Time: 12/28/22  2:31 AM   Result Value Ref Range    N terminal Pro BNP Inpatient 6,091 (H) 0 - 1,800 pg/mL   Troponin T, High Sensitivity    Collection Time: 12/28/22  2:31 AM   Result Value Ref Range    Troponin T, High Sensitivity 35 (H) <=14 ng/L   D dimer quantitative    Collection Time: 12/28/22  2:31 AM   Result Value Ref Range    D-Dimer Quantitative 0.68 (H) 0.00 - 0.50 ug/mL FEU   CBC with platelets and differential    Collection Time: 12/28/22  2:31 AM   Result Value Ref Range    WBC Count 17.2 (H) 4.0 - 11.0 10e3/uL    RBC Count 2.82 (L) 3.80 - 5.20 10e6/uL    Hemoglobin 9.2 (L) 11.7 - 15.7 g/dL    Hematocrit 29.2 (L) 35.0 - 47.0 %     (H) 78 - 100 fL    MCH 32.6 26.5 - 33.0 pg    MCHC 31.5 31.5 - 36.5 g/dL    RDW 15.4 (H) 10.0 - 15.0 %    Platelet Count 282 150 - 450 10e3/uL    % Neutrophils 87 %    % Lymphocytes 4 %    % Monocytes 8 %    % Eosinophils 0 %    % Basophils 0 %    % Immature Granulocytes 1 %    NRBCs per 100 WBC 0 <1 /100    Absolute Neutrophils 15.0 (H) 1.6 - 8.3 10e3/uL    Absolute Lymphocytes 0.7 (L) 0.8 - 5.3 10e3/uL    Absolute Monocytes 1.3 0.0 - 1.3 10e3/uL    Absolute Eosinophils 0.1 0.0 - 0.7 10e3/uL    Absolute Basophils 0.0 0.0 - 0.2 10e3/uL    Absolute Immature Granulocytes 0.1 <=0.4 10e3/uL    Absolute NRBCs 0.0 10e3/uL   Magnesium    Collection Time: 12/28/22  9:13 AM   Result Value Ref Range    Magnesium 2.1 1.7 - 2.3 mg/dL   Procalcitonin    Collection Time: 12/28/22  9:13 AM   Result Value Ref Range    Procalcitonin 0.39 (H) <0.05 ng/mL   Vitamin B12    Collection Time: 12/28/22  9:13 AM   Result Value Ref Range    Vitamin  B12 421 232 - 1,245 pg/mL   TSH    Collection Time: 12/28/22  9:13 AM   Result Value Ref Range    TSH 3.12 0.30 - 4.20 uIU/mL      IMPRESSION: No acute abnormality.      Lab Results personally reviewed 12/28  Imaging Results personally reviewed 12/28      Monse Weinstein DO  Hospitalist Service  [facility]  Text page via [facility] Paging/Directory     This note was created using dragon dictation, any spelling and grammatical errors are unintentional.

## 2022-12-28 NOTE — ED PROVIDER NOTES
Emergency Department Encounter      NAME: Fatuma Henry  AGE: 77 year old female  YOB: 1945  MRN: [id number]  EVALUATION DATE & TIME: 2022  1:05 AM    PCP: Tory Wise    ED PROVIDER: Antwon Daniel M.D.      Chief Complaint   Patient presents with     Shortness of Breath         FINAL IMPRESSION:  1. Acute on chronic congestive heart failure, unspecified heart failure type (H)    2. Hypomagnesemia    3. Chronic obstructive pulmonary disease, unspecified COPD type (H)        MEDICAL DECISION MAKIN:10 AM I met with the patient, obtained history, performed an initial exam, and discussed options and plan for diagnostics and treatment here in the ED.   5:04 AM I rechecked the patient and updated them on laboratory and imaging results.  5:23 AM I spoke with the hospitalist, Dr. Ramon. We discussed the patient's case and they agree to admit the patient.     This patient is a 77-year-old female with a history of hypertension, COPD, heart failure, asthma, A. fib and chronic kidney disease who is sent from the transitional care unit to the ER because of shortness of breath and increased supplemental oxygen as needed.  She was admitted here for shortness of breath and diarrhea.  She was found to have hypomagnesemia as well as COVID and was diagnosed with sepsis.  She was sent to the transitional care unit and today she was short of breath and she required 3 to 4 L of oxygen instead of her 2 L.  In the ER she had just mild rapid A. fib which was controlled with p.o. metoprolol.  Her chest x-ray did not show any acute findings but her beta natruretic peptide was over 6000.  Additionally she received a DuoNeb in the ER.  Her lab work otherwise showed a D-dimer that was age-adjusted normal.  She had an elevated CRP however this could be due to her rheumatoid arthritis.  She also was diagnosed with COVID and went through a course of remdesivir and Decadron with her last  hospitalization.  I gave her 20 mg of IV Lasix to begin her diuresis and we will admit her for further diuresis before transferring her back to the TCU.    Pertinent Labs & Imaging studies reviewed. (See chart for details)      MEDICATIONS GIVEN IN THE EMERGENCY:  Medications   nitroGLYcerin (NITRO-BID) 2 % ointment 15 mg (15 mg Topical Patch/Med Applied 12/28/22 0539)   magnesium sulfate 2 g in water intermittent infusion (2 g Intravenous New Bag 12/28/22 0539)   lidocaine 1 % 0.1-1 mL (has no administration in time range)   lidocaine (LMX4) cream (has no administration in time range)   sodium chloride (PF) 0.9% PF flush 3 mL (has no administration in time range)   sodium chloride (PF) 0.9% PF flush 3 mL (has no administration in time range)   melatonin tablet 1 mg (has no administration in time range)   enoxaparin ANTICOAGULANT (LOVENOX) injection 40 mg (has no administration in time range)   ondansetron (ZOFRAN ODT) ODT tab 4 mg (has no administration in time range)     Or   ondansetron (ZOFRAN) injection 4 mg (has no administration in time range)   ipratropium - albuterol 0.5 mg/2.5 mg/3 mL (DUONEB) neb solution 3 mL (has no administration in time range)   0.9% sodium chloride BOLUS (0 mLs Intravenous Stopped 12/28/22 0404)   ipratropium - albuterol 0.5 mg/2.5 mg/3 mL (DUONEB) neb solution 3 mL (3 mLs Nebulization Given 12/28/22 0236)   furosemide (LASIX) injection 20 mg (20 mg Intravenous Given 12/28/22 0515)   metoprolol tartrate (LOPRESSOR) tablet 25 mg (25 mg Oral Given 12/28/22 0539)       NEW PRESCRIPTIONS STARTED AT TODAY'S ER VISIT:  New Prescriptions    No medications on file          =================================================================    HPI    Patient information was obtained from: Patient    Use of : N/A       Fatuma D Carl is a 77 year old female with a past medical history of HTN, COPD, asthma, CKD3a, and Afib, who presents for evaluation of shortness of breath.    Per  chart review, the patient was admitted to [facility] from 12/13/22 to 12/20/22 (7 days). The patient initially presented to the ED for evaluation of hypotension and shortness of breath. COVID positive. The patient was admitted for hypoxia. While admitted, weaned off supplemental oxygen. Started on decadron (10 days) and Remdesivir (5 days). Patient was discharged to TCU.    The patient is presenting from TCU. She reports she is chronically on 2 liters of supplemental oxygen, but last night she felt more short of breath even on her supplemental oxygen. TCU had to increase her supplemental oxygen to 3-4 liters to maintain patient's oxygen sats. She has also had 2-3 days of wet cough. She has not had any chest pain, abdominal pain, or leg swelling.    She has also had 4-5 months of on and off diarrhea. Yesterday she had 2 episodes of diarrhea.    She denies any other complaints at this time.      REVIEW OF SYSTEMS   Review of Systems   Constitutional: Negative for chills and fever.   HENT: Negative for congestion, rhinorrhea and sore throat.    Respiratory: Positive for cough and shortness of breath.    Cardiovascular: Negative for chest pain and leg swelling.   Gastrointestinal: Positive for diarrhea. Negative for abdominal pain.   All other systems reviewed and are negative.       PAST MEDICAL HISTORY:  Past Medical History:   Diagnosis Date     Atrial fibrillation (H)      CKD (chronic kidney disease) stage 3, GFR 30-59 ml/min (H)      COPD (chronic obstructive pulmonary disease) (H)     nocturnal oxygen     CRF (chronic renal failure)      GERD (gastroesophageal reflux disease)      Hypertension      Hypovolemic shock (H)      Influenza A 12/01/2017     Pulmonary hypertension (H)      Pulmonary nodules      Rheumatoid arthritis (H)      Sepsis (H) 12/24/2017       PAST SURGICAL HISTORY:  Past Surgical History:   Procedure Laterality Date     APPENDECTOMY       BLADDER SUSPENSION       CHOLECYSTECTOMY        PICC TRIPLE LUMEN PLACEMENT  12/13/2022            CURRENT MEDICATIONS:      Current Facility-Administered Medications:      enoxaparin ANTICOAGULANT (LOVENOX) injection 40 mg, 40 mg, Subcutaneous, Q24H, Sky Ramon MD     ipratropium - albuterol 0.5 mg/2.5 mg/3 mL (DUONEB) neb solution 3 mL, 3 mL, Nebulization, Q4H PRN, Sky Ramon MD     lidocaine (LMX4) cream, , Topical, Q1H PRN, Sky Ramon MD     lidocaine 1 % 0.1-1 mL, 0.1-1 mL, Other, Q1H PRN, Sky Ramon MD     magnesium sulfate 2 g in water intermittent infusion, 2 g, Intravenous, Once, Antwon Daniel MD, Last Rate: 50 mL/hr at 12/28/22 0539, 2 g at 12/28/22 0539     melatonin tablet 1 mg, 1 mg, Oral, At Bedtime PRN, Sky Ramon MD     nitroGLYcerin (NITRO-BID) 2 % ointment 15 mg, 1 inch, Topical, Once, Antwon Daniel MD, 15 mg at 12/28/22 0539     ondansetron (ZOFRAN ODT) ODT tab 4 mg, 4 mg, Oral, Q6H PRN **OR** ondansetron (ZOFRAN) injection 4 mg, 4 mg, Intravenous, Q6H PRN, Sky Ramon MD     sodium chloride (PF) 0.9% PF flush 3 mL, 3 mL, Intracatheter, Q8H, Sky Ramon MD     sodium chloride (PF) 0.9% PF flush 3 mL, 3 mL, Intracatheter, q1 min prn, Sky Ramon MD    Current Outpatient Medications:      Acetaminophen (ACETAMIN PO), Take 500 mg by mouth every 4 hours as needed (pain or fever), Disp: , Rfl:      albuterol (PROAIR HFA/PROVENTIL HFA/VENTOLIN HFA) 108 (90 Base) MCG/ACT inhaler, Inhale 2 puffs into the lungs every 4 hours as needed for shortness of breath / dyspnea or wheezing, Disp: , Rfl:      allopurinol (ZYLOPRIM) 300 MG tablet, Take 1 tablet (300 mg) by mouth daily, Disp: 60 tablet, Rfl: 0     benzocaine (ANBESOL) 10 % gel, Take by mouth every 6 hours as needed for mouth sores, Disp: , Rfl:      budesonide-formoterol (SYMBICORT) 160-4.5 MCG/ACT Inhaler, Inhale 2 puffs into the lungs 2 times daily for 30 days, Disp: 10 g, Rfl: 11     colchicine (COLCYRS) 0.6 MG tablet, Take 1 tablet by mouth once  daily, Disp: 60 tablet, Rfl: 0     compressor, for nebulizer Saran, [COMPRESSOR, FOR NEBULIZER SARAN] Nebulizer treatment qid prn, Disp: 1 Device, Rfl: 0     dexamethasone (DECADRON) 6 MG tablet, Take 1 tablet (6 mg) by mouth daily, Disp: 3 tablet, Rfl: 0     DULoxetine (CYMBALTA) 20 MG capsule, Take 1 capsule (20 mg) by mouth daily for 30 days, Disp: 30 capsule, Rfl: 3     folic acid (FOLVITE) 1 MG tablet, Take 1 tablet (1 mg) by mouth daily, Disp: 90 tablet, Rfl: 0     ipratropium - albuterol 0.5 mg/2.5 mg/3 mL (DUONEB) 0.5-2.5 (3) MG/3ML neb solution, Take 1 vial (3 mLs) by nebulization every 4 hours as needed for wheezing or shortness of breath / dyspnea, Disp: 90 mL, Rfl: 0     magnesium oxide (MAG-OX) 400 MG tablet, Take 400 mg by mouth 2 times daily, Disp: , Rfl:      methocarbamol (ROBAXIN) 500 MG tablet, Take 500 mg by mouth 4 times daily as needed for muscle spasms, Disp: , Rfl:      methotrexate sodium 2.5 MG TABS, Take 3 tablets (7.5 mg) by mouth once a week, Disp: 36 tablet, Rfl: 0     metoprolol succinate ER (TOPROL XL) 25 MG 24 hr tablet, Take 3 tablets (75 mg) by mouth daily for 30 days, Disp: 90 tablet, Rfl: 0     omeprazole 20 MG tablet, Take 20 mg by mouth 2 times daily, Disp: , Rfl:      pravastatin (PRAVACHOL) 20 MG tablet, Take 20 mg by mouth every evening, Disp: , Rfl:      predniSONE (DELTASONE) 5 MG tablet, Take 1 tablet (5 mg) by mouth daily Holding until decadron course for COVID is completed. (Patient not taking: Reported on 12/22/2022), Disp: , Rfl:      rivaroxaban ANTICOAGULANT (XARELTO) 15 MG TABS tablet, Take 1 tablet (15 mg) by mouth daily (with dinner), Disp: 90 tablet, Rfl: 3     Vitamin D3 (CHOLECALCIFEROL) 125 MCG (5000 UT) tablet, Take 125 mcg by mouth daily, Disp: , Rfl:     ALLERGIES:  Allergies   Allergen Reactions     Codeine Nausea and Vomiting     Fosamax [Alendronic Acid] Diarrhea     Morphine Nausea and Vomiting     Other reaction(s): Vomiting       FAMILY  "HISTORY:  Family History   Problem Relation Age of Onset     Heart Failure Mother      Cerebrovascular Disease Father      Kidney failure Sister      Cerebrovascular Disease Brother      Diabetes Type 2  Brother        SOCIAL HISTORY:   Social History     Socioeconomic History     Marital status:    Tobacco Use     Smoking status: Former     Packs/day: 0.50     Types: Cigarettes     Quit date: 2017     Years since quittin.0     Smokeless tobacco: Never   Substance and Sexual Activity     Alcohol use: No     Drug use: No     Sexual activity: Not Currently       PHYSICAL EXAM:    Vitals: /72   Pulse 105   Temp 98.3  F (36.8  C)   Resp 21   Ht 1.651 m (5' 5\")   Wt 68 kg (150 lb)   SpO2 99%   BMI 24.96 kg/m     Constitutional: Well developed, well nourished. Comfortable appearing.  HEAD:Normocephalic, atraumatic,   Eyes: PERRLA, EOM intact, conjunctiva clear, no discharge  ENT: mucous membranes moist, nose normal.   Neck- Supple, gross ROM intact.  No JVD.  No palpable nodes.  Pulmonary: Slightly decreased breath sounds, clear breath sounds, no respiratory distress, no wheezing, speaks full sentences easily, moving air well.  Chest: No chest wall tenderness  Cardiovascular: Tachycardic, irregularly irregular rhythm, no murmurs. Trace edema in bilateral lower extremities, 2+ DP pulses.   GI: Soft, no tenderness to deep palpation in all quadrants, not distended, no masses.  No hepatosplenomegaly.  Musculoskeletal: Moving all 4 extremities intentionally and without pain. No obvious deformity. No calf tenderness or erythema.  Back: No CVA tenderness  Skin: Warm, dry, no rash.  Neurologic: Alert & oriented x 3, speech clear, moving all extremities spontaneously   Psychiatric: Affect normal, cooperative.     LAB:  All pertinent labs reviewed and interpreted.  Labs Ordered and Resulted from Time of ED Arrival to Time of ED Departure   BASIC METABOLIC PANEL - Abnormal       Result Value    Sodium " 134 (*)     Potassium 3.6      Chloride 100      Carbon Dioxide (CO2) 24      Anion Gap 10      Urea Nitrogen 27.8 (*)     Creatinine 1.16 (*)     Calcium 9.5      Glucose 111 (*)     GFR Estimate 49 (*)    MAGNESIUM - Abnormal    Magnesium 1.6 (*)    CRP INFLAMMATION - Abnormal    CRP Inflammation 151.20 (*)    NT PROBNP INPATIENT - Abnormal    N terminal Pro BNP Inpatient 6,091 (*)    TROPONIN T, HIGH SENSITIVITY - Abnormal    Troponin T, High Sensitivity 35 (*)    D DIMER QUANTITATIVE - Abnormal    D-Dimer Quantitative 0.68 (*)    CBC WITH PLATELETS AND DIFFERENTIAL - Abnormal    WBC Count 17.2 (*)     RBC Count 2.82 (*)     Hemoglobin 9.2 (*)     Hematocrit 29.2 (*)      (*)     MCH 32.6      MCHC 31.5      RDW 15.4 (*)     Platelet Count 282      % Neutrophils 87      % Lymphocytes 4      % Monocytes 8      % Eosinophils 0      % Basophils 0      % Immature Granulocytes 1      NRBCs per 100 WBC 0      Absolute Neutrophils 15.0 (*)     Absolute Lymphocytes 0.7 (*)     Absolute Monocytes 1.3      Absolute Eosinophils 0.1      Absolute Basophils 0.0      Absolute Immature Granulocytes 0.1      Absolute NRBCs 0.0         RADIOLOGY:  XR Chest Port 1 View   Final Result   IMPRESSION: No acute abnormality.          EKG:   Performed at: 01:22  Impression: Atrial fibrillation with rapid ventricular response. Abnormal ECG.  Rate: 106 bpm  Rhythm: Atrial fibrillation  QRS Interval: 82 ms  QTc Interval: 451 ms  Comparison: Compared to ECG from 12/16/22, atrial fibrillation has replaced sinus rhythm.  I have independently reviewed and interpreted the EKG(s) documented above.         I, Emile Price, am serving as a scribe to document services personally performed by Dr. Antwon Daniel based on my observation and the provider's statements to me. IAntwon M.D. attest that Emile Price is acting in a scribe capacity, has observed my performance of the services and has documented them in accordance  with my direction.      Antwon Daniel M.D.  Emergency Medicine  [facility] EMERGENCY DEPARTMENT  [facility] [facility] [id number] [phone]  Dept: [phone]       Antwon Daniel MD  12/28/22 0748

## 2022-12-28 NOTE — PROGRESS NOTES
Writer received pt at 1515 from day shift nurse. Vitals taken and stable except HR of 103-105. Pt on 4 liter NC and oxygen saturation is 98% on 4 liter. Pt denied any chest pain at this time. Report called at 1535 to p3 nurse.

## 2022-12-28 NOTE — H&P
[facility]    History and Physical - Hospitalist Service       Date of Admission:  12/28/2022    Assessment & Plan      Fatuma Henry is a 77 year old female admitted on 12/28/2022. She has history of HTN, COPD, asthma, CKD3a, and Afib. Recent hospitalization and treatments for COVID. Presents with sob and worsening hypoxia from TCU.     Hypoxia  Recent COVID pneumonia s/p treatments  COPD  Asthma  -o2 prn to keep spo2>90%  -Duoneb prn    Elevated BNP  ?CHF exacerbation  -CXR unremarkable  -trop unremarkable, no cp  -recent echo showing ef wnl  -lasix in er, defer to card for further managment  -cardiology consultation    Hypomagnesia  -replace per protocol    HTN  CKD3a  Afib on Xarelto  -resume PTA meds after pharmacy review     Diet:  cardiac  DVT Prophylaxis: DOAC  Bradnon Catheter: Not present  Central Lines: None  Cardiac Monitoring: None  Code Status:   dnr/dni    Clinically Significant Risk Factors Present on Admission         # Hyponatremia: Lowest Na = 134 mmol/L in last 2 days, will monitor as appropriate    # Hypomagnesemia: Lowest Mg = 1.6 mg/dL in last 2 days, will replace as needed    # Drug Induced Coagulation Defect: home medication list includes an anticoagulant medication                 Disposition Plan    > 2 days       Sky Ramon MD  Hospitalist Service  [facility]  Securely message with the Vocera Web Console (learn more here)  Text page via goBalto Paging/Directory         ______________________________________________________________________    Chief Complaint   sob    History is obtained from the ER    History of Present Illness   Fatuma Henry is a 77 year old female with a past medical history of HTN, COPD, asthma, CKD3a, and Afib, who presents for evaluation of shortness of breath.     Per chart review, the patient was admitted to [facility] from 12/13/22 to 12/20/22 (7 days). The patient initially presented to the ED for  evaluation of hypotension and shortness of breath. COVID positive. The patient was admitted for hypoxia. While admitted, weaned off supplemental oxygen. Started on decadron (10 days) and Remdesivir (5 days). Patient was discharged to TCU.     The patient is presenting from TCU. She reports she is chronically on 2 liters of supplemental oxygen, but last night she felt more short of breath even on her supplemental oxygen. TCU had to increase her supplemental oxygen to 3-4 liters to maintain patient's oxygen sats. She has also had 2-3 days of wet cough. She has not had any chest pain, abdominal pain, or leg swelling.     She has also had 4-5 months of on and off diarrhea. Yesterday she had 2 episodes of diarrhea.     She denies any other complaints at this time.    Review of Systems    The 10 point Review of Systems is negative other than noted in the HPI or here.     Past Medical History    I have reviewed this patient's medical history and updated it with pertinent information if needed.   Past Medical History:   Diagnosis Date     Atrial fibrillation (H)      CKD (chronic kidney disease) stage 3, GFR 30-59 ml/min (H)      COPD (chronic obstructive pulmonary disease) (H)     nocturnal oxygen     CRF (chronic renal failure)      GERD (gastroesophageal reflux disease)      Hypertension      Hypovolemic shock (H)      Influenza A 12/01/2017     Pulmonary hypertension (H)      Pulmonary nodules      Rheumatoid arthritis (H)      Sepsis (H) 12/24/2017       Past Surgical History   I have reviewed this patient's surgical history and updated it with pertinent information if needed.  Past Surgical History:   Procedure Laterality Date     APPENDECTOMY       BLADDER SUSPENSION       CHOLECYSTECTOMY       PICC TRIPLE LUMEN PLACEMENT  12/13/2022            Social History   I have reviewed this patient's social history and updated it with pertinent information if needed.  Social History     Tobacco Use     Smoking status: Former      Packs/day: 0.50     Types: Cigarettes     Quit date: 2017     Years since quittin.0     Smokeless tobacco: Never   Substance Use Topics     Alcohol use: No     Drug use: No       Family History   I have reviewed this patient's family history and updated it with pertinent information if needed.  Family History   Problem Relation Age of Onset     Heart Failure Mother      Cerebrovascular Disease Father      Kidney failure Sister      Cerebrovascular Disease Brother      Diabetes Type 2  Brother        Prior to Admission Medications   Prior to Admission Medications   Prescriptions Last Dose Informant Patient Reported? Taking?   Acetaminophen (ACETAMIN PO)   Yes No   Sig: Take 500 mg by mouth every 4 hours as needed (pain or fever)   DULoxetine (CYMBALTA) 20 MG capsule   No No   Sig: Take 1 capsule (20 mg) by mouth daily for 30 days   Vitamin D3 (CHOLECALCIFEROL) 125 MCG (5000 UT) tablet   Yes No   Sig: Take 125 mcg by mouth daily   albuterol (PROAIR HFA/PROVENTIL HFA/VENTOLIN HFA) 108 (90 Base) MCG/ACT inhaler   Yes No   Sig: Inhale 2 puffs into the lungs every 4 hours as needed for shortness of breath / dyspnea or wheezing   allopurinol (ZYLOPRIM) 300 MG tablet   No No   Sig: Take 1 tablet (300 mg) by mouth daily   benzocaine (ANBESOL) 10 % gel   Yes No   Sig: Take by mouth every 6 hours as needed for mouth sores   budesonide-formoterol (SYMBICORT) 160-4.5 MCG/ACT Inhaler   No No   Sig: Inhale 2 puffs into the lungs 2 times daily for 30 days   colchicine (COLCYRS) 0.6 MG tablet   No No   Sig: Take 1 tablet by mouth once daily   compressor, for nebulizer Saran   No No   Sig: [COMPRESSOR, FOR NEBULIZER SARAN] Nebulizer treatment qid prn   dexamethasone (DECADRON) 6 MG tablet   Yes No   Sig: Take 1 tablet (6 mg) by mouth daily   folic acid (FOLVITE) 1 MG tablet   No No   Sig: Take 1 tablet (1 mg) by mouth daily   ipratropium - albuterol 0.5 mg/2.5 mg/3 mL (DUONEB) 0.5-2.5 (3) MG/3ML neb solution   No No    Sig: Take 1 vial (3 mLs) by nebulization every 4 hours as needed for wheezing or shortness of breath / dyspnea   magnesium oxide (MAG-OX) 400 MG tablet   Yes No   Sig: Take 400 mg by mouth 2 times daily   methocarbamol (ROBAXIN) 500 MG tablet   Yes No   Sig: Take 500 mg by mouth 4 times daily as needed for muscle spasms   methotrexate sodium 2.5 MG TABS   No No   Sig: Take 3 tablets (7.5 mg) by mouth once a week   metoprolol succinate ER (TOPROL XL) 25 MG 24 hr tablet   No No   Sig: Take 3 tablets (75 mg) by mouth daily for 30 days   omeprazole 20 MG tablet   Yes No   Sig: Take 20 mg by mouth 2 times daily   pravastatin (PRAVACHOL) 20 MG tablet   Yes No   Sig: Take 20 mg by mouth every evening   predniSONE (DELTASONE) 5 MG tablet   Yes No   Sig: Take 1 tablet (5 mg) by mouth daily Holding until decadron course for COVID is completed.   Patient not taking: Reported on 12/22/2022   rivaroxaban ANTICOAGULANT (XARELTO) 15 MG TABS tablet   No No   Sig: Take 1 tablet (15 mg) by mouth daily (with dinner)      Facility-Administered Medications: None     Allergies   Allergies   Allergen Reactions     Codeine Nausea and Vomiting     Fosamax [Alendronic Acid] Diarrhea     Morphine Nausea and Vomiting     Other reaction(s): Vomiting       Physical Exam   Vital Signs: Temp: 98.3  F (36.8  C)   BP: 118/72 Pulse: 105   Resp: 21 SpO2: 99 % O2 Device: Nasal cannula Oxygen Delivery: 3 LPM  Weight: 150 lbs 0 oz    Pt is using restroom    Data   Data reviewed today: I reviewed all medications, new labs and imaging results over the last 24 hours.     Recent Labs   Lab 12/28/22  0231 12/27/22  0834   WBC 17.2* 23.9*   HGB 9.2* 10.4*   * 108*    301   * 138   POTASSIUM 3.6 4.3   CHLORIDE 100 102   CO2 24 20*   BUN 27.8* 26.0*   CR 1.16* 1.06*   ANIONGAP 10 16*   SUNI 9.5 9.9   * 81     17.2 (H)    \    9.2 (L)    /    282   N 87    L N/A    134 (L)    100    27.8 (H) /   ------------------------------------  111 (H)   ALT N/A   AST N/A   AP N/A   ALB N/A   Ca 9.5  3.6    24    1.16 (H) \    % RETIC N/A    LDH N/A  Troponin N/A    BNP N/A    CK N/A  INR N/A   PTT N/A    D-dimer 0.68 (H)    Fibrinogen N/A    Antithrombin N/A  Ferritin N/A  .20 (H)    IL-6 N/A  Recent Results (from the past 24 hour(s))   XR Chest Port 1 View    Narrative    EXAM: XR CHEST PORT 1 VIEW  LOCATION: [facility]  DATE/TIME: 12/28/2022 2:39 AM    INDICATION: Shortness of breath.  COMPARISON: 12/16/2022.    FINDINGS: The heart size is normal. The thoracic aorta is calcified. Mild probable scarring at the lung bases. The lungs are otherwise clear. No pneumothorax.      Impression    IMPRESSION: No acute abnormality.

## 2022-12-28 NOTE — ED NOTES
Bed: JNED-02  Expected date: 12/28/22  Expected time: 12:52 AM  Means of arrival:   Comments:  [facility] 81 yo F SOB

## 2022-12-29 PROBLEM — I65.22 INTERNAL CAROTID ARTERY STENOSIS, LEFT: Status: ACTIVE | Noted: 2022-01-01

## 2022-12-29 NOTE — PROGRESS NOTES
[facility] ([facility])  SPIRITUAL ASSESSMENT Progress Note  [facility]. Unit P3    REFERRAL SOURCE: Admission     ELI Henry was quite adamant that she did not want a spiritual care visit or visitors at this time.      PLAN: Spiritual Care is available for ongoing spiritual and emotional support should she change her mind.      Korina Chavarria, Ph.D., [facility]      [facility] available 24/7 for emergency requests/referrals, either by having the on-call  paged or by entering an ASAP/STAT consult in Epic (this will also page the on-call ).

## 2022-12-29 NOTE — CONSULTS
Vascular Surgery Brief Consult Note     77-year-old female with chronic HFpEF, COPD, chronic oxygen use of 2 L, atrial fibrillation presents with acute on chronic HFpEF, acute on chronic hypoxic respiratory failure, leukocytosis, recurrent C Diff diarrhea, now resolved acute metabolic encephalopathy , recent COVID 19 (12/13/2022).     Vascular surgery consulted for L ICA occlusion. No surgical intervention is indicated for asymptomatic ICA occlusion (no stroke suspected based on chart review). Review of US shows 50-69% stenosis in R ICA based on velocity criteria ( barely above 125 cm/s so likely around 50% stenosis) and no flow in L ICA. Bilateral vertebral arteries have antegrade flow     Recommend medical management ( 81 mg Aspirin, rosuvastatin or atorvastatin). Will arrange outpatient follow up in 1 month with repeat duplex and then yearly duplex for surveillance of other side     Marina Gonzalez MD  Fellow

## 2022-12-29 NOTE — PLAN OF CARE
Problem: Fall Injury Risk  Goal: Absence of Fall and Fall-Related Injury  Outcome: Progressing  Intervention: Identify and Manage Contributors  Recent Flowsheet Documentation  Taken 12/29/2022 0358 by Breana Hernandez RN  Medication Review/Management: medications reviewed  Taken 12/29/2022 0000 by Breana Hernandez RN  Medication Review/Management: medications reviewed  Intervention: Promote Injury-Free Environment  Recent Flowsheet Documentation  Taken 12/29/2022 0358 by Breana Hernandez RN  Safety Promotion/Fall Prevention:   safety round/check completed   room organization consistent   room near nurse's station   patient and family education   nonskid shoes/slippers when out of bed   mobility aid in [facility]   fall prevention program maintained   clutter free environment maintained  Taken 12/29/2022 0000 by Breana Hernandez RN  Safety Promotion/Fall Prevention:   safety round/check completed   room organization consistent   room near nurse's station   patient and family education   nonskid shoes/slippers when out of bed   mobility aid in [facility]   fall prevention program maintained   clutter free environment maintained   Goal Outcome Evaluation:       Pt alert & oriented, but forgetful. Denies pain. Numbness present on lower extremities. Pt has fib. VSS. On 3L NC. Pt has stool frequency. Had a fall incident overnight w/o injuries. Pt is able to make needs known. Call light within reach.

## 2022-12-29 NOTE — PLAN OF CARE
Problem: Fall Injury Risk  Goal: Absence of Fall and Fall-Related Injury  Outcome: Progressing     Problem: Plan of Care - These are the overarching goals to be used throughout the patient stay.    Goal: Optimal Comfort and Wellbeing  Outcome: Progressing   Goal Outcome Evaluation:               Pt was transferred several times today with standby pivot.  Pt did well   pt denies symptoms of syncope.   Orthostatic bp  were taken         12/29/22 0808 12/29/22 1000   Lying Orthostatic BP   Lying Orthostatic BP  --  87/50   Lying Orthostatic Pulse  --  103 bpm   Sitting Orthostatic BP   Sitting Orthostatic BP 97/54 88/52   Sitting Orthostatic Pulse 144 bpm 118 bpm   Standing Orthostatic BP   Standing Orthostatic BP  --  95/55   Standing Orthostatic Pulse  --  113 bpm     Pt had UA sample collected. Blood glucose check (158). Bladder scan (79 cc), ct scan head, us carotid today.  Pt is sitting up eating lunch with daughter.  Pt is calm cooperative.     Pt was eating late lunch when a fib rvr with rates in the 140's started.  Pt denies symptoms .  Prn iv metoprolol was given.  Will continue to monitor.

## 2022-12-29 NOTE — SIGNIFICANT EVENT
"Significant Event Note    Time of event: 3:32 AM December 29, 2022    Description of event:  Fall  Admitted with C diff  Was up to toilet to have a BM. When she was done, she tried to get up but felt lightheaded and weak. Slumped to the floor. This was witnessed by bedside RN  Denies hitting her head. No LOC  Still feeling pretty weak    /57 (BP Location: Right arm, Patient Position: Semi-Sy's, Cuff Size: Adult Small)   Pulse 89   Temp 97.3  F (36.3  C) (Oral)   Resp 18   Ht 1.651 m (5' 5\")   Wt 68 kg (150 lb)   SpO2 93%   BMI 24.96 kg/m    Repeat /53     Plan:  No indications for head ct.  Likely orthostatic due to c. Diff, volume loss. Vs vasovagal from valsalva  Fall precautions  Continue current cares.    Discussed with: bedside nurse    Jeanette Uribe MD    "

## 2022-12-29 NOTE — CONSULTS
COPD Consult Note    12/29/2022, 8:44 AM     Reason for Consult: COPD education per protocol due to primary problem  Admission diagnosis: Hypomagnesemia [E83.42]  Chronic obstructive pulmonary disease, unspecified COPD type (H) [J44.9]  Acute on chronic congestive heart failure, unspecified heart failure type (H) [I50.9]     History: Piotr is a 77 year old with a history of asthma, former smoker, hypertension, CKD stage 3, reflux, mood disorder, atrial fibrillation, rheumatoid arthritis, JOHANNA, darby nodules, and oxygen dependent COPD. She sees Dr. Parth Rosario at the [facility] for pulmonary and is on 1-2L via nasal cannula at baseline provided by [facility]. She is currently part of our call back program for COPD.     Assessment:  Patient will not be seen at this time due primarily being fluid overloaded causing issues with her heart failure. Discussed with MD.    Recommendations:  -Continue current respiratory therapy per [facility] Protocol  -Pulmonary follow up once out of TCU  -Continue home respiratory medications    We ask that you please update the patient's chart to reflect an alternative diagnosis.  If you have questions please call COPD Education at [phone], thank you.    Shea De León, RT, Chronic Pulmonary Disease Specialist

## 2022-12-29 NOTE — UTILIZATION REVIEW
Admission Status; Secondary Review Determination   Under the authority of the Utilization Management Committee, the utilization review process indicated a secondary review on Fatuma Henry. The review outcome is based on review of the medical records, discussions with staff, and applying clinical experience noted on the date of the review.   (x) Inpatient Status Appropriate - This patient's medical care is consistent with medical management for inpatient care and reasonable inpatient medical practice.     RATIONALE FOR DETERMINATION   Fatuma Henry is a 77 yr old female with chronic HFpEF, COPD, chronic oxygen use 2L NC, Afib who presented 12/28/22 with acute on chronic hypoxic respiratory failure with acute HFpEF exacerbation.  Has received IV lasix x 3.  Attempting to wean to PO lasix however now with uncontrolled HR as high as 140s.  Does have Afib and on rate controlling medication of metoprolol.  Cardiology following and concern there may be more of a COPD exacerbation in addition to CHF contributing to respiratory issues.  Did require increase in oxygen from her typical 2L to as high as 4L overnight for 85% saturation.  Today also with hypotension of 75/41 at one point which is what prompted conversion to PO diuresis to slow diuresis and minimize further hypotension.    At the time of admission with the information available to the attending physician more than 2 nights Hospital complex care was anticipated, based on patient risk of adverse outcome if treated as outpatient and complex care required. Inpatient admission is appropriate based on the Medicare guidelines.   The information on this document is developed by the utilization review team in order for the business office to ensure compliance. This only denotes the appropriateness of proper admission status and does not reflect the quality of care rendered.   The definitions of Inpatient Status and Observation Status used in making the determination  above are those provided in the CMS Coverage Manual, Chapter 1 and Chapter 6, section 70.4.   Sincerely,   Shannan Bronson MD  Utilization Review  Physician Advisor  [facility]

## 2022-12-29 NOTE — PLAN OF CARE
Heart Failure Care Map  GOALS TO BE MET BEFORE DISCHARGE:    1. Decrease congestion and/or edema with diuretic therapy to achieve near optimal volume status.     Dyspnea improved: Yes, satisfactory for discharge.   Edema improved: Yes, satisfactory for discharge.        Last 24 hour I/O:   Intake/Output Summary (Last 24 hours) at 12/28/2022 2231  Last data filed at 12/28/2022 1800  Gross per 24 hour   Intake 240 ml   Output --   Net 240 ml           Net I/O and Weights since admission:   11/28 2300 - 12/28 2259  In: 240 [P.O.:240]  Out: -   Net: 240     Vitals:    12/28/22 0108   Weight: 68 kg (150 lb)       2.  O2 sats > 90% on room air, or at prior home O2 therapy level.      Able to wean O2 this shift to keep sats above 90%?: Yes, satisfactory for discharge.   Does patient use Home O2? No          Current oxygenation status:   SpO2: 94 %     O2 Device: None (Room air), Oxygen Delivery: 4 LPM    3.  Tolerates ambulation and mobility near baseline.     Ambulation: No, further care required to meet this goal. Please explain Pt weak, up to bathroom    Times patient ambulated with staff this shift: 1    Please review the Heart Failure Care Map for additional HF goal outcomes.    Sydnee Campos RN  12/28/2022        Goal Outcome Evaluation:    Pt aox4 but forgetful. Tele a-fib, HR 90s-120. Prn metoprolol given for HR >120. LS diminished with crackles in bases. Pt up to bathroom with walker and gait belt. No pain. Pt still needs UA. K and Mag protocol, AM recheck.

## 2022-12-29 NOTE — PROGRESS NOTES
Occupational Therapy      12/29/22 1000   Appointment Info   Signing Clinician's Name / Credentials (OT) Elo Camargo OTR/L   General Information   Onset of Illness/Injury or Date of Surgery 12/28/22   Referring Physician Mones Weinstein DO   Additional Occupational Profile Info/Pertinent History of Current Problem 77 year old with a history of asthma, former smoker, hypertension, CKD stage 3, reflux, mood disorder, atrial fibrillation, rheumatoid arthritis, JOHANNA, darby nodules, and oxygen dependent COPD. She sees Dr. Parth Rosario at the [facility] for pulmonary and is on 1-2L via nasal cannula at baseline provided by [facility]. She is currently part of our call back program for COPD.   Existing Precautions/Restrictions fall;oxygen therapy device and L/min  (3L)   Cognitive Status Examination   Orientation Status orientation to person, place and time   Range of Motion Comprehensive   General Range of Motion no range of motion deficits identified   Bed Mobility   Bed Mobility supine-sit;sit-supine   Supine-Sit [place] (Bed Mobility) verbal cues;supervision   Sit-Supine [place] (Bed Mobility) verbal cues;supervision   Assistive Device (Bed Mobility) bed rails   Transfers   Transfers sit-stand transfer   Sit-Stand Transfer   Sit-Stand [place] (Transfers) contact guard;verbal cues;supervision   Assistive Device (Sit-Stand Transfers) walker, front-wheeled   Balance   Balance Assessment no deficits were identified   Clinical Impression   Criteria for Skilled Therapeutic Interventions Met (OT) Yes, treatment indicated   OT Diagnosis Decreased Act. tolerance   OT Problem List-Impairments impacting ADL problems related to;activity tolerance impaired;mobility;strength   Assessment of Occupational Performance 1-3 Performance Deficits   Identified Performance Deficits balance, toileting, endurance   Planned Therapy Interventions (OT) ADL retraining;balance training;IADL retraining;strengthening    Clinical Decision Making Complexity (OT) low complexity   Risk & Benefits of therapy have been explained evaluation/treatment results reviewed;care plan/treatment goals reviewed;risks/benefits reviewed;participants included;patient   OT Total Evaluation Time   OT Eval, Low Complexity Minutes (25335) 8   OT Goals   Therapy Frequency (OT) Daily   OT Predicted Duration/Target Date for Goal Attainment 01/04/23   OT Goals Transfers;Toilet Transfer/Toileting;Hygiene/Grooming;Aerobic Activity   OT: Hygiene/Grooming modified independent;while standing   OT: Transfer Modified independent;with assistive device   OT: Toilet Transfer/Toileting Modified independent;using adaptive equipment   OT: Perform aerobic activity with stable cardiovascular response continuous activity;15 minutes   Self-Care/Home Management   Self-Care/Home Mgmt/ADL, Compensatory, Meal Prep Minutes (99020) 12   Symptoms Noted During/After Treatment (Meal Preparation/Planning Training) fatigue   Treatment Detail/Skilled Intervention supervision w/ bed mobility, STSx3 CGA-SBA fww no LOB, pt ambulated w/in room w/ CGA no reports of light headedness/dizziness. Orthostatics assessed see flow sheet for details.   OT Discharge Planning   OT Plan g/h at sink, toileting, endurance, assess BP   OT Discharge Recommendation (DC Rec) home with assist;home with home care occupational therapy   OT Rationale for DC Rec Pt currently SBA/CGAx1 for ADLs and Fx mobility. Would benefit from further therapy to promote return to baseline Fx and assess safety in home. Expect pt progress enough to make a safe d/c home, pt lives w/ spouse. Pt has no [place] apartment, daughter lives on 3rd floor in apartment and can assist as needed, will continue to assess orthostatics.   OT Brief overview of current status SBA/CGA fww no LOB   Total Session Time   Timed Code Treatment Minutes 12   Total Session Time (sum of timed and untimed services) 20

## 2022-12-29 NOTE — PROGRESS NOTES
"  Care Management Follow Up    Length of Stay (days): 1    Expected Discharge Date: 12/30/2022     Concerns to be Addressed:    Per provider note today. \"No indications for head ct.  Likely orthostatic due to c. Diff, volume loss. Vs vasovagal from valsalva\"    Patient plan of care discussed at interdisciplinary rounds: Yes    Anticipated Discharge Disposition:  TCU     Anticipated Discharge Services:  TCU    Education Provided on the Discharge Plan:  Per Care Team   Patient/Family in Agreement with the Plan:  Yes    Referrals Placed by CM/SW:    Private pay costs discussed: Not applicable    Additional Information:  Chart reviewed.    Pt normally lives with her  in a condo. She has been back and forth to the hospital a lot in the last 2 months and is staying in TCU at [facility] and will need to return for continued rehab at discharge. [facility] to transport at discharge.        She uses [employer] and has a portable tank. It is from an unknown agency.Pt is needing increased oxygen levels.     RNCM called [facility], they do not have a bed hold for pt. They are going to be calling writer back with bed availability.      Therapy consults placed, awaiting recs.       CM will continue to follow plan of care, review recommendations, and assist with any discharge needs anticipated.           Toya Boucher RN            " Endocrinology

## 2022-12-29 NOTE — SIGNIFICANT EVENT
Post Fall Assessment Note    S: Patient had a(n): witnessed, unassisted fall.    B: Patient's orientation/mental status at time of fall:  alert.   Medications administered within 8 hours of fall:    PCA/Opiates:  No    Hypnotics:  No    Psychotropics: No    Antihypertensives:  No    Sedatives:  No   Location of fall:  Toilet     A: Type of injury from fall:  None    Patient status:  Pt alert & oriented x4. Denies pain. VSS. Pt stating feeling very weak. No injuries. Pt sat on the floor, unable to stand up from position. Four RN in room assist pt to getting back to bed w/ lilliana.    Patient was lifted from fall event:  Equipment used to assist, lilliana lift.    Interventions:  Bed alarm, pt education, gait belt.    MD notified: Jeanette Uribe.    Family member/next of kin notified:  Yes, daughter Enid Dove notified at 0600.     R: Falls assessment completed in Epic:  Yes   Care Plan updated:  Yes    Pt sat on the toilet. Want to stand up and clean bottom up. Attempted to stand up using rails on the bathroom & walker but felt very weak, patient slowly slide down to the floor into a sitting position. No injuries occurred. Vitals within normal limits. Pt denies pain.

## 2022-12-29 NOTE — CONSULTS
"CLINICAL NUTRITION SERVICES - ASSESSMENT NOTE    REASON FOR ASSESSMENT  Fatuma Henry is a/an 77 year old female assessed by the dietitian for Admission Nutrition Risk Screen for positive unsure about weight loss and poor po PTA  Pt states she's been eating fine, has had no weight loss and declines low sodium diet ed as she states \" I have already been through that\"    CURRENT NUTRITION ORDERS  Diet: Low saturated fat < 2400 mg Na, no caffeine  Intake/Tolerance: Eating 100% of those meals recorded    NUTRITION DIAGNOSIS  No nutrition diagnosis at this time    INTERVENTIONS  Implementation  Follow for LOS    Goals  Patient to consume % of nutritionally adequate meals three times per day, or the equivalent with supplements/snacks.     Monitoring/Evaluation  Progress toward goals will be monitored and evaluated per protocol.  "

## 2022-12-29 NOTE — PROGRESS NOTES
Assessment & Plan   77-year-old female with chronic HFpEF, COPD, chronic oxygen use of 2 L, atrial fibrillation presents with acute on chronic HFpEF, acute on chronic hypoxic respiratory failure, leukocytosis.    Principal Problem:    COPD (chronic obstructive pulmonary disease) (H)  Active Problems:    Rheumatoid arthritis of multiple sites without rheumatoid factor (H)    Paroxysmal atrial fibrillation (H)    Benign essential hypertension    CKD (chronic kidney disease) stage 3, GFR 30-59 ml/min (H)    Hypomagnesemia    Acute on chronic congestive heart failure, unspecified heart failure type (H)    Acute on chronic respiratory failure with hypoxia (H)    Macrocytic anemia    Recurrent Clostridioides difficile diarrhea    Leukocytosis    HLD (hyperlipidemia)    Gastroesophageal reflux disease without esophagitis    Gout    Mood disorder (H)    JOHANNA (obstructive sleep apnea)    Hyponatremia    Internal carotid artery stenosis, left    Present on Admission:    Hypomagnesemia    Acute on chronic congestive heart failure, unspecified heart failure type (H)    Acute on chronic respiratory failure with hypoxia (H)    Paroxysmal atrial fibrillation (H)    Benign essential hypertension    Macrocytic anemia      Acute HFpEF, acute on chronic hypoxic respiratory failure  -Patient presents with dyspnea, increasing oxygen needs from 2 L to 4, proBNP greater than 6000, mildly elevated troponin.  Chest x-ray 12/20/2022 without any acute abnormalities.  TTE 12/1/2022 with ejection fraction of 60 to 65% and mild aortic stenosis is insufficiency, moderate left atrial enlargement and mild right atrial enlargement with increased pulmonary pressure.  Limited TTE 12/29 with unchanged ejection fractions and no significant abnormalities.  Patient appears to be back at baseline oxygen needs  -Telemetry   -Stop furosemide IV  -Daily weights, strict I's and O's  -Incentive spirometry  -Wean oxygen    Recurrent C. Difficile  diarrhea  -Patient had C. difficile 11/7/2022 and was treated with oral vancomycin and does currently have some loose stools.  I do see patient was started on preemptive vancomycin on 12/26 while C. difficile culture was pending.  Now that C. difficile toxin and antigen positive we will count this as a C. difficile recurrence as it appears patient's diarrhea had resolved and then returned once again within 2 months of the initial diagnosis.  -Enteric precautions  -Fidaxomicin 200 mg p.o. twice daily x10 days, preferred treatment for first C. difficile recurrence.  Will continue as long as able to obtain prior authorization through patients insurance    Acute metabolic encephalopathy-resolved  -Patient with some increased confusion.  Highly likely multifactorial with hypoxia, recurrent C. difficile infection, acute heart failure, delirium in the hospital setting.  Urinalysis without signs of infection and other electrolytes normal.  Upon further discussion with patient and she sounds like she has been having some mild memory issues over the past month in relation to being ill, otherwise she is alert and oriented x4 and very pleasant.  CT head 12/29 without any acute abnormalities  -Delirium protocol    Presyncope  -Patient had an episode of presyncope while using the restroom on 12/29.  Highly likely some orthostatic hypotension in the setting of loose stools.  Carotid ultrasound did show severe left ICA stenosis/occlusion.  Unlikely to have caused presyncope but still significant.  -Telemetry  -Orthostatic Bps    Left ICA occlusion  -Carotid ultrasound 12/29 does show severe plaque formation of the left internal carotid artery without visible lumen.  -Vascular surgery consulted, appreciate recommendations    Leukocytosis- resolved  -Patient presented with elevated white blood cell count as well as high procalcitonin.  Although patient is on chronic prednisone she did not have a leukocytosis prior to this admission  so is likely reactive to infection.  Chest x-ray did not show any consolidations, urinalysis without signs of infection. C. difficile toxin and antigen both positive.  Highly likely WBC elevation is from C. difficile infection.    Atrial fibrillation with RVR  -Initially rate controlled but patient now with some mild RVR  -Metoprolol IV as needed  -Metoprolol XL 75 mg p.o. daily  -Rivaroxaban 15 mg p.o. q. bedtime    CKD 3  -Baseline creatinine approximately 1.2    COPD  -Would not treat for an acute exacerbation as respiratory symptoms appear to be more cardiac in nature.  If oxygen does not improve with appropriate diuresis will initiate COPD treatment   -Breo Ellipta daily  -Will schedule DuoNeb x24 hours    Rheumatoid arthritis  -Hold home methotrexate while being treated for C. difficile  -Prednisone 5 mg p.o. daily    HTN  -Metoprolol-XL 75 mg p.o. daily    HLD  -Pravastatin 20 mg p.o. daily    COVID 19 recovered  -With positive COVID-19 test on 12/13/2022.  Patient did receive 5 doses of IV remdesivir and 10 days of dexamethasone and had been discharged to TCU.  -No special precautions needed    GERD  -Will use famotidine in place of pantoprazole, as PPI associated with c diff infections    Gout  -Allopurinol 300 mg p.o. daily    Mood disorder  -Duloxetine 40 mg p.o. daily    JOHANNA  -CPAP    Hyponatremia- resolved  -Likely hypervolemic hyponatremia in the setting of fluid overload and acute heart failure    Macrocytic anemia  -Hemoglobin 10.2 with MCV of 104.  Highly likely from methotrexate use, mixed anemia of chronic disease.  TSH and vitamin B12 normal.  -Folic acid pending     Clinically Significant Risk Factors         # Hyponatremia: Lowest Na = 134 mmol/L in last 2 days, will monitor as appropriate   # Hypercalcemia: corrected calcium is >10.1, will monitor as appropriate  # Hypomagnesemia: Lowest Mg = 1.6 mg/dL in last 2 days, will replace as needed   # Hypoalbuminemia: Lowest albumin = 3 g/dL at  "12/29/2022  5:00 AM, will monitor as appropriate            # Overweight: Estimated body mass index is 25.73 kg/m  as calculated from the following:    Height as of this encounter: 1.651 m (5' 5\").    Weight as of this encounter: 70.1 kg (154 lb 9.6 oz)., PRESENT ON ADMISSION         Electrolytes: Sodium 137  Fluids: P.o.  Diet: Cardiac  VTE prophylaxis: Rivaroxaban       Expected Discharge Date: 12/31/2022    Discharge Delays: Specialist Consult (enter specialist & decision needed in comments)  Destination: inpatient rehabilitation facility  Discharge Comments: cdiff; meds to oral          Subjective  Cc: shortness of breath, diarrhea    Patient endorses just feeling very tired today but otherwise really no complaints.  Feels like her breathing is better and she does not have any shortness of breath or cough.  She states she still has loose stools maybe 2-3 times a day but denies abdominal pain.  She did have an event overnight where she almost passed out while using the restroom and she did feel like her blood pressure was low at that time.      Review of Systems: History obtained from the patient  General ROS: negative  for  - chills, fever, positive for fatigue  ENT ROS: negative for - headaches, hearing change, nasal congestion, nasal discharge, sore throat  Hematological and Lymphatic ROS: negative for - bleeding problems, blood clots, bruising  Respiratory ROS: negative for - cough, pleuritic pain, shortness of breath  Cardiovascular ROS: negative for - chest pain, dyspnea on exertion, positive edema  Gastrointestinal ROS: negative for - abdominal pain, constipation, diarrhea, and nausea/vomiting  Genito-Urinary ROS: negative for - change in urinary stream, dysuria, hematuria  Musculoskeletal ROS: negative for - gait disturbance, and muscle pain  Neurological ROS: Patient endorses feeling like her mind is foggy and her memory is not as well as it used to be  Dermatological ROS: negative for pruritus, " rash    Objective    Physical Examination:   General appearance - alert, patient does appear tired, and in no distress and oriented to person, place, and time  Mental status - alert, oriented to person, place, and time, normal mood, behavior, speech, dress, motor activity, and thought processes  HEENT - sclera anicteric, left eye normal, right eye normal, nares normal and patent, no erythema  Respiratory -quiet breath sounds all posterior lung fields but without wheezes, rhonchi or crackles.  No tachypnea, retractions, nasal cannula in place  Cardiac -tachycardic, irregularly irregular rhythm, normal S1, S2, no murmurs, rubs, clicks or gallops, no JVD  Abdomen - soft, nontender, nondistended, no masses or organomegaly no rebound tenderness noted bowel sounds normal, no bladder distension noted  Neurological - alert, oriented, normal speech, no focal findings or movement disorder noted  Musculoskeletal - no joint tenderness, deformity or swelling, full range of motion without pain  Extremities - peripheral pulses normal, no pedal edema, no clubbing or cyanosis  Skin -skin is sallow appearing, thin with multiple ecchymotic lesions across bilateral upper extremities      Temp:  [97.3  F (36.3  C)-98.3  F (36.8  C)] 98  F (36.7  C)  Pulse:  [] 118  Resp:  [16-23] 20  BP: ()/(41-98) 102/58  SpO2:  [92 %-100 %] 100 %    No intake or output data in the 24 hours ending 12/28/22 0908    Results    Recent Results (from the past 24 hour(s))   Magnesium    Collection Time: 12/29/22  5:00 AM   Result Value Ref Range    Magnesium 1.9 1.7 - 2.3 mg/dL   CBC with platelets    Collection Time: 12/29/22  5:00 AM   Result Value Ref Range    WBC Count 8.7 4.0 - 11.0 10e3/uL    RBC Count 3.14 (L) 3.80 - 5.20 10e6/uL    Hemoglobin 10.2 (L) 11.7 - 15.7 g/dL    Hematocrit 32.6 (L) 35.0 - 47.0 %     (H) 78 - 100 fL    MCH 32.5 26.5 - 33.0 pg    MCHC 31.3 (L) 31.5 - 36.5 g/dL    RDW 15.3 (H) 10.0 - 15.0 %    Platelet Count  306 150 - 450 10e3/uL   Basic metabolic panel    Collection Time: 12/29/22  5:00 AM   Result Value Ref Range    Sodium 137 136 - 145 mmol/L    Potassium 4.0 3.4 - 5.3 mmol/L    Chloride 100 98 - 107 mmol/L    Carbon Dioxide (CO2) 28 22 - 29 mmol/L    Anion Gap 9 7 - 15 mmol/L    Urea Nitrogen 26.4 (H) 8.0 - 23.0 mg/dL    Creatinine 1.23 (H) 0.51 - 0.95 mg/dL    Calcium 10.0 8.8 - 10.2 mg/dL    Glucose 111 (H) 70 - 99 mg/dL    GFR Estimate 45 (L) >60 mL/min/1.73m2   Folate    Collection Time: 12/29/22  5:00 AM   Result Value Ref Range    Folic Acid 38.3 (H) 4.6 - 34.8 ng/mL   Hepatic panel    Collection Time: 12/29/22  5:00 AM   Result Value Ref Range    Protein Total 5.7 (L) 6.4 - 8.3 g/dL    Albumin 3.0 (L) 3.5 - 5.2 g/dL    Bilirubin Total 0.5 <=1.2 mg/dL    Alkaline Phosphatase 116 (H) 35 - 104 U/L    AST 14 10 - 35 U/L    ALT 16 10 - 35 U/L    Bilirubin Direct <0.20 0.00 - 0.30 mg/dL   Echocardiogram Limited    Collection Time: 12/29/22  7:40 AM   Result Value Ref Range    LVEF  60-65%    UA reflex to Microscopic and Culture    Collection Time: 12/29/22  8:21 AM    Specimen: Urine, Midstream   Result Value Ref Range    Color Urine Colorless Colorless, Straw, Light Yellow, Yellow    Appearance Urine Clear Clear    Glucose Urine Negative Negative mg/dL    Bilirubin Urine Negative Negative    Ketones Urine Negative Negative mg/dL    Specific Gravity Urine 1.006 1.001 - 1.030    Blood Urine Negative Negative    pH Urine 6.5 5.0 - 7.0    Protein Albumin Urine Negative Negative mg/dL    Urobilinogen Urine <2.0 <2.0 mg/dL    Nitrite Urine Negative Negative    Leukocyte Esterase Urine Negative Negative   Glucose by meter    Collection Time: 12/29/22 10:38 AM   Result Value Ref Range    GLUCOSE BY METER POCT 158 (H) 70 - 99 mg/dL     IMPRESSION:  1.  No evidence of an acute intracranial abnormality.  2.  Mild presumed chronic small vessel ischemic change.  3.  Mild-to-moderate atrophy.    IMPRESSION:  1.  Mild plaque  formation, velocities consistent with less than 50% stenosis in the right internal carotid artery.  2.  Severe plaque formation in the left internal carotid artery. No visible lumen. No flow is identified. Patent left common and external carotid arteries.  3.  Flow within the vertebral arteries is antegrade.    Interpretation Summary     Left ventricular size, wall motion and function are normal. The ejection  fraction is 60-65%.  Normal right ventricle size and systolic function.  IVC diameter <2.1 cm collapsing >50% with sniff suggests a normal RA pressure  of 3 mmHg.  ______________________________________________________________________________      Lab Results personally reviewed 12/29  Imaging Results personally reviewed 12/29  Discussed with patient and her daughter  Reviewed old records     Advanced Care Planning  Discharge Planning discussed with patient and family  Discussed care with patient and family for 35 minutes with greater than 50% of time spent in counseling and coordination of care.    Updated patient's daughter Enid on 12/29    Monse Weinstein DO  Hospitalist Service  [facility]  Text page via [employer] Paging/Directory     This note was created using dragon dictation, any spelling and grammatical errors are unintentional.

## 2022-12-29 NOTE — PROGRESS NOTES
"   12/29/22 0918   Appointment Info   Signing Clinician's Name / Credentials (PT) Josie Wilson, PT, DPT   Living Environment   People in Home spouse   Current Living Arrangements condominium   Home Accessibility no concerns   Transportation Anticipated car, drives self   Living Environment Comments One floor   Self-Care   Usual Activity Tolerance good   Equipment Currently Used at Home walker, rolling  (4WW at home \"50% of time\")   Fall history within last six months yes   Number of times patient has fallen within last six months 4  (\"4-6\"; one additional fall early morning of 12/29 in hospital)   Activity/Exercise/Self-Care Comment Does own cooking, cleaning, shopping at home. Takes care of .   General Information   Onset of Illness/Injury or Date of Surgery 12/28/22   Referring Physician Monse Weinstein, DO   Patient/Family Therapy Goals Statement (PT) To go home   Pertinent History of Current Problem (include personal factors and/or comorbidities that impact the POC) Fatuma Henry is a 77 year old female with a past medical history of HTN, COPD, asthma, CKD3a, and Afib, who presents for evaluation of shortness of breath.   Existing Precautions/Restrictions fall;oxygen therapy device and L/min  (3L/min at home)   Cognition   Affect/Mental Status (Cognition) WFL   Orientation Status (Cognition) person;time  (States the date is 1/29/22)   Follows Commands (Cognition) WFL   Range of Motion (ROM)   Range of Motion ROM is WFL   Strength (Manual Muscle Testing)   Strength (Manual Muscle Testing) strength is WFL   Bed Mobility   Comment, (Bed Mobility) Unable to formally assess, patient sitting EOB on arrival   Transfers   Transfers sit-stand transfer   Sit-Stand Transfer   Sit-Stand [place] (Transfers) minimum assist (75% patient effort);1 person to manage equipment   Assistive Device (Sit-Stand Transfers) walker, front-wheeled   Gait/Stairs (Locomotion)   Comment, (Gait/Stairs) Unable to formally " assess due to safety concerns. Patient's BP 85/53 sitting, 75/41 standing, and 108/51 sitting.   Clinical Impression   Criteria for Skilled Therapeutic Intervention Yes, treatment indicated   PT Diagnosis (PT) Impaired functional mobility   Influenced by the following impairments Weakness, medical status   Functional limitations due to impairments Transfers, gait   Clinical Presentation (PT Evaluation Complexity) Evolving/Changing   Clinical Presentation Rationale Patient presents as medically diagnosed   Clinical Decision Making (Complexity) low complexity   Planned Therapy Interventions (PT) gait training;home exercise program;patient/family education;strengthening;transfer training   Anticipated Equipment Needs at Discharge (PT) walker, rolling   Risk & Benefits of therapy have been explained evaluation/treatment results reviewed;care plan/treatment goals reviewed;participants voiced agreement with care plan;participants included;patient   PT Total Evaluation Time   PT Eval, Low Complexity Minutes (16960) 15   Physical Therapy Goals   PT Frequency Daily   PT Predicted Duration/Target Date for Goal Attainment 01/05/23   PT Goals Transfers;Gait   PT: Transfers Modified independent;Sit to/from stand;Assistive device   PT: Gait Supervision/stand-by assist;Rolling walker;150 feet   Interventions   Interventions Quick Adds Therapeutic Procedure   Therapeutic Procedure/Exercise   Ther. Procedure: strength, endurance, ROM, flexibillity Minutes (36711) 19   Symptoms Noted During/After Treatment none   Treatment Detail/Skilled Intervention Patient performed seated BLE exercise x 15 with verbal cueing and visual demonstration required for proper technique. One extended rest break required following seated marching. Patient seated at EOB with call light within reach at end of session.   PT Discharge Planning   PT Plan Mobilize as able, LE ex   PT Discharge Recommendation (DC Rec) Transitional Care Facility   PT Rationale for  DC Rec Patient currently requires assist of 1 for safe transfers. Unable to determine level of assistance required for safe gait due to low blood pressures at this time. Patient lives in a condo with her  for whom she cares for. She has access to a four wheeled walker at home and uses it at baseline. Due to lack of physical assistance available at home and currently inability to safely assess gait and out of bed mobility, recommend patient discharges to TCU in order to improve strength, safety, and independence with mobility. Pending progress with therapy and medical treatments, patient may be safe to discharge home with use of her four wheeled walker.   PT Brief overview of current status Assist of 1 for sit to stand today, low blood pressures limited out of bed mobility.   Total Session Time   Timed Code Treatment Minutes 19   Total Session Time (sum of timed and untimed services) 34     Josie Wilson, PT, DPT

## 2022-12-29 NOTE — PROGRESS NOTES
"  HEART CARE CONSULTATON NOTE        Assessment/Recommendations   Assessment:   1.  Acute on chronic congestive heart failure with preserved ejection fraction  2.  Pulmonary hypertension  3.  COPD with acute exacerbation  4.  Uncontrolled hypertension  5.  Paroxysmal A. fib now with rapid ventricular response  6.  Anemia    Plan:   1.  Discontinue IV Lasix 40 mg every 8 hours, convert to oral tomorrow.  Reviewed echo which demonstrates normal RA pressure.  Appears to be euvolemic on exam at this time.   2.  Continue metoprolol XL 25 mg BID (increased from daily).   3.  Continue Xarelto 20 mg daily  4.  Recommend treating for acute COPD exacerbation as well    Will follow       History of Present Illness/Subjective    HPI: Fatuma Henry is a 77 year old female severe COPD on home oxygen, pulm hypertension, heart failure with preserved ejection fraction, paroxysmal A. fib who presents to [facility] with acute hypoxia and dyspnea.  \    S: Overnight patient was hypotensive and had a fall.  This morning was noted again to be hypotensive with rehab.  Holding diuretic therapy.  On review of echo normal left ventricular ejection fraction.  No evidence of elevation in RA pressures based on IVC collapse.  Currently she notes improvement in her breathing.  No active chest pain.  Blood pressure again is improved.  Her rates remain slightly elevated at 108 currently.  Otherwise on review of telemetry she has been in A. fib with intermittent heart rates between 100 to 130 bpm.  Creatinine is slightly elevated from baseline.    ECHO:   Left ventricular size, wall motion and function are normal. The ejection  fraction is 60-65%.  Normal right ventricle size and systolic function.  IVC diameter <2.1 cm collapsing >50% with sniff suggests a normal RA pressure  of 3 mmHg.       Physical Examination  Review of Systems   VITALS: /57   Pulse 108   Temp 98.3  F (36.8  C) (Oral)   Resp 18   Ht 1.651 m (5' 5\")   Wt " 70.1 kg (154 lb 9.6 oz)   SpO2 100%   BMI 25.73 kg/m    BMI: Body mass index is 25.73 kg/m .  Wt Readings from Last 3 Encounters:   12/29/22 70.1 kg (154 lb 9.6 oz)   12/26/22 71.2 kg (157 lb)   12/23/22 71.2 kg (157 lb)     No intake or output data in the 24 hours ending 12/28/22 1620  General Appearance:    No distress, normal body habitus   ENT/Mouth: membranes moist, no oral lesions or bleeding gums.      EYES:  no scleral icterus, normal conjunctivae   Neck: no carotid bruits or thyromegaly   Chest/Lungs:    Improved wheezing.  No rales   Cardiovascular:    Irregular, rapid.  No systolic murmur.  Jugular venous pressure elevated, no edema bilaterally    Abdomen:  no organomegaly, masses, bruits, or tenderness; bowel sounds are present   Extremities: no cyanosis or clubbing   Skin: no xanthelasma, warm.    Neurologic: normal  bilateral, no tremors     Psychiatric: alert and oriented x3, calm     Review Of Systems  Skin: negative  Eyes: negative  Ears/Nose/Throat: negative  Respiratory: Dyspnea improving.  Positive cough  Cardiovascular: Orthopnea resolved  Gastrointestinal: negative  Genitourinary: negative  Musculoskeletal: Joint pain  Neurologic: negative  Psychiatric: negative  Hematologic/Lymphatic/Immunologic: negative  Endocrine: negative          Lab Results    Chemistry/lipid CBC Cardiac Enzymes/BNP/TSH/INR   Recent Labs   Lab Test 01/28/22  1640   CHOL 154   HDL 65   LDL 63   TRIG 132     Recent Labs   Lab Test 01/28/22  1640 01/13/21  1017 11/04/19  1430   LDL 63 66 93     Recent Labs   Lab Test 12/28/22  0231   *   POTASSIUM 3.6   CHLORIDE 100   CO2 24   *   BUN 27.8*   CR 1.16*   GFRESTIMATED 49*   SUNI 9.5     Recent Labs   Lab Test 12/28/22  0231 12/27/22  0834 12/17/22  0639   CR 1.16* 1.06* 1.15*     No results for input(s): A1C in the last 92423 hours.       Recent Labs   Lab Test 12/28/22  0231   WBC 17.2*   HGB 9.2*   HCT 29.2*   *        Recent Labs   Lab Test  22  0231 22  0834 22  0639   HGB 9.2* 10.4* 10.7*    Recent Labs   Lab Test 22  1312 22  1046 22  1756   TROPONINI 0.04 0.03 0.04     Recent Labs   Lab Test 22  0231 22  0836 22  1303 22  0854 22  1046 22  1756   BNP  --   --   --  274* 165* 190*   NTBNPI 6,091* 3,889* 3,087*  --   --   --      Recent Labs   Lab Test 22  0913   TSH 3.12     Recent Labs   Lab Test 22  1803 22  1046   INR 1.62* 1.77*        Medical History  Surgical History Family History Social History   Past Medical History:   Diagnosis Date     Atrial fibrillation (H)      CKD (chronic kidney disease) stage 3, GFR 30-59 ml/min (H)      COPD (chronic obstructive pulmonary disease) (H)     nocturnal oxygen     CRF (chronic renal failure)      GERD (gastroesophageal reflux disease)      Hypertension      Hypovolemic shock (H)      Influenza A 2017     Pulmonary hypertension (H)      Pulmonary nodules      Rheumatoid arthritis (H)      Sepsis (H) 2017     Past Surgical History:   Procedure Laterality Date     APPENDECTOMY       BLADDER SUSPENSION       CHOLECYSTECTOMY       PICC TRIPLE LUMEN PLACEMENT  2022          Family History   Problem Relation Age of Onset     Heart Failure Mother      Cerebrovascular Disease Father      Kidney failure Sister      Cerebrovascular Disease Brother      Diabetes Type 2  Brother         Social History     Socioeconomic History     Marital status:      Spouse name: Not on file     Number of children: Not on file     Years of education: Not on file     Highest education level: Not on file   Occupational History     Not on file   Tobacco Use     Smoking status: Former     Packs/day: 0.50     Types: Cigarettes     Quit date: 2017     Years since quittin.0     Smokeless tobacco: Never   Substance and Sexual Activity     Alcohol use: No     Drug use: No     Sexual activity: Not Currently   Other  Topics Concern     Not on file   Social History Narrative     Not on file     Social Determinants of Health     Financial Resource Strain: Not on file   Food Insecurity: Not on file   Transportation Needs: Not on file   Physical Activity: Not on file   Stress: Not on file   Social Connections: Not on file   Intimate Partner Violence: Not on file   Housing Stability: Not on file         Medications  Allergies   No current outpatient medications on file.        Allergies   Allergen Reactions     Codeine Nausea and Vomiting     Fosamax [Alendronic Acid] Diarrhea     Morphine Nausea and Vomiting     Other reaction(s): Vomiting         Julien Elliott,

## 2022-12-30 NOTE — PLAN OF CARE
"Had short episode (about 10 seconds total) of dizziness this am while eating breakfast sitting on edge of bed. \"All the sudden the room started spinning\"  resolved when she closed her eyes.  No change on the heart monitor-  a fib in 100's during episode. Denied nausea.  Ate 100% of breakfast. Up with 1 assist to bedside commode x 2 without any dizziness.  Grand daughter at bedside- brought her coffee- Gert will ask the doctors if she can liberalize her caffeine intake. Has been on room air during the day as is her normal routine.  Has Heart Failure patient education folder. Falls prevention plan in place. Amber White RN    Problem: COPD (Chronic Obstructive Pulmonary Disease) Comorbidity  Goal: Maintenance of COPD Symptom Control  Outcome: Progressing     Problem: Heart Failure Comorbidity  Goal: Maintenance of Heart Failure Symptom Control  Intervention: Maintain Heart Failure Management  Recent Flowsheet Documentation  Taken 12/30/2022 0800 by Amber White RN  Medication Review/Management: medications reviewed     "

## 2022-12-30 NOTE — PLAN OF CARE
Pt alert and oriented, slightly forgetful. Pt vitally stable and able to sleep soundly. Pt had not gone to the bathroom all night, no purewick was placed, so Pt got up. Pt had a urine occurrence and made it to the toilet but was dizzy and almost fell. Pt was helped back to bed and BP was taken again. Slight decrease in BP. Will continue to monitor. WYATT SEVERINO, RN    Problem: Plan of Care - These are the overarching goals to be used throughout the patient stay.    Goal: Plan of Care Review  Description: The Plan of Care Review/Shift note should be completed every shift.  The Outcome Evaluation is a brief statement about your assessment that the patient is improving, declining, or no change.  This information will be displayed automatically on your shift note.  Outcome: Progressing     Problem: Plan of Care - These are the overarching goals to be used throughout the patient stay.    Goal: Absence of Hospital-Acquired Illness or Injury  Intervention: Identify and Manage Fall Risk  Recent Flowsheet Documentation  Taken 12/30/2022 0415 by WYATT SEVERINO  Safety Promotion/Fall Prevention:   room organization consistent   room near nurse's station   patient and family education   nonskid shoes/slippers when out of bed   mobility aid in reach   fall prevention program maintained   clutter free environment maintained   bed alarm on   assistive device/personal items within reach  Taken 12/29/2022 2346 by WYATT SEVERINO  Safety Promotion/Fall Prevention:   room organization consistent   room near nurse's station   patient and family education   nonskid shoes/slippers when out of bed   mobility aid in reach   fall prevention program maintained   clutter free environment maintained   bed alarm on   assistive device/personal items within reach     Problem: Plan of Care - These are the overarching goals to be used throughout the patient stay.    Goal: Absence of Hospital-Acquired Illness or Injury  Intervention: Prevent  Skin Injury  Recent Flowsheet Documentation  Taken 12/30/2022 0415 by WYATT SEVERINO  Body Position: position changed independently  Taken 12/29/2022 2346 by WYATT SEVERINO  Body Position: position changed independently     Problem: Plan of Care - These are the overarching goals to be used throughout the patient stay.    Goal: Optimal Comfort and Wellbeing  Outcome: Progressing     Problem: Plan of Care - These are the overarching goals to be used throughout the patient stay.    Goal: Readiness for Transition of Care  Outcome: Progressing     Problem: Risk for Delirium  Goal: Improved Sleep  Outcome: Progressing

## 2022-12-30 NOTE — PROGRESS NOTES
Patient on 3 lpm NC satting low 90's. Receives nebs. No respiratory distress noted at this time. RT will follow

## 2022-12-30 NOTE — PROGRESS NOTES
RESPIRATORY CARE NOTE     Patient was on 3 lpm at night which is her home regime, daytime she is room air 93% while awake and had an Sp02 of 94%. They received Duoneb x2.     RT will continue to assess and monitor cardiopulmonary status.     Ivet Bailey, RT

## 2022-12-30 NOTE — PROGRESS NOTES
Assessment & Plan   77-year-old female with chronic HFpEF, COPD, chronic oxygen use of 2 L, atrial fibrillation presents with acute on chronic HFpEF, acute on chronic hypoxic respiratory failure, leukocytosis.    Principal Problem:    COPD (chronic obstructive pulmonary disease) (H)  Active Problems:    Rheumatoid arthritis of multiple sites without rheumatoid factor (H)    Paroxysmal atrial fibrillation (H)    Benign essential hypertension    CKD (chronic kidney disease) stage 3, GFR 30-59 ml/min (H)    Hypomagnesemia    Acute on chronic congestive heart failure, unspecified heart failure type (H)    Acute kidney injury (H)    Acute on chronic respiratory failure with hypoxia (H)    Macrocytic anemia    Recurrent Clostridioides difficile diarrhea    Leukocytosis    HLD (hyperlipidemia)    Gastroesophageal reflux disease without esophagitis    Gout    Mood disorder (H)    JOHANNA (obstructive sleep apnea)    Hyponatremia    Internal carotid artery stenosis, left    Present on Admission:    Hypomagnesemia    Acute on chronic congestive heart failure, unspecified heart failure type (H)    Acute on chronic respiratory failure with hypoxia (H)    Paroxysmal atrial fibrillation (H)    Benign essential hypertension    Macrocytic anemia      Acute HFpEF, acute on chronic hypoxic respiratory failure  -Patient presents with dyspnea, increasing oxygen needs from 2 L to 4, proBNP greater than 6000, mildly elevated troponin.  Chest x-ray 12/20/2022 without any acute abnormalities.  TTE 12/1/2022 with ejection fraction of 60 to 65% and mild aortic stenosis is insufficiency, moderate left atrial enlargement and mild right atrial enlargement with increased pulmonary pressure.  Limited TTE 12/29 with unchanged ejection fractions and no significant abnormalities.  Patient actually now on room air, which is improved from baseline.  -Discontinue telemetry  -Hold furosemide 20 mg p.o. daily  -Daily weights, strict I's and O's  -Incentive  spirometry  -Wean oxygen    Recurrent C. Difficile diarrhea  -Patient had C. difficile 11/7/2022 and was treated with oral vancomycin and does currently have some loose stools.  I do see patient was started on preemptive vancomycin on 12/26 while C. difficile culture was pending.  Now that C. difficile toxin and antigen positive we will count this as a C. difficile recurrence as it appears patient's diarrhea had resolved and then returned once again within 2 months of the initial diagnosis.  -Enteric precautions  -Fidaxomicin 200 mg p.o. twice daily x10 days, preferred treatment for first C. difficile recurrence.      YESICA on CKD 3  -Creatinine approximately 1.2, sofy to 1.6 in the setting of diuresis.  -Hold Lasix  -NS bolus x1  -Avoid nephrotoxins    Acute metabolic encephalopathy-resolved  -Patient with some increased confusion.  Highly likely multifactorial with hypoxia, recurrent C. difficile infection, acute heart failure, delirium in the hospital setting.  Urinalysis without signs of infection and other electrolytes normal.  Upon further discussion with patient and she sounds like she has been having some mild memory issues over the past month in relation to being ill, otherwise she is alert and oriented x4 and very pleasant.  CT head 12/29 without any acute abnormalities  -Delirium protocol    Presyncope  -Patient had an episode of presyncope while using the restroom on 12/29.  Highly likely some orthostatic hypotension in the setting of loose stools.  Carotid ultrasound did show severe left ICA stenosis/occlusion.  Unlikely to have caused presyncope but still significant.  Orthostatic BPs negative.  -Fall precautions    Left ICA occlusion  -Carotid ultrasound 12/29 does show severe plaque formation of the left internal carotid artery without visible lumen.  Grossly asymptomatic.  -Vascular surgery plan for outpatient monitoring.    Leukocytosis- resolved  -Patient presented with elevated white blood cell  count as well as high procalcitonin.  Although patient is on chronic prednisone she did not have a leukocytosis prior to this admission so is likely reactive to infection.  Chest x-ray did not show any consolidations, urinalysis without signs of infection. C. difficile toxin and antigen both positive.  Highly likely WBC elevation is from C. difficile infection.    Atrial fibrillation with RVR  -Initially rate controlled but patient now with some mild RVR  -Metoprolol IV as needed  -Metoprolol XL 75 mg p.o. daily  -Rivaroxaban 15 mg p.o. q. bedtime    COPD  -Would not treat for an acute exacerbation as respiratory symptoms appear to be more cardiac in nature.  If oxygen does not improve with appropriate diuresis will initiate COPD treatment   -Breo Ellipta daily  -Will schedule DuoNeb x24 hours    Rheumatoid arthritis  -Hold home methotrexate while being treated for C. difficile  -Prednisone 5 mg p.o. daily    HTN  -Metoprolol-XL 75 mg p.o. daily    HLD  -Pravastatin 20 mg p.o. daily    COVID 19 recovered  -With positive COVID-19 test on 12/13/2022.  Patient did receive 5 doses of IV remdesivir and 10 days of dexamethasone and had been discharged to TCU.  -No special precautions needed    GERD  -Will use famotidine in place of pantoprazole, as PPI associated with c diff infections    Gout  -Allopurinol 300 mg p.o. daily    Mood disorder  -Duloxetine 40 mg p.o. daily    JOHANNA  -CPAP    Hyponatremia- resolved  -Likely hypervolemic hyponatremia in the setting of fluid overload and acute heart failure    Macrocytic anemia  -Hemoglobin 9 with MCV of 103.  Highly likely from methotrexate use, mixed anemia of chronic disease.  TSH and vitamin B12 normal, folic acid is actually mildly elevated     Clinically Significant Risk Factors           # Hypercalcemia: corrected calcium is >10.1, will monitor as appropriate    # Hypoalbuminemia: Lowest albumin = 3 g/dL at 12/29/2022  5:00 AM, will monitor as appropriate    # Acute  "Kidney Injury, unspecified: based on a >150% or 0.3 mg/dL increase in last creatinine compared to past 90 day average, will monitor renal function         # Overweight: Estimated body mass index is 25.68 kg/m  as calculated from the following:    Height as of this encounter: 1.651 m (5' 5\").    Weight as of this encounter: 70 kg (154 lb 4.8 oz)., PRESENT ON ADMISSION         Electrolytes: currently within normal limits  Fluids: P.o.  Diet: Cardiac  VTE prophylaxis: Rivaroxaban       Expected Discharge Date: 12/31/2022    Discharge Delays: Specialist Consult (enter specialist & decision needed in comments)  Destination: inpatient rehabilitation facility  Discharge Comments: cdiff; meds to oral.  Needs TCU.          Subjective  Cc: shortness of breath, diarrhea    Patient says she still pretty tired and had some lightheadedness this morning when she attempted to get up but is currently feeling well.  Also states that her bowel movements have solidified a bit which she was very excited about as it has been months since she has had normal stooling.  She denies any abdominal pain, lower extremity pain or swelling.  Also without chest pain or shortness of breath.    Review of Systems: History obtained from the patient  General ROS: negative for  - chills, fever, positive for fatigue  ENT ROS: negative for - headaches, hearing change, nasal congestion, nasal discharge, sore throat  Hematological and Lymphatic ROS: negative for - bleeding problems, blood clots, bruising  Respiratory ROS: negative for - cough, pleuritic pain, shortness of breath  Cardiovascular ROS: negative for - chest pain, dyspnea on exertion, edema  Gastrointestinal ROS: negative for - abdominal pain, constipation, nausea, positive for improvement in loose stools  Genito-Urinary ROS: negative for - dysuria, hematuria  Musculoskeletal ROS: negative for - gait disturbance, and muscle pain  Neurological ROS: patient had some mild dizziness the morning of " 12/30  Dermatological ROS: negative for pruritus, rash    Objective    Physical Examination:   General appearance - alert, patient does appear tired, and in no distress and oriented to person, place, and time  Mental status - alert, oriented to person, place, and time, normal mood, behavior, speech, dress, motor activity, and thought processes intact and answers all questions appropriately   HEENT - sclera anicteric, left eye normal, right eye normal, nares normal and patent, no erythema  Respiratory -quiet but clear breath sounds in all posterior lung fields, no wheezes or rhonchi  Cardiac -normal rate, irregularly irregular rhythm, normal S1, S2, no murmurs, rubs, clicks or gallops, no JVD  Abdomen -soft , nontender, normal bowel sounds in all 4 quadrants  Neurological - alert, oriented, normal speech, no focal findings or movement disorder noted  Musculoskeletal - no joint tenderness, deformity or swelling, full range of motion without pain  Extremities - peripheral pulses normal, no pedal edema, no clubbing or cyanosis  Skin -skin is sallow appearing, thin with multiple ecchymotic lesions across bilateral upper extremities      Temp:  [97.6  F (36.4  C)-98.8  F (37.1  C)] 97.6  F (36.4  C)  Pulse:  [] 85  Resp:  [18-20] 18  BP: ()/(51-72) 116/56  SpO2:  [94 %-100 %] 94 %    No intake or output data in the 24 hours ending 12/28/22 0908    Results    Recent Results (from the past 24 hour(s))   CBC with platelets    Collection Time: 12/30/22  5:27 AM   Result Value Ref Range    WBC Count 6.4 4.0 - 11.0 10e3/uL    RBC Count 2.75 (L) 3.80 - 5.20 10e6/uL    Hemoglobin 9.0 (L) 11.7 - 15.7 g/dL    Hematocrit 28.3 (L) 35.0 - 47.0 %     (H) 78 - 100 fL    MCH 32.7 26.5 - 33.0 pg    MCHC 31.8 31.5 - 36.5 g/dL    RDW 15.1 (H) 10.0 - 15.0 %    Platelet Count 301 150 - 450 10e3/uL   Basic metabolic panel    Collection Time: 12/30/22  5:27 AM   Result Value Ref Range    Sodium 138 136 - 145 mmol/L     Potassium 4.1 3.4 - 5.3 mmol/L    Chloride 101 98 - 107 mmol/L    Carbon Dioxide (CO2) 30 (H) 22 - 29 mmol/L    Anion Gap 7 7 - 15 mmol/L    Urea Nitrogen 32.8 (H) 8.0 - 23.0 mg/dL    Creatinine 1.60 (H) 0.51 - 0.95 mg/dL    Calcium 9.6 8.8 - 10.2 mg/dL    Glucose 89 70 - 99 mg/dL    GFR Estimate 33 (L) >60 mL/min/1.73m2   Magnesium    Collection Time: 12/30/22  5:27 AM   Result Value Ref Range    Magnesium 1.8 1.7 - 2.3 mg/dL       Lab Results personally reviewed 12/30  Imaging Results personally reviewed 12/29  Discussed with patient and her daughter  Reviewed old records     Advanced Care Planning  Discharge Planning discussed with patient and family  Discussed care with patient and family for 25 minutes with greater than 50% of time spent in counseling and coordination of care.    Updated patient's daughter Enid on 12/30     Monse Weinstein DO  Hospitalist Service  [facility]  Text page via [facility] Paging/Directory     This note was created using dragon dictation, any spelling and grammatical errors are unintentional.

## 2022-12-30 NOTE — PROGRESS NOTES
Care Management Follow Up    Length of Stay (days): 2    Expected Discharge Date: 12/31/2022     Concerns to be Addressed:     Needs TCU   Patient plan of care discussed at interdisciplinary rounds: Yes    Anticipated Discharge Disposition: TCU      Anticipated Discharge Services:  TCU    Education Provided on the Discharge Plan:  Per Care Team   Patient/Family in Agreement with the Plan:  Yes    Referrals Placed by CM/SW:    Private pay costs discussed: Not applicable    Additional Information:  Chart reviewed.    Pt normally lives with her  in a condo. She has been back and forth to the hospital a lot in the last 2 months and is staying in TCU at [facility] and will need to return for continued rehab at discharge. [facility] to transport at discharge.         She uses [employer] and has a portable tank. It is from an unknown agency.Pt is needing increased oxygen levels.     RNCM sent TCU referral back to [facility] (pending), pt and family would prefer this as first option.    RNCM spoke to pts daughter Enid, and she said if not [facility] somewhere in [place] easy to get to .    RNCM sent a couple more referrals (pending).       2:36pm- Daughter called back and well and expressed if pt gets stronger over the weekend, and can go home with home care assistance they may be open to that as well.         CM will continue to follow plan of care, review recommendations, and assist with any discharge needs anticipated.       Toya Boucher RN

## 2022-12-30 NOTE — PLAN OF CARE
Problem: Fall Injury Risk  Goal: Absence of Fall and Fall-Related Injury  Outcome: Progressing  Intervention: Promote Injury-Free Environment  Recent Flowsheet Documentation  Taken 12/29/2022 1948 by Breana Hernandez RN  Safety Promotion/Fall Prevention:   safety round/check completed   room organization consistent   room near nurse's station   patient and family education   nonskid shoes/slippers when out of bed   mobility aid in reach   fall prevention program maintained   clutter free environment maintained  Taken 12/29/2022 1621 by Breana Hernandez RN  Safety Promotion/Fall Prevention:   safety round/check completed   room organization consistent   room near nurse's station   patient and family education   nonskid shoes/slippers when out of bed   mobility aid in reach   fall prevention program maintained   clutter free environment maintained     Problem: Risk for Delirium  Goal: Optimal Coping  Outcome: Progressing  Goal: Improved Behavioral Control  Outcome: Progressing  Intervention: Minimize Safety Risk  Recent Flowsheet Documentation  Taken 12/29/2022 1948 by Breana Hernandez RN  Enhanced Safety Measures: bed alarm set  Taken 12/29/2022 1621 by Breana Hernandez RN  Enhanced Safety Measures: bed alarm set  Goal: Improved Attention and Thought Clarity  Outcome: Progressing  Goal: Improved Sleep  Outcome: Progressing   Goal Outcome Evaluation:       Pt alert & oriented x4. Denies pain. Numbness present on BLE. VSS. On 2L NC. A. Fib. Pt is assist x1 w/ gait belt & walker. Remind pt to call when getting out of bed. Pt c/o of muscle spasm, prn robaxin given. Pt is able to make needs known. Call light is within reach.

## 2022-12-31 NOTE — PROGRESS NOTES
Duonebs given QID.  Lung sounds clear and diminished in the bases.  Increased aeration noted after treatments.  Will continue to monitor and treat.

## 2022-12-31 NOTE — PLAN OF CARE
Problem: Plan of Care - These are the overarching goals to be used throughout the patient stay.    Goal: Plan of Care Review  Description: The Plan of Care Review/Shift note should be completed every shift.  The Outcome Evaluation is a brief statement about your assessment that the patient is improving, declining, or no change.  This information will be displayed automatically on your shift note.  Outcome: Progressing  Flowsheets (Taken 12/31/2022 1321)  Plan of Care Reviewed With: patient     Problem: Fall Injury Risk  Goal: Absence of Fall and Fall-Related Injury  Outcome: Progressing  Intervention: Identify and Manage Contributors  Recent Flowsheet Documentation  Taken 12/31/2022 0800 by Melanie Paiz RN  Medication Review/Management: medications reviewed  Intervention: Promote Injury-Free Environment  Recent Flowsheet Documentation  Taken 12/31/2022 0800 by Melanie Paiz RN  Safety Promotion/Fall Prevention:    nonskid shoes/slippers when out of bed    safety round/check completed    clutter free environment maintained    fall prevention program maintained    chair alarm on   Goal Outcome Evaluation:      Plan of Care Reviewed With: patient        Increased coughing from yesterday to today.  Pt reports back pain with cough.  Tyl offered but she declined stating she did not want to take another pill. PRN Tesslon ordered.  Wheezes noted anterior this AM and diminished posterior.  Lungs clear dim this afternoon.  PRN albuterol inhaler available.   SOB with minimal activity.  SaO2 mid 90s on RA.   PRN robaxin for back of leg muscle spasms.  Pt up to chair with SBA, gait belt and walker.  Pt makes her needs known.  Med Surg status.  Heart rhythm is irregular and rapid.  EKG late morning unchanged (a fib RVR).  Enteric precautions followed.

## 2022-12-31 NOTE — PROGRESS NOTES
Assessment & Plan   77-year-old female with chronic HFpEF, COPD, chronic oxygen use (2-3L at nighttime, room air daytime), atrial fibrillation presents with acute on chronic HFpEF, acute on chronic hypoxic respiratory failure, leukocytosis.    Principal Problem:    COPD (chronic obstructive pulmonary disease) (H)  Active Problems:    Rheumatoid arthritis of multiple sites without rheumatoid factor (H)    Paroxysmal atrial fibrillation (H)    Benign essential hypertension    CKD (chronic kidney disease) stage 3, GFR 30-59 ml/min (H)    Hypomagnesemia    Acute on chronic congestive heart failure, unspecified heart failure type (H)    Acute kidney injury (H)    Acute on chronic respiratory failure with hypoxia (H)    Macrocytic anemia    Recurrent Clostridioides difficile diarrhea    Leukocytosis    HLD (hyperlipidemia)    Gastroesophageal reflux disease without esophagitis    Gout    Mood disorder (H)    JOHANNA (obstructive sleep apnea)    Hyponatremia    Internal carotid artery stenosis, left    Present on Admission:    Hypomagnesemia    Acute on chronic congestive heart failure, unspecified heart failure type (H)    Acute on chronic respiratory failure with hypoxia (H)    Paroxysmal atrial fibrillation (H)    Benign essential hypertension    Macrocytic anemia      Acute HFpEF, acute on chronic hypoxic respiratory failure- resolved  -Patient presents with dyspnea, increasing oxygen needs, proBNP greater than 6000, mildly elevated troponin.  Chest x-ray 12/20/2022 without any acute abnormalities.  TTE 12/1/2022 with ejection fraction of 60 to 65% and mild aortic stenosis is insufficiency, moderate left atrial enlargement and mild right atrial enlargement with increased pulmonary pressure.  Limited TTE 12/29 with unchanged ejection fractions and no significant abnormalities.  Patient back at her baseline use of oxygen at night and room air during the day.  Did have some mild chest tightness on 12/31 but EKG was baseline  with atrial fibrillation and seemed to be more related to coughing.  -Hold furosemide 20 mg p.o. daily  -Daily weights, strict I's and O's  -Incentive spirometry  -Wean oxygen    Recurrent C. Difficile diarrhea  -Patient had C. difficile 11/7/2022 and was treated with oral vancomycin and does currently have some loose stools.  I do see patient was started on preemptive vancomycin on 12/26 while C. difficile culture was pending.  Now that C. difficile toxin and antigen positive we will count this as a C. difficile recurrence as it appears patient's diarrhea had resolved and then returned once again within 2 months of the initial diagnosis.  -Enteric precautions  -GI cocktail as needed  -Fidaxomicin 200 mg p.o. twice daily x10 days, preferred treatment for first C. difficile recurrence.      YESICA on CKD 3- improving  -Creatinine approximately 1.2, sofy to 1.6 in the setting of diuresis.  -Hold Lasix  -Avoid nephrotoxins    Acute metabolic encephalopathy-resolved  -Patient with some increased confusion.  Highly likely multifactorial with hypoxia, recurrent C. difficile infection, acute heart failure, delirium in the hospital setting.  Urinalysis without signs of infection and other electrolytes normal.  Upon further discussion with patient and she sounds like she has been having some mild memory issues over the past month in relation to being ill, otherwise she is alert and oriented x4 and very pleasant.  CT head 12/29 without any acute abnormalities  -Delirium protocol    Presyncope  -Patient had an episode of presyncope while using the restroom on 12/29.  Highly likely some orthostatic hypotension in the setting of loose stools.  Carotid ultrasound did show severe left ICA stenosis/occlusion.  Unlikely to have caused presyncope but still significant.  Orthostatic BPs negative.  -Fall precautions    Left ICA occlusion  -Carotid ultrasound 12/29 does show severe plaque formation of the left internal carotid artery  without visible lumen.  Grossly asymptomatic.  -Vascular surgery plan for outpatient monitoring.    Leukocytosis- resolved  -Patient presented with elevated white blood cell count as well as high procalcitonin.  Although patient is on chronic prednisone she did not have a leukocytosis prior to this admission so is likely reactive to infection.  Chest x-ray did not show any consolidations, urinalysis without signs of infection. C. difficile toxin and antigen both positive.  Highly likely WBC elevation is from C. difficile infection.    Atrial fibrillation with RVR  -Initially rate controlled but patient now with some mild RVR  -Metoprolol IV as needed  -Increase metoprolol XL to 100 mg p.o. daily  -Rivaroxaban 15 mg p.o. q. bedtime    COPD  -Not in an acute exacerbation but patient does have slight increase in cough 12/30  -Breo Ellipta daily  -DuoNeb as needed  -Antitussives as needed    Rheumatoid arthritis  -Hold home methotrexate while being treated for C. difficile  -Prednisone 5 mg p.o. daily    HTN  -Increase Metoprolol- mg p.o. daily    HLD  -Pravastatin 20 mg p.o. daily    COVID 19 recovered  -With positive COVID-19 test on 12/13/2022.  Patient did receive 5 doses of IV remdesivir and 10 days of dexamethasone and had been discharged to TCU.  -No special precautions needed    GERD  -Will use famotidine in place of pantoprazole, as PPI associated with c diff infections    Gout  -Allopurinol 300 mg p.o. daily    Mood disorder  -Duloxetine 40 mg p.o. daily    JOHANNA  -CPAP    Hyponatremia- resolved  -Likely hypervolemic hyponatremia in the setting of fluid overload and acute heart failure    Macrocytic anemia  -Hemoglobin 8.7 with MCV of 106.  Highly likely from methotrexate use, mixed anemia of chronic disease.  TSH and vitamin B12 normal, folic acid is actually mildly elevated     Clinically Significant Risk Factors              # Hypoalbuminemia: Lowest albumin = 3 g/dL at 12/29/2022  5:00 AM, will monitor  "as appropriate            # Overweight: Estimated body mass index is 26.29 kg/m  as calculated from the following:    Height as of this encounter: 1.651 m (5' 5\").    Weight as of this encounter: 71.7 kg (158 lb)., PRESENT ON ADMISSION         Electrolytes: magnesium 2.4  Fluids: P.o.  Diet: Cardiac  VTE prophylaxis: Rivaroxaban       Expected Discharge Date: 01/02/2023    Discharge Delays: *Medically Ready for Discharge  Placement - TCU  Destination: inpatient rehabilitation facility  Discharge Comments: cdiff; meds to oral.  Needs TCU.        Subjective  Cc: shortness of breath, diarrhea    Patient says she does feel little bloated today and she had a coughing fit this morning that has caused some tightness in the left hand side of her chest.  Otherwise denies any further loose stools, no nausea, vomiting.  Also denies difficulty breathing.    Review of Systems: History obtained from the patient  General ROS: Negative for fevers, chills  ENT ROS: negative for - headaches, hearing change, nasal congestion, nasal discharge, sore throat  Hematological and Lymphatic ROS: negative for - bleeding problems, blood clots, bruising  Respiratory ROS: Positive for cough, negative for shortness of breath  Cardiovascular ROS: Negative for - chest pain, dyspnea on exertion, edema  Gastrointestinal ROS: Positive for some mild bloating but negative for abdominal pain, nausea or vomiting.  Diarrhea has also resolved  Genito-Urinary ROS: negative for - dysuria, hematuria  Musculoskeletal ROS: Negative for - gait disturbance, and muscle pain  Neurological ROS: Negative for numbness, dizziness  Dermatological ROS: negative for pruritus, rash    Objective    Physical Examination:   General appearance - alert, patient is well-appearing and sitting up in bed, and in no distress and oriented to person, place, and time  Mental status -alert, oriented to person place and time, normal pleasant mood, speech is clear and coherent, motor " activity normal, thought process intact.  HEENT - sclera anicteric, left eye normal, right eye normal, nares normal and patent, no erythema  Respiratory -quiet breath sounds in all lung fields but without wheezes, crackles.  No tachypnea, retractions or cyanosis  Cardiac -normal rate, irregularly irregular rhythm  Abdomen -very mildly distended and tympanic to percussion, normoactive bowel sounds in all 4 quadrants, abdomen nontender.  Neurological -alert, oriented, normal speech, no focal findings or movement disorder noted  Musculoskeletal - no joint tenderness, deformity or swelling, full range of motion without pain  Extremities - peripheral pulses normal, no pedal edema, no clubbing or cyanosis  Skin -skin is sallow appearing, thin with multiple ecchymotic lesions across bilateral upper extremities      Temp:  [97.6  F (36.4  C)-97.9  F (36.6  C)] 97.9  F (36.6  C)  Pulse:  [] 111  Resp:  [18-20] 20  BP: ()/(53-76) 141/76  SpO2:  [94 %-97 %] 97 %    No intake or output data in the 24 hours ending 12/28/22 0908    Results    Recent Results (from the past 24 hour(s))   CBC with platelets    Collection Time: 12/31/22  5:26 AM   Result Value Ref Range    WBC Count 7.5 4.0 - 11.0 10e3/uL    RBC Count 2.70 (L) 3.80 - 5.20 10e6/uL    Hemoglobin 8.7 (L) 11.7 - 15.7 g/dL    Hematocrit 28.5 (L) 35.0 - 47.0 %     (H) 78 - 100 fL    MCH 32.2 26.5 - 33.0 pg    MCHC 30.5 (L) 31.5 - 36.5 g/dL    RDW 15.1 (H) 10.0 - 15.0 %    Platelet Count 285 150 - 450 10e3/uL   Basic metabolic panel    Collection Time: 12/31/22  5:26 AM   Result Value Ref Range    Sodium 140 136 - 145 mmol/L    Potassium 4.8 3.4 - 5.3 mmol/L    Chloride 104 98 - 107 mmol/L    Carbon Dioxide (CO2) 29 22 - 29 mmol/L    Anion Gap 7 7 - 15 mmol/L    Urea Nitrogen 30.5 (H) 8.0 - 23.0 mg/dL    Creatinine 1.31 (H) 0.51 - 0.95 mg/dL    Calcium 9.9 8.8 - 10.2 mg/dL    Glucose 99 70 - 99 mg/dL    GFR Estimate 42 (L) >60 mL/min/1.73m2   Magnesium     Collection Time: 12/31/22  5:26 AM   Result Value Ref Range    Magnesium 2.4 (H) 1.7 - 2.3 mg/dL   ECG 12-LEAD WITH MUSE (LHE)    Collection Time: 12/31/22 11:42 AM   Result Value Ref Range    Systolic Blood Pressure  mmHg    Diastolic Blood Pressure  mmHg    Ventricular Rate 106 BPM    Atrial Rate 101 BPM    LA Interval  ms    QRS Duration 76 ms     ms    QTc 414 ms    P Axis  degrees    R AXIS 46 degrees    T Axis 5 degrees    Interpretation ECG       Atrial fibrillation with rapid ventricular response  Abnormal ECG  When compared with ECG of 28-DEC-2022 01:22,  No significant change was found  Confirmed by HARVINDER CARRANZA MD LOC:JN ([id number]) on 12/31/2022 12:37:38 PM         Lab Results personally reviewed 12/31  Imaging Results personally reviewed 12/29  Discussed with patient and her daughter  Reviewed old records     Advanced Care Planning  Discharge Planning discussed with patient and family  Discussed care with patient and family for 25 minutes with greater than 50% of time spent in counseling and coordination of care.    Voicemail left for daughter Enid on 12/31    Monse Weinstein DO  Hospitalist Service  [facility]  Text page via [facility] Paging/Directory     This note was created using dragon dictation, any spelling and grammatical errors are unintentional.

## 2022-12-31 NOTE — PLAN OF CARE
Problem: COPD (Chronic Obstructive Pulmonary Disease) Comorbidity  Goal: Maintenance of COPD Symptom Control  Outcome: Progressing  Intervention: Maintain COPD-Symptom Control  Recent Flowsheet Documentation  Taken 12/31/2022 0058 by Ivette Borrego RN  Medication Review/Management: medications reviewed     Problem: Hypertension Comorbidity  Goal: Blood Pressure in Desired Range  Outcome: Progressing  Intervention: Maintain Blood Pressure Management  Recent Flowsheet Documentation  Taken 12/31/2022 0058 by Ivette Borrego RN  Medication Review/Management: medications reviewed     Problem: Heart Failure Comorbidity  Goal: Maintenance of Heart Failure Symptom Control  Outcome: Progressing  Intervention: Maintain Heart Failure Management  Recent Flowsheet Documentation  Taken 12/31/2022 0058 by Ivette Borrego RN  Medication Review/Management: medications reviewed       Goal Outcome Evaluation: Patient is alert and oriented. Able to make needs known. Denied pain. VSS. Patient is ambulating to the bathroom with assist x1. Patient needs gait belt and a walker. Patient states she sometimes gets dizzy with movement. Denied dizziness this shift. Bed alarm on for safety and call light within reach.

## 2022-12-31 NOTE — PLAN OF CARE
Problem: Plan of Care - These are the overarching goals to be used throughout the patient stay.    Goal: Optimal Comfort and Wellbeing  Outcome: Progressing   Goal Outcome Evaluation:       Pt alert & oriented x4. Denies pain. Numbness present on BLE. VSS. Pt on medsurg. On RA during the day with O2 in the mid 90s. Daughter Enid & niece came to visit this evening. Pt requesting muscle relaxer & magic mouth wash for sore mouth. Given per MAR. Pt is assist x1 w/ walker & gaitbelt when ambulating. Pt is able to make needs known. Call light is within reach.

## 2023-01-01 ENCOUNTER — TRANSITIONAL CARE UNIT VISIT (OUTPATIENT)
Dept: GERIATRICS | Facility: CLINIC | Age: 78
End: 2023-01-01
Payer: COMMERCIAL

## 2023-01-01 ENCOUNTER — LAB REQUISITION (OUTPATIENT)
Dept: LAB | Facility: CLINIC | Age: 78
End: 2023-01-01

## 2023-01-01 ENCOUNTER — TELEPHONE (OUTPATIENT)
Dept: GERIATRICS | Facility: CLINIC | Age: 78
End: 2023-01-01
Payer: COMMERCIAL

## 2023-01-01 ENCOUNTER — APPOINTMENT (OUTPATIENT)
Dept: OCCUPATIONAL THERAPY | Facility: HOSPITAL | Age: 78
DRG: 291 | End: 2023-01-01
Payer: COMMERCIAL

## 2023-01-01 ENCOUNTER — TRANSFERRED RECORDS (OUTPATIENT)
Dept: HEALTH INFORMATION MANAGEMENT | Facility: CLINIC | Age: 78
End: 2023-01-01

## 2023-01-01 ENCOUNTER — LAB REQUISITION (OUTPATIENT)
Dept: LAB | Facility: CLINIC | Age: 78
End: 2023-01-01
Payer: COMMERCIAL

## 2023-01-01 ENCOUNTER — APPOINTMENT (OUTPATIENT)
Dept: PHYSICAL THERAPY | Facility: HOSPITAL | Age: 78
DRG: 291 | End: 2023-01-01
Payer: COMMERCIAL

## 2023-01-01 ENCOUNTER — APPOINTMENT (OUTPATIENT)
Dept: PHYSICAL THERAPY | Facility: CLINIC | Age: 78
DRG: 559 | End: 2023-01-01
Attending: PHYSICAL MEDICINE & REHABILITATION
Payer: COMMERCIAL

## 2023-01-01 ENCOUNTER — APPOINTMENT (OUTPATIENT)
Dept: CT IMAGING | Facility: HOSPITAL | Age: 78
DRG: 291 | End: 2023-01-01
Attending: EMERGENCY MEDICINE
Payer: COMMERCIAL

## 2023-01-01 ENCOUNTER — APPOINTMENT (OUTPATIENT)
Dept: GENERAL RADIOLOGY | Facility: CLINIC | Age: 78
DRG: 190 | End: 2023-01-01
Attending: FAMILY MEDICINE
Payer: COMMERCIAL

## 2023-01-01 ENCOUNTER — HOSPITAL ENCOUNTER (INPATIENT)
Facility: HOSPITAL | Age: 78
LOS: 13 days | Discharge: NURSING, REHAB OR LONG-TERM CARE | DRG: 480 | End: 2023-03-27
Attending: EMERGENCY MEDICINE | Admitting: STUDENT IN AN ORGANIZED HEALTH CARE EDUCATION/TRAINING PROGRAM
Payer: COMMERCIAL

## 2023-01-01 ENCOUNTER — MEDICAL CORRESPONDENCE (OUTPATIENT)
Dept: HEALTH INFORMATION MANAGEMENT | Facility: CLINIC | Age: 78
End: 2023-01-01

## 2023-01-01 ENCOUNTER — HOSPITAL ENCOUNTER (INPATIENT)
Facility: CLINIC | Age: 78
LOS: 12 days | Discharge: NURSING, REHAB OR LONG-TERM CARE | DRG: 559 | End: 2023-05-15
Attending: PHYSICAL MEDICINE & REHABILITATION | Admitting: PHYSICAL MEDICINE & REHABILITATION
Payer: COMMERCIAL

## 2023-01-01 ENCOUNTER — APPOINTMENT (OUTPATIENT)
Dept: RADIOLOGY | Facility: HOSPITAL | Age: 78
DRG: 291 | End: 2023-01-01
Attending: INTERNAL MEDICINE
Payer: COMMERCIAL

## 2023-01-01 ENCOUNTER — TRANSCRIBE ORDERS (OUTPATIENT)
Dept: OTHER | Age: 78
End: 2023-01-01

## 2023-01-01 ENCOUNTER — APPOINTMENT (OUTPATIENT)
Dept: GENERAL RADIOLOGY | Facility: CLINIC | Age: 78
DRG: 559 | End: 2023-01-01
Attending: PHYSICIAN ASSISTANT
Payer: COMMERCIAL

## 2023-01-01 ENCOUNTER — APPOINTMENT (OUTPATIENT)
Dept: PHYSICAL THERAPY | Facility: HOSPITAL | Age: 78
DRG: 480 | End: 2023-01-01
Payer: COMMERCIAL

## 2023-01-01 ENCOUNTER — APPOINTMENT (OUTPATIENT)
Dept: PHYSICAL THERAPY | Facility: HOSPITAL | Age: 78
DRG: 291 | End: 2023-01-01
Attending: INTERNAL MEDICINE
Payer: COMMERCIAL

## 2023-01-01 ENCOUNTER — PATIENT OUTREACH (OUTPATIENT)
Dept: CARE COORDINATION | Facility: CLINIC | Age: 78
End: 2023-01-01
Payer: COMMERCIAL

## 2023-01-01 ENCOUNTER — TELEPHONE (OUTPATIENT)
Dept: RESPIRATORY THERAPY | Facility: CLINIC | Age: 78
End: 2023-01-01
Payer: COMMERCIAL

## 2023-01-01 ENCOUNTER — APPOINTMENT (OUTPATIENT)
Dept: RADIOLOGY | Facility: HOSPITAL | Age: 78
DRG: 291 | End: 2023-01-01
Attending: EMERGENCY MEDICINE
Payer: COMMERCIAL

## 2023-01-01 ENCOUNTER — APPOINTMENT (OUTPATIENT)
Dept: CT IMAGING | Facility: HOSPITAL | Age: 78
DRG: 480 | End: 2023-01-01
Attending: PHYSICIAN ASSISTANT
Payer: COMMERCIAL

## 2023-01-01 ENCOUNTER — APPOINTMENT (OUTPATIENT)
Dept: OCCUPATIONAL THERAPY | Facility: HOSPITAL | Age: 78
DRG: 371 | End: 2023-01-01
Payer: COMMERCIAL

## 2023-01-01 ENCOUNTER — APPOINTMENT (OUTPATIENT)
Dept: OCCUPATIONAL THERAPY | Facility: CLINIC | Age: 78
DRG: 559 | End: 2023-01-01
Attending: PHYSICAL MEDICINE & REHABILITATION
Payer: COMMERCIAL

## 2023-01-01 ENCOUNTER — APPOINTMENT (OUTPATIENT)
Dept: PHYSICAL THERAPY | Facility: HOSPITAL | Age: 78
DRG: 371 | End: 2023-01-01
Attending: INTERNAL MEDICINE
Payer: COMMERCIAL

## 2023-01-01 ENCOUNTER — APPOINTMENT (OUTPATIENT)
Dept: CT IMAGING | Facility: HOSPITAL | Age: 78
DRG: 371 | End: 2023-01-01
Attending: INTERNAL MEDICINE
Payer: COMMERCIAL

## 2023-01-01 ENCOUNTER — APPOINTMENT (OUTPATIENT)
Dept: SPEECH THERAPY | Facility: CLINIC | Age: 78
DRG: 559 | End: 2023-01-01
Attending: PHYSICAL MEDICINE & REHABILITATION
Payer: COMMERCIAL

## 2023-01-01 ENCOUNTER — APPOINTMENT (OUTPATIENT)
Dept: OCCUPATIONAL THERAPY | Facility: HOSPITAL | Age: 78
DRG: 480 | End: 2023-01-01
Payer: COMMERCIAL

## 2023-01-01 ENCOUNTER — APPOINTMENT (OUTPATIENT)
Dept: RADIOLOGY | Facility: HOSPITAL | Age: 78
DRG: 480 | End: 2023-01-01
Attending: INTERNAL MEDICINE
Payer: COMMERCIAL

## 2023-01-01 ENCOUNTER — APPOINTMENT (OUTPATIENT)
Dept: CT IMAGING | Facility: HOSPITAL | Age: 78
DRG: 480 | End: 2023-01-01
Attending: INTERNAL MEDICINE
Payer: COMMERCIAL

## 2023-01-01 ENCOUNTER — APPOINTMENT (OUTPATIENT)
Dept: PHYSICAL THERAPY | Facility: HOSPITAL | Age: 78
DRG: 371 | End: 2023-01-01
Payer: COMMERCIAL

## 2023-01-01 ENCOUNTER — APPOINTMENT (OUTPATIENT)
Dept: ULTRASOUND IMAGING | Facility: HOSPITAL | Age: 78
DRG: 480 | End: 2023-01-01
Attending: INTERNAL MEDICINE
Payer: COMMERCIAL

## 2023-01-01 ENCOUNTER — NURSING HOME VISIT (OUTPATIENT)
Dept: GERIATRICS | Facility: CLINIC | Age: 78
End: 2023-01-01
Payer: COMMERCIAL

## 2023-01-01 ENCOUNTER — TRANSITIONAL CARE UNIT VISIT (OUTPATIENT)
Dept: GERIATRICS | Facility: CLINIC | Age: 78
End: 2023-01-01
Payer: MEDICARE

## 2023-01-01 ENCOUNTER — ANESTHESIA EVENT (OUTPATIENT)
Dept: SURGERY | Facility: HOSPITAL | Age: 78
DRG: 480 | End: 2023-01-01
Payer: COMMERCIAL

## 2023-01-01 ENCOUNTER — APPOINTMENT (OUTPATIENT)
Dept: ULTRASOUND IMAGING | Facility: HOSPITAL | Age: 78
DRG: 291 | End: 2023-01-01
Attending: INTERNAL MEDICINE
Payer: COMMERCIAL

## 2023-01-01 ENCOUNTER — APPOINTMENT (OUTPATIENT)
Dept: OCCUPATIONAL THERAPY | Facility: HOSPITAL | Age: 78
DRG: 291 | End: 2023-01-01
Attending: INTERNAL MEDICINE
Payer: COMMERCIAL

## 2023-01-01 ENCOUNTER — DOCUMENTATION ONLY (OUTPATIENT)
Dept: PHYSICAL MEDICINE AND REHAB | Facility: CLINIC | Age: 78
End: 2023-01-01

## 2023-01-01 ENCOUNTER — TELEPHONE (OUTPATIENT)
Dept: PHARMACY | Facility: OTHER | Age: 78
End: 2023-01-01

## 2023-01-01 ENCOUNTER — APPOINTMENT (OUTPATIENT)
Dept: GENERAL RADIOLOGY | Facility: CLINIC | Age: 78
DRG: 559 | End: 2023-01-01
Attending: PHYSICAL MEDICINE & REHABILITATION
Payer: COMMERCIAL

## 2023-01-01 ENCOUNTER — HOSPITAL ENCOUNTER (EMERGENCY)
Facility: HOSPITAL | Age: 78
Discharge: HOME | End: 2023-02-13
Attending: EMERGENCY MEDICINE | Admitting: EMERGENCY MEDICINE
Payer: COMMERCIAL

## 2023-01-01 ENCOUNTER — HOSPITAL ENCOUNTER (INPATIENT)
Facility: CLINIC | Age: 78
LOS: 3 days | Discharge: NURSING, REHAB OR LONG-TERM CARE | DRG: 190 | End: 2023-06-19
Attending: FAMILY MEDICINE | Admitting: INTERNAL MEDICINE
Payer: COMMERCIAL

## 2023-01-01 ENCOUNTER — TELEPHONE (OUTPATIENT)
Dept: CARDIOLOGY | Facility: CLINIC | Age: 78
End: 2023-01-01

## 2023-01-01 ENCOUNTER — DOCUMENTATION ONLY (OUTPATIENT)
Dept: OTHER | Facility: CLINIC | Age: 78
End: 2023-01-01

## 2023-01-01 ENCOUNTER — TELEPHONE (OUTPATIENT)
Dept: GERIATRICS | Facility: CLINIC | Age: 78
End: 2023-01-01

## 2023-01-01 ENCOUNTER — APPOINTMENT (OUTPATIENT)
Dept: ULTRASOUND IMAGING | Facility: HOSPITAL | Age: 78
DRG: 371 | End: 2023-01-01
Attending: EMERGENCY MEDICINE
Payer: COMMERCIAL

## 2023-01-01 ENCOUNTER — APPOINTMENT (OUTPATIENT)
Dept: RADIOLOGY | Facility: HOSPITAL | Age: 78
DRG: 480 | End: 2023-01-01
Attending: STUDENT IN AN ORGANIZED HEALTH CARE EDUCATION/TRAINING PROGRAM
Payer: COMMERCIAL

## 2023-01-01 ENCOUNTER — APPOINTMENT (OUTPATIENT)
Dept: RADIOLOGY | Facility: HOSPITAL | Age: 78
DRG: 371 | End: 2023-01-01
Attending: EMERGENCY MEDICINE
Payer: COMMERCIAL

## 2023-01-01 ENCOUNTER — DISCHARGE SUMMARY NURSING HOME (OUTPATIENT)
Dept: GERIATRICS | Facility: CLINIC | Age: 78
End: 2023-01-01
Payer: COMMERCIAL

## 2023-01-01 ENCOUNTER — HOSPITAL ENCOUNTER (INPATIENT)
Facility: HOSPITAL | Age: 78
LOS: 7 days | Discharge: NURSING, REHAB OR LONG-TERM CARE | DRG: 371 | End: 2023-04-13
Attending: EMERGENCY MEDICINE | Admitting: INTERNAL MEDICINE
Payer: COMMERCIAL

## 2023-01-01 ENCOUNTER — APPOINTMENT (OUTPATIENT)
Dept: CARDIOLOGY | Facility: HOSPITAL | Age: 78
DRG: 480 | End: 2023-01-01
Attending: INTERNAL MEDICINE
Payer: COMMERCIAL

## 2023-01-01 ENCOUNTER — HOSPITAL ENCOUNTER (INPATIENT)
Facility: HOSPITAL | Age: 78
LOS: 9 days | Discharge: NURSING, REHAB OR LONG-TERM CARE | DRG: 291 | End: 2023-05-03
Attending: EMERGENCY MEDICINE | Admitting: INTERNAL MEDICINE
Payer: COMMERCIAL

## 2023-01-01 ENCOUNTER — APPOINTMENT (OUTPATIENT)
Dept: RADIOLOGY | Facility: HOSPITAL | Age: 78
DRG: 480 | End: 2023-01-01
Payer: COMMERCIAL

## 2023-01-01 ENCOUNTER — APPOINTMENT (OUTPATIENT)
Dept: RADIOLOGY | Facility: HOSPITAL | Age: 78
DRG: 291 | End: 2023-01-01
Attending: PHYSICIAN ASSISTANT
Payer: COMMERCIAL

## 2023-01-01 ENCOUNTER — ANESTHESIA (OUTPATIENT)
Dept: SURGERY | Facility: HOSPITAL | Age: 78
DRG: 480 | End: 2023-01-01
Payer: COMMERCIAL

## 2023-01-01 ENCOUNTER — TELEPHONE (OUTPATIENT)
Dept: CARDIOLOGY | Facility: CLINIC | Age: 78
End: 2023-01-01
Payer: COMMERCIAL

## 2023-01-01 ENCOUNTER — APPOINTMENT (OUTPATIENT)
Dept: OCCUPATIONAL THERAPY | Facility: HOSPITAL | Age: 78
DRG: 371 | End: 2023-01-01
Attending: INTERNAL MEDICINE
Payer: COMMERCIAL

## 2023-01-01 ENCOUNTER — HOSPITAL ENCOUNTER (OUTPATIENT)
Age: 78
End: 2023-01-01
Attending: INTERNAL MEDICINE | Admitting: INTERNAL MEDICINE
Payer: COMMERCIAL

## 2023-01-01 ENCOUNTER — APPOINTMENT (OUTPATIENT)
Dept: CARDIOLOGY | Facility: HOSPITAL | Age: 78
DRG: 291 | End: 2023-01-01
Attending: INTERNAL MEDICINE
Payer: COMMERCIAL

## 2023-01-01 ENCOUNTER — APPOINTMENT (OUTPATIENT)
Dept: CT IMAGING | Facility: HOSPITAL | Age: 78
End: 2023-01-01
Attending: EMERGENCY MEDICINE
Payer: COMMERCIAL

## 2023-01-01 ENCOUNTER — DOCUMENTATION ONLY (OUTPATIENT)
Dept: OTHER | Facility: CLINIC | Age: 78
End: 2023-01-01
Payer: COMMERCIAL

## 2023-01-01 ENCOUNTER — MEDICAL CORRESPONDENCE (OUTPATIENT)
Dept: HEALTH INFORMATION MANAGEMENT | Facility: CLINIC | Age: 78
End: 2023-01-01
Payer: COMMERCIAL

## 2023-01-01 ENCOUNTER — APPOINTMENT (OUTPATIENT)
Dept: RADIOLOGY | Facility: HOSPITAL | Age: 78
End: 2023-01-01
Attending: EMERGENCY MEDICINE
Payer: COMMERCIAL

## 2023-01-01 ENCOUNTER — RESULTS ONLY (OUTPATIENT)
Dept: ADMINISTRATIVE | Facility: CLINIC | Age: 78
End: 2023-01-01
Payer: COMMERCIAL

## 2023-01-01 VITALS
BODY MASS INDEX: 30.59 KG/M2 | HEIGHT: 65 IN | DIASTOLIC BLOOD PRESSURE: 67 MMHG | OXYGEN SATURATION: 98 % | WEIGHT: 183.6 LBS | SYSTOLIC BLOOD PRESSURE: 142 MMHG | TEMPERATURE: 97.7 F | RESPIRATION RATE: 18 BRPM | HEART RATE: 76 BPM

## 2023-01-01 VITALS
HEART RATE: 93 BPM | DIASTOLIC BLOOD PRESSURE: 59 MMHG | OXYGEN SATURATION: 100 % | WEIGHT: 184.5 LBS | TEMPERATURE: 97.4 F | BODY MASS INDEX: 30.7 KG/M2 | RESPIRATION RATE: 20 BRPM | SYSTOLIC BLOOD PRESSURE: 123 MMHG

## 2023-01-01 VITALS
TEMPERATURE: 97.8 F | BODY MASS INDEX: 26.02 KG/M2 | OXYGEN SATURATION: 95 % | WEIGHT: 156.2 LBS | SYSTOLIC BLOOD PRESSURE: 122 MMHG | RESPIRATION RATE: 20 BRPM | HEART RATE: 88 BPM | HEIGHT: 65 IN | DIASTOLIC BLOOD PRESSURE: 56 MMHG

## 2023-01-01 VITALS
SYSTOLIC BLOOD PRESSURE: 112 MMHG | DIASTOLIC BLOOD PRESSURE: 40 MMHG | BODY MASS INDEX: 24.98 KG/M2 | TEMPERATURE: 96 F | HEART RATE: 96 BPM | HEIGHT: 66 IN | OXYGEN SATURATION: 99 % | WEIGHT: 155.4 LBS | RESPIRATION RATE: 16 BRPM

## 2023-01-01 VITALS
HEART RATE: 84 BPM | RESPIRATION RATE: 16 BRPM | DIASTOLIC BLOOD PRESSURE: 56 MMHG | BODY MASS INDEX: 25.56 KG/M2 | OXYGEN SATURATION: 97 % | HEIGHT: 65 IN | SYSTOLIC BLOOD PRESSURE: 117 MMHG | TEMPERATURE: 98.1 F | WEIGHT: 153.44 LBS

## 2023-01-01 VITALS
SYSTOLIC BLOOD PRESSURE: 118 MMHG | HEART RATE: 52 BPM | WEIGHT: 155.2 LBS | OXYGEN SATURATION: 94 % | BODY MASS INDEX: 25.86 KG/M2 | HEIGHT: 65 IN | RESPIRATION RATE: 18 BRPM | DIASTOLIC BLOOD PRESSURE: 69 MMHG | TEMPERATURE: 97.6 F

## 2023-01-01 VITALS
SYSTOLIC BLOOD PRESSURE: 127 MMHG | BODY MASS INDEX: 24.04 KG/M2 | RESPIRATION RATE: 20 BRPM | TEMPERATURE: 98.6 F | DIASTOLIC BLOOD PRESSURE: 65 MMHG | OXYGEN SATURATION: 98 % | HEART RATE: 84 BPM | WEIGHT: 144.3 LBS | HEIGHT: 65 IN

## 2023-01-01 VITALS
HEIGHT: 65 IN | WEIGHT: 183 LBS | SYSTOLIC BLOOD PRESSURE: 115 MMHG | TEMPERATURE: 98.7 F | OXYGEN SATURATION: 96 % | DIASTOLIC BLOOD PRESSURE: 70 MMHG | BODY MASS INDEX: 30.49 KG/M2 | HEART RATE: 92 BPM | RESPIRATION RATE: 21 BRPM

## 2023-01-01 VITALS
SYSTOLIC BLOOD PRESSURE: 124 MMHG | DIASTOLIC BLOOD PRESSURE: 60 MMHG | TEMPERATURE: 98.6 F | HEART RATE: 84 BPM | OXYGEN SATURATION: 92 % | RESPIRATION RATE: 20 BRPM | HEIGHT: 65 IN | WEIGHT: 143.2 LBS | BODY MASS INDEX: 23.86 KG/M2

## 2023-01-01 VITALS
WEIGHT: 156 LBS | HEIGHT: 65 IN | HEART RATE: 90 BPM | DIASTOLIC BLOOD PRESSURE: 51 MMHG | BODY MASS INDEX: 25.99 KG/M2 | SYSTOLIC BLOOD PRESSURE: 129 MMHG | RESPIRATION RATE: 18 BRPM | TEMPERATURE: 97.9 F | OXYGEN SATURATION: 97 %

## 2023-01-01 VITALS
BODY MASS INDEX: 25.47 KG/M2 | HEIGHT: 65 IN | SYSTOLIC BLOOD PRESSURE: 90 MMHG | RESPIRATION RATE: 17 BRPM | WEIGHT: 152.9 LBS | OXYGEN SATURATION: 95 % | TEMPERATURE: 97.6 F | DIASTOLIC BLOOD PRESSURE: 43 MMHG | HEART RATE: 81 BPM

## 2023-01-01 VITALS
OXYGEN SATURATION: 98 % | DIASTOLIC BLOOD PRESSURE: 77 MMHG | WEIGHT: 174 LBS | TEMPERATURE: 98 F | HEART RATE: 78 BPM | SYSTOLIC BLOOD PRESSURE: 115 MMHG | BODY MASS INDEX: 28.99 KG/M2 | HEIGHT: 65 IN | RESPIRATION RATE: 19 BRPM

## 2023-01-01 VITALS
DIASTOLIC BLOOD PRESSURE: 68 MMHG | SYSTOLIC BLOOD PRESSURE: 122 MMHG | TEMPERATURE: 97.9 F | OXYGEN SATURATION: 95 % | HEIGHT: 65 IN | RESPIRATION RATE: 16 BRPM | HEART RATE: 88 BPM | BODY MASS INDEX: 24.66 KG/M2 | WEIGHT: 148 LBS

## 2023-01-01 VITALS
HEIGHT: 65 IN | BODY MASS INDEX: 25.79 KG/M2 | WEIGHT: 154.8 LBS | RESPIRATION RATE: 18 BRPM | DIASTOLIC BLOOD PRESSURE: 48 MMHG | HEART RATE: 68 BPM | OXYGEN SATURATION: 98 % | SYSTOLIC BLOOD PRESSURE: 100 MMHG | TEMPERATURE: 96.9 F

## 2023-01-01 VITALS
SYSTOLIC BLOOD PRESSURE: 90 MMHG | TEMPERATURE: 97.8 F | HEART RATE: 81 BPM | OXYGEN SATURATION: 98 % | BODY MASS INDEX: 25.71 KG/M2 | HEIGHT: 65 IN | DIASTOLIC BLOOD PRESSURE: 43 MMHG | WEIGHT: 154.3 LBS | RESPIRATION RATE: 17 BRPM

## 2023-01-01 VITALS
WEIGHT: 160.1 LBS | TEMPERATURE: 97.6 F | HEIGHT: 65 IN | DIASTOLIC BLOOD PRESSURE: 71 MMHG | SYSTOLIC BLOOD PRESSURE: 124 MMHG | HEART RATE: 80 BPM | OXYGEN SATURATION: 93 % | BODY MASS INDEX: 26.67 KG/M2 | RESPIRATION RATE: 16 BRPM

## 2023-01-01 VITALS
SYSTOLIC BLOOD PRESSURE: 127 MMHG | RESPIRATION RATE: 18 BRPM | TEMPERATURE: 98.9 F | HEART RATE: 112 BPM | OXYGEN SATURATION: 94 % | DIASTOLIC BLOOD PRESSURE: 59 MMHG

## 2023-01-01 VITALS
DIASTOLIC BLOOD PRESSURE: 84 MMHG | SYSTOLIC BLOOD PRESSURE: 115 MMHG | RESPIRATION RATE: 16 BRPM | TEMPERATURE: 97.9 F | OXYGEN SATURATION: 94 % | WEIGHT: 157.3 LBS | HEART RATE: 76 BPM | BODY MASS INDEX: 25.28 KG/M2 | HEIGHT: 66 IN

## 2023-01-01 VITALS
SYSTOLIC BLOOD PRESSURE: 129 MMHG | OXYGEN SATURATION: 98 % | HEART RATE: 90 BPM | RESPIRATION RATE: 18 BRPM | TEMPERATURE: 97.7 F | WEIGHT: 156 LBS | DIASTOLIC BLOOD PRESSURE: 51 MMHG | HEIGHT: 65 IN | BODY MASS INDEX: 25.99 KG/M2

## 2023-01-01 VITALS
OXYGEN SATURATION: 94 % | SYSTOLIC BLOOD PRESSURE: 107 MMHG | WEIGHT: 152.6 LBS | BODY MASS INDEX: 24.53 KG/M2 | HEART RATE: 107 BPM | DIASTOLIC BLOOD PRESSURE: 42 MMHG | TEMPERATURE: 97 F | HEIGHT: 66 IN | RESPIRATION RATE: 18 BRPM

## 2023-01-01 DIAGNOSIS — Z98.890 S/P ORIF (OPEN REDUCTION INTERNAL FIXATION) FRACTURE: ICD-10-CM

## 2023-01-01 DIAGNOSIS — D72.829 LEUKOCYTOSIS, UNSPECIFIED TYPE: ICD-10-CM

## 2023-01-01 DIAGNOSIS — J96.01 ACUTE RESPIRATORY FAILURE WITH HYPOXIA (H): ICD-10-CM

## 2023-01-01 DIAGNOSIS — I10 PRIMARY HYPERTENSION: ICD-10-CM

## 2023-01-01 DIAGNOSIS — J44.9 CHRONIC OBSTRUCTIVE PULMONARY DISEASE, UNSPECIFIED COPD TYPE (H): ICD-10-CM

## 2023-01-01 DIAGNOSIS — A04.72 COLITIS DUE TO CLOSTRIDIUM DIFFICILE: ICD-10-CM

## 2023-01-01 DIAGNOSIS — Z79.01 CHRONIC ANTICOAGULATION: ICD-10-CM

## 2023-01-01 DIAGNOSIS — D64.9 ANEMIA, UNSPECIFIED: ICD-10-CM

## 2023-01-01 DIAGNOSIS — A04.71 RECURRENT CLOSTRIDIOIDES DIFFICILE DIARRHEA: ICD-10-CM

## 2023-01-01 DIAGNOSIS — J43.9 PULMONARY EMPHYSEMA, UNSPECIFIED EMPHYSEMA TYPE (H): ICD-10-CM

## 2023-01-01 DIAGNOSIS — F03.C0 SEVERE DEMENTIA WITHOUT BEHAVIORAL DISTURBANCE, PSYCHOTIC DISTURBANCE, MOOD DISTURBANCE, OR ANXIETY, UNSPECIFIED DEMENTIA TYPE (H): ICD-10-CM

## 2023-01-01 DIAGNOSIS — J96.21 ACUTE ON CHRONIC RESPIRATORY FAILURE WITH HYPOXIA (H): ICD-10-CM

## 2023-01-01 DIAGNOSIS — M1A.9XX1 CHRONIC TOPHACEOUS GOUT OF RIGHT FOOT: ICD-10-CM

## 2023-01-01 DIAGNOSIS — N18.30 STAGE 3 CHRONIC KIDNEY DISEASE, UNSPECIFIED WHETHER STAGE 3A OR 3B CKD (H): ICD-10-CM

## 2023-01-01 DIAGNOSIS — M06.9 RHEUMATOID ARTHRITIS, INVOLVING UNSPECIFIED SITE, UNSPECIFIED WHETHER RHEUMATOID FACTOR PRESENT (H): ICD-10-CM

## 2023-01-01 DIAGNOSIS — S90.811A ABRASION OF RIGHT FOOT, INITIAL ENCOUNTER: ICD-10-CM
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
## 2023-01-01 DIAGNOSIS — I50.32 CHRONIC HEART FAILURE WITH PRESERVED EJECTION FRACTION (H): Primary | ICD-10-CM

## 2023-01-01 DIAGNOSIS — U07.1 INFECTION DUE TO 2019 NOVEL CORONAVIRUS: ICD-10-CM

## 2023-01-01 DIAGNOSIS — Z87.81 S/P ORIF (OPEN REDUCTION INTERNAL FIXATION) FRACTURE: ICD-10-CM

## 2023-01-01 DIAGNOSIS — R74.8 ELEVATED ALKALINE PHOSPHATASE LEVEL: ICD-10-CM

## 2023-01-01 DIAGNOSIS — A41.9 SEPSIS, DUE TO UNSPECIFIED ORGANISM, UNSPECIFIED WHETHER ACUTE ORGAN DYSFUNCTION PRESENT (H): ICD-10-CM

## 2023-01-01 DIAGNOSIS — A04.71 ENTEROCOLITIS DUE TO CLOSTRIDIUM DIFFICILE, RECURRENT: ICD-10-CM

## 2023-01-01 DIAGNOSIS — I10 ESSENTIAL (PRIMARY) HYPERTENSION: ICD-10-CM

## 2023-01-01 DIAGNOSIS — I48.21 PERMANENT ATRIAL FIBRILLATION (H): ICD-10-CM

## 2023-01-01 DIAGNOSIS — Z98.890 S/P ORIF (OPEN REDUCTION INTERNAL FIXATION) FRACTURE: Primary | ICD-10-CM

## 2023-01-01 DIAGNOSIS — I65.22 ICAO (INTERNAL CAROTID ARTERY OCCLUSION), LEFT: ICD-10-CM

## 2023-01-01 DIAGNOSIS — F39 MOOD DISORDER (H): ICD-10-CM

## 2023-01-01 DIAGNOSIS — J96.12 CHRONIC RESPIRATORY FAILURE WITH HYPOXIA AND HYPERCAPNIA (H): ICD-10-CM

## 2023-01-01 DIAGNOSIS — I50.32 CHRONIC HEART FAILURE WITH PRESERVED EJECTION FRACTION (H): Primary | Chronic | ICD-10-CM

## 2023-01-01 DIAGNOSIS — J44.9 CHRONIC OBSTRUCTIVE PULMONARY DISEASE, UNSPECIFIED (H): ICD-10-CM

## 2023-01-01 DIAGNOSIS — S92.901A CLOSED FRACTURE OF RIGHT FOOT, INITIAL ENCOUNTER: Primary | ICD-10-CM

## 2023-01-01 DIAGNOSIS — M05.9 SEROPOSITIVE RHEUMATOID ARTHRITIS (H): ICD-10-CM

## 2023-01-01 DIAGNOSIS — J18.9 PNEUMONIA, UNSPECIFIED ORGANISM: ICD-10-CM

## 2023-01-01 DIAGNOSIS — R93.7 ABNORMAL CT OF SPINE: ICD-10-CM

## 2023-01-01 DIAGNOSIS — R53.83 OTHER FATIGUE: ICD-10-CM

## 2023-01-01 DIAGNOSIS — Z99.81 OXYGEN DEPENDENT: ICD-10-CM

## 2023-01-01 DIAGNOSIS — I48.0 PAROXYSMAL ATRIAL FIBRILLATION (H): ICD-10-CM

## 2023-01-01 DIAGNOSIS — R53.81 MALAISE AND FATIGUE: ICD-10-CM

## 2023-01-01 DIAGNOSIS — Z91.81 PERSONAL HISTORY OF FALL: ICD-10-CM

## 2023-01-01 DIAGNOSIS — J44.1 COPD EXACERBATION (H): Primary | ICD-10-CM

## 2023-01-01 DIAGNOSIS — N18.31 STAGE 3A CHRONIC KIDNEY DISEASE (H): ICD-10-CM

## 2023-01-01 DIAGNOSIS — I48.91 RAPID ATRIAL FIBRILLATION (H): ICD-10-CM

## 2023-01-01 DIAGNOSIS — R06.02 SOB (SHORTNESS OF BREATH): ICD-10-CM

## 2023-01-01 DIAGNOSIS — R19.5 LOOSE STOOLS: ICD-10-CM

## 2023-01-01 DIAGNOSIS — L03.116 CELLULITIS OF LEFT LEG: ICD-10-CM

## 2023-01-01 DIAGNOSIS — J96.11 CHRONIC RESPIRATORY FAILURE WITH HYPOXIA AND HYPERCAPNIA (H): ICD-10-CM

## 2023-01-01 DIAGNOSIS — S92.511D CLOSED DISPLACED FRACTURE OF PROXIMAL PHALANX OF LESSER TOE OF RIGHT FOOT WITH ROUTINE HEALING, SUBSEQUENT ENCOUNTER: ICD-10-CM

## 2023-01-01 DIAGNOSIS — A04.72 COLITIS, CLOSTRIDIUM DIFFICILE: ICD-10-CM

## 2023-01-01 DIAGNOSIS — R19.7 DIARRHEA, UNSPECIFIED: ICD-10-CM

## 2023-01-01 DIAGNOSIS — N30.00 ACUTE CYSTITIS WITHOUT HEMATURIA: ICD-10-CM

## 2023-01-01 DIAGNOSIS — S92.501A CLOSED FRACTURE OF PHALANX OF RIGHT FOURTH TOE, INITIAL ENCOUNTER: ICD-10-CM

## 2023-01-01 DIAGNOSIS — A04.72 C. DIFFICILE COLITIS: Primary | ICD-10-CM

## 2023-01-01 DIAGNOSIS — J96.11 CHRONIC RESPIRATORY FAILURE WITH HYPOXIA AND HYPERCAPNIA (H): Primary | ICD-10-CM

## 2023-01-01 DIAGNOSIS — S92.334D CLOSED NONDISPLACED FRACTURE OF THIRD METATARSAL BONE OF RIGHT FOOT WITH ROUTINE HEALING, SUBSEQUENT ENCOUNTER: ICD-10-CM

## 2023-01-01 DIAGNOSIS — I48.0 PAROXYSMAL ATRIAL FIBRILLATION (H): Primary | ICD-10-CM

## 2023-01-01 DIAGNOSIS — U07.1 COVID-19: ICD-10-CM

## 2023-01-01 DIAGNOSIS — I50.9 ACUTE ON CHRONIC CONGESTIVE HEART FAILURE, UNSPECIFIED HEART FAILURE TYPE (H): Primary | ICD-10-CM

## 2023-01-01 DIAGNOSIS — R53.81 PHYSICAL DECONDITIONING: ICD-10-CM

## 2023-01-01 DIAGNOSIS — R53.83 MALAISE AND FATIGUE: ICD-10-CM

## 2023-01-01 DIAGNOSIS — G93.41 METABOLIC ENCEPHALOPATHY: ICD-10-CM

## 2023-01-01 DIAGNOSIS — S92.511A CLOSED DISPLACED FRACTURE OF PROXIMAL PHALANX OF LESSER TOE OF RIGHT FOOT, INITIAL ENCOUNTER: ICD-10-CM

## 2023-01-01 DIAGNOSIS — N30.01 ACUTE CYSTITIS WITH HEMATURIA: ICD-10-CM

## 2023-01-01 DIAGNOSIS — S92.901A CLOSED FRACTURE OF RIGHT FOOT, INITIAL ENCOUNTER: ICD-10-CM

## 2023-01-01 DIAGNOSIS — J96.90 RESPIRATORY FAILURE, UNSPECIFIED CHRONICITY, UNSPECIFIED WHETHER WITH HYPOXIA OR HYPERCAPNIA (H): ICD-10-CM

## 2023-01-01 DIAGNOSIS — K12.30 MUCOSITIS: ICD-10-CM

## 2023-01-01 DIAGNOSIS — N18.9 CHRONIC KIDNEY DISEASE, UNSPECIFIED: ICD-10-CM

## 2023-01-01 DIAGNOSIS — S92.501A CLOSED FRACTURE OF PHALANX OF RIGHT FIFTH TOE, INITIAL ENCOUNTER: ICD-10-CM

## 2023-01-01 DIAGNOSIS — F32.89 OTHER DEPRESSION: ICD-10-CM

## 2023-01-01 DIAGNOSIS — I50.9 ACUTE DECOMPENSATED HEART FAILURE (H): Primary | ICD-10-CM

## 2023-01-01 DIAGNOSIS — M05.79 RHEUMATOID ARTHRITIS INVOLVING MULTIPLE SITES WITH POSITIVE RHEUMATOID FACTOR (H): ICD-10-CM

## 2023-01-01 DIAGNOSIS — E83.42 HYPOMAGNESEMIA: ICD-10-CM

## 2023-01-01 DIAGNOSIS — F41.9 ANXIETY: ICD-10-CM

## 2023-01-01 DIAGNOSIS — A04.72 ENTEROCOLITIS DUE TO CLOSTRIDIUM DIFFICILE, NOT SPECIFIED AS RECURRENT: ICD-10-CM

## 2023-01-01 DIAGNOSIS — D50.8 OTHER IRON DEFICIENCY ANEMIA: ICD-10-CM

## 2023-01-01 DIAGNOSIS — K13.79 MOUTH SORES: Primary | ICD-10-CM

## 2023-01-01 DIAGNOSIS — A04.71 RECURRENT CLOSTRIDIOIDES DIFFICILE DIARRHEA: Primary | ICD-10-CM

## 2023-01-01 DIAGNOSIS — J90 PLEURAL EFFUSION ON RIGHT: ICD-10-CM

## 2023-01-01 DIAGNOSIS — A04.72 C. DIFFICILE DIARRHEA: ICD-10-CM

## 2023-01-01 DIAGNOSIS — E78.5 HYPERLIPIDEMIA, UNSPECIFIED HYPERLIPIDEMIA TYPE: ICD-10-CM

## 2023-01-01 DIAGNOSIS — Z51.5 HOSPICE CARE PATIENT: ICD-10-CM

## 2023-01-01 DIAGNOSIS — S92.334A CLOSED NONDISPLACED FRACTURE OF THIRD METATARSAL BONE OF RIGHT FOOT, INITIAL ENCOUNTER: ICD-10-CM

## 2023-01-01 DIAGNOSIS — Z98.890 STATUS POST HIP SURGERY: ICD-10-CM

## 2023-01-01 DIAGNOSIS — M54.41 ACUTE RIGHT-SIDED LOW BACK PAIN WITH RIGHT-SIDED SCIATICA: Primary | ICD-10-CM

## 2023-01-01 DIAGNOSIS — I48.91 ATRIAL FIBRILLATION WITH RVR (H): ICD-10-CM

## 2023-01-01 DIAGNOSIS — D64.9 ANEMIA, UNSPECIFIED TYPE: ICD-10-CM

## 2023-01-01 DIAGNOSIS — I48.0 PAROXYSMAL ATRIAL FIBRILLATION (H): Primary | Chronic | ICD-10-CM

## 2023-01-01 DIAGNOSIS — I47.29 NONSUSTAINED VENTRICULAR TACHYCARDIA (H): ICD-10-CM

## 2023-01-01 DIAGNOSIS — J44.1 COPD EXACERBATION (H): ICD-10-CM

## 2023-01-01 DIAGNOSIS — R41.3 OTHER AMNESIA: ICD-10-CM

## 2023-01-01 DIAGNOSIS — S40.811A ABRASION OF RIGHT UPPER EXTREMITY, INITIAL ENCOUNTER: ICD-10-CM

## 2023-01-01 DIAGNOSIS — E55.9 VITAMIN D DEFICIENCY, UNSPECIFIED: ICD-10-CM

## 2023-01-01 DIAGNOSIS — I50.9 ACUTE ON CHRONIC CONGESTIVE HEART FAILURE, UNSPECIFIED HEART FAILURE TYPE (H): ICD-10-CM

## 2023-01-01 DIAGNOSIS — M62.50 MUSCLE WASTING AND ATROPHY, NOT ELSEWHERE CLASSIFIED, UNSPECIFIED SITE: ICD-10-CM

## 2023-01-01 DIAGNOSIS — Z87.09 HISTORY OF PLEURAL EFFUSION: ICD-10-CM

## 2023-01-01 DIAGNOSIS — S92.334D CLOSED NONDISPLACED FRACTURE OF THIRD METATARSAL BONE OF RIGHT FOOT WITH ROUTINE HEALING, SUBSEQUENT ENCOUNTER: Primary | ICD-10-CM

## 2023-01-01 DIAGNOSIS — M06.9 RHEUMATOID ARTHRITIS, INVOLVING UNSPECIFIED SITE, UNSPECIFIED WHETHER RHEUMATOID FACTOR PRESENT (H): Primary | ICD-10-CM

## 2023-01-01 DIAGNOSIS — R19.7 DIARRHEA, UNSPECIFIED TYPE: ICD-10-CM

## 2023-01-01 DIAGNOSIS — S92.501A CLOSED FRACTURE OF PHALANX OF RIGHT SECOND TOE, INITIAL ENCOUNTER: ICD-10-CM

## 2023-01-01 DIAGNOSIS — Z11.1 ENCOUNTER FOR SCREENING FOR RESPIRATORY TUBERCULOSIS: ICD-10-CM

## 2023-01-01 DIAGNOSIS — D53.9 MACROCYTIC ANEMIA: ICD-10-CM

## 2023-01-01 DIAGNOSIS — K13.21 LEUKOPLAKIA OF ORAL CAVITY: ICD-10-CM

## 2023-01-01 DIAGNOSIS — R19.7 DIARRHEA OF PRESUMED INFECTIOUS ORIGIN: ICD-10-CM

## 2023-01-01 DIAGNOSIS — Z78.9 PRESENCE OF SURGICAL INCISION: ICD-10-CM

## 2023-01-01 DIAGNOSIS — I48.0 PAROXYSMAL ATRIAL FIBRILLATION (H): Chronic | ICD-10-CM

## 2023-01-01 DIAGNOSIS — Z87.81 S/P ORIF (OPEN REDUCTION INTERNAL FIXATION) FRACTURE: Primary | ICD-10-CM

## 2023-01-01 DIAGNOSIS — J96.12 CHRONIC RESPIRATORY FAILURE WITH HYPOXIA AND HYPERCAPNIA (H): Primary | ICD-10-CM

## 2023-01-01 DIAGNOSIS — R06.00 DYSPNEA, UNSPECIFIED TYPE: ICD-10-CM

## 2023-01-01 DIAGNOSIS — I50.32 CHRONIC HEART FAILURE WITH PRESERVED EJECTION FRACTION (H): ICD-10-CM

## 2023-01-01 DIAGNOSIS — D63.8 ANEMIA IN OTHER CHRONIC DISEASES CLASSIFIED ELSEWHERE: ICD-10-CM

## 2023-01-01 DIAGNOSIS — M62.838 MUSCLE SPASM: ICD-10-CM

## 2023-01-01 DIAGNOSIS — N18.9 CHRONIC RENAL IMPAIRMENT, UNSPECIFIED CKD STAGE: ICD-10-CM

## 2023-01-01 DIAGNOSIS — N18.30 CHRONIC KIDNEY DISEASE, STAGE 3 UNSPECIFIED (H): ICD-10-CM

## 2023-01-01 DIAGNOSIS — S72.002A CLOSED FRACTURE OF NECK OF LEFT FEMUR, INITIAL ENCOUNTER (H): Primary | ICD-10-CM

## 2023-01-01 DIAGNOSIS — S72.90XA FRACTURE OF FEMUR (H): ICD-10-CM

## 2023-01-01 DIAGNOSIS — R21 RASH AND NONSPECIFIC SKIN ERUPTION: ICD-10-CM

## 2023-01-01 DIAGNOSIS — J18.9 COMMUNITY ACQUIRED PNEUMONIA OF RIGHT LOWER LOBE OF LUNG: ICD-10-CM

## 2023-01-01 DIAGNOSIS — I50.33 ACUTE ON CHRONIC DIASTOLIC (CONGESTIVE) HEART FAILURE (H): ICD-10-CM

## 2023-01-01 DIAGNOSIS — S92.344A NONDISPLACED FRACTURE OF FOURTH METATARSAL BONE, RIGHT FOOT, INITIAL ENCOUNTER FOR CLOSED FRACTURE: ICD-10-CM

## 2023-01-01 DIAGNOSIS — S92.501A CLOSED FRACTURE OF PHALANX OF RIGHT THIRD TOE, INITIAL ENCOUNTER: ICD-10-CM

## 2023-01-01 LAB
% LINING CELLS, BODY FLUID: 2 %
ABO/RH(D): NORMAL
ALBUMIN MFR UR ELPH: 54.1 %
ALBUMIN SERPL BCG-MCNC: 2.9 G/DL (ref 3.5–5.2)
ALBUMIN SERPL BCG-MCNC: 3 G/DL (ref 3.5–5.2)
ALBUMIN SERPL BCG-MCNC: 3.3 G/DL (ref 3.5–5.2)
ALBUMIN SERPL BCG-MCNC: 3.4 G/DL (ref 3.5–5.2)
ALBUMIN SERPL BCG-MCNC: 3.8 G/DL (ref 3.5–5.2)
ALBUMIN SERPL BCG-MCNC: 4 G/DL (ref 3.5–5.2)
ALBUMIN SERPL BCG-MCNC: 4.2 G/DL (ref 3.5–5.2)
ALBUMIN SERPL ELPH-MCNC: 3.3 G/DL (ref 3.7–5.1)
ALBUMIN UR-MCNC: 30 MG/DL
ALBUMIN UR-MCNC: 50 MG/DL
ALBUMIN UR-MCNC: NEGATIVE MG/DL
ALP SERPL-CCNC: 112 U/L (ref 35–104)
ALP SERPL-CCNC: 115 U/L (ref 35–104)
ALP SERPL-CCNC: 116 U/L (ref 35–104)
ALP SERPL-CCNC: 117 U/L (ref 35–104)
ALP SERPL-CCNC: 123 U/L (ref 35–104)
ALP SERPL-CCNC: 170 U/L (ref 35–104)
ALP SERPL-CCNC: 175 U/L (ref 35–104)
ALP SERPL-CCNC: 190 U/L (ref 35–104)
ALP SERPL-CCNC: 228 U/L (ref 35–104)
ALPHA1 GLOB MFR UR ELPH: 11.7 %
ALPHA1 GLOB SERPL ELPH-MCNC: 0.3 G/DL (ref 0.2–0.4)
ALPHA2 GLOB MFR UR ELPH: 9.5 %
ALPHA2 GLOB SERPL ELPH-MCNC: 0.8 G/DL (ref 0.5–0.9)
ALT SERPL W P-5'-P-CCNC: 14 U/L (ref 0–50)
ALT SERPL W P-5'-P-CCNC: 16 U/L (ref 0–50)
ALT SERPL W P-5'-P-CCNC: 17 U/L (ref 10–35)
ALT SERPL W P-5'-P-CCNC: 18 U/L (ref 10–35)
ALT SERPL W P-5'-P-CCNC: 21 U/L (ref 10–35)
ALT SERPL W P-5'-P-CCNC: 22 U/L (ref 10–35)
ALT SERPL W P-5'-P-CCNC: 25 U/L (ref 10–35)
ALT SERPL W P-5'-P-CCNC: 26 U/L (ref 10–35)
ALT SERPL W P-5'-P-CCNC: 29 U/L (ref 10–35)
ANION GAP SERPL CALCULATED.3IONS-SCNC: 10 MMOL/L (ref 7–15)
ANION GAP SERPL CALCULATED.3IONS-SCNC: 11 MMOL/L (ref 7–15)
ANION GAP SERPL CALCULATED.3IONS-SCNC: 12 MMOL/L (ref 7–15)
ANION GAP SERPL CALCULATED.3IONS-SCNC: 13 MMOL/L (ref 7–15)
ANION GAP SERPL CALCULATED.3IONS-SCNC: 14 MMOL/L (ref 7–15)
ANION GAP SERPL CALCULATED.3IONS-SCNC: 15 MMOL/L (ref 7–15)
ANION GAP SERPL CALCULATED.3IONS-SCNC: 6 MMOL/L (ref 7–15)
ANION GAP SERPL CALCULATED.3IONS-SCNC: 6 MMOL/L (ref 7–15)
ANION GAP SERPL CALCULATED.3IONS-SCNC: 7 MMOL/L (ref 7–15)
ANION GAP SERPL CALCULATED.3IONS-SCNC: 8 MMOL/L (ref 7–15)
ANION GAP SERPL CALCULATED.3IONS-SCNC: 9 MMOL/L (ref 7–15)
ANTIBODY SCREEN: NEGATIVE
APPEARANCE FLD: ABNORMAL
APPEARANCE FLD: ABNORMAL
APPEARANCE UR: ABNORMAL
APPEARANCE UR: CLEAR
APTT PPP: 40 SECONDS (ref 22–38)
APTT PPP: 49 SECONDS (ref 22–38)
AST SERPL W P-5'-P-CCNC: 15 U/L (ref 0–45)
AST SERPL W P-5'-P-CCNC: 16 U/L (ref 0–45)
AST SERPL W P-5'-P-CCNC: 20 U/L (ref 10–35)
AST SERPL W P-5'-P-CCNC: 21 U/L (ref 10–35)
AST SERPL W P-5'-P-CCNC: 22 U/L (ref 10–35)
AST SERPL W P-5'-P-CCNC: 25 U/L (ref 10–35)
AST SERPL W P-5'-P-CCNC: 28 U/L (ref 10–35)
AST SERPL W P-5'-P-CCNC: 31 U/L (ref 10–35)
AST SERPL W P-5'-P-CCNC: 38 U/L (ref 10–35)
ATRIAL RATE - MUSE: 0 BPM
ATRIAL RATE - MUSE: 108 BPM
ATRIAL RATE - MUSE: 133 BPM
ATRIAL RATE - MUSE: 52 BPM
ATRIAL RATE - MUSE: 83 BPM
ATRIAL RATE - MUSE: 87 BPM
ATRIAL RATE - MUSE: 89 BPM
B-GLOBULIN MFR UR ELPH: 15.7 %
B-GLOBULIN SERPL ELPH-MCNC: 0.6 G/DL (ref 0.6–1)
BACTERIA #/AREA URNS HPF: ABNORMAL /HPF
BACTERIA BLD CULT: NO GROWTH
BACTERIA BLD CULT: NO GROWTH
BACTERIA PLR CULT: NO GROWTH
BACTERIA PLR CULT: NO GROWTH
BACTERIA UR CULT: ABNORMAL
BACTERIA UR CULT: NO GROWTH
BACTERIA UR CULT: NORMAL
BACTERIA WND CULT: ABNORMAL
BASE EXCESS BLDV CALC-SCNC: -5.4 MMOL/L
BASE EXCESS BLDV CALC-SCNC: 1 MMOL/L (ref -7.7–1.9)
BASE EXCESS BLDV CALC-SCNC: 1.8 MMOL/L (ref -7.7–1.9)
BASE EXCESS BLDV CALC-SCNC: 2 MMOL/L (ref -7.7–1.9)
BASE EXCESS BLDV CALC-SCNC: 2.1 MMOL/L (ref -7.7–1.9)
BASE EXCESS BLDV CALC-SCNC: 2.2 MMOL/L (ref -7.7–1.9)
BASE EXCESS BLDV CALC-SCNC: 2.8 MMOL/L
BASE EXCESS BLDV CALC-SCNC: 5 MMOL/L
BASE EXCESS BLDV CALC-SCNC: 5.9 MMOL/L
BASE EXCESS BLDV CALC-SCNC: 7.2 MMOL/L
BASOPHILS # BLD AUTO: 0 10E3/UL (ref 0–0.2)
BASOPHILS # BLD AUTO: 0.1 10E3/UL (ref 0–0.2)
BASOPHILS # BLD MANUAL: 0.1 10E3/UL (ref 0–0.2)
BASOPHILS NFR BLD AUTO: 0 %
BASOPHILS NFR BLD AUTO: 1 %
BASOPHILS NFR BLD MANUAL: 1 %
BILIRUB DIRECT SERPL-MCNC: <0.2 MG/DL (ref 0–0.3)
BILIRUB DIRECT SERPL-MCNC: <0.2 MG/DL (ref 0–0.3)
BILIRUB SERPL-MCNC: 0.2 MG/DL
BILIRUB SERPL-MCNC: 0.2 MG/DL
BILIRUB SERPL-MCNC: 0.3 MG/DL
BILIRUB SERPL-MCNC: 0.4 MG/DL
BILIRUB SERPL-MCNC: 0.6 MG/DL
BILIRUB SERPL-MCNC: 0.7 MG/DL
BILIRUB UR QL STRIP: NEGATIVE
BUN SERPL-MCNC: 17.2 MG/DL (ref 8–23)
BUN SERPL-MCNC: 18 MG/DL (ref 8–23)
BUN SERPL-MCNC: 20.7 MG/DL (ref 8–23)
BUN SERPL-MCNC: 21.3 MG/DL (ref 8–23)
BUN SERPL-MCNC: 21.7 MG/DL (ref 8–23)
BUN SERPL-MCNC: 22.4 MG/DL (ref 8–23)
BUN SERPL-MCNC: 22.9 MG/DL (ref 8–23)
BUN SERPL-MCNC: 23.1 MG/DL (ref 8–23)
BUN SERPL-MCNC: 24 MG/DL (ref 8–23)
BUN SERPL-MCNC: 24.3 MG/DL (ref 8–23)
BUN SERPL-MCNC: 24.3 MG/DL (ref 8–23)
BUN SERPL-MCNC: 24.5 MG/DL (ref 8–23)
BUN SERPL-MCNC: 25 MG/DL (ref 8–23)
BUN SERPL-MCNC: 25.1 MG/DL (ref 8–23)
BUN SERPL-MCNC: 25.2 MG/DL (ref 8–23)
BUN SERPL-MCNC: 25.6 MG/DL (ref 8–23)
BUN SERPL-MCNC: 25.7 MG/DL (ref 8–23)
BUN SERPL-MCNC: 25.8 MG/DL (ref 8–23)
BUN SERPL-MCNC: 26.1 MG/DL (ref 8–23)
BUN SERPL-MCNC: 26.3 MG/DL (ref 8–23)
BUN SERPL-MCNC: 26.4 MG/DL (ref 8–23)
BUN SERPL-MCNC: 26.9 MG/DL (ref 8–23)
BUN SERPL-MCNC: 27 MG/DL (ref 8–23)
BUN SERPL-MCNC: 27.8 MG/DL (ref 8–23)
BUN SERPL-MCNC: 28.2 MG/DL (ref 8–23)
BUN SERPL-MCNC: 28.9 MG/DL (ref 8–23)
BUN SERPL-MCNC: 29 MG/DL (ref 8–23)
BUN SERPL-MCNC: 29.4 MG/DL (ref 8–23)
BUN SERPL-MCNC: 29.5 MG/DL (ref 8–23)
BUN SERPL-MCNC: 31.1 MG/DL (ref 8–23)
BUN SERPL-MCNC: 32.1 MG/DL (ref 8–23)
BUN SERPL-MCNC: 33 MG/DL (ref 8–23)
BUN SERPL-MCNC: 33.4 MG/DL (ref 8–23)
BUN SERPL-MCNC: 33.5 MG/DL (ref 8–23)
BUN SERPL-MCNC: 33.5 MG/DL (ref 8–23)
BUN SERPL-MCNC: 35 MG/DL (ref 8–23)
BUN SERPL-MCNC: 36.7 MG/DL (ref 8–23)
BUN SERPL-MCNC: 38.7 MG/DL (ref 8–23)
BUN SERPL-MCNC: 39 MG/DL (ref 8–23)
BUN SERPL-MCNC: 39.2 MG/DL (ref 8–23)
BUN SERPL-MCNC: 40.8 MG/DL (ref 8–23)
BUN SERPL-MCNC: 41.1 MG/DL (ref 8–23)
BUN SERPL-MCNC: 43.5 MG/DL (ref 8–23)
BUN SERPL-MCNC: 44.7 MG/DL (ref 8–23)
BUN SERPL-MCNC: 44.7 MG/DL (ref 8–23)
BUN SERPL-MCNC: 46 MG/DL (ref 8–23)
BUN SERPL-MCNC: 90.5 MG/DL (ref 8–23)
C COLI+JEJUNI+LARI FUSA STL QL NAA+PROBE: NOT DETECTED
C DIFF GDH STL QL IA: POSITIVE
C DIFF TOX A+B STL QL IA: NEGATIVE
C DIFF TOX A+B STL QL IA: POSITIVE
C DIFF TOX A+B STL QL IA: POSITIVE
C DIFF TOX B STL QL: POSITIVE
CALCIUM SERPL-MCNC: 10 MG/DL (ref 8.8–10.2)
CALCIUM SERPL-MCNC: 10.1 MG/DL (ref 8.8–10.2)
CALCIUM SERPL-MCNC: 10.2 MG/DL (ref 8.8–10.2)
CALCIUM SERPL-MCNC: 10.3 MG/DL (ref 8.8–10.2)
CALCIUM SERPL-MCNC: 10.4 MG/DL (ref 8.8–10.2)
CALCIUM SERPL-MCNC: 10.5 MG/DL (ref 8.8–10.2)
CALCIUM SERPL-MCNC: 10.6 MG/DL (ref 8.8–10.2)
CALCIUM SERPL-MCNC: 10.7 MG/DL (ref 8.8–10.2)
CALCIUM SERPL-MCNC: 10.8 MG/DL (ref 8.8–10.2)
CALCIUM SERPL-MCNC: 11.7 MG/DL (ref 8.8–10.2)
CALCIUM SERPL-MCNC: 9.1 MG/DL (ref 8.8–10.2)
CALCIUM SERPL-MCNC: 9.2 MG/DL (ref 8.8–10.2)
CALCIUM SERPL-MCNC: 9.4 MG/DL (ref 8.8–10.2)
CALCIUM SERPL-MCNC: 9.5 MG/DL (ref 8.8–10.2)
CALCIUM SERPL-MCNC: 9.7 MG/DL (ref 8.8–10.2)
CALCIUM SERPL-MCNC: 9.8 MG/DL (ref 8.8–10.2)
CALCIUM SERPL-MCNC: 9.9 MG/DL (ref 8.8–10.2)
CALCIUM, IONIZED MEASURED: 1.38 MMOL/L (ref 1.11–1.3)
CAOX CRY #/AREA URNS HPF: ABNORMAL /HPF
CELL COUNT BODY FLUID SOURCE: ABNORMAL
CELL COUNT BODY FLUID SOURCE: ABNORMAL
CHLORIDE SERPL-SCNC: 100 MMOL/L (ref 98–107)
CHLORIDE SERPL-SCNC: 100 MMOL/L (ref 98–107)
CHLORIDE SERPL-SCNC: 101 MMOL/L (ref 98–107)
CHLORIDE SERPL-SCNC: 102 MMOL/L (ref 98–107)
CHLORIDE SERPL-SCNC: 103 MMOL/L (ref 98–107)
CHLORIDE SERPL-SCNC: 104 MMOL/L (ref 98–107)
CHLORIDE SERPL-SCNC: 105 MMOL/L (ref 98–107)
CHLORIDE SERPL-SCNC: 106 MMOL/L (ref 98–107)
CHLORIDE SERPL-SCNC: 107 MMOL/L (ref 98–107)
CHLORIDE SERPL-SCNC: 109 MMOL/L (ref 98–107)
CHLORIDE SERPL-SCNC: 96 MMOL/L (ref 98–107)
CHLORIDE SERPL-SCNC: 96 MMOL/L (ref 98–107)
CHLORIDE SERPL-SCNC: 97 MMOL/L (ref 98–107)
CHLORIDE SERPL-SCNC: 98 MMOL/L (ref 98–107)
CHLORIDE SERPL-SCNC: 98 MMOL/L (ref 98–107)
CHLORIDE SERPL-SCNC: 99 MMOL/L (ref 98–107)
CK SERPL-CCNC: 27 U/L (ref 26–192)
COLOR FLD: YELLOW
COLOR FLD: YELLOW
COLOR UR AUTO: ABNORMAL
COLOR UR AUTO: YELLOW
COLOR UR AUTO: YELLOW
CREAT SERPL-MCNC: 0.9 MG/DL (ref 0.51–0.95)
CREAT SERPL-MCNC: 0.99 MG/DL (ref 0.51–0.95)
CREAT SERPL-MCNC: 0.99 MG/DL (ref 0.51–0.95)
CREAT SERPL-MCNC: 1.03 MG/DL (ref 0.51–0.95)
CREAT SERPL-MCNC: 1.04 MG/DL (ref 0.51–0.95)
CREAT SERPL-MCNC: 1.05 MG/DL (ref 0.51–0.95)
CREAT SERPL-MCNC: 1.06 MG/DL (ref 0.51–0.95)
CREAT SERPL-MCNC: 1.09 MG/DL (ref 0.51–0.95)
CREAT SERPL-MCNC: 1.09 MG/DL (ref 0.51–0.95)
CREAT SERPL-MCNC: 1.11 MG/DL (ref 0.51–0.95)
CREAT SERPL-MCNC: 1.11 MG/DL (ref 0.51–0.95)
CREAT SERPL-MCNC: 1.12 MG/DL (ref 0.51–0.95)
CREAT SERPL-MCNC: 1.13 MG/DL (ref 0.51–0.95)
CREAT SERPL-MCNC: 1.14 MG/DL (ref 0.51–0.95)
CREAT SERPL-MCNC: 1.15 MG/DL (ref 0.51–0.95)
CREAT SERPL-MCNC: 1.17 MG/DL (ref 0.51–0.95)
CREAT SERPL-MCNC: 1.19 MG/DL (ref 0.51–0.95)
CREAT SERPL-MCNC: 1.21 MG/DL (ref 0.51–0.95)
CREAT SERPL-MCNC: 1.23 MG/DL (ref 0.51–0.95)
CREAT SERPL-MCNC: 1.23 MG/DL (ref 0.51–0.95)
CREAT SERPL-MCNC: 1.24 MG/DL (ref 0.51–0.95)
CREAT SERPL-MCNC: 1.25 MG/DL (ref 0.51–0.95)
CREAT SERPL-MCNC: 1.26 MG/DL (ref 0.51–0.95)
CREAT SERPL-MCNC: 1.28 MG/DL (ref 0.51–0.95)
CREAT SERPL-MCNC: 1.29 MG/DL (ref 0.51–0.95)
CREAT SERPL-MCNC: 1.29 MG/DL (ref 0.51–0.95)
CREAT SERPL-MCNC: 1.3 MG/DL (ref 0.51–0.95)
CREAT SERPL-MCNC: 1.31 MG/DL (ref 0.51–0.95)
CREAT SERPL-MCNC: 1.35 MG/DL (ref 0.51–0.95)
CREAT SERPL-MCNC: 1.36 MG/DL (ref 0.51–0.95)
CREAT SERPL-MCNC: 1.36 MG/DL (ref 0.51–0.95)
CREAT SERPL-MCNC: 1.37 MG/DL (ref 0.51–0.95)
CREAT SERPL-MCNC: 1.38 MG/DL (ref 0.51–0.95)
CREAT SERPL-MCNC: 1.45 MG/DL (ref 0.51–0.95)
CREAT SERPL-MCNC: 1.68 MG/DL (ref 0.51–0.95)
CREAT SERPL-MCNC: 2.76 MG/DL (ref 0.51–0.95)
CRP SERPL-MCNC: 12.03 MG/L
DEPRECATED CALCIDIOL+CALCIFEROL SERPL-MC: <85 UG/L (ref 20–75)
DEPRECATED HCO3 PLAS-SCNC: 18 MMOL/L (ref 22–29)
DEPRECATED HCO3 PLAS-SCNC: 21 MMOL/L (ref 22–29)
DEPRECATED HCO3 PLAS-SCNC: 22 MMOL/L (ref 22–29)
DEPRECATED HCO3 PLAS-SCNC: 23 MMOL/L (ref 22–29)
DEPRECATED HCO3 PLAS-SCNC: 24 MMOL/L (ref 22–29)
DEPRECATED HCO3 PLAS-SCNC: 24 MMOL/L (ref 22–29)
DEPRECATED HCO3 PLAS-SCNC: 25 MMOL/L (ref 22–29)
DEPRECATED HCO3 PLAS-SCNC: 26 MMOL/L (ref 22–29)
DEPRECATED HCO3 PLAS-SCNC: 27 MMOL/L (ref 22–29)
DEPRECATED HCO3 PLAS-SCNC: 27 MMOL/L (ref 22–29)
DEPRECATED HCO3 PLAS-SCNC: 28 MMOL/L (ref 22–29)
DEPRECATED HCO3 PLAS-SCNC: 29 MMOL/L (ref 22–29)
DEPRECATED HCO3 PLAS-SCNC: 30 MMOL/L (ref 22–29)
DEPRECATED HCO3 PLAS-SCNC: 30 MMOL/L (ref 22–29)
DEPRECATED HCO3 PLAS-SCNC: 31 MMOL/L (ref 22–29)
DEPRECATED HCO3 PLAS-SCNC: 31 MMOL/L (ref 22–29)
DEPRECATED HCO3 PLAS-SCNC: 32 MMOL/L (ref 22–29)
DIASTOLIC BLOOD PRESSURE - MUSE: 105 MMHG
DIASTOLIC BLOOD PRESSURE - MUSE: NORMAL MMHG
EC STX1 GENE STL QL NAA+PROBE: NOT DETECTED
EC STX2 GENE STL QL NAA+PROBE: NOT DETECTED
ELLIPTOCYTES BLD QL SMEAR: SLIGHT
EOSINOPHIL # BLD AUTO: 0 10E3/UL (ref 0–0.7)
EOSINOPHIL # BLD AUTO: 0.1 10E3/UL (ref 0–0.7)
EOSINOPHIL # BLD AUTO: 0.2 10E3/UL (ref 0–0.7)
EOSINOPHIL # BLD AUTO: 0.3 10E3/UL (ref 0–0.7)
EOSINOPHIL # BLD AUTO: 0.4 10E3/UL (ref 0–0.7)
EOSINOPHIL # BLD AUTO: 0.7 10E3/UL (ref 0–0.7)
EOSINOPHIL # BLD AUTO: 0.8 10E3/UL (ref 0–0.7)
EOSINOPHIL # BLD AUTO: 0.9 10E3/UL (ref 0–0.7)
EOSINOPHIL # BLD MANUAL: 0.1 10E3/UL (ref 0–0.7)
EOSINOPHIL NFR BLD AUTO: 0 %
EOSINOPHIL NFR BLD AUTO: 1 %
EOSINOPHIL NFR BLD AUTO: 2 %
EOSINOPHIL NFR BLD AUTO: 3 %
EOSINOPHIL NFR BLD AUTO: 4 %
EOSINOPHIL NFR BLD AUTO: 7 %
EOSINOPHIL NFR BLD AUTO: 8 %
EOSINOPHIL NFR BLD AUTO: 9 %
EOSINOPHIL NFR BLD MANUAL: 1 %
ERYTHROCYTE [DISTWIDTH] IN BLOOD BY AUTOMATED COUNT: 15.3 % (ref 10–15)
ERYTHROCYTE [DISTWIDTH] IN BLOOD BY AUTOMATED COUNT: 15.7 % (ref 10–15)
ERYTHROCYTE [DISTWIDTH] IN BLOOD BY AUTOMATED COUNT: 15.8 % (ref 10–15)
ERYTHROCYTE [DISTWIDTH] IN BLOOD BY AUTOMATED COUNT: 15.9 % (ref 10–15)
ERYTHROCYTE [DISTWIDTH] IN BLOOD BY AUTOMATED COUNT: 16.2 % (ref 10–15)
ERYTHROCYTE [DISTWIDTH] IN BLOOD BY AUTOMATED COUNT: 16.2 % (ref 10–15)
ERYTHROCYTE [DISTWIDTH] IN BLOOD BY AUTOMATED COUNT: 16.3 % (ref 10–15)
ERYTHROCYTE [DISTWIDTH] IN BLOOD BY AUTOMATED COUNT: 16.4 % (ref 10–15)
ERYTHROCYTE [DISTWIDTH] IN BLOOD BY AUTOMATED COUNT: 16.4 % (ref 10–15)
ERYTHROCYTE [DISTWIDTH] IN BLOOD BY AUTOMATED COUNT: 16.5 % (ref 10–15)
ERYTHROCYTE [DISTWIDTH] IN BLOOD BY AUTOMATED COUNT: 16.6 % (ref 10–15)
ERYTHROCYTE [DISTWIDTH] IN BLOOD BY AUTOMATED COUNT: 16.7 % (ref 10–15)
ERYTHROCYTE [DISTWIDTH] IN BLOOD BY AUTOMATED COUNT: 16.7 % (ref 10–15)
ERYTHROCYTE [DISTWIDTH] IN BLOOD BY AUTOMATED COUNT: 16.8 % (ref 10–15)
ERYTHROCYTE [DISTWIDTH] IN BLOOD BY AUTOMATED COUNT: 16.8 % (ref 10–15)
ERYTHROCYTE [DISTWIDTH] IN BLOOD BY AUTOMATED COUNT: 16.9 % (ref 10–15)
ERYTHROCYTE [DISTWIDTH] IN BLOOD BY AUTOMATED COUNT: 17.1 % (ref 10–15)
ERYTHROCYTE [DISTWIDTH] IN BLOOD BY AUTOMATED COUNT: 17.1 % (ref 10–15)
ERYTHROCYTE [DISTWIDTH] IN BLOOD BY AUTOMATED COUNT: 17.2 % (ref 10–15)
ERYTHROCYTE [DISTWIDTH] IN BLOOD BY AUTOMATED COUNT: 17.3 % (ref 10–15)
ERYTHROCYTE [DISTWIDTH] IN BLOOD BY AUTOMATED COUNT: 17.4 % (ref 10–15)
ERYTHROCYTE [DISTWIDTH] IN BLOOD BY AUTOMATED COUNT: 17.5 % (ref 10–15)
ERYTHROCYTE [DISTWIDTH] IN BLOOD BY AUTOMATED COUNT: 17.6 % (ref 10–15)
ERYTHROCYTE [DISTWIDTH] IN BLOOD BY AUTOMATED COUNT: 17.8 % (ref 10–15)
ERYTHROCYTE [DISTWIDTH] IN BLOOD BY AUTOMATED COUNT: 17.8 % (ref 10–15)
ERYTHROCYTE [DISTWIDTH] IN BLOOD BY AUTOMATED COUNT: 18.2 % (ref 10–15)
ETHANOL SERPL-MCNC: <0.01 G/DL
FERRITIN SERPL-MCNC: 146 NG/ML (ref 11–328)
FLUAV RNA SPEC QL NAA+PROBE: NEGATIVE
FLUBV RNA RESP QL NAA+PROBE: NEGATIVE
GAMMA GLOB MFR UR ELPH: 9 %
GAMMA GLOB SERPL ELPH-MCNC: 0.4 G/DL (ref 0.7–1.6)
GAMMA INTERFERON BACKGROUND BLD IA-ACNC: 0 IU/ML
GAMMA INTERFERON BACKGROUND BLD IA-ACNC: 0.06 IU/ML
GFR SERPL CREATININE-BSD FRML MDRD: 17 ML/MIN/1.73M2
GFR SERPL CREATININE-BSD FRML MDRD: 31 ML/MIN/1.73M2
GFR SERPL CREATININE-BSD FRML MDRD: 37 ML/MIN/1.73M2
GFR SERPL CREATININE-BSD FRML MDRD: 39 ML/MIN/1.73M2
GFR SERPL CREATININE-BSD FRML MDRD: 40 ML/MIN/1.73M2
GFR SERPL CREATININE-BSD FRML MDRD: 41 ML/MIN/1.73M2
GFR SERPL CREATININE-BSD FRML MDRD: 42 ML/MIN/1.73M2
GFR SERPL CREATININE-BSD FRML MDRD: 42 ML/MIN/1.73M2
GFR SERPL CREATININE-BSD FRML MDRD: 43 ML/MIN/1.73M2
GFR SERPL CREATININE-BSD FRML MDRD: 44 ML/MIN/1.73M2
GFR SERPL CREATININE-BSD FRML MDRD: 44 ML/MIN/1.73M2
GFR SERPL CREATININE-BSD FRML MDRD: 45 ML/MIN/1.73M2
GFR SERPL CREATININE-BSD FRML MDRD: 46 ML/MIN/1.73M2
GFR SERPL CREATININE-BSD FRML MDRD: 47 ML/MIN/1.73M2
GFR SERPL CREATININE-BSD FRML MDRD: 48 ML/MIN/1.73M2
GFR SERPL CREATININE-BSD FRML MDRD: 49 ML/MIN/1.73M2
GFR SERPL CREATININE-BSD FRML MDRD: 50 ML/MIN/1.73M2
GFR SERPL CREATININE-BSD FRML MDRD: 51 ML/MIN/1.73M2
GFR SERPL CREATININE-BSD FRML MDRD: 52 ML/MIN/1.73M2
GFR SERPL CREATININE-BSD FRML MDRD: 52 ML/MIN/1.73M2
GFR SERPL CREATININE-BSD FRML MDRD: 54 ML/MIN/1.73M2
GFR SERPL CREATININE-BSD FRML MDRD: 55 ML/MIN/1.73M2
GFR SERPL CREATININE-BSD FRML MDRD: 55 ML/MIN/1.73M2
GFR SERPL CREATININE-BSD FRML MDRD: 56 ML/MIN/1.73M2
GFR SERPL CREATININE-BSD FRML MDRD: 59 ML/MIN/1.73M2
GFR SERPL CREATININE-BSD FRML MDRD: 59 ML/MIN/1.73M2
GFR SERPL CREATININE-BSD FRML MDRD: 66 ML/MIN/1.73M2
GLUCOSE BLDC GLUCOMTR-MCNC: 109 MG/DL (ref 70–99)
GLUCOSE BLDC GLUCOMTR-MCNC: 152 MG/DL (ref 70–99)
GLUCOSE BLDC GLUCOMTR-MCNC: 172 MG/DL (ref 70–99)
GLUCOSE BLDC GLUCOMTR-MCNC: 173 MG/DL (ref 70–99)
GLUCOSE BLDC GLUCOMTR-MCNC: 177 MG/DL (ref 70–99)
GLUCOSE BLDC GLUCOMTR-MCNC: 179 MG/DL (ref 70–99)
GLUCOSE BLDC GLUCOMTR-MCNC: 234 MG/DL (ref 70–99)
GLUCOSE BLDC GLUCOMTR-MCNC: 249 MG/DL (ref 70–99)
GLUCOSE BLDC GLUCOMTR-MCNC: 84 MG/DL (ref 70–99)
GLUCOSE BLDC GLUCOMTR-MCNC: 97 MG/DL (ref 70–99)
GLUCOSE BODY FLUID SOURCE: NORMAL
GLUCOSE BODY FLUID SOURCE: NORMAL
GLUCOSE FLD-MCNC: 123 MG/DL
GLUCOSE FLD-MCNC: 183 MG/DL
GLUCOSE SERPL-MCNC: 102 MG/DL (ref 70–99)
GLUCOSE SERPL-MCNC: 103 MG/DL (ref 70–99)
GLUCOSE SERPL-MCNC: 105 MG/DL (ref 70–99)
GLUCOSE SERPL-MCNC: 108 MG/DL (ref 70–99)
GLUCOSE SERPL-MCNC: 108 MG/DL (ref 70–99)
GLUCOSE SERPL-MCNC: 109 MG/DL (ref 70–99)
GLUCOSE SERPL-MCNC: 110 MG/DL (ref 70–99)
GLUCOSE SERPL-MCNC: 112 MG/DL (ref 70–99)
GLUCOSE SERPL-MCNC: 113 MG/DL (ref 70–99)
GLUCOSE SERPL-MCNC: 113 MG/DL (ref 70–99)
GLUCOSE SERPL-MCNC: 114 MG/DL (ref 70–99)
GLUCOSE SERPL-MCNC: 114 MG/DL (ref 70–99)
GLUCOSE SERPL-MCNC: 119 MG/DL (ref 70–99)
GLUCOSE SERPL-MCNC: 121 MG/DL (ref 70–99)
GLUCOSE SERPL-MCNC: 125 MG/DL (ref 70–99)
GLUCOSE SERPL-MCNC: 125 MG/DL (ref 70–99)
GLUCOSE SERPL-MCNC: 128 MG/DL (ref 70–99)
GLUCOSE SERPL-MCNC: 133 MG/DL (ref 70–99)
GLUCOSE SERPL-MCNC: 149 MG/DL (ref 70–99)
GLUCOSE SERPL-MCNC: 153 MG/DL (ref 70–99)
GLUCOSE SERPL-MCNC: 159 MG/DL (ref 70–99)
GLUCOSE SERPL-MCNC: 170 MG/DL (ref 70–99)
GLUCOSE SERPL-MCNC: 64 MG/DL (ref 70–99)
GLUCOSE SERPL-MCNC: 78 MG/DL (ref 70–99)
GLUCOSE SERPL-MCNC: 79 MG/DL (ref 70–99)
GLUCOSE SERPL-MCNC: 84 MG/DL (ref 70–99)
GLUCOSE SERPL-MCNC: 84 MG/DL (ref 70–99)
GLUCOSE SERPL-MCNC: 85 MG/DL (ref 70–99)
GLUCOSE SERPL-MCNC: 85 MG/DL (ref 70–99)
GLUCOSE SERPL-MCNC: 86 MG/DL (ref 70–99)
GLUCOSE SERPL-MCNC: 87 MG/DL (ref 70–99)
GLUCOSE SERPL-MCNC: 88 MG/DL (ref 70–99)
GLUCOSE SERPL-MCNC: 88 MG/DL (ref 70–99)
GLUCOSE SERPL-MCNC: 89 MG/DL (ref 70–99)
GLUCOSE SERPL-MCNC: 90 MG/DL (ref 70–99)
GLUCOSE SERPL-MCNC: 90 MG/DL (ref 70–99)
GLUCOSE SERPL-MCNC: 91 MG/DL (ref 70–99)
GLUCOSE SERPL-MCNC: 93 MG/DL (ref 70–99)
GLUCOSE SERPL-MCNC: 93 MG/DL (ref 70–99)
GLUCOSE SERPL-MCNC: 96 MG/DL (ref 70–99)
GLUCOSE SERPL-MCNC: 96 MG/DL (ref 70–99)
GLUCOSE SERPL-MCNC: 98 MG/DL (ref 70–99)
GLUCOSE SERPL-MCNC: 99 MG/DL (ref 70–99)
GLUCOSE UR STRIP-MCNC: NEGATIVE MG/DL
GRAM STAIN RESULT: ABNORMAL
GRAM STAIN RESULT: ABNORMAL
GRAM STAIN RESULT: NORMAL
HBA1C MFR BLD: 5.9 %
HCO3 BLDV-SCNC: 22 MMOL/L (ref 24–30)
HCO3 BLDV-SCNC: 27 MMOL/L (ref 21–28)
HCO3 BLDV-SCNC: 28 MMOL/L (ref 21–28)
HCO3 BLDV-SCNC: 28 MMOL/L (ref 24–30)
HCO3 BLDV-SCNC: 30 MMOL/L (ref 24–30)
HCO3 BLDV-SCNC: 32 MMOL/L (ref 24–30)
HCO3 BLDV-SCNC: 33 MMOL/L (ref 24–30)
HCT VFR BLD AUTO: 26 % (ref 35–47)
HCT VFR BLD AUTO: 26.1 % (ref 35–47)
HCT VFR BLD AUTO: 26.5 % (ref 35–47)
HCT VFR BLD AUTO: 26.5 % (ref 35–47)
HCT VFR BLD AUTO: 26.8 % (ref 35–47)
HCT VFR BLD AUTO: 27.1 % (ref 35–47)
HCT VFR BLD AUTO: 27.6 % (ref 35–47)
HCT VFR BLD AUTO: 27.7 % (ref 35–47)
HCT VFR BLD AUTO: 27.8 % (ref 35–47)
HCT VFR BLD AUTO: 28.1 % (ref 35–47)
HCT VFR BLD AUTO: 28.8 % (ref 35–47)
HCT VFR BLD AUTO: 29 % (ref 35–47)
HCT VFR BLD AUTO: 29 % (ref 35–47)
HCT VFR BLD AUTO: 29.1 % (ref 35–47)
HCT VFR BLD AUTO: 29.4 % (ref 35–47)
HCT VFR BLD AUTO: 30.1 % (ref 35–47)
HCT VFR BLD AUTO: 30.4 % (ref 35–47)
HCT VFR BLD AUTO: 30.6 % (ref 35–47)
HCT VFR BLD AUTO: 30.6 % (ref 35–47)
HCT VFR BLD AUTO: 30.7 % (ref 35–47)
HCT VFR BLD AUTO: 30.8 % (ref 35–47)
HCT VFR BLD AUTO: 30.9 % (ref 35–47)
HCT VFR BLD AUTO: 31 % (ref 35–47)
HCT VFR BLD AUTO: 31.7 % (ref 35–47)
HCT VFR BLD AUTO: 31.9 % (ref 35–47)
HCT VFR BLD AUTO: 32 % (ref 35–47)
HCT VFR BLD AUTO: 32.2 % (ref 35–47)
HCT VFR BLD AUTO: 32.3 % (ref 35–47)
HCT VFR BLD AUTO: 32.8 % (ref 35–47)
HCT VFR BLD AUTO: 33.2 % (ref 35–47)
HCT VFR BLD AUTO: 33.2 % (ref 35–47)
HCT VFR BLD AUTO: 33.3 % (ref 35–47)
HCT VFR BLD AUTO: 33.4 % (ref 35–47)
HCT VFR BLD AUTO: 33.6 % (ref 35–47)
HCT VFR BLD AUTO: 33.6 % (ref 35–47)
HCT VFR BLD AUTO: 33.9 % (ref 35–47)
HCT VFR BLD AUTO: 33.9 % (ref 35–47)
HCT VFR BLD AUTO: 34.4 % (ref 35–47)
HCT VFR BLD AUTO: 35.1 % (ref 35–47)
HCT VFR BLD AUTO: 35.3 % (ref 35–47)
HCT VFR BLD AUTO: 35.7 % (ref 35–47)
HCT VFR BLD AUTO: 36 % (ref 35–47)
HCT VFR BLD AUTO: 38.7 % (ref 35–47)
HGB BLD-MCNC: 10 G/DL (ref 11.7–15.7)
HGB BLD-MCNC: 10.1 G/DL (ref 11.7–15.7)
HGB BLD-MCNC: 10.2 G/DL (ref 11.7–15.7)
HGB BLD-MCNC: 10.2 G/DL (ref 11.7–15.7)
HGB BLD-MCNC: 10.3 G/DL (ref 11.7–15.7)
HGB BLD-MCNC: 10.4 G/DL (ref 11.7–15.7)
HGB BLD-MCNC: 10.4 G/DL (ref 11.7–15.7)
HGB BLD-MCNC: 10.5 G/DL (ref 11.7–15.7)
HGB BLD-MCNC: 10.6 G/DL (ref 11.7–15.7)
HGB BLD-MCNC: 10.7 G/DL (ref 11.7–15.7)
HGB BLD-MCNC: 10.9 G/DL (ref 11.7–15.7)
HGB BLD-MCNC: 11 G/DL (ref 11.7–15.7)
HGB BLD-MCNC: 11.3 G/DL (ref 11.7–15.7)
HGB BLD-MCNC: 11.4 G/DL (ref 11.7–15.7)
HGB BLD-MCNC: 7.9 G/DL (ref 11.7–15.7)
HGB BLD-MCNC: 8 G/DL (ref 11.7–15.7)
HGB BLD-MCNC: 8.1 G/DL (ref 11.7–15.7)
HGB BLD-MCNC: 8.2 G/DL (ref 11.7–15.7)
HGB BLD-MCNC: 8.4 G/DL (ref 11.7–15.7)
HGB BLD-MCNC: 8.5 G/DL (ref 11.7–15.7)
HGB BLD-MCNC: 8.5 G/DL (ref 11.7–15.7)
HGB BLD-MCNC: 8.6 G/DL (ref 11.7–15.7)
HGB BLD-MCNC: 8.6 G/DL (ref 11.7–15.7)
HGB BLD-MCNC: 8.7 G/DL (ref 11.7–15.7)
HGB BLD-MCNC: 8.8 G/DL (ref 11.7–15.7)
HGB BLD-MCNC: 8.8 G/DL (ref 11.7–15.7)
HGB BLD-MCNC: 8.9 G/DL (ref 11.7–15.7)
HGB BLD-MCNC: 9 G/DL (ref 11.7–15.7)
HGB BLD-MCNC: 9.1 G/DL (ref 11.7–15.7)
HGB BLD-MCNC: 9.2 G/DL (ref 11.7–15.7)
HGB BLD-MCNC: 9.3 G/DL (ref 11.7–15.7)
HGB BLD-MCNC: 9.4 G/DL (ref 11.7–15.7)
HGB BLD-MCNC: 9.4 G/DL (ref 11.7–15.7)
HGB BLD-MCNC: 9.5 G/DL (ref 11.7–15.7)
HGB BLD-MCNC: 9.7 G/DL (ref 11.7–15.7)
HGB BLD-MCNC: 9.8 G/DL (ref 11.7–15.7)
HGB BLD-MCNC: 9.9 G/DL (ref 11.7–15.7)
HGB BLD-MCNC: 9.9 G/DL (ref 11.7–15.7)
HGB UR QL STRIP: ABNORMAL
HGB UR QL STRIP: ABNORMAL
HGB UR QL STRIP: NEGATIVE
HOLD SPECIMEN: NORMAL
HYALINE CASTS: 19 /LPF
HYALINE CASTS: 3 /LPF
HYALINE CASTS: 6 /LPF
HYALINE CASTS: 9 /LPF
IMM GRANULOCYTES # BLD: 0 10E3/UL
IMM GRANULOCYTES # BLD: 0 10E3/UL
IMM GRANULOCYTES # BLD: 0.1 10E3/UL
IMM GRANULOCYTES # BLD: 0.2 10E3/UL
IMM GRANULOCYTES # BLD: 0.4 10E3/UL
IMM GRANULOCYTES # BLD: 0.8 10E3/UL
IMM GRANULOCYTES NFR BLD: 0 %
IMM GRANULOCYTES NFR BLD: 1 %
IMM GRANULOCYTES NFR BLD: 2 %
IMM GRANULOCYTES NFR BLD: 2 %
IMM GRANULOCYTES NFR BLD: 4 %
INR PPP: 1.56 (ref 0.85–1.15)
INR PPP: 1.6 (ref 0.85–1.15)
INR PPP: 2.18 (ref 0.85–1.15)
INR PPP: 2.36 (ref 0.85–1.15)
INR PPP: 2.59 (ref 0.85–1.15)
INR PPP: 3.14 (ref 0.85–1.15)
INR PPP: 4.09 (ref 0.85–1.15)
INTERPRETATION ECG - MUSE: NORMAL
ION CA PH 7.4: 1.31 MMOL/L (ref 1.11–1.3)
IRON BINDING CAPACITY (ROCHE): 205 UG/DL (ref 240–430)
IRON SATN MFR SERPL: 6 % (ref 15–46)
IRON SERPL-MCNC: 12 UG/DL (ref 37–145)
KETONES UR STRIP-MCNC: ABNORMAL MG/DL
KETONES UR STRIP-MCNC: NEGATIVE MG/DL
LACTATE SERPL-SCNC: 1 MMOL/L (ref 0.7–2)
LACTATE SERPL-SCNC: 1.3 MMOL/L (ref 0.7–2)
LACTATE SERPL-SCNC: 1.7 MMOL/L (ref 0.7–2)
LACTATE SERPL-SCNC: 2.2 MMOL/L (ref 0.7–2)
LACTATE SERPL-SCNC: 2.5 MMOL/L (ref 0.7–2)
LACTATE SERPL-SCNC: 2.5 MMOL/L (ref 0.7–2)
LD BODY BODY FLUID SOURCE: NORMAL
LD BODY BODY FLUID SOURCE: NORMAL
LDH FLD L TO P-CCNC: 47 U/L
LDH FLD L TO P-CCNC: 75 U/L
LDH SERPL L TO P-CCNC: 189 U/L (ref 0–250)
LDH SERPL L TO P-CCNC: 211 U/L (ref 0–250)
LDH SERPL L TO P-CCNC: 218 U/L (ref 0–250)
LDH SERPL L TO P-CCNC: 305 U/L (ref 0–250)
LEUKOCYTE ESTERASE UR QL STRIP: ABNORMAL
LEUKOCYTE ESTERASE UR QL STRIP: NEGATIVE
LIPASE SERPL-CCNC: 7 U/L (ref 13–60)
LVEF ECHO: NORMAL
LYMPHOCYTES # BLD AUTO: 0.2 10E3/UL (ref 0.8–5.3)
LYMPHOCYTES # BLD AUTO: 0.3 10E3/UL (ref 0.8–5.3)
LYMPHOCYTES # BLD AUTO: 0.5 10E3/UL (ref 0.8–5.3)
LYMPHOCYTES # BLD AUTO: 0.6 10E3/UL (ref 0.8–5.3)
LYMPHOCYTES # BLD AUTO: 0.7 10E3/UL (ref 0.8–5.3)
LYMPHOCYTES # BLD AUTO: 0.8 10E3/UL (ref 0.8–5.3)
LYMPHOCYTES # BLD AUTO: 0.8 10E3/UL (ref 0.8–5.3)
LYMPHOCYTES # BLD AUTO: 1 10E3/UL (ref 0.8–5.3)
LYMPHOCYTES # BLD AUTO: 1.1 10E3/UL (ref 0.8–5.3)
LYMPHOCYTES # BLD AUTO: 1.2 10E3/UL (ref 0.8–5.3)
LYMPHOCYTES # BLD AUTO: 1.3 10E3/UL (ref 0.8–5.3)
LYMPHOCYTES # BLD AUTO: 1.6 10E3/UL (ref 0.8–5.3)
LYMPHOCYTES # BLD MANUAL: 1 10E3/UL (ref 0.8–5.3)
LYMPHOCYTES NFR BLD AUTO: 10 %
LYMPHOCYTES NFR BLD AUTO: 10 %
LYMPHOCYTES NFR BLD AUTO: 11 %
LYMPHOCYTES NFR BLD AUTO: 13 %
LYMPHOCYTES NFR BLD AUTO: 13 %
LYMPHOCYTES NFR BLD AUTO: 2 %
LYMPHOCYTES NFR BLD AUTO: 2 %
LYMPHOCYTES NFR BLD AUTO: 3 %
LYMPHOCYTES NFR BLD AUTO: 5 %
LYMPHOCYTES NFR BLD AUTO: 6 %
LYMPHOCYTES NFR BLD AUTO: 6 %
LYMPHOCYTES NFR BLD AUTO: 7 %
LYMPHOCYTES NFR BLD AUTO: 7 %
LYMPHOCYTES NFR BLD AUTO: 9 %
LYMPHOCYTES NFR BLD MANUAL: 10 %
LYMPHOCYTES NFR FLD MANUAL: 19 %
LYMPHOCYTES NFR FLD MANUAL: 40 %
M PROTEIN MFR UR ELPH: 0 %
M PROTEIN SERPL ELPH-MCNC: 0 G/DL
M TB IFN-G BLD-IMP: NEGATIVE
M TB IFN-G BLD-IMP: NEGATIVE
M TB IFN-G CD4+ BCKGRND COR BLD-ACNC: 10 IU/ML
M TB IFN-G CD4+ BCKGRND COR BLD-ACNC: 2.67 IU/ML
MAGNESIUM SERPL-MCNC: 1.5 MG/DL (ref 1.7–2.3)
MAGNESIUM SERPL-MCNC: 1.6 MG/DL (ref 1.7–2.3)
MAGNESIUM SERPL-MCNC: 1.6 MG/DL (ref 1.7–2.3)
MAGNESIUM SERPL-MCNC: 1.7 MG/DL (ref 1.7–2.3)
MAGNESIUM SERPL-MCNC: 1.8 MG/DL (ref 1.7–2.3)
MAGNESIUM SERPL-MCNC: 1.9 MG/DL (ref 1.7–2.3)
MAGNESIUM SERPL-MCNC: 2 MG/DL (ref 1.7–2.3)
MAGNESIUM SERPL-MCNC: 2 MG/DL (ref 1.7–2.3)
MAGNESIUM SERPL-MCNC: 2.1 MG/DL (ref 1.7–2.3)
MAGNESIUM SERPL-MCNC: 2.2 MG/DL (ref 1.7–2.3)
MAGNESIUM SERPL-MCNC: 2.3 MG/DL (ref 1.7–2.3)
MAGNESIUM SERPL-MCNC: 2.3 MG/DL (ref 1.7–2.3)
MAGNESIUM SERPL-MCNC: 2.4 MG/DL (ref 1.7–2.3)
MAGNESIUM SERPL-MCNC: 2.5 MG/DL (ref 1.7–2.3)
MCH RBC QN AUTO: 27.7 PG (ref 26.5–33)
MCH RBC QN AUTO: 27.8 PG (ref 26.5–33)
MCH RBC QN AUTO: 27.9 PG (ref 26.5–33)
MCH RBC QN AUTO: 28.1 PG (ref 26.5–33)
MCH RBC QN AUTO: 28.4 PG (ref 26.5–33)
MCH RBC QN AUTO: 28.9 PG (ref 26.5–33)
MCH RBC QN AUTO: 29 PG (ref 26.5–33)
MCH RBC QN AUTO: 29.2 PG (ref 26.5–33)
MCH RBC QN AUTO: 29.2 PG (ref 26.5–33)
MCH RBC QN AUTO: 29.3 PG (ref 26.5–33)
MCH RBC QN AUTO: 29.3 PG (ref 26.5–33)
MCH RBC QN AUTO: 29.4 PG (ref 26.5–33)
MCH RBC QN AUTO: 29.4 PG (ref 26.5–33)
MCH RBC QN AUTO: 29.5 PG (ref 26.5–33)
MCH RBC QN AUTO: 29.6 PG (ref 26.5–33)
MCH RBC QN AUTO: 29.7 PG (ref 26.5–33)
MCH RBC QN AUTO: 29.8 PG (ref 26.5–33)
MCH RBC QN AUTO: 29.9 PG (ref 26.5–33)
MCH RBC QN AUTO: 30 PG (ref 26.5–33)
MCH RBC QN AUTO: 30.1 PG (ref 26.5–33)
MCH RBC QN AUTO: 30.2 PG (ref 26.5–33)
MCH RBC QN AUTO: 30.2 PG (ref 26.5–33)
MCH RBC QN AUTO: 30.3 PG (ref 26.5–33)
MCH RBC QN AUTO: 30.4 PG (ref 26.5–33)
MCH RBC QN AUTO: 30.6 PG (ref 26.5–33)
MCH RBC QN AUTO: 30.7 PG (ref 26.5–33)
MCH RBC QN AUTO: 30.7 PG (ref 26.5–33)
MCH RBC QN AUTO: 30.9 PG (ref 26.5–33)
MCH RBC QN AUTO: 31 PG (ref 26.5–33)
MCH RBC QN AUTO: 31.1 PG (ref 26.5–33)
MCH RBC QN AUTO: 31.2 PG (ref 26.5–33)
MCH RBC QN AUTO: 31.3 PG (ref 26.5–33)
MCH RBC QN AUTO: 31.5 PG (ref 26.5–33)
MCH RBC QN AUTO: 31.5 PG (ref 26.5–33)
MCH RBC QN AUTO: 31.6 PG (ref 26.5–33)
MCH RBC QN AUTO: 31.7 PG (ref 26.5–33)
MCH RBC QN AUTO: 31.8 PG (ref 26.5–33)
MCH RBC QN AUTO: 31.9 PG (ref 26.5–33)
MCHC RBC AUTO-ENTMCNC: 29.2 G/DL (ref 31.5–36.5)
MCHC RBC AUTO-ENTMCNC: 29.4 G/DL (ref 31.5–36.5)
MCHC RBC AUTO-ENTMCNC: 29.4 G/DL (ref 31.5–36.5)
MCHC RBC AUTO-ENTMCNC: 29.5 G/DL (ref 31.5–36.5)
MCHC RBC AUTO-ENTMCNC: 29.6 G/DL (ref 31.5–36.5)
MCHC RBC AUTO-ENTMCNC: 29.9 G/DL (ref 31.5–36.5)
MCHC RBC AUTO-ENTMCNC: 30 G/DL (ref 31.5–36.5)
MCHC RBC AUTO-ENTMCNC: 30 G/DL (ref 31.5–36.5)
MCHC RBC AUTO-ENTMCNC: 30.1 G/DL (ref 31.5–36.5)
MCHC RBC AUTO-ENTMCNC: 30.1 G/DL (ref 31.5–36.5)
MCHC RBC AUTO-ENTMCNC: 30.2 G/DL (ref 31.5–36.5)
MCHC RBC AUTO-ENTMCNC: 30.2 G/DL (ref 31.5–36.5)
MCHC RBC AUTO-ENTMCNC: 30.3 G/DL (ref 31.5–36.5)
MCHC RBC AUTO-ENTMCNC: 30.4 G/DL (ref 31.5–36.5)
MCHC RBC AUTO-ENTMCNC: 30.5 G/DL (ref 31.5–36.5)
MCHC RBC AUTO-ENTMCNC: 30.5 G/DL (ref 31.5–36.5)
MCHC RBC AUTO-ENTMCNC: 30.6 G/DL (ref 31.5–36.5)
MCHC RBC AUTO-ENTMCNC: 30.7 G/DL (ref 31.5–36.5)
MCHC RBC AUTO-ENTMCNC: 30.8 G/DL (ref 31.5–36.5)
MCHC RBC AUTO-ENTMCNC: 30.9 G/DL (ref 31.5–36.5)
MCHC RBC AUTO-ENTMCNC: 31 G/DL (ref 31.5–36.5)
MCHC RBC AUTO-ENTMCNC: 31.2 G/DL (ref 31.5–36.5)
MCHC RBC AUTO-ENTMCNC: 31.3 G/DL (ref 31.5–36.5)
MCHC RBC AUTO-ENTMCNC: 31.4 G/DL (ref 31.5–36.5)
MCHC RBC AUTO-ENTMCNC: 31.4 G/DL (ref 31.5–36.5)
MCHC RBC AUTO-ENTMCNC: 32.1 G/DL (ref 31.5–36.5)
MCV RBC AUTO: 100 FL (ref 78–100)
MCV RBC AUTO: 101 FL (ref 78–100)
MCV RBC AUTO: 102 FL (ref 78–100)
MCV RBC AUTO: 103 FL (ref 78–100)
MCV RBC AUTO: 105 FL (ref 78–100)
MCV RBC AUTO: 106 FL (ref 78–100)
MCV RBC AUTO: 106 FL (ref 78–100)
MCV RBC AUTO: 107 FL (ref 78–100)
MCV RBC AUTO: 86 FL (ref 78–100)
MCV RBC AUTO: 91 FL (ref 78–100)
MCV RBC AUTO: 93 FL (ref 78–100)
MCV RBC AUTO: 94 FL (ref 78–100)
MCV RBC AUTO: 94 FL (ref 78–100)
MCV RBC AUTO: 95 FL (ref 78–100)
MCV RBC AUTO: 96 FL (ref 78–100)
MCV RBC AUTO: 96 FL (ref 78–100)
MCV RBC AUTO: 97 FL (ref 78–100)
MCV RBC AUTO: 98 FL (ref 78–100)
MCV RBC AUTO: 99 FL (ref 78–100)
MCV RBC AUTO: 99 FL (ref 78–100)
MITOGEN IGNF BCKGRD COR BLD-ACNC: -0.01 IU/ML
MITOGEN IGNF BCKGRD COR BLD-ACNC: 0.01 IU/ML
MITOGEN IGNF BCKGRD COR BLD-ACNC: 0.04 IU/ML
MITOGEN IGNF BCKGRD COR BLD-ACNC: 0.05 IU/ML
MONOCYTES # BLD AUTO: 0.2 10E3/UL (ref 0–1.3)
MONOCYTES # BLD AUTO: 0.4 10E3/UL (ref 0–1.3)
MONOCYTES # BLD AUTO: 0.6 10E3/UL (ref 0–1.3)
MONOCYTES # BLD AUTO: 0.7 10E3/UL (ref 0–1.3)
MONOCYTES # BLD AUTO: 0.8 10E3/UL (ref 0–1.3)
MONOCYTES # BLD AUTO: 0.9 10E3/UL (ref 0–1.3)
MONOCYTES # BLD AUTO: 1 10E3/UL (ref 0–1.3)
MONOCYTES # BLD AUTO: 1.1 10E3/UL (ref 0–1.3)
MONOCYTES # BLD AUTO: 1.2 10E3/UL (ref 0–1.3)
MONOCYTES # BLD AUTO: 1.3 10E3/UL (ref 0–1.3)
MONOCYTES # BLD AUTO: 1.5 10E3/UL (ref 0–1.3)
MONOCYTES # BLD MANUAL: 1 10E3/UL (ref 0–1.3)
MONOCYTES NFR BLD AUTO: 10 %
MONOCYTES NFR BLD AUTO: 11 %
MONOCYTES NFR BLD AUTO: 12 %
MONOCYTES NFR BLD AUTO: 12 %
MONOCYTES NFR BLD AUTO: 2 %
MONOCYTES NFR BLD AUTO: 3 %
MONOCYTES NFR BLD AUTO: 4 %
MONOCYTES NFR BLD AUTO: 5 %
MONOCYTES NFR BLD AUTO: 6 %
MONOCYTES NFR BLD AUTO: 6 %
MONOCYTES NFR BLD AUTO: 7 %
MONOCYTES NFR BLD AUTO: 7 %
MONOCYTES NFR BLD AUTO: 8 %
MONOCYTES NFR BLD AUTO: 9 %
MONOCYTES NFR BLD MANUAL: 10 %
MONOS+MACROS NFR FLD MANUAL: 2 %
MONOS+MACROS NFR FLD MANUAL: 39 %
MUCOUS THREADS #/AREA URNS LPF: PRESENT /LPF
NEUTROPHILS # BLD AUTO: 10.2 10E3/UL (ref 1.6–8.3)
NEUTROPHILS # BLD AUTO: 11.5 10E3/UL (ref 1.6–8.3)
NEUTROPHILS # BLD AUTO: 12.2 10E3/UL (ref 1.6–8.3)
NEUTROPHILS # BLD AUTO: 12.9 10E3/UL (ref 1.6–8.3)
NEUTROPHILS # BLD AUTO: 19.5 10E3/UL (ref 1.6–8.3)
NEUTROPHILS # BLD AUTO: 19.6 10E3/UL (ref 1.6–8.3)
NEUTROPHILS # BLD AUTO: 28.4 10E3/UL (ref 1.6–8.3)
NEUTROPHILS # BLD AUTO: 5.3 10E3/UL (ref 1.6–8.3)
NEUTROPHILS # BLD AUTO: 7.1 10E3/UL (ref 1.6–8.3)
NEUTROPHILS # BLD AUTO: 7.2 10E3/UL (ref 1.6–8.3)
NEUTROPHILS # BLD AUTO: 7.3 10E3/UL (ref 1.6–8.3)
NEUTROPHILS # BLD AUTO: 7.4 10E3/UL (ref 1.6–8.3)
NEUTROPHILS # BLD AUTO: 7.6 10E3/UL (ref 1.6–8.3)
NEUTROPHILS # BLD AUTO: 7.9 10E3/UL (ref 1.6–8.3)
NEUTROPHILS # BLD AUTO: 8.3 10E3/UL (ref 1.6–8.3)
NEUTROPHILS # BLD AUTO: 8.7 10E3/UL (ref 1.6–8.3)
NEUTROPHILS # BLD AUTO: 8.8 10E3/UL (ref 1.6–8.3)
NEUTROPHILS # BLD AUTO: 8.8 10E3/UL (ref 1.6–8.3)
NEUTROPHILS # BLD AUTO: 9.2 10E3/UL (ref 1.6–8.3)
NEUTROPHILS # BLD AUTO: 9.3 10E3/UL (ref 1.6–8.3)
NEUTROPHILS # BLD AUTO: 9.5 10E3/UL (ref 1.6–8.3)
NEUTROPHILS # BLD AUTO: 9.5 10E3/UL (ref 1.6–8.3)
NEUTROPHILS # BLD AUTO: 9.8 10E3/UL (ref 1.6–8.3)
NEUTROPHILS # BLD MANUAL: 8 10E3/UL (ref 1.6–8.3)
NEUTROPHILS NFR BLD AUTO: 72 %
NEUTROPHILS NFR BLD AUTO: 73 %
NEUTROPHILS NFR BLD AUTO: 74 %
NEUTROPHILS NFR BLD AUTO: 74 %
NEUTROPHILS NFR BLD AUTO: 75 %
NEUTROPHILS NFR BLD AUTO: 75 %
NEUTROPHILS NFR BLD AUTO: 76 %
NEUTROPHILS NFR BLD AUTO: 76 %
NEUTROPHILS NFR BLD AUTO: 78 %
NEUTROPHILS NFR BLD AUTO: 82 %
NEUTROPHILS NFR BLD AUTO: 82 %
NEUTROPHILS NFR BLD AUTO: 84 %
NEUTROPHILS NFR BLD AUTO: 85 %
NEUTROPHILS NFR BLD AUTO: 85 %
NEUTROPHILS NFR BLD AUTO: 88 %
NEUTROPHILS NFR BLD AUTO: 89 %
NEUTROPHILS NFR BLD AUTO: 90 %
NEUTROPHILS NFR BLD AUTO: 90 %
NEUTROPHILS NFR BLD AUTO: 91 %
NEUTROPHILS NFR BLD AUTO: 92 %
NEUTROPHILS NFR BLD AUTO: 93 %
NEUTROPHILS NFR BLD MANUAL: 78 %
NEUTS BAND NFR FLD MANUAL: 19 %
NEUTS BAND NFR FLD MANUAL: 79 %
NITRATE UR QL: NEGATIVE
NOROV GI+II ORF1-ORF2 JNC STL QL NAA+PR: NOT DETECTED
NRBC # BLD AUTO: 0 10E3/UL
NRBC # BLD AUTO: 0.1 10E3/UL
NRBC BLD AUTO-RTO: 0 /100
NRBC BLD AUTO-RTO: 1 /100
NT-PROBNP SERPL-MCNC: 1407 PG/ML (ref 0–1800)
NT-PROBNP SERPL-MCNC: 2308 PG/ML (ref 0–1800)
NT-PROBNP SERPL-MCNC: 2866 PG/ML (ref 0–1800)
NT-PROBNP SERPL-MCNC: 3870 PG/ML (ref 0–1800)
NT-PROBNP SERPL-MCNC: 4340 PG/ML (ref 0–1800)
NT-PROBNP SERPL-MCNC: 4517 PG/ML (ref 0–1800)
O2/TOTAL GAS SETTING VFR VENT: 100 %
O2/TOTAL GAS SETTING VFR VENT: 100 %
O2/TOTAL GAS SETTING VFR VENT: 28 %
O2/TOTAL GAS SETTING VFR VENT: 28 %
O2/TOTAL GAS SETTING VFR VENT: 92 %
OXYHGB MFR BLDV: 13.7 % (ref 70–75)
OXYHGB MFR BLDV: 31.6 % (ref 70–75)
OXYHGB MFR BLDV: 32.8 % (ref 70–75)
OXYHGB MFR BLDV: 66 % (ref 70–75)
OXYHGB MFR BLDV: 72.6 % (ref 70–75)
P AXIS - MUSE: NORMAL DEGREES
PATH REPORT.COMMENTS IMP SPEC: NORMAL
PATH REPORT.COMMENTS IMP SPEC: NORMAL
PATH REPORT.FINAL DX SPEC: NORMAL
PATH REPORT.FINAL DX SPEC: NORMAL
PATH REPORT.GROSS SPEC: NORMAL
PATH REPORT.GROSS SPEC: NORMAL
PATH REPORT.MICROSCOPIC SPEC OTHER STN: NORMAL
PATH REPORT.MICROSCOPIC SPEC OTHER STN: NORMAL
PATH REPORT.RELEVANT HX SPEC: NORMAL
PATH REPORT.RELEVANT HX SPEC: NORMAL
PCO2 BLDV: 45 MM HG (ref 40–50)
PCO2 BLDV: 46 MM HG (ref 35–50)
PCO2 BLDV: 47 MM HG (ref 40–50)
PCO2 BLDV: 48 MM HG (ref 35–50)
PCO2 BLDV: 48 MM HG (ref 40–50)
PCO2 BLDV: 52 MM HG (ref 40–50)
PCO2 BLDV: 53 MM HG (ref 35–50)
PCO2 BLDV: 58 MM HG (ref 40–50)
PCO2 BLDV: 63 MM HG (ref 35–50)
PCO2 BLDV: 66 MM HG (ref 35–50)
PH BLDV: 7.23 [PH] (ref 7.35–7.45)
PH BLDV: 7.3 [PH] (ref 7.32–7.43)
PH BLDV: 7.31 [PH] (ref 7.35–7.45)
PH BLDV: 7.32 [PH] (ref 7.35–7.45)
PH BLDV: 7.34 [PH] (ref 7.32–7.43)
PH BLDV: 7.37 [PH] (ref 7.32–7.43)
PH BLDV: 7.38 [PH] (ref 7.32–7.43)
PH BLDV: 7.39 [PH] (ref 7.32–7.43)
PH BLDV: 7.39 [PH] (ref 7.35–7.45)
PH BLDV: 7.4 [PH] (ref 7.35–7.45)
PH UR STRIP: 5 [PH] (ref 5–7)
PH UR STRIP: 5 [PH] (ref 5–7)
PH UR STRIP: 5.5 [PH] (ref 5–7)
PH UR STRIP: 6 [PH] (ref 5–7)
PH UR STRIP: 6.5 [PH] (ref 5–7)
PH: 7.32 (ref 7.35–7.45)
PHOSPHATE SERPL-MCNC: 2.6 MG/DL (ref 2.5–4.5)
PHOSPHATE SERPL-MCNC: 2.7 MG/DL (ref 2.5–4.5)
PHOSPHATE SERPL-MCNC: 2.9 MG/DL (ref 2.5–4.5)
PHOSPHATE SERPL-MCNC: 3.1 MG/DL (ref 2.5–4.5)
PHOSPHATE SERPL-MCNC: 3.9 MG/DL (ref 2.5–4.5)
PHQ9 SCORE: 12
PLAT MORPH BLD: ABNORMAL
PLATELET # BLD AUTO: 249 10E3/UL (ref 150–450)
PLATELET # BLD AUTO: 259 10E3/UL (ref 150–450)
PLATELET # BLD AUTO: 264 10E3/UL (ref 150–450)
PLATELET # BLD AUTO: 269 10E3/UL (ref 150–450)
PLATELET # BLD AUTO: 284 10E3/UL (ref 150–450)
PLATELET # BLD AUTO: 292 10E3/UL (ref 150–450)
PLATELET # BLD AUTO: 296 10E3/UL (ref 150–450)
PLATELET # BLD AUTO: 306 10E3/UL (ref 150–450)
PLATELET # BLD AUTO: 310 10E3/UL (ref 150–450)
PLATELET # BLD AUTO: 311 10E3/UL (ref 150–450)
PLATELET # BLD AUTO: 320 10E3/UL (ref 150–450)
PLATELET # BLD AUTO: 321 10E3/UL (ref 150–450)
PLATELET # BLD AUTO: 327 10E3/UL (ref 150–450)
PLATELET # BLD AUTO: 332 10E3/UL (ref 150–450)
PLATELET # BLD AUTO: 333 10E3/UL (ref 150–450)
PLATELET # BLD AUTO: 334 10E3/UL (ref 150–450)
PLATELET # BLD AUTO: 336 10E3/UL (ref 150–450)
PLATELET # BLD AUTO: 340 10E3/UL (ref 150–450)
PLATELET # BLD AUTO: 341 10E3/UL (ref 150–450)
PLATELET # BLD AUTO: 343 10E3/UL (ref 150–450)
PLATELET # BLD AUTO: 344 10E3/UL (ref 150–450)
PLATELET # BLD AUTO: 346 10E3/UL (ref 150–450)
PLATELET # BLD AUTO: 349 10E3/UL (ref 150–450)
PLATELET # BLD AUTO: 351 10E3/UL (ref 150–450)
PLATELET # BLD AUTO: 351 10E3/UL (ref 150–450)
PLATELET # BLD AUTO: 354 10E3/UL (ref 150–450)
PLATELET # BLD AUTO: 358 10E3/UL (ref 150–450)
PLATELET # BLD AUTO: 358 10E3/UL (ref 150–450)
PLATELET # BLD AUTO: 361 10E3/UL (ref 150–450)
PLATELET # BLD AUTO: 366 10E3/UL (ref 150–450)
PLATELET # BLD AUTO: 370 10E3/UL (ref 150–450)
PLATELET # BLD AUTO: 381 10E3/UL (ref 150–450)
PLATELET # BLD AUTO: 382 10E3/UL (ref 150–450)
PLATELET # BLD AUTO: 390 10E3/UL (ref 150–450)
PLATELET # BLD AUTO: 391 10E3/UL (ref 150–450)
PLATELET # BLD AUTO: 407 10E3/UL (ref 150–450)
PLATELET # BLD AUTO: 409 10E3/UL (ref 150–450)
PLATELET # BLD AUTO: 410 10E3/UL (ref 150–450)
PLATELET # BLD AUTO: 442 10E3/UL (ref 150–450)
PLATELET # BLD AUTO: 521 10E3/UL (ref 150–450)
PLATELET # BLD AUTO: 560 10E3/UL (ref 150–450)
PLATELET # BLD AUTO: 576 10E3/UL (ref 150–450)
PLATELET # BLD AUTO: 602 10E3/UL (ref 150–450)
PLATELET # BLD AUTO: 609 10E3/UL (ref 150–450)
PO2 BLDV: 12 MM HG (ref 25–47)
PO2 BLDV: 15 MM HG (ref 25–47)
PO2 BLDV: 20 MM HG (ref 25–47)
PO2 BLDV: 21 MM HG (ref 25–47)
PO2 BLDV: 22 MM HG (ref 25–47)
PO2 BLDV: 23 MM HG (ref 25–47)
PO2 BLDV: 35 MM HG (ref 25–47)
PO2 BLDV: 45 MM HG (ref 25–47)
POLYCHROMASIA BLD QL SMEAR: SLIGHT
POTASSIUM SERPL-SCNC: 3.6 MMOL/L (ref 3.4–5.3)
POTASSIUM SERPL-SCNC: 3.6 MMOL/L (ref 3.4–5.3)
POTASSIUM SERPL-SCNC: 3.8 MMOL/L (ref 3.4–5.3)
POTASSIUM SERPL-SCNC: 3.9 MMOL/L (ref 3.4–5.3)
POTASSIUM SERPL-SCNC: 3.9 MMOL/L (ref 3.4–5.3)
POTASSIUM SERPL-SCNC: 4 MMOL/L (ref 3.4–5.3)
POTASSIUM SERPL-SCNC: 4.1 MMOL/L (ref 3.4–5.3)
POTASSIUM SERPL-SCNC: 4.2 MMOL/L (ref 3.4–5.3)
POTASSIUM SERPL-SCNC: 4.3 MMOL/L (ref 3.4–5.3)
POTASSIUM SERPL-SCNC: 4.4 MMOL/L (ref 3.4–5.3)
POTASSIUM SERPL-SCNC: 4.5 MMOL/L (ref 3.4–5.3)
POTASSIUM SERPL-SCNC: 4.6 MMOL/L (ref 3.4–5.3)
POTASSIUM SERPL-SCNC: 4.7 MMOL/L (ref 3.4–5.3)
POTASSIUM SERPL-SCNC: 4.7 MMOL/L (ref 3.4–5.3)
POTASSIUM SERPL-SCNC: 4.8 MMOL/L (ref 3.4–5.3)
POTASSIUM SERPL-SCNC: 4.8 MMOL/L (ref 3.4–5.3)
POTASSIUM SERPL-SCNC: 4.9 MMOL/L (ref 3.4–5.3)
POTASSIUM SERPL-SCNC: 4.9 MMOL/L (ref 3.4–5.3)
POTASSIUM SERPL-SCNC: 5.6 MMOL/L (ref 3.4–5.3)
PR INTERVAL - MUSE: NORMAL MS
PROCALCITONIN SERPL IA-MCNC: 0.04 NG/ML
PROCALCITONIN SERPL IA-MCNC: 0.08 NG/ML
PROCALCITONIN SERPL IA-MCNC: 0.11 NG/ML
PROCALCITONIN SERPL IA-MCNC: 0.33 NG/ML
PROT FLD-MCNC: 1.3 G/DL
PROT FLD-MCNC: 1.5 G/DL
PROT PATTERN SERPL ELPH-IMP: ABNORMAL
PROT PATTERN UR ELPH-IMP: ABNORMAL
PROT SERPL-MCNC: 4.9 G/DL (ref 6.4–8.3)
PROT SERPL-MCNC: 5.3 G/DL (ref 6.4–8.3)
PROT SERPL-MCNC: 5.5 G/DL (ref 6.4–8.3)
PROT SERPL-MCNC: 5.5 G/DL (ref 6.4–8.3)
PROT SERPL-MCNC: 5.6 G/DL (ref 6.4–8.3)
PROT SERPL-MCNC: 5.7 G/DL (ref 6.4–8.3)
PROT SERPL-MCNC: 5.8 G/DL (ref 6.4–8.3)
PROT SERPL-MCNC: 5.8 G/DL (ref 6.4–8.3)
PROT SERPL-MCNC: 5.9 G/DL (ref 6.4–8.3)
PROT SERPL-MCNC: 5.9 G/DL (ref 6.4–8.3)
PROT SERPL-MCNC: 6.1 G/DL (ref 6.4–8.3)
PROT SERPL-MCNC: 6.4 G/DL (ref 6.4–8.3)
PROT SERPL-MCNC: 6.5 G/DL (ref 6.4–8.3)
PROTEIN BODY FLUID SOURCE: NORMAL
PROTEIN BODY FLUID SOURCE: NORMAL
PTH-INTACT SERPL-MCNC: 67 PG/ML (ref 15–65)
QRS DURATION - MUSE: 0 MS
QRS DURATION - MUSE: 104 MS
QRS DURATION - MUSE: 74 MS
QRS DURATION - MUSE: 78 MS
QRS DURATION - MUSE: 78 MS
QRS DURATION - MUSE: 86 MS
QRS DURATION - MUSE: 88 MS
QT - MUSE: 0 MS
QT - MUSE: 300 MS
QT - MUSE: 326 MS
QT - MUSE: 344 MS
QT - MUSE: 346 MS
QT - MUSE: 362 MS
QT - MUSE: 374 MS
QTC - MUSE: 0 MS
QTC - MUSE: 420 MS
QTC - MUSE: 433 MS
QTC - MUSE: 434 MS
QTC - MUSE: 436 MS
QTC - MUSE: 436 MS
QTC - MUSE: 439 MS
QUANTIFERON MITOGEN: 10 IU/ML
QUANTIFERON MITOGEN: 2.73 IU/ML
QUANTIFERON NIL TUBE: 0 IU/ML
QUANTIFERON NIL TUBE: 0.06 IU/ML
QUANTIFERON TB1 TUBE: 0.05 IU/ML
QUANTIFERON TB1 TUBE: 0.05 IU/ML
QUANTIFERON TB2 TUBE: 0.04
QUANTIFERON TB2 TUBE: 0.07
R AXIS - MUSE: 0 DEGREES
R AXIS - MUSE: 38 DEGREES
R AXIS - MUSE: 39 DEGREES
R AXIS - MUSE: 52 DEGREES
R AXIS - MUSE: 52 DEGREES
R AXIS - MUSE: 54 DEGREES
R AXIS - MUSE: 59 DEGREES
RBC # BLD AUTO: 2.69 10E6/UL (ref 3.8–5.2)
RBC # BLD AUTO: 2.72 10E6/UL (ref 3.8–5.2)
RBC # BLD AUTO: 2.84 10E6/UL (ref 3.8–5.2)
RBC # BLD AUTO: 2.84 10E6/UL (ref 3.8–5.2)
RBC # BLD AUTO: 2.88 10E6/UL (ref 3.8–5.2)
RBC # BLD AUTO: 2.9 10E6/UL (ref 3.8–5.2)
RBC # BLD AUTO: 2.91 10E6/UL (ref 3.8–5.2)
RBC # BLD AUTO: 2.97 10E6/UL (ref 3.8–5.2)
RBC # BLD AUTO: 3 10E6/UL (ref 3.8–5.2)
RBC # BLD AUTO: 3 10E6/UL (ref 3.8–5.2)
RBC # BLD AUTO: 3.01 10E6/UL (ref 3.8–5.2)
RBC # BLD AUTO: 3.03 10E6/UL (ref 3.8–5.2)
RBC # BLD AUTO: 3.03 10E6/UL (ref 3.8–5.2)
RBC # BLD AUTO: 3.05 10E6/UL (ref 3.8–5.2)
RBC # BLD AUTO: 3.05 10E6/UL (ref 3.8–5.2)
RBC # BLD AUTO: 3.07 10E6/UL (ref 3.8–5.2)
RBC # BLD AUTO: 3.09 10E6/UL (ref 3.8–5.2)
RBC # BLD AUTO: 3.1 10E6/UL (ref 3.8–5.2)
RBC # BLD AUTO: 3.11 10E6/UL (ref 3.8–5.2)
RBC # BLD AUTO: 3.12 10E6/UL (ref 3.8–5.2)
RBC # BLD AUTO: 3.12 10E6/UL (ref 3.8–5.2)
RBC # BLD AUTO: 3.13 10E6/UL (ref 3.8–5.2)
RBC # BLD AUTO: 3.16 10E6/UL (ref 3.8–5.2)
RBC # BLD AUTO: 3.16 10E6/UL (ref 3.8–5.2)
RBC # BLD AUTO: 3.17 10E6/UL (ref 3.8–5.2)
RBC # BLD AUTO: 3.2 10E6/UL (ref 3.8–5.2)
RBC # BLD AUTO: 3.21 10E6/UL (ref 3.8–5.2)
RBC # BLD AUTO: 3.22 10E6/UL (ref 3.8–5.2)
RBC # BLD AUTO: 3.24 10E6/UL (ref 3.8–5.2)
RBC # BLD AUTO: 3.27 10E6/UL (ref 3.8–5.2)
RBC # BLD AUTO: 3.29 10E6/UL (ref 3.8–5.2)
RBC # BLD AUTO: 3.31 10E6/UL (ref 3.8–5.2)
RBC # BLD AUTO: 3.32 10E6/UL (ref 3.8–5.2)
RBC # BLD AUTO: 3.32 10E6/UL (ref 3.8–5.2)
RBC # BLD AUTO: 3.35 10E6/UL (ref 3.8–5.2)
RBC # BLD AUTO: 3.37 10E6/UL (ref 3.8–5.2)
RBC # BLD AUTO: 3.37 10E6/UL (ref 3.8–5.2)
RBC # BLD AUTO: 3.4 10E6/UL (ref 3.8–5.2)
RBC # BLD AUTO: 3.4 10E6/UL (ref 3.8–5.2)
RBC # BLD AUTO: 3.43 10E6/UL (ref 3.8–5.2)
RBC # BLD AUTO: 3.44 10E6/UL (ref 3.8–5.2)
RBC # BLD AUTO: 3.54 10E6/UL (ref 3.8–5.2)
RBC # BLD AUTO: 3.62 10E6/UL (ref 3.8–5.2)
RBC # FLD: 1000 /UL
RBC # FLD: 390 /UL
RBC MORPH BLD: ABNORMAL
RBC URINE: 1 /HPF
RBC URINE: 10 /HPF
RBC URINE: 2 /HPF
RBC URINE: 4 /HPF
RBC URINE: >182 /HPF
RENAL TUB EPI: 1 /HPF
RSV RNA SPEC NAA+PROBE: NEGATIVE
RVA NSP5 STL QL NAA+PROBE: NOT DETECTED
SALMONELLA SP RPOD STL QL NAA+PROBE: NOT DETECTED
SAO2 % BLDV: 13.9 % (ref 70–75)
SAO2 % BLDV: 32.1 % (ref 70–75)
SAO2 % BLDV: 33.5 % (ref 70–75)
SAO2 % BLDV: 67.5 % (ref 70–75)
SAO2 % BLDV: 73.8 % (ref 70–75)
SARS-COV-2 RNA RESP QL NAA+PROBE: NEGATIVE
SHIGELLA SP+EIEC IPAH STL QL NAA+PROBE: NOT DETECTED
SODIUM SERPL-SCNC: 131 MMOL/L (ref 136–145)
SODIUM SERPL-SCNC: 131 MMOL/L (ref 136–145)
SODIUM SERPL-SCNC: 132 MMOL/L (ref 136–145)
SODIUM SERPL-SCNC: 133 MMOL/L (ref 136–145)
SODIUM SERPL-SCNC: 134 MMOL/L (ref 136–145)
SODIUM SERPL-SCNC: 135 MMOL/L (ref 136–145)
SODIUM SERPL-SCNC: 135 MMOL/L (ref 136–145)
SODIUM SERPL-SCNC: 136 MMOL/L (ref 136–145)
SODIUM SERPL-SCNC: 137 MMOL/L (ref 136–145)
SODIUM SERPL-SCNC: 138 MMOL/L (ref 136–145)
SODIUM SERPL-SCNC: 139 MMOL/L (ref 136–145)
SODIUM SERPL-SCNC: 140 MMOL/L (ref 136–145)
SODIUM SERPL-SCNC: 141 MMOL/L (ref 136–145)
SODIUM SERPL-SCNC: 142 MMOL/L (ref 136–145)
SODIUM SERPL-SCNC: 143 MMOL/L (ref 136–145)
SODIUM SERPL-SCNC: 144 MMOL/L (ref 136–145)
SP GR UR STRIP: 1.01 (ref 1–1.03)
SP GR UR STRIP: 1.01 (ref 1–1.03)
SP GR UR STRIP: 1.02 (ref 1–1.03)
SPECIMEN EXPIRATION DATE: NORMAL
SQUAMOUS EPITHELIAL: 1 /HPF
SQUAMOUS EPITHELIAL: <1 /HPF
SYSTOLIC BLOOD PRESSURE - MUSE: 177 MMHG
SYSTOLIC BLOOD PRESSURE - MUSE: NORMAL MMHG
T AXIS - MUSE: -12 DEGREES
T AXIS - MUSE: -2 DEGREES
T AXIS - MUSE: -25 DEGREES
T AXIS - MUSE: 0 DEGREES
T AXIS - MUSE: 20 DEGREES
T AXIS - MUSE: 29 DEGREES
T AXIS - MUSE: 53 DEGREES
TOTAL PROTEIN SERUM FOR ELP: 5.4 G/DL (ref 6.4–8.3)
TROPONIN T SERPL HS-MCNC: 24 NG/L
TROPONIN T SERPL HS-MCNC: 25 NG/L
TROPONIN T SERPL HS-MCNC: 30 NG/L
TROPONIN T SERPL HS-MCNC: 30 NG/L
TROPONIN T SERPL HS-MCNC: 32 NG/L
TROPONIN T SERPL HS-MCNC: 33 NG/L
TROPONIN T SERPL HS-MCNC: 41 NG/L
TSH SERPL DL<=0.005 MIU/L-ACNC: 3 UIU/ML (ref 0.3–4.2)
UROBILINOGEN UR STRIP-MCNC: 4 MG/DL
UROBILINOGEN UR STRIP-MCNC: <2 MG/DL
UROBILINOGEN UR STRIP-MCNC: NORMAL MG/DL
UROBILINOGEN UR STRIP-MCNC: NORMAL MG/DL
V CHOL+PARA RFBL+TRKH+TNAA STL QL NAA+PR: NOT DETECTED
VENTRICULAR RATE- MUSE: 0 BPM
VENTRICULAR RATE- MUSE: 106 BPM
VENTRICULAR RATE- MUSE: 127 BPM
VENTRICULAR RATE- MUSE: 81 BPM
VENTRICULAR RATE- MUSE: 82 BPM
VENTRICULAR RATE- MUSE: 96 BPM
VENTRICULAR RATE- MUSE: 97 BPM
VIT B12 SERPL-MCNC: 1257 PG/ML (ref 232–1245)
VIT B12 SERPL-MCNC: 981 PG/ML (ref 232–1245)
VITAMIN D2 SERPL-MCNC: <5 UG/L
VITAMIN D3 SERPL-MCNC: 80 UG/L
WBC # BLD AUTO: 10.1 10E3/UL (ref 4–11)
WBC # BLD AUTO: 10.2 10E3/UL (ref 4–11)
WBC # BLD AUTO: 10.2 10E3/UL (ref 4–11)
WBC # BLD AUTO: 10.3 10E3/UL (ref 4–11)
WBC # BLD AUTO: 10.4 10E3/UL (ref 4–11)
WBC # BLD AUTO: 10.7 10E3/UL (ref 4–11)
WBC # BLD AUTO: 10.7 10E3/UL (ref 4–11)
WBC # BLD AUTO: 10.9 10E3/UL (ref 4–11)
WBC # BLD AUTO: 11 10E3/UL (ref 4–11)
WBC # BLD AUTO: 11.1 10E3/UL (ref 4–11)
WBC # BLD AUTO: 11.1 10E3/UL (ref 4–11)
WBC # BLD AUTO: 11.4 10E3/UL (ref 4–11)
WBC # BLD AUTO: 11.7 10E3/UL (ref 4–11)
WBC # BLD AUTO: 11.8 10E3/UL (ref 4–11)
WBC # BLD AUTO: 11.9 10E3/UL (ref 4–11)
WBC # BLD AUTO: 12 10E3/UL (ref 4–11)
WBC # BLD AUTO: 12.1 10E3/UL (ref 4–11)
WBC # BLD AUTO: 12.2 10E3/UL (ref 4–11)
WBC # BLD AUTO: 13.5 10E3/UL (ref 4–11)
WBC # BLD AUTO: 13.5 10E3/UL (ref 4–11)
WBC # BLD AUTO: 13.6 10E3/UL (ref 4–11)
WBC # BLD AUTO: 14.4 10E3/UL (ref 4–11)
WBC # BLD AUTO: 21.4 10E3/UL (ref 4–11)
WBC # BLD AUTO: 21.6 10E3/UL (ref 4–11)
WBC # BLD AUTO: 23.3 10E3/UL (ref 4–11)
WBC # BLD AUTO: 23.6 10E3/UL (ref 4–11)
WBC # BLD AUTO: 31 10E3/UL (ref 4–11)
WBC # BLD AUTO: 49.8 10E3/UL (ref 4–11)
WBC # BLD AUTO: 6.1 10E3/UL (ref 4–11)
WBC # BLD AUTO: 7.2 10E3/UL (ref 4–11)
WBC # BLD AUTO: 7.6 10E3/UL (ref 4–11)
WBC # BLD AUTO: 8.3 10E3/UL (ref 4–11)
WBC # BLD AUTO: 8.4 10E3/UL (ref 4–11)
WBC # BLD AUTO: 8.8 10E3/UL (ref 4–11)
WBC # BLD AUTO: 9.2 10E3/UL (ref 4–11)
WBC # BLD AUTO: 9.3 10E3/UL (ref 4–11)
WBC # BLD AUTO: 9.5 10E3/UL (ref 4–11)
WBC # BLD AUTO: 9.6 10E3/UL (ref 4–11)
WBC # BLD AUTO: 9.8 10E3/UL (ref 4–11)
WBC # FLD AUTO: 129 /UL
WBC # FLD AUTO: 304 /UL
WBC CLUMPS #/AREA URNS HPF: PRESENT /HPF
WBC URINE: 1 /HPF
WBC URINE: 10 /HPF
WBC URINE: 26 /HPF
WBC URINE: 30 /HPF
WBC URINE: 58 /HPF
WBC URINE: 73 /HPF
WBC URINE: >182 /HPF
Y ENTERO RECN STL QL NAA+PROBE: NOT DETECTED
YEAST #/AREA URNS HPF: ABNORMAL /HPF
YEAST #/AREA URNS HPF: ABNORMAL /HPF

## 2023-01-01 PROCEDURE — 250N000009 HC RX 250: Performed by: INTERNAL MEDICINE

## 2023-01-01 PROCEDURE — 210N000001 HC R&B IMCU HEART CARE

## 2023-01-01 PROCEDURE — 85025 COMPLETE CBC W/AUTO DIFF WBC: CPT | Performed by: STUDENT IN AN ORGANIZED HEALTH CARE EDUCATION/TRAINING PROGRAM

## 2023-01-01 PROCEDURE — 99309 SBSQ NF CARE MODERATE MDM 30: CPT | Performed by: PHYSICIAN ASSISTANT

## 2023-01-01 PROCEDURE — 99223 1ST HOSP IP/OBS HIGH 75: CPT | Performed by: INTERNAL MEDICINE

## 2023-01-01 PROCEDURE — 99310 SBSQ NF CARE HIGH MDM 45: CPT | Performed by: NURSE PRACTITIONER

## 2023-01-01 PROCEDURE — 250N000011 HC RX IP 250 OP 636: Performed by: INTERNAL MEDICINE

## 2023-01-01 PROCEDURE — 99222 1ST HOSP IP/OBS MODERATE 55: CPT | Performed by: INTERNAL MEDICINE

## 2023-01-01 PROCEDURE — 97530 THERAPEUTIC ACTIVITIES: CPT | Mod: GP

## 2023-01-01 PROCEDURE — 99232 SBSQ HOSP IP/OBS MODERATE 35: CPT | Performed by: PHYSICIAN ASSISTANT

## 2023-01-01 PROCEDURE — 97530 THERAPEUTIC ACTIVITIES: CPT | Mod: GP | Performed by: PHYSICAL THERAPIST

## 2023-01-01 PROCEDURE — 250N000013 HC RX MED GY IP 250 OP 250 PS 637

## 2023-01-01 PROCEDURE — 80048 BASIC METABOLIC PNL TOTAL CA: CPT | Mod: ORL | Performed by: NURSE PRACTITIONER

## 2023-01-01 PROCEDURE — 93971 EXTREMITY STUDY: CPT | Mod: LT

## 2023-01-01 PROCEDURE — 85025 COMPLETE CBC W/AUTO DIFF WBC: CPT | Mod: ORL | Performed by: FAMILY MEDICINE

## 2023-01-01 PROCEDURE — 99232 SBSQ HOSP IP/OBS MODERATE 35: CPT | Performed by: INTERNAL MEDICINE

## 2023-01-01 PROCEDURE — 36415 COLL VENOUS BLD VENIPUNCTURE: CPT | Performed by: INTERNAL MEDICINE

## 2023-01-01 PROCEDURE — 84484 ASSAY OF TROPONIN QUANT: CPT | Mod: 91 | Performed by: EMERGENCY MEDICINE

## 2023-01-01 PROCEDURE — 250N000011 HC RX IP 250 OP 636: Performed by: PHYSICIAN ASSISTANT

## 2023-01-01 PROCEDURE — 97535 SELF CARE MNGMENT TRAINING: CPT | Mod: GO

## 2023-01-01 PROCEDURE — 250N000013 HC RX MED GY IP 250 OP 250 PS 637: Performed by: STUDENT IN AN ORGANIZED HEALTH CARE EDUCATION/TRAINING PROGRAM

## 2023-01-01 PROCEDURE — 128N000003 HC R&B REHAB

## 2023-01-01 PROCEDURE — 97129 THER IVNTJ 1ST 15 MIN: CPT | Mod: GN

## 2023-01-01 PROCEDURE — 81001 URINALYSIS AUTO W/SCOPE: CPT | Performed by: INTERNAL MEDICINE

## 2023-01-01 PROCEDURE — 99231 SBSQ HOSP IP/OBS SF/LOW 25: CPT | Performed by: INTERNAL MEDICINE

## 2023-01-01 PROCEDURE — 999N000141 HC STATISTIC PRE-PROCEDURE NURSING ASSESSMENT: Performed by: STUDENT IN AN ORGANIZED HEALTH CARE EDUCATION/TRAINING PROGRAM

## 2023-01-01 PROCEDURE — 83735 ASSAY OF MAGNESIUM: CPT | Performed by: PHYSICIAN ASSISTANT

## 2023-01-01 PROCEDURE — 99232 SBSQ HOSP IP/OBS MODERATE 35: CPT | Performed by: FAMILY MEDICINE

## 2023-01-01 PROCEDURE — 83735 ASSAY OF MAGNESIUM: CPT | Performed by: INTERNAL MEDICINE

## 2023-01-01 PROCEDURE — 250N000009 HC RX 250: Performed by: PHYSICIAN ASSISTANT

## 2023-01-01 PROCEDURE — 80048 BASIC METABOLIC PNL TOTAL CA: CPT | Performed by: STUDENT IN AN ORGANIZED HEALTH CARE EDUCATION/TRAINING PROGRAM

## 2023-01-01 PROCEDURE — 85610 PROTHROMBIN TIME: CPT | Performed by: EMERGENCY MEDICINE

## 2023-01-01 PROCEDURE — 36415 COLL VENOUS BLD VENIPUNCTURE: CPT | Performed by: EMERGENCY MEDICINE

## 2023-01-01 PROCEDURE — 80048 BASIC METABOLIC PNL TOTAL CA: CPT | Mod: ORL | Performed by: FAMILY MEDICINE

## 2023-01-01 PROCEDURE — 250N000011 HC RX IP 250 OP 636: Performed by: STUDENT IN AN ORGANIZED HEALTH CARE EDUCATION/TRAINING PROGRAM

## 2023-01-01 PROCEDURE — 80048 BASIC METABOLIC PNL TOTAL CA: CPT | Performed by: INTERNAL MEDICINE

## 2023-01-01 PROCEDURE — 99207 PR NO CHARGE LOS: CPT | Performed by: INTERNAL MEDICINE

## 2023-01-01 PROCEDURE — 99285 EMERGENCY DEPT VISIT HI MDM: CPT | Mod: 25

## 2023-01-01 PROCEDURE — 82803 BLOOD GASES ANY COMBINATION: CPT | Performed by: INTERNAL MEDICINE

## 2023-01-01 PROCEDURE — 99233 SBSQ HOSP IP/OBS HIGH 50: CPT | Performed by: INTERNAL MEDICINE

## 2023-01-01 PROCEDURE — 999N000127 HC STATISTIC PERIPHERAL IV START W US GUIDANCE

## 2023-01-01 PROCEDURE — 94640 AIRWAY INHALATION TREATMENT: CPT

## 2023-01-01 PROCEDURE — 85027 COMPLETE CBC AUTOMATED: CPT | Performed by: INTERNAL MEDICINE

## 2023-01-01 PROCEDURE — 36415 COLL VENOUS BLD VENIPUNCTURE: CPT | Mod: ORL | Performed by: NURSE PRACTITIONER

## 2023-01-01 PROCEDURE — 83880 ASSAY OF NATRIURETIC PEPTIDE: CPT | Performed by: STUDENT IN AN ORGANIZED HEALTH CARE EDUCATION/TRAINING PROGRAM

## 2023-01-01 PROCEDURE — 97535 SELF CARE MNGMENT TRAINING: CPT | Mod: GO | Performed by: STUDENT IN AN ORGANIZED HEALTH CARE EDUCATION/TRAINING PROGRAM

## 2023-01-01 PROCEDURE — 84132 ASSAY OF SERUM POTASSIUM: CPT | Performed by: INTERNAL MEDICINE

## 2023-01-01 PROCEDURE — 258N000003 HC RX IP 258 OP 636: Performed by: HOSPITALIST

## 2023-01-01 PROCEDURE — 999N000215 HC STATISTIC HFNC ADULT NON-CPAP

## 2023-01-01 PROCEDURE — 97110 THERAPEUTIC EXERCISES: CPT | Mod: GP | Performed by: PHYSICAL THERAPIST

## 2023-01-01 PROCEDURE — 84145 PROCALCITONIN (PCT): CPT | Performed by: INTERNAL MEDICINE

## 2023-01-01 PROCEDURE — 250N000012 HC RX MED GY IP 250 OP 636 PS 637: Performed by: PHYSICIAN ASSISTANT

## 2023-01-01 PROCEDURE — 99222 1ST HOSP IP/OBS MODERATE 55: CPT | Mod: 25 | Performed by: INTERNAL MEDICINE

## 2023-01-01 PROCEDURE — 250N000013 HC RX MED GY IP 250 OP 250 PS 637: Performed by: INTERNAL MEDICINE

## 2023-01-01 PROCEDURE — 85025 COMPLETE CBC W/AUTO DIFF WBC: CPT | Performed by: EMERGENCY MEDICINE

## 2023-01-01 PROCEDURE — 85025 COMPLETE CBC W/AUTO DIFF WBC: CPT | Performed by: INTERNAL MEDICINE

## 2023-01-01 PROCEDURE — 96376 TX/PRO/DX INJ SAME DRUG ADON: CPT

## 2023-01-01 PROCEDURE — 83880 ASSAY OF NATRIURETIC PEPTIDE: CPT | Performed by: FAMILY MEDICINE

## 2023-01-01 PROCEDURE — 999N000157 HC STATISTIC RCP TIME EA 10 MIN

## 2023-01-01 PROCEDURE — 250N000013 HC RX MED GY IP 250 OP 250 PS 637: Performed by: PHYSICIAN ASSISTANT

## 2023-01-01 PROCEDURE — 89050 BODY FLUID CELL COUNT: CPT | Performed by: INTERNAL MEDICINE

## 2023-01-01 PROCEDURE — 93970 EXTREMITY STUDY: CPT

## 2023-01-01 PROCEDURE — 80048 BASIC METABOLIC PNL TOTAL CA: CPT | Performed by: FAMILY MEDICINE

## 2023-01-01 PROCEDURE — 99239 HOSP IP/OBS DSCHRG MGMT >30: CPT | Performed by: FAMILY MEDICINE

## 2023-01-01 PROCEDURE — 85610 PROTHROMBIN TIME: CPT | Performed by: PHYSICIAN ASSISTANT

## 2023-01-01 PROCEDURE — 97163 PT EVAL HIGH COMPLEX 45 MIN: CPT | Mod: GP | Performed by: STUDENT IN AN ORGANIZED HEALTH CARE EDUCATION/TRAINING PROGRAM

## 2023-01-01 PROCEDURE — 87506 IADNA-DNA/RNA PROBE TQ 6-11: CPT | Performed by: INTERNAL MEDICINE

## 2023-01-01 PROCEDURE — 84165 PROTEIN E-PHORESIS SERUM: CPT | Mod: TC | Performed by: PATHOLOGY

## 2023-01-01 PROCEDURE — 81003 URINALYSIS AUTO W/O SCOPE: CPT | Performed by: INTERNAL MEDICINE

## 2023-01-01 PROCEDURE — 97110 THERAPEUTIC EXERCISES: CPT | Mod: GP

## 2023-01-01 PROCEDURE — 258N000003 HC RX IP 258 OP 636: Performed by: INTERNAL MEDICINE

## 2023-01-01 PROCEDURE — 71045 X-RAY EXAM CHEST 1 VIEW: CPT

## 2023-01-01 PROCEDURE — 82565 ASSAY OF CREATININE: CPT | Performed by: INTERNAL MEDICINE

## 2023-01-01 PROCEDURE — 94640 AIRWAY INHALATION TREATMENT: CPT | Mod: 76

## 2023-01-01 PROCEDURE — 250N000013 HC RX MED GY IP 250 OP 250 PS 637: Performed by: FAMILY MEDICINE

## 2023-01-01 PROCEDURE — 80053 COMPREHEN METABOLIC PANEL: CPT | Performed by: INTERNAL MEDICINE

## 2023-01-01 PROCEDURE — 72192 CT PELVIS W/O DYE: CPT

## 2023-01-01 PROCEDURE — 97110 THERAPEUTIC EXERCISES: CPT | Mod: GO

## 2023-01-01 PROCEDURE — 250N000025 HC SEVOFLURANE, PER MIN: Performed by: STUDENT IN AN ORGANIZED HEALTH CARE EDUCATION/TRAINING PROGRAM

## 2023-01-01 PROCEDURE — 83880 ASSAY OF NATRIURETIC PEPTIDE: CPT | Performed by: EMERGENCY MEDICINE

## 2023-01-01 PROCEDURE — 87324 CLOSTRIDIUM AG IA: CPT | Mod: ORL | Performed by: NURSE PRACTITIONER

## 2023-01-01 PROCEDURE — 88112 CYTOPATH CELL ENHANCE TECH: CPT | Mod: 26 | Performed by: PATHOLOGY

## 2023-01-01 PROCEDURE — 73630 X-RAY EXAM OF FOOT: CPT | Mod: RT

## 2023-01-01 PROCEDURE — 255N000002 HC RX 255 OP 636: Performed by: STUDENT IN AN ORGANIZED HEALTH CARE EDUCATION/TRAINING PROGRAM

## 2023-01-01 PROCEDURE — 999N000150 HC STATISTIC PT MED CONFERENCE < 30 MIN

## 2023-01-01 PROCEDURE — 93005 ELECTROCARDIOGRAM TRACING: CPT

## 2023-01-01 PROCEDURE — 83690 ASSAY OF LIPASE: CPT | Performed by: EMERGENCY MEDICINE

## 2023-01-01 PROCEDURE — 83735 ASSAY OF MAGNESIUM: CPT | Mod: ORL | Performed by: NURSE PRACTITIONER

## 2023-01-01 PROCEDURE — 87086 URINE CULTURE/COLONY COUNT: CPT | Performed by: EMERGENCY MEDICINE

## 2023-01-01 PROCEDURE — 250N000013 HC RX MED GY IP 250 OP 250 PS 637: Performed by: PHYSICAL MEDICINE & REHABILITATION

## 2023-01-01 PROCEDURE — 93005 ELECTROCARDIOGRAM TRACING: CPT | Performed by: EMERGENCY MEDICINE

## 2023-01-01 PROCEDURE — 82435 ASSAY OF BLOOD CHLORIDE: CPT | Performed by: EMERGENCY MEDICINE

## 2023-01-01 PROCEDURE — 86140 C-REACTIVE PROTEIN: CPT | Performed by: PHYSICIAN ASSISTANT

## 2023-01-01 PROCEDURE — 99309 SBSQ NF CARE MODERATE MDM 30: CPT | Performed by: NURSE PRACTITIONER

## 2023-01-01 PROCEDURE — 97110 THERAPEUTIC EXERCISES: CPT | Mod: GP | Performed by: STUDENT IN AN ORGANIZED HEALTH CARE EDUCATION/TRAINING PROGRAM

## 2023-01-01 PROCEDURE — 96365 THER/PROPH/DIAG IV INF INIT: CPT

## 2023-01-01 PROCEDURE — 94668 MNPJ CHEST WALL SBSQ: CPT

## 2023-01-01 PROCEDURE — P9604 ONE-WAY ALLOW PRORATED TRIP: HCPCS | Mod: ORL | Performed by: NURSE PRACTITIONER

## 2023-01-01 PROCEDURE — 93005 ELECTROCARDIOGRAM TRACING: CPT | Performed by: INTERNAL MEDICINE

## 2023-01-01 PROCEDURE — 36415 COLL VENOUS BLD VENIPUNCTURE: CPT | Performed by: STUDENT IN AN ORGANIZED HEALTH CARE EDUCATION/TRAINING PROGRAM

## 2023-01-01 PROCEDURE — 82077 ASSAY SPEC XCP UR&BREATH IA: CPT | Performed by: EMERGENCY MEDICINE

## 2023-01-01 PROCEDURE — 85027 COMPLETE CBC AUTOMATED: CPT | Mod: ORL | Performed by: NURSE PRACTITIONER

## 2023-01-01 PROCEDURE — 83615 LACTATE (LD) (LDH) ENZYME: CPT | Performed by: INTERNAL MEDICINE

## 2023-01-01 PROCEDURE — 97130 THER IVNTJ EA ADDL 15 MIN: CPT | Mod: GN

## 2023-01-01 PROCEDURE — 250N000012 HC RX MED GY IP 250 OP 636 PS 637: Performed by: INTERNAL MEDICINE

## 2023-01-01 PROCEDURE — 71046 X-RAY EXAM CHEST 2 VIEWS: CPT

## 2023-01-01 PROCEDURE — 85027 COMPLETE CBC AUTOMATED: CPT | Performed by: PHYSICIAN ASSISTANT

## 2023-01-01 PROCEDURE — 80053 COMPREHEN METABOLIC PANEL: CPT | Performed by: EMERGENCY MEDICINE

## 2023-01-01 PROCEDURE — 250N000011 HC RX IP 250 OP 636: Performed by: ANESTHESIOLOGY

## 2023-01-01 PROCEDURE — 97130 THER IVNTJ EA ADDL 15 MIN: CPT | Mod: GN | Performed by: SPEECH-LANGUAGE PATHOLOGIST

## 2023-01-01 PROCEDURE — 97530 THERAPEUTIC ACTIVITIES: CPT | Mod: GO

## 2023-01-01 PROCEDURE — 97116 GAIT TRAINING THERAPY: CPT | Mod: GP

## 2023-01-01 PROCEDURE — 99232 SBSQ HOSP IP/OBS MODERATE 35: CPT | Performed by: STUDENT IN AN ORGANIZED HEALTH CARE EDUCATION/TRAINING PROGRAM

## 2023-01-01 PROCEDURE — 87086 URINE CULTURE/COLONY COUNT: CPT | Performed by: INTERNAL MEDICINE

## 2023-01-01 PROCEDURE — 82077 ASSAY SPEC XCP UR&BREATH IA: CPT | Performed by: PHYSICIAN ASSISTANT

## 2023-01-01 PROCEDURE — 36415 COLL VENOUS BLD VENIPUNCTURE: CPT | Mod: ORL | Performed by: FAMILY MEDICINE

## 2023-01-01 PROCEDURE — 36415 COLL VENOUS BLD VENIPUNCTURE: CPT | Performed by: PHYSICIAN ASSISTANT

## 2023-01-01 PROCEDURE — 250N000009 HC RX 250: Performed by: HOSPITALIST

## 2023-01-01 PROCEDURE — 250N000012 HC RX MED GY IP 250 OP 636 PS 637: Performed by: STUDENT IN AN ORGANIZED HEALTH CARE EDUCATION/TRAINING PROGRAM

## 2023-01-01 PROCEDURE — 84100 ASSAY OF PHOSPHORUS: CPT | Performed by: PHYSICIAN ASSISTANT

## 2023-01-01 PROCEDURE — 73060 X-RAY EXAM OF HUMERUS: CPT | Mod: LT

## 2023-01-01 PROCEDURE — 87205 SMEAR GRAM STAIN: CPT | Performed by: INTERNAL MEDICINE

## 2023-01-01 PROCEDURE — 93325 DOPPLER ECHO COLOR FLOW MAPG: CPT | Mod: 26 | Performed by: INTERNAL MEDICINE

## 2023-01-01 PROCEDURE — 83735 ASSAY OF MAGNESIUM: CPT | Mod: ORL | Performed by: FAMILY MEDICINE

## 2023-01-01 PROCEDURE — 82805 BLOOD GASES W/O2 SATURATION: CPT | Performed by: EMERGENCY MEDICINE

## 2023-01-01 PROCEDURE — 85027 COMPLETE CBC AUTOMATED: CPT | Mod: ORL | Performed by: FAMILY MEDICINE

## 2023-01-01 PROCEDURE — 97161 PT EVAL LOW COMPLEX 20 MIN: CPT | Mod: GP

## 2023-01-01 PROCEDURE — 84100 ASSAY OF PHOSPHORUS: CPT | Performed by: INTERNAL MEDICINE

## 2023-01-01 PROCEDURE — 250N000011 HC RX IP 250 OP 636: Performed by: NURSE ANESTHETIST, CERTIFIED REGISTERED

## 2023-01-01 PROCEDURE — 85014 HEMATOCRIT: CPT | Performed by: INTERNAL MEDICINE

## 2023-01-01 PROCEDURE — 83735 ASSAY OF MAGNESIUM: CPT | Performed by: EMERGENCY MEDICINE

## 2023-01-01 PROCEDURE — 70450 CT HEAD/BRAIN W/O DYE: CPT

## 2023-01-01 PROCEDURE — 84484 ASSAY OF TROPONIN QUANT: CPT | Performed by: FAMILY MEDICINE

## 2023-01-01 PROCEDURE — 85730 THROMBOPLASTIN TIME PARTIAL: CPT | Performed by: EMERGENCY MEDICINE

## 2023-01-01 PROCEDURE — 99207 EKG 12-LEAD, TRACING ONLY: CPT | Performed by: INTERNAL MEDICINE

## 2023-01-01 PROCEDURE — 85610 PROTHROMBIN TIME: CPT | Performed by: INTERNAL MEDICINE

## 2023-01-01 PROCEDURE — 74176 CT ABD & PELVIS W/O CONTRAST: CPT

## 2023-01-01 PROCEDURE — P9603 ONE-WAY ALLOW PRORATED MILES: HCPCS | Mod: ORL | Performed by: FAMILY MEDICINE

## 2023-01-01 PROCEDURE — 84145 PROCALCITONIN (PCT): CPT | Performed by: STUDENT IN AN ORGANIZED HEALTH CARE EDUCATION/TRAINING PROGRAM

## 2023-01-01 PROCEDURE — P9604 ONE-WAY ALLOW PRORATED TRIP: HCPCS | Mod: ORL | Performed by: FAMILY MEDICINE

## 2023-01-01 PROCEDURE — 85730 THROMBOPLASTIN TIME PARTIAL: CPT | Performed by: PHYSICIAN ASSISTANT

## 2023-01-01 PROCEDURE — 97530 THERAPEUTIC ACTIVITIES: CPT | Mod: GO | Performed by: STUDENT IN AN ORGANIZED HEALTH CARE EDUCATION/TRAINING PROGRAM

## 2023-01-01 PROCEDURE — 93005 ELECTROCARDIOGRAM TRACING: CPT | Performed by: FAMILY MEDICINE

## 2023-01-01 PROCEDURE — 97750 PHYSICAL PERFORMANCE TEST: CPT | Mod: GP | Performed by: PHYSICAL THERAPIST

## 2023-01-01 PROCEDURE — P9603 ONE-WAY ALLOW PRORATED MILES: HCPCS | Mod: ORL | Performed by: NURSE PRACTITIONER

## 2023-01-01 PROCEDURE — 96374 THER/PROPH/DIAG INJ IV PUSH: CPT

## 2023-01-01 PROCEDURE — 97605 NEG PRS WND THER DME<=50SQCM: CPT

## 2023-01-01 PROCEDURE — 85048 AUTOMATED LEUKOCYTE COUNT: CPT | Performed by: INTERNAL MEDICINE

## 2023-01-01 PROCEDURE — 97129 THER IVNTJ 1ST 15 MIN: CPT | Mod: GN | Performed by: SPEECH-LANGUAGE PATHOLOGIST

## 2023-01-01 PROCEDURE — 86850 RBC ANTIBODY SCREEN: CPT | Performed by: STUDENT IN AN ORGANIZED HEALTH CARE EDUCATION/TRAINING PROGRAM

## 2023-01-01 PROCEDURE — 87493 C DIFF AMPLIFIED PROBE: CPT | Mod: ORL | Performed by: NURSE PRACTITIONER

## 2023-01-01 PROCEDURE — 71250 CT THORAX DX C-: CPT

## 2023-01-01 PROCEDURE — 93306 TTE W/DOPPLER COMPLETE: CPT | Mod: 26 | Performed by: INTERNAL MEDICINE

## 2023-01-01 PROCEDURE — 84155 ASSAY OF PROTEIN SERUM: CPT | Performed by: INTERNAL MEDICINE

## 2023-01-01 PROCEDURE — G0463 HOSPITAL OUTPT CLINIC VISIT: HCPCS | Mod: 25

## 2023-01-01 PROCEDURE — 97129 THER IVNTJ 1ST 15 MIN: CPT | Mod: GO

## 2023-01-01 PROCEDURE — 250N000009 HC RX 250: Performed by: FAMILY MEDICINE

## 2023-01-01 PROCEDURE — 85610 PROTHROMBIN TIME: CPT | Performed by: STUDENT IN AN ORGANIZED HEALTH CARE EDUCATION/TRAINING PROGRAM

## 2023-01-01 PROCEDURE — 99223 1ST HOSP IP/OBS HIGH 75: CPT | Mod: AI | Performed by: STUDENT IN AN ORGANIZED HEALTH CARE EDUCATION/TRAINING PROGRAM

## 2023-01-01 PROCEDURE — 272N000001 HC OR GENERAL SUPPLY STERILE: Performed by: STUDENT IN AN ORGANIZED HEALTH CARE EDUCATION/TRAINING PROGRAM

## 2023-01-01 PROCEDURE — 81001 URINALYSIS AUTO W/SCOPE: CPT | Performed by: PHYSICIAN ASSISTANT

## 2023-01-01 PROCEDURE — 99310 SBSQ NF CARE HIGH MDM 45: CPT | Performed by: PHYSICIAN ASSISTANT

## 2023-01-01 PROCEDURE — 258N000003 HC RX IP 258 OP 636: Performed by: EMERGENCY MEDICINE

## 2023-01-01 PROCEDURE — P9604 ONE-WAY ALLOW PRORATED TRIP: HCPCS | Performed by: FAMILY MEDICINE

## 2023-01-01 PROCEDURE — 250N000011 HC RX IP 250 OP 636: Performed by: EMERGENCY MEDICINE

## 2023-01-01 PROCEDURE — 99239 HOSP IP/OBS DSCHRG MGMT >30: CPT | Performed by: PHYSICIAN ASSISTANT

## 2023-01-01 PROCEDURE — 250N000009 HC RX 250: Performed by: STUDENT IN AN ORGANIZED HEALTH CARE EDUCATION/TRAINING PROGRAM

## 2023-01-01 PROCEDURE — 99291 CRITICAL CARE FIRST HOUR: CPT | Mod: 25 | Performed by: FAMILY MEDICINE

## 2023-01-01 PROCEDURE — 84484 ASSAY OF TROPONIN QUANT: CPT | Performed by: EMERGENCY MEDICINE

## 2023-01-01 PROCEDURE — 99231 SBSQ HOSP IP/OBS SF/LOW 25: CPT | Performed by: STUDENT IN AN ORGANIZED HEALTH CARE EDUCATION/TRAINING PROGRAM

## 2023-01-01 PROCEDURE — 32555 ASPIRATE PLEURA W/ IMAGING: CPT

## 2023-01-01 PROCEDURE — 97116 GAIT TRAINING THERAPY: CPT | Mod: GP | Performed by: PHYSICAL THERAPIST

## 2023-01-01 PROCEDURE — 85014 HEMATOCRIT: CPT | Performed by: PHYSICIAN ASSISTANT

## 2023-01-01 PROCEDURE — 93010 ELECTROCARDIOGRAM REPORT: CPT | Performed by: INTERNAL MEDICINE

## 2023-01-01 PROCEDURE — 250N000009 HC RX 250: Performed by: EMERGENCY MEDICINE

## 2023-01-01 PROCEDURE — 36415 COLL VENOUS BLD VENIPUNCTURE: CPT | Performed by: FAMILY MEDICINE

## 2023-01-01 PROCEDURE — 97530 THERAPEUTIC ACTIVITIES: CPT | Mod: GP | Performed by: STUDENT IN AN ORGANIZED HEALTH CARE EDUCATION/TRAINING PROGRAM

## 2023-01-01 PROCEDURE — 97110 THERAPEUTIC EXERCISES: CPT | Mod: GO | Performed by: STUDENT IN AN ORGANIZED HEALTH CARE EDUCATION/TRAINING PROGRAM

## 2023-01-01 PROCEDURE — 83036 HEMOGLOBIN GLYCOSYLATED A1C: CPT | Performed by: STUDENT IN AN ORGANIZED HEALTH CARE EDUCATION/TRAINING PROGRAM

## 2023-01-01 PROCEDURE — 96125 COGNITIVE TEST BY HC PRO: CPT | Mod: GN

## 2023-01-01 PROCEDURE — 99418 PROLNG IP/OBS E/M EA 15 MIN: CPT | Performed by: INTERNAL MEDICINE

## 2023-01-01 PROCEDURE — 84166 PROTEIN E-PHORESIS/URINE/CSF: CPT | Performed by: PATHOLOGY

## 2023-01-01 PROCEDURE — 97750 PHYSICAL PERFORMANCE TEST: CPT | Mod: GP

## 2023-01-01 PROCEDURE — 99239 HOSP IP/OBS DSCHRG MGMT >30: CPT | Performed by: INTERNAL MEDICINE

## 2023-01-01 PROCEDURE — 94799 UNLISTED PULMONARY SVC/PX: CPT

## 2023-01-01 PROCEDURE — 99232 SBSQ HOSP IP/OBS MODERATE 35: CPT | Performed by: PHYSICAL MEDICINE & REHABILITATION

## 2023-01-01 PROCEDURE — 0W993ZZ DRAINAGE OF RIGHT PLEURAL CAVITY, PERCUTANEOUS APPROACH: ICD-10-PCS | Performed by: RADIOLOGY

## 2023-01-01 PROCEDURE — 999N000248 HC STATISTIC IV INSERT WITH US BY RN

## 2023-01-01 PROCEDURE — 99305 1ST NF CARE MODERATE MDM 35: CPT | Performed by: FAMILY MEDICINE

## 2023-01-01 PROCEDURE — 87077 CULTURE AEROBIC IDENTIFY: CPT | Performed by: EMERGENCY MEDICINE

## 2023-01-01 PROCEDURE — 73560 X-RAY EXAM OF KNEE 1 OR 2: CPT | Mod: LT

## 2023-01-01 PROCEDURE — 93005 ELECTROCARDIOGRAM TRACING: CPT | Performed by: PHYSICIAN ASSISTANT

## 2023-01-01 PROCEDURE — 99223 1ST HOSP IP/OBS HIGH 75: CPT | Mod: AI | Performed by: PHYSICIAN ASSISTANT

## 2023-01-01 PROCEDURE — 96361 HYDRATE IV INFUSION ADD-ON: CPT

## 2023-01-01 PROCEDURE — 73502 X-RAY EXAM HIP UNI 2-3 VIEWS: CPT

## 2023-01-01 PROCEDURE — 96132 NRPSYC TST EVAL PHYS/QHP 1ST: CPT | Performed by: CLINICAL NEUROPSYCHOLOGIST

## 2023-01-01 PROCEDURE — 82248 BILIRUBIN DIRECT: CPT | Performed by: INTERNAL MEDICINE

## 2023-01-01 PROCEDURE — 120N000004 HC R&B MS OVERFLOW

## 2023-01-01 PROCEDURE — 85049 AUTOMATED PLATELET COUNT: CPT | Performed by: INTERNAL MEDICINE

## 2023-01-01 PROCEDURE — C1713 ANCHOR/SCREW BN/BN,TIS/BN: HCPCS | Performed by: STUDENT IN AN ORGANIZED HEALTH CARE EDUCATION/TRAINING PROGRAM

## 2023-01-01 PROCEDURE — 83550 IRON BINDING TEST: CPT | Performed by: INTERNAL MEDICINE

## 2023-01-01 PROCEDURE — 120N000001 HC R&B MED SURG/OB

## 2023-01-01 PROCEDURE — 250N000011 HC RX IP 250 OP 636: Performed by: FAMILY MEDICINE

## 2023-01-01 PROCEDURE — 82607 VITAMIN B-12: CPT | Performed by: INTERNAL MEDICINE

## 2023-01-01 PROCEDURE — 85025 COMPLETE CBC W/AUTO DIFF WBC: CPT | Performed by: FAMILY MEDICINE

## 2023-01-01 PROCEDURE — 83970 ASSAY OF PARATHORMONE: CPT | Performed by: INTERNAL MEDICINE

## 2023-01-01 PROCEDURE — 83605 ASSAY OF LACTIC ACID: CPT | Performed by: INTERNAL MEDICINE

## 2023-01-01 PROCEDURE — C9803 HOPD COVID-19 SPEC COLLECT: HCPCS

## 2023-01-01 PROCEDURE — 272N000710 US THORACENTESIS

## 2023-01-01 PROCEDURE — 83735 ASSAY OF MAGNESIUM: CPT | Performed by: STUDENT IN AN ORGANIZED HEALTH CARE EDUCATION/TRAINING PROGRAM

## 2023-01-01 PROCEDURE — 99316 NF DSCHRG MGMT 30 MIN+: CPT | Performed by: NURSE PRACTITIONER

## 2023-01-01 PROCEDURE — 80053 COMPREHEN METABOLIC PANEL: CPT | Performed by: PHYSICIAN ASSISTANT

## 2023-01-01 PROCEDURE — 85018 HEMOGLOBIN: CPT | Mod: ORL | Performed by: NURSE PRACTITIONER

## 2023-01-01 PROCEDURE — 96133 NRPSYC TST EVAL PHYS/QHP EA: CPT | Performed by: CLINICAL NEUROPSYCHOLOGIST

## 2023-01-01 PROCEDURE — 88305 TISSUE EXAM BY PATHOLOGIST: CPT | Mod: 26 | Performed by: PATHOLOGY

## 2023-01-01 PROCEDURE — 99233 SBSQ HOSP IP/OBS HIGH 50: CPT | Mod: GC | Performed by: INTERNAL MEDICINE

## 2023-01-01 PROCEDURE — 94667 MNPJ CHEST WALL 1ST: CPT

## 2023-01-01 PROCEDURE — 99233 SBSQ HOSP IP/OBS HIGH 50: CPT | Performed by: PHYSICAL MEDICINE & REHABILITATION

## 2023-01-01 PROCEDURE — 82805 BLOOD GASES W/O2 SATURATION: CPT | Performed by: INTERNAL MEDICINE

## 2023-01-01 PROCEDURE — C1769 GUIDE WIRE: HCPCS | Performed by: STUDENT IN AN ORGANIZED HEALTH CARE EDUCATION/TRAINING PROGRAM

## 2023-01-01 PROCEDURE — 97535 SELF CARE MNGMENT TRAINING: CPT | Mod: GP

## 2023-01-01 PROCEDURE — 96367 TX/PROPH/DG ADDL SEQ IV INF: CPT

## 2023-01-01 PROCEDURE — 258N000003 HC RX IP 258 OP 636: Performed by: NURSE ANESTHETIST, CERTIFIED REGISTERED

## 2023-01-01 PROCEDURE — 84443 ASSAY THYROID STIM HORMONE: CPT | Performed by: PHYSICIAN ASSISTANT

## 2023-01-01 PROCEDURE — 710N000009 HC RECOVERY PHASE 1, LEVEL 1, PER MIN: Performed by: STUDENT IN AN ORGANIZED HEALTH CARE EDUCATION/TRAINING PROGRAM

## 2023-01-01 PROCEDURE — 72100 X-RAY EXAM L-S SPINE 2/3 VWS: CPT

## 2023-01-01 PROCEDURE — 83615 LACTATE (LD) (LDH) ENZYME: CPT | Performed by: EMERGENCY MEDICINE

## 2023-01-01 PROCEDURE — 87637 SARSCOV2&INF A&B&RSV AMP PRB: CPT | Performed by: EMERGENCY MEDICINE

## 2023-01-01 PROCEDURE — 97162 PT EVAL MOD COMPLEX 30 MIN: CPT | Mod: GP

## 2023-01-01 PROCEDURE — 80048 BASIC METABOLIC PNL TOTAL CA: CPT | Performed by: PHYSICIAN ASSISTANT

## 2023-01-01 PROCEDURE — 81001 URINALYSIS AUTO W/SCOPE: CPT | Performed by: EMERGENCY MEDICINE

## 2023-01-01 PROCEDURE — 72125 CT NECK SPINE W/O DYE: CPT

## 2023-01-01 PROCEDURE — 84484 ASSAY OF TROPONIN QUANT: CPT | Performed by: INTERNAL MEDICINE

## 2023-01-01 PROCEDURE — 94660 CPAP INITIATION&MGMT: CPT

## 2023-01-01 PROCEDURE — 85018 HEMOGLOBIN: CPT | Performed by: INTERNAL MEDICINE

## 2023-01-01 PROCEDURE — 87324 CLOSTRIDIUM AG IA: CPT | Performed by: EMERGENCY MEDICINE

## 2023-01-01 PROCEDURE — 88112 CYTOPATH CELL ENHANCE TECH: CPT | Mod: TC | Performed by: INTERNAL MEDICINE

## 2023-01-01 PROCEDURE — 82310 ASSAY OF CALCIUM: CPT | Performed by: PHYSICIAN ASSISTANT

## 2023-01-01 PROCEDURE — 97166 OT EVAL MOD COMPLEX 45 MIN: CPT | Mod: GO | Performed by: STUDENT IN AN ORGANIZED HEALTH CARE EDUCATION/TRAINING PROGRAM

## 2023-01-01 PROCEDURE — 99231 SBSQ HOSP IP/OBS SF/LOW 25: CPT | Performed by: PHYSICAL MEDICINE & REHABILITATION

## 2023-01-01 PROCEDURE — 82728 ASSAY OF FERRITIN: CPT | Performed by: INTERNAL MEDICINE

## 2023-01-01 PROCEDURE — 97166 OT EVAL MOD COMPLEX 45 MIN: CPT | Mod: GO

## 2023-01-01 PROCEDURE — 85025 COMPLETE CBC W/AUTO DIFF WBC: CPT | Performed by: PHYSICIAN ASSISTANT

## 2023-01-01 PROCEDURE — 83605 ASSAY OF LACTIC ACID: CPT | Performed by: EMERGENCY MEDICINE

## 2023-01-01 PROCEDURE — 74177 CT ABD & PELVIS W/CONTRAST: CPT

## 2023-01-01 PROCEDURE — 84166 PROTEIN E-PHORESIS/URINE/CSF: CPT | Mod: 26

## 2023-01-01 PROCEDURE — 87205 SMEAR GRAM STAIN: CPT | Performed by: EMERGENCY MEDICINE

## 2023-01-01 PROCEDURE — 250N000011 HC RX IP 250 OP 636

## 2023-01-01 PROCEDURE — 82803 BLOOD GASES ANY COMBINATION: CPT | Performed by: STUDENT IN AN ORGANIZED HEALTH CARE EDUCATION/TRAINING PROGRAM

## 2023-01-01 PROCEDURE — 87086 URINE CULTURE/COLONY COUNT: CPT | Performed by: PHYSICIAN ASSISTANT

## 2023-01-01 PROCEDURE — 96375 TX/PRO/DX INJ NEW DRUG ADDON: CPT

## 2023-01-01 PROCEDURE — 87070 CULTURE OTHR SPECIMN AEROBIC: CPT | Performed by: INTERNAL MEDICINE

## 2023-01-01 PROCEDURE — 96365 THER/PROPH/DIAG IV INF INIT: CPT | Performed by: FAMILY MEDICINE

## 2023-01-01 PROCEDURE — 85007 BL SMEAR W/DIFF WBC COUNT: CPT | Mod: ORL | Performed by: NURSE PRACTITIONER

## 2023-01-01 PROCEDURE — 73090 X-RAY EXAM OF FOREARM: CPT | Mod: LT

## 2023-01-01 PROCEDURE — 250N000013 HC RX MED GY IP 250 OP 250 PS 637: Performed by: ANESTHESIOLOGY

## 2023-01-01 PROCEDURE — 83735 ASSAY OF MAGNESIUM: CPT | Performed by: FAMILY MEDICINE

## 2023-01-01 PROCEDURE — 200N000001 HC R&B ICU

## 2023-01-01 PROCEDURE — 85004 AUTOMATED DIFF WBC COUNT: CPT | Performed by: INTERNAL MEDICINE

## 2023-01-01 PROCEDURE — 73590 X-RAY EXAM OF LOWER LEG: CPT | Mod: LT

## 2023-01-01 PROCEDURE — 82310 ASSAY OF CALCIUM: CPT | Performed by: INTERNAL MEDICINE

## 2023-01-01 PROCEDURE — 999N000125 HC STATISTIC PATIENT MED CONFERENCE < 30 MIN: Performed by: STUDENT IN AN ORGANIZED HEALTH CARE EDUCATION/TRAINING PROGRAM

## 2023-01-01 PROCEDURE — 87040 BLOOD CULTURE FOR BACTERIA: CPT | Performed by: EMERGENCY MEDICINE

## 2023-01-01 PROCEDURE — 82607 VITAMIN B-12: CPT | Performed by: PHYSICIAN ASSISTANT

## 2023-01-01 PROCEDURE — 258N000003 HC RX IP 258 OP 636: Performed by: FAMILY MEDICINE

## 2023-01-01 PROCEDURE — G0463 HOSPITAL OUTPT CLINIC VISIT: HCPCS

## 2023-01-01 PROCEDURE — 99238 HOSP IP/OBS DSCHRG MGMT 30/<: CPT | Performed by: INTERNAL MEDICINE

## 2023-01-01 PROCEDURE — 99232 SBSQ HOSP IP/OBS MODERATE 35: CPT | Mod: FS | Performed by: PHYSICIAN ASSISTANT

## 2023-01-01 PROCEDURE — 99316 NF DSCHRG MGMT 30 MIN+: CPT | Performed by: PHYSICIAN ASSISTANT

## 2023-01-01 PROCEDURE — 93321 DOPPLER ECHO F-UP/LMTD STD: CPT | Mod: 26 | Performed by: INTERNAL MEDICINE

## 2023-01-01 PROCEDURE — 84157 ASSAY OF PROTEIN OTHER: CPT | Performed by: INTERNAL MEDICINE

## 2023-01-01 PROCEDURE — 82945 GLUCOSE OTHER FLUID: CPT | Performed by: INTERNAL MEDICINE

## 2023-01-01 PROCEDURE — 84165 PROTEIN E-PHORESIS SERUM: CPT | Mod: 26

## 2023-01-01 PROCEDURE — 82306 VITAMIN D 25 HYDROXY: CPT | Performed by: INTERNAL MEDICINE

## 2023-01-01 PROCEDURE — 99231 SBSQ HOSP IP/OBS SF/LOW 25: CPT | Mod: FS | Performed by: PHYSICIAN ASSISTANT

## 2023-01-01 PROCEDURE — U0005 INFEC AGEN DETEC AMPLI PROBE: HCPCS | Mod: ORL | Performed by: FAMILY MEDICINE

## 2023-01-01 PROCEDURE — 99285 EMERGENCY DEPT VISIT HI MDM: CPT | Mod: 25,CS

## 2023-01-01 PROCEDURE — 93308 TTE F-UP OR LMTD: CPT | Mod: 26 | Performed by: INTERNAL MEDICINE

## 2023-01-01 PROCEDURE — 5A09357 ASSISTANCE WITH RESPIRATORY VENTILATION, LESS THAN 24 CONSECUTIVE HOURS, CONTINUOUS POSITIVE AIRWAY PRESSURE: ICD-10-PCS | Performed by: FAMILY MEDICINE

## 2023-01-01 PROCEDURE — 82248 BILIRUBIN DIRECT: CPT | Performed by: EMERGENCY MEDICINE

## 2023-01-01 PROCEDURE — 93321 DOPPLER ECHO F-UP/LMTD STD: CPT

## 2023-01-01 PROCEDURE — 360N000084 HC SURGERY LEVEL 4 W/ FLUORO, PER MIN: Performed by: STUDENT IN AN ORGANIZED HEALTH CARE EDUCATION/TRAINING PROGRAM

## 2023-01-01 PROCEDURE — 258N000003 HC RX IP 258 OP 636

## 2023-01-01 PROCEDURE — 0QS734Z REPOSITION LEFT UPPER FEMUR WITH INTERNAL FIXATION DEVICE, PERCUTANEOUS APPROACH: ICD-10-PCS | Performed by: STUDENT IN AN ORGANIZED HEALTH CARE EDUCATION/TRAINING PROGRAM

## 2023-01-01 PROCEDURE — 250N000009 HC RX 250: Performed by: NURSE ANESTHETIST, CERTIFIED REGISTERED

## 2023-01-01 PROCEDURE — 86481 TB AG RESPONSE T-CELL SUSP: CPT | Mod: ORL | Performed by: FAMILY MEDICINE

## 2023-01-01 PROCEDURE — 87493 C DIFF AMPLIFIED PROBE: CPT | Performed by: EMERGENCY MEDICINE

## 2023-01-01 PROCEDURE — 93010 ELECTROCARDIOGRAM REPORT: CPT | Performed by: FAMILY MEDICINE

## 2023-01-01 PROCEDURE — 99233 SBSQ HOSP IP/OBS HIGH 50: CPT | Performed by: STUDENT IN AN ORGANIZED HEALTH CARE EDUCATION/TRAINING PROGRAM

## 2023-01-01 PROCEDURE — 88305 TISSUE EXAM BY PATHOLOGIST: CPT | Mod: TC | Performed by: INTERNAL MEDICINE

## 2023-01-01 PROCEDURE — 96375 TX/PRO/DX INJ NEW DRUG ADDON: CPT | Performed by: FAMILY MEDICINE

## 2023-01-01 PROCEDURE — 71046 X-RAY EXAM CHEST 2 VIEWS: CPT | Mod: 26 | Performed by: RADIOLOGY

## 2023-01-01 PROCEDURE — 255N000002 HC RX 255 OP 636: Performed by: INTERNAL MEDICINE

## 2023-01-01 PROCEDURE — 83880 ASSAY OF NATRIURETIC PEPTIDE: CPT | Performed by: INTERNAL MEDICINE

## 2023-01-01 PROCEDURE — 83880 ASSAY OF NATRIURETIC PEPTIDE: CPT | Performed by: PHYSICIAN ASSISTANT

## 2023-01-01 PROCEDURE — 82803 BLOOD GASES ANY COMBINATION: CPT | Performed by: FAMILY MEDICINE

## 2023-01-01 PROCEDURE — 999N000180 XR SURGERY CARM FLUORO LESS THAN 5 MIN

## 2023-01-01 PROCEDURE — 99308 SBSQ NF CARE LOW MDM 20: CPT | Mod: GV | Performed by: NURSE PRACTITIONER

## 2023-01-01 PROCEDURE — U0003 INFECTIOUS AGENT DETECTION BY NUCLEIC ACID (DNA OR RNA); SEVERE ACUTE RESPIRATORY SYNDROME CORONAVIRUS 2 (SARS-COV-2) (CORONAVIRUS DISEASE [COVID-19]), AMPLIFIED PROBE TECHNIQUE, MAKING USE OF HIGH THROUGHPUT TECHNOLOGIES AS DESCRIBED BY CMS-2020-01-R: HCPCS | Mod: ORL | Performed by: FAMILY MEDICINE

## 2023-01-01 PROCEDURE — 370N000017 HC ANESTHESIA TECHNICAL FEE, PER MIN: Performed by: STUDENT IN AN ORGANIZED HEALTH CARE EDUCATION/TRAINING PROGRAM

## 2023-01-01 PROCEDURE — 82550 ASSAY OF CK (CPK): CPT | Performed by: INTERNAL MEDICINE

## 2023-01-01 PROCEDURE — 999N000125 HC STATISTIC PATIENT MED CONFERENCE < 30 MIN

## 2023-01-01 PROCEDURE — 82330 ASSAY OF CALCIUM: CPT | Performed by: INTERNAL MEDICINE

## 2023-01-01 PROCEDURE — 90791 PSYCH DIAGNOSTIC EVALUATION: CPT | Performed by: CLINICAL NEUROPSYCHOLOGIST

## 2023-01-01 PROCEDURE — 83605 ASSAY OF LACTIC ACID: CPT | Performed by: STUDENT IN AN ORGANIZED HEALTH CARE EDUCATION/TRAINING PROGRAM

## 2023-01-01 DEVICE — BOLT FOR FEMORAL NECK SYSTEM 80MM LENGTH-STERILE: Type: IMPLANTABLE DEVICE | Site: HIP | Status: FUNCTIONAL

## 2023-01-01 DEVICE — ANTIROTATION SCREW FOR FEMORAL NECK SYS 80MM LENGTH - STERIL: Type: IMPLANTABLE DEVICE | Site: HIP | Status: FUNCTIONAL

## 2023-01-01 DEVICE — FEMORAL NECK SYSTEM PLATE 1 HOLE - STERILE: Type: IMPLANTABLE DEVICE | Site: HIP | Status: FUNCTIONAL

## 2023-01-01 DEVICE — IMPLANTABLE DEVICE: Type: IMPLANTABLE DEVICE | Site: HIP | Status: FUNCTIONAL

## 2023-01-01 RX ORDER — HYDROMORPHONE HCL IN WATER/PF 6 MG/30 ML
0.4 PATIENT CONTROLLED ANALGESIA SYRINGE INTRAVENOUS
Status: DISCONTINUED | OUTPATIENT
Start: 2023-01-01 | End: 2023-01-01

## 2023-01-01 RX ORDER — PRAVASTATIN SODIUM 20 MG
20 TABLET ORAL EVERY EVENING
Status: DISCONTINUED | OUTPATIENT
Start: 2023-01-01 | End: 2023-01-01 | Stop reason: HOSPADM

## 2023-01-01 RX ORDER — SODIUM CHLORIDE, SODIUM LACTATE, POTASSIUM CHLORIDE, CALCIUM CHLORIDE 600; 310; 30; 20 MG/100ML; MG/100ML; MG/100ML; MG/100ML
INJECTION, SOLUTION INTRAVENOUS CONTINUOUS
Status: DISCONTINUED | OUTPATIENT
Start: 2023-01-01 | End: 2023-01-01 | Stop reason: HOSPADM

## 2023-01-01 RX ORDER — PIPERACILLIN SODIUM, TAZOBACTAM SODIUM 3; .375 G/15ML; G/15ML
3.38 INJECTION, POWDER, LYOPHILIZED, FOR SOLUTION INTRAVENOUS ONCE
Status: COMPLETED | OUTPATIENT
Start: 2023-01-01 | End: 2023-01-01

## 2023-01-01 RX ORDER — ONDANSETRON 4 MG/1
4 TABLET, ORALLY DISINTEGRATING ORAL EVERY 30 MIN PRN
Status: DISCONTINUED | OUTPATIENT
Start: 2023-01-01 | End: 2023-01-01 | Stop reason: HOSPADM

## 2023-01-01 RX ORDER — DILTIAZEM HYDROCHLORIDE 30 MG/1
30 TABLET, FILM COATED ORAL EVERY 8 HOURS SCHEDULED
Status: DISCONTINUED | OUTPATIENT
Start: 2023-01-01 | End: 2023-01-01

## 2023-01-01 RX ORDER — NALOXONE HYDROCHLORIDE 0.4 MG/ML
0.2 INJECTION, SOLUTION INTRAMUSCULAR; INTRAVENOUS; SUBCUTANEOUS
Status: DISCONTINUED | OUTPATIENT
Start: 2023-01-01 | End: 2023-01-01 | Stop reason: HOSPADM

## 2023-01-01 RX ORDER — ALBUTEROL SULFATE 0.83 MG/ML
2.5 SOLUTION RESPIRATORY (INHALATION)
Status: DISCONTINUED | OUTPATIENT
Start: 2023-01-01 | End: 2023-01-01 | Stop reason: HOSPADM

## 2023-01-01 RX ORDER — GUAIFENESIN 600 MG/1
600 TABLET, EXTENDED RELEASE ORAL 2 TIMES DAILY
Status: ON HOLD | COMMUNITY
Start: 2023-01-01 | End: 2023-01-01

## 2023-01-01 RX ORDER — OXYCODONE HYDROCHLORIDE 5 MG/1
2.5 TABLET ORAL EVERY 4 HOURS PRN
Qty: 20 TABLET | Refills: 0 | Status: CANCELLED | OUTPATIENT
Start: 2023-01-01

## 2023-01-01 RX ORDER — POLYETHYLENE GLYCOL 3350 17 G/17G
17 POWDER, FOR SOLUTION ORAL DAILY
Status: DISCONTINUED | OUTPATIENT
Start: 2023-01-01 | End: 2023-01-01 | Stop reason: HOSPADM

## 2023-01-01 RX ORDER — ONDANSETRON 2 MG/ML
4 INJECTION INTRAMUSCULAR; INTRAVENOUS EVERY 6 HOURS PRN
Status: DISCONTINUED | OUTPATIENT
Start: 2023-01-01 | End: 2023-01-01

## 2023-01-01 RX ORDER — VITAMIN B COMPLEX
125 TABLET ORAL DAILY
Status: DISCONTINUED | OUTPATIENT
Start: 2023-01-01 | End: 2023-01-01 | Stop reason: HOSPADM

## 2023-01-01 RX ORDER — GUAIFENESIN 600 MG/1
600 TABLET, EXTENDED RELEASE ORAL 2 TIMES DAILY PRN
Start: 2023-01-01 | End: 2023-01-01

## 2023-01-01 RX ORDER — TRAZODONE HYDROCHLORIDE 50 MG/1
25 TABLET, FILM COATED ORAL
Start: 2023-01-01 | End: 2023-01-01

## 2023-01-01 RX ORDER — AMOXICILLIN 250 MG
1 CAPSULE ORAL 2 TIMES DAILY PRN
Status: DISCONTINUED | OUTPATIENT
Start: 2023-01-01 | End: 2023-01-01 | Stop reason: HOSPADM

## 2023-01-01 RX ORDER — DILTIAZEM HYDROCHLORIDE 120 MG/1
120 CAPSULE, COATED, EXTENDED RELEASE ORAL DAILY
Status: DISCONTINUED | OUTPATIENT
Start: 2023-01-01 | End: 2023-01-01

## 2023-01-01 RX ORDER — ONDANSETRON 2 MG/ML
4 INJECTION INTRAMUSCULAR; INTRAVENOUS EVERY 6 HOURS PRN
Status: DISCONTINUED | OUTPATIENT
Start: 2023-01-01 | End: 2023-01-01 | Stop reason: HOSPADM

## 2023-01-01 RX ORDER — LIDOCAINE 40 MG/G
CREAM TOPICAL
Status: DISCONTINUED | OUTPATIENT
Start: 2023-01-01 | End: 2023-01-01 | Stop reason: HOSPADM

## 2023-01-01 RX ORDER — PREDNISONE 5 MG/1
5 TABLET ORAL DAILY
Status: DISCONTINUED | OUTPATIENT
Start: 2023-01-01 | End: 2023-01-01 | Stop reason: HOSPADM

## 2023-01-01 RX ORDER — ACETAMINOPHEN 325 MG/1
650 TABLET ORAL EVERY 4 HOURS PRN
Qty: 60 TABLET | Refills: 0 | Status: CANCELLED | OUTPATIENT
Start: 2023-01-01

## 2023-01-01 RX ORDER — METOPROLOL SUCCINATE 25 MG/1
25 TABLET, EXTENDED RELEASE ORAL DAILY
Status: DISCONTINUED | OUTPATIENT
Start: 2023-01-01 | End: 2023-01-01 | Stop reason: HOSPADM

## 2023-01-01 RX ORDER — CALCIUM CARBONATE 500 MG/1
500 TABLET, CHEWABLE ORAL 2 TIMES DAILY PRN
Status: DISCONTINUED | OUTPATIENT
Start: 2023-01-01 | End: 2023-01-01 | Stop reason: HOSPADM

## 2023-01-01 RX ORDER — NITROGLYCERIN 0.4 MG/1
0.4 TABLET SUBLINGUAL EVERY 5 MIN PRN
Status: DISCONTINUED | OUTPATIENT
Start: 2023-01-01 | End: 2023-01-01

## 2023-01-01 RX ORDER — METOPROLOL SUCCINATE 50 MG/1
25 TABLET, EXTENDED RELEASE ORAL DAILY
Refills: 0
Start: 2023-01-01

## 2023-01-01 RX ORDER — ACETAMINOPHEN 325 MG/1
325-650 TABLET ORAL EVERY 8 HOURS
Status: DISCONTINUED | OUTPATIENT
Start: 2023-01-01 | End: 2023-01-01 | Stop reason: HOSPADM

## 2023-01-01 RX ORDER — LACTOBACILLUS RHAMNOSUS GG 10B CELL
1 CAPSULE ORAL 2 TIMES DAILY
Status: DISCONTINUED | OUTPATIENT
Start: 2023-01-01 | End: 2023-01-01 | Stop reason: HOSPADM

## 2023-01-01 RX ORDER — DULOXETIN HYDROCHLORIDE 20 MG/1
20 CAPSULE, DELAYED RELEASE ORAL DAILY
Status: DISCONTINUED | OUTPATIENT
Start: 2023-01-01 | End: 2023-01-01 | Stop reason: HOSPADM

## 2023-01-01 RX ORDER — METHYLPREDNISOLONE SODIUM SUCCINATE 125 MG/2ML
125 INJECTION, POWDER, LYOPHILIZED, FOR SOLUTION INTRAMUSCULAR; INTRAVENOUS ONCE
Status: COMPLETED | OUTPATIENT
Start: 2023-01-01 | End: 2023-01-01

## 2023-01-01 RX ORDER — IPRATROPIUM BROMIDE AND ALBUTEROL SULFATE 2.5; .5 MG/3ML; MG/3ML
3 SOLUTION RESPIRATORY (INHALATION)
Status: DISCONTINUED | OUTPATIENT
Start: 2023-01-01 | End: 2023-01-01

## 2023-01-01 RX ORDER — ACETAMINOPHEN 325 MG/1
650 TABLET ORAL EVERY 4 HOURS PRN
Status: DISCONTINUED | OUTPATIENT
Start: 2023-01-01 | End: 2023-01-01 | Stop reason: HOSPADM

## 2023-01-01 RX ORDER — ALLOPURINOL 300 MG/1
300 TABLET ORAL DAILY
Status: DISCONTINUED | OUTPATIENT
Start: 2023-01-01 | End: 2023-01-01 | Stop reason: HOSPADM

## 2023-01-01 RX ORDER — FOLIC ACID 1 MG/1
1 TABLET ORAL DAILY
Status: DISCONTINUED | OUTPATIENT
Start: 2023-01-01 | End: 2023-01-01 | Stop reason: HOSPADM

## 2023-01-01 RX ORDER — ALBUTEROL SULFATE 90 UG/1
2 AEROSOL, METERED RESPIRATORY (INHALATION) EVERY 4 HOURS PRN
Status: DISCONTINUED | OUTPATIENT
Start: 2023-01-01 | End: 2023-01-01 | Stop reason: HOSPADM

## 2023-01-01 RX ORDER — SULFAMETHOXAZOLE/TRIMETHOPRIM 800-160 MG
1 TABLET ORAL 2 TIMES DAILY
DISCHARGE
Start: 2023-01-01 | End: 2023-01-01

## 2023-01-01 RX ORDER — ONDANSETRON 2 MG/ML
4 INJECTION INTRAMUSCULAR; INTRAVENOUS EVERY 30 MIN PRN
Status: DISCONTINUED | OUTPATIENT
Start: 2023-01-01 | End: 2023-01-01 | Stop reason: HOSPADM

## 2023-01-01 RX ORDER — PREDNISONE 20 MG/1
40 TABLET ORAL DAILY
Status: DISCONTINUED | OUTPATIENT
Start: 2023-01-01 | End: 2023-01-01 | Stop reason: HOSPADM

## 2023-01-01 RX ORDER — LEVALBUTEROL INHALATION SOLUTION 1.25 MG/3ML
1.25 SOLUTION RESPIRATORY (INHALATION)
Status: DISCONTINUED | OUTPATIENT
Start: 2023-01-01 | End: 2023-01-01

## 2023-01-01 RX ORDER — ACETAMINOPHEN 325 MG/1
325-650 TABLET ORAL EVERY 4 HOURS PRN
Status: DISCONTINUED | OUTPATIENT
Start: 2023-01-01 | End: 2023-01-01

## 2023-01-01 RX ORDER — CEFTRIAXONE 1 G/1
1 INJECTION, POWDER, FOR SOLUTION INTRAMUSCULAR; INTRAVENOUS EVERY 24 HOURS
Status: DISCONTINUED | OUTPATIENT
Start: 2023-01-01 | End: 2023-01-01

## 2023-01-01 RX ORDER — AMOXICILLIN 250 MG
2 CAPSULE ORAL 2 TIMES DAILY PRN
Status: DISCONTINUED | OUTPATIENT
Start: 2023-01-01 | End: 2023-01-01 | Stop reason: HOSPADM

## 2023-01-01 RX ORDER — LIDOCAINE 40 MG/G
CREAM TOPICAL
Status: DISCONTINUED | OUTPATIENT
Start: 2023-01-01 | End: 2023-01-01

## 2023-01-01 RX ORDER — FENTANYL CITRATE 50 UG/ML
25-100 INJECTION, SOLUTION INTRAMUSCULAR; INTRAVENOUS
Status: DISCONTINUED | OUTPATIENT
Start: 2023-01-01 | End: 2023-01-01 | Stop reason: HOSPADM

## 2023-01-01 RX ORDER — NALOXONE HYDROCHLORIDE 0.4 MG/ML
0.4 INJECTION, SOLUTION INTRAMUSCULAR; INTRAVENOUS; SUBCUTANEOUS
Status: DISCONTINUED | OUTPATIENT
Start: 2023-01-01 | End: 2023-01-01 | Stop reason: HOSPADM

## 2023-01-01 RX ORDER — GUAIFENESIN 600 MG/1
600 TABLET, EXTENDED RELEASE ORAL 2 TIMES DAILY PRN
Status: DISCONTINUED | OUTPATIENT
Start: 2023-01-01 | End: 2023-01-01 | Stop reason: HOSPADM

## 2023-01-01 RX ORDER — AMOXICILLIN 250 MG
1 CAPSULE ORAL 2 TIMES DAILY
Status: DISCONTINUED | OUTPATIENT
Start: 2023-01-01 | End: 2023-01-01 | Stop reason: HOSPADM

## 2023-01-01 RX ORDER — PREDNISONE 5 MG/1
10 TABLET ORAL DAILY
Status: COMPLETED | OUTPATIENT
Start: 2023-01-01 | End: 2023-01-01

## 2023-01-01 RX ORDER — CHOLESTYRAMINE 4 G/9G
1 POWDER, FOR SUSPENSION ORAL
Status: DISCONTINUED | OUTPATIENT
Start: 2023-01-01 | End: 2023-01-01 | Stop reason: HOSPADM

## 2023-01-01 RX ORDER — PREDNISONE 10 MG/1
TABLET ORAL
Qty: 38 TABLET | Refills: 0 | DISCHARGE
Start: 2023-01-01

## 2023-01-01 RX ORDER — METHYLPREDNISOLONE SODIUM SUCCINATE 40 MG/ML
40 INJECTION, POWDER, LYOPHILIZED, FOR SOLUTION INTRAMUSCULAR; INTRAVENOUS EVERY 12 HOURS
Status: DISCONTINUED | OUTPATIENT
Start: 2023-01-01 | End: 2023-01-01

## 2023-01-01 RX ORDER — HYDROXYZINE PAMOATE 25 MG/1
25 CAPSULE ORAL 3 TIMES DAILY PRN
COMMUNITY

## 2023-01-01 RX ORDER — SODIUM CHLORIDE, SODIUM LACTATE, POTASSIUM CHLORIDE, CALCIUM CHLORIDE 600; 310; 30; 20 MG/100ML; MG/100ML; MG/100ML; MG/100ML
INJECTION, SOLUTION INTRAVENOUS CONTINUOUS
Status: DISCONTINUED | OUTPATIENT
Start: 2023-01-01 | End: 2023-01-01

## 2023-01-01 RX ORDER — LANOLIN ALCOHOL/MO/W.PET/CERES
3 CREAM (GRAM) TOPICAL AT BEDTIME
Qty: 30 TABLET | Refills: 0
Start: 2023-01-01 | End: 2023-01-01

## 2023-01-01 RX ORDER — SODIUM CHLORIDE, SODIUM LACTATE, POTASSIUM CHLORIDE, CALCIUM CHLORIDE 600; 310; 30; 20 MG/100ML; MG/100ML; MG/100ML; MG/100ML
INJECTION, SOLUTION INTRAVENOUS CONTINUOUS PRN
Status: DISCONTINUED | OUTPATIENT
Start: 2023-01-01 | End: 2023-01-01

## 2023-01-01 RX ORDER — NALOXONE HYDROCHLORIDE 2 MG/.4ML
1 INJECTION, SOLUTION INTRAMUSCULAR; SUBCUTANEOUS PRN
COMMUNITY

## 2023-01-01 RX ORDER — LORAZEPAM 0.5 MG/1
0.25 TABLET ORAL EVERY 4 HOURS PRN
Status: DISCONTINUED | OUTPATIENT
Start: 2023-01-01 | End: 2023-01-01 | Stop reason: HOSPADM

## 2023-01-01 RX ORDER — CEFDINIR 300 MG/1
300 CAPSULE ORAL 2 TIMES DAILY
COMMUNITY
End: 2023-01-01

## 2023-01-01 RX ORDER — FUROSEMIDE 40 MG
40 TABLET ORAL DAILY
DISCHARGE
Start: 2023-01-01 | End: 2023-01-01

## 2023-01-01 RX ORDER — METOPROLOL SUCCINATE 100 MG/1
100 TABLET, EXTENDED RELEASE ORAL DAILY
Status: DISCONTINUED | OUTPATIENT
Start: 2023-01-01 | End: 2023-01-01

## 2023-01-01 RX ORDER — FUROSEMIDE 20 MG
20 TABLET ORAL DAILY
Status: DISCONTINUED | OUTPATIENT
Start: 2023-01-01 | End: 2023-01-01 | Stop reason: HOSPADM

## 2023-01-01 RX ORDER — VANCOMYCIN HYDROCHLORIDE 125 MG/1
125 CAPSULE ORAL 2 TIMES DAILY
Status: DISCONTINUED | OUTPATIENT
Start: 2023-01-01 | End: 2023-01-01

## 2023-01-01 RX ORDER — CHOLESTYRAMINE 4 G/9G
1 POWDER, FOR SUSPENSION ORAL 3 TIMES DAILY
Status: DISCONTINUED | OUTPATIENT
Start: 2023-01-01 | End: 2023-01-01 | Stop reason: HOSPADM

## 2023-01-01 RX ORDER — OXYCODONE HYDROCHLORIDE 5 MG/1
5 TABLET ORAL EVERY 4 HOURS PRN
Status: DISCONTINUED | OUTPATIENT
Start: 2023-01-01 | End: 2023-01-01 | Stop reason: HOSPADM

## 2023-01-01 RX ORDER — FAMOTIDINE 20 MG/1
20 TABLET, FILM COATED ORAL DAILY
Status: DISCONTINUED | OUTPATIENT
Start: 2023-01-01 | End: 2023-01-01 | Stop reason: HOSPADM

## 2023-01-01 RX ORDER — SULFAMETHOXAZOLE/TRIMETHOPRIM 800-160 MG
1 TABLET ORAL 2 TIMES DAILY
Status: DISCONTINUED | OUTPATIENT
Start: 2023-01-01 | End: 2023-01-01

## 2023-01-01 RX ORDER — HYDROMORPHONE HCL IN WATER/PF 6 MG/30 ML
0.2 PATIENT CONTROLLED ANALGESIA SYRINGE INTRAVENOUS
Status: DISCONTINUED | OUTPATIENT
Start: 2023-01-01 | End: 2023-01-01

## 2023-01-01 RX ORDER — HYDROMORPHONE HYDROCHLORIDE 1 MG/ML
0.2 INJECTION, SOLUTION INTRAMUSCULAR; INTRAVENOUS; SUBCUTANEOUS
Status: DISCONTINUED | OUTPATIENT
Start: 2023-01-01 | End: 2023-01-01 | Stop reason: HOSPADM

## 2023-01-01 RX ORDER — DILTIAZEM HYDROCHLORIDE 120 MG/1
120 CAPSULE, COATED, EXTENDED RELEASE ORAL DAILY
Status: DISCONTINUED | OUTPATIENT
Start: 2023-01-01 | End: 2023-01-01 | Stop reason: HOSPADM

## 2023-01-01 RX ORDER — MAGNESIUM OXIDE 400 MG/1
400 TABLET ORAL EVERY 4 HOURS
Status: COMPLETED | OUTPATIENT
Start: 2023-01-01 | End: 2023-01-01

## 2023-01-01 RX ORDER — CALCIUM POLYCARBOPHIL 625 MG 625 MG/1
2 TABLET ORAL 2 TIMES DAILY
COMMUNITY

## 2023-01-01 RX ORDER — OXYCODONE HYDROCHLORIDE 5 MG/1
2.5-5 TABLET ORAL EVERY 4 HOURS PRN
Qty: 15 TABLET | Refills: 0 | Status: ON HOLD | OUTPATIENT
Start: 2023-01-01 | End: 2023-01-01

## 2023-01-01 RX ORDER — AMOXICILLIN 250 MG
1 CAPSULE ORAL 2 TIMES DAILY PRN
Qty: 24 TABLET | Refills: 0 | Status: CANCELLED | OUTPATIENT
Start: 2023-01-01

## 2023-01-01 RX ORDER — CYCLOBENZAPRINE HCL 5 MG
5 TABLET ORAL EVERY 8 HOURS PRN
Status: DISCONTINUED | OUTPATIENT
Start: 2023-01-01 | End: 2023-01-01 | Stop reason: HOSPADM

## 2023-01-01 RX ORDER — METOPROLOL SUCCINATE 50 MG/1
50 TABLET, EXTENDED RELEASE ORAL DAILY
Status: DISCONTINUED | OUTPATIENT
Start: 2023-01-01 | End: 2023-01-01

## 2023-01-01 RX ORDER — LEVALBUTEROL INHALATION SOLUTION 1.25 MG/3ML
1.25 SOLUTION RESPIRATORY (INHALATION) EVERY 4 HOURS PRN
Status: DISCONTINUED | OUTPATIENT
Start: 2023-01-01 | End: 2023-01-01

## 2023-01-01 RX ORDER — PROCHLORPERAZINE MALEATE 5 MG
5 TABLET ORAL EVERY 6 HOURS PRN
Status: DISCONTINUED | OUTPATIENT
Start: 2023-01-01 | End: 2023-01-01 | Stop reason: HOSPADM

## 2023-01-01 RX ORDER — VANCOMYCIN HYDROCHLORIDE 125 MG/1
125 CAPSULE ORAL DAILY
Status: DISCONTINUED | OUTPATIENT
Start: 2023-01-01 | End: 2023-01-01

## 2023-01-01 RX ORDER — METHOCARBAMOL 500 MG/1
500 TABLET, FILM COATED ORAL DAILY PRN
Status: DISCONTINUED | OUTPATIENT
Start: 2023-01-01 | End: 2023-01-01 | Stop reason: HOSPADM

## 2023-01-01 RX ORDER — ACETAMINOPHEN 325 MG/1
975 TABLET ORAL ONCE
Status: COMPLETED | OUTPATIENT
Start: 2023-01-01 | End: 2023-01-01

## 2023-01-01 RX ORDER — CEFAZOLIN SODIUM 1 G/3ML
1 INJECTION, POWDER, FOR SOLUTION INTRAMUSCULAR; INTRAVENOUS ONCE
Status: COMPLETED | OUTPATIENT
Start: 2023-01-01 | End: 2023-01-01

## 2023-01-01 RX ORDER — BUPIVACAINE HYDROCHLORIDE 2.5 MG/ML
INJECTION, SOLUTION EPIDURAL; INFILTRATION; INTRACAUDAL
Status: COMPLETED | OUTPATIENT
Start: 2023-01-01 | End: 2023-01-01

## 2023-01-01 RX ORDER — DIPHENHYDRAMINE HYDROCHLORIDE AND LIDOCAINE HYDROCHLORIDE AND ALUMINUM HYDROXIDE AND MAGNESIUM HYDRO
10 KIT EVERY 6 HOURS PRN
Status: ON HOLD | DISCHARGE
Start: 2023-01-01 | End: 2023-01-01

## 2023-01-01 RX ORDER — DICYCLOMINE HCL 20 MG
20 TABLET ORAL 3 TIMES DAILY PRN
Qty: 21 TABLET | Refills: 0 | Status: SHIPPED | OUTPATIENT
Start: 2023-01-01 | End: 2023-01-01

## 2023-01-01 RX ORDER — FUROSEMIDE 10 MG/ML
20 INJECTION INTRAMUSCULAR; INTRAVENOUS EVERY 12 HOURS
Status: DISCONTINUED | OUTPATIENT
Start: 2023-01-01 | End: 2023-01-01

## 2023-01-01 RX ORDER — VANCOMYCIN HYDROCHLORIDE 125 MG/1
125 CAPSULE ORAL 4 TIMES DAILY
Status: DISCONTINUED | OUTPATIENT
Start: 2023-01-01 | End: 2023-01-01

## 2023-01-01 RX ORDER — CEFAZOLIN SODIUM/WATER 2 G/20 ML
2 SYRINGE (ML) INTRAVENOUS
Status: COMPLETED | OUTPATIENT
Start: 2023-01-01 | End: 2023-01-01

## 2023-01-01 RX ORDER — ACETAMINOPHEN 325 MG/1
325 TABLET ORAL EVERY 4 HOURS PRN
Status: ON HOLD
Start: 2023-01-01 | End: 2023-01-01

## 2023-01-01 RX ORDER — ONDANSETRON 4 MG/1
4 TABLET, ORALLY DISINTEGRATING ORAL EVERY 6 HOURS PRN
Status: DISCONTINUED | OUTPATIENT
Start: 2023-01-01 | End: 2023-01-01 | Stop reason: HOSPADM

## 2023-01-01 RX ORDER — SENNOSIDES 8.6 MG
650 CAPSULE ORAL EVERY 8 HOURS PRN
COMMUNITY

## 2023-01-01 RX ORDER — MAGNESIUM SULFATE 4 G/50ML
4 INJECTION INTRAVENOUS ONCE
Status: COMPLETED | OUTPATIENT
Start: 2023-01-01 | End: 2023-01-01

## 2023-01-01 RX ORDER — LANOLIN ALCOHOL/MO/W.PET/CERES
3 CREAM (GRAM) TOPICAL
Status: DISCONTINUED | OUTPATIENT
Start: 2023-01-01 | End: 2023-01-01 | Stop reason: HOSPADM

## 2023-01-01 RX ORDER — METOPROLOL TARTRATE 1 MG/ML
5 INJECTION, SOLUTION INTRAVENOUS ONCE
Status: COMPLETED | OUTPATIENT
Start: 2023-01-01 | End: 2023-01-01

## 2023-01-01 RX ORDER — PIPERACILLIN SODIUM, TAZOBACTAM SODIUM 3; .375 G/15ML; G/15ML
3.38 INJECTION, POWDER, LYOPHILIZED, FOR SOLUTION INTRAVENOUS EVERY 8 HOURS
Status: DISCONTINUED | OUTPATIENT
Start: 2023-01-01 | End: 2023-01-01

## 2023-01-01 RX ORDER — FAMOTIDINE 20 MG/1
20 TABLET, FILM COATED ORAL 2 TIMES DAILY
Status: DISCONTINUED | OUTPATIENT
Start: 2023-01-01 | End: 2023-01-01

## 2023-01-01 RX ORDER — DILTIAZEM HYDROCHLORIDE 120 MG/1
240 CAPSULE, COATED, EXTENDED RELEASE ORAL DAILY
Status: DISCONTINUED | OUTPATIENT
Start: 2023-01-01 | End: 2023-01-01 | Stop reason: HOSPADM

## 2023-01-01 RX ORDER — CEFAZOLIN SODIUM 1 G/50ML
1250 SOLUTION INTRAVENOUS EVERY 24 HOURS
Status: DISCONTINUED | OUTPATIENT
Start: 2023-01-01 | End: 2023-01-01

## 2023-01-01 RX ORDER — PROPOFOL 10 MG/ML
INJECTION, EMULSION INTRAVENOUS PRN
Status: DISCONTINUED | OUTPATIENT
Start: 2023-01-01 | End: 2023-01-01

## 2023-01-01 RX ORDER — FENTANYL CITRATE 50 UG/ML
50 INJECTION, SOLUTION INTRAMUSCULAR; INTRAVENOUS EVERY 5 MIN PRN
Status: DISCONTINUED | OUTPATIENT
Start: 2023-01-01 | End: 2023-01-01 | Stop reason: HOSPADM

## 2023-01-01 RX ORDER — SULFAMETHOXAZOLE/TRIMETHOPRIM 800-160 MG
1 TABLET ORAL 2 TIMES DAILY
Status: DISCONTINUED | OUTPATIENT
Start: 2023-01-01 | End: 2023-01-01 | Stop reason: HOSPADM

## 2023-01-01 RX ORDER — AMOXICILLIN 250 MG
1 CAPSULE ORAL 2 TIMES DAILY
Status: DISCONTINUED | OUTPATIENT
Start: 2023-01-01 | End: 2023-01-01

## 2023-01-01 RX ORDER — METHYLPREDNISOLONE SODIUM SUCCINATE 40 MG/ML
40 INJECTION, POWDER, LYOPHILIZED, FOR SOLUTION INTRAMUSCULAR; INTRAVENOUS DAILY
Status: DISCONTINUED | OUTPATIENT
Start: 2023-01-01 | End: 2023-01-01

## 2023-01-01 RX ORDER — HYDROXYZINE HYDROCHLORIDE 25 MG/1
25 TABLET, FILM COATED ORAL
Status: DISCONTINUED | OUTPATIENT
Start: 2023-01-01 | End: 2023-01-01 | Stop reason: HOSPADM

## 2023-01-01 RX ORDER — IPRATROPIUM BROMIDE AND ALBUTEROL SULFATE 2.5; .5 MG/3ML; MG/3ML
3 SOLUTION RESPIRATORY (INHALATION) ONCE
Status: COMPLETED | OUTPATIENT
Start: 2023-01-01 | End: 2023-01-01

## 2023-01-01 RX ORDER — DIPHENHYDRAMINE HYDROCHLORIDE AND LIDOCAINE HYDROCHLORIDE AND ALUMINUM HYDROXIDE AND MAGNESIUM HYDRO
10 KIT EVERY 6 HOURS PRN
Status: DISCONTINUED | OUTPATIENT
Start: 2023-01-01 | End: 2023-01-01 | Stop reason: HOSPADM

## 2023-01-01 RX ORDER — MAGNESIUM HYDROXIDE 1200 MG/15ML
LIQUID ORAL PRN
Status: DISCONTINUED | OUTPATIENT
Start: 2023-01-01 | End: 2023-01-01 | Stop reason: HOSPADM

## 2023-01-01 RX ORDER — PROCHLORPERAZINE 25 MG
12.5 SUPPOSITORY, RECTAL RECTAL EVERY 12 HOURS PRN
Status: DISCONTINUED | OUTPATIENT
Start: 2023-01-01 | End: 2023-01-01 | Stop reason: HOSPADM

## 2023-01-01 RX ORDER — DICYCLOMINE HCL 20 MG
20 TABLET ORAL 3 TIMES DAILY PRN
Status: DISCONTINUED | OUTPATIENT
Start: 2023-01-01 | End: 2023-01-01 | Stop reason: HOSPADM

## 2023-01-01 RX ORDER — SODIUM CHLORIDE 9 MG/ML
INJECTION, SOLUTION INTRAVENOUS
Status: COMPLETED
Start: 2023-01-01 | End: 2023-01-01

## 2023-01-01 RX ORDER — VANCOMYCIN HYDROCHLORIDE 1 G/20ML
INJECTION, POWDER, LYOPHILIZED, FOR SOLUTION INTRAVENOUS PRN
Status: DISCONTINUED | OUTPATIENT
Start: 2023-01-01 | End: 2023-01-01 | Stop reason: HOSPADM

## 2023-01-01 RX ORDER — HYDROMORPHONE HYDROCHLORIDE 1 MG/ML
0.5 INJECTION, SOLUTION INTRAMUSCULAR; INTRAVENOUS; SUBCUTANEOUS ONCE
Status: COMPLETED | OUTPATIENT
Start: 2023-01-01 | End: 2023-01-01

## 2023-01-01 RX ORDER — ACETAMINOPHEN 325 MG/1
325 TABLET ORAL EVERY 4 HOURS PRN
Status: DISCONTINUED | OUTPATIENT
Start: 2023-01-01 | End: 2023-01-01 | Stop reason: HOSPADM

## 2023-01-01 RX ORDER — POLYETHYLENE GLYCOL 3350 17 G/17G
17 POWDER, FOR SOLUTION ORAL DAILY PRN
Status: DISCONTINUED | OUTPATIENT
Start: 2023-01-01 | End: 2023-01-01 | Stop reason: HOSPADM

## 2023-01-01 RX ORDER — IPRATROPIUM BROMIDE AND ALBUTEROL SULFATE 2.5; .5 MG/3ML; MG/3ML
3 SOLUTION RESPIRATORY (INHALATION)
Status: DISCONTINUED | OUTPATIENT
Start: 2023-01-01 | End: 2023-01-01 | Stop reason: HOSPADM

## 2023-01-01 RX ORDER — LACTOBACILLUS RHAMNOSUS GG 10B CELL
1 CAPSULE ORAL 2 TIMES DAILY
COMMUNITY
End: 2023-01-01

## 2023-01-01 RX ORDER — METHOTREXATE 2.5 MG/1
7.5 TABLET ORAL WEEKLY
Qty: 36 TABLET | Refills: 0
Start: 2023-01-01 | End: 2023-01-01

## 2023-01-01 RX ORDER — CEFAZOLIN SODIUM/WATER 2 G/20 ML
2 SYRINGE (ML) INTRAVENOUS SEE ADMIN INSTRUCTIONS
Status: DISCONTINUED | OUTPATIENT
Start: 2023-01-01 | End: 2023-01-01 | Stop reason: HOSPADM

## 2023-01-01 RX ORDER — GUAIFENESIN 600 MG/1
600 TABLET, EXTENDED RELEASE ORAL 2 TIMES DAILY
Status: DISCONTINUED | OUTPATIENT
Start: 2023-01-01 | End: 2023-01-01

## 2023-01-01 RX ORDER — DILTIAZEM HYDROCHLORIDE 240 MG/1
240 CAPSULE, COATED, EXTENDED RELEASE ORAL DAILY
Refills: 0 | DISCHARGE
Start: 2023-01-01

## 2023-01-01 RX ORDER — AMOXICILLIN 250 MG
1-2 CAPSULE ORAL 2 TIMES DAILY PRN
Status: DISCONTINUED | OUTPATIENT
Start: 2023-01-01 | End: 2023-01-01 | Stop reason: HOSPADM

## 2023-01-01 RX ORDER — FUROSEMIDE 40 MG
TABLET ORAL
DISCHARGE
Start: 2023-01-01

## 2023-01-01 RX ORDER — DILTIAZEM HYDROCHLORIDE 120 MG/1
120 CAPSULE, COATED, EXTENDED RELEASE ORAL DAILY
Status: ON HOLD
Start: 2023-01-01 | End: 2023-01-01

## 2023-01-01 RX ORDER — METOPROLOL SUCCINATE 25 MG/1
25 TABLET, EXTENDED RELEASE ORAL DAILY
Status: COMPLETED | OUTPATIENT
Start: 2023-01-01 | End: 2023-01-01

## 2023-01-01 RX ORDER — HYDROMORPHONE HYDROCHLORIDE 1 MG/ML
0.1 INJECTION, SOLUTION INTRAMUSCULAR; INTRAVENOUS; SUBCUTANEOUS
Status: DISCONTINUED | OUTPATIENT
Start: 2023-01-01 | End: 2023-01-01 | Stop reason: HOSPADM

## 2023-01-01 RX ORDER — ALBUTEROL SULFATE 5 MG/ML
2.5 SOLUTION RESPIRATORY (INHALATION)
Status: DISCONTINUED | OUTPATIENT
Start: 2023-01-01 | End: 2023-01-01

## 2023-01-01 RX ORDER — LEVALBUTEROL INHALATION SOLUTION 1.25 MG/3ML
1.25 SOLUTION RESPIRATORY (INHALATION) EVERY 6 HOURS PRN
Status: DISCONTINUED | OUTPATIENT
Start: 2023-01-01 | End: 2023-01-01 | Stop reason: HOSPADM

## 2023-01-01 RX ORDER — FOLIC ACID 1 MG/1
1 TABLET ORAL DAILY
Status: DISCONTINUED | OUTPATIENT
Start: 2023-01-01 | End: 2023-01-01

## 2023-01-01 RX ORDER — AMOXICILLIN 250 MG
1 CAPSULE ORAL 2 TIMES DAILY PRN
Qty: 60 TABLET | Refills: 0 | Status: ON HOLD | OUTPATIENT
Start: 2023-01-01 | End: 2023-01-01

## 2023-01-01 RX ORDER — FUROSEMIDE 10 MG/ML
20 INJECTION INTRAMUSCULAR; INTRAVENOUS
Status: COMPLETED | OUTPATIENT
Start: 2023-01-01 | End: 2023-01-01

## 2023-01-01 RX ORDER — FUROSEMIDE 20 MG
20 TABLET ORAL DAILY
Status: ON HOLD
Start: 2023-01-01 | End: 2023-01-01

## 2023-01-01 RX ORDER — CHOLESTYRAMINE 4 G/9G
1 POWDER, FOR SUSPENSION ORAL
COMMUNITY
End: 2023-01-01

## 2023-01-01 RX ORDER — LIDOCAINE HYDROCHLORIDE 10 MG/ML
INJECTION, SOLUTION INFILTRATION; PERINEURAL PRN
Status: DISCONTINUED | OUTPATIENT
Start: 2023-01-01 | End: 2023-01-01

## 2023-01-01 RX ORDER — MAGNESIUM SULFATE HEPTAHYDRATE 40 MG/ML
4 INJECTION, SOLUTION INTRAVENOUS ONCE
Status: COMPLETED | OUTPATIENT
Start: 2023-01-01 | End: 2023-01-01

## 2023-01-01 RX ORDER — GINSENG 100 MG
CAPSULE ORAL ONCE
Status: COMPLETED | OUTPATIENT
Start: 2023-01-01 | End: 2023-01-01

## 2023-01-01 RX ORDER — NITROGLYCERIN 0.4 MG/1
0.4 TABLET SUBLINGUAL EVERY 5 MIN PRN
Status: DISCONTINUED | OUTPATIENT
Start: 2023-01-01 | End: 2023-01-01 | Stop reason: HOSPADM

## 2023-01-01 RX ORDER — PREDNISONE 20 MG/1
40 TABLET ORAL DAILY
Status: COMPLETED | OUTPATIENT
Start: 2023-01-01 | End: 2023-01-01

## 2023-01-01 RX ORDER — GUAIFENESIN 600 MG/1
600 TABLET, EXTENDED RELEASE ORAL 2 TIMES DAILY
Status: DISCONTINUED | OUTPATIENT
Start: 2023-01-01 | End: 2023-01-01 | Stop reason: HOSPADM

## 2023-01-01 RX ORDER — DILTIAZEM HYDROCHLORIDE 5 MG/ML
15 INJECTION INTRAVENOUS ONCE
Status: COMPLETED | OUTPATIENT
Start: 2023-01-01 | End: 2023-01-01

## 2023-01-01 RX ORDER — METOPROLOL SUCCINATE 50 MG/1
50 TABLET, EXTENDED RELEASE ORAL DAILY
Refills: 0 | Status: ON HOLD | DISCHARGE
Start: 2023-01-01 | End: 2023-01-01

## 2023-01-01 RX ORDER — ALBUTEROL SULFATE 0.83 MG/ML
3 SOLUTION RESPIRATORY (INHALATION)
Status: DISCONTINUED | OUTPATIENT
Start: 2023-01-01 | End: 2023-01-01 | Stop reason: HOSPADM

## 2023-01-01 RX ORDER — METOPROLOL SUCCINATE 50 MG/1
50 TABLET, EXTENDED RELEASE ORAL DAILY
Status: DISCONTINUED | OUTPATIENT
Start: 2023-01-01 | End: 2023-01-01 | Stop reason: HOSPADM

## 2023-01-01 RX ORDER — FUROSEMIDE 20 MG
40 TABLET ORAL DAILY
Status: DISCONTINUED | OUTPATIENT
Start: 2023-01-01 | End: 2023-01-01 | Stop reason: HOSPADM

## 2023-01-01 RX ORDER — DICYCLOMINE HCL 20 MG
TABLET ORAL
Status: ON HOLD | COMMUNITY
Start: 2023-01-01 | End: 2023-01-01

## 2023-01-01 RX ORDER — FLUTICASONE FUROATE AND VILANTEROL 200; 25 UG/1; UG/1
1 POWDER RESPIRATORY (INHALATION) DAILY
Status: DISCONTINUED | OUTPATIENT
Start: 2023-01-01 | End: 2023-01-01 | Stop reason: HOSPADM

## 2023-01-01 RX ORDER — FUROSEMIDE 20 MG
20 TABLET ORAL DAILY
Status: DISCONTINUED | OUTPATIENT
Start: 2023-01-01 | End: 2023-01-01

## 2023-01-01 RX ORDER — BUDESONIDE AND FORMOTEROL FUMARATE DIHYDRATE 160; 4.5 UG/1; UG/1
2 AEROSOL RESPIRATORY (INHALATION) 2 TIMES DAILY
Status: DISCONTINUED | OUTPATIENT
Start: 2023-01-01 | End: 2023-01-01 | Stop reason: HOSPADM

## 2023-01-01 RX ORDER — CALCIUM CARBONATE 500 MG/1
500 TABLET, CHEWABLE ORAL DAILY PRN
Status: DISCONTINUED | OUTPATIENT
Start: 2023-01-01 | End: 2023-01-01 | Stop reason: HOSPADM

## 2023-01-01 RX ORDER — METOPROLOL SUCCINATE 100 MG/1
100 TABLET, EXTENDED RELEASE ORAL DAILY
Status: DISCONTINUED | OUTPATIENT
Start: 2023-01-01 | End: 2023-01-01 | Stop reason: HOSPADM

## 2023-01-01 RX ORDER — LEVALBUTEROL INHALATION SOLUTION 1.25 MG/3ML
1.25 SOLUTION RESPIRATORY (INHALATION) EVERY 4 HOURS PRN
Status: DISCONTINUED | OUTPATIENT
Start: 2023-01-01 | End: 2023-01-01 | Stop reason: HOSPADM

## 2023-01-01 RX ORDER — GUAIFENESIN 600 MG/1
600 TABLET, EXTENDED RELEASE ORAL 2 TIMES DAILY
Status: ON HOLD | DISCHARGE
Start: 2023-01-01 | End: 2023-01-01

## 2023-01-01 RX ORDER — IPRATROPIUM BROMIDE AND ALBUTEROL SULFATE 2.5; .5 MG/3ML; MG/3ML
1 SOLUTION RESPIRATORY (INHALATION) EVERY 6 HOURS PRN
Refills: 0 | DISCHARGE
Start: 2023-01-01

## 2023-01-01 RX ORDER — METOPROLOL SUCCINATE 50 MG/1
75 TABLET, EXTENDED RELEASE ORAL DAILY
Status: ON HOLD
Start: 2023-01-01 | End: 2023-01-01

## 2023-01-01 RX ORDER — METHOCARBAMOL 500 MG/1
500 TABLET, FILM COATED ORAL 2 TIMES DAILY PRN
COMMUNITY
End: 2023-01-01

## 2023-01-01 RX ORDER — CITALOPRAM HYDROBROMIDE 10 MG/1
5 TABLET ORAL DAILY
COMMUNITY

## 2023-01-01 RX ORDER — BISACODYL 10 MG
10 SUPPOSITORY, RECTAL RECTAL DAILY PRN
Status: DISCONTINUED | OUTPATIENT
Start: 2023-01-01 | End: 2023-01-01 | Stop reason: HOSPADM

## 2023-01-01 RX ORDER — FAMOTIDINE 20 MG/1
20 TABLET, FILM COATED ORAL DAILY
Status: DISCONTINUED | OUTPATIENT
Start: 2023-01-01 | End: 2023-01-01

## 2023-01-01 RX ORDER — TRANEXAMIC ACID 650 MG/1
1950 TABLET ORAL ONCE
Status: COMPLETED | OUTPATIENT
Start: 2023-01-01 | End: 2023-01-01

## 2023-01-01 RX ORDER — BUDESONIDE AND FORMOTEROL FUMARATE DIHYDRATE 160; 4.5 UG/1; UG/1
2 AEROSOL RESPIRATORY (INHALATION)
Status: DISCONTINUED | OUTPATIENT
Start: 2023-01-01 | End: 2023-01-01

## 2023-01-01 RX ORDER — ACETAMINOPHEN 325 MG/1
325-650 TABLET ORAL EVERY 8 HOURS
Start: 2023-01-01 | End: 2023-01-01

## 2023-01-01 RX ORDER — ONDANSETRON 2 MG/ML
INJECTION INTRAMUSCULAR; INTRAVENOUS PRN
Status: DISCONTINUED | OUTPATIENT
Start: 2023-01-01 | End: 2023-01-01

## 2023-01-01 RX ORDER — ONDANSETRON 4 MG/1
4 TABLET, ORALLY DISINTEGRATING ORAL EVERY 6 HOURS PRN
Status: DISCONTINUED | OUTPATIENT
Start: 2023-01-01 | End: 2023-01-01

## 2023-01-01 RX ORDER — FUROSEMIDE 10 MG/ML
20 INJECTION INTRAMUSCULAR; INTRAVENOUS ONCE
Status: COMPLETED | OUTPATIENT
Start: 2023-01-01 | End: 2023-01-01

## 2023-01-01 RX ORDER — IPRATROPIUM BROMIDE AND ALBUTEROL SULFATE 2.5; .5 MG/3ML; MG/3ML
1 SOLUTION RESPIRATORY (INHALATION) EVERY 6 HOURS PRN
Status: DISCONTINUED | OUTPATIENT
Start: 2023-01-01 | End: 2023-01-01 | Stop reason: HOSPADM

## 2023-01-01 RX ORDER — SODIUM CHLORIDE 9 MG/ML
INJECTION, SOLUTION INTRAVENOUS CONTINUOUS PRN
Status: DISCONTINUED | OUTPATIENT
Start: 2023-01-01 | End: 2023-01-01

## 2023-01-01 RX ORDER — SODIUM CHLORIDE 9 MG/ML
INJECTION, SOLUTION INTRAVENOUS CONTINUOUS
Status: ACTIVE | OUTPATIENT
Start: 2023-01-01 | End: 2023-01-01

## 2023-01-01 RX ORDER — HYDROXYZINE PAMOATE 25 MG/1
25 CAPSULE ORAL
Status: ON HOLD | COMMUNITY
Start: 2023-01-01 | End: 2023-01-01

## 2023-01-01 RX ORDER — FUROSEMIDE 10 MG/ML
40 INJECTION INTRAMUSCULAR; INTRAVENOUS EVERY 12 HOURS
Status: COMPLETED | OUTPATIENT
Start: 2023-01-01 | End: 2023-01-01

## 2023-01-01 RX ORDER — DILTIAZEM HYDROCHLORIDE 180 MG/1
180 CAPSULE, COATED, EXTENDED RELEASE ORAL DAILY
Status: DISCONTINUED | OUTPATIENT
Start: 2023-01-01 | End: 2023-01-01

## 2023-01-01 RX ORDER — BUDESONIDE AND FORMOTEROL FUMARATE DIHYDRATE 160; 4.5 UG/1; UG/1
2 AEROSOL RESPIRATORY (INHALATION) 2 TIMES DAILY
Start: 2023-01-01

## 2023-01-01 RX ORDER — PANTOPRAZOLE SODIUM 40 MG/1
40 TABLET, DELAYED RELEASE ORAL ONCE
Status: COMPLETED | OUTPATIENT
Start: 2023-01-01 | End: 2023-01-01

## 2023-01-01 RX ORDER — DILTIAZEM HYDROCHLORIDE 5 MG/ML
20 INJECTION INTRAVENOUS ONCE
Status: COMPLETED | OUTPATIENT
Start: 2023-01-01 | End: 2023-01-01

## 2023-01-01 RX ORDER — FAMOTIDINE 20 MG/1
20 TABLET, FILM COATED ORAL DAILY
COMMUNITY
Start: 2023-01-01

## 2023-01-01 RX ORDER — IOPAMIDOL 755 MG/ML
80 INJECTION, SOLUTION INTRAVASCULAR ONCE
Status: COMPLETED | OUTPATIENT
Start: 2023-01-01 | End: 2023-01-01

## 2023-01-01 RX ORDER — CEFAZOLIN SODIUM 1 G/3ML
1 INJECTION, POWDER, FOR SOLUTION INTRAMUSCULAR; INTRAVENOUS EVERY 8 HOURS
Status: COMPLETED | OUTPATIENT
Start: 2023-01-01 | End: 2023-01-01

## 2023-01-01 RX ORDER — VASOPRESSIN IN 0.9 % NACL 2 UNIT/2ML
SYRINGE (ML) INTRAVENOUS PRN
Status: DISCONTINUED | OUTPATIENT
Start: 2023-01-01 | End: 2023-01-01

## 2023-01-01 RX ORDER — MAGNESIUM OXIDE 400 MG/1
400 TABLET ORAL 2 TIMES DAILY
Status: DISCONTINUED | OUTPATIENT
Start: 2023-01-01 | End: 2023-01-01 | Stop reason: HOSPADM

## 2023-01-01 RX ORDER — FENTANYL CITRATE 50 UG/ML
25 INJECTION, SOLUTION INTRAMUSCULAR; INTRAVENOUS EVERY 5 MIN PRN
Status: DISCONTINUED | OUTPATIENT
Start: 2023-01-01 | End: 2023-01-01 | Stop reason: HOSPADM

## 2023-01-01 RX ORDER — METOPROLOL SUCCINATE 100 MG/1
100 TABLET, EXTENDED RELEASE ORAL DAILY
Status: ON HOLD
Start: 2023-01-01 | End: 2023-01-01

## 2023-01-01 RX ORDER — ACETAMINOPHEN 325 MG/1
975 TABLET ORAL EVERY 8 HOURS
Status: COMPLETED | OUTPATIENT
Start: 2023-01-01 | End: 2023-01-01

## 2023-01-01 RX ORDER — MAGNESIUM OXIDE 400 MG/1
400 TABLET ORAL ONCE
Status: DISCONTINUED | OUTPATIENT
Start: 2023-01-01 | End: 2023-01-01 | Stop reason: HOSPADM

## 2023-01-01 RX ORDER — ACETAMINOPHEN 500 MG
1000 TABLET ORAL 2 TIMES DAILY
COMMUNITY

## 2023-01-01 RX ORDER — FERROUS SULFATE 325(65) MG
325 TABLET ORAL EVERY OTHER DAY
Status: DISCONTINUED | OUTPATIENT
Start: 2023-01-01 | End: 2023-01-01 | Stop reason: HOSPADM

## 2023-01-01 RX ORDER — DEXAMETHASONE SODIUM PHOSPHATE 10 MG/ML
INJECTION, SOLUTION INTRAMUSCULAR; INTRAVENOUS PRN
Status: DISCONTINUED | OUTPATIENT
Start: 2023-01-01 | End: 2023-01-01

## 2023-01-01 RX ORDER — PREDNISONE 20 MG/1
20 TABLET ORAL DAILY
Status: COMPLETED | OUTPATIENT
Start: 2023-01-01 | End: 2023-01-01

## 2023-01-01 RX ORDER — LEVALBUTEROL TARTRATE 45 UG/1
2 AEROSOL, METERED ORAL EVERY 4 HOURS PRN
Status: DISCONTINUED | OUTPATIENT
Start: 2023-01-01 | End: 2023-01-01 | Stop reason: HOSPADM

## 2023-01-01 RX ORDER — QUETIAPINE FUMARATE 25 MG/1
25 TABLET, FILM COATED ORAL
Status: DISCONTINUED | OUTPATIENT
Start: 2023-01-01 | End: 2023-01-01 | Stop reason: HOSPADM

## 2023-01-01 RX ORDER — FERROUS SULFATE 325(65) MG
325 TABLET ORAL EVERY OTHER DAY
DISCHARGE
Start: 2023-01-01

## 2023-01-01 RX ORDER — CALCIUM CARBONATE 500 MG/1
500 TABLET, CHEWABLE ORAL 3 TIMES DAILY PRN
Status: DISCONTINUED | OUTPATIENT
Start: 2023-01-01 | End: 2023-01-01 | Stop reason: HOSPADM

## 2023-01-01 RX ORDER — CYCLOBENZAPRINE HCL 5 MG
5 TABLET ORAL EVERY 8 HOURS PRN
Status: ON HOLD | DISCHARGE
Start: 2023-01-01 | End: 2023-01-01

## 2023-01-01 RX ORDER — LANOLIN ALCOHOL/MO/W.PET/CERES
3 CREAM (GRAM) TOPICAL AT BEDTIME
Status: DISCONTINUED | OUTPATIENT
Start: 2023-01-01 | End: 2023-01-01 | Stop reason: HOSPADM

## 2023-01-01 RX ORDER — DILTIAZEM HYDROCHLORIDE 30 MG/1
30 TABLET, FILM COATED ORAL EVERY 8 HOURS SCHEDULED
Status: COMPLETED | OUTPATIENT
Start: 2023-01-01 | End: 2023-01-01

## 2023-01-01 RX ORDER — CARBOXYMETHYLCELLULOSE SODIUM 5 MG/ML
1 SOLUTION/ DROPS OPHTHALMIC 3 TIMES DAILY
Status: COMPLETED | OUTPATIENT
Start: 2023-01-01 | End: 2023-01-01

## 2023-01-01 RX ORDER — SODIUM CHLORIDE 9 MG/ML
INJECTION, SOLUTION INTRAVENOUS CONTINUOUS
Status: DISCONTINUED | OUTPATIENT
Start: 2023-01-01 | End: 2023-01-01

## 2023-01-01 RX ORDER — FUROSEMIDE 40 MG
40 TABLET ORAL DAILY
Status: ON HOLD | COMMUNITY
Start: 2023-01-01 | End: 2023-01-01

## 2023-01-01 RX ADMIN — FUROSEMIDE 40 MG: 20 TABLET ORAL at 08:02

## 2023-01-01 RX ADMIN — FOLIC ACID 1 MG: 1 TABLET ORAL at 08:36

## 2023-01-01 RX ADMIN — PHENYLEPHRINE HYDROCHLORIDE 200 MCG: 10 INJECTION INTRAVENOUS at 16:08

## 2023-01-01 RX ADMIN — ONDANSETRON 4 MG: 4 TABLET, ORALLY DISINTEGRATING ORAL at 05:07

## 2023-01-01 RX ADMIN — Medication 1 CAPSULE: at 07:59

## 2023-01-01 RX ADMIN — Medication 1 CAPSULE: at 07:47

## 2023-01-01 RX ADMIN — FOLIC ACID 1 MG: 1 TABLET ORAL at 08:52

## 2023-01-01 RX ADMIN — Medication 1 CAPSULE: at 20:07

## 2023-01-01 RX ADMIN — PRAVASTATIN SODIUM 20 MG: 20 TABLET ORAL at 16:47

## 2023-01-01 RX ADMIN — FOLIC ACID 1 MG: 1 TABLET ORAL at 08:44

## 2023-01-01 RX ADMIN — FUROSEMIDE 20 MG: 20 TABLET ORAL at 08:23

## 2023-01-01 RX ADMIN — CHOLESTYRAMINE 4 G: 4 POWDER, FOR SUSPENSION ORAL at 12:07

## 2023-01-01 RX ADMIN — PREDNISONE 5 MG: 5 TABLET ORAL at 07:58

## 2023-01-01 RX ADMIN — PREDNISONE 5 MG: 5 TABLET ORAL at 08:06

## 2023-01-01 RX ADMIN — ALLOPURINOL 300 MG: 300 TABLET ORAL at 08:42

## 2023-01-01 RX ADMIN — LEVALBUTEROL HYDROCHLORIDE 1.25 MG: 1.25 SOLUTION RESPIRATORY (INHALATION) at 15:00

## 2023-01-01 RX ADMIN — PREDNISONE 5 MG: 5 TABLET ORAL at 09:02

## 2023-01-01 RX ADMIN — IPRATROPIUM BROMIDE 0.5 MG: 0.5 SOLUTION RESPIRATORY (INHALATION) at 08:28

## 2023-01-01 RX ADMIN — PHENYLEPHRINE HYDROCHLORIDE 200 MCG: 10 INJECTION INTRAVENOUS at 16:17

## 2023-01-01 RX ADMIN — PREDNISONE 5 MG: 5 TABLET ORAL at 08:40

## 2023-01-01 RX ADMIN — FUROSEMIDE 20 MG: 10 INJECTION, SOLUTION INTRAMUSCULAR; INTRAVENOUS at 09:49

## 2023-01-01 RX ADMIN — SODIUM CHLORIDE 1000 ML: 9 INJECTION, SOLUTION INTRAVENOUS at 11:54

## 2023-01-01 RX ADMIN — DILTIAZEM HYDROCHLORIDE 120 MG: 120 CAPSULE, COATED, EXTENDED RELEASE ORAL at 08:44

## 2023-01-01 RX ADMIN — MAGNESIUM OXIDE TAB 400 MG (241.3 MG ELEMENTAL MG) 400 MG: 400 (241.3 MG) TAB at 08:23

## 2023-01-01 RX ADMIN — ALLOPURINOL 300 MG: 300 TABLET ORAL at 09:14

## 2023-01-01 RX ADMIN — CHOLESTYRAMINE 4 G: 4 POWDER, FOR SUSPENSION ORAL at 09:26

## 2023-01-01 RX ADMIN — DEXTROMETHORPHAN 30 MG: 30 SUSPENSION, EXTENDED RELEASE ORAL at 06:02

## 2023-01-01 RX ADMIN — FIDAXOMICIN 200 MG: 200 TABLET, FILM COATED ORAL at 20:02

## 2023-01-01 RX ADMIN — Medication 1 CAPSULE: at 19:44

## 2023-01-01 RX ADMIN — CHOLESTYRAMINE 4 G: 4 POWDER, FOR SUSPENSION ORAL at 11:31

## 2023-01-01 RX ADMIN — FOLIC ACID 1 MG: 1 TABLET ORAL at 07:47

## 2023-01-01 RX ADMIN — ALLOPURINOL 300 MG: 300 TABLET ORAL at 08:26

## 2023-01-01 RX ADMIN — Medication 1 CAPSULE: at 21:22

## 2023-01-01 RX ADMIN — FAMOTIDINE 20 MG: 20 TABLET ORAL at 09:40

## 2023-01-01 RX ADMIN — Medication 1 CAPSULE: at 10:11

## 2023-01-01 RX ADMIN — ALLOPURINOL 300 MG: 300 TABLET ORAL at 08:40

## 2023-01-01 RX ADMIN — ALLOPURINOL 300 MG: 300 TABLET ORAL at 08:05

## 2023-01-01 RX ADMIN — GUAIFENESIN 600 MG: 600 TABLET ORAL at 09:50

## 2023-01-01 RX ADMIN — FLUTICASONE FUROATE AND VILANTEROL TRIFENATATE 1 PUFF: 200; 25 POWDER RESPIRATORY (INHALATION) at 08:09

## 2023-01-01 RX ADMIN — BUDESONIDE AND FORMOTEROL FUMARATE DIHYDRATE 2 PUFF: 160; 4.5 AEROSOL RESPIRATORY (INHALATION) at 09:13

## 2023-01-01 RX ADMIN — PREDNISONE 10 MG: 5 TABLET ORAL at 08:24

## 2023-01-01 RX ADMIN — PREDNISONE 5 MG: 5 TABLET ORAL at 08:20

## 2023-01-01 RX ADMIN — Medication 125 MCG: at 08:23

## 2023-01-01 RX ADMIN — VANCOMYCIN HYDROCHLORIDE 125 MG: 125 CAPSULE ORAL at 08:04

## 2023-01-01 RX ADMIN — DULOXETINE HYDROCHLORIDE 20 MG: 20 CAPSULE, DELAYED RELEASE ORAL at 08:41

## 2023-01-01 RX ADMIN — VANCOMYCIN HYDROCHLORIDE 125 MG: 125 CAPSULE ORAL at 20:43

## 2023-01-01 RX ADMIN — BUDESONIDE AND FORMOTEROL FUMARATE DIHYDRATE 2 PUFF: 160; 4.5 AEROSOL RESPIRATORY (INHALATION) at 13:17

## 2023-01-01 RX ADMIN — VANCOMYCIN HYDROCHLORIDE 125 MG: 125 CAPSULE ORAL at 15:38

## 2023-01-01 RX ADMIN — Medication 125 MCG: at 08:31

## 2023-01-01 RX ADMIN — CHOLESTYRAMINE 4 G: 4 POWDER, FOR SUSPENSION ORAL at 08:08

## 2023-01-01 RX ADMIN — DULOXETINE HYDROCHLORIDE 20 MG: 20 CAPSULE, DELAYED RELEASE ORAL at 09:12

## 2023-01-01 RX ADMIN — RIVAROXABAN 15 MG: 15 TABLET, FILM COATED ORAL at 16:50

## 2023-01-01 RX ADMIN — GUAIFENESIN 600 MG: 600 TABLET ORAL at 19:54

## 2023-01-01 RX ADMIN — FUROSEMIDE 40 MG: 20 TABLET ORAL at 08:48

## 2023-01-01 RX ADMIN — Medication 1 CAPSULE: at 08:58

## 2023-01-01 RX ADMIN — RIVAROXABAN 15 MG: 15 TABLET, FILM COATED ORAL at 18:16

## 2023-01-01 RX ADMIN — FAMOTIDINE 20 MG: 20 TABLET ORAL at 08:40

## 2023-01-01 RX ADMIN — SODIUM BICARBONATE: 84 INJECTION, SOLUTION INTRAVENOUS at 17:42

## 2023-01-01 RX ADMIN — CHOLESTYRAMINE 4 G: 4 POWDER, FOR SUSPENSION ORAL at 10:52

## 2023-01-01 RX ADMIN — PRAVASTATIN SODIUM 20 MG: 20 TABLET ORAL at 19:54

## 2023-01-01 RX ADMIN — Medication 1 CAPSULE: at 20:06

## 2023-01-01 RX ADMIN — ALBUTEROL SULFATE 2 PUFF: 90 AEROSOL, METERED RESPIRATORY (INHALATION) at 05:00

## 2023-01-01 RX ADMIN — SODIUM CHLORIDE 500 ML: 9 INJECTION, SOLUTION INTRAVENOUS at 11:48

## 2023-01-01 RX ADMIN — CHOLESTYRAMINE 4 G: 4 POWDER, FOR SUSPENSION ORAL at 12:06

## 2023-01-01 RX ADMIN — FOLIC ACID 1 MG: 1 TABLET ORAL at 08:27

## 2023-01-01 RX ADMIN — VANCOMYCIN HYDROCHLORIDE 125 MG: 125 CAPSULE ORAL at 08:23

## 2023-01-01 RX ADMIN — ALBUTEROL SULFATE 2.5 MG: 2.5 SOLUTION RESPIRATORY (INHALATION) at 13:49

## 2023-01-01 RX ADMIN — Medication 1 CAPSULE: at 20:25

## 2023-01-01 RX ADMIN — Medication 1 CAPSULE: at 08:02

## 2023-01-01 RX ADMIN — Medication 3 MG: at 18:14

## 2023-01-01 RX ADMIN — DULOXETINE HYDROCHLORIDE 20 MG: 20 CAPSULE, DELAYED RELEASE ORAL at 09:38

## 2023-01-01 RX ADMIN — DILTIAZEM HYDROCHLORIDE 240 MG: 120 CAPSULE, COATED, EXTENDED RELEASE ORAL at 08:18

## 2023-01-01 RX ADMIN — METOPROLOL SUCCINATE 100 MG: 100 TABLET, EXTENDED RELEASE ORAL at 08:04

## 2023-01-01 RX ADMIN — FLUTICASONE FUROATE AND VILANTEROL TRIFENATATE 1 PUFF: 200; 25 POWDER RESPIRATORY (INHALATION) at 08:30

## 2023-01-01 RX ADMIN — VANCOMYCIN HYDROCHLORIDE 125 MG: 125 CAPSULE ORAL at 08:45

## 2023-01-01 RX ADMIN — Medication 3 MG: at 00:08

## 2023-01-01 RX ADMIN — DILTIAZEM HYDROCHLORIDE 240 MG: 120 CAPSULE, COATED, EXTENDED RELEASE ORAL at 08:40

## 2023-01-01 RX ADMIN — RIVAROXABAN 15 MG: 15 TABLET, FILM COATED ORAL at 17:19

## 2023-01-01 RX ADMIN — DILTIAZEM HYDROCHLORIDE 15 MG/HR: 5 INJECTION INTRAVENOUS at 01:14

## 2023-01-01 RX ADMIN — CHOLESTYRAMINE 4 G: 4 POWDER, FOR SUSPENSION ORAL at 08:26

## 2023-01-01 RX ADMIN — IPRATROPIUM BROMIDE 0.5 MG: 0.5 SOLUTION RESPIRATORY (INHALATION) at 08:08

## 2023-01-01 RX ADMIN — SENNOSIDES AND DOCUSATE SODIUM 1 TABLET: 50; 8.6 TABLET ORAL at 21:37

## 2023-01-01 RX ADMIN — CHOLESTYRAMINE 4 G: 4 POWDER, FOR SUSPENSION ORAL at 17:16

## 2023-01-01 RX ADMIN — METHYLPREDNISOLONE SODIUM SUCCINATE 125 MG: 125 INJECTION, POWDER, FOR SOLUTION INTRAMUSCULAR; INTRAVENOUS at 12:47

## 2023-01-01 RX ADMIN — IPRATROPIUM BROMIDE 0.5 MG: 0.5 SOLUTION RESPIRATORY (INHALATION) at 15:47

## 2023-01-01 RX ADMIN — RIVAROXABAN 15 MG: 15 TABLET, FILM COATED ORAL at 17:27

## 2023-01-01 RX ADMIN — CHOLESTYRAMINE 4 G: 4 POWDER, FOR SUSPENSION ORAL at 17:43

## 2023-01-01 RX ADMIN — RIVAROXABAN 15 MG: 15 TABLET, FILM COATED ORAL at 16:53

## 2023-01-01 RX ADMIN — DULOXETINE HYDROCHLORIDE 20 MG: 20 CAPSULE, DELAYED RELEASE ORAL at 08:33

## 2023-01-01 RX ADMIN — IPRATROPIUM BROMIDE AND ALBUTEROL SULFATE 3 ML: 2.5; .5 SOLUTION RESPIRATORY (INHALATION) at 11:56

## 2023-01-01 RX ADMIN — GUAIFENESIN 600 MG: 600 TABLET ORAL at 09:22

## 2023-01-01 RX ADMIN — FUROSEMIDE 40 MG: 20 TABLET ORAL at 17:35

## 2023-01-01 RX ADMIN — DULOXETINE HYDROCHLORIDE 20 MG: 20 CAPSULE, DELAYED RELEASE ORAL at 09:02

## 2023-01-01 RX ADMIN — IPRATROPIUM BROMIDE AND ALBUTEROL SULFATE 3 ML: .5; 3 SOLUTION RESPIRATORY (INHALATION) at 17:32

## 2023-01-01 RX ADMIN — IOPAMIDOL 80 ML: 755 INJECTION, SOLUTION INTRAVENOUS at 01:39

## 2023-01-01 RX ADMIN — ALBUTEROL SULFATE 2.5 MG: 2.5 SOLUTION RESPIRATORY (INHALATION) at 07:13

## 2023-01-01 RX ADMIN — FUROSEMIDE 20 MG: 20 TABLET ORAL at 08:51

## 2023-01-01 RX ADMIN — PHENYLEPHRINE HYDROCHLORIDE 200 MCG: 10 INJECTION INTRAVENOUS at 16:05

## 2023-01-01 RX ADMIN — PRAVASTATIN SODIUM 20 MG: 20 TABLET ORAL at 21:30

## 2023-01-01 RX ADMIN — FUROSEMIDE 20 MG: 20 TABLET ORAL at 09:53

## 2023-01-01 RX ADMIN — FLUTICASONE FUROATE AND VILANTEROL TRIFENATATE 1 PUFF: 200; 25 POWDER RESPIRATORY (INHALATION) at 07:49

## 2023-01-01 RX ADMIN — METOPROLOL SUCCINATE 50 MG: 50 TABLET, EXTENDED RELEASE ORAL at 08:41

## 2023-01-01 RX ADMIN — VANCOMYCIN HYDROCHLORIDE 125 MG: 125 CAPSULE ORAL at 08:26

## 2023-01-01 RX ADMIN — Medication 1 CAPSULE: at 20:43

## 2023-01-01 RX ADMIN — RIVAROXABAN 15 MG: 15 TABLET, FILM COATED ORAL at 18:08

## 2023-01-01 RX ADMIN — METOPROLOL SUCCINATE 75 MG: 25 TABLET, EXTENDED RELEASE ORAL at 08:22

## 2023-01-01 RX ADMIN — FUROSEMIDE 40 MG: 20 TABLET ORAL at 07:47

## 2023-01-01 RX ADMIN — FIDAXOMICIN 200 MG: 200 TABLET, FILM COATED ORAL at 08:51

## 2023-01-01 RX ADMIN — CHOLESTYRAMINE 4 G: 4 POWDER, FOR SUSPENSION ORAL at 08:07

## 2023-01-01 RX ADMIN — RIVAROXABAN 15 MG: 15 TABLET, FILM COATED ORAL at 17:00

## 2023-01-01 RX ADMIN — DULOXETINE HYDROCHLORIDE 20 MG: 20 CAPSULE, DELAYED RELEASE ORAL at 08:56

## 2023-01-01 RX ADMIN — GUAIFENESIN 600 MG: 600 TABLET ORAL at 21:29

## 2023-01-01 RX ADMIN — FOLIC ACID 1 MG: 1 TABLET ORAL at 08:04

## 2023-01-01 RX ADMIN — DULOXETINE HYDROCHLORIDE 20 MG: 20 CAPSULE, DELAYED RELEASE ORAL at 09:28

## 2023-01-01 RX ADMIN — CHOLESTYRAMINE 4 G: 4 POWDER, FOR SUSPENSION ORAL at 20:26

## 2023-01-01 RX ADMIN — SENNOSIDES AND DOCUSATE SODIUM 1 TABLET: 50; 8.6 TABLET ORAL at 09:43

## 2023-01-01 RX ADMIN — MAGNESIUM OXIDE TAB 400 MG (241.3 MG ELEMENTAL MG) 400 MG: 400 (241.3 MG) TAB at 09:53

## 2023-01-01 RX ADMIN — RIVAROXABAN 15 MG: 15 TABLET, FILM COATED ORAL at 18:55

## 2023-01-01 RX ADMIN — CALCIUM CARBONATE (ANTACID) CHEW TAB 500 MG 500 MG: 500 CHEW TAB at 22:14

## 2023-01-01 RX ADMIN — DILTIAZEM HYDROCHLORIDE 120 MG: 120 CAPSULE, COATED, EXTENDED RELEASE ORAL at 08:24

## 2023-01-01 RX ADMIN — GUAIFENESIN 600 MG: 600 TABLET, EXTENDED RELEASE ORAL at 13:00

## 2023-01-01 RX ADMIN — HYDROMORPHONE HYDROCHLORIDE 0.5 MG: 1 INJECTION, SOLUTION INTRAMUSCULAR; INTRAVENOUS; SUBCUTANEOUS at 15:42

## 2023-01-01 RX ADMIN — CEFAZOLIN 1 G: 1 INJECTION, POWDER, FOR SOLUTION INTRAMUSCULAR; INTRAVENOUS at 04:01

## 2023-01-01 RX ADMIN — PREDNISONE 40 MG: 20 TABLET ORAL at 10:49

## 2023-01-01 RX ADMIN — FOLIC ACID 1 MG: 1 TABLET ORAL at 08:11

## 2023-01-01 RX ADMIN — Medication 125 MCG: at 08:18

## 2023-01-01 RX ADMIN — VANCOMYCIN HYDROCHLORIDE 125 MG: 125 CAPSULE ORAL at 11:28

## 2023-01-01 RX ADMIN — DULOXETINE HYDROCHLORIDE 20 MG: 20 CAPSULE, DELAYED RELEASE ORAL at 08:31

## 2023-01-01 RX ADMIN — Medication 125 MCG: at 07:58

## 2023-01-01 RX ADMIN — IPRATROPIUM BROMIDE 0.5 MG: 0.5 SOLUTION RESPIRATORY (INHALATION) at 12:00

## 2023-01-01 RX ADMIN — ALBUTEROL SULFATE 2 PUFF: 90 AEROSOL, METERED RESPIRATORY (INHALATION) at 16:17

## 2023-01-01 RX ADMIN — DILTIAZEM HYDROCHLORIDE 180 MG: 180 CAPSULE, COATED, EXTENDED RELEASE ORAL at 08:56

## 2023-01-01 RX ADMIN — CALCIUM CARBONATE (ANTACID) CHEW TAB 500 MG 500 MG: 500 CHEW TAB at 10:00

## 2023-01-01 RX ADMIN — PRAVASTATIN SODIUM 20 MG: 20 TABLET ORAL at 20:15

## 2023-01-01 RX ADMIN — FAMOTIDINE 20 MG: 20 TABLET ORAL at 08:26

## 2023-01-01 RX ADMIN — METOPROLOL SUCCINATE 75 MG: 25 TABLET, EXTENDED RELEASE ORAL at 08:31

## 2023-01-01 RX ADMIN — FOLIC ACID 1 MG: 1 TABLET ORAL at 08:23

## 2023-01-01 RX ADMIN — GUAIFENESIN 600 MG: 600 TABLET ORAL at 20:06

## 2023-01-01 RX ADMIN — FOLIC ACID 1 MG: 1 TABLET ORAL at 08:10

## 2023-01-01 RX ADMIN — GUAIFENESIN 600 MG: 600 TABLET ORAL at 08:09

## 2023-01-01 RX ADMIN — GUAIFENESIN 600 MG: 600 TABLET ORAL at 20:42

## 2023-01-01 RX ADMIN — LEVALBUTEROL HYDROCHLORIDE 1.25 MG: 1.25 SOLUTION RESPIRATORY (INHALATION) at 07:56

## 2023-01-01 RX ADMIN — SODIUM BICARBONATE: 84 INJECTION, SOLUTION INTRAVENOUS at 14:11

## 2023-01-01 RX ADMIN — FAMOTIDINE 20 MG: 20 TABLET ORAL at 08:17

## 2023-01-01 RX ADMIN — CHOLESTYRAMINE 4 G: 4 POWDER, FOR SUSPENSION ORAL at 08:45

## 2023-01-01 RX ADMIN — CHOLESTYRAMINE 4 G: 4 POWDER, FOR SUSPENSION ORAL at 08:51

## 2023-01-01 RX ADMIN — DULOXETINE HYDROCHLORIDE 20 MG: 20 CAPSULE, DELAYED RELEASE ORAL at 17:05

## 2023-01-01 RX ADMIN — MAGNESIUM OXIDE TAB 400 MG (241.3 MG ELEMENTAL MG) 400 MG: 400 (241.3 MG) TAB at 08:04

## 2023-01-01 RX ADMIN — ONDANSETRON 4 MG: 4 TABLET, ORALLY DISINTEGRATING ORAL at 12:22

## 2023-01-01 RX ADMIN — CHOLESTYRAMINE 4 G: 4 POWDER, FOR SUSPENSION ORAL at 16:20

## 2023-01-01 RX ADMIN — PHENYLEPHRINE HYDROCHLORIDE 0.3 MCG/KG/MIN: 10 INJECTION INTRAVENOUS at 16:05

## 2023-01-01 RX ADMIN — Medication 1 CAPSULE: at 08:26

## 2023-01-01 RX ADMIN — Medication 1 CAPSULE: at 20:44

## 2023-01-01 RX ADMIN — ACETAMINOPHEN 325 MG: 325 TABLET ORAL at 11:42

## 2023-01-01 RX ADMIN — FIDAXOMICIN 200 MG: 200 TABLET, FILM COATED ORAL at 09:02

## 2023-01-01 RX ADMIN — ALBUTEROL SULFATE 2 PUFF: 90 AEROSOL, METERED RESPIRATORY (INHALATION) at 09:03

## 2023-01-01 RX ADMIN — MAGNESIUM OXIDE TAB 400 MG (241.3 MG ELEMENTAL MG) 400 MG: 400 (241.3 MG) TAB at 21:10

## 2023-01-01 RX ADMIN — DILTIAZEM HYDROCHLORIDE 5 MG/HR: 5 INJECTION INTRAVENOUS at 08:04

## 2023-01-01 RX ADMIN — PRAVASTATIN SODIUM 20 MG: 20 TABLET ORAL at 21:11

## 2023-01-01 RX ADMIN — SODIUM CHLORIDE 500 ML: 9 INJECTION, SOLUTION INTRAVENOUS at 08:24

## 2023-01-01 RX ADMIN — RIVAROXABAN 15 MG: 15 TABLET, FILM COATED ORAL at 17:59

## 2023-01-01 RX ADMIN — Medication 1 CAPSULE: at 21:50

## 2023-01-01 RX ADMIN — GUAIFENESIN 600 MG: 600 TABLET, EXTENDED RELEASE ORAL at 21:47

## 2023-01-01 RX ADMIN — Medication 1 CAPSULE: at 09:46

## 2023-01-01 RX ADMIN — FUROSEMIDE 40 MG: 20 TABLET ORAL at 08:40

## 2023-01-01 RX ADMIN — FAMOTIDINE 20 MG: 20 TABLET ORAL at 08:08

## 2023-01-01 RX ADMIN — CHOLESTYRAMINE 4 G: 4 POWDER, FOR SUSPENSION ORAL at 07:54

## 2023-01-01 RX ADMIN — RIVAROXABAN 15 MG: 15 TABLET, FILM COATED ORAL at 17:41

## 2023-01-01 RX ADMIN — MAGNESIUM OXIDE TAB 400 MG (241.3 MG ELEMENTAL MG) 400 MG: 400 (241.3 MG) TAB at 19:54

## 2023-01-01 RX ADMIN — SENNOSIDES AND DOCUSATE SODIUM 1 TABLET: 50; 8.6 TABLET ORAL at 09:25

## 2023-01-01 RX ADMIN — METOPROLOL SUCCINATE 100 MG: 100 TABLET, EXTENDED RELEASE ORAL at 08:40

## 2023-01-01 RX ADMIN — FOLIC ACID 1 MG: 1 TABLET ORAL at 08:26

## 2023-01-01 RX ADMIN — IPRATROPIUM BROMIDE 0.5 MG: 0.5 SOLUTION RESPIRATORY (INHALATION) at 15:27

## 2023-01-01 RX ADMIN — FIDAXOMICIN 200 MG: 200 TABLET, FILM COATED ORAL at 08:45

## 2023-01-01 RX ADMIN — Medication 1 CAPSULE: at 09:21

## 2023-01-01 RX ADMIN — GUAIFENESIN 600 MG: 600 TABLET, EXTENDED RELEASE ORAL at 08:02

## 2023-01-01 RX ADMIN — HYDROMORPHONE HYDROCHLORIDE 0.5 MG: 1 INJECTION, SOLUTION INTRAMUSCULAR; INTRAVENOUS; SUBCUTANEOUS at 12:26

## 2023-01-01 RX ADMIN — FAMOTIDINE 20 MG: 20 TABLET ORAL at 08:39

## 2023-01-01 RX ADMIN — FUROSEMIDE 20 MG: 20 TABLET ORAL at 08:09

## 2023-01-01 RX ADMIN — FAMOTIDINE 20 MG: 10 INJECTION, SOLUTION INTRAVENOUS at 08:58

## 2023-01-01 RX ADMIN — CHOLESTYRAMINE 4 G: 4 POWDER, FOR SUSPENSION ORAL at 11:08

## 2023-01-01 RX ADMIN — DEXAMETHASONE SODIUM PHOSPHATE 10 MG: 10 INJECTION, SOLUTION INTRAMUSCULAR; INTRAVENOUS at 16:09

## 2023-01-01 RX ADMIN — FLUTICASONE FUROATE AND VILANTEROL TRIFENATATE 1 PUFF: 200; 25 POWDER RESPIRATORY (INHALATION) at 07:46

## 2023-01-01 RX ADMIN — FOLIC ACID 1 MG: 1 TABLET ORAL at 10:11

## 2023-01-01 RX ADMIN — PREDNISONE 5 MG: 5 TABLET ORAL at 09:12

## 2023-01-01 RX ADMIN — DULOXETINE HYDROCHLORIDE 20 MG: 20 CAPSULE, DELAYED RELEASE ORAL at 08:52

## 2023-01-01 RX ADMIN — FOLIC ACID 1 MG: 1 TABLET ORAL at 08:24

## 2023-01-01 RX ADMIN — METOPROLOL SUCCINATE 100 MG: 100 TABLET, EXTENDED RELEASE ORAL at 09:50

## 2023-01-01 RX ADMIN — FIDAXOMICIN 200 MG: 200 TABLET, FILM COATED ORAL at 08:08

## 2023-01-01 RX ADMIN — FOLIC ACID 1 MG: 1 TABLET ORAL at 08:51

## 2023-01-01 RX ADMIN — GUAIFENESIN 600 MG: 600 TABLET ORAL at 20:37

## 2023-01-01 RX ADMIN — Medication 1 CAPSULE: at 09:06

## 2023-01-01 RX ADMIN — PREDNISONE 5 MG: 5 TABLET ORAL at 08:09

## 2023-01-01 RX ADMIN — DILTIAZEM HYDROCHLORIDE 240 MG: 120 CAPSULE, COATED, EXTENDED RELEASE ORAL at 08:31

## 2023-01-01 RX ADMIN — Medication 125 MCG: at 08:13

## 2023-01-01 RX ADMIN — FOLIC ACID 1 MG: 1 TABLET ORAL at 08:39

## 2023-01-01 RX ADMIN — Medication 1 CAPSULE: at 21:30

## 2023-01-01 RX ADMIN — ACETAMINOPHEN 650 MG: 325 TABLET ORAL at 08:41

## 2023-01-01 RX ADMIN — CEFAZOLIN 1 G: 1 INJECTION, POWDER, FOR SOLUTION INTRAMUSCULAR; INTRAVENOUS at 07:47

## 2023-01-01 RX ADMIN — Medication 0.5 UNITS: at 16:46

## 2023-01-01 RX ADMIN — CHOLESTYRAMINE 4 G: 4 POWDER, FOR SUSPENSION ORAL at 12:59

## 2023-01-01 RX ADMIN — FUROSEMIDE 20 MG: 20 TABLET ORAL at 08:36

## 2023-01-01 RX ADMIN — ACETAMINOPHEN 650 MG: 325 TABLET ORAL at 05:41

## 2023-01-01 RX ADMIN — Medication 1 DROP: at 20:43

## 2023-01-01 RX ADMIN — FAMOTIDINE 20 MG: 20 TABLET ORAL at 08:54

## 2023-01-01 RX ADMIN — METOPROLOL SUCCINATE 25 MG: 25 TABLET, EXTENDED RELEASE ORAL at 08:31

## 2023-01-01 RX ADMIN — Medication 125 MCG: at 07:59

## 2023-01-01 RX ADMIN — METHYLPREDNISOLONE SODIUM SUCCINATE 125 MG: 125 INJECTION INTRAMUSCULAR; INTRAVENOUS at 17:36

## 2023-01-01 RX ADMIN — PIPERACILLIN AND TAZOBACTAM 3.38 G: 3; .375 INJECTION, POWDER, LYOPHILIZED, FOR SOLUTION INTRAVENOUS at 18:50

## 2023-01-01 RX ADMIN — ALLOPURINOL 300 MG: 300 TABLET ORAL at 10:35

## 2023-01-01 RX ADMIN — FLUTICASONE FUROATE AND VILANTEROL TRIFENATATE 1 PUFF: 200; 25 POWDER RESPIRATORY (INHALATION) at 08:41

## 2023-01-01 RX ADMIN — Medication 1 CAPSULE: at 08:40

## 2023-01-01 RX ADMIN — SENNOSIDES AND DOCUSATE SODIUM 1 TABLET: 50; 8.6 TABLET ORAL at 08:36

## 2023-01-01 RX ADMIN — PRAVASTATIN SODIUM 20 MG: 20 TABLET ORAL at 21:27

## 2023-01-01 RX ADMIN — LEVALBUTEROL HYDROCHLORIDE 1.25 MG: 1.25 SOLUTION RESPIRATORY (INHALATION) at 19:58

## 2023-01-01 RX ADMIN — PREDNISONE 5 MG: 5 TABLET ORAL at 08:58

## 2023-01-01 RX ADMIN — VANCOMYCIN HYDROCHLORIDE 125 MG: 125 CAPSULE ORAL at 11:31

## 2023-01-01 RX ADMIN — ALLOPURINOL 300 MG: 300 TABLET ORAL at 08:15

## 2023-01-01 RX ADMIN — BUDESONIDE AND FORMOTEROL FUMARATE DIHYDRATE 2 PUFF: 160; 4.5 AEROSOL RESPIRATORY (INHALATION) at 08:26

## 2023-01-01 RX ADMIN — FOLIC ACID 1 MG: 1 TABLET ORAL at 08:15

## 2023-01-01 RX ADMIN — METOPROLOL SUCCINATE 25 MG: 25 TABLET, EXTENDED RELEASE ORAL at 08:40

## 2023-01-01 RX ADMIN — GUAIFENESIN 600 MG: 600 TABLET ORAL at 08:08

## 2023-01-01 RX ADMIN — IPRATROPIUM BROMIDE 0.5 MG: 0.5 SOLUTION RESPIRATORY (INHALATION) at 15:00

## 2023-01-01 RX ADMIN — FUROSEMIDE 40 MG: 20 TABLET ORAL at 07:53

## 2023-01-01 RX ADMIN — FLUTICASONE FUROATE AND VILANTEROL TRIFENATATE 1 PUFF: 200; 25 POWDER RESPIRATORY (INHALATION) at 09:03

## 2023-01-01 RX ADMIN — CHOLESTYRAMINE 4 G: 4 POWDER, FOR SUSPENSION ORAL at 12:49

## 2023-01-01 RX ADMIN — VANCOMYCIN HYDROCHLORIDE 125 MG: 125 CAPSULE ORAL at 12:50

## 2023-01-01 RX ADMIN — CHOLESTYRAMINE 4 G: 4 POWDER, FOR SUSPENSION ORAL at 17:00

## 2023-01-01 RX ADMIN — DULOXETINE HYDROCHLORIDE 20 MG: 20 CAPSULE, DELAYED RELEASE ORAL at 08:44

## 2023-01-01 RX ADMIN — FLUTICASONE FUROATE AND VILANTEROL TRIFENATATE 1 PUFF: 200; 25 POWDER RESPIRATORY (INHALATION) at 08:36

## 2023-01-01 RX ADMIN — DILTIAZEM HYDROCHLORIDE 30 MG: 30 TABLET, FILM COATED ORAL at 05:41

## 2023-01-01 RX ADMIN — DILTIAZEM HYDROCHLORIDE 120 MG: 120 CAPSULE, COATED, EXTENDED RELEASE ORAL at 18:54

## 2023-01-01 RX ADMIN — Medication 125 MCG: at 08:08

## 2023-01-01 RX ADMIN — ALBUTEROL SULFATE 2 PUFF: 90 AEROSOL, METERED RESPIRATORY (INHALATION) at 19:44

## 2023-01-01 RX ADMIN — DILTIAZEM HYDROCHLORIDE 240 MG: 120 CAPSULE, COATED, EXTENDED RELEASE ORAL at 07:59

## 2023-01-01 RX ADMIN — CHOLESTYRAMINE 4 G: 4 POWDER, FOR SUSPENSION ORAL at 21:05

## 2023-01-01 RX ADMIN — DILTIAZEM HYDROCHLORIDE 240 MG: 120 CAPSULE, COATED, EXTENDED RELEASE ORAL at 08:42

## 2023-01-01 RX ADMIN — DULOXETINE 20 MG: 20 CAPSULE, DELAYED RELEASE ORAL at 07:53

## 2023-01-01 RX ADMIN — DILTIAZEM HYDROCHLORIDE 20 MG: 5 INJECTION INTRAVENOUS at 00:34

## 2023-01-01 RX ADMIN — SENNOSIDES AND DOCUSATE SODIUM 1 TABLET: 50; 8.6 TABLET ORAL at 20:17

## 2023-01-01 RX ADMIN — MAGNESIUM OXIDE TAB 400 MG (241.3 MG ELEMENTAL MG) 400 MG: 400 (241.3 MG) TAB at 21:29

## 2023-01-01 RX ADMIN — DULOXETINE HYDROCHLORIDE 20 MG: 20 CAPSULE, DELAYED RELEASE ORAL at 09:49

## 2023-01-01 RX ADMIN — ALLOPURINOL 300 MG: 300 TABLET ORAL at 08:23

## 2023-01-01 RX ADMIN — PREDNISONE 5 MG: 5 TABLET ORAL at 08:26

## 2023-01-01 RX ADMIN — SENNOSIDES AND DOCUSATE SODIUM 1 TABLET: 50; 8.6 TABLET ORAL at 08:04

## 2023-01-01 RX ADMIN — Medication 1 CAPSULE: at 21:04

## 2023-01-01 RX ADMIN — PHENYLEPHRINE HYDROCHLORIDE 100 MCG: 10 INJECTION INTRAVENOUS at 16:36

## 2023-01-01 RX ADMIN — FLUTICASONE FUROATE AND VILANTEROL TRIFENATATE 1 PUFF: 200; 25 POWDER RESPIRATORY (INHALATION) at 08:24

## 2023-01-01 RX ADMIN — NITROGLYCERIN 0.4 MG: 0.4 TABLET, ORALLY DISINTEGRATING SUBLINGUAL at 17:00

## 2023-01-01 RX ADMIN — FIDAXOMICIN 200 MG: 200 TABLET, FILM COATED ORAL at 20:07

## 2023-01-01 RX ADMIN — FLUTICASONE FUROATE AND VILANTEROL TRIFENATATE 1 PUFF: 200; 25 POWDER RESPIRATORY (INHALATION) at 08:20

## 2023-01-01 RX ADMIN — FLUTICASONE FUROATE AND VILANTEROL TRIFENATATE 1 PUFF: 200; 25 POWDER RESPIRATORY (INHALATION) at 08:52

## 2023-01-01 RX ADMIN — PREDNISONE 40 MG: 20 TABLET ORAL at 10:03

## 2023-01-01 RX ADMIN — Medication 1 CAPSULE: at 08:21

## 2023-01-01 RX ADMIN — METOPROLOL SUCCINATE 100 MG: 100 TABLET, EXTENDED RELEASE ORAL at 09:00

## 2023-01-01 RX ADMIN — RIVAROXABAN 15 MG: 15 TABLET, FILM COATED ORAL at 17:24

## 2023-01-01 RX ADMIN — FIDAXOMICIN 200 MG: 200 TABLET, FILM COATED ORAL at 08:20

## 2023-01-01 RX ADMIN — BUDESONIDE AND FORMOTEROL FUMARATE DIHYDRATE 2 PUFF: 160; 4.5 AEROSOL RESPIRATORY (INHALATION) at 09:04

## 2023-01-01 RX ADMIN — Medication 125 MCG: at 13:14

## 2023-01-01 RX ADMIN — DULOXETINE 20 MG: 20 CAPSULE, DELAYED RELEASE ORAL at 07:50

## 2023-01-01 RX ADMIN — SODIUM CHLORIDE, POTASSIUM CHLORIDE, SODIUM LACTATE AND CALCIUM CHLORIDE: 600; 310; 30; 20 INJECTION, SOLUTION INTRAVENOUS at 07:03

## 2023-01-01 RX ADMIN — ALLOPURINOL 300 MG: 300 TABLET ORAL at 08:24

## 2023-01-01 RX ADMIN — Medication 125 MCG: at 09:40

## 2023-01-01 RX ADMIN — Medication 400 MG: at 08:21

## 2023-01-01 RX ADMIN — IPRATROPIUM BROMIDE 0.5 MG: 0.5 SOLUTION RESPIRATORY (INHALATION) at 07:20

## 2023-01-01 RX ADMIN — CHOLESTYRAMINE 4 G: 4 POWDER, FOR SUSPENSION ORAL at 08:44

## 2023-01-01 RX ADMIN — DILTIAZEM HYDROCHLORIDE 30 MG: 30 TABLET, FILM COATED ORAL at 20:44

## 2023-01-01 RX ADMIN — DULOXETINE 20 MG: 20 CAPSULE, DELAYED RELEASE ORAL at 08:26

## 2023-01-01 RX ADMIN — CHOLESTYRAMINE 4 G: 4 POWDER, FOR SUSPENSION ORAL at 08:48

## 2023-01-01 RX ADMIN — DILTIAZEM HYDROCHLORIDE 240 MG: 120 CAPSULE, COATED, EXTENDED RELEASE ORAL at 07:53

## 2023-01-01 RX ADMIN — DULOXETINE HYDROCHLORIDE 20 MG: 20 CAPSULE, DELAYED RELEASE ORAL at 08:15

## 2023-01-01 RX ADMIN — VANCOMYCIN HYDROCHLORIDE 1500 MG: 5 INJECTION, POWDER, LYOPHILIZED, FOR SOLUTION INTRAVENOUS at 10:35

## 2023-01-01 RX ADMIN — Medication 125 MCG: at 09:51

## 2023-01-01 RX ADMIN — FOLIC ACID 1 MG: 1 TABLET ORAL at 08:08

## 2023-01-01 RX ADMIN — VANCOMYCIN HYDROCHLORIDE 125 MG: 125 CAPSULE ORAL at 18:55

## 2023-01-01 RX ADMIN — PREDNISONE 5 MG: 5 TABLET ORAL at 07:53

## 2023-01-01 RX ADMIN — GUAIFENESIN 600 MG: 600 TABLET ORAL at 20:15

## 2023-01-01 RX ADMIN — FUROSEMIDE 20 MG: 10 INJECTION, SOLUTION INTRAMUSCULAR; INTRAVENOUS at 11:07

## 2023-01-01 RX ADMIN — DULOXETINE HYDROCHLORIDE 20 MG: 20 CAPSULE, DELAYED RELEASE ORAL at 13:14

## 2023-01-01 RX ADMIN — MELATONIN TAB 3 MG 3 MG: 3 TAB at 21:11

## 2023-01-01 RX ADMIN — Medication 125 MCG: at 08:02

## 2023-01-01 RX ADMIN — Medication 125 MCG: at 09:00

## 2023-01-01 RX ADMIN — ACETAMINOPHEN 975 MG: 325 TABLET ORAL at 13:29

## 2023-01-01 RX ADMIN — GUAIFENESIN 600 MG: 600 TABLET, EXTENDED RELEASE ORAL at 20:48

## 2023-01-01 RX ADMIN — FOLIC ACID 1 MG: 1 TABLET ORAL at 07:59

## 2023-01-01 RX ADMIN — METOPROLOL SUCCINATE 75 MG: 25 TABLET, EXTENDED RELEASE ORAL at 08:25

## 2023-01-01 RX ADMIN — Medication 1 CAPSULE: at 08:09

## 2023-01-01 RX ADMIN — METOPROLOL SUCCINATE 75 MG: 25 TABLET, EXTENDED RELEASE ORAL at 09:02

## 2023-01-01 RX ADMIN — MAGNESIUM OXIDE TAB 400 MG (241.3 MG ELEMENTAL MG) 400 MG: 400 (241.3 MG) TAB at 20:45

## 2023-01-01 RX ADMIN — DULOXETINE HYDROCHLORIDE 40 MG: 20 CAPSULE, DELAYED RELEASE ORAL at 08:19

## 2023-01-01 RX ADMIN — Medication 1 DROP: at 14:12

## 2023-01-01 RX ADMIN — METHYLPREDNISOLONE SODIUM SUCCINATE 40 MG: 40 INJECTION INTRAMUSCULAR; INTRAVENOUS at 08:14

## 2023-01-01 RX ADMIN — Medication 3 MG: at 00:04

## 2023-01-01 RX ADMIN — CHOLESTYRAMINE 4 G: 4 POWDER, FOR SUSPENSION ORAL at 07:53

## 2023-01-01 RX ADMIN — METOPROLOL SUCCINATE 50 MG: 50 TABLET, EXTENDED RELEASE ORAL at 08:11

## 2023-01-01 RX ADMIN — CHOLESTYRAMINE 4 G: 4 POWDER, FOR SUSPENSION ORAL at 10:16

## 2023-01-01 RX ADMIN — DILTIAZEM HYDROCHLORIDE 10 MG/HR: 5 INJECTION INTRAVENOUS at 09:10

## 2023-01-01 RX ADMIN — Medication 125 MCG: at 08:25

## 2023-01-01 RX ADMIN — DILTIAZEM HYDROCHLORIDE 120 MG: 120 CAPSULE, COATED, EXTENDED RELEASE ORAL at 09:52

## 2023-01-01 RX ADMIN — RIVAROXABAN 15 MG: 15 TABLET, FILM COATED ORAL at 16:17

## 2023-01-01 RX ADMIN — BUDESONIDE AND FORMOTEROL FUMARATE DIHYDRATE 2 PUFF: 160; 4.5 AEROSOL RESPIRATORY (INHALATION) at 19:44

## 2023-01-01 RX ADMIN — Medication 125 MCG: at 08:04

## 2023-01-01 RX ADMIN — VANCOMYCIN HYDROCHLORIDE 125 MG: 125 CAPSULE ORAL at 17:00

## 2023-01-01 RX ADMIN — HYDROMORPHONE HYDROCHLORIDE 0.2 MG: 1 INJECTION, SOLUTION INTRAMUSCULAR; INTRAVENOUS; SUBCUTANEOUS at 08:26

## 2023-01-01 RX ADMIN — FIDAXOMICIN 200 MG: 200 TABLET, FILM COATED ORAL at 08:32

## 2023-01-01 RX ADMIN — FAMOTIDINE 20 MG: 20 TABLET ORAL at 07:59

## 2023-01-01 RX ADMIN — ALLOPURINOL 300 MG: 300 TABLET ORAL at 08:44

## 2023-01-01 RX ADMIN — CHOLESTYRAMINE 4 G: 4 POWDER, FOR SUSPENSION ORAL at 11:27

## 2023-01-01 RX ADMIN — GUAIFENESIN 600 MG: 600 TABLET ORAL at 08:44

## 2023-01-01 RX ADMIN — Medication 125 MCG: at 08:39

## 2023-01-01 RX ADMIN — CHOLESTYRAMINE 4 G: 4 POWDER, FOR SUSPENSION ORAL at 18:12

## 2023-01-01 RX ADMIN — GUAIFENESIN 600 MG: 600 TABLET, EXTENDED RELEASE ORAL at 08:08

## 2023-01-01 RX ADMIN — DULOXETINE 20 MG: 20 CAPSULE, DELAYED RELEASE ORAL at 07:59

## 2023-01-01 RX ADMIN — POLYETHYLENE GLYCOL 3350 17 G: 17 POWDER, FOR SOLUTION ORAL at 08:04

## 2023-01-01 RX ADMIN — CHOLESTYRAMINE 4 G: 4 POWDER, FOR SUSPENSION ORAL at 07:57

## 2023-01-01 RX ADMIN — RIVAROXABAN 15 MG: 15 TABLET, FILM COATED ORAL at 17:44

## 2023-01-01 RX ADMIN — RIVAROXABAN 15 MG: 15 TABLET, FILM COATED ORAL at 17:43

## 2023-01-01 RX ADMIN — VANCOMYCIN HYDROCHLORIDE 125 MG: 125 CAPSULE ORAL at 21:22

## 2023-01-01 RX ADMIN — PRAVASTATIN SODIUM 20 MG: 20 TABLET ORAL at 20:03

## 2023-01-01 RX ADMIN — CHOLESTYRAMINE 4 G: 4 POWDER, FOR SUSPENSION ORAL at 17:18

## 2023-01-01 RX ADMIN — FIDAXOMICIN 200 MG: 200 TABLET, FILM COATED ORAL at 08:25

## 2023-01-01 RX ADMIN — PRAVASTATIN SODIUM 20 MG: 20 TABLET ORAL at 20:51

## 2023-01-01 RX ADMIN — FOLIC ACID 1 MG: 1 TABLET ORAL at 09:49

## 2023-01-01 RX ADMIN — FUROSEMIDE 20 MG: 20 TABLET ORAL at 09:01

## 2023-01-01 RX ADMIN — FLUTICASONE FUROATE AND VILANTEROL TRIFENATATE 1 PUFF: 200; 25 POWDER RESPIRATORY (INHALATION) at 08:04

## 2023-01-01 RX ADMIN — PRAVASTATIN SODIUM 20 MG: 20 TABLET ORAL at 19:44

## 2023-01-01 RX ADMIN — FLUTICASONE FUROATE AND VILANTEROL TRIFENATATE 1 PUFF: 200; 25 POWDER RESPIRATORY (INHALATION) at 08:39

## 2023-01-01 RX ADMIN — PREDNISONE 5 MG: 5 TABLET ORAL at 07:47

## 2023-01-01 RX ADMIN — FUROSEMIDE 40 MG: 20 TABLET ORAL at 08:26

## 2023-01-01 RX ADMIN — ALLOPURINOL 300 MG: 300 TABLET ORAL at 08:29

## 2023-01-01 RX ADMIN — Medication 1 CAPSULE: at 20:03

## 2023-01-01 RX ADMIN — ACETAMINOPHEN 650 MG: 325 TABLET ORAL at 17:16

## 2023-01-01 RX ADMIN — DILTIAZEM HYDROCHLORIDE 30 MG: 30 TABLET, FILM COATED ORAL at 21:37

## 2023-01-01 RX ADMIN — Medication 1 CAPSULE: at 20:45

## 2023-01-01 RX ADMIN — Medication 1 CAPSULE: at 08:29

## 2023-01-01 RX ADMIN — MELATONIN TAB 3 MG 3 MG: 3 TAB at 21:27

## 2023-01-01 RX ADMIN — LEVALBUTEROL HYDROCHLORIDE 1.25 MG: 1.25 SOLUTION RESPIRATORY (INHALATION) at 15:27

## 2023-01-01 RX ADMIN — VANCOMYCIN HYDROCHLORIDE 125 MG: 125 CAPSULE ORAL at 19:36

## 2023-01-01 RX ADMIN — ALLOPURINOL 300 MG: 300 TABLET ORAL at 07:50

## 2023-01-01 RX ADMIN — SENNOSIDES AND DOCUSATE SODIUM 1 TABLET: 50; 8.6 TABLET ORAL at 21:29

## 2023-01-01 RX ADMIN — DIPHENHYDRAMINE HYDROCHLORIDE AND LIDOCAINE HYDROCHLORIDE AND ALUMINUM HYDROXIDE AND MAGNESIUM HYDRO 10 ML: KIT at 15:08

## 2023-01-01 RX ADMIN — PRAVASTATIN SODIUM 20 MG: 20 TABLET ORAL at 21:04

## 2023-01-01 RX ADMIN — CHOLESTYRAMINE 4 G: 4 POWDER, FOR SUSPENSION ORAL at 18:46

## 2023-01-01 RX ADMIN — METOPROLOL SUCCINATE 50 MG: 50 TABLET, EXTENDED RELEASE ORAL at 09:28

## 2023-01-01 RX ADMIN — Medication 3 MG: at 00:10

## 2023-01-01 RX ADMIN — ALLOPURINOL 300 MG: 300 TABLET ORAL at 08:54

## 2023-01-01 RX ADMIN — METOPROLOL SUCCINATE 50 MG: 50 TABLET, EXTENDED RELEASE ORAL at 07:53

## 2023-01-01 RX ADMIN — DULOXETINE HYDROCHLORIDE 20 MG: 20 CAPSULE, DELAYED RELEASE ORAL at 08:29

## 2023-01-01 RX ADMIN — BUDESONIDE AND FORMOTEROL FUMARATE DIHYDRATE 2 PUFF: 160; 4.5 AEROSOL RESPIRATORY (INHALATION) at 09:38

## 2023-01-01 RX ADMIN — METOPROLOL SUCCINATE 50 MG: 50 TABLET, EXTENDED RELEASE ORAL at 08:54

## 2023-01-01 RX ADMIN — FAMOTIDINE 20 MG: 20 TABLET ORAL at 08:36

## 2023-01-01 RX ADMIN — DULOXETINE HYDROCHLORIDE 20 MG: 20 CAPSULE, DELAYED RELEASE ORAL at 08:09

## 2023-01-01 RX ADMIN — PRAVASTATIN SODIUM 20 MG: 20 TABLET ORAL at 20:07

## 2023-01-01 RX ADMIN — Medication 400 MG: at 12:40

## 2023-01-01 RX ADMIN — RIVAROXABAN 15 MG: 15 TABLET, FILM COATED ORAL at 17:02

## 2023-01-01 RX ADMIN — GUAIFENESIN 600 MG: 600 TABLET ORAL at 20:25

## 2023-01-01 RX ADMIN — CHOLESTYRAMINE 4 G: 4 POWDER, FOR SUSPENSION ORAL at 08:32

## 2023-01-01 RX ADMIN — SODIUM CHLORIDE 1000 ML: 9 INJECTION, SOLUTION INTRAVENOUS at 00:34

## 2023-01-01 RX ADMIN — VANCOMYCIN HYDROCHLORIDE 125 MG: 125 CAPSULE ORAL at 16:23

## 2023-01-01 RX ADMIN — GUAIFENESIN 600 MG: 600 TABLET ORAL at 20:13

## 2023-01-01 RX ADMIN — FAMOTIDINE 20 MG: 20 TABLET ORAL at 07:58

## 2023-01-01 RX ADMIN — PRAVASTATIN SODIUM 20 MG: 20 TABLET ORAL at 21:22

## 2023-01-01 RX ADMIN — METOPROLOL SUCCINATE 100 MG: 100 TABLET, EXTENDED RELEASE ORAL at 08:52

## 2023-01-01 RX ADMIN — SULFAMETHOXAZOLE AND TRIMETHOPRIM 1 TABLET: 800; 160 TABLET ORAL at 11:33

## 2023-01-01 RX ADMIN — ALLOPURINOL 300 MG: 300 TABLET ORAL at 09:25

## 2023-01-01 RX ADMIN — FAMOTIDINE 20 MG: 20 TABLET ORAL at 09:02

## 2023-01-01 RX ADMIN — CHOLESTYRAMINE 4 G: 4 POWDER, FOR SUSPENSION ORAL at 18:40

## 2023-01-01 RX ADMIN — FAMOTIDINE 20 MG: 20 TABLET ORAL at 07:51

## 2023-01-01 RX ADMIN — GUAIFENESIN 600 MG: 600 TABLET, EXTENDED RELEASE ORAL at 08:21

## 2023-01-01 RX ADMIN — GUAIFENESIN 600 MG: 600 TABLET ORAL at 20:35

## 2023-01-01 RX ADMIN — PRAVASTATIN SODIUM 20 MG: 20 TABLET ORAL at 20:12

## 2023-01-01 RX ADMIN — PREDNISONE 10 MG: 5 TABLET ORAL at 08:44

## 2023-01-01 RX ADMIN — SODIUM CHLORIDE: 0.9 INJECTION, SOLUTION INTRAVENOUS at 15:31

## 2023-01-01 RX ADMIN — Medication 1 CAPSULE: at 08:45

## 2023-01-01 RX ADMIN — Medication 1 CAPSULE: at 21:10

## 2023-01-01 RX ADMIN — VANCOMYCIN HYDROCHLORIDE 125 MG: 125 CAPSULE ORAL at 16:24

## 2023-01-01 RX ADMIN — CYCLOBENZAPRINE HYDROCHLORIDE 5 MG: 5 TABLET, FILM COATED ORAL at 14:37

## 2023-01-01 RX ADMIN — FOLIC ACID 1 MG: 1 TABLET ORAL at 08:42

## 2023-01-01 RX ADMIN — ACETAMINOPHEN 975 MG: 325 TABLET ORAL at 14:12

## 2023-01-01 RX ADMIN — Medication 1 CAPSULE: at 20:42

## 2023-01-01 RX ADMIN — IPRATROPIUM BROMIDE AND ALBUTEROL SULFATE 3 ML: 2.5; .5 SOLUTION RESPIRATORY (INHALATION) at 09:08

## 2023-01-01 RX ADMIN — ALLOPURINOL 300 MG: 300 TABLET ORAL at 09:50

## 2023-01-01 RX ADMIN — SENNOSIDES AND DOCUSATE SODIUM 1 TABLET: 50; 8.6 TABLET ORAL at 08:40

## 2023-01-01 RX ADMIN — PHENYLEPHRINE HYDROCHLORIDE 100 MCG: 10 INJECTION INTRAVENOUS at 16:21

## 2023-01-01 RX ADMIN — PREDNISONE 5 MG: 5 TABLET ORAL at 08:37

## 2023-01-01 RX ADMIN — METOPROLOL SUCCINATE 50 MG: 50 TABLET, EXTENDED RELEASE ORAL at 07:59

## 2023-01-01 RX ADMIN — FOLIC ACID 1 MG: 1 TABLET ORAL at 08:40

## 2023-01-01 RX ADMIN — PREDNISONE 5 MG: 5 TABLET ORAL at 08:31

## 2023-01-01 RX ADMIN — MAGNESIUM OXIDE TAB 400 MG (241.3 MG ELEMENTAL MG) 400 MG: 400 (241.3 MG) TAB at 20:06

## 2023-01-01 RX ADMIN — PREDNISONE 20 MG: 20 TABLET ORAL at 08:48

## 2023-01-01 RX ADMIN — METOPROLOL SUCCINATE 50 MG: 50 TABLET, EXTENDED RELEASE ORAL at 08:26

## 2023-01-01 RX ADMIN — VANCOMYCIN HYDROCHLORIDE 125 MG: 125 CAPSULE ORAL at 20:07

## 2023-01-01 RX ADMIN — PREDNISONE 5 MG: 5 TABLET ORAL at 08:12

## 2023-01-01 RX ADMIN — VANCOMYCIN HYDROCHLORIDE 125 MG: 125 CAPSULE ORAL at 17:43

## 2023-01-01 RX ADMIN — FOLIC ACID 1 MG: 1 TABLET ORAL at 08:53

## 2023-01-01 RX ADMIN — VANCOMYCIN HYDROCHLORIDE 125 MG: 125 CAPSULE ORAL at 21:29

## 2023-01-01 RX ADMIN — GUAIFENESIN 600 MG: 600 TABLET ORAL at 21:10

## 2023-01-01 RX ADMIN — PREDNISONE 40 MG: 20 TABLET ORAL at 08:28

## 2023-01-01 RX ADMIN — DULOXETINE HYDROCHLORIDE 20 MG: 20 CAPSULE, DELAYED RELEASE ORAL at 08:39

## 2023-01-01 RX ADMIN — IPRATROPIUM BROMIDE 0.5 MG: 0.5 SOLUTION RESPIRATORY (INHALATION) at 15:49

## 2023-01-01 RX ADMIN — CHOLESTYRAMINE 4 G: 4 POWDER, FOR SUSPENSION ORAL at 17:03

## 2023-01-01 RX ADMIN — SUGAMMADEX 200 MG: 100 INJECTION, SOLUTION INTRAVENOUS at 17:22

## 2023-01-01 RX ADMIN — SENNOSIDES AND DOCUSATE SODIUM 1 TABLET: 50; 8.6 TABLET ORAL at 20:10

## 2023-01-01 RX ADMIN — Medication 1 CAPSULE: at 08:52

## 2023-01-01 RX ADMIN — METHYLPREDNISOLONE SODIUM SUCCINATE 40 MG: 40 INJECTION INTRAMUSCULAR; INTRAVENOUS at 09:40

## 2023-01-01 RX ADMIN — MELATONIN TAB 3 MG 3 MG: 3 TAB at 20:37

## 2023-01-01 RX ADMIN — RIVAROXABAN 15 MG: 15 TABLET, FILM COATED ORAL at 17:42

## 2023-01-01 RX ADMIN — CHOLESTYRAMINE 4 G: 4 POWDER, FOR SUSPENSION ORAL at 21:38

## 2023-01-01 RX ADMIN — DULOXETINE HYDROCHLORIDE 20 MG: 20 CAPSULE, DELAYED RELEASE ORAL at 09:07

## 2023-01-01 RX ADMIN — LIDOCAINE HYDROCHLORIDE 4 ML: 10 INJECTION, SOLUTION INFILTRATION; PERINEURAL at 15:50

## 2023-01-01 RX ADMIN — DULOXETINE HYDROCHLORIDE 20 MG: 20 CAPSULE, DELAYED RELEASE ORAL at 08:23

## 2023-01-01 RX ADMIN — ALLOPURINOL 300 MG: 300 TABLET ORAL at 08:25

## 2023-01-01 RX ADMIN — FAMOTIDINE 20 MG: 20 TABLET ORAL at 08:42

## 2023-01-01 RX ADMIN — PHENYLEPHRINE HYDROCHLORIDE 200 MCG: 10 INJECTION INTRAVENOUS at 16:58

## 2023-01-01 RX ADMIN — FIDAXOMICIN 200 MG: 200 TABLET, FILM COATED ORAL at 08:41

## 2023-01-01 RX ADMIN — MELATONIN TAB 3 MG 3 MG: 3 TAB at 21:47

## 2023-01-01 RX ADMIN — GUAIFENESIN 600 MG: 600 TABLET ORAL at 08:23

## 2023-01-01 RX ADMIN — IPRATROPIUM BROMIDE AND ALBUTEROL SULFATE 3 ML: 2.5; .5 SOLUTION RESPIRATORY (INHALATION) at 09:38

## 2023-01-01 RX ADMIN — PRAVASTATIN SODIUM 20 MG: 20 TABLET ORAL at 21:42

## 2023-01-01 RX ADMIN — ACETAMINOPHEN 325 MG: 325 TABLET ORAL at 16:17

## 2023-01-01 RX ADMIN — PIPERACILLIN AND TAZOBACTAM 3.38 G: 3; .375 INJECTION, POWDER, LYOPHILIZED, FOR SOLUTION INTRAVENOUS at 06:01

## 2023-01-01 RX ADMIN — CHOLESTYRAMINE 4 G: 4 POWDER, FOR SUSPENSION ORAL at 12:57

## 2023-01-01 RX ADMIN — GUAIFENESIN 600 MG: 600 TABLET ORAL at 09:02

## 2023-01-01 RX ADMIN — ALLOPURINOL 300 MG: 300 TABLET ORAL at 08:32

## 2023-01-01 RX ADMIN — ACETAMINOPHEN 325 MG: 325 TABLET ORAL at 22:26

## 2023-01-01 RX ADMIN — DULOXETINE HYDROCHLORIDE 20 MG: 20 CAPSULE, DELAYED RELEASE ORAL at 08:58

## 2023-01-01 RX ADMIN — LEVALBUTEROL HYDROCHLORIDE 1.25 MG: 1.25 SOLUTION RESPIRATORY (INHALATION) at 21:22

## 2023-01-01 RX ADMIN — FAMOTIDINE 20 MG: 20 TABLET ORAL at 08:06

## 2023-01-01 RX ADMIN — GUAIFENESIN 600 MG: 600 TABLET, EXTENDED RELEASE ORAL at 20:52

## 2023-01-01 RX ADMIN — PREDNISONE 5 MG: 5 TABLET ORAL at 08:23

## 2023-01-01 RX ADMIN — Medication 1 MG: at 00:00

## 2023-01-01 RX ADMIN — GUAIFENESIN 600 MG: 600 TABLET ORAL at 08:54

## 2023-01-01 RX ADMIN — PREDNISONE 5 MG: 5 TABLET ORAL at 09:03

## 2023-01-01 RX ADMIN — METOPROLOL SUCCINATE 50 MG: 50 TABLET, EXTENDED RELEASE ORAL at 07:47

## 2023-01-01 RX ADMIN — SULFAMETHOXAZOLE AND TRIMETHOPRIM 1 TABLET: 800; 160 TABLET ORAL at 20:37

## 2023-01-01 RX ADMIN — FUROSEMIDE 20 MG: 20 TABLET ORAL at 08:10

## 2023-01-01 RX ADMIN — CHOLESTYRAMINE 4 G: 4 POWDER, FOR SUSPENSION ORAL at 17:48

## 2023-01-01 RX ADMIN — CHOLESTYRAMINE 4 G: 4 POWDER, FOR SUSPENSION ORAL at 12:22

## 2023-01-01 RX ADMIN — CHOLESTYRAMINE 4 G: 4 POWDER, FOR SUSPENSION ORAL at 09:13

## 2023-01-01 RX ADMIN — METOPROLOL SUCCINATE 100 MG: 100 TABLET, EXTENDED RELEASE ORAL at 08:28

## 2023-01-01 RX ADMIN — IPRATROPIUM BROMIDE 0.5 MG: 0.5 SOLUTION RESPIRATORY (INHALATION) at 21:22

## 2023-01-01 RX ADMIN — Medication 1 MG: at 19:36

## 2023-01-01 RX ADMIN — Medication 1 CAPSULE: at 21:27

## 2023-01-01 RX ADMIN — FOLIC ACID 1 MG: 1 TABLET ORAL at 13:13

## 2023-01-01 RX ADMIN — PREDNISONE 5 MG: 5 TABLET ORAL at 10:34

## 2023-01-01 RX ADMIN — FOLIC ACID 1 MG: 1 TABLET ORAL at 08:54

## 2023-01-01 RX ADMIN — Medication 1 CAPSULE: at 20:02

## 2023-01-01 RX ADMIN — FUROSEMIDE 40 MG: 10 INJECTION, SOLUTION INTRAVENOUS at 05:38

## 2023-01-01 RX ADMIN — MAGNESIUM SULFATE HEPTAHYDRATE 4 G: 80 INJECTION, SOLUTION INTRAVENOUS at 08:37

## 2023-01-01 RX ADMIN — LEVALBUTEROL HYDROCHLORIDE 1.25 MG: 1.25 SOLUTION RESPIRATORY (INHALATION) at 15:47

## 2023-01-01 RX ADMIN — METOPROLOL SUCCINATE 50 MG: 50 TABLET, EXTENDED RELEASE ORAL at 08:44

## 2023-01-01 RX ADMIN — METOPROLOL SUCCINATE 100 MG: 100 TABLET, EXTENDED RELEASE ORAL at 09:25

## 2023-01-01 RX ADMIN — FAMOTIDINE 20 MG: 20 TABLET ORAL at 07:53

## 2023-01-01 RX ADMIN — PRAVASTATIN SODIUM 20 MG: 20 TABLET ORAL at 19:36

## 2023-01-01 RX ADMIN — FAMOTIDINE 20 MG: 20 TABLET, FILM COATED ORAL at 08:29

## 2023-01-01 RX ADMIN — IPRATROPIUM BROMIDE AND ALBUTEROL SULFATE 3 ML: 2.5; .5 SOLUTION RESPIRATORY (INHALATION) at 11:13

## 2023-01-01 RX ADMIN — CHOLESTYRAMINE 4 G: 4 POWDER, FOR SUSPENSION ORAL at 14:09

## 2023-01-01 RX ADMIN — FAMOTIDINE 20 MG: 20 TABLET ORAL at 08:48

## 2023-01-01 RX ADMIN — ACETAMINOPHEN 650 MG: 325 TABLET ORAL at 17:19

## 2023-01-01 RX ADMIN — METOPROLOL SUCCINATE 25 MG: 25 TABLET, EXTENDED RELEASE ORAL at 08:14

## 2023-01-01 RX ADMIN — DULOXETINE 20 MG: 20 CAPSULE, DELAYED RELEASE ORAL at 08:06

## 2023-01-01 RX ADMIN — CHOLESTYRAMINE 4 G: 4 POWDER, FOR SUSPENSION ORAL at 10:02

## 2023-01-01 RX ADMIN — Medication 1 CAPSULE: at 22:16

## 2023-01-01 RX ADMIN — PRAVASTATIN SODIUM 20 MG: 20 TABLET ORAL at 21:18

## 2023-01-01 RX ADMIN — FAMOTIDINE 20 MG: 20 TABLET ORAL at 07:47

## 2023-01-01 RX ADMIN — ACETAMINOPHEN 650 MG: 325 TABLET, FILM COATED ORAL at 00:03

## 2023-01-01 RX ADMIN — VANCOMYCIN HYDROCHLORIDE 125 MG: 125 CAPSULE ORAL at 21:43

## 2023-01-01 RX ADMIN — FIDAXOMICIN 200 MG: 200 TABLET, FILM COATED ORAL at 08:48

## 2023-01-01 RX ADMIN — CHOLESTYRAMINE 4 G: 4 POWDER, FOR SUSPENSION ORAL at 09:10

## 2023-01-01 RX ADMIN — PRAVASTATIN SODIUM 20 MG: 20 TABLET ORAL at 20:08

## 2023-01-01 RX ADMIN — ALBUTEROL SULFATE 2 PUFF: 90 AEROSOL, METERED RESPIRATORY (INHALATION) at 20:39

## 2023-01-01 RX ADMIN — DILTIAZEM HYDROCHLORIDE 240 MG: 120 CAPSULE, COATED, EXTENDED RELEASE ORAL at 08:30

## 2023-01-01 RX ADMIN — CYCLOBENZAPRINE HYDROCHLORIDE 5 MG: 5 TABLET, FILM COATED ORAL at 12:48

## 2023-01-01 RX ADMIN — METOPROLOL SUCCINATE 100 MG: 100 TABLET, EXTENDED RELEASE ORAL at 08:10

## 2023-01-01 RX ADMIN — MAGNESIUM OXIDE TAB 400 MG (241.3 MG ELEMENTAL MG) 400 MG: 400 (241.3 MG) TAB at 21:37

## 2023-01-01 RX ADMIN — BUDESONIDE AND FORMOTEROL FUMARATE DIHYDRATE 2 PUFF: 160; 4.5 AEROSOL RESPIRATORY (INHALATION) at 20:07

## 2023-01-01 RX ADMIN — GUAIFENESIN 600 MG: 600 TABLET, EXTENDED RELEASE ORAL at 20:36

## 2023-01-01 RX ADMIN — Medication 1 CAPSULE: at 08:37

## 2023-01-01 RX ADMIN — METOPROLOL SUCCINATE 100 MG: 100 TABLET, EXTENDED RELEASE ORAL at 08:23

## 2023-01-01 RX ADMIN — FUROSEMIDE 40 MG: 20 TABLET ORAL at 08:08

## 2023-01-01 RX ADMIN — PREDNISONE 5 MG: 5 TABLET ORAL at 08:17

## 2023-01-01 RX ADMIN — VANCOMYCIN HYDROCHLORIDE 125 MG: 125 CAPSULE ORAL at 11:08

## 2023-01-01 RX ADMIN — ALLOPURINOL 300 MG: 300 TABLET ORAL at 07:58

## 2023-01-01 RX ADMIN — PRAVASTATIN SODIUM 20 MG: 20 TABLET ORAL at 20:52

## 2023-01-01 RX ADMIN — ALLOPURINOL 300 MG: 300 TABLET ORAL at 08:08

## 2023-01-01 RX ADMIN — MAGNESIUM OXIDE TAB 400 MG (241.3 MG ELEMENTAL MG) 400 MG: 400 (241.3 MG) TAB at 20:37

## 2023-01-01 RX ADMIN — CALCIUM CARBONATE (ANTACID) CHEW TAB 500 MG 500 MG: 500 CHEW TAB at 02:26

## 2023-01-01 RX ADMIN — PRAVASTATIN SODIUM 20 MG: 20 TABLET ORAL at 20:45

## 2023-01-01 RX ADMIN — FAMOTIDINE 20 MG: 20 TABLET ORAL at 08:27

## 2023-01-01 RX ADMIN — Medication 1 DROP: at 20:41

## 2023-01-01 RX ADMIN — ALLOPURINOL 300 MG: 300 TABLET ORAL at 08:52

## 2023-01-01 RX ADMIN — Medication 0.5 UNITS: at 16:36

## 2023-01-01 RX ADMIN — FOLIC ACID 1 MG: 1 TABLET ORAL at 08:32

## 2023-01-01 RX ADMIN — LEVALBUTEROL HYDROCHLORIDE 1.25 MG: 1.25 SOLUTION RESPIRATORY (INHALATION) at 20:40

## 2023-01-01 RX ADMIN — Medication 1 DROP: at 12:51

## 2023-01-01 RX ADMIN — FOLIC ACID 1 MG: 1 TABLET ORAL at 08:09

## 2023-01-01 RX ADMIN — ACETAMINOPHEN 325 MG: 325 TABLET ORAL at 08:26

## 2023-01-01 RX ADMIN — CHOLESTYRAMINE 4 G: 4 POWDER, FOR SUSPENSION ORAL at 13:00

## 2023-01-01 RX ADMIN — DILTIAZEM HYDROCHLORIDE 30 MG: 30 TABLET, FILM COATED ORAL at 13:40

## 2023-01-01 RX ADMIN — FIDAXOMICIN 200 MG: 200 TABLET, FILM COATED ORAL at 20:43

## 2023-01-01 RX ADMIN — FAMOTIDINE 20 MG: 20 TABLET ORAL at 08:09

## 2023-01-01 RX ADMIN — DILTIAZEM HYDROCHLORIDE 120 MG: 120 CAPSULE, COATED, EXTENDED RELEASE ORAL at 08:04

## 2023-01-01 RX ADMIN — VANCOMYCIN HYDROCHLORIDE 125 MG: 125 CAPSULE ORAL at 16:39

## 2023-01-01 RX ADMIN — LEVALBUTEROL HYDROCHLORIDE 1.25 MG: 1.25 SOLUTION RESPIRATORY (INHALATION) at 15:49

## 2023-01-01 RX ADMIN — FAMOTIDINE 20 MG: 20 TABLET ORAL at 08:50

## 2023-01-01 RX ADMIN — Medication 125 MCG: at 08:44

## 2023-01-01 RX ADMIN — PREDNISONE 5 MG: 5 TABLET ORAL at 08:45

## 2023-01-01 RX ADMIN — BUDESONIDE AND FORMOTEROL FUMARATE DIHYDRATE 2 PUFF: 160; 4.5 AEROSOL RESPIRATORY (INHALATION) at 21:23

## 2023-01-01 RX ADMIN — FAMOTIDINE 20 MG: 20 TABLET ORAL at 09:57

## 2023-01-01 RX ADMIN — CHOLESTYRAMINE 4 G: 4 POWDER, FOR SUSPENSION ORAL at 12:50

## 2023-01-01 RX ADMIN — Medication 125 MCG: at 07:46

## 2023-01-01 RX ADMIN — FUROSEMIDE 20 MG: 10 INJECTION, SOLUTION INTRAMUSCULAR; INTRAVENOUS at 16:47

## 2023-01-01 RX ADMIN — FUROSEMIDE 40 MG: 20 TABLET ORAL at 08:24

## 2023-01-01 RX ADMIN — GUAIFENESIN 600 MG: 600 TABLET, EXTENDED RELEASE ORAL at 20:32

## 2023-01-01 RX ADMIN — CEFTRIAXONE SODIUM 1 G: 1 INJECTION, POWDER, FOR SOLUTION INTRAMUSCULAR; INTRAVENOUS at 15:44

## 2023-01-01 RX ADMIN — FAMOTIDINE 20 MG: 10 INJECTION, SOLUTION INTRAVENOUS at 09:03

## 2023-01-01 RX ADMIN — OXYCODONE HYDROCHLORIDE 2.5 MG: 5 TABLET ORAL at 15:55

## 2023-01-01 RX ADMIN — FUROSEMIDE 40 MG: 20 TABLET ORAL at 08:54

## 2023-01-01 RX ADMIN — FIDAXOMICIN 200 MG: 200 TABLET, FILM COATED ORAL at 20:51

## 2023-01-01 RX ADMIN — FAMOTIDINE 20 MG: 20 TABLET ORAL at 08:13

## 2023-01-01 RX ADMIN — Medication 125 MCG: at 09:37

## 2023-01-01 RX ADMIN — ALLOPURINOL 300 MG: 300 TABLET ORAL at 09:37

## 2023-01-01 RX ADMIN — DILTIAZEM HYDROCHLORIDE 120 MG: 120 CAPSULE, COATED, EXTENDED RELEASE ORAL at 08:26

## 2023-01-01 RX ADMIN — DILTIAZEM HYDROCHLORIDE 240 MG: 120 CAPSULE, COATED, EXTENDED RELEASE ORAL at 08:05

## 2023-01-01 RX ADMIN — PREDNISONE 5 MG: 5 TABLET ORAL at 08:02

## 2023-01-01 RX ADMIN — FENTANYL CITRATE 100 MCG: 50 INJECTION, SOLUTION INTRAMUSCULAR; INTRAVENOUS at 15:23

## 2023-01-01 RX ADMIN — RIVAROXABAN 15 MG: 15 TABLET, FILM COATED ORAL at 18:12

## 2023-01-01 RX ADMIN — Medication 1 CAPSULE: at 20:15

## 2023-01-01 RX ADMIN — PRAVASTATIN SODIUM 20 MG: 20 TABLET ORAL at 20:38

## 2023-01-01 RX ADMIN — DULOXETINE HYDROCHLORIDE 20 MG: 20 CAPSULE, DELAYED RELEASE ORAL at 08:08

## 2023-01-01 RX ADMIN — PREDNISONE 5 MG: 5 TABLET ORAL at 08:10

## 2023-01-01 RX ADMIN — METOPROLOL SUCCINATE 100 MG: 100 TABLET, EXTENDED RELEASE ORAL at 08:17

## 2023-01-01 RX ADMIN — Medication 1 CAPSULE: at 21:17

## 2023-01-01 RX ADMIN — FUROSEMIDE 40 MG: 10 INJECTION, SOLUTION INTRAVENOUS at 18:18

## 2023-01-01 RX ADMIN — GUAIFENESIN 600 MG: 600 TABLET ORAL at 09:41

## 2023-01-01 RX ADMIN — Medication 1 CAPSULE: at 20:32

## 2023-01-01 RX ADMIN — Medication 1 CAPSULE: at 20:59

## 2023-01-01 RX ADMIN — FUROSEMIDE 40 MG: 20 TABLET ORAL at 08:44

## 2023-01-01 RX ADMIN — RIVAROXABAN 15 MG: 15 TABLET, FILM COATED ORAL at 19:02

## 2023-01-01 RX ADMIN — Medication 125 MCG: at 08:26

## 2023-01-01 RX ADMIN — MELATONIN TAB 3 MG 3 MG: 3 TAB at 21:43

## 2023-01-01 RX ADMIN — ALLOPURINOL 300 MG: 300 TABLET ORAL at 08:03

## 2023-01-01 RX ADMIN — Medication 1 CAPSULE: at 21:00

## 2023-01-01 RX ADMIN — BENZONATATE 100 MG: 100 CAPSULE ORAL at 15:09

## 2023-01-01 RX ADMIN — DILTIAZEM HYDROCHLORIDE 15 MG/HR: 5 INJECTION INTRAVENOUS at 11:04

## 2023-01-01 RX ADMIN — SENNOSIDES AND DOCUSATE SODIUM 1 TABLET: 50; 8.6 TABLET ORAL at 21:00

## 2023-01-01 RX ADMIN — FOLIC ACID 1 MG: 1 TABLET ORAL at 09:12

## 2023-01-01 RX ADMIN — FLUTICASONE FUROATE AND VILANTEROL TRIFENATATE 1 PUFF: 200; 25 POWDER RESPIRATORY (INHALATION) at 07:57

## 2023-01-01 RX ADMIN — PRAVASTATIN SODIUM 20 MG: 20 TABLET ORAL at 20:36

## 2023-01-01 RX ADMIN — METOPROLOL SUCCINATE 100 MG: 100 TABLET, EXTENDED RELEASE ORAL at 08:20

## 2023-01-01 RX ADMIN — CHOLESTYRAMINE 4 G: 4 POWDER, FOR SUSPENSION ORAL at 08:24

## 2023-01-01 RX ADMIN — MAGNESIUM OXIDE TAB 400 MG (241.3 MG ELEMENTAL MG) 400 MG: 400 (241.3 MG) TAB at 08:52

## 2023-01-01 RX ADMIN — DILTIAZEM HYDROCHLORIDE 30 MG: 30 TABLET, FILM COATED ORAL at 05:29

## 2023-01-01 RX ADMIN — PRAVASTATIN SODIUM 20 MG: 20 TABLET ORAL at 21:52

## 2023-01-01 RX ADMIN — HYDROMORPHONE HYDROCHLORIDE 1 MG: 2 TABLET ORAL at 21:34

## 2023-01-01 RX ADMIN — GUAIFENESIN 600 MG: 600 TABLET ORAL at 20:04

## 2023-01-01 RX ADMIN — Medication 1 CAPSULE: at 20:48

## 2023-01-01 RX ADMIN — Medication 1 CAPSULE: at 07:53

## 2023-01-01 RX ADMIN — FAMOTIDINE 20 MG: 10 INJECTION, SOLUTION INTRAVENOUS at 21:37

## 2023-01-01 RX ADMIN — GUAIFENESIN 600 MG: 600 TABLET ORAL at 21:00

## 2023-01-01 RX ADMIN — AZITHROMYCIN MONOHYDRATE 500 MG: 500 INJECTION, POWDER, LYOPHILIZED, FOR SOLUTION INTRAVENOUS at 17:52

## 2023-01-01 RX ADMIN — LEVALBUTEROL HYDROCHLORIDE 1.25 MG: 1.25 SOLUTION RESPIRATORY (INHALATION) at 21:11

## 2023-01-01 RX ADMIN — FOLIC ACID 1 MG: 1 TABLET ORAL at 09:24

## 2023-01-01 RX ADMIN — Medication 125 MCG: at 09:12

## 2023-01-01 RX ADMIN — LEVALBUTEROL HYDROCHLORIDE 1.25 MG: 1.25 SOLUTION RESPIRATORY (INHALATION) at 12:00

## 2023-01-01 RX ADMIN — SULFAMETHOXAZOLE AND TRIMETHOPRIM 1 TABLET: 800; 160 TABLET ORAL at 07:53

## 2023-01-01 RX ADMIN — FAMOTIDINE 20 MG: 20 TABLET ORAL at 08:02

## 2023-01-01 RX ADMIN — FUROSEMIDE 40 MG: 20 TABLET ORAL at 08:12

## 2023-01-01 RX ADMIN — CHOLESTYRAMINE 4 G: 4 POWDER, FOR SUSPENSION ORAL at 11:42

## 2023-01-01 RX ADMIN — FLUTICASONE FUROATE AND VILANTEROL TRIFENATATE 1 PUFF: 200; 25 POWDER RESPIRATORY (INHALATION) at 08:23

## 2023-01-01 RX ADMIN — PRAVASTATIN SODIUM 20 MG: 20 TABLET ORAL at 21:10

## 2023-01-01 RX ADMIN — CHOLESTYRAMINE 4 G: 4 POWDER, FOR SUSPENSION ORAL at 13:46

## 2023-01-01 RX ADMIN — IPRATROPIUM BROMIDE AND ALBUTEROL SULFATE 3 ML: 2.5; .5 SOLUTION RESPIRATORY (INHALATION) at 16:11

## 2023-01-01 RX ADMIN — FIDAXOMICIN 200 MG: 200 TABLET, FILM COATED ORAL at 21:22

## 2023-01-01 RX ADMIN — DULOXETINE 20 MG: 20 CAPSULE, DELAYED RELEASE ORAL at 07:47

## 2023-01-01 RX ADMIN — CHOLESTYRAMINE 4 G: 4 POWDER, FOR SUSPENSION ORAL at 08:13

## 2023-01-01 RX ADMIN — ALLOPURINOL 300 MG: 300 TABLET ORAL at 08:37

## 2023-01-01 RX ADMIN — FOLIC ACID 1 MG: 1 TABLET ORAL at 08:21

## 2023-01-01 RX ADMIN — RIVAROXABAN 15 MG: 15 TABLET, FILM COATED ORAL at 16:23

## 2023-01-01 RX ADMIN — PRAVASTATIN SODIUM 20 MG: 20 TABLET ORAL at 20:43

## 2023-01-01 RX ADMIN — PRAVASTATIN SODIUM 20 MG: 20 TABLET ORAL at 20:40

## 2023-01-01 RX ADMIN — IPRATROPIUM BROMIDE AND ALBUTEROL SULFATE 3 ML: .5; 3 SOLUTION RESPIRATORY (INHALATION) at 12:38

## 2023-01-01 RX ADMIN — CHOLESTYRAMINE 4 G: 4 POWDER, FOR SUSPENSION ORAL at 14:31

## 2023-01-01 RX ADMIN — CHOLESTYRAMINE 4 G: 4 POWDER, FOR SUSPENSION ORAL at 17:41

## 2023-01-01 RX ADMIN — Medication 1 CAPSULE: at 08:47

## 2023-01-01 RX ADMIN — ALLOPURINOL 300 MG: 300 TABLET ORAL at 07:53

## 2023-01-01 RX ADMIN — FOLIC ACID 1 MG: 1 TABLET ORAL at 09:01

## 2023-01-01 RX ADMIN — MAGNESIUM SULFATE HEPTAHYDRATE 4 G: 40 INJECTION, SOLUTION INTRAVENOUS at 11:53

## 2023-01-01 RX ADMIN — Medication 1 CAPSULE: at 20:08

## 2023-01-01 RX ADMIN — FIDAXOMICIN 200 MG: 200 TABLET, FILM COATED ORAL at 09:59

## 2023-01-01 RX ADMIN — CHOLESTYRAMINE 4 G: 4 POWDER, FOR SUSPENSION ORAL at 11:48

## 2023-01-01 RX ADMIN — Medication 125 MCG: at 09:01

## 2023-01-01 RX ADMIN — VANCOMYCIN HYDROCHLORIDE 125 MG: 125 CAPSULE ORAL at 09:12

## 2023-01-01 RX ADMIN — METOPROLOL SUCCINATE 50 MG: 50 TABLET, EXTENDED RELEASE ORAL at 08:06

## 2023-01-01 RX ADMIN — FLUTICASONE FUROATE AND VILANTEROL TRIFENATATE 1 PUFF: 200; 25 POWDER RESPIRATORY (INHALATION) at 08:17

## 2023-01-01 RX ADMIN — DILTIAZEM HYDROCHLORIDE 30 MG: 30 TABLET, FILM COATED ORAL at 13:41

## 2023-01-01 RX ADMIN — PHENYLEPHRINE HYDROCHLORIDE 100 MCG: 10 INJECTION INTRAVENOUS at 15:56

## 2023-01-01 RX ADMIN — ROCURONIUM BROMIDE 30 MG: 50 INJECTION, SOLUTION INTRAVENOUS at 15:50

## 2023-01-01 RX ADMIN — CHOLESTYRAMINE 4 G: 4 POWDER, FOR SUSPENSION ORAL at 17:24

## 2023-01-01 RX ADMIN — PREDNISONE 20 MG: 20 TABLET ORAL at 08:08

## 2023-01-01 RX ADMIN — PRAVASTATIN SODIUM 20 MG: 20 TABLET ORAL at 20:18

## 2023-01-01 RX ADMIN — METOPROLOL SUCCINATE 25 MG: 25 TABLET, EXTENDED RELEASE ORAL at 08:20

## 2023-01-01 RX ADMIN — METOPROLOL SUCCINATE 75 MG: 25 TABLET, EXTENDED RELEASE ORAL at 17:07

## 2023-01-01 RX ADMIN — IPRATROPIUM BROMIDE 0.5 MG: 0.5 SOLUTION RESPIRATORY (INHALATION) at 13:49

## 2023-01-01 RX ADMIN — ONDANSETRON 4 MG: 2 INJECTION INTRAMUSCULAR; INTRAVENOUS at 17:06

## 2023-01-01 RX ADMIN — MELATONIN TAB 3 MG 3 MG: 3 TAB at 20:59

## 2023-01-01 RX ADMIN — FLUTICASONE FUROATE AND VILANTEROL TRIFENATATE 1 PUFF: 200; 25 POWDER RESPIRATORY (INHALATION) at 08:51

## 2023-01-01 RX ADMIN — Medication 1 CAPSULE: at 07:58

## 2023-01-01 RX ADMIN — CHOLESTYRAMINE 4 G: 4 POWDER, FOR SUSPENSION ORAL at 08:10

## 2023-01-01 RX ADMIN — DILTIAZEM HYDROCHLORIDE 10 MG/HR: 5 INJECTION, SOLUTION INTRAVENOUS at 11:13

## 2023-01-01 RX ADMIN — RIVAROXABAN 15 MG: 15 TABLET, FILM COATED ORAL at 17:05

## 2023-01-01 RX ADMIN — CHOLESTYRAMINE 4 G: 4 POWDER, FOR SUSPENSION ORAL at 17:27

## 2023-01-01 RX ADMIN — IPRATROPIUM BROMIDE AND ALBUTEROL SULFATE 3 ML: 2.5; .5 SOLUTION RESPIRATORY (INHALATION) at 11:26

## 2023-01-01 RX ADMIN — Medication 125 MCG: at 07:53

## 2023-01-01 RX ADMIN — METOPROLOL SUCCINATE 25 MG: 25 TABLET, EXTENDED RELEASE ORAL at 07:59

## 2023-01-01 RX ADMIN — GUAIFENESIN 600 MG: 600 TABLET ORAL at 08:52

## 2023-01-01 RX ADMIN — FAMOTIDINE 20 MG: 10 INJECTION, SOLUTION INTRAVENOUS at 10:35

## 2023-01-01 RX ADMIN — ACETAMINOPHEN 325 MG: 325 TABLET ORAL at 01:52

## 2023-01-01 RX ADMIN — MAGNESIUM OXIDE TAB 400 MG (241.3 MG ELEMENTAL MG) 400 MG: 400 (241.3 MG) TAB at 08:36

## 2023-01-01 RX ADMIN — BUDESONIDE AND FORMOTEROL FUMARATE DIHYDRATE 2 PUFF: 160; 4.5 AEROSOL RESPIRATORY (INHALATION) at 08:45

## 2023-01-01 RX ADMIN — FUROSEMIDE 40 MG: 20 TABLET ORAL at 08:29

## 2023-01-01 RX ADMIN — RIVAROXABAN 15 MG: 15 TABLET, FILM COATED ORAL at 17:28

## 2023-01-01 RX ADMIN — Medication 1 CAPSULE: at 19:53

## 2023-01-01 RX ADMIN — IPRATROPIUM BROMIDE 0.5 MG: 0.5 SOLUTION RESPIRATORY (INHALATION) at 19:58

## 2023-01-01 RX ADMIN — Medication 5 MG: at 11:21

## 2023-01-01 RX ADMIN — RIVAROXABAN 15 MG: 15 TABLET, FILM COATED ORAL at 17:48

## 2023-01-01 RX ADMIN — Medication 125 MCG: at 07:48

## 2023-01-01 RX ADMIN — RIVAROXABAN 15 MG: 15 TABLET, FILM COATED ORAL at 16:44

## 2023-01-01 RX ADMIN — PRAVASTATIN SODIUM 20 MG: 20 TABLET ORAL at 21:47

## 2023-01-01 RX ADMIN — GUAIFENESIN 600 MG: 600 TABLET ORAL at 20:45

## 2023-01-01 RX ADMIN — DULOXETINE HYDROCHLORIDE 20 MG: 20 CAPSULE, DELAYED RELEASE ORAL at 08:32

## 2023-01-01 RX ADMIN — PRAVASTATIN SODIUM 20 MG: 20 TABLET ORAL at 22:16

## 2023-01-01 RX ADMIN — RIVAROXABAN 15 MG: 15 TABLET, FILM COATED ORAL at 17:16

## 2023-01-01 RX ADMIN — DILTIAZEM HYDROCHLORIDE 15 MG/HR: 5 INJECTION INTRAVENOUS at 10:53

## 2023-01-01 RX ADMIN — SODIUM CHLORIDE, POTASSIUM CHLORIDE, SODIUM LACTATE AND CALCIUM CHLORIDE: 600; 310; 30; 20 INJECTION, SOLUTION INTRAVENOUS at 17:25

## 2023-01-01 RX ADMIN — HYDROMORPHONE HYDROCHLORIDE 0.2 MG: 1 INJECTION, SOLUTION INTRAMUSCULAR; INTRAVENOUS; SUBCUTANEOUS at 17:19

## 2023-01-01 RX ADMIN — RIVAROXABAN 15 MG: 15 TABLET, FILM COATED ORAL at 16:34

## 2023-01-01 RX ADMIN — Medication 125 MCG: at 08:21

## 2023-01-01 RX ADMIN — FLUTICASONE FUROATE AND VILANTEROL TRIFENATATE 1 PUFF: 200; 25 POWDER RESPIRATORY (INHALATION) at 08:50

## 2023-01-01 RX ADMIN — LEVALBUTEROL HYDROCHLORIDE 1.25 MG: 1.25 SOLUTION RESPIRATORY (INHALATION) at 08:28

## 2023-01-01 RX ADMIN — ACETAMINOPHEN 650 MG: 325 TABLET ORAL at 17:49

## 2023-01-01 RX ADMIN — FAMOTIDINE 20 MG: 20 TABLET ORAL at 09:53

## 2023-01-01 RX ADMIN — Medication 1 CAPSULE: at 08:32

## 2023-01-01 RX ADMIN — METOPROLOL TARTRATE 5 MG: 5 INJECTION INTRAVENOUS at 15:23

## 2023-01-01 RX ADMIN — ACETAMINOPHEN 650 MG: 325 TABLET ORAL at 08:39

## 2023-01-01 RX ADMIN — CHOLESTYRAMINE 4 G: 4 POWDER, FOR SUSPENSION ORAL at 16:32

## 2023-01-01 RX ADMIN — FLUTICASONE FUROATE AND VILANTEROL TRIFENATATE 1 PUFF: 200; 25 POWDER RESPIRATORY (INHALATION) at 08:21

## 2023-01-01 RX ADMIN — CHOLESTYRAMINE 4 G: 4 POWDER, FOR SUSPENSION ORAL at 17:59

## 2023-01-01 RX ADMIN — PERFLUTREN 3 ML: 6.52 INJECTION, SUSPENSION INTRAVENOUS at 12:48

## 2023-01-01 RX ADMIN — FLUTICASONE FUROATE AND VILANTEROL TRIFENATATE 1 PUFF: 200; 25 POWDER RESPIRATORY (INHALATION) at 08:22

## 2023-01-01 RX ADMIN — GUAIFENESIN 600 MG: 600 TABLET ORAL at 21:23

## 2023-01-01 RX ADMIN — BENZOCAINE: 220 SPRAY, METERED PERIODONTAL at 18:21

## 2023-01-01 RX ADMIN — ACETAMINOPHEN 975 MG: 325 TABLET ORAL at 22:47

## 2023-01-01 RX ADMIN — CEFAZOLIN 1 G: 1 INJECTION, POWDER, FOR SOLUTION INTRAMUSCULAR; INTRAVENOUS at 11:58

## 2023-01-01 RX ADMIN — FAMOTIDINE 20 MG: 20 TABLET ORAL at 08:52

## 2023-01-01 RX ADMIN — METOPROLOL SUCCINATE 75 MG: 25 TABLET, EXTENDED RELEASE ORAL at 09:12

## 2023-01-01 RX ADMIN — Medication 1 CAPSULE: at 08:56

## 2023-01-01 RX ADMIN — CHOLESTYRAMINE 4 G: 4 POWDER, FOR SUSPENSION ORAL at 07:48

## 2023-01-01 RX ADMIN — Medication 3 MG: at 22:39

## 2023-01-01 RX ADMIN — FAMOTIDINE 20 MG: 20 TABLET ORAL at 08:29

## 2023-01-01 RX ADMIN — DILTIAZEM HYDROCHLORIDE 240 MG: 120 CAPSULE, COATED, EXTENDED RELEASE ORAL at 08:14

## 2023-01-01 RX ADMIN — GUAIFENESIN 600 MG: 600 TABLET, EXTENDED RELEASE ORAL at 08:26

## 2023-01-01 RX ADMIN — BUDESONIDE AND FORMOTEROL FUMARATE DIHYDRATE 2 PUFF: 160; 4.5 AEROSOL RESPIRATORY (INHALATION) at 20:41

## 2023-01-01 RX ADMIN — PROPOFOL 90 MG: 10 INJECTION, EMULSION INTRAVENOUS at 15:50

## 2023-01-01 RX ADMIN — RIVAROXABAN 15 MG: 15 TABLET, FILM COATED ORAL at 21:52

## 2023-01-01 RX ADMIN — Medication 1 DROP: at 08:26

## 2023-01-01 RX ADMIN — PERFLUTREN 2 ML: 6.52 INJECTION, SUSPENSION INTRAVENOUS at 10:18

## 2023-01-01 RX ADMIN — FLUTICASONE FUROATE AND VILANTEROL TRIFENATATE 1 PUFF: 200; 25 POWDER RESPIRATORY (INHALATION) at 09:42

## 2023-01-01 RX ADMIN — Medication 1 CAPSULE: at 08:54

## 2023-01-01 RX ADMIN — LORAZEPAM 0.25 MG: 0.5 TABLET ORAL at 20:02

## 2023-01-01 RX ADMIN — IPRATROPIUM BROMIDE AND ALBUTEROL SULFATE 3 ML: 2.5; .5 SOLUTION RESPIRATORY (INHALATION) at 07:56

## 2023-01-01 RX ADMIN — MELATONIN TAB 3 MG 3 MG: 3 TAB at 20:44

## 2023-01-01 RX ADMIN — DILTIAZEM HYDROCHLORIDE 120 MG: 120 CAPSULE, COATED, EXTENDED RELEASE ORAL at 09:02

## 2023-01-01 RX ADMIN — ONDANSETRON 4 MG: 2 INJECTION INTRAMUSCULAR; INTRAVENOUS at 17:31

## 2023-01-01 RX ADMIN — MELATONIN TAB 3 MG 3 MG: 3 TAB at 21:04

## 2023-01-01 RX ADMIN — GUAIFENESIN 600 MG: 600 TABLET ORAL at 08:41

## 2023-01-01 RX ADMIN — SENNOSIDES AND DOCUSATE SODIUM 1 TABLET: 50; 8.6 TABLET ORAL at 08:10

## 2023-01-01 RX ADMIN — FLUTICASONE FUROATE AND VILANTEROL TRIFENATATE 1 PUFF: 200; 25 POWDER RESPIRATORY (INHALATION) at 09:56

## 2023-01-01 RX ADMIN — ALLOPURINOL 300 MG: 300 TABLET ORAL at 09:00

## 2023-01-01 RX ADMIN — Medication 1 CAPSULE: at 08:05

## 2023-01-01 RX ADMIN — FUROSEMIDE 40 MG: 20 TABLET ORAL at 07:58

## 2023-01-01 RX ADMIN — DULOXETINE 20 MG: 20 CAPSULE, DELAYED RELEASE ORAL at 08:40

## 2023-01-01 RX ADMIN — METOPROLOL SUCCINATE 50 MG: 50 TABLET, EXTENDED RELEASE ORAL at 08:24

## 2023-01-01 RX ADMIN — PRAVASTATIN SODIUM 20 MG: 20 TABLET ORAL at 21:07

## 2023-01-01 RX ADMIN — CHOLESTYRAMINE 4 G: 4 POWDER, FOR SUSPENSION ORAL at 08:56

## 2023-01-01 RX ADMIN — FAMOTIDINE 20 MG: 20 TABLET ORAL at 08:04

## 2023-01-01 RX ADMIN — RIVAROXABAN 15 MG: 15 TABLET, FILM COATED ORAL at 16:56

## 2023-01-01 RX ADMIN — ACETAMINOPHEN 975 MG: 325 TABLET ORAL at 13:28

## 2023-01-01 RX ADMIN — ALLOPURINOL 300 MG: 300 TABLET ORAL at 08:12

## 2023-01-01 RX ADMIN — FAMOTIDINE 20 MG: 20 TABLET ORAL at 08:44

## 2023-01-01 RX ADMIN — DULOXETINE HYDROCHLORIDE 20 MG: 20 CAPSULE, DELAYED RELEASE ORAL at 08:51

## 2023-01-01 RX ADMIN — MAGNESIUM OXIDE TAB 400 MG (241.3 MG ELEMENTAL MG) 400 MG: 400 (241.3 MG) TAB at 20:17

## 2023-01-01 RX ADMIN — Medication 1 CAPSULE: at 08:23

## 2023-01-01 RX ADMIN — CHOLESTYRAMINE 4 G: 4 POWDER, FOR SUSPENSION ORAL at 08:27

## 2023-01-01 RX ADMIN — ALLOPURINOL 300 MG: 300 TABLET ORAL at 08:09

## 2023-01-01 RX ADMIN — Medication 1 MG: at 20:43

## 2023-01-01 RX ADMIN — CEFTRIAXONE SODIUM 1 G: 1 INJECTION, POWDER, FOR SOLUTION INTRAMUSCULAR; INTRAVENOUS at 16:50

## 2023-01-01 RX ADMIN — FAMOTIDINE 20 MG: 10 INJECTION, SOLUTION INTRAVENOUS at 09:12

## 2023-01-01 RX ADMIN — GUAIFENESIN 600 MG: 600 TABLET, EXTENDED RELEASE ORAL at 08:06

## 2023-01-01 RX ADMIN — Medication 1 CAPSULE: at 08:12

## 2023-01-01 RX ADMIN — ACETAMINOPHEN 650 MG: 325 TABLET ORAL at 08:45

## 2023-01-01 RX ADMIN — Medication 1 CAPSULE: at 20:13

## 2023-01-01 RX ADMIN — RIVAROXABAN 15 MG: 15 TABLET, FILM COATED ORAL at 16:47

## 2023-01-01 RX ADMIN — METOPROLOL SUCCINATE 100 MG: 100 TABLET, EXTENDED RELEASE ORAL at 09:41

## 2023-01-01 RX ADMIN — ALLOPURINOL 300 MG: 300 TABLET ORAL at 09:41

## 2023-01-01 RX ADMIN — BUPIVACAINE HYDROCHLORIDE 30 ML: 2.5 INJECTION, SOLUTION EPIDURAL; INFILTRATION; INTRACAUDAL at 15:40

## 2023-01-01 RX ADMIN — GUAIFENESIN 600 MG: 600 TABLET ORAL at 08:36

## 2023-01-01 RX ADMIN — FUROSEMIDE 20 MG: 20 TABLET ORAL at 08:17

## 2023-01-01 RX ADMIN — FUROSEMIDE 20 MG: 10 INJECTION, SOLUTION INTRAMUSCULAR; INTRAVENOUS at 11:37

## 2023-01-01 RX ADMIN — RIVAROXABAN 15 MG: 15 TABLET, FILM COATED ORAL at 17:18

## 2023-01-01 RX ADMIN — LEVALBUTEROL HYDROCHLORIDE 1.25 MG: 1.25 SOLUTION RESPIRATORY (INHALATION) at 07:20

## 2023-01-01 RX ADMIN — FAMOTIDINE 20 MG: 10 INJECTION, SOLUTION INTRAVENOUS at 08:29

## 2023-01-01 RX ADMIN — GUAIFENESIN 600 MG: 600 TABLET ORAL at 08:39

## 2023-01-01 RX ADMIN — FOLIC ACID 1 MG: 1 TABLET ORAL at 08:29

## 2023-01-01 RX ADMIN — DULOXETINE 20 MG: 20 CAPSULE, DELAYED RELEASE ORAL at 08:20

## 2023-01-01 RX ADMIN — Medication 1 CAPSULE: at 21:47

## 2023-01-01 RX ADMIN — PRAVASTATIN SODIUM 20 MG: 20 TABLET ORAL at 20:44

## 2023-01-01 RX ADMIN — METOPROLOL SUCCINATE 75 MG: 25 TABLET, EXTENDED RELEASE ORAL at 10:34

## 2023-01-01 RX ADMIN — SODIUM CHLORIDE 250 ML: 9 INJECTION, SOLUTION INTRAVENOUS at 14:46

## 2023-01-01 RX ADMIN — PREDNISONE 5 MG: 5 TABLET ORAL at 07:59

## 2023-01-01 RX ADMIN — RIVAROXABAN 15 MG: 15 TABLET, FILM COATED ORAL at 16:18

## 2023-01-01 RX ADMIN — Medication 125 MCG: at 08:50

## 2023-01-01 RX ADMIN — METHYLPREDNISOLONE SODIUM SUCCINATE 40 MG: 40 INJECTION, POWDER, FOR SOLUTION INTRAMUSCULAR; INTRAVENOUS at 00:03

## 2023-01-01 RX ADMIN — FUROSEMIDE 20 MG: 20 TABLET ORAL at 08:45

## 2023-01-01 RX ADMIN — IPRATROPIUM BROMIDE AND ALBUTEROL SULFATE 3 ML: 2.5; .5 SOLUTION RESPIRATORY (INHALATION) at 20:52

## 2023-01-01 RX ADMIN — Medication 1 CAPSULE: at 09:28

## 2023-01-01 RX ADMIN — Medication 3 MG: at 21:52

## 2023-01-01 RX ADMIN — METOPROLOL SUCCINATE 100 MG: 100 TABLET, EXTENDED RELEASE ORAL at 08:51

## 2023-01-01 RX ADMIN — CHOLESTYRAMINE 4 G: 4 POWDER, FOR SUSPENSION ORAL at 18:19

## 2023-01-01 RX ADMIN — PREDNISONE 5 MG: 5 TABLET ORAL at 09:25

## 2023-01-01 RX ADMIN — DILTIAZEM HYDROCHLORIDE 30 MG: 30 TABLET, FILM COATED ORAL at 20:10

## 2023-01-01 RX ADMIN — IPRATROPIUM BROMIDE 0.5 MG: 0.5 SOLUTION RESPIRATORY (INHALATION) at 07:56

## 2023-01-01 RX ADMIN — ALLOPURINOL 300 MG: 300 TABLET ORAL at 09:56

## 2023-01-01 RX ADMIN — FUROSEMIDE 40 MG: 20 TABLET ORAL at 08:06

## 2023-01-01 RX ADMIN — CHOLESTYRAMINE 4 G: 4 POWDER, FOR SUSPENSION ORAL at 08:25

## 2023-01-01 RX ADMIN — FUROSEMIDE 20 MG: 10 INJECTION, SOLUTION INTRAMUSCULAR; INTRAVENOUS at 08:14

## 2023-01-01 RX ADMIN — BUDESONIDE AND FORMOTEROL FUMARATE DIHYDRATE 2 PUFF: 160; 4.5 AEROSOL RESPIRATORY (INHALATION) at 20:43

## 2023-01-01 RX ADMIN — DILTIAZEM HYDROCHLORIDE 240 MG: 120 CAPSULE, COATED, EXTENDED RELEASE ORAL at 08:24

## 2023-01-01 RX ADMIN — FOLIC ACID 1 MG: 1 TABLET ORAL at 08:45

## 2023-01-01 RX ADMIN — DILTIAZEM HYDROCHLORIDE 5 MG/HR: 5 INJECTION, SOLUTION INTRAVENOUS at 21:17

## 2023-01-01 RX ADMIN — SODIUM CHLORIDE, PRESERVATIVE FREE 50 ML/HR: 5 INJECTION INTRAVENOUS at 11:48

## 2023-01-01 RX ADMIN — METOPROLOL SUCCINATE 100 MG: 100 TABLET, EXTENDED RELEASE ORAL at 08:36

## 2023-01-01 RX ADMIN — DILTIAZEM HYDROCHLORIDE 120 MG: 120 CAPSULE, COATED, EXTENDED RELEASE ORAL at 08:31

## 2023-01-01 RX ADMIN — VANCOMYCIN HYDROCHLORIDE 125 MG: 125 CAPSULE ORAL at 20:40

## 2023-01-01 RX ADMIN — HYDROXYZINE HYDROCHLORIDE 25 MG: 25 TABLET, FILM COATED ORAL at 21:35

## 2023-01-01 RX ADMIN — DILTIAZEM HYDROCHLORIDE 15 MG: 5 INJECTION, SOLUTION INTRAVENOUS at 06:44

## 2023-01-01 RX ADMIN — FOLIC ACID 1 MG: 1 TABLET ORAL at 08:48

## 2023-01-01 RX ADMIN — FLUTICASONE FUROATE AND VILANTEROL TRIFENATATE 1 PUFF: 200; 25 POWDER RESPIRATORY (INHALATION) at 07:53

## 2023-01-01 RX ADMIN — FIDAXOMICIN 200 MG: 200 TABLET, FILM COATED ORAL at 20:45

## 2023-01-01 RX ADMIN — GUAIFENESIN 600 MG: 600 TABLET ORAL at 21:37

## 2023-01-01 RX ADMIN — METOPROLOL SUCCINATE 50 MG: 50 TABLET, EXTENDED RELEASE ORAL at 08:13

## 2023-01-01 RX ADMIN — GUAIFENESIN 600 MG: 600 TABLET, EXTENDED RELEASE ORAL at 21:43

## 2023-01-01 RX ADMIN — ALLOPURINOL 300 MG: 300 TABLET ORAL at 07:59

## 2023-01-01 RX ADMIN — RIVAROXABAN 15 MG: 15 TABLET, FILM COATED ORAL at 17:32

## 2023-01-01 RX ADMIN — Medication 125 MCG: at 08:36

## 2023-01-01 RX ADMIN — CHOLESTYRAMINE 4 G: 4 POWDER, FOR SUSPENSION ORAL at 18:09

## 2023-01-01 RX ADMIN — METOPROLOL SUCCINATE 75 MG: 25 TABLET, EXTENDED RELEASE ORAL at 09:54

## 2023-01-01 RX ADMIN — METOPROLOL SUCCINATE 25 MG: 25 TABLET, EXTENDED RELEASE ORAL at 07:52

## 2023-01-01 RX ADMIN — DULOXETINE HYDROCHLORIDE 20 MG: 20 CAPSULE, DELAYED RELEASE ORAL at 08:04

## 2023-01-01 RX ADMIN — Medication 2 G: at 15:43

## 2023-01-01 RX ADMIN — IPRATROPIUM BROMIDE 0.5 MG: 0.5 SOLUTION RESPIRATORY (INHALATION) at 20:40

## 2023-01-01 RX ADMIN — Medication 400 MG: at 11:37

## 2023-01-01 RX ADMIN — DILTIAZEM HYDROCHLORIDE 240 MG: 120 CAPSULE, COATED, EXTENDED RELEASE ORAL at 07:47

## 2023-01-01 RX ADMIN — PREDNISONE 10 MG: 5 TABLET ORAL at 08:42

## 2023-01-01 RX ADMIN — DILTIAZEM HYDROCHLORIDE 30 MG: 30 TABLET, FILM COATED ORAL at 14:16

## 2023-01-01 RX ADMIN — CHOLESTYRAMINE 4 G: 4 POWDER, FOR SUSPENSION ORAL at 12:03

## 2023-01-01 RX ADMIN — FLUTICASONE FUROATE AND VILANTEROL TRIFENATATE 1 PUFF: 200; 25 POWDER RESPIRATORY (INHALATION) at 08:46

## 2023-01-01 RX ADMIN — MELATONIN TAB 3 MG 3 MG: 3 TAB at 20:32

## 2023-01-01 RX ADMIN — CHOLESTYRAMINE 4 G: 4 POWDER, FOR SUSPENSION ORAL at 11:45

## 2023-01-01 RX ADMIN — PRAVASTATIN SODIUM 20 MG: 20 TABLET ORAL at 16:34

## 2023-01-01 RX ADMIN — DILTIAZEM HYDROCHLORIDE 240 MG: 120 CAPSULE, COATED, EXTENDED RELEASE ORAL at 10:35

## 2023-01-01 RX ADMIN — DILTIAZEM HYDROCHLORIDE 180 MG: 180 CAPSULE, COATED, EXTENDED RELEASE ORAL at 09:27

## 2023-01-01 RX ADMIN — ACETAMINOPHEN 650 MG: 325 TABLET ORAL at 01:18

## 2023-01-01 RX ADMIN — ALLOPURINOL 300 MG: 300 TABLET ORAL at 09:02

## 2023-01-01 RX ADMIN — Medication 1 CAPSULE: at 21:11

## 2023-01-01 RX ADMIN — ALBUTEROL SULFATE 2 PUFF: 90 AEROSOL, METERED RESPIRATORY (INHALATION) at 08:24

## 2023-01-01 RX ADMIN — CHOLESTYRAMINE 4 G: 4 POWDER, FOR SUSPENSION ORAL at 10:03

## 2023-01-01 RX ADMIN — PHENYLEPHRINE HYDROCHLORIDE 100 MCG: 10 INJECTION INTRAVENOUS at 16:09

## 2023-01-01 RX ADMIN — ALLOPURINOL 300 MG: 300 TABLET ORAL at 08:20

## 2023-01-01 RX ADMIN — FOLIC ACID 1 MG: 1 TABLET ORAL at 07:54

## 2023-01-01 RX ADMIN — DULOXETINE HYDROCHLORIDE 20 MG: 20 CAPSULE, DELAYED RELEASE ORAL at 10:11

## 2023-01-01 RX ADMIN — FUROSEMIDE 40 MG: 20 TABLET ORAL at 07:59

## 2023-01-01 RX ADMIN — FIDAXOMICIN 200 MG: 200 TABLET, FILM COATED ORAL at 20:40

## 2023-01-01 RX ADMIN — Medication 1 CAPSULE: at 09:12

## 2023-01-01 RX ADMIN — CHOLESTYRAMINE 4 G: 4 POWDER, FOR SUSPENSION ORAL at 07:59

## 2023-01-01 RX ADMIN — DILTIAZEM HYDROCHLORIDE 120 MG: 120 CAPSULE, COATED, EXTENDED RELEASE ORAL at 08:29

## 2023-01-01 RX ADMIN — SODIUM CHLORIDE 250 ML: 9 INJECTION, SOLUTION INTRAVENOUS at 01:13

## 2023-01-01 RX ADMIN — FUROSEMIDE 40 MG: 20 TABLET ORAL at 08:42

## 2023-01-01 RX ADMIN — FAMOTIDINE 20 MG: 20 TABLET ORAL at 08:20

## 2023-01-01 RX ADMIN — PREDNISONE 5 MG: 5 TABLET ORAL at 09:53

## 2023-01-01 RX ADMIN — LEVALBUTEROL HYDROCHLORIDE 1.25 MG: 1.25 SOLUTION RESPIRATORY (INHALATION) at 13:49

## 2023-01-01 RX ADMIN — FOLIC ACID 1 MG: 1 TABLET ORAL at 08:20

## 2023-01-01 RX ADMIN — PREDNISONE 20 MG: 20 TABLET ORAL at 08:54

## 2023-01-01 RX ADMIN — ACETAMINOPHEN 975 MG: 325 TABLET ORAL at 14:56

## 2023-01-01 RX ADMIN — CHOLESTYRAMINE 4 G: 4 POWDER, FOR SUSPENSION ORAL at 08:39

## 2023-01-01 RX ADMIN — CHOLESTYRAMINE 4 G: 4 POWDER, FOR SUSPENSION ORAL at 10:49

## 2023-01-01 RX ADMIN — Medication 400 MG: at 08:51

## 2023-01-01 RX ADMIN — RIVAROXABAN 15 MG: 15 TABLET, FILM COATED ORAL at 16:24

## 2023-01-01 RX ADMIN — VANCOMYCIN HYDROCHLORIDE 125 MG: 125 CAPSULE ORAL at 08:39

## 2023-01-01 RX ADMIN — Medication 125 MCG: at 08:19

## 2023-01-01 RX ADMIN — FOLIC ACID 1 MG: 1 TABLET ORAL at 08:17

## 2023-01-01 RX ADMIN — GUAIFENESIN 600 MG: 600 TABLET ORAL at 08:48

## 2023-01-01 RX ADMIN — PANTOPRAZOLE SODIUM 40 MG: 40 TABLET, DELAYED RELEASE ORAL at 09:09

## 2023-01-01 RX ADMIN — DILTIAZEM HYDROCHLORIDE 240 MG: 120 CAPSULE, COATED, EXTENDED RELEASE ORAL at 08:44

## 2023-01-01 RX ADMIN — DIPHENHYDRAMINE HYDROCHLORIDE AND LIDOCAINE HYDROCHLORIDE AND ALUMINUM HYDROXIDE AND MAGNESIUM HYDRO 10 ML: KIT at 13:07

## 2023-01-01 RX ADMIN — RIVAROXABAN 15 MG: 15 TABLET, FILM COATED ORAL at 16:39

## 2023-01-01 RX ADMIN — FOLIC ACID 1 MG: 1 TABLET ORAL at 08:58

## 2023-01-01 RX ADMIN — MAGNESIUM OXIDE TAB 400 MG (241.3 MG ELEMENTAL MG) 400 MG: 400 (241.3 MG) TAB at 08:09

## 2023-01-01 RX ADMIN — ALLOPURINOL 300 MG: 300 TABLET ORAL at 08:39

## 2023-01-01 RX ADMIN — ACETAMINOPHEN 975 MG: 325 TABLET ORAL at 05:49

## 2023-01-01 RX ADMIN — RIVAROXABAN 15 MG: 15 TABLET, FILM COATED ORAL at 17:12

## 2023-01-01 RX ADMIN — MELATONIN TAB 3 MG 3 MG: 3 TAB at 20:52

## 2023-01-01 RX ADMIN — METOPROLOL SUCCINATE 25 MG: 25 TABLET, EXTENDED RELEASE ORAL at 08:26

## 2023-01-01 RX ADMIN — FLUTICASONE FUROATE AND VILANTEROL TRIFENATATE 1 PUFF: 200; 25 POWDER RESPIRATORY (INHALATION) at 08:06

## 2023-01-01 RX ADMIN — DILTIAZEM HYDROCHLORIDE 120 MG: 120 CAPSULE, COATED, EXTENDED RELEASE ORAL at 09:55

## 2023-01-01 RX ADMIN — FUROSEMIDE 40 MG: 10 INJECTION, SOLUTION INTRAVENOUS at 16:48

## 2023-01-01 RX ADMIN — FOLIC ACID 1 MG: 1 TABLET ORAL at 09:40

## 2023-01-01 RX ADMIN — ALLOPURINOL 300 MG: 300 TABLET ORAL at 08:17

## 2023-01-01 RX ADMIN — DILTIAZEM HYDROCHLORIDE 120 MG: 120 CAPSULE, COATED, EXTENDED RELEASE ORAL at 08:39

## 2023-01-01 RX ADMIN — VANCOMYCIN HYDROCHLORIDE 1250 MG: 5 INJECTION, POWDER, LYOPHILIZED, FOR SOLUTION INTRAVENOUS at 10:04

## 2023-01-01 RX ADMIN — DILTIAZEM HYDROCHLORIDE 240 MG: 120 CAPSULE, COATED, EXTENDED RELEASE ORAL at 08:08

## 2023-01-01 RX ADMIN — SENNOSIDES AND DOCUSATE SODIUM 1 TABLET: 50; 8.6 TABLET ORAL at 20:07

## 2023-01-01 RX ADMIN — SENNOSIDES AND DOCUSATE SODIUM 1 TABLET: 50; 8.6 TABLET ORAL at 08:52

## 2023-01-01 RX ADMIN — ALLOPURINOL 300 MG: 300 TABLET ORAL at 08:47

## 2023-01-01 RX ADMIN — Medication 1 CAPSULE: at 10:49

## 2023-01-01 RX ADMIN — Medication 1 CAPSULE: at 13:14

## 2023-01-01 RX ADMIN — Medication 1 CAPSULE: at 20:36

## 2023-01-01 RX ADMIN — LEVALBUTEROL HYDROCHLORIDE 1.25 MG: 1.25 SOLUTION RESPIRATORY (INHALATION) at 20:17

## 2023-01-01 RX ADMIN — PHENYLEPHRINE HYDROCHLORIDE 100 MCG: 10 INJECTION INTRAVENOUS at 16:28

## 2023-01-01 RX ADMIN — ALLOPURINOL 300 MG: 300 TABLET ORAL at 08:45

## 2023-01-01 RX ADMIN — IPRATROPIUM BROMIDE AND ALBUTEROL SULFATE 3 ML: 2.5; .5 SOLUTION RESPIRATORY (INHALATION) at 15:50

## 2023-01-01 RX ADMIN — FAMOTIDINE 20 MG: 20 TABLET, FILM COATED ORAL at 08:36

## 2023-01-01 RX ADMIN — METOPROLOL SUCCINATE 75 MG: 25 TABLET, EXTENDED RELEASE ORAL at 08:54

## 2023-01-01 RX ADMIN — PREDNISONE 5 MG: 5 TABLET ORAL at 08:50

## 2023-01-01 RX ADMIN — SENNOSIDES AND DOCUSATE SODIUM 1 TABLET: 50; 8.6 TABLET ORAL at 20:37

## 2023-01-01 RX ADMIN — DILTIAZEM HYDROCHLORIDE 240 MG: 120 CAPSULE, COATED, EXTENDED RELEASE ORAL at 08:12

## 2023-01-01 RX ADMIN — Medication 1 CAPSULE: at 20:37

## 2023-01-01 RX ADMIN — CHOLESTYRAMINE 4 G: 4 POWDER, FOR SUSPENSION ORAL at 12:23

## 2023-01-01 RX ADMIN — PREDNISONE 40 MG: 20 TABLET ORAL at 08:30

## 2023-01-01 RX ADMIN — CHOLESTYRAMINE 4 G: 4 POWDER, FOR SUSPENSION ORAL at 17:12

## 2023-01-01 RX ADMIN — PREDNISONE 5 MG: 5 TABLET ORAL at 09:38

## 2023-01-01 RX ADMIN — TRANEXAMIC ACID 1950 MG: 650 TABLET ORAL at 14:56

## 2023-01-01 RX ADMIN — MAGNESIUM OXIDE TAB 400 MG (241.3 MG ELEMENTAL MG) 400 MG: 400 (241.3 MG) TAB at 08:45

## 2023-01-01 RX ADMIN — FOLIC ACID 1 MG: 1 TABLET ORAL at 09:02

## 2023-01-01 RX ADMIN — ACETAMINOPHEN 975 MG: 325 TABLET ORAL at 05:15

## 2023-01-01 RX ADMIN — RIVAROXABAN 15 MG: 15 TABLET, FILM COATED ORAL at 16:32

## 2023-01-01 RX ADMIN — Medication 125 MCG: at 08:05

## 2023-01-01 RX ADMIN — ALLOPURINOL 300 MG: 300 TABLET ORAL at 08:04

## 2023-01-01 RX ADMIN — CHOLESTYRAMINE 4 G: 4 POWDER, FOR SUSPENSION ORAL at 13:51

## 2023-01-01 RX ADMIN — ACETAMINOPHEN 325 MG: 325 TABLET ORAL at 20:40

## 2023-01-01 RX ADMIN — DIPHENHYDRAMINE HYDROCHLORIDE AND LIDOCAINE HYDROCHLORIDE AND ALUMINUM HYDROXIDE AND MAGNESIUM HYDRO 10 ML: KIT at 20:41

## 2023-01-01 RX ADMIN — VANCOMYCIN HYDROCHLORIDE 125 MG: 125 CAPSULE ORAL at 12:04

## 2023-01-01 RX ADMIN — PRAVASTATIN SODIUM 20 MG: 20 TABLET ORAL at 21:39

## 2023-01-01 RX ADMIN — GUAIFENESIN 600 MG: 600 TABLET, EXTENDED RELEASE ORAL at 20:44

## 2023-01-01 RX ADMIN — Medication 1 CAPSULE: at 09:02

## 2023-01-01 RX ADMIN — PRAVASTATIN SODIUM 20 MG: 20 TABLET ORAL at 20:35

## 2023-01-01 RX ADMIN — FAMOTIDINE 20 MG: 20 TABLET ORAL at 08:24

## 2023-01-01 RX ADMIN — DULOXETINE HYDROCHLORIDE 40 MG: 20 CAPSULE, DELAYED RELEASE ORAL at 10:08

## 2023-01-01 RX ADMIN — DILTIAZEM HYDROCHLORIDE 120 MG: 120 CAPSULE, COATED, EXTENDED RELEASE ORAL at 08:53

## 2023-01-01 RX ADMIN — GUAIFENESIN 600 MG: 600 TABLET, EXTENDED RELEASE ORAL at 07:48

## 2023-01-01 RX ADMIN — HYDROXYZINE HYDROCHLORIDE 25 MG: 25 TABLET, FILM COATED ORAL at 21:50

## 2023-01-01 RX ADMIN — DULOXETINE HYDROCHLORIDE 20 MG: 20 CAPSULE, DELAYED RELEASE ORAL at 08:24

## 2023-01-01 RX ADMIN — GUAIFENESIN 600 MG: 600 TABLET ORAL at 21:17

## 2023-01-01 RX ADMIN — Medication 1 CAPSULE: at 20:35

## 2023-01-01 RX ADMIN — PRAVASTATIN SODIUM 20 MG: 20 TABLET ORAL at 20:32

## 2023-01-01 RX ADMIN — Medication 1 CAPSULE: at 08:51

## 2023-01-01 RX ADMIN — METOPROLOL SUCCINATE 100 MG: 100 TABLET, EXTENDED RELEASE ORAL at 08:45

## 2023-01-01 RX ADMIN — PRAVASTATIN SODIUM 20 MG: 20 TABLET ORAL at 20:59

## 2023-01-01 RX ADMIN — CHOLESTYRAMINE 4 G: 4 POWDER, FOR SUSPENSION ORAL at 18:17

## 2023-01-01 RX ADMIN — PRAVASTATIN SODIUM 20 MG: 20 TABLET ORAL at 20:25

## 2023-01-01 RX ADMIN — MELATONIN TAB 3 MG 3 MG: 3 TAB at 20:48

## 2023-01-01 RX ADMIN — FUROSEMIDE 40 MG: 20 TABLET ORAL at 08:20

## 2023-01-01 RX ADMIN — POLYETHYLENE GLYCOL 3350 17 G: 17 POWDER, FOR SOLUTION ORAL at 08:40

## 2023-01-01 RX ADMIN — RIVAROXABAN 15 MG: 15 TABLET, FILM COATED ORAL at 20:59

## 2023-01-01 RX ADMIN — METOPROLOL SUCCINATE 25 MG: 25 TABLET, EXTENDED RELEASE ORAL at 09:40

## 2023-01-01 RX ADMIN — METOPROLOL SUCCINATE 25 MG: 25 TABLET, EXTENDED RELEASE ORAL at 17:33

## 2023-01-01 RX ADMIN — FAMOTIDINE 20 MG: 20 TABLET, FILM COATED ORAL at 08:32

## 2023-01-01 RX ADMIN — Medication 1 CAPSULE: at 07:55

## 2023-01-01 RX ADMIN — GUAIFENESIN 600 MG: 600 TABLET ORAL at 08:04

## 2023-01-01 RX ADMIN — DULOXETINE HYDROCHLORIDE 20 MG: 20 CAPSULE, DELAYED RELEASE ORAL at 09:46

## 2023-01-01 RX ADMIN — Medication 1 CAPSULE: at 21:46

## 2023-01-01 RX ADMIN — ALLOPURINOL 300 MG: 300 TABLET ORAL at 08:51

## 2023-01-01 RX ADMIN — DILTIAZEM HYDROCHLORIDE 240 MG: 120 CAPSULE, COATED, EXTENDED RELEASE ORAL at 07:52

## 2023-01-01 RX ADMIN — VANCOMYCIN HYDROCHLORIDE 125 MG: 125 CAPSULE ORAL at 20:17

## 2023-01-01 RX ADMIN — FAMOTIDINE 20 MG: 10 INJECTION, SOLUTION INTRAVENOUS at 08:25

## 2023-01-01 RX ADMIN — FAMOTIDINE 20 MG: 20 TABLET ORAL at 08:23

## 2023-01-01 RX ADMIN — SENNOSIDES AND DOCUSATE SODIUM 1 TABLET: 50; 8.6 TABLET ORAL at 21:05

## 2023-01-01 RX ADMIN — GUAIFENESIN 600 MG: 600 TABLET ORAL at 20:17

## 2023-01-01 RX ADMIN — FERROUS SULFATE TAB 325 MG (65 MG ELEMENTAL FE) 325 MG: 325 (65 FE) TAB at 11:48

## 2023-01-01 RX ADMIN — IPRATROPIUM BROMIDE 0.5 MG: 0.5 SOLUTION RESPIRATORY (INHALATION) at 20:17

## 2023-01-01 RX ADMIN — IPRATROPIUM BROMIDE 0.5 MG: 0.5 SOLUTION RESPIRATORY (INHALATION) at 08:53

## 2023-01-01 RX ADMIN — VANCOMYCIN HYDROCHLORIDE 125 MG: 125 CAPSULE ORAL at 08:31

## 2023-01-01 RX ADMIN — CHOLESTYRAMINE 4 G: 4 POWDER, FOR SUSPENSION ORAL at 08:29

## 2023-01-01 RX ADMIN — SODIUM CHLORIDE: 9 INJECTION, SOLUTION INTRAVENOUS at 09:09

## 2023-01-01 RX ADMIN — SODIUM CHLORIDE, POTASSIUM CHLORIDE, SODIUM LACTATE AND CALCIUM CHLORIDE: 600; 310; 30; 20 INJECTION, SOLUTION INTRAVENOUS at 19:40

## 2023-01-01 RX ADMIN — ALLOPURINOL 300 MG: 300 TABLET ORAL at 07:47

## 2023-01-01 RX ADMIN — PREDNISONE 5 MG: 5 TABLET ORAL at 09:41

## 2023-01-01 RX ADMIN — DULOXETINE 20 MG: 20 CAPSULE, DELAYED RELEASE ORAL at 08:02

## 2023-01-01 RX ADMIN — BUDESONIDE AND FORMOTEROL FUMARATE DIHYDRATE 2 PUFF: 160; 4.5 AEROSOL RESPIRATORY (INHALATION) at 08:32

## 2023-01-01 RX ADMIN — LEVALBUTEROL HYDROCHLORIDE 1.25 MG: 1.25 SOLUTION RESPIRATORY (INHALATION) at 08:53

## 2023-01-01 RX ADMIN — DILTIAZEM HYDROCHLORIDE 240 MG: 120 CAPSULE, COATED, EXTENDED RELEASE ORAL at 08:26

## 2023-01-01 RX ADMIN — CHOLESTYRAMINE 4 G: 4 POWDER, FOR SUSPENSION ORAL at 08:05

## 2023-01-01 RX ADMIN — CHOLESTYRAMINE 4 G: 4 POWDER, FOR SUSPENSION ORAL at 08:42

## 2023-01-01 RX ADMIN — Medication 1 CAPSULE: at 21:42

## 2023-01-01 RX ADMIN — BUDESONIDE AND FORMOTEROL FUMARATE DIHYDRATE 2 PUFF: 160; 4.5 AEROSOL RESPIRATORY (INHALATION) at 21:37

## 2023-01-01 RX ADMIN — Medication 125 MCG: at 08:09

## 2023-01-01 RX ADMIN — GUAIFENESIN 600 MG: 600 TABLET, EXTENDED RELEASE ORAL at 20:59

## 2023-01-01 RX ADMIN — PIPERACILLIN AND TAZOBACTAM 3.38 G: 3; .375 INJECTION, POWDER, LYOPHILIZED, FOR SOLUTION INTRAVENOUS at 10:35

## 2023-01-01 RX ADMIN — GUAIFENESIN 600 MG: 600 TABLET, EXTENDED RELEASE ORAL at 07:58

## 2023-01-01 RX ADMIN — Medication 1 CAPSULE: at 20:40

## 2023-01-01 RX ADMIN — CHOLESTYRAMINE 4 G: 4 POWDER, FOR SUSPENSION ORAL at 18:02

## 2023-01-01 RX ADMIN — DILTIAZEM HYDROCHLORIDE 15 MG/HR: 5 INJECTION INTRAVENOUS at 17:55

## 2023-01-01 RX ADMIN — CHOLESTYRAMINE 4 G: 4 POWDER, FOR SUSPENSION ORAL at 20:59

## 2023-01-01 RX ADMIN — VANCOMYCIN HYDROCHLORIDE 125 MG: 125 CAPSULE ORAL at 09:02

## 2023-01-01 RX ADMIN — MAGNESIUM OXIDE TAB 400 MG (241.3 MG ELEMENTAL MG) 400 MG: 400 (241.3 MG) TAB at 08:39

## 2023-01-01 RX ADMIN — BUDESONIDE AND FORMOTEROL FUMARATE DIHYDRATE 2 PUFF: 160; 4.5 AEROSOL RESPIRATORY (INHALATION) at 08:28

## 2023-01-01 RX ADMIN — FIDAXOMICIN 200 MG: 200 TABLET, FILM COATED ORAL at 08:21

## 2023-01-01 RX ADMIN — FIDAXOMICIN 200 MG: 200 TABLET, FILM COATED ORAL at 11:08

## 2023-01-01 RX ADMIN — FAMOTIDINE 20 MG: 10 INJECTION, SOLUTION INTRAVENOUS at 08:32

## 2023-01-01 RX ADMIN — DULOXETINE 20 MG: 20 CAPSULE, DELAYED RELEASE ORAL at 08:12

## 2023-01-01 RX ADMIN — FLUTICASONE FUROATE AND VILANTEROL TRIFENATATE 1 PUFF: 200; 25 POWDER RESPIRATORY (INHALATION) at 07:52

## 2023-01-01 RX ADMIN — VANCOMYCIN HYDROCHLORIDE 1250 MG: 5 INJECTION, POWDER, LYOPHILIZED, FOR SOLUTION INTRAVENOUS at 08:42

## 2023-01-01 RX ADMIN — DULOXETINE HYDROCHLORIDE 20 MG: 20 CAPSULE, DELAYED RELEASE ORAL at 08:37

## 2023-01-01 RX ADMIN — DILTIAZEM HYDROCHLORIDE 120 MG: 120 CAPSULE, COATED, EXTENDED RELEASE ORAL at 08:52

## 2023-01-01 RX ADMIN — RIVAROXABAN 15 MG: 15 TABLET, FILM COATED ORAL at 18:34

## 2023-01-01 RX ADMIN — DIPHENHYDRAMINE HYDROCHLORIDE AND LIDOCAINE HYDROCHLORIDE AND ALUMINUM HYDROXIDE AND MAGNESIUM HYDRO 10 ML: KIT at 11:44

## 2023-01-01 RX ADMIN — Medication 1 CAPSULE: at 08:41

## 2023-01-01 RX ADMIN — LEVALBUTEROL HYDROCHLORIDE 1.25 MG: 1.25 SOLUTION RESPIRATORY (INHALATION) at 08:08

## 2023-01-01 RX ADMIN — DULOXETINE HYDROCHLORIDE 40 MG: 20 CAPSULE, DELAYED RELEASE ORAL at 08:21

## 2023-01-01 RX ADMIN — FAMOTIDINE 20 MG: 20 TABLET ORAL at 09:26

## 2023-01-01 RX ADMIN — GUAIFENESIN 600 MG: 600 TABLET ORAL at 22:16

## 2023-01-01 RX ADMIN — PRAVASTATIN SODIUM 20 MG: 20 TABLET ORAL at 20:42

## 2023-01-01 RX ADMIN — IPRATROPIUM BROMIDE AND ALBUTEROL SULFATE 3 ML: 2.5; .5 SOLUTION RESPIRATORY (INHALATION) at 19:47

## 2023-01-01 RX ADMIN — METOPROLOL SUCCINATE 75 MG: 25 TABLET, EXTENDED RELEASE ORAL at 08:45

## 2023-01-01 RX ADMIN — BACITRACIN: 500 OINTMENT TOPICAL at 16:10

## 2023-01-01 RX ADMIN — BUDESONIDE AND FORMOTEROL FUMARATE DIHYDRATE 2 PUFF: 160; 4.5 AEROSOL RESPIRATORY (INHALATION) at 19:37

## 2023-01-01 RX ADMIN — Medication 1 CAPSULE: at 08:08

## 2023-01-01 RX ADMIN — Medication 1 CAPSULE: at 20:52

## 2023-01-01 RX ADMIN — RIVAROXABAN 15 MG: 15 TABLET, FILM COATED ORAL at 17:49

## 2023-01-01 RX ADMIN — CHOLESTYRAMINE 4 G: 4 POWDER, FOR SUSPENSION ORAL at 13:11

## 2023-01-01 RX ADMIN — CHOLESTYRAMINE 4 G: 4 POWDER, FOR SUSPENSION ORAL at 14:14

## 2023-01-01 RX ADMIN — GUAIFENESIN 600 MG: 600 TABLET ORAL at 08:28

## 2023-01-01 RX ADMIN — SULFAMETHOXAZOLE AND TRIMETHOPRIM 1 TABLET: 800; 160 TABLET ORAL at 07:59

## 2023-01-01 RX ADMIN — IPRATROPIUM BROMIDE 0.5 MG: 0.5 SOLUTION RESPIRATORY (INHALATION) at 21:11

## 2023-01-01 RX ADMIN — PRAVASTATIN SODIUM 20 MG: 20 TABLET ORAL at 20:48

## 2023-01-01 RX ADMIN — ACETAMINOPHEN 975 MG: 325 TABLET ORAL at 21:00

## 2023-01-01 RX ADMIN — CHOLESTYRAMINE 4 G: 4 POWDER, FOR SUSPENSION ORAL at 17:19

## 2023-01-01 RX ADMIN — METOPROLOL SUCCINATE 100 MG: 100 TABLET, EXTENDED RELEASE ORAL at 08:09

## 2023-01-01 RX ADMIN — VANCOMYCIN HYDROCHLORIDE 125 MG: 125 CAPSULE ORAL at 11:48

## 2023-01-01 RX ADMIN — ACETAMINOPHEN 650 MG: 325 TABLET ORAL at 13:47

## 2023-01-01 RX ADMIN — GUAIFENESIN 600 MG: 600 TABLET ORAL at 08:24

## 2023-01-01 RX ADMIN — FUROSEMIDE 20 MG: 20 TABLET ORAL at 09:41

## 2023-01-01 RX ADMIN — GUAIFENESIN 600 MG: 600 TABLET, EXTENDED RELEASE ORAL at 21:27

## 2023-01-01 RX ADMIN — FAMOTIDINE 20 MG: 10 INJECTION, SOLUTION INTRAVENOUS at 08:45

## 2023-01-01 RX ADMIN — FIDAXOMICIN 200 MG: 200 TABLET, FILM COATED ORAL at 08:24

## 2023-01-01 RX ADMIN — LEVALBUTEROL HYDROCHLORIDE 1.25 MG: 1.25 SOLUTION RESPIRATORY (INHALATION) at 11:29

## 2023-01-01 RX ADMIN — CHOLESTYRAMINE 4 G: 4 POWDER, FOR SUSPENSION ORAL at 09:03

## 2023-01-01 RX ADMIN — VANCOMYCIN HYDROCHLORIDE 125 MG: 125 CAPSULE ORAL at 10:34

## 2023-01-01 RX ADMIN — IPRATROPIUM BROMIDE 0.5 MG: 0.5 SOLUTION RESPIRATORY (INHALATION) at 11:29

## 2023-01-01 RX ADMIN — Medication 3 MG: at 20:13

## 2023-01-01 RX ADMIN — FAMOTIDINE 20 MG: 20 TABLET ORAL at 08:21

## 2023-01-01 ASSESSMENT — ACTIVITIES OF DAILY LIVING (ADL)
ADLS_ACUITY_SCORE: 43
WALKING_OR_CLIMBING_STAIRS_DIFFICULTY: YES
ADLS_ACUITY_SCORE: 31
ADLS_ACUITY_SCORE: 43
ADLS_ACUITY_SCORE: 30
ADLS_ACUITY_SCORE: 26
ADLS_ACUITY_SCORE: 50
ADLS_ACUITY_SCORE: 31
ADLS_ACUITY_SCORE: 49
ADLS_ACUITY_SCORE: 26
ADLS_ACUITY_SCORE: 37
ADLS_ACUITY_SCORE: 49
ADLS_ACUITY_SCORE: 35
ADLS_ACUITY_SCORE: 30
ADLS_ACUITY_SCORE: 51
WEAR_GLASSES_OR_BLIND: YES
DRESSING/BATHING_DIFFICULTY: YES
ADLS_ACUITY_SCORE: 30
ADLS_ACUITY_SCORE: 32
ADLS_ACUITY_SCORE: 30
ADLS_ACUITY_SCORE: 32
ADLS_ACUITY_SCORE: 32
IADLS,_PREVIOUS_FUNCTIONAL_LEVEL: USES DEVICE OR EQUIPMENT
ADLS_ACUITY_SCORE: 49
ADLS_ACUITY_SCORE: 31
CHANGE_IN_FUNCTIONAL_STATUS_SINCE_ONSET_OF_CURRENT_ILLNESS/INJURY: YES
TRANSFERRING: 0-->ASSISTANCE NEEDED (DEVELOPMENTALLY APPROPRIATE)
ADLS_ACUITY_SCORE: 49
ADLS_ACUITY_SCORE: 39
ADLS_ACUITY_SCORE: 27
ADLS_ACUITY_SCORE: 32
ADLS_ACUITY_SCORE: 26
ADLS_ACUITY_SCORE: 36
ADLS_ACUITY_SCORE: 30
ADLS_ACUITY_SCORE: 37
ADLS_ACUITY_SCORE: 31
ADLS_ACUITY_SCORE: 50
ADLS_ACUITY_SCORE: 30
ADLS_ACUITY_SCORE: 35
ADLS_ACUITY_SCORE: 41
ADLS_ACUITY_SCORE: 51
WALKING_OR_CLIMBING_STAIRS_DIFFICULTY: YES
ADLS_ACUITY_SCORE: 34
ADLS_ACUITY_SCORE: 30
ADLS_ACUITY_SCORE: 41
ADLS_ACUITY_SCORE: 32
TOILETING: 1-->ASSISTANCE (EQUIPMENT/PERSON) NEEDED (NOT DEVELOPMENTALLY APPROPRIATE)
ADLS_ACUITY_SCORE: 43
ADLS_ACUITY_SCORE: 30
ADLS_ACUITY_SCORE: 32
ADLS_ACUITY_SCORE: 49
NUMBER_OF_TIMES_PATIENT_HAS_FALLEN_WITHIN_LAST_SIX_MONTHS: 1
DRESSING/BATHING_DIFFICULTY: NO
ADLS_ACUITY_SCORE: 49
ADLS_ACUITY_SCORE: 37
ADLS_ACUITY_SCORE: 26
ADLS_ACUITY_SCORE: 49
ADLS_ACUITY_SCORE: 49
ADLS_ACUITY_SCORE: 44
ADLS_ACUITY_SCORE: 36
ADLS_ACUITY_SCORE: 31
ADLS_ACUITY_SCORE: 37
ADLS_ACUITY_SCORE: 30
ADLS_ACUITY_SCORE: 35
WEAR_GLASSES_OR_BLIND: NO
DIFFICULTY_EATING/SWALLOWING: NO
ADLS_ACUITY_SCORE: 32
ADLS_ACUITY_SCORE: 30
ADLS_ACUITY_SCORE: 39
ADLS_ACUITY_SCORE: 37
DOING_ERRANDS_INDEPENDENTLY_DIFFICULTY: NO
ADLS_ACUITY_SCORE: 37
ADLS_ACUITY_SCORE: 47
ADLS_ACUITY_SCORE: 44
ADLS_ACUITY_SCORE: 32
ADLS_ACUITY_SCORE: 50
ADLS_ACUITY_SCORE: 43
CHANGE_IN_FUNCTIONAL_STATUS_SINCE_ONSET_OF_CURRENT_ILLNESS/INJURY: YES
ADLS_ACUITY_SCORE: 30
ADLS_ACUITY_SCORE: 43
ADLS_ACUITY_SCORE: 26
ADLS_ACUITY_SCORE: 51
ADLS_ACUITY_SCORE: 41
ADLS_ACUITY_SCORE: 41
ADLS_ACUITY_SCORE: 51
ADLS_ACUITY_SCORE: 34
ADLS_ACUITY_SCORE: 30
ADLS_ACUITY_SCORE: 43
ADLS_ACUITY_SCORE: 30
ADLS_ACUITY_SCORE: 39
ADLS_ACUITY_SCORE: 42
CONCENTRATING,_REMEMBERING_OR_MAKING_DECISIONS_DIFFICULTY: NO
ADLS_ACUITY_SCORE: 41
ADLS_ACUITY_SCORE: 35
ADLS_ACUITY_SCORE: 49
ADLS_ACUITY_SCORE: 43
ADLS_ACUITY_SCORE: 43
ADLS_ACUITY_SCORE: 30
WALKING_OR_CLIMBING_STAIRS_DIFFICULTY: NO
ADLS_ACUITY_SCORE: 30
ADLS_ACUITY_SCORE: 35
ADLS_ACUITY_SCORE: 31
DEPENDENT_IADLS:: CLEANING;COOKING;LAUNDRY;SHOPPING;MEAL PREPARATION;MEDICATION MANAGEMENT;MONEY MANAGEMENT;TRANSPORTATION
ADLS_ACUITY_SCORE: 28
ADLS_ACUITY_SCORE: 27
ADLS_ACUITY_SCORE: 30
ADLS_ACUITY_SCORE: 26
ADLS_ACUITY_SCORE: 30
ADLS_ACUITY_SCORE: 31
ADLS_ACUITY_SCORE: 30
FALL_HISTORY_WITHIN_LAST_SIX_MONTHS: YES
ADLS_ACUITY_SCORE: 41
ADLS_ACUITY_SCORE: 41
ADLS_ACUITY_SCORE: 30
ADLS_ACUITY_SCORE: 37
ADLS_ACUITY_SCORE: 30
ADLS_ACUITY_SCORE: 30
ADLS_ACUITY_SCORE: 40
ADLS_ACUITY_SCORE: 26
ADLS_ACUITY_SCORE: 30
ADLS_ACUITY_SCORE: 41
ADLS_ACUITY_SCORE: 31
CHANGE_IN_FUNCTIONAL_STATUS_SINCE_ONSET_OF_CURRENT_ILLNESS/INJURY: YES
ADLS_ACUITY_SCORE: 49
ADLS_ACUITY_SCORE: 37
ADLS_ACUITY_SCORE: 44
ADLS_ACUITY_SCORE: 32
ADLS_ACUITY_SCORE: 30
ADLS_ACUITY_SCORE: 26
ADLS_ACUITY_SCORE: 30
ADLS_ACUITY_SCORE: 27
ADLS_ACUITY_SCORE: 30
ADLS_ACUITY_SCORE: 43
TOILETING_ISSUES: NO
ADLS_ACUITY_SCORE: 30
ADLS_ACUITY_SCORE: 51
ADLS_ACUITY_SCORE: 36
ADLS_ACUITY_SCORE: 30
ADLS_ACUITY_SCORE: 26
TRANSFERRING: 1-->ASSISTANCE (EQUIPMENT/PERSON) NEEDED
ADLS_ACUITY_SCORE: 26
ADLS_ACUITY_SCORE: 45
ADLS_ACUITY_SCORE: 26
ADLS_ACUITY_SCORE: 30
ADLS_ACUITY_SCORE: 47
ADLS_ACUITY_SCORE: 28
ADLS_ACUITY_SCORE: 32
ADLS_ACUITY_SCORE: 41
ADLS_ACUITY_SCORE: 43
ADLS_ACUITY_SCORE: 30
ADLS_ACUITY_SCORE: 47
ADLS_ACUITY_SCORE: 37
ADLS_ACUITY_SCORE: 30
BADLS,_PREVIOUS_FUNCTIONAL_LEVEL: USES DEVICE OR EQUIPMENT
ADLS_ACUITY_SCORE: 30
VISION_MANAGEMENT: GLASSES
ADLS_ACUITY_SCORE: 26
ADLS_ACUITY_SCORE: 30
ADLS_ACUITY_SCORE: 30
ADLS_ACUITY_SCORE: 32
DOING_ERRANDS_INDEPENDENTLY_DIFFICULTY: NO
ADLS_ACUITY_SCORE: 41
ADLS_ACUITY_SCORE: 34
ADLS_ACUITY_SCORE: 31
ADLS_ACUITY_SCORE: 49
ADLS_ACUITY_SCORE: 35
ADLS_ACUITY_SCORE: 28
ADLS_ACUITY_SCORE: 41
ADLS_ACUITY_SCORE: 49
ADLS_ACUITY_SCORE: 30
DOING_ERRANDS_INDEPENDENTLY_DIFFICULTY: YES
ADLS_ACUITY_SCORE: 41
ADLS_ACUITY_SCORE: 30
ADLS_ACUITY_SCORE: 49
ADLS_ACUITY_SCORE: 43
ADLS_ACUITY_SCORE: 35
ADLS_ACUITY_SCORE: 44
WEAR_GLASSES_OR_BLIND: YES
ADLS_ACUITY_SCORE: 47
ADLS_ACUITY_SCORE: 44
NUMBER_OF_TIMES_PATIENT_HAS_FALLEN_WITHIN_LAST_SIX_MONTHS: 6
ADLS_ACUITY_SCORE: 35
ADLS_ACUITY_SCORE: 26
ADLS_ACUITY_SCORE: 34
ADLS_ACUITY_SCORE: 49
ADLS_ACUITY_SCORE: 50
CONCENTRATING,_REMEMBERING_OR_MAKING_DECISIONS_DIFFICULTY: YES
ADLS_ACUITY_SCORE: 47
ADLS_ACUITY_SCORE: 27
ADLS_ACUITY_SCORE: 28
BATHING: 1-->ASSISTANCE NEEDED
ADLS_ACUITY_SCORE: 43
ADLS_ACUITY_SCORE: 30
ADLS_ACUITY_SCORE: 44
ADLS_ACUITY_SCORE: 43
ADLS_ACUITY_SCORE: 31
ADLS_ACUITY_SCORE: 34
ADLS_ACUITY_SCORE: 39
ADLS_ACUITY_SCORE: 43
ADLS_ACUITY_SCORE: 43
ADLS_ACUITY_SCORE: 50
ADLS_ACUITY_SCORE: 37
ADLS_ACUITY_SCORE: 49
ADLS_ACUITY_SCORE: 30
ADLS_ACUITY_SCORE: 49
ADLS_ACUITY_SCORE: 26
DIFFICULTY_EATING/SWALLOWING: NO
ADLS_ACUITY_SCORE: 43
ADLS_ACUITY_SCORE: 43
ADLS_ACUITY_SCORE: 30
ADLS_ACUITY_SCORE: 42
ADLS_ACUITY_SCORE: 49
ADLS_ACUITY_SCORE: 30
ADLS_ACUITY_SCORE: 43
ADLS_ACUITY_SCORE: 26
ADLS_ACUITY_SCORE: 26
ADLS_ACUITY_SCORE: 43
ADLS_ACUITY_SCORE: 26
ADLS_ACUITY_SCORE: 43
ADLS_ACUITY_SCORE: 31
ADLS_ACUITY_SCORE: 31
ADLS_ACUITY_SCORE: 30
ADLS_ACUITY_SCORE: 50
ADLS_ACUITY_SCORE: 50
ADLS_ACUITY_SCORE: 49
ADLS_ACUITY_SCORE: 41
ADLS_ACUITY_SCORE: 42
ADLS_ACUITY_SCORE: 30
ADLS_ACUITY_SCORE: 50
DOING_ERRANDS_INDEPENDENTLY_DIFFICULTY: YES
ADLS_ACUITY_SCORE: 43
ADLS_ACUITY_SCORE: 51
NUMBER_OF_TIMES_PATIENT_HAS_FALLEN_WITHIN_LAST_SIX_MONTHS: 1
ADLS_ACUITY_SCORE: 30
ADLS_ACUITY_SCORE: 30
ADLS_ACUITY_SCORE: 32
ADLS_ACUITY_SCORE: 30
EQUIPMENT_CURRENTLY_USED_AT_HOME: WALKER, ROLLING
ADLS_ACUITY_SCORE: 30
ADLS_ACUITY_SCORE: 49
ADLS_ACUITY_SCORE: 40
ADLS_ACUITY_SCORE: 41
ADLS_ACUITY_SCORE: 44
ADLS_ACUITY_SCORE: 37
ADLS_ACUITY_SCORE: 30
ADLS_ACUITY_SCORE: 37
ADLS_ACUITY_SCORE: 42
ADLS_ACUITY_SCORE: 30
ADLS_ACUITY_SCORE: 26
ADLS_ACUITY_SCORE: 43
ADLS_ACUITY_SCORE: 39
ADLS_ACUITY_SCORE: 45
ADLS_ACUITY_SCORE: 30
ADLS_ACUITY_SCORE: 26
ADLS_ACUITY_SCORE: 31
ADLS_ACUITY_SCORE: 26
ADLS_ACUITY_SCORE: 36
ADLS_ACUITY_SCORE: 37
ADLS_ACUITY_SCORE: 44
ADLS_ACUITY_SCORE: 30
ADLS_ACUITY_SCORE: 43
ADLS_ACUITY_SCORE: 30
ADLS_ACUITY_SCORE: 40
ADLS_ACUITY_SCORE: 31
ADLS_ACUITY_SCORE: 35
ADLS_ACUITY_SCORE: 30
ADLS_ACUITY_SCORE: 28
ADLS_ACUITY_SCORE: 27
ADLS_ACUITY_SCORE: 36
ADLS_ACUITY_SCORE: 45
ADLS_ACUITY_SCORE: 36
ADLS_ACUITY_SCORE: 31
ADLS_ACUITY_SCORE: 42
ADLS_ACUITY_SCORE: 28
ADLS_ACUITY_SCORE: 37
ADLS_ACUITY_SCORE: 27
BADLS,_PREVIOUS_FUNCTIONAL_LEVEL: USES DEVICE OR EQUIPMENT
ADLS_ACUITY_SCORE: 49
ADLS_ACUITY_SCORE: 44
ADLS_ACUITY_SCORE: 26
ADLS_ACUITY_SCORE: 37
ADLS_ACUITY_SCORE: 36
ADLS_ACUITY_SCORE: 30
ADLS_ACUITY_SCORE: 50
ADLS_ACUITY_SCORE: 32
ADLS_ACUITY_SCORE: 51
ADLS_ACUITY_SCORE: 44
ADLS_ACUITY_SCORE: 30
ADLS_ACUITY_SCORE: 30
ADLS_ACUITY_SCORE: 34
ADLS_ACUITY_SCORE: 26
WALKING_OR_CLIMBING_STAIRS_DIFFICULTY: NO
ADLS_ACUITY_SCORE: 35
ADLS_ACUITY_SCORE: 30
ADLS_ACUITY_SCORE: 37
ADLS_ACUITY_SCORE: 30
ADLS_ACUITY_SCORE: 35
ADLS_ACUITY_SCORE: 31
ADLS_ACUITY_SCORE: 26
ADLS_ACUITY_SCORE: 34
ADLS_ACUITY_SCORE: 37
ADLS_ACUITY_SCORE: 47
ADLS_ACUITY_SCORE: 36
ADLS_ACUITY_SCORE: 43
ADLS_ACUITY_SCORE: 43
ADLS_ACUITY_SCORE: 27
ADLS_ACUITY_SCORE: 49
ADLS_ACUITY_SCORE: 32
ADLS_ACUITY_SCORE: 43
ADLS_ACUITY_SCORE: 35
ADLS_ACUITY_SCORE: 30
ADLS_ACUITY_SCORE: 37
ADLS_ACUITY_SCORE: 43
ADLS_ACUITY_SCORE: 30
ADLS_ACUITY_SCORE: 27
ADLS_ACUITY_SCORE: 51
ADLS_ACUITY_SCORE: 32
ADLS_ACUITY_SCORE: 43
ADLS_ACUITY_SCORE: 28
ADLS_ACUITY_SCORE: 49
ADLS_ACUITY_SCORE: 43
ADLS_ACUITY_SCORE: 32
TOILETING: 1-->ASSISTANCE (EQUIPMENT/PERSON) NEEDED
ADLS_ACUITY_SCORE: 49
ADLS_ACUITY_SCORE: 30
ADLS_ACUITY_SCORE: 37
ADLS_ACUITY_SCORE: 27
ADLS_ACUITY_SCORE: 39
ADLS_ACUITY_SCORE: 31
ADLS_ACUITY_SCORE: 37
ADLS_ACUITY_SCORE: 43
DEPENDENT_IADLS:: CLEANING;COOKING;LAUNDRY;SHOPPING;MEAL PREPARATION;TRANSPORTATION
ADLS_ACUITY_SCORE: 32
ADLS_ACUITY_SCORE: 51
ADLS_ACUITY_SCORE: 49
ADLS_ACUITY_SCORE: 32
ADLS_ACUITY_SCORE: 39
ADLS_ACUITY_SCORE: 37
ADLS_ACUITY_SCORE: 30
ADLS_ACUITY_SCORE: 26
ADLS_ACUITY_SCORE: 43
ADLS_ACUITY_SCORE: 30
TOILETING_ISSUES: YES
ADLS_ACUITY_SCORE: 40
ADLS_ACUITY_SCORE: 39
ADLS_ACUITY_SCORE: 41
ADLS_ACUITY_SCORE: 37
DRESS: 1-->ASSISTANCE (EQUIPMENT/PERSON) NEEDED
ADLS_ACUITY_SCORE: 31
ADLS_ACUITY_SCORE: 32
DIFFICULTY_EATING/SWALLOWING: NO
ADLS_ACUITY_SCORE: 50
ADLS_ACUITY_SCORE: 30
FALL_HISTORY_WITHIN_LAST_SIX_MONTHS: YES
ADLS_ACUITY_SCORE: 30
ADLS_ACUITY_SCORE: 37
ADLS_ACUITY_SCORE: 30
ADLS_ACUITY_SCORE: 32
ADLS_ACUITY_SCORE: 39
ADLS_ACUITY_SCORE: 49
ADLS_ACUITY_SCORE: 43
ADLS_ACUITY_SCORE: 26
ADLS_ACUITY_SCORE: 43
ADLS_ACUITY_SCORE: 30
ADLS_ACUITY_SCORE: 37
ADLS_ACUITY_SCORE: 32
ADLS_ACUITY_SCORE: 28
ADLS_ACUITY_SCORE: 31
ADLS_ACUITY_SCORE: 44
ADLS_ACUITY_SCORE: 32
ADLS_ACUITY_SCORE: 30
ADLS_ACUITY_SCORE: 41
ADLS_ACUITY_SCORE: 43
ADLS_ACUITY_SCORE: 35
ADLS_ACUITY_SCORE: 34
ADLS_ACUITY_SCORE: 30
ADLS_ACUITY_SCORE: 26
ADLS_ACUITY_SCORE: 30
ADLS_ACUITY_SCORE: 31
ADLS_ACUITY_SCORE: 30
ADLS_ACUITY_SCORE: 40
ADLS_ACUITY_SCORE: 37
ADLS_ACUITY_SCORE: 30
ADLS_ACUITY_SCORE: 34
ADLS_ACUITY_SCORE: 26
ADLS_ACUITY_SCORE: 43
ADLS_ACUITY_SCORE: 24
ADLS_ACUITY_SCORE: 26
DIFFICULTY_EATING/SWALLOWING: NO
DIFFICULTY_COMMUNICATING: YES
ADLS_ACUITY_SCORE: 31
ADLS_ACUITY_SCORE: 42
ADLS_ACUITY_SCORE: 49
NUMBER_OF_TIMES_PATIENT_HAS_FALLEN_WITHIN_LAST_SIX_MONTHS: 1
TOILETING_ISSUES: NO
ADLS_ACUITY_SCORE: 49
ADLS_ACUITY_SCORE: 35
ADLS_ACUITY_SCORE: 46
ADLS_ACUITY_SCORE: 31
EQUIPMENT_CURRENTLY_USED_AT_HOME: WALKER, ROLLING
FALL_HISTORY_WITHIN_LAST_SIX_MONTHS: YES
ADLS_ACUITY_SCORE: 41
ADLS_ACUITY_SCORE: 34
ADLS_ACUITY_SCORE: 30
ADLS_ACUITY_SCORE: 39
ADLS_ACUITY_SCORE: 36
ADLS_ACUITY_SCORE: 31
ADLS_ACUITY_SCORE: 30
ADLS_ACUITY_SCORE: 30
ADLS_ACUITY_SCORE: 49
DRESSING/BATHING_DIFFICULTY: NO
ADLS_ACUITY_SCORE: 26
ADLS_ACUITY_SCORE: 31
CHANGE_IN_FUNCTIONAL_STATUS_SINCE_ONSET_OF_CURRENT_ILLNESS/INJURY: YES
ADLS_ACUITY_SCORE: 41
ADLS_ACUITY_SCORE: 35
ADLS_ACUITY_SCORE: 32
ADLS_ACUITY_SCORE: 50
ADLS_ACUITY_SCORE: 22
ADLS_ACUITY_SCORE: 26
ADLS_ACUITY_SCORE: 35
ADLS_ACUITY_SCORE: 28
ADLS_ACUITY_SCORE: 35
ADLS_ACUITY_SCORE: 31
ADLS_ACUITY_SCORE: 51
ADLS_ACUITY_SCORE: 32
ADLS_ACUITY_SCORE: 27
ADLS_ACUITY_SCORE: 30
ADLS_ACUITY_SCORE: 43
DRESS: 1-->ASSISTANCE (EQUIPMENT/PERSON) NEEDED (NOT DEVELOPMENTALLY APPROPRIATE)
ADLS_ACUITY_SCORE: 30
WEAR_GLASSES_OR_BLIND: YES
DIFFICULTY_EATING/SWALLOWING: NO
ADLS_ACUITY_SCORE: 51
ADLS_ACUITY_SCORE: 43
ADLS_ACUITY_SCORE: 30
ADLS_ACUITY_SCORE: 37
ADLS_ACUITY_SCORE: 30
ADLS_ACUITY_SCORE: 40
ADLS_ACUITY_SCORE: 31
IADLS,_PREVIOUS_FUNCTIONAL_LEVEL: USES DEVICE OR EQUIPMENT
ADLS_ACUITY_SCORE: 30
ADLS_ACUITY_SCORE: 43
ADLS_ACUITY_SCORE: 30
ADLS_ACUITY_SCORE: 34
ADLS_ACUITY_SCORE: 49
ADLS_ACUITY_SCORE: 35
ADLS_ACUITY_SCORE: 51
ADLS_ACUITY_SCORE: 30
WALKING_OR_CLIMBING_STAIRS: AMBULATION DIFFICULTY, ASSISTANCE 1 PERSON
ADLS_ACUITY_SCORE: 30
ADLS_ACUITY_SCORE: 27
ADLS_ACUITY_SCORE: 42
ADLS_ACUITY_SCORE: 32
ADLS_ACUITY_SCORE: 35
ADLS_ACUITY_SCORE: 26
ADLS_ACUITY_SCORE: 31
ADLS_ACUITY_SCORE: 40
ADLS_ACUITY_SCORE: 30
ADLS_ACUITY_SCORE: 26
ADLS_ACUITY_SCORE: 32
ADLS_ACUITY_SCORE: 26
ADLS_ACUITY_SCORE: 51
ADLS_ACUITY_SCORE: 30
WALKING_OR_CLIMBING_STAIRS_DIFFICULTY: NO
ADLS_ACUITY_SCORE: 30
ADLS_ACUITY_SCORE: 35
ADLS_ACUITY_SCORE: 31
ADLS_ACUITY_SCORE: 43
ADLS_ACUITY_SCORE: 47
ADLS_ACUITY_SCORE: 47
TOILETING_ASSISTANCE: TOILETING DIFFICULTY, ASSISTANCE 1 PERSON
ADLS_ACUITY_SCORE: 30
ADLS_ACUITY_SCORE: 26
ADLS_ACUITY_SCORE: 39
ADLS_ACUITY_SCORE: 35
ADLS_ACUITY_SCORE: 37
ADLS_ACUITY_SCORE: 26
ADLS_ACUITY_SCORE: 26
ADLS_ACUITY_SCORE: 43
ADLS_ACUITY_SCORE: 32
CONCENTRATING,_REMEMBERING_OR_MAKING_DECISIONS_DIFFICULTY: NO
ADLS_ACUITY_SCORE: 30
ADLS_ACUITY_SCORE: 28
ADLS_ACUITY_SCORE: 41
ADLS_ACUITY_SCORE: 32
ADLS_ACUITY_SCORE: 24
ADLS_ACUITY_SCORE: 35
ADLS_ACUITY_SCORE: 34
ADLS_ACUITY_SCORE: 34
ADLS_ACUITY_SCORE: 27
FALL_HISTORY_WITHIN_LAST_SIX_MONTHS: NO
ADLS_ACUITY_SCORE: 32
ADLS_ACUITY_SCORE: 30
ADLS_ACUITY_SCORE: 43
ADLS_ACUITY_SCORE: 30
ADLS_ACUITY_SCORE: 31
ADLS_ACUITY_SCORE: 30
CONCENTRATING,_REMEMBERING_OR_MAKING_DECISIONS_DIFFICULTY: YES
ADLS_ACUITY_SCORE: 49
ADLS_ACUITY_SCORE: 34
ADLS_ACUITY_SCORE: 44
ADLS_ACUITY_SCORE: 32
FALL_HISTORY_WITHIN_LAST_SIX_MONTHS: YES
ADLS_ACUITY_SCORE: 37
ADLS_ACUITY_SCORE: 26
ADLS_ACUITY_SCORE: 28
ADLS_ACUITY_SCORE: 43
ADLS_ACUITY_SCORE: 47
ADLS_ACUITY_SCORE: 37
ADLS_ACUITY_SCORE: 24
ADLS_ACUITY_SCORE: 34
ADLS_ACUITY_SCORE: 30
ADLS_ACUITY_SCORE: 30
EQUIPMENT_CURRENTLY_USED_AT_HOME: WALKER, STANDARD
ADLS_ACUITY_SCORE: 30
ADLS_ACUITY_SCORE: 44
ADLS_ACUITY_SCORE: 30
ADLS_ACUITY_SCORE: 26
ADLS_ACUITY_SCORE: 26
ADLS_ACUITY_SCORE: 50
ADLS_ACUITY_SCORE: 30
ADLS_ACUITY_SCORE: 37
ADLS_ACUITY_SCORE: 30
ADLS_ACUITY_SCORE: 50
ADLS_ACUITY_SCORE: 32
ADLS_ACUITY_SCORE: 26
ADLS_ACUITY_SCORE: 28
ADLS_ACUITY_SCORE: 26
TOILETING_ASSISTANCE: TOILETING DIFFICULTY, ASSISTANCE 1 PERSON
ADLS_ACUITY_SCORE: 37
ADLS_ACUITY_SCORE: 34
ADLS_ACUITY_SCORE: 26
ADLS_ACUITY_SCORE: 50
ADLS_ACUITY_SCORE: 51
ADLS_ACUITY_SCORE: 43
ADLS_ACUITY_SCORE: 41
ADLS_ACUITY_SCORE: 44
ADLS_ACUITY_SCORE: 26
ADLS_ACUITY_SCORE: 27
ADLS_ACUITY_SCORE: 32
ADLS_ACUITY_SCORE: 35
ADLS_ACUITY_SCORE: 51
ADLS_ACUITY_SCORE: 50
ADLS_ACUITY_SCORE: 37
ADLS_ACUITY_SCORE: 26
ADLS_ACUITY_SCORE: 41
ADLS_ACUITY_SCORE: 41
ADLS_ACUITY_SCORE: 24
ADLS_ACUITY_SCORE: 30
ADLS_ACUITY_SCORE: 41
ADLS_ACUITY_SCORE: 34
ADLS_ACUITY_SCORE: 51
ADLS_ACUITY_SCORE: 30
DRESSING/BATHING_DIFFICULTY: YES
ADLS_ACUITY_SCORE: 30
ADLS_ACUITY_SCORE: 30
ADLS_ACUITY_SCORE: 49
TOILETING_ISSUES: NO
DEPENDENT_IADLS:: CLEANING;COOKING;LAUNDRY;SHOPPING;MEAL PREPARATION;MEDICATION MANAGEMENT;MONEY MANAGEMENT;TRANSPORTATION
TOILETING_ISSUES: YES
ADLS_ACUITY_SCORE: 49
ADLS_ACUITY_SCORE: 30
ADLS_ACUITY_SCORE: 30
ADLS_ACUITY_SCORE: 39
ADLS_ACUITY_SCORE: 30
ADLS_ACUITY_SCORE: 30
DRESSING/BATHING_DIFFICULTY: NO
ADLS_ACUITY_SCORE: 44
ADLS_ACUITY_SCORE: 44
ADLS_ACUITY_SCORE: 30
ADLS_ACUITY_SCORE: 26
ADLS_ACUITY_SCORE: 35
ADLS_ACUITY_SCORE: 35
ADLS_ACUITY_SCORE: 50
ADLS_ACUITY_SCORE: 34
CHANGE_IN_FUNCTIONAL_STATUS_SINCE_ONSET_OF_CURRENT_ILLNESS/INJURY: NO
ADLS_ACUITY_SCORE: 28
ADLS_ACUITY_SCORE: 32
ADLS_ACUITY_SCORE: 49
ADLS_ACUITY_SCORE: 49
ADLS_ACUITY_SCORE: 27
ADLS_ACUITY_SCORE: 39
ADLS_ACUITY_SCORE: 31
ADLS_ACUITY_SCORE: 35
ADLS_ACUITY_SCORE: 43
ADLS_ACUITY_SCORE: 31
ADLS_ACUITY_SCORE: 30
ADLS_ACUITY_SCORE: 27
ADLS_ACUITY_SCORE: 30
ADLS_ACUITY_SCORE: 35
ADLS_ACUITY_SCORE: 43
ADLS_ACUITY_SCORE: 42
ADLS_ACUITY_SCORE: 30
ADLS_ACUITY_SCORE: 40
DEPENDENT_IADLS:: CLEANING;COOKING;LAUNDRY;SHOPPING;MEAL PREPARATION;TRANSPORTATION
ADLS_ACUITY_SCORE: 44
ADLS_ACUITY_SCORE: 30
ADLS_ACUITY_SCORE: 30
ADLS_ACUITY_SCORE: 51
ADLS_ACUITY_SCORE: 32
ADLS_ACUITY_SCORE: 34
ADLS_ACUITY_SCORE: 30
ADLS_ACUITY_SCORE: 32
ADLS_ACUITY_SCORE: 31
ADLS_ACUITY_SCORE: 28
ADLS_ACUITY_SCORE: 35
ADLS_ACUITY_SCORE: 47
ADLS_ACUITY_SCORE: 32
ADLS_ACUITY_SCORE: 49
ADLS_ACUITY_SCORE: 30
ADLS_ACUITY_SCORE: 31
ADLS_ACUITY_SCORE: 26
ADLS_ACUITY_SCORE: 44
ADLS_ACUITY_SCORE: 30
ADLS_ACUITY_SCORE: 32
ADLS_ACUITY_SCORE: 30
ADLS_ACUITY_SCORE: 31
ADLS_ACUITY_SCORE: 28
ADLS_ACUITY_SCORE: 30
WEAR_GLASSES_OR_BLIND: NO
CONCENTRATING,_REMEMBERING_OR_MAKING_DECISIONS_DIFFICULTY: YES
ADLS_ACUITY_SCORE: 30
ADLS_ACUITY_SCORE: 30
WALKING_OR_CLIMBING_STAIRS: AMBULATION DIFFICULTY, REQUIRES EQUIPMENT
DOING_ERRANDS_INDEPENDENTLY_DIFFICULTY: NO
ADLS_ACUITY_SCORE: 51
ADLS_ACUITY_SCORE: 34
ADLS_ACUITY_SCORE: 49
ADLS_ACUITY_SCORE: 28
ADLS_ACUITY_SCORE: 27
ADLS_ACUITY_SCORE: 30
ADLS_ACUITY_SCORE: 43
ADLS_ACUITY_SCORE: 26
ADLS_ACUITY_SCORE: 43
DRESSING/BATHING: BATHING DIFFICULTY, ASSISTANCE 1 PERSON

## 2023-01-01 ASSESSMENT — ENCOUNTER SYMPTOMS
NAUSEA: 0
SHORTNESS OF BREATH: 0
FATIGUE: 1
DYSRHYTHMIAS: 1
ABDOMINAL PAIN: 0
SHORTNESS OF BREATH: 1
BLOOD IN STOOL: 0
FEVER: 0
SHORTNESS OF BREATH: 1
WEAKNESS: 1
DIARRHEA: 1
ABDOMINAL PAIN: 0
BRUISES/BLEEDS EASILY: 1
DIARRHEA: 1
VOMITING: 1

## 2023-01-01 ASSESSMENT — COPD QUESTIONNAIRES
CAT_SEVERITY: SEVERE
COPD: 1

## 2023-01-01 NOTE — PROGRESS NOTES
Care Management Follow Up    Length of Stay (days): 4    Expected Discharge Date: 01/02/2023     Concerns to be Addressed:     Medical progression, placement  Patient plan of care discussed at interdisciplinary rounds: Yes    Anticipated Discharge Disposition:  TBD     Anticipated Discharge Services:    Anticipated Discharge DME:      Patient/family educated on Medicare website which has current facility and service quality ratings:    Education Provided on the Discharge Plan:    Patient/Family in Agreement with the Plan:      Referrals Placed by CM/SW:  TCU  Private pay costs discussed: Not applicable    Additional Information:  Social history:  Pt typically lives with her  in a condo, however she has been back and forth from the hospital in the past few months and her last dischage she was sent to [facility]CU for some strength. [facility] but [facility]. Pt uses home O2 and has a portable tank.     Therapy recommendation is TCU however daughter Enid said if pt gets stronger over the weekend then the hope would be for her to come home with home care. CM to follow to see if pt is getting stronger. Also sent more TCU referrals.    Destination  Service Provider Request Status Selected Services Address Phone Fax Patient Preferred   [facility] (TCU)  Pending - Request Sent N/A [address] [facility] [address] [phone] --   [facility] ([facility])  Pending - Request Sent N/A [address] [place] [address] [phone] --   [facility] ([facility])  Pending - Request Sent N/A [address] [facility] [id number] [phone] --   [facility] ([facility])  Pending - Request Sent N/A [address] [facility] [id number] [phone] --   [facility] ([facility])  Pending - Request Sent N/A [address] [address] [phone] --   [facility] (SNF)  Pending - Request Sent N/A [address], [place] [address] [phone] --   [facility] ([facility])  Pending - Request Sent N/A [address] [address], [place] [address] [phone] --   [facility] ()  Declined   Tested positive for Covid-19 12/28/22 N/A [address], [place] [address] [phone] --   [facility] ()  Declined   Bed Not Available N/A [address], [place] [address] [phone] --               Lynn Camarena RN

## 2023-01-01 NOTE — PLAN OF CARE
Problem: Risk for Delirium  Goal: Improved Sleep  Outcome: Progressing     Problem: Fall Injury Risk  Goal: Absence of Fall and Fall-Related Injury  Outcome: Progressing  Intervention: Identify and Manage Contributors  Recent Flowsheet Documentation  Taken 1/1/2023 0031 by Wiliam Denny RN  Medication Review/Management: medications reviewed  Intervention: Promote Injury-Free Environment  Recent Flowsheet Documentation  Taken 1/1/2023 0031 by Wiliam Denny RN  Safety Promotion/Fall Prevention:   bed alarm on   clutter free environment maintained   fall prevention program maintained   increased rounding and observation   increase visualization of patient   lighting adjusted   mobility aid in reach   nonskid shoes/slippers when out of bed   patient and family education   room near nurse's station   room organization consistent   safety round/check completed   Goal Outcome Evaluation:  Patient denied pain. Reported some cough, cough syrup given this AM. Up to bathroom with Assist X 1 with GB and walker. Cares grouped to allow rest. Patient slept well overnight.

## 2023-01-01 NOTE — PROGRESS NOTES
Assessment & Plan   77-year-old female with chronic HFpEF, COPD, chronic oxygen use (2-3L at nighttime, room air daytime), atrial fibrillation presents with acute on chronic HFpEF, acute on chronic hypoxic respiratory failure, leukocytosis.    Principal Problem:    COPD (chronic obstructive pulmonary disease) (H)  Active Problems:    Rheumatoid arthritis of multiple sites without rheumatoid factor (H)    Paroxysmal atrial fibrillation (H)    Benign essential hypertension    CKD (chronic kidney disease) stage 3, GFR 30-59 ml/min (H)    Hypomagnesemia    Acute on chronic congestive heart failure, unspecified heart failure type (H)    Acute kidney injury (H)    Acute on chronic respiratory failure with hypoxia (H)    Macrocytic anemia    Recurrent Clostridioides difficile diarrhea    Leukocytosis    HLD (hyperlipidemia)    Gastroesophageal reflux disease without esophagitis    Gout    Mood disorder (H)    JOHANNA (obstructive sleep apnea)    Hyponatremia    Internal carotid artery stenosis, left    Present on Admission:    Hypomagnesemia    Acute on chronic congestive heart failure, unspecified heart failure type (H)    Acute on chronic respiratory failure with hypoxia (H)    Paroxysmal atrial fibrillation (H)    Benign essential hypertension    Macrocytic anemia      Acute HFpEF, acute on chronic hypoxic respiratory failure- resolved  -Patient presents with dyspnea, increasing oxygen needs, proBNP greater than 6000, mildly elevated troponin.  Chest x-ray 12/20/2022 without any acute abnormalities.  TTE 12/1/2022 with ejection fraction of 60 to 65% and mild aortic stenosis is insufficiency, moderate left atrial enlargement and mild right atrial enlargement with increased pulmonary pressure.  Limited TTE 12/29 with unchanged ejection fractions and no significant abnormalities.  Patient back at her baseline use of oxygen at night and room air during the day.  Did have some mild chest tightness on 12/31 but EKG was baseline  with atrial fibrillation and seemed to be more related to coughing.  -Furosemide had been held for YESICA but now patient mildly fluid overloaded once again, will give furosemide 20 mg IV x1 and resume furosemide 20 mg p.o. daily on 1/2  -Daily weights, strict I's and O's  -Incentive spirometry  -Wean oxygen    Recurrent C. Difficile diarrhea  -Patient had C. difficile 11/7/2022 and was treated with oral vancomycin x10 days.  Now that C. difficile toxin and antigen positive once again within 2 months will count as a C. difficile recurrence.  For recurrence fidaxomicin is preferred over a second course of vancomycin.  -Enteric precautions  -GI cocktail as needed  -Fidaxomicin 200 mg p.o. twice daily x10 days, preferred treatment for first C. difficile recurrence.      YESICA on CKD 3- improving  -Creatinine approximately 1.2, sofy to 1.6 in the setting of diuresis and appears to have stabilized.  Patient does appear somewhat mildly fluid overloaded once again on 1/1  - furosemide 20 mg IV x1, resume oral furosemide 1/2  -Avoid nephrotoxins    Acute metabolic encephalopathy-resolved  -Patient self endorsed some increased confusion and memory issues. Urinalysis without signs of infection and other electrolytes normal, CT head 12/29 without any acute abnormalities-Delirium protocol.  Upon more discussion with patient and her daughter it really appears more like fatigue and current acute illnesses are causing some mental fatigue and not actual confusion or encephalopathy.  -Delirium protocol    Presyncope  -Patient had an episode of presyncope while using the restroom on 12/29.  Highly likely some orthostatic hypotension in the setting of loose stools.  Carotid ultrasound did show severe left ICA stenosis/occlusion.  Unlikely to have caused presyncope but still significant.  Orthostatic BPs negative.  -Fall precautions    Left ICA occlusion  -Carotid ultrasound 12/29 does show severe plaque formation of the left internal  carotid artery without visible lumen.  Grossly asymptomatic.  -Vascular surgery plan for outpatient monitoring.    Leukocytosis- resolved  -Patient presented with elevated white blood cell count as well as high procalcitonin. Chest x-ray did not show any consolidations, urinalysis without signs of infection. C. difficile toxin and antigen both positive.  Highly likely WBC elevation is from C. difficile infection.    Atrial fibrillation with RVR  -Initially rate controlled but patient now with some mild RVR  -Metoprolol IV as needed  -Metoprolol XL has been increased to 100 mg p.o. daily  -Rivaroxaban 15 mg p.o. q. bedtime    COPD  -Not in acute exacerbation.  -Breo Ellipta daily  -DuoNeb as needed  -Antitussives as needed    Rheumatoid arthritis  -Hold home methotrexate while being treated for C. difficile  -Prednisone 5 mg p.o. daily    HTN  -Metoprolol XL increased to 100 mg p.o. daily    HLD  -Pravastatin 20 mg p.o. daily    COVID 19 recovered  -With positive COVID-19 test on 12/13/2022.  Patient did receive 5 doses of IV remdesivir and 10 days of dexamethasone and had been discharged to TCU.  -No special precautions needed    GERD  -Will use famotidine in place of pantoprazole, as PPI associated with c diff infections    Gout  -Allopurinol 300 mg p.o. daily    Mood disorder  -Duloxetine 40 mg p.o. daily    JOHANNA  -CPAP    Hyponatremia- resolved  -Likely hypervolemic hyponatremia in the setting of fluid overload and acute heart failure    Macrocytic anemia  -Hemoglobin 8.7 with MCV of 106.  Highly likely from methotrexate use, mixed anemia of chronic disease.  TSH and vitamin B12 normal, folic acid is actually mildly elevated     Clinically Significant Risk Factors              # Hypoalbuminemia: Lowest albumin = 3 g/dL at 12/29/2022  5:00 AM, will monitor as appropriate            # Overweight: Estimated body mass index is 26.44 kg/m  as calculated from the following:    Height as of this encounter: 1.651 m (5'  "5\").    Weight as of this encounter: 72.1 kg (158 lb 14.4 oz).          Electrolytes: currently within normal limits  Fluids: P.o.  Diet: Cardiac  VTE prophylaxis: Rivaroxaban       Expected Discharge Date: 01/02/2023    Discharge Delays: *Medically Ready for Discharge  Placement - TCU  Destination: inpatient rehabilitation facility  Discharge Comments: cdiff; meds to oral.  Needs TCU.        Subjective  Cc: shortness of breath, diarrhea    Patient is not feeling as well today she was yesterday.  She endorses she feels somewhat short of breath but denies any wheezes.  She thinks that she has gotten a little bit more of a cough and she does feel slightly more tired.  Denies any further diarrhea, no abdominal pain or further bloating.    Review of Systems: History obtained from the patient  General ROS: Positive for fatigue  ENT ROS: negative for - headaches, hearing change, nasal congestion, nasal discharge, sore throat  Hematological and Lymphatic ROS: negative for - bleeding problems, blood clots, bruising  Respiratory ROS: Positive for cough, shortness of breath  Cardiovascular ROS: Negative for - chest pain, dyspnea on exertion, edema  Gastrointestinal ROS: Negative for abdominal pain, nausea, diarrhea  Genito-Urinary ROS: negative for - dysuria, hematuria  Musculoskeletal ROS: Negative for - gait disturbance, and muscle pain  Neurological ROS: Negative for numbness, dizziness  Dermatological ROS: negative for pruritus, rash    Objective    Physical Examination:   General appearance -alert, patient appears somewhat sad and is laying on her bed, oriented to person place and time  Mental status -alert, oriented to person place and time, normal pleasant mood, speech is clear and coherent, motor activity normal, thought process intact.  HEENT - sclera anicteric, left eye normal, right eye normal, nares normal and patent, no erythema  Respiratory -quiet breath sounds in all lung fields with some questionable quiet " bibasilar crackles but no wheezes, tachypnea or retractions  Cardiac -normal rate, irregularly irregular rhythm  Abdomen -soft, nontender  Neurological -alert, oriented, normal speech, no focal findings or movement disorder noted  Musculoskeletal - no joint tenderness, deformity or swelling, full range of motion without pain  Extremities - peripheral pulses normal, trace pitting edema of the ankles  Skin -skin is sallow appearing, thin with multiple ecchymotic lesions across bilateral upper extremities      Temp:  [97.7  F (36.5  C)-97.9  F (36.6  C)] 97.9  F (36.6  C)  Pulse:  [] 93  Resp:  [20] 20  BP: (124-141)/(66-76) 126/69  SpO2:  [97 %-99 %] 99 %    No intake or output data in the 24 hours ending 12/28/22 0908    Results    Recent Results (from the past 24 hour(s))   ECG 12-LEAD WITH MUSE ([facility])    Collection Time: 12/31/22 11:42 AM   Result Value Ref Range    Systolic Blood Pressure  mmHg    Diastolic Blood Pressure  mmHg    Ventricular Rate 106 BPM    Atrial Rate 101 BPM    TN Interval  ms    QRS Duration 76 ms     ms    QTc 414 ms    P Axis  degrees    R AXIS 46 degrees    T Axis 5 degrees    Interpretation ECG       Atrial fibrillation with rapid ventricular response  Abnormal ECG  When compared with ECG of 28-DEC-2022 01:22,  No significant change was found  Confirmed by HARVINDER CARRANZA MD LOC:JN ([id number]) on 12/31/2022 12:37:38 PM     Basic metabolic panel    Collection Time: 01/01/23  5:37 AM   Result Value Ref Range    Sodium 140 136 - 145 mmol/L    Potassium 4.8 3.4 - 5.3 mmol/L    Chloride 105 98 - 107 mmol/L    Carbon Dioxide (CO2) 28 22 - 29 mmol/L    Anion Gap 7 7 - 15 mmol/L    Urea Nitrogen 24.3 (H) 8.0 - 23.0 mg/dL    Creatinine 1.25 (H) 0.51 - 0.95 mg/dL    Calcium 10.0 8.8 - 10.2 mg/dL    Glucose 87 70 - 99 mg/dL    GFR Estimate 44 (L) >60 mL/min/1.73m2   Magnesium    Collection Time: 01/01/23  5:37 AM   Result Value Ref Range    Magnesium 2.0 1.7 - 2.3 mg/dL   Glucose by meter     Collection Time: 01/01/23  7:06 AM   Result Value Ref Range    GLUCOSE BY METER POCT 84 70 - 99 mg/dL       Lab Results personally reviewed 1/1  Imaging Results personally reviewed 12/29  Discussed with patient   Reviewed old records     Medical Decision Making       35 MINUTES SPENT BY ME on the date of service doing chart review, history, exam, documentation & further activities per the note.  MANAGEMENT DISCUSSED with the following over the past 24 hours: Patient, patient's daughter, nursing in regards to results, renal function, reinitiation of certain oral medications   NOTE(S)/MEDICAL RECORDS REVIEWED over the past 24 hours: Nursing  Tests ORDERED & REVIEWED in the past 24 hours:  - BMP  - Magnesium  Medical complexity over the past 24 hours:  - Prescription DRUG MANAGEMENT performed      Monse Weinstein DO  Hospitalist Service  [facility]  Text page via [facility] Paging/Directory     This note was created using dragon dictation, any spelling and grammatical errors are unintentional.

## 2023-01-02 NOTE — PLAN OF CARE
"  Problem: Plan of Care - These are the overarching goals to be used throughout the patient stay.    Goal: Plan of Care Review  Description: The Plan of Care Review/Shift note should be completed every shift.  The Outcome Evaluation is a brief statement about your assessment that the patient is improving, declining, or no change.  This information will be displayed automatically on your shift note.  Outcome: Progressing  Flowsheets (Taken 1/1/2023 1828)  Plan of Care Reviewed With: patient     Problem: COPD (Chronic Obstructive Pulmonary Disease) Comorbidity  Goal: Maintenance of COPD Symptom Control  Outcome: Progressing  Intervention: Maintain COPD-Symptom Control  Recent Flowsheet Documentation  Taken 1/1/2023 1600 by Melanie Paiz RN  Medication Review/Management: medications reviewed  Taken 1/1/2023 0800 by Melanie Paiz RN  Medication Review/Management: medications reviewed     Problem: Heart Failure Comorbidity  Goal: Maintenance of Heart Failure Symptom Control  Outcome: Progressing  Intervention: Maintain Heart Failure Management  Recent Flowsheet Documentation  Taken 1/1/2023 1600 by Melanie Paiz RN  Medication Review/Management: medications reviewed  Taken 1/1/2023 0800 by Melanie Paiz RN  Medication Review/Management: medications reviewed     Problem: Heart Failure  Goal: Optimal Coping  Outcome: Progressing   Goal Outcome Evaluation:      Plan of Care Reviewed With: patient      Pt reports breathing is \"heavy\".  SaO2 mid to upper 90s on RA.  Resp rate 20.  IV lasix given this AM.  Compression stocking on this AM (off at HS).  Output 950cc and occurrence x1 so far today.  Collection hat is in toilet but patient missed x1.  Tesslon PRN for cough.  Pt reported sore mouth.  Magic mouthwash PRN.  Pt thought Oragel might work better.  Order received for hurricain spray.  Lower denture removed to promote healing. Dtr visited at bedside late afternoon.         "

## 2023-01-02 NOTE — PROGRESS NOTES
[facility]    PROGRESS NOTE - Hospitalist Service    Assessment and Plan  Patient is new to me, Fatuma Henry is a 77-year-old female with chronic HFpEF, COPD, chronic oxygen use (2-3L at nighttime, room air daytime), atrial fibrillation presents with acute on chronic HFpEF, acute on chronic hypoxic respiratory failure, leukocytosis.    Acute HFpEF, acute on chronic hypoxic respiratory failure- resolved  -Patient presents with dyspnea, increasing oxygen needs, proBNP greater than 6000, mildly elevated troponin.  Chest x-ray 12/20/2022 without any acute abnormalities.  TTE 12/1/2022 with ejection fraction of 60 to 65% and mild aortic stenosis is insufficiency, moderate left atrial enlargement and mild right atrial enlargement with increased pulmonary pressure.  Limited TTE 12/29 with unchanged ejection fractions and no significant abnormalities.  Patient back at her baseline use of oxygen at night and room air during the day.  Did have some mild chest tightness on 12/31 but EKG was baseline with atrial fibrillation and seemed to be more related to coughing.  -Hold furosemide 20 mg p.o. daily  -Daily weights, strict I's and O's  -Incentive spirometry  -Weaned of oxygen     Recurrent C. Difficile diarrhea  -Patient had C. difficile 11/7/2022 and was treated with oral vancomycin and does currently have some loose stools.  I do see patient was started on preemptive vancomycin on 12/26 while C. difficile culture was pending.  Now that C. difficile toxin and antigen positive we will count this as a C. difficile recurrence as it appears patient's diarrhea had resolved and then returned once again within 2 months of the initial diagnosis.  -Enteric precautions  -GI cocktail as needed  -Fidaxomicin 200 mg p.o. twice daily x10 days, preferred treatment for first C. difficile recurrence.       YESICA on CKD 3- improving  -Creatinine approximately 1.2, sofy to 1.6 in the setting of diuresis.  -Hold  Lasix  -Avoid nephrotoxins     Acute metabolic encephalopathy-resolved  -Patient with some increased confusion.  Highly likely multifactorial with hypoxia, recurrent C. difficile infection, acute heart failure, delirium in the hospital setting.  Urinalysis without signs of infection and other electrolytes normal.  Upon further discussion with patient and she sounds like she has been having some mild memory issues over the past month in relation to being ill, otherwise she is alert and oriented x4 and very pleasant.  CT head 12/29 without any acute abnormalities  -Delirium protocol     Presyncope  -Patient had an episode of presyncope while using the restroom on 12/29.  Highly likely some orthostatic hypotension in the setting of loose stools.  Carotid ultrasound did show severe left ICA stenosis/occlusion.  Unlikely to have caused presyncope but still significant.  Orthostatic BPs negative.  -Fall precautions     Left ICA occlusion  -Carotid ultrasound 12/29 does show severe plaque formation of the left internal carotid artery without visible lumen.  Grossly asymptomatic.  -Vascular surgery plan for outpatient monitoring.     Leukocytosis- resolved  -Patient presented with elevated white blood cell count as well as high procalcitonin.  Although patient is on chronic prednisone she did not have a leukocytosis prior to this admission so is likely reactive to infection.  Chest x-ray did not show any consolidations, urinalysis without signs of infection. C. difficile toxin and antigen both positive.  Highly likely WBC elevation is from C. difficile infection.     Atrial fibrillation with RVR  -Initially rate controlled but patient now with some mild RVR  -Metoprolol IV as needed  -Increase metoprolol XL to 100 mg p.o. daily  -Rivaroxaban 15 mg p.o. q. bedtime     COPD  -Not in an acute exacerbation but patient does have slight increase in cough 12/30  -Breo Ellipta daily  -DuoNeb as needed  -Antitussives as  needed     Rheumatoid arthritis  -Hold home methotrexate while being treated for C. difficile  -Prednisone 5 mg p.o. daily     HTN  -Increase Metoprolol- mg p.o. daily     HLD  -Pravastatin 20 mg p.o. daily     COVID 19 recovered  -With positive COVID-19 test on 12/13/2022.  Patient did receive 5 doses of IV remdesivir and 10 days of dexamethasone and had been discharged to TCU.  -No special precautions needed     GERD  -Will use famotidine in place of pantoprazole, as PPI associated with c diff infections     Gout  -Allopurinol 300 mg p.o. daily     Mood disorder  -Duloxetine 40 mg p.o. daily     JOHANNA  -CPAP     Hyponatremia- resolved  -Likely hypervolemic hyponatremia in the setting of fluid overload and acute heart failure     Macrocytic anemia  - Hemoglobin 8.7 with MCV of 106.  Highly likely from methotrexate use, mixed anemia of chronic disease.  TSH and vitamin B12 normal, folic acid is actually mildly elevated     Medical Decision Making     25 MINUTES SPENT BY ME on the date of service doing chart review, history, exam, documentation & further activities per the note    Principal Problem:    COPD (chronic obstructive pulmonary disease) (H)  Active Problems:    Rheumatoid arthritis of multiple sites without rheumatoid factor (H)    Paroxysmal atrial fibrillation (H)    Benign essential hypertension    CKD (chronic kidney disease) stage 3, GFR 30-59 ml/min (H)    Hypomagnesemia    Acute on chronic congestive heart failure, unspecified heart failure type (H)    Acute kidney injury (H)    Acute on chronic respiratory failure with hypoxia (H)    Macrocytic anemia    Recurrent Clostridioides difficile diarrhea    Leukocytosis    HLD (hyperlipidemia)    Gastroesophageal reflux disease without esophagitis    Gout    Mood disorder (H)    JOHANNA (obstructive sleep apnea)    Hyponatremia    Internal carotid artery stenosis, left      VTE prophylaxis:  DOAC  DIET: Orders Placed This Encounter      Combination Diet  Low Saturated Fat Na <2400mg Diet      Disposition/Barriers to discharge: Placement  Code Status: No CPR- Do NOT Intubate    Subjective:  Fatuma is pleasant confused, no acute significant events overnight    PHYSICAL EXAM  Vitals:    12/31/22 0659 01/01/23 0559 01/02/23 0457   Weight: 71.7 kg (158 lb) 72.1 kg (158 lb 14.4 oz) 71.7 kg (158 lb 1.6 oz)     B/P:104/59 T:97.8 P:89 R:18     Intake/Output Summary (Last 24 hours) at 1/2/2023 1456  Last data filed at 1/2/2023 1234  Gross per 24 hour   Intake 240 ml   Output 900 ml   Net -660 ml      Body mass index is 26.31 kg/m .    Constitutional: awake, alert, cooperative, no apparent distress, and appears stated age  Respiratory: No increased work of breathing, good air exchange, clear to auscultation bilaterally, no crackles or wheezing  Cardiovascular: Normal apical impulse, regular rate and rhythm, normal S1 and S2, no S3 or S4, and no murmur noted  GI: No scars, normal bowel sounds, soft, non-distended, non-tender, no masses palpated, no hepatosplenomegally  Skin: no bruising or bleeding and normal skin color, texture, turgor  Musculoskeletal: There is no redness, warmth, or swelling of the joints.  Full range of motion noted.  no lower extremity pitting edema present  Neurologic: Awake, alert, oriented to self only, grossly intact.    Neuropsychiatric: Appropriate with examiner      PERTINENT LABS/IMAGING:    I have personally reviewed the following data over the past 24 hrs:    N/A  \   N/A   / N/A     133 (L) 97 (L) 25.7 (H) /  90   4.2 30 (H) 1.23 (H) \       Imaging results reviewed over the past 24 hrs:   No results found for this or any previous visit (from the past 24 hour(s)).    Discussed with patient, family, nursing staff and discharge planner    Christopher Avila MD  [facility] Medicine Service  [phone]

## 2023-01-02 NOTE — PLAN OF CARE
Assumed care 1900 to 0730. A&O x 4. Assist x 1 with a gait belt and walker. Denies pain. Up to toilet. 2L O2 via nasal cannula for comfort while sleeping. Call light within reach, able to make needs known. Bed alarm on for safety.    Problem: Dysrhythmia  Goal: Normalized Cardiac Rhythm  Outcome: Progressing     Problem: Heart Failure  Goal: Effective Oxygenation and Ventilation  Intervention: Optimize Oxygenation and Ventilation  Recent Flowsheet Documentation  Taken 1/2/2023 0400 by NORMAN ARNOLD  Head of Bed (HOB) Positioning: HOB at 20 degrees

## 2023-01-03 NOTE — PROGRESS NOTES
Care Management Discharge Note    Discharge Date: 01/03/2023       Discharge Disposition:  Pt going back to [facility]    Discharge Services:  TCU    Discharge DME:      Discharge Transportation: daughter to transport    Private pay costs discussed: Not applicable    PAS Confirmation Code:  Not needed at pt is going back to the same TCU  Patient/family educated on Medicare website which has current facility and service quality ratings:      Education Provided on the Discharge Plan:    Persons Notified of Discharge Plans: yes  Patient/Family in Agreement with the Plan:      Handoff Referral Completed: Yes    Additional Information:  Pt is adequate to discharge to TCU. Pt was accepted back to [facility] TCU for today and pt and family are accepting of this. Pt daughter to transport today and will be at the main entrance between [phone] to  pt she is going to call the unit and would like us to wheel the pt down when she calls. CM sent orders to admissions.        Lynn Camarena RN

## 2023-01-03 NOTE — PLAN OF CARE
Physical Therapy Discharge Summary    Reason for therapy discharge:    Discharged to transitional care facility.    Progress towards therapy goal(s). See goals on Care Plan in [facility] electronic health record for goal details.  Goals partially met.  Barriers to achieving goals:   discharge from facility.    Therapy recommendation(s):    Recommend continued therapies to improve overall strength, balance and mobility.       Hannah Jose, PT, DPT  1/3/2023

## 2023-01-03 NOTE — PLAN OF CARE
Problem: Plan of Care - These are the overarching goals to be used throughout the patient stay.    Goal: Plan of Care Review  Description: The Plan of Care Review/Shift note should be completed every shift.  The Outcome Evaluation is a brief statement about your assessment that the patient is improving, declining, or no change.  This information will be displayed automatically on your shift note.  Outcome: Progressing     Problem: Fall Injury Risk  Goal: Absence of Fall and Fall-Related Injury  Outcome: Progressing  Intervention: Identify and Manage Contributors  Recent Flowsheet Documentation  Taken 1/2/2023 1600 by Melanie Paiz RN  Medication Review/Management: medications reviewed  Taken 1/2/2023 0800 by Melanie Paiz RN  Medication Review/Management: medications reviewed  Intervention: Promote Injury-Free Environment  Recent Flowsheet Documentation  Taken 1/2/2023 1600 by Melanie Paiz RN  Safety Promotion/Fall Prevention:    nonskid shoes/slippers when out of bed    mobility aid in reach    bed alarm on    activity supervised  Taken 1/2/2023 0800 by Melanie Paiz RN  Safety Promotion/Fall Prevention:    nonskid shoes/slippers when out of bed    mobility aid in reach    bed alarm on    activity supervised     Problem: COPD (Chronic Obstructive Pulmonary Disease) Comorbidity  Goal: Maintenance of COPD Symptom Control  Outcome: Progressing  Intervention: Maintain COPD-Symptom Control  Recent Flowsheet Documentation  Taken 1/2/2023 1600 by Melanie Paiz RN  Medication Review/Management: medications reviewed  Taken 1/2/2023 0800 by Melanie Paiz RN  Medication Review/Management: medications reviewed     Problem: Heart Failure  Goal: Effective Oxygenation and Ventilation  Outcome: Progressing  Intervention: Promote Airway Secretion Clearance  Recent Flowsheet Documentation  Taken 1/2/2023 1600 by Melanie Paiz RN  Cough And Deep Breathing: done independently per patient  Activity  "Management:    ambulated in room    ambulated to bathroom  Taken 1/2/2023 1407 by Melanie Paiz RN  Activity Management: (Simultaneous filing. User may not have seen previous data.) ambulated to bathroom  Taken 1/2/2023 0800 by Melanie Paiz RN  Cough And Deep Breathing: done independently per patient  Activity Management:    ambulated in room    ambulated to bathroom  Intervention: Optimize Oxygenation and Ventilation  Recent Flowsheet Documentation  Taken 1/2/2023 1600 by Melanei Paiz RN  Head of Bed (HOB) Positioning: HOB at 20 degrees  Taken 1/2/2023 0800 by Melanie Paiz RN  Head of Bed (HOB) Positioning: HOB at 20 degrees   Goal Outcome Evaluation:      Plan of Care Reviewed With: patient      Patient using call light to make her needs known.  Up to bathroom with 1 assist/SBA, transfer belt, and walker.  Bruise on left shin with fluid filled blister.  Pt denies pain.  She report Shortness of breath witih activity and at rest.  Sa02 mid to upper 90s.  Pitting edema in bilateral lower extremities (right slightly greater than left).  Compression stockings on during theday.  Stockings removed around 1800 this evening per patient request.  IV infiltrated in left forearm.  SWAT call for restart.  Patients veins appear thin and fragile.  Pt still has sore on bottom gum/lip area.  She declined magic mouthwash and hurricain spray this evening.  She states she has been using it and is disappointed it hasn't \"done anything\"            "

## 2023-01-03 NOTE — DISCHARGE SUMMARY
[facility]  Hospitalist Discharge Summary      Date of Admission:  12/28/2022  Date of Discharge:  1/3/2023  Discharging Provider: Christopher Avila MD  Discharge Service: Hospitalist Service    Discharge Diagnoses   Acute on chronic CHF, diastolic dysfunction  Acute on chronic respiratory failure with hypoxia, improved  Recurrent C. difficile, improving  Presyncope  Acute metabolic encephalopathy, improved  Acute kidney injury, resolved   Chronic kidney disease stage III  Left ICA occlusion  Leukocytosis, resolved   COPD  A. fib with RVR, improved  Hyperlipidemia  Recovered COVID-19  GERD  Gout  Weakness and deconditioning    Follow-ups Needed After Discharge   Follow-up Appointments     Follow Up and recommended labs and tests      Follow up with Nursing home physician.  No follow up labs or test are   needed.  Follow up with specialist, cardiology and rheumatology as scheduled             Unresulted Labs Ordered in the Past 30 Days of this Admission     No orders found from 11/28/2022 to 12/29/2022.      These results will be followed up by PCP    Discharge Disposition   Discharged to nursing home  Condition at discharge: Stable      Hospital Course   77-year-old female with chronic HFpEF, COPD, chronic oxygen use (2-3L at nighttime, room air daytime), atrial fibrillation presents with acute on chronic HFpEF, acute on chronic hypoxic respiratory failure, leukocytosis.  Please refer to H&P for details     Acute HFpEF, acute on chronic hypoxic respiratory failure- resolved  -Patient presents with dyspnea, increasing oxygen needs, proBNP greater than 6000, mildly elevated troponin.  Chest x-ray 12/20/2022 without any acute abnormalities.  TTE 12/1/2022 with ejection fraction of 60 to 65% and mild aortic stenosis is insufficiency, moderate left atrial enlargement and mild right atrial enlargement with increased pulmonary pressure.  Limited TTE 12/29 with unchanged ejection fractions and no  significant abnormalities.  Patient back at her baseline use of oxygen at night and room air during the day.  Did have some mild chest tightness on 12/31 but EKG was baseline with atrial fibrillation and seemed to be more related to coughing.  -Hold furosemide 20 mg p.o. daily  -Daily weights, strict I's and O's  -Incentive spirometry  -Weaned of oxygen     Recurrent C. Difficile diarrhea  -Patient had C. difficile 11/7/2022 and was treated with oral vancomycin and does currently have some loose stools.  I do see patient was started on preemptive vancomycin on 12/26 while C. difficile culture was pending.  Now that C. difficile toxin and antigen positive we will count this as a C. difficile recurrence as it appears patient's diarrhea had resolved and then returned once again within 2 months of the initial diagnosis.  -Enteric precautions  -GI cocktail as needed  -Fidaxomicin 200 mg p.o. twice daily x10 days, preferred treatment for first C. difficile recurrence.       YESICA on CKD 3- improving  -Creatinine approximately 1.2, sofy to 1.6 in the setting of diuresis.  -Hold Lasix  -Avoid nephrotoxins     Acute metabolic encephalopathy-resolved  -Patient with some increased confusion.  Highly likely multifactorial with hypoxia, recurrent C. difficile infection, acute heart failure, delirium in the hospital setting.  Urinalysis without signs of infection and other electrolytes normal.  Upon further discussion with patient and she sounds like she has been having some mild memory issues over the past month in relation to being ill, otherwise she is alert and oriented x4 and very pleasant.  CT head 12/29 without any acute abnormalities  -Delirium protocol  - Improved, back to baseline     Presyncope  -Patient had an episode of presyncope while using the restroom on 12/29.  Highly likely some orthostatic hypotension in the setting of loose stools.  Carotid ultrasound did show severe left ICA stenosis/occlusion.  Unlikely to  have caused presyncope but still significant.  Orthostatic BPs negative.  -Fall precautions     Left ICA occlusion  -Carotid ultrasound 12/29 does show severe plaque formation of the left internal carotid artery without visible lumen.    - Grossly asymptomatic.  -Vascular surgery plan for outpatient monitoring.     Leukocytosis- resolved  -Patient presented with elevated white blood cell count as well as high procalcitonin.  Although patient is on chronic prednisone she did not have a leukocytosis prior to this admission so is likely reactive to infection.  Chest x-ray did not show any consolidations, urinalysis without signs of infection. C. difficile toxin and antigen both positive.  Highly likely WBC elevation is from C. difficile infection.     Atrial fibrillation with RVR  -Initially rate controlled but patient now with some mild RVR  -Metoprolol IV as needed  -Increase metoprolol XL to 100 mg p.o. daily  -Rivaroxaban 15 mg p.o. q. bedtime     COPD  -Not in an acute exacerbation but patient does have slight increase in cough 12/30  -Breo Ellipta daily  -DuoNeb as needed  -Antitussives as needed     Rheumatoid arthritis  -Hold home methotrexate while being treated for C. difficile  -Prednisone 5 mg p.o. daily     HTN  -Increase Metoprolol- mg p.o. daily     HLD  -Pravastatin 20 mg p.o. daily     COVID 19 recovered  - With positive COVID-19 test on 12/13/2022.  Patient did receive 5 doses of IV remdesivir and 10 days of dexamethasone and had been discharged to TCU.  -No special precautions needed     GERD  -Will use famotidine in place of pantoprazole, as PPI associated with c diff infections     Gout  -Allopurinol 300 mg p.o. daily     Mood disorder  -Duloxetine 40 mg p.o. daily     JOHANNA  -CPAP     Hyponatremia  - resolved  -Likely hypervolemic hyponatremia in the setting of fluid overload and acute heart failure     Weakness and deconditioning  - PT/OT evaluation   - Plan for TCU on  discharge       Consultations This Hospital Stay   CARDIOLOGY IP CONSULT  OCCUPATIONAL THERAPY ADULT IP CONSULT  NUTRITION SERVICES ADULT IP CONSULT  CARE MANAGEMENT / SOCIAL WORK IP CONSULT  PHYSICAL THERAPY ADULT IP CONSULT  IP RESPIRATORY CARE CHRONIC PULMONARY DISEASE SPECIALIST  PHARMACY IP CONSULT  VASCULAR SURGERY IP CONSULT  PHYSICAL THERAPY ADULT IP CONSULT  OCCUPATIONAL THERAPY ADULT IP CONSULT    Code Status   No CPR- Do NOT Intubate    Time Spent on this Encounter   I, Christopher Avila MD, personally saw the patient today and spent greater than 30 minutes discharging this patient.       Christopher Avila MD  [facility]  [address] [place] [id number]  Phone: [phone]  Fax: [phone]  ______________________________________________________________________    Physical Exam   Vital Signs: Temp: 97.8  F (36.6  C) Temp src: Oral BP: 122/56 Pulse: 88   Resp: 20 SpO2: 95 % O2 Device: None (Room air)    Weight: 156 lbs 3.2 oz  Constitutional: awake, alert, cooperative, no apparent distress, and appears stated age  Respiratory: No increased work of breathing, good air exchange, clear to auscultation bilaterally, no crackles or wheezing  Cardiovascular: Normal apical impulse, regular rate and rhythm, normal S1 and S2, no S3 or S4, and no murmur noted  GI: No scars, normal bowel sounds, soft, non-distended, non-tender, no masses palpated, no hepatosplenomegally  Skin: no bruising or bleeding and normal skin color, texture, turgor  Musculoskeletal: There is no redness, warmth, or swelling of the joints.  Full range of motion noted.  no lower extremity pitting edema present  Neurologic: Awake, alert, oriented to self only, grossly intact.    Neuropsychiatric: Appropriate with examiner    Primary Care Physician   Tory Wise    Discharge Orders      US Carotid Bilateral     General info for SNF    Length of Stay Estimate: Short Term Care: Estimated # of Days  <30  Condition at Discharge: Improving  Level of care:skilled   Rehabilitation Potential: Fair  Admission H&P remains valid and up-to-date: Yes  Recent Chemotherapy: N/A  Use Nursing Home Standing Orders: Yes     Mantoux instructions    Give two-step Mantoux (PPD) Per Facility Policy Yes     Follow Up and recommended labs and tests    Follow up with Nursing home physician.  No follow up labs or test are needed.  Follow up with specialist, cardiology and rheumatology as scheduled     Reason for your hospital stay    Acute on chronic CHF, C diff     Activity - Up with nursing assistance     Physical Therapy Adult Consult    Evaluate and treat as clinically indicated.    Reason: Weakness     Occupational Therapy Adult Consult    Evaluate and treat as clinically indicated.    Reason: Weakness     Fall precautions     Diet    Follow this diet upon discharge: Orders Placed This Encounter      Combination Diet Low Saturated Fat Na <2400mg Diet       Significant Results and Procedures   Most Recent 3 CBC's:Recent Labs   Lab Test 01/03/23  0440 12/31/22  0526 12/30/22  0527 12/29/22  0500   WBC  --  7.5 6.4 8.7   HGB  --  8.7* 9.0* 10.2*   MCV  --  106* 103* 104*    285 301 306     Most Recent 3 BMP's:Recent Labs   Lab Test 01/03/23  0440 01/02/23  0448 01/01/23  0706 01/01/23  0537    133*  --  140   POTASSIUM 4.5  4.5 4.2  --  4.8   CHLORIDE 100 97*  --  105   CO2 31* 30*  --  28   BUN 24.3* 25.7*  --  24.3*   CR 1.17* 1.23*  --  1.25*   ANIONGAP 8 6*  --  7   SUNI 10.5* 10.0  --  10.0   GLC 86 90 84 87   ,   Results for orders placed or performed during the hospital encounter of 12/28/22   XR Chest Port 1 View    Narrative    EXAM: XR CHEST PORT 1 VIEW  LOCATION: [facility]  DATE/TIME: 12/28/2022 2:39 AM    INDICATION: Shortness of breath.  COMPARISON: 12/16/2022.    FINDINGS: The heart size is normal. The thoracic aorta is calcified. Mild probable scarring at the lung bases. The  lungs are otherwise clear. No pneumothorax.      Impression    IMPRESSION: No acute abnormality.   US Carotid Bilateral    Narrative    EXAM: US CAROTID BILATERAL  LOCATION: [facility]  DATE/TIME: 12/29/2022 1:12 PM    INDICATION: Presyncope, carotid bruit.  COMPARISON: None.  TECHNIQUE: Duplex exam performed utilizing 2D gray-scale imaging, Doppler interrogation with color-flow and spectral waveform analysis. The percent diameter stenosis is determined using NASCET criteria and Society of Radiologists in Ultrasound Consensus   Criteria.    FINDINGS:    RIGHT: Mild plaque at the bifurcation. The peak systolic velocity in the ICA is less than 125 cm/sec, consistent with less than 50% stenosis. Normal velocities in the ECA. Antegrade flow within the vertebral artery.     LEFT: Severe plaque at the bifurcation. No visible lumen. No flow is identified. Normal velocities in the ECA. Antegrade flow within the vertebral artery.    VELOCITY CHART:  CCA   Right: 65/15 cm/s   Left: 30/5 cm/s  ICA   Right: 130/52 cm/s   Left: -- cm/s  ECA   Right: 134/16 cm/s   Left: 106/13 cm/s  ICA/CCA PSV Ratio   Right: --   Left: --      Impression    IMPRESSION:  1.  Mild plaque formation, velocities consistent with less than 50% stenosis in the right internal carotid artery.  2.  Severe plaque formation in the left internal carotid artery. No visible lumen. No flow is identified. Patent left common and external carotid arteries.  3.  Flow within the vertebral arteries is antegrade.       CT Head w/o Contrast    Narrative    EXAM: CT HEAD W/O CONTRAST  LOCATION: [facility]  DATE/TIME: 12/29/2022 1:17 PM    INDICATION: Confusion, presyncope  COMPARISON: None.  TECHNIQUE: Routine CT Head without IV contrast. Multiplanar reformats. Dose reduction techniques were used.    FINDINGS:  INTRACRANIAL CONTENTS: No intracranial hemorrhage, extraaxial collection, or mass effect.  No CT evidence of  acute infarct. Mild presumed chronic small vessel ischemic changes. Mild to moderate generalized volume loss. No hydrocephalus. The sella is   unremarkable for technique. The cerebellar tonsils are appropriately positioned.     VISUALIZED ORBITS/SINUSES/MASTOIDS: No acute intraorbital abnormality. No paranasal sinus mucosal disease. No middle ear or mastoid effusion.    BONES/SOFT TISSUES: No scalp hematoma. No skull fracture.      Impression    IMPRESSION:  1.  No evidence of an acute intracranial abnormality.  2.  Mild presumed chronic small vessel ischemic change.  3.  Mild-to-moderate atrophy.   Echocardiogram Limited     Value    LVEF  60-65%    [facility]    [id number]  [id number]  [id number]  [id number]^RYAN^ROXANNE^[place] [address]     Name: HEATHER DOYLE  MRN: [id number]  : 1945  Study Date: 2022 07:25 AM  Age: 77 yrs  Gender: Female  Patient Location: [facility]  Reason For Study: Heart Failure  Ordering Physician: ROXANNE DAVIS  Performed By: RALPH     BSA: 1.7 m2  Height: 65 in  Weight: 145 lb  HR: 96  ______________________________________________________________________________  Procedure  Limited Portable Echo Adult. Definity (NDC #55184-136) given intravenously.  ______________________________________________________________________________  Interpretation Summary     Left ventricular size, wall motion and function are normal. The ejection  fraction is 60-65%.  Normal right ventricle size and systolic function.  IVC diameter <2.1 cm collapsing >50% with sniff suggests a normal RA pressure  of 3 mmHg.  ______________________________________________________________________________  Left Ventricle  Left ventricular size, wall motion and function are normal. The ejection  fraction is 60-65%. There is normal left ventricular wall thickness. No  regional wall motion abnormalities noted.     Right Ventricle  Normal right ventricle size and systolic function.      Atria  The left atrium is mild to moderately dilated. The right atrium is borderline  dilated.     Mitral Valve  There is moderate mitral annular calcification.     Tricuspid Valve  Tricuspid valve leaflets appear normal.     Aortic Valve  Moderate aortic valve calcification is present.     Pulmonic Valve  The pulmonic valve is not well seen, but is grossly normal.     Vessels  The aorta root is normal. Normal size ascending aorta. IVC diameter <2.1 cm  collapsing >50% with sniff suggests a normal RA pressure of 3 mmHg.     Pericardium  There is no pericardial effusion.     ______________________________________________________________________________  MMode/2D Measurements & Calculations  IVSd: 1.5 cm  LVIDd: 3.5 cm  LVIDs: 2.2 cm  LVPWd: 1.2 cm     FS: 36.7 %  LV mass(C)d: 166.4 grams  LV mass(C)dI: 96.4 grams/m2  RWT: 0.67     ______________________________________________________________________________  Report approved by: Derrick Quick 12/29/2022 09:27 AM               Discharge Medications   Current Discharge Medication List      START taking these medications    Details   fidaxomicin (DIFICID) 200 MG tablet Take 1 tablet (200 mg) by mouth 2 times daily for 5 days  Qty: 10 tablet, Refills: 0    Associated Diagnoses: Recurrent Clostridioides difficile diarrhea      furosemide (LASIX) 20 MG tablet Take 1 tablet (20 mg) by mouth daily    Associated Diagnoses: Acute on chronic congestive heart failure, unspecified heart failure type (H)      traZODone (DESYREL) 50 MG tablet Take 0.5 tablets (25 mg) by mouth nightly as needed for sleep    Associated Diagnoses: Insomnia, unspecified type         CONTINUE these medications which have CHANGED    Details   metoprolol succinate ER (TOPROL XL) 100 MG 24 hr tablet Take 1 tablet (100 mg) by mouth daily    Associated Diagnoses: Nonsustained ventricular tachycardia         CONTINUE these medications which have NOT CHANGED    Details   Acetaminophen (ACETAMIN PO)  Take 500 mg by mouth every 4 hours as needed (pain or fever)      albuterol (PROAIR HFA/PROVENTIL HFA/VENTOLIN HFA) 108 (90 Base) MCG/ACT inhaler Inhale 2 puffs into the lungs every 4 hours as needed for shortness of breath / dyspnea or wheezing      allopurinol (ZYLOPRIM) 300 MG tablet Take 1 tablet (300 mg) by mouth daily  Qty: 60 tablet, Refills: 0    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      benzocaine (ANBESOL) 10 % gel Take by mouth every 6 hours as needed for mouth sores      budesonide-formoterol (SYMBICORT) 160-4.5 MCG/ACT Inhaler Inhale 2 puffs into the lungs 2 times daily      colchicine (COLCYRS) 0.6 MG tablet Take 1 tablet by mouth once daily  Qty: 60 tablet, Refills: 0    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      DULoxetine (CYMBALTA) 20 MG capsule Take 40 mg by mouth daily      folic acid (FOLVITE) 1 MG tablet Take 1 tablet (1 mg) by mouth daily  Qty: 90 tablet, Refills: 0    Associated Diagnoses: Seropositive rheumatoid arthritis (H)      ipratropium - albuterol 0.5 mg/2.5 mg/3 mL (DUONEB) 0.5-2.5 (3) MG/3ML neb solution Take 1 vial (3 mLs) by nebulization every 4 hours as needed for wheezing or shortness of breath / dyspnea  Qty: 90 mL, Refills: 0    Associated Diagnoses: Mild intermittent reactive airway disease with acute exacerbation      magic mouthwash suspension (diphenhydrAMINE, lidocaine, aluminum-magnesium & simethicone) Swish and swallow 10 mLs in mouth every 6 hours as needed for mouth sores      magnesium oxide (MAG-OX) 400 MG tablet Take 400 mg by mouth 2 times daily      methocarbamol (ROBAXIN) 500 MG tablet Take 500 mg by mouth daily as needed for muscle spasms      methotrexate sodium 2.5 MG TABS Take 3 tablets (7.5 mg) by mouth once a week  Qty: 36 tablet, Refills: 0    Associated Diagnoses: Rheumatoid arthritis involving multiple sites with positive rheumatoid factor (H)      omeprazole 20 MG tablet Take 20 mg by mouth 2 times daily       pravastatin (PRAVACHOL) 20 MG tablet Take 20 mg by mouth every evening      predniSONE (DELTASONE) 5 MG tablet Take 5 mg by mouth daily Holding until decadron course for COVID is completed.      rivaroxaban ANTICOAGULANT (XARELTO) 15 MG TABS tablet Take 1 tablet (15 mg) by mouth daily (with dinner)  Qty: 90 tablet, Refills: 3    Associated Diagnoses: Paroxysmal atrial fibrillation (H)      Vitamin D3 (CHOLECALCIFEROL) 125 MCG (5000 UT) tablet Take 125 mcg by mouth daily      compressor, for nebulizer Saran [COMPRESSOR, FOR NEBULIZER SARAN] Nebulizer treatment qid prn  Qty: 1 Device, Refills: 0    Associated Diagnoses: Reactive airway disease         STOP taking these medications       vancomycin (VANCOCIN) 125 MG capsule Comments:   Reason for Stopping:             Allergies   Allergies   Allergen Reactions     Codeine Nausea and Vomiting     Fosamax [Alendronic Acid] Diarrhea     Morphine Nausea and Vomiting     Other reaction(s): Vomiting

## 2023-01-03 NOTE — PLAN OF CARE
"  Problem: Plan of Care - These are the overarching goals to be used throughout the patient stay.    Goal: Plan of Care Review  Description: The Plan of Care Review/Shift note should be completed every shift.  The Outcome Evaluation is a brief statement about your assessment that the patient is improving, declining, or no change.  This information will be displayed automatically on your shift note.  Outcome: Adequate for Care Transition  Goal: Patient-Specific Goal (Individualized)  Description: You can add care plan individualizations to a care plan. Examples of Individualization might be:  \"Parent requests to be called daily at 9am for status\", \"I have a hard time hearing out of my right ear\", or \"Do not touch me to wake me up as it startles me\".  Outcome: Adequate for Care Transition  Goal: Absence of Hospital-Acquired Illness or Injury  Outcome: Adequate for Care Transition  Intervention: Identify and Manage Fall Risk  Recent Flowsheet Documentation  Taken 1/3/2023 1209 by Sugar Chandra RN  Safety Promotion/Fall Prevention:    activity supervised    nonskid shoes/slippers when out of bed  Taken 1/3/2023 0807 by Sugar Chandra RN  Safety Promotion/Fall Prevention:    activity supervised    nonskid shoes/slippers when out of bed  Intervention: Prevent Skin Injury  Recent Flowsheet Documentation  Taken 1/3/2023 1209 by Sugar Chandra RN  Body Position: position changed independently  Taken 1/3/2023 0807 by Sugar Chandra RN  Body Position: position changed independently  Goal: Optimal Comfort and Wellbeing  Outcome: Adequate for Care Transition  Goal: Readiness for Transition of Care  Outcome: Adequate for Care Transition   Goal Outcome Evaluation:  Pt will go back to [facility] ride will come to  at around  [id number] patient is informed  regarding  the ride time IV removed .                      "

## 2023-01-03 NOTE — PLAN OF CARE
Problem: Plan of Care - These are the overarching goals to be used throughout the patient stay.    Goal: Plan of Care Review  Description: The Plan of Care Review/Shift note should be completed every shift.  The Outcome Evaluation is a brief statement about your assessment that the patient is improving, declining, or no change.  This information will be displayed automatically on your shift note.  Outcome: Progressing  Goal: Absence of Hospital-Acquired Illness or Injury  Intervention: Identify and Manage Fall Risk  Recent Flowsheet Documentation  Taken 1/2/2023 2047 by Florecita Patterson RN  Safety Promotion/Fall Prevention:    nonskid shoes/slippers when out of bed    mobility aid in reach    bed alarm on    activity supervised  Intervention: Prevent Skin Injury  Recent Flowsheet Documentation  Taken 1/2/2023 2047 by Florecita Patterson RN  Body Position: position changed independently   Goal Outcome Evaluation:       Pt is alert to self, and place but not to situation and time. Denies pain, sob with activity, Pitting edema in bilateral lower extremities, Right greater than left, calls appropriately, assist of one with a walker, and a safety belt. Sores on bottom area. Gum sore still uncomfortable but pt has been refusing mouthwash because he thinks it's not helping. Will continue to monitor pt.

## 2023-01-05 NOTE — LETTER
1/5/2023        RE: Fatuma Henry  [address]  [address]  [place] [address]        [facility] GERIATRICS    PRIMARY CARE PROVIDER AND CLINIC:  Tory Wise MD, [address] [facility] [address]  Chief Complaint   Patient presents with     Hospital F/U      [facility] Record Number:  [id number]  Place of Service where encounter took place:  [facility] [[id number]]    Fatuma Henry  is a 77 year old  (1945), admitted to the above facility from  [facility]. Hospital stay 12/2/2022 through 1/3/2023..   HPI:    Patient is a 77-year-old female with past medical history of CKD, paroxysmal A. fib, hypertension, COPD on nocturnal oxygen, RA, Gout, and multiple recent hospitalizations as outlined below due to C.diff and HF exacerbations who was most recently admitted at [facility] from 12/2/22 - 1/3/23 with recurrent Cdiff infection and HF exacerbation. She was started on fidaxomicin for Cdiff recurrence and treated for HF exacerbation with IV diuresis. She had a mild YESICA which resolved with treatment of Cdiff as well as afib with RVR for which her pta metoprolol was increased to 100mg/d. She returns to TCU for ongoing rehab, medical management.    Summarization of recent hospitalizations:  11/7/2022 - 11/10/2022: admission at [facility] with acute C.diff infection, stabilized and discharged home.   11/17/2022 - 11/21/2022: admission at [facility] after presenting with increased falls and generalized weakness. She had findings of dehydation with YESICA, electrolyte abnormalities, and soft blood pressures. She was also identified to have progressively worsening macrocytic anemia with a decrease in hemoglobin from 11/12--8.8 since 10/2022.  Workup was concerning for bone marrow etiology and she was recommended to follow-up as an outpatient once acute medical issues resolve.  She did receive 1 unit of PRBCs for  symptomatic anemia on 11/17. Symptoms improved with IV hydration and repletion of electrolytes. Blood pressure meds were adjusted, metoprolol discontinued altogether due to soft bps (PTA dose 100mg/d). Following therapy evaluations, she was discharged to TCU. While at TCU, she completed treatment with oral vancomycin and diarrhea improved. Her metoprolol was up-titrated to 75mg/d and PTA PRN lasix resumed.   12/1/2022 - 12/4/2022: admission at [facility] after she reported waking overnight with chest pressure and sensation of an elephant sitting on her chest with worsening BLE edema, BP 170s. EKG was nonischemic, troponin 28--23, BNP 3087, CXR clear. Symptoms were suspected 2/2 mild CHF exacerbation. She was treated with IV diuresis, TTE with preserved LVEF and unchanged from prior. Ultimately was discharged back to TCU without medication changes.  12/13/2022 - 12/20/2022: admission at [facility] after developing 2-3 days of SOB, generalized malaise, and increased oxygen needs while at TCU. She had been started on Abx for pneumonia and Proteus UTI, found to be acutely positive for COVID-19 infection and was hypotensive, hypoxic on presentation. She was treated with remdesivir x 5 days and decadron x 10 days, treated for likely HCAP pneumonia with IV zosyn. She was able to wean off daytime oxygen and return to her baseline nocturnal requirements. She was referred back to TCU for ongoing rehab, medical management.    Patient is seen today as initial visit. She is sitting up at bedside, feels slightly winded but reports she has been noticing more exercise intolerance, felt to be related to deconditioning. Continues to have mild leg swelling, bowel movements have nearly returned to normal. She asks about her nocturnal oxygen, was not sent from hospital with orders. Is also asking for a yellow tag on her walker so she can move about her room - is hoping to receive one soon from therapies. She denies recurrent  chest discomfort. No abdominal pain.     CODE STATUS/ADVANCE DIRECTIVES DISCUSSION:  Prior  DNR / DNI  ALLERGIES:   Allergies   Allergen Reactions     Codeine Nausea and Vomiting     Fosamax [Alendronic Acid] Diarrhea     Morphine Nausea and Vomiting     Other reaction(s): Vomiting      PAST MEDICAL HISTORY:   Past Medical History:   Diagnosis Date     Atrial fibrillation (H)      CKD (chronic kidney disease) stage 3, GFR 30-59 ml/min (H)      COPD (chronic obstructive pulmonary disease) (H)     nocturnal oxygen     CRF (chronic renal failure)      GERD (gastroesophageal reflux disease)      Hypertension      Hypovolemic shock (H)      Influenza A 12/01/2017     Pulmonary hypertension (H)      Pulmonary nodules      Rheumatoid arthritis (H)      Sepsis (H) 12/24/2017      PAST SURGICAL HISTORY:   has a past surgical history that includes Cholecystectomy; appendectomy; Bladder Suspension; and PICC/Midline Placement (12/13/2022).  FAMILY HISTORY: family history includes Cerebrovascular Disease in her brother and father; Diabetes Type 2  in her brother; Heart Failure in her mother; Kidney failure in her sister.  SOCIAL HISTORY:   reports that she quit smoking about 5 years ago. Her smoking use included cigarettes. She smoked an average of .5 packs per day. She has never used smokeless tobacco. She reports that she does not drink alcohol and does not use drugs.  Patient's living condition: lives with spouse    Post Discharge Medication Reconciliation Status:   MED REC REQUIRED  Post Medication Reconciliation Status: discharge medications reconciled and changed, per note/orders       Current Outpatient Medications   Medication Sig     Acetaminophen (ACETAMIN PO) Take 500 mg by mouth every 4 hours as needed (pain or fever)     albuterol (PROAIR HFA/PROVENTIL HFA/VENTOLIN HFA) 108 (90 Base) MCG/ACT inhaler Inhale 2 puffs into the lungs every 4 hours as needed for shortness of breath / dyspnea or wheezing     allopurinol  (ZYLOPRIM) 300 MG tablet Take 1 tablet (300 mg) by mouth daily     benzocaine (ANBESOL) 10 % gel Take by mouth every 6 hours as needed for mouth sores     colchicine (COLCYRS) 0.6 MG tablet Take 1 tablet by mouth once daily     compressor, for nebulizer Saran [COMPRESSOR, FOR NEBULIZER SARAN] Nebulizer treatment qid prn     DULoxetine (CYMBALTA) 20 MG capsule Take 40 mg by mouth daily     fidaxomicin (DIFICID) 200 MG tablet Take 1 tablet (200 mg) by mouth 2 times daily for 5 days     folic acid (FOLVITE) 1 MG tablet Take 1 tablet (1 mg) by mouth daily     furosemide (LASIX) 20 MG tablet Take 1 tablet (20 mg) by mouth daily     ipratropium - albuterol 0.5 mg/2.5 mg/3 mL (DUONEB) 0.5-2.5 (3) MG/3ML neb solution Take 1 vial (3 mLs) by nebulization every 4 hours as needed for wheezing or shortness of breath / dyspnea     MAGIC MOUTHWASH (FV STANDARD FORMULA) Swish and swallow 10 mLs in mouth every 6 hours as needed for mouth sores     magnesium oxide (MAG-OX) 400 MG tablet Take 400 mg by mouth 2 times daily     methocarbamol (ROBAXIN) 500 MG tablet Take 500 mg by mouth daily as needed for muscle spasms     methotrexate sodium 2.5 MG TABS Take 3 tablets (7.5 mg) by mouth once a week     metoprolol succinate ER (TOPROL XL) 100 MG 24 hr tablet Take 1 tablet (100 mg) by mouth daily     omeprazole 20 MG tablet Take 20 mg by mouth 2 times daily     pravastatin (PRAVACHOL) 20 MG tablet Take 20 mg by mouth every evening     predniSONE (DELTASONE) 5 MG tablet Take 5 mg by mouth daily Holding until decadron course for COVID is completed.     rivaroxaban ANTICOAGULANT (XARELTO) 15 MG TABS tablet Take 1 tablet (15 mg) by mouth daily (with dinner)     traZODone (DESYREL) 50 MG tablet Take 0.5 tablets (25 mg) by mouth nightly as needed for sleep     Vitamin D3 (CHOLECALCIFEROL) 125 MCG (5000 UT) tablet Take 125 mcg by mouth daily     budesonide-formoterol (SYMBICORT) 160-4.5 MCG/ACT Inhaler Inhale 2 puffs into the lungs 2 times daily  "for 30 days     No current facility-administered medications for this visit.       ROS:  4 point ROS including Respiratory, CV, GI and , other than that noted in the HPI,  is negative    Vitals:  /48   Pulse 68   Temp 96.9  F (36.1  C)   Resp 18   Ht 1.651 m (5' 5\")   Wt 70.2 kg (154 lb 12.8 oz)   SpO2 98%   BMI 25.76 kg/m    Exam:    GEN: well-developed, thin elderly female, appears comfortable  HEENT: NCAT, EOM intact bilaterally, sclera clear, conjunctiva normal, nose & mouth patent, mucous membranes dry  CHEST: lungs CTA bilaterally, no increased work of breathing, no wheeze, crackles, rhonchi  HEART: RRR, S1 & S2, no murmur  ABD: soft, nontender, nondistended, no guarding or rigidity, +BS in all 4 quadrants  MSK: AROM bilateral UE/LE  NEURO: awake, alert, oriented to name, place, and time. CN II-XII grossly intact. Sensation grossly intact to light touch.   SKIN: warm & dry without rash, 1+ pitting pedal edema    Lab/Diagnostic data:    Most Recent 3 CBC's:  Recent Labs   Lab Test 01/03/23  0440 12/31/22  0526 12/30/22  0527 12/29/22  0500   WBC  --  7.5 6.4 8.7   HGB  --  8.7* 9.0* 10.2*   MCV  --  106* 103* 104*    285 301 306     Most Recent 3 BMP's:  Recent Labs   Lab Test 01/03/23  0440 01/02/23  0448 01/01/23  0706 01/01/23  0537    133*  --  140   POTASSIUM 4.5  4.5 4.2  --  4.8   CHLORIDE 100 97*  --  105   CO2 31* 30*  --  28   BUN 24.3* 25.7*  --  24.3*   CR 1.17* 1.23*  --  1.25*   ANIONGAP 8 6*  --  7   SUNI 10.5* 10.0  --  10.0   GLC 86 90 84 87       ASSESSMENT/PLAN:    Recurrent CDiff infection, improving  Identified to have recurrent Cdiff infection, initiated on treatment with fidaxomicin BID with improvement in stools. Now reports diarrhea has resolved, is having formed bowel movements regularly. No abdominal discomfort.  -Continues on fidaxomicin to complete 10-day course    Paroxysmal afib  HTN / HLD  Hx pulmonary htn  HFpEF exacerbation, improved  PTA " metoprolol 75mg/d, lasix 20mg daily PRN. Had mild afib with RVR while inpatient, metoprolol increased to 100mg/d. In setting of admission with HF exacerbation, discharged on lasix 20mg daily. Weight on re-admission 154#, down from prior range 157-159. Clinically with mild 1+ edema.  -Continue metoprolol at 100mg/d, hold parameters for SBP < 95, HR < 55  -Continues on rivaroxaban for CVA proph, statin  -Continues on lasix 20mg daily  -Compression stockings to BLE  -Daily weights  -Follow up with cardiology as outpatient    YESICA on CKD-3  Baseline Cr 0.7-0.9, most recently had been in range of 1.2. with mild YESICA to Cr 1.6 during hospitalization in setting of volume loss from Cdiff, diuresis. Value at discharge 1.17  -BMP on 1/9    Presyncope, resolved  Noted to have episode of presyncope while using restroom, felt to be 2/2 orthostatic hypotension in setting of GI volume losses. Resolved without recurrence upon treatment of above.  -Monitor for recurrence    Left ICA occlusion  Carotid ultrasound for evaluation of presyncope revealed severe plaque formation within left ICA without visible lumen, appears asymptomatic.  -Vascular surgery follow-up as outpatient     Generalized weakness  Physical deconditioning  Multifactorial in setting of repeat hospitalizations, acute infections, anemia, electrolyte abnormalities.  -PT/OT evaluations  -SW for safe discharge planning     Hypomagnesemia  -Continues on MagOx 400mg BID  -Monitor periodically     Anemia, macrocytic  Noted to have progressively worsening macrocytic anemia with decrease in Hgb from 11/12--8.8 in 2mo. Workup suggestive of bone marrow supression from unknown etiology; recurrent infections vs other. Has had colonoscopy screening recently. Folate/B12 values wnl. Iron studies c/w chronic disease. HIV/carlene tests neg. Received 1u PRBCs while inpatient for suspected symptomatic anemia, value at discharge 11.6. Repeat values ranging 10-11 with persistent  macrocytosis.  *Peripheral smear from 11/18 neg for hemolysis/atypia, MMA 0.24, SPEP with hypogammaglobulinemia  -Follow-up with hematology, [facility] hematology referral previously placed     RA  Without evidence of flare.  -Methotrexate held during acute infection, follow-up with rheumatology regarding when to resume     Gout  Seen by rheumatology in 09/2022 with concerns of gout, started on 60-day prescription for colchicine and prednisone as well as allopurinol loading  -Continue allopurinol 300mg daily  -Continue colchicine  -Continues on prednisone 5mg daily   -Follow up with rheumatology, scheduled     COPD, on nocturnal oxygen (2L)  No evidence of acute exacerbation today. Lungs clear. Inadvertently discharged without orders for oxygen per home use.  -Continue nocturnal oxygen as per home use  -Pulmonary hygiene, encourage OOB, ambulation, IS  -Continues on symbicort, albuterol     GERD  -Continue famotidine 20mg BID     Depression, acute on chronic  Admits to exacerbation, situational depression given multiple hospitalizations and now having to spend two major holidays in facility.  -Continue cymbalta at increased dose of 40mg/d     Pulmonary nodules  Followed by pulmonology, Dr. Rosario. 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 and a 10 mm groundglass nodule in the lingula and several diminutive left upper lobe nodules that were unchanged.  -Follow with pulmonology for repeat CT scan as scheduled in 03/2023    Acute COVID-19 infection, resolved  Acute hypoxic respiratory failure, resolved  HCAP, resolved  PNA diagnosed at TCU, likely HCAP given recent hospitalizations. Completed treatment with IV zosyn while inpatient. COVID positive 12/13, s/p treatment with remdesivir x5d, initiated on treatment with dexamethasone x10d, to complete course at TCU.  Initially hypoxic, requiring supplemental oxygen which was weaned prior to discharge. Completed decadron at TCU.  -Pulmonary hygiene, encourage OOB, IS use  -Monitor oxygen levels    Orders:  -CBC, BMP on 1/9  -Discontinue protonix, start famotidine  -Oxygen per home use    Total time spent with patient visit at the [facility] nursing facility was 35 min including patient visit and review of past records. Greater than 50% of total time spent with counseling and coordinating care due to medical complexity.     Electronically signed by:  Jorge Luis Henry PA-C                     Sincerely,        Jorge Luis Henry PA-C

## 2023-01-05 NOTE — PROGRESS NOTES
[facility] GERIATRICS    PRIMARY CARE PROVIDER AND CLINIC:  Tory Wise MD, [address] [facility] [place] [facility] [place] [id number]  Chief Complaint   Patient presents with     Hospital F/U      [place] Medical Record Number:  [id number]  Place of Service where encounter took place:  [facility] ([facility]) [[id number]]    Fatuma Henry  is a 77 year old  (1945), admitted to the above facility from  [facility]. Hospital stay 12/2/2022 through 1/3/2023..   HPI:    Patient is a 77-year-old female with past medical history of CKD, paroxysmal A. fib, hypertension, COPD on nocturnal oxygen, RA, Gout, and multiple recent hospitalizations as outlined below due to C.diff and HF exacerbations who was most recently admitted at [facility] from 12/2/22 - 1/3/23 with recurrent Cdiff infection and HF exacerbation. She was started on fidaxomicin for Cdiff recurrence and treated for HF exacerbation with IV diuresis. She had a mild YESICA which resolved with treatment of Cdiff as well as afib with RVR for which her pta metoprolol was increased to 100mg/d. She returns to TCU for ongoing rehab, medical management.    Summarization of recent hospitalizations:  11/7/2022 - 11/10/2022: admission at [facility] with acute C.diff infection, stabilized and discharged home.   11/17/2022 - 11/21/2022: admission at [facility] after presenting with increased falls and generalized weakness. She had findings of dehydation with YESICA, electrolyte abnormalities, and soft blood pressures. She was also identified to have progressively worsening macrocytic anemia with a decrease in hemoglobin from 11/12--8.8 since 10/2022.  Workup was concerning for bone marrow etiology and she was recommended to follow-up as an outpatient once acute medical issues resolve.  She did receive 1 unit of PRBCs for symptomatic anemia on 11/17. Symptoms improved with IV hydration and repletion of electrolytes.  Blood pressure meds were adjusted, metoprolol discontinued altogether due to soft bps (PTA dose 100mg/d). Following therapy evaluations, she was discharged to TCU. While at TCU, she completed treatment with oral vancomycin and diarrhea improved. Her metoprolol was up-titrated to 75mg/d and PTA PRN lasix resumed.   12/1/2022 - 12/4/2022: admission at [facility] after she reported waking overnight with chest pressure and sensation of an elephant sitting on her chest with worsening BLE edema, BP 170s. EKG was nonischemic, troponin 28--23, BNP 3087, CXR clear. Symptoms were suspected 2/2 mild CHF exacerbation. She was treated with IV diuresis, TTE with preserved LVEF and unchanged from prior. Ultimately was discharged back to TCU without medication changes.  12/13/2022 - 12/20/2022: admission at [facility] after developing 2-3 days of SOB, generalized malaise, and increased oxygen needs while at TCU. She had been started on Abx for pneumonia and Proteus UTI, found to be acutely positive for COVID-19 infection and was hypotensive, hypoxic on presentation. She was treated with remdesivir x 5 days and decadron x 10 days, treated for likely HCAP pneumonia with IV zosyn. She was able to wean off daytime oxygen and return to her baseline nocturnal requirements. She was referred back to TCU for ongoing rehab, medical management.    Patient is seen today as initial visit. She is sitting up at bedside, feels slightly winded but reports she has been noticing more exercise intolerance, felt to be related to deconditioning. Continues to have mild leg swelling, bowel movements have nearly returned to normal. She asks about her nocturnal oxygen, was not sent from hospital with orders. Is also asking for a yellow tag on her walker so she can move about her room - is hoping to receive one soon from therapies. She denies recurrent chest discomfort. No abdominal pain.     CODE STATUS/ADVANCE DIRECTIVES DISCUSSION:  Prior  DNR /  DNI  ALLERGIES:   Allergies   Allergen Reactions     Codeine Nausea and Vomiting     Fosamax [Alendronic Acid] Diarrhea     Morphine Nausea and Vomiting     Other reaction(s): Vomiting      PAST MEDICAL HISTORY:   Past Medical History:   Diagnosis Date     Atrial fibrillation (H)      CKD (chronic kidney disease) stage 3, GFR 30-59 ml/min (H)      COPD (chronic obstructive pulmonary disease) (H)     nocturnal oxygen     CRF (chronic renal failure)      GERD (gastroesophageal reflux disease)      Hypertension      Hypovolemic shock (H)      Influenza A 12/01/2017     Pulmonary hypertension (H)      Pulmonary nodules      Rheumatoid arthritis (H)      Sepsis (H) 12/24/2017      PAST SURGICAL HISTORY:   has a past surgical history that includes Cholecystectomy; appendectomy; Bladder Suspension; and PICC/Midline Placement (12/13/2022).  FAMILY HISTORY: family history includes Cerebrovascular Disease in her brother and father; Diabetes Type 2  in her brother; Heart Failure in her mother; Kidney failure in her sister.  SOCIAL HISTORY:   reports that she quit smoking about 5 years ago. Her smoking use included cigarettes. She smoked an average of .5 packs per day. She has never used smokeless tobacco. She reports that she does not drink alcohol and does not use drugs.  Patient's living condition: lives with spouse    Post Discharge Medication Reconciliation Status:   MED REC REQUIRED  Post Medication Reconciliation Status: discharge medications reconciled and changed, per note/orders       Current Outpatient Medications   Medication Sig     Acetaminophen (ACETAMIN PO) Take 500 mg by mouth every 4 hours as needed (pain or fever)     albuterol (PROAIR HFA/PROVENTIL HFA/VENTOLIN HFA) 108 (90 Base) MCG/ACT inhaler Inhale 2 puffs into the lungs every 4 hours as needed for shortness of breath / dyspnea or wheezing     allopurinol (ZYLOPRIM) 300 MG tablet Take 1 tablet (300 mg) by mouth daily     benzocaine (ANBESOL) 10 % gel  Take by mouth every 6 hours as needed for mouth sores     colchicine (COLCYRS) 0.6 MG tablet Take 1 tablet by mouth once daily     compressor, for nebulizer Saran [COMPRESSOR, FOR NEBULIZER SARAN] Nebulizer treatment qid prn     DULoxetine (CYMBALTA) 20 MG capsule Take 40 mg by mouth daily     fidaxomicin (DIFICID) 200 MG tablet Take 1 tablet (200 mg) by mouth 2 times daily for 5 days     folic acid (FOLVITE) 1 MG tablet Take 1 tablet (1 mg) by mouth daily     furosemide (LASIX) 20 MG tablet Take 1 tablet (20 mg) by mouth daily     ipratropium - albuterol 0.5 mg/2.5 mg/3 mL (DUONEB) 0.5-2.5 (3) MG/3ML neb solution Take 1 vial (3 mLs) by nebulization every 4 hours as needed for wheezing or shortness of breath / dyspnea     MAGIC MOUTHWASH (FV STANDARD FORMULA) Swish and swallow 10 mLs in mouth every 6 hours as needed for mouth sores     magnesium oxide (MAG-OX) 400 MG tablet Take 400 mg by mouth 2 times daily     methocarbamol (ROBAXIN) 500 MG tablet Take 500 mg by mouth daily as needed for muscle spasms     methotrexate sodium 2.5 MG TABS Take 3 tablets (7.5 mg) by mouth once a week     metoprolol succinate ER (TOPROL XL) 100 MG 24 hr tablet Take 1 tablet (100 mg) by mouth daily     omeprazole 20 MG tablet Take 20 mg by mouth 2 times daily     pravastatin (PRAVACHOL) 20 MG tablet Take 20 mg by mouth every evening     predniSONE (DELTASONE) 5 MG tablet Take 5 mg by mouth daily Holding until decadron course for COVID is completed.     rivaroxaban ANTICOAGULANT (XARELTO) 15 MG TABS tablet Take 1 tablet (15 mg) by mouth daily (with dinner)     traZODone (DESYREL) 50 MG tablet Take 0.5 tablets (25 mg) by mouth nightly as needed for sleep     Vitamin D3 (CHOLECALCIFEROL) 125 MCG (5000 UT) tablet Take 125 mcg by mouth daily     budesonide-formoterol (SYMBICORT) 160-4.5 MCG/ACT Inhaler Inhale 2 puffs into the lungs 2 times daily for 30 days     No current facility-administered medications for this visit.       ROS:  4 point  "ROS including Respiratory, CV, GI and , other than that noted in the HPI,  is negative    Vitals:  /48   Pulse 68   Temp 96.9  F (36.1  C)   Resp 18   Ht 1.651 m (5' 5\")   Wt 70.2 kg (154 lb 12.8 oz)   SpO2 98%   BMI 25.76 kg/m    Exam:    GEN: well-developed, thin elderly female, appears comfortable  HEENT: NCAT, EOM intact bilaterally, sclera clear, conjunctiva normal, nose & mouth patent, mucous membranes dry  CHEST: lungs CTA bilaterally, no increased work of breathing, no wheeze, crackles, rhonchi  HEART: RRR, S1 & S2, no murmur  ABD: soft, nontender, nondistended, no guarding or rigidity, +BS in all 4 quadrants  MSK: AROM bilateral UE/LE  NEURO: awake, alert, oriented to name, place, and time. CN II-XII grossly intact. Sensation grossly intact to light touch.   SKIN: warm & dry without rash, 1+ pitting pedal edema    Lab/Diagnostic data:    Most Recent 3 CBC's:  Recent Labs   Lab Test 01/03/23  0440 12/31/22  0526 12/30/22  0527 12/29/22  0500   WBC  --  7.5 6.4 8.7   HGB  --  8.7* 9.0* 10.2*   MCV  --  106* 103* 104*    285 301 306     Most Recent 3 BMP's:  Recent Labs   Lab Test 01/03/23  0440 01/02/23  0448 01/01/23  0706 01/01/23  0537    133*  --  140   POTASSIUM 4.5  4.5 4.2  --  4.8   CHLORIDE 100 97*  --  105   CO2 31* 30*  --  28   BUN 24.3* 25.7*  --  24.3*   CR 1.17* 1.23*  --  1.25*   ANIONGAP 8 6*  --  7   SUNI 10.5* 10.0  --  10.0   GLC 86 90 84 87       ASSESSMENT/PLAN:    Recurrent CDiff infection, improving  Identified to have recurrent Cdiff infection, initiated on treatment with fidaxomicin BID with improvement in stools. Now reports diarrhea has resolved, is having formed bowel movements regularly. No abdominal discomfort.  -Continues on fidaxomicin to complete 10-day course    Paroxysmal afib  HTN / HLD  Hx pulmonary htn  HFpEF exacerbation, improved  PTA metoprolol 75mg/d, lasix 20mg daily PRN. Had mild afib with RVR while inpatient, metoprolol increased to " 100mg/d. In setting of admission with HF exacerbation, discharged on lasix 20mg daily. Weight on re-admission 154#, down from prior range 157-159. Clinically with mild 1+ edema.  -Continue metoprolol at 100mg/d, hold parameters for SBP < 95, HR < 55  -Continues on rivaroxaban for CVA proph, statin  -Continues on lasix 20mg daily  -Compression stockings to BLE  -Daily weights  -Follow up with cardiology as outpatient    YESICA on CKD-3  Baseline Cr 0.7-0.9, most recently had been in range of 1.2. with mild YESICA to Cr 1.6 during hospitalization in setting of volume loss from Cdiff, diuresis. Value at discharge 1.17  -BMP on 1/9    Presyncope, resolved  Noted to have episode of presyncope while using restroom, felt to be 2/2 orthostatic hypotension in setting of GI volume losses. Resolved without recurrence upon treatment of above.  -Monitor for recurrence    Left ICA occlusion  Carotid ultrasound for evaluation of presyncope revealed severe plaque formation within left ICA without visible lumen, appears asymptomatic.  -Vascular surgery follow-up as outpatient     Generalized weakness  Physical deconditioning  Multifactorial in setting of repeat hospitalizations, acute infections, anemia, electrolyte abnormalities.  -PT/OT evaluations  -SW for safe discharge planning     Hypomagnesemia  -Continues on MagOx 400mg BID  -Monitor periodically     Anemia, macrocytic  Noted to have progressively worsening macrocytic anemia with decrease in Hgb from 11/12--8.8 in 2mo. Workup suggestive of bone marrow supression from unknown etiology; recurrent infections vs other. Has had colonoscopy screening recently. Folate/B12 values wnl. Iron studies c/w chronic disease. HIV/carlene tests neg. Received 1u PRBCs while inpatient for suspected symptomatic anemia, value at discharge 11.6. Repeat values ranging 10-11 with persistent macrocytosis.  *Peripheral smear from 11/18 neg for hemolysis/atypia, MMA 0.24, SPEP with  hypogammaglobulinemia  -Follow-up with hematology, [facility] hematology referral previously placed     RA  Without evidence of flare.  -Methotrexate held during acute infection, follow-up with rheumatology regarding when to resume     Gout  Seen by rheumatology in 09/2022 with concerns of gout, started on 60-day prescription for colchicine and prednisone as well as allopurinol loading  -Continue allopurinol 300mg daily  -Continue colchicine  -Continues on prednisone 5mg daily   -Follow up with rheumatology, scheduled     COPD, on nocturnal oxygen (2L)  No evidence of acute exacerbation today. Lungs clear. Inadvertently discharged without orders for oxygen per home use.  -Continue nocturnal oxygen as per home use  -Pulmonary hygiene, encourage OOB, ambulation, IS  -Continues on symbicort, albuterol     GERD  -Continue famotidine 20mg BID     Depression, acute on chronic  Admits to exacerbation, situational depression given multiple hospitalizations and now having to spend two major holidays in facility.  -Continue cymbalta at increased dose of 40mg/d     Pulmonary nodules  Followed by pulmonology, Dr. Rosario. 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 and a 10 mm groundglass nodule in the lingula and several diminutive left upper lobe nodules that were unchanged.  -Follow with pulmonology for repeat CT scan as scheduled in 03/2023    Acute COVID-19 infection, resolved  Acute hypoxic respiratory failure, resolved  HCAP, resolved  PNA diagnosed at TCU, likely HCAP given recent hospitalizations. Completed treatment with IV zosyn while inpatient. COVID positive 12/13, s/p treatment with remdesivir x5d, initiated on treatment with dexamethasone x10d, to complete course at TCU. Initially hypoxic, requiring supplemental oxygen which was weaned prior to discharge. Completed  decadron at TCU.  -Pulmonary hygiene, encourage OOB, IS use  -Monitor oxygen levels    Orders:  -CBC, BMP on 1/9  -Discontinue protonix, start famotidine  -Oxygen per home use    Total time spent with patient visit at the [facility] nursing facility was 35 min including patient visit and review of past records. Greater than 50% of total time spent with counseling and coordinating care due to medical complexity.     Electronically signed by:  Jorge Luis Henry PA-C

## 2023-01-09 NOTE — LETTER
1/9/2023        RE: Fatuma Henry  [address] [facility]  [address] [place] [address]        [facility] GERIATRICS    Chief Complaint   Patient presents with     RECHECK     HPI:  Fatuma Henry is a 77 year old  (1945), who is being seen today for an episodic care visit at: [facility] ([facility]) [[id number]].     Summary: Patient is a 77-year-old female with past medical history of CKD, paroxysmal A. fib, hypertension, COPD on nocturnal oxygen, RA, Gout, and multiple recent hospitalizations as outlined below due to C.diff and HF exacerbations who was most recently admitted at [facility] from 12/2/22 - 1/3/23 with recurrent Cdiff infection and HF exacerbation. She was started on fidaxomicin for Cdiff recurrence and treated for HF exacerbation with IV diuresis. She had a mild YESICA which resolved with treatment of Cdiff as well as afib with RVR for which her pta metoprolol was increased to 100mg/d. She returns to TCU for ongoing rehab, medical management.     Summarization of recent hospitalizations:  11/7/2022 - 11/10/2022: admission at [facility] with acute C.diff infection, stabilized and discharged home.   11/17/2022 - 11/21/2022: admission at [facility] after presenting with increased falls and generalized weakness. She had findings of dehydation with YESICA, electrolyte abnormalities, and soft blood pressures. She was also identified to have progressively worsening macrocytic anemia with a decrease in hemoglobin from 11/12--8.8 since 10/2022.  Workup was concerning for bone marrow etiology and she was recommended to follow-up as an outpatient once acute medical issues resolve.  She did receive 1 unit of PRBCs for symptomatic anemia on 11/17. Symptoms improved with IV hydration and repletion of electrolytes. Blood pressure meds were adjusted, metoprolol discontinued altogether due to soft bps (PTA dose 100mg/d). Following therapy evaluations, she was discharged to  TCU. While at TCU, she completed treatment with oral vancomycin and diarrhea improved. Her metoprolol was up-titrated to 75mg/d and PTA PRN lasix resumed.   12/1/2022 - 12/4/2022: admission at [facility] after she reported waking overnight with chest pressure and sensation of an elephant sitting on her chest with worsening BLE edema, BP 170s. EKG was nonischemic, troponin 28--23, BNP 3087, CXR clear. Symptoms were suspected 2/2 mild CHF exacerbation. She was treated with IV diuresis, TTE with preserved LVEF and unchanged from prior. Ultimately was discharged back to TCU without medication changes.  12/13/2022 - 12/20/2022: admission at [facility] after developing 2-3 days of SOB, generalized malaise, and increased oxygen needs while at TCU. She had been started on Abx for pneumonia and Proteus UTI, found to be acutely positive for COVID-19 infection and was hypotensive, hypoxic on presentation. She was treated with remdesivir x 5 days and decadron x 10 days, treated for likely HCAP pneumonia with IV zosyn. She was able to wean off daytime oxygen and return to her baseline nocturnal requirements. She was referred back to TCU for ongoing rehab, medical management.    Patient is seen today in follow-up. She has family visiting her in her room today, she appears in bright spirits. Informs me she plans to discharge home this upcoming Saturday, 1/14. She is looking forward to being home, admits she is slightly apprehensive but feels she is generally improved. Bowel movements remain soft, but no longer loose. Breathing has improved dramatically, not requiring oxygen during daytime and does not feel short of breath at rest. Has been working with therapies and feels she is getting stronger. Family is looking forward to having her home as well.    Allergies, and PMH/PSH reviewed in EPIC today.  REVIEW OF SYSTEMS:  4 point ROS including Respiratory, CV, GI and , other than that noted in the HPI,  is  "negative    Objective:   /84   Pulse 76   Temp 97.9  F (36.6  C)   Resp 16   Ht 1.676 m (5' 6\")   Wt 71.4 kg (157 lb 4.8 oz)   SpO2 94%   BMI 25.39 kg/m      GEN: well-developed, thin elderly female, appears comfortable  HEENT: NCAT, EOM intact bilaterally, sclera clear, conjunctiva normal, nose & mouth patent, mucous membranes dry  CHEST: lungs CTA bilaterally, no increased work of breathing, no wheeze, crackles, rhonchi  HEART: RRR, S1 & S2, no murmur  ABD: soft, nontender, nondistended, no guarding or rigidity, +BS in all 4 quadrants  MSK: AROM bilateral UE/LE  NEURO: awake, alert, oriented to name, place, and time. CN II-XII grossly intact. Sensation grossly intact to light touch.   SKIN: warm & dry without rash, 1+ pitting pedal edema      Most Recent 3 CBC's:  Recent Labs   Lab Test 01/09/23  0805 01/03/23  0440 12/31/22  0526 12/30/22  0527   WBC 9.6  --  7.5 6.4   HGB 11.4*  --  8.7* 9.0*   *  --  106* 103*    269 285 301     Most Recent 3 BMP's:  Recent Labs   Lab Test 01/09/23  0805 01/03/23  0440 01/02/23  0448    139 133*   POTASSIUM 4.4 4.5  4.5 4.2   CHLORIDE 103 100 97*   CO2 24 31* 30*   BUN 26.4* 24.3* 25.7*   CR 1.15* 1.17* 1.23*   ANIONGAP 13 8 6*   SUNI 10.4* 10.5* 10.0   GLC 84 86 90       Assessment/Plan:    Recurrent CDiff infection, improving  Identified to have recurrent Cdiff infection, initiated on treatment with fidaxomicin BID with improvement in stools. Now reports diarrhea has resolved, is having formed bowel movements regularly. No abdominal discomfort.  -Continues on fidaxomicin to complete 10-day course, ended 1/8     Paroxysmal afib  HTN / HLD  Hx pulmonary htn  HFpEF exacerbation, improved  PTA metoprolol 75mg/d, lasix 20mg daily PRN. Had mild afib with RVR while inpatient, metoprolol increased to 100mg/d. In setting of admission with HF exacerbation, discharged on lasix 20mg daily. Weight today 157 (154--157). Clinically with mild 1+ edema, lungs " clear.  -Continue metoprolol at 100mg/d, hold parameters for SBP < 95, HR < 55  -Continues on rivaroxaban for CVA proph, statin  -Continues on lasix 20mg daily  -Compression stockings to BLE  -Daily weights  -Follow up with cardiology as outpatient     YESICA on CKD-3  Baseline Cr 0.7-0.9, most recently had been in range of 1.2. with mild YESICA to Cr 1.6 during hospitalization in setting of volume loss from Cdiff, diuresis. Value at discharge 1.17, stable on repeat 1/9.  -Monitor periodically     Presyncope, resolved  Noted to have episode of presyncope while using restroom, felt to be 2/2 orthostatic hypotension in setting of GI volume losses. Resolved without recurrence upon treatment of above.  -Monitor for recurrence     Left ICA occlusion  Carotid ultrasound for evaluation of presyncope revealed severe plaque formation within left ICA without visible lumen, appears asymptomatic.  -Vascular surgery follow-up as outpatient     Generalized weakness  Physical deconditioning  Multifactorial in setting of repeat hospitalizations, acute infections, anemia, electrolyte abnormalities.  -PT/OT evaluations  -SW for safe discharge planning     Hypomagnesemia  -Continues on MagOx 400mg BID  -Monitor periodically     Anemia, macrocytic  Noted to have progressively worsening macrocytic anemia with decrease in Hgb from 11/12--8.8 in 2mo. Workup suggestive of bone marrow supression from unknown etiology; recurrent infections vs other. Has had colonoscopy screening recently. Folate/B12 values wnl. Iron studies c/w chronic disease. HIV/carlene tests neg. Received 1u PRBCs while inpatient for suspected symptomatic anemia, value at discharge 11.6. Repeat values ranging 10-11 with persistent macrocytosis.  *Peripheral smear from 11/18 neg for hemolysis/atypia, MMA 0.24, SPEP with hypogammaglobulinemia  -Follow-up with hematology, [facility] hematology referral previously placed     RA  Without evidence of flare.  -Methotrexate held during acute  infection, instructed to resume this week per rheumatology     Gout  Seen by rheumatology in 09/2022 with concerns of gout, started on 60-day prescription for colchicine and prednisone as well as allopurinol loading  -Continue allopurinol 300mg daily  -Continue colchicine  -Continues on prednisone 5mg daily   -Follow up with rheumatology, scheduled     COPD, on nocturnal oxygen (2L)  No evidence of acute exacerbation today. Lungs clear.   -Continue nocturnal oxygen as per home use  -Pulmonary hygiene, encourage OOB, ambulation, IS  -Continues on symbicort, albuterol     GERD  -Continue famotidine 20mg BID     Depression, acute on chronic  Admits to exacerbation, situational depression given multiple hospitalizations and now having to spend two major holidays in facility.  -Continue cymbalta at increased dose of 40mg/d     Pulmonary nodules  Followed by pulmonology, Dr. Rosario. 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 and a 10 mm groundglass nodule in the lingula and several diminutive left upper lobe nodules that were unchanged.  -Follow with pulmonology for repeat CT scan as scheduled in 03/2023     Acute COVID-19 infection, resolved  Acute hypoxic respiratory failure, resolved  HCAP, resolved  PNA diagnosed at TCU, likely HCAP given recent hospitalizations. Completed treatment with IV zosyn while inpatient. COVID positive 12/13, s/p treatment with remdesivir x5d, initiated on treatment with dexamethasone x10d, completed course at TCU.  -Pulmonary hygiene, encourage OOB, IS use  -Monitor oxygen levels    MED REC REQUIRED  Post Medication Reconciliation Status: discharge medications reconciled and changed, per note/orders      Orders:  NNO    Electronically signed by: Jorge Luis Henry PA-C             Sincerely,        Jorge Luis Henry PA-C

## 2023-01-09 NOTE — PROGRESS NOTES
[facility] GERIATRICS    Chief Complaint   Patient presents with     RECHECK     HPI:  Fatuma Henry is a 77 year old  (1945), who is being seen today for an episodic care visit at: [facility]) [[id number]].     Summary: Patient is a 77-year-old female with past medical history of CKD, paroxysmal A. fib, hypertension, COPD on nocturnal oxygen, RA, Gout, and multiple recent hospitalizations as outlined below due to C.diff and HF exacerbations who was most recently admitted at [facility] from 12/2/22 - 1/3/23 with recurrent Cdiff infection and HF exacerbation. She was started on fidaxomicin for Cdiff recurrence and treated for HF exacerbation with IV diuresis. She had a mild YESICA which resolved with treatment of Cdiff as well as afib with RVR for which her pta metoprolol was increased to 100mg/d. She returns to TCU for ongoing rehab, medical management.     Summarization of recent hospitalizations:  11/7/2022 - 11/10/2022: admission at [facility] with acute C.diff infection, stabilized and discharged home.   11/17/2022 - 11/21/2022: admission at [facility] after presenting with increased falls and generalized weakness. She had findings of dehydation with YESICA, electrolyte abnormalities, and soft blood pressures. She was also identified to have progressively worsening macrocytic anemia with a decrease in hemoglobin from 11/12--8.8 since 10/2022.  Workup was concerning for bone marrow etiology and she was recommended to follow-up as an outpatient once acute medical issues resolve.  She did receive 1 unit of PRBCs for symptomatic anemia on 11/17. Symptoms improved with IV hydration and repletion of electrolytes. Blood pressure meds were adjusted, metoprolol discontinued altogether due to soft bps (PTA dose 100mg/d). Following therapy evaluations, she was discharged to TCU. While at TCU, she completed treatment with oral vancomycin and diarrhea improved. Her metoprolol  "was up-titrated to 75mg/d and PTA PRN lasix resumed.   12/1/2022 - 12/4/2022: admission at [facility] after she reported waking overnight with chest pressure and sensation of an elephant sitting on her chest with worsening BLE edema, BP 170s. EKG was nonischemic, troponin 28--23, BNP 3087, CXR clear. Symptoms were suspected 2/2 mild CHF exacerbation. She was treated with IV diuresis, TTE with preserved LVEF and unchanged from prior. Ultimately was discharged back to TCU without medication changes.  12/13/2022 - 12/20/2022: admission at [facility] after developing 2-3 days of SOB, generalized malaise, and increased oxygen needs while at TCU. She had been started on Abx for pneumonia and Proteus UTI, found to be acutely positive for COVID-19 infection and was hypotensive, hypoxic on presentation. She was treated with remdesivir x 5 days and decadron x 10 days, treated for likely HCAP pneumonia with IV zosyn. She was able to wean off daytime oxygen and return to her baseline nocturnal requirements. She was referred back to TCU for ongoing rehab, medical management.    Patient is seen today in follow-up. She has family visiting her in her room today, she appears in bright spirits. Informs me she plans to discharge home this upcoming Saturday, 1/14. She is looking forward to being home, admits she is slightly apprehensive but feels she is generally improved. Bowel movements remain soft, but no longer loose. Breathing has improved dramatically, not requiring oxygen during daytime and does not feel short of breath at rest. Has been working with therapies and feels she is getting stronger. Family is looking forward to having her home as well.    Allergies, and PMH/PSH reviewed in EPIC today.  REVIEW OF SYSTEMS:  4 point ROS including Respiratory, CV, GI and , other than that noted in the HPI,  is negative    Objective:   /84   Pulse 76   Temp 97.9  F (36.6  C)   Resp 16   Ht 1.676 m (5' 6\")   Wt 71.4 kg " (157 lb 4.8 oz)   SpO2 94%   BMI 25.39 kg/m      GEN: well-developed, thin elderly female, appears comfortable  HEENT: NCAT, EOM intact bilaterally, sclera clear, conjunctiva normal, nose & mouth patent, mucous membranes dry  CHEST: lungs CTA bilaterally, no increased work of breathing, no wheeze, crackles, rhonchi  HEART: RRR, S1 & S2, no murmur  ABD: soft, nontender, nondistended, no guarding or rigidity, +BS in all 4 quadrants  MSK: AROM bilateral UE/LE  NEURO: awake, alert, oriented to name, place, and time. CN II-XII grossly intact. Sensation grossly intact to light touch.   SKIN: warm & dry without rash, 1+ pitting pedal edema      Most Recent 3 CBC's:  Recent Labs   Lab Test 01/09/23  0805 01/03/23  0440 12/31/22  0526 12/30/22  0527   WBC 9.6  --  7.5 6.4   HGB 11.4*  --  8.7* 9.0*   *  --  106* 103*    269 285 301     Most Recent 3 BMP's:  Recent Labs   Lab Test 01/09/23  0805 01/03/23  0440 01/02/23  0448    139 133*   POTASSIUM 4.4 4.5  4.5 4.2   CHLORIDE 103 100 97*   CO2 24 31* 30*   BUN 26.4* 24.3* 25.7*   CR 1.15* 1.17* 1.23*   ANIONGAP 13 8 6*   SUNI 10.4* 10.5* 10.0   GLC 84 86 90       Assessment/Plan:    Recurrent CDiff infection, improving  Identified to have recurrent Cdiff infection, initiated on treatment with fidaxomicin BID with improvement in stools. Now reports diarrhea has resolved, is having formed bowel movements regularly. No abdominal discomfort.  -Continues on fidaxomicin to complete 10-day course, ended 1/8     Paroxysmal afib  HTN / HLD  Hx pulmonary htn  HFpEF exacerbation, improved  PTA metoprolol 75mg/d, lasix 20mg daily PRN. Had mild afib with RVR while inpatient, metoprolol increased to 100mg/d. In setting of admission with HF exacerbation, discharged on lasix 20mg daily. Weight today 157 (154--157). Clinically with mild 1+ edema, lungs clear.  -Continue metoprolol at 100mg/d, hold parameters for SBP < 95, HR < 55  -Continues on rivaroxaban for CVA proph,  statin  -Continues on lasix 20mg daily  -Compression stockings to BLE  -Daily weights  -Follow up with cardiology as outpatient     YESICA on CKD-3  Baseline Cr 0.7-0.9, most recently had been in range of 1.2. with mild YESICA to Cr 1.6 during hospitalization in setting of volume loss from Cdiff, diuresis. Value at discharge 1.17, stable on repeat 1/9.  -Monitor periodically     Presyncope, resolved  Noted to have episode of presyncope while using restroom, felt to be 2/2 orthostatic hypotension in setting of GI volume losses. Resolved without recurrence upon treatment of above.  -Monitor for recurrence     Left ICA occlusion  Carotid ultrasound for evaluation of presyncope revealed severe plaque formation within left ICA without visible lumen, appears asymptomatic.  -Vascular surgery follow-up as outpatient     Generalized weakness  Physical deconditioning  Multifactorial in setting of repeat hospitalizations, acute infections, anemia, electrolyte abnormalities.  -PT/OT evaluations  -SW for safe discharge planning     Hypomagnesemia  -Continues on MagOx 400mg BID  -Monitor periodically     Anemia, macrocytic  Noted to have progressively worsening macrocytic anemia with decrease in Hgb from 11/12--8.8 in 2mo. Workup suggestive of bone marrow supression from unknown etiology; recurrent infections vs other. Has had colonoscopy screening recently. Folate/B12 values wnl. Iron studies c/w chronic disease. HIV/carlene tests neg. Received 1u PRBCs while inpatient for suspected symptomatic anemia, value at discharge 11.6. Repeat values ranging 10-11 with persistent macrocytosis.  *Peripheral smear from 11/18 neg for hemolysis/atypia, MMA 0.24, SPEP with hypogammaglobulinemia  -Follow-up with hematology, [facility] hematology referral previously placed     RA  Without evidence of flare.  -Methotrexate held during acute infection, instructed to resume this week per rheumatology     Gout  Seen by rheumatology in 09/2022 with concerns of  gout, started on 60-day prescription for colchicine and prednisone as well as allopurinol loading  -Continue allopurinol 300mg daily  -Continue colchicine  -Continues on prednisone 5mg daily   -Follow up with rheumatology, scheduled     COPD, on nocturnal oxygen (2L)  No evidence of acute exacerbation today. Lungs clear.   -Continue nocturnal oxygen as per home use  -Pulmonary hygiene, encourage OOB, ambulation, IS  -Continues on symbicort, albuterol     GERD  -Continue famotidine 20mg BID     Depression, acute on chronic  Admits to exacerbation, situational depression given multiple hospitalizations and now having to spend two major holidays in facility.  -Continue cymbalta at increased dose of 40mg/d     Pulmonary nodules  Followed by pulmonology, Dr. Rosario. 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 and a 10 mm groundglass nodule in the lingula and several diminutive left upper lobe nodules that were unchanged.  -Follow with pulmonology for repeat CT scan as scheduled in 03/2023     Acute COVID-19 infection, resolved  Acute hypoxic respiratory failure, resolved  HCAP, resolved  PNA diagnosed at TCU, likely HCAP given recent hospitalizations. Completed treatment with IV zosyn while inpatient. COVID positive 12/13, s/p treatment with remdesivir x5d, initiated on treatment with dexamethasone x10d, completed course at TCU.  -Pulmonary hygiene, encourage OOB, IS use  -Monitor oxygen levels    MED REC REQUIRED  Post Medication Reconciliation Status: discharge medications reconciled and changed, per note/orders      Orders:  NNO    Electronically signed by: Jorge Luis Henry PA-C

## 2023-01-09 NOTE — TELEPHONE ENCOUNTER
[facility] Geriatrics Lab Note     Provider: Jorge Luis Henry PA-C  Facility: [facility] Facility Type:  TCU    Allergies   Allergen Reactions     Codeine Nausea and Vomiting     Fosamax [Alendronic Acid] Diarrhea     Morphine Nausea and Vomiting     Other reaction(s): Vomiting       Labs Reviewed by provider: Heme 1, BMP     Verbal Order/Direction given by Provider: No new orders.      Provider giving Order:  Jorge Luis Henry PA-C    Verbal Order given to: Tamanna Amin RN

## 2023-01-12 NOTE — PROGRESS NOTES
[facility] GERIATRICS DISCHARGE SUMMARY  PATIENT'S NAME: Fatuma Henry  YOB: 1945  MEDICAL RECORD NUMBER:  [id number]  Place of Service where encounter took place:  [facility] ([facility]) [[id number]]    PRIMARY CARE PROVIDER AND CLINIC RESPONSIBLE AFTER TRANSFER:   Tory Wise MD, [address] [facility] / [facility] [address]    [facility] Provider     Transferring providers: Jorge Luis Henry PA-C, Anu Harp MD  Recent Hospitalization/ED:  [facility] stay 12/23/2022 to 1/3/2023.  Date of SNF Admission: January 03, 2023  Date of SNF (anticipated) Discharge: January 14, 2023  Discharged to: previous independent home  Cognitive Scores: NA to pt  Physical Function: Ambulating 200 ft with 4WW  DME: SNF  coordinating DME needs     CODE STATUS/ADVANCE DIRECTIVES DISCUSSION:  DNR/DNI  ALLERGIES: Codeine, Fosamax [alendronic acid], and Morphine    NURSING FACILITY COURSE   Summary: Patient is a 77-year-old female with past medical history of CKD, paroxysmal A. fib, hypertension, COPD on nocturnal oxygen, RA, Gout, and multiple recent hospitalizations as outlined below due to C.diff and HF exacerbations who was most recently admitted at [facility] from 12/2/22 - 1/3/23 with recurrent Cdiff infection and HF exacerbation. She was started on fidaxomicin for Cdiff recurrence and treated for HF exacerbation with IV diuresis. She had a mild YESICA which resolved with treatment of Cdiff as well as afib with RVR for which her pta metoprolol was increased to 100mg/d. She returned to TCU for ongoing rehab, medical management.     Summarization of recent hospitalizations:  11/7/2022 - 11/10/2022: admission at [facility] with acute C.diff infection, stabilized and discharged home.   11/17/2022 - 11/21/2022: admission at [facility] after presenting with increased falls and generalized weakness. She had findings of dehydation with YSEICA,  electrolyte abnormalities, and soft blood pressures. She was also identified to have progressively worsening macrocytic anemia with a decrease in hemoglobin from 11/12--8.8 since 10/2022.  Workup was concerning for bone marrow etiology and she was recommended to follow-up as an outpatient once acute medical issues resolve.  She did receive 1 unit of PRBCs for symptomatic anemia on 11/17. Symptoms improved with IV hydration and repletion of electrolytes. Blood pressure meds were adjusted, metoprolol discontinued altogether due to soft bps (PTA dose 100mg/d). Following therapy evaluations, she was discharged to TCU. While at TCU, she completed treatment with oral vancomycin and diarrhea improved. Her metoprolol was up-titrated to 75mg/d and PTA PRN lasix resumed.   12/1/2022 - 12/4/2022: admission at [facility] after she reported waking overnight with chest pressure and sensation of an elephant sitting on her chest with worsening BLE edema, BP 170s. EKG was nonischemic, troponin 28--23, BNP 3087, CXR clear. Symptoms were suspected 2/2 mild CHF exacerbation. She was treated with IV diuresis, TTE with preserved LVEF and unchanged from prior. Ultimately was discharged back to TCU without medication changes.  12/13/2022 - 12/20/2022: admission at [facility] after developing 2-3 days of SOB, generalized malaise, and increased oxygen needs while at TCU. She had been started on Abx for pneumonia and Proteus UTI, found to be acutely positive for COVID-19 infection and was hypotensive, hypoxic on presentation. She was treated with remdesivir x 5 days and decadron x 10 days, treated for likely HCAP pneumonia with IV zosyn. She was able to wean off daytime oxygen and return to her baseline nocturnal requirements. She was referred back to TCU for ongoing rehab, medical management.    Patient is seen today for discharge planning. She is resting in bed, perks up to discuss discharge. She is looking forward to being home and  seeing her family regularly. She feels her breathing is at her baseline, denies significant shortness of breath. No chest pains. Diarrhea has resolved. She has a scale at home, understands the importance of daily weights and will plan to monitor. She acknowledges being on a low sodium diet at TCU, notes her  is as well so will be easier for her to follow.    Summary of nursing facility stay:     Recurrent CDiff infection, improving  Identified to have recurrent Cdiff infection, initiated on treatment with fidaxomicin BID with improvement in stools. Now reports diarrhea has resolved, is having formed bowel movements regularly. No abdominal discomfort. Completed course of fidaxomicin on 1/8. Stools remain improved, no recurrent diarrhea.  -Patient will benefit from Cdiff prophylaxis with future Abx courses     Paroxysmal afib  HTN / HLD  Hx pulmonary htn  HFpEF exacerbation, resolved  PTA metoprolol 75mg/d, lasix 20mg daily PRN. Had mild afib with RVR while inpatient, metoprolol increased to 100mg/d. In setting of admission with HF exacerbation, discharged on lasix 20mg daily. Weight today 152 (154--157). Clinically with mild 1+ edema, lungs clear.  -Continue metoprolol 100mg/d  -Continues on rivaroxaban for CVA proph, statin  -Continues on lasix 20mg daily  -Compression stockings to BLE on day/off night  -Daily weights, stressed importance to patient and indications to call with significant weight gain or loss  -Follow up with cardiology as outpatient     YESICA on CKD-3, resolved  Baseline Cr 0.7-0.9, most recently had been in range of 1.2. with mild YESICA to Cr 1.6 during hospitalization in setting of volume loss from Cdiff, diuresis. Value at discharge 1.17, stable on repeat 1/9.  -Monitor periodically     Presyncope, resolved  Noted to have episode of presyncope while using restroom, felt to be 2/2 orthostatic hypotension in setting of GI volume losses. Resolved without recurrence upon treatment of  above.     Left ICA occlusion  Carotid ultrasound for evaluation of presyncope revealed severe plaque formation within left ICA without visible lumen, appears asymptomatic.  -Vascular surgery follow-up as outpatient     Generalized weakness  Physical deconditioning  Multifactorial in setting of repeat hospitalizations, acute infections, anemia, electrolyte abnormalities. Walking up to 400 feet with 4WW.  -PT/OT to continue at home     Hypomagnesemia  -Continues on MagOx 400mg BID     Anemia, macrocytic  Noted to have progressively worsening macrocytic anemia with decrease in Hgb from 11/12--8.8 in 2mo. Workup suggestive of bone marrow supression from unknown etiology; recurrent infections vs other. Has had colonoscopy screening recently. Folate/B12 values wnl. Iron studies c/w chronic disease. HIV/carlene tests neg. Received 1u PRBCs while inpatient for suspected symptomatic anemia, value at discharge 11.6. Repeat values ranging 10-11 with persistent macrocytosis.  *Peripheral smear from 11/18 neg for hemolysis/atypia, MMA 0.24, SPEP with hypogammaglobulinemia  -Follow-up with hematology, [facility] hematology referral previously placed  -Continues on folate, B12, vitamin D     RA  Without evidence of flare.  -Methotrexate held during acute infection, resuming at discharge  -Follow-up with rheumatology for ongoing management     Gout  Seen by rheumatology in 09/2022 with concerns of gout, started on colchicine and prednisone as well as allopurinol loading.  -Continue allopurinol 300mg daily  -Continue colchicine  -Continues on prednisone 5mg daily   -Follow up with rheumatology, scheduled     COPD, on nocturnal oxygen (2L)  No evidence of acute exacerbation today. Lungs clear.   -Continue nocturnal oxygen as per home use  -Pulmonary hygiene, encourage OOB, ambulation, IS  -Continues on symbicort, albuterol     GERD  -Continue famotidine 20mg BID     Depression, acute on chronic  Admits to exacerbation, situational depression  given multiple hospitalizations and now having to spend two major holidays in facility.  -Continue cymbalta at increased dose of 40mg/d  -Follow up with PCP for ongoing management     Pulmonary nodules  Followed by pulmonology, Dr. Rosario. 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 and a 10 mm groundglass nodule in the lingula and several diminutive left upper lobe nodules that were unchanged.  -Follow with pulmonology for repeat CT scan as scheduled in 03/2023     Acute COVID-19 infection, resolved  Acute hypoxic respiratory failure, resolved  HCAP, resolved  PNA diagnosed at TCU, likely HCAP given recent hospitalizations. Completed treatment with IV zosyn while inpatient. COVID positive 12/13, s/p treatment with remdesivir x5d, initiated on treatment with dexamethasone x10d, completed course at TCU.  -Pulmonary hygiene, encourage OOB, IS use  -Monitor oxygen levels    UTI, Proteus, resolved  Treated with course of levaquin transitioned to IV abx during hospitalization for HCAP.    Discharge Medications:  MED REC REQUIRED  Post Medication Reconciliation Status: medication reconcilation previously completed during another office visit       Current Outpatient Medications   Medication Sig Dispense Refill     Acetaminophen (ACETAMIN PO) Take 500 mg by mouth every 4 hours as needed (pain or fever)       albuterol (PROAIR HFA/PROVENTIL HFA/VENTOLIN HFA) 108 (90 Base) MCG/ACT inhaler Inhale 2 puffs into the lungs every 4 hours as needed for shortness of breath / dyspnea or wheezing       allopurinol (ZYLOPRIM) 300 MG tablet Take 1 tablet (300 mg) by mouth daily 60 tablet 0     benzocaine (ANBESOL) 10 % gel Take by mouth every 6 hours as needed for mouth sores       budesonide-formoterol (SYMBICORT) 160-4.5 MCG/ACT Inhaler Inhale 2 puffs into the lungs 2 times  daily for 30 days 10 g 11     colchicine (COLCYRS) 0.6 MG tablet Take 1 tablet by mouth once daily 60 tablet 0     compressor, for nebulizer Saran [COMPRESSOR, FOR NEBULIZER SARAN] Nebulizer treatment qid prn 1 Device 0     DULoxetine (CYMBALTA) 20 MG capsule Take 40 mg by mouth daily       folic acid (FOLVITE) 1 MG tablet Take 1 tablet (1 mg) by mouth daily 90 tablet 0     furosemide (LASIX) 20 MG tablet Take 1 tablet (20 mg) by mouth daily       ipratropium - albuterol 0.5 mg/2.5 mg/3 mL (DUONEB) 0.5-2.5 (3) MG/3ML neb solution Take 1 vial (3 mLs) by nebulization every 4 hours as needed for wheezing or shortness of breath / dyspnea 90 mL 0     MAGIC MOUTHWASH (FV STANDARD FORMULA) Swish and swallow 10 mLs in mouth every 6 hours as needed for mouth sores       magnesium oxide (MAG-OX) 400 MG tablet Take 400 mg by mouth 2 times daily       methocarbamol (ROBAXIN) 500 MG tablet Take 500 mg by mouth daily as needed for muscle spasms       methotrexate sodium 2.5 MG TABS Take 3 tablets (7.5 mg) by mouth once a week 36 tablet 0     metoprolol succinate ER (TOPROL XL) 100 MG 24 hr tablet Take 1 tablet (100 mg) by mouth daily       omeprazole 20 MG tablet Take 20 mg by mouth 2 times daily       pravastatin (PRAVACHOL) 20 MG tablet Take 20 mg by mouth every evening       predniSONE (DELTASONE) 5 MG tablet Take 5 mg by mouth daily Holding until decadron course for COVID is completed.       rivaroxaban ANTICOAGULANT (XARELTO) 15 MG TABS tablet Take 1 tablet (15 mg) by mouth daily (with dinner) 90 tablet 3     traZODone (DESYREL) 50 MG tablet Take 0.5 tablets (25 mg) by mouth nightly as needed for sleep       Vitamin D3 (CHOLECALCIFEROL) 125 MCG (5000 UT) tablet Take 125 mcg by mouth daily         Controlled medications:   not applicable/none     Past Medical History:   Past Medical History:   Diagnosis Date     Atrial fibrillation (H)      CKD (chronic kidney disease) stage 3, GFR 30-59 ml/min (H)      COPD (chronic  "obstructive pulmonary disease) (H)     nocturnal oxygen     CRF (chronic renal failure)      GERD (gastroesophageal reflux disease)      Hypertension      Hypovolemic shock (H)      Influenza A 12/01/2017     Pulmonary hypertension (H)      Pulmonary nodules      Rheumatoid arthritis (H)      Sepsis (H) 12/24/2017     Physical Exam:   Vitals: /42   Pulse 107   Temp 97  F (36.1  C)   Resp 18   Ht 1.676 m (5' 6\")   Wt 69.2 kg (152 lb 9.6 oz)   SpO2 94%   BMI 24.63 kg/m    BMI: Body mass index is 24.63 kg/m .    GEN: well-developed, thin elderly female, appears comfortable  HEENT: NCAT, EOM intact bilaterally, sclera clear, conjunctiva normal, nose & mouth patent, mucous membranes moist  CHEST: lungs CTA bilaterally, no increased work of breathing, no wheeze, crackles, rhonchi  HEART: RRR, S1 & S2, no murmur  ABD: soft, nontender, nondistended, no guarding or rigidity, +BS in all 4 quadrants  MSK: AROM bilateral UE/LE, pedal & radial pulses 2+ bilaterally  NEURO: awake, alert, oriented to name, place, and time. CN II-XII grossly intact. Sensation grossly intact to light touch.   SKIN: warm & dry without rash, trace 1+ pedal edema    SNF labs:   Most Recent 3 CBC's:  Recent Labs   Lab Test 01/09/23  0805 01/03/23  0440 12/31/22  0526 12/30/22  0527   WBC 9.6  --  7.5 6.4   HGB 11.4*  --  8.7* 9.0*   *  --  106* 103*    269 285 301     Most Recent 3 BMP's:  Recent Labs   Lab Test 01/09/23  0805 01/03/23  0440 01/02/23  0448    139 133*   POTASSIUM 4.4 4.5  4.5 4.2   CHLORIDE 103 100 97*   CO2 24 31* 30*   BUN 26.4* 24.3* 25.7*   CR 1.15* 1.17* 1.23*   ANIONGAP 13 8 6*   SUNI 10.4* 10.5* 10.0   GLC 84 86 90       DISCHARGE PLAN:    Follow up labs: No labs orders/due    Medical Follow Up:      Follow up with primary care provider in 1-2 weeks    Current [place] scheduled appointments:  Next 5 appointments (look out 90 days)    Jan 30, 2023 11:00 AM  (Arrive by 10:50 AM)  Return Visit with " Charmaine Huerta PA-C  [facility] ([facility] ) [address]  [facility] [address] [phone]   Mar 21, 2023  3:30 PM  (Arrive by 3:15 PM)  Return Visit with Parth Rosario MD  [facility] ([facility]) [address]  [address]  [facility] [address] [phone]          Discharge Services: Home Care:  Occupational Therapy, Physical Therapy, Registered Nurse and Home Health Aide    Discharge Instructions Verbalized to Patient at Discharge:     Weigh yourself daily in the morning and keep a record. Call your primary clinic: a) if you are more short of breath, or b) if your weight changes more than 3 pounds in one day or more than 5 pounds in one week.     TOTAL DISCHARGE TIME:   Greater than 30 minutes  Electronically signed by:  Jorge Luis Henry PA-C     Documentation of Face to Face and Certification for Home Health Services    I certify that services are/were furnished while this patient was under the care of a physician and that a physician or an allowed non-physician practitioner (NPP), had a face-to-face encounter that meets the physician face-to-face encounter requirements. The encounter was in whole, or in part, related to the primary reason for home health. The patient is confined to his/her home and needs intermittent skilled nursing, physical therapy, speech-language pathology, or the continued need for occupational therapy. A plan of care has been established by a physician and is periodically reviewed by a physician.  Date of Face-to-Face Encounter: 1/12/2023.    I certify that, based on my findings, the following services are medically necessary home health services: Nursing, Occupational Therapy and Physical Therapy.    My clinical findings support the need for the above skilled services because: Requires assistance of another person or specialized  equipment to access medical services because patient: Range of motion limitations prevents ability to exit home safely...    Patient to re-establish plan of care with their PCP within 7-10 days after leaving the facility to reestablish care.  Medicare certified [facility] provider: Jorge Luis Henry PA-C  Date: January 12, 2023

## 2023-01-12 NOTE — LETTER
1/12/2023        RE: Fatuma Henry  [address]  [address]  [place] [address]        [facility] GERIATRICS DISCHARGE SUMMARY  PATIENT'S NAME: Fatuma Henry  YOB: 1945  MEDICAL RECORD NUMBER:  [id number]  Place of Service where encounter took place:  [facility] (SNF) [[id number]]    PRIMARY CARE PROVIDER AND CLINIC RESPONSIBLE AFTER TRANSFER:   Tory Wise MD, [address] [facility] [facility] [id number]    [facility] Provider     Transferring providers: Jorge Luis Henry PA-C, Anu Harp MD  Recent Hospitalization/ED:  [facility] stay 12/23/2022 to 1/3/2023.  Date of SNF Admission: January 03, 2023  Date of SNF (anticipated) Discharge: January 14, 2023  Discharged to: previous independent home  Cognitive Scores: NA to pt  Physical Function: Ambulating 200 ft with 4WW  DME: SNF  coordinating DME needs     CODE STATUS/ADVANCE DIRECTIVES DISCUSSION:  DNR/DNI  ALLERGIES: Codeine, Fosamax [alendronic acid], and Morphine    NURSING FACILITY COURSE   Summary: Patient is a 77-year-old female with past medical history of CKD, paroxysmal A. fib, hypertension, COPD on nocturnal oxygen, RA, Gout, and multiple recent hospitalizations as outlined below due to C.diff and HF exacerbations who was most recently admitted at [facility] from 12/2/22 - 1/3/23 with recurrent Cdiff infection and HF exacerbation. She was started on fidaxomicin for Cdiff recurrence and treated for HF exacerbation with IV diuresis. She had a mild YESICA which resolved with treatment of Cdiff as well as afib with RVR for which her pta metoprolol was increased to 100mg/d. She returned to TCU for ongoing rehab, medical management.     Summarization of recent hospitalizations:  11/7/2022 - 11/10/2022: admission at [facility] with acute C.diff infection, stabilized and discharged home.   11/17/2022 - 11/21/2022: admission at [facility] after presenting with increased falls and generalized weakness. She had findings of dehydation with YESICA, electrolyte abnormalities, and soft blood pressures. She was also identified to have progressively worsening macrocytic anemia with a decrease in hemoglobin from 11/12--8.8 since 10/2022.  Workup was concerning for bone marrow etiology and she was recommended to follow-up as an outpatient once acute medical issues resolve.  She did receive 1 unit of PRBCs for symptomatic anemia on 11/17. Symptoms improved with IV hydration and repletion of electrolytes. Blood pressure meds were adjusted, metoprolol discontinued altogether due to soft bps (PTA dose 100mg/d). Following therapy evaluations, she was discharged to TCU. While at TCU, she completed treatment with oral vancomycin and diarrhea improved. Her metoprolol was up-titrated to 75mg/d and PTA PRN lasix resumed.   12/1/2022 - 12/4/2022: admission at [facility] after she reported waking overnight with chest pressure and sensation of an elephant sitting on her chest with worsening BLE edema, BP 170s. EKG was nonischemic, troponin 28--23, BNP 3087, CXR clear. Symptoms were suspected 2/2 mild CHF exacerbation. She was treated with IV diuresis, TTE with preserved LVEF and unchanged from prior. Ultimately was discharged back to TCU without medication changes.  12/13/2022 - 12/20/2022: admission at [facility] after developing 2-3 days of SOB, generalized malaise, and increased oxygen needs while at TCU. She had been started on Abx for pneumonia and Proteus UTI, found to be acutely positive for COVID-19 infection and was hypotensive, hypoxic on presentation. She was treated with remdesivir x 5 days and decadron x 10 days, treated for likely HCAP pneumonia with IV zosyn. She was able to wean off daytime oxygen and return to her baseline nocturnal requirements. She was referred back to TCU for ongoing rehab, medical management.    Patient is seen today for  discharge planning. She is resting in bed, perks up to discuss discharge. She is looking forward to being home and seeing her family regularly. She feels her breathing is at her baseline, denies significant shortness of breath. No chest pains. Diarrhea has resolved. She has a scale at home, understands the importance of daily weights and will plan to monitor. She acknowledges being on a low sodium diet at TCU, notes her  is as well so will be easier for her to follow.    Summary of nursing facility stay:     Recurrent CDiff infection, improving  Identified to have recurrent Cdiff infection, initiated on treatment with fidaxomicin BID with improvement in stools. Now reports diarrhea has resolved, is having formed bowel movements regularly. No abdominal discomfort. Completed course of fidaxomicin on 1/8. Stools remain improved, no recurrent diarrhea.  -Patient will benefit from Cdiff prophylaxis with future Abx courses     Paroxysmal afib  HTN / HLD  Hx pulmonary htn  HFpEF exacerbation, resolved  PTA metoprolol 75mg/d, lasix 20mg daily PRN. Had mild afib with RVR while inpatient, metoprolol increased to 100mg/d. In setting of admission with HF exacerbation, discharged on lasix 20mg daily. Weight today 152 (154--157). Clinically with mild 1+ edema, lungs clear.  -Continue metoprolol 100mg/d  -Continues on rivaroxaban for CVA proph, statin  -Continues on lasix 20mg daily  -Compression stockings to BLE on day/off night  -Daily weights, stressed importance to patient and indications to call with significant weight gain or loss  -Follow up with cardiology as outpatient     YESICA on CKD-3, resolved  Baseline Cr 0.7-0.9, most recently had been in range of 1.2. with mild YESICA to Cr 1.6 during hospitalization in setting of volume loss from Cdiff, diuresis. Value at discharge 1.17, stable on repeat 1/9.  -Monitor periodically     Presyncope, resolved  Noted to have episode of presyncope while using restroom, felt to be 2/2  orthostatic hypotension in setting of GI volume losses. Resolved without recurrence upon treatment of above.     Left ICA occlusion  Carotid ultrasound for evaluation of presyncope revealed severe plaque formation within left ICA without visible lumen, appears asymptomatic.  -Vascular surgery follow-up as outpatient     Generalized weakness  Physical deconditioning  Multifactorial in setting of repeat hospitalizations, acute infections, anemia, electrolyte abnormalities. Walking up to 400 feet with 4WW.  -PT/OT to continue at home     Hypomagnesemia  -Continues on MagOx 400mg BID     Anemia, macrocytic  Noted to have progressively worsening macrocytic anemia with decrease in Hgb from 11/12--8.8 in 2mo. Workup suggestive of bone marrow supression from unknown etiology; recurrent infections vs other. Has had colonoscopy screening recently. Folate/B12 values wnl. Iron studies c/w chronic disease. HIV/carlene tests neg. Received 1u PRBCs while inpatient for suspected symptomatic anemia, value at discharge 11.6. Repeat values ranging 10-11 with persistent macrocytosis.  *Peripheral smear from 11/18 neg for hemolysis/atypia, MMA 0.24, SPEP with hypogammaglobulinemia  -Follow-up with hematology, [facility] hematology referral previously placed  -Continues on folate, B12, vitamin D     RA  Without evidence of flare.  -Methotrexate held during acute infection, resuming at discharge  -Follow-up with rheumatology for ongoing management     Gout  Seen by rheumatology in 09/2022 with concerns of gout, started on colchicine and prednisone as well as allopurinol loading.  -Continue allopurinol 300mg daily  -Continue colchicine  -Continues on prednisone 5mg daily   -Follow up with rheumatology, scheduled     COPD, on nocturnal oxygen (2L)  No evidence of acute exacerbation today. Lungs clear.   -Continue nocturnal oxygen as per home use  -Pulmonary hygiene, encourage OOB, ambulation, IS  -Continues on symbicort,  albuterol     GERD  -Continue famotidine 20mg BID     Depression, acute on chronic  Admits to exacerbation, situational depression given multiple hospitalizations and now having to spend two major holidays in facility.  -Continue cymbalta at increased dose of 40mg/d  -Follow up with PCP for ongoing management     Pulmonary nodules  Followed by pulmonology, Dr. Rosario. 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 and a 10 mm groundglass nodule in the lingula and several diminutive left upper lobe nodules that were unchanged.  -Follow with pulmonology for repeat CT scan as scheduled in 03/2023     Acute COVID-19 infection, resolved  Acute hypoxic respiratory failure, resolved  HCAP, resolved  PNA diagnosed at TCU, likely HCAP given recent hospitalizations. Completed treatment with IV zosyn while inpatient. COVID positive 12/13, s/p treatment with remdesivir x5d, initiated on treatment with dexamethasone x10d, completed course at TCU.  -Pulmonary hygiene, encourage OOB, IS use  -Monitor oxygen levels    UTI, Proteus, resolved  Treated with course of levaquin transitioned to IV abx during hospitalization for HCAP.    Discharge Medications:  MED REC REQUIRED  Post Medication Reconciliation Status: medication reconcilation previously completed during another office visit       Current Outpatient Medications   Medication Sig Dispense Refill     Acetaminophen (ACETAMIN PO) Take 500 mg by mouth every 4 hours as needed (pain or fever)       albuterol (PROAIR HFA/PROVENTIL HFA/VENTOLIN HFA) 108 (90 Base) MCG/ACT inhaler Inhale 2 puffs into the lungs every 4 hours as needed for shortness of breath / dyspnea or wheezing       allopurinol (ZYLOPRIM) 300 MG tablet Take 1 tablet (300 mg) by mouth daily 60 tablet 0     benzocaine (ANBESOL) 10 % gel Take by mouth every 6 hours  as needed for mouth sores       budesonide-formoterol (SYMBICORT) 160-4.5 MCG/ACT Inhaler Inhale 2 puffs into the lungs 2 times daily for 30 days 10 g 11     colchicine (COLCYRS) 0.6 MG tablet Take 1 tablet by mouth once daily 60 tablet 0     compressor, for nebulizer Saran [COMPRESSOR, FOR NEBULIZER SARAN] Nebulizer treatment qid prn 1 Device 0     DULoxetine (CYMBALTA) 20 MG capsule Take 40 mg by mouth daily       folic acid (FOLVITE) 1 MG tablet Take 1 tablet (1 mg) by mouth daily 90 tablet 0     furosemide (LASIX) 20 MG tablet Take 1 tablet (20 mg) by mouth daily       ipratropium - albuterol 0.5 mg/2.5 mg/3 mL (DUONEB) 0.5-2.5 (3) MG/3ML neb solution Take 1 vial (3 mLs) by nebulization every 4 hours as needed for wheezing or shortness of breath / dyspnea 90 mL 0     MAGIC MOUTHWASH (FV STANDARD FORMULA) Swish and swallow 10 mLs in mouth every 6 hours as needed for mouth sores       magnesium oxide (MAG-OX) 400 MG tablet Take 400 mg by mouth 2 times daily       methocarbamol (ROBAXIN) 500 MG tablet Take 500 mg by mouth daily as needed for muscle spasms       methotrexate sodium 2.5 MG TABS Take 3 tablets (7.5 mg) by mouth once a week 36 tablet 0     metoprolol succinate ER (TOPROL XL) 100 MG 24 hr tablet Take 1 tablet (100 mg) by mouth daily       omeprazole 20 MG tablet Take 20 mg by mouth 2 times daily       pravastatin (PRAVACHOL) 20 MG tablet Take 20 mg by mouth every evening       predniSONE (DELTASONE) 5 MG tablet Take 5 mg by mouth daily Holding until decadron course for COVID is completed.       rivaroxaban ANTICOAGULANT (XARELTO) 15 MG TABS tablet Take 1 tablet (15 mg) by mouth daily (with dinner) 90 tablet 3     traZODone (DESYREL) 50 MG tablet Take 0.5 tablets (25 mg) by mouth nightly as needed for sleep       Vitamin D3 (CHOLECALCIFEROL) 125 MCG (5000 UT) tablet Take 125 mcg by mouth daily         Controlled medications:   not applicable/none     Past Medical History:   Past Medical History:  "  Diagnosis Date     Atrial fibrillation (H)      CKD (chronic kidney disease) stage 3, GFR 30-59 ml/min (H)      COPD (chronic obstructive pulmonary disease) (H)     nocturnal oxygen     CRF (chronic renal failure)      GERD (gastroesophageal reflux disease)      Hypertension      Hypovolemic shock (H)      Influenza A 12/01/2017     Pulmonary hypertension (H)      Pulmonary nodules      Rheumatoid arthritis (H)      Sepsis (H) 12/24/2017     Physical Exam:   Vitals: /42   Pulse 107   Temp 97  F (36.1  C)   Resp 18   Ht 1.676 m (5' 6\")   Wt 69.2 kg (152 lb 9.6 oz)   SpO2 94%   BMI 24.63 kg/m    BMI: Body mass index is 24.63 kg/m .    GEN: well-developed, thin elderly female, appears comfortable  HEENT: NCAT, EOM intact bilaterally, sclera clear, conjunctiva normal, nose & mouth patent, mucous membranes moist  CHEST: lungs CTA bilaterally, no increased work of breathing, no wheeze, crackles, rhonchi  HEART: RRR, S1 & S2, no murmur  ABD: soft, nontender, nondistended, no guarding or rigidity, +BS in all 4 quadrants  MSK: AROM bilateral UE/LE, pedal & radial pulses 2+ bilaterally  NEURO: awake, alert, oriented to name, place, and time. CN II-XII grossly intact. Sensation grossly intact to light touch.   SKIN: warm & dry without rash, trace 1+ pedal edema    SNF labs:   Most Recent 3 CBC's:  Recent Labs   Lab Test 01/09/23  0805 01/03/23 0440 12/31/22  0526 12/30/22  0527   WBC 9.6  --  7.5 6.4   HGB 11.4*  --  8.7* 9.0*   *  --  106* 103*    269 285 301     Most Recent 3 BMP's:  Recent Labs   Lab Test 01/09/23  0805 01/03/23  0440 01/02/23  0448    139 133*   POTASSIUM 4.4 4.5  4.5 4.2   CHLORIDE 103 100 97*   CO2 24 31* 30*   BUN 26.4* 24.3* 25.7*   CR 1.15* 1.17* 1.23*   ANIONGAP 13 8 6*   SUNI 10.4* 10.5* 10.0   GLC 84 86 90       DISCHARGE PLAN:    Follow up labs: No labs orders/due    Medical Follow Up:      Follow up with primary care provider in 1-2 weeks    Current [place] " scheduled appointments:  Next 5 appointments (look out 90 days)    Jan 30, 2023 11:00 AM  (Arrive by 10:50 AM)  Return Visit with Charmaine Huerta PA-C  [facility] ([facility] ) [address]  [facility] [place] [facility] [id number] [phone]   Mar 21, 2023  3:30 PM  (Arrive by 3:15 PM)  Return Visit with Parth Rosario MD  [facility] ([facility]) [address]  [address]  [facility] [id number] [phone]          Discharge Services: Home Care:  Occupational Therapy, Physical Therapy, Registered Nurse and Home Health Aide    Discharge Instructions Verbalized to Patient at Discharge:     Weigh yourself daily in the morning and keep a record. Call your primary clinic: a) if you are more short of breath, or b) if your weight changes more than 3 pounds in one day or more than 5 pounds in one week.     TOTAL DISCHARGE TIME:   Greater than 30 minutes  Electronically signed by:  Jorge Luis Henry PA-C     Documentation of Face to Face and Certification for Home Health Services    I certify that services are/were furnished while this patient was under the care of a physician and that a physician or an allowed non-physician practitioner (NPP), had a face-to-face encounter that meets the physician face-to-face encounter requirements. The encounter was in whole, or in part, related to the primary reason for home health. The patient is confined to his/her home and needs intermittent skilled nursing, physical therapy, speech-language pathology, or the continued need for occupational therapy. A plan of care has been established by a physician and is periodically reviewed by a physician.  Date of Face-to-Face Encounter: 1/12/2023.    I certify that, based on my findings, the following services are medically necessary home health services: Nursing, Occupational Therapy and Physical  Therapy.    My clinical findings support the need for the above skilled services because: Requires assistance of another person or specialized equipment to access medical services because patient: Range of motion limitations prevents ability to exit home safely...    Patient to re-establish plan of care with their PCP within 7-10 days after leaving the facility to reestablish care.  Medicare certified [facility] provider: Jorge Luis Henry PA-C  Date: January 12, 2023                      Sincerely,        Jorge Luis Henry PA-C

## 2023-02-13 NOTE — DISCHARGE INSTRUCTIONS
Take antibiotic Fidaxomicin as directed and follow up closely with primary clinic - call today.  Take bentyl for abdominal pain/cramping.  Return for new, worsening concerns. Antibiotics can take a few days to see some effects.

## 2023-02-13 NOTE — ED TRIAGE NOTES
Pt brought in by EMS for evaluation of palpitations. Pt has hx of AFIB and states that she has these episodes often. However, she states that they seem to be getting worse. Pt also notes loose stools for approx 4 days. She denies emesis but states she has intermittent nausea. Pitting edema noted to BLE, +3 on left and +1 on right. Pt states that that began around the same time as the loose stools. Pt appears anxious and is tachypneic on arrival but is pleasant and cooperative.      Triage Assessment     Row Name 02/12/23 1333       Triage Assessment (Adult)    Airway WDL WDL       Respiratory WDL    Respiratory WDL all    Rhythm/Pattern, Respiratory tachypneic

## 2023-02-13 NOTE — ED PROVIDER NOTES
Emergency Department Encounter     Evaluation Date & Time:   2/12/2023 11:44 PM    CHIEF COMPLAINT:  Palpitations      Triage Note:  Pt brought in by EMS for evaluation of palpitations. Pt has hx of AFIB and states that she has these episodes often. However, she states that they seem to be getting worse. Pt also notes loose stools for approx 4 days. She denies emesis but states she has intermittent nausea. Pitting edema noted to BLE, +3 on left and +1 on right. Pt states that that began around the same time as the loose stools. Pt appears anxious and is tachypneic on arrival but is pleasant and cooperative.              ED COURSE & MEDICAL DECISION MAKING:     ED Course as of 02/13/23 0321 Mon Feb 13, 2023   0105 Pt with leukocytosis up to 21.6, which is new.  2/5/23, she had cdiff antigen +, but toxin negative.  Given new leukocytosis, will plan to treat clinically for c-diff. Ordering CT abd/pelvis and repeat cdiff studies.  Abdomen largely benign, nontoxic.     0106 HR improved to 90s.   0106 BUN/Cr ratio elevated, some dehydration present. Will continue IVF.   0123 hsTrop 25, will get 2 hour delta as a result.  Low suspicion for ACS.   0124 CXR (independent interpretation): no acute cardiopulmonary process    0215 CT abd/pelvis reviewed. Will discuss with pt.     0219 Pt re-evaluated and updated. She's improved here. Did discuss c-diff and treatment with Fidaxomicin this time per recommendations for recurrent c-diff treatment.  Pt understands and would need close outpt follow up.  Awaiting repeat trop and if reassuring, plan for discharge.     0311 Repeat hsTrop down to 24 from 25.  ACS ruled out and will not pursue further.   0320 Pt aware of results, need for new antibx for likely c-diff and importance of outpatient follow up and return precautions.       Pt presenting for evaluation of chronic diarrhea, recurrent over the past 4 days with up to 3-4 stools a day.  Stools are reportedly loose, non-bloody.   Denies n/v, abd pain, fevers, cough.  Pt also having some dyspnea, but this is more chronic as she has COPD with chronic pursed-lip breathing. Review of records shows previous c-diff and last tested 8 days ago and had +antigen, but negative toxin, so was not started on medications.      12:03 PM I met with the patient for an interview and initial exam. Plans for treatment were discussed.  2:16 AM I checked on the patient and updated her regarding her labs and imaging results.    Medical Decision Making    History:    Supplemental history from: Documented in chart, if applicable    External Record(s) reviewed: Documented in chart, if applicable.     Work Up:    Chart documentation includes differential considered and any EKGs or imaging independently interpreted by provider, where specified.    In additional to work up documented, I considered the following work up: Documented in chart, if applicable.    External consultation:    Discussion of management with another provider: Documented in chart, if applicable    Complicating factors:    Care impacted by chronic illness: Chronic Kidney Disease, Hypertension and Other: Atrial fibrillation and COPD    Care affected by social determinants of health: N/A    Disposition considerations: Discharge. I prescribed additional prescription strength medication(s) as charted. I considered admission, but ultimately discharged patient after improvement and follow up/outpatient plan..      At the conclusion of the encounter I discussed the results of all the tests and the disposition. The questions were answered. The patient or family acknowledged understanding and was agreeable with the care plan.      MEDICATIONS GIVEN IN THE EMERGENCY DEPARTMENT:  Medications   0.9% sodium chloride BOLUS (0 mLs Intravenous Stopped 2/13/23 0134)   diltiazem (CARDIZEM) injection 20 mg (20 mg Intravenous Given 2/13/23 0034)   iopamidol (ISOVUE-370) solution 80 mL (80 mLs Intravenous Given 2/13/23  0139)       NEW PRESCRIPTIONS STARTED AT TODAY'S ED VISIT:  New Prescriptions    DICYCLOMINE (BENTYL) 20 MG TABLET    Take 1 tablet (20 mg) by mouth 3 times daily as needed (abdominal discomfort/cramping)    FIDAXOMICIN (DIFICID) 200 MG TABLET    Take 1 PO BID x 5 days, then 1 PO every other day for 20 days       HPI   HPI     Fatuma Henry is a 77 year old female with a pertinent history of atrial fibrillation, COPD, Hypovolemic shock, GERD, Hypertension who presents to this ED by EMS for evaluation of palpations.    The patient presented to the ED due to concerns of ongoing diarrhea and shortness of breath for the past 4 days. She endorsed having a history of diarrhea. She endorsed a long history of intermittent C. Diff. She denied using any laxatives or medications for treatment. She denied any recent exposure to others with similar symptoms. She denied chest pain, abdominal pain fever, blood in stool, and any other associated symptoms at this time.    REVIEW OF SYSTEMS:  Review of Systems   Constitutional: Negative for fever.   Respiratory: Positive for shortness of breath.    Cardiovascular: Negative for chest pain.   Gastrointestinal: Positive for diarrhea. Negative for abdominal pain and blood in stool.   All other systems reviewed and are negative.      Medical History     Past Medical History:   Diagnosis Date     Atrial fibrillation (H)      CKD (chronic kidney disease) stage 3, GFR 30-59 ml/min (H)      COPD (chronic obstructive pulmonary disease) (H)      CRF (chronic renal failure)      GERD (gastroesophageal reflux disease)      Hypertension      Hypovolemic shock (H)      Influenza A 12/01/2017     Pulmonary hypertension (H)      Pulmonary nodules      Rheumatoid arthritis (H)      Sepsis (H) 12/24/2017       Past Surgical History:   Procedure Laterality Date     APPENDECTOMY       BLADDER SUSPENSION       CHOLECYSTECTOMY       PICC TRIPLE LUMEN PLACEMENT  12/13/2022            Family History    Problem Relation Age of Onset     Heart Failure Mother      Cerebrovascular Disease Father      Kidney failure Sister      Cerebrovascular Disease Brother      Diabetes Type 2  Brother        Social History     Tobacco Use     Smoking status: Former     Packs/day: 0.50     Types: Cigarettes     Quit date: 2017     Years since quittin.1     Smokeless tobacco: Never   Substance Use Topics     Alcohol use: No     Drug use: No       dicyclomine (BENTYL) 20 MG tablet  fidaxomicin (DIFICID) 200 MG tablet  Acetaminophen (ACETAMIN PO)  albuterol (PROAIR HFA/PROVENTIL HFA/VENTOLIN HFA) 108 (90 Base) MCG/ACT inhaler  allopurinol (ZYLOPRIM) 300 MG tablet  benzocaine (ANBESOL) 10 % gel  budesonide-formoterol (SYMBICORT) 160-4.5 MCG/ACT Inhaler  colchicine (COLCYRS) 0.6 MG tablet  compressor, for nebulizer Eva  DULoxetine (CYMBALTA) 20 MG capsule  folic acid (FOLVITE) 1 MG tablet  furosemide (LASIX) 20 MG tablet  ipratropium - albuterol 0.5 mg/2.5 mg/3 mL (DUONEB) 0.5-2.5 (3) MG/3ML neb solution  MAGIC MOUTHWASH (FV STANDARD FORMULA)  magnesium oxide (MAG-OX) 400 MG tablet  methocarbamol (ROBAXIN) 500 MG tablet  methotrexate sodium 2.5 MG TABS  metoprolol succinate ER (TOPROL XL) 100 MG 24 hr tablet  omeprazole 20 MG tablet  pravastatin (PRAVACHOL) 20 MG tablet  predniSONE (DELTASONE) 5 MG tablet  rivaroxaban ANTICOAGULANT (XARELTO) 15 MG TABS tablet  traZODone (DESYREL) 50 MG tablet  Vitamin D3 (CHOLECALCIFEROL) 125 MCG (5000 UT) tablet        Physical Exam     Vitals:  /59   Pulse 112   Temp 98.9  F (37.2  C) (Oral)   Resp 18   SpO2 95%     PHYSICAL EXAM:   Physical Exam  Vitals and nursing note reviewed.   Constitutional:       General: She is not in acute distress.     Appearance: Normal appearance.   HENT:      Head: Normocephalic and atraumatic.      Nose: Nose normal.      Mouth/Throat:      Mouth: Mucous membranes are moist.   Eyes:      Pupils: Pupils are equal, round, and reactive to light.    Neck:      Vascular: No JVD.   Cardiovascular:      Rate and Rhythm: Tachycardia present. Rhythm irregularly irregular.      Pulses: Normal pulses.           Radial pulses are 2+ on the right side and 2+ on the left side.        Dorsalis pedis pulses are 2+ on the right side and 2+ on the left side.   Pulmonary:      Effort: Pulmonary effort is normal. No respiratory distress.      Breath sounds: Normal breath sounds.      Comments: Pursed lip breaths - baseline, diminished breath sounds bilaterally  Abdominal:      Palpations: Abdomen is soft.      Tenderness: There is no abdominal tenderness.   Musculoskeletal:      Cervical back: Full passive range of motion without pain and neck supple.      Comments: No calf tenderness or swelling b/l   Skin:     General: Skin is warm.      Findings: No rash.   Neurological:      General: No focal deficit present.      Mental Status: She is alert. Mental status is at baseline.      Comments: Fluent speech, no acute lateralizing deficits   Psychiatric:         Mood and Affect: Mood is anxious (somewhat).         Behavior: Behavior normal.         Results     LAB:  All pertinent labs reviewed and interpreted  Labs Ordered and Resulted from Time of ED Arrival to Time of ED Departure   BASIC METABOLIC PANEL - Abnormal       Result Value    Sodium 138      Potassium 4.8      Chloride 99      Carbon Dioxide (CO2) 28      Anion Gap 11      Urea Nitrogen 28.9 (*)     Creatinine 1.14 (*)     Calcium 10.7 (*)     Glucose 98      GFR Estimate 50 (*)    TROPONIN T, HIGH SENSITIVITY - Abnormal    Troponin T, High Sensitivity 25 (*)    HEPATIC FUNCTION PANEL - Abnormal    Protein Total 6.4      Albumin 3.8      Bilirubin Total 0.6      Alkaline Phosphatase 123 (*)     AST 25      ALT 25      Bilirubin Direct <0.20     CBC WITH PLATELETS AND DIFFERENTIAL - Abnormal    WBC Count 21.6 (*)     RBC Count 3.54 (*)     Hemoglobin 11.3 (*)     Hematocrit 36.0       (*)     MCH 31.9       MCHC 31.4 (*)     RDW 15.7 (*)     Platelet Count 332      % Neutrophils 89      % Lymphocytes 5      % Monocytes 4      % Eosinophils 1      % Basophils 0      % Immature Granulocytes 1      NRBCs per 100 WBC 0      Absolute Neutrophils 19.5 (*)     Absolute Lymphocytes 1.0      Absolute Monocytes 1.0      Absolute Eosinophils 0.1      Absolute Basophils 0.1      Absolute Immature Granulocytes 0.2      Absolute NRBCs 0.0     TROPONIN T, HIGH SENSITIVITY - Abnormal    Troponin T, High Sensitivity 24 (*)    MAGNESIUM - Normal    Magnesium 2.1     C. DIFFICILE TOXIN B PCR WITH REFLEX TO C. DIFFICILE ANTIGEN AND TOXINS A/B EIA       RADIOLOGY:  CT Abdomen Pelvis w Contrast   Final Result   IMPRESSION:    1.  Mild infectious or inflammatory colitis.   2.  Nondisplaced left sacral insufficiency fracture is new from 11/07/2022.   3.  Compression fracture of T11 with mild loss of height is new from 11/07/2022.    4.  Interval further loss of height of the T12 compression fracture.      XR Chest Port 1 View   Final Result   IMPRESSION: Negative chest.                   ECG:  Afib, rate 127, similar to previous, no acute ischemia    I have independently reviewed and interpreted the EKG(s) documented above     PROCEDURES:  Procedures:  none      FINAL IMPRESSION:    ICD-10-CM    1. Colitis due to Clostridium difficile  A04.72           0 minutes of critical care time      I, Adonis Patel, am serving as a scribe to document services personally performed by Dr. Chaz Botello, based on my observations and the provider's statements to me. I, Chaz Botello, DO attest that Adonis Patel is acting in a scribe capacity, has observed my performance of the services and has documented them in accordance with my direction.      Chaz Botello DO  Emergency Medicine  [facility] EMERGENCY DEPARTMENT  2/13/2023  12:22 AM          Chaz Botello MD  02/13/23 0321

## 2023-02-13 NOTE — ED NOTES
Bed: JNED-23  Expected date: 2/12/23  Expected time: 11:38 PM  Means of arrival: Ambulance  Comments:  [facility] 77F Afib w RVR

## 2023-02-28 NOTE — TELEPHONE ENCOUNTER
Talked with her Daughter, Piotr is still in TCU, let her know we will check in again next month.  Shea De León, RT, CTTS, Chronic Pulmonary Disease Specialist

## 2023-03-14 PROBLEM — I48.91 ATRIAL FIBRILLATION WITH RVR (H): Status: ACTIVE | Noted: 2023-01-01
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
## 2023-03-14 PROBLEM — Z99.81 OXYGEN DEPENDENT: Status: ACTIVE | Noted: 2023-01-01

## 2023-03-14 PROBLEM — S72.90XA FRACTURE OF FEMUR (H): Status: ACTIVE | Noted: 2023-01-01

## 2023-03-14 PROBLEM — Z79.01 CHRONIC ANTICOAGULATION: Chronic | Status: ACTIVE | Noted: 2022-04-22

## 2023-03-14 PROBLEM — R93.7 ABNORMAL CT OF SPINE: Status: ACTIVE | Noted: 2023-01-01

## 2023-03-14 NOTE — PROGRESS NOTES
"Transfer Type: [facility]  Transfer Triage Note    Date of call: 03/14/23  Time of call: 3:13 PM    Current Patient Location:  [facility]  Current Level of Care: ED    Vitals: Temp: 97.6  F (36.4  C) Temp src: Oral BP: (!) 153/85 Pulse: 119   Resp: 17 SpO2: 100 % Height: 165.1 cm (5' 5\") Weight: 68 kg (150 lb)  O2 Device: Nasal cannula at Oxygen Delivery: 3 LPM    Diagnosis: Acute impacted mildly displaced subcapital fracture of the left femur, with complex medical history needing admission under medicine service  Is COVID-19 a concern? No  Reason for requested transfer: Procedure can be done here and not at referring hospital   Isolation Needs: None    Outside Records: Available  Additional records may be faxed to [phone].    Transfer accepted: Yes  Stability of Patient: Patient is vitally stable, with no critical labs, and will likely remain stable throughout the transfer process  Level of Care Needed: Med Surg  Telemetry Needed:  None  Expected Time of Arrival for Transfer: 0-8 hours  Arrival Location:  [facility]    Recommendations for Management and Stabilization: Not needed    Additional Comments:   77 year old female with acute impacted mildly displaced subcapital fracture of the left femur with complex medical history needing admission under medicine service. The patient has a history of COPD complicated by chronic respiratory failure on home oxygen, diastolic heart failure LVEF 60% and atrial fibrillation on anticoagulation with rivaroxaban. The patient was found to be tachycardic, heart rate of 141, tachypenic at respiratory rate of 27, CT head and neck showed no acute fracture or bleeding, CT pelvis showed acute fracture of left femur. Orthopedic surgery recommended admission under medicine service with available beds in [facility]. I carried a conference call with my [facility] hospitalist colleague and , cardiology attending " given atrial fibrillation with rapid ventricular rate. However, given the need for further work up and the potential need for cardiac work up, my [facility] colleague feel more comfortable with this patient being on the [facility]. Given lack of available beds in the [facility], The patient will stay at ED border in [facility] [place], with admission to medicine, cardiology and orthopedic consult. If this changes, then we will get a call back.     Trae Deleon MD     Addendum 17:29  Paged regarding cancellation of request for transfer. Patient will board in [facility] ED pending inpatient bed availability there.    Norma De Luna MD

## 2023-03-14 NOTE — ED PROVIDER NOTES
"History:  I have seen the patient with Mary Santana PA-C and agree with their assessment, physical, and plan.  I was present for key parts of the physical and any procedures performed.  I personally saw the patient and performed a substantive portion of the visit including all aspects of the medical decision making.  My additional impression on seeing the patient is that Fatuma Henry is a 77 year old  who presents with hip pain following a fall.    The patient reports that she fell in the middle of living room. She doesn't remember what she was trying to do, but notes that her \"legs gave out\". Her  was with her and witnessed the fall. She endorses left hip pain and left sided neck pain. She notes that she has slightly heavy breathing, and is normally on oxygen at home for emphysema, congestive heart failure, and atrial fibrillation. She denies any headache, chest pain, or arm pain.     Medical Decision Making:  ED Course as of 03/14/23 1549   Tue Mar 14, 2023   1346 I saw the patient along with our physician assistant.  She does not recall events leading up to the fall so cannot rule out the possibility of syncope or near syncope followed by collapse.  Certainly may have been mechanical.  At time of consult labs and imaging are completed.  Patient does have a known left hip fracture from the fall.  Certainly with complaint of neck pain would have considered other associated cervical spine fracture but CT imaging does not reveal any additional fracture and she has no radicular symptoms going down her arm so she is also radiologically and clinically cleared from a C-spine perspective.  She is on warfarin therapy and so is at risk for delayed bleeding but there is no intracranial hemorrhage noted today on her examination.  She is comfortable lying down in bed.  She does have COPD with an end stage COPD respiratory pattern at rest with prolonged expiratory phase so is not a great candidate for surgery.  Will " need clearance for surgery.  Plan is to admit to the hospital but there are no beds available here so we are looking for beds elsewhere.  Otherwise urinalysis is still pending as part of the weakness evaluation to rule out associated UTI.   1545 Patient needs tele and not available at [facility], no other tele beds available today so will plan to keep here.  This is preferable as the patient has a broken left hip and transport would be painful.  IMAN Hernandez is notifying patient family (son) of the plan and findings.    Admitted here.   incidental sclerotic lesion in her neck that may be metastatic needs follow up.      Medications:  Medications   lidocaine 1 % 0.1-1 mL (has no administration in time range)   lidocaine (LMX4) cream (has no administration in time range)   sodium chloride (PF) 0.9% PF flush 3 mL (3 mLs Intracatheter $Given 3/14/23 1314)   sodium chloride (PF) 0.9% PF flush 3 mL (has no administration in time range)   HYDROmorphone (PF) (DILAUDID) injection 0.5 mg (0.5 mg Intravenous $Given 3/14/23 1226)   metoprolol (LOPRESSOR) injection 5 mg (5 mg Intravenous $Given 3/14/23 1523)   HYDROmorphone (PF) (DILAUDID) injection 0.5 mg (0.5 mg Intravenous $Given 3/14/23 1542)       Additional ROS: Left hip pain, left-sided neck pain .  All other systems are otherwise negative.    Constitutional:  Patient is semi-reclined in bed, easily conversive.   Eyes:  PERRLA bilaterally, no hyphema, no diplopia,     HENT:  NCAT, canals clear, no lacerations, no hemotympanum, no epistaxis, no septal hematoma, oropharynx clear, no blood in posterior pharynx, teeth intact, trachea midline, mucous membranes tacky and moderately pink.   Spine:  No C-Collar, midline spine is nontender to palpation from occiput through sacrum  Respiratory:  Clear to auscultation no subcutaneous air, atraumatic chest wall, stable chest wall to ap and lateral palpation, 2.5 L nasal cannula oxygen. Prolonged expiratory phase. Using abdominal muscles  to help with breathing, but no apparent distress, decreased air sounds throughout.   Cardiovascular:   Irregularly regular rhythm, slight tachycardic S1 S2, no murmurs or friction rubs, No JVD, pulses are equal and symmetrically  in all extremities  Abdomen:  Soft, Non-distended, non-tender, atraumatic,  Pelvis:  Stable, nontender to ap and lateral compression and to rock.   /Rectal:  No signs of trauma, no gross hematuria, no blood at the urethral meatus, no perineal ecchymosis  Musculoskeletal:  Slight tenderness to left lateral chest wall with lateral compression, not AP compression. All extremities, except left lower extremity, are palpated non-tender with good range of motion. Left leg very slightly shortened compared to right. Left hip quite tender laterally.   Integument:  Senile ecchymosis and thin skin. Scabbed abrasion on forearms.   Neurologic:  Awake, Alert, and Oriented x3, GCS 15, diffusely normal sensation including perirectally, spontaneously able to move all extremities          Results for orders placed or performed during the hospital encounter of 03/14/23   Head CT w/o contrast     Status: None    Narrative    EXAM: CT HEAD W/O CONTRAST  LOCATION: [facility]  DATE/TIME: 3/14/2023 12:45 PM    INDICATION: Fall, on thinners  COMPARISON: Head CT 12/29/2022.  TECHNIQUE: Routine CT Head without IV contrast. Multiplanar reformats. Dose reduction techniques were used.    FINDINGS:  INTRACRANIAL CONTENTS: No intracranial hemorrhage, extraaxial collection, or mass effect.  No CT evidence of acute infarct. Mild presumed chronic small vessel ischemic changes. Mild generalized volume loss. No hydrocephalus.     VISUALIZED ORBITS/SINUSES/MASTOIDS: No intraorbital abnormality. No paranasal sinus mucosal disease. No middle ear or mastoid effusion.    BONES/SOFT TISSUES: No acute abnormality.      Impression    IMPRESSION:  1.  No acute intracranial hemorrhage, mass, or herniation.  2.   Mild diffuse parenchymal volume loss and white matter changes most likely due to chronic microvascular ischemic disease.   Cervical spine CT w/o contrast     Status: None    Narrative    EXAM: CT CERVICAL SPINE W/O CONTRAST  LOCATION: [facility]  DATE/TIME: 3/14/2023 12:47 PM    INDICATION: Fall. Pain.  COMPARISON: Cervical spine CT 11/17/2022.  TECHNIQUE: Routine CT Cervical Spine without IV contrast. Multiplanar reformats. Dose reduction techniques were used.    FINDINGS:  VERTEBRA: Straightening of the normal cervical lordosis which may be positional. No acute lucent fracture line visualized. No significant loss of vertebral body height. Moderate degenerative endplate changes and loss of disc height at C6-C7. Mild   degenerative endplate changes and loss of disc height at C5-C6. Normal facets.    CANAL/FORAMINA: No canal or neural foraminal stenosis.    PARASPINAL: Atherosclerotic calcifications at the carotid bifurcations.      Impression    IMPRESSION:  1.  No fracture or posttraumatic subluxation.  2.  No high-grade spinal canal or neural foraminal stenosis.   CT Pelvis Bone wo Contrast     Status: None    Narrative    EXAM: CT PELVIS BONE WO CONTRAST  LOCATION: [facility]  DATE/TIME: 3/14/2023 12:46 PM    INDICATION: Fall, left hip pain.  COMPARISON: 02/13/2023 CTA.  TECHNIQUE: CT scan of the pelvis was performed without IV contrast. Multiplanar reformats were obtained. Dose reduction techniques were used.  CONTRAST: None.    FINDINGS:    Bones: Acute mildly displaced subcapital fracture of the left femur as best seen on series 4 image 59 series 2 image 77. Healing left-sided sacral insufficiency fracture. Osteopenia. No degenerative changes in either hip.    Soft tissues: Moderate size left hip effusion. Pelvic intraperitoneal contents normal.       Impression    IMPRESSION:  1.  Acute impacted mildly displaced subcapital fracture of the left femur.    2.   Healing insufficiency fracture of the left sacral ala.   Comprehensive metabolic panel     Status: Abnormal   Result Value Ref Range    Sodium 140 136 - 145 mmol/L    Potassium 4.9 3.4 - 5.3 mmol/L    Chloride 102 98 - 107 mmol/L    Carbon Dioxide (CO2) 31 (H) 22 - 29 mmol/L    Anion Gap 7 7 - 15 mmol/L    Urea Nitrogen 22.4 8.0 - 23.0 mg/dL    Creatinine 1.12 (H) 0.51 - 0.95 mg/dL    Calcium 10.5 (H) 8.8 - 10.2 mg/dL    Glucose 114 (H) 70 - 99 mg/dL    Alkaline Phosphatase 116 (H) 35 - 104 U/L    AST 31 10 - 35 U/L    ALT 29 10 - 35 U/L    Protein Total 6.5 6.4 - 8.3 g/dL    Albumin 4.0 3.5 - 5.2 g/dL    Bilirubin Total 0.4 <=1.2 mg/dL    GFR Estimate 51 (L) >60 mL/min/1.73m2   INR     Status: Abnormal   Result Value Ref Range    INR 2.18 (H) 0.85 - 1.15   Partial thromboplastin time     Status: Abnormal   Result Value Ref Range    aPTT 40 (H) 22 - 38 Seconds   Alcohol ethyl     Status: Normal   Result Value Ref Range    Alcohol ethyl <0.01 <=0.01 g/dL   CBC with platelets and differential     Status: Abnormal   Result Value Ref Range    WBC Count 11.1 (H) 4.0 - 11.0 10e3/uL    RBC Count 3.40 (L) 3.80 - 5.20 10e6/uL    Hemoglobin 10.6 (L) 11.7 - 15.7 g/dL    Hematocrit 35.1 35.0 - 47.0 %     (H) 78 - 100 fL    MCH 31.2 26.5 - 33.0 pg    MCHC 30.2 (L) 31.5 - 36.5 g/dL    RDW 16.6 (H) 10.0 - 15.0 %    Platelet Count 334 150 - 450 10e3/uL    % Neutrophils 85 %    % Lymphocytes 6 %    % Monocytes 6 %    % Eosinophils 1 %    % Basophils 1 %    % Immature Granulocytes 1 %    NRBCs per 100 WBC 0 <1 /100    Absolute Neutrophils 9.5 (H) 1.6 - 8.3 10e3/uL    Absolute Lymphocytes 0.7 (L) 0.8 - 5.3 10e3/uL    Absolute Monocytes 0.6 0.0 - 1.3 10e3/uL    Absolute Eosinophils 0.1 0.0 - 0.7 10e3/uL    Absolute Basophils 0.1 0.0 - 0.2 10e3/uL    Absolute Immature Granulocytes 0.1 <=0.4 10e3/uL    Absolute NRBCs 0.0 10e3/uL   CBC with platelets differential     Status: Abnormal    Narrative    The following orders were  created for panel order CBC with platelets differential.  Procedure                               Abnormality         Status                     ---------                               -----------         ------                     CBC with platelets and d...[[id number]]  Abnormal            Final result                 Please view results for these tests on the individual orders.         EKG:  Atrial fib with rvr, no acute st changes just chronic nonspecific st flattening.  I have independently reviewed and interpreted the EKG(s) documented above.          Diagnosis:    1. Fracture of femur (H)    2. Fall, initial encounter    3. Abnormal CT of spine    4. Chronic anticoagulation    5. Atrial fibrillation with RVR (H)    6. Oxygen dependent           Asiya Kohler MD  03/14/23 4000

## 2023-03-14 NOTE — PLAN OF CARE
Ortho Brief Note    Consult received and imaging reviewed. Patient with impacted left femoral neck fracture. 3 L O2 at baseline, and with RVR on admission. Takes Xarelto, last dose likely yesterday evening.     Recommend admission to hospitalist, optimization for surgery. Left hip pinning vs hemiarthroplasty with one of my partners tentatively planned for tomorrow PM pending medical clearance. Hold anticoagulation, NPO at midnight.     Full consult to follow.     Suraj Markham MD  [facility]

## 2023-03-14 NOTE — H&P
[facility]    History and Physical - Hospitalist Service       Date of Admission:  3/14/2023    Assessment & Plan      Fatuma Henry is a 77 year old female admitted on 3/14/2023. She has h/o recurrent C. difficile, paroxysmal A-fib, hypertension, hyperlipidemia, history of pulmonary hypertension, diastolic CHF, CKD-III, presyncope, left ICA occlusion, microcytic anemia, rheumatoid arthritis, gout, GERD, depression presented with left hip pain after falling at home.  Per patient, her legs gave out.  She denied any chest pain, dizziness, palpitation, loss of consciousness during the fall.  CT-head and CT and cervical spine with no acute findings.  CT pelvis showed acute impacted mildly displaced subcapital fracture of the left femur. Per ED provider, orthopedics consulted and they will see the patient shortly.  She will be admitted for further work-up and management    S/P fall  Acute impacted left femoral fracture  - denied any chest pain, dizziness, palpitation, loss of consciousness during the fall  -Has history of presyncope-not reported during this counter  - CT pelvis showed acute impacted mildly displaced subcapital fracture of the left femur.  -Orthopedics consulted, appreciate recommendation  -N.p.o.  -cardiology consulted for preop eval, appreciate recommendations.    A-fib with RVR  - on metorplol 100 and xarelto at home  - BP on the lower side. 108/64 with HR: 120-140 during examination  -Cardiology consulted, appreciate recommendations    History of pulmonary HTN  HFpEF- notexacerbated  -C/w PTA medication as tolerated  - f/u proBNp    CKD stage III  -Avoid nephrotoxins as able  -f/u BMP    COPD on home oxygen 2 L  -Not exacerbated  -Continue PTA Symbicort, albuterol    Essential HTN  -Monitor vital signs  -Blood pressure borderline    Left ICA occlusion  -Asymptomatic  -Outpatient vascular surgery follow-up    Anemia, macrocytic  -Continue with PTA folic acid, B12  -Outpatient  hematology    GERD  -c/w PTA famotidine 20 mg.    Depression  -Continue with PTA Cymbalta    Pulmonary nodules  -Outpatient pulmonology    Gout  -Continue with PTA allopurinol, colchicine, prednisone  -Outpatient rheumatology follow-up    Hypomagnesemia  -Continue PTA Mag-Ox    Recurrent C. difficile infection  -No diarrhea reported    Social: lives with spouse. Discussed with daughter.  Med rec: not complete- await pharmacy     Diet: NPO for Medical/Clinical Reasons Except for: No Exceptions    DVT Prophylaxis: Pneumatic Compression Devices  Brandon Catheter: Not present  Lines: None     Cardiac Monitoring: ACTIVE order. Indication: Tachyarrhythmias, acute (48 hours)  Code Status: No CPR- Do NOT Intubate    Clinically Significant Risk Factors Present on Admission           # Hypercalcemia: Highest Ca = 10.5 mg/dL in last 2 days, will monitor as appropriate     # Drug Induced Coagulation Defect: home medication list includes an anticoagulant medication                 Disposition Plan      Expected Discharge Date: 03/16/2023                  Loretta Germain MD  Hospitalist Service  [facility]  Securely message with [employer] (more info)  Text page via [employer] Paging/Directory     ______________________________________________________________________    Chief Complaint   S/p fall        History of Present Illness   Fatuma Henry is a 77 year old female who has h/o recurrent C. difficile, paroxysmal A-fib, hypertension, hyperlipidemia, history of pulmonary hypertension, diastolic CHF, CKD-III, presyncope, left ICA occlusion, microcytic anemia, rheumatoid arthritis, gout, GERD, depression presented with left hip pain after falling at home.  Per patient, her legs gave out.  She denied any chest pain, dizziness, palpitation, loss of consciousness during the fall.  CT-head and CT and cervical spine with no acute findings.  CT pelvis showed acute impacted mildly displaced subcapital fracture of the  left femur.  She will be admitted for further work-up and management      Past Medical History    Past Medical History:   Diagnosis Date     Atrial fibrillation (H)      CKD (chronic kidney disease) stage 3, GFR 30-59 ml/min (H)      COPD (chronic obstructive pulmonary disease) (H)     nocturnal oxygen     CRF (chronic renal failure)      GERD (gastroesophageal reflux disease)      Hypertension      Hypovolemic shock (H)      Influenza A 12/01/2017     Pulmonary hypertension (H)      Pulmonary nodules      Rheumatoid arthritis (H)      Sepsis (H) 12/24/2017       Past Surgical History   Past Surgical History:   Procedure Laterality Date     APPENDECTOMY       BLADDER SUSPENSION       CHOLECYSTECTOMY       PICC TRIPLE LUMEN PLACEMENT  12/13/2022            Prior to Admission Medications   Prior to Admission Medications   Prescriptions Last Dose Informant Patient Reported? Taking?   Acetaminophen (ACETAMIN PO)   Yes No   Sig: Take 500 mg by mouth every 4 hours as needed (pain or fever)   DULoxetine (CYMBALTA) 20 MG capsule   Yes No   Sig: Take 40 mg by mouth daily   MAGIC MOUTHWASH (FV STANDARD FORMULA)   Yes No   Sig: Swish and swallow 10 mLs in mouth every 6 hours as needed for mouth sores   Vitamin D3 (CHOLECALCIFEROL) 125 MCG (5000 UT) tablet   Yes No   Sig: Take 125 mcg by mouth daily   albuterol (PROAIR HFA/PROVENTIL HFA/VENTOLIN HFA) 108 (90 Base) MCG/ACT inhaler   Yes No   Sig: Inhale 2 puffs into the lungs every 4 hours as needed for shortness of breath / dyspnea or wheezing   allopurinol (ZYLOPRIM) 300 MG tablet   No No   Sig: Take 1 tablet (300 mg) by mouth daily   benzocaine (ANBESOL) 10 % gel   Yes No   Sig: Take by mouth every 6 hours as needed for mouth sores   budesonide-formoterol (SYMBICORT) 160-4.5 MCG/ACT Inhaler   No No   Sig: Inhale 2 puffs into the lungs 2 times daily for 30 days   colchicine (COLCYRS) 0.6 MG tablet   No No   Sig: Take 1 tablet by mouth once daily   compressor, for nebulizer  Saran   No No   Sig: [COMPRESSOR, FOR NEBULIZER SARAN] Nebulizer treatment qid prn   fidaxomicin (DIFICID) 200 MG tablet   No No   Sig: Take 1 PO BID x 5 days, then 1 PO every other day for 20 days   folic acid (FOLVITE) 1 MG tablet   No No   Sig: Take 1 tablet (1 mg) by mouth daily   furosemide (LASIX) 20 MG tablet   No No   Sig: Take 1 tablet (20 mg) by mouth daily   ipratropium - albuterol 0.5 mg/2.5 mg/3 mL (DUONEB) 0.5-2.5 (3) MG/3ML neb solution   No No   Sig: Take 1 vial (3 mLs) by nebulization every 4 hours as needed for wheezing or shortness of breath / dyspnea   magnesium oxide (MAG-OX) 400 MG tablet   Yes No   Sig: Take 400 mg by mouth 2 times daily   methocarbamol (ROBAXIN) 500 MG tablet   Yes No   Sig: Take 500 mg by mouth daily as needed for muscle spasms   methotrexate sodium 2.5 MG TABS   No No   Sig: Take 3 tablets (7.5 mg) by mouth once a week   metoprolol succinate ER (TOPROL XL) 100 MG 24 hr tablet   No No   Sig: Take 1 tablet (100 mg) by mouth daily   omeprazole 20 MG tablet   Yes No   Sig: Take 20 mg by mouth 2 times daily   pravastatin (PRAVACHOL) 20 MG tablet   Yes No   Sig: Take 20 mg by mouth every evening   predniSONE (DELTASONE) 5 MG tablet   Yes No   Sig: Take 5 mg by mouth daily Holding until decadron course for COVID is completed.   rivaroxaban ANTICOAGULANT (XARELTO) 15 MG TABS tablet   No No   Sig: Take 1 tablet (15 mg) by mouth daily (with dinner)   traZODone (DESYREL) 50 MG tablet   No No   Sig: Take 0.5 tablets (25 mg) by mouth nightly as needed for sleep      Facility-Administered Medications: None        Review of Systems    The 10 point Review of Systems is negative other than noted in the HPI or here.      Physical Exam   Vital Signs: Temp: 97.6  F (36.4  C) Temp src: Oral BP: (!) 149/87 Pulse: (!) 129   Resp: 19 SpO2: 100 % O2 Device: Nasal cannula Oxygen Delivery: 3 LPM  Weight: 150 lbs 0 oz    GEN: Alert and oriented to self, date, place, situation. Not in acute  distress.  HEENT: Atraumatic, mucous membrane- moist and pink.  Chest: Decreased bilateral air entry.  CVS: S1S2 regular. No murmurs, rubs or gallops.  Abdomen: Soft. Non-tender, non-distended. No organomegaly. No guarding or rigidity. Bowel sounds active.   Extremities: ++ b/l LE  Edema. Left hip area pain.  CNS: No focal neurologic deficit. No involuntary movements.  Skin: No skin rash, no cyanosis or clubbing.     Medical Decision Making             Data     I have personally reviewed the following data over the past 24 hrs:    11.1 (H)  \   10.6 (L)   / 334     140 102 22.4 /  114 (H)   4.9 31 (H) 1.12 (H) \       ALT: 29 AST: 31 AP: 116 (H) TBILI: 0.4   ALB: 4.0 TOT PROTEIN: 6.5 LIPASE: N/A       INR:  2.18 (H) PTT:  40 (H)   D-dimer:  N/A Fibrinogen:  N/A

## 2023-03-14 NOTE — ED PROVIDER NOTES
EMERGENCY DEPARTMENT ENCOUNTER      NAME: Fatuma Henry  AGE: 77 year old female  YOB: 1945  MRN: [id number]  EVALUATION DATE & TIME: 3/14/2023 11:47 AM    PCP: Tory Wise    ED PROVIDER: Mary Santana PA-C      Chief Complaint   Patient presents with     Fall     Hip pain         FINAL IMPRESSION:  1. Fracture of femur (H)    2. Fall, initial encounter    3. Abnormal CT of spine    4. Chronic anticoagulation    5. Atrial fibrillation with RVR (H)    6. Oxygen dependent          MEDICAL DECISION MAKING:    Pertinent Labs & Imaging studies reviewed. (See chart for details)  77 year old female with a pertinent history of CHF, hypertension, COPD, CKD3a, A-fib on xarelto, JOHANNA presents to the Emergency Department for evaluation of suspected mechanical fall with left hip pain.  Denies head injury or loss of consciousness.  She is on 3 LPM O2 at baseline.    Vitals reviewed and notable for elevated blood pressure.  On monitor she is in A-fib with RVR with a heart rate in the 110-120s.  She is on 3 LPM NC saturating at 100%.  GCS 15.  No midline cervical spine tenderness or pain with axial loading.  On exam she has tenderness with palpation of the left hip and severely limited range of motion secondary to pain.  CMTS intact distally.  Differential diagnosis includes but not limited to fracture, dislocation, ligamentous injury.    Given unwitnessed fall on blood thinners we did CT her head and cervical spine which was unremarkable for acute findings.  The CT scan of her pelvis is showing a left subcapital femur fracture.  Given we do not have beds here I did attempt to find her a bed elsewhere however this was unsuccessful.  Ultimately, patient was admitted here.  Orthopedics consulted and will evaluate the patient with likely surgery tomorrow after medical optimization from the medicine team.  Patient is unclear if she took her medications this morning.  I did give her 5 mg metoprolol  IV.    Medical Decision Making    History:    Supplemental history from: Documented in chart, if applicable    External Record(s) reviewed: Documented in chart, if applicable.    Work Up:    Chart documentation includes differential considered and any EKGs or imaging independently interpreted by provider, where specified.    In additional to work up documented, I considered the following work up: Documented in chart, if applicable.    External consultation:    Discussion of management with another provider: Hospitalist and Orthopedics    Complicating factors:    Care impacted by chronic illness: Anticoagulated State, Chronic Lung Disease and Heart Disease    Care affected by social determinants of health: N/A    Disposition considerations: Admit.    0 minutes of critical care time     ED COURSE:  11:50 AM I met with the patient, obtained history, performed an initial exam, and discussed options and plan for diagnostics and treatment here in the ED.  1:13 PM I staffed the patient with Dr. Kohler.   1:14 PM I rechecked and updated the patient with results.  2:09 PM I spoke with patient placement regarding bed placement for patient.   3:07 PM I spoke with patient placement regarding bed placement for patient.    3:55 PM Spoke with Dr. Markham with [facility].  3:59 PM Patient admitted to Dr. Germain who accepts patient for admission.     At the conclusion of the encounter I discussed the results of all of the tests and the disposition. The questions were answered. The patient acknowledged understanding and was agreeable with the care plan.     MEDICATIONS GIVEN IN THE EMERGENCY:  Medications   lidocaine 1 % 0.1-1 mL (has no administration in time range)   lidocaine (LMX4) cream (has no administration in time range)   sodium chloride (PF) 0.9% PF flush 3 mL (3 mLs Intracatheter $Given 3/14/23 1314)   sodium chloride (PF) 0.9% PF flush 3 mL (has no administration in time range)   HYDROmorphone (PF) (DILAUDID)  injection 0.5 mg (0.5 mg Intravenous $Given 3/14/23 1226)   metoprolol (LOPRESSOR) injection 5 mg (5 mg Intravenous $Given 3/14/23 1523)   HYDROmorphone (PF) (DILAUDID) injection 0.5 mg (0.5 mg Intravenous $Given 3/14/23 1542)       NEW PRESCRIPTIONS STARTED AT TODAY'S ER VISIT  New Prescriptions    No medications on file            =================================================================    HPI:    Patient information was obtained from: Patient     Use of Interpretor: N/A      Fatuma JAZMIN Carl is a 77 year old female with a pertinent history of CHF, hypertension, COPD, CKD3a, A-fib on xarelto, JOHANNA who presents to this ED via EMS for evaluation of hip pain post fall.     Patient reports she was walking with her walker when she had an unwitnessed fall earlier today. She does not know why she fell. She reports falling forward and landing on her left hip. She was on the ground for 10 minutes prior to EMS arrival. Denies hitting head and loss of consciousness. She lives with her . Denies being dizzy or lightheaded prior to fall. Endorses left hip pain and limited range of motion secondary to pain. Of note, patient is on 3L of O2 at baseline for COPD. She is anticoagulated on xarelto.    Denies abdominal pain, nausea, chest pain, and worsening shortness of breath from baseline.    REVIEW OF SYSTEMS:  Review of Systems   HENT:        Negative for hitting head.    Respiratory: Negative for shortness of breath.    Cardiovascular: Negative for chest pain.   Gastrointestinal: Negative for abdominal pain and nausea.   Musculoskeletal:        Positive for left hip pain.    Neurological: Negative for syncope.   Hematological: Bruises/bleeds easily.   All other systems reviewed and are negative.      PAST MEDICAL HISTORY:  Past Medical History:   Diagnosis Date     Atrial fibrillation (H)      CKD (chronic kidney disease) stage 3, GFR 30-59 ml/min (H)      COPD (chronic obstructive pulmonary disease) (H)      nocturnal oxygen     CRF (chronic renal failure)      GERD (gastroesophageal reflux disease)      Hypertension      Hypovolemic shock (H)      Influenza A 12/01/2017     Pulmonary hypertension (H)      Pulmonary nodules      Rheumatoid arthritis (H)      Sepsis (H) 12/24/2017       PAST SURGICAL HISTORY:  Past Surgical History:   Procedure Laterality Date     APPENDECTOMY       BLADDER SUSPENSION       CHOLECYSTECTOMY       PICC TRIPLE LUMEN PLACEMENT  12/13/2022                CURRENT MEDICATIONS:      Current Facility-Administered Medications:      lidocaine (LMX4) cream, , Topical, Q1H PRN, Asiya Kohler MD     lidocaine 1 % 0.1-1 mL, 0.1-1 mL, Other, Q1H PRN, Asiya Kohler MD     sodium chloride (PF) 0.9% PF flush 3 mL, 3 mL, Intracatheter, Q8H, Asiya Kohler MD, 3 mL at 03/14/23 1314     sodium chloride (PF) 0.9% PF flush 3 mL, 3 mL, Intracatheter, q1 min prn, Asiya Kohler MD    Current Outpatient Medications:      Acetaminophen (ACETAMIN PO), Take 500 mg by mouth every 4 hours as needed (pain or fever), Disp: , Rfl:      albuterol (PROAIR HFA/PROVENTIL HFA/VENTOLIN HFA) 108 (90 Base) MCG/ACT inhaler, Inhale 2 puffs into the lungs every 4 hours as needed for shortness of breath / dyspnea or wheezing, Disp: , Rfl:      allopurinol (ZYLOPRIM) 300 MG tablet, Take 1 tablet (300 mg) by mouth daily, Disp: 60 tablet, Rfl: 0     benzocaine (ANBESOL) 10 % gel, Take by mouth every 6 hours as needed for mouth sores, Disp: , Rfl:      budesonide-formoterol (SYMBICORT) 160-4.5 MCG/ACT Inhaler, Inhale 2 puffs into the lungs 2 times daily for 30 days, Disp: 10 g, Rfl: 11     colchicine (COLCYRS) 0.6 MG tablet, Take 1 tablet by mouth once daily, Disp: 60 tablet, Rfl: 0     compressor, for nebulizer Saran, [COMPRESSOR, FOR NEBULIZER SARAN] Nebulizer treatment qid prn, Disp: 1 Device, Rfl: 0     DULoxetine (CYMBALTA) 20 MG capsule, Take 40 mg by mouth daily, Disp: , Rfl:      fidaxomicin (DIFICID) 200 MG  tablet, Take 1 PO BID x 5 days, then 1 PO every other day for 20 days, Disp: 20 tablet, Rfl: 0     folic acid (FOLVITE) 1 MG tablet, Take 1 tablet (1 mg) by mouth daily, Disp: 90 tablet, Rfl: 0     furosemide (LASIX) 20 MG tablet, Take 1 tablet (20 mg) by mouth daily, Disp: , Rfl:      ipratropium - albuterol 0.5 mg/2.5 mg/3 mL (DUONEB) 0.5-2.5 (3) MG/3ML neb solution, Take 1 vial (3 mLs) by nebulization every 4 hours as needed for wheezing or shortness of breath / dyspnea, Disp: 90 mL, Rfl: 0     MAGIC MOUTHWASH (FV STANDARD FORMULA), Swish and swallow 10 mLs in mouth every 6 hours as needed for mouth sores, Disp: , Rfl:      magnesium oxide (MAG-OX) 400 MG tablet, Take 400 mg by mouth 2 times daily, Disp: , Rfl:      methocarbamol (ROBAXIN) 500 MG tablet, Take 500 mg by mouth daily as needed for muscle spasms, Disp: , Rfl:      methotrexate sodium 2.5 MG TABS, Take 3 tablets (7.5 mg) by mouth once a week, Disp: 36 tablet, Rfl: 0     metoprolol succinate ER (TOPROL XL) 100 MG 24 hr tablet, Take 1 tablet (100 mg) by mouth daily, Disp: , Rfl:      omeprazole 20 MG tablet, Take 20 mg by mouth 2 times daily, Disp: , Rfl:      pravastatin (PRAVACHOL) 20 MG tablet, Take 20 mg by mouth every evening, Disp: , Rfl:      predniSONE (DELTASONE) 5 MG tablet, Take 5 mg by mouth daily Holding until decadron course for COVID is completed., Disp: , Rfl:      rivaroxaban ANTICOAGULANT (XARELTO) 15 MG TABS tablet, Take 1 tablet (15 mg) by mouth daily (with dinner), Disp: 90 tablet, Rfl: 3     traZODone (DESYREL) 50 MG tablet, Take 0.5 tablets (25 mg) by mouth nightly as needed for sleep, Disp: , Rfl:      Vitamin D3 (CHOLECALCIFEROL) 125 MCG (5000 UT) tablet, Take 125 mcg by mouth daily, Disp: , Rfl:       ALLERGIES:  Allergies   Allergen Reactions     Codeine Nausea and Vomiting     Fosamax [Alendronic Acid] Diarrhea     Morphine Nausea and Vomiting     Other reaction(s): Vomiting       FAMILY HISTORY:  Family History   Problem  "Relation Age of Onset     Heart Failure Mother      Cerebrovascular Disease Father      Kidney failure Sister      Cerebrovascular Disease Brother      Diabetes Type 2  Brother        SOCIAL HISTORY:   Social History     Socioeconomic History     Marital status:    Tobacco Use     Smoking status: Former     Packs/day: 0.50     Types: Cigarettes     Quit date: 2017     Years since quittin.2     Smokeless tobacco: Never   Substance and Sexual Activity     Alcohol use: No     Drug use: No     Sexual activity: Not Currently       VITALS:  Patient Vitals for the past 24 hrs:   BP Temp Temp src Pulse Resp SpO2 Height Weight   23 1545 (!) 149/87 -- -- (!) 129 19 100 % -- --   23 1500 (!) 145/78 -- -- 107 17 100 % -- --   23 1430 (!) 153/85 -- -- 119 17 100 % -- --   23 1411 (!) 146/75 -- -- (!) 128 17 100 % -- --   23 1357 (!) 153/69 -- -- (!) 123 18 100 % -- --   23 1347 (!) 146/64 -- -- (!) 137 27 100 % -- --   23 1327 (!) 159/67 -- -- (!) 141 27 98 % -- --   23 1317 (!) 156/65 -- -- (!) 122 25 100 % -- --   23 1258 (!) 141/62 -- -- 101 15 -- -- --   23 1249 134/65 -- -- 117 21 -- -- --   23 1159 -- -- -- 95 -- 100 % -- --   23 1158 (!) 161/74 -- -- 99 13 92 % -- --   23 1157 -- -- -- -- -- -- 1.651 m (5' 5\") 68 kg (150 lb)   23 1149 (!) 152/109 97.6  F (36.4  C) Oral -- 20 -- -- --       PHYSICAL EXAM   General Appearance: Alert, cooperative, normal speech and facial symmetry, appears stated age  Head:  Normocephalic, without obvious abnormality, atraumatic  Eyes:  PERRL, conjunctiva/corneas clear, EOM's intact, no orbital injury  ENT:  No obvious facial deformity. No tenderness to palpation. No epistaxis. Normal dentition.  Normal bite.  No malocclusion.  No oral pharyngeal bleeding no dysphonia or difficulty swallowing, membranes are moist without pallor  Neck:  No midline cervical spine tenderness.  No paraspinal " tenderness. No pain with axial loading. Supple, symmetrical, trachea midline, no stridor or dysphonia, no soft tissue swelling or tenderness  Chest:  No tenderness or deformity, no crepitus  Cardio:  Irregularly irregular rhythm, rate in the 110-120s, S1 and S2 normal, no murmur, rub or gallop, 2+ pulses symmetric in all extremities  Pulm:  Clear to auscultation bilaterally, respirations unlabored,   Back:  Symmetric, no curvature, ROM normal, no CVA tenderness, no spinal tenderness  Abdomen: Soft, non-tender, bowel sounds active all four quadrants, no masses, no organomegaly, no rebound or guarding, no pelvic pain to compression  Extremities: Pulses equal in all extremities, normal cap refill. Left hip pain, unable to preform range of motion secondary to pain.   Skin:  Skin color, texture, turgor normal, no rashes or lesions. Scattered bruising to upper extremities that appears old.   Neuro:  Awake, alert, responsive to voice, follows commands, normal speech, No gross motor weakness or sensory loss, moves all extremities, GCS 15    LAB:  All pertinent labs reviewed and interpreted.  Recent Results (from the past 24 hour(s))   Comprehensive metabolic panel    Collection Time: 03/14/23 12:21 PM   Result Value Ref Range    Sodium 140 136 - 145 mmol/L    Potassium 4.9 3.4 - 5.3 mmol/L    Chloride 102 98 - 107 mmol/L    Carbon Dioxide (CO2) 31 (H) 22 - 29 mmol/L    Anion Gap 7 7 - 15 mmol/L    Urea Nitrogen 22.4 8.0 - 23.0 mg/dL    Creatinine 1.12 (H) 0.51 - 0.95 mg/dL    Calcium 10.5 (H) 8.8 - 10.2 mg/dL    Glucose 114 (H) 70 - 99 mg/dL    Alkaline Phosphatase 116 (H) 35 - 104 U/L    AST 31 10 - 35 U/L    ALT 29 10 - 35 U/L    Protein Total 6.5 6.4 - 8.3 g/dL    Albumin 4.0 3.5 - 5.2 g/dL    Bilirubin Total 0.4 <=1.2 mg/dL    GFR Estimate 51 (L) >60 mL/min/1.73m2   INR    Collection Time: 03/14/23 12:21 PM   Result Value Ref Range    INR 2.18 (H) 0.85 - 1.15   Partial thromboplastin time    Collection Time: 03/14/23  12:21 PM   Result Value Ref Range    aPTT 40 (H) 22 - 38 Seconds   Alcohol ethyl    Collection Time: 03/14/23 12:21 PM   Result Value Ref Range    Alcohol ethyl <0.01 <=0.01 g/dL   CBC with platelets and differential    Collection Time: 03/14/23 12:21 PM   Result Value Ref Range    WBC Count 11.1 (H) 4.0 - 11.0 10e3/uL    RBC Count 3.40 (L) 3.80 - 5.20 10e6/uL    Hemoglobin 10.6 (L) 11.7 - 15.7 g/dL    Hematocrit 35.1 35.0 - 47.0 %     (H) 78 - 100 fL    MCH 31.2 26.5 - 33.0 pg    MCHC 30.2 (L) 31.5 - 36.5 g/dL    RDW 16.6 (H) 10.0 - 15.0 %    Platelet Count 334 150 - 450 10e3/uL    % Neutrophils 85 %    % Lymphocytes 6 %    % Monocytes 6 %    % Eosinophils 1 %    % Basophils 1 %    % Immature Granulocytes 1 %    NRBCs per 100 WBC 0 <1 /100    Absolute Neutrophils 9.5 (H) 1.6 - 8.3 10e3/uL    Absolute Lymphocytes 0.7 (L) 0.8 - 5.3 10e3/uL    Absolute Monocytes 0.6 0.0 - 1.3 10e3/uL    Absolute Eosinophils 0.1 0.0 - 0.7 10e3/uL    Absolute Basophils 0.1 0.0 - 0.2 10e3/uL    Absolute Immature Granulocytes 0.1 <=0.4 10e3/uL    Absolute NRBCs 0.0 10e3/uL       RADIOLOGY:  Reviewed all pertinent imaging. Please see official radiology report.  Cervical spine CT w/o contrast   Final Result   IMPRESSION:   1.  No fracture or posttraumatic subluxation.   2.  No high-grade spinal canal or neural foraminal stenosis.      CT Pelvis Bone wo Contrast   Final Result   IMPRESSION:   1.  Acute impacted mildly displaced subcapital fracture of the left femur.      2.  Healing insufficiency fracture of the left sacral ala.      Head CT w/o contrast   Final Result   IMPRESSION:   1.  No acute intracranial hemorrhage, mass, or herniation.   2.  Mild diffuse parenchymal volume loss and white matter changes most likely due to chronic microvascular ischemic disease.      XR Pelvis and Hip Left 2 Views    (Results Pending)       EKG:      Performed at: 1156    Impression: a fib    Rate: 96  Rhythm: irregularly irregular  Axis: 52  QRS  Interval: 78  QTc Interval: 434  ST Changes: no acute ST elevations or depressions  Comparison: 2/12/23 no significant change    I have independently reviewed and interpreted the EKG(s) documented above.  Reviewed with Dr. Acevedo      I, Ivet Pulido, am serving as a scribe to document services personally performed by Mary Santana PA-C based on my observation and the provider's statements to me. I, Mary Santana PA-C attest that Ivet Pulido is acting in a scribe capacity, has observed my performance of the services and has documented them in accordance with my direction.    Mary Santana PA-C  Emergency Medicine  [facility]  3/14/2023       Mary Santana PA-C  03/14/23 1622

## 2023-03-14 NOTE — ED TRIAGE NOTES
Pt came via EMS [facility]. Pt fell today morning and assisted to her chair. Later on, Left hip pain started. Home health care presented at that time, called EMS. Pt is on thinner (Xaralto). Trauma alert called. Pt has hx of COPD, on oxygen at home 3L and Afib. Denies chest pain and n/v. C/o short of breath. Purewick placed.

## 2023-03-14 NOTE — PHARMACY-ADMISSION MEDICATION HISTORY
Pharmacy Note - Admission Medication History    Pertinent Provider Information:    - The patient has just finished a course of Fidaxomicin     ______________________________________________________________________    Prior To Admission (PTA) med list completed and updated in EMR.       PTA Med List   Medication Sig Last Dose     Acetaminophen (ACETAMIN PO) Take 500 mg by mouth every 4 hours as needed (pain or fever) Unknown     albuterol (PROAIR HFA/PROVENTIL HFA/VENTOLIN HFA) 108 (90 Base) MCG/ACT inhaler Inhale 2 puffs into the lungs every 4 hours as needed for shortness of breath / dyspnea or wheezing Past Week     allopurinol (ZYLOPRIM) 300 MG tablet Take 1 tablet (300 mg) by mouth daily 3/14/2023 at am     cholestyramine (QUESTRAN) 4 g packet 1 packet mixed with water or non-carbonated drink Orally Three times daily 3/14/2023 at am     colchicine (COLCYRS) 0.6 MG tablet Take 1 tablet by mouth once daily 3/14/2023 at am     compressor, for nebulizer Saran [COMPRESSOR, FOR NEBULIZER SARAN] Nebulizer treatment qid prn Unknown     dicyclomine (BENTYL) 20 MG tablet TAKE 1 TABLET BY MOUTH THREE TIMES DAILY AS NEEDED FOR ABDOMINAL DISCOMFORT Not started     DULoxetine (CYMBALTA) 20 MG capsule Take 20 mg by mouth daily 3/14/2023 at am     famotidine (PEPCID) 20 MG tablet Take 20 mg by mouth daily 3/14/2023 at am     folic acid (FOLVITE) 1 MG tablet Take 1 tablet (1 mg) by mouth daily 3/14/2023 at am     furosemide (LASIX) 20 MG tablet Take 1 tablet (20 mg) by mouth daily 3/14/2023 at am     guaiFENesin (MUCINEX) 600 MG 12 hr tablet Take 600 mg by mouth 2 times daily 3/14/2023 at am     hydrOXYzine (VISTARIL) 25 MG capsule Take 25 mg by mouth nightly as needed Not started     ipratropium - albuterol 0.5 mg/2.5 mg/3 mL (DUONEB) 0.5-2.5 (3) MG/3ML neb solution Take 1 vial (3 mLs) by nebulization every 4 hours as needed for wheezing or shortness of breath / dyspnea Unknown     Lactobacillus-Inulin (CULTURELLE DIGESTIVE  HEALTH) CAPS Take 1 capsule by mouth 2 times daily 3/14/2023 at am     MAGIC MOUTHWASH (FV STANDARD FORMULA) Swish and swallow 10 mLs in mouth every 6 hours as needed for mouth sores Unknown     magnesium oxide (MAG-OX) 400 MG tablet Take 400 mg by mouth 2 times daily 3/14/2023 at am     methocarbamol (ROBAXIN) 500 MG tablet Take 500 mg by mouth daily as needed for muscle spasms Unknown     methotrexate sodium 2.5 MG TABS Take 3 tablets (7.5 mg) by mouth once a week On hold     metoprolol succinate ER (TOPROL XL) 100 MG 24 hr tablet Take 1 tablet (100 mg) by mouth daily 3/14/2023 at am     pravastatin (PRAVACHOL) 20 MG tablet Take 20 mg by mouth every evening 3/13/2023 at pm     predniSONE (DELTASONE) 5 MG tablet Take 5 mg by mouth daily 3/14/2023 at am     rivaroxaban ANTICOAGULANT (XARELTO) 15 MG TABS tablet Take 1 tablet (15 mg) by mouth daily (with dinner) 3/13/2023 at pm     Vitamin D3 (CHOLECALCIFEROL) 125 MCG (5000 UT) tablet Take 125 mcg by mouth daily 3/14/2023 at am       Information source(s): Family member, Clinic records and [facility]  Method of interview communication: in-person    Summary of Changes to PTA Med List  New:   Discontinued:   Changed:     Patient was asked about OTC/herbal products specifically.  PTA med list reflects this.    In the past week, patient estimated taking medication this percent of the time:  greater than 90%.    Medication Affordability:       Allergies were reviewed, assessed, and updated with the patient.      Patient did not bring any medications to the hospital and can't retrieve from home. No multi-dose medications are available for use during hospital stay.     The information provided in this note is only as accurate as the sources available at the time of the update(s).    Thank you for the opportunity to participate in the care of this patient.    Rachel Marks, PharmD.  3/14/2023 6:54 PM

## 2023-03-15 NOTE — CONSULTS
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     PLAN:  This patient was discussed with Dr. Markham and Dr. Mckeon, on-call surgeon for [facility] and they are in agreement with the following plan.  Patient was evaluated for left hip fracture after mechanical fall.  Patient with a complex medical history including A-fib with RVR, CKD, COPD, HTN, etc.  Patient currently admitted for left hip fracture and A-fib with RVR.  On exam LLE, skin and neurovascular intact.  XR revealed a femoral neck fracture. Due to the nature of the patient's fracture, we are recommending surgical fixation. The risks and benefits of surgical intervention including, but not limited to, injury to nerves, arteries or veins, malunion, nonunion, DVT or even death, were discussed with the patient and they are in agreement with proceeding. Patient is to remain NPO pending surgical intervention. We will plan to proceed with surgery at the earliest opportunity once medical clearance has been obtained.  Recommendation follow:    -Tentatively plan for surgery at 3PM with Dr. Mckeon pending medical clearance   -NPO  -Hold all anticoagulation  -NWB LLE, bedrest  -Pain management  -Ortho will continue to follow patient      Thank you for including [facility] in the care of Fatuma Henry. It has been a pleasure participating in Fatuma Henry's care.        CHIEF COMPLAINT: Oxygen dependent    HISTORY OF PRESENT ILLNESS:  The patient is seen in orthopedic consultation at the request of Mary Santana PA-C.  The patient is a 77 year old female with moderate and severe pain of the left  hip.  Patient is accompanied by her daughters.  The patient reports that while walking with a walker to the living room, she fell landing on her knee.  Patient reports initial pain localized on the left hip and unable to stand. They report that their pain does not radiate. The patient reports that their pain is alleviated by rest and is exacerbated by movement.  Per daughter patient just returned from TCU about a week ago this patient was quite sick with C. difficile, pneumonia, etc.  Patient uses a walker at baseline.  Patient takes Xarelto for A-fib.  Denies new numbness, tingling.      ALLERGIES:   Review of patient's allergies indicates   Allergies   Allergen Reactions     Codeine Nausea and Vomiting     Fosamax [Alendronic Acid] Diarrhea     Morphine Nausea and Vomiting     Other reaction(s): Vomiting         MEDICATIONS UPON ADMISSION:  Medications were reviewed.  They include:   (Not in a hospital admission)        SOCIAL HISTORY:   she  reports that she quit smoking about 5 years ago. Her smoking use included cigarettes. She smoked an average of .5 packs per day. She has never used smokeless tobacco. She reports that she does not drink alcohol and does not use drugs.      FAMILY HISTORY:  family history includes Cerebrovascular Disease in her brother and father; Diabetes Type 2  in her brother; Heart Failure in her mother; Kidney failure in her sister.      REVIEW OF SYSTEMS:   Reviewed with patient. See HPI, otherwise negative       PHYSICAL EXAMINATION:  Vitals: Temp:  [98  F (36.7  C)] 98  F (36.7  C)  Pulse:  [] 88  Resp:  [11-37] 20  BP: (102-159)/(53-97)  124/57  FiO2 (%):  [3 %] 3 %  SpO2:  [83 %-100 %] 96 %  General: On examination, the patient is awake and alert and oriented to general circumstances   SKIN: There is no evidence of erythema, warmth, swelling, deformity, break to the skin, open wound.  LLE shortened externally rotated.  Pulses:  dorsalis pedis and posterior tibial pulse is intact and equal bilaterally  Sensation: intact and equal bilaterally to the distal lower extremities.  Tenderness: NTTP of the distal femur, knee, tibia, fibula, ankle, foot. Compartment soft and non-tender.  Defer palpation of the proximal femur given known fracture.  ROM: R hip and knee ROM given known fracture.  Ankle DF/PF without pain.  Contralateral side= Full range of motion, Negative joint instability findings, 5/5 motor groups about the joint, Non-tender.       RADIOGRAPHIC EVALUATION:  Personally reviewed  Narrative & Impression   EXAM: CT PELVIS BONE WO CONTRAST  LOCATION: [facility]  DATE/TIME: 3/14/2023 12:46 PM     INDICATION: Fall, left hip pain.  COMPARISON: 02/13/2023 CTA.  TECHNIQUE: CT scan of the pelvis was performed without IV contrast. Multiplanar reformats were obtained. Dose reduction techniques were used.  CONTRAST: None.     FINDINGS:     Bones: Acute mildly displaced subcapital fracture of the left femur as best seen on series 4 image 59 series 2 image 77. Healing left-sided sacral insufficiency fracture. Osteopenia. No degenerative changes in either hip.     Soft tissues: Moderate size left hip effusion. Pelvic intraperitoneal contents normal.                                                                       IMPRESSION:  1.  Acute impacted mildly displaced subcapital fracture of the left femur.     2.  Healing insufficiency fracture of the left sacral ala         PERTINENT LABS:  Lab Results: personally reviewed.   Lab Results   Component Value Date     03/15/2023    CO2 28 03/15/2023    CO2 20 11/10/2022    BUN 20.7  03/15/2023    BUN 13 11/10/2022     Lab Results   Component Value Date    WBC 9.8 03/15/2023    HGB 10.0 03/15/2023    HCT 33.2 03/15/2023     03/15/2023     03/15/2023         Zoe Denny PA-C, PA-C  Date: 3/15/2023  Time: 1:13 PM    CC1:   Loretta Germain MD    CC2:   Tory Wise     abdominal pain

## 2023-03-15 NOTE — CONSULTS
Care Management Initial Consult    General Information  Assessment completed with: Patient, Children, Pt, dtrs Enid and Lisbeth  Type of CM/SW Visit: Initial Assessment    Primary Care Provider verified and updated as needed:     Readmission within the last 30 days:        Reason for Consult: discharge planning  Advance Care Planning:            Communication Assessment  Patient's communication style: spoken language (English or Bilingual)             Cognitive  Cognitive/Neuro/Behavioral: .WDL except, WDL     Arousal Level: opens eyes spontaneously  Orientation: oriented x 4  Mood/Behavior: calm, cooperative  Best Language: 0 - No aphasia       Living Environment:   People in home: spouse     Current living Arrangements: apartment      Able to return to prior arrangements: other (see comments) (TBD)       Family/Social Support:  Care provided by: self, homecare agency  Provides care for: no one, unable/limited ability to care for self  Marital Status:   , Children          Description of Support System: Supportive, Involved    Support Assessment: Adequate family and caregiver support, Adequate social supports    Current Resources:   Patient receiving home care services: Yes  Skilled Home Care Services: Skilled Nursing  Community Resources: Home Care  Equipment currently used at home: walker, rolling        Lifestyle & Psychosocial Needs:  Social Determinants of Health     Tobacco Use: Medium Risk     Smoking Tobacco Use: Former     Smokeless Tobacco Use: Never     Passive Exposure: Not on file   Alcohol Use: Not on file   Financial Resource Strain: Not on file   Food Insecurity: Not on file   Transportation Needs: Not on file   Physical Activity: Not on file   Stress: Not on file   Social Connections: Not on file   Intimate Partner Violence: Not on file   Depression: Not on file   Housing Stability: Not on file       Functional Status:  Prior to admission patient needed assistance:   Dependent ADLs::  Ambulation-walker, Bathing  Dependent IADLs:: Cleaning, Cooking, Laundry, Shopping, Meal Preparation, Transportation  Assesssment of Functional Status: Needs placement in a SNF/TCF for rehabilitation, Not at  functional baseline    Mental Health Status:  Mental Health Status: No Current Concerns       Chemical Dependency Status:  Chemical Dependency Status: No Current Concerns             Values/Beliefs:  Spiritual, Cultural Beliefs, Caodaism Practices, Values that affect care:                 Additional Information:  SW met with pt and pts daughters Lisbeth and Enid in pts room for initial assessment. Pt was awake and participated somewhat in assessment but most information was gathered from pts daughters. They are anticipating pt will have surgery this afternoon and had multiple questions for SW. Lisbeth asks if someone can notarize the power of  paperwork they have with them. SW reviewed the paperwork and noted that it is a durable power of  for financial. SW informed Lisbeth that the hospital is not able to notarize this form. She asked about a healthcare directive. Enid reports that she believes pt has a healthcare directive and it might be on file with [facility]. SW offered to look into this and pt and daughters were agreeable.     Discussed pts baseline, per chart review pt had been at [facility] TCU back in January. Pts daughters report that pt was actually in TCU until last week. They report ongoing TCU stays since last November. They report that she was most recently at [facility]. They report that she was discharged with home care through [facility] but only for nursing care for a wound. They report that pt needed therapy also and the home care nurse had reached out to pts PCP to get therapy orders which they believe the home care had received. They report that pt needed assistance with bathing but the home care does not have staffing to provide this. They report that pt  lives with her  at baseline, but he is not able to provide any physical assistance. They identify that pt has gone to [facility] clinics for a long time for primary care but they are feeling they may need to move her to an [facility] clinic as the hospital is not able to see notes/medications/etc from [facility]. SW encouraged them to speak with pts PCP regarding this. They do request a list of [facility] clinics which SW provided.     Pts daughters anticipate pt will likely need TCU again after surgery. They report that they believe she has about 15 Medicare covered TCU days left in her 100 days of coverage. SW provided some general education about this and about Medicare home care coverage. They report understanding. Discussed plan for SW to follow up after surgery to discuss discharge planning further.    SW spoke with [facility] medical records. They confirmed they had an advanced directive on file which they faxed to STEPHANIE. SW emailed this to [employer], placed a hard copy on pts chart and provided a copy to Enid. Discussed that it lists pts  as the primary healthcare agent and both daughters as secondary, it is dated 2010. Pt reports understanding of this and agreement.     SW placed call to [facility]. They confirm pt is open to them for RN/PT/OT. SW left VM for pts home care  providing brief update and SW call back.    Joyce Mathur, [facility]      Addendum: SW received call back from Tashia pts  with [facility] ([phone]). SW provided update. She reports that if pt is able to discharge home after this hospitalization that they can take her back for services. She requested to be kept updated.  3:44 PM

## 2023-03-15 NOTE — BRIEF OP NOTE
[facility]    Brief Operative Note    Pre-operative diagnosis:  1. Left minimally displaced impacted subcapital femoral neck fracture    Post-operative diagnosis:  1. Left minimally displaced impacted subcapital femoral neck fracture     Procedure:  1. Left hip closed reduction, internal fixation with placement of Synthes femoral neck system    Surgeon: Denton Mckeon MD     Assistant: Vanessa Farrell PA-C    Anesthesia: General with Block   Estimated Blood Loss: 100 ml     Drains: None    Specimens: None     Findings: Minimally displaced subcapital femoral neck fracture, stable alignment intraop compared to pre operative images. Fracture alignment remained stable with placement of Synthes femoral neck system. Notable bone demineralization.     Complications: None apparent    Implants:   Implant Name Type Inv. Item Serial No.  Lot No. LRB No. Used Action   PLATE BN TI 1 HL STRL FEM NK - [employer] PLATE BN TI 1 HL STRL FEM NK NA [facility]- [id number] Left 1 Implanted   BOLT ORTH 80MM FEM NK SYS STRL [id number] - [employer] BOLT ORTH 80MM FEM NK SYS STRL [id number] NA [facility]- [id number] Left 1 Implanted   SCREW BN 46MM 5MM TI ST LCK STRDR T25 STRL - [employer] SCREW BN 46MM 5MM TI ST LCK STRDR T25 STRL NA [facility]- [id number] Left 1 Implanted   SCREW BN 80MM ANTIROTATE STRL FEM NK SYS [id number] - [employer] SCREW BN 80MM ANTIROTATE STRL FEM NK SYS [id number] NA [facility]- [id number] Left 1 Implanted     Activity: Up with assist and assistive device at all times.   Weight bearing status: WBAT LLE.  Pain management: Transition from IV to PO as tolerated.    Antibiotics: Ancef x 24 hours.  Diet: Begin with clear fluids and progress diet as tolerated.   DVT prophylaxis: Resume PTA Xarelto POD#1 and mechanical while in the hospital  Imaging: complete.  Labs: Hgb  POD#1.  Dressings: Keep clean, dry and intact x 3 days.   Elevation: Elevate LLE on pillows to keep above the level of the heart as much as possible.   Physical Therapy/Occupational Therapy: Eval and treat, appreciate assistance and recommendations.  Consults: PT/OT, social work for disposition planning.  Follow-up: Clinic in 2 weeks for incision check and repeat x-rays needed.   Disposition: Pending progress with therapies, pain control on orals, and medical stability, anticipate discharge to TCU.    Denton Mckeon MD  Orthopedic Surgery

## 2023-03-15 NOTE — CONSULTS
Cardiology Consult Note    Thank you, Dr. Germain, for asking the [facility] team to see Fatuma Henry in consultation at [facility] ED to evaluate preoperative cardiovascular risk and atrial fibrillation with rapid ventricular response.      Assessment:   1.  Paroxysmal atrial fibrillation with rapid ventricular response.  Patient with history of paroxysmal atrial fibrillation in the past although has been more persistent atrial fibrillation with evidence of atrial fibrillation during her hospitalization in late December to early January as well as again in February.  Rate is likely elevated due to pain from her fracture.  She is currently on diltiazem drip with adequate rate control.  This can be continued through surgery after which the patient can be restarted on her home dose of metoprolol succinate.  Patient had been on Xarelto anticoagulation although this is on hold in anticipation of hip surgery.  2.  Chest discomfort, etiology unclear.  Could be related to atrial fibrillation with rapid ventricular response with activity, along with her COPD, although cannot exclude occult coronary artery disease.  ECG shows no ischemic changes despite rapid heart rates.  Her last nuclear stress test was in 2018 which demonstrated no ischemia or infarction.  Echocardiogram this admission continues to show normal LV function without wall motion abnormality.  At this point, feel she can pursue surgery without further ischemic work-up.  3.  COPD requiring supplemental oxygen  4.  Left femur fracture secondary to mechanical fall.  Patient specifically denies lightheadedness or near syncope.     Plan:   1.  Continue diltiazem drip to maintain heart rate less than 100  2.  Echocardiogram to reassess LV function and wall motion given recent history of exertional chest discomfort.  This again demonstrates normal LV systolic function without wall motion abnormality.  Given this finding and lack of  ECG changes despite A-fib with RVR, feel she can proceed with surgery on her hip with low cardiac risk.      Primary cardiologist: None     Current History:   Ms. Fatuma Henry is a 77 year old female with history of paroxysmal atrial fibrillation, COPD on supplemental oxygen, chronic HFpEF, pulmonary hypertension who presented to the ED yesterday for evaluation of left hip pain.  Patient states she was walking in the living room where an her leg suddenly gave out and she fell to the floor.  Does not recall any chest discomfort, lightheadedness prior to the event.  At the time of the arrival, she was noted to be in atrial fibrillation with mildly rapid ventricular response.  Initiated on IV diltiazem with improvement in rate control.  BNP elevated at 2866 although this is down from 6091 in late December 2022.  Does report symptoms of exertional dyspnea as well as some chest discomfort.  States the chest discomfort with activity has been prevalent for the last 4 to 5 months.  Echocardiogram in late December demonstrated no evidence of a regional wall motion abnormality.    Past Medical History:     Past Medical History:   Diagnosis Date     Atrial fibrillation (H)      CKD (chronic kidney disease) stage 3, GFR 30-59 ml/min (H)      COPD (chronic obstructive pulmonary disease) (H)     nocturnal oxygen     CRF (chronic renal failure)      GERD (gastroesophageal reflux disease)      Hypertension      Hypovolemic shock (H)      Influenza A 12/01/2017     Pulmonary hypertension (H)      Pulmonary nodules      Rheumatoid arthritis (H)      Sepsis (H) 12/24/2017       Past Surgical History:     Past Surgical History:   Procedure Laterality Date     APPENDECTOMY       BLADDER SUSPENSION       CHOLECYSTECTOMY       PICC TRIPLE LUMEN PLACEMENT  12/13/2022            Family History:     Family History   Problem Relation Age of Onset     Heart Failure Mother      Cerebrovascular Disease Father      Kidney failure Sister       Cerebrovascular Disease Brother      Diabetes Type 2  Brother        Social History:    reports that she quit smoking about 5 years ago. Her smoking use included cigarettes. She smoked an average of .5 packs per day. She has never used smokeless tobacco. She reports that she does not drink alcohol and does not use drugs.    Meds:     Current Facility-Administered Medications:      diltiazem (CARDIZEM) 125 mg in sodium chloride 0.9 % 125 mL infusion, 5-15 mg/hr, Intravenous, Continuous, Salih, Mohsin, MD, Last Rate: 10 mL/hr at 03/15/23 0824, 10 mg/hr at 03/15/23 0824     fluticasone-vilanterol (BREO ELLIPTA) 200-25 MCG/ACT inhaler 1 puff, 1 puff, Inhalation, Daily, Loretta Germain MD     HYDROmorphone (DILAUDID) injection 0.2 mg, 0.2 mg, Intravenous, Q2H PRN, Loretta Germain MD     HYDROmorphone (DILAUDID) injection 0.4 mg, 0.4 mg, Intravenous, Q2H PRN, Loretta Germain MD     ipratropium (ATROVENT) 0.02 % neb solution 0.5 mg, 0.5 mg, Nebulization, Q4H PRN, Loretta Germain MD     levalbuterol (XOPENEX HFA) 45 MCG/ACT Inhaler 2 puff, 2 puff, Inhalation, Q4H PRN, Loretta Germain MD     levalbuterol (XOPENEX) neb solution 1.25 mg, 1.25 mg, Nebulization, Q4H PRN, Loretta Germain MD     lidocaine (LMX4) cream, , Topical, Q1H PRN, Asiya Kohler MD     lidocaine (LMX4) cream, , Topical, Q1H PRN, Loretta Germain MD     lidocaine 1 % 0.1-1 mL, 0.1-1 mL, Other, Q1H PRN, Asiya Kohler MD     lidocaine 1 % 0.1-1 mL, 0.1-1 mL, Other, Q1H PRN, Loretta Germain MD     melatonin tablet 1 mg, 1 mg, Oral, At Bedtime PRN, Loretta Germain MD     naloxone (NARCAN) injection 0.2 mg, 0.2 mg, Intravenous, Q2 Min PRN **OR** naloxone (NARCAN) injection 0.4 mg, 0.4 mg, Intravenous, Q2 Min PRN **OR** naloxone (NARCAN) injection 0.2 mg, 0.2 mg, Intramuscular, Q2 Min PRN **OR** naloxone (NARCAN) injection 0.4 mg, 0.4 mg, Intramuscular, Q2 Min PRN, Loretta Germain MD     ondansetron (ZOFRAN ODT) ODT  tab 4 mg, 4 mg, Oral, Q6H PRN **OR** ondansetron (ZOFRAN) injection 4 mg, 4 mg, Intravenous, Q6H PRN, Loretta Germain MD     senna-docusate (SENOKOT-S/PERICOLACE) 8.6-50 MG per tablet 1 tablet, 1 tablet, Oral, BID PRN **OR** senna-docusate (SENOKOT-S/PERICOLACE) 8.6-50 MG per tablet 2 tablet, 2 tablet, Oral, BID PRN, Loretta Germain MD     sodium chloride (PF) 0.9% PF flush 3 mL, 3 mL, Intracatheter, Q8H, Asiya Kohler MD, 3 mL at 03/14/23 2117     sodium chloride (PF) 0.9% PF flush 3 mL, 3 mL, Intracatheter, q1 min prn, Asiya Kohler MD     sodium chloride (PF) 0.9% PF flush 3 mL, 3 mL, Intracatheter, Q8H, Loretta Germain MD, 3 mL at 03/15/23 0206     sodium chloride (PF) 0.9% PF flush 3 mL, 3 mL, Intracatheter, q1 min prn, Loretta Germain MD    Current Outpatient Medications:      Acetaminophen (ACETAMIN PO), Take 500 mg by mouth every 4 hours as needed (pain or fever), Disp: , Rfl:      albuterol (PROAIR HFA/PROVENTIL HFA/VENTOLIN HFA) 108 (90 Base) MCG/ACT inhaler, Inhale 2 puffs into the lungs every 4 hours as needed for shortness of breath / dyspnea or wheezing, Disp: , Rfl:      allopurinol (ZYLOPRIM) 300 MG tablet, Take 1 tablet (300 mg) by mouth daily, Disp: 60 tablet, Rfl: 0     cholestyramine (QUESTRAN) 4 g packet, 1 packet mixed with water or non-carbonated drink Orally Three times daily, Disp: , Rfl:      colchicine (COLCYRS) 0.6 MG tablet, Take 1 tablet by mouth once daily, Disp: 60 tablet, Rfl: 0     compressor, for nebulizer Saran, [COMPRESSOR, FOR NEBULIZER SARAN] Nebulizer treatment qid prn, Disp: 1 Device, Rfl: 0     dicyclomine (BENTYL) 20 MG tablet, TAKE 1 TABLET BY MOUTH THREE TIMES DAILY AS NEEDED FOR ABDOMINAL DISCOMFORT, Disp: , Rfl:      DULoxetine (CYMBALTA) 20 MG capsule, Take 20 mg by mouth daily, Disp: , Rfl:      famotidine (PEPCID) 20 MG tablet, Take 20 mg by mouth daily, Disp: , Rfl:      folic acid (FOLVITE) 1 MG tablet, Take 1 tablet (1 mg) by mouth daily,  Disp: 90 tablet, Rfl: 0     furosemide (LASIX) 20 MG tablet, Take 1 tablet (20 mg) by mouth daily, Disp: , Rfl:      guaiFENesin (MUCINEX) 600 MG 12 hr tablet, Take 600 mg by mouth 2 times daily, Disp: , Rfl:      hydrOXYzine (VISTARIL) 25 MG capsule, Take 25 mg by mouth nightly as needed, Disp: , Rfl:      ipratropium - albuterol 0.5 mg/2.5 mg/3 mL (DUONEB) 0.5-2.5 (3) MG/3ML neb solution, Take 1 vial (3 mLs) by nebulization every 4 hours as needed for wheezing or shortness of breath / dyspnea, Disp: 90 mL, Rfl: 0     Lactobacillus-Inulin ([facility] DIGESTIVE HEALTH) CAPS, Take 1 capsule by mouth 2 times daily, Disp: , Rfl:      MAGIC MOUTHWASH (FV STANDARD FORMULA), Swish and swallow 10 mLs in mouth every 6 hours as needed for mouth sores, Disp: , Rfl:      magnesium oxide (MAG-OX) 400 MG tablet, Take 400 mg by mouth 2 times daily, Disp: , Rfl:      methocarbamol (ROBAXIN) 500 MG tablet, Take 500 mg by mouth daily as needed for muscle spasms, Disp: , Rfl:      methotrexate sodium 2.5 MG TABS, Take 3 tablets (7.5 mg) by mouth once a week, Disp: 36 tablet, Rfl: 0     metoprolol succinate ER (TOPROL XL) 100 MG 24 hr tablet, Take 1 tablet (100 mg) by mouth daily, Disp: , Rfl:      pravastatin (PRAVACHOL) 20 MG tablet, Take 20 mg by mouth every evening, Disp: , Rfl:      predniSONE (DELTASONE) 5 MG tablet, Take 5 mg by mouth daily, Disp: , Rfl:      rivaroxaban ANTICOAGULANT (XARELTO) 15 MG TABS tablet, Take 1 tablet (15 mg) by mouth daily (with dinner), Disp: 90 tablet, Rfl: 3     Vitamin D3 (CHOLECALCIFEROL) 125 MCG (5000 UT) tablet, Take 125 mcg by mouth daily, Disp: , Rfl:     fluticasone-vilanterol  1 puff Inhalation Daily     sodium chloride (PF)  3 mL Intracatheter Q8H     sodium chloride (PF)  3 mL Intracatheter Q8H       Allergies:   Codeine, Fosamax [alendronic acid], and Morphine    Review of Systems:   A 12 point comprehensive review of systems was negative except as noted in the current  "history.    Objective:      Physical Exam  Body mass index is 24.96 kg/m .  BP (!) 145/62 (BP Location: Left arm, Patient Position: Semi-Sy's)   Pulse 85   Temp 98  F (36.7  C) (Oral)   Resp 20   Ht 1.651 m (5' 5\")   Wt 68 kg (150 lb)   SpO2 100%   BMI 24.96 kg/m      General Appearance:    Well-developed, well-nourished elderly female who appears in no acute distress other than pain due to her hip   HEENT:   Normocephalic, atraumatic.  Sclera nonicteric.  Mucous membranes moist.   Neck:  No jugular venous distention or carotid bruits   Chest:  Symmetric with increased AP diameter   Lungs:    Markedly diminished breath sounds throughout both lung fields.  No rales.  Scattered end expiratory wheezes.   Cardiovascular:    Irregularly irregular rhythm.  S1, S2 normal.  No murmur or gallop   Abdomen:   Soft, nondistended, nontender.  No organomegaly or mass   Extremities:  1+ pitting edema of the lower extremities below the calfs   Skin:  No rash or lesions   Neurologic:  Alert and oriented x3.  No gross focal deficits.             Cardiographics (personally reviewed)  ECG demonstrates atrial fibrillation with rapid ventricular response.  No acute ST or T wave changes.    Imaging (personally reviewed)  Echocardiogram currently pending.    Lab Review (personally reviewed all results)  Recent Labs   Lab Test 01/28/22  1640   CHOL 154   HDL 65   LDL 63   TRIG 132     Recent Labs   Lab Test 01/28/22  1640 01/13/21  1017 11/04/19  1430   LDL 63 66 93     Recent Labs   Lab Test 03/14/23  1221      POTASSIUM 4.9   CHLORIDE 102   CO2 31*   *   BUN 22.4   CR 1.12*   GFRESTIMATED 51*   SUNI 10.5*     Recent Labs   Lab Test 03/14/23  1221 03/13/23  1747 03/06/23  0500   CR 1.12* 1.14* 1.28*     No results for input(s): A1C in the last 37955 hours.       Recent Labs   Lab Test 03/15/23  0820   WBC 9.8   HGB 10.0*   HCT 33.2*   *        Recent Labs   Lab Test 03/15/23  0820 03/14/23  1221 " 03/06/23  0500   HGB 10.0* 10.6* 9.5*    Recent Labs   Lab Test 11/02/22  1312 04/20/22  1046 01/17/22  1756   TROPONINI 0.04 0.03 0.04     Recent Labs   Lab Test 03/14/23  1221 12/28/22  0231 12/13/22  0836 12/01/22  1303 09/13/22  0854 04/20/22  1046 01/17/22  1756   BNP  --   --   --   --  274* 165* 190*   NTBNPI 2,866* 6,091* 3,889*   < >  --   --   --     < > = values in this interval not displayed.     Recent Labs   Lab Test 03/13/23  1747   TSH 3.00     Recent Labs   Lab Test 03/15/23  0820 03/14/23  1221 12/13/22  1803   INR 1.60* 2.18* 1.62*

## 2023-03-15 NOTE — PROGRESS NOTES
[facility]    Medicine Progress Note - Hospitalist Service    Date of Admission:  3/14/2023    Assessment & Plan      Fatuma Henry is a 77 year old female admitted on 3/14/2023. She has h/o recurrent C. difficile, paroxysmal A-fib, hypertension, hyperlipidemia, history of pulmonary hypertension, diastolic CHF, CKD-III, presyncope, left ICA occlusion, microcytic anemia, rheumatoid arthritis, gout, GERD, depression presented with left hip pain after falling at home.  Per patient, her legs gave out.  She denied any chest pain, dizziness, palpitation, loss of consciousness during the fall.  CT-head and CT and cervical spine with no acute findings.  CT pelvis showed acute impacted mildly displaced subcapital fracture of the left femur. Per ED provider, orthopedics consulted and they will see the patient shortly.  She will be admitted for further work-up and management    S/P fall  Acute impacted left femoral fracture  - denied any chest pain, dizziness, palpitation, loss of consciousness during the fall  -Has history of presyncope-not reported during this counter  - CT pelvis showed acute impacted mildly displaced subcapital fracture of the left femur.  -Orthopedics consult appreciated-   -N.p.o.  -cardiology consult appreciated.  - Closed reduction with percutaneous pinning planned for 03/15    A-fib with RVR  - on metorplol 100 and xarelto at home  - BP on the lower side. 108/64 with HR: 120-140 during examination  - on cardizem gtt  -Cardiology consult appreciated.    History of pulmonary HTN  HFpEF- notexacerbated  -lasix 20mg Iv bid  - proBNp 2866    CKD stage III  -Avoid nephrotoxins as able  -f/u BMP    COPD on home oxygen 2 L  -Not exacerbated  -Continue PTA Symbicort, albuterol    Essential HTN  -Monitor vital signs  -on cardizem gtt    Left ICA occlusion  -Asymptomatic  -Outpatient vascular surgery follow-up    Anemia, macrocytic  -Continue with PTA folic acid, B12  -Outpatient  hematology    GERD  -c/w PTA famotidine 20 mg.    Depression  -Continue with PTA Cymbalta    Pulmonary nodules  -Outpatient pulmonology    Gout  -Continue with PTA allopurinol, colchicine, prednisone  -Outpatient rheumatology follow-up    Hypomagnesemia  -Continue PTA Mag-Ox    Recurrent C. difficile infection  -No diarrhea reported         Diet: NPO per Anesthesia Guidelines for Procedure/Surgery Except for: Meds    DVT Prophylaxis: Pneumatic Compression Devices  Brandon Catheter: Not present  Lines: None     Cardiac Monitoring: ACTIVE order. Indication: Tachyarrhythmias, acute (48 hours)  Code Status: No CPR- Do NOT Intubate      Clinically Significant Risk Factors           # Hypercalcemia: Highest Ca = 10.5 mg/dL in last 2 days, will monitor as appropriate                      Disposition Plan      Expected Discharge Date: 03/17/2023      Destination: other (comment) (TCU)            Loretta Germain MD  Hospitalist Service  [facility]  Securely message with [employer] (more info)  Text page via [employer] Paging/Directory   ______________________________________________________________________    Interval History   Patient seen and examined at bedside. She has some shortness of breath. Denied chest pain, fever, chills.    Physical Exam   Vital Signs: Temp: 98.8  F (37.1  C) Temp src: Oral BP: 120/62 Pulse: 88   Resp: 18 SpO2: 99 % O2 Device: Nasal cannula Oxygen Delivery: 3 LPM  Weight: 150 lbs 0 oz    GEN: Alert and oriented to self, date, place, situation. Not in acute distress.  HEENT: Atraumatic, mucous membrane- moist and pink.  Chest: Decreased bilateral air entry.  CVS: S1S2 regular. No murmurs, rubs or gallops.  Abdomen: Soft. Non-tender, non-distended. No organomegaly. No guarding or rigidity. Bowel sounds active.   Extremities: ++ b/l LE  Edema. Left hip area pain.  CNS: No focal neurologic deficit. No involuntary movements.  Skin: No skin rash, no cyanosis or clubbing.     Medical  Decision Making             Data     I have personally reviewed the following data over the past 24 hrs:    9.8  \   10.0 (L)   / 296     139 102 20.7 /  102 (H)   4.2 28 1.06 (H) \       Trop: N/A BNP: N/A       TSH: N/A T4: N/A A1C: 5.9 (H)       INR:  1.60 (H) PTT:  N/A   D-dimer:  N/A Fibrinogen:  N/A

## 2023-03-15 NOTE — ANESTHESIA PREPROCEDURE EVALUATION
Anesthesia Pre-Procedure Evaluation    Patient: Fatuma Henry   MRN: [id number] : 1945        Procedure : Procedure(s):  CLOSED REDUCTION, HIP, WITH PERCUTANEOUS PINNING          Past Medical History:   Diagnosis Date     Atrial fibrillation (H)      CKD (chronic kidney disease) stage 3, GFR 30-59 ml/min (H)      COPD (chronic obstructive pulmonary disease) (H)     nocturnal oxygen     CRF (chronic renal failure)      GERD (gastroesophageal reflux disease)      Hypertension      Hypovolemic shock (H)      Influenza A 2017     Pulmonary hypertension (H)      Pulmonary nodules      Rheumatoid arthritis (H)      Sepsis (H) 2017      Past Surgical History:   Procedure Laterality Date     APPENDECTOMY       BLADDER SUSPENSION       CHOLECYSTECTOMY       PICC TRIPLE LUMEN PLACEMENT  2022           Allergies   Allergen Reactions     Codeine Nausea and Vomiting     Fosamax [Alendronic Acid] Diarrhea     Morphine Nausea and Vomiting     Other reaction(s): Vomiting      Social History     Tobacco Use     Smoking status: Former     Packs/day: 0.50     Types: Cigarettes     Quit date: 2017     Years since quittin.2     Smokeless tobacco: Never   Substance Use Topics     Alcohol use: No      Wt Readings from Last 1 Encounters:   23 68 kg (150 lb)        Anesthesia Evaluation   Pt has had prior anesthetic.         ROS/MED HX  ENT/Pulmonary:     (+) sleep apnea, asthma severe,  COPD, O2 dependent,     Neurologic:  - neg neurologic ROS     Cardiovascular: Comment: EKG 3/14/23:  Atrial fibrillation   Abnormal ECG   When compared with ECG of 2023 23:53,   No significant change was found   Confirmed by SEE ED PROVIDER NOTE FOR, ECG INTERPRETATION (),  Bettye Wen () on 3/15/2023 1:20:37 PM     Echo   Interpretation Summary    1. Normal left ventricular size and systolic performance with a visually  estimated ejection fraction of 60%.  2. There is mild aortic  stenosis.  3. There is mild aortic insufficiency.  4. There is mild-moderate, to perhaps moderate, mitral insufficiency.  5. Borderline right ventricular enlargement with normal right ventricular  systolic performance.  6. There is moderate biatrial enlargement.  7. Right ventricular systolic pressure relative to right atrial pressure is  mildly increased. The pulmonary artery pressure is estimated to be 45-50 mmHg  plus right atrial pressure (the IVC is of normal caliber).    When compared to the prior real-time echocardiogram dated 29 December 2022,  left ventricular systolic performance and regional wall motion appears fairly  similar on both studies (the prior examination was a limited study without  complete evaluation of valvular function).      -Ongoing diltiazem gtt    (+) hypertension-----Taking blood thinners CHF fainting (syncope). dysrhythmias, a-fib, Irregular Heartbeat/Palpitations, pulmonary hypertension,  (-) murmur and wheezes   METS/Exercise Tolerance: >4 METS    Hematologic:  - neg hematologic  ROS     Musculoskeletal: Comment: RA  Femur fracture  (+) fracture, Fracture location: LLE,     GI/Hepatic:     (+) GERD,     Renal/Genitourinary:     (+) renal disease, type: CRI,     Endo:  - neg endo ROS     Psychiatric/Substance Use:  - neg psychiatric ROS     Infectious Disease:  - neg infectious disease ROS     Malignancy:  - neg malignancy ROS     Other:  - neg other ROS          Physical Exam    Airway        Mallampati: III   TM distance: > 3 FB   Neck ROM: limited   Mouth opening: > 3 cm    Respiratory Devices and Support         Dental     Comment: Upper/lower dentures    (+) Edentulous      Cardiovascular          Rhythm and rate: irregular and normal (-) no murmur    Pulmonary           breath sounds clear to auscultation   (-) no wheezes        OUTSIDE LABS:  CBC:   Lab Results   Component Value Date    WBC 9.8 03/15/2023    WBC 11.1 (H) 03/14/2023    HGB 10.0 (L) 03/15/2023    HGB 10.6 (L)  03/14/2023    HCT 33.2 (L) 03/15/2023    HCT 35.1 03/14/2023     03/15/2023     03/14/2023     BMP:   Lab Results   Component Value Date     03/15/2023     03/14/2023    POTASSIUM 4.2 03/15/2023    POTASSIUM 4.9 03/14/2023    CHLORIDE 102 03/15/2023    CHLORIDE 102 03/14/2023    CO2 28 03/15/2023    CO2 31 (H) 03/14/2023    BUN 20.7 03/15/2023    BUN 22.4 03/14/2023    CR 1.06 (H) 03/15/2023    CR 1.12 (H) 03/14/2023     (H) 03/15/2023     (H) 03/14/2023     COAGS:   Lab Results   Component Value Date    PTT 40 (H) 03/14/2023    INR 1.60 (H) 03/15/2023     POC: No results found for: BGM, HCG, HCGS  HEPATIC:   Lab Results   Component Value Date    ALBUMIN 4.0 03/14/2023    PROTTOTAL 6.5 03/14/2023    ALT 29 03/14/2023    AST 31 03/14/2023    ALKPHOS 116 (H) 03/14/2023    BILITOTAL 0.4 03/14/2023     OTHER:   Lab Results   Component Value Date    PH 7.28 (L) 01/28/2022    LACT 1.7 12/13/2022    A1C 5.9 (H) 03/15/2023    SUNI 10.0 03/15/2023    PHOS 3.3 04/30/2021    MAG 2.4 (H) 03/13/2023    LIPASE 29 12/13/2022    TSH 3.00 03/13/2023    T3 120 05/03/2018    CRP 0.3 01/20/2022    SED 23 (H) 01/20/2022       Anesthesia Plan    ASA Status:  4   NPO Status:  NPO Appropriate    Anesthesia Type: General.     - Airway: ETT   Induction: Intravenous, Propofol.   Maintenance: TIVA.        Consents    Anesthesia Plan(s) and associated risks, benefits, and realistic alternatives discussed. Questions answered and patient/representative(s) expressed understanding.     - Discussed: Risks, Benefits and Alternatives for BOTH SEDATION and the PROCEDURE were discussed     - Discussed with:  Patient      - Patient is DNR/DNI Status: Yes             Suspend during perioperative period? No.    Use of blood products discussed: Yes.     - Discussed with: Patient.     - Consented: consented to blood products            Reason for refusal: other.     Postoperative Care    Pain management: IV analgesics,  Oral pain medications, Peripheral nerve block (Single Shot).   PONV prophylaxis: Ondansetron (or other 5HT-3), Dexamethasone or Solumedrol     Comments:    Other Comments: GETA  TIVA w/Propofol  Decadron/Zofran for PONV  PENG block in pre op area            Remberto Victoria MD

## 2023-03-15 NOTE — PLAN OF CARE
Problem: Dysrhythmia  Goal: Normalized Cardiac Rhythm  Outcome: Progressing   Goal Outcome Evaluation:  Diltiazem drip infusing at 10 ml/hr.  Continues in A-fib, HR 80's-90's.  Denies pain.  Remains on bedrest.  Npo.  Maintaining sats on 3L NC, same as her home oxygen amount.  Family at bedside.  Plan for surgery this afternoon.

## 2023-03-15 NOTE — ANESTHESIA PROCEDURE NOTES
Airway       Patient location during procedure: OR       Procedure Start/Stop Times: 3/15/2023 3:53 PM  Staff -        Anesthesiologist:  Rachid Wright MD       CRNA: Yoli Rodgers APRN CRNA       Performed By: CRNAIndications and Patient Condition       Indications for airway management: ramandeep-procedural       Induction type:intravenous       Mask difficulty assessment: 2 - vent by mask + OA or adjuvant +/- NMBA    Final Airway Details       Final airway type: endotracheal airway       Successful airway: ETT - single and Oral  Endotracheal Airway Details        ETT size (mm): 7.0       Cuffed: yes       Successful intubation technique: video laryngoscopy       VL Blade Size: Glidescope 3       Grade View of Cords: 1       Adjucts: stylet       Position: Right       Measured from: gums/teeth       Secured at (cm): 20       Bite block used: None    Post intubation assessment        Placement verified by: capnometry, equal breath sounds and chest rise        Number of attempts at approach: 1       Secured with: silk tape       Ease of procedure: easy       Dentition: Intact and Unchanged    Medication(s) Administered   Medication Administration Time: 3/15/2023 3:53 PM

## 2023-03-15 NOTE — H&P (VIEW-ONLY)
ORTHOPEDIC CONSULTATION    Consultation  Fatuma Henry,  1945, MRN [id number]    Chronic anticoagulation [Z79.01]  Oxygen dependent [Z99.81]  Fracture of femur (H) [S72.90XA]  Atrial fibrillation with RVR (H) [I48.91]  Fall, initial encounter [W19.XXXA]  Closed fracture of neck of left femur, initial encounter (H) [S72.002A]  Abnormal CT of spine [R93.7]    PCP: Tory Wise, [phone]   Code status:  No CPR- Do NOT Intubate       Extended Emergency Contact Information  Primary Emergency Contact: Bolivar Henry  Home Phone: [phone]  Relation: Spouse  Secondary Emergency Contact: Enid Dove  Home Phone: [phone]  Relation: Daughter         IMPRESSION:  Closed, femoral neck fracture of the left femur.     PLAN:  This patient was discussed with Dr. Markham and Dr. Mckeon, on-call surgeon for [facility] and they are in agreement with the following plan.  Patient was evaluated for left hip fracture after mechanical fall.  Patient with a complex medical history including A-fib with RVR, CKD, COPD, HTN, etc.  Patient currently admitted for left hip fracture and A-fib with RVR.  On exam LLE, skin and neurovascular intact.  XR revealed a femoral neck fracture. Due to the nature of the patient's fracture, we are recommending surgical fixation. The risks and benefits of surgical intervention including, but not limited to, injury to nerves, arteries or veins, malunion, nonunion, DVT or even death, were discussed with the patient and they are in agreement with proceeding. Patient is to remain NPO pending surgical intervention. We will plan to proceed with surgery at the earliest opportunity once medical clearance has been obtained.  Recommendation follow:    -Tentatively plan for surgery at 3PM with Dr. Mckeon pending medical clearance   -NPO  -Hold all anticoagulation  -NWB LLE, bedrest  -Pain management  -Ortho will continue to follow patient      Thank you for including [facility] in the care of Fatuma Henry. It has been a pleasure participating in Fatuma Henry's care.        CHIEF COMPLAINT: Oxygen dependent    HISTORY OF PRESENT ILLNESS:  The patient is seen in orthopedic consultation at the request of Mary Santana PA-C.  The patient is a 77 year old female with moderate and severe pain of the left  hip.  Patient is accompanied by her daughters.  The patient reports that while walking with a walker to the living room, she fell landing on her knee.  Patient reports initial pain localized on the left hip and unable to stand. They report that their pain does not radiate. The patient reports that their pain is alleviated by rest and is exacerbated by movement.  Per daughter patient just returned from TCU about a week ago this patient was quite sick with C. difficile, pneumonia, etc.  Patient uses a walker at baseline.  Patient takes Xarelto for A-fib.  Denies new numbness, tingling.      ALLERGIES:   Review of patient's allergies indicates   Allergies   Allergen Reactions     Codeine Nausea and Vomiting     Fosamax [Alendronic Acid] Diarrhea     Morphine Nausea and Vomiting     Other reaction(s): Vomiting         MEDICATIONS UPON ADMISSION:  Medications were reviewed.  They include:   (Not in a hospital admission)        SOCIAL HISTORY:   she  reports that she quit smoking about 5 years ago. Her smoking use included cigarettes. She smoked an average of .5 packs per day. She has never used smokeless tobacco. She reports that she does not drink alcohol and does not use drugs.      FAMILY HISTORY:  family history includes Cerebrovascular Disease in her brother and father; Diabetes Type 2  in her brother; Heart Failure in her mother; Kidney failure in her sister.      REVIEW OF SYSTEMS:   Reviewed with patient. See HPI, otherwise negative       PHYSICAL EXAMINATION:  Vitals: Temp:  [98  F (36.7  C)] 98  F (36.7  C)  Pulse:  [] 88  Resp:  [11-37] 20  BP: (102-159)/(53-97)  124/57  FiO2 (%):  [3 %] 3 %  SpO2:  [83 %-100 %] 96 %  General: On examination, the patient is awake and alert and oriented to general circumstances   SKIN: There is no evidence of erythema, warmth, swelling, deformity, break to the skin, open wound.  LLE shortened externally rotated.  Pulses:  dorsalis pedis and posterior tibial pulse is intact and equal bilaterally  Sensation: intact and equal bilaterally to the distal lower extremities.  Tenderness: NTTP of the distal femur, knee, tibia, fibula, ankle, foot. Compartment soft and non-tender.  Defer palpation of the proximal femur given known fracture.  ROM: R hip and knee ROM given known fracture.  Ankle DF/PF without pain.  Contralateral side= Full range of motion, Negative joint instability findings, 5/5 motor groups about the joint, Non-tender.       RADIOGRAPHIC EVALUATION:  Personally reviewed  Narrative & Impression   EXAM: CT PELVIS BONE WO CONTRAST  LOCATION: [facility]  DATE/TIME: 3/14/2023 12:46 PM     INDICATION: Fall, left hip pain.  COMPARISON: 02/13/2023 CTA.  TECHNIQUE: CT scan of the pelvis was performed without IV contrast. Multiplanar reformats were obtained. Dose reduction techniques were used.  CONTRAST: None.     FINDINGS:     Bones: Acute mildly displaced subcapital fracture of the left femur as best seen on series 4 image 59 series 2 image 77. Healing left-sided sacral insufficiency fracture. Osteopenia. No degenerative changes in either hip.     Soft tissues: Moderate size left hip effusion. Pelvic intraperitoneal contents normal.                                                                       IMPRESSION:  1.  Acute impacted mildly displaced subcapital fracture of the left femur.     2.  Healing insufficiency fracture of the left sacral ala         PERTINENT LABS:  Lab Results: personally reviewed.   Lab Results   Component Value Date     03/15/2023    CO2 28 03/15/2023    CO2 20 11/10/2022    BUN 20.7  03/15/2023    BUN 13 11/10/2022     Lab Results   Component Value Date    WBC 9.8 03/15/2023    HGB 10.0 03/15/2023    HCT 33.2 03/15/2023     03/15/2023     03/15/2023         Zoe Denny PA-C, PA-C  Date: 3/15/2023  Time: 1:13 PM    CC1:   Loretta Germain MD    CC2:   Tory Wise

## 2023-03-15 NOTE — INTERVAL H&P NOTE
I have independently reviewed the patient's chart, notes, labs, and imaging. I agree with the above assessment in plan.     Piotr is a pleasant 77-year-old female with a complex past medical history including A-fib with RVR, CKD, COPD on chronic oxygen, hypertension, chronic HFpEF, pulmonary hypertension with recent admission for C. difficile and pneumonia who had a mechanical fall at home onto her left hip.  She reports that she just recently discharged to home from TCU, was noting that she was walking in her living room with a walker when she fell, landing on her left hip, and had sudden onset of pain with inability to stand.  She was brought to the emergency department where x-rays and CT were performed, demonstrating a impacted subcapital femoral neck fracture, minimally displaced.  She was admitted to the to the hospitalist service team, and evaluated preoperatively by the cardiology team.  She is been deemed medically optimized and cleared for surgery, though is high risk for perioperative complications.    Preoperatively, I reviewed with her and her daughter the nature of her left valgus impacted subcapital femoral neck fracture.  We discussed options for treatment, including conservative non-operative management versus surgical intervention.      From a conservative standpoint, the patient can pursue non-operative management, which would include protected weight bearing to the lower extremity, which carries a high risk of morbidity and mortality due to prolonged and diminished mobilization/ambulation and it's associated complications, which include but are not limited to blood clots (DVT/PE), skin ulcerations and pressure sores, and pneumonia. In the long term, there is also the risk of chronic pain, fracture nonunion, fracture malunion, and avascular necrosis of the femoral head.    From a surgical standpoint, we discussed closed reduction and internal fixation using the femoral neck system. Given, the  patient's fracture morphology, including minimal displacement, I do feel this is a reasonable approach for managing her fracture.  Given her significant medical comorbidities and high risk status from anesthesia standpoint, this procedure would likely be quicker, requiring less time under anesthesia and blood loss when compared to hip arthroplasty.  We also reviewed what hemiarthroplasty involves, as well as the postoperative recovery and rehabilitation.  We did discuss that the surgery may carry longer anesthetic and operative time, blood loss, further dissection.  This would provide the benefit of reduce risk from the standpoint of loss of reduction, implant migration, malunion/nonunion.  Again overall given her medical status, limited mobility at baseline, and fracture morphology, I did discuss my recommendation for close reduction and pinning using the femoral neck system for the above reasons. We also discussed at length the risks and benefits of surgery. Our discussion included but was not limited to the risk of pain, bleeding, infection, blood clots (DVT, PE), wound issues, continued chronic pain in the hip, post-operative joint stiffness, painful arc of motion, difficulty with ambulation, damage to nearby neurovascular structures, loss of reduction, nonunion, malunion, implant migration, symptomatic hardware, and need for potential future surgery including conversion to hip arthroplasty. The possibility of intra-operative and/or post-operative medical complications such as anesthesia complications or reactions, respiratory and cardiovascular events, stroke, heart attack and/or death were also discussed.  We discussed at this time, her wishes for DNR/DNI status to be held during the surgical procedure and would not like any extensive measures to be taken in the case of a cardiopulmonary complication intraoperatively.  Following our conversation, both her and her daughter expressed a good understanding and  wished to proceed with surgery despite the risks involved.  All questions were answered, and written informed consent was obtained from the patient.  The left hip was marked by myself.      Denton Mckeon MD   Orthopedic Surgery   [facility]

## 2023-03-15 NOTE — ANESTHESIA POSTPROCEDURE EVALUATION
Patient: Fatuma Henry    Procedure: Procedure(s):  CLOSED REDUCTION, HIP, WITH PERCUTANEOUS PINNING       Anesthesia Type:  General    Note:  Disposition: Inpatient   Postop Pain Control: Uneventful            Sign Out: Well controlled pain   PONV: No   Neuro/Psych: Uneventful            Sign Out: Acceptable/Baseline neuro status   Airway/Respiratory: Uneventful            Sign Out: Acceptable/Baseline resp. status   CV/Hemodynamics: Uneventful            Sign Out: Acceptable CV status; No obvious hypovolemia; No obvious fluid overload   Other NRE: NONE   DID A NON-ROUTINE EVENT OCCUR? No           Last vitals:  Vitals Value Taken Time   /57 03/15/23 1815   Temp 36.8  C (98.2  F) 03/15/23 1735   Pulse 80 03/15/23 1816   Resp 21 03/15/23 1816   SpO2 96 % 03/15/23 1816   Vitals shown include unvalidated device data.    Electronically Signed By: Rachid Wright MD  March 15, 2023  6:18 PM

## 2023-03-15 NOTE — ANESTHESIA PROCEDURE NOTES
PENG Procedure Note    Pre-Procedure   Staff -        Anesthesiologist:  Remberto Victoria MD       Performed By: anesthesiologist       Location: OR       Procedure Start/Stop Times: 3/15/2023 3:40 PM and 3/15/2023 3:45 PM       Pre-Anesthestic Checklist: patient identified, IV checked, site marked, risks and benefits discussed, informed consent, monitors and equipment checked, pre-op evaluation, at physician/surgeon's request and post-op pain management  Timeout:       Correct Patient: Yes        Correct Procedure: Yes        Correct Site: Yes        Correct Position: Yes        Correct Laterality: Yes        Site Marked: Yes  Procedure Documentation  Procedure: PENG       Diagnosis: POST OP PAIN CONTROL       Laterality: left       Patient Position: supine       Patient Prep/Sterile Barriers: sterile gloves, mask       Skin prep: Chloraprep       Needle Type: short bevel       Needle Gauge: 20.        Needle Length (Inches): 4        Ultrasound guided       1. Ultrasound was used to identify targeted nerve, plexus, vascular marker, or fascial plane and place a needle adjacent to it in real-time.       2. Ultrasound was used to visualize the spread of anesthetic in close proximity to the above referenced structure.       3. A permanent image is entered into the patient's record.       4. The visualized anatomic structures appeared normal.       5. There were no apparent abnormal pathologic findings.    Assessment/Narrative         The placement was negative for: blood aspirated, painful injection and site bleeding       Paresthesias: No.       Bolus given via needle. no blood aspirated via catheter.        Secured via.        Insertion/Infusion Method: Single Shot       Complications: none       Injection made incrementally with aspirations every 5 mL.    Medication(s) Administered   Bupivacaine 0.25% PF (Infiltration) - Infiltration   30 mL - 3/15/2023 3:40:00 PM  Medication Administration Time: 3/15/2023 3:40  "PM      FOR [facility] ONLY:   Pain Team Contact information: please page the Pain Team Via [employer]. Search \"Pain\". During daytime hours, please page the attending first. At night please page the resident first.    "

## 2023-03-15 NOTE — ANESTHESIA CARE TRANSFER NOTE
Patient: Fatuma Henry    Procedure: Procedure(s):  CLOSED REDUCTION, HIP, WITH PERCUTANEOUS PINNING       Diagnosis: Closed fracture of neck of left femur, initial encounter (H) [S72.002A]  Diagnosis Additional Information: No value filed.    Anesthesia Type:   General     Note:    Oropharynx: oropharynx clear of all foreign objects and spontaneously breathing  Level of Consciousness: drowsy  Oxygen Supplementation: face mask  Level of Supplemental Oxygen (L/min / FiO2): 6  Independent Airway: airway patency satisfactory and stable  Dentition: dentition unchanged  Vital Signs Stable: post-procedure vital signs reviewed and stable  Report to RN Given: handoff report given  Patient transferred to: PACU    Handoff Report: Identifed the Patient, Identified the Reponsible Provider, Reviewed the pertinent medical history, Discussed the surgical course, Reviewed Intra-OP anesthesia mangement and issues during anesthesia, Set expectations for post-procedure period and Allowed opportunity for questions and acknowledgement of understanding      Vitals:  Vitals Value Taken Time   /52 03/15/23 1739   Temp 98.2 03/15/25 1740   Pulse 79 03/15/23 1740   Resp 25 03/15/23 1740   SpO2 100 % 03/15/23 1740       Electronically Signed By: FREDY Bowles CRNA  March 15, 2023  5:42 PM

## 2023-03-15 NOTE — PLAN OF CARE
Report given to REGGIE RN.  Patient transferred to REGGIE.  Dilt infusing at 10ml/hr.  Family accompanied patient.

## 2023-03-16 NOTE — PROGRESS NOTES
[facility]    Medicine Progress Note - Hospitalist Service    Date of Admission:  3/14/2023    Assessment & Plan   Fatuma Henry is a 77 year old female admitted on 3/14/2023. She has h/o recurrent C. difficile, paroxysmal A-fib, hypertension, hyperlipidemia, history of pulmonary hypertension, diastolic CHF, CKD-III, presyncope, left ICA occlusion, microcytic anemia, rheumatoid arthritis, gout, GERD, depression presented with left hip pain after falling at home.  Per patient, her legs gave out.  She denied any chest pain, dizziness, palpitation, loss of consciousness during the fall.  CT-head and CT and cervical spine with no acute findings.  CT pelvis showed acute impacted mildly displaced subcapital fracture of the left femur. Per ED provider, orthopedics consulted and they will see the patient shortly.  She will be admitted for further work-up and management    S/P fall  Acute impacted left femoral fracture  - denied any chest pain, dizziness, palpitation, loss of consciousness during the fall  -Has history of presyncope-not reported during this counter  - CT pelvis showed acute impacted mildly displaced subcapital fracture of the left femur.  -Orthopedics consult appreciated  -cardiology consult appreciated.  - 03/16: POD 1 s/p Closed reduction with percutaneous pinning.    A-fib with RVR  - on metorplol 100 and xarelto at home  - BP on the lower side. 108/64 with HR: 120-140 during examination  - S/p ardizem gtt. Now on PTA metoprolol  -Cardiology consult appreciated.    History of pulmonary HTN  HFpEF- notexacerbated  -c/w PTA lasix 20mg   - proBNp 2866    CKD stage III  -Avoid nephrotoxins as able  -f/u BMP    Chronic hypoxic respiratory failure  COPD on home oxygen 2 L  -Not exacerbated  -Continue PTA Symbicort, albuterol    Essential HTN  -Monitor vital signs  -PTA metoprolol    Left ICA occlusion  -Asymptomatic  -Outpatient vascular surgery follow-up    Anemia,  "macrocytic  -Continue with PTA folic acid, B12  -Outpatient hematology    GERD  -c/w PTA famotidine 20 mg.    Depression  -Continue with PTA Cymbalta    Pulmonary nodules  -Outpatient pulmonology    Gout  -Continue with PTA allopurinol, colchicine, prednisone  -Outpatient rheumatology follow-up    Hypomagnesemia  -Continue PTA Mag-Ox    Recurrent C. difficile infection  -No diarrhea reported           Diet: Advance Diet as Tolerated: Regular Diet Adult    DVT Prophylaxis: DOAC  Brandon Catheter: Not present  Lines: None     Cardiac Monitoring: ACTIVE order. Indication: Tachyarrhythmias, acute (48 hours)  Code Status: No CPR- Do NOT Intubate      Clinically Significant Risk Factors           # Hypercalcemia: Highest Ca = 10.5 mg/dL in last 2 days, will monitor as appropriate              # Overweight: Estimated body mass index is 25.2 kg/m  as calculated from the following:    Height as of this encounter: 1.651 m (5' 5\").    Weight as of this encounter: 68.7 kg (151 lb 7.3 oz)., PRESENT ON ADMISSION         Disposition Plan      Expected Discharge Date: 03/17/2023      Destination: other (comment) (TCU)  Discharge Comments: 3/16 POD#1 Hip repair. IV Lasix, IV Antibiotics, IV DIlt gtt(AFIB with RVR), O2 at 2 L/NC          Loretta Germain MD  Hospitalist Service  [facility]  Securely message with [employer] (more info)  Text page via [facility] Paging/Directory   ______________________________________________________________________    Interval History   Patient seen and examined at bedside. POD 1. Denied chest pain, fever, chills. Pain is well controlled.    Physical Exam   Vital Signs: Temp: 98.3  F (36.8  C) Temp src: Axillary BP: 90/51 Pulse: 78   Resp: 18 SpO2: (!) 87 % O2 Device: Nasal cannula Oxygen Delivery: 2 LPM  Weight: 151 lbs 7.3 oz    GEN: Alert and oriented to self, date, place, situation. Not in acute distress.  HEENT: Atraumatic, mucous membrane- moist and pink.  Chest: Decreased " bilateral air entry.  CVS: S1S2 regular. No murmurs, rubs or gallops.  Abdomen: Soft. Non-tender, non-distended. No organomegaly. No guarding or rigidity. Bowel sounds active.   Extremities: + b/l LE  Edema. Left hip area pain.  CNS: No focal neurologic deficit. No involuntary movements.  Skin: No skin rash, no cyanosis or clubbing.     Medical Decision Making             Data     I have personally reviewed the following data over the past 24 hrs:    7.6  \   9.5 (L)   / 259     139 104 26.1 (H) /  170 (H)   4.5 25 1.12 (H) \

## 2023-03-16 NOTE — PLAN OF CARE
Problem: Plan of Care - These are the overarching goals to be used throughout the patient stay.    Goal: Absence of Hospital-Acquired Illness or Injury  Intervention: Identify and Manage Fall Risk  Recent Flowsheet Documentation  Taken 3/15/2023 2220 by Fernando Denny RN  Safety Promotion/Fall Prevention:   activity supervised   assistive device/personal items within reach   bed alarm on   clutter free environment maintained   fall prevention program maintained   lighting adjusted   mobility aid in reach   nonskid shoes/slippers when out of bed   patient and family education   room door open   room near nurse's station   room organization consistent   safety round/check completed   supervised activity   toileting scheduled     Problem: Hip Fracture Surgical Repair  Goal: Optimal Coping with Change in Health Status  Outcome: Progressing     Problem: Hip Fracture Surgical Repair  Goal: Optimal Pain Control and Function  Outcome: Progressing   Goal Outcome Evaluation:    Arrived on unit at approx. 2200 H. A & O x 4, afib controlled, on 3 L NC. On dilt gtt 5 mg. Dr. Muñiz paged about restarting pt's scheduled metoprolol and lasix this evening and he is ok with restarting the meds as scheduled for tomorrow AM. Reported pain in left hip 5/10, tylenol administered. Bladder scanned for roughly 50 ml. Pleasant and cooperative.

## 2023-03-16 NOTE — PROGRESS NOTES
03/16/23 1015   Appointment Info   Signing Clinician's Name / Credentials (PT) Angie Santana PT   Rehab Comments (PT) Patient in bed ,Left up in chair .Chair alarm on and call light in reach.   Living Environment   People in Home spouse   Current Living Arrangements condominium   Home Accessibility no concerns   Transportation Anticipated health plan transportation;other (see comments)   Self-Care   Usual Activity Tolerance moderate   Current Activity Tolerance fair   Equipment Currently Used at Home walker, rolling;wheelchair, manual;other (see comments)  (has 4WW and transport chair at home)   Fall history within last six months yes   Number of times patient has fallen within last six months 4   Activity/Exercise/Self-Care Comment independent with mobility and basic ADL,had HHA  for bathing.On home O2 at 2 l at baseline   General Information   Onset of Illness/Injury or Date of Surgery 03/14/23   Referring Physician Loretta Germain   Patient/Family Therapy Goals Statement (PT) did not state   Pertinent History of Current Problem (include personal factors and/or comorbidities that impact the POC) fall at home with L hip fx-s/op closed reduction and pinning.Patient has a hx of CHF,CKD,depression,L ICA occlusion.   Existing Precautions/Restrictions fall;oxygen therapy device and L/min  (2 l at baseline)   Weight-Bearing Status - LLE weight-bearing as tolerated   Weight-Bearing Status - RLE full weight-bearing   Cognition   Affect/Mental Status (Cognition) confused   Pain Assessment   Patient Currently in Pain Yes, see Vital Sign flowsheet   Range of Motion (ROM)   Range of Motion ROM deficits secondary to surgical procedure;ROM deficits secondary to pain   ROM Comment L leg   Strength (Manual Muscle Testing)   Strength (Manual Muscle Testing) Deficits observed during functional mobility   Bed Mobility   Supine-Sit Harmon (Bed Mobility) moderate assist (50% patient effort)   Bed Mobility Limitations  decreased ability to use legs for bridging/pushing   Impairments Contributing to Impaired Bed Mobility pain   Sit-Stand Transfer   Sit-Stand Bryan (Transfers) moderate assist (50% patient effort)   Assistive Device (Sit-Stand Transfers) walker, front-wheeled   Stand-Sit Transfer   Stand-Sit Bryan (Transfers) minimum assist (75% patient effort)   Assistive Device (Stand-Sit Transfers) walker, front-wheeled   Gait/Stairs (Locomotion)   Bryan Level (Gait) moderate assist (50% patient effort)   Assistive Device (Gait) walker, front-wheeled   Distance in Feet 3 feet   Pattern (Gait) step-to   Deviations/Abnormal Patterns (Gait) antalgic;gait speed decreased;yoly decreased   Maintains Weight-bearing Status (Gait) able to maintain   Clinical Impression   Criteria for Skilled Therapeutic Intervention Yes, treatment indicated   PT Diagnosis (PT) impaired functional mobility   Influenced by the following impairments surgical procedure   Functional limitations due to impairments bed mobility ,transfers ,gait .strenght   Clinical Presentation (PT Evaluation Complexity) Stable/Uncomplicated   Clinical Presentation Rationale pt preesnts as med diagnosed   Clinical Decision Making (Complexity) low complexity   Planned Therapy Interventions (PT) gait training   Anticipated Equipment Needs at Discharge (PT) walker, rolling   Risk & Benefits of therapy have been explained evaluation/treatment results reviewed;patient   PT Total Evaluation Time   PT Eval, Low Complexity Minutes (76181) 15   Plan of Care Review   Plan of Care Reviewed With patient   Physical Therapy Goals   PT Frequency 2x/day   PT Predicted Duration/Target Date for Goal Attainment 03/23/23   PT Goals Bed Mobility;Transfers;Gait;PT Goal 1   PT: Bed Mobility Minimal assist   PT: Transfers Supervision/stand-by assist;Bed to/from chair;Assistive device   PT: Gait Minimal assist;Rolling walker;25 feet   PT: Goal 1 patient will be independent with  HEP with handout   Therapeutic Procedure/Exercise   Ther. Procedure: strength, endurance, ROM, flexibillity Minutes (34101) 12   Symptoms Noted During/After Treatment shortness of breath;fatigue   Treatment Detail/Skilled Intervention patient performed routine hip fx ex x 10 reps.Independent on R L/E ,min assist on L axcept I with SAQ   PT Discharge Planning   PT Plan mobilize ,increase distance of amb with FWW ,hip ex   PT Discharge Recommendation (DC Rec) Transitional Care Facility   PT Rationale for DC Rec needs assist with mobility ,min amb   PT Brief overview of current status mod assist for mobility ,# deet to chair with FWW ,mod assist   Total Session Time   Timed Code Treatment Minutes 12   Total Session Time (sum of timed and untimed services) 27

## 2023-03-16 NOTE — PROGRESS NOTES
Patient went through orthopedic surgery without cardiac incident.  Transitioned from IV diltiazem to her usual dose of oral metoprolol for rate control of her atrial fibrillation.  Telemetry demonstrates rate well controlled.  At this point we will sign off.  Feel free to call if any questions.

## 2023-03-16 NOTE — PROGRESS NOTES
"Orthopedic Progress Note      Assessment: 1 Day Post-Op  S/P Procedure(s):  CLOSED REDUCTION, HIP, WITH PERCUTANEOUS PINNING     Plan:   Activity: Up with assist and assistive device at all times.   Weight bearing status: WBAT LLE.  Pain management: Transition from IV to PO as tolerated.    Antibiotics: Ancef x 24 hours.  Diet: Begin with clear fluids and progress diet as tolerated.   DVT prophylaxis: Resume PTA Xarelto today and mechanical while in the hospital  Imaging: complete.  Labs: Hgb POD#1.  Dressings: Keep clean, dry and intact x 3 days.   Elevation: Elevate LLE on pillows to keep above the level of the heart as much as possible.   Physical Therapy/Occupational Therapy: Eval and treat, appreciate assistance and recommendations.  Consults: PT/OT, social work for disposition planning.  Follow-up: Clinic in 2 weeks for incision check and repeat x-rays needed.   Disposition: Pending progress with therapies, pain control on orals, and medical stability, anticipate discharge to TCU.      Subjective:  Pain: mild  Nausea, Vomiting:  No  Lightheadedness, Dizziness:  No  Neuro:  Patient denies new onset numbness or paresthesias  Fever, chills: No  Chest pain: No  SOB: No    Patient reports feeling well today. Patient reports pain is tolerable with current pain regimen. Patient eating and drinking well. Patient voiding and passing gas however no BM. Patient reports walking around the room with therapy. All questions/concerns answered.      Objective:  /53 (BP Location: Right arm)   Pulse 93   Temp 99.4  F (37.4  C) (Oral)   Resp 22   Ht 1.651 m (5' 5\")   Wt 68.7 kg (151 lb 7.3 oz)   SpO2 94%   BMI 25.20 kg/m    The patient is Alert and awake. Patient is laying in bed and appears comfortable.   Sensation is intact.  Dorsiflexion and plantar flexion is intact.  Dorsalis pedis pulse intact. Skin warm and well perfused.   Compartments are soft and non-tender.   The incision is covered. Dressing " C/D/I.        Pertinent Labs   Lab Results: personally reviewed.   Lab Results   Component Value Date    INR 1.60 (H) 03/15/2023    INR 2.18 (H) 03/14/2023    INR 1.62 (H) 12/13/2022     Lab Results   Component Value Date    WBC 7.6 03/16/2023    HGB 9.5 (L) 03/16/2023    HCT 31.0 (L) 03/16/2023     03/16/2023     03/16/2023     Lab Results   Component Value Date     03/16/2023    CO2 25 03/16/2023         Report completed by:  Zoe Denny PA-C, LAZARO  Date: 3/16/2023  Time: 11:23 AM  [facility]

## 2023-03-16 NOTE — OP NOTE
Operative Note     Name:  Fatuma Henry  PCP:  Tory Wise  Procedure Date:  3/14/2023 - 3/15/2023        Pre-Operative Diagnosis:  1. Left minimally displaced impacted femoral neck fracture     Post-Operative Diagnosis:    1.   Left minimally displaced impacted femoral neck fracture     Procedure:   1.  Closed reduction, internal fixation left proximal femur using Synthes Femoral Neck System     Surgeon: Denton Mckeon MD  Assistant: Vanessa Farrell PA-C     A skilled assistant was necessary for this case to help with patient positioning, manipulation and prep of the operative extremity, soft tissue retraction, and safe/timely progression of the procedure.      Circulator: Thomas Lan RN; Simba Amaya RN; Brooke Champagne RN  Scrub Person: Rachel Santana; Elo Pan  X-Ray Technologist: Yusuf Paiz ARRT      Anesthesia Type: General endotracheal anesthesia, peripheral block     Estimated Blood Loss: 100 mL     Specimens: None     Complications: None apparent     Findings:   Minimally displaced impacted left femoral neck fracture, alignment remained stable compared to injury films and during placement of femoral neck system. Noted bone demineralization during instrumentation.      Indication for Procedure:   Piotr is a pleasant 77-year-old female with a complex past medical history including A-fib with RVR, CKD, COPD on chronic oxygen, hypertension, chronic HFpEF, pulmonary hypertension with recent admission for C. difficile and pneumonia who had a mechanical fall at home onto her left hip.  She reports that she just recently discharged to home from TCU, was noting that she was walking in her living room with a walker when she fell, landing on her left hip, and had sudden onset of pain with inability to stand.  She was brought to the emergency department where x-rays and CT were performed, demonstrating a impacted subcapital femoral neck fracture, minimally  displaced.  She was admitted to the to the hospitalist service team, and evaluated preoperatively by the cardiology team.  She is been deemed medically optimized and cleared for surgery, though is high risk for perioperative complications     I was able to meet with Piotr and her daughter in the preoperative holding area.  I discussed the nature of her left impacted femoral neck fracture.  Preoperatively, the nature of the procedure, risks and benefits, as well as alternatives including nonsurgical management were discussed in detail with the patient. I reviewed and discussed the patient's condition and relevant images with the patient. We discussed options for further evaluation and treatment, including conservative non-operative management versus surgical intervention.       From a conservative standpoint, the patient can pursue non-operative management, which would include protected weight bearing to the lower extremity, which carries a high risk of morbidity and mortality due to prolonged and diminished mobilization/ambulation and it's associated complications, which include but are not limited to blood clots (DVT/PE), skin ulcerations and pressure sores, and pneumonia. In the long term, there is also the risk of chronic pain, fracture nonunion, fracture malunion, and avascular necrosis of the femoral head.     From a surgical standpoint, we discussed closed reduction and internal fixation with the Synthes Femoral Neck System. Given her fracture morphology, this is a reasonable treatment strategy to stabilize her fracture and allow for early mobilization. This procedure would likely be quicker, requiring less time under anesthesia and blood loss when compared to hip arthroplasty.Given, the patient's generalized bone demineralization seen on imaging, internal fixation would carry the risks of fracture displacement, implant cut out, fracture nonunion, fracture malunion, and need for potential future surgery.        We also discussed what hip hemiarthroplasty involves, as well as the postoperative recovery and rehabilitation. Similarly, this procedure has a high likelihood of providing immediate post-operative mobilization. It would likely involve longer time under anesthesia, greater surgical dissection, and potentially increased blood loss. We also discussed at length the risks and benefits of arthroplasty surgery.     We reviewed in detail the risks of surgical management included but was not limited to the risk of pain, bleeding, infection, blood clots (DVT, PE), wound issues, continued chronic pain in the hip, post-operative joint stiffness, painful arc of motion, difficulty with ambulation, iatrogenic fracture, damage to nearby neurovascular structures, need for potential future surgery, the possibility of intra-operative and/or post-operative medical complications such as anesthesia complications or reactions, respiratory and cardiovascular events, stroke, heart attack and/or death were also discussed. In the case of infection, they understand that this will require prolonged IV antibiotic therapy and possible multiple operative procedures in the future.      The patient and her family demonstrated an understanding of these risks as well as the potential benefits of closed reduction and pinning versus arthroplasty surgery. Again risks and benefits of both surgical options were reviewed. Specific details of the surgical procedure, hospitalization, recovery, rehabilitation, and long-term precautions were also presented. Ultimately, they wished to proceed with closed reduction left proximal femur and internal fixation with the femoral neck system despite the risks involved. All of patient's and her family's questions were answered preoperatively at which time written informed consent was taken.      Description of Procedure:   The patient was met in the preoperative holding area. The correct operative site was identified  and marked with indelible ink. After being informed of the risks, benefits, and alternatives of the procedure as above, the patient desired to proceed with surgery, and written informed consent was obtained. The patient met with the anesthesia team in the preoperative holding area, and underwent peripheral nerve block without complication.The patient was then brought to the operating suite, where she underwent general endotracheal anesthesia without complication. Bilateral feet and ankles were well padded with cast padding and she was placed into the traction boots. She was then placed in the supine position on the fracture table. All bony prominences were well padded. The patient received antibiotics preoperatively. C-arm was used to confirm adequate visualization of the proximal femur and femoral head on both AP and lateral planes. The left lower extremity was then prepped and draped in the typical sterile fashion. A timeout  was performed by all operating room staff to confirm patient, procedure to be performed, laterality, and equipment needs. All were in agreement without safety concerns.      Attention was turned to the left hip.  A longitudinal incision was created at the lateral proximal thigh using 10 blade scalpel through skin and subcutaneous tissue.  Electrocautery was used to achieve hemostasis.  Dissection was carried down to the IT band.  This was longitudinally split in line with the incision.  The vastus lateralis was carefully elevated at its posterior border down to the lateral aspect of the femur.  A Abraham retractor was placed over the anterior femur, allowing for visualization of the lateral femur at the vastus ridge.  Under fluoroscopic guidance, an initial anti-rotation wire was placed within the superior and anterior aspect of the femoral head/neck, with AP and lateral images ensuring appropriate position and out of the way for anticipated femoral neck system position. Next, again under  fluoroscopic guidance, using the Synthes femoral neck system guide, guide pin was placed.  Both on AP and lateral views, guidepin was ensured to be centered within the femoral head on both views, and planned to be slighting inferior to capture better quality bone.  Guidepin was advanced just to the subchondral bone of the femoral head, then subsequently measured.  A 80 mm bolt and 80 mm antirotation screw were felt to be most appropriate.  Next, the reamer was then set to a 80 mm, and placed over the guidewire and reamed under fluoroscopic guidance.  Guidewire was then removed.  A 1 hole Synthes femoral neck system plate was then opened, and the 80 mm bolt was then placed into the plate and assembled onto the insertion handle on the back table.  This was then advanced into the proximal femur under fluoroscopic guidance, with fracture alignment remaining stable.  With care taken to ensure the plate was well positioned on the lateral femoral cortex, as well as well centered in line with the axis of the femur using fluoroscopy, the single locking screw was then drilled and placed.  A final 46 mm locking screw was then placed with appropriate positioning and security within the plate.  Next, the antirotation screw was then drilled with the drill set to 80 mm.  The final 80 mm antirotation screw was then placed under fluoroscopic guidance.  Fracture alignment remained stable throughout the entirety of implant positioning.  Final AP and lateral fluoroscopic images were obtained ensuring appropriate implant positioning and maintenance of fracture alignment.     The wounds were then copiously irrigated with normal sterile saline. Hemostasis was ensured using electrocautery. Closure was then performed using 0 Vicryl for IT band closure and deep subcutaneous/adipose layer closure. 2-0 Vicryl was used for deep dermal closure. Staples were used for skin closure. Sterile dressings of xeroform, 4x4 guaze and island dressing were  placed. The patient was then awakened from anesthesia without complication and transferred to a regular bed. All sponge and needle counts were correct. The patient was then taken from the operating room to the PACU in stable condition. There were no apparent complications identified.     Post Operative Plan:   Activity: Up with assist and assistive device at all times.   Weight bearing status: WBAT LLE.  Pain management: Transition from IV to PO as tolerated.    Antibiotics: Ancef x 24 hours.  Diet: Begin with clear fluids and progress diet as tolerated.   DVT prophylaxis: Resume PTA Xarelto POD#1 and mechanical while in the hospital  Imaging: complete.  Labs: Hgb POD#1.  Dressings: Keep clean, dry and intact x 3 days.   Elevation: Elevate LLE on pillows to keep above the level of the heart as much as possible.   Physical Therapy/Occupational Therapy: Eval and treat, appreciate assistance and recommendations.  Consults: PT/OT, social work for disposition planning.  Follow-up: Clinic in 2 weeks for incision check and repeat x-rays needed.   Disposition: Pending progress with therapies, pain control on orals, and medical stability, anticipate discharge to TCU.      Implant Name Type Inv. Item Serial No.  Lot No. LRB No. Used Action   PLATE BN TI 1 HL STRL FEM NK - [employer] PLATE BN TI 1 HL STRL FEM NK NA [employer] [facility] [employer]- [id number] Left 1 Implanted   BOLT ORTH 80MM FEM NK SYS STRL [id number] - [employer] BOLT ORTH 80MM FEM NK SYS STRL [id number] NA [employer] [facility] [employer]- [id number] Left 1 Implanted   SCREW BN 46MM 5MM TI ST LCK STRDR T25 STRL - [employer] SCREW BN 46MM 5MM TI ST LCK STRDR T25 STRL NA [employer] [facility] [employer]- [id number] Left 1 Implanted   SCREW BN 80MM ANTIROTATE STRL FEM NK SYS [id number] - [employer] SCREW BN 80MM ANTIROTATE STRL FEM NK SYS [id number] NA [employer] [facility] [employer]- [id number] Left 1 Implanted        Denton  MD Mike   Orthopedic Surgery   [facility]

## 2023-03-16 NOTE — PLAN OF CARE
"  Problem: Plan of Care - These are the overarching goals to be used throughout the patient stay.    Goal: Patient-Specific Goal (Individualized)  Description: You can add care plan individualizations to a care plan. Examples of Individualization might be:  \"Parent requests to be called daily at 9am for status\", \"I have a hard time hearing out of my right ear\", or \"Do not touch me to wake me up as it startles me\".  Outcome: Progressing  Goal: Absence of Hospital-Acquired Illness or Injury  Outcome: Progressing  Intervention: Identify and Manage Fall Risk  Recent Flowsheet Documentation  Taken 3/15/2023 2349 by Yusuf Burgos, RN  Safety Promotion/Fall Prevention:    activity supervised    assistive device/personal items within reach    bed alarm on    clutter free environment maintained    fall prevention program maintained    lighting adjusted    mobility aid in reach    nonskid shoes/slippers when out of bed    patient and family education    room door open    room near nurse's station    room organization consistent    safety round/check completed    supervised activity    toileting scheduled  Intervention: Prevent Skin Injury  Recent Flowsheet Documentation  Taken 3/15/2023 2349 by Yusuf Burgos, RN  Body Position:    supine, legs elevated    supine, head elevated  Goal: Optimal Comfort and Wellbeing  Outcome: Progressing     Problem: Risk for Delirium  Goal: Improved Behavioral Control  Intervention: Minimize Safety Risk  Recent Flowsheet Documentation  Taken 3/15/2023 2349 by Yusuf Burgos, RN  Enhanced Safety Measures: bed alarm set     Problem: Hip Fracture Surgical Repair  Goal: Optimal Coping with Change in Health Status  Outcome: Progressing  Goal: Optimal Functional Ability  Intervention: Promote Optimal Functional Status  Recent Flowsheet Documentation  Taken 3/15/2023 2349 by Yusuf Burgos, RN  Activity Management: activity adjusted per tolerance  Goal: Anesthesia/Sedation " Recovery  Intervention: Optimize Anesthesia Recovery  Recent Flowsheet Documentation  Taken 3/15/2023 2349 by Yusuf Burgos, RN  Safety Promotion/Fall Prevention:    activity supervised    assistive device/personal items within reach    bed alarm on    clutter free environment maintained    fall prevention program maintained    lighting adjusted    mobility aid in reach    nonskid shoes/slippers when out of bed    patient and family education    room door open    room near nurse's station    room organization consistent    safety round/check completed    supervised activity    toileting scheduled  Goal: Effective Oxygenation and Ventilation  Intervention: Optimize Oxygenation and Ventilation  Recent Flowsheet Documentation  Taken 3/15/2023 2349 by Yusuf Burgos, RN  Head of Bed (HOB) Positioning: HOB at 30-45 degrees     Problem: COPD (Chronic Obstructive Pulmonary Disease) Comorbidity  Goal: Maintenance of COPD Symptom Control  Outcome: Progressing  Intervention: Maintain COPD-Symptom Control  Recent Flowsheet Documentation  Taken 3/15/2023 2349 by Yusuf Burgos, RN  Medication Review/Management: medications reviewed     Problem: Heart Failure Comorbidity  Goal: Maintenance of Heart Failure Symptom Control  Outcome: Progressing  Intervention: Maintain Heart Failure Management  Recent Flowsheet Documentation  Taken 3/15/2023 2349 by Yusuf Burgos, RN  Medication Review/Management: medications reviewed   Goal Outcome Evaluation:    Patient is alert and oriented x 4. Pleasant and cooperative. Patient has been very tired throughout the night. Patient rates pain at a 3/10 which is tolerable for her. CMS in LLE remains intact with +pulses. No redness or swelling noted at incision site. Slight dried serosanguinous noted on mepilex. Patient has remained in bed. Diltiazem drip remains on with HR in the 80s. SCDa in place. Refused AM APAP.     Telemetry: atrial fibrillation.

## 2023-03-16 NOTE — PROGRESS NOTES
03/16/23 0850   Appointment Info   Signing Clinician's Name / Credentials (OT) Hannah Kuldeep, OTR/L   Living Environment   People in Home spouse  ()   Current Living Arrangements condominium   Home Accessibility no concerns   Living Environment Comments Per pt, she lives in a condo with her . Per chart review, pt has been in a TCU until last week.   Self-Care   Equipment Currently Used at Home walker, rolling   Fall history within last six months yes   Number of times patient has fallen within last six months 1   Activity/Exercise/Self-Care Comment Pt states she is independent with all ADLs. Per chart review, pt's daughters state she needs assistance with bathing but the HC agency does not have the staffing to provide this level of assistance. Has been receiving HC services since her TCU stay.   General Information   Onset of Illness/Injury or Date of Surgery 03/15/23   Referring Physician Vanessa Farrell PA-C   Patient/Family Therapy Goal Statement (OT) none stated   Additional Occupational Profile Info/Pertinent History of Current Problem Per chart review, pt  is a 77 year old female admitted on 3/14/2023. She has h/o recurrent C. difficile, paroxysmal A-fib, hypertension, hyperlipidemia, history of pulmonary hypertension, diastolic CHF, CKD-III, presyncope, left ICA occlusion, microcytic anemia, rheumatoid arthritis, gout, GERD, depression presented with left hip pain after falling at home, now s/p closed reduction and pinning.   Existing Precautions/Restrictions fall;oxygen therapy device and L/min  (3 LPM via NC at baseline, pt wearing 3LPM via oxymask during session)   Left Lower Extremity (Weight-bearing Status) weight-bearing as tolerated (WBAT)   Cognitive Status Examination   Cognitive Status Comments Increased time and cues for command following.   Pain Assessment   Patient Currently in Pain Yes, see Vital Sign flowsheet  (5-6/10 pain in LLE throughout session)   Range of Motion  Comprehensive   General Range of Motion no range of motion deficits identified   Strength Comprehensive (MMT)   General Manual Muscle Testing (MMT) Assessment no strength deficits identified   Bed Mobility   Bed Mobility supine-sit;sit-supine;scooting/bridging   Scooting/Bridging [place] (Bed Mobility) dependent (less than 25% patient effort);2 person assist   Supine-Sit [place] (Bed Mobility) minimum assist (75% patient effort)   Sit-Supine [place] (Bed Mobility) maximum assist (25% patient effort)   Assistive Device (Bed Mobility) bed rails   Transfers   Transfers sit-stand transfer   Sit-Stand Transfer   Sit-Stand [place] (Transfers) moderate assist (50% patient effort);2 person assist   Assistive Device (Sit-Stand Transfers) walker, front-wheeled   Balance   Balance Comments Overall good balance, did require hands-on assist for safety with some unsteadiness taking steps.   Activities of Daily Living   BADL Assessment/Intervention lower body dressing   Lower Body Dressing Assessment/Training   Position (Lower Body Dressing) edge of bed sitting   [place] Level (Lower Body Dressing) maximum assist (25% patient effort)   Clinical Impression   Criteria for Skilled Therapeutic Interventions Met (OT) Yes, treatment indicated   OT Diagnosis Impaired ability to perform ADLs and functional mobility.   Influenced by the following impairments L hip fracture   OT Problem List-Impairments impacting ADL problems related to;activity tolerance impaired;balance;cognition;mobility;pain;post-surgical precautions;strength   Assessment of Occupational Performance 3-5 Performance Deficits   Identified Performance Deficits bed mobility, transfers, functional mobility, lower body dressing   Planned Therapy Interventions (OT) ADL retraining;balance training;bed mobility training;cognition;transfer training;home program guidelines;progressive activity/exercise;risk factor education   Clinical Decision Making  Complexity (OT) moderate complexity   Risk & Benefits of therapy have been explained evaluation/treatment results reviewed;care plan/treatment goals reviewed;risks/benefits reviewed;current/potential barriers reviewed;participants voiced agreement with care plan;participants included;patient   Clinical Impression Comments Pt would benefit from skilled OT services to promote ability to perform ADLs and functional mobility.   OT Total Evaluation Time   OT Eval, Moderate Complexity Minutes (85809) 15   OT Goals   Therapy Frequency (OT) Daily   OT Predicted Duration/Target Date for Goal Attainment 03/23/23   OT Goals Lower Body Dressing;Bed Mobility;Transfers   OT: Lower Body Dressing Supervision/stand-by assist;using adaptive equipment;within precautions;including set-up/clothing retrieval   OT: Bed Mobility Supervision/stand-by assist;supine to/from sitting;within precautions   OT: Transfer Supervision/stand-by assist;with assistive device;within precautions   Interventions   Interventions Quick Adds Therapeutic Activity   Therapeutic Activities   Therapeutic Activity Minutes (59768) 10   Treatment Detail/Skilled Intervention O2 sat dropping throughout session with mobility on 3LPM oxymask to mid/low 80s, RN present during session and monitoring. O2 sat recovered with rest breaks and cues for breathing techniques. Pt seated on EOB for ~8 min with SBA, overall good balance and tolerance for sitting EOB, some increased pain in L hip. Pt completed additional STS from EOB with Mod Ax2, able to tolerate standing for ~30 sec with FWW, then took 5 side steps with Min Ax2 towards HOB.   OT Discharge Planning   OT Plan monitor O2, bed mobility, transfer to chair/commode, lower body dressing with AE   OT Discharge Recommendation (DC Rec) Transitional Care Facility   OT Rationale for DC Rec Pt required Ax2 for mobility and Max A for ADLs today, decreased activity tolerance. Recommend TCU to progress mobility and safety.   OT  Brief overview of current status Mod Ax2 STS, Min Ax2 ambulation, Max A lower body dressing   Total Session Time   Timed Code Treatment Minutes 10   Total Session Time (sum of timed and untimed services) 25

## 2023-03-16 NOTE — PLAN OF CARE
"  Patient is disoriented to time and situation.  Patient is pleasant, cooperative, and follows commands; but can be forgetful.  Patient's daughter confirmed this is not a new finding, and is something that has occurred since November.  Patient is on 4 L NC.  Her oxygen can fall to 78% but climbs back up to the mid 90s in a short period of time.  Patient has denied pain or reported low pain this shift, her body presentation shows she has pain.  In the 0900 hour she received Hydromorphone PRN.  Patient sat in the recliner most of the shift, but is now back to bed.  Patient has had low urine output this shift, and is on a pierwick.  Patient had roughly 360 mL to drink.  Patient's surgical dressing is intact with some blood spotting that was existing since last shift.    Problem: Plan of Care - These are the overarching goals to be used throughout the patient stay.    Goal: Plan of Care Review  Description: The Plan of Care Review/Shift note should be completed every shift.  The Outcome Evaluation is a brief statement about your assessment that the patient is improving, declining, or no change.  This information will be displayed automatically on your shift note.  Outcome: Progressing  Goal: Patient-Specific Goal (Individualized)  Description: You can add care plan individualizations to a care plan. Examples of Individualization might be:  \"Parent requests to be called daily at 9am for status\", \"I have a hard time hearing out of my right ear\", or \"Do not touch me to wake me up as it startles me\".  Outcome: Progressing  Goal: Absence of Hospital-Acquired Illness or Injury  Outcome: Progressing  Intervention: Identify and Manage Fall Risk  Recent Flowsheet Documentation  Taken 3/16/2023 1215 by Rony Reilly, RN  Safety Promotion/Fall Prevention:   bed alarm on   clutter free environment maintained   fall prevention program maintained   increase visualization of patient   lighting adjusted   nonskid shoes/slippers when " out of bed   patient and family education  Taken 3/16/2023 0906 by Rony Reilly, RN  Safety Promotion/Fall Prevention:   bed alarm on   clutter free environment maintained   fall prevention program maintained   increase visualization of patient   lighting adjusted   nonskid shoes/slippers when out of bed   patient and family education  Intervention: Prevent and Manage VTE (Venous Thromboembolism) Risk  Recent Flowsheet Documentation  Taken 3/16/2023 1215 by Rony Reilly, RN  VTE Prevention/Management: SCDs (sequential compression devices) off  Taken 3/16/2023 0906 by Rony Reilly, RN  VTE Prevention/Management: SCDs (sequential compression devices) off  Taken 3/16/2023 0800 by Rony Reilly, RN  VTE Prevention/Management: SCDs (sequential compression devices) on     Problem: Hip Fracture Surgical Repair  Goal: Absence of Bleeding  Outcome: Progressing     Tyrell Reilly RN

## 2023-03-16 NOTE — PROGRESS NOTES
Care Management Follow Up    Length of Stay (days): 2    Expected Discharge Date: 03/17/2023     Concerns to be Addressed:  Discharge planning, diltiazem drip  Patient plan of care discussed at interdisciplinary rounds: Yes    Anticipated Discharge Disposition:  Home care vs. TCU     Anticipated Discharge Services:   PT/OT  Anticipated Discharge DME:  unknown    Patient/family educated on Medicare website which has current facility and service quality ratings:  yes  Education Provided on the Discharge Plan:  yes  Patient/Family in Agreement with the Plan:  OK if needs to go back to TCU    Referrals Placed by CM/SW:  None yet- waiting on recommendation  Private pay costs discussed: not applicable    Additional Information:  Pt is a 77 year old woman who has a past medical history of CRF stage 3, COPD, oxygen dependent, A fib, HTN, CHF, recurrent falls, respiratory failure.  She was admitted on 03/14/23 after a fall and found to have a fracture Lfemur . She was taken to surgery on 03/15/23 for surgical repair.    Pt has a health care directive on file and has elected no CPR, no intubation.  Her primary health care agent is her spouse Bolivar Henry and her daughter Enid Dove and Lisbeth Carvajal and re co-first [employer] health care agents.    Pt had surgery on 03/15/23.  She is presently on a diltiazem drip. Pt lives with her  in an apartment and had been getting homecare.  She has been in TCU previously, off and on since November. Waiting on PT/OT recommendations. CM will continue to follow.     3:40PM PT/OT recommend TCU. Spoke with daughter and placed referrals for TCU.  Daughter could only agree on 4 at this time.     Cherelle Ramirez RN

## 2023-03-17 NOTE — PLAN OF CARE
Problem: Plan of Care - These are the overarching goals to be used throughout the patient stay.    Goal: Plan of Care Review  Description: The Plan of Care Review/Shift note should be completed every shift.  The Outcome Evaluation is a brief statement about your assessment that the patient is improving, declining, or no change.  This information will be displayed automatically on your shift note.  Outcome: Progressing  Flowsheets (Taken 3/16/2023 7616)  Plan of Care Reviewed With: patient     Problem: Hip Fracture Surgical Repair  Goal: Optimal Coping with Change in Health Status  Outcome: Progressing  Intervention: Support Psychosocial Response to Surgery and Mobility Changes  Recent Flowsheet Documentation  Taken 3/16/2023 1537 by Charity Gonzalez, RN  Family/Support System Care: caregiver stress acknowledged     Problem: Dysrhythmia  Goal: Normalized Cardiac Rhythm  Outcome: Progressing  Intervention: Monitor and Manage Cardiac Rhythm Effect  Recent Flowsheet Documentation  Taken 3/16/2023 1537 by Charity Gonzalez RN  VTE Prevention/Management: SCDs (sequential compression devices) off     Problem: COPD (Chronic Obstructive Pulmonary Disease) Comorbidity  Goal: Maintenance of COPD Symptom Control  Outcome: Progressing  Intervention: Maintain COPD-Symptom Control  Recent Flowsheet Documentation  Taken 3/16/2023 1537 by Charity Gonzalez RN  Medication Review/Management: medications reviewed   Goal Outcome Evaluation:      Plan of Care Reviewed With: patient  Piotr had a good shift. Declines pain frequently but groaning, resistive movement and grimacing. Encouraged her to ask for pain meds, especially before therapy. Did get up with therapy twice today and doing well. Hip incision dressing intact. Does get confused after oxy but reoriented successfully and it was effective for pain. Bed alarm on for safety. Remains in afib. On 4L by oxygen. Removes it frequently and sats drop to 70's, recovers quickly once oxygen back on. N  bowel movement this shift. 2 incontinent urine episodes. Purewick in place.

## 2023-03-17 NOTE — PLAN OF CARE
"  Problem: Plan of Care - These are the overarching goals to be used throughout the patient stay.    Goal: Plan of Care Review  Description: The Plan of Care Review/Shift note should be completed every shift.  The Outcome Evaluation is a brief statement about your assessment that the patient is improving, declining, or no change.  This information will be displayed automatically on your shift note.  Outcome: Progressing  Goal: Patient-Specific Goal (Individualized)  Description: You can add care plan individualizations to a care plan. Examples of Individualization might be:  \"Parent requests to be called daily at 9am for status\", \"I have a hard time hearing out of my right ear\", or \"Do not touch me to wake me up as it startles me\".  Outcome: Progressing  Goal: Absence of Hospital-Acquired Illness or Injury  Intervention: Identify and Manage Fall Risk  Recent Flowsheet Documentation  Taken 3/17/2023 1202 by Sugar Chandra RN  Safety Promotion/Fall Prevention:    activity supervised    nonskid shoes/slippers when out of bed    lighting adjusted  Taken 3/17/2023 0804 by Sugar Chandra RN  Safety Promotion/Fall Prevention:    activity supervised    nonskid shoes/slippers when out of bed    lighting adjusted  Intervention: Prevent Skin Injury  Recent Flowsheet Documentation  Taken 3/17/2023 1202 by Sugar Chandra RN  Body Position: turned  Taken 3/17/2023 0804 by Sugar Chandra RN  Body Position: turned  Goal: Optimal Comfort and Wellbeing  Outcome: Progressing   Goal Outcome Evaluation:  Tolerating activity well up in the recliner for meals  No  Void  Since AM bladder scan result is 424 ml  Pt says she just want  The Purewick back and that she will try for 2 hrs and will redo bladder scan then . If still no void                      "

## 2023-03-17 NOTE — PROGRESS NOTES
Care Management Follow Up    Length of Stay (days): 3    Expected Discharge Date: 03/18/2023     Concerns to be Addressed: cardio status, post procedure care and monitoring, TCU acceptance    Patient plan of care discussed at interdisciplinary rounds: Yes    Anticipated Discharge Disposition:  TCU     Anticipated Discharge Services:  TCU    Anticipated Discharge DME:  FWW    Education Provided on the Discharge Plan:  yes  Patient/Family in Agreement with the Plan: yes    Referrals Placed by CM/SW:  TCU  Private pay costs discussed:     Additional Information:  Patient admit post fall at home, acute impacted left femoral fracture. A-fib with RVR. History of COPD on home O2 2L.  Orthopedic following, POD 2 Closed reduction with percutaneous pinning.  Cardiology consulted, signed off 3/16.    Therapy: mod .min assist for mobility,amb 20 feet with fWW ,min asssit. Recommending TCU.     Social History:  [facility]. Pts daughters report that pt was actually in TCU until last week. They report ongoing TCU stays since last November. They report that she was most recently at [facility]. They report that she was discharged with home care through [facility] but only for nursing care for a wound. They report that pt needed therapy also and the home care nurse had reached out to pts PCP to get therapy orders which they believe the home care had received. They report that pt needed assistance with bathing but the home care does not have staffing to provide this. They report that pt lives with her  at baseline, but he is not able to provide any physical assistance.     [facility] confirm patient is open to them for RN/PT/OT.    Spoke with daughter Enid 3/17. She [facility] She declines referrals to [facility] and [facility]. She states they are not sure how many days she has left for TCU coverage. She has  questions about MA or any other forms of coverage. Referral to [facility]. She asks if CM could ask if facility has many c-diff patients as patient is susceptible to c-diff. CM enquired on support patient could have at home for home discharge. Enid states not enough support at home.     Referrals faxed and pending. PAS needed. Transportation TBD.     2:24 PM Accepted by [facility]. Enid is in agreement. Admissions will send for auth which will take until Monday 3/20. Enid requests [facility]. CM notified her of potential transportation cost. CM to keep Enid updated.             Malaika Flores, RN

## 2023-03-17 NOTE — PROGRESS NOTES
"Orthopedic Progress Note      Assessment: 2 Day Post-Op  S/P Procedure(s):  CLOSED REDUCTION, HIP, WITH PERCUTANEOUS PINNING     Plan:   Activity: Up with assist and assistive device at all times.   Weight bearing status: WBAT LLE.  Pain management: Transition from IV to PO as tolerated.    Antibiotics: Ancef x 24 hours.  Diet: Begin with clear fluids and progress diet as tolerated.   DVT prophylaxis: Resume PTA Xarelto today and mechanical while in the hospital  Imaging: complete.  Labs: Hgb POD#1.  Dressings: Keep clean, dry and intact x 3 days.   Elevation: Elevate LLE on pillows to keep above the level of the heart as much as possible.   Physical Therapy/Occupational Therapy: Eval and treat, appreciate assistance and recommendations.  Consults: PT/OT, social work for disposition planning.  Follow-up: Clinic in 2 weeks for incision check and repeat x-rays needed.   Disposition: Pending progress with therapies, pain control on orals, and medical stability, anticipate discharge to TCU.      Subjective:  Pain: mild  Nausea, Vomiting:  No  Lightheadedness, Dizziness:  No  Neuro:  Patient denies new onset numbness or paresthesias  Fever, chills: No  Chest pain: No  SOB: No    Patient reports feeling well today. Patient reports pain is tolerable with current pain regimen. Patient eating and drinking well. Patient reports walking around the room with therapy. All questions/concerns answered.      Objective:  /62 (BP Location: Right arm)   Pulse 104   Temp 98  F (36.7  C) (Oral)   Resp 20   Ht 1.651 m (5' 5\")   Wt 66.7 kg (147 lb 0.8 oz)   SpO2 97%   BMI 24.47 kg/m    The patient is Alert and awake. Patient is sitting on the recliner and appears comfortable.   Sensation is intact.  Dorsiflexion and plantar flexion is intact.  Dorsalis pedis pulse intact. Skin warm and well perfused.   Compartments are soft and non-tender.   The incision is covered. Dressing C/D/I.        Pertinent Labs   Lab Results: " personally reviewed.   Lab Results   Component Value Date    INR 1.60 (H) 03/15/2023    INR 2.18 (H) 03/14/2023    INR 1.62 (H) 12/13/2022     Lab Results   Component Value Date    WBC 14.4 (H) 03/17/2023    HGB 9.9 (L) 03/17/2023    HCT 31.9 (L) 03/17/2023     03/17/2023     03/17/2023     Lab Results   Component Value Date     03/17/2023    CO2 26 03/17/2023         Report completed by:  Zoe Denny PA-C, LAZARO  [facility]

## 2023-03-17 NOTE — PROGRESS NOTES
[facility]    Medicine Progress Note - Hospitalist Service    Date of Admission:  3/14/2023    Assessment & Plan   Fatuma Henry is a 77 year old female admitted on 3/14/2023. She has h/o recurrent C. difficile, paroxysmal A-fib, hypertension, hyperlipidemia, history of pulmonary hypertension, diastolic CHF, CKD-III, presyncope, left ICA occlusion, microcytic anemia, rheumatoid arthritis, gout, GERD, depression presented with left hip pain after falling at home.  Per patient, her legs gave out.  She denied any chest pain, dizziness, palpitation, loss of consciousness during the fall.  CT-head and CT and cervical spine with no acute findings.  CT pelvis showed acute impacted mildly displaced subcapital fracture of the left femur. Per ED provider, orthopedics consulted and they will see the patient shortly.  She will be admitted for further work-up and management    S/P fall  Acute impacted left femoral fracture  -denied any chest pain, dizziness, palpitation, loss of consciousness during the fall  -Has history of presyncope-not reported during this counter  -CT pelvis showed acute impacted mildly displaced subcapital fracture of the left femur.  -Orthopedics consult appreciated  -cardiology consult appreciated.  -03/17: POD 2 s/p Closed reduction with percutaneous pinning.    Leukocytosis  - likely 2/2 post surgery  - no fever, chill, fever. U/A: negative. Will monitor    A-fib with RVR  -On metorplol 100 and xarelto at home  -BP on the lower side. 108/64 with HR: 120-140 during examination  -S/p ardizem gtt. Now on PTA metoprolol  -Cardiology consult appreciated.    History of pulmonary HTN  HFpEF- not exacerbated  -c/w PTA lasix 20mg   -proBNp 2866    CKD stage III  -Avoid nephrotoxins as able  -f/u BMP    Chronic hypoxic respiratory failure  COPD on home oxygen 2 L  -Not exacerbated  -Continue PTA Symbicort, albuterol    Essential HTN  -Monitor vital signs  -PTA metoprolol    Left ICA  occlusion  -Asymptomatic  -Outpatient vascular surgery follow-up    Anemia, macrocytic  -Continue with PTA folic acid, B12  -Outpatient hematology    GERD  -c/w PTA famotidine 20 mg.    Depression  -Continue with PTA Cymbalta    Pulmonary nodules  -Outpatient pulmonology    Gout  -Continue with PTA allopurinol, colchicine, prednisone  -Outpatient rheumatology follow-up    Hypomagnesemia  -Continue PTA Mag-Ox    Recurrent C. difficile infection  -No diarrhea reported             Diet: Advance Diet as Tolerated: Regular Diet Adult    DVT Prophylaxis: DOAC  Brandon Catheter: Not present  Lines: None     Cardiac Monitoring: ACTIVE order. Indication: Tachyarrhythmias, acute (48 hours)  Code Status: No CPR- Do NOT Intubate      Clinically Significant Risk Factors           # Hypercalcemia: Highest Ca = 10.5 mg/dL in last 2 days, will monitor as appropriate                      Disposition Plan      Expected Discharge Date: 03/18/2023      Destination: other (comment) (TCU)  Discharge Comments: 3/17 POD#2 Hip repair. Needs TCU.          Loretta Germain MD  Hospitalist Service  [facility]  Securely message with [employer] (more info)  Text page via [employer] Paging/Directory   ______________________________________________________________________    Interval History   Patient seen and examined at bedside. POD 2. Denied chest pain, fever, chills. Pain is well controlled.    Physical Exam   Vital Signs: Temp: 97.6  F (36.4  C) Temp src: Oral BP: 116/58 Pulse: 95   Resp: 20 SpO2: 90 % O2 Device: None (Room air) Oxygen Delivery: 2 LPM  Weight: 147 lbs .75 oz    GEN: Alert and oriented to self, date, place, situation. Not in acute distress.  HEENT: Atraumatic, mucous membrane- moist and pink.  Chest: Decreased bilateral air entry.  CVS: S1S2 irregular. No murmurs, rubs or gallops.  Abdomen: Soft. Non-tender, non-distended. No organomegaly. No guarding or rigidity. Bowel sounds active.   Extremities: + b/l  LE  Edema. Left hip area pain.  CNS: No focal neurologic deficit. No involuntary movements.  Skin: No skin rash, no cyanosis or clubbing.     Medical Decision Making             Data     I have personally reviewed the following data over the past 24 hrs:    14.4 (H)  \   9.9 (L)   / 336     137 102 39.2 (H) /  133 (H)   4.5 26 1.26 (H) \

## 2023-03-17 NOTE — PLAN OF CARE
Problem: Risk for Delirium  Goal: Improved Attention and Thought Clarity  Outcome: Progressing  Goal: Improved Sleep  Outcome: Progressing     Problem: Hip Fracture Surgical Repair  Goal: Absence of Infection Signs and Symptoms  Outcome: Progressing  Goal: Optimal Functional Ability  Outcome: Progressing  Intervention: Promote Optimal Functional Status  Recent Flowsheet Documentation  Taken 3/17/2023 0315 by Davina Song, RN  Activity Management: activity encouraged  Taken 3/17/2023 0032 by Davina Song, RN  Activity Management: activity encouraged     Problem: Dysrhythmia  Goal: Normalized Cardiac Rhythm  Outcome: Progressing  Intervention: Monitor and Manage Cardiac Rhythm Effect  Recent Flowsheet Documentation  Taken 3/17/2023 0315 by Davina Song, RN  VTE Prevention/Management: SCDs (sequential compression devices) off     Goal Outcome Evaluation:         Patient alert and oriented, forgetful about time. Minimal left leg pain. On scheduled tylenol. Chronic afib, HR controlled. On chronic home oxygen at 2L. Lungs diminished. No void.Bladder scanned for 518. Straight cath for 600.

## 2023-03-18 NOTE — PROGRESS NOTES
Notified Dr. Germain that sepsis protocol was firing.  Asked Dr. Germain if he wanted me to run the protocol, as patient's vital signs were wnl.  Dr. Germain stated that I did not need to run the sepsis protocol, unless her vital signs would change.

## 2023-03-18 NOTE — PROGRESS NOTES
[facility]    Medicine Progress Note - Hospitalist Service    Date of Admission:  3/14/2023    Assessment & Plan   Fatuma Henry is a 77 year old female admitted on 3/14/2023. She has h/o recurrent C. difficile, paroxysmal A-fib, hypertension, hyperlipidemia, history of pulmonary hypertension, diastolic CHF, CKD-III, presyncope, left ICA occlusion, microcytic anemia, rheumatoid arthritis, gout, GERD, depression presented with left hip pain after falling at home.  Per patient, her legs gave out.  She denied any chest pain, dizziness, palpitation, loss of consciousness during the fall.  CT-head and CT and cervical spine with no acute findings.  CT pelvis showed acute impacted mildly displaced subcapital fracture of the left femur. Per ED provider, orthopedics consulted and they will see the patient shortly.  She will be admitted for further work-up and management    S/P fall  Acute impacted left femoral fracture  -denied any chest pain, dizziness, palpitation, loss of consciousness during the fall  -Has history of presyncope-not reported during this counter  -CT pelvis showed acute impacted mildly displaced subcapital fracture of the left femur.  -Orthopedics consult appreciated  -cardiology consult appreciated.  -03/18: POD 3 s/p Closed reduction with percutaneous pinning.    Leukocytosis  - likely 2/2 post surgery. Trending down.  - no fever, chill, fever. U/A: negative. CXR: no e/o pneumonia. Procal: 0.11. Will monitor    A-fib with RVR- resolved  -On metorplol 100 and xarelto at home  -BP on the lower side. 108/64 with HR: 120-140 during examination  -S/p ardizem gtt. Now on PTA metoprolol  -Cardiology consult appreciated.    History of pulmonary HTN  HFpEF- not exacerbated  -c/w PTA lasix 20mg   -proBNp 2866    CKD stage III  -Avoid nephrotoxins as able  -f/u BMP    Chronic hypoxic respiratory failure  COPD on home oxygen 2 L  -Not exacerbated  -Continue PTA Symbicort,  albuterol    Essential HTN  -Monitor vital signs  -PTA metoprolol    Left ICA occlusion  -Asymptomatic  -Outpatient vascular surgery follow-up    Anemia, macrocytic  -Continue with PTA folic acid, B12  -Outpatient hematology    GERD  -c/w PTA famotidine 20 mg.    Depression  -Continue with PTA Cymbalta    Pulmonary nodules  -Outpatient pulmonology    Gout  -Continue with PTA allopurinol, colchicine, prednisone  -Outpatient rheumatology follow-up    Hypomagnesemia  -Continue PTA Mag-Ox    Recurrent C. difficile infection  -No diarrhea reported               Diet: Advance Diet as Tolerated: Regular Diet Adult    DVT Prophylaxis: DOAC  Brandon Catheter: Not present  Lines: None     Cardiac Monitoring: ACTIVE order. Indication: Tachyarrhythmias, acute (48 hours)  Code Status: No CPR- Do NOT Intubate      Clinically Significant Risk Factors           # Hypercalcemia: Highest Ca = 10.6 mg/dL in last 2 days, will monitor as appropriate                      Disposition Plan      Expected Discharge Date: 03/20/2023    Discharge Delays: Insurance Authorization needed  Placement - TCU  Destination: other (comment) (TCU)  Discharge Comments: 3/17 POD#2 Hip repair. Needs TCU.          Loretta Germain MD  Hospitalist Service  [facility]  Securely message with [employer] (more info)  Text page via [employer] Paging/Directory   ______________________________________________________________________    Interval History   Patient seen and examined at bedside. POD 3. Denied chest pain, fever, chills. Pain is well controlled. Discussed with patient and daughter by the bedside.    Physical Exam   Vital Signs: Temp: 98.1  F (36.7  C) Temp src: Oral BP: 117/58 Pulse: 96   Resp: 20 SpO2: 100 % O2 Device: Oxymask Oxygen Delivery: 2 LPM  Weight: 150 lbs 2.13 oz    GEN: Alert and oriented to self, date, place, situation. Not in acute distress.  HEENT: Atraumatic, mucous membrane- moist and pink.  Chest: Decreased bilateral air  entry.  CVS: S1S2 irregular. No murmurs, rubs or gallops.  Abdomen: Soft. Non-tender, non-distended. No organomegaly. No guarding or rigidity. Bowel sounds active.   Extremities: + b/l LE  Edema. Left hip area pain.  CNS: No focal neurologic deficit. No involuntary movements.  Skin: No skin rash, no cyanosis or clubbing.     Medical Decision Making             Data     I have personally reviewed the following data over the past 24 hrs:    13.5 (H)  \   10.2 (L)   / 320     138 101 43.5 (H) /  98   4.3 27 1.19 (H) \       Procal: 0.11 (H) CRP: N/A Lactic Acid: N/A

## 2023-03-18 NOTE — PLAN OF CARE
Problem: Urinary Retention  Goal: Effective Urinary Elimination  Outcome: Not Progressing     Problem: Dysrhythmia  Goal: Normalized Cardiac Rhythm  Outcome: Progressing     Problem: COPD (Chronic Obstructive Pulmonary Disease) Comorbidity  Goal: Maintenance of COPD Symptom Control  Outcome: Progressing  Intervention: Maintain COPD-Symptom Control  Recent Flowsheet Documentation  Taken 3/18/2023 0515 by Davina Song, RN  Medication Review/Management: medications reviewed  Taken 3/18/2023 0040 by Davina Song RN  Medication Review/Management: medications reviewed     Goal Outcome Evaluation:         No urge to void. Bladder scanned for 510, straight cath for 550. Left leg pain. Scheduled tylenol given. Lungs with diminished bases. On 2L of oxygen per baseline. Afib. HR controlled.

## 2023-03-18 NOTE — PLAN OF CARE
"  Problem: Plan of Care - These are the overarching goals to be used throughout the patient stay.    Goal: Plan of Care Review  Description: The Plan of Care Review/Shift note should be completed every shift.  The Outcome Evaluation is a brief statement about your assessment that the patient is improving, declining, or no change.  This information will be displayed automatically on your shift note.  Outcome: Progressing  Goal: Patient-Specific Goal (Individualized)  Description: You can add care plan individualizations to a care plan. Examples of Individualization might be:  \"Parent requests to be called daily at 9am for status\", \"I have a hard time hearing out of my right ear\", or \"Do not touch me to wake me up as it startles me\".  Outcome: Progressing  Goal: Absence of Hospital-Acquired Illness or Injury  Intervention: Identify and Manage Fall Risk  Recent Flowsheet Documentation  Taken 3/18/2023 1208 by Sugar Chandra RN  Safety Promotion/Fall Prevention:    activity supervised    fall prevention program maintained    increased rounding and observation    lighting adjusted    mobility aid in reach    nonskid shoes/slippers when out of bed    patient and family education    room door open    clutter free environment maintained  Taken 3/18/2023 0812 by Sugar Chandra RN  Safety Promotion/Fall Prevention:    activity supervised    fall prevention program maintained    increased rounding and observation    lighting adjusted    mobility aid in reach    nonskid shoes/slippers when out of bed    patient and family education    room door open    clutter free environment maintained  Intervention: Prevent Skin Injury  Recent Flowsheet Documentation  Taken 3/18/2023 1208 by Sugar Chandra RN  Body Position: position changed independently  Taken 3/18/2023 0812 by Sugar Chandra RN  Body Position: position changed independently  Goal: Optimal Comfort and Wellbeing  Outcome: Progressing   Goal Outcome " Evaluation:       Was up in the chair and the bathroom tolerated walk in the hallway with PT.Voided in the bathroom once with an output of 250 ml clear eliezer urine the post void  bladder scan  Showed 43 ml

## 2023-03-18 NOTE — PROGRESS NOTES
"Orthopedic Progress Note      Assessment: 3 Day Post-Op  S/P Procedure(s):  CLOSED REDUCTION, HIP, WITH PERCUTANEOUS PINNING     Plan:   Activity: Up with assist and assistive device at all times.   Weight bearing status: WBAT LLE.  Pain management: Transition from IV to PO as tolerated.    Antibiotics: Ancef x 24 hours, complete  Diet: Begin with clear fluids and progress diet as tolerated.   DVT prophylaxis: Resume PTA Xarelto today and mechanical while in the hospital  Imaging: complete.  Labs: Hgb POD#1.  Dressings: Keep clean, dry and intact x 3 days.   Elevation: Elevate LLE on pillows to keep above the level of the heart as much as possible.   Physical Therapy/Occupational Therapy: Eval and treat, appreciate assistance and recommendations.  Consults: PT/OT, social work for disposition planning.  Follow-up: Clinic in 2 weeks for incision check and repeat x-rays needed.   Disposition: Pending progress with therapies, pain control on orals, and medical stability, anticipate discharge to TCU.      Subjective:  Pain: mild  Nausea, Vomiting:  No  Lightheadedness, Dizziness:  No  Neuro:  Patient denies new onset numbness or paresthesias  Fever, chills: No  Chest pain: No  SOB: No    No acute events overnight.  Straight cath x1 for retention.  Admits to continued moderate pain in the left thigh/hip.  Tylenol for pain.  2 L of oxygen per baseline.      Objective:  BP (!) 140/75 (BP Location: Right arm, Patient Position: Semi-Sy's, Cuff Size: Adult Regular)   Pulse 92   Temp 97.6  F (36.4  C) (Oral)   Resp 20   Ht 1.651 m (5' 5\")   Wt 68.1 kg (150 lb 2.1 oz)   SpO2 100%   BMI 24.98 kg/m    Patient is resting.  AOx3  Sensation is intact.  Dorsiflexion and plantar flexion is intact.  Dorsalis pedis pulse intact. Skin warm and well perfused.   Compartments are soft and non-tender.   The incision is covered. Dressing with modest but stable strikethrough        Pertinent Labs   Lab Results: personally reviewed. "   Lab Results   Component Value Date    INR 1.60 (H) 03/15/2023    INR 2.18 (H) 03/14/2023    INR 1.62 (H) 12/13/2022     Lab Results   Component Value Date    WBC 13.5 (H) 03/18/2023    HGB 10.2 (L) 03/18/2023    HCT 33.6 (L) 03/18/2023     03/18/2023     03/18/2023     Lab Results   Component Value Date     03/18/2023    CO2 27 03/18/2023         Frank Lozano MD  [facility]

## 2023-03-18 NOTE — PLAN OF CARE
Problem: Plan of Care - These are the overarching goals to be used throughout the patient stay.    Goal: Plan of Care Review  Description: The Plan of Care Review/Shift note should be completed every shift.  The Outcome Evaluation is a brief statement about your assessment that the patient is improving, declining, or no change.  This information will be displayed automatically on your shift note.  Outcome: Progressing   Goal Outcome Evaluation:  Patient has been alert and oriented x4.  Patient has been cooperative with cares.  Patient has had no complaints of pain, and refused to take her scheduled tylenol.  Patient sat up in the chair for 3 hrs this evening.  Patient was assist of 1 person, walker, and gait belt to ambulate.  Patient's vital signs and telemetry have remained stable.  Patient continues on 2 liters either nasal cannula/ or oxymask, and is on continuous pulse oximetry.   Patient was only able to void 125ml via purwik, and her PVR was 509ml.  Patient was straight cathed per order, and 700ml of urine was obtained.

## 2023-03-19 NOTE — PLAN OF CARE
"  Problem: Plan of Care - These are the overarching goals to be used throughout the patient stay.    Goal: Plan of Care Review  Description: The Plan of Care Review/Shift note should be completed every shift.  The Outcome Evaluation is a brief statement about your assessment that the patient is improving, declining, or no change.  This information will be displayed automatically on your shift note.  Outcome: Progressing  Flowsheets (Taken 3/19/2023 1453)  Plan of Care Reviewed With: patient  Goal: Patient-Specific Goal (Individualized)  Description: You can add care plan individualizations to a care plan. Examples of Individualization might be:  \"Parent requests to be called daily at 9am for status\", \"I have a hard time hearing out of my right ear\", or \"Do not touch me to wake me up as it startles me\".  Outcome: Progressing  Goal: Absence of Hospital-Acquired Illness or Injury  Outcome: Progressing  Intervention: Identify and Manage Fall Risk  Recent Flowsheet Documentation  Taken 3/19/2023 1300 by Toya Alvarado RN  Safety Promotion/Fall Prevention:   assistive device/personal items within reach   activity supervised   bed alarm on   mobility aid in reach   nonskid shoes/slippers when out of bed   room organization consistent  Taken 3/19/2023 0825 by Toya Alvarado RN  Safety Promotion/Fall Prevention:   assistive device/personal items within reach   activity supervised   bed alarm on   mobility aid in reach   nonskid shoes/slippers when out of bed   room organization consistent  Intervention: Prevent Skin Injury  Recent Flowsheet Documentation  Taken 3/19/2023 1300 by Toya Alvarado RN  Body Position: position changed independently  Taken 3/19/2023 0900 by Toya Alvarado RN  Body Position: position changed independently  Intervention: Prevent and Manage VTE (Venous Thromboembolism) Risk  Recent Flowsheet Documentation  Taken 3/19/2023 1300 by Toya Alvarado RN  VTE Prevention/Management: SCDs (sequential compression " devices) on  Taken 3/19/2023 0825 by Toya Alvarado RN  VTE Prevention/Management: SCDs (sequential compression devices) on  Goal: Optimal Comfort and Wellbeing  Outcome: Progressing  Intervention: Provide Person-Centered Care  Recent Flowsheet Documentation  Taken 3/19/2023 1300 by Toya Alvarado RN  Trust Relationship/Rapport:   care explained   choices provided  Taken 3/19/2023 0825 by Toya Alvarado RN  Trust Relationship/Rapport:   care explained   choices provided  Goal: Readiness for Transition of Care  Outcome: Progressing     Problem: Risk for Delirium  Goal: Optimal Coping  Outcome: Progressing  Goal: Improved Behavioral Control  Outcome: Progressing  Intervention: Minimize Safety Risk  Recent Flowsheet Documentation  Taken 3/19/2023 1300 by Toya Alvarado RN  Enhanced Safety Measures: bed alarm set  Trust Relationship/Rapport:   care explained   choices provided  Taken 3/19/2023 0825 by Toya Alvarado RN  Enhanced Safety Measures: bed alarm set  Trust Relationship/Rapport:   care explained   choices provided  Goal: Improved Attention and Thought Clarity  Outcome: Progressing  Intervention: Maximize Cognitive Function  Recent Flowsheet Documentation  Taken 3/19/2023 1300 by Toya Alvarado RN  Sensory Stimulation Regulation: television on  Reorientation Measures:   calendar in view   clock in view  Taken 3/19/2023 0825 by Toya Alvarado RN  Sensory Stimulation Regulation: television on  Reorientation Measures:   calendar in view   clock in view  Goal: Improved Sleep  Outcome: Progressing     Problem: Hip Fracture Surgical Repair  Goal: Optimal Coping with Change in Health Status  Outcome: Progressing  Goal: Absence of Bleeding  Outcome: Progressing  Goal: Effective Bowel Elimination  Outcome: Progressing  Goal: Cognitive Function Maintained  Outcome: Progressing  Intervention: Maintain Cognitive Function  Recent Flowsheet Documentation  Taken 3/19/2023 1300 by Toya Alvarado RN  Reorientation Measures:   calendar  in view   clock in view  Taken 3/19/2023 0825 by Toya Alvarado RN  Reorientation Measures:   calendar in view   clock in view  Goal: Fluid and Electrolyte Balance  Outcome: Progressing  Goal: Absence of Infection Signs and Symptoms  Outcome: Progressing  Goal: Optimal Functional Ability  Outcome: Progressing  Intervention: Promote Optimal Functional Status  Recent Flowsheet Documentation  Taken 3/19/2023 1300 by Toya Alvarado RN  Activity Management:   activity adjusted per tolerance   up in chair   activity encouraged  Taken 3/19/2023 0900 by Toya Alvarado RN  Activity Management:   activity adjusted per tolerance   activity encouraged   up ad ricarda   up in chair  Goal: Anesthesia/Sedation Recovery  Outcome: Progressing  Intervention: Optimize Anesthesia Recovery  Recent Flowsheet Documentation  Taken 3/19/2023 1300 by Toya Alvarado RN  Safety Promotion/Fall Prevention:   assistive device/personal items within reach   activity supervised   bed alarm on   mobility aid in reach   nonskid shoes/slippers when out of bed   room organization consistent  Reorientation Measures:   calendar in view   clock in view  Level Incentive Spirometer (mL): 2000  Number of Repetitions (IS): 3  Patient Tolerance (IS): good  Taken 3/19/2023 0825 by Toya Alvarado RN  Safety Promotion/Fall Prevention:   assistive device/personal items within reach   activity supervised   bed alarm on   mobility aid in reach   nonskid shoes/slippers when out of bed   room organization consistent  Reorientation Measures:   calendar in view   clock in view  Goal: Optimal Pain Control and Function  Outcome: Progressing  Goal: Nausea and Vomiting Relief  Outcome: Progressing  Goal: Effective Urinary Elimination  Outcome: Progressing  Goal: Effective Oxygenation and Ventilation  Outcome: Progressing  Intervention: Optimize Oxygenation and Ventilation  Recent Flowsheet Documentation  Taken 3/19/2023 1300 by Toya Alvarado RN  Head of Bed (HOB) Positioning: HOB at  20-30 degrees  Taken 3/19/2023 0900 by Toya Alvarado RN  Head of Bed (HOB) Positioning: HOB at 20-30 degrees     Problem: Dysrhythmia  Goal: Normalized Cardiac Rhythm  Outcome: Progressing  Intervention: Monitor and Manage Cardiac Rhythm Effect  Recent Flowsheet Documentation  Taken 3/19/2023 1300 by Toya Alvarado RN  VTE Prevention/Management: SCDs (sequential compression devices) on  Taken 3/19/2023 0825 by Toya Alvarado RN  VTE Prevention/Management: SCDs (sequential compression devices) on     Problem: COPD (Chronic Obstructive Pulmonary Disease) Comorbidity  Goal: Maintenance of COPD Symptom Control  Outcome: Progressing  Intervention: Maintain COPD-Symptom Control  Recent Flowsheet Documentation  Taken 3/19/2023 1300 by Toya Alvarado RN  Medication Review/Management: medications reviewed  Taken 3/19/2023 0825 by Toya Alvarado RN  Medication Review/Management: medications reviewed     Problem: Heart Failure Comorbidity  Goal: Maintenance of Heart Failure Symptom Control  Outcome: Progressing  Intervention: Maintain Heart Failure Management  Recent Flowsheet Documentation  Taken 3/19/2023 1300 by Toya Alvarado RN  Medication Review/Management: medications reviewed  Taken 3/19/2023 0825 by Toya Alvarado RN  Medication Review/Management: medications reviewed     Problem: Hypertension Comorbidity  Goal: Blood Pressure in Desired Range  Outcome: Progressing  Intervention: Maintain Blood Pressure Management  Recent Flowsheet Documentation  Taken 3/19/2023 1300 by Toya Alvarado RN  Medication Review/Management: medications reviewed  Taken 3/19/2023 0825 by Toya Alvarado RN  Medication Review/Management: medications reviewed     Problem: Urinary Retention  Goal: Effective Urinary Elimination  Outcome: Progressing   Goal Outcome Evaluation:      Plan of Care Reviewed With: patient  Pt has had an uneventful shift. VSS, A-fib with rate control, Pt is home O2 dependent @ 3L. Pt is needing less during the hospitalization,  MD is aware. Pt is independent with using her IS = 2000. Dressing on left hip is unchanged. Pt has had good PO intake. Pt denies pain, but then will admit to 5/10. Pt is refusing PRN pain medications. Pt verbalized POC and discharge planning for [facility] tomorrow. Assist 1 with gait belt and walker is being used.

## 2023-03-19 NOTE — PLAN OF CARE
Problem: Plan of Care - These are the overarching goals to be used throughout the patient stay.    Goal: Plan of Care Review  Description: The Plan of Care Review/Shift note should be completed every shift.  The Outcome Evaluation is a brief statement about your assessment that the patient is improving, declining, or no change.  This information will be displayed automatically on your shift note.  Outcome: Progressing   Goal Outcome Evaluation:  Patient has been alert and oriented x 4.  Patient has had no complaints of pain, only dyspnea with exertion.  Patient has remained on her baseline O2 2 liters nasal cannula, and o2 saturations have been %.   Patient sat up in the chair for dinner.  Patient is assist of 1 person, walker, and gait belt to ambulate.  Patient has been getting up to the bathroom, and her voiding has improved. Patient remains in Afib HR 90- 120.  Patient's vital signs have remained stable.

## 2023-03-19 NOTE — PLAN OF CARE
Problem: Hip Fracture Surgical Repair  Goal: Cognitive Function Maintained  Outcome: Progressing     Problem: Hip Fracture Surgical Repair  Goal: Optimal Functional Ability  Outcome: Progressing  Intervention: Promote Optimal Functional Status    Problem: Hip Fracture Surgical Repair  Goal: Optimal Pain Control and Function  Outcome: Progressing  Intervention: Prevent or Manage Pain     Pt alert and oriented, calls for assistance appropriately.  Up to bathroom assist of 1 with walker, voiding.  Reports mild pain to left hip, worse with activity, but declines need for prn medication.  Old drainage noted on surgical dressing.  Pt remains on 2L O2.  Tele afib with rate 90-100s.

## 2023-03-19 NOTE — PROGRESS NOTES
Ortho Progress Note    Subjective:  No acute events. Still requiring supplemental O2. Pain improving, doing well with PT.     Objective:  Vital signs in last 24 hours  Temp:  [97.9  F (36.6  C)-98.4  F (36.9  C)] 98.4  F (36.9  C)  Pulse:  [] 108  Resp:  [18-20] 20  BP: (111-144)/(58-72) 144/58  SpO2:  [95 %-100 %] 95 %  Patient is resting.  AOx3  Sensation is intact.  Dorsiflexion and plantar flexion is intact.  Dorsalis pedis pulse intact. Skin warm and well perfused.   Compartments are soft and non-tender.   The incision is covered. Dressing with modest but stable strikethrough      Pertinent Labs   Lab Results: personally reviewed.   Recent Labs   Lab 03/15/23  0820 03/14/23  1221   INR 1.60* 2.18*     Recent Labs   Lab 03/19/23  0419   HGB 10.5*     No results for input(s): CREATININE in the last 168 hours.    Assessment: 4 Day Post-Op  S/P Procedure(s):  CLOSED REDUCTION, HIP, WITH PERCUTANEOUS PINNING      Plan:   Activity: Up with assist and assistive device at all times.   Weight bearing status: WBAT LLE.  Pain management: Transition from IV to PO as tolerated.    Antibiotics: Ancef x 24 hours, complete  Diet: Begin with clear fluids and progress diet as tolerated.   DVT prophylaxis:Home Xarelto and mechanical while in the hospital  Imaging: complete.  Dressings: Keep clean, dry and intact x 3 days.   Elevation: Elevate LLE on pillows to keep above the level of the heart as much as possible.   Physical Therapy/Occupational Therapy: Eval and treat, appreciate assistance and recommendations.  Consults: PT/OT, social work for disposition planning.  Follow-up: Clinic in 2 weeks for incision check and repeat x-rays needed.   Disposition: Pending progress with therapies, pain control on orals, and medical stability, anticipate discharge to TCU.    Suraj Markham MD  [facility]

## 2023-03-19 NOTE — PROGRESS NOTES
Care Management Follow Up    Length of Stay (days): 5    Expected Discharge Date: 03/20/2023     Concerns to be Addressed: discharge planning       Patient plan of care discussed at interdisciplinary rounds: Yes    Anticipated Discharge Disposition: Transitional Care     Anticipated Discharge Services: Other (see comment) (therapy services)    Anticipated Discharge DME: None    Patient/family educated on Medicare website which has current facility and service quality ratings: yes    Education Provided on the Discharge Plan:  Yes    Patient/Family in Agreement with the Plan: yes    Referrals Placed by CM/SW: Post Acute Facilities    Private pay costs discussed: Not applicable    Additional Information: SW returned call to pt's daughter Enid and updated her regarding plan for pt to discharge to [facility]CU most likely tomorrow pending receipt of insurance prior authorization.  Per doctor, is possible pt may not be ready until Tuesday.  Confirmed that ride would be via W.  Needs PAS.  Keep Enid updated.      JEREMIAH Perales, RODRÍGUEZ 03/19/23 5:52 PM

## 2023-03-19 NOTE — PROGRESS NOTES
[facility]    Medicine Progress Note - Hospitalist Service    Date of Admission:  3/14/2023    Assessment & Plan     Fatuma JAZMIN Henry is a 77 year old female admitted on 3/14/2023. She has h/o recurrent C. difficile, paroxysmal A-fib, hypertension, hyperlipidemia, history of pulmonary hypertension, diastolic CHF, CKD-III, presyncope, left ICA occlusion, microcytic anemia, rheumatoid arthritis, gout, GERD, depression presented with left hip pain after falling at home.  Per patient, her legs gave out.  She denied any chest pain, dizziness, palpitation, loss of consciousness during the fall.  CT-head and CT and cervical spine with no acute findings.  CT pelvis showed acute impacted mildly displaced subcapital fracture of the left femur.      1.S/P fall  Acute impacted left femoral fracture  -denied any chest pain, dizziness, palpitation, loss of consciousness during the fall  -Has history of presyncope-not reported during this counter  -CT pelvis showed acute impacted mildly displaced subcapital fracture of the left femur.  -Orthopedics consult appreciated  -cardiology consult appreciated.  -03/19: POD 4 s/p Closed reduction with percutaneous pinning.  -pain is still present, but improving some  -passing stool  -will need tcu     2.Leukocytosis  - likely 2/2 post surgery. Trending down.  - no fever, chill, fever. U/A: negative. CXR: no e/o pneumonia. Procal: 0.11. Will monitor     3.A-fib with RVR- resolved  -On metorplol 100 and xarelto at home  -BP on the lower side. 108/64 with HR: 120-140 during examination  -S/p cardizem gtt. Now on PTA metoprolol  -Cardiology consult appreciated.     4.History of pulmonary HTN  HFpEF- not exacerbated  -c/w PTA lasix 20mg   -proBNp 2866     5.CKD stage III  -Avoid nephrotoxins as able  -f/u BMP  -creat today 1.15     6.Chronic hypoxic respiratory failure  COPD on home oxygen 2 L,at home sometimes on 3 liters  -Not exacerbated  -Continue PTA Symbicort,  "albuterol     7.Essential HTN  -Monitor vital signs  -PTA metoprolol     8.Left ICA occlusion--noted back in 12/22  -Asymptomatic  -Outpatient vascular surgery follow-up     9.Anemia, macrocytic  -Continue with PTA folic acid, B12  -Outpatient hematology     10.GERD  -c/w PTA famotidine 20 mg.     11.Depression  -Continue with PTA Cymbalta     12.Pulmonary nodules  -Outpatient pulmonology     13.Gout  -Continue with PTA allopurinol, colchicine, prednisone  -Outpatient rheumatology follow-up     14.Hypomagnesemia  -Continue PTA Mag-Ox     15.Recurrent C. difficile infection  -No diarrhea reported    I called /daughter Enid--updated at 1616         Diet: Advance Diet as Tolerated: Regular Diet Adult    DVT Prophylaxis: DOAC  Brandon Catheter: Not present  Lines: None     Cardiac Monitoring: ACTIVE order. Indication: Tachyarrhythmias, acute (48 hours)  Code Status: No CPR- Do NOT Intubate      Clinically Significant Risk Factors           # Hypercalcemia: Highest Ca = 10.6 mg/dL in last 2 days, will monitor as appropriate              # Overweight: Estimated body mass index is 27.57 kg/m  as calculated from the following:    Height as of this encounter: 1.651 m (5' 5\").    Weight as of this encounter: 75.2 kg (165 lb 11.2 oz).          Disposition Plan     Expected Discharge Date: 03/20/2023    Discharge Delays: Insurance Authorization needed  Placement - TCU  Destination: other (comment) (TCU)  Discharge Comments: 3/17 POD#2 Hip repair. Needs TCU.          Teena Cantor MD  Hospitalist Service  [facility]  Securely message with [employer] (more info)  Text page via [employer] Paging/Directory   ______________________________________________________________________    Interval History   She feels pain in hip 5-6/10, slowly getting better  Eating some and passing small stool  Understands needs tcu  At home on up to 3 liters O2    Physical Exam   Vital Signs: Temp: 98.1  F (36.7  C) Temp src: " Oral BP: (!) 140/61 Pulse: 99   Resp: 20 SpO2: 96 % O2 Device: Nasal cannula Oxygen Delivery: 1 LPM  Weight: 165 lbs 11.2 oz    Constitutional: awake, fatigued, alert, cooperative and no apparent distress  Eyes: sclera clear  Respiratory: no increased work of breathing, good air exchange, no retractions and clear to auscultation  Cardiovascular: Normal apical impulse, regular rate and rhythm, normal S1 and S2, no S3 or S4, and no murmur noted  GI: normal bowel sounds, soft, non-distended and non-tender  Skin: dressing over left hip  Musculoskeletal: no lower extremity pitting edema present  Neurologic: Mental Status Exam:  Level of Alertness:   awake  Neuropsychiatric: General: normal, calm and normal eye contact    Medical Decision Making       40 MINUTES SPENT BY ME on the date of service doing chart review, history, exam, documentation & further activities per the note.      Data     I have personally reviewed the following data over the past 24 hrs:    10.9  \   10.5 (L)   / 327     139 103 44.7 (H) /  89   4.3 26 1.15 (H) \       Imaging results reviewed over the past 24 hrs:   No results found for this or any previous visit (from the past 24 hour(s)).

## 2023-03-20 NOTE — PROGRESS NOTES
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 Upon chart review, he is s/p emergent balloon atrial septostomy complicated by bradycardia and loss of airway leading to hypoxemic cardiac arrest for roughly 30 minutes.    Of note PACT/Palliative had the opportunity to meet and introduce our services to mother and father prenatally.     Per maternal H&P and chart review, mother has a history of drug use and presented to an OSH ED for help due to having a strong urge to use cocaine. Unclear as to whether she has used recently. In the ER, infant monitoring demonstrated recurrent late decels. Given the high risk nature of her pregnancy and known fetal CHD, she was transferred to [facility] for further eval. Here, FHRM demontrated intermittent late decels that became more persistent despite resuscitative efforts and thus the decision was made to proceed with C/S.     Patient was delivered and transferred to the hybrid suite and underwent BAS complicated by hypoxemic cardiac arrest for roughly 30 minutes.    No parent at bedside for consult. Rounded with PCICU team and RN. Pt. Receiving 24 hr EEG for seizure like concerns, on epi, vaso, intubated and not on any sedation or pain control meds.     Allergies:  ALLERGIES:  Patient has no known allergies.    Primary Care Pediatrician:  No Pcp    History:  No past medical history on file.  No past surgical history on file.    HX Obtained by: Medical Team and Chart Review    Patient's medications, allergies, past medical, surgical,  [facility]    Medicine Progress Note - Hospitalist Service    Date of Admission:  3/14/2023    Assessment & Plan     Fatuma Henry is a 77 year old female admitted on 3/14/2023. She has h/o recurrent C. difficile, paroxysmal A-fib, hypertension, hyperlipidemia, history of pulmonary hypertension, diastolic CHF, CKD-III, presyncope, left ICA occlusion, microcytic anemia, rheumatoid arthritis, gout, GERD, depression presented with left hip pain after falling at home.  Per patient, her legs gave out.  She denied any chest pain, dizziness, palpitation, loss of consciousness during the fall.  CT-head and CT and cervical spine with no acute findings.  CT pelvis showed acute impacted mildly displaced subcapital fracture of the left femur.      1.S/P fall  Acute impacted left femoral fracture  -denied any chest pain, dizziness, palpitation, loss of consciousness during the fall  -Has history of presyncope-not reported during this counter  -CT pelvis showed acute impacted mildly displaced subcapital fracture of the left femur.  -Orthopedics consult appreciated  -cardiology consult appreciated.  -: POD  #5 s/p Closed reduction with percutaneous pinning.  -pain is still present, but improving each day  -passing stool  -will need tcu, waiting for approval      2.Leukocytosis  - likely 2/2 post surgery. Trending down.  - no fever, chill, fever. U/A: negative. CXR: no e/o pneumonia. Procal: 0.11.      3.A-fib with RVR- resolved  -On metorplol 100 and xarelto at home  -S/p cardizem gtt. Now on PTA metoprolol  -Cardiology consult appreciated.     4.History of pulmonary HTN  HFpEF- not exacerbated  -c/w PTA lasix 20mg   -proBNp 2866     5.CKD stage III  -Avoid nephrotoxins as able  -f/u BMP  -creat today 1.09     6.Chronic hypoxic respiratory failure  COPD on home oxygen 2 L,at home sometimes on 3 liters  -Not exacerbated  -Continue PTA Symbicort, albuterol     7.Essential HTN  -Monitor vital signs  -PTA  "metoprolol     8.Left ICA occlusion--noted back in 12/22  -Asymptomatic  -Outpatient vascular surgery follow-up     9.Anemia, macrocytic  -Continue with PTA folic acid, B12  -Outpatient hematology     10.GERD  -c/w PTA famotidine 20 mg.     11.Depression  -Continue with PTA Cymbalta     12.Pulmonary nodules  -Outpatient pulmonology     13.Gout  -Continue with PTA allopurinol, colchicine, prednisone  -Outpatient rheumatology follow-up     14.Hypomagnesemia  -Continue PTA Mag-Ox     15.Recurrent C. difficile infection  -No diarrhea reported    16.Hx of RA  -on chronic prednisone and methotrexate--holding methotrexate for now    I called daughter Enid to update 1521          Diet: Advance Diet as Tolerated: Regular Diet Adult  Discharge Instruction - Regular Diet Adult    DVT Prophylaxis: DOAC  Brandon Catheter: Not present  Lines: None     Cardiac Monitoring: ACTIVE order. Indication: Tachyarrhythmias, acute (48 hours)  Code Status: No CPR- Do NOT Intubate      Clinically Significant Risk Factors           # Hypercalcemia: Highest Ca = 10.3 mg/dL in last 2 days, will monitor as appropriate              # Overweight: Estimated body mass index is 27.16 kg/m  as calculated from the following:    Height as of this encounter: 1.651 m (5' 5\").    Weight as of this encounter: 74 kg (163 lb 3.2 oz).          Disposition Plan      Expected Discharge Date: 03/21/2023    Discharge Delays: Insurance Authorization needed  Placement - TCU  Destination: other (comment) (TCU)  Discharge Comments: Delayed due to rapid HR. Will be going to [facility] on discharge          Teena Cantor MD  Hospitalist Service  [facility]  Securely message with [employer] (more info)  Text page via [facility] Paging/Directory   ______________________________________________________________________    Interval History   She had some sob overnight, better now  Passing gas and stool  Rates pain in hip 5/10, but thinks " social and family histories were reviewed and updated as appropriate.    Social History  Pediatric Add'l Social Hx   • Patient lives with:     • Parent Status / Presence of Guardian     • Developmental status     • Home based resources in place     • Child's School / Grade       Pediatric Personal Preferences   • Favorite things / activities     • Ideas about Quality of Life     • Important coping items, techniques during stressful times       Spiritual Assessment   • [employer]     • Active [employer]     • Open to  Visits       Function Status Assessment   • Function Status Assessment                                                         Review of Systems:  Review of Systems   All other systems reviewed and are negative.               Basic Information:   VITAL SIGNS:     Vital Last Value 24 Hour Range   Temperature 97.7 °F (36.5 °C) (12/13/21 0800) Temp  Min: 97.3 °F (36.3 °C)  Max: 98.1 °F (36.7 °C)   Pulse 142 (12/13/21 0700) Pulse  Min: 121  Max: 148   Respiratory (!) 63 (12/13/21 0700) Resp  Min: 30  Max: 69   Non-Invasive  Blood Pressure (!) 84/53 (12/13/21 0600) BP  Min: 63/38  Max: 84/53   Pulse Oximetry 89 % (12/13/21 0735) SpO2  Min: 79 %  Max: 95 %     Vital Today Admitted   Weight (!) 2.36 kg (5 lb 3.3 oz) (12/13/21 0600) Weight: (!) 2.17 kg (4 lb 12.5 oz) (12/11/21 0225)   Height N/A Length: 18.11\" (46 cm) (12/11/21 0458)   Body Mass Index N/A BMI (Calculated): 11.72 (12/12/21 0600)     INTAKE/OUTPUT:    Date 12/12/21 0700 - 12/13/21 0659 12/13/21 0700 - 12/14/21 0659   Shift [id number] 5619-5065 5652-4104 24 Hour Total 9450-4056 1672-8299 7483-2725 24 Hour Total   INTAKE   I.V.(mL/kg) 29.92(12.06) 27.14(10.94) 36.22(15.35) 93.28(39.52) 12.94(5.48)   12.94(5.48)   IV Piggyback(mL/kg)  10.5(4.23) 10.5(4.45) 21(8.9)       TPN(mL/kg) 43.36(17.48) 43.18(17.41) 44.2(18.73) 130.74(55.4) 24(10.17)   24(10.17)   Shift Total(mL/kg) 73.28(29.55) 80.82(32.59) 90.92(38.52) 245.02(103.82)  36.94(15.65)   36.94(15.65)   OUTPUT   Urine(mL/kg/hr) 58(2.92) 96(4.84) 133(7.04) 287(5.07) 88   88   Shift Total(mL/kg) 58(23.39) 96(38.71) 133(56.35) 287(121.61) 88(37.29)   88(37.29)   Weight (kg) 2.48 2.48 2.36 2.36 2.36 2.36 2.36 2.36         LABORATORY DATA:    Recent Results (from the past 24 hour(s))   BLOOD GAS, ARTERIAL WITH COOXIMETRY - RESPIRATORY    Collection Time: 12/12/21  2:22 PM   Result Value Ref Range    BASE EXCESS / DEFICIT, ARTERIAL - RESPIRATORY 2 -2 - 3 mmol/L    HCO3, ARTERIAL - RESPIRATORY 27 22 - 28 mmol/L    O2 CONTENT, ARTERIAL - RESPIRATORY 13 (L) 15 - 23 %    PCO2, ARTERIAL - RESPIRATORY 42 35 - 48 mm Hg    PH, ARTERIAL - RESPIRATORY 7.42 7.35 - 7.45 Units    PO2, ARTERIAL - RESPIRATORY 48 (LL) 83 - 108 mm Hg    O2 SATURATION, ARTERIAL - RESPIRATORY 90 (L) 95 - 99 %    CONDITION - RESPIRATORY SIMV PC 18 R 35 PEEP 5 PS 10 21%     CARBOXYHEMOGLOBIN - RESPIRATORY 1.5 1.5 - 15.0 %    HEMOGLOBIN - RESPIRATORY 10.8 (L) 14.5 - 22.5 g/dL    METHEMOGLOBIN - RESPIRATORY 1.9 (H) <=1.6 %    OXYHEMOGLOBIN, ARTERIAL - RESPIRATORY 87.0 (L) 94.0 - 98.0 %    SITE - RESPIRATORY Arterial Line     TEMPERATURE - RESPIRATORY 37.0 degrees   POTASSIUM - RESPIRATORY    Collection Time: 12/12/21  2:22 PM   Result Value Ref Range    POTASSIUM - RESPIRATORY 4.7 3.5 - 6.0 mmol/L   SODIUM - RESPIRATORY    Collection Time: 12/12/21  2:22 PM   Result Value Ref Range    SODIUM - RESPIRATORY 129 (L) 135 - 145 mmol/L   CALCIUM, IONIZED - RESPIRATORY    Collection Time: 12/12/21  2:22 PM   Result Value Ref Range    CALCIUM, IONIZED - RESPIRATORY 1.31 (H) 1.15 - 1.29 mmol/L   LACTIC ACID, ARTERIAL - RESPIRATORY    Collection Time: 12/12/21  2:22 PM   Result Value Ref Range    LACTIC ACID, ARTERIAL - RESPIRATORY 1.2 <1.6 mmol/L   Basic Metabolic Panel    Collection Time: 12/12/21  2:25 PM   Result Value Ref Range    Fasting Status      Sodium 133 (L) 135 - 145 mmol/L    Potassium 4.7 3.5 - 6.0 mmol/L    Chloride 103 97 -  getting better  No cp  Hr still at times >100      Physical Exam   Vital Signs: Temp: (P) 99  F (37.2  C) Temp src: (P) Oral BP: 106/52 Pulse: 113   Resp: (P) 20 SpO2: 95 % O2 Device: Nasal cannula Oxygen Delivery: 1 LPM  Weight: 163 lbs 3.2 oz    Constitutional: awake, fatigued, alert, cooperative and no apparent distress  Eyes: sclera clear  Respiratory: no increased work of breathing, good air exchange, no retractions and diminished breath sounds throughout lungs  Cardiovascular: irregularly irregular rhythm  GI: normal bowel sounds, soft, non-distended and non-tender  Skin: no bruising or bleeding  Musculoskeletal: no lower extremity pitting edema present  Neurologic: Mental Status Exam:  Level of Alertness:   awake  Neuropsychiatric: General: normal, calm and normal eye contact    Medical Decision Making       35 MINUTES SPENT BY ME on the date of service doing chart review, history, exam, documentation & further activities per the note.      Data     I have personally reviewed the following data over the past 24 hrs:    N/A  \   10.4 (L)   / N/A     139 102 39.0 (H) /  99   4.1 28 1.09 (H) \       Imaging results reviewed over the past 24 hrs:   No results found for this or any previous visit (from the past 24 hour(s)).   110 mmol/L    Carbon Dioxide 21 21 - 32 mmol/L    Anion Gap 14 10 - 20 mmol/L    Glucose 96 47 - 110 mg/dL    BUN 11 5 - 19 mg/dL    Creatinine 0.58 0.33 - 0.97 mg/dL    Glomerular Filtration Rate      BUN/ Creatinine Ratio 19 7 - 25    Calcium 8.7 7.6 - 11.3 mg/dL   BLOOD GAS, ARTERIAL WITH COOXIMETRY - RESPIRATORY    Collection Time: 12/12/21  6:08 PM   Result Value Ref Range    BASE EXCESS / DEFICIT, ARTERIAL - RESPIRATORY -4 (L) -2 - 3 mmol/L    HCO3, ARTERIAL - RESPIRATORY 22 22 - 28 mmol/L    O2 CONTENT, ARTERIAL - RESPIRATORY 12 (L) 15 - 23 %    PCO2, ARTERIAL - RESPIRATORY 42 35 - 48 mm Hg    PH, ARTERIAL - RESPIRATORY 7.33 (L) 7.35 - 7.45 Units    PO2, ARTERIAL - RESPIRATORY 47 (LL) 83 - 108 mm Hg    O2 SATURATION, ARTERIAL - RESPIRATORY 88 (L) 95 - 99 %    CONDITION - RESPIRATORY SIMV PC 18 R 35 PEEP 5 PS 10 21%     CARBOXYHEMOGLOBIN - RESPIRATORY 2.0 1.5 - 15.0 %    HEMOGLOBIN - RESPIRATORY 10.4 (L) 14.5 - 22.5 g/dL    METHEMOGLOBIN - RESPIRATORY 1.8 (H) <=1.6 %    OXYHEMOGLOBIN, ARTERIAL - RESPIRATORY 84.5 (L) 94.0 - 98.0 %    SITE - RESPIRATORY Arterial Line     TEMPERATURE - RESPIRATORY 37.0 degrees   POTASSIUM - RESPIRATORY    Collection Time: 12/12/21  6:08 PM   Result Value Ref Range    POTASSIUM - RESPIRATORY 3.9 3.5 - 6.0 mmol/L   SODIUM - RESPIRATORY    Collection Time: 12/12/21  6:08 PM   Result Value Ref Range    SODIUM - RESPIRATORY 130 (L) 135 - 145 mmol/L   CALCIUM, IONIZED - RESPIRATORY    Collection Time: 12/12/21  6:08 PM   Result Value Ref Range    CALCIUM, IONIZED - RESPIRATORY 1.21 1.15 - 1.29 mmol/L   LACTIC ACID, ARTERIAL - RESPIRATORY    Collection Time: 12/12/21  6:08 PM   Result Value Ref Range    LACTIC ACID, ARTERIAL - RESPIRATORY 1.9 (H) <1.6 mmol/L   BLOOD GAS, ARTERIAL WITH COOXIMETRY - RESPIRATORY    Collection Time: 12/12/21  8:22 PM   Result Value Ref Range    BASE EXCESS / DEFICIT, ARTERIAL - RESPIRATORY 1 -2 - 3 mmol/L    HCO3, ARTERIAL - RESPIRATORY 24 22 - 28 mmol/L     O2 CONTENT, ARTERIAL - RESPIRATORY 13 (L) 15 - 23 %    PCO2, ARTERIAL - RESPIRATORY 33 (L) 35 - 48 mm Hg    PH, ARTERIAL - RESPIRATORY 7.47 (H) 7.35 - 7.45 Units    PO2, ARTERIAL - RESPIRATORY 52 (L) 83 - 108 mm Hg    O2 SATURATION, ARTERIAL - RESPIRATORY 94 (L) 95 - 99 %    CONDITION - RESPIRATORY SIMV PC PIP18 RR35 +5 21% PS10     CARBOXYHEMOGLOBIN - RESPIRATORY 2.8 1.5 - 15.0 %    HEMOGLOBIN - RESPIRATORY 10.5 (L) 14.5 - 22.5 g/dL    METHEMOGLOBIN - RESPIRATORY 0.9 <=1.6 %    OXYHEMOGLOBIN, ARTERIAL - RESPIRATORY 90.2 (L) 94.0 - 98.0 %    SITE - RESPIRATORY Arterial Line     TEMPERATURE - RESPIRATORY 37.0 degrees   POTASSIUM - RESPIRATORY    Collection Time: 12/12/21  8:22 PM   Result Value Ref Range    POTASSIUM - RESPIRATORY 4.1 3.5 - 6.0 mmol/L   SODIUM - RESPIRATORY    Collection Time: 12/12/21  8:22 PM   Result Value Ref Range    SODIUM - RESPIRATORY 128 (L) 135 - 145 mmol/L   CALCIUM, IONIZED - RESPIRATORY    Collection Time: 12/12/21  8:22 PM   Result Value Ref Range    CALCIUM, IONIZED - RESPIRATORY 1.26 1.15 - 1.29 mmol/L   LACTIC ACID, ARTERIAL - RESPIRATORY    Collection Time: 12/12/21  8:22 PM   Result Value Ref Range    LACTIC ACID, ARTERIAL - RESPIRATORY 1.6 (H) <1.6 mmol/L   GLUCOSE, BEDSIDE - POINT OF CARE    Collection Time: 12/12/21  8:24 PM   Result Value Ref Range    GLUCOSE, BEDSIDE - POINT OF CARE 111 (H) 47 - 110 mg/dL   GLUCOSE, BEDSIDE - POINT OF CARE    Collection Time: 12/12/21 10:26 PM   Result Value Ref Range    GLUCOSE, BEDSIDE - POINT OF CARE 88 47 - 110 mg/dL   BLOOD GAS, ARTERIAL WITH COOXIMETRY - RESPIRATORY    Collection Time: 12/12/21 10:27 PM   Result Value Ref Range    BASE EXCESS / DEFICIT, ARTERIAL - RESPIRATORY -2 -2 - 3 mmol/L    HCO3, ARTERIAL - RESPIRATORY 23 22 - 28 mmol/L    O2 CONTENT, ARTERIAL - RESPIRATORY 12 (L) 15 - 23 %    PCO2, ARTERIAL - RESPIRATORY 38 35 - 48 mm Hg    PH, ARTERIAL - RESPIRATORY 7.38 7.35 - 7.45 Units    PO2, ARTERIAL - RESPIRATORY 51 (L) 83 -  108 mm Hg    O2 SATURATION, ARTERIAL - RESPIRATORY 91 (L) 95 - 99 %    CONDITION - RESPIRATORY SIMV PC PIP18 RR30 +5 21% PS10     CARBOXYHEMOGLOBIN - RESPIRATORY 1.2 (L) 1.5 - 15.0 %    HEMOGLOBIN - RESPIRATORY 9.5 (L) 14.5 - 22.5 g/dL    METHEMOGLOBIN - RESPIRATORY 1.5 <=1.6 %    OXYHEMOGLOBIN, ARTERIAL - RESPIRATORY 88.0 (L) 94.0 - 98.0 %    SITE - RESPIRATORY Arterial Line     TEMPERATURE - RESPIRATORY 37.0 degrees   POTASSIUM - RESPIRATORY    Collection Time: 12/12/21 10:27 PM   Result Value Ref Range    POTASSIUM - RESPIRATORY 3.7 3.5 - 6.0 mmol/L   SODIUM - RESPIRATORY    Collection Time: 12/12/21 10:27 PM   Result Value Ref Range    SODIUM - RESPIRATORY 131 (L) 135 - 145 mmol/L   CALCIUM, IONIZED - RESPIRATORY    Collection Time: 12/12/21 10:27 PM   Result Value Ref Range    CALCIUM, IONIZED - RESPIRATORY 1.31 (H) 1.15 - 1.29 mmol/L   LACTIC ACID, ARTERIAL - RESPIRATORY    Collection Time: 12/12/21 10:27 PM   Result Value Ref Range    LACTIC ACID, ARTERIAL - RESPIRATORY 2.5 (HH) <1.6 mmol/L   GLUCOSE, BEDSIDE - POINT OF CARE    Collection Time: 12/13/21  1:06 AM   Result Value Ref Range    GLUCOSE, BEDSIDE - POINT OF CARE 135 (H) 47 - 110 mg/dL   Phenobarbital    Collection Time: 12/13/21  1:09 AM   Result Value Ref Range    Phenobarbital 32.0 20.0 - 40.0 mcg/mL   BLOOD GAS, ARTERIAL WITH COOXIMETRY - RESPIRATORY    Collection Time: 12/13/21  1:10 AM   Result Value Ref Range    BASE EXCESS / DEFICIT, ARTERIAL - RESPIRATORY -2 -2 - 3 mmol/L    HCO3, ARTERIAL - RESPIRATORY 24 22 - 28 mmol/L    O2 CONTENT, ARTERIAL - RESPIRATORY 12 (L) 15 - 23 %    PCO2, ARTERIAL - RESPIRATORY 47 35 - 48 mm Hg    PH, ARTERIAL - RESPIRATORY 7.32 (L) 7.35 - 7.45 Units    PO2, ARTERIAL - RESPIRATORY 48 (LL) 83 - 108 mm Hg    O2 SATURATION, ARTERIAL - RESPIRATORY 87 (L) 95 - 99 %    CONDITION - RESPIRATORY SIMV PRVC VT10 RR30 +5 21% PS8     CARBOXYHEMOGLOBIN - RESPIRATORY 1.1 (L) 1.5 - 15.0 %    HEMOGLOBIN - RESPIRATORY 10.1 (L)  14.5 - 22.5 g/dL    METHEMOGLOBIN - RESPIRATORY 1.3 <=1.6 %    OXYHEMOGLOBIN, ARTERIAL - RESPIRATORY 84.3 (L) 94.0 - 98.0 %    SITE - RESPIRATORY Arterial Line     TEMPERATURE - RESPIRATORY 37.0 degrees   POTASSIUM - RESPIRATORY    Collection Time: 12/13/21  1:10 AM   Result Value Ref Range    POTASSIUM - RESPIRATORY 4.1 3.5 - 6.0 mmol/L   SODIUM - RESPIRATORY    Collection Time: 12/13/21  1:10 AM   Result Value Ref Range    SODIUM - RESPIRATORY 130 (L) 135 - 145 mmol/L   CALCIUM, IONIZED - RESPIRATORY    Collection Time: 12/13/21  1:10 AM   Result Value Ref Range    CALCIUM, IONIZED - RESPIRATORY 1.36 (H) 1.15 - 1.29 mmol/L   LACTIC ACID, ARTERIAL - RESPIRATORY    Collection Time: 12/13/21  1:10 AM   Result Value Ref Range    LACTIC ACID, ARTERIAL - RESPIRATORY 1.0 <1.6 mmol/L   GLUCOSE, BEDSIDE - POINT OF CARE    Collection Time: 12/13/21  2:43 AM   Result Value Ref Range    GLUCOSE, BEDSIDE - POINT OF CARE 87 47 - 110 mg/dL   Triglyceride    Collection Time: 12/13/21  2:44 AM   Result Value Ref Range    Fasting Status      Triglycerides 33 <=74 mg/dL   Magnesium    Collection Time: 12/13/21  2:44 AM   Result Value Ref Range    Magnesium 2.3 1.3 - 2.7 mg/dL   Phosphorus    Collection Time: 12/13/21  2:44 AM   Result Value Ref Range    Phosphorus 6.8 4.8 - 8.2 mg/dL   Comprehensive Metabolic Panel    Collection Time: 12/13/21  2:44 AM   Result Value Ref Range    Fasting Status      Sodium 136 135 - 145 mmol/L    Potassium 3.8 3.5 - 6.0 mmol/L    Chloride 103 97 - 110 mmol/L    Carbon Dioxide 22 21 - 32 mmol/L    Anion Gap 15 10 - 20 mmol/L    Glucose 80 47 - 110 mg/dL    BUN 13 5 - 19 mg/dL    Creatinine 0.62 0.33 - 0.97 mg/dL    Glomerular Filtration Rate      BUN/ Creatinine Ratio 21 7 - 25    Calcium 9.3 7.6 - 11.3 mg/dL    Bilirubin, Total 5.4 2.0 - 7.0 mg/dL    GOT/AST 30 (L) 35 - 140 Units/L    GPT/ALT 8 6 - 50 Units/L    Alkaline Phosphatase 83 (L) 95 - 255 Units/L    Albumin 2.6 2.5 - 3.4 g/dL    Protein,  Total 4.3 (L) 4.6 - 7.0 g/dL    Globulin 1.7 (L) 2.0 - 4.0 g/dL    A/G Ratio 1.5 1.0 - 2.4   C Reactive Protein    Collection Time: 12/13/21  2:44 AM   Result Value Ref Range    C-Reactive Protein <0.3 <=1.0 mg/dL   Procalcitonin    Collection Time: 12/13/21  2:44 AM   Result Value Ref Range    Procalcitonin 6.93 (H) <=0.09 ng/mL   BLOOD GAS, ARTERIAL WITH COOXIMETRY - RESPIRATORY    Collection Time: 12/13/21  5:16 AM   Result Value Ref Range    BASE EXCESS / DEFICIT, ARTERIAL - RESPIRATORY -2 -2 - 3 mmol/L    HCO3, ARTERIAL - RESPIRATORY 25 22 - 28 mmol/L    O2 CONTENT, ARTERIAL - RESPIRATORY 11 (L) 15 - 23 %    PCO2, ARTERIAL - RESPIRATORY 49 (H) 35 - 48 mm Hg    PH, ARTERIAL - RESPIRATORY 7.31 (L) 7.35 - 7.45 Units    PO2, ARTERIAL - RESPIRATORY 46 (LL) 83 - 108 mm Hg    O2 SATURATION, ARTERIAL - RESPIRATORY 84 (L) 95 - 99 %    CONDITION - RESPIRATORY SIMV PRVC VT10 RR30 +5 21% PS8     CARBOXYHEMOGLOBIN - RESPIRATORY 1.8 1.5 - 15.0 %    HEMOGLOBIN - RESPIRATORY 9.5 (L) 14.5 - 22.5 g/dL    METHEMOGLOBIN - RESPIRATORY 1.6 <=1.6 %    OXYHEMOGLOBIN, ARTERIAL - RESPIRATORY 81.6 (L) 94.0 - 98.0 %    SITE - RESPIRATORY Arterial Line     TEMPERATURE - RESPIRATORY 37.0 degrees   POTASSIUM - RESPIRATORY    Collection Time: 12/13/21  5:16 AM   Result Value Ref Range    POTASSIUM - RESPIRATORY 4.1 3.5 - 6.0 mmol/L   SODIUM - RESPIRATORY    Collection Time: 12/13/21  5:16 AM   Result Value Ref Range    SODIUM - RESPIRATORY 130 (L) 135 - 145 mmol/L   CALCIUM, IONIZED - RESPIRATORY    Collection Time: 12/13/21  5:16 AM   Result Value Ref Range    CALCIUM, IONIZED - RESPIRATORY 1.39 (H) 1.15 - 1.29 mmol/L   LACTIC ACID, ARTERIAL - RESPIRATORY    Collection Time: 12/13/21  5:16 AM   Result Value Ref Range    LACTIC ACID, ARTERIAL - RESPIRATORY 1.3 <1.6 mmol/L   Fibrinogen Activity    Collection Time: 12/13/21 10:10 AM   Result Value Ref Range    Fibrinogen 134 (L) 197 - 491 mg/dL   D Dimer, Quantitative    Collection Time:  21 10:10 AM   Result Value Ref Range    D Dimer, Quantitative 0.38 <0.57 mg/L (FEU)   Prothrombin Time    Collection Time: 21 10:10 AM   Result Value Ref Range    Prothrombin Time 11.7 8.2 - 12.9 sec    INR 1.1     Partial Thromboplastin Time    Collection Time: 21 10:10 AM   Result Value Ref Range    PTT 54 (H) 21 - 45 sec   BLOOD GAS, ARTERIAL WITH COOXIMETRY - RESPIRATORY    Collection Time: 21 10:19 AM   Result Value Ref Range    BASE EXCESS / DEFICIT, ARTERIAL - RESPIRATORY -2 -2 - 3 mmol/L    HCO3, ARTERIAL - RESPIRATORY 25 22 - 28 mmol/L    O2 CONTENT, ARTERIAL - RESPIRATORY 12 (L) 15 - 23 %    PCO2, ARTERIAL - RESPIRATORY 49 (H) 35 - 48 mm Hg    PH, ARTERIAL - RESPIRATORY 7.31 (L) 7.35 - 7.45 Units    PO2, ARTERIAL - RESPIRATORY 47 (LL) 83 - 108 mm Hg    O2 SATURATION, ARTERIAL - RESPIRATORY 91 (L) 95 - 99 %    CONDITION - RESPIRATORY ;SIMV PRVC VT10 RR30 PEEP5 PS8 0.21     CARBOXYHEMOGLOBIN - RESPIRATORY 1.9 1.5 - 15.0 %    HEMOGLOBIN - RESPIRATORY 9.5 (L) 14.5 - 22.5 g/dL    METHEMOGLOBIN - RESPIRATORY 1.6 <=1.6 %    OXYHEMOGLOBIN, ARTERIAL - RESPIRATORY 87.5 (L) 94.0 - 98.0 %    SITE - RESPIRATORY Arterial Line     TEMPERATURE - RESPIRATORY 37.0 degrees   POTASSIUM - RESPIRATORY    Collection Time: 21 10:19 AM   Result Value Ref Range    POTASSIUM - RESPIRATORY 4.1 3.5 - 6.0 mmol/L   SODIUM - RESPIRATORY    Collection Time: 21 10:19 AM   Result Value Ref Range    SODIUM - RESPIRATORY 126 (LL) 135 - 145 mmol/L   CALCIUM, IONIZED - RESPIRATORY    Collection Time: 21 10:19 AM   Result Value Ref Range    CALCIUM, IONIZED - RESPIRATORY 1.46 (H) 1.15 - 1.29 mmol/L   LACTIC ACID, ARTERIAL - RESPIRATORY    Collection Time: 21 10:19 AM   Result Value Ref Range    LACTIC ACID, ARTERIAL - RESPIRATORY 1.9 (H) <1.6 mmol/L   ]    MEDICATIONS:    • heparin 2 unit/mL in NaCL 0.9% solution     • fat emul fish oil/plant based 20% (SMOFLIPID) 30 mL /pediatric infusion      • parenteral nutrition /pediatric (less than 5 kg)     • fat emul fish oil/plant based 20% (SMOFLIPID) 20 mL /pediatric infusion 1 mL/hr at 21 0247   • parenteral nutrition /pediatric (less than 5 kg) 5 mL/hr at 21 2232   • furosemide (LASIX INJECT) 10 mg/20 mL in sodium chloride 0.9 % infusion syr 0.05 mg/kg/hr (213)   • heparin (porcine) 2,500 units/25 mL in dextrose 5 % infusion syringe     • alprostadil (PROSTIN VR) 250 mcg/25 mL in dextrose 5 % infusion syringe 0.01 mcg/kg/min (21)   • EPINEPHrine (ADRENALIN) 0.4 mg/25 mL in dextrose 5 % infusion 0.09 mcg/kg/min (2150)   • heparin 2 unit/mL in NaCL 0.9% solution 0.5 mL/hr (21)   • calcium gluconate 2,500 mg/50 mL in dextrose 5 % infusion syringe 40 mg/kg/hr (21)   • fentaNYL (SUBLIMAZE) 62.5 mcg/25 mL in dextrose 5 % infusion syringe Stopped (21)   • vasopressin (PITRESSIN) 10 unit/50mL in sodium chloride 0.9 % infusion syr 0.17 joselito-units/kg/min (21)   • sodium chloride 0.9% infusion 0.28 mL/hr at 21 1145     • EPINEPHrine 10 mcg/mL       • albumin human 25%  1 g/kg (Dosing Weight) Intravenous Q6H   • hydroCORTisone (Solu-CORTEF) injection  1 mg/kg (Dosing Weight) Intravenous Q6H   • PHENObarbital (LUMINAL) MAINTENANCE DOSE IV  2.5 mg/kg (Dosing Weight) Intravenous Q12H       IMAGING STUDIES:    XR ABDOMEN 1 VIEW    Result Date: 2021  EXAMINATION: XR CHEST AND ABDOMEN  OR INFANT 1 VIEW, XR ABDOMEN 1 VIEW  EXAM DATE: 2021 5:41 AM CLINICAL HISTORY: 3 days Male  please evaluate ETT, lines/lung fields, and bowel gas pattern  COMPARISON: 2021  FINDINGS: Lines, tubes, and devices:  Stable endotracheal tube, enteric catheter, left femoral PICC terminating over the external iliac vein, UAC and high positioned UVC.  If there are PVCs consider retracting UVC by 2- 2.5 cm. Cardiomediastinal silhouette:  Normal sized cardiothymic  silhouette. Lungs and pleura: No focal opacity.  Mild interstitial prominence remains. No pneumothorax. No discrete pleural effusion. Abdomen: Amount of bowel gas has decreased with persistent nondilated lower abdominal/pelvic loop, probably representing the sigmoid colon.  There is no pneumoperitoneum.   No pneumatosis or portal venous gas is seen.    Other: Increased body wall edema.     1.   Mild interstitial prominence. 2.   Paucity of bowel gas. Electronically Signed by: SERENE MANNING M.D. Signed on: 2021 7:49 AM     XR CHEST AND ABDOMEN  OR INFANT 1 VIEW    Result Date: 2021  EXAMINATION: XR CHEST AND ABDOMEN  OR INFANT 1 VIEW, XR ABDOMEN 1 VIEW  EXAM DATE: 2021 5:41 AM CLINICAL HISTORY: 3 days Male  please evaluate ETT, lines/lung fields, and bowel gas pattern  COMPARISON: 2021  FINDINGS: Lines, tubes, and devices:  Stable endotracheal tube, enteric catheter, left femoral PICC terminating over the external iliac vein, UAC and high positioned UVC.  If there are PVCs consider retracting UVC by 2- 2.5 cm. Cardiomediastinal silhouette:  Normal sized cardiothymic silhouette. Lungs and pleura: No focal opacity.  Mild interstitial prominence remains. No pneumothorax. No discrete pleural effusion. Abdomen: Amount of bowel gas has decreased with persistent nondilated lower abdominal/pelvic loop, probably representing the sigmoid colon.  There is no pneumoperitoneum.   No pneumatosis or portal venous gas is seen.    Other: Increased body wall edema.     1.   Mild interstitial prominence. 2.   Paucity of bowel gas. Electronically Signed by: SERENE MANNING M.D. Signed on: 2021 7:49 AM       Imaging studies reviewed.      Physical Exam:  Physical Exam  Cardiovascular:      Rate and Rhythm: Normal rate.   Pulmonary:      Breath sounds: Normal breath sounds.   Abdominal:      General: Abdomen is flat.   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 Now s/p emergent balloon atrial septostomy complicated by bradycardia and loss of airway leading to hypoxemic cardiac arrest.    He remains critically ill in the PCICU.     Impression/Plan:     Advance Care Planning   1. Primary medical management per PCICU.    2. Full code, by default.   3. We will follow patient and family and assist with any advance care planning and goals of care discussion needs in collaboration with primary service.    Pain and Symptom Management   1. Pain / agitation Plan   - Per PCICU       Supportive Care   1. Appreciate social work, , CCLS involvement.   2. Appreciate Music and Art Therapy, as appropriate.      Thank you for involving Pediatric Advanced Care Team (PACT)/Palliative and Supportive Care. Please contact the covering provider via [employer] with further questions or concerns.    I spent 25 minutes in the care of this patient, with greater than 50% of time being spent counseling/coordinating care as described above.    Denice Long, DNP, APRN   Ph:

## 2023-03-20 NOTE — PLAN OF CARE
Goal Outcome Evaluation:    Problem: Plan of Care - These are the overarching goals to be used throughout the patient stay.    Goal: Plan of Care Review  Description: The Plan of Care Review/Shift note should be completed every shift.  The Outcome Evaluation is a brief statement about your assessment that the patient is improving, declining, or no change.  This information will be displayed automatically on your shift note.  Outcome: Progressing    Patient has been alert and oreiented x4.  Patient sat up in the chair for 2 hrs over the dinner hour.  Patient got up and walked to the bathroom x 2 earlier this evening, and then utilized the purewik once she returned to bed.  Patient is assist of 1 person, walker, and gait belt. Patient's vital signs have remained stable.  She remains in afib with HR .  Patient has no complaints of pain.  Patient does state she is short of breath with activity.  Patient remains on 1 liter nasal cannula, and o2 saturations are %.

## 2023-03-20 NOTE — PLAN OF CARE
Goal Outcome Evaluation:    Pt is alert and oriented x3-4. Denied chest pain. LAGUNAS noted with activitiy. Vitals checked and WNL. Pt on NC at 1 lpm tolerating well.  Pt is A1 with a walker; able to ambulate to bathroom.  Pt able to make needs known, calls appropriately when needed.    Pt will be discharge to [facility] tomorrow at 11am via transport.

## 2023-03-20 NOTE — PLAN OF CARE
Problem: Plan of Care - These are the overarching goals to be used throughout the patient stay.    Goal: Optimal Comfort and Wellbeing  Intervention: Provide Person-Centered Care  Recent Flowsheet Documentation  Taken 3/19/2023 2345 by Gabby Joshi RN  Trust Relationship/Rapport: care explained     Problem: Dysrhythmia  Goal: Normalized Cardiac Rhythm  Outcome: Progressing  Intervention: Monitor and Manage Cardiac Rhythm Effect  Recent Flowsheet Documentation  Taken 3/19/2023 2345 by Gabby Joshi RN  VTE Prevention/Management: SCDs (sequential compression devices) off   Goal Outcome Evaluation:       Pt is alert and oriented x 4, denies pain, has SOB with activity, uses O2 at 2 LPM per nasal cannula at home. Lung sounds clear, diminished, on O2 at 1 LPM per nasal cannula, SpO2 100%. HR in the low 100's, Afib. Assist of 1 with ambulation using a walker and a gait belt. Plan to discharge to [facility] today pending insurance authorization. Will continue to monitor.

## 2023-03-20 NOTE — PROGRESS NOTES
"Orthopedic Progress Note      Assessment: 5 Days Post-Op  S/P Procedure(s):  CLOSED REDUCTION, HIP, WITH PERCUTANEOUS PINNING     Plan:   Activity: Up with assist and assistive device at all times.   Weight bearing status: WBAT LLE.  Pain management: Transition from IV to PO as tolerated.    Antibiotics: Ancef x 24 hours.  Diet: Begin with clear fluids and progress diet as tolerated.   DVT prophylaxis: Resume PTA Xarelto and mechanical while in the hospital  Imaging: complete.  Labs: Hgb POD#1.  Dressings: Changed dressing today.   Elevation: Elevate LLE on pillows to keep above the level of the heart as much as possible.   Physical Therapy/Occupational Therapy: Eval and treat, appreciate assistance and recommendations.  Consults: PT/OT, social work for disposition planning.  Follow-up with Dr. Mckeon in 2 weeks for incision check and repeat x-rays needed.   Disposition: Pending progress with therapies, pain control on orals, and medical stability, anticipate discharge to TCU.      Subjective:  Pain: mild  Nausea, Vomiting:  No  Lightheadedness, Dizziness:  No  Neuro:  Patient denies new onset numbness or paresthesias  Fever, chills: No  Chest pain: No  SOB: No    Patient reports feeling well today. Patient reports pain is tolerable with current pain regimen. Patient eating and drinking well.  Patient reports walking well with therapy. All questions/concerns answered.      Objective:  /74 (BP Location: Right arm)   Pulse 109   Temp 97.6  F (36.4  C) (Oral)   Resp 20   Ht 1.651 m (5' 5\")   Wt 74 kg (163 lb 3.2 oz)   SpO2 95%   BMI 27.16 kg/m    The patient is Alert and awake. Patient is sitting up on the recliner and appears comfortable.   Sensation is intact.  Dorsiflexion and plantar flexion is intact.  Dorsalis pedis pulse intact. Skin warm and well perfused.   Compartments are soft and non-tender.   Dressing with mild amount of serosanguinous drainage. Removed dressing, incision closed with " staples, no active drainage, erythema, and warmth. Applied new gauze and primapore. Patient tolerated dressing change well.          Pertinent Labs   Lab Results: personally reviewed.   Lab Results   Component Value Date    INR 1.60 (H) 03/15/2023    INR 2.18 (H) 03/14/2023    INR 1.62 (H) 12/13/2022     Lab Results   Component Value Date    WBC 10.9 03/19/2023    HGB 10.4 (L) 03/20/2023    HCT 34.4 (L) 03/19/2023     03/19/2023     03/19/2023     Lab Results   Component Value Date     03/20/2023    CO2 28 03/20/2023         Report completed by:  Zoe Denny PA-C, LAZARO  Date: 3/20/2023  Time: 9:13 AM  [facility]

## 2023-03-20 NOTE — PROGRESS NOTES
Care Management Follow Up    Length of Stay (days): 6    Expected Discharge Date: 03/21/2023    Concerns to be Addressed:  Rapid Heart rate  Patient plan of care discussed at interdisciplinary rounds: Yes    Anticipated Discharge Disposition:  [facility]     Anticipated Discharge Services:   PT/OT, TCU  Anticipated Discharge DME:  Unknown    Patient/family educated on Medicare website which has current facility and service quality ratings:  yes  Education Provided on the Discharge Plan:  yes  Patient/Family in Agreement with the Plan:  yes    Referrals Placed by CM/SW:  TCU  Private pay costs discussed: Yes    Additional Information:  Patient had been at [facility] back in January. Pts daughters report that pt was actually in TCU until last week. They report ongoing TCU stays since last November. They report that she was most recently at [facility]. They report that she was discharged with home care through [facility] but only for nursing care for a wound. They report that pt needed therapy also and the home care nurse had reached out to pts PCP to get therapy orders which they believe the home care had received. They report that pt needed assistance with bathing but the home care does not have staffing to provide this. They report that pt lives with her  at baseline, but he is not able to provide any physical assistance.     Pt was admitted to [facility]. Tentative plan was  To discharge today but the patient continues to have have in increased heart rate.  Cardiology to see today in regards to heart rate and possible discharge tomorrow pending insurance auth. Once Cardiology signs off and insurance authorization received will set up transportation via [facility] per family request.     Insurance has authorized pt for TCU for about 2 weeks. And then will re-evaluate. Transportation set up for 03/21 at 11am. Confirmed with facility and with family. Message sent  to provider for orders. .PAS completed.    Cherelle Ramirez RN

## 2023-03-21 NOTE — PROGRESS NOTES
[facility]    Medicine Progress Note - Hospitalist Service    Date of Admission:  3/14/2023    Assessment & Plan     Fatuma Henry is a 77 year old female admitted on 3/14/2023. She has h/o recurrent C. difficile, paroxysmal A-fib, hypertension, hyperlipidemia, history of pulmonary hypertension, diastolic CHF, CKD-III, presyncope, left ICA occlusion, microcytic anemia, rheumatoid arthritis, gout, GERD, depression presented with left hip pain after falling at home.  Per patient, her legs gave out.  She denied any chest pain, dizziness, palpitation, loss of consciousness during the fall.  CT-head and CT and cervical spine with no acute findings.  CT pelvis showed acute impacted mildly displaced subcapital fracture of the left femur.      1.S/P fall  Acute impacted left femoral fracture  -denied any chest pain, dizziness, palpitation, loss of consciousness during the fall  -Has history of presyncope-not reported during this counter  -CT pelvis showed acute impacted mildly displaced subcapital fracture of the left femur.  -Orthopedics consult appreciated  -cardiology consult appreciated.  -: POD  #6 s/p Closed reduction with percutaneous pinning.  -pain is still present, but improving each day  -passing stool  -will need tcu, now has approval   -Dr. Mckeon in 2 weeks for incision check and repeat x-rays needed     2.Leukocytosis  - likely 2/2 post surgery. Trending down.  - no fever, chill, fever. U/A: negative. CXR: no e/o pneumonia. Procal: 0.11.      3.A-fib with RVR- again an issue this am  -On metorplol 100 and xarelto at home  -S/p cardizem gtt.   -Cardiology consulted again and now 3/21 added dilt 30 mg q 8 hours  -cards will discuss with EP case     4.History of pulmonary HTN  HFpEF- not exacerbated  -c/w PTA lasix 20mg   -proBNp 2866     5.CKD stage III  -Avoid nephrotoxins as able  -f/u BMP  -creat today 1.09     6.Chronic hypoxic respiratory failure  COPD on home oxygen 2 L,at home  "sometimes on 3 liters  -Not exacerbated  -Continue PTA Symbicort, albuterol     7.Essential HTN  -Monitor vital signs  -PTA metoprolol     8.Left ICA occlusion--noted back in 12/22  -Asymptomatic  -Outpatient vascular surgery follow-up     9.Anemia, macrocytic  -Continue with PTA folic acid, B12  -Outpatient hematology     10.GERD  -c/w PTA famotidine 20 mg.     11.Depression  -Continue with PTA Cymbalta     12.Pulmonary nodules  -Outpatient pulmonology     13.Gout  -Continue with PTA allopurinol, colchicine, prednisone  -Outpatient rheumatology follow-up     14.Hypomagnesemia  -Continue PTA Mag-Ox     15.Recurrent C. difficile infection  -No diarrhea reported     16.Hx of RA  -on chronic prednisone and methotrexate--holding methotrexate for now     I called daughter to update at 1421       Diet: Advance Diet as Tolerated: Regular Diet Adult  Discharge Instruction - Regular Diet Adult    DVT Prophylaxis: DOAC  Brandon Catheter: Not present  Lines: None     Cardiac Monitoring: ACTIVE order. Indication: Tachyarrhythmias, acute (48 hours)  Code Status: No CPR- Do NOT Intubate      Clinically Significant Risk Factors           # Hypercalcemia: Highest Ca = 10.2 mg/dL in last 2 days, will monitor as appropriate              # Overweight: Estimated body mass index is 27.21 kg/m  as calculated from the following:    Height as of this encounter: 1.651 m (5' 5\").    Weight as of this encounter: 74.2 kg (163 lb 8 oz).          Disposition Plan      Expected Discharge Date: 03/22/2023      Destination: other (comment) (TCU)  Discharge Comments: discharged delayed secondry to increased HR          Teena Cantor MD  Hospitalist Service  [facility]  Securely message with [employer] (more info)  Text page via [facility] Paging/Directory   ______________________________________________________________________    Interval History   She feels sob with walking bathroom to bed  She does not know when HR up, when I " was in at the time she was walking HR got to 140 and when she sat stayed >110   No cp  Ongoing sob  Pain still in hip  Eating and stooling ok    Physical Exam   Vital Signs: Temp: 97.7  F (36.5  C) Temp src: Oral BP: 102/57 Pulse: 97   Resp: 20 SpO2: 96 % O2 Device: Nasal cannula Oxygen Delivery: 1 LPM  Weight: 163 lbs 8 oz    Constitutional: awake, alert, cooperative and no apparent distress  Eyes: sclera clear  Respiratory: tachypneic, moderate air exchange, no retractions and diminished breath sounds throughout lungs  Cardiovascular: irregularly irregular rhythm  GI: normal bowel sounds, soft, non-distended and non-tender  Skin: dressing over hip  Musculoskeletal: no lower extremity pitting edema present  Neurologic: Mental Status Exam:  Level of Alertness:   awake  Neuropsychiatric: General: normal, calm and normal eye contact    Medical Decision Making       50 MINUTES SPENT BY ME on the date of service doing chart review, history, exam, documentation & further activities per the note.      Data     I have personally reviewed the following data over the past 24 hrs:    N/A  \   11.0 (L)   / N/A     137 99 35.0 (H) /  98   4.1 32 (H) 1.05 (H) \       Imaging results reviewed over the past 24 hrs:   No results found for this or any previous visit (from the past 24 hour(s)).

## 2023-03-21 NOTE — PLAN OF CARE
Problem: Plan of Care - These are the overarching goals to be used throughout the patient stay.    Goal: Optimal Comfort and Wellbeing  Intervention: Monitor Pain and Promote Comfort  Recent Flowsheet Documentation  Taken 3/20/2023 2134 by Wiliam Denny, RN  Pain Management Interventions:    medication (see MAR)    emotional support    pillow support provided    repositioned    rest     Problem: Risk for Delirium  Goal: Improved Attention and Thought Clarity  Outcome: Progressing     Problem: Hip Fracture Surgical Repair  Goal: Absence of Bleeding  Outcome: Progressing   Goal Outcome Evaluation:  Patient reported lower back pain at bedtime which PO Dilaudid was given with effective results. Patient sleeping in between cares overnight. Patient refusing repositioning, wants to sleep on her back, weight shifting provided. Discharge to [facility] today.    Patient with no urine output overnight, she has no urge. Bladder scan for 197 ml. Dr. Wills , notified.

## 2023-03-21 NOTE — CONSULTS
CARDIOLOGY CONSULT NOTE         Assessment:   1.  Atrial fibrillation with RVR (H)-based on record review and being seen by EP atrial fibrillation nurse practitioners decision was made to leave her in atrial fibrillation.  Work on rate control strategy.  Patient was unaware of this decision.  Given her shortness of breath and significant COPD we will revisit with [facility] nurse practitioner, possible rhythm control strategy.  In any event [id number] score is at least 5 and agree with chronic anticoagulation with Xarelto.  2.  Tachycardia- continue metoprolol for rate control but will also add on diltiazem.  3.  Oxygen dependent-COPD and defer to primary care.  4.  Fracture of femur (H)-mildly displaced subcapital fracture of the left femur and status post closed reduction with percutaneous pinning and doing well.  5.  Fall, initial encounter-this was nonsyncopal.  She admits to several falls and given this concern with anticoagulation.     Plan:   1.  Discussed with patient's primary cardiologist, possibly left atrial appendage occlusion device given frequent falls.  2.  Add diltiazem for rate control.  3.  Discuss further with electrophysiology about potential rhythm control strategy.  4.  Can follow with Teressa Cespedes.     Current History:   [facility] has been requested by Dr. Teena Cantor to evaluate Fatuma Henry  who is a 77 year old year old white female for atrial fibrillation.  Patient was in her usual state of health at home, living independently with her , has had several falls.  She had another fall, it was unwitnessed but there was no syncope according to her.  She fractured her left femur, presented to [facility] where she had preop consultation with cardiology and subsequently had appropriate surgical treatment.  She has been in atrial fibrillation the entire hospital stay and cardiology is reconsulted for rate control.  Patient does admit to shortness of breath,  denies any PND, orthopnea, chest pains, palpitations, syncope, dizziness or peripheral edema.    Past Medical History:     Past Medical History:   Diagnosis Date     Atrial fibrillation (H)      CKD (chronic kidney disease) stage 3, GFR 30-59 ml/min (H)      COPD (chronic obstructive pulmonary disease) (H)     nocturnal oxygen     CRF (chronic renal failure)      GERD (gastroesophageal reflux disease)      Hypertension      Hypovolemic shock (H)      Influenza A 12/01/2017     Pulmonary hypertension (H)      Pulmonary nodules      Rheumatoid arthritis (H)      Sepsis (H)  CHF preserved EF 12/24/2017     Past history is negative for cancer, tuberculosis, diabetes mellitus, myocardial infarction,  rheumatic fever, cerebrovascular accident, chronic kidney disease, peptic ulcer disease, chronic obstructive pulmonary disease, or thyroid disorder  and no lipid disorder.    Past Surgical History:     Past Surgical History:   Procedure Laterality Date     APPENDECTOMY       BLADDER SUSPENSION       CHOLECYSTECTOMY       CLOSED REDUCTION, PERCUTANEOUS PINNING HIP Left 3/15/2023    Procedure: CLOSED REDUCTION, HIP, WITH PERCUTANEOUS PINNING;  Surgeon: Denton Mckeon MD;  Location: [facility] OR     PICC TRIPLE LUMEN PLACEMENT  12/13/2022          Family History:   Reviewed, and positive for heart failure causing death of mother at the age of 89.    Social History:   Reviewed, and she  reports that she quit smoking about 5 years ago. Her smoking use included cigarettes. She smoked an average of 1 to 2 packs a day for about 55 years.  She has never used smokeless tobacco. She reports that she does not drink alcohol and does not use drugs. Lives independently at home with .  Primary care provider is Dr. Tory Tidwell.    Meds:       allopurinol  300 mg Oral Daily     cholestyramine  1 packet Oral TID     DULoxetine  20 mg Oral Daily     famotidine  20 mg Oral Daily     fluticasone-vilanterol  1 puff  Inhalation Daily     folic acid  1 mg Oral Daily     furosemide  20 mg Oral Daily     guaiFENesin  600 mg Oral BID     lactobacillus rhamnosus (GG)  1 capsule Oral BID     magnesium oxide  400 mg Oral BID     metoprolol succinate ER  100 mg Oral Daily     polyethylene glycol  17 g Oral Daily     pravastatin  20 mg Oral QPM     predniSONE  5 mg Oral Daily     rivaroxaban ANTICOAGULANT  15 mg Oral Daily with supper     senna-docusate  1 tablet Oral BID     sodium chloride (PF)  3 mL Intracatheter Q8H     Vitamin D3  125 mcg Oral Daily     Allergies:   Codeine, Fosamax [alendronic acid], and Morphine    Review of Systems:   A 12 point comprehensive review of systems was  as follows:   General: negative for fatigue, weight loss or weight gain  Constitutional: negative for anorexia, chills, fevers, malaise, night sweats   Eyes: negative for cataracts, color blindness, contacts/glasses, glaucoma, icterus, irritation, redness and visual disturbance  Ears, nose, mouth, throat, and face: negative for ear drainage, earaches, epistaxis, facial trauma, hearing loss, hoarseness, nasal congestion, snoring, sore mouth, sore throat, tinnitus and voice change  Respiratory: Positive for cough, dyspnea on exertion, negative hemoptysis, sputum production, pleurisy, stridor, wheezing, PND, orthopnea  Cardiovascular: negative for chest pain, chest pressure/discomfort, claudication, dyspnea, exertional chest pressure/discomfort, fatigue, irregular heart beat, lower extremity edema, near-syncope,  palpitations,  syncope   Gastrointestinal: negative for abdominal pain, change in bowel haibits, constipation, diarrhea, dyspepsia, dysphagia, jaundice, melena, nausea, odynophagia, reflux symptoms and vomiting  Genitourinary: negative for decreased stream  Hematologic/lymphatic: negative for bleeding  Musculoskeletal: negative for arthralgias, back pain, bone pain, muscle weakness, myalgias, neck pain and stiff joints  Neurological: negative for  "syncope, presyncope, coordination problems, dizziness, gait problems, headaches, memory problems, paresthesia, seizures, speech problems, tremors, vertigo and weakness    Objective:     Physical Exam:  /60 (BP Location: Left arm)   Pulse 103   Temp 97.8  F (36.6  C) (Oral)   Resp 18   Ht 1.651 m (5' 5\")   Wt 74.2 kg (163 lb 8 oz)   SpO2 96%   BMI 27.21 kg/m       Head:    Normocephalic, without obvious abnormality, atraumatic   Eyes:    PERRL, conjunctiva/corneas clear, EOM's intact,           Nose:   Nares normal, septum midline, mucosa normal, no drainage  or sinus tenderness   Throat:   Lips, mucosa, and tongue normal; teeth and gums normal   Neck:   Supple, symmetrical, trachea midline, no adenopathy;        thyroid:  No enlargement/tenderness/nodules; no carotid    bruit or JVD   Back:     Symmetric, no curvature, ROM normal, no CVA tenderness   Lungs:     Clear to auscultation bilaterally, respirations unlabored   Chest wall:    No tenderness or deformity   Heart:    Irregularly irregular, S1 and S2 normal, no murmur, rub   or gallop   Abdomen:     Soft, non-tender, bowel sounds active all four quadrants,     no masses, no organomegaly   Extremities:   Extremities normal, atraumatic, no cyanosis or edema   Pulses:   2+ and symmetric all extremities   Skin:   Skin color, texture, turgor normal, no rashes or lesions   Neurologic:   CNII-XII intact. Normal strength, sensation and reflexes       throughout     Cardiographics and Imaging  Radiology Results: Chest x-ray shows Small right pleural effusion has developed. Remainder unchanged. Calcified aortic atherosclerosis. Mildly enlarged cardiac silhouette. No evidence of pneumonia.    EKG Results: personally reviewed atrial fibrillation with nonspecific ST-T wave flattening.    Lab Review   Pertinent Labs  Lab Results: personally reviewed       Lab Results   Component Value Date    TROPONINI 0.04 11/02/2022       Lab Results   Component Value Date    " BUN 35.0 (H) 03/21/2023     03/21/2023    CO2 32 (H) 03/21/2023       Lab Results   Component Value Date    WBC 10.9 03/19/2023    HGB 11.0 (L) 03/21/2023    HCT 34.4 (L) 03/19/2023     03/19/2023     03/19/2023       Lab Results   Component Value Date     (H) 09/13/2022

## 2023-03-21 NOTE — PROGRESS NOTES
Care Management Follow Up    Length of Stay (days): 7    Expected Discharge Date: 03/21/2023     Concerns to be Addressed:  increased heart rate  Patient plan of care discussed at interdisciplinary rounds: Yes    Anticipated Discharge Disposition:  TCU     Anticipated Discharge Services:   PT/OT  Anticipated Discharge DME:  unknown    Patient/family educated on Medicare website which has current facility and service quality ratings:  yes  Education Provided on the Discharge Plan:  yes  Patient/Family in Agreement with the Plan:  yes    Referrals Placed by CM/SW:  TCU  Private pay costs discussed: yes- transportation- discussed with family    Additional Information:  Patient had been at [facility] back in January. Pts daughters report that pt was actually in TCU until last week. They report ongoing TCU stays since last November. They report that she was most recently at [facility]. They report that she was discharged with home care through [facility] but only for nursing care for a wound. They report that pt needed therapy also and the home care nurse had reached out to pts PCP to get therapy orders which they believe the home care had received. They report that pt needed assistance with bathing but the home care does not have staffing to provide this. They report that pt lives with her  at baseline, but he is not able to provide any physical assistance.     Pt was to go to [facility].  Discharge has again been delayed due to increased heart rate into 130s.  Cardiology has been consulted again to see as this has been recurring.  Will set up discharge once cardiology signs off.     2:12 pm Per orthopedics Pt will need a provena wound vac, to be placed tomorrow.       Cherelle Ramirez RN

## 2023-03-21 NOTE — PROGRESS NOTES
"Orthopedic Progress Note      Assessment: 6 Days Post-Op  S/P Procedure(s):  CLOSED REDUCTION, HIP, WITH PERCUTANEOUS PINNING     Plan:   Activity: Up with assist and assistive device at all times.   Weight bearing status: WBAT LLE.  Pain management: Transition from IV to PO as tolerated.    Antibiotics: Ancef x 24 hours.  Diet: Begin with clear fluids and progress diet as tolerated.   DVT prophylaxis: Resume PTA Xarelto and mechanical while in the hospital  Imaging: complete.  Labs: Hgb POD#1.  Dressings: consult WOC for prevena wound vac on the left hip incision.   Elevation: Elevate LLE on pillows to keep above the level of the heart as much as possible.   Physical Therapy/Occupational Therapy: Eval and treat, appreciate assistance and recommendations.  Consults: PT/OT, social work for disposition planning.  Follow-up with Dr. Mckeon in 2 weeks for incision check and repeat x-rays needed.   Disposition: Pending progress with therapies, pain control on orals, and medical stability, anticipate discharge to TCU.      Subjective:  Pain: mild  Nausea, Vomiting:  No  Lightheadedness, Dizziness:  No  Neuro:  Patient denies new onset numbness or paresthesias  Fever, chills: No  Chest pain: No  SOB: No    Patient reports feeling well today. Patient reports pain is tolerable with current pain regimen. Patient eating and drinking well.  Patient reports walking well with therapy. All questions/concerns answered.      Objective:  /57 (BP Location: Left arm)   Pulse 97   Temp 97.7  F (36.5  C) (Oral)   Resp 20   Ht 1.651 m (5' 5\")   Wt 74.2 kg (163 lb 8 oz)   SpO2 96%   BMI 27.21 kg/m    The patient is Alert and awake. Patient is sitting up on the recliner and appears comfortable.   Sensation is intact.  Dorsiflexion and plantar flexion is intact.  Dorsalis pedis pulse intact. Skin warm and well perfused.   Compartments are soft and non-tender.   Dressing with moderate amount of serous drainage. Removed " dressing, incision closed with staples, no active drainage, erythema, and warmth. Applied new gauze and primapore. Patient tolerated dressing change well.          Pertinent Labs   Lab Results: personally reviewed.   Lab Results   Component Value Date    INR 1.60 (H) 03/15/2023    INR 2.18 (H) 03/14/2023    INR 1.62 (H) 12/13/2022     Lab Results   Component Value Date    WBC 10.9 03/19/2023    HGB 11.0 (L) 03/21/2023    HCT 34.4 (L) 03/19/2023     03/19/2023     03/19/2023     Lab Results   Component Value Date     03/21/2023    CO2 32 (H) 03/21/2023         Report completed by:  Zoe Denny PA-C, LAZARO  [facility]

## 2023-03-21 NOTE — PLAN OF CARE
Problem: Plan of Care - These are the overarching goals to be used throughout the patient stay.    Goal: Absence of Hospital-Acquired Illness or Injury  Intervention: Identify and Manage Fall Risk  Recent Flowsheet Documentation  Taken 3/21/2023 1200 by Canelo Beatty RN  Safety Promotion/Fall Prevention:    bed alarm on    clutter free environment maintained    fall prevention program maintained    mobility aid in reach    nonskid shoes/slippers when out of bed    room organization consistent    safety round/check completed  Taken 3/21/2023 0800 by Canelo Beatty RN  Safety Promotion/Fall Prevention:    bed alarm on    clutter free environment maintained    fall prevention program maintained    mobility aid in reach    nonskid shoes/slippers when out of bed    room organization consistent    safety round/check completed     Problem: Risk for Delirium  Goal: Improved Behavioral Control  Intervention: Minimize Safety Risk  Recent Flowsheet Documentation  Taken 3/21/2023 1200 by Canelo Beatty RN  Enhanced Safety Measures: bed alarm set  Taken 3/21/2023 0800 by Canelo Beatty RN  Enhanced Safety Measures: bed alarm set     Problem: Hip Fracture Surgical Repair  Goal: Anesthesia/Sedation Recovery  Intervention: Optimize Anesthesia Recovery  Recent Flowsheet Documentation  Taken 3/21/2023 1200 by Canelo Beatty RN  Safety Promotion/Fall Prevention:    bed alarm on    clutter free environment maintained    fall prevention program maintained    mobility aid in reach    nonskid shoes/slippers when out of bed    room organization consistent    safety round/check completed  Taken 3/21/2023 0800 by Canelo Beatty RN  Safety Promotion/Fall Prevention:    bed alarm on    clutter free environment maintained    fall prevention program maintained    mobility aid in reach    nonskid shoes/slippers when out of bed    room organization consistent    safety round/check completed     Problem: COPD (Chronic Obstructive  Pulmonary Disease) Comorbidity  Goal: Maintenance of COPD Symptom Control  Intervention: Maintain COPD-Symptom Control  Recent Flowsheet Documentation  Taken 3/21/2023 1200 by Canelo Beatty RN  Medication Review/Management: medications reviewed  Taken 3/21/2023 0800 by Canelo Beatty RN  Medication Review/Management: medications reviewed     Problem: Heart Failure Comorbidity  Goal: Maintenance of Heart Failure Symptom Control  Intervention: Maintain Heart Failure Management  Recent Flowsheet Documentation  Taken 3/21/2023 1200 by Canelo Beatty RN  Medication Review/Management: medications reviewed  Taken 3/21/2023 0800 by Canelo Beatty RN  Medication Review/Management: medications reviewed     Problem: Hypertension Comorbidity  Goal: Blood Pressure in Desired Range  Intervention: Maintain Blood Pressure Management  Recent Flowsheet Documentation  Taken 3/21/2023 1200 by Canelo Beatty RN  Medication Review/Management: medications reviewed  Taken 3/21/2023 0800 by Canelo Beatty RN  Medication Review/Management: medications reviewed   Goal Outcome Evaluation:    Pt is alert and oriented x3. Denied chest pain, LAGUNAS noted with activity.  Vitals checked and WNL. Pt on NC at 1 lpm. Pt able to ambulate to bathroom on GB with a walker A1.  Discharge postponed today as pt was having Afib RVR.   Pt able to make needs known; calls appropriately when needed.    Pt on tab diltiazem; Afib controlled.  One episode of UVT; pt is asymptomatic. Will cont to monitor.

## 2023-03-22 NOTE — PLAN OF CARE
Problem: Plan of Care - These are the overarching goals to be used throughout the patient stay.    Goal: Absence of Hospital-Acquired Illness or Injury  Outcome: Progressing  Intervention: Identify and Manage Fall Risk  Recent Flowsheet Documentation  Taken 3/22/2023 0000 by Yusuf Burgos RN  Safety Promotion/Fall Prevention:    bed alarm on    clutter free environment maintained    fall prevention program maintained    mobility aid in reach    nonskid shoes/slippers when out of bed    room organization consistent    safety round/check completed  Intervention: Prevent Skin Injury  Recent Flowsheet Documentation  Taken 3/22/2023 0000 by Yusuf Burgos RN  Body Position: position changed independently  Goal: Optimal Comfort and Wellbeing  Outcome: Progressing     Problem: Hip Fracture Surgical Repair  Goal: Optimal Coping with Change in Health Status  Outcome: Progressing  Goal: Anesthesia/Sedation Recovery  Intervention: Optimize Anesthesia Recovery  Recent Flowsheet Documentation  Taken 3/22/2023 0000 by Yusuf Burgos RN  Safety Promotion/Fall Prevention:    bed alarm on    clutter free environment maintained    fall prevention program maintained    mobility aid in reach    nonskid shoes/slippers when out of bed    room organization consistent    safety round/check completed  Goal: Effective Oxygenation and Ventilation  Intervention: Optimize Oxygenation and Ventilation  Recent Flowsheet Documentation  Taken 3/22/2023 0000 by Yusuf Burgos RN  Head of Bed (HOB) Positioning: HOB at 20-30 degrees     Problem: COPD (Chronic Obstructive Pulmonary Disease) Comorbidity  Goal: Maintenance of COPD Symptom Control  Outcome: Progressing  Intervention: Maintain COPD-Symptom Control  Recent Flowsheet Documentation  Taken 3/22/2023 0000 by Yusuf Burgos, RN  Medication Review/Management: medications reviewed     Problem: Heart Failure Comorbidity  Goal: Maintenance of Heart Failure Symptom Control  Outcome:  Progressing  Intervention: Maintain Heart Failure Management  Recent Flowsheet Documentation  Taken 3/22/2023 0000 by Yusuf Burgos, RN  Medication Review/Management: medications reviewed   Goal Outcome Evaluation:    Patient is alert and oriented x 4. Pleasant and cooperative. Uses call light appropriately. Bed alarm on for safety. Denies pain. Remains on supplemental oxygen at 1 LPM via nasal cannula. Up with assist of one to BR and use of walker/gait belt. Dressing on left hip CDI.   Telemetry: Atrial Fibrillation with rates in the 80s-100s.

## 2023-03-22 NOTE — PROGRESS NOTES
CARDIOLOGY PROGRESS NOTE      Assessment/Plan:  1.  Atrial fibrillation with RVR (H)-based on record review and being seen by EP atrial fibrillation nurse practitioners decision was made to leave her in atrial fibrillation with rate control strategy.  Patient was unaware of this decision.  Given her shortness of breath and significant COPD wouldl revisit with [facility] nurse practitioner, possible rhythm control strategy.  In any event [id number] score is at least 5 and agree with chronic anticoagulation with Xarelto.  2.  Tachycardia- resolved on metoprolol as well as diltiazem.    3.  Oxygen dependent-COPD and defer to primary care.  4.  Fracture of femur (H)-mildly displaced subcapital fracture of the left femur and status post closed reduction with percutaneous pinning and doing well.  5.  Fall, initial encounter-this was nonsyncopal.  She admits to several falls and given this concern with anticoagulation.  Discussed with her left atrial appendage occlusion device and she is interested.  We will arrange for appointment as an outpatient.  6.  Abdominal discomfort-defer to primary care team.  History of C. difficile infection in past.  7.  Benign essential hypertension- blood pressure under good control, if anything borderline low.  Continue metoprolol and diltiazem for now.  8.  Mild pulmonary hypertension-most likely WHO class III on the basis of lung disease.  Pressure probably 55 to 60 mmHg.  Can discuss as outpatient whether to proceed with nitric oxide reactivity cardiac catheterization.    Plan:  1.  Continue current meds monitoring blood pressure.  2.  Arrange for discussion with left atrial appendage occlusion team as an outpatient.  3.  Cardiology does not have much further inpatient to add, most likely will sign off tomorrow.    Discharge Plannin.  No significant cardiac barrier to discharge.  2.  Can follow-up with Teressa Cespedes primary cardiologist.     LOS: 8 days     Subjective:  Interval  "History:    77-year-old white female being seen on ninth day of hospitalization.  Complains of abdominal discomfort left-sided, no diarrhea, constipation, nausea, vomiting, decreased appetite, palpitations, chest discomfort, shortness of breath, PND, orthopnea or significant peripheral edema.    Medications    allopurinol  300 mg Oral Daily     cholestyramine  1 packet Oral TID     diltiazem  30 mg Oral Q8H RIYA     DULoxetine  20 mg Oral Daily     famotidine  20 mg Oral Daily     fluticasone-vilanterol  1 puff Inhalation Daily     folic acid  1 mg Oral Daily     furosemide  20 mg Oral Daily     guaiFENesin  600 mg Oral BID     lactobacillus rhamnosus (GG)  1 capsule Oral BID     magnesium oxide  400 mg Oral BID     metoprolol succinate ER  100 mg Oral Daily     polyethylene glycol  17 g Oral Daily     pravastatin  20 mg Oral QPM     predniSONE  5 mg Oral Daily     rivaroxaban ANTICOAGULANT  15 mg Oral Daily with supper     senna-docusate  1 tablet Oral BID     sodium chloride (PF)  3 mL Intracatheter Q8H     Vitamin D3  125 mcg Oral Daily     Objective:   Vital signs in last 24 hours:  Temp:  [97.3  F (36.3  C)-97.8  F (36.6  C)] 97.6  F (36.4  C)  Pulse:  [80-97] 93  Resp:  [18-20] (P) 18  BP: ()/(56-67) (P) 98/55  SpO2:  [95 %-100 %] (P) 95 %    Physical Exam:  BP (P) 98/55 (BP Location: Right arm)   Pulse 93   Temp 97.6  F (36.4  C) (Oral)   Resp (P) 18   Ht 1.651 m (5' 5\")   Wt 74.6 kg (164 lb 6.4 oz)   SpO2 (P) 95%   BMI 27.36 kg/m      General Appearance:    Alert, cooperative, no distress, appears stated age   Head:    Normocephalic, without obvious abnormality, atraumatic   Throat:   Lips, mucosa, and tongue normal; teeth and gums normal   Neck:   Supple, symmetrical, trachea midline, no adenopathy;        thyroid:  No enlargement/tenderness/nodules; no carotid    bruit or JVD   Back:     Symmetric, no curvature, ROM normal, no CVA tenderness   Lungs:     Clear to auscultation bilaterally, " respirations unlabored   Chest wall:    No tenderness or deformity   Heart:    Irregularly irregular, S1 and S2 normal, no murmur, rub   or gallop   Abdomen:     Soft, non-tender, bowel sounds active all four quadrants,     no masses, no organomegaly   Extremities:   Normal, atraumatic, no cyanosis or edema   Pulses:   2+ and symmetric all extremities   Skin:   Skin color, texture, turgor normal, no rashes or lesions     Cardiographics:      ECG: Personally reviewed by myself shows atrial fibrillation, nonspecific ST-T wave changes.    Echocardiogram:   1. Normal left ventricular size and systolic performance with a visually estimated ejection fraction of 60%.  2. There is mild aortic stenosis.  3. There is mild aortic insufficiency.  4. There is mild-moderate, to perhaps moderate, mitral insufficiency.  5. Borderline right ventricular enlargement with normal right ventricular systolic performance.  6. There is moderate biatrial enlargement.  7. Right ventricular systolic pressure relative to right atrial pressure is mildly increased. The pulmonary artery pressure is estimated to be 45-50 mmHg plus right atrial pressure (the IVC is of normal caliber).     When compared to the prior real-time echocardiogram dated 29 December 2022, left ventricular systolic performance and regional wall motion appears fairly similar on both studies (the prior examination was a limited study without complete evaluation of valvular function).      Lab Results:   Recent Labs   Lab 03/22/23 0423   WBC 12.0*   HGB 9.9*   HCT 33.4*        Recent Labs   Lab 03/22/23 0423      CO2 28   BUN 33.0*   .       Lab Results   Component Value Date    TROPONINI 0.04 11/02/2022

## 2023-03-22 NOTE — PROGRESS NOTES
[facility]    Medicine Progress Note - Hospitalist Service    Date of Admission:  3/14/2023    Assessment & Plan     Fatuma Henry is a 77 year old female admitted on 3/14/2023. She has h/o recurrent C. difficile, paroxysmal A-fib, hypertension, hyperlipidemia, history of pulmonary hypertension, diastolic CHF, CKD-III, presyncope, left ICA occlusion, microcytic anemia, rheumatoid arthritis, gout, GERD, depression presented with left hip pain after falling at home.  CT pelvis showed acute impacted mildly displaced subcapital fracture of the left femur. She is s/p closed reduction with percutaneous pinning     1.S/P fall  Acute impacted left femoral fracture  -denied any chest pain, dizziness, palpitation, loss of consciousness during the fall  -Has history of presyncope-not reported during this counter  -CT pelvis showed acute impacted mildly displaced subcapital fracture of the left femur.  -Orthopedics consult appreciated  -cardiology consult appreciated.  -POD  #7 s/p Closed reduction with percutaneous pinning.Today ortho had Prevena wound pump placed over it due to large volume of serous fluid  -pain is still present, but improving each day  -passing stool  -will need tcu, now has approval   -Dr. Mckeon in 2 weeks for incision check and repeat x-rays needed     2.Leukocytosis  - likely 2/2 post surgery. Now 12  - no fever, chill, fever. U/A: negative. CXR: no e/o pneumonia. Procal: 0.11.  -also note she is on chronic prednisone for RA      3.A-fib with RVR- again an issue this am  -On metoprolol 100 and xarelto at home  -S/p Cardizem gtt.   -Cardiology consulted again and now 3/21 added dilt 30 mg q 8 hours  -she will need to have follow up in clinic to consider possible left atrial appendage occlusion     4.History of pulmonary HTN  HFpEF- not exacerbated  -c/w PTA lasix 20mg   -proBNp 2866     5.CKD stage III  -Avoid nephrotoxins as able  -f/u BMP  -creat today .99     6.Chronic  "hypoxic respiratory failure  COPD on home oxygen 2 L,at home sometimes on 3 liters  -Not exacerbated  -Continue PTA Symbicort, albuterol     7.Essential HTN  -Monitor vital signs  -PTA metoprolol     8.Left ICA occlusion--noted back in 12/22  -Asymptomatic  -Outpatient vascular surgery follow-up     9.Anemia, macrocytic  -Continue with PTA folic acid, B12  -Outpatient hematology     10.GERD  -c/w PTA famotidine 20 mg.     11.Depression  -Continue with PTA Cymbalta     12.Pulmonary nodules  -Outpatient pulmonology     13.Gout  -Continue with PTA allopurinol, colchicine, prednisone  -Outpatient rheumatology follow-up     14.Hypomagnesemia  -Continue PTA Mag-Ox     15.Recurrent C. difficile infection  -No diarrhea reported     16.Hx of RA  -on chronic prednisone and methotrexate--holding methotrexate for now    17.Abd pain on left abd--new complaint 3/22  -denied loose stools--told me having regular ones  -new onset and had it for hours  -ct showed large amount of stool--encourage bowel meds in addition to senna/doc      I called daughter to update at 1538 and let her know about ct scan and her mother's reluctance to take bowel meds           Diet: Advance Diet as Tolerated: Regular Diet Adult  Discharge Instruction - Regular Diet Adult    DVT Prophylaxis: DOAC  Brandon Catheter: Not present  Lines: None     Cardiac Monitoring: ACTIVE order. Indication: Tachyarrhythmias, acute (48 hours)  Code Status: No CPR- Do NOT Intubate      Clinically Significant Risk Factors           # Hypercalcemia: Highest Ca = 10.2 mg/dL in last 2 days, will monitor as appropriate    # Hypoalbuminemia: Lowest albumin = 3.3 g/dL at 3/22/2023  4:23 AM, will monitor as appropriate           # Overweight: Estimated body mass index is 27.36 kg/m  as calculated from the following:    Height as of this encounter: 1.651 m (5' 5\").    Weight as of this encounter: 74.6 kg (164 lb 6.4 oz).          Disposition Plan      Expected Discharge Date: " 03/23/2023    Discharge Delays: Other (Add Comment)  Specialist Consult (enter specialist & decision needed in comments)  Destination: other (comment) (TCU)  Discharge Comments: abd pain, CT if abd pending  Orthopedics recommending Prevena wound vac-WOC ordered          Teena Cantor MD  Hospitalist Service  [facility]  Securely message with [employer] (more info)  Text page via [facility] Paging/Directory   ______________________________________________________________________    Interval History   She notes she had new onset left abd pain today  She had it a few hours  She points to left mid abd-no radiation  No nausea  Thinks HR has been better  Pain about same in left leg    Physical Exam   Vital Signs: Temp: 97.7  F (36.5  C) Temp src: Oral BP: 130/61 Pulse: 83   Resp: 18 SpO2: 96 % O2 Device: Nasal cannula Oxygen Delivery: 1 LPM  Weight: 164 lbs 6.4 oz    Constitutional: awake, alert, cooperative and no apparent distress  Eyes: sclera clear  Respiratory: no increased work of breathing, good air exchange, no retractions and diminished breath sounds right base and left base  Cardiovascular: irregularly irregular rhythm  GI: normal bowel sounds, soft, non-distended and tenderness noted in the left upper quadrant and to left mid , no rebound or guarding  Skin: no bruising or bleeding, dressing over hip   Musculoskeletal: no lower extremity pitting edema present  Neurologic: Mental Status Exam:  Level of Alertness:   awake  Neuropsychiatric: General: normal, calm and normal eye contact    Medical Decision Making       35 MINUTES SPENT BY ME on the date of service doing chart review, history, exam, documentation & further activities per the note.      Data     I have personally reviewed the following data over the past 24 hrs:    12.0 (H)  \   9.9 (L)   / 358     139 101 33.0 (H) /  96   4.1 28 0.99 (H) \       ALT: 21 AST: 38 (H) AP: 117 (H) TBILI: 0.4   ALB: 3.3 (L) TOT PROTEIN: 5.7 (L)  LIPASE: N/A       Imaging results reviewed over the past 24 hrs:   Recent Results (from the past 24 hour(s))   CT Abdomen Pelvis w/o Contrast    Narrative    EXAM: CT ABDOMEN PELVIS W/O CONTRAST  LOCATION: [facility]  DATE/TIME: 3/22/2023 10:07 AM    INDICATION: 77 y o female with s p hip repair, now today abd pain left of midline upper abd  COMPARISON: 02/13/2023   TECHNIQUE: CT scan of the abdomen and pelvis was performed without IV contrast. Multiplanar reformats were obtained. Dose reduction techniques were used.  CONTRAST: None.    FINDINGS:   LOWER CHEST: Coronary artery disease.     HEPATOBILIARY: Cholecystectomy.     PANCREAS: Normal.    SPLEEN: Normal.    ADRENAL GLANDS: Normal.    KIDNEYS/BLADDER: Parapelvic renal cysts and simple cortical renal cysts are noted. No follow-up required.     BOWEL: Large amount of stool throughout the colon.     LYMPH NODES: Normal.    VASCULATURE: Atherosclerotic disease.     PELVIC ORGANS: Normal.    MUSCULOSKELETAL: Surgical change in the left femur. Sclerosis of the left sacral ala. Height loss of T11 and T12 is again seen.       Impression    IMPRESSION:   1.  Large amount of stool throughout the colon.  2.  Atherosclerotic disease.

## 2023-03-22 NOTE — DISCHARGE INSTRUCTIONS
L hip - remove Prevena after 7 days (3/29/23) or if dressing or pump fails.  Prevena pump is preset to -125 mmHg continuous suction.  Dressing may be reinforced with clear film dressing is leaking is noted.   After dressing removal, entire kit (pump and dressing) may be thrown.  If dressing fails prior to removal date of 3/29/23 - contact surgeon's office to update.        Appointment has been made for you with [facility]: [address], [phone] Wednesday 3/29/23 at 8:45am.

## 2023-03-22 NOTE — PROGRESS NOTES
RN educated pt on importance of bowel medications and tried to offer pt one of the bowel meds including miralax, senna, MOM or even prune juice.  RN told pt that CT scan showed large amount of stool, (with Dr. Cantor's ok).  Pt declined medications stating she had to have a bowel movt now.      RN then assisted pt to bathroom.  Pt had a large soft formed bowel movement. RN will update MD.

## 2023-03-22 NOTE — PROGRESS NOTES
"Orthopedic Progress Note      Assessment: 7 Days Post-Op  S/P Procedure(s):  CLOSED REDUCTION, HIP, WITH PERCUTANEOUS PINNING     Plan:   Activity: Up with assist and assistive device at all times.   Weight bearing status: WBAT LLE.  Pain management: Transition from IV to PO as tolerated.    Antibiotics: Ancef x 24 hours, complete.   Diet: Begin with clear fluids and progress diet as tolerated.   DVT prophylaxis: Resume PTA Xarelto and mechanical while in the hospital  Labs: Hgb 9.9, recheck daily.  Dressings: Prevena wound vac on the left hip incision with no drainage, monitor output.   Elevation: Elevate LLE on pillows to keep above the level of the heart as much as possible.   Physical Therapy/Occupational Therapy: Eval and treat, appreciate assistance and recommendations.  Consults: PT/OT, social work for disposition planning.  Follow-up with Dr. Mckeon in 2 weeks for incision check and repeat x-rays needed.   Disposition: Pending progress with therapies, pain control on orals, and medical stability, anticipate discharge to TCU.      Subjective:  Pain: mild  Nausea, Vomiting:  No  Lightheadedness, Dizziness:  No  Neuro:  Patient denies new onset numbness or paresthesias    Patient sitting up in bed listening to the radio. Pain well controlled on current regimen. Has had the prevena wound vac placed today without issue. Doing well with therapy. All questions and concerns addressed.     Objective:  /61 (BP Location: Right arm)   Pulse 83   Temp 97.7  F (36.5  C) (Oral)   Resp 18   Ht 1.651 m (5' 5\")   Wt 74.6 kg (164 lb 6.4 oz)   SpO2 96%   BMI 27.36 kg/m    The patient is A&Ox3. Appears comfortable.   Sensation is intact along lower extremity.  Dorsiflexion and plantar flexion is intact.  Dorsalis pedis pulse intact.  Calves are soft and non-tender. Some pitting edema around distal lower extremety and into foot.  Negative Virgen's.  The incision is covered with the prevena wound vac dressing. No " drainage. No surrounding erythema. Thigh soft and non tender.     Pertinent Labs   Lab Results: personally reviewed.   Lab Results   Component Value Date    INR 1.60 (H) 03/15/2023    INR 2.18 (H) 03/14/2023    INR 1.62 (H) 12/13/2022     Lab Results   Component Value Date    WBC 12.0 (H) 03/22/2023    HGB 9.9 (L) 03/22/2023    HCT 33.4 (L) 03/22/2023     (H) 03/22/2023     03/22/2023     Lab Results   Component Value Date     03/22/2023    CO2 28 03/22/2023         Report completed by:  ANGEL BOJORQUEZ PA-C, LAZARO  Date: 3/22/2023  Time: 11:45 AM

## 2023-03-22 NOTE — CONSULTS
[facility]  WO Nurse Inpatient Assessment     Consulted for: L hip incision    Patient History (according to provider note(s):        Areas Assessed:    L hip with 9 cm incision noted. Staples intact and edges approximated.  Large volume of serous fluid noted on cover dressing.    Treatment Plan:     Prevena placed.  L hip - remove after 7 days (3/29/23)  NPWT (hospital pump) at -125 mmHg continuous.  When patient is discharging, change over to small pump in the Prevena box in the patient's room.  This is preset to -125 mmHg.    Orders: Written    RECOMMEND PRIMARY TEAM ORDER: None, at this time  Education provided: plan of care  Discussed plan of care with: Patient and Nurse  WO nurse follow-up plan: weekly  Notify WOC if wound(s) deteriorate.  Nursing to notify the Provider(s) and re-consult the WOC Nurse if new skin concern.    DATA:     Current support surface: Standard  Standard gel/foam mattress (IsoFlex, Atmos air, etc)  Containment of urine/stool: Incontinence Protocol  BMI: Body mass index is 27.36 kg/m .   Active diet order: Orders Placed This Encounter      Advance Diet as Tolerated: Regular Diet Adult      Discharge Instruction - Regular Diet Adult     Output: I/O last 3 completed shifts:  In: 1400 [P.O.:1400]  Out: 1100 [Urine:1100]     Labs: Recent Labs   Lab 03/22/23  0423   ALBUMIN 3.3*   HGB 9.9*   WBC 12.0*     Pressure injury risk assessment:   Sensory Perception: 3-->slightly limited  Moisture: 3-->occasionally moist  Activity: 3-->walks occasionally  Mobility: 3-->slightly limited  Nutrition: 3-->adequate  Friction and Shear: 2-->potential problem  Dmitriy Score: 17    Rayne Molina, MSN RN CWOCN  Pager no longer is use, please contact through [employer] group: [facility] [employer]

## 2023-03-23 NOTE — PLAN OF CARE
Goal Outcome Evaluation:      Plan of Care Reviewed With: patient    Overall Patient Progress: improvingOverall Patient Progress: improving    Outcome Evaluation: Pt had another very large bowel movement.  (2 large total BM's so far today up to this point.) Pt still refusing bowel meds.

## 2023-03-23 NOTE — PROGRESS NOTES
CARDIOLOGY PROGRESS NOTE      Assessment/Plan:  1.  Atrial fibrillation with RVR (H)-based on record review and being seen by EP atrial fibrillation nurse practitioners decision was made to leave her in atrial fibrillation with rate control strategy.  Patient was unaware of this decision.  Given her shortness of breath and significant COPD wouldl revisit with A-[facility] nurse practitioner, possible rhythm control strategy.  In any event [id number] score is at least 5 and agree with chronic anticoagulation with Xarelto.  2.  Tachycardia- resolved on metoprolol as well as diltiazem.    3.  Oxygen dependent-COPD and defer to primary care.  4.  Fracture of femur (H)-mildly displaced subcapital fracture of the left femur and status post closed reduction with percutaneous pinning and doing well.  5.  Fall, initial encounter-this was nonsyncopal.  She admits to several falls and given this concern with anticoagulation.  Discussed with her left atrial appendage occlusion device and she is interested.  We will arrange for appointment as an outpatient.  6.  Abdominal discomfort-defer to primary care team.  History of C. difficile infection in past.  7.  Benign essential hypertension- blood pressure under good control, if anything borderline low.  Continue metoprolol and diltiazem for now.  8.  Mild pulmonary hypertension-most likely WHO class III on the basis of lung disease.  Pressure probably 55 to 60 mmHg.  Can discuss as outpatient whether to proceed with nitric oxide reactivity cardiac catheterization.    Plan:  1.  Continue current meds monitoring blood pressure.  2.  Arrange for discussion with left atrial appendage occlusion team as an outpatient.  3.  Cardiology has nothing further inpatient to add, we will sign off, available as needed.    Discharge Plannin.  No significant cardiac barrier to discharge.  2.  Can follow-up with Teressa Cespedes primary cardiologist.     LOS: 9 days     Subjective:  Interval  "History:    77-year-old white female being seen on 10th day of hospitalization.  Complains of abdominal discomfort left-sided, no diarrhea, or bowel movement, nausea, vomiting, decreased appetite, palpitations, chest discomfort, shortness of breath, PND, orthopnea or significant peripheral edema.    Medications    allopurinol  300 mg Oral Daily     cholestyramine  1 packet Oral TID     diltiazem  30 mg Oral Q8H RIYA     DULoxetine  20 mg Oral Daily     famotidine  20 mg Oral Daily     fluticasone-vilanterol  1 puff Inhalation Daily     folic acid  1 mg Oral Daily     furosemide  20 mg Oral Daily     guaiFENesin  600 mg Oral BID     lactobacillus rhamnosus (GG)  1 capsule Oral BID     magnesium oxide  400 mg Oral BID     metoprolol succinate ER  100 mg Oral Daily     polyethylene glycol  17 g Oral Daily     pravastatin  20 mg Oral QPM     predniSONE  5 mg Oral Daily     rivaroxaban ANTICOAGULANT  15 mg Oral Daily with supper     senna-docusate  1 tablet Oral BID     Vitamin D3  125 mcg Oral Daily     Objective:   Vital signs in last 24 hours:  Temp:  [97.7  F (36.5  C)-98.3  F (36.8  C)] 98.3  F (36.8  C)  Pulse:  [83-95] 94  Resp:  [16-20] 20  BP: (105-160)/(50-74) 110/65  SpO2:  [96 %-100 %] 96 %    Physical Exam:  /65 (BP Location: Right arm)   Pulse 94   Temp 98.3  F (36.8  C) (Oral)   Resp 20   Ht 1.651 m (5' 5\")   Wt 69.6 kg (153 lb 7 oz)   SpO2 96%   BMI 25.53 kg/m      General Appearance:    Alert, cooperative, no distress, appears stated age   Head:    Normocephalic, without obvious abnormality, atraumatic   Throat:   Lips, mucosa, and tongue normal; teeth and gums normal   Neck:   Supple, symmetrical, trachea midline, no adenopathy;        thyroid:  No enlargement/tenderness/nodules; no carotid    bruit or JVD   Back:     Symmetric, no curvature, ROM normal, no CVA tenderness   Lungs:     Clear to auscultation bilaterally, respirations unlabored   Chest wall:    No tenderness or deformity "   Heart:    Irregularly irregular, S1 and S2 normal, no murmur, rub   or gallop   Abdomen:     Soft, non-tender, bowel sounds active all four quadrants,     no masses, no organomegaly   Extremities:   Normal, atraumatic, no cyanosis or edema   Pulses:   2+ and symmetric all extremities   Skin:   Skin color, texture, turgor normal, no rashes or lesions     Cardiographics:      ECG: Personally reviewed by myself shows atrial fibrillation, nonspecific ST-T wave changes.    Echocardiogram:   1. Normal left ventricular size and systolic performance with a visually estimated ejection fraction of 60%.  2. There is mild aortic stenosis.  3. There is mild aortic insufficiency.  4. There is mild-moderate, to perhaps moderate, mitral insufficiency.  5. Borderline right ventricular enlargement with normal right ventricular systolic performance.  6. There is moderate biatrial enlargement.  7. Right ventricular systolic pressure relative to right atrial pressure is mildly increased. The pulmonary artery pressure is estimated to be 45-50 mmHg plus right atrial pressure (the IVC is of normal caliber).     When compared to the prior real-time echocardiogram dated 29 December 2022, left ventricular systolic performance and regional wall motion appears fairly similar on both studies (the prior examination was a limited study without complete evaluation of valvular function).      Lab Results:   Recent Labs   Lab 03/23/23  0450   WBC 11.4*   HGB 10.3*   HCT 33.6*        Recent Labs   Lab 03/23/23  0450      CO2 29   BUN 29.5*   .       Lab Results   Component Value Date    TROPONINI 0.04 11/02/2022

## 2023-03-23 NOTE — PLAN OF CARE
Goal Outcome Evaluation:      Plan of Care Reviewed With: patient    Overall Patient Progress: improvingOverall Patient Progress: improving    Outcome Evaluation: Pt up to chair this morning.  Pt on 1-2L per NC.  Home dose is 3L per NC.  Pt using IS and getting 2000ML.  Pt agreed to take senna this morning, but refused miralax.  (Pt did have 2 large bowel movements yesterday-normal soft brown and formed).  Pt worried about discharge plan.  Pt ambulated this morning in hallway.    Pt is alert, but forgetful.  Alarms on.      Update 1430- Pt had another very large BM - soft formed and brown.  Left lower extremity more edematous today than yesterday.

## 2023-03-23 NOTE — CARE PLAN
Nurse reached out to the call service regarding Gert pulling out the tubing attached to her Wound vac. No concerns of drainage at this time. Dressing still intact and covering incision. Discussed with the nurse my recommendation to reinforce dressing at this time with Tegaderm and our hospitalist PA will come and see her tomorrow morning to reevaluate dressing and reach out to the wound nurse if needed. Please reach out with any further questions.    Vanessa Farrell PA-C  [facility]

## 2023-03-23 NOTE — PROGRESS NOTES
RESPIRATORY CARE NOTE    Patient is on 2 LPM NC. Patient receive schedule nebulizer. BS diminished pre and post nebulizer. Patient tolerated treatment well.         Kyle Luna, RT

## 2023-03-23 NOTE — PLAN OF CARE
Pt alert and oriented, vitally stable. Pt went to bathroom several times, only going between  ml. Pt was confused but reorientable. Pt was a bit unstable this AM, we used bedside commode and bed scale. Pt denied pain all shift. WYATT SEVERINO, RN    Problem: Plan of Care - These are the overarching goals to be used throughout the patient stay.    Goal: Plan of Care Review  Description: The Plan of Care Review/Shift note should be completed every shift.  The Outcome Evaluation is a brief statement about your assessment that the patient is improving, declining, or no change.  This information will be displayed automatically on your shift note.  Outcome: Progressing     Problem: Plan of Care - These are the overarching goals to be used throughout the patient stay.    Goal: Absence of Hospital-Acquired Illness or Injury  Intervention: Identify and Manage Fall Risk  Recent Flowsheet Documentation  Taken 3/23/2023 0400 by WYATT SEVERINO  Safety Promotion/Fall Prevention:   bed alarm on   clutter free environment maintained   fall prevention program maintained   mobility aid in reach   nonskid shoes/slippers when out of bed   room organization consistent   safety round/check completed   chair alarm on   increase visualization of patient   increased rounding and observation   room door open   room near nurse's station  Taken 3/22/2023 2352 by WYATT SEVERINO  Safety Promotion/Fall Prevention:   bed alarm on   clutter free environment maintained   fall prevention program maintained   mobility aid in reach   nonskid shoes/slippers when out of bed   room organization consistent   safety round/check completed   chair alarm on   increase visualization of patient   increased rounding and observation   room door open   room near nurse's station  Taken 3/22/2023 2010 by WYATT SEVERINO  Safety Promotion/Fall Prevention:   bed alarm on   clutter free environment maintained   fall prevention program maintained   mobility  aid in reach   nonskid shoes/slippers when out of bed   room organization consistent   safety round/check completed   chair alarm on   increase visualization of patient   increased rounding and observation   room door open   room near nurse's station     Problem: Plan of Care - These are the overarching goals to be used throughout the patient stay.    Goal: Absence of Hospital-Acquired Illness or Injury  Intervention: Prevent Skin Injury  Recent Flowsheet Documentation  Taken 3/23/2023 0400 by WYATT SEVERINO  Body Position: position changed independently  Taken 3/22/2023 2352 by WYATT SEVERINO  Body Position: position changed independently  Taken 3/22/2023 2010 by WYATT SEVERINO  Body Position: position changed independently     Problem: Plan of Care - These are the overarching goals to be used throughout the patient stay.    Goal: Optimal Comfort and Wellbeing  Outcome: Progressing  Intervention: Provide Person-Centered Care  Recent Flowsheet Documentation  Taken 3/23/2023 0400 by WYATT SEVERINO  Trust Relationship/Rapport: care explained  Taken 3/22/2023 2352 by WYATT SEVERINO  Trust Relationship/Rapport: care explained  Taken 3/22/2023 2010 by WYATT SEVERINO  Trust Relationship/Rapport: care explained     Problem: Risk for Delirium  Goal: Improved Sleep  Outcome: Progressing     Problem: Hip Fracture Surgical Repair  Goal: Absence of Infection Signs and Symptoms  Outcome: Progressing

## 2023-03-23 NOTE — PLAN OF CARE
Problem: Plan of Care - These are the overarching goals to be used throughout the patient stay.    Goal: Plan of Care Review  Description: The Plan of Care Review/Shift note should be completed every shift.  The Outcome Evaluation is a brief statement about your assessment that the patient is improving, declining, or no change.  This information will be displayed automatically on your shift note.  Outcome: Progressing     Problem: Plan of Care - These are the overarching goals to be used throughout the patient stay.    Goal: Readiness for Transition of Care  Outcome: Progressing     Problem: Risk for Delirium  Goal: Improved Attention and Thought Clarity  Outcome: Progressing     Problem: Hip Fracture Surgical Repair  Goal: Effective Bowel Elimination  Outcome: Progressing     Problem: Hip Fracture Surgical Repair  Goal: Absence of Infection Signs and Symptoms  Outcome: Progressing     Problem: Hip Fracture Surgical Repair  Goal: Optimal Functional Ability  Intervention: Promote Optimal Functional Status  Recent Flowsheet Documentation  Taken 3/23/2023 1600 by Harish Norris RN  Activity Management:    activity adjusted per tolerance    up in chair     Problem: Dysrhythmia  Goal: Normalized Cardiac Rhythm  Outcome: Progressing     Problem: Urinary Retention  Goal: Effective Urinary Elimination  Outcome: Progressing   Goal Outcome Evaluation:               Alert. Oriented, very forgetful. Disoriented to time. Easily redirectable.    Denied pain. L hip incision post surgery with WOC vac. Dressing intact.    Patient looked short of breath when writer came to assess patient. She was sitting up on recliner resting. That time her O2 was not in her nose. Her breathing looked deep and labored and reported feeling short of breath. Immediately placed her back on O2. Her breathing came easy and comfortable when recheck later. O2 sat 100% on 3 lpm nc. Weaned down O2 to 2 at rest. O2 at at 3 lpm nc during activity. Shortness of  breath observed with activity. Patient rested in between.      Weak, slow and unsteady. Assist of 1, gait belt, walker. Falls precaution and interventions maintained.    Patient got really anxious later tonight. PRN Atarax 25 mg po given with improvement. Daughter came tonight per patient request which helped a lot while waiting for the medication to kick in. She calmed down and sleeping comfortably in bed.

## 2023-03-23 NOTE — PROGRESS NOTES
"Orthopedic Progress Note      Assessment: 8 Days Post-Op  S/P Procedure(s):  CLOSED REDUCTION, HIP, WITH PERCUTANEOUS PINNING     Plan:   Activity: Up with assist and assistive device at all times.   Weight bearing status: WBAT LLE.  Pain management: Transition from IV to PO as tolerated.    Antibiotics: Ancef x 24 hours, complete.   Diet: Begin with clear fluids and progress diet as tolerated.   DVT prophylaxis: Resume PTA Xarelto and mechanical while in the hospital  Labs: Hgb 10.3, recheck daily.  Dressings: Prevena wound vac on the left hip incision with no drainage, monitor output.   Elevation: Elevate LLE on pillows to keep above the level of the heart as much as possible.   Physical Therapy/Occupational Therapy: Eval and treat, appreciate assistance and recommendations.  Consults: PT/OT, social work for disposition planning.  Follow-up with Dr. Mckeon in 2 weeks for incision check and repeat x-rays needed.   Disposition: Pending progress with therapies, pain control on orals, and medical stability, anticipate discharge to TCU.      Subjective:  Pain: mild  Nausea, Vomiting:  No  Lightheadedness, Dizziness:  No  Neuro:  Patient denies new onset numbness or paresthesias  Chest pain/ SOB: No  Fevers/Chills: No    Patient reports feeling well. Pain reports pain is well controlled on current regimen. Prevena wound vac placed today without issue. Doing well with therapy. All questions and concerns addressed.     Objective:  /66 (BP Location: Right arm)   Pulse 78   Temp 97.9  F (36.6  C) (Oral)   Resp 28   Ht 1.651 m (5' 5\")   Wt 69.6 kg (153 lb 7 oz)   SpO2 96%   BMI 25.53 kg/m    The patient is Alert and awake. Patient is laying in bed and appears comfortable.   Sensation is intact along lower extremity.  Dorsiflexion and plantar flexion is intact.  Dorsalis pedis pulse intact.  Calves are soft and non-tender. Some pitting edema around distal lower extremety and into foot.  Negative Virgen's.  The " incision is covered with the prevena wound vac dressing, well suctioned. No drainage. No surrounding erythema. Thigh soft and non tender.     Pertinent Labs   Lab Results: personally reviewed.   Lab Results   Component Value Date    INR 1.60 (H) 03/15/2023    INR 2.18 (H) 03/14/2023    INR 1.62 (H) 12/13/2022     Lab Results   Component Value Date    WBC 11.4 (H) 03/23/2023    HGB 10.3 (L) 03/23/2023    HCT 33.6 (L) 03/23/2023     03/23/2023     03/23/2023     Lab Results   Component Value Date     03/23/2023    CO2 29 03/23/2023       Zoe Denny PA-C on 3/23/2023 at 12:50 PM   [facility]

## 2023-03-23 NOTE — PROGRESS NOTES
Care Management Follow Up    Length of Stay (days): 9    Expected Discharge Date: 03/23/2023     Concerns to be Addressed:  medically ready for discharge  Patient plan of care discussed at interdisciplinary rounds: Yes    Anticipated Discharge Disposition:  TCU     Anticipated Discharge Services:   Pt/OT/TCU  Anticipated Discharge DME:  unknown    Patient/family educated on Medicare website which has current facility and service quality ratings:  yes  Education Provided on the Discharge Plan:  yes  Patient/Family in Agreement with the Plan:  yes    Referrals Placed by CM/SW:  TCU  Private pay costs discussed: transportation    Additional Information:   Patient had been at [facility] back in January. Pts daughters report that pt was actually in TCU until last week. They report ongoing TCU stays since last November. They report that she was most recently at [facility]. They report that she was discharged with home care through [facility] but only for nursing care for a wound. They report that pt needed therapy also and the home care nurse had reached out to pts PCP to get therapy orders which they believe the home care had received. They report that pt needed assistance with bathing but the home care does not have staffing to provide this. They report that pt lives with her  at baseline, but he is not able to provide any physical assistance.      Pt accepted at [facility].  Awaiting signoff from MD stating pt ids medically able to be discharged at this time.  CT has been completed and Provena wound vac is in place.      2:35 pm: Creatinine in AM  Cherelle Ramirez RN

## 2023-03-23 NOTE — PROGRESS NOTES
[facility]     Medicine Progress Note - Hospitalist Service     Date of Admission:  3/14/2023        Assessment & Plan        Fatuma Henry is a 77 year old female admitted on 3/14/2023. She has h/o recurrent C. difficile, paroxysmal A-fib, hypertension, hyperlipidemia, history of pulmonary hypertension, diastolic CHF, CKD-III, presyncope, left ICA occlusion, microcytic anemia, rheumatoid arthritis, gout, GERD, depression presented with left hip pain after falling at home.  CT pelvis showed acute impacted mildly displaced subcapital fracture of the left femur. She is s/p closed reduction with percutaneous pinning     Acute impacted left femoral fracture due to mechanical fall:   -CT pelvis showed acute impacted mildly displaced subcapital fracture of the left femur.  -POD  #8 s/p Closed reduction with percutaneous pinning.  -Prevena wound vac on the left hip incision with no drainage, monitor output  -Dr. Mckeon in 2 weeks for incision check and repeat x-rays needed  -pain control  -Up with assist and assistive device at all times.  -Elevate LLE on pillows to keep above the level of the heart as much as possible.  -discharge once TCU secured  -orthopedic surgery assistance appreciated     A-fib with RVR- resolved RVR.  -On metoprolol xl 100mg every day   -change to Cardizem CD 120mg every day tomorrow. Complete last 2 doses of short acting Diltiazem 30mg today.  -Xarelto  -Cardiology s/o  - follow up in clinic to consider possible left atrial appendage occlusion  -TSH normal  -TTE as below     HFpEF- clinically compensated.  -c/w PTA lasix 20mg   -monitor    Valvular heart disease:  TTE (3/14/23) showed LVEF 60%, mild aortic stenosis, mild aortic insufficiency, mild-mod MR, Right ventricular systolic pressure relative to right atrial pressure is mildly increased. The pulmonary artery pressure is estimated to be 45-50 mmHg plus right atrial pressure.     CKD stage III  -Avoid nephrotoxins  "  -Cr a.m.     Chronic hypoxic respiratory failure due to COPD: on home oxygen 2 L,at home sometimes on 3 liters. Mild exacerbation today. On 2L O2 PNC currently.  -start scheduled xopenex/atrovent nebs q6h today  -Continue Symbicort  -continue prednisone 5mg every day (onmainly for R.A.)  -I.S.     Essential HTN  -metoprolol, diltiazem     Left ICA occlusion--noted back in 12/22  -Asymptomatic  -Outpatient vascular surgery follow-up     Chronic macrocytic anemia: Hgb stable at 10.3  -Continue with PTA folic acid, B12    Leukocytosis: improved  - likely 2/2 post surgery.   - no fever, chill, fever. U/A: negative. CXR: no e/o pneumonia. Procal: 0.11.  -also note she is on chronic prednisone for RA      GERD  -c/w PTA famotidine 20 mg.     Depression: stable  -Continue with PTA Cymbalta     Pulmonary nodules  -Outpatient pulmonology     Chronic Gout  -Continue with PTA allopurinol, colchicine, prednisone  -Outpatient rheumatology follow-up     Hypomagnesemia  -Continue PTA Mag-Ox     H/O Recurrent C. difficile infection  -no diarrhea     Hx of RA  -on chronic prednisone and methotrexate--holding methotrexate for now     Abdominal pain due to constipation: CT A/P (3/22/23) showed Large amount of stool throughout the colon.  -needs to be compliant with bowel regimen ordered       Diet: Regular Diet Adult    DVT Prophylaxis: DOAC  Brandon Catheter: Not present  Lines: None     Cardiac Monitoring: stop today  Code Status: No CPR- Do NOT Intubate          Clinically Significant Risk Factors              # Hypercalcemia: Highest Ca = 10.2 mg/dL in last 2 days, will monitor as appropriate    # Hypoalbuminemia: Lowest albumin = 3.3 g/dL at 3/22/2023  4:23 AM, will monitor as appropriate           # Overweight: Estimated body mass index is 27.36 kg/m  as calculated from the following:    Height as of this encounter: 1.651 m (5' 5\").    Weight as of this encounter: 74.6 kg (164 lb 6.4 oz).                 Disposition Plan      " "  Expected Discharge Date: when TCU available  Destination: other (comment) (TCU)  Discharge Comments:  Orthopedics recommending Prevena wound vac-WOC ordered            S:  Afebrile. Events overnight noted    O:  /66 (BP Location: Right arm)   Pulse 78   Temp 97.9  F (36.6  C) (Oral)   Resp 28   Ht 1.651 m (5' 5\")   Wt 69.6 kg (153 lb 7 oz)   SpO2 95%   BMI 25.53 kg/m    Physical Examination:   General appearance - alert, and in no distress  Eyes - pupils equal and reactive, extraocular eye movements intact, sclera anicteric  Mouth - mucous membranes moist, pharynx normal without lesions  Lungs - diffusely decreased, some exp. Wheezes, no accessory muscle use  Heart - normal rate, regular rhythm, normal S1, S2, no murmurs, rubs, clicks or gallops. No peripheral edema.  Abdomen - soft, BS+  Neurological - alert, oriented  Skin - no rash. LLE wound vac in place    Lab/imaging reviewed  "

## 2023-03-24 NOTE — PROGRESS NOTES
[facility]     Medicine Progress Note - Hospitalist Service     Date of Admission:  3/14/2023        Assessment & Plan        Fatuma Henry is a 77 year old female admitted on 3/14/2023. She has h/o recurrent C. difficile, paroxysmal A-fib, hypertension, hyperlipidemia, history of pulmonary hypertension, diastolic CHF, CKD-III, presyncope, left ICA occlusion, microcytic anemia, rheumatoid arthritis, gout, GERD, depression presented with left hip pain after falling at home.  CT pelvis showed acute impacted mildly displaced subcapital fracture of the left femur. She is s/p closed reduction with percutaneous pinning     Acute impacted left femoral fracture due to mechanical fall:   -CT pelvis showed acute impacted mildly displaced subcapital fracture of the left femur.  -POD  #9 s/p Closed reduction with percutaneous pinning.  -Prevena wound vac on the left hip incision with no drainage, monitor output  -Dr. Mckeon in 2 weeks for incision check and repeat x-rays needed  -pain control  -Up with assist and assistive device at all times.  -Elevate LLE on pillows to keep above the level of the heart as much as possible.  -discharge once TCU secured  -orthopedic surgery assistance appreciated     A-fib with RVR- resolved RVR.  -On metoprolol xl 100mg every day   -Cardizem CD 120mg every day.   -Xarelto  -Cardiology s/o  - follow up in clinic to consider possible left atrial appendage occlusion  -TSH normal  -TTE as below     HFpEF- clinically compensated.  -c/w PTA lasix 20mg   -monitor    Valvular heart disease:  TTE (3/14/23) showed LVEF 60%, mild aortic stenosis, mild aortic insufficiency, mild-mod MR, Right ventricular systolic pressure relative to right atrial pressure is mildly increased. The pulmonary artery pressure is estimated to be 45-50 mmHg plus right atrial pressure.     CKD stage III  -Avoid nephrotoxins   -Cr stable    Acute toxic/metabolic encephalopathy: seems appropriate this  morning on exam. However, she did have mechanical fall PTA and on Xarelto. Initial CT head on admission negative for acute process.  Also possible UTI, meds, hypercarbia given current VBG today with pCO2 of 66 and sPO2 of 100% on 2-3L o2.  -repeat CT head  -avoid hypercarbia as ordered  -IV CTX for possible UTI until UC back  -minimize deleriogenic meds.     Chronic hypoxic/hypercarbic respiratory failure due to COPD: on home oxygen 2 L,at home sometimes on 3 liters. On  3L. VBG was checked today and pCO2 was 66mmhg with Ph corrected at 7.35.  Her sPO2 has been running 100% which can explain some of intermittent confusion noted per family.  -Goal sPO2 is 88-92%.  Patient is a chronic CO2 retainer and oxygen levels > 92% must be avoided  -continue scheduled xopenex/atrovent nebs q6h today  -Continue Symbicort  -continue prednisone 5mg every day (on mainly for R.A.). No current bronchospasm.  -I.S.     Essential HTN  -metoprolol, diltiazem, lasix     Left ICA occlusion--noted back in 12/22  -Asymptomatic  -Outpatient vascular surgery follow-up     Chronic macrocytic anemia: Hgb stable at 9-10 range  -Continue with PTA folic acid, B12    Leukocytosis: persistent. Her WBC has ranged between 14.4K-11.4K since admission on 3/14/23.  Afebrile. CXR 3/23 negative for infectious process. UA abnormal on 3/24 with UC in process.  - likely 2/2 post surgery.   - no fever, chill, fever. U/A: negative. CXR: no e/o pneumonia. Procal: 0.11.  -also note she is on chronic prednisone for RA   -start IV CTX for possible UTI and await UC    LLE edema:  -venous US r/o DVT as only on xarelto 15mg daily for stroke prevention.     GERD  -c/w PTA famotidine 20 mg.     Depression: stable  -Continue with PTA Cymbalta     Pulmonary nodules  -Outpatient pulmonology     Chronic Gout  -Continue with PTA allopurinol, colchicine, prednisone  -Outpatient rheumatology follow-up     Hypomagnesemia  -Continue PTA Mag-Ox     H/O Recurrent C. difficile  "infection  -no diarrhea  -start empiric PO vancomycin with initiation of IV CTX.     Hx of RA  -on chronic prednisone and methotrexate--holding methotrexate for now     Abdominal pain due to constipation: CT A/P (3/22/23) showed Large amount of stool throughout the colon.  -needs to be compliant with bowel regimen ordered       Diet: Regular Diet Adult    DVT Prophylaxis: DOAC  Brandon Catheter: Not present  Lines: None     Cardiac Monitoring: no  Code Status: No CPR- Do NOT Intubate          Clinically Significant Risk Factors              # Hypercalcemia: Highest Ca = 10.2 mg/dL in last 2 days, will monitor as appropriate    # Hypoalbuminemia: Lowest albumin = 3.3 g/dL at 3/22/2023  4:23 AM, will monitor as appropriate           # Overweight: Estimated body mass index is 27.36 kg/m  as calculated from the following:    Height as of this encounter: 1.651 m (5' 5\").    Weight as of this encounter: 74.6 kg (164 lb 6.4 oz).                 Disposition Plan        Expected Discharge Date: when TCU available  Destination: other (comment) (TCU)  Discharge Comments:  Orthopedics recommending Prevena wound vac-WOC ordered            S:  Afebrile. Events overnight noted    O:  /77   Pulse 95   Temp 97.9  F (36.6  C) (Oral)   Resp 20   Ht 1.651 m (5' 5\")   Wt 69.6 kg (153 lb 7 oz)   SpO2 100%   BMI 25.53 kg/m    Physical Examination:   General appearance - alert, and in no distress  Eyes - pupils equal and reactive, extraocular eye movements intact, sclera anicteric  Lungs - diffusely decreased, no Wheezes, no accessory muscle use  Heart - normal rate, regular rhythm, normal S1, S2, no murmurs, rubs, clicks or gallops. No peripheral edema.  Abdomen - soft, BS+  Neurological - alert, oriented x3, follows commands, noted chronic left hand contracture  Skin - no rash. LLE wound vac in place, 2+ LLE edema    Lab/imaging reviewed  "

## 2023-03-24 NOTE — PLAN OF CARE
Pt alert and oriented but very forgetful. Pt gets up often to void, only  ml each time. Pt is on 3 L. Pt gets SOB with movement. Pt is 1-2 assist. WYATT SEVERINO, RN    Problem: Plan of Care - These are the overarching goals to be used throughout the patient stay.    Goal: Plan of Care Review  Description: The Plan of Care Review/Shift note should be completed every shift.  The Outcome Evaluation is a brief statement about your assessment that the patient is improving, declining, or no change.  This information will be displayed automatically on your shift note.  Outcome: Progressing     Problem: Plan of Care - These are the overarching goals to be used throughout the patient stay.    Goal: Absence of Hospital-Acquired Illness or Injury  Intervention: Identify and Manage Fall Risk  Recent Flowsheet Documentation  Taken 3/24/2023 0345 by WYATT SEVERINO  Safety Promotion/Fall Prevention:   activity supervised   fall prevention program maintained   nonskid shoes/slippers when out of bed   patient and family education   safety round/check completed   supervised activity  Taken 3/24/2023 0000 by WYATT SEVERINO  Safety Promotion/Fall Prevention:   activity supervised   fall prevention program maintained   nonskid shoes/slippers when out of bed   patient and family education   safety round/check completed   supervised activity     Problem: Plan of Care - These are the overarching goals to be used throughout the patient stay.    Goal: Absence of Hospital-Acquired Illness or Injury  Intervention: Prevent Skin Injury  Recent Flowsheet Documentation  Taken 3/24/2023 0345 by WYATT SEVERINO  Body Position: position changed independently  Taken 3/24/2023 0000 by WYATT SEVERINO  Body Position: position changed independently     Problem: Plan of Care - These are the overarching goals to be used throughout the patient stay.    Goal: Optimal Comfort and Wellbeing  Outcome: Progressing     Problem: Hip Fracture  Surgical Repair  Goal: Optimal Pain Control and Function  Outcome: Progressing

## 2023-03-24 NOTE — PLAN OF CARE
Problem: COPD (Chronic Obstructive Pulmonary Disease) Comorbidity  Goal: Maintenance of COPD Symptom Control  Outcome: Progressing     Problem: Plan of Care - These are the overarching goals to be used throughout the patient stay.    Goal: Optimal Comfort and Wellbeing  Outcome: Progressing   Goal Outcome Evaluation:    Patient is alert and oriented, but disorientated to time. Requires re-orientation. Denied pain, but reported mild SOB with exertion and rest. Up in chair x2 with assist of 1 and a walker. Weaned down to 1L 02 this shift to maintain 02 levels of 88-92% per orders, tolerated well sating in the low-to-mid 90s. Wound vac reinforced with tegaderm due to a leak. UA positive for UTI, ceftriaxone ordered. Transfer orders in for P4, verbal report given via phone to CHIARA Echavarria.

## 2023-03-24 NOTE — PROGRESS NOTES
Care Management Follow Up    Length of Stay (days): 10    Expected Discharge Date: 03/24/2023     Concerns to be Addressed:  Not medically ready to discharge  Patient plan of care discussed at interdisciplinary rounds: Yes    Anticipated Discharge Disposition:  TCU     Anticipated Discharge Services:   PT/OT  Anticipated Discharge DME:  unknown    Patient/family educated on Medicare website which has current facility and service quality ratings:  yes  Education Provided on the Discharge Plan:  yes  Patient/Family in Agreement with the Plan:  yes    Referrals Placed by CM/SW:  TCU  Private pay costs discussed: yes    Additional Information:  Patient had been at [facility] back in January. Pts daughters report that pt was actually in TCU until last week. They report ongoing TCU stays since last November. They report that she was most recently at [facility]. They report that she was discharged with home care through [facility] but only for nursing care for a wound. They report that pt needed therapy also and the home care nurse had reached out to pts PCP to get therapy orders which they believe the home care had received. They report that pt needed assistance with bathing but the home care does not have staffing to provide this. They report that pt lives with her  at baseline, but he is not able to provide any physical assistance.     Awaiting pt to be medically ready to discharge and then pt will be going to [facility]. Possible discharge over the weekend if set up in advance today.  Care management will continue to follow .      Cherelle Ramirez RN

## 2023-03-24 NOTE — PROGRESS NOTES
Patient is on 2LNC, BS diminished, gave treatment x1, BS post treatment no change, patient perceives mild improvement, patient tolerated well.    jamila Turcios RRT

## 2023-03-24 NOTE — PROGRESS NOTES
"Orthopedic Progress Note      Assessment: 9 Days Post-Op  S/P Procedure(s):  CLOSED REDUCTION, HIP, WITH PERCUTANEOUS PINNING     Plan:   Activity: Up with assist and assistive device at all times.   Weight bearing status: WBAT LLE.  Pain management: Transition from IV to PO as tolerated.    Antibiotics: Ancef x 24 hours, complete.   Diet: Begin with clear fluids and progress diet as tolerated.   DVT prophylaxis: Resume PTA Xarelto and mechanical while in the hospital  Labs: Hgb 10.0, recheck daily.  Dressings: Prevena wound vac on the left hip incision with no drainage, monitor output. Prevena to stay in place for 1 week or until post op visit.   Elevation: Elevate LLE on pillows to keep above the level of the heart as much as possible.   Physical Therapy/Occupational Therapy: Eval and treat, appreciate assistance and recommendations.  Consults: PT/OT, social work for disposition planning.  Follow-up with Dr. Mckeon in 2 weeks for incision check and repeat x-rays needed.   Disposition: Pending progress with therapies, pain control on orals, and medical stability, anticipate discharge to TCU.  Ortho will sign off. Please feel free to reach out with any further questions or concerns.       Subjective:  Pain: mild  Nausea, Vomiting:  No  Lightheadedness, Dizziness:  No  Neuro:  Patient denies new onset numbness or paresthesias  Chest pain/ SOB: No  Fevers/Chills: No    Patient reports feeling well. Pain reports pain is well controlled on current regimen.  Doing well with therapy. All questions and concerns addressed.     Objective:  /77   Pulse 95   Temp 97.9  F (36.6  C) (Oral)   Resp 20   Ht 1.651 m (5' 5\")   Wt 69.6 kg (153 lb 7 oz)   SpO2 100%   BMI 25.53 kg/m    The patient is Alert and awake. Patient is lay and appears comfortable.   Sensation is intact along lower extremity.  Dorsiflexion and plantar flexion is intact.  Dorsalis pedis pulse intact.  Calves are soft and non-tender. Some pitting " edema around distal lower extremety and into foot.  Negative Virgen's.  The incision is covered with the prevena wound vac dressing, well suctioned. No drainage. No surrounding erythema. Thigh soft and non tender.     Pertinent Labs   Lab Results: personally reviewed.   Lab Results   Component Value Date    INR 1.60 (H) 03/15/2023    INR 2.18 (H) 03/14/2023    INR 1.62 (H) 12/13/2022     Lab Results   Component Value Date    WBC 13.6 (H) 03/24/2023    HGB 10.0 (L) 03/24/2023    HCT 32.8 (L) 03/24/2023    MCV 99 03/24/2023     03/24/2023     Lab Results   Component Value Date     03/23/2023    CO2 29 03/23/2023       Zoe Denny PA-C on 3/24/2023 at 12:12 PM   [facility]

## 2023-03-24 NOTE — PROGRESS NOTES
SPIRITUAL HEALTH SERVICES (SHS)  SPIRITUAL ASSESSMENT Progress Note  [facility]. Unit P3    REFERRAL SOURCE: SIMONE Saldaña recounts that she's been in hospitals and TCUs since September 2022. Her daughter Enid lives in the same building as she and her   do.  Piotr is hoping to be discharged.      PLAN: [facility]S will follow as able.      Korina Chavarria, Ph.D., [facility]      SHS available 24/7 for emergency requests/referrals, either by having the on-call  paged or by entering an ASAP/STAT consult in Epic (this will also page the on-call ).

## 2023-03-25 NOTE — PROGRESS NOTES
"O2 at 3 lpm/NC, unable to obtain SpO2 reading. BS diminished bilat, Xopenex/Atrovent tx given/mask. Tolerated well. BS after diminished bilat, no change.    /63 (BP Location: Right arm)   Pulse 93   Temp 98.3  F (36.8  C) (Oral)   Resp 18   Ht 1.651 m (5' 5\")   Wt 69.6 kg (153 lb 7 oz)   SpO2 100%   BMI 25.53 kg/m        RT to follow.   "

## 2023-03-25 NOTE — SIGNIFICANT EVENT
Significant Event Note    Time of event: 7:55 PM March 24, 2023    Description of event:  Notified of elevated lactic acid to 2.5. Unclear why protocol fired.  Patient is here with left femur fx s/p closed reduction w/ pinning.    She is curerntly on ceftriaxone for a presumed UTI and po vancomycin for c. Diff. She has had a leukocytosis throughout admission that has been worked up in the past day for an infectious process including CXR, procal, and UA (UC pending). CT abd/pelvis completed on 3/22 only with findings of constipation. WBC may be elevated in the setting of recent surgery and steroids. No new fevers, new infectious symptoms. BP normal; HR normal; she is on 3L oxygen and has been on 1-3L O2 this admission.    On exam, patient is pleasant and in no distress  mentation normal  HR: irregularly irregular rhythmn, intermittently will spike to 150's but is mostly in 80-90's  Lungs with decreased sounds towards the bases but otherwise clear    Plan:  No intervention for HR now as she mostly stays in 80-90's and is asymptomatic    EKG- confirmed atrial fib  CXR- no acute changes    Patient is not septic  no indication to broaden antibiotics at this point  Will continue to monitor closely    Discussed with: bedside nurse    Charmaine Armenta MD

## 2023-03-25 NOTE — PLAN OF CARE
Assisted fall during PT session.     Patient attempted to come to stand from edge of bed, nearly in standing with knees then buckling. Patient fell to her right, PT was positioned on her left. PT assisted fall but unable to prevent. Patient denied hitting her head, denied hitting Left hip, denied sharp pain. RN staff in to assist PT following fall.

## 2023-03-25 NOTE — PROGRESS NOTES
[facility]     Medicine Progress Note - Hospitalist Service     Date of Admission:  3/14/2023        Assessment & Plan        Fatuma Henry is a 77 year old female admitted on 3/14/2023. She has h/o recurrent C. difficile, paroxysmal A-fib, hypertension, hyperlipidemia, history of pulmonary hypertension, diastolic CHF, CKD-III, presyncope, left ICA occlusion, microcytic anemia, rheumatoid arthritis, gout, GERD, depression presented with left hip pain after falling at home.  CT pelvis showed acute impacted mildly displaced subcapital fracture of the left femur. She is s/p closed reduction with percutaneous pinning     Acute impacted left femoral fracture due to mechanical fall:   -CT pelvis showed acute impacted mildly displaced subcapital fracture of the left femur.  -POD  #10 s/p Closed reduction with percutaneous pinning.  -Prevena wound vac on the left hip incision with no drainage, monitor output  -Dr. Mckeon in 2 weeks for incision check and repeat x-rays needed  -pain control  -Up with assist and assistive device at all times.  -Elevate LLE on pillows to keep above the level of the heart as much as possible.  -orthopedic surgery assistance appreciated  -discharge once TCU secured     A-fib with RVR- resolved RVR.  -On metoprolol xl 100mg every day   -Cardizem CD 120mg every day.   -Xarelto  -Cardiology s/o  - follow up in clinic to consider possible left atrial appendage occlusion  -TSH normal  -TTE as below  -tele     HFpEF- clinically compensated.  -hold lasix 20mg   -nt-BNP    Valvular heart disease:  TTE (3/14/23) showed LVEF 60%, mild aortic stenosis, mild aortic insufficiency, mild-mod MR, Right ventricular systolic pressure relative to right atrial pressure is mildly increased. The pulmonary artery pressure is estimated to be 45-50 mmHg plus right atrial pressure.     YESICA on CKD stage III: Cr up slightly at 1.3 today.  -Avoid nephrotoxins   -CMP a.m.  -hold lasix (already  received dose this morning so hold now)  -strict I/O and daily weights ordered    Acute toxic/metabolic encephalopathy: resolved but at high risk for hospital acquired delirium.  CT head x 2 (3/14/23 & 3/24/23) negative for acute process.  Possible UTI, meds, hypercarbia given VBG showing a pCO2 of 66, and metabolic with CKD stage II, mild hypercalcemia.  -avoid worsening of hypercarbia by avoiding hyperoxia  -IV CTX for possible UTI until UC resulted  -minimize deleriogenic meds.     Chronic hypoxic/hypercarbic respiratory failure and chronic respiratory acidosis due to COPD: home oxygen 2-3 liters chronically. On  3L. VBG was checked today and pCO2 was 66mmhg with Ph corrected at 7.35.    -Goal sPO2 is 88-92%.  Patient is a chronic CO2 retainer and oxygen levels > 92% must be avoided  -xopenex/atrovent nebs q4h prn  -Continue Symbicort  -continue prednisone 5mg every day (on mainly for R.A.). No current bronchospasm.  -I.S.     Essential HTN  -metoprolol, diltiazem, hold lasix today due to mild YESICA     Left ICA occlusion--noted back in 12/22  -Asymptomatic  -Outpatient vascular surgery follow-up  -xarelto, statin     Chronic macrocytic anemia: Hgb stable at 9-10 range  -Continue with PTA folic acid, B12    Leukocytosis: persistent but improved. Her WBC has ranged between 14.4K-11.4K since admission on 3/14/23.  WBC today 11.8 from 13.6 (3/24). Afebrile. CXR 3/24 negative for infectious process. UA abnormal on 3/24 with UC pending.  Surgical site with vac and no erythema.  Procalcitonin 0.08 on repeat 3/24.  -on chronic prednisone for RA   -continue IV CTX for possible UTI and await UC results    LLE edema:  -venous US negative for  DVT on 3/24/23.    Left knee effusion: no e/o septic/inflammatory arthropathy  -orthopedic surgery following.    Hypercalcemia: mild. 10.4 on 3/23/23 at an albumin of 3.3.  Chronic on review of past labs dating back to 2018.   -hold vitamin D  -check ionized calcium, phosphorus, vit. D  "level, iPTH, SPEP, UPEP.     GERD  -c/w PTA famotidine 20 mg.     Depression: stable  -Continue with PTA Cymbalta     Pulmonary nodules  -Outpatient pulmonology     Chronic Gout  -Continue with PTA allopurinol, colchicine, prednisone  -Outpatient rheumatology follow-up     Hypomagnesemia  -Continue PTA Mag-Ox     H/O Recurrent C. difficile infection  -no diarrhea  -continue empiric PO vancomycin 125mg BID while on antibiotics     Hx of RA  -on chronic prednisone and methotrexate--holding methotrexate. Resume on discharge.     Abdominal pain due to constipation: CT A/P (3/22/23) showed Large amount of stool throughout the colon.  2 BM's 3/23.  -continue bowel regimen ordered       Diet: Regular Diet Adult    DVT Prophylaxis: DOAC  Brandon Catheter: Not present  Lines: None     Cardiac Monitoring: yes  Code Status: No CPR- Do NOT Intubate          Clinically Significant Risk Factors              # Hypercalcemia: Highest Ca = 10.2 mg/dL in last 2 days, will monitor as appropriate    # Hypoalbuminemia: Lowest albumin = 3.3 g/dL at 3/22/2023  4:23 AM, will monitor as appropriate           # Overweight: Estimated body mass index is 27.36 kg/m  as calculated from the following:    Height as of this encounter: 1.651 m (5' 5\").    Weight as of this encounter: 74.6 kg (164 lb 6.4 oz).                 Disposition Plan        Expected Discharge Date: when TCU available  Destination: other (comment) (TCU)     Addendum: repeat evaluation due to apparent assisted fall. No LOC. + left knee pain, not much worsening of left hip pain.  Will obtain left hip/pelvis, knee, tib-fib xrays.      S:  Afebrile. Events overnight noted    O:  /56 (BP Location: Right arm, Patient Position: Semi-Sy's)   Pulse 80   Temp 98.8  F (37.1  C) (Oral)   Resp 18   Ht 1.651 m (5' 5\")   Wt 69.6 kg (153 lb 7 oz)   SpO2 99%   BMI 25.53 kg/m    Physical Examination:   General appearance - alert, and in no distress  Eyes - pupils equal and " reactive, sclera anicteric  Lungs - diffusely decreased, no Wheezes, no accessory muscle use  Heart - irreg, irreg, normal rate.   Abdomen - soft, BS+  Neurological - alert, oriented x3, follows commands, chronic left hand contracture  Skin - no rash. LLE wound vac in place, 1+ LLE edema    Lab/imaging reviewed

## 2023-03-25 NOTE — PLAN OF CARE
"  Problem: Plan of Care - These are the overarching goals to be used throughout the patient stay.    Goal: Plan of Care Review  Description: The Plan of Care Review/Shift note should be completed every shift.  The Outcome Evaluation is a brief statement about your assessment that the patient is improving, declining, or no change.  This information will be displayed automatically on your shift note.  Outcome: Progressing  Goal: Patient-Specific Goal (Individualized)  Description: You can add care plan individualizations to a care plan. Examples of Individualization might be:  \"Parent requests to be called daily at 9am for status\", \"I have a hard time hearing out of my right ear\", or \"Do not touch me to wake me up as it startles me\".  Outcome: Progressing  Goal: Absence of Hospital-Acquired Illness or Injury  Outcome: Progressing  Intervention: Identify and Manage Fall Risk  Recent Flowsheet Documentation  Taken 3/24/2023 1800 by Italia Worley RN  Safety Promotion/Fall Prevention:    activity supervised    assistive device/personal items within reach    chair alarm on    bed alarm on    check orthostatic blood pressure    clutter free environment maintained    fall prevention program maintained    mobility aid in reach    nonskid shoes/slippers when out of bed    patient and family education    room near nurse's station    safety round/check completed    supervised activity  Intervention: Prevent Skin Injury  Recent Flowsheet Documentation  Taken 3/24/2023 1800 by Italia Worley RN  Body Position: position changed independently  Taken 3/24/2023 1720 by Italia Worley RN  Body Position: position changed independently  Intervention: Prevent and Manage VTE (Venous Thromboembolism) Risk  Recent Flowsheet Documentation  Taken 3/24/2023 1800 by Italia Worley RN  VTE Prevention/Management: SCDs (sequential compression devices) on  Goal: Optimal Comfort and Wellbeing  Outcome: Progressing  Intervention: Provide " Person-Centered Care  Recent Flowsheet Documentation  Taken 3/24/2023 1800 by Italia Worley RN  Trust Relationship/Rapport: care explained  Goal: Readiness for Transition of Care  Outcome: Progressing   Goal Outcome Evaluation: Patient alert, disoriented to time and forgetful. Patient daughter at bedside. Denies pain. Oxygen 89-98% on 3L nasal cannula. Pulse oximetry in place. HR and lactic acid elevated. House Officer notified. Order for EKG and XR chest port 1 view.  EkG shows A-Fib. Notify House Officer, DR. Armenta if patient needs for oxygen increases and HR elevation. UA positive for UTI Ceftriaxone ordered on P3 before transfer to P4. Patient also on vancomycin oral.  Wound VAC in place. Up in chair for dinner. Transfer with walker and gait belt. Adequate intake and output. Bilateral lower extremity edema. Reports shortness of breath, not new per patient.

## 2023-03-25 NOTE — PLAN OF CARE
Problem: Plan of Care - These are the overarching goals to be used throughout the patient stay.    Goal: Optimal Comfort and Wellbeing  Outcome: Progressing  Intervention: Provide Person-Centered Care  Recent Flowsheet Documentation  Taken 3/25/2023 0047 by Mary Lau, RN  Trust Relationship/Rapport: care explained   Goal Outcome Evaluation:    Patient is alert and oriented, with some confusion. Patient is A1 with walker. Patients O2 was decreased from 2L d/t sat being over 92%- as order stated and patient is currently on 1 LPM. Patient denied having any pain. PIV in L arm removed d/t infiltrating. Picc was paged d/t picc starting previous IV. Wound vac is intact.

## 2023-03-25 NOTE — PLAN OF CARE
Problem: Plan of Care - These are the overarching goals to be used throughout the patient stay.    Goal: Optimal Comfort and Wellbeing  Outcome: Progressing     Problem: Hip Fracture Surgical Repair  Goal: Optimal Coping with Change in Health Status  Outcome: Progressing     Problem: Dysrhythmia  Goal: Normalized Cardiac Rhythm  Outcome: Progressing     Problem: Fall Injury Risk  Goal: Absence of Fall and Fall-Related Injury  Outcome: Progressing  Intervention: Promote Injury-Free Environment  Recent Flowsheet Documentation  Taken 3/25/2023 0805 by Italia King RN  Safety Promotion/Fall Prevention:   activity supervised   assistive device/personal items within reach   bed alarm on   chair alarm on   clutter free environment maintained   nonskid shoes/slippers when out of bed   Pt place on tele monitoring, running a fib.   HR up to 120s.   O2 saturations running from 87 to 100% on 2 liters of O2.  Tried to titrated to 1 liter and pt will desat to as low as 84%.      Pt had fall with therapy today.  See note for details.  About an hour after fall pt c/o pain in her operative (left) hip.  MD notified and xrays ordered.

## 2023-03-26 NOTE — PLAN OF CARE
"  Problem: Plan of Care - These are the overarching goals to be used throughout the patient stay.    Goal: Plan of Care Review  Description: The Plan of Care Review/Shift note should be completed every shift.  The Outcome Evaluation is a brief statement about your assessment that the patient is improving, declining, or no change.  This information will be displayed automatically on your shift note.  Outcome: Progressing  Goal: Patient-Specific Goal (Individualized)  Description: You can add care plan individualizations to a care plan. Examples of Individualization might be:  \"Parent requests to be called daily at 9am for status\", \"I have a hard time hearing out of my right ear\", or \"Do not touch me to wake me up as it startles me\".  Outcome: Progressing  Goal: Absence of Hospital-Acquired Illness or Injury  Outcome: Progressing  Intervention: Prevent Skin Injury  Recent Flowsheet Documentation  Taken 3/25/2023 1540 by Italia Worley RN  Body Position: position changed independently  Intervention: Prevent and Manage VTE (Venous Thromboembolism) Risk  Recent Flowsheet Documentation  Taken 3/25/2023 1540 by Italia Worley RN  VTE Prevention/Management: SCDs (sequential compression devices) on  Goal: Optimal Comfort and Wellbeing  Outcome: Progressing  Intervention: Provide Person-Centered Care  Recent Flowsheet Documentation  Taken 3/25/2023 1540 by Italia Worley RN  Trust Relationship/Rapport: care explained  Goal: Readiness for Transition of Care  Outcome: Progressing   Goal Outcome Evaluation: Patient alert and calm. C/o right hip pain and rated pain 3/10 on movement. Declines pain medication. Oxygen 88-97% on 2L nasal cannula. Pulse oximetry in place.  Wound VAC in place to left hip. Peripheral IV saline locked. Adequate intake and output. Atarax given 2150 for anxiety per patient requests. On telemetry with A-Fib. 1 small bm this shift.                        "

## 2023-03-26 NOTE — PLAN OF CARE
Problem: Plan of Care - These are the overarching goals to be used throughout the patient stay.    Goal: Optimal Comfort and Wellbeing  Outcome: Progressing  Intervention: Monitor Pain and Promote Comfort  Recent Flowsheet Documentation  Taken 3/26/2023 0100 by Mary Lau RN  Pain Management Interventions: repositioned  Intervention: Provide Person-Centered Care  Recent Flowsheet Documentation  Taken 3/26/2023 0100 by Mary Lau RN  Trust Relationship/Rapport:    care explained    questions answered   Goal Outcome Evaluation:    Pt is alert, has some forgetfulness. Patient is A1 with walker. Patient reporting SOB tonight. pt restless and sat up in chair for a little while. Patient reported 5/10 pain to L femur- repositioned pt. Pt denied need for pain medication. Pt reported heart burn, order obtained from [facility] for Tums- pt reported it was a little bit effective.

## 2023-03-26 NOTE — PLAN OF CARE
Problem: Hip Fracture Surgical Repair  Goal: Absence of Infection Signs and Symptoms  Outcome: Progressing  Goal: Optimal Functional Ability  Intervention: Promote Optimal Functional Status  Recent Flowsheet Documentation  Taken 3/26/2023 0838 by Marisol Miller, RN  Activity Management: up to bedside commode  Goal: Anesthesia/Sedation Recovery  Intervention: Optimize Anesthesia Recovery  Recent Flowsheet Documentation  Taken 3/26/2023 0838 by Marisol Miller, RN  Safety Promotion/Fall Prevention:   activity supervised   assistive device/personal items within reach   bed alarm on   chair alarm on   clutter free environment maintained   fall prevention program maintained   nonskid shoes/slippers when out of bed   safety round/check completed  Goal: Optimal Pain Control and Function  Intervention: Prevent or Manage Pain  Recent Flowsheet Documentation  Taken 3/26/2023 0728 by Marisol Miller, RN  Pain Management Interventions: medication (see MAR)   Goal Outcome Evaluation:         Wound vac dressing CDI with good seal. Positive CMS. No drainage noted in canister. Started on IVF at 75mL/hr. BSC for toileting with A1-2 with walker and belt. A fib, HR 90s-101. PIV patent/intact. Continues on 2L NC.

## 2023-03-26 NOTE — PROGRESS NOTES
Care Management Follow Up    Length of Stay (days): 12    Expected Discharge Date: 03/27/2023     Concerns to be Addressed: discharge planning, care progression, TCU placement     Patient plan of care discussed at interdisciplinary rounds: Yes    Anticipated Discharge Disposition: Transitional Care (TCU)     Anticipated Discharge Services: Other (see comment) (therapy services) Rehab at TCU  Anticipated Discharge DME: Per Therapy    Patient/family educated on Medicare website which has current facility and service quality ratings: yes  Education Provided on the Discharge Plan:  Yes, Enid beckett  Patient/Family in Agreement with the Plan: yes    Referrals Placed by CM/SW: Post Acute Facilities  Private pay costs discussed: not at this time    Additional Information:  Chart reviewed and plan of care discussed with hospitalist.  Plan discharge to TCU and pt is medically ready for discharge.     Pt has been accepted at [facility] TCU, with prior authorization from insurance submitted on 3/22/2023 and is pending.      Call placed to [facility] TCU and was informed that there are no admission staff on this weekend and there is no manager/charge RN on duty today.     Call placed to daughterEnid, as primary family contact.  She verbalizes agreement with TCU placement at [facility].  She states she would like to talk with hospitalist for update.  Hospitalist paged.  She states she is still agreeable with medical w/c transport and said this had been arranged last week, but then pt wasn't ready to leave yet.  Explained need to find out status of prior authorization and Care Manager will f/u with TCU in the morning.    She states pt has a f/u visit on Tuesday from previous discharge from [facility]. She cannot recall who appt is with, but feels it is with Ortho.  She wants to see if that provider can come to the hospital to see pt before she discharges because daughter does not want to have to have pt  transported to TCU and then have to be transported again to an appointment.  Chart reviewed and pt is supposed to f/u with Dr. Marin from [facility] for a 2 week incision check and repeat x-rays, possibly remove Prevena wound vac.   Call placed to [facility], [phone], but office not open today (Sunday).  Care Manager to follow-up tomorrow.    [facility] and scheduled w/c transport at 1500 in anticipation of pt's readiness for discharge.       PAS has been previously completed.    Teena Yadav RN

## 2023-03-26 NOTE — PROGRESS NOTES
[facility]     Medicine Progress Note - Hospitalist Service     Date of Admission:  3/14/2023        Assessment & Plan        Fatuma Henry is a 77 year old female admitted on 3/14/2023. She has h/o recurrent C. difficile, paroxysmal A-fib, hypertension, hyperlipidemia, history of pulmonary hypertension, diastolic CHF, CKD-III, presyncope, left ICA occlusion, microcytic anemia, rheumatoid arthritis, gout, GERD, depression presented with left hip pain after falling at home.  CT pelvis showed acute impacted mildly displaced subcapital fracture of the left femur. She is s/p closed reduction with percutaneous pinning     Acute impacted left femoral fracture due to mechanical fall:   -CT pelvis showed acute impacted mildly displaced subcapital fracture of the left femur.  -POD  #11 s/p Closed reduction with percutaneous pinning.  -Prevena wound vac on the left hip incision with no drainage, monitor output  -Dr. Mckeon in 2 weeks for incision check and repeat x-rays needed  -pain control  -Up with assist and assistive device at all times.  -Elevate LLE on pillows to keep above the level of the heart as much as possible.  -orthopedic surgery assistance appreciated  -discharge once TCU secured.  Plan for 3/27 discharge in a.m.     A-fib with RVR- resolved RVR.  -On metoprolol xl 100mg every day   -Cardizem CD 120mg every day.   -Xarelto  -Cardiology s/o  - follow up in clinic to consider possible left atrial appendage occlusion  -TSH normal  -TTE as below  -tele     HFpEF- clinically compensated.  -hold lasix 20mg     Valvular heart disease:  TTE (3/14/23) showed LVEF 60%, mild aortic stenosis, mild aortic insufficiency, mild-mod MR, Right ventricular systolic pressure relative to right atrial pressure is mildly increased. The pulmonary artery pressure is estimated to be 45-50 mmHg plus right atrial pressure.     YESICA on CKD stage III: Cr up to 1.37.   -NS 75ml/h x 4h  -Avoid nephrotoxins   -BMP  a.m.  -hold lasix  -strict I/O and daily weights ordered    Acute toxic/metabolic encephalopathy: resolved but at high risk for hospital acquired delirium.  CT head x 2 (3/14/23 & 3/24/23) negative for acute process.  Possible UTI, meds, hypercarbia given VBG showing a pCO2 of 66, and metabolic with CKD stage II, mild hypercalcemia.  -avoid worsening of hypercarbia by avoiding hyperoxia  -minimize deleriogenic meds.     Chronic hypoxic/hypercarbic respiratory failure and chronic respiratory acidosis due to COPD: home oxygen 2-3 liters chronically. On  3L. VBG was checked today and pCO2 was 66mmhg with Ph corrected at 7.35.    -Goal sPO2 is 88-92%.  Patient is a chronic CO2 retainer and oxygen levels > 92% must be avoided  -xopenex/atrovent nebs q4h prn  -Continue Symbicort  -continue prednisone 5mg every day (on mainly for R.A.). No current bronchospasm.  -I.S.     Essential HTN  -metoprolol, diltiazem, hold lasix     Left ICA occlusion--noted back in 12/22  -Asymptomatic  -Outpatient vascular surgery follow-up  -xarelto, statin     Chronic macrocytic anemia: Hgb stable at 9-10 range  -Continue with PTA folic acid, B12    Leukocytosis: Her WBC has ranged between 14.4K-11.4K since admission on 3/14/23.  Afebrile. CXR 3/24 negative for infectious process. UA abnormal on 3/24 with UC negative so IV CTX stopped.  Surgical site with vac and no erythema.  Procalcitonin 0.08 on repeat 3/24.  -likely reactive as no e/o infectious process currently  -stop abx    LLE edema:  -venous US negative for  DVT on 3/24/23.    Left knee effusion: no e/o septic/inflammatory arthropathy  -orthopedic surgery following.    Hypercalcemia due to probable primary hyperparathyroidism: mild. 10.4 on 3/23/23 at an albumin of 3.3.  Chronically elevated on review of past labs dating back to 2018.  iPTH 67 which improved previous level.  -hold vitamin D  -vit. D level, SPEP, UPEP pending  -outpt f/u     GERD  -increase famotidine 20 mg to  "BID  -PPI x 1 dose. Would avoid chronic use given recurrent c. Diff history.     Depression: stable  -Continue with PTA Cymbalta     Pulmonary nodules  -Outpatient pulmonology     Chronic Gout  -Continue with PTA allopurinol, colchicine, prednisone  -Outpatient rheumatology follow-up     Hypomagnesemia  -Continue PTA Mag-Ox     H/O Recurrent C. difficile infection  -no diarrhea  -stop vancomycin with discontinuation of abx     Hx of RA  -on chronic prednisone and methotrexate--holding methotrexate. Resume on discharge.     Abdominal pain due to constipation: CT A/P (3/22/23) showed Large amount of stool throughout the colon.  +BM.  -continue bowel regimen ordered    Fall: 3/25 in a.m.  No LOC. Mechanical. Xrays of left hip/pelvis, knee, tib-fib w/o acute fx/dislocation and hip hardware intact.  -fall precautions       Diet: Regular Diet    DVT Prophylaxis: DOAC  Brandon Catheter: Not present  Lines: None     Cardiac Monitoring: yes  Code Status: No CPR- Do NOT Intubate          Clinically Significant Risk Factors              # Hypercalcemia: Highest Ca = 10.2 mg/dL in last 2 days, will monitor as appropriate    # Hypoalbuminemia: Lowest albumin = 3.3 g/dL at 3/22/2023  4:23 AM, will monitor as appropriate           # Overweight: Estimated body mass index is 27.36 kg/m  as calculated from the following:    Height as of this encounter: 1.651 m (5' 5\").    Weight as of this encounter: 74.6 kg (164 lb 6.4 oz).                 Disposition Plan        Expected Discharge Date: when TCU available. Planning 3/27.  Destination: other (comment) (TCU)      S:  Afebrile. No acute events overnight noted    O:  /61 (BP Location: Right arm)   Pulse 101   Temp 98.6  F (37  C) (Oral)   Resp 18   Ht 1.651 m (5' 5\")   Wt 69.6 kg (153 lb 7 oz)   SpO2 90%   BMI 25.53 kg/m    Physical Examination:   General appearance - alert, and in no distress  Eyes - pupils equal and reactive, sclera anicteric  Lungs - diffusely decreased, " no Wheezes, no accessory muscle use  Heart - irreg, irreg, normal rate.   Abdomen - soft, BS+  Neurological - alert, oriented x3, follows commands, chronic left hand contracture  Skin - no rash. LLE wound vac in place, 1+ LLE edema    Lab/imaging reviewed

## 2023-03-27 NOTE — DISCHARGE SUMMARY
[facility] MEDICINE  DISCHARGE SUMMARY     Primary Care Physician: Tory Wise  Admission Date: 3/14/2023   Discharge Provider: El Juarez DO, DO Discharge Date: 3/27/2023   Diet:   Active Diet and Nourishment Order   Procedures     Room Service     Advance Diet as Tolerated: Regular Diet Adult     Discharge Instruction - Regular Diet Adult     Diet       Code Status: No CPR- Do NOT Intubate   Activity: DCACTIVITY: Activity as tolerated and per orthopedic surgery discharge orders        Condition at Discharge: Stable     REASON FOR PRESENTATION(See Admission Note for Details)   Refer to h&p    PRINCIPAL & ACTIVE DISCHARGE DIAGNOSES     Principal Problem:    Oxygen dependent  Active Problems:    Chronic anticoagulation    Fracture of femur (H)    Atrial fibrillation with RVR (H)    Fall, initial encounter    Abnormal CT of spine      PENDING LABS     Unresulted Labs Ordered in the Past 30 Days of this Admission     Date and Time Order Name Status Description    3/25/2023 11:24 AM Protein Electrophoresis, Serum In process     3/25/2023 11:21 AM 25 Hydroxyvitamin D2 and D3 In process     3/25/2023 11:21 AM Protein electrophoresis random urine In process             PROCEDURES ( this hospitalization only)      Procedure(s):  CLOSED REDUCTION, HIP, WITH PERCUTANEOUS PINNING    RECOMMENDATIONS TO OUTPATIENT PROVIDER FOR F/U VISIT     Follow-up Appointments     Follow Up Care      Please follow-up with Dr. Mckeon's team in 2 weeks at [facility]. Call our scheduling line at [phone] to make an   appointment if you do not already have one scheduled.         Follow Up and recommended labs and tests      Follow up with Nursing home physician.  No follow up labs or test are   needed.                 DISPOSITION     Skilled Nursing Facility    SUMMARY OF HOSPITAL COURSE:    Fatuma Henry is a 77 year old female admitted on 3/14/2023. She has h/o  recurrent C. difficile, paroxysmal A-fib, hypertension, hyperlipidemia, history of pulmonary hypertension, diastolic CHF, CKD-III, presyncope, left ICA occlusion, microcytic anemia, rheumatoid arthritis, gout, GERD, depression presented with left hip pain after falling at home.  CT pelvis showed acute impacted mildly displaced subcapital fracture of the left femur. She is s/p closed reduction with percutaneous pinning.     Acute impacted left femoral fracture due to mechanical fall:   -CT pelvis showed acute impacted mildly displaced subcapital fracture of the left femur.  -POD  #11 s/p Closed reduction with percutaneous pinning.  -Prevena wound vac on the left hip incision   -F/U Dr. Mckeon in 2 weeks for incision check and repeat x-rays as scheduled.  -pain control  -Up with assist and assistive device at all times.  -Elevate LLE on pillows to keep above the level of the heart as much as possible.  -discharge to TCU today     A-fib with RVR- resolved RVR.  -Metoprolol xl 75mg every day   -Cardizem CD 120mg every day.   -Xarelto  -TTE as below  -follow up in clinic to consider possible left atrial appendage occlusion     HFpEF- clinically compensated.  -changed lasix 20mg daily to prn on discharge.  Recommend giving a 1 time dose prn if weight gain >2lbs per 24hrs  -outpatient monitoring with cardiology follow-up recommended.     Valvular heart disease:  TTE (3/14/23) showed LVEF 60%, mild aortic stenosis, mild aortic insufficiency, mild-mod MR, Right ventricular systolic pressure relative to right atrial pressure is mildly increased. The pulmonary artery pressure is estimated to be 45-50 mmHg plus right atrial pressure.  -outpatient monitoring with cardiology follow-up recommended.     YESICA on CKD stage III: YESICA resolved with gentle IVF.  Creatinine 1.14 on discharge.  -Avoid nephrotoxins   -stopped lasix 20 mg qd  -strict I/O and daily weights advised and would use Lasix prn if > 2lbs weight  gain/24hrs.     Acute toxic/metabolic encephalopathy: resolved. High risk for hospital/facilityacquired delirium.  CT head x 2 (3/14/23 & 3/24/23) negative for acute process.  Possible UTI, meds, hypercarbia given VBG showing a pCO2 of 66, and metabolic with CKD stage II, mild hypercalcemia.  -avoid worsening of hypercarbia by avoiding hyperoxia  -minimize deleriogenic meds.     Chronic hypoxic/hypercarbic respiratory failure and chronic respiratory acidosis due to COPD: On discharge, no oxygen at rest during the day, but likely will require O2 with activiity and at night.  VBG showed a pCO2 of 66mmhg with Ph corrected at 7.35.    -Goal sPO2 is 88-92%.  Patient is a chronic CO2 retainer and oxygen levels > 92% must be avoided.  -duo nebs prn  -Symbicort  -continue prednisone 5mg every day (on mainly for R.A.).  -I.S.     Essential HTN  -metoprolol, diltiazem, discontinued lasix as discussed below     Left ICA occlusion--noted back in 12/22  -Asymptomatic  -Outpatient vascular surgery follow-up recommended  -xarelto, statin     Chronic macrocytic anemia: Hgb stable at 9-10 range  -Continue with PTA folic acid, B12     Leukocytosis: persistent but improved. Her WBC has ranged between 14.4K-11.4K since admission on 3/14/23.  WBC 11.8 from 13.6 (3/24).  Afebrile. CXR 3/24 negative for infectious process. UA abnormal on 3/24 with UC negative.  Surgical site with vac and no erythema.  Procalcitonin 0.08 on repeat 3/24.     LLE edema:  -venous US negative for  DVT on 3/24/23.  -recommend lower extremity compression/ADRIANNA's stockings at TCU.     Left knee effusion: no e/o septic/inflammatory arthropathy     Chronic Hypercalcemia: probable primary hyperparathyroidism. Mild elevation of serum calcium at 10.4 on 3/23/23 at an albumin of 3.3.  Chronic hypercalcemia on review of past labs dating back to 2018. iPTH 67 (3/25/23) down from 140 (1/28/22).  phosphorus normal.  -hold vitamin D  -vit. D level, SPEP, UPEP pending on  "discharge.  -f/u outpt PCP or endocrinologist who is managing this issue      GERD  -famotidine 20 mg qd     Depression: stable  -Continue with PTA Cymbalta     Pulmonary nodules  -Outpatient pulmonology f/u advised     Chronic Gout  -allopurinol, prednisone  -Outpatient rheumatology follow-up     Hypomagnesemia  -Continue PTA Mag-Ox     H/O Recurrent C. difficile infection  -no diarrhea     Hx of RA  -on chronic prednisone and methotrexate     Abdominal pain due to constipation: CT A/P (3/22/23) showed Large amount of stool throughout the colon.  Resolved pain.  -maintain bowel regimen post discharge to TCU           Clinically Significant Risk Factors               # Hypercalcemia: Highest Ca = 10.2 mg/dL in last 2 days, will monitor as appropriate    # Hypoalbuminemia: Lowest albumin = 3.3 g/dL at 3/22/2023  4:23 AM, will monitor as appropriate           # Overweight: Estimated body mass index is 27.36 kg/m  as calculated from the following:    Height as of this encounter: 1.651 m (5' 5\").    Weight as of this encounter: 74.6 kg (164 lb 6.4 oz).                       Discharge Medications with Med changes:     Current Discharge Medication List      START taking these medications    Details   budesonide-formoterol (SYMBICORT) 160-4.5 MCG/ACT Inhaler Inhale 2 puffs into the lungs 2 times daily    Associated Diagnoses: Pulmonary emphysema, unspecified emphysema type (H)      diltiazem ER COATED BEADS (CARDIZEM CD/CARTIA XT) 120 MG 24 hr capsule Take 1 capsule (120 mg) by mouth daily    Comments: Hold for SBP < 110 or HR < 60  Associated Diagnoses: Paroxysmal atrial fibrillation (H)      oxyCODONE (ROXICODONE) 5 MG tablet Take 0.5-1 tablets (2.5-5 mg) by mouth every 4 hours as needed for breakthrough pain (2.5 mg if pain rating 4-6, 5 mg if pain rating 7-10. Hold if sedated.)  Qty: 15 tablet, Refills: 0    Associated Diagnoses: Closed fracture of neck of left femur, initial encounter (H); Status post hip surgery    "   senna-docusate (SENOKOT-S/PERICOLACE) 8.6-50 MG tablet Take 1 tablet by mouth 2 times daily as needed for constipation  Qty: 60 tablet, Refills: 0    Associated Diagnoses: Closed fracture of neck of left femur, initial encounter (H); Status post hip surgery         CONTINUE these medications which have CHANGED    Details   metoprolol succinate ER (TOPROL XL) 50 MG 24 hr tablet Take 1.5 tablets (75 mg) by mouth daily    Comments: Hold for SBP < 110 or HR < 60  Associated Diagnoses: Nonsustained ventricular tachycardia (H)         CONTINUE these medications which have NOT CHANGED    Details   Acetaminophen (ACETAMIN PO) Take 500 mg by mouth every 4 hours as needed (pain or fever)      albuterol (PROAIR HFA/PROVENTIL HFA/VENTOLIN HFA) 108 (90 Base) MCG/ACT inhaler Inhale 2 puffs into the lungs every 4 hours as needed for shortness of breath / dyspnea or wheezing      allopurinol (ZYLOPRIM) 300 MG tablet Take 1 tablet (300 mg) by mouth daily  Qty: 60 tablet, Refills: 0    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      cholestyramine (QUESTRAN) 4 g packet 1 packet mixed with water or non-carbonated drink Orally Three times daily      compressor, for nebulizer Saran [COMPRESSOR, FOR NEBULIZER SARAN] Nebulizer treatment qid prn  Qty: 1 Device, Refills: 0    Associated Diagnoses: Reactive airway disease      dicyclomine (BENTYL) 20 MG tablet TAKE 1 TABLET BY MOUTH THREE TIMES DAILY AS NEEDED FOR ABDOMINAL DISCOMFORT      DULoxetine (CYMBALTA) 20 MG capsule Take 20 mg by mouth daily      famotidine (PEPCID) 20 MG tablet Take 20 mg by mouth daily      folic acid (FOLVITE) 1 MG tablet Take 1 tablet (1 mg) by mouth daily  Qty: 90 tablet, Refills: 0    Associated Diagnoses: Seropositive rheumatoid arthritis (H)      ipratropium - albuterol 0.5 mg/2.5 mg/3 mL (DUONEB) 0.5-2.5 (3) MG/3ML neb solution Take 1 vial (3 mLs) by nebulization every 4 hours as needed for wheezing or shortness of breath / dyspnea  Qty: 90  mL, Refills: 0    Associated Diagnoses: Mild intermittent reactive airway disease with acute exacerbation      Lactobacillus-Inulin ([facility] DIGESTIVE [facility]) CAPS Take 1 capsule by mouth 2 times daily      MAGIC MOUTHWASH (FV STANDARD FORMULA) Swish and swallow 10 mLs in mouth every 6 hours as needed for mouth sores      magnesium oxide (MAG-OX) 400 MG tablet Take 400 mg by mouth 2 times daily      methocarbamol (ROBAXIN) 500 MG tablet Take 500 mg by mouth daily as needed for muscle spasms      methotrexate sodium 2.5 MG TABS Take 3 tablets (7.5 mg) by mouth once a week  Qty: 36 tablet, Refills: 0    Associated Diagnoses: Rheumatoid arthritis involving multiple sites with positive rheumatoid factor (H)      pravastatin (PRAVACHOL) 20 MG tablet Take 20 mg by mouth every evening      predniSONE (DELTASONE) 5 MG tablet Take 5 mg by mouth daily      rivaroxaban ANTICOAGULANT (XARELTO) 15 MG TABS tablet Take 1 tablet (15 mg) by mouth daily (with dinner)  Qty: 90 tablet, Refills: 3    Associated Diagnoses: Paroxysmal atrial fibrillation (H)         STOP taking these medications       colchicine (COLCYRS) 0.6 MG tablet Comments:   Reason for Stopping:         furosemide (LASIX) 20 MG tablet Comments:   Reason for Stopping:         guaiFENesin (MUCINEX) 600 MG 12 hr tablet Comments:   Reason for Stopping:         hydrOXYzine (VISTARIL) 25 MG capsule Comments:   Reason for Stopping:         Vitamin D3 (CHOLECALCIFEROL) 125 MCG (5000 UT) tablet Comments:   Reason for Stopping:                     Rationale for medication changes:              Consults       CARDIOLOGY IP CONSULT  ORTHOPEDIC SURGERY IP CONSULT  CARE MANAGEMENT / SOCIAL WORK IP CONSULT  PHYSICAL THERAPY ADULT IP CONSULT  OCCUPATIONAL THERAPY ADULT IP CONSULT  CARDIOLOGY IP CONSULT  WOUND OSTOMY CONTINENCE NURSE  IP CONSULT  NEUROSURGERY IP CONSULT  PHYSICAL THERAPY ADULT IP CONSULT  OCCUPATIONAL THERAPY ADULT IP CONSULT    Immunizations given this encounter      Most Recent Immunizations   Administered Date(s) Administered     COVID-19 Vaccine 18+ (Moderna) 11/13/2021     COVID-19 Vaccine Bivalent Booster 12+ (Pfizer) 10/11/2022     FLU 6-35 months 10/10/2008     Flu, Unspecified 11/10/2017     Influenza (H1N1) 11/19/2009     Influenza (High Dose) 3 valent vaccine 10/24/2019     Influenza (IIV3) PF 09/18/2013     Influenza (intradermal) 11/10/2021     Influenza Vaccine 65+ (Fluzone HD) 10/11/2022     Influenza Vaccine, 6+MO IM (QUADRIVALENT W/PRESERVATIVES) 09/23/2016     Pneumo Conj 13-V (2010&after) 04/10/2015     Pneumococcal 23 valent 09/24/2012     TD,PF 7+ (Tenivac) 08/09/2000     TDAP (Adacel,Boostrix) 10/28/2022     TDAP Vaccine (Adacel) 10/12/2010     Td (Adult), Adsorbed 08/09/2000     Td,adult,historic,unspecified 1946     Zoster recombinant adjuvanted (SHINGRIX) 12/26/2019     Zoster vaccine, live 12/26/2019           Anticoagulation Information            SIGNIFICANT IMAGING FINDINGS     Results for orders placed or performed during the hospital encounter of 03/14/23   Head CT w/o contrast    Impression    IMPRESSION:  1.  No acute intracranial hemorrhage, mass, or herniation.  2.  Mild diffuse parenchymal volume loss and white matter changes most likely due to chronic microvascular ischemic disease.   Cervical spine CT w/o contrast    Impression    IMPRESSION:  1.  No fracture or posttraumatic subluxation.  2.  No high-grade spinal canal or neural foraminal stenosis.   CT Pelvis Bone wo Contrast    Impression    IMPRESSION:  1.  Acute impacted mildly displaced subcapital fracture of the left femur.    2.  Healing insufficiency fracture of the left sacral ala.   XR Pelvis and Hip Left 2 Views    Impression    IMPRESSION: Acute left femoral neck fracture with minimal impaction. The healing left sacral insufficiency fracture is difficult to visualize. There is normal joint alignment. Mild degenerative changes throughout the pelvis. Osteopenia.  Atherosclerosis.   XR Chest Port 1 View    Impression    IMPRESSION: Small right pleural effusion has developed. Remainder unchanged. Calcified aortic atherosclerosis. Mildly enlarged cardiac silhouette. No evidence of pneumonia.   CT Abdomen Pelvis w/o Contrast    Impression    IMPRESSION:   1.  Large amount of stool throughout the colon.  2.  Atherosclerotic disease.      CT Head w/o Contrast    Impression    IMPRESSION:  1.  No CT evidence for acute intracranial process.   2.  Brain atrophy and presumed chronic microvascular ischemic changes as above.     XR Chest 2 Views    Impression    IMPRESSION: The lungs are hyperinflated. Trace right effusion has decreased and compared to prior exam. No pneumothorax or new focal consolidation. Heart size is stable. Degenerative changes of the spine.   US Lower Extremity Venous Duplex Left    Impression    IMPRESSION:  1.  No deep venous thrombosis in the left lower extremity.  2.  Left knee effusion.   XR Chest Port 1 View    Impression    IMPRESSION: Borderline enlarged heart. Mild pulmonary vascular congestion. No pneumothorax or pleural effusion. No focal consolidation.   XR Pelvis and Hip Left 2 Views    Impression    IMPRESSION: Status post ORIF of the left femoral neck fracture with similar alignment. No hardware complication. No new fracture. There is normal joint alignment. Mild degenerative changes throughout the pelvis. Severe degenerative changes of the lower   lumbar spine. Osteopenia. Skin staples laterally. Atherosclerosis.   XR Knee Left 1/2 Views    Impression    IMPRESSION: No acute fracture or malalignment. No significant degenerative changes or knee joint effusion. Osteopenia. Atherosclerosis.   XR Tibia and Fibula Left 2 Views    Impression    IMPRESSION: No acute fracture or malalignment. Osteopenia. Mild soft tissue swelling.       SIGNIFICANT LABORATORY FINDINGS       Discharge Orders        Reason for your hospital stay    Left hip fracture, s/p  "left hip IMN     When to call - Contact Surgeon Team    You may experience symptoms that require follow-up before your scheduled appointment. Refer to the \"[employer] Tool\" for instructions on when to contact your Surgeon Team if you are concerned about pain control, blood clots, constipation, or if you are unable to urinate.     When to call - Reach out to Urgent Care    If you are not able to reach your Surgeon Team and you need immediate care, go to the Orthopedic Walk-in Clinic or Urgent Care at your Surgeon's office.  Do NOT go to the Emergency Room unless you have shortness of breath, chest pain, or other signs of a medical emergency.     When to call - Reasons to Call 911    Call 911 immediately if you experience sudden-onset chest pain, arm weakness/numbness, slurred speech, or shortness of breath     Discharge Instruction - Breathing exercises    Perform breathing exercises using your Incentive Spirometer 10 times per hour while awake for 2 weeks.     Symptoms - Fever Management    A low grade fever can be expected after surgery.  Use acetaminophen (TYLENOL) as needed for fever management.  Contact your Surgeon Team if you have a fever greater than 101.5 F, chills, and/or night sweats.     Symptoms - Constipation management    Constipation (hard, dry bowel movements) is expected after surgery due to the combination of being less active, the anesthetic, and the opioid pain medication.  You can do the following to help reduce constipation:  ~  FLUIDS:  Drink clear liquids (water or Gatorade), or juice (apple/prune).  ~  DIET:  Eat a fiber rich diet.    ~  ACTIVITY:  Get up and move around several times a day.  Increase your activity as you are able.  MEDICATIONS:  Reduce the risk of constipation by starting medications before you are constipated.  You can take Miralax   (1 packet as directed) and/or a stool softener (Senokot 1-2 tablets 1-2 times a day).  If you already have constipation and these medications " are not working, you can get magnesium citrate and use as directed.  If you continue to have constipation you can try an over the counter suppository or enema.  Call your Surgeon Team if it has been greater than 3 days since your last bowel movement.     Symptoms - Reduced Urine Output    Changes in the amount of fluids you drank before and after surgery may result in problems urinating.  It is important to stay well-hydrated after surgery and drink plenty of water. If it has been greater than 8 hours since you have urinated despite drinking plenty of water, call your Surgeon Team.     Activity - Exercises to prevent blood clots    Unless otherwise directed by your Surgeon team, perform the following exercises at least three times per day for the first four weeks after surgery to prevent blood clots in your legs: 1) Point and flex your feet (Ankle Pumps), 2) Move your ankle around in big circles, 3) Wiggle your toes, 4) Walk, even for short distances, several times a day, will help decrease the risk of blood clots.     Comfort and Pain Management - Pain after Surgery    Pain after surgery is normal and expected.  You will have some amount of pain for several weeks after surgery.  Your pain will improve with time.  There are several things you can do to help reduce your pain including: rest, ice, elevation, and using pain medications as needed. Contact your Surgeon Team if you have pain that persists or worsens after surgery despite rest, ice, elevation, and taking your medication(s) as prescribed. Contact your Surgeon Team if you have new numbness, tingling, or weakness in your operative extremity.     Comfort and Pain Management - Swelling after Surgery    Swelling and/or bruising of the surgical extremity is common and may persist for several months after surgery. In addition to frequent icing and elevation, gentle compressive support with an ACE wrap or tubigrip may help with swelling. Apply compression  regularly, removing at least twice daily to perform skin checks. Contact your Surgeon Team if your swelling increases and is NOT associated with an increase in your activity level, or if your swelling increases and is associated with redness and pain.     Comfort and Pain Management - Cold therapy    Ice can be used to control swelling and discomfort after surgery. Place a thin towel over your operative site and apply the ice pack overtop. Leave ice pack in place for 20 minutes, then remove for 20 minutes. Repeat this 20 minutes on/20 minutes off routine as often as tolerated.     Medication Instructions - Acetaminophen (TYLENOL) Instructions    You were discharged with acetaminophen (TYLENOL) for pain management after surgery. Acetaminophen most effectively manages pain symptoms when it is taken on a schedule without missing doses (every four, six, or eight hours). Your Provider will prescribe a safe daily dose between 3000 - 4000 mg.  Do NOT exceed this daily dose. Most patients use acetaminophen for pain control for the first four weeks after surgery.  You can wean from this medication as your pain decreases.     Medication Instructions - NSAID Instructions    You were discharged with an anti-inflammatory medication for pain management to use in combination with acetaminophen (TYLENOL) and the narcotic pain medication.  Take this medication exactly as directed.  You should only take one anti-inflammatory at a time.  Some common anti-inflammatories include: ibuprofen (ADVIL, MOTRIN), naproxen (ALEVE, NAPROSYN), celecoxib (CELEBREX), meloxicam (MOBIC), ketorolac (TORADOL).  Take this medication with food and water.     Medication Instructions - Opioids - Tapering Instructions    In the first three days following surgery, your symptoms may warrant use of the narcotic pain medication every four to six hours as prescribed. This is normal. As your pain symptoms improve, focus your efforts on decreasing (tapering) use  "of narcotic medications. The most successful tapering strategy is to first, decrease the number of tablets you take every 4-6 hours to the minimum prescribed. Then, increase the amount of time between doses.  For example:  First, taper to   or 1 tablet every 4-6 hours.  Then, taper to   or 1 tablet every 6-8 hours.  Then, taper to   or 1 tablet every 8-10 hours.  Then, taper to   or 1 tablet every 10-12 hours.  Then, taper to   or 1 tablet at bedtime.  The bedtime dose can help with comfort during sleep and is typically the last dose to be discontinued after surgery.     Follow Up Care    Please follow-up with Dr. Mckeon's team in 2 weeks at [facility]. Call our scheduling line at [phone] to make an appointment if you do not already have one scheduled.     Medication instructions - Anticoagulation - other    Resume Xarelto as prescribed.  This is given to help minimize your risk of blood clot     Comfort and Pain Management - LOWER Extremity Elevation    Swelling is expected for several months after surgery. This type of swelling is usually associated with gravity and activity, and can be improved with elevation.   The best way to do this is to get your \"toes above your nose\" by laying down and placing several pillows lengthwise under your calf and heel. When elevating your leg keep your knee completely straight. Perform this elevation as often as possible especially for the first two weeks after surgery.     Medication Instructions - Opioid Instructions (0.5 - 1 tablet Q 4-6 hours, MAX 4 tablets)    You were discharged with an opioid medication (hydromorphone, oxycodone, hydrocodone, or tramadol). This medication should only be taken for breakthrough pain that is not controlled with acetaminophen (TYLENOL). If you rate your pain less than 3 you do not need this medication.  Pain rating 0-3:  You do not need this medication  Pain rating 4-6:  Take 1/2 tablet every 4-6 hours as needed  Pain rating " "7-10:  Take 1 tablet every 4-6 hours as needed  Do not exceed 4 tablets per day     Activity - Total Hip Arthroplasty    Refer to the [facility] \"Your Guide to Total Joint Replacement\" for recommendations on activities and Exercises.     Return to Driving    Return to driving - Driving is NOT permitted until directed by your provider. Under no circumstance are you permitted to drive while using narcotic pain medications.     Dressing / Wound Care - Wound    You have a clean dressing on your surgical wound. Dressing change instructions as follows: dressing will be removed at your follow-up appointment. Contact your Surgeon Team if you have increased redness, warmth around the surgical wound, and/or drainage from the surgical wound.     Dressing / Wound Care - NO Tub Bathing    Tub bathing, swimming, or any other activities that will cause your incision to be submerged in water should be avoided until allowed by your Surgeon.     No precautions    No precautions directed by your Provider.  You may perform range of motion activities as tolerated.     Weight bearing as tolerated    Weight bearing as tolerated on your operative extremity.     Dressing / Wound care - Shower with wound/dressing covered    You must COVER your dressing or incision with saran wrap (or any other non-permeable covering) to allow the incision to remain dry while showering.  You may shower 3 days after surgery as long as the surgical wound stays dry. Continue to cover your dressing or incision for showering until your first office visit.  You are strictly prohibited from soaking   or submerging the surgical wound underwater.     General info for SNF    Length of Stay Estimate: Short Term Care: Estimated # of Days <30  Condition at Discharge: Stable  Level of care:skilled   Rehabilitation Potential: Fair  Admission H&P remains valid and up-to-date: Yes  Recent Chemotherapy: N/A  Use Nursing Home Standing Orders: Yes     [facility] "    Give two-step Mantoux (PPD) Per Facility Policy Yes     Follow Up and recommended labs and tests    Follow up with Nursing home physician.  No follow up labs or test are needed.     Reason for your hospital stay    Left hip fracture, s/p repair     Intake and output    Every shift     Daily weights    Call Provider for weight gain of more than 2 pounds per day or 5 pounds per week.     Activity - Up with nursing assistance     No CPR- Do NOT Intubate     Physical Therapy Adult Consult    Evaluate and treat as clinically indicated.    Reason:  weakness     Occupational Therapy Adult Consult    Evaluate and treat as clinically indicated.    Reason:  weakness     Fall precautions     Crutches DME    DME Documentation: Describe the reason for need to support medical necessity: Impaired gait status post hip surgery. I, the undersigned, certify that the above prescribed supplies are medically necessary for this patient and is both reasonable and necessary in reference to accepted standards of medical practice in the treatment of this patient's condition and is not prescribed as a convenience.     Cane DME    DME Documentation: Describe the reason for need to support medical necessity: Impaired gait status post hip surgery. I, the undersigned, certify that the above prescribed supplies are medically necessary for this patient and is both reasonable and necessary in reference to accepted standards of medical practice in the treatment of this patient's condition and is not prescribed as a convenience.     Walker DME    : DME Documentation: Describe the reason for need to support medical necessity: Impaired gait status post hip surgery. I, the undersigned, certify that the above prescribed supplies are medically necessary for this patient and is both reasonable and necessary in reference to accepted standards of medical practice in the treatment of this patient's condition and is not prescribed as a convenience.      Discharge Instruction - Regular Diet Adult    Return to your pre-surgery diet unless instructed otherwise     Diet    Follow this diet upon discharge: Orders Placed This Encounter      Room Service      Advance Diet as Tolerated: Regular Diet Adult      Discharge Instruction - Regular Diet Adult       Examination   Physical Exam   Temp:  [97.8  F (36.6  C)-98.2  F (36.8  C)] 97.8  F (36.6  C)  Pulse:  [77-84] 80  Resp:  [18] 18  BP: (100-129)/(55-63) 100/55  SpO2:  [96 %-100 %] 96 %  Wt Readings from Last 1 Encounters:   03/23/23 69.6 kg (153 lb 7 oz)     General appearance - alert, and in no distress  Lungs - diffusely decreased, no Wheezes, no accessory muscle use  Heart - irreg, irreg, normal rate.   Abdomen - soft, BS+  Neurological - alert, oriented x2, follows commands, chronic left hand contracture  Skin - no rash. LLE wound vac in place, 1+ LLE edema      El Juarez DO, DO  [facility]    CC:Tory Wise

## 2023-03-27 NOTE — PLAN OF CARE
"  Problem: Plan of Care - These are the overarching goals to be used throughout the patient stay.    Goal: Plan of Care Review  Description: The Plan of Care Review/Shift note should be completed every shift.  The Outcome Evaluation is a brief statement about your assessment that the patient is improving, declining, or no change.  This information will be displayed automatically on your shift note.  Outcome: Progressing  Goal: Patient-Specific Goal (Individualized)  Description: You can add care plan individualizations to a care plan. Examples of Individualization might be:  \"Parent requests to be called daily at 9am for status\", \"I have a hard time hearing out of my right ear\", or \"Do not touch me to wake me up as it startles me\".  Outcome: Progressing  Goal: Absence of Hospital-Acquired Illness or Injury  Outcome: Progressing  Intervention: Identify and Manage Fall Risk  Recent Flowsheet Documentation  Taken 3/26/2023 1627 by Italia Worley RN  Safety Promotion/Fall Prevention:    activity supervised    assistive device/personal items within reach    bed alarm on    chair alarm on    clutter free environment maintained    fall prevention program maintained    nonskid shoes/slippers when out of bed    safety round/check completed  Intervention: Prevent Skin Injury  Recent Flowsheet Documentation  Taken 3/26/2023 1627 by Italia Worley RN  Body Position: (up in chair) position changed independently  Intervention: Prevent and Manage VTE (Venous Thromboembolism) Risk  Recent Flowsheet Documentation  Taken 3/26/2023 1627 by Italia Worley RN  VTE Prevention/Management:    SCDs (sequential compression devices) off    patient refused intervention  Goal: Optimal Comfort and Wellbeing  Outcome: Progressing  Intervention: Provide Person-Centered Care  Recent Flowsheet Documentation  Taken 3/26/2023 1627 by Italia Worley RN  Trust Relationship/Rapport:    care explained    questions answered  Goal: Readiness for " Transition of Care  Outcome: Progressing   Goal Outcome Evaluation:Patient alert, but little forgetful. Denies pain, able to make needs known. Saline locked. Wound VAC intact. Continues on pulse oximetry and 2L nasal cannula. Refused dinner. Up to bedside commode with walker, belt and 1-2 assist. On telemetry A-Fib. HR 89-100bpm.

## 2023-03-27 NOTE — PLAN OF CARE
Problem: Plan of Care - These are the overarching goals to be used throughout the patient stay.    Goal: Optimal Comfort and Wellbeing  Outcome: Progressing   Goal Outcome Evaluation:    Patient is alert with confusion. Patient slept through the night with minimal disturbances. Patient denied having any pain. Wound vac is intact. Pt is saline locked. O2 is at 1 LPM and pts sats are 94%. Patient repositioned as tolerated.

## 2023-03-27 NOTE — PLAN OF CARE
Occupational Therapy Discharge Summary    Reason for therapy discharge:    Discharged to transitional care facility.    Progress towards therapy goal(s). See goals on Care Plan in [facility] electronic health record for goal details.  Goals partially met.  Barriers to achieving goals:   discharge from facility.    Therapy recommendation(s):    Continued therapy is recommended.  Rationale/Recommendations:  At [facility].    Shannan IWNG, OTR/L, CLT 3/27/2023 , 11:47 AM

## 2023-03-27 NOTE — PLAN OF CARE
Physical Therapy Discharge Summary    Reason for therapy discharge:    Discharged to TCU.    Progress towards therapy goal(s). See goals on Care Plan in [facility] electronic health record for goal details.  Goals partially met.  Barriers to achieving goals:   discharge from facility.    Therapy recommendation(s):    Further recommended therapy is related to documented deficits, and is necessary to maximize functional independence in order for patient to return to prior level of function.      Emili Knowles, TARUN 3/27/2023

## 2023-03-27 NOTE — PROGRESS NOTES
Care Management Discharge Note    Discharge Date: 03/27/2023       Discharge Disposition: Transitional Care    Discharge Services: None    Discharge DME: None    Discharge Transportation:  (HEW)    PAS Confirmation Code:  ([id number])  Patient/family educated on Medicare website which has current facility and service quality ratings: yes    Education Provided on the Discharge Plan:  yes  Persons Notified of Discharge Plans: yes      Patient/Family in Agreement with the Plan: yes    Handoff Referral Completed: yes    Additional Information:  Patient discharging to [facility]. Admissions received auth. Notified son Bruce of auth received and patient has 20 days left of TCU. Notified both admission and son, patient has [facility] appt this Wednesday 3/29 at 8:45am.          Malaika Flores RN

## 2023-03-27 NOTE — PLAN OF CARE
Problem: Plan of Care - These are the overarching goals to be used throughout the patient stay.    Goal: Readiness for Transition of Care  Outcome: Adequate for Care Transition   Goal Outcome Evaluation:       Discharging to TCU, unable to get seal with Prevena wound vac after reinforcing with drape and skin prep. Spoke with ortho and gave verbal to remove wound vac and place primapore.

## 2023-03-28 PROBLEM — M50.20 CERVICAL HERNIATED DISC: Status: ACTIVE | Noted: 2023-01-01

## 2023-03-28 PROBLEM — G93.41 METABOLIC ENCEPHALOPATHY: Status: ACTIVE | Noted: 2023-01-01

## 2023-03-28 PROBLEM — M10.9 GOUT OF RIGHT FOOT, UNSPECIFIED CAUSE, UNSPECIFIED CHRONICITY: Status: ACTIVE | Noted: 2023-01-01

## 2023-03-28 PROBLEM — M54.41 ACUTE RIGHT-SIDED LOW BACK PAIN WITH RIGHT-SIDED SCIATICA: Status: ACTIVE | Noted: 2023-01-01

## 2023-03-28 PROBLEM — I50.33 ACUTE ON CHRONIC DIASTOLIC (CONGESTIVE) HEART FAILURE (H): Status: ACTIVE | Noted: 2023-01-01

## 2023-03-28 PROBLEM — G47.00 INSOMNIA, UNSPECIFIED TYPE: Status: ACTIVE | Noted: 2023-01-01

## 2023-03-28 NOTE — PROGRESS NOTES
Code Status:  DNR/DNI  Visit Type: Hospital F/U     Facility:   [facility] [[id number]]        History of Present Illness:   Hospital Admission Date: 3/14/2023      Hospital Discharge Date: 3/27/2023      Fatuma Henry is a 77 year old female with past medical history for p. Afib, CKD, COPD, GERD, HTN, HLD, RA, anemia and left ICA occlusion. She was recently hospitalized after a fall at home in which she sustained a impacted mildly displaced subcapital fracture of left femur.  She underwent a Closed reduction with perc pinning.  She will need to follow up with ortho in 2 weeks.  Postoperatively she had metabolic encephalopathy thought to be retaliated to hypercarbia due to hyperoxia. O2 sat goal is 88-92% as she is an CO2 retainer. She did have leukocytosis and workup was unremarkable.    TTE done showed LVEF 60%, aortic stenosis and mild-mod MR.  Pulmonary artery pressure slightly up and it is recommended that she follow up with cardiology.      She was noted to have chronic hypercalcemia thought to be related to primary hyperparathyroidism.  Vitamin D held and SPEP and UPEP were drawn and pending.  Recommended to follow up with outpatient PCP or endocrinology.     Today, she denies any issues.  She reports feeling sore and has questions about incision check with ortho. Vac can be removed on 3/29.      Past Medical History:   Diagnosis Date     Atrial fibrillation (H)      CKD (chronic kidney disease) stage 3, GFR 30-59 ml/min (H)      COPD (chronic obstructive pulmonary disease) (H)     nocturnal oxygen     CRF (chronic renal failure)      GERD (gastroesophageal reflux disease)      Hypertension      Hypovolemic shock (H)      Influenza A 12/01/2017     Pulmonary hypertension (H)      Pulmonary nodules      Rheumatoid arthritis (H)      Sepsis (H) 12/24/2017     Past Surgical History:   Procedure Laterality Date     APPENDECTOMY       BLADDER SUSPENSION       CHOLECYSTECTOMY       CLOSED  REDUCTION, PERCUTANEOUS PINNING HIP Left 3/15/2023    Procedure: CLOSED REDUCTION, HIP, WITH PERCUTANEOUS PINNING;  Surgeon: Denton Mckeon MD;  Location: [facility] OR     PICC TRIPLE LUMEN PLACEMENT  2022          Family History   Problem Relation Age of Onset     Heart Failure Mother      Cerebrovascular Disease Father      Kidney failure Sister      Cerebrovascular Disease Brother      Diabetes Type 2  Brother      Social History     Socioeconomic History     Marital status:      Spouse name: Not on file     Number of children: Not on file     Years of education: Not on file     Highest education level: Not on file   Occupational History     Not on file   Tobacco Use     Smoking status: Former     Packs/day: 0.50     Types: Cigarettes     Quit date: 2017     Years since quittin.2     Smokeless tobacco: Never   Substance and Sexual Activity     Alcohol use: No     Drug use: No     Sexual activity: Not Currently   Other Topics Concern     Not on file   Social History Narrative     Not on file     Social Determinants of Health     Financial Resource Strain: Not on file   Food Insecurity: Not on file   Transportation Needs: Not on file   Physical Activity: Not on file   Stress: Not on file   Social Connections: Not on file   Intimate Partner Violence: Not on file   Housing Stability: Not on file       Current Outpatient Medications   Medication Sig Dispense Refill     Acetaminophen (ACETAMIN PO) Take 500 mg by mouth every 4 hours as needed (pain or fever)       albuterol (PROAIR HFA/PROVENTIL HFA/VENTOLIN HFA) 108 (90 Base) MCG/ACT inhaler Inhale 2 puffs into the lungs every 4 hours as needed for shortness of breath / dyspnea or wheezing       allopurinol (ZYLOPRIM) 300 MG tablet Take 1 tablet (300 mg) by mouth daily 60 tablet 0     budesonide-formoterol (SYMBICORT) 160-4.5 MCG/ACT Inhaler Inhale 2 puffs into the lungs 2 times daily       cholestyramine (QUESTRAN) 4 g packet 1  packet mixed with water or non-carbonated drink Orally Three times daily       compressor, for nebulizer Saran [COMPRESSOR, FOR NEBULIZER SARAN] Nebulizer treatment qid prn 1 Device 0     dicyclomine (BENTYL) 20 MG tablet TAKE 1 TABLET BY MOUTH THREE TIMES DAILY AS NEEDED FOR ABDOMINAL DISCOMFORT       diltiazem ER COATED BEADS (CARDIZEM CD/CARTIA XT) 120 MG 24 hr capsule Take 1 capsule (120 mg) by mouth daily       DULoxetine (CYMBALTA) 20 MG capsule Take 20 mg by mouth daily       famotidine (PEPCID) 20 MG tablet Take 20 mg by mouth daily       folic acid (FOLVITE) 1 MG tablet Take 1 tablet (1 mg) by mouth daily 90 tablet 0     ipratropium - albuterol 0.5 mg/2.5 mg/3 mL (DUONEB) 0.5-2.5 (3) MG/3ML neb solution Take 1 vial (3 mLs) by nebulization every 4 hours as needed for wheezing or shortness of breath / dyspnea 90 mL 0     Lactobacillus-Inulin ([facility] DIGESTIVE [facility]) CAPS Take 1 capsule by mouth 2 times daily       MAGIC MOUTHWASH (FV STANDARD FORMULA) Swish and swallow 10 mLs in mouth every 6 hours as needed for mouth sores       magnesium oxide (MAG-OX) 400 MG tablet Take 400 mg by mouth 2 times daily       methocarbamol (ROBAXIN) 500 MG tablet Take 500 mg by mouth daily as needed for muscle spasms       metoprolol succinate ER (TOPROL XL) 50 MG 24 hr tablet Take 1.5 tablets (75 mg) by mouth daily       oxyCODONE (ROXICODONE) 5 MG tablet Take 0.5-1 tablets (2.5-5 mg) by mouth every 4 hours as needed for breakthrough pain (2.5 mg if pain rating 4-6, 5 mg if pain rating 7-10. Hold if sedated.) 15 tablet 0     pravastatin (PRAVACHOL) 20 MG tablet Take 20 mg by mouth every evening       predniSONE (DELTASONE) 5 MG tablet Take 5 mg by mouth daily       rivaroxaban ANTICOAGULANT (XARELTO) 15 MG TABS tablet Take 1 tablet (15 mg) by mouth daily (with dinner) 90 tablet 3     senna-docusate (SENOKOT-S/PERICOLACE) 8.6-50 MG tablet Take 1 tablet by mouth 2 times daily as needed for constipation 60 tablet 0      methotrexate sodium 2.5 MG TABS Take 3 tablets (7.5 mg) by mouth once a week 36 tablet 0     Allergies   Allergen Reactions     Codeine Nausea and Vomiting     Fosamax [Alendronic Acid] Diarrhea     Morphine Nausea and Vomiting     Other reaction(s): Vomiting     Immunization History   Administered Date(s) Administered     COVID-19 Vaccine 18+ (Moderna) 03/19/2021, 04/16/2021, 11/13/2021     COVID-19 Vaccine Bivalent Booster 12+ (Pfizer) 10/11/2022     FLU 6-35 months 11/01/2003, 10/30/2007, 10/10/2008     FLUAD(HD)65+ QUAD 10/11/2021     Flu 65+ Years 09/23/2020     Flu, Unspecified 11/01/2003, 10/30/2007, 10/10/2008, 10/12/2010, 09/22/2011, 09/24/2012, 12/02/2015, 11/10/2017     Influenza (H1N1) 11/19/2009     Influenza (High Dose) 3 valent vaccine 12/01/2015, 11/10/2017, 10/18/2018, 10/24/2019     Influenza (IIV3) PF 10/12/2010, 09/22/2011, 09/24/2012, 09/18/2013     Influenza (intradermal) 11/01/2003, 10/30/2007, 10/10/2008, 10/12/2010, 09/22/2011, 09/24/2012, 09/18/2013, 09/23/2016, 09/23/2020, 11/10/2021     Influenza Vaccine 65+ (Fluzone HD) 11/10/2017, 09/23/2020, 10/11/2021, 10/11/2022     Influenza Vaccine, 6+MO IM (QUADRIVALENT W/PRESERVATIVES) 09/23/2016     Pneumo Conj 13-V (2010&after) 04/10/2015     Pneumococcal 23 valent 09/24/2012     TD,PF 7+ (Tenivac) 08/09/2000     TDAP (Adacel,Boostrix) 10/28/2022     TDAP Vaccine (Adacel) 10/12/2010     Td (Adult), Adsorbed 1945, 08/09/2000     Td,adult,historic,unspecified 1945     Tdap (Adult) Unspecified Formulation 08/09/2000     Zoster recombinant adjuvanted (SHINGRIX) 10/24/2019, 12/26/2019     Zoster vaccine, live 07/18/2008, 12/26/2019         Post Discharge Medication Reconciliation Status: discharge medications reconciled and changed, per note/orders.    Medications list and allergies in the facility chart have been reviewed.  Please see facility EMR for most up to date list.         Review of Systems   Patient denies fever, chills,  "headache, lightheadedness, dizziness, rhinorrhea, cough, congestion, shortness of breath, chest pain, palpitations, abdominal pain, n/v, diarrhea, constipation, change in appetite, change in sleep pattern, dysuria, frequency, burning or pain with urination.  Other than stated in HPI all other review of systems is negative.         Physical Exam  Vital signs:/77   Pulse 78   Temp 98  F (36.7  C)   Resp 19   Ht 1.651 m (5' 5\")   Wt 78.9 kg (174 lb)   SpO2 98%   BMI 28.96 kg/m     GENERAL APPEARANCE: Well developed, well nourished, in no acute distress.  HEENT: normocephalic, atraumatic  sclerae anicteric, conjunctivae clear and moist, EOM intact  LUNGS: Lung sounds CTA, no adventitious sounds, respiratory effort normal. On 3L of O2 98%  CARD: RRR, S1, S2, without murmurs, gallops, rubs  ABD: Soft, nondistended and nontender with normal bowel sounds.   MSK: Muscle strength and tone were equal bilaterally. Moves all extremities easily and intentionally.   EXTREMITIES: left lower extremity 2+ pitting edema. Good cms without calf tenderness  NEURO: Alert and oriented x 3.  Face is symmetric.  SKIN: incision covered, preva drain  PSYCH: euthymic          Labs:   Last Comprehensive Metabolic Panel:  Lab Results   Component Value Date     03/26/2023    POTASSIUM 4.4 03/27/2023    CHLORIDE 99 03/26/2023    CO2 30 (H) 03/26/2023    ANIONGAP 9 03/26/2023     (H) 03/26/2023    BUN 38.7 (H) 03/26/2023    CR 1.14 (H) 03/27/2023    GFRESTIMATED 50 (L) 03/27/2023    SUNI 10.4 (H) 03/26/2023     Lab Results   Component Value Date    WBC 12.0 03/26/2023     Lab Results   Component Value Date    RBC 3.12 03/26/2023     Lab Results   Component Value Date    HGB 9.4 03/26/2023     Lab Results   Component Value Date    HCT 30.7 03/26/2023     Lab Results   Component Value Date    MCV 98 03/26/2023     Lab Results   Component Value Date    MCH 30.1 03/26/2023     Lab Results   Component Value Date    MCHC 30.6 " 03/26/2023     Lab Results   Component Value Date    RDW 16.3 03/26/2023     Lab Results   Component Value Date     03/26/2023             Assessment/plan:   S/P ORIF (open reduction internal fixation) fracture  Pain is stable, changed apap to scheduled TID, has prn oxycodone and robaxin, wean in 2 weeks.  PT/OT eval and treat.  On Xarelto for afib.  Asked nursing to schedule ortho follow up in 2 weeks.      Paroxysmal atrial fibrillation (H)  Rate controlled, continue with home metoprolol, diltiazem and Xarelto    Chronic obstructive pulmonary disease, unspecified COPD type (H)  O2 up to 3L today as patient states she adjusts 1-3L.  CO2 retainer so orders written to keep O2 1-2L to maintain sats 88-92%.  Monitor CO2.  Continue with home nebs and symbicort    Rheumatoid arthritis, involving unspecified site, unspecified whether rheumatoid factor present (H)  Stable, continue on home prednisone.  Methotrexate was not transcribed from discharge orders so order writtent to take methotrexate weekly.      Stage 3 chronic kidney disease, unspecified whether stage 3a or 3b CKD (H)  Monitor BMP and avoid nephrotoxins.     Primary hypertension  Controlled, continue with home metoprolol and diltiazem.     Hyperlipidemia, unspecified hyperlipidemia type  On pravastatin.     Anemia, unspecified type  Check CBC next week. On folic acid.     ICAO (internal carotid artery occlusion), left  Monitor for symptoms on xarelto, pravastatin     Chronic diarrhea  Hx of cdiff  Continue with home Questran and Bentyl.       45 total minutes spent in reviewing [address] medical chart and counseling patient on O2 and CO2 retaining.     Electronically signed by: Norma Ziegler NP

## 2023-03-28 NOTE — LETTER
3/28/2023        RE: Fatuma Henry  [address]  [address] [place] [address]        Code Status:  DNR/DNI  Visit Type: Hospital F/U     Facility:   [facility] ([facility]) [[id number]]        History of Present Illness:   Hospital Admission Date: 3/14/2023      Hospital Discharge Date: 3/27/2023      Fatuma Henry is a 77 year old female with past medical history for p. Afib, CKD, COPD, GERD, HTN, HLD, RA, anemia and left ICA occlusion. She was recently hospitalized after a fall at home in which she sustained a impacted mildly displaced subcapital fracture of left femur.  She underwent a Closed reduction with perc pinning.  She will need to follow up with ortho in 2 weeks.  Postoperatively she had metabolic encephalopathy thought to be retaliated to hypercarbia due to hyperoxia. O2 sat goal is 88-92% as she is an CO2 retainer. She did have leukocytosis and workup was unremarkable.    TTE done showed LVEF 60%, aortic stenosis and mild-mod MR.  Pulmonary artery pressure slightly up and it is recommended that she follow up with cardiology.      She was noted to have chronic hypercalcemia thought to be related to primary hyperparathyroidism.  Vitamin D held and SPEP and UPEP were drawn and pending.  Recommended to follow up with outpatient PCP or endocrinology.     Today, she denies any issues.  She reports feeling sore and has questions about incision check with ortho. Vac can be removed on 3/29.      Past Medical History:   Diagnosis Date     Atrial fibrillation (H)      CKD (chronic kidney disease) stage 3, GFR 30-59 ml/min (H)      COPD (chronic obstructive pulmonary disease) (H)     nocturnal oxygen     CRF (chronic renal failure)      GERD (gastroesophageal reflux disease)      Hypertension      Hypovolemic shock (H)      Influenza A 12/01/2017     Pulmonary hypertension (H)      Pulmonary nodules      Rheumatoid arthritis (H)      Sepsis (H) 12/24/2017     Past Surgical History:    Procedure Laterality Date     APPENDECTOMY       BLADDER SUSPENSION       CHOLECYSTECTOMY       CLOSED REDUCTION, PERCUTANEOUS PINNING HIP Left 3/15/2023    Procedure: CLOSED REDUCTION, HIP, WITH PERCUTANEOUS PINNING;  Surgeon: Denton Mckeon MD;  Location: [facility] OR     PICC TRIPLE LUMEN PLACEMENT  2022          Family History   Problem Relation Age of Onset     Heart Failure Mother      Cerebrovascular Disease Father      Kidney failure Sister      Cerebrovascular Disease Brother      Diabetes Type 2  Brother      Social History     Socioeconomic History     Marital status:      Spouse name: Not on file     Number of children: Not on file     Years of education: Not on file     Highest education level: Not on file   Occupational History     Not on file   Tobacco Use     Smoking status: Former     Packs/day: 0.50     Types: Cigarettes     Quit date: 2017     Years since quittin.2     Smokeless tobacco: Never   Substance and Sexual Activity     Alcohol use: No     Drug use: No     Sexual activity: Not Currently   Other Topics Concern     Not on file   Social History Narrative     Not on file     Social Determinants of Health     Financial Resource Strain: Not on file   Food Insecurity: Not on file   Transportation Needs: Not on file   Physical Activity: Not on file   Stress: Not on file   Social Connections: Not on file   Intimate Partner Violence: Not on file   Housing Stability: Not on file       Current Outpatient Medications   Medication Sig Dispense Refill     Acetaminophen (ACETAMIN PO) Take 500 mg by mouth every 4 hours as needed (pain or fever)       albuterol (PROAIR HFA/PROVENTIL HFA/VENTOLIN HFA) 108 (90 Base) MCG/ACT inhaler Inhale 2 puffs into the lungs every 4 hours as needed for shortness of breath / dyspnea or wheezing       allopurinol (ZYLOPRIM) 300 MG tablet Take 1 tablet (300 mg) by mouth daily 60 tablet 0     budesonide-formoterol (SYMBICORT) 160-4.5  MCG/ACT Inhaler Inhale 2 puffs into the lungs 2 times daily       cholestyramine (QUESTRAN) 4 g packet 1 packet mixed with water or non-carbonated drink Orally Three times daily       compressor, for nebulizer Saran [COMPRESSOR, FOR NEBULIZER SARAN] Nebulizer treatment qid prn 1 Device 0     dicyclomine (BENTYL) 20 MG tablet TAKE 1 TABLET BY MOUTH THREE TIMES DAILY AS NEEDED FOR ABDOMINAL DISCOMFORT       diltiazem ER COATED BEADS (CARDIZEM CD/CARTIA XT) 120 MG 24 hr capsule Take 1 capsule (120 mg) by mouth daily       DULoxetine (CYMBALTA) 20 MG capsule Take 20 mg by mouth daily       famotidine (PEPCID) 20 MG tablet Take 20 mg by mouth daily       folic acid (FOLVITE) 1 MG tablet Take 1 tablet (1 mg) by mouth daily 90 tablet 0     ipratropium - albuterol 0.5 mg/2.5 mg/3 mL (DUONEB) 0.5-2.5 (3) MG/3ML neb solution Take 1 vial (3 mLs) by nebulization every 4 hours as needed for wheezing or shortness of breath / dyspnea 90 mL 0     Lactobacillus-Inulin ([facility] DIGESTIVE HEALTH) CAPS Take 1 capsule by mouth 2 times daily       MAGIC MOUTHWASH (FV STANDARD FORMULA) Swish and swallow 10 mLs in mouth every 6 hours as needed for mouth sores       magnesium oxide (MAG-OX) 400 MG tablet Take 400 mg by mouth 2 times daily       methocarbamol (ROBAXIN) 500 MG tablet Take 500 mg by mouth daily as needed for muscle spasms       metoprolol succinate ER (TOPROL XL) 50 MG 24 hr tablet Take 1.5 tablets (75 mg) by mouth daily       oxyCODONE (ROXICODONE) 5 MG tablet Take 0.5-1 tablets (2.5-5 mg) by mouth every 4 hours as needed for breakthrough pain (2.5 mg if pain rating 4-6, 5 mg if pain rating 7-10. Hold if sedated.) 15 tablet 0     pravastatin (PRAVACHOL) 20 MG tablet Take 20 mg by mouth every evening       predniSONE (DELTASONE) 5 MG tablet Take 5 mg by mouth daily       rivaroxaban ANTICOAGULANT (XARELTO) 15 MG TABS tablet Take 1 tablet (15 mg) by mouth daily (with dinner) 90 tablet 3     senna-docusate  (SENOKOT-S/PERICOLACE) 8.6-50 MG tablet Take 1 tablet by mouth 2 times daily as needed for constipation 60 tablet 0     methotrexate sodium 2.5 MG TABS Take 3 tablets (7.5 mg) by mouth once a week 36 tablet 0     Allergies   Allergen Reactions     Codeine Nausea and Vomiting     Fosamax [Alendronic Acid] Diarrhea     Morphine Nausea and Vomiting     Other reaction(s): Vomiting     Immunization History   Administered Date(s) Administered     COVID-19 Vaccine 18+ (Moderna) 03/19/2021, 04/16/2021, 11/13/2021     COVID-19 Vaccine Bivalent Booster 12+ (Pfizer) 10/11/2022     FLU 6-35 months 11/01/2003, 10/30/2007, 10/10/2008     FLUAD(HD)65+ QUAD 10/11/2021     Flu 65+ Years 09/23/2020     Flu, Unspecified 11/01/2003, 10/30/2007, 10/10/2008, 10/12/2010, 09/22/2011, 09/24/2012, 12/02/2015, 11/10/2017     Influenza (H1N1) 11/19/2009     Influenza (High Dose) 3 valent vaccine 12/01/2015, 11/10/2017, 10/18/2018, 10/24/2019     Influenza (IIV3) PF 10/12/2010, 09/22/2011, 09/24/2012, 09/18/2013     Influenza (intradermal) 11/01/2003, 10/30/2007, 10/10/2008, 10/12/2010, 09/22/2011, 09/24/2012, 09/18/2013, 09/23/2016, 09/23/2020, 11/10/2021     Influenza Vaccine 65+ (Fluzone HD) 11/10/2017, 09/23/2020, 10/11/2021, 10/11/2022     Influenza Vaccine, 6+MO IM (QUADRIVALENT W/PRESERVATIVES) 09/23/2016     Pneumo Conj 13-V (2010&after) 04/10/2015     Pneumococcal 23 valent 09/24/2012     TD,PF 7+ (Tenivac) 08/09/2000     TDAP (Adacel,Boostrix) 10/28/2022     TDAP Vaccine (Adacel) 10/12/2010     Td (Adult), Adsorbed 1945, 08/09/2000     Td,adult,historic,unspecified 1945     Tdap (Adult) Unspecified Formulation 08/09/2000     Zoster recombinant adjuvanted (SHINGRIX) 10/24/2019, 12/26/2019     Zoster vaccine, live 07/18/2008, 12/26/2019         Post Discharge Medication Reconciliation Status: discharge medications reconciled and changed, per note/orders.    Medications list and allergies in the facility chart have been  "reviewed.  Please see facility EMR for most up to date list.         Review of Systems   Patient denies fever, chills, headache, lightheadedness, dizziness, rhinorrhea, cough, congestion, shortness of breath, chest pain, palpitations, abdominal pain, n/v, diarrhea, constipation, change in appetite, change in sleep pattern, dysuria, frequency, burning or pain with urination.  Other than stated in HPI all other review of systems is negative.         Physical Exam  Vital signs:/77   Pulse 78   Temp 98  F (36.7  C)   Resp 19   Ht 1.651 m (5' 5\")   Wt 78.9 kg (174 lb)   SpO2 98%   BMI 28.96 kg/m     GENERAL APPEARANCE: Well developed, well nourished, in no acute distress.  HEENT: normocephalic, atraumatic  sclerae anicteric, conjunctivae clear and moist, EOM intact  LUNGS: Lung sounds CTA, no adventitious sounds, respiratory effort normal. On 3L of O2 98%  CARD: RRR, S1, S2, without murmurs, gallops, rubs  ABD: Soft, nondistended and nontender with normal bowel sounds.   MSK: Muscle strength and tone were equal bilaterally. Moves all extremities easily and intentionally.   EXTREMITIES: left lower extremity 2+ pitting edema. Good cms without calf tenderness  NEURO: Alert and oriented x 3.  Face is symmetric.  SKIN: incision covered, preva drain  PSYCH: euthymic          Labs:   Last Comprehensive Metabolic Panel:  Lab Results   Component Value Date     03/26/2023    POTASSIUM 4.4 03/27/2023    CHLORIDE 99 03/26/2023    CO2 30 (H) 03/26/2023    ANIONGAP 9 03/26/2023     (H) 03/26/2023    BUN 38.7 (H) 03/26/2023    CR 1.14 (H) 03/27/2023    GFRESTIMATED 50 (L) 03/27/2023    SUNI 10.4 (H) 03/26/2023     Lab Results   Component Value Date    WBC 12.0 03/26/2023     Lab Results   Component Value Date    RBC 3.12 03/26/2023     Lab Results   Component Value Date    HGB 9.4 03/26/2023     Lab Results   Component Value Date    HCT 30.7 03/26/2023     Lab Results   Component Value Date    MCV 98 03/26/2023 "     Lab Results   Component Value Date    MCH 30.1 03/26/2023     Lab Results   Component Value Date    MCHC 30.6 03/26/2023     Lab Results   Component Value Date    RDW 16.3 03/26/2023     Lab Results   Component Value Date     03/26/2023             Assessment/plan:   S/P ORIF (open reduction internal fixation) fracture  Pain is stable, changed apap to scheduled TID, has prn oxycodone and robaxin, wean in 2 weeks.  PT/OT eval and treat.  On Xarelto for afib.  Asked nursing to schedule ortho follow up in 2 weeks.      Paroxysmal atrial fibrillation (H)  Rate controlled, continue with home metoprolol, diltiazem and Xarelto    Chronic obstructive pulmonary disease, unspecified COPD type (H)  O2 up to 3L today as patient states she adjusts 1-3L.  CO2 retainer so orders written to keep O2 1-2L to maintain sats 88-92%.  Monitor CO2.  Continue with home nebs and symbicort    Rheumatoid arthritis, involving unspecified site, unspecified whether rheumatoid factor present (H)  Stable, continue on home prednisone.  Methotrexate was not transcribed from discharge orders so order writtent to take methotrexate weekly.      Stage 3 chronic kidney disease, unspecified whether stage 3a or 3b CKD (H)  Monitor BMP and avoid nephrotoxins.     Primary hypertension  Controlled, continue with home metoprolol and diltiazem.     Hyperlipidemia, unspecified hyperlipidemia type  On pravastatin.     Anemia, unspecified type  Check CBC next week. On folic acid.     ICAO (internal carotid artery occlusion), left  Monitor for symptoms on xarelto, pravastatin     Chronic diarrhea  Hx of cdiff  Continue with home Questran and Bentyl.       45 total minutes spent in reviewing [facility] medical chart and counseling patient on O2 and CO2 retaining.     Electronically signed by: Norma Ziegler NP          Sincerely,        Norma Ziegler NP

## 2023-03-29 NOTE — TELEPHONE ENCOUNTER
Phone call to patient, spoke to daughter Enid. She states patient is in TCU currently, possibly for the next 3-4 weeks. Discussed LAAC Referral and purpose of LAAC procedure in general terms. Discussed various indications and motivations for discontinuation of OAC. She states patient has been having 2 years of imbalance/dizziness contributing to falls. Discussed that fall while on OAC may put her at risk of serious injury. She would like more information. Will send LAAC pamphlet in mail to address below. She would be interested in scheduling LAAC consult after 4 weeks. Will route to scheduling to arrange. [facility]    Enid Dove  [address], [address] [place] [id number]    ----- Message -----  From: Isaías Waller MD  Sent: 3/22/2023   9:12 AM CDT  To: Dayna Gonzales RN    Lady here in the hospital who was seen Teressa Cespedes in past for atrial fibrillation, having falls on anticoagulation for atrial fibrillation.  Need to be considered for left atrial appendage occlusion device, if you can arrange as an outpatient appointment.  LF

## 2023-03-30 NOTE — TELEPHONE ENCOUNTER
[facility] Lab Note     Provider: Calli Mccormick MD  Facility: [facility] Facility Type:  TCU    Allergies   Allergen Reactions     Codeine Nausea and Vomiting     Fosamax [Alendronic Acid] Diarrhea     Morphine Nausea and Vomiting     Other reaction(s): Vomiting       Labs Reviewed by provider: Vitamin D---from 3/25/23 in hospital.       Verbal Order/Direction given by Provider: Vitamin D3---5000iu daily.  Check Vitamin D level on 4/13/23.      Provider giving Order:  Calli Mccormick MD    Verbal Order given to: Rosalba([phone])    El Amin RN

## 2023-04-05 NOTE — TELEPHONE ENCOUNTER
Nurse report faint pink rash on pts back, chest and thighs  Stated patient new to facility and had shower today  Vitals stable, no edema, shortness of breath or bump  No raised rashes  Patients feels fine with no acute concerns  No new medications or topical agents applied per nurse    Order  Observe for now and update provider if not resolving or further concerns  Update primary NP tomorrow

## 2023-04-05 NOTE — TELEPHONE ENCOUNTER
[facility] Lab Note     Provider: Calli Mccormick MD  Facility: [facility]) Facility Type:  TCU    Allergies   Allergen Reactions     Codeine Nausea and Vomiting     Fosamax [Alendronic Acid] Diarrhea     Morphine Nausea and Vomiting     Other reaction(s): Vomiting       Labs Reviewed by provider: Heme 2, BMP, Mg     Verbal Order/Direction given by Provider: Decrease magnesium oxide to 400mg daily.      Provider giving Order:  Calli Mccormick MD    Verbal Order given to: Moinque([phone])    El Amin RN

## 2023-04-06 PROBLEM — A41.9 SEPSIS, DUE TO UNSPECIFIED ORGANISM, UNSPECIFIED WHETHER ACUTE ORGAN DYSFUNCTION PRESENT (H): Status: ACTIVE | Noted: 2022-01-01

## 2023-04-06 PROBLEM — L03.116 CELLULITIS OF LEFT LEG: Status: ACTIVE | Noted: 2023-01-01

## 2023-04-06 PROBLEM — R74.8 ELEVATED ALKALINE PHOSPHATASE LEVEL: Status: ACTIVE | Noted: 2023-01-01

## 2023-04-06 PROBLEM — R21 RASH AND NONSPECIFIC SKIN ERUPTION: Status: ACTIVE | Noted: 2023-01-01

## 2023-04-06 PROBLEM — N18.9 CHRONIC RENAL IMPAIRMENT, UNSPECIFIED CKD STAGE: Status: ACTIVE | Noted: 2023-01-01

## 2023-04-06 PROBLEM — R06.00 DYSPNEA, UNSPECIFIED TYPE: Status: ACTIVE | Noted: 2023-01-01

## 2023-04-06 PROBLEM — R19.5 LOOSE STOOLS: Status: ACTIVE | Noted: 2023-01-01

## 2023-04-06 NOTE — CONSULTS
[facility] team is asked to see Fatuma Henry in consultation to evaluate afib rvr, persistant afib.      Assessment:     1. Paroxysmal atrial fibrillation w/ RVR, on Xarelto.RIWPH-2-VTTc score of 5;  Prior to onset of GI and infectious symptoms, patient's atrial fibrillation was well-controlled with metoprolol and diltiazem. Not currently symptomatic. Diltiazem gtt initiated in ED with good response. Suspect acute infectious illness vs dehydration from GI/insensible losses additionally contributing to tachycardia. Will continue diltiazem gtt for rate control until other symptoms are better controlled.   2. HFpEF 2/2 unclear etiology, likely multifactorial. most recent EF 60% on ECHO 3/14/23. Mild increase in baseline BNP at 3870 (baseline 8938-9882). Troponin also mildly elevated, though appears to be at patient's baseline -- poor clearance 2/2 CKD may also be contributing. Patient is on chronic O2 at home, states her breathing is unchanged from baseline at this time. CT revealed bilateral small pleural effusions and diffuse anasarca, despite lack of overt evidence of fluid overload on exam. Will trial lasix 40mg for symptomatic relief.  3.  Mild aortic stenosis  4.  Chronic kidney disease stage IIIa     Plan:     - Cardiac monitoring  - Continue diltiazem gtt for rate control   - Can transition to PTA PO diltiazem as other GI and infectious symptoms resolve and patient is able to tolerate PO.   - Continue PTA Xarelto 15mg daily, metoprolol XL 75mg daily  -Appears fairly euvolemic on examination today, outside of chronic right heart failure.  Would not add furosemide at this point.        Current History:     Fatuma Henry is a 76 yo F with a history of asymptomatic paroxysmal atrial fibrillation on Xarelto, HFpEF 2/2 unclear etiology, HTN, HLD, COPD, pulmonary hypertension on chronic O2, recurrent C.diff colitis, CKD who presented to the ED with 3 days of GI distress and concern  "for LLE cellulitis, found to be in atrial fibrillation with RVR.    Patient reports 3 days of nausea, vomiting, and diarrhea, states she feels similar to prior episodes of C.diff colitis. She additionally has redness and swelling of LLE. She has experienced intermittent chills over the past several days, no fevers. Patient has had a productive cough for the past few weeks, not acutely worsening.     She uses 3L home O2 at baseline, feels as though her breathing is normal for her. No episodes of chest pain or pressure. She has never experienced symptoms with her atrial fibrillation, no current sensation of palpitations.  Follows a low-sodium diet.  Main complaint is ongoing diarrhea.     Patient is currently residing in a TCU following a hospitalization for L hip fracture 03/14/23, has been taking all medications as prescribed. She has been able to ambulate with a walker, but is only able to walk ~50 feet prior to becoming short of breath and needing to rest. She feels as though she has not been able to sleep well and wakes up several times throughout the night with shortness of breath.     Past Medical History:     Past Medical History:   Diagnosis Date     Atrial fibrillation (H)      CKD (chronic kidney disease) stage 3, GFR 30-59 ml/min (H)      COPD (chronic obstructive pulmonary disease) (H)     nocturnal oxygen     CRF (chronic renal failure)      GERD (gastroesophageal reflux disease)      Hypertension      Hypovolemic shock (H)      Influenza A 12/01/2017     Pulmonary hypertension (H)      Pulmonary nodules      Rheumatoid arthritis (H)      Sepsis (H) 12/24/2017     Sleep History: Patient reports waking up several times throughout the night with shortness of breath. Takes frequent naps throughout the day, but states \"I have always been a nelly.\"    Past Surgical History:     Past Surgical History:   Procedure Laterality Date     APPENDECTOMY       BLADDER SUSPENSION       CHOLECYSTECTOMY       CLOSED " REDUCTION, PERCUTANEOUS PINNING HIP Left 3/15/2023    Procedure: CLOSED REDUCTION, HIP, WITH PERCUTANEOUS PINNING;  Surgeon: Denton Mckeon MD;  Location: [facility] OR     PIC TRIPLE LUMEN PLACEMENT  12/13/2022            Family History:     Family History   Problem Relation Age of Onset     Heart Failure Mother      Cerebrovascular Disease Father      Kidney failure Sister      Cerebrovascular Disease Brother      Diabetes Type 2  Brother      Family history reviewed and is not pertinent to the chief complaint or presenting problem    Social History:    reports that she quit smoking about 20 years ago. Her smoking use included cigarettes. She smoked an average of .5 packs per day. She has never used smokeless tobacco. She reports that she does not drink alcohol and does not use drugs.     Exercise History: Patient is currently residing in a TCU following a L hip fracture 3/14/23, limiting her mobility. She says that she is only able to walk ~50 feet with her walker before needing to rest due to dyspnea.     Meds:     Current Outpatient Medications   Medication Sig Dispense Refill     Acetaminophen (ACETAMIN PO) Take 1,000 mg by mouth 3 times daily       albuterol (PROAIR HFA/PROVENTIL HFA/VENTOLIN HFA) 108 (90 Base) MCG/ACT inhaler Inhale 2 puffs into the lungs every 4 hours as needed for shortness of breath / dyspnea or wheezing       allopurinol (ZYLOPRIM) 300 MG tablet Take 1 tablet (300 mg) by mouth daily 60 tablet 0     budesonide-formoterol (SYMBICORT) 160-4.5 MCG/ACT Inhaler Inhale 2 puffs into the lungs 2 times daily       cholestyramine (QUESTRAN) 4 g packet 1 packet mixed with water or non-carbonated drink Orally Three times daily       compressor, for nebulizer Saran [COMPRESSOR, FOR NEBULIZER SARAN] Nebulizer treatment qid prn 1 Device 0     dicyclomine (BENTYL) 20 MG tablet TAKE 1 TABLET BY MOUTH THREE TIMES DAILY AS NEEDED FOR ABDOMINAL DISCOMFORT       diltiazem ER COATED BEADS  (CARDIZEM CD/CARTIA XT) 120 MG 24 hr capsule Take 1 capsule (120 mg) by mouth daily       DULoxetine (CYMBALTA) 20 MG capsule Take 20 mg by mouth daily       famotidine (PEPCID) 20 MG tablet Take 20 mg by mouth daily       folic acid (FOLVITE) 1 MG tablet Take 1 tablet (1 mg) by mouth daily 90 tablet 0     ipratropium - albuterol 0.5 mg/2.5 mg/3 mL (DUONEB) 0.5-2.5 (3) MG/3ML neb solution Take 1 vial (3 mLs) by nebulization every 4 hours as needed for wheezing or shortness of breath / dyspnea 90 mL 0     Lactobacillus-Inulin ([facility] DIGESTIVE [facility]) CAPS Take 1 capsule by mouth 2 times daily       MAGIC MOUTHWASH (FV STANDARD FORMULA) Swish and swallow 10 mLs in mouth every 6 hours as needed for mouth sores       magnesium oxide (MAG-OX) 400 MG tablet Take 400 mg by mouth daily       methocarbamol (ROBAXIN) 500 MG tablet Take 500 mg by mouth daily as needed for muscle spasms       methotrexate sodium 2.5 MG TABS Take 3 tablets (7.5 mg) by mouth once a week 36 tablet 0     metoprolol succinate ER (TOPROL XL) 50 MG 24 hr tablet Take 1.5 tablets (75 mg) by mouth daily       oxyCODONE (ROXICODONE) 5 MG tablet Take 0.5-1 tablets (2.5-5 mg) by mouth every 4 hours as needed for breakthrough pain (2.5 mg if pain rating 4-6, 5 mg if pain rating 7-10. Hold if sedated.) 15 tablet 0     pravastatin (PRAVACHOL) 20 MG tablet Take 20 mg by mouth every evening       predniSONE (DELTASONE) 5 MG tablet Take 5 mg by mouth daily       rivaroxaban ANTICOAGULANT (XARELTO) 15 MG TABS tablet Take 1 tablet (15 mg) by mouth daily (with dinner) 90 tablet 3     senna-docusate (SENOKOT-S/PERICOLACE) 8.6-50 MG tablet Take 1 tablet by mouth 2 times daily as needed for constipation 60 tablet 0     Vitamin D3 (CHOLECALCIFEROL) 125 MCG (5000 UT) tablet Take 1 tablet by mouth daily         Allergies:   Codeine, Fosamax [alendronic acid], and Morphine    Review of Systems:   A 12 point comprehensive review of systems was negative except as noted  in the current history.    Objective:      Physical Exam  Wt Readings from Last 3 Encounters:   03/28/23 78.9 kg (174 lb)   03/23/23 69.6 kg (153 lb 7 oz)   01/12/23 69.2 kg (152 lb 9.6 oz)     There is no height or weight on file to calculate BMI.  BP (!) 149/64   Pulse (!) 124   Temp 98.4  F (36.9  C) (Oral)   Resp 22   SpO2 (!) 89%     General Appearance: Laying in bed, breathing through pursed lips intermittently.   HEENT: No scleral icterus; the mucous membranes were mildly dry.   Neck:  No cervical bruits or jugular venous distention.  Chest: Mild kyphosis.. No tenderness to chest wall.   Lungs: Mild increased work of breathing; the lungs are clear to auscultation but poor air movement noted.  Cardiovascular: Epigastric PMI. Tachycardic, irregular. 2/6 blowing holosystolic murmur left lower sternal border..  Radial pulses are intact and symmetric.  Abdomen: Bowels sounds are present  Extremities: Pink discoloration, warm of left anterior shin surrounding a chronic-appearing ulceration. Trace pitting edema of bilateral lower extremities.   Skin:  No xanthelasma.  Neurologic: Mood and affect are appropriate. Speech is fluent.      EKG (personally reviewed): Atrial fibrillation with RVR, diffuse non-specific ST flattening.  Telemetry demonstrates rates in the 150s, slowing to around 100 following diltiazem    Imaging:  Chest XR 04/06/23:   IMPRESSION: Similar prominence of the cardiomediastinal silhouette with prominence of the pulmonary vasculature. Small right pleural effusion.    CT Abdomen Pelvis w/o contrast 04/06/23:  VASCULATURE: Extensive atherosclerosis.  IMPRESSION:   1.  Findings suggestive of a diarrheal state. There is mild wall thickening of the majority of the colon suggestive of infectious or inflammatory colitis.   2.  Manifestations of third spacing moderate right pleural effusion, trace left pleural effusion, and diffuse anasarca.    Cardiac diagnostics:   ECHO 03/14/23:   1. Normal left  ventricular size and systolic performance with a visually  estimated ejection fraction of 60%.  2. There is mild aortic stenosis.  3. There is mild aortic insufficiency.  4. There is mild-moderate, to perhaps moderate, mitral insufficiency.  5. Borderline right ventricular enlargement with normal right ventricular  systolic performance.  6. There is moderate biatrial enlargement.  7. Right ventricular systolic pressure relative to right atrial pressure is  mildly increased. The pulmonary artery pressure is estimated to be 45-50 mmHg  plus right atrial pressure (the IVC is of normal caliber).      Lab Review     Lab Results   Component Value Date    HGB 9.8 04/06/2023     Lab Results   Component Value Date     04/06/2023     Lab Results   Component Value Date    POTASSIUM 4.2 04/06/2023    POTASSIUM 4.6 11/10/2022     Lab Results   Component Value Date    BUN 25.1 04/06/2023    BUN 13 11/10/2022     Lab Results   Component Value Date    CR 1.11 04/06/2023     Lab Results   Component Value Date    CHOL 154 01/28/2022    HDL 65 01/28/2022    LDL 63 01/28/2022    TRIG 132 01/28/2022           Susan Valdez, MS4  4/6/2023  I have seen and examined the patient, reviewed the student s note, and agree with history, exam, and plan of care as we have outlined. Bolivar Ward MD       Note created using Dragon voice recognition software.  Sound alike errors may have escaped editing.    Clinically Significant Risk Factors Present on Admission           # Hypercalcemia: Highest Ca = 10.3 mg/dL in last 2 days, will monitor as appropriate    # Hypoalbuminemia: Lowest albumin = 3.4 g/dL at 4/6/2023  4:05 AM, will monitor as appropriate   # Drug Induced Coagulation Defect: home medication list includes an anticoagulant medication

## 2023-04-06 NOTE — ED NOTES
[facility] ED Handoff Report    ED Chief Complaint: Irregular heartbeat, gen weakness    ED Diagnosis:  (I48.91) Atrial fibrillation with RVR (H)  Comment:   Plan:     (R06.00) Dyspnea, unspecified type  Comment:   Plan:     (L03.116) Cellulitis of left leg  Comment:   Plan:     (A41.9) Sepsis, due to unspecified organism, unspecified whether acute organ dysfunction present (H)  Comment: rule out  Plan:     (R21) Rash and nonspecific skin eruption  Comment:   Plan:     (R19.5) Loose stools  Comment: history of c.difficile  Plan:     (R74.8) Elevated alkaline phosphatase level  Comment:   Plan:     (N18.9) Chronic renal impairment, unspecified CKD stage  Comment:   Plan:        PMH:    Past Medical History:   Diagnosis Date    Atrial fibrillation (H)     CKD (chronic kidney disease) stage 3, GFR 30-59 ml/min (H)     COPD (chronic obstructive pulmonary disease) (H)     nocturnal oxygen    CRF (chronic renal failure)     GERD (gastroesophageal reflux disease)     Hypertension     Hypovolemic shock (H)     Influenza A 12/01/2017    Pulmonary hypertension (H)     Pulmonary nodules     Rheumatoid arthritis (H)     Sepsis (H) 12/24/2017        Code Status:  No CPR- Do NOT Intubate     Falls Risk: Yes Band: Applied    Current Living Situation/Residence: lives in a house but has been hospitalized or in TCU since December.     Elimination Status: Continent: No     Activity Level: Total assist/lift    Patients Preferred Language:  English     Needed: No    Vital Signs:  /54   Pulse 94   Temp 98.4  F (36.9  C) (Oral)   Resp 26   SpO2 91%      Cardiac Rhythm: A fib    Pain Score: 0/10    Is the Patient Confused:  No    Last Food or Drink: 04/06/23 at 1745    Focused Assessment:  Pt SOB at baseline - COPD. Chronic 2-3L NC. Sats in mid-90s on 2L. Target O2 above 88. Recurrent c diff - [facility] follows and has been consulted. Incont, using brief. Several small liquid stools today. Another stool sample  needed for enteric bacteria/virus panel, no BM large enough for sample yet. Wound to L shin + cellulitis. ID following. Wound GIUSEPPE, not draining. Pt in A fib RVR upon arrival, managed with dilt drip and pt's usual PO metoprolol. Maxed out on dilt, maintaining HR mid 90s. Pt ok to take pills with water. Pt has intermittent tremor, pt states this is not new. Clear liquid diet. Aox4. Pt has not been out of bed since arrival.    Tests Performed: Done: Labs and Imaging    Treatments Provided:  IV abx, IV diltiazem, PO prednisone, inhaler    Family Dynamics/Concerns: No    Family Updated On Visitor Policy: Yes    Plan of Care Communicated to Family: Yes    Who Was Updated about Plan of Care: both daughters    Belongings Checklist Done and Signed by Patient: Yes    Medications sent with patient: inhaler    Covid: asymptomatic , negative    Additional Information:     RN: Jorge Luis Campo RN   4/6/2023 6:31 PM

## 2023-04-06 NOTE — ED TRIAGE NOTES
Patient arrives by [facility] from [facility] TCU. Has C. Diff. HHas been having increased weakness, N/V/D. Hx COPD on 3L NC. Per EMS patient's heart rate 130-150s in route, A. Fib w/ RVR. Patient transfers assist of 1     Triage Assessment     Row Name 04/06/23 0319       Triage Assessment (Adult)    Airway WDL WDL       Respiratory WDL    Respiratory WDL WDL       Skin Circulation/Temperature WDL    Skin Circulation/Temperature WDL WDL       Cardiac WDL    Cardiac WDL X;rhythm    Pulse Rate & Regularity apical pulse irregular    Cardiac Rhythm Atrial fibrillation       Cognitive/Neuro/Behavioral WDL    Cognitive/Neuro/Behavioral WDL WDL

## 2023-04-06 NOTE — ED NOTES
Bed: [facility]  Expected date: 4/6/23  Expected time: 3:05 AM  Means of arrival:   Comments:  Gume  37yr old Female  N/V/D Afib w RVR

## 2023-04-06 NOTE — ED PROVIDER NOTES
Emergency Department Encounter     Evaluation Date & Time:   4/6/2023  3:06 AM    CHIEF COMPLAINT:  Irregular Heart Beat and Generalized Weakness      Triage Note:     FINAL IMPRESSION:    ICD-10-CM    1. Atrial fibrillation with RVR (H)  I48.91       2. Dyspnea, unspecified type  R06.00       3. Cellulitis of left leg  L03.116       4. Sepsis, due to unspecified organism, unspecified whether acute organ dysfunction present (H)  A41.9     rule out      5. Rash and nonspecific skin eruption  R21       6. Loose stools  R19.5     history of c.difficile      7. Elevated alkaline phosphatase level  R74.8       8. Chronic renal impairment, unspecified CKD stage  N18.9           Impression and Plan     ED COURSE & MEDICAL DECISION MAKING:  3:10 AM I introduced myself to the patient, obtained patient history, performed a physical exam, and discussed plan for ED workup including potential diagnostic laboratory/imaging studies and interventions.  ED Course as of 04/06/23 0708   Thu Apr 06, 2023   0354 Fatuma is here for progression of weakness.  With this she did have looser stools today than she has been but typically has about 3 bowel movements a day.  She is concerned that her C. difficile colitis has returned.  She was just hospitalized within the last couple of weeks for an ORIF of her femur from a fracture.  At that time she reported that she was not having diarrhea bowel movements or frequent stools.  However, she has had recurrent C. difficile colitis and this may be early presentation of a recurrence of that again.  I did order testing for it if she provides a stool sample today.    However, her left lower extremity is newly erythematous which she did not notice but it seems to involve the entire anterior shin and extending slightly onto the calf and is concerning for cellulitis.  It seems to be located around a chronic ulceration that is still weeping serous fluid and is likely the source of entry for  cellulitis.  Given that she has such an extensive history of C. difficile colitis, before I give her antibiotics I will do an ultrasound to assure that there is no DVT causing this given the recent injury to that leg but clinically it appears consistent with a cellulitis.  She is afebrile with it but does have an elevated heart rate so cannot rule out sepsis but more likely the elevated heart rate seems to be secondary to her chronic underlying atrial fibrillation.  She is generally managed with oral diltiazem so I will place her on IV diltiazem to try to get her heart rate better controlled.  She is more short of breath with this so we will also get chest x-ray imaging to assure that there is no CHF developing or new pneumonia/infection to cause the increase in her heart rate.  There is no blood in her stools or emesis to suggest an ischemic colitis.  Her abdomen is soft and nontender so no indication for imaging at this time.   0630 Based on her chart review, her elevated white blood cell count has been present over the course of the last couple of months and no obvious source has been identified.  It seems to be at about the same level as it was a month ago.  Her COVID swab is negative and she does not appear to be retaining CO2 and her venous blood gas.  BNP and troponin mildly elevated do suggest the presence of acute on chronic heart failure again likely related to her poorly controlled atrial fibrillation.  There is no pulmonary edema to need to place her on CPAP and she is doing well just on nasal cannula oxygen which is even been weaned off.  These levels appear to be around her baseline (just above her baseline [id number])  Her calcium level has improved compared to her last hospitalization.  Alkaline phosphatase has been slowly elevating so will review imaging from February and see if need ultrasound or ct to view her liver for possible obstructing cause, otherwise with recent high ca and now elevating  phos need to consider paraneoplastic issue.   0636 Will need to start antibiotics for the cellulitis in the presence of elevated heart rate with her atrial fibrillation suggesting possible sepsis.   0651 Patient just had loose stool that rn collected to send for c.diff.  no sour smell.   0651 Patient is responding well to the diltiazem drip.  She does need antibiotics for the cellulitis and I did discuss this with her.  She is agreeable to it.  We will try to avoid fluoroquinolone, clindamycin, and third-generation cephalosporins I think that we could do Ancef for this cellulitis as I do not see a history of MRSA for her.  Interestingly she does also have a rash on her trunk which appears to be possible medication type rash.  She has not noticed it   0700 Patient agreeable to transfer, order placed.  Signed out to dr mendoza.       At the conclusion of the encounter I discussed the results of all the tests and the disposition. The questions were answered. The patient or family acknowledged understanding and was agreeable with the care plan.          30 minutes of critical care time        MEDICATIONS GIVEN IN THE EMERGENCY DEPARTMENT:  Medications   sodium chloride (PF) 0.9% PF flush 3 mL (has no administration in time range)   sodium chloride (PF) 0.9% PF flush 3 mL (3 mLs Intracatheter $Given 4/6/23 0407)   diltiazem (CARDIZEM) 125 mg in sodium chloride 0.9 % 125 mL infusion (has no administration in time range)   ceFAZolin (ANCEF) 1 g vial to attach to  ml bag for ADULT or 50 ml bag for PEDS (has no administration in time range)   diltiazem (CARDIZEM) injection 15 mg (15 mg Intravenous $Given 4/6/23 0644)       NEW PRESCRIPTIONS STARTED AT TODAY'S ED VISIT:  New Prescriptions    No medications on file       HPI     HPI     Fatuma Henry is a 77 year old female with a pertinent history of hypertension, hyperlipidemia, CKD stage 3, atrial fibrillation, GERD, shortness of breath and diarrhea who presents to  this ED via EMS for evaluation of fatigue.    Per EMS, patient has been in the TCU ([facility]) in the past few days for C.diff. History of COPD and is on 3L of O2 at baseline. Presents for worsening shortness of breath, weakness, vomiting and diarrhea. -150's. History of atrial fibrillation. No meds given en route.     Per patient, patient reports worsening weakness, vomiting, and fatigue for the past couple days while recovering from C.Diff at the TCU. States that she has worsening shortness of breath as well despite being on 3L of O2. Endorses diarrhea today as well, but denies any blood in her stool or hematemesis. She usually has 3-4 bowel movements a day and her last bowel movement was 3-4 days ago. Also has new redness on her left leg. Reports that she's had C.Diff 3x before and is not on any antibiotics for her C. Diff currently. Denies any chest pain. Patient uses a wheelchair and walker.     Patient is currently on probiotics, dicyclomine (20 MG), daily prednisone (5 MG) and oxycodone as needed per her paperwork from the assisted living. Patient's POLST that was included from the assisted living dated 3/2023 stated she's DNR.      REVIEW OF SYSTEMS:  Review of Systems   Constitutional: Positive for fatigue.   Respiratory: Positive for shortness of breath.    Cardiovascular: Negative for chest pain.   Gastrointestinal: Positive for diarrhea and vomiting.   Neurological: Positive for weakness.     remainder of systems are all otherwise negative.        Medical History     Past Medical History:   Diagnosis Date     Atrial fibrillation (H)      CKD (chronic kidney disease) stage 3, GFR 30-59 ml/min (H)      COPD (chronic obstructive pulmonary disease) (H)      CRF (chronic renal failure)      GERD (gastroesophageal reflux disease)      Hypertension      Hypovolemic shock (H)      Influenza A 12/01/2017     Pulmonary hypertension (H)      Pulmonary nodules      Rheumatoid arthritis (H)       Sepsis (H) 2017       Past Surgical History:   Procedure Laterality Date     APPENDECTOMY       BLADDER SUSPENSION       CHOLECYSTECTOMY       CLOSED REDUCTION, PERCUTANEOUS PINNING HIP Left 3/15/2023    Procedure: CLOSED REDUCTION, HIP, WITH PERCUTANEOUS PINNING;  Surgeon: Denton Mckeon MD;  Location: [facility] OR     PICC TRIPLE LUMEN PLACEMENT  2022            Family History   Problem Relation Age of Onset     Heart Failure Mother      Cerebrovascular Disease Father      Kidney failure Sister      Cerebrovascular Disease Brother      Diabetes Type 2  Brother        Social History     Tobacco Use     Smoking status: Former     Packs/day: 0.50     Types: Cigarettes     Quit date: 2017     Years since quittin.2     Smokeless tobacco: Never   Substance Use Topics     Alcohol use: No     Drug use: No       Acetaminophen (ACETAMIN PO)  albuterol (PROAIR HFA/PROVENTIL HFA/VENTOLIN HFA) 108 (90 Base) MCG/ACT inhaler  allopurinol (ZYLOPRIM) 300 MG tablet  budesonide-formoterol (SYMBICORT) 160-4.5 MCG/ACT Inhaler  cholestyramine (QUESTRAN) 4 g packet  compressor, for nebulizer Eva  dicyclomine (BENTYL) 20 MG tablet  diltiazem ER COATED BEADS (CARDIZEM CD/CARTIA XT) 120 MG 24 hr capsule  DULoxetine (CYMBALTA) 20 MG capsule  famotidine (PEPCID) 20 MG tablet  folic acid (FOLVITE) 1 MG tablet  ipratropium - albuterol 0.5 mg/2.5 mg/3 mL (DUONEB) 0.5-2.5 (3) MG/3ML neb solution  Lactobacillus-Inulin (CULTURELLE DIGESTIVE HEALTH) CAPS  MAGIC MOUTHWASH (FV STANDARD FORMULA)  magnesium oxide (MAG-OX) 400 MG tablet  methocarbamol (ROBAXIN) 500 MG tablet  methotrexate sodium 2.5 MG TABS  metoprolol succinate ER (TOPROL XL) 50 MG 24 hr tablet  oxyCODONE (ROXICODONE) 5 MG tablet  pravastatin (PRAVACHOL) 20 MG tablet  predniSONE (DELTASONE) 5 MG tablet  rivaroxaban ANTICOAGULANT (XARELTO) 15 MG TABS tablet  senna-docusate (SENOKOT-S/PERICOLACE) 8.6-50 MG tablet  Vitamin D3 (CHOLECALCIFEROL) 125  [facility] (5000 UT) tablet        Physical Exam     First Vitals:  Patient Vitals for the past 24 hrs:   BP Temp Temp src Pulse Resp SpO2   04/06/23 0651 122/58 -- -- 97 22 100 %   04/06/23 0636 123/89 -- -- (!) 157 (!) 31 99 %   04/06/23 0629 -- -- -- (!) 152 21 100 %   04/06/23 0615 128/89 -- -- (!) 153 25 100 %   04/06/23 0521 -- -- -- (!) 140 17 96 %   04/06/23 0516 -- -- -- (!) 150 23 93 %   04/06/23 0515 (!) 144/61 -- -- (!) 151 29 94 %   04/06/23 0435 -- -- -- (!) 145 22 100 %   04/06/23 0430 (!) 120/93 -- -- -- -- --   04/06/23 0429 -- -- -- (!) 143 19 100 %   04/06/23 0415 -- -- -- (!) 152 24 97 %   04/06/23 0400 (!) 193/151 -- -- (!) 151 30 94 %   04/06/23 0330 135/65 -- -- (!) 152 17 100 %   04/06/23 0319 138/84 98.4  F (36.9  C) Oral -- 22 --       PHYSICAL EXAM:   Constitutional: Sitting up in bed answering questions but gets breathless   HENT:  Normocephalic, Patient was wearing a mask, voice is normal.   Eyes:  PERRL, EOMI, Conjunctiva normal, No discharge, no scleral icterus.  Respiratory:  Mild increased work of breathing Lungs clear to auscultation.   Cardiovascular:  Tachycardic, irregular, somewhat distant heart sound, nl s1s2 0 murmurs, rubs, or gallops.  Peripheral pulses dp, pt, and radial are wnl.  No peripheral edema   GI:  Bowel sounds normal, Soft, No tenderness, No flank tenderness, nondistended.  :No CVA tenderness.   Musculoskeletal:  Moves all extremities.  No erythematous or swollen major joints,   Integument:  Pink discoloration to the left anterior shin surrounding a chronic ulceration, dry surface and weeping slightly.   Diffuse macular rash on trunc concentrated around neck and groin.  Neurologic:  Alert & oriented x 3, Normal motor function, Normal sensory function, No focal deficits noted. Normal speech.  Psychiatric:  Affect normal, Judgment normal, Mood normal.             Results     LAB AND RADIOLOGY:  All pertinent labs reviewed and interpreted  Results for orders placed or  performed during the hospital encounter of 04/06/23   XR Chest 1 View     Status: None    Narrative    EXAM: XR CHEST 1 VIEW  LOCATION: [facility]  DATE/TIME: 4/6/2023 4:49 AM    INDICATION: copd and worsened dyspnea  COMPARISON: 03/24/2023      Impression    IMPRESSION: Similar prominence of the cardiomediastinal silhouette with prominence of the pulmonary vasculature. Small right pleural effusion.   XR Tibia and Fibula Left 2 Views     Status: None    Narrative    EXAM: XR TIBIA AND FIBULA LEFT 2 VIEWS  LOCATION: [facility]  DATE/TIME: 4/6/2023 4:50 AM    INDICATION: redness shin around chronic ulceration  COMPARISON: None.      Impression    IMPRESSION: Normal tibia and fibula. No fracture. No bony erosion to suggest osteomyelitis. Soft tissue swelling about the shin.   US Lower Extremity Venous Duplex Left     Status: None    Narrative    EXAM: US LOWER EXTREMITY VENOUS DUPLEX LEFT  LOCATION: [facility]  DATE/TIME: 4/6/2023 5:07 AM    INDICATION: redness swelling shin calf around chronic ulceration anterior shin.  (had recent orthopedic surgeyr to femur)  COMPARISON: None.  TECHNIQUE: Venous Duplex ultrasound of the left lower extremity with and without compression, augmentation and duplex. Color flow and spectral Doppler with waveform analysis performed.    FINDINGS: Exam includes the common femoral, femoral, popliteal, and contralateral common femoral veins as well as segmentally visualized deep calf veins and greater saphenous vein.     LEFT: No deep vein thrombosis. No superficial thrombophlebitis. No popliteal cyst.      Impression    IMPRESSION:  1.  No deep venous thrombosis in the left lower extremity.   Comprehensive metabolic panel     Status: Abnormal   Result Value Ref Range    Sodium 138 136 - 145 mmol/L    Potassium 4.2 3.4 - 5.3 mmol/L    Chloride 103 98 - 107 mmol/L    Carbon Dioxide (CO2) 25 22 - 29 mmol/L    Anion Gap 10  7 - 15 mmol/L    Urea Nitrogen 25.1 (H) 8.0 - 23.0 mg/dL    Creatinine 1.11 (H) 0.51 - 0.95 mg/dL    Calcium 10.0 8.8 - 10.2 mg/dL    Glucose 112 (H) 70 - 99 mg/dL    Alkaline Phosphatase 228 (H) 35 - 104 U/L    AST 20 10 - 35 U/L    ALT 18 10 - 35 U/L    Protein Total 5.9 (L) 6.4 - 8.3 g/dL    Albumin 3.4 (L) 3.5 - 5.2 g/dL    Bilirubin Total 0.7 <=1.2 mg/dL    GFR Estimate 51 (L) >60 mL/min/1.73m2   Lactic acid whole blood     Status: Normal   Result Value Ref Range    Lactic Acid 1.3 0.7 - 2.0 mmol/L   Blood gas venous     Status: Abnormal   Result Value Ref Range    pH Venous 7.39 7.35 - 7.45    pCO2 Venous 46 35 - 50 mm Hg    pO2 Venous 21 (L) 25 - 47 mm Hg    Bicarbonate Venous 28 24 - 30 mmol/L    Base Excess/Deficit (+/-) 2.8   mmol/L    Oxyhemoglobin Venous 31.6 (L) 70.0 - 75.0 %    O2 Sat, Venous 32.1 (L) 70.0 - 75.0 %   Lipase     Status: Abnormal   Result Value Ref Range    Lipase 7 (L) 13 - 60 U/L   Symptomatic Influenza A/B, RSV, & SARS-CoV2 PCR (COVID-19) Nasopharyngeal     Status: Normal    Specimen: Nasopharyngeal; Swab   Result Value Ref Range    Influenza A PCR Negative Negative    Influenza B PCR Negative Negative    RSV PCR Negative Negative    SARS CoV2 PCR Negative Negative    Narrative    Testing was performed using the Xpert Xpress CoV2/Flu/RSV Assay on the Cepheid GeneXpert Instrument. This test should be ordered for the detection of SARS-CoV-2, influenza, and RSV viruses in individuals who meet clinical and/or epidemiological criteria. Test performance is unknown in asymptomatic patients. This test is for in vitro diagnostic use under the FDA EUA for laboratories certified under CLIA to perform high or moderate complexity testing. This test has not been FDA cleared or approved. A negative result does not rule out the presence of PCR inhibitors in the specimen or target RNA in concentration below the limit of detection for the assay. If only one viral target is positive but coinfection with  multiple targets is suspected, the sample should be re-tested with another FDA cleared, approved, or authorized test, if coinfection would change clinical management. This test was validated by the [facility] giddy. These laboratories are certified under the Clinical Laboratory Improvement Amendments of 1988 (CLIA-88) as qualified to perform high complexity laboratory testing.   Troponin T, High Sensitivity     Status: Abnormal   Result Value Ref Range    Troponin T, High Sensitivity 41 (H) <=14 ng/L   Magnesium     Status: Normal   Result Value Ref Range    Magnesium 1.8 1.7 - 2.3 mg/dL   Nt probnp inpatient (BNP)     Status: Abnormal   Result Value Ref Range    N terminal Pro BNP Inpatient 3,870 (H) 0 - 1,800 pg/mL   CBC with platelets and differential     Status: Abnormal   Result Value Ref Range    WBC Count 13.5 (H) 4.0 - 11.0 10e3/uL    RBC Count 3.22 (L) 3.80 - 5.20 10e6/uL    Hemoglobin 9.8 (L) 11.7 - 15.7 g/dL    Hematocrit 32.2 (L) 35.0 - 47.0 %     78 - 100 fL    MCH 30.4 26.5 - 33.0 pg    MCHC 30.4 (L) 31.5 - 36.5 g/dL    RDW 17.2 (H) 10.0 - 15.0 %    Platelet Count 351 150 - 450 10e3/uL    % Neutrophils 90 %    % Lymphocytes 3 %    % Monocytes 6 %    % Eosinophils 1 %    % Basophils 0 %    % Immature Granulocytes 0 %    NRBCs per 100 WBC 0 <1 /100    Absolute Neutrophils 12.2 (H) 1.6 - 8.3 10e3/uL    Absolute Lymphocytes 0.3 (L) 0.8 - 5.3 10e3/uL    Absolute Monocytes 0.8 0.0 - 1.3 10e3/uL    Absolute Eosinophils 0.1 0.0 - 0.7 10e3/uL    Absolute Basophils 0.1 0.0 - 0.2 10e3/uL    Absolute Immature Granulocytes 0.1 <=0.4 10e3/uL    Absolute NRBCs 0.0 10e3/uL   Extra Tube     Status: None    Narrative    The following orders were created for panel order Extra Tube.  Procedure                               Abnormality         Status                     ---------                               -----------         ------                     Extra Blue Top Tube[[id number]]                               Final result               Extra Red Top Tube[[id number]]                               Final result                 Please view results for these tests on the individual orders.   Extra Blue Top Tube     Status: None   Result Value Ref Range    Hold Specimen JIC    Extra Red Top Tube     Status: None   Result Value Ref Range    Hold Specimen JIC    CBC with platelets differential     Status: Abnormal    Narrative    The following orders were created for panel order CBC with platelets differential.  Procedure                               Abnormality         Status                     ---------                               -----------         ------                     CBC with platelets and d...[[id number]]  Abnormal            Final result                 Please view results for these tests on the individual orders.         ECG:    Performed at: 0319    Impression: atrial fib with rvr, diffuse nonspecific st flattening.  Compared to previous ekg dated3/24/23 nonspecific st findings now present in lateral precordial leads.  Pr *, qrs 74ms, qtc 436ms, prt axes * 59 29.      I have independently reviewed and interpreted the EKS(s) documented above        [facility] Documentation     Medical Decision Making    History:    Supplemental history from: Documented in chart, if applicable and EMS    External Record(s) reviewed: Documented in chart, if applicable.    Work Up:    Chart documentation includes differential considered and any EKGs or imaging independently interpreted by provider, where specified.    In additional to work up documented, I considered the following work up: Documented in chart, if applicable.    External consultation:    Discussion of management with another provider: Documented in chart, if applicable    Complicating factors:    Care impacted by chronic illness: Chronic Kidney Disease, Chronic Lung Disease, Heart Disease, Hyperlipidemia and Hypertension    Care affected by  social determinants of health: N/A    Disposition considerations: Admission considered. Patient was signed out to the oncoming physician, disposition pending.        CMS Diagnoses:sepsis without signs of severe sepsis.       The creation of this record is based on the scribe s observations of the work being performed by Brooke Marks and the provider s statements to them. This document has been checked and approved by MD Asiya Tyson MD  Emergency Medicine  [facility] EMERGENCY DEPARTMENT       Asiya Kohler MD  04/06/23 0710

## 2023-04-06 NOTE — H&P
[facility]    History and Physical - Hospitalist Service       Date of Admission:  4/6/2023    Assessment & Plan      Fatuma Henry is a 77 year old female admitted on 4/6/2023. She has a hx of Parox afib, htn, lipids, pulm htn (on chronic O2), diastolic HF, CKD stage 3, left ICA occlusion, RA, hx of gout, gerd, here now from TCU with Afib RVR and 3 days of n/v/d and concern for cellulitis on left lower leg. She was at TCU for hip fx recovery after fall and fx on 3/14/23.     1.Afib RVR  -hx of issues with this  -HR on admit 130-150  -now on dilt gtt  -continue metoprolol   -last echo 3/23  -will place on tele  -get cards  -was on DOAC    2.N/V/D  -for 3 days  -gastroenteritis, vs colitis  -hx of c diff in past  -check stool  -check ct now abd/pelvis  -npo til get CT    3.Cellulitis left lower leg  -ultrasound neg clot  -anterior erythema new since she was here  -this is on surgical leg  -iv vanco and zosyn now  -will get id to see    4.Hx of c diff  -check stool  -will start prevent vanco po now    5.s/p Hip fx 3/14/23  -summit did repair  -s/p closed reduction with percutaneous pinning  -incision not draining, but slight erythema, had Prevena wound pump at time of discharge  -will ask ortho to see here    6.Leukocytosis  -suspect due to cellulitis  -pan cx, blood, ua/uc, check covid status since in TCU  -check procal  -get ID(high risk hx of RA, on chronic pred) and ortho to see  -iv vanco and zosyn now    7.hx of pulm htn  -on chronic O2 for chronic hypoxic resp failure  -was on diuretic  -with n/v/d hold for now    8.CKD stage 3  -avoid nephrotoxins  -daily bmp  -hold diuretic with n/v/d    9.Chronic hypoxic resp failure  -with COPD  -not in exacerbation  -clarify her meds    10.HTN  --on dilt gtt now for afib  -clarify meds    11.Left ICA occlusion  -to follow with vascular as outpt    12.gerd  -clarify med    13.Depression  -was on Cymbalta    14.hx of pulm nodules  -needs follow up  "with pulm as outpt  -she is due for ct chest to check nodule--will try to get this admit--did discuss with daughter     15.hx of gout    16.hx of RA  -on chronic pred  -hx of methotrexate--hold and stay off for good    17.Low albumin  -moderate malnutrition    18.Code-verified dnr/dni with her    I Called daughter Enid at 0948         Diet:   npo til we get ct  DVT Prophylaxis: DOAC--clarify dose  Brandon Catheter: Not present  Lines: None     Cardiac Monitoring: None  Code Status:   dnr    Clinically Significant Risk Factors Present on Admission           # Hypercalcemia: Highest Ca = 10.3 mg/dL in last 2 days, will monitor as appropriate    # Hypoalbuminemia: Lowest albumin = 3.4 g/dL at 4/6/2023  4:05 AM, will monitor as appropriate  # Drug Induced Coagulation Defect: home medication list includes an anticoagulant medication         # Overweight: Estimated body mass index is 28.96 kg/m  as calculated from the following:    Height as of 3/28/23: 1.651 m (5' 5\").    Weight as of 3/28/23: 78.9 kg (174 lb).           Disposition Plan      Expected Discharge Date: 04/08/2023                  Teena Cantor MD  Hospitalist Service  [facility]  Securely message with [employer] (more info)  Text page via [employer] Paging/Directory     ______________________________________________________________________    Chief Complaint   N/v/d    History is obtained from the patient    History of Present Illness     Fatuma Henry is a 77 year old female admitted on 4/6/2023. She has a hx of Parox afib, htn, lipids, pulm htn (on chronic O2), diastolic HF, CKD stage 3, left ICA occlusion, RA, hx of gout and gerd is coming in via EMS from TCU with n/v/d x 3 days. She had been at TCU since last admit here (She was admitted after fall 3/14/23-3/27/23 had hip fx repair)    She notes she was doing fine with rehab then on Tuesday this week n/v/d started--no abd pain  Stool was watery, not bloody,loose stools about 4 " times /day x 3 days  She notes no abd pain at all  No fevers    She notes left lower leg also about that time started to look red, no pain  Left hip pain had improved  She felt she was doing well with pt/ot     No cp  No sob  She never knows when she is in afib              Past Medical History    Past Medical History:   Diagnosis Date     Atrial fibrillation (H)      CKD (chronic kidney disease) stage 3, GFR 30-59 ml/min (H)      COPD (chronic obstructive pulmonary disease) (H)     nocturnal oxygen     CRF (chronic renal failure)      GERD (gastroesophageal reflux disease)      Hypertension      Hypovolemic shock (H)      Influenza A 12/01/2017     Pulmonary hypertension (H)      Pulmonary nodules      Rheumatoid arthritis (H)      Sepsis (H) 12/24/2017       Past Surgical History   Past Surgical History:   Procedure Laterality Date     APPENDECTOMY       BLADDER SUSPENSION       CHOLECYSTECTOMY       CLOSED REDUCTION, PERCUTANEOUS PINNING HIP Left 3/15/2023    Procedure: CLOSED REDUCTION, HIP, WITH PERCUTANEOUS PINNING;  Surgeon: Denton Mckeon MD;  Location: [facility] OR     PICC TRIPLE LUMEN PLACEMENT  12/13/2022            Prior to Admission Medications   Prior to Admission Medications   Prescriptions Last Dose Informant Patient Reported? Taking?   Acetaminophen (ACETAMIN PO)   Yes No   Sig: Take 1,000 mg by mouth 3 times daily   DULoxetine (CYMBALTA) 20 MG capsule   Yes No   Sig: Take 20 mg by mouth daily   Lactobacillus-Inulin ([facility] DIGESTIVE [facility]) CAPS   Yes No   Sig: Take 1 capsule by mouth 2 times daily   MAGIC MOUTHWASH (FV STANDARD FORMULA)   Yes No   Sig: Swish and swallow 10 mLs in mouth every 6 hours as needed for mouth sores   Vitamin D3 (CHOLECALCIFEROL) 125 MCG (5000 UT) tablet   Yes No   Sig: Take 1 tablet by mouth daily   albuterol (PROAIR HFA/PROVENTIL HFA/VENTOLIN HFA) 108 (90 Base) MCG/ACT inhaler   Yes No   Sig: Inhale 2 puffs into the lungs every 4 hours as needed for  shortness of breath / dyspnea or wheezing   allopurinol (ZYLOPRIM) 300 MG tablet   No No   Sig: Take 1 tablet (300 mg) by mouth daily   budesonide-formoterol (SYMBICORT) 160-4.5 MCG/ACT Inhaler   No No   Sig: Inhale 2 puffs into the lungs 2 times daily   cholestyramine (QUESTRAN) 4 g packet   Yes No   Si packet mixed with water or non-carbonated drink Orally Three times daily   compressor, for nebulizer Saran   No No   Sig: [COMPRESSOR, FOR NEBULIZER SARAN] Nebulizer treatment qid prn   dicyclomine (BENTYL) 20 MG tablet   Yes No   Sig: TAKE 1 TABLET BY MOUTH THREE TIMES DAILY AS NEEDED FOR ABDOMINAL DISCOMFORT   diltiazem ER COATED BEADS (CARDIZEM CD/CARTIA XT) 120 MG 24 hr capsule   No No   Sig: Take 1 capsule (120 mg) by mouth daily   famotidine (PEPCID) 20 MG tablet   Yes No   Sig: Take 20 mg by mouth daily   folic acid (FOLVITE) 1 MG tablet   No No   Sig: Take 1 tablet (1 mg) by mouth daily   ipratropium - albuterol 0.5 mg/2.5 mg/3 mL (DUONEB) 0.5-2.5 (3) MG/3ML neb solution   No No   Sig: Take 1 vial (3 mLs) by nebulization every 4 hours as needed for wheezing or shortness of breath / dyspnea   magnesium oxide (MAG-OX) 400 MG tablet   Yes No   Sig: Take 400 mg by mouth daily   methocarbamol (ROBAXIN) 500 MG tablet   Yes No   Sig: Take 500 mg by mouth daily as needed for muscle spasms   methotrexate sodium 2.5 MG TABS   No No   Sig: Take 3 tablets (7.5 mg) by mouth once a week   metoprolol succinate ER (TOPROL XL) 50 MG 24 hr tablet   No No   Sig: Take 1.5 tablets (75 mg) by mouth daily   oxyCODONE (ROXICODONE) 5 MG tablet   No No   Sig: Take 0.5-1 tablets (2.5-5 mg) by mouth every 4 hours as needed for breakthrough pain (2.5 mg if pain rating 4-6, 5 mg if pain rating 7-10. Hold if sedated.)   pravastatin (PRAVACHOL) 20 MG tablet   Yes No   Sig: Take 20 mg by mouth every evening   predniSONE (DELTASONE) 5 MG tablet   Yes No   Sig: Take 5 mg by mouth daily   rivaroxaban ANTICOAGULANT (XARELTO) 15 MG TABS tablet    No No   Sig: Take 1 tablet (15 mg) by mouth daily (with dinner)   senna-docusate (SENOKOT-S/PERICOLACE) 8.6-50 MG tablet   No No   Sig: Take 1 tablet by mouth 2 times daily as needed for constipation      Facility-Administered Medications: None        Review of Systems    CONSTITUTIONAL:  positive for  fatigue  EYES:  negative  HEENT:  negative  RESPIRATORY:  positive for  Chronic O2  CARDIOVASCULAR:  positive for  No cp or sob, afib hx  GASTROINTESTINAL:  positive for nausea, vomiting and diarrhea  GENITOURINARY:  negative  INTEGUMENT/BREAST:  positive for red skin on left leg  HEMATOLOGIC/LYMPHATIC:  negative  ALLERGIC/IMMUNOLOGIC:  negative  ENDOCRINE:  negative  MUSCULOSKELETAL:  positive for  Recent left hip fx  NEUROLOGICAL:  negative  BEHAVIOR/PSYCH:  negative    Social History   I have reviewed this patient's social history and updated it with pertinent information if needed.  Social History     Tobacco Use     Smoking status: Former     Packs/day: 0.50     Types: Cigarettes     Quit date: 2017     Years since quittin.2     Smokeless tobacco: Never   Substance Use Topics     Alcohol use: No     Drug use: No       Family History   I have reviewed this patient's family history and updated it with pertinent information if needed.  Family History   Problem Relation Age of Onset     Heart Failure Mother      Cerebrovascular Disease Father      Kidney failure Sister      Cerebrovascular Disease Brother      Diabetes Type 2  Brother        Allergies   Allergies   Allergen Reactions     Codeine Nausea and Vomiting     Fosamax [Alendronic Acid] Diarrhea     Morphine Nausea and Vomiting     Other reaction(s): Vomiting        Physical Exam   Vital Signs: Temp: 98.4  F (36.9  C) Temp src: Oral BP: 122/58 Pulse: 97   Resp: 22 SpO2: 100 % O2 Device: Nasal cannula Oxygen Delivery: 2 LPM  Weight: 0 lbs 0 oz    Constitutional: awake, fatigued, alert, cooperative and no apparent distress  Eyes: sclera clear  ENT:  normocepalic, without obvious abnormality  Respiratory: no increased work of breathing, good air exchange, no retractions and diminished breath sounds throughout lungs  Cardiovascular: irregularly irregular rhythm, tachy  GI: normal bowel sounds, soft, non-distended and non-tender  Skin: erythema on left anterior leg, central to this is old scar from skin ulcer, left lateral thigh- healing incision, some erythema -no drainage  Musculoskeletal: no lower extremity pitting edema present  Neurologic: Mental Status Exam:  Level of Alertness:   awake  Neuropsychiatric: General: normal, calm and normal eye contact    Medical Decision Making       75 MINUTES SPENT BY ME on the date of service doing chart review, history, exam, documentation & further activities per the note.      Data     I have personally reviewed the following data over the past 24 hrs:    13.5 (H)  \   9.8 (L)   / 351     138 103 25.1 (H) /  112 (H)   4.2 25 1.11 (H) \       ALT: 18 AST: 20 AP: 228 (H) TBILI: 0.7   ALB: 3.4 (L) TOT PROTEIN: 5.9 (L) LIPASE: 7 (L)       Trop: 41 (H) BNP: 3,870 (H)       Procal: N/A CRP: N/A Lactic Acid: 1.3         Imaging results reviewed over the past 24 hrs:   Recent Results (from the past 24 hour(s))   XR Chest 1 View    Narrative    EXAM: XR CHEST 1 VIEW  LOCATION: [facility]  DATE/TIME: 4/6/2023 4:49 AM    INDICATION: copd and worsened dyspnea  COMPARISON: 03/24/2023      Impression    IMPRESSION: Similar prominence of the cardiomediastinal silhouette with prominence of the pulmonary vasculature. Small right pleural effusion.   XR Tibia and Fibula Left 2 Views    Narrative    EXAM: XR TIBIA AND FIBULA LEFT 2 VIEWS  LOCATION: [facility]  DATE/TIME: 4/6/2023 4:50 AM    INDICATION: redness shin around chronic ulceration  COMPARISON: None.      Impression    IMPRESSION: Normal tibia and fibula. No fracture. No bony erosion to suggest osteomyelitis. Soft tissue swelling  about the shin.   US Lower Extremity Venous Duplex Left    Narrative    EXAM: US LOWER EXTREMITY VENOUS DUPLEX LEFT  LOCATION: [facility]  DATE/TIME: 4/6/2023 5:07 AM    INDICATION: redness swelling shin calf around chronic ulceration anterior shin.  (had recent orthopedic surgeyr to femur)  COMPARISON: None.  TECHNIQUE: Venous Duplex ultrasound of the left lower extremity with and without compression, augmentation and duplex. Color flow and spectral Doppler with waveform analysis performed.    FINDINGS: Exam includes the common femoral, femoral, popliteal, and contralateral common femoral veins as well as segmentally visualized deep calf veins and greater saphenous vein.     LEFT: No deep vein thrombosis. No superficial thrombophlebitis. No popliteal cyst.      Impression    IMPRESSION:  1.  No deep venous thrombosis in the left lower extremity.

## 2023-04-06 NOTE — PHARMACY-ADMISSION MEDICATION HISTORY
Pharmacist Admission Medication History    Admission medication history is complete. The information provided in this note is only as accurate as the sources available at the time of the update.    Medication reconciliation/reorder completed by provider prior to medication history? Yes    Information Source(s): Facility ([facility]/) medication list/MAR via N/A    Pertinent Information: MAR provided by [facility]     Changes made to PTA medication list:    Added: None    Deleted: None    Changed: None    Medication Affordability:       Allergies reviewed with patient and updates made in EHR: yes    Medication History Completed By: SHANTANU HODGE RPH 4/6/2023 8:58 AM    PTA Med List   Medication Sig Last Dose     Acetaminophen (ACETAMIN PO) Take 1,000 mg by mouth 3 times daily 4/5/2023 at 1400     albuterol (PROAIR HFA/PROVENTIL HFA/VENTOLIN HFA) 108 (90 Base) MCG/ACT inhaler Inhale 2 puffs into the lungs every 4 hours as needed for shortness of breath / dyspnea or wheezing Unknown at prn     allopurinol (ZYLOPRIM) 300 MG tablet Take 1 tablet (300 mg) by mouth daily 4/5/2023 at 0800     budesonide-formoterol (SYMBICORT) 160-4.5 MCG/ACT Inhaler Inhale 2 puffs into the lungs 2 times daily 4/5/2023 at 1600     cholestyramine (QUESTRAN) 4 g packet 1 packet 3 times daily (with meals) 4/5/2023 at 1400     dicyclomine (BENTYL) 20 MG tablet TAKE 1 TABLET BY MOUTH THREE TIMES DAILY AS NEEDED FOR ABDOMINAL DISCOMFORT Unknown at prn     diltiazem ER COATED BEADS (CARDIZEM CD/CARTIA XT) 120 MG 24 hr capsule Take 1 capsule (120 mg) by mouth daily 4/4/2023     DULoxetine (CYMBALTA) 20 MG capsule Take 20 mg by mouth daily 4/5/2023 at 0800     famotidine (PEPCID) 20 MG tablet Take 20 mg by mouth daily 4/5/2023 at 0800     folic acid (FOLVITE) 1 MG tablet Take 1 tablet (1 mg) by mouth daily 4/5/2023 at 0800     Lactobacillus-Inulin ([facility] DIGESTIVE [facility]) CAPS Take 1 capsule by mouth 2 times daily 4/5/2023 at 0800      MAGIC MOUTHWASH (FV STANDARD FORMULA) Swish and swallow 10 mLs in mouth every 6 hours as needed for mouth sores Unknown at prn     magnesium oxide (MAG-OX) 400 MG tablet Take 400 mg by mouth daily 4/5/2023 at 0800     methocarbamol (ROBAXIN) 500 MG tablet Take 500 mg by mouth daily as needed for muscle spasms Unknown at prn     metoprolol succinate ER (TOPROL XL) 50 MG 24 hr tablet Take 1.5 tablets (75 mg) by mouth daily 4/4/2023 at 0800     oxyCODONE (ROXICODONE) 5 MG tablet Take 0.5-1 tablets (2.5-5 mg) by mouth every 4 hours as needed for breakthrough pain (2.5 mg if pain rating 4-6, 5 mg if pain rating 7-10. Hold if sedated.) Unknown at prn     pravastatin (PRAVACHOL) 20 MG tablet Take 20 mg by mouth every evening 4/4/2023 at 2000     predniSONE (DELTASONE) 5 MG tablet Take 5 mg by mouth daily 4/5/2023 at 0800     rivaroxaban ANTICOAGULANT (XARELTO) 15 MG TABS tablet Take 1 tablet (15 mg) by mouth daily (with dinner) 4/5/2023 at 1700     senna-docusate (SENOKOT-S/PERICOLACE) 8.6-50 MG tablet Take 1 tablet by mouth 2 times daily as needed for constipation Unknown at prn     Vitamin D3 (CHOLECALCIFEROL) 125 MCG (5000 UT) tablet Take 1 tablet by mouth daily 4/5/2023 at 0800

## 2023-04-06 NOTE — ED NOTES
EMERGENCY DEPARTMENT SIGN OUT NOTE        ED COURSE AND MEDICAL DECISION MAKING  Patient was signed out to me by Dr Asiya Kohler at 7:12 AM.     In brief, Fatuma Henry is a 77 year old female who initially presented for evaluation of fatigue.     Per EMS, patient has been in the TCU ([facility]) in the past few days for C.diff. History of COPD and is on 3L of O2 at baseline. Presents for worsening shortness of breath, weakness, vomiting and diarrhea. -150's.      Per patient, she reports worsening weakness, vomiting, and fatigue for the past couple days while recovering from C.Diff at the TCU. States that she has worsening shortness of breath as well despite being on 3L of O2. Endorses diarrhea today as well, but denies any blood in her stool or hematemesis. She usually has 3-4 bowel movements a day and her last bowel movement was 3-4 days ago. Also has new redness on her left leg. Reports that she's had C.Diff 3x before and is not on any antibiotics for her C. Diff currently.     At time of sign out, disposition was pending placement.     Update: At time of signout we are waiting for the patient in transfer protocol to see if we can get her transferred elsewhere due to bed capacity.  I was notified that there are no beds in any facility and we will proceed with admission here.    7:41 AM I discussed the case with hospitalist, Dr Cantor, who accepts the patient.    FINAL IMPRESSION    1. Atrial fibrillation with RVR (H)    2. Dyspnea, unspecified type    3. Cellulitis of left leg    4. Sepsis, due to unspecified organism, unspecified whether acute organ dysfunction present (H)    5. Rash and nonspecific skin eruption    6. Loose stools    7. Elevated alkaline phosphatase level    8. Chronic renal impairment, unspecified CKD stage        ED MEDS  Medications   sodium chloride (PF) 0.9% PF flush 3 mL (has no administration in time range)   sodium chloride (PF) 0.9% PF flush 3 mL (3 mLs  Intracatheter $Given 4/6/23 7965)   diltiazem (CARDIZEM) 125 mg in sodium chloride 0.9 % 125 mL infusion (has no administration in time range)   ceFAZolin (ANCEF) 1 g vial to attach to  ml bag for ADULT or 50 ml bag for PEDS (has no administration in time range)   diltiazem (CARDIZEM) injection 15 mg (15 mg Intravenous $Given 4/6/23 1782)       LAB  Labs Ordered and Resulted from Time of ED Arrival to Time of ED Departure   COMPREHENSIVE METABOLIC PANEL - Abnormal       Result Value    Sodium 138      Potassium 4.2      Chloride 103      Carbon Dioxide (CO2) 25      Anion Gap 10      Urea Nitrogen 25.1 (*)     Creatinine 1.11 (*)     Calcium 10.0      Glucose 112 (*)     Alkaline Phosphatase 228 (*)     AST 20      ALT 18      Protein Total 5.9 (*)     Albumin 3.4 (*)     Bilirubin Total 0.7      GFR Estimate 51 (*)    BLOOD GAS VENOUS - Abnormal    pH Venous 7.39      pCO2 Venous 46      pO2 Venous 21 (*)     Bicarbonate Venous 28      Base Excess/Deficit (+/-) 2.8      Oxyhemoglobin Venous 31.6 (*)     O2 Sat, Venous 32.1 (*)    LIPASE - Abnormal    Lipase 7 (*)    TROPONIN T, HIGH SENSITIVITY - Abnormal    Troponin T, High Sensitivity 41 (*)    NT PROBNP INPATIENT - Abnormal    N terminal Pro BNP Inpatient 3,870 (*)    CBC WITH PLATELETS AND DIFFERENTIAL - Abnormal    WBC Count 13.5 (*)     RBC Count 3.22 (*)     Hemoglobin 9.8 (*)     Hematocrit 32.2 (*)           MCH 30.4      MCHC 30.4 (*)     RDW 17.2 (*)     Platelet Count 351      % Neutrophils 90      % Lymphocytes 3      % Monocytes 6      % Eosinophils 1      % Basophils 0      % Immature Granulocytes 0      NRBCs per 100 WBC 0      Absolute Neutrophils 12.2 (*)     Absolute Lymphocytes 0.3 (*)     Absolute Monocytes 0.8      Absolute Eosinophils 0.1      Absolute Basophils 0.1      Absolute Immature Granulocytes 0.1      Absolute NRBCs 0.0     LACTIC ACID WHOLE BLOOD - Normal    Lactic Acid 1.3     INFLUENZA A/B, RSV, & SARS-COV2 PCR -  Normal    Influenza A PCR Negative      Influenza B PCR Negative      RSV PCR Negative      SARS CoV2 PCR Negative     MAGNESIUM - Normal    Magnesium 1.8     BLOOD CULTURE   BLOOD CULTURE   AEROBIC BACTERIAL CULTURE ROUTINE   C. DIFFICILE TOXIN B PCR WITH REFLEX TO C. DIFFICILE ANTIGEN AND TOXINS A/B EIA       EKG      RADIOLOGY    US Lower Extremity Venous Duplex Left   Final Result   IMPRESSION:   1.  No deep venous thrombosis in the left lower extremity.      XR Tibia and Fibula Left 2 Views   Final Result   IMPRESSION: Normal tibia and fibula. No fracture. No bony erosion to suggest osteomyelitis. Soft tissue swelling about the shin.      XR Chest 1 View   Final Result   IMPRESSION: Similar prominence of the cardiomediastinal silhouette with prominence of the pulmonary vasculature. Small right pleural effusion.          DISCHARGE MEDS  New Prescriptions    No medications on file     I, Melani Martinez, am serving as a scribe to document services personally performed by Dr. Scott based on my observation and the provider's statements to me. I, Cinthia Scott MD attest that Melani Martinez is acting in a scribe capacity, has observed my performance of the services and has documented them in accordance with my direction.    Cinthia Scott MD  [facility] EMERGENCY DEPARTMENT  [address] [id number]  [phone]     Cinthia Scott MD  04/06/23 0815

## 2023-04-06 NOTE — CONSULTS
Infectious Disease Consultation:  Requesting MD:  Reason for consult:      HISTORY:  Pt 3 wks out from ORIF.  Incision looks perfect on exam.  Ipsilateral side (left) LE with mild cellulitis surrounding a skin divot probably small trauma.  On maximal abx.  Ortho happy with hip (as am I).  Pt not a good historian otherwise.           Pertinent past history, past infectious disease history:  PAST MEDICAL HISTORY:  Past Medical History        Past Medical History:   Diagnosis Date     Atrial fibrillation (H)       CKD (chronic kidney disease) stage 3, GFR 30-59 ml/min (H)       COPD (chronic obstructive pulmonary disease) (H)       nocturnal oxygen     CRF (chronic renal failure)       GERD (gastroesophageal reflux disease)       Hypertension       Hypovolemic shock (H)       Influenza A 12/01/2017     Pulmonary hypertension (H)       Pulmonary nodules       Rheumatoid arthritis (H)       Sepsis (H) 12/24/2017                 ALLERGIES:   Review of patient's allergies indicates         Allergies   Allergen Reactions     Codeine Nausea and Vomiting     Fosamax [Alendronic Acid] Diarrhea     Morphine Nausea and Vomiting       Other reaction(s): Vomiting          MEDICATIONS UPON ADMISSION:  Medications were reviewed.  They include:     Prescriptions Prior to Admission   (Not in a hospital admission)           SOCIAL HISTORY:   she  reports that she quit smoking about 5 years ago. Her smoking use included cigarettes. She smoked an average of .5 packs per day. She has never used smokeless tobacco. She reports that she does not drink alcohol and does not use drugs.     FAMILY HISTORY:  family history includes Cerebrovascular Disease in her brother and father; Diabetes Type 2  in her brother; Heart Failure in her mother; Kidney failure in her sister.         Medications:  Reviewed prior to admission meds as applicable in chart review.  Current meds are reviewed in the EMR listed MAR.     ANTIBIOTICS:    Current:vanco,  zosyn   Prior:ancef   Allergy to:none    SH/FH and  travel history(if applicable to consult):  above  REVIEW OF SYSTEMS:  All other systems negative    EXAMINATION:  /54   Pulse 94   Temp 98.4  F (36.9  C) (Oral)   Resp 22   SpO2 99%   Alert, awake  Vitals tabulated above, reviewed  HEENT:glasses, nl  Neck supple without lymphadenopathy  Sclera clear  CARDIOVASCULAR regular rate and rhythm, no murmur  Lungs CLEAR TO AUSCULTATION   Abdomen soft, NT/ND, absent HEPATOSPLENOMEGALY  Skin normal  Joints normal  Neurologic exam non focal  Wound: clean hip wound, well approx  Mild LLE erythema              CLINICAL DATABASE FOR---LAB/MICRO/CULTURES/IMAGING STUDIES:  Lab Results   Component Value Date    WBC 13.5 04/06/2023     Lab Results   Component Value Date    RBC 3.22 04/06/2023     Lab Results   Component Value Date    HGB 9.8 04/06/2023     Lab Results   Component Value Date    HCT 32.2 04/06/2023     No components found for: MCT  Lab Results   Component Value Date     04/06/2023     Lab Results   Component Value Date    MCH 30.4 04/06/2023     Lab Results   Component Value Date    MCHC 30.4 04/06/2023     Lab Results   Component Value Date    RDW 17.2 04/06/2023     Lab Results   Component Value Date     04/06/2023       Last Comprehensive Metabolic Panel:  Sodium   Date Value Ref Range Status   04/06/2023 138 136 - 145 mmol/L Final     Potassium   Date Value Ref Range Status   04/06/2023 4.2 3.4 - 5.3 mmol/L Final   11/10/2022 4.6 3.5 - 5.0 mmol/L Final     Chloride   Date Value Ref Range Status   04/06/2023 103 98 - 107 mmol/L Final   11/10/2022 109 (H) 98 - 107 mmol/L Final     Carbon Dioxide (CO2)   Date Value Ref Range Status   04/06/2023 25 22 - 29 mmol/L Final   11/10/2022 20 (L) 22 - 31 mmol/L Final     Anion Gap   Date Value Ref Range Status   04/06/2023 10 7 - 15 mmol/L Final   11/10/2022 9 5 - 18 mmol/L Final     Glucose   Date Value Ref Range Status   04/06/2023 112 (H) 70 - 99  mg/dL Final   11/10/2022 86 70 - 125 mg/dL Final     GLUCOSE BY METER POCT   Date Value Ref Range Status   03/25/2023 179 (H) 70 - 99 mg/dL Final     Urea Nitrogen   Date Value Ref Range Status   04/06/2023 25.1 (H) 8.0 - 23.0 mg/dL Final   11/10/2022 13 8 - 28 mg/dL Final     Creatinine   Date Value Ref Range Status   04/06/2023 1.11 (H) 0.51 - 0.95 mg/dL Final     GFR Estimate   Date Value Ref Range Status   04/06/2023 51 (L) >60 mL/min/1.73m2 Final     Comment:     eGFR calculated using 2021 CKD-EPI equation.   06/02/2021 48 (L) >60 mL/min/1.73m2 Final     Calcium   Date Value Ref Range Status   04/06/2023 10.0 8.8 - 10.2 mg/dL Final       Liver Function Studies -   Recent Labs   Lab Test 04/06/23  0405   PROTTOTAL 5.9*   ALBUMIN 3.4*   BILITOTAL 0.7   ALKPHOS 228*   AST 20   ALT 18       Erythrocyte Sedimentation Rate   Date Value Ref Range Status   01/20/2022 23 (H) 0 - 20 mm/hr Final       CRP   Date Value Ref Range Status   01/20/2022 0.3 0.0-<0.8 mg/dL Final               IMPRESSION:  Mild erythroderma vs cellulitis LLE  Recent ORIF  Multiple recent stools + for c diff, last in feb 2023    PLAN:  On aggressive abx  Should be better in am, then arian CHOWDHURY MD  [facility]  Office [phone]

## 2023-04-06 NOTE — CONSULTS
ORTHOPEDIC CONSULTATION    Consultation  Fatuma Henry,  1945, MRN [id number]    [unfilled]  Chronic renal impairment, unspecified CKD stage [N18.9]    PCP: Tory Wise, [phone]   Code status:  No CPR- Do NOT Intubate       Extended Emergency Contact Information  Primary Emergency Contact: Bolivar Henry  Address: [address] [place] [address]  Home Phone: [phone]  Mobile Phone: [phone]  Relation: Spouse  Secondary Emergency Contact: Enid Dove  Address: [address] [place] [address]  Home Phone: [phone]  Relation: Daughter         IMPRESSION:  3 weeks status post ORIF left hip fracture done by Dr. Mckeon on 03/15/2023, no concerns for periprosthetic fracture or septic joint.     PLAN:  This patient was discussed with Dr. Martinez, on-call surgeon for [facility] and they are in agreement with the following plan.     -pain control  -follow up with Dr. Mckeon outpatient as scheduled for normal post operative visit  -orthopedics will sign off    Thank you for including [facility] in the care of Fatuma Henry. It has been a pleasure participating in her care.        CHIEF COMPLAINT: Chronic renal impairment, unspecified CKD stage    HISTORY OF PRESENT ILLNESS:  The patient is seen in orthopedic consultation at the request of Dr. Scott The patient is a 77 year old female who presents to the ED for evaluation of fatigue, chronic renal impairment, cellulitis of left lower extremity, sepsis. Patient is 3 weeks s/p ORIF of a left hip fracture done by Dr. Mckeon on 03/15/2023. Today she reports no pain in the hip. Has not noticed any drainage coming from the incision. States she has been moving her hip well.  Denies numbness or tingling in her toes.    PAST MEDICAL HISTORY:  Past Medical History:   Diagnosis Date     Atrial fibrillation (H)      CKD (chronic kidney disease) stage 3, GFR 30-59 ml/min (H)       COPD (chronic obstructive pulmonary disease) (H)     nocturnal oxygen     CRF (chronic renal failure)      GERD (gastroesophageal reflux disease)      Hypertension      Hypovolemic shock (H)      Influenza A 12/01/2017     Pulmonary hypertension (H)      Pulmonary nodules      Rheumatoid arthritis (H)      Sepsis (H) 12/24/2017           ALLERGIES:   Review of patient's allergies indicates   Allergies   Allergen Reactions     Codeine Nausea and Vomiting     Fosamax [Alendronic Acid] Diarrhea     Morphine Nausea and Vomiting     Other reaction(s): Vomiting         MEDICATIONS UPON ADMISSION:  Medications were reviewed.  They include:   (Not in a hospital admission)        SOCIAL HISTORY:   she  reports that she quit smoking about 5 years ago. Her smoking use included cigarettes. She smoked an average of .5 packs per day. She has never used smokeless tobacco. She reports that she does not drink alcohol and does not use drugs.    FAMILY HISTORY:  family history includes Cerebrovascular Disease in her brother and father; Diabetes Type 2  in her brother; Heart Failure in her mother; Kidney failure in her sister.      REVIEW OF SYSTEMS:   Reviewed with patient. See HPI, otherwise negative      PHYSICAL EXAMINATION:  Vitals: Temp:  [98.4  F (36.9  C)] 98.4  F (36.9  C)  Pulse:  [] 117  Resp:  [17-31] 28  BP: (120-193)/() 159/75  SpO2:  [89 %-100 %] 100 %  General: On examination, the patient is resting comfortably, NAD, awake and alert and oriented to person, place, time, and and general circumstances   SKIN: There is no evidence of a superficial abraisain present over the left anterior shin. There is a well healing surgical incision present over the left lateral hip, wound edges are well approximated. No erythema, odor, or drainage noted. No signs of infection noted.  Pulses:  dorsalis pedis and posterior tibial pulse is intact and equal bilaterally  Sensation: intact and equal bilaterally to the distal lower  extremities.  Tenderness: No tenderness to palpation over the left lateral hip or around incision.  ROM: Normal hip flexion, abduction, adduction, internal and external rotation, no pain with passive or active ROM.  Contralateral side= Full range of motion, Negative joint instability findings, 5/5 motor groups about the joint, Non-tender.       PERTINENT LABS:  Lab Results: personally reviewed.   not applicable      Stan Dalton PA-C, LAZARO  Date: 4/6/2023  Time: 10:58 AM    CC1:   Teena Cantor MD    CC2:   Tory Wise

## 2023-04-06 NOTE — PHARMACY-VANCOMYCIN DOSING SERVICE
Pharmacy Vancomycin Initial Note  Date of Service 2023  Patient's  1945  77 year old, female    Indication: Skin and Soft Tissue Infection    Current estimated CrCl = Estimated Creatinine Clearance: 44.8 mL/min (A) (based on SCr of 1.11 mg/dL (H)).    Creatinine for last 3 days  2023:  7:23 AM Creatinine 1.17 mg/dL  2023:  4:05 AM Creatinine 1.11 mg/dL    Recent Vancomycin Level(s) for last 3 days  No results found for requested labs within last 3 days.      Vancomycin IV Administrations (past 72 hours)      No vancomycin orders with administrations in past 72 hours.                Nephrotoxins and other renal medications (From now, onward)    Start     Dose/Rate Route Frequency Ordered Stop    23 0900  vancomycin (VANCOCIN) 1,250 mg in sodium chloride 0.9 % 250 mL intermittent infusion         1,250 mg  over 90 Minutes Intravenous EVERY 24 HOURS 23 0817      23 1500  piperacillin-tazobactam (ZOSYN) 3.375 g vial to attach to  mL bag        Note to Pharmacy: Extended infusion dosing to start 6 hours after initial infusion.   See Hyperspace for full Linked Orders Report.    3.375 g  over 240 Minutes Intravenous EVERY 8 HOURS 23 0813      23 0900  vancomycin (VANCOCIN) capsule 125 mg         125 mg Oral DAILY 23 0802      23 0900  piperacillin-tazobactam (ZOSYN) 3.375 g vial to attach to  mL bag        See Hyperspace for full Linked Orders Report.    3.375 g  over 30 Minutes Intravenous ONCE 23 0813      23 0900  vancomycin (VANCOCIN) 1,500 mg in sodium chloride 0.9 % 250 mL intermittent infusion         1,500 mg  over 90 Minutes Intravenous ONCE 23 0817            Contrast Orders - past 72 hours (72h ago, onward)    None          [employer] Prediction of Planned Initial Vancomycin Regimen  Loading dose: 1500 mg at 08:30 2023.  Regimen: 1250 mg IV every 24 hours.  Start time: 08:17 on 2023  Exposure target: AUC24  (range)400-600 mg/L.hr   AUC24,ss: 513 mg/L.hr  Probability of AUC24 > 400: 77 %  Ctrough,ss: 15.7 mg/L  Probability of Ctrough,ss > 20: 28 %  Probability of nephrotoxicity (Lodise ARIA 2009): 11 %        Plan:  1. Start vancomycin  1500 mg IV once, then 1250 mg IV q24h.   2. Vancomycin monitoring method: AUC  3. Vancomycin therapeutic monitoring goal: 400-600 mg*h/L  4. Pharmacy will check vancomycin levels as appropriate in 1-3 Days.    5. Serum creatinine levels will be ordered daily for the first week of therapy and at least twice weekly for subsequent weeks.      Maco Dorado [facility]

## 2023-04-07 NOTE — CONSULTS
[facility]  WOC Nurse Inpatient Assessment     Consulted for: Left leg cellulitis     Patient History (according to provider note(s):      Fatuma Henry is a 77 year old female admitted on 4/6/2023. She has a hx of Parox afib, htn, lipids, pulm htn (on chronic O2), diastolic HF, CKD stage 3, left ICA occlusion, RA, hx of gout, gerd, here now from TCU with Afib RVR and 3 days of n/v/d and concern for cellulitis on left lower leg. She was at TCU for hip fx recovery after fall and fx on 3/14/23.     Areas Assessed:      Areas visualized during today's visit: Focused: and Lower extremities     Wound Location:  Left shin    Date of last photo 4/7  Wound History: trauma   Wound Base: 100% dried drainage     Palpation of the wound bed: firm and rough and raised       Drainage: none     Description of drainage: none     Measurements (length x width x depth, in cm) 0.4  x 1.3 cm      Tunneling N/A     Undermining N/A  Periwound skin: scar tissue       Color: red      Temperature: hot  Odor: none  Pain: no grimacing or signs of discomfort, none    Treatment Plan:     Left leg wound - every 3 days   Cleanse wound and surrounding skin with vashe moistened gauze x 5 minutes   Apply mepilex 4x4   Orders: Written    RECOMMEND PRIMARY TEAM ORDER: None, at this time  Education provided: plan of care and Infection prevention   Discussed plan of care with: Patient, Family and Nurse  WOC nurse follow-up plan: weekly  Notify WOC if wound(s) deteriorate.  Nursing to notify the Provider(s) and re-consult the WOC Nurse if new skin concern.    DATA:     Current support surface: Standard  Low air loss (CRISTOBAL pump, Isolibrium, Pulsate, skin guard, etc)  Containment of urine/stool: Incontinence Protocol  BMI: Body mass index is 28.43 kg/m .   Active diet order: Orders Placed This Encounter      Regular Diet Adult     Output: I/O last 3 completed shifts:  In: 273.97 [P.O.:100; I.V.:173.97]  Out: 700 [Urine:700]     Labs:  Recent Labs   Lab 04/06/23  0405   ALBUMIN 3.4*   HGB 9.8*   WBC 13.5*     Pressure injury risk assessment:   Sensory Perception: 3-->slightly limited  Moisture: 3-->occasionally moist  Activity: 2-->chairfast  Mobility: 3-->slightly limited  Nutrition: 3-->adequate  Friction and Shear: 2-->potential problem  Dmitriy Score: 16    KRISHNA Fung RN CWOCN  Pager no longer is use, please contact through [employer]: WOC Nurse

## 2023-04-07 NOTE — PROGRESS NOTES
[facility] Progress Note    SUBJECTIVE:  Pt leg looking better to a degree it is probably fully resolved. Has c diff again.  She has done dificid before and has some at home even.  She is on it now.  I stopped zosyn.       REVIEW OF SYSTEMS:  Negative unless as listed above.  Social history, Family history, Medications: reviewed.    OBJECTIVE:  /57 (BP Location: Right arm, Patient Position: Chair)   Pulse 89   Temp 98  F (36.7  C) (Oral)   Resp 22   Wt 77.5 kg (170 lb 13.7 oz)   SpO2 98%   BMI 28.43 kg/m                PHYSICAL EXAM:  Alert, awake  Vitals tabulated above, reviewed  Neck supple without lymphadenopathy  Sclera normal color, not injected  CARDIOVASCULAR regular rate and rhythm, no murmur  Lungs CLEAR TO AUSCULTATION   Abdomen soft, NT/ND, absent HEPATOSPLENOMEGALY  Skin normal  Joints normal  Neurologic exam non focal  Leg is without any infection    Antibiotics:  V/Z/dificid  Pertinent labs:  Lab Results   Component Value Date    WBC 11.1 04/07/2023     Lab Results   Component Value Date    RBC 3.17 04/07/2023     Lab Results   Component Value Date    HGB 9.7 04/07/2023     Lab Results   Component Value Date    HCT 31.7 04/07/2023     No components found for: MCT  Lab Results   Component Value Date     04/07/2023     Lab Results   Component Value Date    MCH 30.6 04/07/2023     Lab Results   Component Value Date    MCHC 30.6 04/07/2023     Lab Results   Component Value Date    RDW 17.2 04/07/2023     Lab Results   Component Value Date     04/07/2023       Last Comprehensive Metabolic Panel:  Sodium   Date Value Ref Range Status   04/07/2023 136 136 - 145 mmol/L Final     Potassium   Date Value Ref Range Status   04/07/2023 4.0 3.4 - 5.3 mmol/L Final   11/10/2022 4.6 3.5 - 5.0 mmol/L Final     Chloride   Date Value Ref Range Status   04/07/2023 103 98 - 107 mmol/L Final   11/10/2022 109 (H) 98 - 107 mmol/L Final     Carbon Dioxide (CO2)   Date Value Ref Range  Status   04/07/2023 25 22 - 29 mmol/L Final   11/10/2022 20 (L) 22 - 31 mmol/L Final     Anion Gap   Date Value Ref Range Status   04/07/2023 8 7 - 15 mmol/L Final   11/10/2022 9 5 - 18 mmol/L Final     Glucose   Date Value Ref Range Status   04/07/2023 113 (H) 70 - 99 mg/dL Final   11/10/2022 86 70 - 125 mg/dL Final     GLUCOSE BY METER POCT   Date Value Ref Range Status   03/25/2023 179 (H) 70 - 99 mg/dL Final     Urea Nitrogen   Date Value Ref Range Status   04/07/2023 23.1 (H) 8.0 - 23.0 mg/dL Final   11/10/2022 13 8 - 28 mg/dL Final     Creatinine   Date Value Ref Range Status   04/07/2023 1.09 (H) 0.51 - 0.95 mg/dL Final     GFR Estimate   Date Value Ref Range Status   04/07/2023 52 (L) >60 mL/min/1.73m2 Final     Comment:     eGFR calculated using 2021 CKD-EPI equation.   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   Sign off    [email or web address]:  My Treatment Approach to Clostridioides  difficile Infection  MELODY Tariq, MS  ______________________________________________________  Fidaxomicin extend regimen Fidaxomicin 200 mg by mouth two times a day for 5 days, followed by  200 mg once every other  day from days 7 through 25 (20 doses)      CHAY Mcmullen MD  Office [phone] option 2 to desk staff

## 2023-04-07 NOTE — CONSULTS
[facility] - Digestive Health Consultation     Fatuma Henry  [address] [place] [address]  77 year old female     Admission Date/Time: 4/6/2023  Primary Care Provider: Tory Wise     We were asked to see the patient in consultation by Dr. Cantor for evaluation of C difficile.    ASSESSMENT:    Fatuma Henry is a 77 year old female with PMH of paroxysmal atrial fibrillation on Xarelto, HFpEF, HTN, HLD, COPD, pulmonary hypertension on chronic O2, recurrent C.diff colitis, CKD who presented to the ED on 4/6/23 with diarrhea and concern for LLE cellulitis, found to be in atrial fibrillation with RVR.    1. C difficile  - C diff antigen and toxin +, refractory to treatments of vancomycin and fidaxomicin. She had 2 days of antibiotics (ceftriaxone) during her last admission (3/24-25) which likely caused the C diff. Unfortunately, she has this lower extremity edema and is now on antibiotics again which will likely exacerbate the C diff further.     - CT with mild colitis  - Started on vancomycin PO    2. Atrial fibrillation w/ RVR  - Cardiology following.  - Pt on Xarelto, well-controlled on metoprolol and diltiazem. Given diltiazem ggt in the ED with good response.     3. Chronic kidney disease stage IIIa    4. Cellulitis (left lower leg)  - US Lower extremity negative for DVT.   - On aggressive abx (Iv vanco and zosyn)    5. Recent Hip Fx 3/14/23, underwent ORIF    6. Rheumatoid arthritis  - on chronic prednisone and methotrexate    RECOMMENDATIONS:  - Will start Dificid for 10 days    - Unclear how long she will require antibiotics for her cellulitis, but I would recommend taking PO vancomycin for C diff prophylaxis while on antibiotics.     - I would like her referred to [facility] GI for consideration of FMT given recurrent C difficile. I'll place referral orders through [facility] EHR.     - Supportive cares as you are.        Case discussed with Dr. Moreno, please review MD addendum  below.    Approximately 45 minutes of total time was spent providing patient care, including patient evaluation, reviewing documentation/test results, and     Enoch Denny PA-C  [facility]  [phone]  ________________________________________________________________________        CC: diarrhea, cellulitis     HPI:  Fatuma Henry is a 77 year old female with PMH of paroxysmal atrial fibrillation on Xarelto, HFpEF, HTN, HLD, COPD, pulmonary hypertension on chronic O2, recurrent C.diff colitis, CKD who presented to the ED on 4/6/23 with diarrhea and concern for LLE cellulitis, found to be in atrial fibrillation with RVR. GI consulted for C difficile infection.      Per chart review:  Pt previously seen in [facility] clinic on 3/3/23. Reviewing notes, she had previous history of C diff treated with vancomycin but was admitted to [facility] in Jan 2023 for pneumonia and was treated with broad spectrum antibiotics. Subsequently, she developed diarrhea and was prescribed vancomycin. Diarrhea persisted and tested positive for C diff again in February 2023 with CT findings of mild colitis. Fidaxomicin was prescribed with good relief of symptoms. She was recently admitted in March for hip fracture after a fall, underwent ORIF. During this admission she had ~2 days of antibiotics for leukocytosis/possible UTI.    Pt is a poor historian. She states she has memory issues but answers questions appropriately. She explains diarrhea is severe with loose, watery stools with fecal urgency. No bloody stools. No associated abdominal pains, nausea, or vomiting. She is hungry at this time. She cannot tell me when the diarrhea resumed, presumably after her recent admission.     In the hospital:   - Vitals: afebrile  - Labs: C diff antigen and toxin positive. CBC with WBC 13.5, Hgb 9.8, platelets 351. BMP with Cr 1.11. LFT's with alk phos 228 otherwise normal. BNP 3870, Troponin 41.   - Imaging:   CT abd/pelvis  04/06/2023  IMPRESSION:   1.  Findings suggestive of a diarrheal state. There is mild wall thickening of the majority of the colon suggestive of infectious or inflammatory colitis.   2.  Manifestations of third spacing moderate right pleural effusion, trace left pleural effusion, and diffuse anasarca.    ROS: A comprehensive ten point review of systems was negative aside from those in mentioned in the HPI.      PAST MEDICAL HISTORY:  Patient Active Problem List    Diagnosis Date Noted     Rash and nonspecific skin eruption 04/06/2023     Priority: Medium     Loose stools 04/06/2023     Priority: Medium     Cellulitis of left leg 04/06/2023     Priority: Medium     Elevated alkaline phosphatase level 04/06/2023     Priority: Medium     Dyspnea, unspecified type 04/06/2023     Priority: Medium     Chronic renal impairment, unspecified CKD stage 04/06/2023     Priority: Medium     Acute on chronic diastolic (congestive) heart failure (H) 03/28/2023     Priority: Medium     Cervical herniated disc 03/28/2023     Priority: Medium     Gout of right foot, unspecified cause, unspecified chronicity 03/28/2023     Priority: Medium     Insomnia, unspecified type 03/28/2023     Priority: Medium     Metabolic encephalopathy 03/28/2023     Priority: Medium     Acute right-sided low back pain with right-sided sciatica 03/28/2023     Priority: Medium     Oxygen dependent 03/14/2023     Priority: Medium     Fracture of femur (H) 03/14/2023     Priority: Medium     Atrial fibrillation with RVR (H) 03/14/2023     Priority: Medium     Fall, initial encounter 03/14/2023     Priority: Medium     Abnormal CT of spine 03/14/2023     Priority: Medium     Internal carotid artery stenosis, left 12/29/2022     Priority: Medium     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     Priority: Medium     Leukocytosis 12/28/2022     Priority: Medium     HLD (hyperlipidemia) 12/28/2022     Priority: Medium     Gastroesophageal reflux  disease without esophagitis 12/28/2022     Priority: Medium     Gout 12/28/2022     Priority: Medium     Mood disorder (H) 12/28/2022     Priority: Medium     JOHANNA (obstructive sleep apnea) 12/28/2022     Priority: Medium     Hyponatremia 12/28/2022     Priority: Medium     Pain in limb 12/23/2022     Priority: Medium     Formatting of this note might be different from the original.  Created by Conversion       Respiratory failure, unspecified chronicity, unspecified whether with hypoxia or hypercapnia (H) 12/23/2022     Priority: Medium     Malaise and fatigue 12/23/2022     Priority: Medium     Stomatitis and mucositis 12/23/2022     Priority: Medium     Acute on chronic respiratory failure with hypoxia (H) 12/23/2022     Priority: Medium     Macrocytic anemia 12/23/2022     Priority: Medium     Community acquired pneumonia of right lower lobe of lung 12/23/2022     Priority: Medium     Acute cystitis with hematuria 12/23/2022     Priority: Medium     SOB (shortness of breath) 12/13/2022     Priority: Medium     Hypoxia 12/13/2022     Priority: Medium     Rapid atrial fibrillation (H) 12/13/2022     Priority: Medium     Sepsis, due to unspecified organism, unspecified whether acute organ dysfunction present (H) 12/13/2022     Priority: Medium     Infection due to 2019 novel coronavirus 12/13/2022     Priority: Medium     Acute on chronic congestive heart failure, unspecified heart failure type (H) 12/01/2022     Priority: Medium     Paroxysmal atrial fibrillation (H) 11/17/2022     Priority: Medium     Syncope 11/17/2022     Priority: Medium     Recurrent falls 11/17/2022     Priority: Medium     Generalized muscle weakness 11/17/2022     Priority: Medium     Acute kidney injury (H) 11/17/2022     Priority: Medium     Acute blood loss anemia 11/17/2022     Priority: Medium     Hypokalemia 11/07/2022     Priority: Medium     Hypomagnesemia 11/07/2022     Priority: Medium     Diarrhea, unspecified type 11/07/2022      Priority: Medium     Asthma without status asthmaticus 2022     Priority: Medium     CKD (chronic kidney disease) stage 3, GFR 30-59 ml/min (H) 2022     Priority: Medium     History of 2019 novel coronavirus disease (COVID-19) 2022     Priority: Medium     DNR (do not resuscitate) 2022     Priority: Medium     Chronic anticoagulation 2022     Priority: Medium     Eliquis- parox atrial fib       Personal history of immunosuppressive therapy 2022     Priority: Medium     Cimzia for RA;   certolizumab pegol (CIMZIA) injection 400 mg  subcut every 28 days             Pulmonary nodules 2022     Priority: Medium     COPD (chronic obstructive pulmonary disease) (H) 2022     Priority: Medium     Benign essential hypertension 2020     Priority: Medium     Primary osteoarthritis involving multiple joints 06/10/2019     Priority: Medium     CRF (chronic renal failure), stage 3 (moderate) (H) 2018     Priority: Medium     Nonsustained ventricular tachycardia (H) 2018     Priority: Medium     Reactive airway disease      Priority: Medium     Dyspnea      Priority: Medium     High risk medication use 10/07/2015     Priority: Medium     Osteoporosis dxa 2006  2015     Priority: Medium     Seropositive rheumatoid arthritis (H) 2015     Priority: Medium     Rheumatoid arthritis of multiple sites without rheumatoid factor (H) 10/30/2014     Priority: Medium     Hypertension 10/30/2014     Priority: Medium     SOCIAL HISTORY:  Social History     Tobacco Use     Smoking status: Former     Packs/day: 0.50     Types: Cigarettes     Quit date: 2017     Years since quittin.2     Smokeless tobacco: Never   Substance Use Topics     Alcohol use: No     Drug use: No     FAMILY HISTORY:  Family History   Problem Relation Age of Onset     Heart Failure Mother      Cerebrovascular Disease Father      Kidney failure Sister      Cerebrovascular Disease Brother       Diabetes Type 2  Brother      ALLERGIES:   Allergies   Allergen Reactions     Codeine Nausea and Vomiting     Fosamax [Alendronic Acid] Diarrhea     Morphine Nausea and Vomiting     Other reaction(s): Vomiting     MEDICATIONS:   Current Facility-Administered Medications   Medication     acetaminophen (TYLENOL) tablet 325 mg     albuterol (PROVENTIL HFA/VENTOLIN HFA) inhaler     allopurinol (ZYLOPRIM) tablet 300 mg     budesonide-formoterol (SYMBICORT) 160-4.5 MCG/ACT Inhaler 2 puff     cholestyramine (QUESTRAN) Packet 4 g     diltiazem (CARDIZEM) 125 mg in sodium chloride 0.9 % 125 mL infusion     DULoxetine (CYMBALTA) DR capsule 20 mg     famotidine (PEPCID) injection 20 mg     folic acid (FOLVITE) tablet 1 mg     lactobacillus rhamnosus (GG) (CULTURELL) capsule 1 capsule     lidocaine (LMX4) cream     lidocaine 1 % 0.1-1 mL     melatonin tablet 1 mg     metoprolol succinate ER (TOPROL XL) 24 hr tablet 75 mg     Patient is already receiving anticoagulation with heparin, enoxaparin (LOVENOX), warfarin (COUMADIN)  or other anticoagulant medication     piperacillin-tazobactam (ZOSYN) 3.375 g vial to attach to  mL bag     pravastatin (PRAVACHOL) tablet 20 mg     predniSONE (DELTASONE) tablet 5 mg     rivaroxaban ANTICOAGULANT (XARELTO) tablet 15 mg     sodium chloride (PF) 0.9% PF flush 3 mL     sodium chloride (PF) 0.9% PF flush 3 mL     sodium chloride (PF) 0.9% PF flush 3 mL     sodium chloride (PF) 0.9% PF flush 3 mL     vancomycin (VANCOCIN) 1,250 mg in sodium chloride 0.9 % 250 mL intermittent infusion     vancomycin (VANCOCIN) capsule 125 mg     Vitamin D3 (CHOLECALCIFEROL) tablet 125 mcg       PHYSICAL EXAM:   /53 (BP Location: Left arm)   Pulse 89   Temp 97.7  F (36.5  C) (Oral)   Resp 24   Wt 77.5 kg (170 lb 13.7 oz)   SpO2 98%   BMI 28.43 kg/m     GEN: Alert, oriented x2, communicative and in NAD.  LC: AT, anicteric, OP without erythema, exudate, or ulcers.    NECK: Supple.    LYMPH:  No LAD noted.  HRT: RRR  LUNGS: CTA  ABD:  ND, +BS, no guarding or pain to palpation, no rebound, no HSM.  SKIN: No rash, jaundice or spider angiomata  MSKL: LE free of edema, strength 5/5 all 4 extrems  NEURO: CN grossly intact, sensation intact to light touch, toes downgoing.     ADDITIONAL DATA:   I reviewed the patient's new clinical lab test results.   Recent Labs   Lab Test 04/06/23  0405 04/05/23  0723 03/26/23  0534 03/16/23  0528 03/15/23  0820 03/14/23  1221 12/14/22  0635 12/13/22  1803   WBC 13.5* 9.3 12.0*   < > 9.8 11.1*   < >  --    HGB 9.8* 10.2* 9.4*   < > 10.0* 10.6*   < >  --     103* 98   < > 102* 103*   < >  --     410 381   < > 296 334   < >  --    INR  --   --   --   --  1.60* 2.18*  --  1.62*    < > = values in this interval not displayed.     Recent Labs   Lab Test 04/06/23  0405 04/05/23  0723 03/27/23  0504 03/26/23  0534   POTASSIUM 4.2 4.4 4.4 3.9   CHLORIDE 103 105  --  99   CO2 25 22  --  30*   BUN 25.1* 24.5*  --  38.7*   CR 1.11* 1.17* 1.14* 1.37*   ANIONGAP 10 13  --  9     Recent Labs   Lab Test 04/06/23  1246 04/06/23  0405 03/26/23  0534 03/24/23  1138 03/22/23  0423 03/17/23  0625 12/13/22  1604 12/13/22  0836 11/08/22  0605 11/07/22  1932   ALBUMIN  --  3.4* 3.4*  --  3.3*  --    < > 3.4*   < > 2.6*   BILITOTAL  --  0.7 0.4  --  0.4  --    < > 0.3   < > 0.6   ALT  --  18 17  --  21  --    < > 20   < > 13   AST  --  20 21  --  38*  --    < > 30   < > 19   PROTEIN 50*  --   --  Negative  --  Negative   < >  --    < >  --    LIPASE  --  7*  --   --   --   --   --  29  --  45    < > = values in this interval not displayed.        IMAGING:  I reviewed the patient's new imaging results.      LUCILA PAUL Note    Patient interviewed, examined, chart reviewed.  I have discussed the further management of this patient with Enoch Denny PA-C, please see his note for details.  I agree with his assessment and plan.    Briefly, Ms. Henry is a 77-year-old female with multiple medical  issues, including paroxysmal atrial fibrillation on chronic anticoagulation with Xarelto, hypertension, hyperlipidemia, COPD, pulmonary hypertension on chronic O2, chronic kidney disease and recurrent C. difficile colitis.  She presented to the ED yesterday, with diarrhea and concern for left lower extremity cellulitis.  She was found to be in atrial fibrillation with RVR.    She has previously been seen by us due to recurrent C. difficile.  She has been treated with the vancomycin in January 2023, and was for fidaxomicin in February 2023.    Now again, C. difficile antigen and toxin are positive.  She does have a leukocytosis of 13.5.  CT scan of the abdomen pelvis done yesterday reveals mild wall thickening of the majority of colon.    Agree with plan on starting fidaxomicin.  Long-term, she will certainly benefit for FMT, given recurrent C. difficile infection.  We will help facilitate this through [facility] GI department.    Thank you for this consultation.    Davin Moreno MD on 4/7/2023 at 1:16 PM  [facility] Dig[facility]

## 2023-04-07 NOTE — PLAN OF CARE
Problem: Dysrhythmia  Goal: Normalized Cardiac Rhythm  Outcome: Progressing   Goal Outcome Evaluation:    Admitted from ED accompanied by family member- Afib HR  -80 to low 100's.  On diltiazem drip at 15 mg/hr. Confused- reorientation done. Always takes her O2 off. IV site where Zosyn was running was out- new IV site was inserted on Left forearm by PICC RN. Denies any pain and discomfort. Left leg area- red, warm to touch. Left thigh( surgical leg) - incision healed. Area noted mildly swollen, pink and warm to touch.  + Cdiff- on vanco po. No diarrhea noted.

## 2023-04-07 NOTE — CONSULTS
Care Management Initial Consult    General Information  Assessment completed with: Patient, Children, daughter Lisbeth in person, En[place] on the phone  Type of CM/SW Visit: Initial Assessment    Primary Care Provider verified and updated as needed:     Readmission within the last 30 days:        Reason for Consult: discharge planning  Advance Care Planning: Advance Care Planning Reviewed: no concerns identified       Communication Assessment  Patient's communication style: spoken language (English or Bilingual)    Hearing Difficulty or Deaf: no   Wear Glasses or Blind: yes    Cognitive  Cognitive/Neuro/Behavioral: orientation        Orientation: disoriented to, place, time             Living Environment:   People in home: spouse  [place]  Current living Arrangements: apartment      Able to return to prior arrangements: yes (TCU or home with home care    Family/Social Support:  Care provided by: other (see comments) (patient was residing in a  TCU prior to admission)  Provides care for: no one, unable/limited ability to care for self  Marital Status:   , Children  [place]       Description of Support System: Supportive, Involved    Support Assessment: Adequate family and caregiver support, Adequate social supports    Current Resources:   Patient receiving home care services:       Community Resources:    Equipment currently used at home: walker, rolling  Supplies currently used at home:      Employment/Financial:  Employment Status: retired        Financial Concerns: No concerns identified. Patient does note that she only has 14 skilled days left of her SNF Medicare coverage    Lifestyle & Psychosocial Needs:  Social Determinants of Health     Tobacco Use: Medium Risk (3/28/2023)    Patient History      Smoking Tobacco Use: Former      Smokeless Tobacco Use: Never      Passive Exposure: Not on file   Alcohol Use: Not on file   Financial Resource Strain: Not on file   Food Insecurity: Not on file    Transportation Needs: Not on file   Physical Activity: Not on file   Stress: Not on file   Social Connections: Not on file   Intimate Partner Violence: Not on file   Depression: Not on file   Housing Stability: Not on file     Functional Status:  Prior to admission patient needed assistance:   Dependent ADLs:: Ambulation-walker, Bathing  Dependent IADLs:: Cleaning, Cooking, Laundry, Shopping, Meal Preparation, Transportation    Mental Health Status:  Mental Health Status: No Current Concerns       Chemical Dependency Status:  Chemical Dependency Status: No Current Concerns          Values/Beliefs:  Spiritual, Cultural Beliefs, Episcopal Practices, Values that affect care: no         Additional Information:  This writer met with patient and her daughter Lisbeth. Daughter Enid was on the phone. Patient was A & O x 4 at time of assessment. Patient and family are discouraged by the return of c-diff though they are relieved that GI has a plan and is referring patient to the FMT program. Patient has been in and out of the hospital and care facilities since November. She was recently residing at [facility]. This writer left a message there to ask if patient's bed is being held. Family is frustrated that they haven't been able to get patient in to her other specialist, specifically pulmonology due to being in facilities. They feel no one is looking globally at the patient's overall health and are just putting bandaids on her problems. This writer listened and demonstrating understanding of their frustration and offered encouragement. Resources were given for [facility] and this writer left a message with [facility] to see if patient might qualify for their disease management program. Family requests that referrals be made to home care, in case patient recovers enough to be able to return home. Referrals made.    Darby Painting [facility]

## 2023-04-07 NOTE — PROGRESS NOTES
Cardiology Progress Note    Assessment/Plan:    1. Persistent atrial fibrillation with controlled ventricular response.  Anticoagulated on Xarelto.  Resume oral diltiazem tonight, then discontinue IV drip  2. Mild aortic stenosis   3. Severe COPD on chronic oxygen, with pulmonary hypertension evident on echo with lower extremity edema.  Advised regular use of ADRIANNA stockings.  Apply every morning off nightly.    Cardiology will sign off.  Please call if we can be of further assistance.    Principal Problem:    Chronic renal impairment, unspecified CKD stage  Active Problems:    Sepsis, due to unspecified organism, unspecified whether acute organ dysfunction present (H)    Atrial fibrillation with RVR (H)    Rash and nonspecific skin eruption    Loose stools    Cellulitis of left leg    Elevated alkaline phosphatase level    Dyspnea, unspecified type     LOS: 1 day     Subjective:  Now feels much better.  Back on oral diet.  No awareness of palpitations      Objective:   Vital signs in last 24 hours:  Vitals:    04/07/23 0303 04/07/23 0700 04/07/23 1239 04/07/23 1527   BP: 124/53 112/57 (P) 112/56 108/55   BP Location: Left arm Right arm (P) Right arm Left arm   Patient Position:  Chair     Pulse: 89  65 68   Resp:  22 (P) 22 20   Temp: 97.7  F (36.5  C) 98  F (36.7  C)  97.6  F (36.4  C)   TempSrc: Oral Oral (P) Oral Oral   SpO2: 98%      Weight: 77.5 kg (170 lb 13.7 oz)        Weight:   Wt Readings from Last 3 Encounters:   04/07/23 77.5 kg (170 lb 13.7 oz)   03/28/23 78.9 kg (174 lb)   03/23/23 69.6 kg (153 lb 7 oz)           PHYSICAL EXAM      Respiratory: Poor air movement diffusely.  No rales or wheezes  Cardiovascular: Epigastric point of maximal impulse.  Irregularly irregular rhythm.  2 out of 6 systolic murmur left lower sternal border.  GI:  Bowel sounds normal, Soft, No tenderness, No masses  Extremities: 1-2+ bilateral below-knee edema       Cardiographics:   Telemetry atrial fibrillation, 65 to 85  bpm.    Echocardiogram 3/2023:  1. Normal left ventricular size and systolic performance with a visually  estimated ejection fraction of 60%.  2. There is mild aortic stenosis.  3. There is mild aortic insufficiency.  4. There is mild-moderate, to perhaps moderate, mitral insufficiency.  5. Borderline right ventricular enlargement with normal right ventricular  systolic performance.  6. There is moderate biatrial enlargement.  7. Right ventricular systolic pressure relative to right atrial pressure is  mildly increased. The pulmonary artery pressure is estimated to be 45-50 mmHg  plus right atrial pressure (the IVC is of normal caliber).    Imaging:       Lab Results:   Lab Results   Component Value Date    WBC 11.1 (H) 04/07/2023    HGB 9.7 (L) 04/07/2023    HCT 31.7 (L) 04/07/2023     04/07/2023    CHOL 154 01/28/2022    TRIG 132 01/28/2022    HDL 65 01/28/2022    ALT 18 04/06/2023    AST 20 04/06/2023     04/07/2023    BUN 23.1 (H) 04/07/2023    CO2 25 04/07/2023    TSH 3.00 03/13/2023    INR 1.60 (H) 03/15/2023     Lab Results   Component Value Date    TROPONINI 0.04 11/02/2022         Bolivar Ward MD [facility]  4/7/2023

## 2023-04-07 NOTE — PROGRESS NOTES
Orthopedic Progress Note      Assessment:    3 weeks status post left hip ORIF by Dr. Mckeon on 3/15/23, no concerns for periprosthetic fracture or septic joint.    Plan:  -pain control  -follow up with Dr. Mckeon outpatient as scheduled for normal post operative visit for 1 month follow up visit  -okay to discharge from orthopedic standpoint     Thank you for including [facility] in the care of Fatuma Henry. It has been a pleasure participating in her care.    Subjective:  Pain: minimal  Chest pain, SOB: none   Nausea, Vomiting:  none  Lightheadedness, Dizziness:  none  Neuro:  Patient denies new onset numbness or paresthesias    Patient is up with PT. Daughter at bedside. Very pleased with recovery from left hip fracture ORIF by Dr. Mckeon. No new concerns today. Denies any numbness or tingling. No signs of infection.     Objective:  /57 (BP Location: Right arm, Patient Position: Chair)   Pulse 65   Temp 98  F (36.7  C) (Oral)   Resp 22   Wt 77.5 kg (170 lb 13.7 oz)   SpO2 98%   BMI 28.43 kg/m    The patient is A&Ox3. Appears comfortable.   Sensation is intact.  Dorsiflexion and plantar flexion is intact.  Dorsalis pedis pulse intact.  Calves are soft and non-tender. Negative Virgen's.      Pertinent Labs   Lab Results: personally reviewed.   Lab Results   Component Value Date    INR 1.60 (H) 03/15/2023    INR 2.18 (H) 03/14/2023    INR 1.62 (H) 12/13/2022     Lab Results   Component Value Date    WBC 11.1 (H) 04/07/2023    HGB 9.7 (L) 04/07/2023    HCT 31.7 (L) 04/07/2023     04/07/2023     04/07/2023     Lab Results   Component Value Date     04/07/2023    CO2 25 04/07/2023         Report completed by:  Wallace Gonzales PA-C  [facility]    Date: 4/7/2023  Time: 12:54 PM

## 2023-04-07 NOTE — PROGRESS NOTES
04/07/23 0955   Appointment Info   Signing Clinician's Name / Credentials (PT) Monse Peres PT   Living Environment   People in Home spouse   Current Living Arrangements condominium   Home Accessibility no concerns   Transportation Anticipated family or friend will provide   Living Environment Comments per dtr, pt has been in and out of hospitals and TCU's past six months; only home for about one week and fell and broke hip last time home   Self-Care   Equipment Currently Used at Home walker, rolling  (4WW; home O2 at night)   Fall history within last six months yes   Number of times patient has fallen within last six months 1   Activity/Exercise/Self-Care Comment independent ADL's/IADL's, but in TCU and hospitals since Nov.; dtr lives in same building but works during day, is available to assist with IADL's   General Information   Onset of Illness/Injury or Date of Surgery 04/06/23   Referring Physician Dr. Cantor   Patient/Family Therapy Goals Statement (PT) per dtr, go home soon with w/c and increased services   Pertinent History of Current Problem (include personal factors and/or comorbidities that impact the POC) CKD, sepsis, LLE cellulitis, recent L hip periprosthetic fx s/p ORIF 3/15/23 and to TCU since then; PMH of CHF, RA, L ICA occlusion,a fib, COPD on home O2   Existing Precautions/Restrictions fall;oxygen therapy device and L/min   Cognition   Affect/Mental Status (Cognition) WFL   Orientation Status (Cognition) oriented to;person;place;time  (states correct month, but a few days off correct date)   Follows Commands (Cognition) WFL   Range of Motion (ROM)   Range of Motion ROM is WFL   Strength (Manual Muscle Testing)   Strength (Manual Muscle Testing) Deficits observed during functional mobility   Transfers   Transfers sit-stand transfer;toilet transfer   Sit-Stand Transfer   Sit-Stand [place] (Transfers) supervision;verbal cues   Assistive Device (Sit-Stand Transfers) walker, front-wheeled    Stand-Sit Transfer   Stand-Sit [place] (Transfers) supervision;verbal cues   Assistive Device (Stand-Sit Transfers) walker, front-wheeled   Toilet Transfer   [place] Level (Toilet Transfer) supervision;verbal cues   Assistive Device (Toilet Transfer) grab bars/safety frame;walker, front-wheeled   Type (Toilet Transfer) sit-stand;stand-sit   Gait/Stairs (Locomotion)   [place] Level (Gait) supervision;verbal cues   Assistive Device (Gait) walker, front-wheeled   Distance in Feet 12   Distance in Feet (Gait) 12   Pattern (Gait) step-to   Deviations/Abnormal Patterns (Gait) gait speed decreased;stride length decreased;yoly decreased;other (see comments)  (pt needs safety cuing for use of walker sideways to fit into bathroom and around room; O2 1L NC)   Physical Therapy Goals   PT Frequency Daily   PT Predicted Duration/Target Date for Goal Attainment 04/14/23   PT Goals Bed Mobility;Transfers;Gait   PT: Bed Mobility Supervision/stand-by assist;Supine to/from sit   PT: Transfers Modified independent;Sit to/from stand;Assistive device;Bed to/from chair   PT: Gait Rolling walker;50 feet;Supervision/stand-by assist   Interventions   Interventions Quick Adds Gait Training;Therapeutic Activity   Therapeutic Activity   Therapeutic Activities: dynamic activities to improve functional performance Minutes (72899) 15   Symptoms Noted During/After Treatment Fatigue;Shortness of breath   Treatment Detail/Skilled Intervention static standing tolerance with rw cuing for pursed lip breathing due to dyspnea with activity; sba for hygiene at sink; pt needs assist for managing brief and hygiene with toileting; educated pt and dtr on recommendations for pt to have assist with bathing, toileting, and dressing at home   Gait Training   Gait Training Minutes (75629) 15   Symptoms Noted During/After Treatment (Gait Training) shortness of breath;fatigue   Treatment Detail/Skilled Intervention gait training with rw additional  12 feet with room with cuing for pursed lip breathing due to dyspnea; O2 1L NC 99% at rest; educated pt and dtr on recommendation for w/c use for longer distances and 4WW with O2 for short distances in home; also commode for bedside use at night   [place] Level (Gait Training) stand-by assist   Physical Assistance Level (Gait Training) supervision;verbal cues   Weight Bearing (Gait Training) full weight-bearing   Assistive Device (Gait Training) rolling walker   Pattern Analysis (Gait Training) swing-to gait   Gait Analysis Deviations decreased step length;decreased yoly   Impairments (Gait Analysis/Training) balance impaired;strength decreased   PT Discharge Planning   PT Plan bring 4WW with O2 for gait;  LE ex; w/c training?   PT Discharge Recommendation (DC Rec) Transitional Care Facility   PT Rationale for DC Rec pt requires assist of one for toileting, dressing, and safe mobility; recommend short term rehab for mobility training and strengthening; pt may benefit from increased services at home due to many hospitalizations and TCU stays and not doing well at home; discussed options of [facility] and SNF with pt and dtr or increased care at home from [facility] and private caregivers   PT Brief overview of current status amb. 12 feet with walker sba limited by dyspnea   Total Session Time   Timed Code Treatment Minutes 30   Total Session Time (sum of timed and untimed services) 30

## 2023-04-07 NOTE — PLAN OF CARE
Problem: Risk for Delirium  Goal: Improved Attention and Thought Clarity  Outcome: Progressing  Intervention: Maximize Cognitive Function  Recent Flowsheet Documentation  Taken 4/7/2023 0000 by Ty Patel RN  Reorientation Measures:    calendar in view    clock in view    reorientation provided     Pt remains confused overnight.  Pt alert to self only, but is cooperative with all cares.    Problem: Dysrhythmia  Goal: Normalized Cardiac Rhythm  Outcome: Progressing     Pt has been in controlled Afib this shift.  Diltiazem still running at 15ml/hr.  VSS.  Shortness of breath noted with minimal acitivity; weaned down to 2LNC maintaining sats in high 90's. Pt is 3LNC at baseline.    Pt unable to void this shift. Bladder scanned for 507ml, notified hospitalist on call and received order for bladder protocol. Pt straight cathed for 600ml eliezer urine.

## 2023-04-07 NOTE — PLAN OF CARE
Problem: Dysrhythmia  Goal: Normalized Cardiac Rhythm  Outcome: Progressing   Goal Outcome Evaluation:    Vitals stable.  On 2L 92%.  Upgraded to regular diet.  Ate 100%.  2 loose stool.  Walked to the bathroom with walker and pt very SOB.  Diltiazem gtt 15mg/hr. Periph in right arm occluded still has left arm periph.  Afib HR 70. Bladder scan 5ml at 1300. Walks to the bathroom assist of 1 with walker. Afib HR 76. NC 2L 92%.

## 2023-04-07 NOTE — PROGRESS NOTES
[facility]    Medicine Progress Note - Hospitalist Service    Date of Admission:  4/6/2023    Assessment & Plan       Fatuma Henry is a 77 year old female admitted on 4/6/2023. She has a hx of Parox afib, htn, lipids, pulm htn (on chronic O2), diastolic HF, CKD stage 3, left ICA occlusion, RA, hx of gout, gerd, here now from TCU with Afib RVR and 3 days of n/v/d and concern for cellulitis on left lower leg. She was at TCU for hip fx recovery after fall and fx on 3/14/23.      1.Afib RVR  -hx of issues with this  -HR on admit 130-150   -now on dilt gtt, improved   -continue metoprolol   -last echo 3/23  - tele   -cards seeing  -was on DOAC     2.N/V/D--now recurrent c diff colitis  -for 3 days PTA  -vanco po was started 4/6  -fidaxomicin started per GI 4/7  -GI also will refer to [facility] for FMT    3.Cellulitis left lower leg  -ultrasound neg clot  -anterior erythema new since she was here with wound  -woc to see for wound shin  -iv zosyn and vanco given on admit--now ID stopped Zosyn,continue iv vanco til tomorrow  -greatly improved     4.s/p Hip fx 3/14/23  -summit did repair  -s/p closed reduction with percutaneous pinning  -ortho did see here  -follow up with Dr. Mckeon outpatient as scheduled for normal post operative visit     5.Leukocytosis  -suspect due to cellulitis  -pan cx, blood, ua/uc, check covid status since in TCU  - ID(high risk hx of RA, on chronic pred)   -iv vanco and zosyn --zosyn stopped on 4/7/23     6.hx of pulm htn  -on chronic O2 for chronic hypoxic resp failure     7.CKD stage 3  -avoid nephrotoxins  -daily bmp     8..Chronic hypoxic resp failure  -with COPD  -not in exacerbation     9.HTN  --on dilt gtt now for afib  -metoprolol     10.Left ICA occlusion  -to follow with vascular as outpt     11.gerd  -clarify med     12.Depression  -was on Cymbalta     13.hx of pulm nodules  -needs follow up with pulm as outpt  -she is due for ct chest to check nodule--will  "try to get this admit--did discuss with daughter   -ct chest to check nodule status--ordered     14.hx of gout     15.hx of RA  -on chronic pred  -hx of methotrexate--hold and stay off for good     16.Low albumin  -moderate malnutrition     17.Code-dnr/dni       I called daughter Enid at 1451       Diet: Regular Diet Adult    DVT Prophylaxis: DOAC  Brandon Catheter: Not present  Lines: None     Cardiac Monitoring: ACTIVE order. Indication: Tachyarrhythmias, acute (48 hours)  Code Status: No CPR- Do NOT Intubate      Clinically Significant Risk Factors              # Hypoalbuminemia: Lowest albumin = 3.4 g/dL at 4/6/2023  4:05 AM, will monitor as appropriate           # Overweight: Estimated body mass index is 28.43 kg/m  as calculated from the following:    Height as of 3/28/23: 1.651 m (5' 5\").    Weight as of this encounter: 77.5 kg (170 lb 13.7 oz)., PRESENT ON ADMISSION         Disposition Plan     Expected Discharge Date: 04/10/2023                  Teena Cantor MD  Hospitalist Service  [facility]  Securely message with [employer] (more info)  Text page via [employer] Paging/Directory   ______________________________________________________________________    Interval History   She notes loose stools yesterday and 2 today  No abd pain  Feels she can eat more  No nausea  Leg feels better on left  No cp or sob at rest , still on dilt gtt    Physical Exam   Vital Signs: Temp: 98  F (36.7  C) Temp src: Oral BP: 112/57 Pulse: 89   Resp: 22 SpO2: 98 % O2 Device: Nasal cannula Oxygen Delivery: 2 LPM  Weight: 170 lbs 13.7 oz    Constitutional: awake, alert, cooperative and no apparent distress  Respiratory: no increased work of breathing, good air exchange, no retractions and diminished breath sounds right base and left base  Cardiovascular: irregularly irregular rhythm  GI: normal bowel sounds, soft, non-distended and non-tender  Skin: left leg anterior erythema much improved, left lateral hip " area incision looks good as well, no erythema   Musculoskeletal: no lower extremity pitting edema present  Neurologic: Mental Status Exam:  Level of Alertness:   awake  Neuropsychiatric: General: normal, calm and normal eye contact    Medical Decision Making       50 MINUTES SPENT BY ME on the date of service doing chart review, history, exam, documentation & further activities per the note.      Data     I have personally reviewed the following data over the past 24 hrs:    11.1 (H)  \   9.7 (L)   / 344     136 103 23.1 (H) /  113 (H)   4.0 25 1.09 (H) \       Imaging results reviewed over the past 24 hrs:   No results found for this or any previous visit (from the past 24 hour(s)).

## 2023-04-07 NOTE — PROGRESS NOTES
Occupational Therapy      04/07/23 0730   Appointment Info   Signing Clinician's Name / Credentials (OT) Elo Camargo OTR/L   Living Environment   People in Home spouse   Current Living Arrangements condominium   Home Accessibility no concerns   Transportation Anticipated family or friend will provide   Living Environment Comments normally lives in condo but was in TCU following ORIF on L.hip.   Self-Care   Usual Activity Tolerance moderate   Current Activity Tolerance fair   Regular Exercise No   Equipment Currently Used at Home walker, standard   Fall history within last six months yes   Number of times patient has fallen within last six months 1   Activity/Exercise/Self-Care Comment Pt states she is independent with all ADLs normally. Per chart review, pt has been in/out of TCU's since last november.   Instrumental Activities of Daily Living (IADL)   IADL Comments Pt and spouse share IADLs at home   General Information   Onset of Illness/Injury or Date of Surgery 04/06/23   Referring Physician Teena Cantor   Additional Occupational Profile Info/Pertinent History of Current Problem 77 year old female admitted on 4/6/2023. She has a hx of Parox afib, htn, lipids, pulm htn (on chronic O2), diastolic HF, CKD stage 3, left ICA occlusion, RA, hx of gout, gerd, here now from TCU with Afib RVR and 3 days of n/v/d and concern for cellulitis on left lower leg. She was at TCU for hip fx recovery after fall and fx on 3/14/23   Existing Precautions/Restrictions fall;oxygen therapy device and L/min  (2L via nasal cannula)   Left Lower Extremity (Weight-bearing Status) weight-bearing as tolerated (WBAT)   Cognitive Status Examination   Orientation Status person   Cognitive Status Comments not oriented to place/time   Range of Motion Comprehensive   General Range of Motion no range of motion deficits identified   Bed Mobility   Bed Mobility supine-sit   Supine-Sit [place] (Bed Mobility) contact guard   Assistive  Device (Bed Mobility) bed rails   Transfers   Transfers sit-stand transfer   Sit-Stand Transfer   Sit-Stand [place] (Transfers) minimum assist (75% patient effort);contact guard   Assistive Device (Sit-Stand Transfers) walker, front-wheeled   Clinical Impression   Criteria for Skilled Therapeutic Interventions Met (OT) Yes, treatment indicated   OT Diagnosis decreased ADL ind/safety   OT Problem List-Impairments impacting ADL problems related to;activity tolerance impaired;balance;cognition;strength;post-surgical precautions   Assessment of Occupational Performance 3-5 Performance Deficits   Identified Performance Deficits bed mobility, transfers, functional mobility, lower body dressing   Planned Therapy Interventions (OT) ADL retraining;balance training;cognition;bed mobility training;strengthening;home program guidelines   Clinical Decision Making Complexity (OT) moderate complexity   Risk & Benefits of therapy have been explained evaluation/treatment results reviewed;care plan/treatment goals reviewed;risks/benefits reviewed;current/potential barriers reviewed;participants voiced agreement with care plan;participants included;patient   OT Total Evaluation Time   OT Eval, Moderate Complexity Minutes (26385) 8   OT Goals   Therapy Frequency (OT) Daily   OT Predicted Duration/Target Date for Goal Attainment 04/13/23   OT Goals Lower Body Dressing;Bed Mobility;Transfers;Cognition   OT: Lower Body Dressing Supervision/stand-by assist;within precautions;using adaptive equipment   OT: Bed Mobility Modified independent;supine to/from sitting   OT: Transfer Modified independent;with assistive device   OT: Cognitive Patient/caregiver will verbalize understanding of cognitive assessment results/recommendations as needed for safe discharge planning   Self-Care/Home Management   Self-Care/Home Mgmt/ADL, Compensatory, Meal Prep Minutes (66759) 25   Symptoms Noted During/After Treatment (Meal Preparation/Planning  Training) fatigue   Treatment Detail/Skilled Intervention Pt up in bed upon OT arrival, OT notified RN pt R. arm IV had dry blood on it OT stated RN should come to assess it. Pt not oriented to place/time, but oriented to person/situation. Pt required cues throughout for sequencing/safety.Pt attempted to get to EOB but had Loose BM in process of getting to eOB, pt roll L/R w/ CGA no LOB Max.A for hygiene supine w/ increased time for ramandeep cares. Pt able to get supine>EOB w/ CGA cues for tech no  LOB, pt STSx3 Min/CGA w/ fww no LOB noted Height of bed slightly elevated, pt ambulated w/in room w/ CGA w/ fww nO LOB, pt up in recliner at end of session alarm on call light w/in reach, on 2 L throughout session SpO2>90%.   OT Discharge Planning   OT Plan Toileting, LB dressing w/ AE, transfers   OT Discharge Recommendation (DC Rec) Transitional Care Facility   OT Rationale for DC Rec Pt requires Min.A trnfs w/ fww no LOB noted, pt on 2L of O2. Pt lives in condo w/ spouse at baseline- OT currently recommending TCU upon d/c to enhance trnf safety/ADL ind, pending progress pt may be able to d/c home w/ support from family Assistx1 for trnfs/self cares   OT Brief overview of current status Min.Ax1 fww 2L of O2   Total Session Time   Timed Code Treatment Minutes 25   Total Session Time (sum of timed and untimed services) 33

## 2023-04-07 NOTE — DISCHARGE INSTRUCTIONS
Left leg wound - every 3 days   Cleanse wound and surrounding skin with vashe moistened gauze x 5 minutes   Apply mepilex 4x4

## 2023-04-08 NOTE — PLAN OF CARE
Problem: Risk for Delirium  Goal: Improved Sleep  Outcome: Not Progressing     Problem: Risk for Delirium  Goal: Improved Attention and Thought Clarity  Outcome: Progressing     Problem: Dysrhythmia  Goal: Normalized Cardiac Rhythm  Outcome: Progressing  Intervention: Monitor and Manage Cardiac Rhythm Effect  Recent Flowsheet Documentation  Taken 4/8/2023 0340 by Davina Song, CHIARA  VTE Prevention/Management: compression stockings off  Taken 4/8/2023 0010 by Davina Song RN  VTE Prevention/Management: compression stockings off     Goal Outcome Evaluation:         Chronic afib. HR controlled. On oral diltiazem and metoprolol. Patient forgetful, disoriented about time, thought it's October. Only slept for a couple of hours tonight. Offered sleeping aid earlier of shift but declined.

## 2023-04-08 NOTE — PLAN OF CARE
Problem: Dysrhythmia  Goal: Normalized Cardiac Rhythm  Outcome: Progressing  Intervention: Monitor and Manage Cardiac Rhythm Effect  Recent Flowsheet Documentation  Taken 4/7/2023 1900 by Gabby Joshi RN  VTE Prevention/Management: compression stockings on   Goal Outcome Evaluation:       Pt is alert and oriented x 3, forgetful. Lung sounds diminished on O2 at 2 LPM per nasal cannula. Per pt she uses 3 LPM at home. SpO2 ranges 94 to 98%. HR in the 60's to 80's, Afib. She was on Diltiazem drip running at 10 ml/hr at the start of the shift, was discontinued around 2100 H 2 hours after oral Diltiazem was started per Dr. Simmons's order. HR went down to 47 around 1947 H but did not sustained. Last /58. Bladder scan showed 130 ml, pt voided 100 ml only. Assist of 1 with walker and gait belt. Still needs stool specimen for enteric bacterial panel.

## 2023-04-08 NOTE — PROGRESS NOTES
[facility]    Medicine Progress Note - Hospitalist Service    Date of Admission:  4/6/2023    Assessment & Plan     Fatuma Henry is a 77 year old female admitted on 4/6/2023. She has a hx of Parox afib, htn, lipids, pulm htn (on chronic O2), diastolic HF, CKD stage 3, left ICA occlusion, RA, hx of gout, gerd, here now from TCU with Afib RVR and 3 days of n/v/d and concern for cellulitis on left lower leg. She was at TCU for hip fx recovery after fall and fx on 3/14/23. She now has recurrent c diff as well     1.Afib RVR  -hx of issues with this  -HR on admit 130-150   -was  on dilt gtt, improved --now on po dilt  -continue metoprolol   -last echo 3/23  - tele   -cards following  - on DOAC     2.N/V/D--now recurrent c diff colitis  -for 3 days PTA  -vanco po was started 4/6  -fidaxomicin started per GI 4/7  -GI also will refer to [facility] for FMT  -as of 4/8/23 improved     3.Cellulitis left lower leg  -ultrasound neg clot  -anterior erythema new since she was here with wound  -woc did  see for wound shin  -iv zosyn and vanco given on admit--now ID stopped Zosyn,continue iv vanco today  -greatly improved, should be able to do short course antibiotic for this     4.s/p Hip fx 3/14/23  -summit did repair  -s/p closed reduction with percutaneous pinning  -ortho did see here  -follow up with Dr. Mckeon outpatient as scheduled for normal post operative visit     5.Leukocytosis  -suspect due to cellulitis  -pan cx, blood, ua/uc, check covid status since in TCU  - ID(high risk hx of RA, on chronic pred)   -iv vanco and zosyn --zosyn stopped on 4/7/23  -4/8 now wbc 9.5     6.hx of pulm htn  -on chronic O2 for chronic hypoxic resp failure     7.CKD stage 3  -avoid nephrotoxins  -daily bmp  -creat today 1.35     8..Chronic hypoxic resp failure  -with COPD  -not in exacerbation     9.HTN  --on dilt gtt now for afib--now po  -metoprolol     10.Left ICA occlusion  -to follow with vascular as  "outpt     11.gerd  -on med    12.Depression  -was on Cymbalta     13.hx of pulm nodules with positive pet  -needs follow up with pulm as outpt  -she is due for ct chest to check nodule--was hard to get this done with pt in and out of hospital   -got Ct chest 4/7/23-->  Stable 17 x 19 mm groundglass nodule in the lingula since 07/21/2022. This had just minimally increased in size since 01/17/2022. Recommend follow-up in 2 years.Stable 10 mm groundglass nodule in the lingula.  New small right and tiny left pleural effusions.  -I sent e-mail message to Cara Hdz NP in pulm clinic to update      14.hx of gout     15.hx of RA  -on chronic pred  -hx of methotrexate--hold and stay off for good     16.Low albumin  -moderate malnutrition     17.Code-dnr/dni       Social--pt/ot, ?if need to go to tcu vs home care    I called Enid , daughter at 1352---updated her on ct          Diet: Regular Diet Adult  Room Service    DVT Prophylaxis: DOAC  Brandon Catheter: Not present  Lines: None     Cardiac Monitoring: ACTIVE order. Indication: Tachyarrhythmias, acute (48 hours)  Code Status: No CPR- Do NOT Intubate      Clinically Significant Risk Factors              # Hypoalbuminemia: Lowest albumin = 3.4 g/dL at 4/6/2023  4:05 AM, will monitor as appropriate           # Overweight: Estimated body mass index is 29.5 kg/m  as calculated from the following:    Height as of 3/28/23: 1.651 m (5' 5\").    Weight as of this encounter: 80.4 kg (177 lb 4.8 oz)., PRESENT ON ADMISSION         Disposition Plan     Expected Discharge Date: 04/10/2023      Destination: home;nursing home            Teena Cantor MD  Hospitalist Service  [facility]  Securely message with [employer] (more info)  Text page via [employer] Paging/Directory   ______________________________________________________________________    Interval History   She feels better  Denied loose stool this am  Left leg improved  Eating ok  Off dilt gtt "     Physical Exam   Vital Signs: Temp: 97.6  F (36.4  C) Temp src: Oral BP: 108/56 Pulse: 74   Resp: 18 SpO2: 92 % O2 Device: Nasal cannula Oxygen Delivery: 2 LPM  Weight: 177 lbs 4.8 oz    Constitutional: awake, fatigued, alert, cooperative and no apparent distress  Respiratory: no increased work of breathing, good air exchange, no retractions and diminished breath sounds throughout lungs  Cardiovascular: irregularly irregular rhythm  GI: normal bowel sounds, soft, non-distended and non-tender  Skin: left anterior leg erythema greatly improved, left hip incision intact and no erythema there  Musculoskeletal: no edema  Neurologic: Mental Status Exam:  Level of Alertness:   awake  Neuropsychiatric: General: normal, calm and normal eye contact    Medical Decision Making       35 MINUTES SPENT BY ME on the date of service doing chart review, history, exam, documentation & further activities per the note.      Data     I have personally reviewed the following data over the past 24 hrs:    9.5  \   9.3 (L)   / 366     135 (L) 103 33.4 (H) /  108 (H)   4.0 22 1.35 (H) \       Imaging results reviewed over the past 24 hrs:   Recent Results (from the past 24 hour(s))   CT Chest w/o Contrast    Narrative    EXAM: CT CHEST W/O CONTRAST  LOCATION: [facility]  DATE/TIME: 4/7/2023 3:57 PM    INDICATION: 77 y o with lung nodule, copd, here c diff, need to check status lung nodule seen last year  no contrast, CKD; Pulmonary nodule evaluation; High risk appearing nodule; Potential contraindications to iodinated contrast  COMPARISON: 07/21/2022. And 01/17/2022 CTs  TECHNIQUE: CT chest without IV contrast. Multiplanar reformats were obtained. Dose reduction techniques were used.  CONTRAST: None.    FINDINGS:   LUNGS AND PLEURA: Stable groundglass nodules in the lingula including a 17 x 19 mm nodule image 131 and a 10 mm nodule image 127. Previously the larger nodule has just minimally increased in size. A  couple tinier less than 5 mm nodules of no   significance.    New small right and tiny left pleural effusions.    MEDIASTINUM/AXILLAE: No lymphadenopathy.    CORONARY ARTERY CALCIFICATION: Moderate.    UPPER ABDOMEN: No significant finding.    MUSCULOSKELETAL: Unremarkable.      Impression    IMPRESSION:   1.  Stable 17 x 19 mm groundglass nodule in the lingula since 07/21/2022. This had just minimally increased in size since 01/17/2022. Recommend follow-up in 2 years.    2.  Stable 10 mm groundglass nodule in the lingula.    3.  New small right and tiny left pleural effusions.    REFERENCE:  Guidelines for Management of Incidental Pulmonary Nodules Detected on CT Images: From the Fleischner Society 2017.   Guidelines apply to incidental nodules in patients who are 35 years or older.  Guidelines do not apply to lung cancer screening, patients with immunosuppression, or patients with known primary cancer.    SUBSOLID NODULES  Ground glass    Multiple  CT at 3-6 months. If stable, consider CT at 2 and 4 years. Management based on the most suspicious nodule.    Consider referral to lung nodule clinic.

## 2023-04-08 NOTE — PLAN OF CARE
Problem: Dysrhythmia  Goal: Normalized Cardiac Rhythm  Outcome: Progressing   Goal Outcome Evaluation:    Vitals stable.   On 2L 92%.  RA 77%. Walked to the bathroom with walker and assist of 1.  Void and small formed bm.  Sent bm to lab for virus panel.  Bladders scan at 1400 was 15ml.  Ate 100% breakfast.  Refused lunch. Intake 236 ml.  Output 200 ml.  Afib HR 72.

## 2023-04-09 NOTE — PROGRESS NOTES
[facility]    Medicine Progress Note - Hospitalist Service    Date of Admission:  4/6/2023    Assessment & Plan     Fatuma Henry is a 77 year old female admitted on 4/6/2023. She has a hx of Parox afib, htn, lipids, pulm htn (on chronic O2), diastolic HF, CKD stage 3, left ICA occlusion, RA, hx of gout, gerd, here now from TCU with Afib RVR and 3 days of n/v/d and concern for cellulitis on left lower leg. She was at TCU for hip fx recovery after fall and fx on 3/14/23. She now has recurrent c diff as well     1.Afib RVR  -hx of issues with this  -HR on admit 130-150   -was  on dilt gtt, improved --now on po dilt, heart rate improved   -continue metoprolol   -last echo 3/23  - tele   -cards following  - on DOAC     2.N/V/D--now recurrent c diff colitis--now this is 4th time  -for 3 days PTA  -vanco po was started 4/6  -fidaxomicin started per GI 4/7  -GI also will refer to [facility] for FMT  -as of 4/9/23 more stools again, nausea, does not think she will take much po, start ivf NS 50/hr and watch volume     3.Cellulitis left lower leg  -ultrasound neg clot  -anterior erythema new since she was here with wound  -woc did  see for wound shin  -iv zosyn and vanco given on admit--now ID stopped Zosyn,continue iv vanco today  -greatly improved, should be able to do short course antibiotic for this--ID stopped antibiotics on 4/8/23     4.s/p Hip fx 3/14/23  -summit did repair  -s/p closed reduction with percutaneous pinning  -ortho did see here  -follow up with Dr. Mckeon outpatient as scheduled for normal post operative visit     5.Leukocytosis  -suspect due to cellulitis  -pan cx, blood, ua/uc, check covid status since in TCU  - ID(high risk hx of RA, on chronic pred)   -iv vanco and zosyn --zosyn stopped on 4/7/23  -4/8 now wbc 10.3     6.hx of pulm htn  -on chronic O2 for chronic hypoxic resp failure     7.CKD stage 3--concern getting YESICA not taking much po and having ongoing loose  stool  "loss  -avoid nephrotoxins  -daily bmp  -creat today 1.45  -starting ivf today, 4/9/23     8..Chronic hypoxic resp failure  -with COPD  -not in exacerbation, always on 2 liters at least at night at home     9.HTN  --on dilt gtt now for afib--now po  -metoprolol     10.Left ICA occlusion  -to follow with vascular as outpt     11.gerd  -on med     12.Depression  -was on Cymbalta     13.hx of pulm nodules with positive pet  -needs follow up with pulm as outpt  -she is due for ct chest to check nodule--was hard to get this done with pt in and out of hospital   -got Ct chest (no contrast)4/7/23-->  Stable 17 x 19 mm groundglass nodule in the lingula since 07/21/2022. This had just minimally increased in size since 01/17/2022. Recommend follow-up in 2 years.Stable 10 mm groundglass nodule in the lingula.  New small right and tiny left pleural effusions.  -I sent e-mail message to Cara Hdz NP in pulm clinic to update      14.hx of gout     15.hx of RA  -on chronic pred  -hx of methotrexate--hold and stay off for good     16.Low albumin  -moderate malnutrition    17.Acute toxic encephalopathy  -from c diff, infections, re-admit  -had delirium last admit as well  -will check VBG, want to avoid hypercarbia, wean O2 when we can, at  Home uses night O2, see if wean daytime    18.Code-dnr/dni        Social- met with Enid in person to update at 1310         Diet: Regular Diet Adult  Room Service    DVT Prophylaxis: DOAC  Brandon Catheter: Not present  Lines: None     Cardiac Monitoring: ACTIVE order. Indication: Tachyarrhythmias, acute (48 hours)  Code Status: No CPR- Do NOT Intubate      Clinically Significant Risk Factors              # Hypoalbuminemia: Lowest albumin = 3.4 g/dL at 4/6/2023  4:05 AM, will monitor as appropriate           # Overweight: Estimated body mass index is 28.95 kg/m  as calculated from the following:    Height as of 3/28/23: 1.651 m (5' 5\").    Weight as of this encounter: 78.9 kg (173 lb 15.1 oz)., " PRESENT ON ADMISSION         Disposition Plan     Expected Discharge Date: 04/10/2023      Destination: home;nursing home            Teena Cantor MD  Hospitalist Service  [facility]  Securely message with [employer] (more info)  Text page via [employer] Paging/Directory   ______________________________________________________________________    Interval History   She does not feel well  Did not sleep well  Had loose stools late last night and large one this am  Some nausea  Does not think she will eat much  Does not feel up to PT    Physical Exam   Vital Signs: Temp: 97.8  F (36.6  C) Temp src: Oral BP: (!) 163/67 Pulse: 79   Resp: 22 SpO2: 94 % O2 Device: Nasal cannula Oxygen Delivery: 2 LPM  Weight: 173 lbs 15.09 oz    Constitutional: awake, fatigued, alert, cooperative and no apparent distress--looks very tired  Respiratory: no increased work of breathing, moderate air exchange, no retractions and clear to auscultation  Cardiovascular: irregularly irregular rhythm  GI: normal bowel sounds, soft, non-distended and non-tender  Skin: anterior left leg, wound no drainage, slight erythema   Musculoskeletal: trace edema  Neurologic: Mental Status Exam:  Level of Alertness:   awake  Neuropsychiatric: General: normal, calm and normal eye contact    Medical Decision Making       35 MINUTES SPENT BY ME on the date of service doing chart review, history, exam, documentation & further activities per the note.      Data     I have personally reviewed the following data over the past 24 hrs:    10.3  \   9.0 (L)   / 407     137 104 33.5 (H) /  109 (H)   4.2 22 1.45 (H) \       Imaging results reviewed over the past 24 hrs:   No results found for this or any previous visit (from the past 24 hour(s)).

## 2023-04-09 NOTE — PROGRESS NOTES
"Care Management Follow Up    Length of Stay (days): 3    Expected Discharge Date: 04/13/2023     Concerns to be Addressed: discharge planning     Patient plan of care discussed at interdisciplinary rounds: Yes    Anticipated Discharge Disposition:  TCU - referrals pending     Anticipated Discharge Services:    Anticipated Discharge DME:      Patient/family educated on Medicare website which has current facility and service quality ratings:  yes  Education Provided on the Discharge Plan:  yes  Patient/Family in Agreement with the Plan:  yes    Referrals Placed by CM/SW:  TCU  Private pay costs discussed: Not applicable    Additional Information:  Discussed with MD. Not medically ready, likely a few more days. MICHELLE pushed out. Patient positive for C Diff and was recommended to have a stool transplant.     Social HX/ discharge planning: \"Patient and family are discouraged by the return of c-diff though they are relieved that GI has a plan and is referring patient to the FMT program. Patient has been in and out of the hospital and care facilities since November. She was recently residing at [facility]. This writer left a message there to ask if patient's bed is being held. Family is frustrated that they haven't been able to get patient in to her other specialist, specifically pulmonology due to being in facilities. They feel no one is looking globally at the patient's overall health and are just putting bandaids on her problems. This writer listened and demonstrating understanding of their frustration and offered encouragement. Resources were given for [facility] and this writer left a message with [facility] to see if patient might qualify for their disease management program. Family requests that referrals be made to home care, in case patient recovers enough to be able to return home. Referrals made.\"    CM will continue to follow care progression and aide in discharge planning " as needed.     Debi Bravo RN

## 2023-04-09 NOTE — PLAN OF CARE
Problem: Plan of Care - These are the overarching goals to be used throughout the patient stay.    Goal: Absence of Hospital-Acquired Illness or Injury  Intervention: Prevent Skin Injury  Recent Flowsheet Documentation  Taken 4/8/2023 2019 by Gabby Joshi RN  Body Position: position changed independently  Taken 4/8/2023 1630 by Gabby Joshi RN  Body Position: position changed independently     Problem: Dysrhythmia  Goal: Normalized Cardiac Rhythm  Outcome: Progressing     Problem: Risk for Delirium  Goal: Improved Attention and Thought Clarity  Intervention: Maximize Cognitive Function  Recent Flowsheet Documentation  Taken 4/8/2023 2019 by Gabby Joshi RN  Sensory Stimulation Regulation: television on  Reorientation Measures:   calendar in view   clock in view   reorientation provided  Taken 4/8/2023 1630 by Gabby Joshi RN  Sensory Stimulation Regulation: television on  Reorientation Measures:   calendar in view   clock in view   reorientation provided   Goal Outcome Evaluation:       Pt is alert and oriented  x 4, forgetful, confused at times. Lung sounds clear, diminished, on chronic O2 at 2 LPM per nasal cannula, SpO2 94 to 100 %. SOB with activity noted. HR in the 70's to 80's, Afib. She is assist of 1 with gait belt and walker. Had 2 small BM's this evening shift, voiding adequately. Will continue to monitor.

## 2023-04-09 NOTE — PROGRESS NOTES
Have been the pts. Room a few times already  this shift.  We have found 02 off in bed and  Re did 02 probe.   This time around 02 put back and probe sensor switched to her finger, seems to be working.    Pt. Is cooperative and has good attitude for being woke up.   Tylenol given as complains of back pain.  Only has 1 tylenol ordered.

## 2023-04-09 NOTE — PROGRESS NOTES
Pt. Sitting up and complains of short ness of breath. 02 put back on her, 02 sats were still 94 on room air.   We sat with pt. For a while she stated also felt nauseated when she sat up.     We had pt. Sit at end of bed for a  Little while, she did agree to lay back down and then elevate head as before.      Nausea resolved rt before laying her back down.   Lab in there room.  I ordered prn inhaler from pharmacy.  No change of heart rate, or tele.

## 2023-04-09 NOTE — PLAN OF CARE
Problem: Fluid Volume Deficit  Goal: Fluid Balance  Outcome: Not Progressing   Goal Outcome Evaluation:    Large inc loose stool.  C/O heartburn gave tums with good effect.  Ate a piece of toast only.  Drank  200cc.  NS 50cc continuous started.  Placed new periph right forearm with PICC team.  On NC 3L Sats vary %. Pt has been very tired all shift and sleeping.  Legs 2+ edema and seem more red today but cool to touch. Voided on toilet. Pt refused lunch.  Alert to self, place but not date.  At 1400 walked to the bathroom voided.  Sitting up in chair and eating. Critical lab PH 7.23 at 1420.  Lactic acid 2.5.  Gave NS bolus 250cc.  Recheck labs in am.

## 2023-04-09 NOTE — PROGRESS NOTES
I am concerned about the amount of stools and gi losses here  Metabolic acidosis and lactic acidosis  Fluid bolus NS now  Then follow with D5W +3  Amps Sodium bicarb ivf at 50cc/hour  Updated RN

## 2023-04-10 NOTE — PLAN OF CARE
Problem: Dysrhythmia  Goal: Normalized Cardiac Rhythm  Outcome: Progressing     Problem: Diarrhea  Goal: Effective Diarrhea Management  Outcome: Progressing  Intervention: Manage Diarrhea  Recent Flowsheet Documentation  Taken 4/10/2023 0400 by Samia Norwood RN  Isolation Precautions: enteric precautions maintained  Medication Review/Management: medications reviewed  Taken 4/10/2023 0033 by Samia Norwood RN  Isolation Precautions: enteric precautions maintained  Medication Review/Management: medications reviewed    Pt's vital signs were stable on 0.5 L of O2. She was afib on telemetry. She denied pain throughout the night but stated that she does occasionally feel short of breath. She has D5W with sodium bicarb 150 mEq running at 50 ml/hr. She was found sitting at the edge of her bed with her telemetry patches off and her O2 off. She was straightened out and repositioned in bed. The bed alarm is on for safety but she has not tried to get out of bed. She is able to make needs known. Call light is within reach.

## 2023-04-10 NOTE — PROGRESS NOTES
Pt's daughter called and notified staff that pt is having Anxiety, panicking in room.  Pt was checked. Pt was up in the chair working on her dinner, admitted to feeling panicky wanting to have Anti-Anxiety pills and sleeping pills. Tolerating Room Air, Sp02 96%. Wants warm water added on the cup in her tray.   Cross cover paged. PRN Ativan was ordered. Given and helpful.

## 2023-04-10 NOTE — PROGRESS NOTES
[facility]    Medicine Progress Note - Hospitalist Service    Date of Admission:  4/6/2023    Assessment & Plan     Fatuma Henry is a 77 year old female admitted on 4/6/2023. She has a hx of Parox afib, htn, lipids, pulm htn (on chronic O2), diastolic HF, CKD stage 3, left ICA occlusion, RA, hx of gout, gerd, here now from TCU with Afib RVR and 3 days of n/v/d and concern for cellulitis on left lower leg. She was at TCU for hip fx recovery after fall and fx on 3/14/23. She now has recurrent c diff as well     1.Afib RVR  -hx of issues with this  -HR on admit 130-150   -was  on dilt gtt, improved --now on po dilt, heart rate improved   -continue metoprolol   -last echo 3/23  - tele   -cards following  - on DOAC     2Recurrent c diff colitis--severe, now this is 4th time  -for 3 days PTA  -vanco po was started 4/6  -fidaxomicin started per GI 4/7  -GI also will refer to [facility] for FMT  -as of 4/9/23 more stools again, nausea, does not think she will take much po, start ivf bicarb gtt at 50/hr, improved     3.Cellulitis left lower leg  -ultrasound neg clot  -anterior erythema new since she was here with wound  -woc did  see for wound shin  -iv zosyn and vanco given on admit--now ID stopped Zosyn,continue iv vanco today  -greatly improved, should be able to do short course antibiotic for this--ID stopped antibiotics on 4/8/23  -looks much improved after short course     4.s/p Hip fx 3/14/23  -summit did repair  -s/p closed reduction with percutaneous pinning  -ortho did see here  -follow up with Dr. Mckeon outpatient as scheduled for normal post operative visit     5.Leukocytosis/lactic acidosis, metabolic acidosis  -suspect due to cellulitis, then severe c diff colitis   -pan cx, blood, ua/uc, check covid status since in TCU  - ID(high risk hx of RA, on chronic pred)   -iv vanco and zosyn --zosyn stopped on 4/7/23  -4/10 wbc now is 9.8     6.hx of pulm htn  -on chronic O2 for chronic  hypoxic resp failure     7.CKD stage 3--concern getting YESICA not taking much po and having ongoing loose  stool loss  -avoid nephrotoxins  -daily bmp  -creat today 1.30--better  -started ivf with bicarb, 4/9/23     8.Chronic hypoxic resp failure  -with COPD  -not in exacerbation, per family and pt at home uses 2 liters sleeping, and usually daytime off     9.HTN  --on dilt gtt now for afib--now po  -metoprolol     10.Left ICA occlusion  -to follow with vascular as outpt     11.gerd  -on med     12.Depression  -was on Cymbalta     13.hx of pulm nodules with positive pet  -needs follow up with pulm as outpt  -she is due for ct chest to check nodule--was hard to get this done with pt in and out of hospital   -got Ct chest (no contrast)4/7/23-->  Stable 17 x 19 mm groundglass nodule in the lingula since 07/21/2022. This had just minimally increased in size since 01/17/2022. Recommend follow-up in 2 years.Stable 10 mm groundglass nodule in the lingula.  New small right and tiny left pleural effusions.  -I sent e-mail message to Cara Hdz NP in pulm clinic to update --she will try to move up follow up with her now     14.hx of gout     15.hx of RA  -on chronic pred  -hx of methotrexate--hold and stay off for good--DO not restart--discussed with family     16.Low albumin  -moderate malnutrition     17.Acute toxic encephalopathy  -from c diff, infections, re-admit  -had delirium last admit as well  -checked VBG, want to avoid hypercarbia, wean O2 when we can, at  Home uses night O2, see if wean daytime  -improved now    18 . Suspect tear duct occulsions  -try natural  tear, massage, warm washcloth    19.Moderate malnutrition  -encourage po     20. Code-dnr/dni    I called [place] to update [id number]              Diet: Regular Diet Adult  Room Service    DVT Prophylaxis: DOAC  Brandon Catheter: Not present  Lines: None     Cardiac Monitoring: ACTIVE order. Indication: Tachyarrhythmias, acute (48 hours)  Code Status: No CPR- Do NOT  "Intubate      Clinically Significant Risk Factors              # Hypoalbuminemia: Lowest albumin = 3.4 g/dL at 4/6/2023  4:05 AM, will monitor as appropriate           # Overweight: Estimated body mass index is 29.39 kg/m  as calculated from the following:    Height as of 3/28/23: 1.651 m (5' 5\").    Weight as of this encounter: 80.1 kg (176 lb 9.4 oz).          Disposition Plan      Expected Discharge Date: 04/14/2023    Discharge Delays: Placement - TCU  Isolation - find facility that will accept  Destination: home;nursing home  Discharge Comments: Loose stools, NS bolus, , PO Michelo          Teena Cantor MD  Hospitalist Service  [facility]  Securely message with [employer] (more info)  Text page via [employer] Paging/Directory   ______________________________________________________________________    Interval History   She feels better overall  No loose stools overnight and not yet today  She notes no abd pain  Was up in chair  Eyes some drainage--no pain    Physical Exam   Vital Signs: Temp: 97.6  F (36.4  C) Temp src: Oral BP: 111/61 Pulse: 97   Resp: 18 SpO2: 95 % O2 Device: None (Room air) Oxygen Delivery: 1/2 LPM  Weight: 176 lbs 9.42 oz    Constitutional: awake, fatigued, alert, cooperative and no apparent distress  Eyes: pooling of tears  Respiratory: no increased work of breathing, good air exchange, no retractions and diminished breath sounds throughout lungs  Cardiovascular: irregularly irregular rhythm  GI: normal bowel sounds, soft, non-distended and non-tender  Skin: left hip incision intact, lower left anterior leg small wound, no drainage and no erythema now  Musculoskeletal: no lower extremity pitting edema present  Neurologic: Mental Status Exam:  Level of Alertness:   awake  Neuropsychiatric: General: normal, calm and normal eye contact    Medical Decision Making       50 MINUTES SPENT BY ME on the date of service doing chart review, history, exam, documentation & further " activities per the note.      Data     I have personally reviewed the following data over the past 24 hrs:    9.8  \   8.9 (L)   / 382     141 109 (H) 26.3 (H) /  110 (H)   4.0 22 1.30 (H) \       Procal: N/A CRP: N/A Lactic Acid: 1.0         Imaging results reviewed over the past 24 hrs:   No results found for this or any previous visit (from the past 24 hour(s)).

## 2023-04-10 NOTE — PLAN OF CARE
Problem: Dysrhythmia  Goal: Normalized Cardiac Rhythm  Outcome: Progressing     Problem: Diarrhea  Goal: Effective Diarrhea Management  Outcome: Progressing  Intervention: Manage Diarrhea  Recent Flowsheet Documentation  Taken 4/10/2023 1211 by Rebecca Lama, RN  Isolation Precautions: enteric precautions maintained  Medication Review/Management: medications reviewed  Taken 4/10/2023 0900 by Rebecca Lama, RN  Isolation Precautions: enteric precautions maintained  Medication Review/Management: medications reviewed   Goal Outcome Evaluation:    Vitals stable.  On RA when awake during the day.  D5 sodium bicarb 50cc/hr continuous. Afib HR controlled.  Voided on toilet.  No BM.  Pt said she did not like the ativan they gave her last night for her anxiety she said it made her confused and she would like something else.  Tear ducts look blocked.  Using eye drops and massage on eyes. Pt has no c/o pain in eyes.  Ate well.  Napping in afternoon with 2L NC.

## 2023-04-10 NOTE — PLAN OF CARE
Problem: Plan of Care - These are the overarching goals to be used throughout the patient stay.    Goal: Absence of Hospital-Acquired Illness or Injury  Outcome: Progressing  Intervention: Identify and Manage Fall Risk  Recent Flowsheet Documentation  Taken 4/9/2023 1600 by Selvin Martinez RN  Safety Promotion/Fall Prevention:   activity supervised   clutter free environment maintained  Intervention: Prevent Skin Injury  Recent Flowsheet Documentation  Taken 4/9/2023 2100 by Selvin Martinez RN  Body Position:   turned   right   position changed independently  Taken 4/9/2023 1845 by Selvin Martinez RN  Body Position: legs elevated     Problem: Risk for Delirium  Goal: Improved Sleep  Outcome: Progressing     Problem: Dysrhythmia  Goal: Normalized Cardiac Rhythm  Outcome: Progressing     Problem: Diarrhea  Goal: Effective Diarrhea Management  Outcome: Progressing  Intervention: Manage Diarrhea  Recent Flowsheet Documentation  Taken 4/9/2023 1600 by Selvin Martinez RN  Isolation Precautions: enteric precautions maintained  Medication Review/Management: medications reviewed     Goal Outcome Evaluation:  A/O x3, forgetful disoriented to day/time but able to make needs known. Tolerating Room Air on Eves, Sp02 >90% and kept at 1L NC at bedtime. Up in the chair for meals, ambulated to the bathroom to void, no loose stools this shift. Afebrile, BP within normal. Completed Bolus Normal Saline and started on D5W  with Sodium Bicarb at 50 ml/hr. A Fib, rate controlled heart rate 70-80's. PRN Melatonin for sleep and PRN Ativan for Anxiety is effective. Leg leg wound is dry/scabbed left GIUSEPPE.

## 2023-04-11 NOTE — PLAN OF CARE
Goal Outcome Evaluation:      Plan of Care Reviewed With: patient    Overall Patient Progress: improvingOverall Patient Progress: improving     Pt. Doing well overnight, sleeping between cares. VSS, and denied pain or nausea. Was quite dyspneic on exertion after returning to bed from the BR. Mild edema LE.VSS, continue to monitor.

## 2023-04-11 NOTE — PROGRESS NOTES
[facility]  Hospitalist Progress Note    Admit Date:  4/6/2023  Date of Service (when I saw the patient): 04/11/2023   Provider:  Cookie Leigh, DO    Assessment & Plan   Fatuma JAZMIN Henry is a 77 year old female who was admitted on 4/6/2023. She has a hx of Parox afib, htn, lipids, pulm htn (on chronic O2), diastolic HF, CKD stage 3, left ICA occlusion, RA, hx of gout, gerd, here now from TCU with Afib RVR and 3 days of n/v/d and concern for cellulitis on left lower leg. She was at TCU for hip fx recovery after fall and fx on 3/14/23. She now has recurrent c diff as well which is being treated.    Problem List:    1. Afib RVR  -hx of issues with this  -HR on admit 130-150   -was  on dilt gtt, improved --now on po dilt, heart rate improved   -continue metoprolol   -last echo 3/23  - tele   -cards saw on 4/7/23 and has signed off  - on DOAC     2. Recurrent c diff colitis--severe, now this is 4th time  -for 3 days PTA  -vanco po was started 4/6  -fidaxomicin started per GI 4/7  -GI also will refer to [facility] for FMT  Po intake improving and less stooling - will saline lock IVF today     3.Cellulitis left lower leg  -ultrasound neg clot  -anterior erythema new since she was here with wound  -woc did  see for wound shin  -iv zosyn and vanco given on admit--now ID stopped Zosyn,continue iv vanco today  -greatly improved, should be able to do short course antibiotic for this--ID stopped antibiotics on 4/8/23  -looks much improved after short course     4.s/p Hip fx 3/14/23  -summit did repair  -s/p closed reduction with percutaneous pinning  -ortho did see here  -follow up with Dr. Mckeon outpatient as scheduled for normal post operative visit     5.Leukocytosis/lactic acidosis, metabolic acidosis  -suspect due to cellulitis, then severe c diff colitis   -pan cx, blood, ua/uc, check covid status since in TCU  - ID(high risk hx of RA, on chronic pred)   -iv vanco and zosyn --zosyn stopped  on 4/7/23  -4/10 wbc now is 9.8     6.hx of pulm htn  -on chronic O2 for chronic hypoxic resp failure     7.CKD stage 3--concern getting YESICA not taking much po and having ongoing loose  stool loss  -avoid nephrotoxins  -daily bmp  -creat again better today at 1.15     8.Chronic hypoxic resp failure  -with COPD  -not in exacerbation, per family and pt at home uses 2 liters sleeping, and usually daytime off     9.HTN  -cardizem 120mg/day  -metoprolol 75mg/day     10.Left ICA occlusion  -to follow with vascular as outpt     11.GERD  -on med     12.Depression  -was on Cymbalta     13.hx of pulm nodules with positive PET  -needs follow up with pulm as outpt  -she is due for ct chest to check nodule--was hard to get this done with pt in and out of hospital   -got Ct chest (no contrast)4/7/23-->  Stable 17 x 19 mm groundglass nodule in the lingula since 07/21/2022. This had just minimally increased in size since 01/17/2022. Recommend follow-up in 2 years.Stable 10 mm groundglass nodule in the lingula.  New small right and tiny left pleural effusions.  -I sent e-mail message to Cara Hdz NP in pulm clinic to update --she will try to move up follow up with her now     14.hx of gout     15.hx of RA  -on chronic pred  -hx of methotrexate--hold and stay off for good--DO not restart--prior hospitalist discussed with family     16.Low albumin  -moderate malnutrition     17.Acute toxic encephalopathy - resolved  -from c diff, infections, re-admit  -had delirium last admit as well  -checked VBG, want to avoid hypercarbia, wean O2 when we can, at  Home uses night O2, see if wean daytime  -improved now     18 . Suspect tear duct occulsions  -try natural  tear, massage, warm washcloth     19.Moderate malnutrition  -encourage po     20. Code-dnr/dni       DISPOSITION - awaiting TCU placement    Medical Decision Making     51 MINUTES SPENT BY ME on the date of service doing chart review, history, exam, documentation & further  activities per the note.        Labs/Imaging Reviewed:  See Information above and Data section below    Diet: Regular Diet Adult  Room Service    DVT Prophylaxis: DOAC  Brandon Catheter: Not present  Code Status: No CPR- Do NOT Intubate      Disposition Plan      Expected Discharge Date: 04/14/2023    Discharge Delays: Placement - TCU  Isolation - find facility that will accept  Destination: home;nursing home  Discharge Comments: ID ? fecal transplant. Bicarb gtt   TCU at Discharge     Entered: Cookie Leigh DO 04/11/2023, 7:29 AM       The patient's care was discussed with the Patient.  And bedside RN    Interval History   Sitting up in chair.  Feels fatigued and SOB with activity.  Does not think that she has had any stools overnight or yet this am.  No F/C, N/V.  No ant chest pain or HA this am.     -Data reviewed today: I reviewed all new labs and imaging results over the last 24 hours. I personally reviewed no images or EKG's today.    Physical Exam   Temp: 97  F (36.1  C) Temp src: Axillary BP: 122/60 Pulse: 98   Resp: 18 SpO2: 97 % O2 Device: None (Room air) Oxygen Delivery: 1 LPM  Vitals:    04/09/23 0428 04/10/23 0501 04/11/23 0314   Weight: 78.9 kg (173 lb 15.1 oz) 80.1 kg (176 lb 9.4 oz) 74.9 kg (165 lb 2 oz)     Vital Signs with Ranges  Temp:  [97  F (36.1  C)-98.2  F (36.8  C)] 97  F (36.1  C)  Pulse:  [87-98] 98  Resp:  [17-22] 18  BP: (111-124)/(60-77) 122/60  SpO2:  [92 %-100 %] 97 %  I/O last 3 completed shifts:  In: 360 [P.O.:360]  Out: 350 [Urine:350]    GEN:  Alert, elderly female sitting up in chair.  Appears fatigued  HEENT:  Normocephalic/atraumatic, no scleral icterus, no nasal discharge, mouth moist  CV:  Regular rate and rhythm, somewhat distant but no loud murmur to ausc.  S1 + S2 noted, no S3 or S4.  LUNGS:  Mild, scattered end exp wheeze to the upper ant right chest, otherwise clear to ausc ant/lat.  No costal retractions bilaterally.  Symmetric chest rise on inhalation  noted.  ABD:  Active bowel sounds, soft, non-tender/non-distended.  No rebound/guarding/rigidity.    EXT: 1+ pitting edema up to the inferior patella area (no pitting edema  or cyanosis bilaterally.   SKIN:  Dry to touch, no rashes or jaundice noted.  PSYCH:  Mood slightly flattened  NEURO:  No tremors at rest, speech is clear    Data   Labs:  No results for input(s): CULT in the last 168 hours.  Recent Labs   Lab 04/11/23  0440 04/10/23  0438 04/09/23  0446    141 137   POTASSIUM 3.8 4.0 4.2   CHLORIDE 104 109* 104   CO2 26 22 22   ANIONGAP 8 10 11   * 110* 109*   BUN 25.8* 26.3* 33.5*   CR 1.15* 1.30* 1.45*   GFRESTIMATED 49* 42* 37*   SUNI 9.1 9.5 9.7     Recent Labs   Lab 04/11/23  0440 04/10/23  0438 04/09/23  0446   WBC 9.2 9.8 10.3   HGB 8.6* 8.9* 9.0*   HCT 28.1* 29.4* 29.0*   MCV 97 98 97    382 407     Recent Labs   Lab 04/11/23  0440 04/10/23  0438 04/09/23  0446 04/07/23  1006 04/06/23  0405    141 137   < > 138   POTASSIUM 3.8 4.0 4.2   < > 4.2   CHLORIDE 104 109* 104   < > 103   CO2 26 22 22   < > 25   ANIONGAP 8 10 11   < > 10   * 110* 109*   < > 112*   BUN 25.8* 26.3* 33.5*   < > 25.1*   CR 1.15* 1.30* 1.45*   < > 1.11*   GFRESTIMATED 49* 42* 37*   < > 51*   SUNI 9.1 9.5 9.7   < > 10.0   MAG 1.8 1.7 1.9   < > 1.8   PROTTOTAL  --   --   --   --  5.9*   ALBUMIN  --   --   --   --  3.4*   BILITOTAL  --   --   --   --  0.7   ALKPHOS  --   --   --   --  228*   AST  --   --   --   --  20   ALT  --   --   --   --  18    < > = values in this interval not displayed.     Recent Labs   Lab 04/06/23  1246   COLOR Yellow   APPEARANCE Cloudy*   URINEGLC Negative   URINEBILI Negative   URINEKETONE Negative   SG 1.022   UBLD >1.0 mg/dL*   URINEPH 6.0   PROTEIN 50*   NITRITE Negative   LEUKEST 500 Lorenzo/uL*   RBCU 10*   WBCU 30*      Recent Imaging:   No results found for this or any previous visit (from the past 24 hour(s)).    Medications     - MEDICATION INSTRUCTIONS -          allopurinol  300 mg Oral Daily     budesonide-formoterol  2 puff Inhalation BID     artificial tears  1 drop Both Eyes TID     cholestyramine  1 packet Oral TID w/meals     diltiazem ER COATED BEADS  120 mg Oral Daily     DULoxetine  20 mg Oral Daily     famotidine  20 mg Intravenous Q24H     fidaxomicin  200 mg Oral BID     folic acid  1 mg Oral Daily     lactobacillus rhamnosus (GG)  1 capsule Oral BID     metoprolol succinate ER  75 mg Oral Daily     pravastatin  20 mg Oral QPM     predniSONE  5 mg Oral Daily     rivaroxaban ANTICOAGULANT  15 mg Oral Daily with supper     sodium chloride (PF)  3 mL Intracatheter Q8H     vancomycin  125 mg Oral 4x Daily     Vitamin D3  125 mcg Oral Daily

## 2023-04-11 NOTE — PLAN OF CARE
Problem: Dysrhythmia  Goal: Normalized Cardiac Rhythm  Outcome: Progressing   Cardiac rhythm: A-fib with rates below 100.      Problem: Diarrhea  Goal: Effective Diarrhea Management  Outcome: Progressing  Intervention: Manage Diarrhea  Recent Flowsheet Documentation  Taken 4/10/2023 1645 by Kelley Menezes RN  Isolation Precautions: enteric precautions maintained  Medication Review/Management: medications reviewed   Pt is on enteric precautions; taking Dificid. She has not had any stools this shift.     Known to desat into the mid 80's RA at times. Requires reminders to cough and deep breath.  LS are diminished throughout; non-productive cough noted.   Currently on oxygen at 1 lpm/nc for saturations in the high 90's.

## 2023-04-12 NOTE — PLAN OF CARE
Problem: Plan of Care - These are the overarching goals to be used throughout the patient stay.    Goal: Optimal Comfort and Wellbeing  Outcome: Progressing     Problem: Plan of Care - These are the overarching goals to be used throughout the patient stay.    Goal: Readiness for Transition of Care  Outcome: Progressing   Goal Outcome Evaluation:      Plan of Care Reviewed With: patient    Overall Patient Progress: improvingOverall Patient Progress: improving     Pt is AXOX4 but forgetful at times, a-fib, 1-3L NC, and assistx1 with a walker. Pt denies pain. Pt does experience SOB with exertion. Mepilex replaced on skin tear on left arm.    Pt's daughter called for an update, expressed the desire to be called by the MD and social work with updates. Reached out to social work for an update, called daughter. Left note on the pt's board that pt's daughter Enid would like to receive updates and spoke with Dr. Hui regarding daughters request.     Dr. Hui requested to paged GI, paged GI with Dr. Hui's contact information.

## 2023-04-12 NOTE — PROGRESS NOTES
ID chart check    Asked to comment on C diff treatment regimen. GI recommendation was fidaxomicin 10 days, plus oral vancomycin while on other antibiotics for cellulitis (now stopped). See Dr. Mcmullen's note for reference on use of fidaxomicin at dosing of 200mg BID 5 days, then 200mg every other day from days 7 through 25 (20 doses), this is what we recommend, with referral for fecal transplant as planned. Please call if questions.     Frank William MD

## 2023-04-12 NOTE — PROGRESS NOTES
Daily Progress Note        CODE STATUS:  No CPR- Do NOT Intubate    04/12/23  Assessment/Plan:  Fatuma Henry is a 77 year old female who was admitted on 4/6/2023. She has a hx of Parox afib, htn, lipids, pulm htn (on chronic O2), diastolic HF, CKD stage 3, left ICA occlusion, RA, hx of gout, gerd, here now from TCU with Afib RVR and 3 days of n/v/d and concern for cellulitis on left lower leg. She was at TCU for hip fx recovery after fall and fx on 3/14/23. She now has recurrent c diff as well which is being treated.       Afib RVR  -hx of issues with this  -HR on admit 130-150   -was  on dilt gtt, improved --now on po dilt, heart rate improved   -continue metoprolol   -last echo 3/23  -tele   -cards saw on 4/7/23 and has signed off  -on DOAC     Recurrent c diff colitis--severe, now this is 4th time  -for 3 days PTA  -vanco po was started 4/6. Stopped on 4/12/23  -fidaxomicin started on 4/7. Discussed with GI and ID as the recommendations are diferrent. Dr Rivera defer the treatment to ID. Discussed with Dr William. PO vancomycin stopped today. Completed 5 days of PO Dificid. Will taper Dificid per ID. Needs every other day dosing from day 7-25 (total of 20 doses)  -GI also will refer to [facility] for FMT    Cellulitis left lower leg  -ultrasound neg clot  -anterior erythema new since she was here with wound  -woc did  see for wound shin  -iv zosyn and vanco given on admit. ID stopped antibiotics on 4/8/23  -greatly improved     S/p Hip fx 3/14/23  -[facility] did the repair  -s/p closed reduction with percutaneous pinning  -ortho did see here  -follow up with Dr. Mckeon outpatient as scheduled for normal post operative visit     Leukocytosis/lactic acidosis, metabolic acidosis  -suspect due to cellulitis, then severe c diff colitis   -pan cx, blood, ua/uc, check covid status since in TCU  - ID(high risk hx of RA, on chronic pred)   -iv vanco and zosyn --zosyn stopped on 4/7/23  -4/10 wbc now is 9.8     H/O pulm  htn  -on chronic O2 for chronic hypoxic resp failure     CKD stage 3--concern getting YESICA not taking much po and having ongoing loose  stool loss  -avoid nephrotoxins  -daily bmp     Chronic hypoxic resp failure  -with COPD  -not in exacerbation, per family and pt at home uses 2 liters sleeping, and usually daytime off     HTN  -cardizem 120mg/day  -metoprolol 75mg/day     Left ICA occlusion  -to follow with vascular as outpt     GERD  -on med     Depression  -was on Cymbalta     H/O pulm nodules with positive PET  -needs follow up with pulm as outpt  -she is due for ct chest to check nodule--was hard to get this done with pt in and out of hospital   -got Ct chest (no contrast)4/7/23-->  Stable 17 x 19 mm groundglass nodule in the lingula since 07/21/2022. This had just minimally increased in size since 01/17/2022. Recommend follow-up in 2 years.Stable 10 mm groundglass nodule in the lingula.  New small right and tiny left pleural effusions.     H/O gout     H/O RA  -on chronic pred  -hx of methotrexate--hold and stay off for good--DO not restart--prior hospitalist discussed with family     Low albumin  -moderate malnutrition     Acute toxic encephalopathy - resolved  -from c diff, infections, re-admit  -had delirium last admit as well  -checked VBG, want to avoid hypercarbia, wean O2 when we can, at  Home uses night O2, see if wean daytime  -improved now     Suspect tear duct occulsions  -try natural  tear, massage, warm washcloth     Moderate malnutrition  -encourage po     Code-dnr/dni      Disposition; Likely back to TCU tomorrow  Barrier to discharge; Clinical progress     LOS: 6 days     Subjective:  Interval History: Patient seen and examined this morning. Notes, labs, imaging reports personally reviewed. Patient is new to me today. Patient reports she had no BM yesterday. Today she went 3 times. Stools were loose, but not watery. Denies any abdominal pain. Denies any fevers or chills.     Review of Systems:    As mentioned in subjective.    Patient Active Problem List   Diagnosis     Seropositive rheumatoid arthritis (H)     Osteoporosis dxa 2006      High risk medication use     Rheumatoid arthritis of multiple sites without rheumatoid factor (H)     Dyspnea     Reactive airway disease     Paroxysmal atrial fibrillation (H)     Nonsustained ventricular tachycardia (H)     CRF (chronic renal failure), stage 3 (moderate) (H)     Primary osteoarthritis involving multiple joints     Benign essential hypertension     Pulmonary nodules     COPD (chronic obstructive pulmonary disease) (H)     Asthma without status asthmaticus     CKD (chronic kidney disease) stage 3, GFR 30-59 ml/min (H)     History of 2019 novel coronavirus disease (COVID-19)     DNR (do not resuscitate)     Chronic anticoagulation     Personal history of immunosuppressive therapy     Hypokalemia     Hypomagnesemia     Diarrhea, unspecified type     Acute on chronic congestive heart failure, unspecified heart failure type (H)     SOB (shortness of breath)     Hypoxia     Rapid atrial fibrillation (H)     Sepsis, due to unspecified organism, unspecified whether acute organ dysfunction present (H)     Infection due to 2019 novel coronavirus     Syncope     Recurrent falls     Pain in limb     Hypertension     Generalized muscle weakness     Acute kidney injury (H)     Acute blood loss anemia     Respiratory failure, unspecified chronicity, unspecified whether with hypoxia or hypercapnia (H)     Malaise and fatigue     Stomatitis and mucositis     Acute on chronic respiratory failure with hypoxia (H)     Macrocytic anemia     Community acquired pneumonia of right lower lobe of lung     Acute cystitis with hematuria     Recurrent Clostridioides difficile diarrhea     Leukocytosis     HLD (hyperlipidemia)     Gastroesophageal reflux disease without esophagitis     Gout     Mood disorder (H)     JOHANNA (obstructive sleep apnea)     Hyponatremia     Internal carotid  artery stenosis, left     Oxygen dependent     Fracture of femur (H)     Atrial fibrillation with RVR (H)     Fall, initial encounter     Abnormal CT of spine     Acute on chronic diastolic (congestive) heart failure (H)     Cervical herniated disc     Gout of right foot, unspecified cause, unspecified chronicity     Insomnia, unspecified type     Metabolic encephalopathy     Acute right-sided low back pain with right-sided sciatica     Rash and nonspecific skin eruption     Loose stools     Cellulitis of left leg     Elevated alkaline phosphatase level     Dyspnea, unspecified type     Chronic renal impairment, unspecified CKD stage       Scheduled Meds:    allopurinol  300 mg Oral Daily     budesonide-formoterol  2 puff Inhalation BID     cholestyramine  1 packet Oral TID w/meals     diltiazem ER COATED BEADS  120 mg Oral Daily     DULoxetine  20 mg Oral Daily     famotidine  20 mg Intravenous Q24H     fidaxomicin  200 mg Oral BID     folic acid  1 mg Oral Daily     lactobacillus rhamnosus (GG)  1 capsule Oral BID     metoprolol succinate ER  75 mg Oral Daily     pravastatin  20 mg Oral QPM     predniSONE  5 mg Oral Daily     rivaroxaban ANTICOAGULANT  15 mg Oral Daily with supper     sodium chloride (PF)  3 mL Intracatheter Q8H     Vitamin D3  125 mcg Oral Daily     Continuous Infusions:    - MEDICATION INSTRUCTIONS -       PRN Meds:.acetaminophen, albuterol, calcium carbonate, LORazepam, melatonin, - MEDICATION INSTRUCTIONS -, QUEtiapine, sodium chloride (PF), sodium chloride (PF)    Objective:  Vital signs in last 24 hours:  Temp:  [97.6  F (36.4  C)-98.3  F (36.8  C)] 97.9  F (36.6  C)  Pulse:  [78-94] 83  Resp:  [18-20] 18  BP: (115-132)/(56-65) 126/59  SpO2:  [96 %-100 %] 100 %        Intake/Output Summary (Last 24 hours) at 4/12/2023 1323  Last data filed at 4/12/2023 0800  Gross per 24 hour   Intake 780 ml   Output 600 ml   Net 180 ml       Physical Exam:    General: Not in obvious distress.  HEENT: NC, AT    Chest: Clear to auscultation anteriorly  Heart: S1S2 normal, regular. No M/R/G  Abdomen: Soft. NT, ND. Bowel sounds- active.  Extremities: No legs swelling  Neuro: Awake, grossly non-focal      Lab Results:(I have personally reviewed the results)    Recent Results (from the past 24 hour(s))   Basic metabolic panel    Collection Time: 04/12/23  4:57 AM   Result Value Ref Range    Sodium 138 136 - 145 mmol/L    Potassium 4.0 3.4 - 5.3 mmol/L    Chloride 104 98 - 107 mmol/L    Carbon Dioxide (CO2) 26 22 - 29 mmol/L    Anion Gap 8 7 - 15 mmol/L    Urea Nitrogen 21.3 8.0 - 23.0 mg/dL    Creatinine 1.06 (H) 0.51 - 0.95 mg/dL    Calcium 9.4 8.8 - 10.2 mg/dL    Glucose 96 70 - 99 mg/dL    GFR Estimate 54 (L) >60 mL/min/1.73m2   CBC with platelets    Collection Time: 04/12/23  4:57 AM   Result Value Ref Range    WBC Count 11.0 4.0 - 11.0 10e3/uL    RBC Count 3.13 (L) 3.80 - 5.20 10e6/uL    Hemoglobin 9.3 (L) 11.7 - 15.7 g/dL    Hematocrit 30.4 (L) 35.0 - 47.0 %    MCV 97 78 - 100 fL    MCH 29.7 26.5 - 33.0 pg    MCHC 30.6 (L) 31.5 - 36.5 g/dL    RDW 16.8 (H) 10.0 - 15.0 %    Platelet Count 370 150 - 450 10e3/uL   Extra Blue Top Tube    Collection Time: 04/12/23  5:50 AM   Result Value Ref Range    Hold Specimen JIC    Extra Green Top (Lithium Heparin) Tube    Collection Time: 04/12/23  5:50 AM   Result Value Ref Range    Hold Specimen JIC    Extra Purple Top Tube    Collection Time: 04/12/23  5:50 AM   Result Value Ref Range    Hold Specimen JIC        All laboratory and imaging data in the past 24 hours reviewed  Serum Glucose range:   Recent Labs   Lab 04/12/23  0457 04/11/23  0440 04/10/23  0438 04/09/23  0446   GLC 96 105* 110* 109*     ABG: No lab results found in last 7 days.  CBC:   Recent Labs   Lab 04/12/23  0457 04/11/23  0440 04/10/23  0438 04/09/23  0446 04/08/23  0504   WBC 11.0 9.2 9.8 10.3 9.5   HGB 9.3* 8.6* 8.9* 9.0* 9.3*   HCT 30.4* 28.1* 29.4* 29.0* 30.6*   MCV 97 97 98 97 98    343 382 407 366    NEUTROPHIL  --   --  74 75 74   LYMPH  --   --  7 6 7   MONOCYTE  --   --  10 9 8   EOSINOPHIL  --   --  7 8 9     Chemistry:   Recent Labs   Lab 04/12/23  0457 04/11/23  0440 04/10/23  0438 04/09/23  0446 04/07/23  1006 04/06/23  0405    138 141 137   < > 138   POTASSIUM 4.0 3.8 4.0 4.2   < > 4.2   CHLORIDE 104 104 109* 104   < > 103   CO2 26 26 22 22   < > 25   BUN 21.3 25.8* 26.3* 33.5*   < > 25.1*   CR 1.06* 1.15* 1.30* 1.45*   < > 1.11*   GFRESTIMATED 54* 49* 42* 37*   < > 51*   SUNI 9.4 9.1 9.5 9.7   < > 10.0   MAG  --  1.8 1.7 1.9   < > 1.8   PROTTOTAL  --   --   --   --   --  5.9*   ALBUMIN  --   --   --   --   --  3.4*   AST  --   --   --   --   --  20   ALT  --   --   --   --   --  18   ALKPHOS  --   --   --   --   --  228*   BILITOTAL  --   --   --   --   --  0.7    < > = values in this interval not displayed.     Coags:  No results for input(s): INR, PROTIME, PTT in the last 168 hours.    Invalid input(s): APTT  Cardiac Markers:  No results for input(s): CKTOTAL, TROPONINI in the last 168 hours.       CT Chest w/o Contrast    Result Date: 4/7/2023  EXAM: CT CHEST W/O CONTRAST LOCATION: [facility] DATE/TIME: 4/7/2023 3:57 PM INDICATION: 77 y o with lung nodule, copd, here c diff, need to check status lung nodule seen last year  no contrast, CKD; Pulmonary nodule evaluation; High risk appearing nodule; Potential contraindications to iodinated contrast COMPARISON: 07/21/2022. And 01/17/2022 CTs TECHNIQUE: CT chest without IV contrast. Multiplanar reformats were obtained. Dose reduction techniques were used. CONTRAST: None. FINDINGS: LUNGS AND PLEURA: Stable groundglass nodules in the lingula including a 17 x 19 mm nodule image 131 and a 10 mm nodule image 127. Previously the larger nodule has just minimally increased in size. A couple tinier less than 5 mm nodules of no significance. New small right and tiny left pleural effusions. MEDIASTINUM/AXILLAE: No lymphadenopathy.  CORONARY ARTERY CALCIFICATION: Moderate. UPPER ABDOMEN: No significant finding. MUSCULOSKELETAL: Unremarkable.     IMPRESSION: 1.  Stable 17 x 19 mm groundglass nodule in the lingula since 07/21/2022. This had just minimally increased in size since 01/17/2022. Recommend follow-up in 2 years. 2.  Stable 10 mm groundglass nodule in the lingula. 3.  New small right and tiny left pleural effusions. REFERENCE: Guidelines for Management of Incidental Pulmonary Nodules Detected on CT Images: From the Fleischner Society 2017. Guidelines apply to incidental nodules in patients who are 35 years or older. Guidelines do not apply to lung cancer screening, patients with immunosuppression, or patients with known primary cancer. SUBSOLID NODULES Ground glass Multiple CT at 3-6 months. If stable, consider CT at 2 and 4 years. Management based on the most suspicious nodule. Consider referral to lung nodule clinic.     CT Abdomen Pelvis w/o Contrast    Result Date: 4/6/2023  EXAM: CT ABDOMEN PELVIS W/O CONTRAST LOCATION: [facility] DATE/TIME: 4/6/2023 8:30 AM INDICATION: 77 y o with 3 days nausea, vomiting, and diarrhea; hx of c diff, very recent hip fx, check for colitis. COMPARISON: 03/22/2023. TECHNIQUE: CT scan of the abdomen and pelvis was performed without IV contrast. Multiplanar reformats were obtained. Dose reduction techniques were used. CONTRAST: None. FINDINGS: LOWER CHEST: Moderate right and trace left pleural effusions with associated atelectasis. Coronary atherosclerosis. Low blood pool relative to the myocardium suggestive of anemia. Small hiatal hernia. HEPATOBILIARY: Normal. PANCREAS: Normal. SPLEEN: Normal. ADRENAL GLANDS: Nodular thickening of the adrenal glands. KIDNEYS/BLADDER: No significant mass, stones, or hydronephrosis. There are simple or benign cysts. No follow up is needed. Similar mild bilateral perinephric fat stranding. BOWEL: There is diffuse fluid-filled distention of the  small and large bowel. Mild wall thickening of the majority of the colon. No obstruction or free air. LYMPH NODES: Normal. VASCULATURE: Extensive atherosclerosis. PELVIC ORGANS: Normal. MUSCULOSKELETAL: Degenerative changes of the spine. Similar chronic 11-12 compression deformities. ORIF hardware of the femur. Diffuse anasarca. Similar sclerosis of the left sacral ala.     IMPRESSION: 1.  Findings suggestive of a diarrheal state. There is mild wall thickening of the majority of the colon suggestive of infectious or inflammatory colitis. 2.  Manifestations of third spacing moderate right pleural effusion, trace left pleural effusion, and diffuse anasarca.     US Lower Extremity Venous Duplex Left    Result Date: 4/6/2023  EXAM: US LOWER EXTREMITY VENOUS DUPLEX LEFT LOCATION: [facility] DATE/TIME: 4/6/2023 5:07 AM INDICATION: redness swelling shin calf around chronic ulceration anterior shin.  (had recent orthopedic surgeyr to femur) COMPARISON: None. TECHNIQUE: Venous Duplex ultrasound of the left lower extremity with and without compression, augmentation and duplex. Color flow and spectral Doppler with waveform analysis performed. FINDINGS: Exam includes the common femoral, femoral, popliteal, and contralateral common femoral veins as well as segmentally visualized deep calf veins and greater saphenous vein. LEFT: No deep vein thrombosis. No superficial thrombophlebitis. No popliteal cyst.     IMPRESSION: 1.  No deep venous thrombosis in the left lower extremity.    XR Tibia and Fibula Left 2 Views    Result Date: 4/6/2023  EXAM: XR TIBIA AND FIBULA LEFT 2 VIEWS LOCATION: [facility] DATE/TIME: 4/6/2023 4:50 AM INDICATION: redness shin around chronic ulceration COMPARISON: None.     IMPRESSION: Normal tibia and fibula. No fracture. No bony erosion to suggest osteomyelitis. Soft tissue swelling about the shin.    XR Chest 1 View    Result Date: 4/6/2023  EXAM: XR  CHEST 1 VIEW LOCATION: [facility] DATE/TIME: 4/6/2023 4:49 AM INDICATION: copd and worsened dyspnea COMPARISON: 03/24/2023     IMPRESSION: Similar prominence of the cardiomediastinal silhouette with prominence of the pulmonary vasculature. Small right pleural effusion.    XR Tibia and Fibula Left 2 Views    Result Date: 3/25/2023  EXAM: XR TIBIA AND FIBULA LEFT 2 VIEWS LOCATION: [facility] DATE/TIME: 3/25/2023 2:59 PM INDICATION: fall 3 25 23, s p hip fx repair, r o new fx COMPARISON: None.     IMPRESSION: No acute fracture or malalignment. Osteopenia. Mild soft tissue swelling.    XR Knee Left 1/2 Views    Result Date: 3/25/2023  EXAM: XR KNEE LEFT 1/2 VIEWS LOCATION: [facility] DATE/TIME: 3/25/2023 3:01 PM INDICATION: fall 3 25 23, s p hip fx repair, r o new fx COMPARISON: 11/17/2022     IMPRESSION: No acute fracture or malalignment. No significant degenerative changes or knee joint effusion. Osteopenia. Atherosclerosis.    XR Pelvis and Hip Left 2 Views    Result Date: 3/25/2023  EXAM: XR PELVIS AND HIP LEFT 2 VIEWS LOCATION: [facility] DATE/TIME: 3/25/2023 3:00 PM INDICATION: fall 3 25 23, s p hip fx repair, r o new fx COMPARISON: CT from 03/22/2023     IMPRESSION: Status post ORIF of the left femoral neck fracture with similar alignment. No hardware complication. No new fracture. There is normal joint alignment. Mild degenerative changes throughout the pelvis. Severe degenerative changes of the lower lumbar spine. Osteopenia. Skin staples laterally. Atherosclerosis.    XR Chest Port 1 View    Result Date: 3/24/2023  EXAM: XR CHEST PORT 1 VIEW LOCATION: [facility] DATE/TIME: 3/24/2023 9:24 PM INDICATION: increasing oxygen needs COMPARISON: 03/24/2023     IMPRESSION: Borderline enlarged heart. Mild pulmonary vascular congestion. No pneumothorax or pleural effusion. No focal  consolidation.    US Lower Extremity Venous Duplex Left    Result Date: 3/24/2023  EXAM: US LOWER EXTREMITY VENOUS DUPLEX LEFT LOCATION: [facility] DATE/TIME: 3/24/2023 8:37 PM INDICATION: Swelling. Evaluate for DVT. COMPARISON: None. TECHNIQUE: Venous Duplex ultrasound of the left lower extremity with and without compression, augmentation and duplex. Color flow and spectral Doppler with waveform analysis performed. FINDINGS: Exam includes the common femoral, femoral, popliteal, and contralateral common femoral veins as well as segmentally visualized deep calf veins and greater saphenous vein. LEFT: No deep vein thrombosis. No superficial thrombophlebitis. No popliteal cyst. Left knee effusion identified.     IMPRESSION: 1.  No deep venous thrombosis in the left lower extremity. 2.  Left knee effusion.    XR Chest 2 Views    Result Date: 3/24/2023  EXAM: XR CHEST 2 VIEWS LOCATION: [facility] DATE/TIME: 3/24/2023 9:20 AM INDICATION: dyspnea, leukocytosis COMPARISON: 3/18/2023     IMPRESSION: The lungs are hyperinflated. Trace right effusion has decreased and compared to prior exam. No pneumothorax or new focal consolidation. Heart size is stable. Degenerative changes of the spine.    CT Head w/o Contrast    Result Date: 3/24/2023  EXAM: CT HEAD W/O CONTRAST LOCATION: [facility] DATE/TIME: 3/24/2023 9:17 AM INDICATION: Recent fall, possible worsening of confusion, r o bleed COMPARISON: CT head 03/14/2023 TECHNIQUE: Routine CT Head without IV contrast. Multiplanar reformats. Dose reduction techniques were used. FINDINGS: INTRACRANIAL CONTENTS: No intracranial hemorrhage, extraaxial collection, or mass effect.  No CT evidence of acute infarct. Mild presumed chronic small vessel ischemic changes. Mild generalized volume loss. No hydrocephalus. VISUALIZED ORBITS/SINUSES/MASTOIDS: Prior bilateral cataract surgery. Visualized portions of the orbits  are otherwise unremarkable. No paranasal sinus mucosal disease. No middle ear or mastoid effusion. BONES/SOFT TISSUES: No scalp hematoma. No skull fracture.     IMPRESSION: 1.  No CT evidence for acute intracranial process. 2.  Brain atrophy and presumed chronic microvascular ischemic changes as above.     CT Abdomen Pelvis w/o Contrast    Result Date: 3/22/2023  EXAM: CT ABDOMEN PELVIS W/O CONTRAST LOCATION: [facility] DATE/TIME: 3/22/2023 10:07 AM INDICATION: 77 y o female with s p hip repair, now today abd pain left of midline upper abd COMPARISON: 02/13/2023 TECHNIQUE: CT scan of the abdomen and pelvis was performed without IV contrast. Multiplanar reformats were obtained. Dose reduction techniques were used. CONTRAST: None. FINDINGS: LOWER CHEST: Coronary artery disease. HEPATOBILIARY: Cholecystectomy. PANCREAS: Normal. SPLEEN: Normal. ADRENAL GLANDS: Normal. KIDNEYS/BLADDER: Parapelvic renal cysts and simple cortical renal cysts are noted. No follow-up required. BOWEL: Large amount of stool throughout the colon. LYMPH NODES: Normal. VASCULATURE: Atherosclerotic disease. PELVIC ORGANS: Normal. MUSCULOSKELETAL: Surgical change in the left femur. Sclerosis of the left sacral ala. Height loss of T11 and T12 is again seen.     IMPRESSION: 1.  Large amount of stool throughout the colon. 2.  Atherosclerotic disease.     XR Chest Port 1 View    Result Date: 3/18/2023  EXAM: XR CHEST PORT 1 VIEW LOCATION: [facility] DATE/TIME: 3/18/2023 10:59 AM INDICATION: r o pneumonia COMPARISON: 02/13/2023     IMPRESSION: Small right pleural effusion has developed. Remainder unchanged. Calcified aortic atherosclerosis. Mildly enlarged cardiac silhouette. No evidence of pneumonia.    XR Surgery NAYE Fluoro L/T 5 Min    Result Date: 3/15/2023  This exam was marked as non-reportable because it will not be read by a radiologist or a [place] non-radiologist provider.     POC US Guidance  "Needle Placement    Result Date: 3/15/2023  Ultrasound was performed as guidance to an anesthesia procedure.  Click \"PACS images\" hyperlink below to view any stored images.  For specific procedure details, view procedure note authored by anesthesia.    Echocardiogram Complete    Result Date: 3/15/2023  [id number]^YARI^SAUL^[place] [address]  Name: HEATHER DOYLE MRN: [id number] : 1945 Study Date: 03/15/2023 09:38 AM Age: 77 yrs Gender: Female Patient Location: [facility] Reason For Study: Atrial Fibrillation, Chest Discomfort Ordering Physician: SUAL GREENBERG Performed By: ARELY  BSA: 1.8 m2 Height: 65 in Weight: 150 lb HR: 107 BP: 145/82 mmHg ______________________________________________________________________________ Procedure Complete Portable Echo Adult. Definity (NDC #10852-386) given intravenously. ______________________________________________________________________________ Interpretation Summary  1. Normal left ventricular size and systolic performance with a visually estimated ejection fraction of 60%. 2. There is mild aortic stenosis. 3. There is mild aortic insufficiency. 4. There is mild-moderate, to perhaps moderate, mitral insufficiency. 5. Borderline right ventricular enlargement with normal right ventricular systolic performance. 6. There is moderate biatrial enlargement. 7. Right ventricular systolic pressure relative to right atrial pressure is mildly increased. The pulmonary artery pressure is estimated to be 45-50 mmHg plus right atrial pressure (the IVC is of normal caliber).  When compared to the prior real-time echocardiogram dated 2022, left ventricular systolic performance and regional wall motion appears fairly similar on both studies (the prior examination was a limited study without complete evaluation of valvular function). ______________________________________________________________________________ Left " ventricle: Normal left ventricular size and systolic performance with a visually estimated ejection fraction of 60%. There is normal regional wall motion. Left ventricular wall thickness is normal.  Assessment of LV Diastolic Function: Patient appears to be in atrial fibrillation which limits assessment of diastolic filling.  Right ventricle: Borderline right ventricular enlargement with normal right ventricular systolic performance.  Left atrium: There is moderate left atrial enlargement.  Right atrium: There is moderate right atrial enlargement.  IVC: The IVC is of normal caliber.  Aortic valve: The aortic valve is not well visualized, but suspected to be comprised of three cusps. There is mild aortic stenosis. There is mild aortic insufficiency.  Mitral valve: There is mild mitral annular calcification. There is moderate mitral annular calcification. There is mild-moderate, to perhaps moderate, mitral insufficiency.  Tricuspid valve: The tricuspid valve is grossly morphologically normal. There is mild tricuspid insufficiency.  Pulmonic valve: The pulmonic valve is grossly morphologically normal.  Thoracic aorta: The aortic root and proximal ascending aorta are of normal dimension.  Pericardium: There is no significant pericardial effusion. ______________________________________________________________________________ ______________________________________________________________________________ MMode/2D Measurements & Calculations RVDd: 4.3 cm IVSd: 1.2 cm LVIDd: 3.2 cm LVIDs: 2.3 cm LVPWd: 0.89 cm FS: 29.6 % LV mass(C)d: 96.9 grams LV mass(C)dI: 55.4 grams/m2 Ao root diam: 3.2 cm LA dimension: 4.6 cm asc Aorta Diam: 2.6 cm LA/Ao: 1.4 LVOT diam: 1.9 cm LVOT area: 2.8 cm2 LA Volume Indexed (AL/bp): 32.6 ml/m2  RWT: 0.55 TAPSE: 1.3 cm  Time Measurements MM HR: 84.0 BPM  Doppler Measurements & Calculations MV E max ash: 144.0 cm/sec MV max P.4 mmHg MV mean PG: 3.0 mmHg MV V2 VTI: 33.2 cm MVA(VTI): 1.1 cm2 MV  dec time: 0.19 sec Ao V2 max: 225.0 cm/sec Ao max P.0 mmHg Ao V2 mean: 146.0 cm/sec Ao mean PG: 10.0 mmHg Ao V2 VTI: 37.5 cm NA(I,D): 0.96 cm2 NA(V,D): 1.00 cm2 LV V1 max P.5 mmHg LV V1 max: 79.3 cm/sec LV V1 VTI: 12.7 cm SV(LVOT): 36.0 ml SI(LVOT): 20.6 ml/m2 PA acc time: 0.08 sec TR max chente: 339.0 cm/sec TR max P.0 mmHg AV Chente Ratio (DI): 0.35  NA Index (cm2/m2): 0.55 E/E' av.2 Lateral E/e': 11.7 Medial E/e': 12.7 RV S Chente: 14.0 cm/sec  ______________________________________________________________________________ Report approved by: Derrick Singh 03/15/2023 11:04 AM       XR Pelvis and Hip Left 2 Views    Result Date: 3/14/2023  EXAM: XR PELVIS AND HIP LEFT 2 VIEWS LOCATION: [facility] DATE/TIME: 3/14/2023 5:34 PM INDICATION: left femoral neck fracture COMPARISON: CT from earlier today     IMPRESSION: Acute left femoral neck fracture with minimal impaction. The healing left sacral insufficiency fracture is difficult to visualize. There is normal joint alignment. Mild degenerative changes throughout the pelvis. Osteopenia. Atherosclerosis.    Cervical spine CT w/o contrast    Result Date: 3/14/2023  EXAM: CT CERVICAL SPINE W/O CONTRAST LOCATION: [facility] DATE/TIME: 3/14/2023 12:47 PM INDICATION: Fall. Pain. COMPARISON: Cervical spine CT 2022. TECHNIQUE: Routine CT Cervical Spine without IV contrast. Multiplanar reformats. Dose reduction techniques were used. FINDINGS: VERTEBRA: Straightening of the normal cervical lordosis which may be positional. No acute lucent fracture line visualized. No significant loss of vertebral body height. Moderate degenerative endplate changes and loss of disc height at C6-C7. Mild degenerative endplate changes and loss of disc height at C5-C6. Normal facets. CANAL/FORAMINA: No canal or neural foraminal stenosis. PARASPINAL: Atherosclerotic calcifications at the carotid bifurcations.      IMPRESSION: 1.  No fracture or posttraumatic subluxation. 2.  No high-grade spinal canal or neural foraminal stenosis.    CT Pelvis Bone wo Contrast    Result Date: 3/14/2023  EXAM: CT PELVIS BONE WO CONTRAST LOCATION: [facility] DATE/TIME: 3/14/2023 12:46 PM INDICATION: Fall, left hip pain. COMPARISON: 02/13/2023 CTA. TECHNIQUE: CT scan of the pelvis was performed without IV contrast. Multiplanar reformats were obtained. Dose reduction techniques were used. CONTRAST: None. FINDINGS: Bones: Acute mildly displaced subcapital fracture of the left femur as best seen on series 4 image 59 series 2 image 77. Healing left-sided sacral insufficiency fracture. Osteopenia. No degenerative changes in either hip. Soft tissues: Moderate size left hip effusion. Pelvic intraperitoneal contents normal.     IMPRESSION: 1.  Acute impacted mildly displaced subcapital fracture of the left femur. 2.  Healing insufficiency fracture of the left sacral ala.    Head CT w/o contrast    Result Date: 3/14/2023  EXAM: CT HEAD W/O CONTRAST LOCATION: [facility] DATE/TIME: 3/14/2023 12:45 PM INDICATION: Fall, on thinners COMPARISON: Head CT 12/29/2022. TECHNIQUE: Routine CT Head without IV contrast. Multiplanar reformats. Dose reduction techniques were used. FINDINGS: INTRACRANIAL CONTENTS: No intracranial hemorrhage, extraaxial collection, or mass effect.  No CT evidence of acute infarct. Mild presumed chronic small vessel ischemic changes. Mild generalized volume loss. No hydrocephalus. VISUALIZED ORBITS/SINUSES/MASTOIDS: No intraorbital abnormality. No paranasal sinus mucosal disease. No middle ear or mastoid effusion. BONES/SOFT TISSUES: No acute abnormality.     IMPRESSION: 1.  No acute intracranial hemorrhage, mass, or herniation. 2.  Mild diffuse parenchymal volume loss and white matter changes most likely due to chronic microvascular ischemic disease.      Latest radiology report  personally reviewed.    Note created using dragon voice recognition software so sounds alike errors may have escaped editing.      04/12/2023   Ugo Hui MD  Hospitalist, [facility]  Pager: [phone]

## 2023-04-12 NOTE — PROGRESS NOTES
Care Management Follow Up    Length of Stay (days): 6    Expected Discharge Date: 04/17/2023     Concerns to be Addressed: discharge planning     Patient plan of care discussed at interdisciplinary rounds: Yes    Anticipated Discharge Disposition: Home     Anticipated Discharge Services:    Anticipated Discharge DME:      Patient/family educated on Medicare website which has current facility and service quality ratings:    Education Provided on the Discharge Plan:    Patient/Family in Agreement with the Plan:      Referrals Placed by CM/SW:    Private pay costs discussed: Not applicable    Additional Information:  RNCM received update from patient's nurse that daughter would like an update.  RNCM reached out to MD but did not receive a response, daughter stated needed a call within an hour as she had a meeting.     RNCM placed call to [place] - daughter, stated no clinical update, however confirmed that patient is on a bed hold at [facility] and would be able to return there when medically ready.     [place] stated that patient has 10 days left in 100 days of coverage, and she was very nervously asking what is the next steps what should they be working on what additional placement will be needed following TCU. RNCM discussed that PT/OT recommending LTC if TCU is not an option. Discussed finances, stated patient and  do have some assets and a home that is paid for but uncertain of liquid finances. RNCM requested financial  to reach out to family to start on medicaide process to aide in search for LTC placement following TCU as well.     CM will continue to follow care progression and aide in discharge planning as needed.     1:01 PM- RNCM received update from MD that patient is able to return to TCU tomorrow. RNCM provided update to TCU liaison, will need to re- run insurance auth. CM to follow up.     Debi Bravo RN

## 2023-04-12 NOTE — PLAN OF CARE
Goal Outcome Evaluation:    Pt denies pain, alert and oriented, but can be forgetful. Bed alarm is on and call light within reach.   Pt requests for her Cholestyramine powder to be mix with hot water.   A-fib HR 70-90's

## 2023-04-13 NOTE — PLAN OF CARE
Physical Therapy Discharge Summary    Reason for therapy discharge:    Discharged to transitional care facility.    Progress towards therapy goal(s). See goals on Care Plan in [facility] electronic health record for goal details.  Goals not met.  Barriers to achieving goals:   limited tolerance for therapy and discharge from facility.    Therapy recommendation(s):    Continued therapy is recommended.  Rationale/Recommendations:  at TCU.      Lymph Discharge Summary    Reason for therapy discharge:    Discharged to transitional care facility.    Progress towards therapy goal(s). See goals on Care Plan in Epic electronic health record for goal details.  Goals partially met.  Barriers to achieving goals:   discharge from facility.    Therapy recommendation(s):    Continued therapy is recommended.  Rationale/Recommendations:  continued lymph wraps recommended for further reduction in edema.

## 2023-04-13 NOTE — PLAN OF CARE
Problem: Plan of Care - These are the overarching goals to be used throughout the patient stay.    Goal: Optimal Comfort and Wellbeing  Outcome: Progressing  Intervention: Monitor Pain and Promote Comfort  Recent Flowsheet Documentation  Taken 4/13/2023 0853 by Vanessa Simmons RN  Pain Management Interventions: medication (see MAR)     Problem: Risk for Delirium  Goal: Improved Attention and Thought Clarity  Outcome: Progressing   Goal Outcome Evaluation:      Plan of Care Reviewed With: patient    Overall Patient Progress: improvingOverall Patient Progress: improving    Pt is AXOX4 but forgetful at times, a-fib, and on 1-3L oxymask, and assistx1 with a gait belt and a walker. Pt denies pain. Pt is on enteric precautions for C-diff.  Last BM: 4/12 Lymphedema wraps in place. Pt to discharge to TCU with family as transport at 1600.

## 2023-04-13 NOTE — PLAN OF CARE
"  Problem: Plan of Care - These are the overarching goals to be used throughout the patient stay.    Goal: Plan of Care Review  Description: The Plan of Care Review/Shift note should be completed every shift.  The Outcome Evaluation is a brief statement about your assessment that the patient is improving, declining, or no change.  This information will be displayed automatically on your shift note.  Outcome: Met  Goal: Patient-Specific Goal (Individualized)  Description: You can add care plan individualizations to a care plan. Examples of Individualization might be:  \"Parent requests to be called daily at 9am for status\", \"I have a hard time hearing out of my right ear\", or \"Do not touch me to wake me up as it startles me\".  Outcome: Met  Goal: Absence of Hospital-Acquired Illness or Injury  Outcome: Met  Goal: Optimal Comfort and Wellbeing  Outcome: Met  Goal: Readiness for Transition of Care  Outcome: Met     Problem: Risk for Delirium  Goal: Optimal Coping  Outcome: Met  Goal: Improved Behavioral Control  Outcome: Met  Goal: Improved Attention and Thought Clarity  Outcome: Met  Goal: Improved Sleep  Outcome: Met     Problem: Dysrhythmia  Goal: Normalized Cardiac Rhythm  Outcome: Met     Problem: Fluid Volume Deficit  Goal: Fluid Balance  Outcome: Met     Problem: Diarrhea  Goal: Effective Diarrhea Management  Outcome: Met   Goal Outcome Evaluation:    Pt discharged via wheelchair at 1600 H. Daughter to provide transport back to TCU. All pt belongings packed and sent with pt. Meds and AVS given to pt.       "

## 2023-04-13 NOTE — DISCHARGE SUMMARY
[facility] MEDICINE  DISCHARGE SUMMARY     Primary Care Physician: Tory Wise  Admission Date: 4/6/2023   Discharge Provider: MELODY Ely Discharge Date: 4/13/2023   Diet:  As below   Code Status: No CPR- Do NOT Intubate   Activity: DCACTIVITY: Activity as tolerated        Condition at Discharge: Stable     REASON FOR PRESENTATION(See Admission Note for Details)   Cdiff diarrhea    PRINCIPAL & ACTIVE DISCHARGE DIAGNOSES     Principal Problem:    Chronic renal impairment, unspecified CKD stage  Active Problems:    Sepsis, due to unspecified organism, unspecified whether acute organ dysfunction present (H)    Atrial fibrillation with RVR (H)    Rash and nonspecific skin eruption    Loose stools    Cellulitis of left leg    Elevated alkaline phosphatase level    Dyspnea, unspecified type      PENDING LABS     Unresulted Labs Ordered in the Past 30 Days of this Admission     No orders found from 3/7/2023 to 4/7/2023.            PROCEDURES ( this hospitalization only)          RECOMMENDATIONS TO OUTPATIENT PROVIDER FOR F/U VISIT     Follow-up Appointments     Follow Up and recommended labs and tests      Follow up with Nursing home physician.    Follow up with Pulmonary Clinic as planned  Follow up with GI at [facility] to evaluate for FMT (Referral   made by [facility])  Follow up with Ortho clinic as planned.                 DISPOSITION     Skilled Nursing Facility    SUMMARY OF HOSPITAL COURSE:      Fatuma Henry is a 77 year old female who was admitted on 4/6/2023. She has a hx of Parox afib, htn, lipids, pulm htn (on chronic O2), diastolic HF, CKD stage 3, left ICA occlusion, RA, hx of gout, gerd, here now from TCU with Afib RVR and 3 days of n/v/d and also with concern for cellulitis on left lower leg. She was at TCU for hip fx recovery after fall and fx on 3/14/23. Please review the admission H&P for details. In summary,        Afib  RVR  -HR on admit 130-150   -Was  on dilt gtt, improved --now on po dilt, and metoprolol; heart rate improved   -Last echo 3/23  -Cards saw on 4/7/23 and has signed off  -On DOAC     Recurrent c diff colitis--severe, now this is 4th time  -Symptoms for 3 days PTA  -Vanco po was started 4/6. Stopped on 4/12/23  -Fidaxomicin started on 4/7. Discussed with GI and ID. Dr Rivera defer the treatment to ID. Discussed with Dr William. PO vancomycin stopped 4/12. Completed 5 days of PO Dificid 4/12. Will taper Dificid per ID. Needs every other day dosing from day 7-25 (total of 20 doses)  -GI referred the patient to [facility] for FMT (Per Dr Rivera)     Cellulitis left lower leg  -Ultrasound neg for DVT  -Anterior erythema new since she was here with wound  -WOC did  see for wound shin  -IV zosyn and vanco given on admit. ID stopped antibiotics on 4/8/23  -Greatly improved. Patient does have lymphedema. Lymphedem wrap in place. Lymphedema PT to follow the patient in TCU     S/p Hip fx 3/14/23  -[place] did the repair. S/p closed reduction with percutaneous pinning  -Ortho did see here  -Follow up with Dr. Mckeon outpatient as scheduled for normal post operative visit     Leukocytosis/lactic acidosis, metabolic acidosis  -Suspect due to cellulitis, then severe c diff colitis   -Resolved     H/O pulm htn  -On chronic O2 for chronic hypoxic resp failure  -Follow up with Pulm clinic later this month     CKD stage 3  -Avoid nephrotoxins  -Stable     Chronic hypoxic resp failure  -With COPD  -Not in exacerbation, per family and pt at home uses 2 liters sleeping, and usually daytime off  -Pulm follow up coming later this month     HTN  -Cardizem 120mg/day  -Metoprolol 75mg/day     Left ICA occlusion  -To follow with vascular as outpt     GERD  -Con pepcid     Depression  -On Cymbalta     H/O pulm nodules with positive PET  -Needs follow up with pulm as outpt  -She is due for ct chest to check nodule--was hard to get this done with pt  in and out of hospital   -Got CT chest (no contrast)4/7/23-->  Stable 17 x 19 mm groundglass nodule in the lingula since 07/21/2022. This had just minimally increased in size since 01/17/2022. Recommend follow-up in 2 years.Stable 10 mm groundglass nodule in the lingula.  New small right and tiny left pleural effusions.     H/O gout     H/O RA  -On chronic pred  -Hx of methotrexate--hold and stay off for good--DO not restart--prior hospitalist discussed with family     Low albumin  -Moderate malnutrition     Acute toxic encephalopathy - resolved  -From c diff, infections, re-admit  -Had delirium last admit as well  -Checked VBG, want to avoid hypercarbia, wean O2 when we can, at  Home uses night O2, see if wean daytime  -Improved now     Code-dnr/dni    Discharge Medications with Med changes:     Current Discharge Medication List      START taking these medications    Details   fidaxomicin (DIFICID) 200 MG tablet Take 1 tablet (200 mg) by mouth every other day for 10 doses  Qty: 10 tablet, Refills: 0    Associated Diagnoses: C. difficile diarrhea         CONTINUE these medications which have CHANGED    Details   acetaminophen (TYLENOL) 325 MG tablet Take 1 tablet (325 mg) by mouth every 4 hours as needed for mild pain or fever    Associated Diagnoses: Acute right-sided low back pain with right-sided sciatica                    CONTINUE these medications which have NOT CHANGED    Details   albuterol (PROAIR HFA/PROVENTIL HFA/VENTOLIN HFA) 108 (90 Base) MCG/ACT inhaler Inhale 2 puffs into the lungs every 4 hours as needed for shortness of breath / dyspnea or wheezing      allopurinol (ZYLOPRIM) 300 MG tablet Take 1 tablet (300 mg) by mouth daily  Qty: 60 tablet, Refills: 0    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      budesonide-formoterol (SYMBICORT) 160-4.5 MCG/ACT Inhaler Inhale 2 puffs into the lungs 2 times daily    Associated Diagnoses: Pulmonary emphysema, unspecified emphysema type (H)       cholestyramine (QUESTRAN) 4 g packet 1 packet 3 times daily (with meals)      diltiazem ER COATED BEADS (CARDIZEM CD/CARTIA XT) 120 MG 24 hr capsule Take 1 capsule (120 mg) by mouth daily    Comments: Hold for SBP < 110 or HR < 60  Associated Diagnoses: Paroxysmal atrial fibrillation (H)      DULoxetine (CYMBALTA) 20 MG capsule Take 20 mg by mouth daily      famotidine (PEPCID) 20 MG tablet Take 20 mg by mouth daily      folic acid (FOLVITE) 1 MG tablet Take 1 tablet (1 mg) by mouth daily  Qty: 90 tablet, Refills: 0    Associated Diagnoses: Seropositive rheumatoid arthritis (H)      Lactobacillus-Inulin ([facility] DIGESTIVE [facility]) CAPS Take 1 capsule by mouth 2 times daily      metoprolol succinate ER (TOPROL XL) 50 MG 24 hr tablet Take 1.5 tablets (75 mg) by mouth daily    Comments: Hold for SBP < 110 or HR < 60  Associated Diagnoses: Nonsustained ventricular tachycardia (H)      pravastatin (PRAVACHOL) 20 MG tablet Take 20 mg by mouth every evening      predniSONE (DELTASONE) 5 MG tablet Take 5 mg by mouth daily      rivaroxaban ANTICOAGULANT (XARELTO) 15 MG TABS tablet Take 1 tablet (15 mg) by mouth daily (with dinner)  Qty: 90 tablet, Refills: 3    Associated Diagnoses: Paroxysmal atrial fibrillation (H)      Vitamin D3 (CHOLECALCIFEROL) 125 MCG (5000 UT) tablet Take 1 tablet by mouth daily      compressor, for nebulizer Sarna [COMPRESSOR, FOR NEBULIZER SARAN] Nebulizer treatment qid prn  Qty: 1 Device, Refills: 0    Associated Diagnoses: Reactive airway disease         STOP taking these medications       dicyclomine (BENTYL) 20 MG tablet Comments:   Reason for Stopping:         MAGIC MOUTHWASH (FV STANDARD FORMULA) Comments:   Reason for Stopping:         magnesium oxide (MAG-OX) 400 MG tablet Comments:   Reason for Stopping:         methocarbamol (ROBAXIN) 500 MG tablet Comments:   Reason for Stopping:         oxyCODONE (ROXICODONE) 5 MG tablet Comments:   Reason for Stopping:         senna-docusate  (SENOKOT-S/PERICOLACE) 8.6-50 MG tablet Comments:   Reason for Stopping:     Methotrexate 7.5mg once a wk.                 Rationale for medication changes:      Please see above        Consults   [facility], ID, Ortho and Cardiology      Immunizations given this encounter     Most Recent Immunizations   Administered Date(s) Administered     COVID-19 Vaccine 18+ (Moderna) 11/13/2021     COVID-19 Vaccine Bivalent Booster 12+ (Pfizer) 10/11/2022     FLU 6-35 months 10/10/2008     FLUAD(HD)65+ QUAD 10/11/2021     Flu 65+ Years 09/23/2020     Flu, Unspecified 11/10/2017     Influenza (H1N1) 11/19/2009     Influenza (High Dose) 3 valent vaccine 10/24/2019     Influenza (IIV3) PF 09/18/2013     Influenza (intradermal) 11/10/2021     Influenza Vaccine 65+ (Fluzone HD) 10/11/2022     Influenza Vaccine, 6+MO IM (QUADRIVALENT W/PRESERVATIVES) 09/23/2016     Pneumo Conj 13-V (2010&after) 04/10/2015     Pneumococcal 23 valent 09/24/2012     TD,PF 7+ (Tenivac) 08/09/2000     TDAP (Adacel,Boostrix) 10/28/2022     TDAP Vaccine (Adacel) 10/12/2010     Td (Adult), Adsorbed 08/09/2000     Td,adult,historic,unspecified 1946     Tdap (Adult) Unspecified Formulation 08/09/2000     Zoster recombinant adjuvanted (SHINGRIX) 12/26/2019     Zoster vaccine, live 12/26/2019           Anticoagulation Information      Recent INR results: No results for input(s): INR in the last 168 hours.  Warfarin doses (if applicable) or name of other anticoagulant: NA      SIGNIFICANT IMAGING FINDINGS     Results for orders placed or performed during the hospital encounter of 04/06/23   XR Chest 1 View    Impression    IMPRESSION: Similar prominence of the cardiomediastinal silhouette with prominence of the pulmonary vasculature. Small right pleural effusion.   XR Tibia and Fibula Left 2 Views    Impression    IMPRESSION: Normal tibia and fibula. No fracture. No bony erosion to suggest osteomyelitis. Soft tissue swelling about the shin.   US Lower Extremity  Venous Duplex Left    Impression    IMPRESSION:  1.  No deep venous thrombosis in the left lower extremity.   CT Abdomen Pelvis w/o Contrast    Impression    IMPRESSION:   1.  Findings suggestive of a diarrheal state. There is mild wall thickening of the majority of the colon suggestive of infectious or inflammatory colitis.   2.  Manifestations of third spacing moderate right pleural effusion, trace left pleural effusion, and diffuse anasarca.                 CT Chest w/o Contrast    Impression    IMPRESSION:   1.  Stable 17 x 19 mm groundglass nodule in the lingula since 07/21/2022. This had just minimally increased in size since 01/17/2022. Recommend follow-up in 2 years.    2.  Stable 10 mm groundglass nodule in the lingula.    3.  New small right and tiny left pleural effusions.    REFERENCE:  Guidelines for Management of Incidental Pulmonary Nodules Detected on CT Images: From the Fleischner Society 2017.   Guidelines apply to incidental nodules in patients who are 35 years or older.  Guidelines do not apply to lung cancer screening, patients with immunosuppression, or patients with known primary cancer.    SUBSOLID NODULES  Ground glass    Multiple  CT at 3-6 months. If stable, consider CT at 2 and 4 years. Management based on the most suspicious nodule.    Consider referral to lung nodule clinic.           SIGNIFICANT LABORATORY FINDINGS     Most Recent 3 CBC's:Recent Labs   Lab Test 04/12/23 0457 04/11/23  0440 04/10/23  0438   WBC 11.0 9.2 9.8   HGB 9.3* 8.6* 8.9*   MCV 97 97 98    343 382     Most Recent 3 BMP's:Recent Labs   Lab Test 04/12/23 0457 04/11/23  0440 04/10/23  0438    138 141   POTASSIUM 4.0 3.8 4.0   CHLORIDE 104 104 109*   CO2 26 26 22   BUN 21.3 25.8* 26.3*   CR 1.06* 1.15* 1.30*   ANIONGAP 8 8 10   SUNI 9.4 9.1 9.5   GLC 96 105* 110*     Most Recent 2 LFT's:Recent Labs   Lab Test 04/06/23 0405 03/26/23  0534   AST 20 21   ALT 18 17   ALKPHOS 228* 115*   BILITOTAL 0.7 0.4      Most Recent 3 INR's:Recent Labs   Lab Test 03/15/23  0820 03/14/23  1221 12/13/22  1803   INR 1.60* 2.18* 1.62*         Discharge Orders        Medication Therapy Management Referral      General info for SNF    Length of Stay Estimate: Short Term Care: Estimated # of Days <30  Condition at Discharge: Improving  Level of care:skilled   Rehabilitation Potential: Good  Admission H&P remains valid and up-to-date: Yes  Recent Chemotherapy: N/A  Use Nursing Home Standing Orders: Yes     Mantoux instructions    Give two-step Mantoux (PPD) Per Facility Policy Yes     Follow Up and recommended labs and tests    Follow up with Nursing home physician.    Follow up with Pulmonary Clinic as planned  Follow up with GI at [facility] to evaluate for FMT (Referral made by LUCILA)  Follow up with Ortho clinic as planned.     Reason for your hospital stay    Cdiff diarrhea     Activity - Up with nursing assistance     No CPR- Do NOT Intubate     Physical Therapy Adult Consult    Evaluate and treat as clinically indicated.    Reason:  weakness     Occupational Therapy Adult Consult    Evaluate and treat as clinically indicated.    Reason:  Weakness     Lymphedema Therapy Adult Consult    Evaluate and treat as clinically indicated.    Reason:  Bilateral lymphedema     Contact Isolation     Fall precautions     Diet    Follow this diet upon discharge: Orders Placed This Encounter      Room Service      Regular Diet Adult       Examination   /59 (BP Location: Right arm)   Pulse 103   Temp 97.4  F (36.3  C) (Oral)   Resp 20   Wt 83.7 kg (184 lb 8 oz)   SpO2 97%   BMI 30.70 kg/m    General: Not in obvious distress.  HEENT: NC, AT   Chest: Clear to auscultation anteriorly  Heart: S1S2 normal, regular. No M/R/G  Abdomen: Soft. NT, ND. Bowel sounds- active.  Extremities: Lymphedema wraps on both LEs  Neuro: Awake, grossly non-focal    Please see EMR for more detailed significant labs, imaging, consultant notes  etc.    I, MELODY Ely, personally saw the patient today and spent greater than 30 minutes discharging this patient.    MELODY Ely  [facility]    CC:Tory Wise

## 2023-04-13 NOTE — PROGRESS NOTES
Care Management Discharge Note    Discharge Date: 04/13/2023       Discharge Disposition: [facility]    Discharge Services:  Skilled nursing and rehab services    Discharge DME:  none    Discharge Transportation: family or friend will provide    Private pay costs discussed: Not applicable    PAS Confirmation Code:  Pt is returning to facility to PAS not needed.   Patient/family educated on Medicare website which has current facility and service quality ratings:      Education Provided on the Discharge Plan:  yes  Persons Notified of Discharge Plans: pt, pt's daughter Enid, MD, [facility], Charge and bedside RNs, facility  Patient/Family in Agreement with the Plan:      Handoff Referral Completed: Yes    Additional Information:  -Chart Reviewed.  -[facility] spoke with JEISON Foreman regarding pt's discharge plan. Kellen stated that pt continues to need rehab and will also need lymphedema wraps.   -Discussed pt's discharge plan with Dr. Hui. Pt is medically ready for discharge back to [facility].  -9:37 AM  [facility] spoke with Stephanie from [facility] regarding pt's readiness. Stephanie stated that pt can return today as long as the hospital sends pt with c.diff medications and if pt and family agree as pt only has until 4/17/23 TCU days covered by medicare afterwards would be private pt. [facility] also confirmed that they can take a pt who needs lymphedema wraps, which they can.  -[facility] met with pt and called pt's daughter Enid to provide her with an update of discharge plan. Enid stated that she will discuss it further and get back to [facility]  -10:46 AM  [facility] received call from Enid who indicated that pt will return to [facility] as she still needs the rehab services. Enid stated that family can provide transport.  -2:55 PM  [facility] updated pt's daughter. Daughter will call the unit once she has arrived and wait for staff to bring pt to the front entrance.

## 2023-04-14 PROBLEM — R29.6 REPEATED FALLS: Status: ACTIVE | Noted: 2022-01-01

## 2023-04-14 PROBLEM — M54.41 LUMBAGO WITH SCIATICA, RIGHT SIDE: Status: ACTIVE | Noted: 2023-01-01

## 2023-04-14 PROBLEM — S72.002D FRACTURE OF UNSPECIFIED PART OF NECK OF LEFT FEMUR, SUBSEQUENT ENCOUNTER FOR CLOSED FRACTURE WITH ROUTINE HEALING: Status: ACTIVE | Noted: 2023-01-01

## 2023-04-14 PROBLEM — M62.50 MUSCLE WASTING AND ATROPHY, NOT ELSEWHERE CLASSIFIED, UNSPECIFIED SITE: Status: ACTIVE | Noted: 2023-01-01

## 2023-04-14 PROBLEM — R26.89 OTHER ABNORMALITIES OF GAIT AND MOBILITY: Status: ACTIVE | Noted: 2023-01-01

## 2023-04-14 PROBLEM — Z74.1 NEED FOR ASSISTANCE WITH PERSONAL CARE: Status: ACTIVE | Noted: 2023-01-01

## 2023-04-14 NOTE — CONFIDENTIAL NOTE
Code Status:  DNR/DNI  Visit Type: Hospital F/U (RE-ADMIT)     Facility:   [facility] ([facility]) [[id number]]  PCP:  Tory Wise  [phone]       Admission Date to our Facility: 3/27/2023 Discharge Date from our Facility: 4/18/2023    Discharge Diagnosis:    S/P ORIF (open reduction internal fixation) fracture  Paroxysmal atrial fibrillation (H)  Chronic obstructive pulmonary disease, unspecified COPD type (H)  Rheumatoid arthritis, involving unspecified site, unspecified whether rheumatoid factor present (H)  Stage 3 chronic kidney disease, unspecified whether stage 3a or 3b CKD (H)  Primary hypertension  Hyperlipidemia, unspecified hyperlipidemia type  Anemia, unspecified type  Mood disorder (H)  Colitis due to Clostridium difficile        History of Present Illness: Fatuma Henry is a 77 year old female with past medical history for p. Afib, CKD, COPD, GERD, HTN, HLD, RA, anemia and left ICA occlusion. She was recently hospitalized after a fall at home in which she sustained a impacted mildly displaced subcapital fracture of left femur.  She underwent a Closed reduction with perc pinning.  Postoperatively she had metabolic encephalopathy thought to be retaliated to hypercarbia due to hyperoxia. O2 sat goal is 88-92% as she is an CO2 retainer. She did have leukocytosis and workup was unremarkable.     TTE done showed LVEF 60%, aortic stenosis and mild-mod MR.  Pulmonary artery pressure slightly up and it is recommended that she follow up with cardiology.       She was noted to have chronic hypercalcemia thought to be related to primary hyperparathyroidism.  Vitamin D held and SPEP and UPEP were drawn and pending.  Recommended to follow up with outpatient PCP or endocrinology.       On 4/6/2023, she was sent to the ER due to increased SOB.  She was found to have afib with RVR and so was seen by cardiology that started her on diltiazem.  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  She was started on fidaxomicin which she complete 5 days of treatment and will taper off.  She will need to follow up with GI at the [facility] for FMT. She also had cellulitis and was treated with IV abx.        Skilled Nursing Facility Course:  Unfortunately, patient will be discharge after 5 days of therapy following her most recent hospitalized due to insurance issues.     S/P ORIF (open reduction internal fixation) fracture  Doing well, no complaints of pain and will need ongoing therapy as she is mostly in a wheelchair for mobility.  Counseled patient to follow up with ortho as directed.      Paroxysmal atrial fibrillation (H)  Rate controlled, continue with home metoprolol, diltiazem and Xarelto     Chronic obstructive pulmonary disease, unspecified COPD type (H)  Reporting feeling weak today.  Breathing at baseline on 3L of O2.   Continue with home nebs and symbicort     Rheumatoid arthritis, involving unspecified site, unspecified whether rheumatoid factor present (H)  Stable, continue on home prednisone and methotrexate.      Stage 3 chronic kidney disease, unspecified whether stage 3a or 3b CKD (H)  Last GFR 54, monitor,  and avoid nephrotoxins.      Primary hypertension  Controlled, continue with home metoprolol and diltiazem.      Hyperlipidemia, unspecified hyperlipidemia type  On pravastatin.      Anemia, unspecified type  Last hgb 9.3, continue On folic acid.      ICAO (internal carotid artery occlusion), left  Monitor for symptoms on xarelto, pravastatin       Discharge Plan:  Discharging due to insurance.  Family will take her home and she will get home care services.  Counseled patient to follow up with PCP, cardiology, GI and pulmonology.     Discharge Medications:   Current Outpatient Medications   Medication Sig Dispense Refill     acetaminophen (TYLENOL) 325 MG tablet Take 1 tablet (325 mg) by mouth every 4 hours as needed for mild pain or fever       albuterol (PROAIR  HFA/PROVENTIL HFA/VENTOLIN HFA) 108 (90 Base) MCG/ACT inhaler Inhale 2 puffs into the lungs every 4 hours as needed for shortness of breath / dyspnea or wheezing       allopurinol (ZYLOPRIM) 300 MG tablet Take 1 tablet (300 mg) by mouth daily 60 tablet 0     budesonide-formoterol (SYMBICORT) 160-4.5 MCG/ACT Inhaler Inhale 2 puffs into the lungs 2 times daily       cholestyramine (QUESTRAN) 4 g packet 1 packet 3 times daily (with meals)       diltiazem ER COATED BEADS (CARDIZEM CD/CARTIA XT) 120 MG 24 hr capsule Take 1 capsule (120 mg) by mouth daily       DULoxetine (CYMBALTA) 20 MG capsule Take 20 mg by mouth daily       famotidine (PEPCID) 20 MG tablet Take 20 mg by mouth daily       fidaxomicin (DIFICID) 200 MG tablet Take 1 tablet (200 mg) by mouth every other day for 10 doses 10 tablet 0     folic acid (FOLVITE) 1 MG tablet Take 1 tablet (1 mg) by mouth daily 90 tablet 0     Lactobacillus-Inulin ([facility] DIGESTIVE [facility]) CAPS Take 1 capsule by mouth 2 times daily       methotrexate 2.5 MG tablet Take 7.5 mg by mouth every 7 days       metoprolol succinate ER (TOPROL XL) 50 MG 24 hr tablet Take 1.5 tablets (75 mg) by mouth daily       pravastatin (PRAVACHOL) 20 MG tablet Take 20 mg by mouth every evening       predniSONE (DELTASONE) 5 MG tablet Take 5 mg by mouth daily       rivaroxaban ANTICOAGULANT (XARELTO) 15 MG TABS tablet Take 1 tablet (15 mg) by mouth daily (with dinner) 90 tablet 3     Vitamin D3 (CHOLECALCIFEROL) 125 MCG (5000 UT) tablet Take 1 tablet by mouth daily       compressor, for nebulizer Saran [COMPRESSOR, FOR NEBULIZER SARAN] Nebulizer treatment qid prn 1 Device 0       Review of Systems   Patient denies fever, chills, headache, lightheadedness, dizziness, rhinorrhea, cough, congestion, shortness of breath, chest pain, palpitations, abdominal pain, n/v, diarrhea, constipation, change in appetite, change in sleep pattern, dysuria, frequency, burning or pain with urination.  Other than stated  "in HPI all other review of systems is negative.         Physical Exam  Vital signs:/70   Pulse 92   Temp 98.7  F (37.1  C)   Resp 21   Ht 1.651 m (5' 5\")   Wt 83 kg (183 lb)   SpO2 96%   BMI 30.45 kg/m     GENERAL APPEARANCE: Well developed, well nourished, in no acute distress.  HEENT: normocephalic, atraumatic  sclerae anicteric, conjunctivae clear and moist, EOM intact  LUNGS: Lung sounds CTA, no adventitious sounds, poor respiratory effort.   CARD: RRR, S1, S2, without murmurs, gallops, rubs  ABD: Soft, nondistended and nontender with normal bowel sounds.   MSK: Muscle strength and tone were equal bilaterally. Moves all extremities easily and intentionally.   EXTREMITIES: lymphedema BLE.   NEURO: Alert and oriented x 3.Face is symmetric.  SKIN: Inspection of the skin reveals no rashes, ulcerations or petechiae.  PSYCH: euthymic          Labs:    Last Comprehensive Metabolic Panel:  Lab Results   Component Value Date     04/12/2023    POTASSIUM 4.0 04/12/2023    CHLORIDE 104 04/12/2023    CO2 26 04/12/2023    ANIONGAP 8 04/12/2023    GLC 96 04/12/2023    BUN 21.3 04/12/2023    CR 1.06 (H) 04/12/2023    GFRESTIMATED 54 (L) 04/12/2023    SUNI 9.4 04/12/2023       Lab Results   Component Value Date    WBC 11.0 04/12/2023     Lab Results   Component Value Date    RBC 3.13 04/12/2023     Lab Results   Component Value Date    HGB 9.3 04/12/2023     Lab Results   Component Value Date    HCT 30.4 04/12/2023     Lab Results   Component Value Date    MCV 97 04/12/2023     Lab Results   Component Value Date    MCH 29.7 04/12/2023     Lab Results   Component Value Date    MCHC 30.6 04/12/2023     Lab Results   Component Value Date    RDW 16.8 04/12/2023     Lab Results   Component Value Date     04/12/2023           MEDICAL EQUIPMENT NEEDS:  Patient has a mobility limitation that significantly impairs her ability to participate in mobility-related ADLS. Patient has gait abnormality, COPD and " weakness.  This mobility limitation cannot be sufficiently and safely resolved by use of a cane, walker or crutches, as patient cannot walk or transfer independently.  Patient has the mental and functional capacity to safely use a manual wheelchair.  Her home has the space to maneuver a wheelchair.  Patient requires a standard manual wheelchair, bilateral footrests, height adjustable arm rests, seat cushion and anti-tippers.  Patient would also greatly benefit from a wheelchair to assist in transportation to medical appointments.   I certify that, based on my findings, a standard manual wheelchair is medically necessary for this patient.  Length of need is lifetime.         DISCHARGE PLAN/FACE TO FACE:  I certify that services are/were furnished while this patient was under the care of a physician and that a physician or an allowed non-physician practitioner (NPP), had a face-to-face encounter that meets the physician face-to-face encounter requirements. The encounter was in whole, or in part, related to the primary reason for home health. The patient is confined to his/her home and needs intermittent skilled nursing, physical therapy, speech-language pathology, or the continued need for occupational therapy. A plan of care has been established by a physician and is periodically reviewed by a physician.    I certify that this patient is under my care and that I, or a nurse practitioner or physician's assistant working with me, had a face-to-face encounter that meets the physician face-to-face encounter requirements with this patient.   Date of Face-to-Face Encounter: 4/14/2023    I certify that, based on my findings, the following services are medically necessary home health services: RN, PT/OT, lymphedema specialist    My clinical findings support the need for the above skilled services because:  RN for medication education, PT/OT for ongoing strengthening and endurance, lymphedema wrap LE    This patient is  homebound because:  She requires maximum effort in order to get out into the community on a regular basis.     The patient is, or has been, under my care and I have initiated the establishment of the plan of care. This patient will be followed by a physician who will periodically review the plan of care.    35 total minutes spent with SW and patient in coordinating her discharge plan of care.     Electronically signed by: Norma Ziegler NP

## 2023-04-14 NOTE — LETTER
4/14/2023        RE: Fatuma D Carl  [address] [place] [address]        No notes on file      Sincerely,        Norma Ziegler, NP

## 2023-04-14 NOTE — TELEPHONE ENCOUNTER
----- Message -----  From: Nanda Boucher  Sent: 4/14/2023   9:58 AM CDT  To: Yamilet Eldridge RN    She is scheduled on 5/1 with Jennifer.  Thank you     ----- Message -----  From: Yamilet Eldridge RN  Sent: 4/14/2023   9:17 AM CDT  To: [facility] Scheduling Registration Pool - [facility]    ----- Message from Yamilet Eldridge RN sent at 4/14/2023  9:17 AM CDT -----  Sorry, still getting used to the new process!    Order is in now, thanks!    Yamilet

## 2023-04-14 NOTE — LETTER
4/14/2023        RE: Fatuma D Carl  [address] [place] [address]        No notes on file      Sincerely,        Norma Ziegler, NP

## 2023-04-17 NOTE — TELEPHONE ENCOUNTER
[facility] Lab Note     Provider: IRMA Ibarra  Facility: [facility]) Facility Type:  TCU    Allergies   Allergen Reactions     Codeine Nausea and Vomiting     Fosamax [Alendronic Acid] Diarrhea     Morphine Nausea and Vomiting     Other reaction(s): Vomiting       Labs Reviewed by provider: Heme 2, BMP, Mg     Verbal Order/Direction given by Provider: No new orders.      Provider giving Order:  IRMA Ibarra    Verbal Order given to: Shannan([phone])    El Amin RN

## 2023-04-17 NOTE — TELEPHONE ENCOUNTER
MTM referral from: Transitions of Care (recent hospital discharge or ED visit)    MTM referral outreach attempt #2 on April 17, 2023 at 11:39 AM      Outcome: Patient not reachable after several attempts, will route to [facility] Pharmacist/Provider as an FYI.  [facility] scheduling number is [phone].  Thank you for the referral.    Wiliam Patel [facility]

## 2023-04-17 NOTE — PROGRESS NOTES
[facility] GERIATRIC SERVICES       Patient Fatuma Henry  MRN: [id number]        Reason for Visit     Chief Complaint   Patient presents with     Hospital F/U     MD Initial       Code Status     CPR/Full code     Assessment     S/p ORIF l hip fracture  3/14/23  Cellulitis LLE  Paroxysmal atrial fibrillation  COPD  Rheumatoid arthritis  Stage 3 CKD  chrnoic hypoxic resp failure  C diff colitis  Generalized weakness    Plan     Pt is admitted to TCU for strengthening and rehab. Hospitalized after fall sustaining hip fx s/p ORIF 3/14. Readmitted 4/6 - 4/13 due to Afib RVR, cellulitis, and recurrent C diff.   Diarrhea is improving  Doing well and walking ; denies any pain  Recheck labs  Continue with PT/OT  Patient can decrease cholestyramine if needed   Follow-up GI  Lymphedema wraps   Discharge plan is to go home    History     Patient is a very pleasant 77 year old female who is admitted to TCU    Feels good, denies any pain of hip fx s/p ORIF.   Denies n/v/constipation. Diarrhea improving, having 2 BMs in a day of loose stools  Thinks her cholestyramine is causing her diarrhea  Plans to see outpatient GI for fecal transplant/implantation for C diff    Good oral intake    Denies chest pain, dyspnea    Past Medical & Surgical History     PAST MEDICAL HISTORY:   Past Medical History:   Diagnosis Date     Atrial fibrillation (H)      CKD (chronic kidney disease) stage 3, GFR 30-59 ml/min (H)      COPD (chronic obstructive pulmonary disease) (H)     nocturnal oxygen     CRF (chronic renal failure)      GERD (gastroesophageal reflux disease)      Hypertension      Hypovolemic shock (H)      Influenza A 12/01/2017     Pulmonary hypertension (H)      Pulmonary nodules      Rheumatoid arthritis (H)      Sepsis (H) 12/24/2017      PAST SURGICAL HISTORY:   has a past surgical history that includes Cholecystectomy; appendectomy; Bladder Suspension; PICC/Midline Placement (12/13/2022); and Closed reduction, percutaneous pinning  hip (Left, 3/15/2023).      Past Social History     Reviewed,  reports that she quit smoking about 5 years ago. Her smoking use included cigarettes. She smoked an average of .5 packs per day. She has never used smokeless tobacco. She reports that she does not drink alcohol and does not use drugs.    Family History     Reviewed, and family history includes Cerebrovascular Disease in her brother and father; Diabetes Type 2  in her brother; Heart Failure in her mother; Kidney failure in her sister.    Medication List     Current Outpatient Medications   Medication     acetaminophen (TYLENOL) 325 MG tablet     albuterol (PROAIR HFA/PROVENTIL HFA/VENTOLIN HFA) 108 (90 Base) MCG/ACT inhaler     allopurinol (ZYLOPRIM) 300 MG tablet     budesonide-formoterol (SYMBICORT) 160-4.5 MCG/ACT Inhaler     cholestyramine (QUESTRAN) 4 g packet     compressor, for nebulizer Eva     diltiazem ER COATED BEADS (CARDIZEM CD/CARTIA XT) 120 MG 24 hr capsule     DULoxetine (CYMBALTA) 20 MG capsule     famotidine (PEPCID) 20 MG tablet     fidaxomicin (DIFICID) 200 MG tablet     folic acid (FOLVITE) 1 MG tablet     Lactobacillus-Inulin (CULTURELLE DIGESTIVE HEALTH) CAPS     methotrexate 2.5 MG tablet     metoprolol succinate ER (TOPROL XL) 50 MG 24 hr tablet     pravastatin (PRAVACHOL) 20 MG tablet     predniSONE (DELTASONE) 5 MG tablet     rivaroxaban ANTICOAGULANT (XARELTO) 15 MG TABS tablet     Vitamin D3 (CHOLECALCIFEROL) 125 MCG (5000 UT) tablet     No current facility-administered medications for this visit.      MED REC REQUIRED  Post Medication Reconciliation Status:            Allergies     Allergies   Allergen Reactions     Codeine Nausea and Vomiting     Fosamax [Alendronic Acid] Diarrhea     Morphine Nausea and Vomiting     Other reaction(s): Vomiting       Review of Systems   A comprehensive review of 14 systems was done. Pertinent findings noted here and in history of present illness. All the rest negative.  Constitutional:  "Negative.  Negative for fever, chills, she has  activity change, appetite change and fatigue.   HENT: Negative for congestion and facial swelling.    Eyes: Negative for photophobia, redness and visual disturbance.   Respiratory: Negative for cough and chest tightness.    Cardiovascular: Negative for chest pain, palpitations and leg swelling.   Gastrointestinal: Negative for nausea, diarrhea, constipation, blood in stool and abdominal distention.   Feels cholestyramine is causing more stomach upset  Genitourinary: Negative.    Musculoskeletal: Denies any pain in the hip and is ambulating with therapy  Skin: Negative.  Her wounds have healed  Neurological: Negative for dizziness, tremors, syncope, weakness, light-headedness and headaches.   Hematological: Does not bruise/bleed easily.   Psychiatric/Behavioral: Negative.  Mood stable      Physical Exam   BP (!) 142/67   Pulse 76   Temp 97.7  F (36.5  C)   Resp 18   Ht 1.651 m (5' 5\")   Wt 83.3 kg (183 lb 9.6 oz)   SpO2 98%   BMI 30.55 kg/m       Constitutional: Oriented to person, place, and time and appears well-developed.   HEENT:  Normocephalic and atraumatic.  Eyes: Conjunctivae and EOM are normal. Pupils are equal, round, and reactive to light. No discharge.  No scleral icterus. Nose normal. Mouth/Throat: Oropharynx is clear and moist. No oropharyngeal exudate.    NECK: Normal range of motion. Neck supple. No JVD present. No tracheal deviation present. No thyromegaly present.   CARDIOVASCULAR: Normal rate, regular rhythm and intact distal pulses.  Exam reveals no gallop and no friction rub.  Systolic murmur present.  PULMONARY: Effort normal and breath sounds normal. No respiratory distress.No Wheezing or rales. NC in place   ABDOMEN: Soft. Bowel sounds are normal. No distension and no mass.  There is no tenderness. There is no rebound and no guarding. No HSM.  MUSCULOSKELETAL: Normal range of motion. Mild kyphosis, no tenderness.  LYMPH NODES: Has no " cervical, supraclavicular, axillary and groin adenopathy.   NEUROLOGICAL: Alert and oriented to person, place, and time. No cranial nerve deficit.  Normal muscle tone. Coordination normal.   GENITOURINARY: Deferred exam.  SKIN: Skin is warm and dry. No rash noted. No erythema. No pallor.   EXTREMITIES: No cyanosis, no clubbing. no Deformity. Left medial shin with blanchable erythema with no breaks in skin. B/L LE edema, 2+ nonpitting, to knees.   PSYCHIATRIC: Normal mood, affect and behavior.      Lab Results     Recent Results (from the past 240 hour(s))   Glucose by meter    Collection Time: 04/09/23  4:37 AM   Result Value Ref Range    GLUCOSE BY METER POCT 109 (H) 70 - 99 mg/dL   Magnesium    Collection Time: 04/09/23  4:46 AM   Result Value Ref Range    Magnesium 1.9 1.7 - 2.3 mg/dL   Basic metabolic panel    Collection Time: 04/09/23  4:46 AM   Result Value Ref Range    Sodium 137 136 - 145 mmol/L    Potassium 4.2 3.4 - 5.3 mmol/L    Chloride 104 98 - 107 mmol/L    Carbon Dioxide (CO2) 22 22 - 29 mmol/L    Anion Gap 11 7 - 15 mmol/L    Urea Nitrogen 33.5 (H) 8.0 - 23.0 mg/dL    Creatinine 1.45 (H) 0.51 - 0.95 mg/dL    Calcium 9.7 8.8 - 10.2 mg/dL    Glucose 109 (H) 70 - 99 mg/dL    GFR Estimate 37 (L) >60 mL/min/1.73m2   CBC with platelets and differential    Collection Time: 04/09/23  4:46 AM   Result Value Ref Range    WBC Count 10.3 4.0 - 11.0 10e3/uL    RBC Count 3.00 (L) 3.80 - 5.20 10e6/uL    Hemoglobin 9.0 (L) 11.7 - 15.7 g/dL    Hematocrit 29.0 (L) 35.0 - 47.0 %    MCV 97 78 - 100 fL    MCH 30.0 26.5 - 33.0 pg    MCHC 31.0 (L) 31.5 - 36.5 g/dL    RDW 16.8 (H) 10.0 - 15.0 %    Platelet Count 407 150 - 450 10e3/uL    % Neutrophils 75 %    % Lymphocytes 6 %    % Monocytes 9 %    % Eosinophils 8 %    % Basophils 1 %    % Immature Granulocytes 1 %    NRBCs per 100 WBC 0 <1 /100    Absolute Neutrophils 7.9 1.6 - 8.3 10e3/uL    Absolute Lymphocytes 0.6 (L) 0.8 - 5.3 10e3/uL    Absolute Monocytes 0.9 0.0 - 1.3  10e3/uL    Absolute Eosinophils 0.8 (H) 0.0 - 0.7 10e3/uL    Absolute Basophils 0.1 0.0 - 0.2 10e3/uL    Absolute Immature Granulocytes 0.1 <=0.4 10e3/uL    Absolute NRBCs 0.0 10e3/uL   Blood gas venous    Collection Time: 04/09/23  2:01 PM   Result Value Ref Range    pH Venous 7.23 (LL) 7.35 - 7.45    pCO2 Venous 53 (H) 35 - 50 mm Hg    pO2 Venous 45 25 - 47 mm Hg    Bicarbonate Venous 22 (L) 24 - 30 mmol/L    Base Excess/Deficit (+/-) -5.4   mmol/L    Oxyhemoglobin Venous 72.6 70.0 - 75.0 %    O2 Sat, Venous 73.8 70.0 - 75.0 %   Lactic acid whole blood    Collection Time: 04/09/23  2:01 PM   Result Value Ref Range    Lactic Acid 2.5 (H) 0.7 - 2.0 mmol/L   Basic metabolic panel    Collection Time: 04/10/23  4:38 AM   Result Value Ref Range    Sodium 141 136 - 145 mmol/L    Potassium 4.0 3.4 - 5.3 mmol/L    Chloride 109 (H) 98 - 107 mmol/L    Carbon Dioxide (CO2) 22 22 - 29 mmol/L    Anion Gap 10 7 - 15 mmol/L    Urea Nitrogen 26.3 (H) 8.0 - 23.0 mg/dL    Creatinine 1.30 (H) 0.51 - 0.95 mg/dL    Calcium 9.5 8.8 - 10.2 mg/dL    Glucose 110 (H) 70 - 99 mg/dL    GFR Estimate 42 (L) >60 mL/min/1.73m2   Lactic acid whole blood    Collection Time: 04/10/23  4:38 AM   Result Value Ref Range    Lactic Acid 1.0 0.7 - 2.0 mmol/L   Magnesium    Collection Time: 04/10/23  4:38 AM   Result Value Ref Range    Magnesium 1.7 1.7 - 2.3 mg/dL   CBC with platelets and differential    Collection Time: 04/10/23  4:38 AM   Result Value Ref Range    WBC Count 9.8 4.0 - 11.0 10e3/uL    RBC Count 3.00 (L) 3.80 - 5.20 10e6/uL    Hemoglobin 8.9 (L) 11.7 - 15.7 g/dL    Hematocrit 29.4 (L) 35.0 - 47.0 %    MCV 98 78 - 100 fL    MCH 29.7 26.5 - 33.0 pg    MCHC 30.3 (L) 31.5 - 36.5 g/dL    RDW 16.7 (H) 10.0 - 15.0 %    Platelet Count 382 150 - 450 10e3/uL    % Neutrophils 74 %    % Lymphocytes 7 %    % Monocytes 10 %    % Eosinophils 7 %    % Basophils 1 %    % Immature Granulocytes 1 %    NRBCs per 100 WBC 0 <1 /100    Absolute Neutrophils 7.4  1.6 - 8.3 10e3/uL    Absolute Lymphocytes 0.7 (L) 0.8 - 5.3 10e3/uL    Absolute Monocytes 0.9 0.0 - 1.3 10e3/uL    Absolute Eosinophils 0.7 0.0 - 0.7 10e3/uL    Absolute Basophils 0.1 0.0 - 0.2 10e3/uL    Absolute Immature Granulocytes 0.1 <=0.4 10e3/uL    Absolute NRBCs 0.0 10e3/uL   Basic metabolic panel    Collection Time: 04/11/23  4:40 AM   Result Value Ref Range    Sodium 138 136 - 145 mmol/L    Potassium 3.8 3.4 - 5.3 mmol/L    Chloride 104 98 - 107 mmol/L    Carbon Dioxide (CO2) 26 22 - 29 mmol/L    Anion Gap 8 7 - 15 mmol/L    Urea Nitrogen 25.8 (H) 8.0 - 23.0 mg/dL    Creatinine 1.15 (H) 0.51 - 0.95 mg/dL    Calcium 9.1 8.8 - 10.2 mg/dL    Glucose 105 (H) 70 - 99 mg/dL    GFR Estimate 49 (L) >60 mL/min/1.73m2   CBC with platelets    Collection Time: 04/11/23  4:40 AM   Result Value Ref Range    WBC Count 9.2 4.0 - 11.0 10e3/uL    RBC Count 2.90 (L) 3.80 - 5.20 10e6/uL    Hemoglobin 8.6 (L) 11.7 - 15.7 g/dL    Hematocrit 28.1 (L) 35.0 - 47.0 %    MCV 97 78 - 100 fL    MCH 29.7 26.5 - 33.0 pg    MCHC 30.6 (L) 31.5 - 36.5 g/dL    RDW 16.6 (H) 10.0 - 15.0 %    Platelet Count 343 150 - 450 10e3/uL   Magnesium    Collection Time: 04/11/23  4:40 AM   Result Value Ref Range    Magnesium 1.8 1.7 - 2.3 mg/dL   Basic metabolic panel    Collection Time: 04/12/23  4:57 AM   Result Value Ref Range    Sodium 138 136 - 145 mmol/L    Potassium 4.0 3.4 - 5.3 mmol/L    Chloride 104 98 - 107 mmol/L    Carbon Dioxide (CO2) 26 22 - 29 mmol/L    Anion Gap 8 7 - 15 mmol/L    Urea Nitrogen 21.3 8.0 - 23.0 mg/dL    Creatinine 1.06 (H) 0.51 - 0.95 mg/dL    Calcium 9.4 8.8 - 10.2 mg/dL    Glucose 96 70 - 99 mg/dL    GFR Estimate 54 (L) >60 mL/min/1.73m2   CBC with platelets    Collection Time: 04/12/23  4:57 AM   Result Value Ref Range    WBC Count 11.0 4.0 - 11.0 10e3/uL    RBC Count 3.13 (L) 3.80 - 5.20 10e6/uL    Hemoglobin 9.3 (L) 11.7 - 15.7 g/dL    Hematocrit 30.4 (L) 35.0 - 47.0 %    MCV 97 78 - 100 fL    MCH 29.7 26.5 - 33.0  pg    MCHC 30.6 (L) 31.5 - 36.5 g/dL    RDW 16.8 (H) 10.0 - 15.0 %    Platelet Count 370 150 - 450 10e3/uL   Extra Blue Top Tube    Collection Time: 04/12/23  5:50 AM   Result Value Ref Range    Hold Specimen JIC    Extra Green Top (Lithium Heparin) Tube    Collection Time: 04/12/23  5:50 AM   Result Value Ref Range    Hold Specimen JIC    Extra Purple Top Tube    Collection Time: 04/12/23  5:50 AM   Result Value Ref Range    Hold Specimen JIC    Basic metabolic panel    Collection Time: 04/17/23  6:30 AM   Result Value Ref Range    Sodium 140 136 - 145 mmol/L    Potassium 4.3 3.4 - 5.3 mmol/L    Chloride 107 98 - 107 mmol/L    Carbon Dioxide (CO2) 22 22 - 29 mmol/L    Anion Gap 11 7 - 15 mmol/L    Urea Nitrogen 17.2 8.0 - 23.0 mg/dL    Creatinine 1.11 (H) 0.51 - 0.95 mg/dL    Calcium 9.9 8.8 - 10.2 mg/dL    Glucose 79 70 - 99 mg/dL    GFR Estimate 51 (L) >60 mL/min/1.73m2   Magnesium    Collection Time: 04/17/23  6:30 AM   Result Value Ref Range    Magnesium 1.7 1.7 - 2.3 mg/dL   CBC with platelets    Collection Time: 04/17/23  6:30 AM   Result Value Ref Range    WBC Count 10.2 4.0 - 11.0 10e3/uL    RBC Count 3.16 (L) 3.80 - 5.20 10e6/uL    Hemoglobin 9.4 (L) 11.7 - 15.7 g/dL    Hematocrit 32.0 (L) 35.0 - 47.0 %     (H) 78 - 100 fL    MCH 29.7 26.5 - 33.0 pg    MCHC 29.4 (L) 31.5 - 36.5 g/dL    RDW 17.2 (H) 10.0 - 15.0 %    Platelet Count 390 150 - 450 10e3/uL     Electronically signed by    Tobias Mac DO   Pt was seen and examined independently  Denies any pain  Diarrhea is improving  Leg is healing  Agree with above assessment     Electronically signed by  Calli Mccormick MD

## 2023-04-17 NOTE — LETTER
4/17/2023        RE: Fatuma Henry  [address]  [address] [place] [address]        [facility] GERIATRIC SERVICES       Patient Fatuma Henry  MRN: [id number]        Reason for Visit     Chief Complaint   Patient presents with     Hospital F/U     MD Initial       Code Status     CPR/Full code     Assessment     S/p ORIF l hip fracture  3/14/23  Cellulitis LLE  Paroxysmal atrial fibrillation  COPD  Rheumatoid arthritis  Stage 3 CKD  chrnoic hypoxic resp failure  C diff colitis  Generalized weakness    Plan     Pt is admitted to TCU for strengthening and rehab. Hospitalized after fall sustaining hip fx s/p ORIF 3/14. Readmitted 4/6 - 4/13 due to Afib RVR, cellulitis, and recurrent C diff.   Diarrhea is improving  Doing well and walking ; denies any pain  Recheck labs  Continue with PT/OT  Patient can decrease cholestyramine if needed   Follow-up GI  Lymphedema wraps   Discharge plan is to go home    History     Patient is a very pleasant 77 year old female who is admitted to TCU    Feels good, denies any pain of hip fx s/p ORIF.   Denies n/v/constipation. Diarrhea improving, having 2 BMs in a day of loose stools  Thinks her cholestyramine is causing her diarrhea  Plans to see outpatient GI for fecal transplant/implantation for C diff    Good oral intake    Denies chest pain, dyspnea    Past Medical & Surgical History     PAST MEDICAL HISTORY:   Past Medical History:   Diagnosis Date     Atrial fibrillation (H)      CKD (chronic kidney disease) stage 3, GFR 30-59 ml/min (H)      COPD (chronic obstructive pulmonary disease) (H)     nocturnal oxygen     CRF (chronic renal failure)      GERD (gastroesophageal reflux disease)      Hypertension      Hypovolemic shock (H)      Influenza A 12/01/2017     Pulmonary hypertension (H)      Pulmonary nodules      Rheumatoid arthritis (H)      Sepsis (H) 12/24/2017      PAST SURGICAL HISTORY:   has a past surgical history that includes Cholecystectomy;  appendectomy; Bladder Suspension; PICC/Midline Placement (12/13/2022); and Closed reduction, percutaneous pinning hip (Left, 3/15/2023).      Past Social History     Reviewed,  reports that she quit smoking about 5 years ago. Her smoking use included cigarettes. She smoked an average of .5 packs per day. She has never used smokeless tobacco. She reports that she does not drink alcohol and does not use drugs.    Family History     Reviewed, and family history includes Cerebrovascular Disease in her brother and father; Diabetes Type 2  in her brother; Heart Failure in her mother; Kidney failure in her sister.    Medication List     Current Outpatient Medications   Medication     acetaminophen (TYLENOL) 325 MG tablet     albuterol (PROAIR HFA/PROVENTIL HFA/VENTOLIN HFA) 108 (90 Base) MCG/ACT inhaler     allopurinol (ZYLOPRIM) 300 MG tablet     budesonide-formoterol (SYMBICORT) 160-4.5 MCG/ACT Inhaler     cholestyramine (QUESTRAN) 4 g packet     compressor, for nebulizer Eva     diltiazem ER COATED BEADS (CARDIZEM CD/CARTIA XT) 120 MG 24 hr capsule     DULoxetine (CYMBALTA) 20 MG capsule     famotidine (PEPCID) 20 MG tablet     fidaxomicin (DIFICID) 200 MG tablet     folic acid (FOLVITE) 1 MG tablet     Lactobacillus-Inulin ([facility]IG[facility]) CAPS     methotrexate 2.5 MG tablet     metoprolol succinate ER (TOPROL XL) 50 MG 24 hr tablet     pravastatin (PRAVACHOL) 20 MG tablet     predniSONE (DELTASONE) 5 MG tablet     rivaroxaban ANTICOAGULANT (XARELTO) 15 MG TABS tablet     Vitamin D3 (CHOLECALCIFEROL) 125 MCG (5000 UT) tablet     No current facility-administered medications for this visit.      MED REC REQUIRED  Post Medication Reconciliation Status:            Allergies     Allergies   Allergen Reactions     Codeine Nausea and Vomiting     Fosamax [Alendronic Acid] Diarrhea     Morphine Nausea and Vomiting     Other reaction(s): Vomiting       Review of Systems   A comprehensive review of 14 systems was  "done. Pertinent findings noted here and in history of present illness. All the rest negative.  Constitutional: Negative.  Negative for fever, chills, she has  activity change, appetite change and fatigue.   HENT: Negative for congestion and facial swelling.    Eyes: Negative for photophobia, redness and visual disturbance.   Respiratory: Negative for cough and chest tightness.    Cardiovascular: Negative for chest pain, palpitations and leg swelling.   Gastrointestinal: Negative for nausea, diarrhea, constipation, blood in stool and abdominal distention.   Feels cholestyramine is causing more stomach upset  Genitourinary: Negative.    Musculoskeletal: Denies any pain in the hip and is ambulating with therapy  Skin: Negative.  Her wounds have healed  Neurological: Negative for dizziness, tremors, syncope, weakness, light-headedness and headaches.   Hematological: Does not bruise/bleed easily.   Psychiatric/Behavioral: Negative.  Mood stable      Physical Exam   BP (!) 142/67   Pulse 76   Temp 97.7  F (36.5  C)   Resp 18   Ht 1.651 m (5' 5\")   Wt 83.3 kg (183 lb 9.6 oz)   SpO2 98%   BMI 30.55 kg/m       Constitutional: Oriented to person, place, and time and appears well-developed.   HEENT:  Normocephalic and atraumatic.  Eyes: Conjunctivae and EOM are normal. Pupils are equal, round, and reactive to light. No discharge.  No scleral icterus. Nose normal. Mouth/Throat: Oropharynx is clear and moist. No oropharyngeal exudate.    NECK: Normal range of motion. Neck supple. No JVD present. No tracheal deviation present. No thyromegaly present.   CARDIOVASCULAR: Normal rate, regular rhythm and intact distal pulses.  Exam reveals no gallop and no friction rub.  Systolic murmur present.  PULMONARY: Effort normal and breath sounds normal. No respiratory distress.No Wheezing or rales. NC in place   ABDOMEN: Soft. Bowel sounds are normal. No distension and no mass.  There is no tenderness. There is no rebound and no " guarding. No HSM.  MUSCULOSKELETAL: Normal range of motion. Mild kyphosis, no tenderness.  LYMPH NODES: Has no cervical, supraclavicular, axillary and groin adenopathy.   NEUROLOGICAL: Alert and oriented to person, place, and time. No cranial nerve deficit.  Normal muscle tone. Coordination normal.   GENITOURINARY: Deferred exam.  SKIN: Skin is warm and dry. No rash noted. No erythema. No pallor.   EXTREMITIES: No cyanosis, no clubbing. no Deformity. Left medial shin with blanchable erythema with no breaks in skin. B/L LE edema, 2+ nonpitting, to knees.   PSYCHIATRIC: Normal mood, affect and behavior.      Lab Results     Recent Results (from the past 240 hour(s))   Glucose by meter    Collection Time: 04/09/23  4:37 AM   Result Value Ref Range    GLUCOSE BY METER POCT 109 (H) 70 - 99 mg/dL   Magnesium    Collection Time: 04/09/23  4:46 AM   Result Value Ref Range    Magnesium 1.9 1.7 - 2.3 mg/dL   Basic metabolic panel    Collection Time: 04/09/23  4:46 AM   Result Value Ref Range    Sodium 137 136 - 145 mmol/L    Potassium 4.2 3.4 - 5.3 mmol/L    Chloride 104 98 - 107 mmol/L    Carbon Dioxide (CO2) 22 22 - 29 mmol/L    Anion Gap 11 7 - 15 mmol/L    Urea Nitrogen 33.5 (H) 8.0 - 23.0 mg/dL    Creatinine 1.45 (H) 0.51 - 0.95 mg/dL    Calcium 9.7 8.8 - 10.2 mg/dL    Glucose 109 (H) 70 - 99 mg/dL    GFR Estimate 37 (L) >60 mL/min/1.73m2   CBC with platelets and differential    Collection Time: 04/09/23  4:46 AM   Result Value Ref Range    WBC Count 10.3 4.0 - 11.0 10e3/uL    RBC Count 3.00 (L) 3.80 - 5.20 10e6/uL    Hemoglobin 9.0 (L) 11.7 - 15.7 g/dL    Hematocrit 29.0 (L) 35.0 - 47.0 %    MCV 97 78 - 100 fL    MCH 30.0 26.5 - 33.0 pg    MCHC 31.0 (L) 31.5 - 36.5 g/dL    RDW 16.8 (H) 10.0 - 15.0 %    Platelet Count 407 150 - 450 10e3/uL    % Neutrophils 75 %    % Lymphocytes 6 %    % Monocytes 9 %    % Eosinophils 8 %    % Basophils 1 %    % Immature Granulocytes 1 %    NRBCs per 100 WBC 0 <1 /100    Absolute  Neutrophils 7.9 1.6 - 8.3 10e3/uL    Absolute Lymphocytes 0.6 (L) 0.8 - 5.3 10e3/uL    Absolute Monocytes 0.9 0.0 - 1.3 10e3/uL    Absolute Eosinophils 0.8 (H) 0.0 - 0.7 10e3/uL    Absolute Basophils 0.1 0.0 - 0.2 10e3/uL    Absolute Immature Granulocytes 0.1 <=0.4 10e3/uL    Absolute NRBCs 0.0 10e3/uL   Blood gas venous    Collection Time: 04/09/23  2:01 PM   Result Value Ref Range    pH Venous 7.23 (LL) 7.35 - 7.45    pCO2 Venous 53 (H) 35 - 50 mm Hg    pO2 Venous 45 25 - 47 mm Hg    Bicarbonate Venous 22 (L) 24 - 30 mmol/L    Base Excess/Deficit (+/-) -5.4   mmol/L    Oxyhemoglobin Venous 72.6 70.0 - 75.0 %    O2 Sat, Venous 73.8 70.0 - 75.0 %   Lactic acid whole blood    Collection Time: 04/09/23  2:01 PM   Result Value Ref Range    Lactic Acid 2.5 (H) 0.7 - 2.0 mmol/L   Basic metabolic panel    Collection Time: 04/10/23  4:38 AM   Result Value Ref Range    Sodium 141 136 - 145 mmol/L    Potassium 4.0 3.4 - 5.3 mmol/L    Chloride 109 (H) 98 - 107 mmol/L    Carbon Dioxide (CO2) 22 22 - 29 mmol/L    Anion Gap 10 7 - 15 mmol/L    Urea Nitrogen 26.3 (H) 8.0 - 23.0 mg/dL    Creatinine 1.30 (H) 0.51 - 0.95 mg/dL    Calcium 9.5 8.8 - 10.2 mg/dL    Glucose 110 (H) 70 - 99 mg/dL    GFR Estimate 42 (L) >60 mL/min/1.73m2   Lactic acid whole blood    Collection Time: 04/10/23  4:38 AM   Result Value Ref Range    Lactic Acid 1.0 0.7 - 2.0 mmol/L   Magnesium    Collection Time: 04/10/23  4:38 AM   Result Value Ref Range    Magnesium 1.7 1.7 - 2.3 mg/dL   CBC with platelets and differential    Collection Time: 04/10/23  4:38 AM   Result Value Ref Range    WBC Count 9.8 4.0 - 11.0 10e3/uL    RBC Count 3.00 (L) 3.80 - 5.20 10e6/uL    Hemoglobin 8.9 (L) 11.7 - 15.7 g/dL    Hematocrit 29.4 (L) 35.0 - 47.0 %    MCV 98 78 - 100 fL    MCH 29.7 26.5 - 33.0 pg    MCHC 30.3 (L) 31.5 - 36.5 g/dL    RDW 16.7 (H) 10.0 - 15.0 %    Platelet Count 382 150 - 450 10e3/uL    % Neutrophils 74 %    % Lymphocytes 7 %    % Monocytes 10 %    %  Eosinophils 7 %    % Basophils 1 %    % Immature Granulocytes 1 %    NRBCs per 100 WBC 0 <1 /100    Absolute Neutrophils 7.4 1.6 - 8.3 10e3/uL    Absolute Lymphocytes 0.7 (L) 0.8 - 5.3 10e3/uL    Absolute Monocytes 0.9 0.0 - 1.3 10e3/uL    Absolute Eosinophils 0.7 0.0 - 0.7 10e3/uL    Absolute Basophils 0.1 0.0 - 0.2 10e3/uL    Absolute Immature Granulocytes 0.1 <=0.4 10e3/uL    Absolute NRBCs 0.0 10e3/uL   Basic metabolic panel    Collection Time: 04/11/23  4:40 AM   Result Value Ref Range    Sodium 138 136 - 145 mmol/L    Potassium 3.8 3.4 - 5.3 mmol/L    Chloride 104 98 - 107 mmol/L    Carbon Dioxide (CO2) 26 22 - 29 mmol/L    Anion Gap 8 7 - 15 mmol/L    Urea Nitrogen 25.8 (H) 8.0 - 23.0 mg/dL    Creatinine 1.15 (H) 0.51 - 0.95 mg/dL    Calcium 9.1 8.8 - 10.2 mg/dL    Glucose 105 (H) 70 - 99 mg/dL    GFR Estimate 49 (L) >60 mL/min/1.73m2   CBC with platelets    Collection Time: 04/11/23  4:40 AM   Result Value Ref Range    WBC Count 9.2 4.0 - 11.0 10e3/uL    RBC Count 2.90 (L) 3.80 - 5.20 10e6/uL    Hemoglobin 8.6 (L) 11.7 - 15.7 g/dL    Hematocrit 28.1 (L) 35.0 - 47.0 %    MCV 97 78 - 100 fL    MCH 29.7 26.5 - 33.0 pg    MCHC 30.6 (L) 31.5 - 36.5 g/dL    RDW 16.6 (H) 10.0 - 15.0 %    Platelet Count 343 150 - 450 10e3/uL   Magnesium    Collection Time: 04/11/23  4:40 AM   Result Value Ref Range    Magnesium 1.8 1.7 - 2.3 mg/dL   Basic metabolic panel    Collection Time: 04/12/23  4:57 AM   Result Value Ref Range    Sodium 138 136 - 145 mmol/L    Potassium 4.0 3.4 - 5.3 mmol/L    Chloride 104 98 - 107 mmol/L    Carbon Dioxide (CO2) 26 22 - 29 mmol/L    Anion Gap 8 7 - 15 mmol/L    Urea Nitrogen 21.3 8.0 - 23.0 mg/dL    Creatinine 1.06 (H) 0.51 - 0.95 mg/dL    Calcium 9.4 8.8 - 10.2 mg/dL    Glucose 96 70 - 99 mg/dL    GFR Estimate 54 (L) >60 mL/min/1.73m2   CBC with platelets    Collection Time: 04/12/23  4:57 AM   Result Value Ref Range    WBC Count 11.0 4.0 - 11.0 10e3/uL    RBC Count 3.13 (L) 3.80 - 5.20  10e6/uL    Hemoglobin 9.3 (L) 11.7 - 15.7 g/dL    Hematocrit 30.4 (L) 35.0 - 47.0 %    MCV 97 78 - 100 fL    MCH 29.7 26.5 - 33.0 pg    MCHC 30.6 (L) 31.5 - 36.5 g/dL    RDW 16.8 (H) 10.0 - 15.0 %    Platelet Count 370 150 - 450 10e3/uL   Extra Blue Top Tube    Collection Time: 04/12/23  5:50 AM   Result Value Ref Range    Hold Specimen JIC    Extra Green Top (Lithium Heparin) Tube    Collection Time: 04/12/23  5:50 AM   Result Value Ref Range    Hold Specimen JIC    Extra Purple Top Tube    Collection Time: 04/12/23  5:50 AM   Result Value Ref Range    Hold Specimen JIC    Basic metabolic panel    Collection Time: 04/17/23  6:30 AM   Result Value Ref Range    Sodium 140 136 - 145 mmol/L    Potassium 4.3 3.4 - 5.3 mmol/L    Chloride 107 98 - 107 mmol/L    Carbon Dioxide (CO2) 22 22 - 29 mmol/L    Anion Gap 11 7 - 15 mmol/L    Urea Nitrogen 17.2 8.0 - 23.0 mg/dL    Creatinine 1.11 (H) 0.51 - 0.95 mg/dL    Calcium 9.9 8.8 - 10.2 mg/dL    Glucose 79 70 - 99 mg/dL    GFR Estimate 51 (L) >60 mL/min/1.73m2   Magnesium    Collection Time: 04/17/23  6:30 AM   Result Value Ref Range    Magnesium 1.7 1.7 - 2.3 mg/dL   CBC with platelets    Collection Time: 04/17/23  6:30 AM   Result Value Ref Range    WBC Count 10.2 4.0 - 11.0 10e3/uL    RBC Count 3.16 (L) 3.80 - 5.20 10e6/uL    Hemoglobin 9.4 (L) 11.7 - 15.7 g/dL    Hematocrit 32.0 (L) 35.0 - 47.0 %     (H) 78 - 100 fL    MCH 29.7 26.5 - 33.0 pg    MCHC 29.4 (L) 31.5 - 36.5 g/dL    RDW 17.2 (H) 10.0 - 15.0 %    Platelet Count 390 150 - 450 10e3/uL     Electronically signed by    Tobias Mac DO   Pt was seen and examined independently  Denies any pain  Diarrhea is improving  Leg is healing  Agree with above assessment     Electronically signed by  Calli Mccormick MD                               Sincerely,        MELODY Greene

## 2023-04-24 PROBLEM — S92.501A: Status: ACTIVE | Noted: 2023-01-01

## 2023-04-24 PROBLEM — J44.1 COPD EXACERBATION (H): Status: ACTIVE | Noted: 2023-01-01

## 2023-04-24 PROBLEM — D63.8 ANEMIA IN OTHER CHRONIC DISEASES CLASSIFIED ELSEWHERE: Status: ACTIVE | Noted: 2023-01-01

## 2023-04-24 PROBLEM — S92.511A CLOSED DISPLACED FRACTURE OF PROXIMAL PHALANX OF LESSER TOE OF RIGHT FOOT, INITIAL ENCOUNTER: Status: ACTIVE | Noted: 2023-01-01

## 2023-04-24 PROBLEM — S90.811A ABRASION OF RIGHT FOOT, INITIAL ENCOUNTER: Status: ACTIVE | Noted: 2023-01-01

## 2023-04-24 PROBLEM — J96.01 ACUTE RESPIRATORY FAILURE WITH HYPOXIA (H): Status: ACTIVE | Noted: 2023-01-01

## 2023-04-24 PROBLEM — S40.811A ABRASION OF RIGHT UPPER EXTREMITY, INITIAL ENCOUNTER: Status: ACTIVE | Noted: 2023-01-01

## 2023-04-24 PROBLEM — J90 PLEURAL EFFUSION ON RIGHT: Status: ACTIVE | Noted: 2023-01-01

## 2023-04-24 PROBLEM — S92.334A CLOSED NONDISPLACED FRACTURE OF THIRD METATARSAL BONE OF RIGHT FOOT, INITIAL ENCOUNTER: Status: ACTIVE | Noted: 2023-01-01

## 2023-04-24 PROBLEM — S92.501A CLOSED FRACTURE OF PHALANX OF RIGHT FIFTH TOE, INITIAL ENCOUNTER: Status: ACTIVE | Noted: 2023-01-01

## 2023-04-24 PROBLEM — S92.344A NONDISPLACED FRACTURE OF FOURTH METATARSAL BONE, RIGHT FOOT, INITIAL ENCOUNTER FOR CLOSED FRACTURE: Status: ACTIVE | Noted: 2023-01-01

## 2023-04-24 NOTE — CONSULTS
ORTHOPEDIC CONSULTATION    Consultation  Fatuma Henry,  1945, MRN [id number]    No admission diagnoses are documented for this encounter.    PCP: Tory Wise, [phone]   Code status:  Prior       Extended Emergency Contact Information  Primary Emergency Contact: Bolivar Henry  Address: [address] [place] [address]  Home Phone: [phone]  Mobile Phone: [phone]  Relation: Spouse  Secondary Emergency Contact: Enid Dove  Address: [address] [place] [address]  Home Phone: [phone]  Relation: Daughter         IMPRESSION:  76 yo female with multiple fracture of the phalanx and metatarsal of third, fourth, and fifth toe     PLAN:  This patient was discussed with Dr. Renteria, on-call surgeon for [facility] and they are in agreement with the following plan.     - no indication for surgical intervention at this time  - place in a cam boot, WBAT  - pain control  - outpatient follow-up with a foot and ankle provider at [facility] upon discharge, may call [phone] to schedule an appointment.    Thank you for including [facility] in the care of Fatuma Henry. It has been a pleasure participating in her care.        CHIEF COMPLAINT: Pleural effusion on right    HISTORY OF PRESENT ILLNESS:  The patient is seen in orthopedic consultation at the request of Dr. Barnett for multiple fractures of the right foot.  The patient is a 77 year old female with PMH significant for Afib on Xarelto, CKD3, hyperlipidemia, frequent falls, and COPD who presented to the ED today after sustaining fall. She had complaints of a sore right foot and plain film revealed multiple acute-subacute fractures in her right foot. Of note she had a fall about 6 weeks ago that resulted in a minimally displaced impacted femoral neck fracture; she is now 6 weeks s/p closed reduction, internal fixation left proximal femur    Today patient is  "experiencing moderate pain and discomfort of the right  foot. The patient reports that she is very sore in her right foot, but has been ambulating regularly on her foot.  The patient describes their pain as a sore/achy pain. They report that their pain does not radiate. The patient reports that their pain is alleviated by rest and is exacerbated by movement. Patient denies any paraesthesias to the right lower extremity.      ALLERGIES:   Review of patient's allergies indicates   Allergies   Allergen Reactions     Codeine Nausea and Vomiting     Fosamax [Alendronic Acid] Diarrhea     Morphine Nausea and Vomiting     Other reaction(s): Vomiting         MEDICATIONS UPON ADMISSION:  Medications were reviewed.  They include:   (Not in a hospital admission)        SOCIAL HISTORY:   she  reports that she quit smoking about 5 years ago. Her smoking use included cigarettes. She smoked an average of .5 packs per day. She has never used smokeless tobacco. She reports that she does not drink alcohol and does not use drugs.      FAMILY HISTORY:  family history includes Cerebrovascular Disease in her brother and father; Diabetes Type 2  in her brother; Heart Failure in her mother; Kidney failure in her sister.      REVIEW OF SYSTEMS:   Reviewed with patient. See HPI, otherwise negative       PHYSICAL EXAMINATION:  Vitals: /55   Pulse 89   Temp 98.2  F (36.8  C) (Oral)   Resp 15   Ht 1.651 m (5' 5\")   Wt 83 kg (183 lb)   SpO2 94%   BMI 30.45 kg/m    General: On examination, the patient is resting comfortably, NAD, awake and alert and oriented to person, place, time and general circumstances   SKIN: There is no evidence of erythema, warmth, swelling, deformity, or crepitus of the right foot. There is a small abrasion [id number] along the medial aspect of the MTP joint.  Pulses:  dorsalis pedis and posterior tibial pulse is intact and equal bilaterally  Sensation: intact and equal bilaterally to the distal lower " extremities.  Tenderness: NTTP of the femur, knee, tibia, fibula, ankle, midfoot. TTP of the third MTP joint, third, fourth, and fifth proximal phalanx  ROM: Full knee extension/flexion without pain. Full hip flexion/abduction/adduction/IR/ER without pain. full ankle DF/PF/inversion/eversion without pain.  Motor: TIB ANT, PF, ELH, QUAD, HF 5/5  Contralateral side= Full range of motion, Negative joint instability findings, 5/5 motor groups about the joint, Non-tender.       RADIOGRAPHIC EVALUATION:  Personally reviewed  4/24/2023 Xray Right Foot  1. Minimally displaced fracture at the base of the proximal phalanx of the fifth toe, new since the prior study. This is probably acute or subacute.  2. Nondisplaced fracture of the distal shaft of the fourth metatarsal, new. This is also probably acute or subacute.  3. Minimally displaced fracture in the distal end of the third and fourth proximal phalanges, new since the prior study but probably subacute or old.  4. At the base of the proximal phalanx of the second toe there is a mildly displaced fracture which shows some interval healing.  5. In the distal shaft of the third metatarsal, there is a nondisplaced fracture, age indeterminate but not present previously.  5. 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

## 2023-04-24 NOTE — ED TRIAGE NOTES
HX of cdiff, COPD    Discharged from TCU recently    Pt was with family at home stood up from dinner table, fell onto her R side. Unsure if she hit her head.    Talking in full sentences, complaining of shortness of breath since the fall - baseline 3L     Afib - on Xarelto     ,  18G L wrist EMS     Declining since her oringinal fall 6 months ago where she broke her femur

## 2023-04-24 NOTE — CONSULTS
Care Management Initial Consult    General Information  Assessment completed with: Patient, Children, patient, dtr Enid  Type of CM/SW Visit: Initial Assessment    Primary Care Provider verified and updated as needed: Yes   Readmission within the last 30 days: current reason for admission unrelated to previous admission   Return Category: New Diagnosis  Reason for Consult: discharge planning  Advance Care Planning: Advance Care Planning Reviewed: no concerns identified          Communication Assessment  Patient's communication style: spoken language (English or Bilingual)             Cognitive  Cognitive/Neuro/Behavioral: WDL (Denies LOC.)                      Living Environment:   People in home: spouse  Bolivar  Current living Arrangements: condominium      Able to return to prior arrangements: yes (currently open with [facility])       Family/Social Support:  Care provided by: self, spouse/significant other, child(bryan)  Provides care for: no one, unable/limited ability to care for self  Marital Status:   , Children, Other (specify) (Home care staff)  Bolivar       Description of Support System: Supportive, Involved    Support Assessment: Patient communicates needs well met, Adequate family and caregiver support, Adequate social supports    Current Resources:   Patient receiving home care services: Yes  Skilled Home Care Services: Skilled Nursing, Home Health Aid, Physical Therapy, Occupational Therapy  Community Resources: Home Care  Equipment currently used at home:    Supplies currently used at home: Incontinence Supplies, Oxygen Tubing/Supplies, Nebulizer tubing, Other (lymphedema wraps)    Employment/Financial:  Employment Status:          Financial Concerns:             Does the patient's insurance plan have a 3 day qualifying hospital stay waiver?      Lifestyle & Psychosocial Needs:  Social Determinants of Health     Tobacco Use: Medium Risk (4/24/2023)    Patient History       Smoking Tobacco Use: Former      Smokeless Tobacco Use: Never      Passive Exposure: Not on file   Alcohol Use: Not on file   Financial Resource Strain: Not on file   Food Insecurity: Not on file   Transportation Needs: Not on file   Physical Activity: Not on file   Stress: Not on file   Social Connections: Not on file   Intimate Partner Violence: Not on file   Depression: Not on file   Housing Stability: Not on file       Functional Status:  Prior to admission patient needed assistance:   Dependent ADLs:: Wheelchair-with assist, Bathing, Dressing, Grooming, Transfers, Toileting  Dependent IADLs:: Cleaning, Cooking, Laundry, Shopping, Meal Preparation, Medication Management, Money Management, Transportation  Assesssment of Functional Status: Not at baseline with ADL Functioning, Not at baseline with mobility, Not at  functional baseline    Mental Health Status:          Chemical Dependency Status:                Values/Beliefs:  Spiritual, Cultural Beliefs, Anabaptism Practices, Values that affect care:                 Additional Information:  Met with patient and daughter Enid, introduced self and care management role. Piotr has been in and out of the hospital and TCU's since Nov last year. [facility]. She came home on 4/17 and started [facility] services(SN, PT, OT, HA)  immediately following. She lives with her spouse in their 1st floor condo. Dtr Enid lives upstairs in the same complex.     Piotr requires assist with most of her ADLs and all IADLs which is provided by spouse, daughters and home care. She uses a wheelchair for mobility but is working with home care PT in hopes that she will be back to using a walker for ambulation.     Care manager to follow hospital progression, treatment team recommendations and assist with discharge planning as needed.  Family to transport at discharge.    Edelmira Weber RN

## 2023-04-24 NOTE — ED NOTES
[facility] ED Handoff Report    ED Chief Complaint: Increased SOB/Fall.    ED Diagnosis:  (S92.511A) Closed displaced fracture of proximal phalanx of lesser toe of right foot, initial encounter    (J44.1) COPD exacerbation (H)    (J96.01) Acute respiratory failure with hypoxia (H)    (S92.344A) Nondisplaced fracture of fourth metatarsal bone, right foot, initial encounter for closed fracture    (S92.501A) Closed fracture of phalanx of right fifth toe, initial encounter    (S92.501A) Closed fracture of phalanx of right third toe, initial encounter    (S92.501A) Closed fracture of phalanx of right fourth toe, initial encounter    (S92.334A) Closed nondisplaced fracture of third metatarsal bone of right foot, initial encounter    (S92.501A) Closed fracture of phalanx of right second toe, initial encounter      (S40.811A) Abrasion of right upper extremity, initial encounter      (S90.811A) Abrasion of right foot, initial encounter      (W19.XXXA) Fall, initial encounter      (D63.8) Anemia in other chronic diseases classified elsewhere      (J90) Pleural effusion on right         PMH:    Past Medical History:   Diagnosis Date    Atrial fibrillation (H)     CKD (chronic kidney disease) stage 3, GFR 30-59 ml/min (H)     COPD (chronic obstructive pulmonary disease) (H)     nocturnal oxygen    CRF (chronic renal failure)     GERD (gastroesophageal reflux disease)     Hypertension     Hypovolemic shock (H)     Influenza A 12/01/2017    Pulmonary hypertension (H)     Pulmonary nodules     Rheumatoid arthritis (H)     Sepsis (H) 12/24/2017        Code Status:  Prior     Falls Risk: Yes Band: Applied    Current Living Situation/Residence: lives with their son or daughter     Elimination Status: Continent: Yes     Activity Level: SBA w/ walker, also uses w/c.    Patients Preferred Language:  English     Needed: No    Vital Signs:  /69   Pulse 93   Temp 98.2  F (36.8  C) (Oral)   Resp 16   Ht 1.651 m  "(5' 5\")   Wt 83 kg (183 lb)   SpO2 93%   BMI 30.45 kg/m       Cardiac Rhythm: Atrial Fib.    Pain Score: 1/10    Is the Patient Confused:  No    Last Food or Drink: 04/24/23 at 11:00am    Focused Assessment:  Pt currently saying with daughter after being discharged from TCU after recovering from femur fracture. Today was at the table, when she stood up from chair she fell onto floor. C/o pain rt foot, especially great toe area, abrasion noted. Does have a few other abrasions elsewhere. Unclear if she hit her head, is on thinners, CT neg for bleed. Rt pleural effusion noted via radiology, INR to high today for thoracentesis, INR needs to be 3 or less. Plan is for possibly tomorrow am. Received 2 neb treatments while in the ED which did improve breathing some. Very difficult to get 02 sat in ED, very poor waveform despite extremity or forehead. RT suggested infant probe on ear which has worked better. Daughter with patient.    Tests Performed: Done: Labs and Imaging    Treatments Provided:  Nebs, IV steroids, and abx ointment to small wounds.    Family Dynamics/Concerns: No    Family Updated On Visitor Policy: Yes    Plan of Care Communicated to Family: Yes    Who Was Updated about Plan of Care: Daughter Enid.    Belongings Checklist Done and Signed by Patient: Yes    Medications sent with patient: C-diff. Medication with daughter.    Additional Information: None    RN: Toya Hodge RN   4/24/2023 3:44 PM      "

## 2023-04-24 NOTE — H&P
[facility]    History and Physical - Hospitalist Service       Date of Admission:  4/24/2023    Assessment & Plan      Fatuma Henry is a 78 yo medically complex lady with hx of Parox afib, htn, lipids, pulm htn (on chronic O2), diastolic HF, CKD stage 3, left ICA occlusion, RA, hx of gout, gerd, recent hospitalization for left hip fracture after fall at home, completed dysuria, fell again at home today, broke numerous toes on the left foot, presented with right foot pain, dyspnea, productive cough, worsening leg edema.     Acute on chronic respiratory failure with hypoxia, PTA on 2/3 LPM  COPD with acute exacerbation  Right-sided pleural effusion, previously known/CT chest 4/7  17 x 19 groundglass nodule in the known since 7/21/2022  Diastolic CHF with acute exacerbation.  Echo 3/15/2023 EF 60%  Pulmonary hypertension, follows with pulm clinic  Significant wheezing admission, subsided with few rounds of nebulizer/Solu-Medrol  -DuoNeb 4 times daily, albuterol neb every 2 hours as needed  -Telemetry monitoring  -Diuresis IV Lasix 40 mg twice daily/not on diuretics at home  -Medrol 40 mg twice daily  -Hold Xarelto, right thoracentesis in 1-2 days or sooner if hypoxia worsens; INR was 4 on admission; pleural fluid labs ordered; may need Kcentra to reverse anticoagulation if needed urgent thoracentesis.    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 Stopped on 4/12/23  -Fidaxomicin started on 4/7.   -Continue taper Dificid per ID for now dosing every other day, took a.m. dose today.  Total requested 20 doses of 4 times daily.   -Pt referred the patient to [facility] for FMT, GI, consultation appointment scheduled only for  "November.  -Avoid antibiotics by all cost     S/p Hip fx 3/14/23  -[place] did the repair. S/p closed reduction with percutaneous pinning  Completed rehabilitation at TCU, return home couple days ago.    Recurrent falls, fell at home today, reportedly no syncope or near syncope.  Patient fell while getting up from the chair by the table, with daughter being nearby.  Multiple toes fracture of the right foot: 3, 4, fifth toes, third metatarsal bone, first metatarsal bone.  -ER MD d/w B6, recommended CAM boot  -Pain management  -PT/OT, likely will need placement     CKD stage 3  -Avoid nephrotoxins and hypotension, monitor closely renal function while diuresing    Essential HTN  -Continue PTA Cardizem and metoprolol, with holding parameters.     Left ICA occlusion  -To follow with vascular as outpt     GERD  -Con pepcid     Depression  -On Cymbalta     H/O pulm nodules with positive PET  -Needs follow up with pulm as outpt  Stable 17 x 19 mm groundglass nodule in the lingula since 07/21/2022.      H/O RA  -On chronic pred  -Hx of methotrexate--was discontinued during recent hospitalization.  No exacerbation of RA off methotrexate.    Diet: Cardiac diet  DVT Prophylaxis: DOAC-holding for now  Brandon Catheter: Not present  Lines: None     Cardiac Monitoring: None  Code Status:  DNR/DNI    Clinically Significant Risk Factors Present on Admission   TIP  This section helps capture the illness of the patient on admission.     - Review diagnoses highlighted in blue; right click, edit & delete if not appropriate   - If blank, no additional diagnoses identified              # Hypoalbuminemia: Lowest albumin = 3.4 g/dL at 4/24/2023 12:33 PM, will monitor as appropriate  # Drug Induced Coagulation Defect: home medication list includes an anticoagulant medication         # Obesity: Estimated body mass index is 30.45 kg/m  as calculated from the following:    Height as of this encounter: 1.651 m (5' 5\").    Weight as of this " encounter: 83 kg (183 lb).         Disposition Plan      Expected Discharge Date: 04/26/2023                Karli Schwartz MD  Hospitalist Service  [facility]  Securely message with [employer] ( [employer] ) (more info)  Text page via [employer] Paging/Directory   ______________________________________________________________________  Chief Complaint   Fall at home, right foot pain, dyspnea, cough    History is obtained from the patient, electronic health record and emergency department physician    History of Present Illness   Fatuma Henry is a 77 year old female who was admitted on 4/6/2023. She has a hx of Parox afib, htn, lipids, pulm htn (on chronic O2), diastolic HF, CKD stage 3, left ICA occlusion, RA, hx of gout, gerd, atrial fibrillation, recurrent C. difficile, physical deconditioning, recurrent falls, recent hospitalization for left hip fracture.  Patient was at home few days ago.  Today while getting on the dining table, lost balance and fell.  She denies preceding chest pain, dyspnea, cardiac palpitations, lightheadedness, diaphoresis, nausea.  It appears to be mechanical fall.  X-ray showed multiple right foot fractures.  Orthopedics recommended conservative treatment with a CAM boot.  Patient found to have respiratory wheezes, with worsening Chronic hypoxia in ED.  For stated with Solu-Medrol and nebulizers.  Wheezing resolved by the time I seen In ED.  She has productive cough.  CT chest showed right-sided moderate pleural effusion, subcutaneous edema, stable nodules in the lingula.  Currently patient denies chest pain, cardiac operations, abdominal pain, nausea, vomiting, dysuria.  No pain in right foot at rest.    Past Medical History    Past Medical History:   Diagnosis Date     Atrial fibrillation (H)      CKD (chronic kidney disease) stage 3, GFR 30-59 ml/min (H)      COPD (chronic obstructive pulmonary disease) (H)     nocturnal oxygen     CRF (chronic renal failure)      GERD  (gastroesophageal reflux disease)      Hypertension      Hypovolemic shock (H)      Influenza A 12/01/2017     Pulmonary hypertension (H)      Pulmonary nodules      Rheumatoid arthritis (H)      Sepsis (H) 12/24/2017     Past Surgical History   Past Surgical History:   Procedure Laterality Date     APPENDECTOMY       BLADDER SUSPENSION       CHOLECYSTECTOMY       CLOSED REDUCTION, PERCUTANEOUS PINNING HIP Left 3/15/2023    Procedure: CLOSED REDUCTION, HIP, WITH PERCUTANEOUS PINNING;  Surgeon: Denton Mckeon MD;  Location: [facility] OR     PICC TRIPLE LUMEN PLACEMENT  12/13/2022          Prior to Admission Medications   Prior to Admission Medications   Prescriptions Last Dose Informant Patient Reported? Taking?   DULoxetine (CYMBALTA) 20 MG capsule 4/24/2023  Yes Yes   Sig: Take 20 mg by mouth daily   Lactobacillus-Inulin ([facility] DIGESTIVE [facility]) CAPS 4/24/2023 at AM  Yes Yes   Sig: Take 1 capsule by mouth 2 times daily   Vitamin D3 (CHOLECALCIFEROL) 125 MCG (5000 UT) tablet 4/24/2023 at AM  Yes Yes   Sig: Take 1 tablet by mouth daily   acetaminophen (TYLENOL) 325 MG tablet Unknown  No Yes   Sig: Take 1 tablet (325 mg) by mouth every 4 hours as needed for mild pain or fever   albuterol (PROAIR HFA/PROVENTIL HFA/VENTOLIN HFA) 108 (90 Base) MCG/ACT inhaler Unknown  Yes Yes   Sig: Inhale 2 puffs into the lungs every 4 hours as needed for shortness of breath / dyspnea or wheezing   allopurinol (ZYLOPRIM) 300 MG tablet 4/24/2023 at AM  No Yes   Sig: Take 1 tablet (300 mg) by mouth daily   budesonide-formoterol (SYMBICORT) 160-4.5 MCG/ACT Inhaler 4/24/2023 at AM, didn't bring  No Yes   Sig: Inhale 2 puffs into the lungs 2 times daily   cholestyramine (QUESTRAN) 4 g packet 4/24/2023 at AM  Yes Yes   Sig: Take 1 packet by mouth 3 times daily (with meals)   compressor, for nebulizer Saran   No No   Sig: [COMPRESSOR, FOR NEBULIZER SARAN] Nebulizer treatment qid prn   diltiazem ER COATED BEADS (CARDIZEM  CD/CARTIA XT) 120 MG 24 hr capsule 2023 at AM  No Yes   Sig: Take 1 capsule (120 mg) by mouth daily   famotidine (PEPCID) 20 MG tablet 2023 at AM  Yes Yes   Sig: Take 20 mg by mouth daily   fidaxomicin (DIFICID) 200 MG tablet 2023 at AM, 4 left  No Yes   Sig: Take 1 tablet (200 mg) by mouth every other day for 10 doses   folic acid (FOLVITE) 1 MG tablet 2023 at AM  No Yes   Sig: Take 1 tablet (1 mg) by mouth daily   metoprolol succinate ER (TOPROL XL) 50 MG 24 hr tablet 2023 at AM  No Yes   Sig: Take 1.5 tablets (75 mg) by mouth daily   pravastatin (PRAVACHOL) 20 MG tablet 2023 at PM  Yes Yes   Sig: Take 20 mg by mouth every evening   predniSONE (DELTASONE) 5 MG tablet 2023  Yes Yes   Sig: Take 5 mg by mouth daily   rivaroxaban ANTICOAGULANT (XARELTO) 15 MG TABS tablet 2023 at AM  No Yes   Sig: Take 1 tablet (15 mg) by mouth daily (with dinner)      Facility-Administered Medications: None      The 10 point Review of Systems is negative other than noted in the HPI.    Social History   I have reviewed this patient's social history and updated it with pertinent information if needed.  Social History     Tobacco Use     Smoking status: Former     Packs/day: 0.50     Types: Cigarettes     Quit date: 2017     Years since quittin.3     Smokeless tobacco: Never   Substance Use Topics     Alcohol use: No     Drug use: No     Family History   I have reviewed this patient's family history and updated it with pertinent information if needed.  Family History   Problem Relation Age of Onset     Heart Failure Mother      Cerebrovascular Disease Father      Kidney failure Sister      Cerebrovascular Disease Brother      Diabetes Type 2  Brother      Allergies   Allergies   Allergen Reactions     Codeine Nausea and Vomiting     Fosamax [Alendronic Acid] Diarrhea     Morphine Nausea and Vomiting     Other reaction(s): Vomiting      Physical Exam   Vital Signs: Temp: 98.2  F (36.8  C)  Temp src: Oral BP: 106/69 Pulse: 93   Resp: 16 SpO2: 93 % O2 Device: Nasal cannula    Weight: 183 lbs 0 oz  General: Alert and oriented x 2. Not in obvious distress.  Patient's daughter at the bedside.  HEENT: NC, AT. Neck- supple, No JVP elevation, lymphadenopathy or thyromegaly. Trachea-central.  Chest: Clear to auscultation bilaterally.  Heart: S1S2 irreg irregular. No M/R/G.  Abdomen: Soft. NT, ND. No organomegaly. Bowel sounds- active.  Back: No spine tenderness. No CVA tenderness.  Extremities: 2+ pitting edema, slightly more in the right.  Right  to palpation, no foot deformity. Peripheral pulses 1+ bilaterally.  Neuro: Cranial nerves 1-12 grossly normal. No focal neurological deficit    Medical Decision Making       75 MINUTES SPENT BY ME on the date of service doing chart review, history, exam, documentation & further activities per the note.      Data     I have personally reviewed the following data over the past 24 hrs:    12.0 (H)  \   8.8 (L)   / 351     142 106 18.0 /  119 (H)   4.0 27 1.14 (H) \       ALT: 22 AST: 22 AP: 170 (H) TBILI: 0.4   ALB: 3.4 (L) TOT PROTEIN: 5.5 (L); 5.5 (L) LIPASE: N/A       Trop: N/A BNP: 4,340 (H)       INR:  4.09 (H) PTT:  49 (H)   D-dimer:  N/A Fibrinogen:  N/A       Ferritin:  N/A % Retic:  N/A LDH:  211       Imaging results reviewed over the past 24 hrs:   Recent Results (from the past 24 hour(s))   Foot  XR, G/E 3 views, right    Narrative    EXAM: XR FOOT RIGHT G/E 3 VIEWS  LOCATION: [facility]  DATE/TIME: 4/24/2023 1:08 PM CDT    INDICATION: Right medial distal forefoot abrasion and pain after fall.  COMPARISON: 10/12/22.      Impression    IMPRESSION:   1. Minimally displaced fracture at the base of the proximal phalanx of the fifth toe, new since the prior study. This is probably acute or subacute.  2. Nondisplaced fracture of the distal shaft of the fourth metatarsal, new. This is also probably acute or subacute.  3.  Minimally displaced fracture in the distal end of the third and fourth proximal phalanges, new since the prior study but probably subacute or old.  4. At the base of the proximal phalanx of the second toe there is a mildly displaced fracture which shows some interval healing.  5. In the distal shaft of the third metatarsal, there is a nondisplaced fracture, age indeterminate but not present previously.  5. Osteoarthrosis in the interphalangeal joints of the toes, particularly the great toe, unchanged.   CT Head w/o Contrast    Narrative    EXAM: CT HEAD W/O CONTRAST  LOCATION: [facility]  DATE/TIME: 4/24/2023 1:12 PM CDT    INDICATION: head trauma fall. Injury. Pain.  COMPARISON: CT brain 03/24/2023  TECHNIQUE: Routine CT Head without IV contrast. Multiplanar reformats. Dose reduction techniques were used.    FINDINGS:  INTRACRANIAL CONTENTS: No intracranial hemorrhage, extraaxial collection, or mass effect.  No CT evidence of acute infarct. Mild presumed chronic small vessel ischemic changes. Mild generalized volume loss. No hydrocephalus.     VISUALIZED ORBITS/SINUSES/MASTOIDS: Prior bilateral cataract surgery. Visualized portions of the orbits are otherwise unremarkable. No paranasal sinus mucosal disease. No middle ear or mastoid effusion.    BONES/SOFT TISSUES: No acute abnormality.      Impression    IMPRESSION:  1.  No CT evidence for acute intracranial process.  2.  Brain atrophy and presumed chronic microvascular ischemic changes as above.   CT Cervical Spine w/o Contrast    Narrative    EXAM: CT CERVICAL SPINE W/O CONTRAST  LOCATION: [facility]  DATE/TIME: 4/24/2023 1:13 PM CDT    INDICATION: head trauma fall. Injury cervical spine pain.  COMPARISON: None.  TECHNIQUE: Routine CT Cervical Spine without IV contrast. Multiplanar reformats. Dose reduction techniques were used.    FINDINGS:  VERTEBRA: Normal vertebral body heights. Mid cervical leftward  curvature centered at C4. No congenital spine anomaly. No fracture or posttraumatic subluxation.     CANAL/FORAMINA:     C6-C7: Minimal disc osteophyte. Patent central canal and foramen.    T1-T2: 2 mm degenerative anterolisthesis. Moderate bilateral facet arthropathy. Mild right foraminal stenosis.    PARASPINAL: Moderate-sized right pleural effusion      Impression    IMPRESSION:  1.  No fracture or posttraumatic subluxation.  2.  No high-grade spinal canal or neural foraminal stenosis. Cervical spondylosis.    3.  Development of right pleural effusion.   CT Chest Abdomen Pelvis w/o Contrast    Narrative    EXAM: CT CHEST ABDOMEN PELVIS W/O CONTRAST  LOCATION: [facility]  DATE/TIME: 4/24/2023 1:13 PM CDT    INDICATION: Right chest wall and abdominal pain after fall, on blood thinners, age 77.  COMPARISON: CTs chest 04/07/2023 and 07/21/2022 and CT AP 04/06/2023 and 03/22/2023.  TECHNIQUE: CT scan of the chest, abdomen, and pelvis was performed without IV contrast. Multiplanar reformats were obtained. Dose reduction techniques were used.   CONTRAST: None.    FINDINGS:   LUNGS AND PLEURA: The two subsolid nodular opacities in the lingula are unchanged at 2.0 cm and 0.9 cm (series 4 images 140 and 133, respectively). Near-complete right middle lobe atelectasis has developed. Moderate volume right pleural effusion layering   posteriorly and the associated atelectasis in the right lower lobe posteriorly have increased and the minimal left posterior basilar pleural fluid and atelectasis are unchanged. No pneumothorax.    MEDIASTINUM/AXILLAE: Mild biatrial cardiac enlargement with aortic valve leaflet, mitral annular and three-vessel coronary artery calcification. No pericardial effusion. Normal-caliber thoracic aorta and central pulmonary arteries. Decreased density   intracardiac blood pool consistent with nonspecific anemia. No lymphadenopathy.    CORONARY ARTERY CALCIFICATION:  Severe.    HEPATOBILIARY: Cholecystectomy.  Liver and bile ducts unremarkable.    PANCREAS: Normal.    SPLEEN: Normal.    ADRENAL GLANDS: Normal.    KIDNEYS/BLADDER: No significant mass, stones, or hydronephrosis. There are simple or benign cysts. No follow up is needed.    BOWEL: No obstruction or inflammatory change.    LYMPH NODES: No lymphadenopathy.    VASCULATURE: Moderate to severe calcified atheromatous plaque throughout the normal caliber abdominal aorta, iliofemoral arteries and at the origin of the renal and mesenteric arteries.    PELVIC ORGANS: Normal.    MUSCULOSKELETAL: Generalized subcutaneous edema has increased. Mild compression superior endplate T12 vertebral body unchanged. Chronic insufficiency fracture left sacral ala unchanged. Intertrochanteric fracture proximal left femur status post dynamic   screw and plate fixation unchanged. Spondylotic change lumbar spine. No new acute fractures identified.      Impression    IMPRESSION:  1.  No acute posttraumatic findings identified.  2.  Mild biatrial cardiac enlargement with aortic valve leaflet, mitral annular and three-vessel coronary artery calcification.  3.  Decreased density intracardiac blood pool consistent with nonspecific anemia  4.  Moderate right pleural effusion and right middle and lower lobe atelectasis have increased.  5.  Generalized subcutaneous edema has increased.  6.  The two subsolid nodular opacities in the lingula are unchanged at 2.0 cm and 0.9 cm. Recommend 12-24-month follow-up CT chest.    REFERENCE:  Guidelines for Management of Incidental Pulmonary Nodules Detected on CT Images: From the Fleischner Society 2017.   Guidelines apply to incidental nodules in patients who are 35 years or older.  Guidelines do not apply to lung cancer screening, patients with immunosuppression, or patients with known primary cancer.    SUBSOLID NODULES    Nodule size 6 mm or greater  CT at 6-12 months to confirm persistence, then CT every 2  years until 5 years.

## 2023-04-24 NOTE — ED PROVIDER NOTES
EMERGENCY DEPARTMENT ENCOUNTER      NAME: Fatuma Henry  AGE: 77 year old female  YOB: 1945  MRN: [id number]  EVALUATION DATE & TIME: 4/24/2023 11:49 AM    PCP: Tory Wise    ED PROVIDER: Micaela Barnett M.D.      Chief Complaint   Patient presents with     trauma alert     Fall     Shortness of Breath         FINAL IMPRESSION:  1. Closed displaced fracture of proximal phalanx of lesser toe of right foot, initial encounter    2. COPD exacerbation (H)    3. Acute respiratory failure with hypoxia (H)    4. Nondisplaced fracture of fourth metatarsal bone, right foot, initial encounter for closed fracture    5. Closed fracture of phalanx of right fifth toe, initial encounter    6. Closed fracture of phalanx of right third toe, initial encounter    7. Closed fracture of phalanx of right fourth toe, initial encounter    8. Closed nondisplaced fracture of third metatarsal bone of right foot, initial encounter    9. Closed fracture of phalanx of right second toe, initial encounter    10. Abrasion of right upper extremity, initial encounter    11. Abrasion of right foot, initial encounter    12. Fall, initial encounter    13. Anemia in other chronic diseases classified elsewhere    14. Pleural effusion on right          ED COURSE & MEDICAL DECISION MAKING:    ED Course as of 04/24/23 1429   Mon Apr 24, 2023   1227 Patient BIB EMS one hour ago, family not here with her, she reported initially that she fell going to the bathroom at 5pm last night but then when I asked her re: EMS report of family witnessed fall today while getting up from the tablle prior to ED arrival she agrees that is likely what occurred, she did not fall last night, doesn't actually recall what happened. Trauma alert called on arrival, CT images head and c-spine pending with poor memory, age 77, on blood thinners and fall with possible head strike, CAP pending with poor historian and with abasion to right arm and XR right  foot with some pain there with abrasion, wound care provided, tetanus UTD one year ago, no new hip pain. With diffuse wheezing and COPD without sense of new SOB or cough or fever given duonebs, steroid therapy. Will reasess after neb/steroid therapy   1329 INR elevated as expected on bloodt hinners, hemoglobin 8.8 with chronic anemia same as last 2 weeks in outpaitent setting, CMP with normal values with creatinine 1.1 which is around her baseline, CT head without intracranial pathology, CT cspine images reviewed by me without c-spine fracture present on my review but awaiting formal radiology read.   1331 XR right foot with multiple closed foot fractures after her fall with pain to site. Will reassess patient now. Sat recorded as 83% with COPD exacerbation, will reassess now after nebs.   1333 CT c-spine reassuringly without traumatic pathology per radiology read   1337 Pt reassessed, daughter still not present, RN called daughter and daughter noted she has the day off and will come ot the ED this afternoon. Per RN sat off 83% was accidentally validated with poor pleth waveform, and when we check forehead sat, sat is 100% but pt on 2L NC supplmental O2 and still wheezing with fairly poor air entry and somet achypnea with intercostal retraction thus will give further nebs now, RN administering nebs, will admit with broken foot for TCU placement as she cannot ambulate/get around or care for herself with foot fractures, and for treatment of her COPD exacerbation. Hospitalist and orthopedics both paged   1352 I reviewed CT chest/abd/pelvis for patient with left hip replacement appreciated with known prior fall and right hip fracture, many vertebral body compression fractures old appearing, no acute traumatic pathology as all fractures do appear chronic. Old unchanged sacral insufficiency fracutre is present, right pleural effusion gradually increasing, patient with likely fluid overload overall, IR paged to discuss  thoracentesis whether this amount of fluid would be adequate to relieve with thoracentesis vs. Fluid overload/chronic to medically manage, add on pro BNP pending, 3/14/2023 echo with EF 60% thus less likely acute CHF exacerbation, total protein 5.5 and will check UA for protein spillage, no signs of MM lesions on CT imaging   1402 I spoke with IR who reviewed images and ok with thoracentesis for pleural effusion, order placed with labs   1412 I spoke with orthopedic surgery at bedside who assessed patient and recommends CAM boot, aware patient will be admitted and will place consult in chart. Hospitalist repaged for admission, patient reassessed and daughter still not at bedside at this time   1413 CAM boot ordered by me.   1414 I called listed home phone [phone] with no answer to home phone line, went to voicemail without identification provided so I did not leave a message as I cannot confirm whose voicemail I reached.  [place] listed at that home phone number but without answer.   1416 Per IR they cannot do thoracentesis now because patient's INR is over 4 and they want us to hold blood thinners until INR under 3 prior to thoracentesis to remove pleural effusion, thus will need to await that with admission and hold blood thinners vs. Change onto heparin with chronic afib, EKG underway now and on cardiac monitor 2:17 PM Patient in afib with HR 92 without ST abnormalities, will defer to hospitalist team ideal way to bring INR to under 3 to ensure she is able to undergo thoracentesis to relieve the degree of acute hypoxic respiratory failure that may be attributable to pleural effusion as opposed to COPD exacerbation, likely mixed picture. Hospitalist repaged to discuss admission plans.   1427 I spoke with hospitalist Lana who indicates hold xarelto, do not start heparin, will trend INR to tap when INR drops below 3, she indicates admit cardiac tele       Pertinent Labs & Imaging studies reviewed. (See  chart for details)    12:10 PM I met with the patient to gather history and to perform my initial exam. We discussed plans for the ED course, including diagnostic testing and treatment.     N95 worn  A face shield was worn also  COVID PPE    Medical Decision Making    History:    Supplemental history from: Documented in chart, if applicable and Family Member/Significant Other    External Record(s) reviewed: Documented in chart, if applicable.    Work Up:    Chart documentation includes differential considered and any EKGs or imaging independently interpreted by provider, where specified.    In additional to work up documented, I considered the following work up: Documented in chart, if applicable.    External consultation:    Discussion of management with another provider: Documented in chart, if applicable    Complicating factors:    Care impacted by chronic illness: Chronic Kidney Disease, Chronic Lung Disease, Heart Disease and Hyperlipidemia    Care affected by social determinants of health: N/A    Disposition considerations: Admit.    Critical Care time was 85 minutes for this patient excluding procedures.      At the conclusion of the encounter I discussed the results of all of the tests and the disposition. The questions were answered. The patient or family acknowledged understanding and was agreeable with the care plan.     MEDICATIONS GIVEN IN THE EMERGENCY:  Medications   lidocaine 1 % 0.1-1 mL (has no administration in time range)   lidocaine (LMX4) cream (has no administration in time range)   sodium chloride (PF) 0.9% PF flush 3 mL (3 mLs Intracatheter $Given 4/24/23 1251)   sodium chloride (PF) 0.9% PF flush 3 mL (3 mLs Intracatheter $Given 4/24/23 1248)   bacitracin ointment (has no administration in time range)   albuterol (PROVENTIL) neb solution 2.5 mg (2.5 mg Nebulization $Given 4/24/23 1349)   ipratropium - albuterol 0.5 mg/2.5 mg/3 mL (DUONEB) neb solution 3 mL (3 mLs Nebulization $Given 4/24/23  "1238)   methylPREDNISolone sodium succinate (solu-MEDROL) injection 125 mg (125 mg Intravenous $Given 4/24/23 1247)       NEW PRESCRIPTIONS STARTED AT TODAY'S ER VISIT  New Prescriptions    No medications on file          =================================================================    HPI      Fatuma Henry is a 77 year old female with PMHx of DNR/DNI, paroxysmal atrial fibrillation on Xarelto, CKD 3, hyperlipidemia, frequent falls, COPD, recent c diff diagnosis, and recent discharge from TCU who presents to the ED today via EMS with a fall.    Patient reports she fell yesterday at 5 PM while walking out of the bathroom. She thinks she hit her head but not hard. When asked about the triage note that states that the patient's family reported that her fall occurred today, not yesterday, she states \"I could have fallen yesterday.\" Patient states she does recall the fall today and endorses right foot pain following the fall. She denies injuring her knees, hips, abdomen, chest, or shoulders during the fall. Patient endorses shortness of breath but denies any new cough, fever, neck pain, or back pain. No other complaints or concerns expressed at this time.    Per triage note, patient was recently discharged from a TCU. She was at home with her family when she stood up from her dinner table and fell onto her right side today. They are unsure if she hit her head. Patient has been complaining of shortness of breath since her fall, and is on 3 liters at baseline. Her blood sugar was 166 for EMS.     Per chart review, patient's last tetanus was 10/28/22.      REVIEW OF SYSTEMS   All other systems reviewed and are negative except as noted above in HPI.    PAST MEDICAL HISTORY:  Past Medical History:   Diagnosis Date     Atrial fibrillation (H)      CKD (chronic kidney disease) stage 3, GFR 30-59 ml/min (H)      COPD (chronic obstructive pulmonary disease) (H)     nocturnal oxygen     CRF (chronic renal failure)      GERD " (gastroesophageal reflux disease)      Hypertension      Hypovolemic shock (H)      Influenza A 12/01/2017     Pulmonary hypertension (H)      Pulmonary nodules      Rheumatoid arthritis (H)      Sepsis (H) 12/24/2017       PAST SURGICAL HISTORY:  Past Surgical History:   Procedure Laterality Date     APPENDECTOMY       BLADDER SUSPENSION       CHOLECYSTECTOMY       CLOSED REDUCTION, PERCUTANEOUS PINNING HIP Left 3/15/2023    Procedure: CLOSED REDUCTION, HIP, WITH PERCUTANEOUS PINNING;  Surgeon: Denton Mckeon MD;  Location: [facility] OR     PICC TRIPLE LUMEN PLACEMENT  12/13/2022            CURRENT MEDICATIONS:    acetaminophen (TYLENOL) 325 MG tablet  albuterol (PROAIR HFA/PROVENTIL HFA/VENTOLIN HFA) 108 (90 Base) MCG/ACT inhaler  allopurinol (ZYLOPRIM) 300 MG tablet  budesonide-formoterol (SYMBICORT) 160-4.5 MCG/ACT Inhaler  cholestyramine (QUESTRAN) 4 g packet  compressor, for nebulizer Eva  diltiazem ER COATED BEADS (CARDIZEM CD/CARTIA XT) 120 MG 24 hr capsule  DULoxetine (CYMBALTA) 20 MG capsule  famotidine (PEPCID) 20 MG tablet  fidaxomicin (DIFICID) 200 MG tablet  folic acid (FOLVITE) 1 MG tablet  Lactobacillus-Inulin ([facility] DIGESTIVE [facility]) CAPS  methotrexate 2.5 MG tablet  metoprolol succinate ER (TOPROL XL) 50 MG 24 hr tablet  pravastatin (PRAVACHOL) 20 MG tablet  predniSONE (DELTASONE) 5 MG tablet  rivaroxaban ANTICOAGULANT (XARELTO) 15 MG TABS tablet  Vitamin D3 (CHOLECALCIFEROL) 125 MCG (5000 UT) tablet        ALLERGIES:  Allergies   Allergen Reactions     Codeine Nausea and Vomiting     Fosamax [Alendronic Acid] Diarrhea     Morphine Nausea and Vomiting     Other reaction(s): Vomiting       FAMILY HISTORY:  Family History   Problem Relation Age of Onset     Heart Failure Mother      Cerebrovascular Disease Father      Kidney failure Sister      Cerebrovascular Disease Brother      Diabetes Type 2  Brother        SOCIAL HISTORY:   Social History     Socioeconomic History      "Marital status:    Tobacco Use     Smoking status: Former     Packs/day: 0.50     Types: Cigarettes     Quit date: 2017     Years since quittin.3     Smokeless tobacco: Never   Substance and Sexual Activity     Alcohol use: No     Drug use: No     Sexual activity: Not Currently       VITALS:  Patient Vitals for the past 24 hrs:   BP Temp Temp src Pulse Resp SpO2 Height Weight   23 1423 -- -- -- 89 15 94 % -- --   23 1415 -- -- -- 92 17 -- -- --   23 1350 -- -- -- -- -- 99 % -- --   23 1252 111/55 -- -- 90 26 -- -- --   23 1229 (!) 140/70 -- -- 85 15 99 % -- --   23 1223 -- -- -- 85 15 98 % -- --   23 1215 114/56 -- -- 82 18 -- -- --   23 1142 -- -- -- -- -- -- 1.651 m (5' 5\") 83 kg (183 lb)   23 1141 107/55 98.2  F (36.8  C) Oral -- 16 -- -- --       PHYSICAL EXAM    VITAL SIGNS: /55   Pulse 89   Temp 98.2  F (36.8  C) (Oral)   Resp 15   Ht 1.651 m (5' 5\")   Wt 83 kg (183 lb)   SpO2 94%   BMI 30.45 kg/m     GENERAL: Awake, alert.  In no acute distress. GCS 15  HEENT: Normocephalic, atraumatic.  Pupils equal, round and reactive.  Conjunctiva normal.  EOMI. No schreiber sign, no racoon eyes, no mastoid tenderness, no hemotympanum, no facial instability, no nasal bridge pain, no nasal septal hematoma, no intraoral lacerations, no loose teeth, no mandible pain or deformity  NECK: No stridor or apparent deformity. No midline pain to palpation.  PULMONARY: Symmetrical breath sounds without distress. Diffuse moderate expiratory wheeze with moderate air entry.   CARDIO: Regular rate and rhythm.  No significant murmur, rub or gallop.  Radial pulses strong and symmetrical.  THORAX: No focal chest wall deformity or crepitus  BACK: No focal tenderness or deformity to each vertebral level in midline  ABDOMINAL: Abdomen soft, non-distended and non-tender to palpation.  No CVAT, BL.  EXTREMITIES: No lower extremity swelling or edema. Pelvis stable and " without focal tenderness. Bilateral pedal pulses 2+ and equal.   NEURO: Alert and oriented to person, place and time.  Cranial nerves grossly intact.  No focal motor deficit. Sensation globally intact.  PSYCH: Normal mood and affect  SKIN: No rashes. Right medial distal forefoot abrasion that is slightly tender. Right forearm superficial abrasion that is nontender.     LAB:  All pertinent labs reviewed and interpreted.  Results for orders placed or performed during the hospital encounter of 04/24/23   CT Head w/o Contrast    Impression    IMPRESSION:  1.  No CT evidence for acute intracranial process.  2.  Brain atrophy and presumed chronic microvascular ischemic changes as above.   CT Cervical Spine w/o Contrast    Impression    IMPRESSION:  1.  No fracture or posttraumatic subluxation.  2.  No high-grade spinal canal or neural foraminal stenosis. Cervical spondylosis.    3.  Development of right pleural effusion.   CT Chest Abdomen Pelvis w/o Contrast    Impression    IMPRESSION:  1.  No acute posttraumatic findings identified.  2.  Mild biatrial cardiac enlargement with aortic valve leaflet, mitral annular and three-vessel coronary artery calcification.  3.  Decreased density intracardiac blood pool consistent with nonspecific anemia  4.  Moderate right pleural effusion and right middle and lower lobe atelectasis have increased.  5.  Generalized subcutaneous edema has increased.  6.  The two subsolid nodular opacities in the lingula are unchanged at 2.0 cm and 0.9 cm. Recommend 12-24-month follow-up CT chest.    REFERENCE:  Guidelines for Management of Incidental Pulmonary Nodules Detected on CT Images: From the Fleischner Society 2017.   Guidelines apply to incidental nodules in patients who are 35 years or older.  Guidelines do not apply to lung cancer screening, patients with immunosuppression, or patients with known primary cancer.    SUBSOLID NODULES    Nodule size 6 mm or greater  CT at 6-12 months to  confirm persistence, then CT every 2 years until 5 years.   Foot  XR, G/E 3 views, right    Impression    IMPRESSION:   1. Minimally displaced fracture at the base of the proximal phalanx of the fifth toe, new since the prior study. This is probably acute or subacute.  2. Nondisplaced fracture of the distal shaft of the fourth metatarsal, new. This is also probably acute or subacute.  3. Minimally displaced fracture in the distal end of the third and fourth proximal phalanges, new since the prior study but probably subacute or old.  4. At the base of the proximal phalanx of the second toe there is a mildly displaced fracture which shows some interval healing.  5. In the distal shaft of the third metatarsal, there is a nondisplaced fracture, age indeterminate but not present previously.  5. Osteoarthrosis in the interphalangeal joints of the toes, particularly the great toe, unchanged.   Comprehensive metabolic panel   Result Value Ref Range    Sodium 142 136 - 145 mmol/L    Potassium 4.0 3.4 - 5.3 mmol/L    Chloride 106 98 - 107 mmol/L    Carbon Dioxide (CO2) 27 22 - 29 mmol/L    Anion Gap 9 7 - 15 mmol/L    Urea Nitrogen 18.0 8.0 - 23.0 mg/dL    Creatinine 1.14 (H) 0.51 - 0.95 mg/dL    Calcium 10.0 8.8 - 10.2 mg/dL    Glucose 119 (H) 70 - 99 mg/dL    Alkaline Phosphatase 170 (H) 35 - 104 U/L    AST 22 10 - 35 U/L    ALT 22 10 - 35 U/L    Protein Total 5.5 (L) 6.4 - 8.3 g/dL    Albumin 3.4 (L) 3.5 - 5.2 g/dL    Bilirubin Total 0.4 <=1.2 mg/dL    GFR Estimate 50 (L) >60 mL/min/1.73m2   Result Value Ref Range    INR 4.09 (H) 0.85 - 1.15   Partial thromboplastin time   Result Value Ref Range    aPTT 49 (H) 22 - 38 Seconds   CBC with platelets and differential   Result Value Ref Range    WBC Count 12.0 (H) 4.0 - 11.0 10e3/uL    RBC Count 3.03 (L) 3.80 - 5.20 10e6/uL    Hemoglobin 8.8 (L) 11.7 - 15.7 g/dL    Hematocrit 30.1 (L) 35.0 - 47.0 %    MCV 99 78 - 100 fL    MCH 29.0 26.5 - 33.0 pg    MCHC 29.2 (L) 31.5 - 36.5  g/dL    RDW 17.2 (H) 10.0 - 15.0 %    Platelet Count 351 150 - 450 10e3/uL    % Neutrophils 84 %    % Lymphocytes 5 %    % Monocytes 8 %    % Eosinophils 1 %    % Basophils 1 %    % Immature Granulocytes 1 %    NRBCs per 100 WBC 0 <1 /100    Absolute Neutrophils 10.2 (H) 1.6 - 8.3 10e3/uL    Absolute Lymphocytes 0.6 (L) 0.8 - 5.3 10e3/uL    Absolute Monocytes 1.0 0.0 - 1.3 10e3/uL    Absolute Eosinophils 0.1 0.0 - 0.7 10e3/uL    Absolute Basophils 0.1 0.0 - 0.2 10e3/uL    Absolute Immature Granulocytes 0.1 <=0.4 10e3/uL    Absolute NRBCs 0.0 10e3/uL   Result Value Ref Range    Protein Total 5.5 (L) 6.4 - 8.3 g/dL       RADIOLOGY:  Reviewed all pertinent imaging. Please see official radiology report.  CT Chest Abdomen Pelvis w/o Contrast   Final Result   IMPRESSION:   1.  No acute posttraumatic findings identified.   2.  Mild biatrial cardiac enlargement with aortic valve leaflet, mitral annular and three-vessel coronary artery calcification.   3.  Decreased density intracardiac blood pool consistent with nonspecific anemia   4.  Moderate right pleural effusion and right middle and lower lobe atelectasis have increased.   5.  Generalized subcutaneous edema has increased.   6.  The two subsolid nodular opacities in the lingula are unchanged at 2.0 cm and 0.9 cm. Recommend 12-24-month follow-up CT chest.      REFERENCE:   Guidelines for Management of Incidental Pulmonary Nodules Detected on CT Images: From the Fleischner Society 2017.    Guidelines apply to incidental nodules in patients who are 35 years or older.   Guidelines do not apply to lung cancer screening, patients with immunosuppression, or patients with known primary cancer.      SUBSOLID NODULES      Nodule size 6 mm or greater   CT at 6-12 months to confirm persistence, then CT every 2 years until 5 years.      CT Cervical Spine w/o Contrast   Final Result   IMPRESSION:   1.  No fracture or posttraumatic subluxation.   2.  No high-grade spinal canal or  neural foraminal stenosis. Cervical spondylosis.      3.  Development of right pleural effusion.      CT Head w/o Contrast   Final Result   IMPRESSION:   1.  No CT evidence for acute intracranial process.   2.  Brain atrophy and presumed chronic microvascular ischemic changes as above.      Foot  XR, G/E 3 views, right   Preliminary Result   IMPRESSION:    1. Minimally displaced fracture at the base of the proximal phalanx of the fifth toe, new since the prior study. This is probably acute or subacute.   2. Nondisplaced fracture of the distal shaft of the fourth metatarsal, new. This is also probably acute or subacute.   3. Minimally displaced fracture in the distal end of the third and fourth proximal phalanges, new since the prior study but probably subacute or old.   4. At the base of the proximal phalanx of the second toe there is a mildly displaced fracture which shows some interval healing.   5. In the distal shaft of the third metatarsal, there is a nondisplaced fracture, age indeterminate but not present previously.   5. Osteoarthrosis in the interphalangeal joints of the toes, particularly the great toe, unchanged.      US Thoracentesis    (Results Pending)               I, Melani Martinez, am serving as a scribe to document services personally performed by Dr. Micaela Barnett based on my observation and the provider's statements to me. I, Micaela Barnett MD attest that Melani Martinez is acting in a scribe capacity, has observed my performance of the services and has documented them in accordance with my direction.     Micaela Barnett MD  04/24/23 3748

## 2023-04-24 NOTE — PHARMACY-ADMISSION MEDICATION HISTORY
Pharmacist Admission Medication History    Admission medication history is complete. The information provided in this note is only as accurate as the sources available at the time of the update.    Medication reconciliation/reorder completed by provider prior to medication history? No    Information Source(s): Patient, Family member, Hospital records and CareEverywhere/SureScripts via in-person    Pertinent Information: Note from last admission stated to stop methotrexate for good.     Changes made to PTA medication list:    Added: None    Deleted: methotrexate    Changed: None    Medication Affordability:  Not including over the counter (OTC) medications, was there a time in the past 12 months when you did not take your medications as prescribed because of cost?: No    Allergies reviewed with patient and updates made in EHR: yes    Medication History Completed By: Maco Dorado RPH 4/24/2023 3:19 PM    Prior to Admission medications    Medication Sig Last Dose Taking? Auth Provider Long Term End Date   acetaminophen (TYLENOL) 325 MG tablet Take 1 tablet (325 mg) by mouth every 4 hours as needed for mild pain or fever Unknown Yes Ugo Hui MBBS No    albuterol (PROAIR HFA/PROVENTIL HFA/VENTOLIN HFA) 108 (90 Base) MCG/ACT inhaler Inhale 2 puffs into the lungs every 4 hours as needed for shortness of breath / dyspnea or wheezing Unknown Yes Reported, Patient Yes    allopurinol (ZYLOPRIM) 300 MG tablet Take 1 tablet (300 mg) by mouth daily 4/24/2023 at AM Yes Bobby Rush MBBS     budesonide-formoterol (SYMBICORT) 160-4.5 MCG/ACT Inhaler Inhale 2 puffs into the lungs 2 times daily 4/24/2023 at AM, didn't bring Yes El Juarez, DO Yes    cholestyramine (QUESTRAN) 4 g packet Take 1 packet by mouth 3 times daily (with meals) 4/24/2023 at AM Yes Unknown, Entered By History Yes    diltiazem ER COATED BEADS (CARDIZEM CD/CARTIA XT) 120 MG 24 hr capsule Take 1 capsule (120 mg) by mouth daily 4/24/2023  at AM Yes El Juarez, DO Yes    DULoxetine (CYMBALTA) 20 MG capsule Take 20 mg by mouth daily 4/24/2023 Yes Unknown, Entered By History Yes    famotidine (PEPCID) 20 MG tablet Take 20 mg by mouth daily 4/24/2023 at AM Yes Unknown, Entered By History     fidaxomicin (DIFICID) 200 MG tablet Take 1 tablet (200 mg) by mouth every other day for 10 doses 4/24/2023 at AM, 4 left Yes Ugo Hui MBBS  5/3/23   folic acid (FOLVITE) 1 MG tablet Take 1 tablet (1 mg) by mouth daily 4/24/2023 at AM Yes Bobby Rush MBBS     Lactobacillus-Inulin ([facility] DIGESTIVE [facility]) CAPS Take 1 capsule by mouth 2 times daily 4/24/2023 at AM Yes Unknown, Entered By History     metoprolol succinate ER (TOPROL XL) 50 MG 24 hr tablet Take 1.5 tablets (75 mg) by mouth daily 4/24/2023 at AM Yes El Juarez DO Yes    pravastatin (PRAVACHOL) 20 MG tablet Take 20 mg by mouth every evening 4/23/2023 at PM Yes Provider, Historical     predniSONE (DELTASONE) 5 MG tablet Take 5 mg by mouth daily 4/24/2023 Yes Lydia Gold MD     rivaroxaban ANTICOAGULANT (XARELTO) 15 MG TABS tablet Take 1 tablet (15 mg) by mouth daily (with dinner) 4/24/2023 at AM Yes Teressa Cespedes, APRN CNP Yes    Vitamin D3 (CHOLECALCIFEROL) 125 MCG (5000 UT) tablet Take 1 tablet by mouth daily 4/24/2023 at AM Yes Reported, Patient No    compressor, for nebulizer Eva [COMPRESSOR, FOR NEBULIZER EVA] Nebulizer treatment qid swapnan   Michaela Maddox MD

## 2023-04-25 NOTE — PROGRESS NOTES
"O2 3 lpm/NC, SPO2 98 %, decreased to 2 lpm. BS clear and diminished bilat, Xop/Atro neb tx/mask given. Tolerated well. BS after unchanged, mild upper airway E/wheezes    /68 (BP Location: Left arm, Patient Position: Supine, Cuff Size: Adult Large)   Pulse 95   Temp 98.5  F (36.9  C) (Oral)   Resp 20   Ht 1.651 m (5' 5\")   Wt 82 kg (180 lb 12.4 oz)   SpO2 99%   BMI 30.08 kg/m      RT to follow  " [FreeTextEntry6] : pt is here for INR \par Alternating between 2, 2.5 QOD

## 2023-04-25 NOTE — PLAN OF CARE
Problem: COPD (Chronic Obstructive Pulmonary Disease) Comorbidity  Goal: Maintenance of COPD Symptom Control  Outcome: Progressing   Chronically on supplemental oxygen at 3 lpm/nc.  Saturations noted in the high 80's to high 90'S.  LS are diminished in bilateral lobes.      Cardiac rhythm: A-fib with rare PVC.  Heart murmur noted.  Problem: Orthopaedic Fracture  Goal: Optimal Functional Ability  Outcome: Progressing   Pt has been on bedrest- has Fx of phalanx and metatarsals on the R-foot. Abrasions and bruising note to the RLE; pt denies pain when resting; reports full sensation.     Plan: Thoracentesis tomorrow for R-pleural effusion.

## 2023-04-25 NOTE — PROGRESS NOTES
[facility]    Medicine Progress Note - Hospitalist Service    Date of Admission:  4/24/2023    Assessment & Plan      Fatuma Henry is a 76 yo medically complex lady with hx of Parox afib, htn, lipids, pulm htn (on chronic O2), diastolic HF, CKD stage 3, left ICA occlusion, RA, hx of gout, gerd, recent hospitalization for left hip fracture after fall at home, and completed TCU and recently discharge home.     She fell at home prior to arrival, broke numerous toes on the right foot, presented with right foot pain, dyspnea, productive cough, and worsening leg edema.     Acute on chronic respiratory failure with hypoxia  COPD with acute exacerbation  Right-sided pleural effusion,  Diastolic CHF with acute exacerbation.    Pulmonary hypertension  Significant wheezing admission, subsided with few rounds of nebulizer/Solu-Medrol  -DuoNeb 4 times daily, albuterol neb every 2 hours as needed, prednisone taper  -Diuresis IV Lasix 40 mg twice daily/not on diuretics at home  -Status post thoracentesis 750 cc fluid removed, analysis and cultures pending  -Hold Xarelto; INR was 4 on admission; pleural fluid labs ordered; may need Kcentra to reverse anticoagulation if needed urgent thoracentesis.  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   -Pt [facility]  GI for FMT consultation appointment scheduled for November.  -Avoid antibiotics by all cost      Recurrent falls,    S/p Hip fracture S/p closed reduction with percutaneous pinning 3/14/23  Multiple toes fracture of the right foot: 3, 4, 5th, 1st & 3rd metatarsal bone.  Completed rehabilitation at TCU,  "return home couple days ago. She fell at home prior to arrival, reportedly no syncope or near syncope. Patient fell while getting up from the chair by the table, with daughter being nearby.  Foot x-rays demonstrated above fractures.     -ER MD d/w B6, recommended CAM boot, orthotist consult placed.   -Pain management  -PT/OT, likely will need placement     CKD stage 3  -Avoid nephrotoxins and hypotension, monitor closely renal function while diuresing    Essential HTN  -Continue PTA Cardizem and metoprolol, with holding parameters.     Left ICA occlusion  -To follow with vascular as outpt     GERD  -Con pepcid     Depression  -On Cymbalta     H/O pulm nodules with positive PET  Stable 17 x 19 mm groundglass nodule in the lingula since 07/21/2022.    -Needs follow up with pulm clinic as outpt and serial imaging 3 to 6-month    H/O RA  -On chronic pred  -Hx of methotrexate--was discontinued during recent hospitalization.  No exacerbation of RA off methotrexate.    Diet: Cardiac diet  DVT Prophylaxis: DOAC-holding for now  Brandon Catheter: Not present  Lines: None     Cardiac Monitoring: None  Code Status:  DNR/DNI       Diet: Combination Diet Low Saturated Fat Na <2400mg Diet    DVT Prophylaxis: DOAC  Brandon Catheter: Not present  Lines: None     Cardiac Monitoring: ACTIVE order. Indication: QTc prolonging medication (48 hours)  Code Status: No CPR- Do NOT Intubate      Clinically Significant Risk Factors Present on Admission            # Hypomagnesemia: Lowest Mg = 1.5 mg/dL in last 2 days, will replace as needed   # Hypoalbuminemia: Lowest albumin = 3.4 g/dL at 4/24/2023 12:33 PM, will monitor as appropriate  # Drug Induced Coagulation Defect: home medication list includes an anticoagulant medication         # Obesity: Estimated body mass index is 30.08 kg/m  as calculated from the following:    Height as of this encounter: 1.651 m (5' 5\").    Weight as of this encounter: 82 kg (180 lb 12.4 oz).           Disposition " Plan     Expected Discharge Date: 04/26/2023      Destination: home with family;home with help/services            Bon Rosales MD  Hospitalist Service  [facility]  Securely message with ControlScan (more info)  Text page via De[employer] Paging/Directory   ______________________________________________________________________    Interval History   No acute events overnight.  Reports labored breathing.  She was still requiring 3 LPM supplemental oxygen.  Blood pressure soft SBP in the 90s.  She is -1 L fluid balance since admission.  Also reports orthopnea and lower extremity swelling.    Physical Exam   Vital Signs: Temp: 98.5  F (36.9  C) Temp src: Oral BP: 94/58 Pulse: 102   Resp: 20 SpO2: 96 % O2 Device: Nasal cannula Oxygen Delivery: 3 LPM  Weight: 180 lbs 12.44 oz    General: AO X 3, lying comfortably in bed in no apparent distress  HEENT: pupils equal and reactive to light and accommodation, extraocular movements are intact; oral mucosa moist  Neck: Supple no raised JVD, no rigidity  Respiratory: Diminished bibasilar left greater than right, mild labored respirations  CVS: nl s1 s2, regular rate and rhythm, no murmur  P/A: soft, nt,nd, no guarding rigidity or rebound tenderness  Ext: no edema  Neuro: no focal neurological deficits noted, cranial nerves 2-12 grossly intact  Psychiatry: normal mood and affect  Skin: No obvious skin rashes or ulcers      Medical Decision Making       35 MINUTES SPENT BY ME on the date of service doing chart review, history, exam, documentation & further activities per the note.      Data     I have personally reviewed the following data over the past 24 hrs:    6.1  \   8.5 (L)   / 333     141 104 22.9 /  153 (H)   4.2 26 1.24 (H) \       ALT: 22 AST: 22 AP: 170 (H) TBILI: 0.4   ALB: 3.4 (L) TOT PROTEIN: 5.8 (L) LIPASE: N/A       Trop: N/A BNP: 4,340 (H)       INR:  3.14 (H) PTT:  49 (H)   D-dimer:  N/A Fibrinogen:  N/A       Ferritin:  N/A % Retic:  N/A LDH:  189        Imaging results reviewed over the past 24 hrs:   Recent Results (from the past 24 hour(s))   Foot  XR, G/E 3 views, right    Narrative    EXAM: XR FOOT RIGHT G/E 3 VIEWS  LOCATION: [facility]  DATE/TIME: 4/24/2023 1:08 PM CDT    INDICATION: Right medial distal forefoot abrasion and pain after fall.  COMPARISON: 10/12/22.      Impression    IMPRESSION:   1. Minimally displaced fracture at the base of the proximal phalanx of the fifth toe, new since the prior study. This is probably acute or subacute.  2. Nondisplaced fracture of the distal shaft of the fourth metatarsal, new. This is also probably acute or subacute.  3. Minimally displaced fracture in the distal end of the third and fourth proximal phalanges, new since the prior study but probably subacute or old.  4. At the base of the proximal phalanx of the second toe there is a mildly displaced fracture which shows some interval healing.  5. In the distal shaft of the third metatarsal, there is a nondisplaced fracture, age indeterminate but not present previously.  5. Osteoarthrosis in the interphalangeal joints of the toes, particularly the great toe, unchanged.   CT Head w/o Contrast    Narrative    EXAM: CT HEAD W/O CONTRAST  LOCATION: [facility]  DATE/TIME: 4/24/2023 1:12 PM CDT    INDICATION: head trauma fall. Injury. Pain.  COMPARISON: CT brain 03/24/2023  TECHNIQUE: Routine CT Head without IV contrast. Multiplanar reformats. Dose reduction techniques were used.    FINDINGS:  INTRACRANIAL CONTENTS: No intracranial hemorrhage, extraaxial collection, or mass effect.  No CT evidence of acute infarct. Mild presumed chronic small vessel ischemic changes. Mild generalized volume loss. No hydrocephalus.     VISUALIZED ORBITS/SINUSES/MASTOIDS: Prior bilateral cataract surgery. Visualized portions of the orbits are otherwise unremarkable. No paranasal sinus mucosal disease. No middle ear or mastoid  effusion.    BONES/SOFT TISSUES: No acute abnormality.      Impression    IMPRESSION:  1.  No CT evidence for acute intracranial process.  2.  Brain atrophy and presumed chronic microvascular ischemic changes as above.   CT Cervical Spine w/o Contrast    Narrative    EXAM: CT CERVICAL SPINE W/O CONTRAST  LOCATION: [facility]  DATE/TIME: 4/24/2023 1:13 PM CDT    INDICATION: head trauma fall. Injury cervical spine pain.  COMPARISON: None.  TECHNIQUE: Routine CT Cervical Spine without IV contrast. Multiplanar reformats. Dose reduction techniques were used.    FINDINGS:  VERTEBRA: Normal vertebral body heights. Mid cervical leftward curvature centered at C4. No congenital spine anomaly. No fracture or posttraumatic subluxation.     CANAL/FORAMINA:     C6-C7: Minimal disc osteophyte. Patent central canal and foramen.    T1-T2: 2 mm degenerative anterolisthesis. Moderate bilateral facet arthropathy. Mild right foraminal stenosis.    PARASPINAL: Moderate-sized right pleural effusion      Impression    IMPRESSION:  1.  No fracture or posttraumatic subluxation.  2.  No high-grade spinal canal or neural foraminal stenosis. Cervical spondylosis.    3.  Development of right pleural effusion.   CT Chest Abdomen Pelvis w/o Contrast    Narrative    EXAM: CT CHEST ABDOMEN PELVIS W/O CONTRAST  LOCATION: [facility]  DATE/TIME: 4/24/2023 1:13 PM CDT    INDICATION: Right chest wall and abdominal pain after fall, on blood thinners, age 77.  COMPARISON: CTs chest 04/07/2023 and 07/21/2022 and CT AP 04/06/2023 and 03/22/2023.  TECHNIQUE: CT scan of the chest, abdomen, and pelvis was performed without IV contrast. Multiplanar reformats were obtained. Dose reduction techniques were used.   CONTRAST: None.    FINDINGS:   LUNGS AND PLEURA: The two subsolid nodular opacities in the lingula are unchanged at 2.0 cm and 0.9 cm (series 4 images 140 and 133, respectively). Near-complete right middle  lobe atelectasis has developed. Moderate volume right pleural effusion layering   posteriorly and the associated atelectasis in the right lower lobe posteriorly have increased and the minimal left posterior basilar pleural fluid and atelectasis are unchanged. No pneumothorax.    MEDIASTINUM/AXILLAE: Mild biatrial cardiac enlargement with aortic valve leaflet, mitral annular and three-vessel coronary artery calcification. No pericardial effusion. Normal-caliber thoracic aorta and central pulmonary arteries. Decreased density   intracardiac blood pool consistent with nonspecific anemia. No lymphadenopathy.    CORONARY ARTERY CALCIFICATION: Severe.    HEPATOBILIARY: Cholecystectomy.  Liver and bile ducts unremarkable.    PANCREAS: Normal.    SPLEEN: Normal.    ADRENAL GLANDS: Normal.    KIDNEYS/BLADDER: No significant mass, stones, or hydronephrosis. There are simple or benign cysts. No follow up is needed.    BOWEL: No obstruction or inflammatory change.    LYMPH NODES: No lymphadenopathy.    VASCULATURE: Moderate to severe calcified atheromatous plaque throughout the normal caliber abdominal aorta, iliofemoral arteries and at the origin of the renal and mesenteric arteries.    PELVIC ORGANS: Normal.    MUSCULOSKELETAL: Generalized subcutaneous edema has increased. Mild compression superior endplate T12 vertebral body unchanged. Chronic insufficiency fracture left sacral ala unchanged. Intertrochanteric fracture proximal left femur status post dynamic   screw and plate fixation unchanged. Spondylotic change lumbar spine. No new acute fractures identified.      Impression    IMPRESSION:  1.  No acute posttraumatic findings identified.  2.  Mild biatrial cardiac enlargement with aortic valve leaflet, mitral annular and three-vessel coronary artery calcification.  3.  Decreased density intracardiac blood pool consistent with nonspecific anemia  4.  Moderate right pleural effusion and right middle and lower lobe  atelectasis have increased.  5.  Generalized subcutaneous edema has increased.  6.  The two subsolid nodular opacities in the lingula are unchanged at 2.0 cm and 0.9 cm. Recommend 12-24-month follow-up CT chest.    REFERENCE:  Guidelines for Management of Incidental Pulmonary Nodules Detected on CT Images: From the Fleischner Society 2017.   Guidelines apply to incidental nodules in patients who are 35 years or older.  Guidelines do not apply to lung cancer screening, patients with immunosuppression, or patients with known primary cancer.    SUBSOLID NODULES    Nodule size 6 mm or greater  CT at 6-12 months to confirm persistence, then CT every 2 years until 5 years.

## 2023-04-25 NOTE — PROGRESS NOTES
RCAT Treatment Plan    Patient Score: 13  Patient Acuity: 3    Clinical Indication for Therapy: atelectasis, COPD exac.    Therapy Ordered: Xopenex/Atrovent QID, albuterol neb Q 2 hours PRN    Assessment Summary:    Acute on chronic respiratory failure with hypoxia  COPD with acute exacerbation  Right-sided pleural effusion,  Diastolic CHF with acute exacerbation.    Pulmonary hypertension    BS mild E/wheezesLUL, O2 1.5 lpm/NC SpO2 98 %. Will continue to titrate. Home medications Albuterol MMDI Q 4 hours PRN, Symbicort daily. Continue current Xopenex/Atrovent QID,  RCAT Q 72 hours. Add IS nursing administration.     CT 4/24/23    FINDINGS:   LUNGS AND PLEURA: The two subsolid nodular opacities in the lingula are unchanged at 2.0 cm and 0.9 cm (series 4 images 140 and 133, respectively). Near-complete right middle lobe atelectasis has developed. Moderate volume right pleural effusion layering posteriorly and the associated atelectasis in the right lower lobe posteriorly have increased and the minimal left posterior basilar pleural fluid and atelectasis are unchanged. No pneumothorax.      Rony Aiken, RT  4/25/2023

## 2023-04-25 NOTE — PLAN OF CARE
Problem: COPD (Chronic Obstructive Pulmonary Disease) Comorbidity  Goal: Maintenance of COPD Symptom Control  Outcome: Progressing     Problem: Pulmonary Impairment  Goal: Effective Airway Clearance  Outcome: Progressing  Goal: Optimal Gas Exchange  Outcome: Progressing     Goal Outcome Evaluation:         Lungs diminished. On 3L of oxygen chronically. On IV lasix. Getting methylprednisone. Contact precaution for recurrent c-diff. Getting dificid every other day. No stool tonight. Possible right thoracentesis today. INR is 3.14 today. UA sent, awaiting urine culture.

## 2023-04-25 NOTE — PROGRESS NOTES
Pt alert to person and place only. She is forgetful. Vitals stable at the beginning of the shift. However, later her HR was going from 100-120 at times. Day nurse had to hold her scheduled metoprolol and diltiazem due to low BP. Writer paged Dr. Bobby Crockett and notified about her HR and medications. Her BP later was 133/79. MD told writer to give morning dose 75 mg metoprolol one time now. Gave medication. HR right now is . Tele a-fib. Pt on mg and potassium protocol. Recheck labs in am per protocol. Pt has evangelista and draining well at this time. Ortho placed cam boot on her right foot.

## 2023-04-25 NOTE — PLAN OF CARE
"Goal Outcome Evaluation:   Patient not able to void using Bedpan. Was previously Straight cath. This am. Bladder scanned for 760 @1330 after returning from thoracentesis and evangelista placed at 1400 for 800ml. Patient becoming more confused this afternoon. Said her \"boot was in the room next doors\" and was confused about how to used the phone. She thinks the month is November and her age is 64. Daughter says she has recently been very forgetful and more confused about things however she has been in  and out of the the hospital and TCU's for the last month. Has some toe pain but declines meds. Called [facility] who will come today with the CAM boot.    Problem: Urinary Retention  Goal: Effective Urinary Elimination  Outcome: Not Progressing     Problem: Risk for Delirium  Goal: Optimal Coping  Outcome: Progressing  Goal: Improved Attention and Thought Clarity  Outcome: Progressing  Intervention: Maximize Cognitive Function  Recent Flowsheet Documentation  Taken 4/25/2023 1200 by Brittny Mays, RN  Reorientation Measures:   calendar in view   clock in view     Problem: Orthopaedic Fracture  Goal: Absence of Bleeding  Outcome: Progressing  Goal: Effective Bowel Elimination  Outcome: Progressing  Goal: Fracture Stability  Outcome: Progressing  Goal: Optimal Functional Ability  Outcome: Progressing  Intervention: Optimize Functional Ability  Recent Flowsheet Documentation  Taken 4/25/2023 1200 by Brittny Mays, RN  Activity Management: bedrest  Positioning/Transfer Devices:   pillows   in use  Taken 4/25/2023 0820 by Brittny Mays, RN  Activity Management: bedrest  Positioning/Transfer Devices:   pillows   in use                           "

## 2023-04-25 NOTE — CONSULTS
NUTRITION EDUCATION      REASON FOR ASSESSMENT:  To educate pt on 2 gram Na diet    NUTRITION HISTORY:  Information obtained from pt    Pt states she and her  have been following a low sodium diet and still has written information at home.  She declines diet education and written materials    CURRENT DIET:  Low saturated fat < 2400 mg Na    INTERVENTIONS:  Will follow for length of stay    Goals:     Consume 75% or more of nutritionally adequate meals 3 times per day

## 2023-04-25 NOTE — PROGRESS NOTES
S: Pt. seen at the [facility]. Female. Dr. Rosales. RX: CAM boot. DX: Fx. toes.  O:A: Today I F/D a [employer] Maxtrax Air walker right size Medium to be worn to protect FX. site. Donning doffing wear and care instructions given.  P: Pt. to be seen as needed.    Juan DWYER,LO

## 2023-04-25 NOTE — PROGRESS NOTES
"O2 3 lpm/NC, SpO2 98 %, decreased to 2 lpm. BS clear and diminished bilat. After tx mild E/wheezes SHAKEEL, otherwise clear.     BP (!) 87/53 (BP Location: Right arm)   Pulse 98   Temp 98.3  F (36.8  C) (Oral)   Resp 18   Ht 1.651 m (5' 5\")   Wt 82 kg (180 lb 12.4 oz)   SpO2 99%   BMI 30.08 kg/m       RT to follow  "

## 2023-04-26 NOTE — PLAN OF CARE
Patient denied pain and was able to sleep for most of the night. Tele Afib; HR 80's. Continues with 2L oxygen via NC. Brandon in place for retention. Patient blood pressure and heart rate improved during this shift in general.     Vitals:    04/25/23 2011 04/25/23 2018 04/26/23 0021 04/26/23 0323   BP:   (!) 141/59 128/65   BP Location:   Left arm Left arm   Patient Position:       Cuff Size:       Pulse:  96 97 89   Resp:   20 20   Temp:   98.1  F (36.7  C) 97.5  F (36.4  C)   TempSrc:   Oral Oral   SpO2: 96%  97% 95%   Weight:    79.5 kg (175 lb 4.3 oz)   Height:            Problem: Plan of Care - These are the overarching goals to be used throughout the patient stay.    Goal: Optimal Comfort and Wellbeing  Outcome: Progressing     Problem: Risk for Delirium  Goal: Improved Sleep  Outcome: Progressing     Problem: COPD (Chronic Obstructive Pulmonary Disease) Comorbidity  Goal: Maintenance of COPD Symptom Control  Outcome: Progressing  Intervention: Maintain COPD-Symptom Control  Recent Flowsheet Documentation  Taken 4/26/2023 0120 by Ivet Phillips, RN  Medication Review/Management: medications reviewed   Goal Outcome Evaluation:

## 2023-04-26 NOTE — PROVIDER NOTIFICATION
Patient had just finished an neb with RT stood at bedside with assist of 2 for 1minute but was too weak and clumsy with CAM boot to actually pivot to chair. Seemed fine not complains. She was then hoyered to the chair. With in 5 minutes of being in the chair she complained of chest pain and was a bit tachypneic but 02 was still 93% on RA. BP normal -130 but sustained more low 100s. She became nauseated and was dry heaving. Called Dr Rosales gave 1nitro. Patient did report her pain was gone and she no longer had pain. When asked where her pain was she pointed to her left lower breast bone area. And pain was rated a 2/10 but had difficulty explaining her pain.Daughter Enid and niece at bedside.

## 2023-04-26 NOTE — PROGRESS NOTES
Patient currently on 2L 95% , Patient declined Last Qid neb. BS Diminished with crackles. RT will continue to follow

## 2023-04-26 NOTE — PLAN OF CARE
Goal Outcome Evaluation:   Daughter here most of the day says patient less confused then yesterday but at times still confused. No further chest pain or nausea since 1630 episode. Did not stand patient again since episode. Patient was Ruslan back to chair.  No BM today. Eating well. Brandon in place and cares done. Patients Iv was leaking, dressing changed but not able to flush was removed IV cath was Bent. Picc called for new PIV.   Problem: Risk for Delirium  Goal: Optimal Coping  Outcome: Progressing  Goal: Improved Behavioral Control  Outcome: Progressing  Intervention: Minimize Safety Risk  Recent Flowsheet Documentation  Taken 4/26/2023 1525 by Brittny Masy, RN  Enhanced Safety Measures: room near unit station  Taken 4/26/2023 1100 by Brittny Mays RN  Enhanced Safety Measures: room near unit station  Taken 4/26/2023 0800 by Brittny Mays RN  Enhanced Safety Measures: room near unit station     Problem: COPD (Chronic Obstructive Pulmonary Disease) Comorbidity  Goal: Maintenance of COPD Symptom Control  Outcome: Progressing  Intervention: Maintain COPD-Symptom Control  Recent Flowsheet Documentation  Taken 4/26/2023 1525 by Brittny Mays, RN  Medication Review/Management: medications reviewed  Taken 4/26/2023 1100 by Brittny Mays, RN  Medication Review/Management: medications reviewed  Taken 4/26/2023 0800 by Brittny Mays RN  Medication Review/Management: medications reviewed     Problem: Chest Pain  Goal: Resolution of Chest Pain Symptoms  Outcome: Progressing     Problem: Chest Pain  Goal: Resolution of Chest Pain Symptoms  Outcome: Progressing

## 2023-04-26 NOTE — PROGRESS NOTES
"Pt has refused the last three Xop/Atro neb tx's and now states that she \"doesn't need or want them anymore\". BS crackles RLL otherwise clear. Room air SpO2 95 %. Text message sent to MD, will wait for MD response. RT to follow    /73 (BP Location: Left arm)   Pulse 93   Temp 98.1  F (36.7  C) (Oral)   Resp 20   Ht 1.651 m (5' 5\")   Wt 79.5 kg (175 lb 4.3 oz)   SpO2 95%   BMI 29.17 kg/m            "

## 2023-04-26 NOTE — PROGRESS NOTES
04/26/23 0906   Appointment Info   Signing Clinician's Name / Credentials (PT) Nanda Chappell, PT   Living Environment   People in Home spouse   Current Living Arrangements condominium   Home Accessibility no concerns  (no steps)   Transportation Anticipated family or friend will provide   Living Environment Comments Lives in condo with .   Self-Care   Usual Activity Tolerance moderate   Current Activity Tolerance fair   Equipment Currently Used at Home walker, rolling;wheelchair, manual  (4WW and FWW)   Fall history within last six months yes   Activity/Exercise/Self-Care Comment Pt said she walked shorter distances with a walker and she said she has a WC. Pt did not indicate how much assist with mobility.  (Pt does have assist with ADLS and home ADLs.  Pt said she drives.)   General Information   Onset of Illness/Injury or Date of Surgery 04/24/23   Referring Physician Lana   Patient/Family Therapy Goals Statement (PT) none stated   Pertinent History of Current Problem (include personal factors and/or comorbidities that impact the POC) Fatuma Henry is a 76 yo medically complex lady with hx of Parox afib, htn, lipids, pulm htn (on chronic O2), diastolic HF, CKD stage 3, left ICA occlusion, RA, hx of gout, gerd, recent hospitalization for left hip fracture after fall at home, completed dysuria, fell again at home today, broke numerous toes on the right foot, presented with right foot pain, dyspnea, productive cough, worsening leg edema.   Existing Precautions/Restrictions fall  (PING boot for mobility R LE. Hx of hip fx L LE WBAT)   Weight-Bearing Status - LLE weight-bearing as tolerated   Weight-Bearing Status - RLE full weight-bearing   Cognition   Orientation Status (Cognition) person   Follows Commands (Cognition) follows one-step commands;increased processing time needed;delayed response/completion   Pain Assessment   Patient Currently in Pain   (R foot pain with standing.and she did have the PING boot  on.)   Posture    Posture Comments poor sit posture and poor stand posture.  (Pt must have assist of 2 at this time.)   Range of Motion (ROM)   ROM Comment Bilat LE AROM WFL with right toes not tested.   Strength (Manual Muscle Testing)   Strength Comments R foot not tested.  4/5 bilat LE. L HF 3/5.  Difficult to test due to the pt did not follow commands consistently   Bed Mobility   Comment, (Bed Mobility) Supine>sit x 2 reps with SBA with HOB elevated.  Pt did do sit>supine the first rep with SBA and she was c/o dizziness.  Sit>supine the 2nd rep with mod A x 2.   Transfers   Comment, (Transfers) Sit<>stand with mod A x 2 with HHA   Gait/Stairs (Locomotion)   Distance in Feet (Gait) standing x 2 reps   Balance   Balance Comments SBA with sit bal, and mod A x 2 with HHA   Sensory Examination   Sensory Perception WNL   Sensory Perception Comments bilat LEs   Clinical Impression   Criteria for Skilled Therapeutic Intervention Yes, treatment indicated   PT Diagnosis (PT) impaired functional mobility   Influenced by the following impairments dec strength. orthopedic restrictions, dec bal,   Functional limitations due to impairments gait, transfers, bed mobility,   Clinical Presentation (PT Evaluation Complexity) Stable/Uncomplicated   Clinical Presentation Rationale pt presents as medically diagnosed   Clinical Decision Making (Complexity) moderate complexity   Planned Therapy Interventions (PT) balance training;bed mobility training;gait training;home exercise program;patient/family education;strengthening;transfer training;wheelchair management/propulsion training  (WBAT with R LE PING boot.)   Anticipated Equipment Needs at Discharge (PT) walker, rolling;wheelchair  (Pt has a FWW, 4WW and a WC at home.)   Risk & Benefits of therapy have been explained evaluation/treatment results reviewed;care plan/treatment goals reviewed;current/potential barriers reviewed;patient;participants voiced agreement with care plan   PT  Total Evaluation Time   PT Eval, Moderate Complexity Minutes (95824) 15   Physical Therapy Goals   PT Frequency Daily   PT Predicted Duration/Target Date for Goal Attainment 05/03/23   PT: Bed Mobility Independent;Supine to/from sit   PT: Transfers Minimal assist;Sit to/from stand;Bed to/from chair;Assistive device;Within precautions   PT: Gait Rolling walker;Minimal assist;Within precautions;25 feet   PT: Goal 1 Pt to sudarshan bilat LE ex x 10 reps to increase strength for mobility.   Therapeutic Activity   Therapeutic Activities: dynamic activities to improve functional performance Minutes (41240) 10   Treatment Detail/Skilled Intervention PT did take time to israel and doff her R LE PING boot pre and post mobility.  Sit<>stand with mod A x 2.  Pt did c/o dizziness.  Cues to open her eyes more.  O2 SATS 88% and nursing did put her O2 on at 2 L.  .  Pt did lay down in bed with SBA.  /55.  Pt did do supine>sit with SBA and sat at the EOB for a few minutes.  She did stand up a 2nd time with mod A x 2.  She did sit scoot to the EOB and she did get tired.  Sit>supine with mod A x 2.  (Some SOB with mobility.  Increased cues for P.LB and time to rest was needed.)   Gait Training   Treatment Detail/Skilled Intervention Pt only stood x 2 reps and she did have the PING boot on.  She was not able to sudarshan walking.  She does fatugue quickly   [place] Level (Gait Training) moderate assist (50% patient effort)   Physical Assistance Level (Gait Training) 2 person assist   Weight Bearing (Gait Training) weight-bearing as tolerated   Assistive Device (Gait Training) other (see comments)  (HHAx2)   PT Discharge Planning   PT Plan Bed mobility, transfers with PING boot on the R foot with WBAT w A x 2.  Hip ex, bed mobility.   PT Discharge Recommendation (DC Rec) Transitional Care Facility   PT Rationale for DC Rec Pt does need assist of 2 with bed mobility if she is dizzy. Mod A  x2 with standing with the PING boot on the right  foot. Pt was not able to take any steps yet due to weakness, dec bal and some SOB. TCU is recommended at this time.   PT Brief overview of current status Supine>sit with SBA x 2. Sit>supine with SBA the first time and mod A x 2 the 2nd time with increased fatigue.  Sit<>stand with HHA x 2 with mod A x 2 x 2 reps.  Some increased SOB.   Total Session Time   Timed Code Treatment Minutes 10   Total Session Time (sum of timed and untimed services) 25

## 2023-04-26 NOTE — PROGRESS NOTES
Care Management Follow Up    Length of Stay (days): 2    Expected Discharge Date: 04/28/2023     Concerns to be Addressed:  pulm status, mobility    Patient plan of care discussed at interdisciplinary rounds: Yes    Anticipated Discharge Disposition:  TBD     Anticipated Discharge Services:  TCU recommended    Anticipated Discharge DME:  Niraj ling    Education Provided on the Discharge Plan:  Yes    Patient/Family in Agreement with the Plan:        Additional Information:  Patient presenting after fall at home prior to arrival, broke numerous toes on the right foot, presented with right foot pain, dyspnea, productive cough, and worsening leg edema. Acute respiratory failure-on O2 2L. Recurrent c-diff.       Therapy:  Bed mobility, transfers with PING boot on the R foot with WBAT w A x 2.  Hip ex, bed mobility. Recommendation is for TCU.     Social History:  Piotr has been in and out of the hospital and TCU's since Nov last year. [facility]. She came home on 4/17 and started [facility] services(SN, PT, OT, HA)  immediately following. She lives with her spouse in their 1st floor condo. Dtr Enid lives upstairs in the same complex.   Piotr requires assist with most of her ADLs and all IADLs which is provided by spouse, daughters and home care. She uses a wheelchair for mobility but is working with home care PT in hopes that she will be back to using a walker for ambulation.     Met with patient and daughter Enid 4/26. Patient is out of TCU coverable days. Discussed options of private pay and returning home with family support and home care skilled and private pay. Enid states she is working with someone from [facility] for other services and other coverage for services. Patient's spouse is able to monitor her and bring food etc but not able to lift or transfer patient. They are happy with [facility] and will also look at private pay  options with them. Provided them a list of private pay agencies.     Final discharge plan pending.       Malaika Flores RN

## 2023-04-26 NOTE — PROGRESS NOTES
[facility]    Medicine Progress Note - Hospitalist Service    Date of Admission:  4/24/2023    Assessment & Plan      Fatuma Henry is a 78 yo medically complex lady with hx of Parox afib, htn, lipids, pulm htn (on chronic O2), diastolic HF, CKD stage 3, left ICA occlusion, RA, hx of gout, gerd, recent hospitalization for left hip fracture after fall at home, and completed TCU and recently discharge home.     She fell at home prior to arrival, broke numerous toes on the right foot, presented with right foot pain, dyspnea, productive cough, and worsening leg edema.     Acute on chronic respiratory failure with hypoxia  COPD with acute exacerbation  Right-sided pleural effusion,  Diastolic CHF with acute exacerbation.    Pulmonary hypertension  Significant wheezing admission, subsided with few rounds of nebulizer/Solu-Medrol  -DuoNeb 4 times daily, albuterol neb every 2 hours as needed, prednisone taper  -Transition to oral Lasix 40 mg daily since not on diuretics at home  -Status post thoracentesis 750 cc fluid removed, analysis and cultures pending  -Held Xarelto on 4/25; INR was 4 on admission; pleural fluid consist with transudate  -Peripheral cultures pending  -Telemetry monitoring    Chronic atrial fibrillation, HR 's  Difficult to rate control due low BP (SBP 's) on Cardizem and metoprolol  Anticoagulated on Xarelto- restarted to AC on 4/26.   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   -Pt referred the patient to [facility] for FMT consultation appointment scheduled for November.  -Avoid antibiotics by all cost    Recurrent falls,    S/p Hip fracture S/p closed reduction with percutaneous pinning 3/14/23  Multiple toes fracture of the right foot: 3, 4, 5th, 1st & 3rd metatarsal bone.  Completed  "rehabilitation at TCU, return home couple days ago. She fell at home prior to arrival, reportedly no syncope or near syncope. Patient fell while getting up from the chair by the table, with daughter being nearby.  Foot x-rays demonstrated above fractures.     -ER MD d/w B6, recommended CAM boot, orthotist consult placed.   -Pain management  -PT/OT recommending TCU but difficulty with insurance since she used up all her 100 days for rehab for this year     CKD stage 3  -Avoid nephrotoxins and hypotension, monitor closely renal function while diuresing    Essential HTN  -Continue PTA Cardizem and metoprolol, with holding parameters.     Left ICA occlusion  -To follow with vascular as outpt     GERD  -Con pepcid     Depression  -On Cymbalta     H/O pulm nodules with positive PET  Stable 17 x 19 mm groundglass nodule in the lingula since 07/21/2022.    -Needs follow up with pulm clinic as outpt and serial imaging 3 to 6-month    H/O RA  -On chronic pred  -Hx of methotrexate--was discontinued during recent hospitalization.  No exacerbation of RA off methotrexate.    Diet: Cardiac diet  DVT Prophylaxis: DOAC-holding for now  Brandon Catheter: Not present  Lines: None     Cardiac Monitoring: None  Code Status:  DNR/DNI       Diet: Combination Diet Low Saturated Fat Na <2400mg Diet    DVT Prophylaxis: DOAC  Brandon Catheter: PRESENT, indication: Retention  Lines: None     Cardiac Monitoring: ACTIVE order. Indication: QTc prolonging medication (48 hours)  Code Status: No CPR- Do NOT Intubate      Clinically Significant Risk Factors            # Hypomagnesemia: Lowest Mg = 1.5 mg/dL in last 2 days, will replace as needed   # Hypoalbuminemia: Lowest albumin = 3.4 g/dL at 4/24/2023 12:33 PM, will monitor as appropriate            # Overweight: Estimated body mass index is 29.17 kg/m  as calculated from the following:    Height as of this encounter: 1.651 m (5' 5\").    Weight as of this encounter: 79.5 kg (175 lb 4.3 oz)., PRESENT " ON ADMISSION         Disposition Plan      Expected Discharge Date: 04/28/2023      Destination: home with family;home with help/services  Discharge Comments: 2 to 3-day inpatient stay to complete work-up, wean off O2 and rate control for atrial fibrillation.          Bon Rosales MD  Hospitalist Service  [facility]  Securely message with [employer] (more info)  Text page via [employer] Paging/Directory   ______________________________________________________________________    Interval History   No acute events overnight.  Reports labored breathing.  She was still requiring 3 LPM supplemental oxygen.  Blood pressure soft SBP in the 90s.  She is -1 L fluid balance since admission.  Also reports orthopnea and lower extremity swelling.    Physical Exam   Vital Signs: Temp: 98.1  F (36.7  C) Temp src: Oral BP: 104/73 Pulse: 93   Resp: 20 SpO2: 95 % O2 Device: None (Room air) Oxygen Delivery: 2 LPM (decreased to room air.)  Weight: 175 lbs 4.25 oz    General: AO X 3, lying comfortably in bed in no apparent distress  HEENT: pupils equal and reactive to light and accommodation, extraocular movements are intact; oral mucosa moist  Neck: Supple no raised JVD, no rigidity  Respiratory: Diminished bibasilar left greater than right, mild labored respirations  CVS: nl s1 s2, regular rate and rhythm, no murmur  P/A: soft, nt,nd, no guarding rigidity or rebound tenderness  Ext: no edema  Neuro: no focal neurological deficits noted, cranial nerves 2-12 grossly intact  Psychiatry: normal mood and affect  Skin: No obvious skin rashes or ulcers      Medical Decision Making       35 MINUTES SPENT BY ME on the date of service doing chart review, history, exam, documentation & further activities per the note.      Data     I have personally reviewed the following data over the past 24 hrs:    10.4  \   8.0 (L)   / 340     141 105 32.1 (H) /  128 (H)   3.6 25 1.35 (H) \       ALT: N/A AST: N/A AP: N/A TBILI: N/A   ALB: N/A  TOT PROTEIN: N/A LIPASE: N/A       INR:  1.56 (H) PTT:  N/A   D-dimer:  N/A Fibrinogen:  N/A       Ferritin:  N/A % Retic:  N/A LDH:  N/A       Imaging results reviewed over the past 24 hrs:   Recent Results (from the past 24 hour(s))   US Thoracentesis    Narrative    EXAM:   1. RIGHT THORACENTESIS  2. ULTRASOUND GUIDANCE  LOCATION: [facility]  DATE/TIME: 4/25/2023 1:06 PM CDT    INDICATION: Pleural effusion.    PROCEDURE: Informed consent obtained. Time out performed. The chest was prepped and draped in sterile fashion. 10 mL of 1 % lidocaine was infused into the local soft tissues. Under direct ultrasound guidance, a 5 Japanese catheter system was placed into   the pleural effusion.     0.95 liters of clear yellow fluid were removed and sent to lab, if requested.    Patient tolerated procedure well.    Ultrasound imaging was obtained and placed in the patient's permanent medical record.      Impression    IMPRESSION:  Status post right ultrasound-guided thoracentesis.    Reference CPT Code: 19430

## 2023-04-26 NOTE — PROGRESS NOTES
"Occupational Therapy     04/26/23 0905   Appointment Info   Signing Clinician's Name / Credentials (OT) Alanna Maier OTR/L   Living Environment   People in Home spouse   Current Living Arrangements condominium   Home Accessibility no concerns  (no steps)   Living Environment Comments shower chair & grab bars; has adjustable bed w/bedrail   Self-Care   Usual Activity Tolerance moderate   Current Activity Tolerance poor   Equipment Currently Used at Home walker, rolling;wheelchair, manual  (4WW)   Fall history within last six months yes   Number of times patient has fallen within last six months 1   Activity/Exercise/Self-Care Comment Pt reports since she has been home she has been amb short distances w/walker & using wc. Per chart, spouse has been assisting w/ADLs; he does all IADLs.   General Information   Onset of Illness/Injury or Date of Surgery 04/24/23   Referring Physician Karli Schwartz   Patient/Family Therapy Goal Statement (OT) none stated   Existing Precautions/Restrictions fall  (WBAT w/R CAM boot)   Left Lower Extremity (Weight-bearing Status) weight-bearing as tolerated (WBAT)   Right Lower Extremity (Weight-bearing Status) weight-bearing as tolerated (WBAT)   General Observations and Info Per chart:  \"76 yo medically complex lady with hx of Parox afib, htn, lipids, pulm htn (on chronic O2), diastolic HF, CKD stage 3, left ICA occlusion, RA, hx of gout, gerd, recent hospitalization for left hip fracture after fall at home, completed dysuria, fell again at home today\".  Numerous bone broken in R foot.  \"presented with right foot pain, dyspnea, productive cough, worsening leg edema.\"   Cognitive Status Examination   Orientation Status person   Follows Commands follows one-step commands;increased processing time needed;delayed response/completion;repetition of directions required   Sensory   Sensory Comments reports decreased sensation B hands/fingers (chronic)   Pain Assessment   Patient Currently " in Pain Yes, see Vital Sign flowsheet  (R foot when standing (w/CAM boot on))   Posture   Posture   (slightly flexed standing posture-able to improve when cued.)   Range of Motion Comprehensive   General Range of Motion no range of motion deficits identified   Strength Comprehensive (MMT)   Comment, General Manual Muscle Testing (MMT) Assessment generalized weakness throughout B UEs; fatigues easily. Weak & equal B  strength (R hand dominanted)   Coordination   Upper Extremity Coordination No deficits were identified   Bed Mobility   Comment (Bed Mobility) SBA sup to/from sitting first attempt with increased time/effort   Transfers   Transfer Comments mod A of 2 STS from bed   Balance   Balance Comments static standing-limited; unsteady (family will try & bring in shoe for L foot per report)   Lower Body Dressing Assessment/Training   Comment, (Lower Body Dressing) dependent w/sock on L foot; dependent CAM boot on R   Clinical Impression   Criteria for Skilled Therapeutic Interventions Met (OT) Yes, treatment indicated   OT Diagnosis decreased ADLs   OT Problem List-Impairments impacting ADL activity tolerance impaired;balance;cognition;mobility;strength;pain   Assessment of Occupational Performance 5 or more Performance Deficits   Identified Performance Deficits bed mobility, standing sudarshan, transfers, LB dressing, functional mobility   Planned Therapy Interventions (OT) ADL retraining;balance training;bed mobility training;strengthening;transfer training;progressive activity/exercise   Clinical Decision Making Complexity (OT) moderate complexity   Anticipated Equipment Needs Upon Discharge (OT)   (to be further determined)   Risk & Benefits of therapy have been explained evaluation/treatment results reviewed;care plan/treatment goals reviewed;risks/benefits reviewed;current/potential barriers reviewed;participants voiced agreement with care plan;participants included;patient   OT Total Evaluation Time   OT Benjamín  Moderate Complexity Minutes (19699) 15   OT Goals   Therapy Frequency (OT) Daily   OT Predicted Duration/Target Date for Goal Attainment 05/03/23   OT Goals OT Goal 1   OT: Lower Body Dressing Moderate assist  (don/doff sock/shoe on L & CAM boot on R)   OT: Transfer Minimal assist   OT: Cognitive Patient/caregiver will verbalize understanding of cognitive assessment results/recommendations as needed for safe discharge planning   OT: Goal 1 Pt to demo static standing for @ least 3 minutes w/CGA in prep caregiver assist for ADLs.   Self-Care/Home Management   Self-Care/Home Mgmt/ADL, Compensatory, Meal Prep Minutes (64907) 10   Symptoms Noted During/After Treatment (Meal Preparation/Planning Training) fatigue;dizziness;shortness of breath   Treatment Detail/Skilled Intervention Pt sat SBA @ EOB ~5 minutes then c/o dizziness & SOB-pt laying herself back to supine w/SBA. Nurse in the room-applied O2 & she monitored O2 sats & able to return to RA during session. With rest, pt agreeable to try further activity. SBA sup to sit w/HOB ~90 degrees. Pt sat ~2 minutes SBA. Mod A of 2 STS from elevated bed ht. Pt able to stand ~30 seconds w/min A of 2 arm in arm assist. Pt w/incr SOB, fatigue & incr R foot pain/wearing CAM boot. Pt able to sit/scoot to HOB w/min after cues for tech & incr time to complete. Mod A of 2 sit to sup. Vitals: O2 desat initially once seated EOB on RA ~87-88% when nurse applied 2 L O2. O2 incr to 94-96%. With activity-nurse placed pt back on RA & O2 90-92%, /58, HR in high 90's & highest  (in AFib). Pt needing cues for PLB throughout session. At end of session, pt placed in direct care of her nurse w/bed alarm activated & call light in hand.   OT Discharge Planning   OT Plan SLUMS ordered, bed mob, A of 2 w/CAM boot-standing sudarshan, pivot trf when able, seated ADLs-gets easily SOB   OT Discharge Recommendation (DC Rec) Transitional Care Facility   OT Rationale for DC Rec Unable to take any  steps or trf d/t pain, fatigue, weakness & SOB. A of 2 for any standing attempts; A of 1 24/7 for all ADLs; dependent all IADLs.   OT Brief overview of current status SBA/min A bed mob, mod A of 2 limited standing arm in arm, dependent w/LB dressing/CAM boot.   Total Session Time   Timed Code Treatment Minutes 10   Total Session Time (sum of timed and untimed services) 25

## 2023-04-26 NOTE — PROGRESS NOTES
"Room air SpO2 93 %, BS clear and diminished bilat, Xop/Atro tx given, tolerated well, BS after . Loose productive cough yellow thick. BS after diminished, scattered rhonchi, mild E/wheezes. Text message sent to MD, adding Mucomyst QID and chest vest therapy BID.     /55 (BP Location: Left arm)   Pulse 98   Temp 97.9  F (36.6  C) (Oral)   Resp 21   Ht 1.651 m (5' 5\")   Wt 79.5 kg (175 lb 4.3 oz)   SpO2 93%   BMI 29.17 kg/m      RT to follow.   "

## 2023-04-26 NOTE — PLAN OF CARE
Problem: Plan of Care - These are the overarching goals to be used throughout the patient stay.    Goal: Plan of Care Review  Description: The Plan of Care Review/Shift note should be completed every shift.  The Outcome Evaluation is a brief statement about your assessment that the patient is improving, declining, or no change.  This information will be displayed automatically on your shift note.  Outcome: Progressing  Flowsheets (Taken 4/25/2023 2214)  Plan of Care Reviewed With: patient     Problem: Orthopaedic Fracture  Goal: Fracture Stability  Outcome: Progressing     Problem: Urinary Retention  Goal: Effective Urinary Elimination  Outcome: Progressing   Goal Outcome Evaluation:      Plan of Care Reviewed With: patient      Pt alert but confused. Her HS vitals stable. She denied pain. Pt seen trying to get out of bed twice. Reoriented and redirected. Pt's oxygen decreased to 2 liter and she has been 93-96 % on 2liter. Brandon draining well. Thoracentesis site dry and intact.

## 2023-04-27 NOTE — PROGRESS NOTES
RESPIRATORY CARE NOTE     Patient is on 3 LPM NC. Patient receive schedule nebulizer with vest therapy. BS diminished pre and post neb. Patient tolerated treatment well.         Kyle Luna, RT

## 2023-04-27 NOTE — PLAN OF CARE
Problem: Orthopaedic Fracture  Goal: Fracture Stability  Outcome: Progressing   Patient got up to the recliner to day with ortho boot in place. She is a heavy assist of two and she states that it is difficult for her to move that right foot with the boot in place.  She denied pain throughout the shift.    Problem: COPD (Chronic Obstructive Pulmonary Disease) Comorbidity  Goal: Maintenance of COPD Symptom Control  Outcome: Progressing   Patient's O2 sats were reading low, pulse ox was changed and reading improved. Then they were reading low again and patient was found to have very cold hands, warm blanket given and again sats improved. After getting up to recliner and then back to bed MD came to room and called RN in to see O2 sats. Pulse ox was then moved to the forehead. Oxygen was then removed per MD who states that patient should not have oxygen levels above 92% and to keep them between 88% & 92%.      Spoke with daughter on the phone today. She is concerned about the patients cognitive ability and hoping to have this addressed prior to discharge. I spoke then with OT who did state that she is to do a SLUMS.   Patient was looking for her cell phone (asking multiple staff to locate) after speaking with the daughter she informed me that the phone is broken and at her home.

## 2023-04-27 NOTE — PROGRESS NOTES
MD made aware that L arm is very bruised, edematous. Appears to be painful when touched (Piotr milian).  Family asked if it was ever xrayed incase it was also injured in her fall. Dr. Rosales will come assess

## 2023-04-27 NOTE — PROGRESS NOTES
" Room air SpO2 94 %, BS diminished bases, clear. Xopenex/Atrovent tx given with flutter valve PEP 3 x 10 minutes.  Tolerated well, BS after unchanged.  Vest therapy after x 12 minutes. Tolerated well,     /55 (BP Location: Left arm)   Pulse 92   Temp 98.5  F (36.9  C) (Oral)   Resp 20   Ht 1.651 m (5' 5\")   Wt 79.5 kg (175 lb 4.3 oz)   SpO2 94%   BMI 29.17 kg/m      RT to follow.   "

## 2023-04-27 NOTE — PROGRESS NOTES
[facility]    Medicine Progress Note - Hospitalist Service    Date of Admission:  4/24/2023    Assessment & Plan      Fatuma Henry is a 76 yo medically complex lady with hx of Parox afib, htn, lipids, pulm htn (on chronic O2), diastolic HF, CKD stage 3, left ICA occlusion, RA, hx of gout, gerd, recent hospitalization for left hip fracture after fall at home, and completed TCU and recently discharge home.     She fell at home prior to arrival, broke numerous toes on the right foot, presented with right foot pain, dyspnea, productive cough, and worsening leg edema.     Acute on chronic respiratory failure with hypoxia  COPD with acute exacerbation  Right-sided pleural effusion,  Diastolic CHF with acute exacerbation.    Pulmonary hypertension  Significant wheezing admission, subsided with few rounds of nebulizer/Solu-Medrol  -DuoNeb 4 times daily, albuterol neb every 2 hours as needed, prednisone taper  -Transition to oral Lasix 40 mg daily since not on diuretics at home  -Status post thoracentesis 750 cc fluid removed, analysis and cultures pending  -Held Xarelto on 4/25; INR was 4 on admission; pleural fluid consist with transudate  -Peripheral cultures pending  -Telemetry monitoring    Chronic atrial fibrillation, HR 's  Difficult to rate control due low BP (SBP 's) on Cardizem and metoprolol  Anticoagulated on Xarelto- restarted to AC on 4/26.   Cardiology decreased metoprolol XL to 75 mg daily and increased Cardizem CD to 180 mg daily , with holding parameters.     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   -Pt referred the patient to [facility] for FMT consultation appointment scheduled for November.  -Avoid antibiotics by all cost    Recurrent falls,    S/p Hip fracture S/p closed reduction with percutaneous pinning 3/14/23  Multiple toes fracture of the right foot: 3, 4, 5th, 1st & 3rd  "metatarsal bone.  Completed rehabilitation at TCU, return home couple days ago. She fell at home prior to arrival, reportedly no syncope or near syncope. Patient fell while getting up from the chair by the table, with daughter being nearby.  Foot x-rays demonstrated above fractures.     -ER MD d/w B6, recommended CAM boot, orthotist consult placed.   -Pain management  -PT/OT recommending TCU but difficulty with insurance since she used up all her 100 days for rehab for this year     CKD stage 3  -Avoid nephrotoxins and hypotension, monitor closely renal function while diuresing    Essential HTN  -Continue PTA Cardizem and metoprolol, with holding parameters.     Left ICA occlusion  -To follow with vascular as outpt     GERD  -Con pepcid     Depression  -On Cymbalta     H/O pulm nodules with positive PET  Stable 17 x 19 mm groundglass nodule in the lingula since 07/21/2022.   Needs follow up with pulm clinic as outpt and serial imaging 3 to 6-month    H/O RA  -On chronic pred  -Hx of methotrexate--was discontinued during recent hospitalization.  No exacerbation of RA off methotrexate.    Diet: Cardiac diet  DVT Prophylaxis: DOAC-holding for now  Brandon Catheter: Not present  Lines: None     Cardiac Monitoring: None  Code Status:  DNR/DNI       Diet: Combination Diet Low Saturated Fat Na <2400mg Diet    DVT Prophylaxis: DOAC  Brandon Catheter: PRESENT, indication: Retention  Lines: None     Cardiac Monitoring: ACTIVE order. Indication: Chest pain/ ACS rule out (24 hours)  Code Status: No CPR- Do NOT Intubate      Clinically Significant Risk Factors              # Hypoalbuminemia: Lowest albumin = 3.4 g/dL at 4/24/2023 12:33 PM, will monitor as appropriate            # Overweight: Estimated body mass index is 28.98 kg/m  as calculated from the following:    Height as of this encounter: 1.651 m (5' 5\").    Weight as of this encounter: 79 kg (174 lb 2.6 oz)., PRESENT ON ADMISSION         Disposition Plan She is a two-person " heavy assist with transfers and ambulation.  She does not qualify for TCU because of her medical insurance.  She did over stay 100 days this year for her rehab hence it will not cover any more days for the rest of the year.  CM is assisting with placement.     Expected Discharge Date: 04/28/2023      Destination: home with family;home with help/services  Discharge Comments: 2 to 3-day inpatient stay to complete work-up, wean off O2 and rate control for atrial fibrillation.          Bon Rosales MD  Hospitalist Service  [facility]  Securely message with [employer] (more info)  Text page via [facility] Paging/Directory   ______________________________________________________________________    Interval History   No acute events overnight.  She was on 3 LPM supplemental oxygen when I arrived in the room.  Her sats were under percent.  Instructed nurse to avoid over oxygenation given her COPD history.  Maintain sats 88 to 91%.      Physical Exam   Vital Signs: Temp: 98.4  F (36.9  C) Temp src: Oral BP: 95/61 Pulse: 98   Resp: 20 SpO2: 100 % O2 Device: Nasal cannula Oxygen Delivery: 3 LPM  Weight: 174 lbs 2.61 oz    General: AO X 3, lying comfortably in bed in no apparent distress  HEENT: pupils equal and reactive to light and accommodation, extraocular movements are intact; oral mucosa moist  Neck: Supple no raised JVD, no rigidity  Respiratory: Diminished bibasilar left greater than right, mild labored respirations  CVS: nl s1 s2, regular rate and rhythm, no murmur  P/A: soft, nt,nd, no guarding rigidity or rebound tenderness  Ext: no edema  Neuro: no focal neurological deficits noted, cranial nerves 2-12 grossly intact  Psychiatry: normal mood and affect  Skin: No obvious skin rashes or ulcers      Medical Decision Making       35 MINUTES SPENT BY ME on the date of service doing chart review, history, exam, documentation & further activities per the note.      Data     I have personally reviewed the  following data over the past 24 hrs:    10.1  \   8.4 (L)   / 358     139 104 36.7 (H) /  125 (H)   4.0 25 1.31 (H) \       Trop: 30 (H) BNP: N/A       INR:  2.36 (H) PTT:  N/A   D-dimer:  N/A Fibrinogen:  N/A       Imaging results reviewed over the past 24 hrs:   No results found for this or any previous visit (from the past 24 hour(s)).

## 2023-04-27 NOTE — PROGRESS NOTES
Care Management Follow Up    Length of Stay (days): 3    Expected Discharge Date: 04/28/2023    Concerns to be Addressed:   Adjusting medications;   Patient plan of care discussed at interdisciplinary rounds: Yes    Anticipated Discharge Disposition:  Home.     Anticipated Discharge Services:  Resumed home PT, OT, nursing and home health aide  Anticipated Discharge DME:  Per therapy (if indicated). May need a commode.     Patient/family educated on Medicare website which has current facility and service quality ratings:  yes  Education Provided on the Discharge Plan:   Per team  Patient/Family in Agreement with the Plan:   Yes    Referrals Placed by CM/SW:  [facility];   Private pay costs discussed: Not applicable     Additional Information:  Patient has been in and out of the hospital and TCU's since November of last year. [facility] was [facility] for a femur fracture. She discharged to home on 4/17/23 and started [facility] services (SN, PT, OT, HA). She lives with her spouse in their 1st floor condo. Daughter Enid lives upstairs in the same complex.   Gert requires assist with most of her activities of daily living and all IADLs which is provided by spouse, daughters and home care. She uses a wheelchair for mobility but is working with home care PT in hopes that she will be back to using a walker for ambulation.   Transitional care (TCU) is recommended for continued therapy and skilled nursing. However, patient is out of TCU coverable days. Discussed options of private pay and returning home with family support and home care skilled and private pay. Enid states she is working with someone from [facility] for other services and other coverage for services. Patient's spouse is able to monitor her and bring food etc but not able to lift or transfer patient. They are happy with [facility] and will also look at private pay options with them. Previous CM provided  family with a list of private pay agencies.   Writer spoke with [facility] who confirmed that patient is open to their services. They request a call when discharge orders are written (they do have Epic access). Per therapy, patient was able to stand with assist of 2 and only able take a few steps.   4:07 PM:  Met with patient's daughter Enid at the bedside to review the plan. She is concerned that patient is not ready to go home. She is aware that patient is  Out of TCU covered days. She spoke with [facility] ([facility]) who suggested LTACH or Acute Rehab. Discussed that she would not likely meet their criteria but will make the referrals. She also stated that [facility] told her that we could submit for TCU who could submit for auth 'in case' it would be approved. If she is not approved for TCU, LTACH or ARU, would likely need long term care (LTC). Writer provided a list of local skilled nursing facilities (which includes the medicare.gov website) for patient and family to review.  Also provided Enid with the LTC MA application in case patient needs long term care. Enid also expressed interest in finding out if her mother would be appropriate for hospice at this point.     Tory Wilhelm RN

## 2023-04-27 NOTE — CONSULTS
"  HEART CARE ENCOUNTER CONSULTATON NOTE      [facility]  [phone]      Assessment/Recommendations   Assessment:  1.  Atrial fibrillation: Chronic and fairly well rate controlled.  Limited by low blood pressures.  2.  Anticoagulation: Maintained on Xarelto for anticoagulation  3.  Recurrent C. difficile infection  4.  Very deconditioned  5.  Chronic hypoxic respiratory failure  6.  Recurrent falls with recent hip fracture  7.  Chronic heart failure with preserved ejection fraction    Plan:  1.  We will try adjusting medications with higher diltiazem and lower metoprolol dose and see if this better controls atrial fibrillation  2.  No further medication changes at this time       History of Present Illness/Subjective    HPI: Fatuma Henry is a 77 year old female with history of chronic atrial fibrillation, chronic hypoxic respiratory failure on 2 L of O2, chronic heart failure with preserved ejection fraction, recurrent C. difficile infection, recurrent falls with recent hip fracture on 3/14/2023 with repair, admitted with cellulitis and recurrent C. difficile colitis.  Cardiology was asked to see patient due to atrial fibrillation.  Reviewed telemetry and atrial fibrillation is fairly well rate controlled.  No complaints of chest pain.  Chronic dyspnea unchanged.  Chronic lower extremity edema unchanged         Physical Examination  Review of Systems   Vitals: BP 95/61 (BP Location: Left arm)   Pulse 98   Temp 98.4  F (36.9  C) (Oral)   Resp 20   Ht 1.651 m (5' 5\")   Wt 79 kg (174 lb 2.6 oz)   SpO2 100%   BMI 28.98 kg/m    BMI= Body mass index is 28.98 kg/m .  Wt Readings from Last 3 Encounters:   04/27/23 79 kg (174 lb 2.6 oz)   04/17/23 83.3 kg (183 lb 9.6 oz)   04/14/23 83 kg (183 lb)       General Appearance:   no distress, normal body habitus   ENT/Mouth: membranes moist, no oral lesions or bleeding gums.      EYES:  no scleral icterus, normal conjunctivae   Neck: no carotid " bruits or thyromegaly   Chest/Lungs:    Decreased at bases   Cardiovascular:   irregular. Normal first and second heart sounds with no murmurs  + edema bilaterally        Extremities: no cyanosis or clubbing   Skin: no xanthelasma, warm.    Neurologic: normal  bilateral, no tremors     Psychiatric: alert and oriented x3, calm        Please refer above for cardiac ROS details.        Medical History  Surgical History Family History Social History   Past Medical History:   Diagnosis Date     Atrial fibrillation (H)      CKD (chronic kidney disease) stage 3, GFR 30-59 ml/min (H)      COPD (chronic obstructive pulmonary disease) (H)     nocturnal oxygen     CRF (chronic renal failure)      GERD (gastroesophageal reflux disease)      Hypertension      Hypovolemic shock (H)      Influenza A 2017     Pulmonary hypertension (H)      Pulmonary nodules      Rheumatoid arthritis (H)      Sepsis (H) 2017     Past Surgical History:   Procedure Laterality Date     APPENDECTOMY       BLADDER SUSPENSION       CHOLECYSTECTOMY       CLOSED REDUCTION, PERCUTANEOUS PINNING HIP Left 3/15/2023    Procedure: CLOSED REDUCTION, HIP, WITH PERCUTANEOUS PINNING;  Surgeon: Denton Mckeon MD;  Location: [facility] [place]     PICC TRIPLE LUMEN PLACEMENT  2022          Family History   Problem Relation Age of Onset     Heart Failure Mother      Cerebrovascular Disease Father      Kidney failure Sister      Cerebrovascular Disease Brother      Diabetes Type 2  Brother         Social History     Socioeconomic History     Marital status:      Spouse name: Not on file     Number of children: Not on file     Years of education: Not on file     Highest education level: Not on file   Occupational History     Not on file   Tobacco Use     Smoking status: Former     Packs/day: 0.50     Types: Cigarettes     Quit date: 2017     Years since quittin.3     Smokeless tobacco: Never   Vaping Use     Vaping  status: Not on file   Substance and Sexual Activity     Alcohol use: No     Drug use: No     Sexual activity: Not Currently   Other Topics Concern     Not on file   Social History Narrative     Not on file     Social Determinants of Health     Financial Resource Strain: Not on file   Food Insecurity: Not on file   Transportation Needs: Not on file   Physical Activity: Not on file   Stress: Not on file   Social Connections: Not on file   Intimate Partner Violence: Not on file   Housing Stability: Not on file           Medications  Allergies   No current outpatient medications on file.       Allergies   Allergen Reactions     Codeine Nausea and Vomiting     Fosamax [Alendronate] Diarrhea     Morphine Nausea and Vomiting     Other reaction(s): Vomiting          Lab Results    Chemistry/lipid CBC Cardiac Enzymes/BNP/TSH/INR   Recent Labs   Lab Test 01/28/22  1640   CHOL 154   HDL 65   LDL 63   TRIG 132     Recent Labs   Lab Test 01/28/22  1640 01/13/21  1017 11/04/19  1430   LDL 63 66 93     Recent Labs   Lab Test 04/27/23  0445      POTASSIUM 4.0   CHLORIDE 104   CO2 25   *   BUN 36.7*   CR 1.31*   GFRESTIMATED 42*   SUNI 9.9     Recent Labs   Lab Test 04/27/23  0445 04/26/23  0521 04/25/23  0447   CR 1.31* 1.35* 1.24*     Recent Labs   Lab Test 03/15/23  0820   A1C 5.9*          Recent Labs   Lab Test 04/27/23  0445   WBC 10.1   HGB 8.4*   HCT 27.1*   MCV 95        Recent Labs   Lab Test 04/27/23  0445 04/26/23  0521 04/25/23  0447   HGB 8.4* 8.0* 8.5*    Recent Labs   Lab Test 11/02/22  1312 04/20/22  1046 01/17/22  1756   TROPONINI 0.04 0.03 0.04     Recent Labs   Lab Test 04/24/23  1233 04/06/23  0405 03/25/23  0536 12/01/22  1303 09/13/22  0854 04/20/22  1046 01/17/22  1756   BNP  --   --   --   --  274* 165* 190*   NTBNPI 4,340* 3,870* 2,308*   < >  --   --   --     < > = values in this interval not displayed.     Recent Labs   Lab Test 03/13/23  1747   TSH 3.00     Recent Labs   Lab Test  04/27/23  0445 04/26/23  0521 04/25/23  0447   INR 2.36* 1.56* 3.14*        Rocio Ordonez MD

## 2023-04-27 NOTE — PLAN OF CARE
Pt sleeping throughout the night between cares and rounding.  Denies pain or discomfort when assessed.  Lung sounds diminished.  A. Fib on telemetry.    Arti Michael RN

## 2023-04-28 NOTE — PROGRESS NOTES
"  HEART CARE ENCOUNTER CONSULTATON NOTE      [facility]  [phone]      Assessment/Recommendations   Assessment:  1.  Atrial fibrillation: Chronic and fairly well rate controlled.  Limited by low blood pressures.  2.  Anticoagulation: Maintained on Xarelto for anticoagulation  3.  Recurrent C. difficile infection  4.  Very deconditioned  5.  Chronic hypoxic respiratory failure  6.  Recurrent falls with recent hip fracture  7.  Chronic heart failure with preserved ejection fraction     Plan:  1.    Continue on current doses of diltiazem and metoprolol.  Heart rates are now well controlled  No further cardiac recommendations, please call if any further questions or concerns       History of Present Illness/Subjective    Telemetry shows rate controlled atrial fibrillation.  Still very weak       Physical Examination  Review of Systems   Vitals: /59 (BP Location: Left arm)   Pulse 93   Temp 97.5  F (36.4  C) (Oral)   Resp 20   Ht 1.651 m (5' 5\")   Wt 78.1 kg (172 lb 2.9 oz)   SpO2 91%   BMI 28.65 kg/m    BMI= Body mass index is 28.65 kg/m .  Wt Readings from Last 3 Encounters:   04/28/23 78.1 kg (172 lb 2.9 oz)   04/17/23 83.3 kg (183 lb 9.6 oz)   04/14/23 83 kg (183 lb)                                                       Please refer above for cardiac ROS details.        Medical History  Surgical History Family History Social History   Past Medical History:   Diagnosis Date     Atrial fibrillation (H)      CKD (chronic kidney disease) stage 3, GFR 30-59 ml/min (H)      COPD (chronic obstructive pulmonary disease) (H)     nocturnal oxygen     CRF (chronic renal failure)      GERD (gastroesophageal reflux disease)      Hypertension      Hypovolemic shock (H)      Influenza A 12/01/2017     Pulmonary hypertension (H)      Pulmonary nodules      Rheumatoid arthritis (H)      Sepsis (H) 12/24/2017     Past Surgical History:   Procedure Laterality Date     APPENDECTOMY       BLADDER " SUSPENSION       CHOLECYSTECTOMY       CLOSED REDUCTION, PERCUTANEOUS PINNING HIP Left 3/15/2023    Procedure: CLOSED REDUCTION, HIP, WITH PERCUTANEOUS PINNING;  Surgeon: Denton Mckeon MD;  Location: [facility] OR     PICC TRIPLE LUMEN PLACEMENT  2022          Family History   Problem Relation Age of Onset     Heart Failure Mother      Cerebrovascular Disease Father      Kidney failure Sister      Cerebrovascular Disease Brother      Diabetes Type 2  Brother         Social History     Socioeconomic History     Marital status:      Spouse name: Not on file     Number of children: Not on file     Years of education: Not on file     Highest education level: Not on file   Occupational History     Not on file   Tobacco Use     Smoking status: Former     Packs/day: 0.50     Types: Cigarettes     Quit date: 2017     Years since quittin.3     Smokeless tobacco: Never   Vaping Use     Vaping status: Not on file   Substance and Sexual Activity     Alcohol use: No     Drug use: No     Sexual activity: Not Currently   Other Topics Concern     Not on file   Social History Narrative     Not on file     Social Determinants of Health     Financial Resource Strain: Not on file   Food Insecurity: Not on file   Transportation Needs: Not on file   Physical Activity: Not on file   Stress: Not on file   Social Connections: Not on file   Intimate Partner Violence: Not on file   Housing Stability: Not on file           Medications  Allergies   No current outpatient medications on file.       Allergies   Allergen Reactions     Codeine Nausea and Vomiting     Fosamax [Alendronate] Diarrhea     Morphine Nausea and Vomiting     Other reaction(s): Vomiting          Lab Results    Chemistry/lipid CBC Cardiac Enzymes/BNP/TSH/INR   Recent Labs   Lab Test 22  1640   CHOL 154   HDL 65   LDL 63   TRIG 132     Recent Labs   Lab Test 22  1640 21  1017 19  1430   LDL 63 66 93     Recent Labs    Lab Test 04/28/23  0554      POTASSIUM 4.4   CHLORIDE 103   CO2 23   *   BUN 41.1*   CR 1.29*   GFRESTIMATED 43*   SUNI 9.8     Recent Labs   Lab Test 04/28/23  0554 04/27/23  0445 04/26/23  0521   CR 1.29* 1.31* 1.35*     Recent Labs   Lab Test 03/15/23  0820   A1C 5.9*          Recent Labs   Lab Test 04/28/23  0554   WBC 9.8   HGB 7.9*   HCT 26.1*   MCV 97        Recent Labs   Lab Test 04/28/23  0554 04/27/23  0445 04/26/23  0521   HGB 7.9* 8.4* 8.0*    Recent Labs   Lab Test 11/02/22  1312 04/20/22  1046 01/17/22  1756   TROPONINI 0.04 0.03 0.04     Recent Labs   Lab Test 04/24/23  1233 04/06/23  0405 03/25/23  0536 12/01/22  1303 09/13/22  0854 04/20/22  1046 01/17/22  1756   BNP  --   --   --   --  274* 165* 190*   NTBNPI 4,340* 3,870* 2,308*   < >  --   --   --     < > = values in this interval not displayed.     Recent Labs   Lab Test 03/13/23  1747   TSH 3.00     Recent Labs   Lab Test 04/28/23  0554 04/27/23  0445 04/26/23  0521   INR 2.59* 2.36* 1.56*        Rocio Ordonez MD

## 2023-04-28 NOTE — PLAN OF CARE
Goal Outcome Evaluation:      Plan of Care Reviewed With: patient    Overall Patient Progress: improving  Problem: Plan of Care - These are the overarching goals to be used throughout the patient stay.    Goal: Plan of Care Review  Description: The Plan of Care Review/Shift note should be completed every shift.  The Outcome Evaluation is a brief statement about your assessment that the patient is improving, declining, or no change.  This information will be displayed automatically on your shift note.  Outcome: Progressing  Flowsheets (Taken 4/27/2023 2246)  Plan of Care Reviewed With: patient  Overall Patient Progress: improving     Problem: Orthopaedic Fracture  Goal: Absence of Bleeding  Outcome: Progressing     Problem: Pulmonary Impairment  Goal: Improved Activity Tolerance  Outcome: Progressing     Problem: Plan of Care - These are the overarching goals to be used throughout the patient stay.    Goal: Absence of Hospital-Acquired Illness or Injury  Intervention: Identify and Manage Fall Risk  Recent Flowsheet Documentation  Taken 4/27/2023 1544 by Charity Gonzalez RN  Safety Promotion/Fall Prevention:   activity supervised   assistive device/personal items within reach   clutter free environment maintained   nonskid shoes/slippers when out of bed   toileting scheduled  Intervention: Prevent Skin Injury  Recent Flowsheet Documentation  Taken 4/27/2023 1700 by Charity Gonzalez RN  Body Position:   turned   left  Taken 4/27/2023 1544 by Charity Gonzalez RN  Body Position: position changed independently  Intervention: Prevent and Manage VTE (Venous Thromboembolism) Risk  Recent Flowsheet Documentation  Taken 4/27/2023 1544 by Charity Gonzalez RN  VTE Prevention/Management: compression stockings on     Problem: Risk for Delirium  Goal: Improved Behavioral Control  Intervention: Minimize Safety Risk  Recent Flowsheet Documentation  Taken 4/27/2023 1544 by Charity Gonzalez RN  Enhanced Safety Measures: room near unit station  Goal:  Improved Attention and Thought Clarity  Intervention: Maximize Cognitive Function  Recent Flowsheet Documentation  Taken 4/27/2023 1544 by Charity Gonzalez RN  Sensory Stimulation Regulation:   music on   quiet environment promoted  Reorientation Measures:   calendar in view   clock in view     Problem: Orthopaedic Fracture  Goal: Absence of Embolism Signs and Symptoms  Intervention: Prevent or Manage Embolism Risk  Recent Flowsheet Documentation  Taken 4/27/2023 1544 by Charity Gonzalez RN  VTE Prevention/Management: compression stockings on  Goal: Optimal Functional Ability  Intervention: Optimize Functional Ability  Recent Flowsheet Documentation  Taken 4/27/2023 1544 by Charity Gonzalez RN  Activity Management:   activity adjusted per tolerance   activity encouraged  Positioning/Transfer Devices:   pillows   in use  Taken 4/27/2023 1543 by Charity Gonzalez RN  Activity Management:   activity adjusted per tolerance   activity encouraged  Goal: Absence of Infection Signs and Symptoms  Intervention: Prevent or Manage Infection  Recent Flowsheet Documentation  Taken 4/27/2023 1544 by Charity Gonzalez RN  Isolation Precautions: enteric precautions maintained  Goal: Effective Oxygenation and Ventilation  Intervention: Promote Airway Secretion Clearance  Recent Flowsheet Documentation  Taken 4/27/2023 1949 by Charity Gonzalez RN  Cough And Deep Breathing: done independently per patient  Taken 4/27/2023 1544 by Charity Gonzalez RN  Cough And Deep Breathing: done independently per patient  Activity Management:   activity adjusted per tolerance   activity encouraged  Taken 4/27/2023 1543 by Charity Gonzalez RN  Activity Management:   activity adjusted per tolerance   activity encouraged  Intervention: Optimize Oxygenation and Ventilation  Recent Flowsheet Documentation  Taken 4/27/2023 1700 by Charity Gonzalez RN  Head of Bed (HOB) Positioning: HOB at 30-45 degrees  Taken 4/27/2023 1544 by Charity Gonzalez RN  Head of Bed (HOB) Positioning: HOB at  20-30 degrees     Problem: COPD (Chronic Obstructive Pulmonary Disease) Comorbidity  Goal: Maintenance of COPD Symptom Control  Intervention: Maintain COPD-Symptom Control  Recent Flowsheet Documentation  Taken 4/27/2023 1044 by Charity Gonzalez, RN  Medication Review/Management: medications reviewed  Gert had a good shift. Family here most of it. Did have a discussion with  about plans for discharge. L arm significantly swollen, No DVT and no fracture found. No BM noted. No complaints of pain. Afib on tele

## 2023-04-28 NOTE — PROGRESS NOTES
Care Management Follow Up    Length of Stay (days): 4    Expected Discharge Date: 04/29/2023    Concerns to be Addressed:   Adjusting medications; Discharge disposition.   Patient plan of care discussed at interdisciplinary rounds: Yes    Anticipated Discharge Disposition:  Acute Rehab (pending authorization);      Anticipated Discharge Services:  Continued therapy, skilled nursing and medical management. Ultimate goal is home with family and resumed home PT, OT, nursing and home health aide  Anticipated Discharge DME:  Per therapy (if indicated). May need a commode and a transfer lift.     Patient/family educated on Medicare website which has current facility and service quality ratings:  yes  Education Provided on the Discharge Plan:   Per team  Patient/Family in Agreement with the Plan:   Yes    Referrals Placed by CM/SW:  [facility]; [facility]; [facility]  Private pay costs discussed: Not applicable     Additional Information:  On 4/27/23, writer met with patient's daughter Enid at the bedside to review the plan. She is concerned that patient is not ready to go home. She is aware that patient is  Out of TCU covered days. She spoke with [facility] ([facility]) who suggested LTACH or Acute Rehab. Discussed that she would not likely meet their criteria but will make the referrals. She also stated that [facility] told her that we could submit for TCU who could submit for auth 'in case' it would be approved. If she is not approved for TCU, LTACH or ARU, would likely need long term care (LTC). Writer provided a list of local skilled nursing facilities (which includes the medicare.gov website) for patient and family to review.  Also provided Enid with the LTC MA application in case patient needs long term care. Enid also expressed interest in finding out if her mother would be appropriate for hospice at this point.   Writer also spoke with [employer] [employer] who confirmed that patient is open to their services. They  request a call when discharge orders are written (they do have Epic access). Per therapy on 4/27, patient was able to stand with assist of 2 and only able take a few steps.   1:05 PM:  Spoke with intake at [facility] who is reviewing. Would potentially consider if family would consider at lift at home and family has the ability to provide care for her. Would not be appropriate for LTACH.   1:17 PM:  Spoke with patient and Enid at the bedside. Enid states that patient's  would be home with patient. Also, Enid and her  live on the 3rd floor of the same building and would be available to assist. Enid states that her  does not work outside the home and would be available also. They would be open to the idea of a mechanical lift in the home. Update provided to Acute Rehab admissions who will submit for auth. No beds are anticipated to be available until next week.    Social history:  Patient has been in and out of the hospital and TCU's since November of last year. [facility]. She discharged to home on 4/17/23 and started [facility] services (SN, PT, OT, HA). She lives with her spouse in their 1st floor condo. Daughter Enid lives upstairs in the same complex.   Gert requires assist with most of her activities of daily living and all IADLs which is provided by spouse, daughters and home care. She uses a wheelchair for mobility but is working with home care PT in hopes that she will be back to using a walker for ambulation.   Transitional care (TCU) is recommended for continued therapy and skilled nursing. However, patient is out of TCU coverable days. Discussed options of private pay and returning home with family support and home care skilled and private pay. Enid states she is working with someone from [facility] for other services and other coverage for services. Patient's spouse is able to  monitor her and bring food etc but not able to lift or transfer patient. They are happy with [facility] and will also look at private pay options with them. Previous CM provided family with a list of private pay agencies.    Tory Wilhelm RN

## 2023-04-28 NOTE — PROGRESS NOTES
RESPIRATORY CARE NOTE   Patient is on room air, BS clear, diminished, gave Xopenex, Atrovent treatment x1, BS post treatment Slight increased aeration, patient perceives no improvement, patient tolerated well.     Jose Lam, RT

## 2023-04-28 NOTE — PLAN OF CARE
Goal Outcome Evaluation:      Plan of Care Reviewed With: patient    Overall Patient Progress: improvingOverall Patient Progress: improving     Pt. Alert, oriented to self and place but forgetful and at time not oriented to time or situation.Denied pain, nausea, dyspnea, or lightheadedness. Brandon cath draining adequate amount clear yellow urine. Pt. Forgets she has it in place and has asked x 2 to go to the bathroom. NO BM overnight. Has bruising and swelling to arms and left calf. Very little bruising around fractured toes. VSS,continue to monitor.

## 2023-04-28 NOTE — PROGRESS NOTES
"Orthopedic Progress Note      Assessment:    S/P Closed reduction internal fixation of left femur 3/14/2023 now with multiple closed fractures of right foot.    Plan:   - Weightbearing status: WBAT; continue to wear CAM boot while ambulating  - Activity: Up with assist and assistive device until independent.  - Pain Management; continue current regimen  - Obtain Xray pelvis/hip evaluation of hardware placement (orders placed)  - Follow-up: Outpatient follow up with a foot and ankle provider at [facility] upon discharge for foot fractures. Follow-up with Dr. Mckeon as scheduled for management of healing hip fracture      Subjective:  Pain: moderate  Nausea, Vomiting:  No  Lightheadedness, Dizziness:  No  Neuro:  Patient denies new onset numbness or paresthesias  Fever, chills: No  Chest pain: No  SOB: No    Patient reports feeling well today. Patient reports pain is tolerable with current pain regimen. Patient eating and drinking well. All orthopedic questions/concerns were answered      Objective:  /66 (BP Location: Left arm)   Pulse 87   Temp 99  F (37.2  C) (Oral)   Resp 20   Ht 1.651 m (5' 5\")   Wt 78.1 kg (172 lb 2.9 oz)   SpO2 91%   BMI 28.65 kg/m      The patient is A&Ox3. Appears comfortable, sitting up at bedside.  Calves without tenderness, neg Virgen's  Brisk capillary refill in the toes.   Palpable Right and Left dorsalis pedis pulse. Right and Left foot warm & well-perfused.  Sensation is intact to light touch & equal bilaterally in the femoral, DP, SP & tibial nerve distributions.  ROM: Appropriately flexes & extends all toes bilaterally.   Motor: +5/5 dorsiflexion, plantar flexion & EHL bilaterally.   Leg lengths equal.      Pertinent Labs   Lab Results: personally reviewed.   Lab Results   Component Value Date    INR 2.59 (H) 04/28/2023    INR 2.36 (H) 04/27/2023    INR 1.56 (H) 04/26/2023     Lab Results   Component Value Date    WBC 9.8 04/28/2023    HGB 7.9 (L) 04/28/2023    " HCT 26.1 (L) 04/28/2023    MCV 97 04/28/2023     04/28/2023     Lab Results   Component Value Date     04/28/2023    CO2 23 04/28/2023         Report completed by:  Brinda Pop PA-C, LAZARO  Date: 4/28/2023  Time: 11:23 AM  [facility]

## 2023-04-28 NOTE — PLAN OF CARE
Goal Outcome Evaluation:      Plan of Care Reviewed With: patient    Patient alert and oriented x 5, but forgetful. Up to chair for meals. Denies pain. No shortness of breath. Per Care Management, patient is being reviewed by Acute Rehab for potential discharge. Continue to monitor telemetry.

## 2023-04-28 NOTE — PROGRESS NOTES
[facility]    Medicine Progress Note - Hospitalist Service    Date of Admission:  4/24/2023    Assessment & Plan      Fatuma Henry is a 76 yo medically complex lady with hx of Parox afib, htn, lipids, pulm htn (on chronic O2), diastolic HF, CKD stage 3, left ICA occlusion, RA, hx of gout, gerd, recent hospitalization for left hip fracture after fall at home, and completed TCU and recently discharge home.     She fell at home prior to arrival, broke numerous toes on the right foot, presented with right foot pain, dyspnea, productive cough, and worsening leg edema.     Acute on chronic respiratory failure with hypoxia, resolved  COPD with acute exacerbation  Right-sided pleural effusion,  Diastolic CHF with acute exacerbation.    Pulmonary hypertension  Significant wheezing admission, subsided with few rounds of nebulizer/Solu-Medrol  -DuoNeb 4 times daily, albuterol neb every 2 hours as needed, prednisone taper  -Transition to oral Lasix 40 mg daily since not on diuretics at home  -Status post thoracentesis 750 cc fluid removed, and is transudate.    - Pleural fluid cultures have been negative so far  - Peripheral cultures negative.      Chronic atrial fibrillation, HR 's  Difficult to rate control due low BP (SBP 's) on Cardizem and metoprolol  Anticoagulated on Xarelto- restarted to AC on 4/26.   Metoprolol 50 mg daily, and Cardizem increased to 180 by cardiology.  BPs remain soft SBP low 100s, resting HR 90- 100s.    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   -Pt referred the patient to [facility]  GI for FMT consultation appointment scheduled for November.  -Avoid antibiotics by all cost    Recurrent falls,    S/p Hip fracture S/p closed reduction with percutaneous pinning 3/14/23  Multiple toes fracture of the right foot: 3, 4, 5th, 1st & 3rd metatarsal bone.  Completed rehabilitation at TCU, return  "home couple days ago. She fell at home prior to arrival, reportedly no syncope or near syncope. Patient fell while getting up from the chair by the table, with daughter being nearby.  Foot x-rays demonstrated above fractures.     -ER MD d/w B6, recommended CAM boot, orthotist consult placed.   -PT/OT recommending TCU but difficulty with insurance since she used up all her 100 days for rehab for this year  -Pain management     CKD stage 3  -Avoid nephrotoxins and hypotension, monitor closely renal function while diuresing    Essential HTN  -Continue PTA Cardizem and metoprolol, with holding parameters.     Left ICA occlusion  -To follow with vascular as outpt     GERD  -Con pepcid     Depression  -On Cymbalta     H/O pulm nodules with positive PET  Stable 17 x 19 mm groundglass nodule in the lingula since 07/21/2022.   Needs follow up with pulm clinic as outpt and serial imaging 3 to 6-month    H/O RA  -On chronic pred  -Hx of methotrexate--was discontinued during recent hospitalization.  No exacerbation of RA off methotrexate.    Diet: Cardiac diet  DVT Prophylaxis: DOAC-holding for now  Brandon Catheter: Not present  Lines: None     Cardiac Monitoring: None  Code Status:  DNR/DNI       Diet: Combination Diet Low Saturated Fat Na <2400mg Diet    DVT Prophylaxis: DOAC  Brandon Catheter: PRESENT, indication: Retention  Lines: None     Cardiac Monitoring: ACTIVE order. Indication: Chest pain/ ACS rule out (24 hours)  Code Status: No CPR- Do NOT Intubate      Clinically Significant Risk Factors              # Hypoalbuminemia: Lowest albumin = 3.4 g/dL at 4/24/2023 12:33 PM, will monitor as appropriate            # Overweight: Estimated body mass index is 28.65 kg/m  as calculated from the following:    Height as of this encounter: 1.651 m (5' 5\").    Weight as of this encounter: 78.1 kg (172 lb 2.9 oz)., PRESENT ON ADMISSION         Disposition Plan She is a two-person heavy assist with transfers and ambulation.  She does " not qualify for TCU because of her medical insurance.  She did over stay 100 days this year for her rehab hence it will not cover any more days for the rest of the year.  CM is assisting with placement.     Expected Discharge Date: 04/29/2023      Destination: home with family;home with help/services  Discharge Comments: 2 to 3-day inpatient stay to complete work-up, wean off O2 and rate control for atrial fibrillation. TCU?          Bon Rosales MD  Hospitalist Service  [facility]  Securely message with [employer] (more info)  Text page via [facility] Paging/Directory   ______________________________________________________________________    Interval History   No acute events overnight.  On room air.  Maintain sats 88 to 91%.  No complaints noted today.    Physical Exam   Vital Signs: Temp: 99  F (37.2  C) Temp src: Oral BP: 104/66 Pulse: 87   Resp: 20 SpO2: 91 % O2 Device: None (Room air)    Weight: 172 lbs 2.87 oz    General: AO X 3, lying comfortably in bed in no apparent distress  HEENT: pupils equal and reactive to light and accommodation, extraocular movements are intact; oral mucosa moist  Neck: Supple no raised JVD, no rigidity  Respiratory: Diminished bibasilar left greater than right, mild labored respirations  CVS: nl s1 s2, regular rate and rhythm, no murmur  P/A: soft, nt,nd, no guarding rigidity or rebound tenderness  Ext: no edema  Neuro: no focal neurological deficits noted, cranial nerves 2-12 grossly intact  Psychiatry: normal mood and affect  Skin: No obvious skin rashes or ulcers      Medical Decision Making       35 MINUTES SPENT BY ME on the date of service doing chart review, history, exam, documentation & further activities per the note.      Data     I have personally reviewed the following data over the past 24 hrs:    9.8  \   7.9 (L)   / 349     137 103 41.1 (H) /  114 (H)   4.4 23 1.29 (H) \       INR:  2.59 (H) PTT:  N/A   D-dimer:  N/A Fibrinogen:  N/A       Imaging  results reviewed over the past 24 hrs:   Recent Results (from the past 24 hour(s))   Echocardiogram Limited   Result Value    LVEF  60-65%    Narrative    [id number]  [id number]  [id number]  [id number]^ALI^[place] [address]     Name: HEATHER DOYLE  MRN: [id number]  : 1945  Study Date: 2023 12:31 PM  Age: 77 yrs  Gender: Female  Patient Location: [facility]  Reason For Study: Abn EKG  Ordering Physician: YAMILETH FARAH  Performed By: ACE     BSA: 1.9 m2  Height: 65 in  Weight: 174 lb  HR: 96  BP: 111/63 mmHg  ______________________________________________________________________________  Procedure  Limited Echo Adult.  ______________________________________________________________________________  Interpretation Summary     Left ventricular size, wall motion and function are normal. The ejection  fraction is 60-65%.  Normal right ventricle size and systolic function.  The left atrium is mild to moderately dilated.  The right atrium is mild to moderately dilated.  Mild valvular aortic stenosis.  There is mild (1+) aortic regurgitation.  There is mild (1+) mitral regurgitation.  There is mild (1+) tricuspid regurgitation.  The right ventricular systolic pressure is approximated at 52 mmHg.     When compared to previous study on 3-, there is no significant interval  change.  .  ______________________________________________________________________________  I      WMSI = 1.00     % Normal = 100     X - Cannot   0 -                      (2) - Mildly 2 -          Segments  Size  Interpret    Hyperkinetic 1 - Normal  Hypokinetic  Hypokinetic  1-2     small                                                     7 -          3-5      moderate  3 - Akinetic 4 -          5 -         6 - Akinetic Dyskinetic   6-14    large               Dyskinetic   Aneurysmal  w/scar       w/scar       15-16   diffuse     Left Ventricle  Left ventricular size, wall motion and function are  normal. The ejection  fraction is 60-65%. There is normal left ventricular wall thickness. Normal  left ventricular wall motion.     Right Ventricle  Normal right ventricle size and systolic function.     Atria  The left atrium is mild to moderately dilated. The right atrium is mild to  moderately dilated. There is no atrial shunt seen.     Mitral Valve  Thickened mitral valve anterior leaflet. There is mild mitral annular  calcification. There is mild (1+) mitral regurgitation. There is no mitral  valve stenosis.     Tricuspid Valve  Tricuspid leaflets are thickened. There is mild (1+) tricuspid regurgitation.  The right ventricular systolic pressure is approximated at 49mmHg plus the  right atrial pressure. There is no tricuspid stenosis.     Aortic Valve  Mild aortic valve calcification is present. There is mild (1+) aortic  regurgitation. Mild valvular aortic stenosis.     Pulmonic Valve  The pulmonic valve is not well seen, but is grossly normal.     Vessels  The aorta root is normal. IVC diameter <2.1 cm collapsing >50% with sniff  suggests a normal RA pressure of 3 mmHg.     Pericardium  There is no pericardial effusion.     ______________________________________________________________________________  MMode/2D Measurements & Calculations  IVSd: 0.66 cm  LVIDd: 3.5 cm  LVIDs: 2.2 cm  LVPWd: 0.72 cm  FS: 36.3 %  LV mass(C)d: 61.5 grams  LV mass(C)dI: 33.0 grams/m2  RWT: 0.41  TAPSE: 1.1 cm     Time Measurements  MM HR: 105.0 BPM     Doppler Measurements & Calculations  Ao V2 max: 210.4 cm/sec  Ao max P.0 mmHg  Ao V2 mean: 140.3 cm/sec  Ao mean P.4 mmHg  Ao V2 VTI: 38.0 cm     ______________________________________________________________________________  Report approved by: Derrick Carlin 2023 02:44 PM         US Upper Extremity Venous Duplex Bilat    Narrative    EXAM: US UPPER EXTREMITY VENOUS DUPLEX BILATERAL  LOCATION: [facility]  DATE/TIME: 2023 6:45 PM  CDT    INDICATION: arm swelling  COMPARISON: None.  TECHNIQUE: Venous Duplex ultrasound of both upper extremities with (when possible) and without compression, augmentation, and duplex. Color flow and spectral Doppler with waveform analysis performed.    FINDINGS: Ultrasound includes evaluation of the internal jugular veins, innominate veins, subclavian veins, axillary veins, and brachial veins. The superficial cephalic and basilic veins were also evaluated where seen.    RIGHT: No deep venous thrombosis. No superficial thrombophlebitis.    LEFT: No deep venous thrombosis. No superficial thrombophlebitis.       Impression    IMPRESSION:  1.  No deep venous thrombosis in the bilateral upper extremities.   XR Forearm Left 2 Views    Narrative    EXAM: XR FOREARM LEFT 2 VIEWS, XR HUMERUS LEFT G/E 2 VIEWS  LOCATION: [facility]  DATE/TIME: 4/27/2023 9:00 PM CDT    INDICATION: Swelling, pain, and trauma  COMPARISON: None.      Impression    IMPRESSION: Bones are demineralized. There is no acute displaced fracture of the left humerus or forearm. No dislocation. No definitive elbow joint effusion.   XR Humerus Left G/E 2 Views    Narrative    EXAM: XR FOREARM LEFT 2 VIEWS, XR HUMERUS LEFT G/E 2 VIEWS  LOCATION: [facility]  DATE/TIME: 4/27/2023 9:00 PM CDT    INDICATION: Swelling, pain, and trauma  COMPARISON: None.      Impression    IMPRESSION: Bones are demineralized. There is no acute displaced fracture of the left humerus or forearm. No dislocation. No definitive elbow joint effusion.

## 2023-04-28 NOTE — INTERIM SUMMARY
[facility] Pre-Admission Screen    Referral Source:  [facility] CARDIAC TESTING  Admit date to referring facility: 4/24/2023    Physical Medicine and Rehab Consult Completed: No    Rehab Diagnosis:    Orthopaedic Disorders 08.2 Femur (Shaft) Fracture: s/p closed reduction internal fixation of left femur 3/14/2023, now with multiple closed fractures of right foot    Justification for Acute Inpatient Rehabilitation  Fatuma Henry is a 76 yo medically complex female with hx of paroxysmal Afib, HTN, COPD, pulm HTN (on chronic O2), diastolic HF, CKD stage 3, left ICA occlusion, RA, and recent hospitalization for left hip fracture s/p closed reduction percutaneous pinning on 3/15/23 after fall at home for which she went to a TCU. She discharged home from that TCU 4/17 and readmitted to the hospital 4/24 after she fell at home. She had broken numerous toes on the right foot, presented with right foot pain, dyspnea, productive cough, and worsening leg edema. She has required medical mgmt of her acute on chronic hypoxic respiratory failure (including thoracentesis), right pleural effusion, chronic a-fib, severe recurrent C Diff coliits, and hypotension. She is now medically stable and ready to discharge to acute inpatient rehab for her ongoing medical mgmt and intensive rehab needs.   The patient requires an intensive inpatient rehab program to address the following acute impairments: pain, weakness, fatigue, impaired balance, dyspnea on exertion, impaired cognition, and impaired safety awareness. These impairments are contributing to functional limitations impacting her safety and independence w/ bed mobility, transfers, gait, stairs, ADLs, as well as IADLs.     Current Active Medical Management Needs/Risks for Clinical Complications  The patient requires the high level of rehabilitation physician supervision that accompanies the provision of intensive  rehabilitation therapy.  The patient needs the services of the rehabilitation physician to assess the patient medically and functionally and to modify the course of treatment as needed to maximize the patient's capacity to benefit from the rehabilitation process.   The patient requires ongoing medical management and assessment of the following:     Respiratory status in setting of COPD, pulm HTN, acute on chronic hypoxic respiratory failure, and right pleural effusion: assess respiratory status and promote pulmonary hygiene; pt currently on 0-2 LPM of supplemental oxygen. Pt on Mucinex BID and prednisone taper. Pt also on oral Lasix. Pt s/p thoracentesis w/ 750 ml fluid removed. Pt at risk for respiratory decompensation.     Cardiac status in setting of Afib, diastolic heart failure, HTN c/b hypotension: difficulty noted d/t rate control d/t low blood pressure (SBPs 90-100s), on Cardizem, Metoprolol, and Lasix. Pt's Cardizem dosage increased last on 4/30/23. Will need ongoing assessment of cardiac status and further titration of cardiac meds as indicated. Provide ongoing education to pt on cardiac diet, as well as instruction in how to safely manage her heart failure - such as importance of daily weights. Pt also on Xarelto for anticoagulation.     Enterocolitis due to Clostridium Difficile: on Fidaxomicin taper. C.Diff is severe given 4th episode of recurrent C.Diff. Assist w/ bowel mgmt given bowel incontinence. Pt at risk for altered skin integrity, infection and falls in setting of bowel incontinence.    Orthopedic needs in setting of recent left femur fracture s/p percutaneous pinning, and new right toe fractures (3rd, 4th, 5th, and 1st and 3rd metatarsal fractures): CAM boot for R foot. WBAT to BLEs.     Renal function, fluid, and electrolyte balance: monitor renal function closely in setting of CKD stage III and diuresis w/ po Lasix.     Pain: Patient will need ongoing assessment and adjustment of pain  medications for optimal participation in therapies. Pt receiving PRN Tylenol and Flexeril.     Rheumatoid arthritis: on prednisone.     Mental health in setting of depression: currently on Cymbalta and Melatonin; promote effective coping strategies in pt w/ multiple chronic health concerns.    She is at risk for falls w/ injury given weakness, impaired balance, and gait instability. She is at risk for DVT.     Past Medical/Surgical History  Surgery in the past 100 days: Yes  Additional relevant past medical history: Paroxysmal Afib, HTN, COPD, pulm HTN (on chronic O2), diastolic HF, CKD stage 3, left ICA occlusion, RA, hx of gout, GERD, and recent hospitalization for left hip fracture s/p closed reduction percutaneous pinning, RA, sepsis, hypovolemic shock    Level of Functioning Prior to Admission:  LIVING ENVIRONMENT  People in Home: spouse  Current Living Arrangements: condominium  Home Accessibility: no concerns (no steps)  Transportation Anticipated: family or friend will provide  Living Environment Comments: Lives in [facility] with .    SELF-CARE  Usual Activity Tolerance: moderate  Equipment Currently Used at Home: walker, rolling, wheelchair, manual (4WW and FWW)  Activity/Exercise/Self-Care Comment: Pt said she walked shorter distances with a walker and she said she has a WC. Pt did not indicate how much assist with mobility. (Pt does have assist with ADLS and home ADLs. Pt said she drives.)    Additional Comments: Patient's daughter and son-in-law live in same condo complex but on the 3rd floor so they are available to assist as needed. Pt's son-in-law does not work so is available 24/7, along with patient's spouse.     Level of Function: GG Scale (Section GG Functional Ability and Goals; CMS's SINGH Version 3.0 Manual effective 10.1.2019):  PT Current Function Goals for Rehab   Bed Rolling 4 Supervision or touching assitance 6 Independent   Supine to Sit 4 Supervision or touching assitance 6 Independent    Sit to Stand 1 Dependent 6 Independent   Transfer 1 Dependent 6 Independent   Ambulation 1 Dependent 6 Independent   Stairs 88 Not attempted due to safety 4 Supervision or touching assitance     OT Current Function Goals for Rehab   Feeding 4 Supervision or touching assitance 5 Setup or clean-up assistance   Grooming 3 Partial/moderate assistance 5 Setup or clean-up assistance   Bathing 1 Dependent 4 Supervision or touching assitance   Upper Body Dressing 2 Substantial/maximal assistance 5 Setup or clean-up assistance   Lower Body Dressing 2 Substantial/maximal assistance 6 Independent   Toileting 1 Dependent 4 Supervision or touching assitance   Toilet Transfer 2 Substantial/maximal assistance 6 Independent   Tub/Shower Transfer 88 Not attempted due to safety 4 Supervision or touching assitance   Cognition Impaired: 12/30 on SLUMS Supervision     SLP Current Function Goals for Rehab   Swallow Not Impaired Not applicable   Communication Not Impaired Not applicable     Current Diet:  0-Thin, 7-Regular and Cardiac (NA <2400 mg)    Summary Statement:  The pt needs cues for how to safely perform transfers w/ use of WW and CAM boot on RLE. She performs supine >sit w/ SBA and requires min A x2 for sit > supine. She performs STS transfers to WW w/ min A x2 w/ cues for safe hand placement and transfer technique. She ambulates w/ min A x2 using WW 10 ft + 20 ft w/ seated rest breaks needed d/t LAGUNAS and R knee pain. She needs cues for upright posture, sequencing, and improved step length when ambulating, as well as a w/c follow d/t fatigue and LAGUNAS. She requires max A for donning/doffing R CAM boot. Pt required Mod A to stand from BSC, when attempting pericares posterior LOB and Mod A required to lower back onto commode. Pt required increased time for seated rest break on commode d/t SOB, then Max A to stand from BSC, CGA for balance and Max A for pericares.  The pt would benefit from ongoing cognitive assessment and  intervention as pt demos deficits in memory, executive functioning, attention, and problem-solving.  She scored a 12/30 on SLUMS indicating dementia level impairment on 4/28/23.       Expected Therapies and Services Required During Inpatient Rehab Admission  Intensity of Therapy: Patient requires intensive therapies not available in a lesser level of care. Patient is motivated, making gains, and can tolerate 3 hours of therapy a day.  Physical Therapy: 90 minutes per day, 7 days a week for 12 days  Occupational Therapy: 90 minutes per day, 7 days a week for 12 days  Speech and Language Therapy: No SLP needs anticipated.   Rehabilitation Nursing Needs: Patient requires 24 hour Rehab Nursing to manage bowel program, vitals, medication education, carryover of new rehab techniques, care coordination, skin integrity, pain management and provide safe environment for patient at falls risk.    Precautions/restrictions/special needs:  Precautions: fall precautions, isolation precautions and Weight bearing precautions (WBAT BLEs with CAM boot on R)  Restrictions: CAM boot on R  Special Needs: isolation and oxygen    Expected Level of Improvement: Mod I with mobility, transfers, and ADLs and anticipate assist with   Expected Length of time to achieve: 14 days    Anticipated Discharge Needs:  Anticipated Discharge Destination: Home  Anticipated Discharge Support: Family member  24/7 support available : Yes  Identified caregiver(s):  Spouse, daughter Enid, and son-in-law  Anticipated Discharge Needs: Home with homecare    Identified challenges/barriers: cognitive deficits    Liaison signature/date/time:    Physician statement of review and agreement:  I have reviewed and am in agreement of the need for IRF stay to address above functional and medical needs. In addition to above statements address, Patient requires intensive active and ongoing therapeutic intervention and multiple therapies; Patient requires medical supervision;  Expected to actively participate in the intensive rehab program; Sufficiently stable to actively participate; Expectation for measurable improvement in functional capacity or adaption to impairments.    MD signature/date/time:

## 2023-04-29 NOTE — PROGRESS NOTES
[facility]    Medicine Progress Note - Hospitalist Service    Date of Admission:  4/24/2023    Assessment & Plan      Fatuma Henry is a 78 yo medically complex lady with hx of Parox afib, htn, lipids, pulm htn (on chronic O2), diastolic HF, CKD stage 3, left ICA occlusion, RA, hx of gout, gerd, recent hospitalization for left hip fracture after fall at home, and completed TCU and recently discharge home.     She fell at home prior to arrival, broke numerous toes on the right foot, presented with right foot pain, dyspnea, productive cough, and worsening leg edema.     Acute on chronic respiratory failure with hypoxia, resolved  COPD with acute exacerbation  Right-sided pleural effusion,  Diastolic CHF with acute exacerbation.    Pulmonary hypertension  Significant wheezing admission, subsided with few rounds of nebulizer/Solu-Medrol  -DuoNeb 4 times daily, albuterol neb every 2 hours as needed, prednisone taper  -Transition to oral Lasix 40 mg daily since not on diuretics at home  -Status post thoracentesis 750 cc fluid removed, and is transudate.    - Pleural fluid cultures have been negative so far  - Peripheral cultures negative.      Chronic atrial fibrillation, HR 's  Difficult to rate control due low BP (SBP 's) on Cardizem and metoprolol  Anticoagulated on Xarelto- restarted to AC on 4/26.   Metoprolol 50 mg daily, and Cardizem increased to 180 by cardiology.  BPs remain soft SBP low 100s, resting HR 90- 100s.    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   -Pt referred the patient to [facility]  GI for FMT consultation appointment scheduled for November.  -Avoid antibiotics by all cost    Recurrent falls,    S/p Hip fracture S/p closed reduction with percutaneous pinning 3/14/23  Multiple toes fracture of the right foot: 3, 4, 5th, 1st & 3rd metatarsal bone.  Completed rehabilitation at TCU, return  "home couple days ago. She fell at home prior to arrival, reportedly no syncope or near syncope. Patient fell while getting up from the chair by the table, with daughter being nearby.  Foot x-rays demonstrated above fractures.     -ER MD d/w B6, recommended CAM boot, orthotist consult placed.   -PT/OT recommending TCU but difficulty with insurance since she used up all her 100 days for rehab for this year  -Pain management     CKD stage 3  -Avoid nephrotoxins and hypotension, monitor closely renal function while diuresing    Essential HTN  -Continue PTA Cardizem and metoprolol, with holding parameters.     Left ICA occlusion  -To follow with vascular as outpt     GERD  -Con pepcid     Depression  -On Cymbalta     H/O pulm nodules with positive PET  Stable 17 x 19 mm groundglass nodule in the lingula since 07/21/2022.   Needs follow up with pulm clinic as outpt and serial imaging 3 to 6-month    H/O RA  -On chronic pred  -Hx of methotrexate--was discontinued during recent hospitalization.  No exacerbation of RA off methotrexate.    Diet: Cardiac diet  DVT Prophylaxis: DOAC-holding for now  Brandon Catheter: Not present  Lines: None     Cardiac Monitoring: None  Code Status:  DNR/DNI       Diet: Combination Diet Low Saturated Fat Na <2400mg Diet    DVT Prophylaxis: DOAC  Brandon Catheter: PRESENT, indication: Retention  Lines: None     Cardiac Monitoring: ACTIVE order. Indication: Chest pain/ ACS rule out (24 hours)  Code Status: No CPR- Do NOT Intubate      Clinically Significant Risk Factors              # Hypoalbuminemia: Lowest albumin = 3.4 g/dL at 4/24/2023 12:33 PM, will monitor as appropriate            # Overweight: Estimated body mass index is 26.78 kg/m  as calculated from the following:    Height as of this encounter: 1.651 m (5' 5\").    Weight as of this encounter: 73 kg (160 lb 15 oz).          Disposition Plan She is a two-person heavy assist with transfers and ambulation.  She does not qualify for TCU " because of her medical insurance.  She did over stay 100 days this year for her rehab hence it will not cover any more days for the rest of the year.  CM is assisting with placement.    Expected Discharge Date: 05/01/2023      Destination: home with family;home with help/services  Discharge Comments: 2 to 3-day inpatient stay to complete work-up, wean off O2 and rate control for atrial fibrillation. Might qualify for Acute Rehab but needs auth.          Bon Rosales MD  Hospitalist Service  [facility]  Securely message with [employer] (more info)  Text page via [facility] Paging/Directory   ______________________________________________________________________    Interval History   No acute events overnight.  On room air.  Maintain sats 88 to 91%.  No complaints noted today.    Physical Exam   Vital Signs: Temp: 97  F (36.1  C) Temp src: Axillary BP: 128/60 Pulse: 97   Resp: 22 SpO2: 93 % O2 Device: None (Room air)    Weight: 160 lbs 14.97 oz    General: AO X 3, lying comfortably in bed in no apparent distress  HEENT: pupils equal and reactive to light and accommodation, extraocular movements are intact; oral mucosa moist  Neck: Supple no raised JVD, no rigidity  Respiratory: Diminished bibasilar left greater than right, mild labored respirations  CVS: nl s1 s2, regular rate and rhythm, no murmur  P/A: soft, nt,nd, no guarding rigidity or rebound tenderness  Ext: no edema  Neuro: no focal neurological deficits noted, cranial nerves 2-12 grossly intact  Psychiatry: normal mood and affect  Skin: No obvious skin rashes or ulcers      Medical Decision Making       35 MINUTES SPENT BY ME on the date of service doing chart review, history, exam, documentation & further activities per the note.      Data     I have personally reviewed the following data over the past 24 hrs:    N/A  \   N/A   / N/A     N/A N/A N/A /  N/A   3.8 N/A N/A \       Imaging results reviewed over the past 24 hrs:   Recent Results  (from the past 24 hour(s))   XR Pelvis and Hip Left 2 Views    Narrative    EXAM: XR PELVIS AND HIP LEFT 2 VIEWS  LOCATION: [facility]  DATE/TIME: 4/28/2023 12:47 PM CDT    INDICATION: S p left hip pinning following femoral neck fracture.  COMPARISON: 03/14/2023, 03/25/2023.      Impression    IMPRESSION: Internal fixation hardware securing a left femoral neck fracture. Hardware is in place and alignment is near-anatomic. Mild degenerative changes of underlying left hip. Bones are demineralized.     Subtle sclerosis along the left sacral ala in keeping with a subacute healing sacral insufficiency fracture.

## 2023-04-29 NOTE — PROGRESS NOTES
RCAT Treatment Plan    Patient Score: 10  Patient Acuity: 4    Clinical Indication for Therapy: COPD    Therapy Ordered: Xopenex/Atrovent Q 6 hours PRN wheezing/SOB    Assessment Summary: Day 6 admit s/p fall with Rt foot/ankle fractures, Hx COPD, Pt uses Symbicort BID at home, Albuterol MDI Q 4 PRN, last CT chest 4/24/23 bilat atelectasis and pleural effusions. BS clear and diminished, pt routinely refusing HHN txs. Room air SpO2 92 %. Last BNP 4340 4/24/23, Lasix 40 mg daily.I/O -400.  RCAT acuity level 4, change HHN tx to Q 6 hour PRN, discontinue RCAT. RT to follow.       CT 4/24/23    FINDINGS:   LUNGS AND PLEURA: The two subsolid nodular opacities in the lingula are unchanged at 2.0 cm and 0.9 cm (series 4 images 140 and 133, respectively). Near-complete right middle lobe atelectasis has developed. Moderate volume right pleural effusion layering posteriorly and the associated atelectasis in the right lower lobe posteriorly have increased and the minimal left posterior basilar pleural fluid and atelectasis are unchanged. No pneumothorax.    Rony Aiken, RT  4/29/2023

## 2023-04-29 NOTE — PROVIDER NOTIFICATION
Patient complaint of chest tightness, rated 3 out of 10, shortness of breath and nausea. EKG done. Dr. Booker notifed. Now new order.

## 2023-04-29 NOTE — PLAN OF CARE
"Goal Outcome Evaluation:      Plan of Care Reviewed With: patient    Patient reports feeling \"generally crappy\" today. Refused breakfast and lunch. Zofran given with relief this afternoon. Cardiac monitor discontinued. Continue hourly rounding.   "

## 2023-04-29 NOTE — PLAN OF CARE
Problem: Pulmonary Impairment  Goal: Effective Airway Clearance  Outcome: Progressing  Goal: Optimal Gas Exchange  Outcome: Progressing     Problem: Chest Pain  Goal: Resolution of Chest Pain Symptoms  Outcome: Progressing     Problem: Nausea and Vomiting  Goal: Nausea and Vomiting Relief  Outcome: Progressing     Goal Outcome Evaluation:         Patient complaint of chest heaviness, rated 3, shortness of breath and nausea. EKG with no changes. Vitals stable. Put on 1L of oxygen for comfort.  Chest pain resolved on it's own, but still complaining shortness of breath. Lungs diminished. Lower extremities more swollen. House officer updated, no new order. Gave oral zofran. Patient restless tonight.

## 2023-04-29 NOTE — PROGRESS NOTES
Patient declined 8pm nebulizers. Patient wanted to get ready for bed. RT will continue to follow.

## 2023-04-30 PROBLEM — Z91.81 PERSONAL HISTORY OF FALL: Status: ACTIVE | Noted: 2023-01-01

## 2023-04-30 PROBLEM — R26.81 UNSTEADY GAIT WHEN WALKING: Status: ACTIVE | Noted: 2023-01-01

## 2023-04-30 NOTE — PLAN OF CARE
Problem: Orthopaedic Fracture  Goal: Optimal Functional Ability  Outcome: Progressing  Intervention: Optimize Functional Ability  Recent Flowsheet Documentation  Taken 4/29/2023 2343 by Davina Song, RN  Activity Management: activity adjusted per tolerance  Taken 4/29/2023 2006 by Davina Song, RN  Activity Management: activity adjusted per tolerance     Problem: Pulmonary Impairment  Goal: Effective Airway Clearance  Outcome: Progressing  Goal: Optimal Gas Exchange  Outcome: Progressing     Problem: Sleep Disturbance  Goal: Adequate Sleep/Rest  Outcome: Progressing     Goal Outcome Evaluation:         Patient slept well tonight. Got melatonin at bedtime. Lungs diminished. On room air. Utilizing CAM boot when up. Had 1 black large stool tonight. Denied any pain.

## 2023-04-30 NOTE — PLAN OF CARE
Goal Outcome Evaluation:      Plan of Care Reviewed With: patient    Patient reports feeling much better today. No nausea. Up to chair for meals. Flexeril given for leg cramps. CAM boot when out of bed. Continue hourly rounding.

## 2023-04-30 NOTE — PROGRESS NOTES
Care Management Follow Up    Length of Stay (days): 6    Expected Discharge Date: 05/01/2023     Concerns to be Addressed: discharge planning     Patient plan of care discussed at interdisciplinary rounds: Yes    Anticipated Discharge Disposition:  Acute Rehab vs LTC     Anticipated Discharge Services:  Acute Rehab vs LTC  Anticipated Discharge DME:  None    Patient/family educated on Medicare website which has current facility and service quality ratings:    Education Provided on the Discharge Plan:  N/A  Patient/Family in Agreement with the Plan:  Yes    Referrals Placed by CM/SW:  [facility]  Private pay costs discussed: Transportation, TCU, LTC    Additional Information:  Chart reviewed. Pt has been in TCU or hospital since November 2022. Prior to that, she lived in a condo with her spouse, her daughter and NEYMAR also reside onsite. Pt discharged home on 4/17/23 with PT/OT/SN/HHA through [facility]. She is presenting now after falling and fracturing several toes on her right foot.    Pt is out of Medicare days for TCU, and while TCU is recommended by PT/OT, another stay would not be covered. [facility] accepted based on family's willingness to have a lift at home and provide cares following her stay. They are submitting to insurance for approval, but would not be able to accept the pt prior to 5/1. Family has been provided an application for LTC MA and a list of facilities to review for LTC if [facility] cannot accommodate.  [facility] to be [facility] discharge plan, family goal is for pt to ultimately return home and resume services. CM following.      RODRÍGUEZ Gallegos

## 2023-04-30 NOTE — SIGNIFICANT EVENT
Patient tachypneic. Working very hard to breath. Put on 2 L oxygen for comfort. Lungs sounds more diminished. Paged Dr. Rosales. See new orders.     CXR done. Patient reports her breathing has improved significantly. RR WNL.

## 2023-04-30 NOTE — PROGRESS NOTES
[facility]    Medicine Progress Note - Hospitalist Service    Date of Admission:  4/24/2023    Assessment & Plan      Fatuma Henry is a 78 yo medically complex lady with hx of Parox afib, htn, lipids, pulm htn (on chronic O2), diastolic HF, CKD stage 3, left ICA occlusion, RA, hx of gout, gerd, recent hospitalization for left hip fracture after fall at home, and completed TCU and recently discharge home.     She fell at home prior to arrival, broke numerous toes on the right foot, presented with right foot pain, dyspnea, productive cough, and worsening leg edema.     Acute on chronic respiratory failure with hypoxia, resolved  COPD with acute exacerbation  Right-sided pleural effusion,  Diastolic CHF with acute exacerbation.    Pulmonary hypertension  Significant wheezing admission, subsided with few rounds of nebulizer/Solu-Medrol  -DuoNeb 4 times daily, albuterol neb every 2 hours as needed, prednisone taper  -Transition to oral Lasix 40 mg daily since not on diuretics at home  -Status post thoracentesis 750 cc fluid removed, and is transudate.    - Pleural fluid cultures have been negative so far  - Peripheral cultures negative.      Chronic atrial fibrillation, HR 's  Difficult to rate control due low BP (SBP 's) on Cardizem and metoprolol  Anticoagulated on Xarelto- restarted to AC on 4/26.   Metoprolol 50 mg daily, and Cardizem increased to 180 by cardiology.  BPs remain soft SBP low 100s, resting HR 90- 100s.    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   -Pt referred the patient to [facility]  GI for FMT consultation appointment scheduled for November.  -Avoid antibiotics by all cost    Recurrent falls,    S/p Hip fracture S/p closed reduction with percutaneous pinning 3/14/23  Multiple toes fracture of the right foot: 3, 4, 5th, 1st & 3rd metatarsal bone.  Completed rehabilitation at TCU, return  "home couple days ago. She fell at home prior to arrival, reportedly no syncope or near syncope. Patient fell while getting up from the chair by the table, with daughter being nearby.  Foot x-rays demonstrated above fractures.     -ER MD d/w B6, recommended CAM boot, orthotist consult placed.   -PT/OT recommending TCU but difficulty with insurance since she used up all her 100 days for rehab for this year  -Pain management     CKD stage 3  -Avoid nephrotoxins and hypotension, monitor closely renal function while diuresing    Essential HTN  -Continue PTA Cardizem and metoprolol, with holding parameters.     Left ICA occlusion  -To follow with vascular as outpt     GERD  -Con pepcid     Depression  -On Cymbalta     H/O pulm nodules with positive PET  Stable 17 x 19 mm groundglass nodule in the lingula since 07/21/2022.   Needs follow up with pulm clinic as outpt and serial imaging 3 to 6-month    H/O RA  -On chronic pred  -Hx of methotrexate--was discontinued during recent hospitalization.  No exacerbation of RA off methotrexate.    Diet: Cardiac diet  DVT Prophylaxis: DOAC-holding for now  Brandon Catheter: Not present  Lines: None     Cardiac Monitoring: None  Code Status:  DNR/DNI       Diet: Combination Diet Low Saturated Fat Na <2400mg Diet    DVT Prophylaxis: DOAC  Brandon Catheter: PRESENT, indication: Retention  Lines: None     Cardiac Monitoring: None  Code Status: No CPR- Do NOT Intubate      Clinically Significant Risk Factors              # Hypoalbuminemia: Lowest albumin = 3.4 g/dL at 4/24/2023 12:33 PM, will monitor as appropriate            # Overweight: Estimated body mass index is 26.92 kg/m  as calculated from the following:    Height as of this encounter: 1.651 m (5' 5\").    Weight as of this encounter: 73.4 kg (161 lb 12.8 oz).          Disposition Plan She is a two-person heavy assist with transfers and ambulation.  She does not qualify for TCU because of her medical insurance.  She did over stay 100 " days this year for her rehab hence it will not cover any more days for the rest of the year.  CM is assisting with placement.    Expected Discharge Date: 05/01/2023      Destination: home with family;home with help/services  Discharge Comments: 2 to 3-day inpatient stay to complete work-up, wean off O2 and rate control for atrial fibrillation. Might qualify for Acute Rehab but needs auth.          Bon Rosales MD  Hospitalist Service  [facility]  Securely message with [employer] (more info)  Text page via [employer] Paging/Directory   ______________________________________________________________________    Interval History   No acute events overnight.  On room air.  Maintain sats 88 to 91%.  No complaints noted today.    Physical Exam   Vital Signs: Temp: 97.8  F (36.6  C) Temp src: Axillary BP: 117/55 Pulse: 81   Resp: 20 SpO2: 94 % O2 Device: None (Room air)    Weight: 161 lbs 12.8 oz    General: AO X 3, lying comfortably in bed in no apparent distress  HEENT: pupils equal and reactive to light and accommodation, extraocular movements are intact; oral mucosa moist  Neck: Supple no raised JVD, no rigidity  Respiratory: Diminished bibasilar left greater than right, mild labored respirations  CVS: nl s1 s2, regular rate and rhythm, no murmur  P/A: soft, nt,nd, no guarding rigidity or rebound tenderness  Ext: no edema  Neuro: no focal neurological deficits noted, cranial nerves 2-12 grossly intact  Psychiatry: normal mood and affect  Skin: No obvious skin rashes or ulcers      Medical Decision Making       35 MINUTES SPENT BY ME on the date of service doing chart review, history, exam, documentation & further activities per the note.      Data     I have personally reviewed the following data over the past 24 hrs:    N/A  \   N/A   / N/A     N/A N/A N/A /  N/A   3.9 N/A N/A \       Imaging results reviewed over the past 24 hrs:   No results found for this or any previous visit (from the past 24  hour(s)).

## 2023-05-01 NOTE — PLAN OF CARE
Problem: Plan of Care - These are the overarching goals to be used throughout the patient stay.    Goal: Plan of Care Review  Description: The Plan of Care Review/Shift note should be completed every shift.  The Outcome Evaluation is a brief statement about your assessment that the patient is improving, declining, or no change.  This information will be displayed automatically on your shift note.  Outcome: Progressing     Problem: Orthopaedic Fracture  Goal: Absence of Bleeding  Outcome: Progressing     Problem: Chest Pain  Goal: Resolution of Chest Pain Symptoms  Outcome: Progressing     Problem: Plan of Care - These are the overarching goals to be used throughout the patient stay.    Goal: Absence of Hospital-Acquired Illness or Injury  Intervention: Identify and Manage Fall Risk  Recent Flowsheet Documentation  Taken 5/1/2023 0832 by Tracye Peres RN  Safety Promotion/Fall Prevention: activity supervised     Problem: Risk for Delirium  Goal: Improved Behavioral Control  Intervention: Minimize Safety Risk  Recent Flowsheet Documentation  Taken 5/1/2023 0832 by Tracey Peres RN  Enhanced Safety Measures: other (see comments)  Goal: Improved Attention and Thought Clarity  Intervention: Maximize Cognitive Function  Recent Flowsheet Documentation  Taken 5/1/2023 0832 by Tracey Peres RN  Sensory Stimulation Regulation: care clustered  Reorientation Measures: clock in view     Problem: Orthopaedic Fracture  Goal: Optimal Functional Ability  Intervention: Optimize Functional Ability  Recent Flowsheet Documentation  Taken 5/1/2023 0832 by Tracey Peres RN  Activity Management: up in chair  Goal: Absence of Infection Signs and Symptoms  Intervention: Prevent or Manage Infection  Recent Flowsheet Documentation  Taken 5/1/2023 0832 by Tracey Peres RN  Isolation Precautions: contact precautions maintained  Goal: Effective Oxygenation and Ventilation  Intervention: Promote Airway  Secretion Clearance  Recent Flowsheet Documentation  Taken 5/1/2023 0832 by Tracey Peres RN  Cough And Deep Breathing: done independently per patient  Activity Management: up in chair     Problem: COPD (Chronic Obstructive Pulmonary Disease) Comorbidity  Goal: Maintenance of COPD Symptom Control  Intervention: Maintain COPD-Symptom Control  Recent Flowsheet Documentation  Taken 5/1/2023 0832 by Tracey Peres RN  Medication Review/Management: medications reviewed   Goal Outcome Evaluation:       Patient is alert and able to let her needs be known. Up in chair for meals. CAM boot on while out of bed. VSS. evangelista in place. Reports mouth to be sore, MD paged, no response as of now. Daughter at bedside asking questions regarding follow up ortho appointments. MD was paged, no response. Will inform oncoming nurse to attempt to re-page if needed.

## 2023-05-01 NOTE — PROGRESS NOTES
[facility]    Medicine Progress Note - Hospitalist Service    Date of Admission:  4/24/2023    Assessment & Plan      Fatuma Henry is a 78 yo medically complex lady with hx of Parox afib, htn, lipids, pulm htn (on chronic O2), diastolic HF, CKD stage 3, left ICA occlusion, RA, hx of gout, gerd, recent hospitalization for left hip fracture after fall at home, and completed TCU and recently discharge home.     She fell at home prior to arrival, broke numerous toes on the right foot, presented with right foot pain, dyspnea, productive cough, and worsening leg edema.     Acute on chronic respiratory failure with hypoxia, resolved  COPD with acute exacerbation, resolved, currently stable  Right-sided pleural effusion, status post  750 cc fluid removed  Diastolic CHF with acute exacerbation.  Resolved, now stable  Pulmonary hypertension, stable  Significant wheezing admission, subsided with few rounds of nebulizer/Solu-Medrol  - DuoNeb 4 times daily, albuterol neb every 2 hours as needed, prednisone taper  - Transition to oral Lasix 40 mg daily since not on diuretics at home  - Status post thoracentesis 750 cc fluid removed, and is transudate.    - Pleural fluid cultures have been negative so far  - Peripheral cultures negative.    Chronic atrial fibrillation, HR 's  Difficult to rate control due low BP (SBP 's) on Cardizem and metoprolol  Anticoagulated on Xarelto- restarted to AC on 4/26.   Metoprolol 50 mg daily, and Cardizem increased to 240 mg on 4/30.  BPs remain soft SBP low 100-110's, resting HR 90- 100s.    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   -Pt referred the patient to [facility]  GI for FMT consultation appointment scheduled for November.  -Avoid antibiotics by all cost    Recurrent falls,    S/p Hip fracture S/p closed reduction with percutaneous pinning 3/14/23  Multiple toes fracture of  "the right foot: 3, 4, 5th, 1st & 3rd metatarsal bone.  Completed rehabilitation at TCU, return home couple days ago. She fell at home prior to arrival, reportedly no syncope or near syncope. Patient fell while getting up from the chair by the table, with daughter being nearby.  Foot x-rays demonstrated above fractures.     -ER MD d/w B6, recommended CAM boot, orthotist consult placed.   -PT/OT recommending TCU but difficulty with insurance since she used up all her 100 days for rehab for this year  -Pain management     CKD stage 3  -Avoid nephrotoxins and hypotension, monitor closely renal function while diuresing    Essential HTN  -Continue PTA Cardizem and metoprolol, with holding parameters.     Left ICA occlusion  -To follow with vascular as outpt     GERD  -Con pepcid     Depression  -On Cymbalta     H/O pulm nodules with positive PET  Stable 17 x 19 mm groundglass nodule in the lingula since 07/21/2022.   Needs follow up with pulm clinic as outpt and serial imaging 3 to 6-month    H/O RA  -On chronic pred  -Hx of methotrexate--was discontinued during recent hospitalization.  No exacerbation of RA off methotrexate.    Diet: Cardiac diet  DVT Prophylaxis: DOAC-holding for now  Brandon Catheter: Not present  Lines: None     Cardiac Monitoring: None  Code Status:  DNR/DNI       Diet: Combination Diet Low Saturated Fat Na <2400mg Diet    DVT Prophylaxis: DOAC  Brandon Catheter: PRESENT, indication: Retention  Lines: None     Cardiac Monitoring: None  Code Status: No CPR- Do NOT Intubate      Clinically Significant Risk Factors              # Hypoalbuminemia: Lowest albumin = 3.4 g/dL at 4/24/2023 12:33 PM, will monitor as appropriate            # Overweight: Estimated body mass index is 26.81 kg/m  as calculated from the following:    Height as of this encounter: 1.651 m (5' 5\").    Weight as of this encounter: 73.1 kg (161 lb 1.6 oz).          Disposition Plan She is a two-person heavy assist with transfers and " ambulation.  She does not qualify for TCU because of her medical insurance.  She did over stay 100 days this year for her rehab hence it will not cover any more days for the rest of the year.  CM is assisting with placement.    Expected Discharge Date: 05/01/2023      Destination: home with family;home with help/services  Discharge Comments: Discharge to  [facility] pending insurance auth          Bon Rosales MD  Hospitalist Service  [facility]  Securely message with [employer] (more info)  Text page via [employer] Paging/Directory   ______________________________________________________________________    Interval History   No acute events overnight.  On room air.  .  No complaints noted today.  Daughter at bedside.  Instructed RN to discontinue any comfort oxygen due to her severe COPD, AND maintain sats 88 to 91%.    Physical Exam   Vital Signs: Temp: 97.3  F (36.3  C) Temp src: Oral BP: 129/58 Pulse: 80   Resp: 18 SpO2: 97 % O2 Device: Nasal cannula Oxygen Delivery: 2 LPM  Weight: 161 lbs 1.6 oz    General: AO X 3, lying comfortably in bed in no apparent distress  HEENT: pupils equal and reactive to light and accommodation, extraocular movements are intact; oral mucosa moist  Neck: Supple no raised JVD, no rigidity  Respiratory: Diminished bibasilar left greater than right, Nonlabored respirations  CVS: nl s1 s2, regular rate and rhythm, no murmur  P/A: soft, nt,nd, no guarding rigidity or rebound tenderness  Ext: no edema  Neuro: no focal neurological deficits noted, cranial nerves 2-12 grossly intact  Psychiatry: normal mood and affect  Skin: No obvious skin rashes or ulcers      Medical Decision Making       35 MINUTES SPENT BY ME on the date of service doing chart review, history, exam, documentation & further activities per the note.      Data     I have personally reviewed the following data over the past 24 hrs:    N/A  \   N/A   / N/A     N/A N/A N/A /  N/A   4.2 N/A N/A \       Imaging results  reviewed over the past 24 hrs:   Recent Results (from the past 24 hour(s))   XR Chest Port 1 View    Narrative    EXAM: XR CHEST PORT 1 VIEW  LOCATION: [facility]  DATE/TIME: 4/30/2023 4:25 PM CDT    INDICATION: Dyspnea  COMPARISON: 04/06/2023 chest x-ray. CT 04/24/2023.      Impression    IMPRESSION: Small right pleural effusion appears slightly smaller when compared to 04/24/2023 CT. No new findings.

## 2023-05-01 NOTE — PLAN OF CARE
Problem: Chest Pain  Goal: Resolution of Chest Pain Symptoms  Outcome: Progressing     Problem: Sleep Disturbance  Goal: Adequate Sleep/Rest  Outcome: Progressing     Goal Outcome Evaluation:           Denied any pain and shortness of breath. On 2L of oxygen for comfort. Got melatonin for sleep. Brandon intact.

## 2023-05-01 NOTE — TREATMENT PLAN
RCAT Treatment Plan    Patient Score: 6  Patient Acuity: 4    Clinical Indication for Therapy: prevent atelectasis,COPD, Right small Plural effuson    Therapy Ordered: Flutter valve QID    Assessment Summary: Patient seen on RA, clear/diminished. Patient demonstrated good effort and technique with flutter valve. 2 cycles of 10 breaths. Level 5 (highest ) recorded 25 cmh20.     Yoli Rosales, RT  4/30/2023

## 2023-05-01 NOTE — PROGRESS NOTES
"Care Management Follow Up    Length of Stay (days): 7    Expected Discharge Date: 05/01/2023     Concerns to be Addressed: discharge planning     Patient plan of care discussed at interdisciplinary rounds: Yes    Anticipated Discharge Disposition: ARU?   Anticipated Discharge Services: [facility]   Anticipated Discharge DME: per treatment team     Patient/family educated on Medicare website which has current facility and service quality ratings: N/A   Education Provided on the Discharge Plan: N/A   Patient/Family in Agreement with the Plan: yes     Referrals Placed by CM/SW: no additional referrals have been sent at this time   Private pay costs discussed: Not applicable    Additional Information:  9:00 AM  SW called [facility] admissions and was informed that Pt is fifth on the unit's waitlist at this time. They anticipated a bed won't be available until midweek at the earliest. Pt is #5 on the waitlist. CM to follow up.     Social History:  \"Pt has been in TCU or hospital since November 2022. Prior to that, she lived in a condo with her spouse, her daughter and NEYMAR also reside onsite. Pt discharged home on 4/17/23 with [facility]OT/SN/[facility]. She is presenting now after falling and fracturing several toes on her right foot.     Pt is out of Medicare days for TCU, and while TCU is recommended by PT/OT, another stay would not be covered. [facility] accepted based on family's willingness to have a lift at home and provide cares following her stay. They are submitting to insurance for approval, but would not be able to accept the pt prior to 5/1. Family has been provided an application for LTC MA and a list of facilities to review for LTC if [facility] cannot accommodate.  [facility] has asked to be updated regarding discharge plan, family goal is for pt to ultimately return home and resume services.\"    BRY Limon        "

## 2023-05-01 NOTE — PROGRESS NOTES
RESPIRATORY CARE NOTE     Patient was on room air and tolerated it well.    They have completed aerobika with RT multiple times today and demonstrated good technique.    RT will continue to assess and monitor cardiopulmonary status.     Ivet Bailey, RT

## 2023-05-01 NOTE — PROGRESS NOTES
"CLINICAL NUTRITION SERVICES - ASSESSMENT NOTE     Nutrition Prescription    RECOMMENDATIONS FOR MDs/PROVIDERS TO ORDER:    Malnutrition Status:    Does not meet criteria for malnutrition    Recommendations already ordered by Registered Dietitian (RD):  Trial magic cup (orange) and gelatein plus dialy to help pt meet her   needs    Future/Additional Recommendations:  Monitor po intake, weight, labs, poc     REASON FOR ASSESSMENT  Fatuma Henry is a/an 77 year old female assessed by the dietitian for LOS    Pt with a past medical history of parox Afib, hypertension, hyperlipidemia, pulmonary hypertension (on chronic O2), diastolic HF, CKD stage 3, left ICA occlusion, RA, gout, GERD recent hospitalization for left hip fracture after fall at home-completed TCU and recently discharged home and then fell at home prior to arrival, broke numerous toes on the right foot, presented with right foot pain, dyspnea, productive cough and worsening leg edema    NUTRITION HISTORY  Pt states she follows a low sodium diet at home. She states she's eating fine but not eating 75% of meals (she thinks she's eating more like 50% of meals)    CURRENT NUTRITION ORDERS  Diet: Low Saturated Fat/2400 mg Sodium  Intake/Tolerance: % at lunch on 4/30 and breakfast and dinner on 4/28.  Refused lunch on 4/29 not all meals are recorded.  Pt does not like ensure but is willing to try a magic cup and high protein jello    LABS  Labs reviewed: K 4.2, Mg 1.7, Glu 114 (4/28/2023)    MEDICATIONS  Medications reviewed: questran, pepcid, folic acid, lasix, culturell, prednisone    ANTHROPOMETRICS  Height: 165.1 cm (5' 5\")  Most Recent Weight: 73.1 kg (161 lb 1.6 oz)    IBW: 56.8 kg  BMI: Overweight BMI 25-29.9  Weight History:   Wt Readings from Last 10 Encounters:   05/01/23 73.1 kg (161 lb 1.6 oz)   04/17/23 83.3 kg (183 lb 9.6 oz)   04/14/23 83 kg (183 lb)   04/13/23 83.7 kg (184 lb 8 oz)   03/28/23 78.9 kg (174 lb)   03/23/23 69.6 kg (153 lb 7 " oz)   01/12/23 69.2 kg (152 lb 9.6 oz)   01/09/23 71.4 kg (157 lb 4.8 oz)   01/05/23 70.2 kg (154 lb 12.8 oz)   01/03/23 70.9 kg (156 lb 3.2 oz)   Per pt's family member-pt had gained weight and has lost the weight she gained.    Dosing Weight: 60.9 kg/ adjusted weight    ASSESSED NUTRITION NEEDS  Estimated Energy Needs: 6629-2403 kcals/day (25 - 30 kcals/kg)  Justification: Maintenance  Estimated Protein Needs: 49-61 grams protein/day (0.8 - 1 grams of pro/kg)  Justification: Maintenance  Estimated Fluid Needs: 1522 mL/day (25 ml/Kg)   Justification: Per provider pending fluid status    PHYSICAL FINDINGS  See malnutrition section below.  Skin: Redness/blanchable buttocks/IT, Sacrum/coccyx  Abrasion upper arm, forearm, hand, finger  Peeling foot  Dmitriy score=17, nutrition noted as adequate  GI: WDL/ nausea  Last BM not documented    MALNUTRITION:  % Weight Loss:  Weight loss does not meet criteria for malnutrition   % Intake:  <75% for > 7 days (moderate malnutrition)  Subcutaneous Fat Loss:  None observed  Muscle Loss:  None observed  Fluid Retention:  Trace to mild ( 1+ to 2+)    Malnutrition Diagnosis: Patient does not meet two of the above criteria necessary for diagnosing malnutrition    NUTRITION DIAGNOSIS  Inadequate oral intake related to poor appetite as evidenced by pt statement of poor po intake      INTERVENTIONS  Implementation  Nutrition Education: Will be provided if education needs arise   Medical food supplement therapy : Add orange magic cup with breakfast meals and gelatein plus with dinner meals  Encouraged po intake    Goals  Patient to consume % of nutritionally adequate meals three times per day, or the equivalent with supplements/snacks.     Monitoring/Evaluation  Progress toward goals will be monitored and evaluated per protocol.

## 2023-05-02 NOTE — PLAN OF CARE
Problem: Orthopaedic Fracture  Goal: Fracture Stability  Outcome: Progressing     Problem: Pulmonary Impairment  Goal: Effective Airway Clearance  Outcome: Progressing  Goal: Optimal Gas Exchange  Outcome: Progressing     Problem: Urinary Retention  Goal: Effective Urinary Elimination  Outcome: Progressing     Goal Outcome Evaluation:         Using CAM boot when up. Lungs diminished. Been on room air all night. No complaint of shortness of breath. Brandon intact. Got melatonin for sleep. Acute rehab at discharge.

## 2023-05-02 NOTE — PROGRESS NOTES
RESPIRATORY CARE NOTE     Patient is on 1 LPM NC. Patient seen for pulmonary toiletries. Patient did well with flutter valve and done with good effort. IS achieved 800. Patient has fair np cough. Patient tolerated therapy well.         Kyle Luna, RT

## 2023-05-02 NOTE — PROGRESS NOTES
[facility]    Medicine Progress Note - Hospitalist Service    Date of Admission:  4/24/2023    Assessment & Plan      Fatuma Henry is a 76 yo medically complex lady with hx of Parox afib, htn, lipids, pulm htn (on chronic O2), diastolic HF, CKD stage 3, left ICA occlusion, RA, hx of gout, gerd, recent hospitalization for left hip fracture after fall at home, and completed TCU and recently discharge home. She fell at home prior to arrival, broke numerous toes on the right foot, presented with right foot pain, dyspnea, productive cough, and worsening leg edema.     Admitted with acute on chronic respiratory failure with hypoxia attributed to COPD exacerbation and right-sided pleural effusion.  Underwent thoracentesis with 750 cc fluid removed from the right lung.  He has been off oxygen for several days.  She improved significantly with the frequent nebulizers and oral prednisone.  Lasix was transitioned to ora as well..  Currently awaiting placement due to TCU.  Difficulty with insurance approval which is the biggest barrier to discharge.     Acute on chronic respiratory failure with hypoxia, resolved  COPD with acute exacerbation, resolved, currently stable  Right-sided pleural effusion, status post  750 cc fluid removed  Diastolic CHF with acute exacerbation.  Resolved, now stable  Pulmonary hypertension, stable  Significant wheezing admission, subsided with few rounds of nebulizer/Solu-Medrol  - DuoNeb 4 times daily, albuterol neb every 2 hours as needed, prednisone taper  - Transition to oral Lasix 40 mg daily since not on diuretics at home  - Status post thoracentesis 750 cc fluid removed, and is transudate.    - Pleural fluid cultures have been negative so far  - Peripheral cultures negative.    Chronic atrial fibrillation, HR 's  Difficult to rate control due low BP (SBP 's) on Cardizem and metoprolol  Anticoagulated on Xarelto- restarted to AC on 4/26.   Metoprolol 50 mg  daily, and Cardizem increased to 240 mg on 4/30.  BPs remain soft SBP low 100-110's, resting HR 90- 100s.    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   -Pt referred the patient to [facility]  GI for FMT consultation appointment scheduled for November.  -Avoid antibiotics by all cost    Recurrent falls,    S/p Hip fracture S/p closed reduction with percutaneous pinning 3/14/23  Multiple toes fracture of the right foot: 3, 4, 5th, 1st & 3rd metatarsal bone.  Completed rehabilitation at TCU, return home couple days ago. She fell at home prior to arrival, reportedly no syncope or near syncope. Patient fell while getting up from the chair by the table, with daughter being nearby.  Foot x-rays demonstrated above fractures.     -ER MD d/w B6, recommended CAM boot, orthotist consult placed.   -PT/OT recommending TCU but difficulty with insurance since she used up all her 100 days for rehab for this year  -Pain management     CKD stage 3  -Avoid nephrotoxins and hypotension, monitor closely renal function while diuresing    Essential HTN  -Continue PTA Cardizem and metoprolol, with holding parameters.     Left ICA occlusion  -To follow with vascular as outpt     GERD  -Con pepcid     Depression  -On Cymbalta     H/O pulm nodules with positive PET  Stable 17 x 19 mm groundglass nodule in the lingula since 07/21/2022.   Needs follow up with pulm clinic as outpt and serial imaging 3 to 6-month    H/O RA  -On chronic pred  -Hx of methotrexate--was discontinued during recent hospitalization.  No exacerbation of RA off methotrexate.    Diet: Cardiac diet  DVT Prophylaxis: DOAC-holding for now  Brandon Catheter: Not present  Lines: None     Cardiac Monitoring: None  Code Status:  DNR/DNI       Diet: Combination Diet Low Saturated Fat Na <2400mg Diet  Snacks/Supplements Adult: Magic Cup; With Meals  Snacks/Supplements Adult: Gelatein Plus; With Meals    DVT  "Prophylaxis: DOAC  Brandon Catheter: PRESENT, indication: Retention  Lines: None     Cardiac Monitoring: None  Code Status: No CPR- Do NOT Intubate      Clinically Significant Risk Factors              # Hypoalbuminemia: Lowest albumin = 3.4 g/dL at 4/24/2023 12:33 PM, will monitor as appropriate            # Overweight: Estimated body mass index is 26.81 kg/m  as calculated from the following:    Height as of this encounter: 1.651 m (5' 5\").    Weight as of this encounter: 73.1 kg (161 lb 1.6 oz).          Disposition Plan She is a two-person heavy assist with transfers and ambulation.  She does not qualify for TCU because of her medical insurance.  She did over stay 100 days this year for her rehab hence it will not cover any more days for the rest of the year.  CM is assisting with placement.    Expected Discharge Date: 05/03/2023      Destination: home with family;home with help/services  Discharge Comments: Discharge to  [facility] pending insurance auth #3 on list          Bon Rosales MD  Hospitalist Service  [facility]  Securely message with [employer] (more info)  Text page via [facility] Paging/Directory   ______________________________________________________________________    Interval History   No acute events overnight.  On room air.  She reports increased shortness of breath today, and mild labored breathing.  No chest pain, cough or fevers or chills.  We will obtain a two-view chest x-ray to assess for pneumonia or fluid buildup.      Physical Exam   Vital Signs: Temp: 97.1  F (36.2  C) Temp src: Axillary BP: 129/69 Pulse: 88   Resp: 20 SpO2: 97 % O2 Device: Nasal cannula Oxygen Delivery: 1 LPM  Weight: 161 lbs 1.6 oz    General: AO X 3, lying comfortably in bed in no apparent distress  HEENT: pupils equal and reactive to light and accommodation, extraocular movements are intact; oral mucosa moist  Neck: Supple no raised JVD, no rigidity  Respiratory: Diminished bibasilar right greater than " left, Nonlabored respirations  CVS: nl s1 s2, regular rate and rhythm, no murmur  P/A: soft, nt,nd, no guarding rigidity or rebound tenderness  Ext: no edema  Neuro: no focal neurological deficits noted, cranial nerves 2-12 grossly intact  Psychiatry: normal mood and affect  Skin: No obvious skin rashes or ulcers      Medical Decision Making       35 MINUTES SPENT BY ME on the date of service doing chart review, history, exam, documentation & further activities per the note.      Data     I have personally reviewed the following data over the past 24 hrs:    N/A  \   N/A   / N/A     N/A N/A N/A /  N/A   4.2 N/A N/A \       Imaging results reviewed over the past 24 hrs:   No results found for this or any previous visit (from the past 24 hour(s)).

## 2023-05-02 NOTE — PLAN OF CARE
Problem: Plan of Care - These are the overarching goals to be used throughout the patient stay.    Goal: Plan of Care Review  Description: The Plan of Care Review/Shift note should be completed every shift.  The Outcome Evaluation is a brief statement about your assessment that the patient is improving, declining, or no change.  This information will be displayed automatically on your shift note.  Outcome: Progressing     Problem: Orthopaedic Fracture  Goal: Absence of Bleeding  Outcome: Progressing     Problem: COPD (Chronic Obstructive Pulmonary Disease) Comorbidity  Goal: Maintenance of COPD Symptom Control  Outcome: Progressing  Intervention: Maintain COPD-Symptom Control  Recent Flowsheet Documentation  Taken 5/2/2023 0845 by Tracey Peres RN  Medication Review/Management: medications reviewed     Problem: Urinary Retention  Goal: Effective Urinary Elimination  Outcome: Progressing     Problem: Plan of Care - These are the overarching goals to be used throughout the patient stay.    Goal: Absence of Hospital-Acquired Illness or Injury  Intervention: Identify and Manage Fall Risk  Recent Flowsheet Documentation  Taken 5/2/2023 0845 by Tracey Peres RN  Safety Promotion/Fall Prevention: lighting adjusted     Problem: Risk for Delirium  Goal: Improved Behavioral Control  Intervention: Minimize Safety Risk  Recent Flowsheet Documentation  Taken 5/2/2023 0845 by Tracey Peres RN  Enhanced Safety Measures: other (see comments)  Goal: Improved Attention and Thought Clarity  Intervention: Maximize Cognitive Function  Recent Flowsheet Documentation  Taken 5/2/2023 0845 by Tracey Peres RN  Sensory Stimulation Regulation: care clustered  Reorientation Measures: clock in view     Problem: Orthopaedic Fracture  Goal: Optimal Functional Ability  Intervention: Optimize Functional Ability  Recent Flowsheet Documentation  Taken 5/2/2023 0845 by Tracey Peres RN  Activity Management:    up  to bedside commode    up ad ricarda  Goal: Absence of Infection Signs and Symptoms  Intervention: Prevent or Manage Infection  Recent Flowsheet Documentation  Taken 5/2/2023 0845 by Tracey Peres RN  Isolation Precautions: enteric precautions maintained  Goal: Effective Oxygenation and Ventilation  Intervention: Promote Airway Secretion Clearance  Recent Flowsheet Documentation  Taken 5/2/2023 0845 by Tracey Peres RN  Cough And Deep Breathing: done independently per patient  Activity Management:    up to bedside commode    up ad ricarda   Goal Outcome Evaluation:       Patient is alert and able to let her needs be known. Denies pain when asked. Reports feeling more short of breath at times. Pursed lip breathing encouraged. On room air and VSS. Reports muscle spasms, PRN flexeril was given per request. Continue plan of care.

## 2023-05-02 NOTE — PLAN OF CARE
Goal Outcome Evaluation:      Plan of Care Reviewed With: patient, family      Pt alert and oriented times 4. She is forgetful at times. Pt's BP was on soft side at the beginning of the shift. BP was much better at bedtime. Pt was up in chair and tolerated well. She c/o mouth sore. Writer notified MD at bedside and got order for magic mouth wash. Gave magic mouth wash for sore. Pt's oxygenation has been well this shift. She was encouraged to use IS and did fairly well. Brandon draining well. Pt on mg and K protocol. Recheck both lab in am per protocol.

## 2023-05-03 PROBLEM — S92.901A FOOT FRACTURE, RIGHT: Status: ACTIVE | Noted: 2023-01-01

## 2023-05-03 NOTE — H&P
[facility]   Acute Rehabilitation Unit  Admission History and Physical    CHIEF COMPLAINT   Orthopaedic Disorders 08.2 Femur (Shaft) Fracture: s/p closed reduction internal fixation of left femur 3/14/2023, now with multiple closed fractures of right foot     HISTORY OF PRESENT ILLNESS  Fatuma Henry is a 77 year old female with past medical history of paroxysmal atrial fibrillation (on Xarelto), CKD3, COPD, HFpEF, hyperlipidemia, hypertension, recurrent C diff colitis, frequent falls, RA, left ICA occlusion, anemia, depression, gout, chronic lower extremity edema, GERD, and recent left femur fracture 2/2 mechanical fall s/p closed reduction with percutaneous pinning on 3/14/23 then discharged to TCU on 3/27/23, then readmitted 4/6/23-4/13/23 with afib with RVR, recurrent C diff, LLE cellulitis, lactic acidosis, and encephalopathy, then returned to TCU from which she discharged home on 4/17/23 until she re-presented on 4/24/23 after recurrent fall at home, found to have multiple acute/subactue fractures of right foot including proximal phalanx of fifth toe, distal shaft of fourth metatarsal, distal third and fourth proximal phalanges, second proximal phalanx, and distal third metatarsal.  Also noted to have increased shortness of breath 2/2 acute exacerbations of both COPD and HFpEF.  Chest imaging also revealed known right-sided pleural effusion and known/stable groundglass nodule in lingula.     Started on IV diuresis for pleural effusion, later transitioned to oral.  Started on prednisone taper (on chronic low dose prednisone for RA).  She underwent thoracentesis on 4/25 with 750 mL removed (transudative), with repeat thoracentesis on 5/3 just prior to admission with another 1000 mL removed. Cardiology was consulted and recommended to increase diltiazem dose and decrease metoprolol for improved afib control.     Ortho/podiatry consulted for multiple foot fractures,  "recommended non-operative management and CAM boot for ambulation with WBAT.    During acute hospitalization, patient was seen and evaluated by PT and OT, who collectively recommended that patient would benefit from ongoing therapies in the acute inpatient rehabilitation setting.      In review of the therapy notes, patient is currently needing SBA for supine>sit, min A x2 for sit>supine.  She performs sit to stand transfers with min Ax2 and ambulates with min Ax2 using walker up to 10' + 20' with seated rest breaks between.  She needs max A for donning/doffing R CAM boot.  She required mod A to stand from bedside commode and max A for pericares.  The pt would benefit from ongoing cognitive assessment and intervention as pt demos deficits in memory, executive functioning, attention, and problem-solving.  She scored a 12/30 on SLUMS indicating dementia level impairment on 4/28/23.     Upon arrival to the rehab unit, patient is accompanied by her daughter.  Patient reports she is feeling ok, but \"winded\".  States cough is improved.  Notes dizziness intermittently, typically with position changes.  She denies pain at rest, notes some right foot pain with weightbearing but no hip pain.  Reports minimal ongoing abdominal cramping, much improved.  Having about 2 BM per day, still loose.  States sleep is not good, has had difficulty for quite some time.  Has had recurrent issues with urinary incontinence or retention, frequently requiring evangelista catheter or Purewick.    PAST MEDICAL HISTORY   Reviewed and updated in Epic.  Past Medical History:   Diagnosis Date     Atrial fibrillation (H)      CKD (chronic kidney disease) stage 3, GFR 30-59 ml/min (H)      COPD (chronic obstructive pulmonary disease) (H)     nocturnal oxygen     CRF (chronic renal failure)      GERD (gastroesophageal reflux disease)      Hypertension      Hypovolemic shock (H)      Influenza A 12/01/2017     Pulmonary hypertension (H)      Pulmonary nodules  "     Rheumatoid arthritis (H)      Sepsis (H) 2017       SURGICAL HISTORY  Reviewed and updated in Epic.  Past Surgical History:   Procedure Laterality Date     APPENDECTOMY       BLADDER SUSPENSION       CHOLECYSTECTOMY       CLOSED REDUCTION, PERCUTANEOUS PINNING HIP Left 3/15/2023    Procedure: CLOSED REDUCTION, HIP, WITH PERCUTANEOUS PINNING;  Surgeon: Denton Mckeon MD;  [facility]     PICC TRIPLE LUMEN PLACEMENT  2022            SOCIAL HISTORY  Reviewed and updated in Epic.  Marital Status:   Living situation: lives with spouse in [facility] with no stairs  Family support: Patient's daughter and son-in-law [place] [facility] [place] 3rd floor so they are available to assist as needed. Pt's son-in-law does not work so is available , along with patient's spouse.   Vocational History: retired  Tobacco use: former  Alcohol use: none  Illicit drug use: none  Social History     Socioeconomic History     Marital status:      Spouse name: Not on file     Number of children: Not on file     Years of education: Not on file     Highest education level: Not on file   Occupational History     Not on file   Tobacco Use     Smoking status: Former     Packs/day: 0.50     Types: Cigarettes     Quit date: 2017     Years since quittin.3     Smokeless tobacco: Never   Vaping Use     Vaping status: Not on file   Substance and Sexual Activity     Alcohol use: No     Drug use: No     Sexual activity: Not Currently   Other Topics Concern     Not on file   Social History Narrative     Not on file     Social Determinants of Health     Financial Resource Strain: Not on file   Food Insecurity: Not on file   Transportation Needs: Not on file   Physical Activity: Not on file   Stress: Not on file   Social Connections: Not on file   Intimate Partner Violence: Not on file   Housing Stability: Not on file       FAMILY HISTORY  Reviewed and updated in [employer].  Family  History   Problem Relation Age of Onset     Heart Failure Mother      Cerebrovascular Disease Father      Kidney failure Sister      Cerebrovascular Disease Brother      Diabetes Type 2  Brother          PRIOR FUNCTIONAL HISTORY   Pt was mod I with mobility ambulating shorter distances with walker, did have wheelchair.  Patient does have assist with ADLs PTA.    MEDICATIONS  Scheduled meds  Medications Prior to Admission   Medication Sig Dispense Refill Last Dose     acetaminophen (TYLENOL) 325 MG tablet Take 1 tablet (325 mg) by mouth every 4 hours as needed for mild pain or fever        albuterol (PROAIR HFA/PROVENTIL HFA/VENTOLIN HFA) 108 (90 Base) MCG/ACT inhaler Inhale 2 puffs into the lungs every 4 hours as needed for shortness of breath / dyspnea or wheezing        allopurinol (ZYLOPRIM) 300 MG tablet Take 1 tablet (300 mg) by mouth daily 60 tablet 0      budesonide-formoterol (SYMBICORT) 160-4.5 MCG/ACT Inhaler Inhale 2 puffs into the lungs 2 times daily        cholestyramine (QUESTRAN) 4 g packet Take 1 packet by mouth 3 times daily (with meals)        DULoxetine (CYMBALTA) 20 MG capsule Take 20 mg by mouth daily        famotidine (PEPCID) 20 MG tablet Take 20 mg by mouth daily        folic acid (FOLVITE) 1 MG tablet Take 1 tablet (1 mg) by mouth daily 90 tablet 0      Lactobacillus-Inulin ([facility] DIGESTIVE [facility]) CAPS Take 1 capsule by mouth 2 times daily        pravastatin (PRAVACHOL) 20 MG tablet Take 20 mg by mouth every evening        predniSONE (DELTASONE) 5 MG tablet Take 5 mg by mouth daily        rivaroxaban ANTICOAGULANT (XARELTO) 15 MG TABS tablet Take 1 tablet (15 mg) by mouth daily (with dinner) 90 tablet 3      Vitamin D3 (CHOLECALCIFEROL) 125 MCG (5000 UT) tablet Take 1 tablet by mouth daily        compressor, for nebulizer Saran [COMPRESSOR, FOR NEBULIZER SARAN] Nebulizer treatment qid prn 1 Device 0      [DISCONTINUED] diltiazem ER COATED BEADS (CARDIZEM CD/CARTIA XT) 120 MG 24 hr  "capsule Take 1 capsule (120 mg) by mouth daily        [DISCONTINUED] fidaxomicin (DIFICID) 200 MG tablet Take 1 tablet (200 mg) by mouth every other day for 10 doses 10 tablet 0      [DISCONTINUED] metoprolol succinate ER (TOPROL XL) 50 MG 24 hr tablet Take 1.5 tablets (75 mg) by mouth daily          ALLERGIES     Allergies   Allergen Reactions     Codeine Nausea and Vomiting     Fosamax [Alendronate] Diarrhea     Morphine Nausea and Vomiting     Other reaction(s): Vomiting         REVIEW OF SYSTEMS  A 10 point ROS was performed and negative unless otherwise noted in HPI.       PHYSICAL EXAM  VITAL SIGNS:  /66 (BP Location: Left arm)   Pulse 74   Temp 98.2  F (36.8  C) (Oral)   Resp 22   Ht 1.676 m (5' 6\")   Wt 71.9 kg (158 lb 8.2 oz)   SpO2 92%   BMI 25.58 kg/m    BMI:  Estimated body mass index is 25.58 kg/m  as calculated from the following:    Height as of this encounter: 1.676 m (5' 6\").    Weight as of this encounter: 71.9 kg (158 lb 8.2 oz).     General: NAD, lying in bed  HEENT: NC/AT, MMM  Pulmonary: non-labored on room air, lungs coarse at bases, some pursed lip breathing  Cardiovascular: RRR, no murmurs appreciated  Abdominal: soft, non-tender, non-distended, bowel sounds present  Extremities: warm, well perfused, 1+ pitting edema in bilateral lower extremities, no tenderness in calves   MSK/neuro:   Mental Status:  alert and oriented x3    Cranial Nerves: grossly normal    Sensory: Normal to light touch in bilateral upper and lower extremities    Strength: generalized weakness in bilateral upper extremities 4/5 throughout.  LLE 3/5 at HF, 4/5 KE, 4/5 DF, 4/5 PF.  RLE 4/5 throughout.   Speech: clear/fluent   Cognition: intact to conversation, follows basic commands   Gait: not tested  Skin: no rashes and no lesions or gross abnormalities of visible skin      LABS  CBC RESULTS:   Recent Labs   Lab Test 04/28/23  0554 04/27/23  0445 04/26/23  0521   WBC 9.8 10.1 10.4   RBC 2.69* 2.84* 2.72* "   HGB 7.9* 8.4* 8.0*   HCT 26.1* 27.1* 26.0*   MCV 97 95 96   MCH 29.4 29.6 29.4   MCHC 30.3* 31.0* 30.8*   RDW 17.1* 17.3* 17.3*    358 340       Last Basic Metabolic Panel:  Recent Labs   Lab Test 05/03/23 0444 05/02/23 0443 05/01/23  0434 04/29/23 0444 04/28/23  0554 04/27/23 0445 04/26/23  0521   NA  --   --   --   --  137 139 141   POTASSIUM 4.4 4.2 4.2   < > 4.4 4.0 3.6   CHLORIDE  --   --   --   --  103 104 105   CO2  --   --   --   --  23 25 25   ANIONGAP  --   --   --   --  11 10 11   GLC  --   --   --   --  114* 125* 128*   BUN  --   --   --   --  41.1* 36.7* 32.1*   CR  --   --   --   --  1.29* 1.31* 1.35*   GFRESTIMATED  --   --   --   --  43* 42* 41*   SUNI  --   --   --   --  9.8 9.9 9.9    < > = values in this interval not displayed.       IMPRESSION/PLAN:  Fatuma Henry is a 77 year old female with a past medical history of paroxysmal atrial fibrillation (on Xarelto), CKD3, COPD, HFpEF, hyperlipidemia, hypertension, recurrent C diff colitis, frequent falls, RA, left ICA occlusion, anemia, depression, gout, chronic lower extremity edema, GERD, and recent left femur fracture 2/2 mechanical fall s/p closed reduction with percutaneous pinning on 3/14/23 who was admitted on 4/17/23 after recurrent fall, found to have multiple fractures of right foot as well as COPD exacerbation and HF exacerbation with hospital course complicated by pleural effusion s/p multiple thoracentesis, ongoing symptoms associated with recurrent C diff, pain, dyspnea, and suboptimal heart rate control.  She is now admitted to ARU on 5/3/23 for multidisciplinary rehabilitation and ongoing medical management.      Admission to acute inpatient rehab 05/03/23.    Impairment group code: Orthopaedic Disorders 08.2 Femur (Shaft) Fracture: s/p closed reduction internal fixation of left femur 3/14/2023, now with multiple closed fractures of right foot      1. PT and OT 90 minutes of each on a daily basis, in addition to rehab  nursing and close management of physiatrist.      2. Impairment of ADL's: Noted to have pain, weakness, fatigue, impaired balance, dyspnea on exertion, impaired cognition, and impaired safety awareness, all affecting her ability to safely and independently perform basic ADLs.  Goal for mod I with basic ADLs.    3. Impairment of mobility:  Noted to have pain, weakness, fatigue, impaired balance, dyspnea on exertion, impaired cognition, and impaired safety awareness, all affecting her ability to safely and independently perform basic mobility.  Goal for mod I with basic mobility.    4. Medical Conditions    Debility  Recurrent falls  Multiple fractures of the right foot: 3rd, 4th, 5th phalanges and 1st & 3rd metatarsals, managed non-operatively  Left minimally displaced impacted femoral neck fracture s/p closed reduction with percutaneous pinning on 3/14/23 with Dr. Mckeon  Completed rehabilitation at [facility], return home couple days ago. She fell at home prior to arrival, reportedly no syncope or near syncope. Patient fell while getting up from the chair by the table, with daughter being nearby.  Foot x-rays demonstrated above fractures.   - Right foot CAM when ambulating and weightbearing as tolerated  - Pain management: Tylenol PRN  - Continue PT/OT  - Follow up with [facility] - foot and ankle provider for foot fractures and Dr. Mckeon for hip fracture    Acute on chronic respiratory failure with hypoxia, resolved  COPD with acute exacerbation, resolved, currently stable  Right-sided pleural effusion, s/p thoracentesis on 4/25 and again 5/3  HFpEF with acute exacerbation.  Resolved, now stable  Pulmonary hypertension, stable  [PTA meds: albuterol 2 puffs q4h PRN, budesonide-formoterol 160-4.5 mcg inhaler]  Exacerbations of CHF and COPD this admission.  Completed prednisone taper, now returned to PTA dose.  Initially on IV diuretics, transitioned to oral.  Underwent thoracentesis x2.  - Goal spO2 is 88-92%.   Oxygen by NC PRN to maintain sats.  - Breo Ellipta as formulary sub for PTA Symbicort  - Continue albuterol inhaler and Duoneb PRN  - Continue prednisone 5 mg (PTA dose for RA)  - Continue Lasix 40 mg daily.  Monitor daily weights, labs, volume status.  - Follow up pulmonology     Chronic atrial fibrillation  Difficult to rate control due low BP.  Cardiology consulted this admission and made some medication changes, felt HR better-controlled.  PTA anticoagulation held briefly for thoracentesis but resumed on 4/26.  - Continue Xarelto 15 mg daily  - Continue metoprolol 50 mg daily (reduced on 4/28)  - Continue diltiazem  mg (increased on 4/30)  - Monitor BP, HR     Recurrent c diff colitis, resolved  Severe, 4th episode.  Off vancomycin since 4/6.  Completed taper Dificid per ID/GI recs (started last admission) as of 5/2/23.  - Avoid antibiotics   - Continue probiotics  - Monitor bowel pattern  - Previously had been referred to [facility] for FMT consultation - appointment scheduled for November     CKD stage 3  Recent Cr in 1.1-1.3 range.  - Avoid nephrotoxins and hypotension  - Continue to monitor renal function while diuresing     Essential HTN  - Continue PTA Cardizem and metoprolol, with holding parameters  - BP has been on soft side recently, med changes as above per cardiology  - Monitor BP    Acute on chronic anemia  Baseline Hgb 9-10 range.  Most recent Hgb 7.9 on 4/28.  - Continue folic acid 1 mg daily  - Trend CBC every M/Th     Left ICA occlusion  Had been noted in 12/22, asymptomatic.  - On Xarelto and statin as above  - Outpatient vascular surgery follow-up recommended     Hx GERD  - Continue PTA pepcid      Hx depression  - Continue PTA Cymbalta  - Monitor mood, consult psychology as indicated     Hx pulm nodules with positive PET  Stable 17 x 19 mm groundglass nodule in the lingula since 07/21/2022.   - Follow up with pulm clinic as outpt and serial imaging 3 to 6 month     Hx rheumatoid  arthritis  Hx of methotrexate--was discontinued during recent hospitalization.  No exacerbation of RA off methotrexate.  - Continue prednisone 5 mg daily (chronic dose, recently completed taper due to COPD exacerbation)    Hx gout  - Continue PTA allopurinol 300 mg daily    5. Adjustment to disability:  Clinical psychology to eval and treat if indicated  6. FEN: low sat fat, Na <2400mg  7. Bowel: continent, ongoing loose stools in setting of recent recurrent C diff, monitor  8. Bladder: continent/incontinent, evangelista just removed on 5/3, monitor PVRs at admission  9. DVT Prophylaxis: Xarelto  10. GI Prophylaxis: Pepcid  11. Code: no CPR, do not intubate  12. Disposition: goal for home with family assist, though suspect would benefit from more supportive environment given recurrent falls, hospitalizations, chronic medical comorbidities  13. ELOS:  12 days  14. Rehab prognosis:  fair  15. Follow up Appointments on Discharge: PCP in 1-2 weeks, ortho ([facility] - 2 different providers: foot/ankle for foot fractures, and Dr. Mckeon for left femur fracture s/p repair), pulmonology (currently scheduled 5/17), cardiology (currently scheduled 5/10), vascular medicine (ICA occlusion, scheduled 5/18), GI (FMT, scheduled 11/1)        Patient was discussed with Dr. Frankie Hassan, PM&R staff physician     Dawna Edwards PA-C  Physical Medicine & Rehabilitation

## 2023-05-03 NOTE — PLAN OF CARE
Problem: Pulmonary Impairment  Goal: Effective Airway Clearance  Outcome: Progressing  Goal: Optimal Gas Exchange  Outcome: Progressing     Problem: Sleep Disturbance  Goal: Adequate Sleep/Rest  Outcome: Progressing     Goal Outcome Evaluation:         Lungs diminished. Vitals stable. On room air. Complaint of shortness of breath. Offered nebulization but declined. Dyspnea with exertion. Got melatonin for sleep. Brandon in place. Denied any pain. Possible discharge to acute rehab today, family to transport.

## 2023-05-03 NOTE — PROGRESS NOTES
Care Management Discharge Note    Discharge Date: 05/03/2023       Discharge Disposition: Acute Rehab    Discharge Services: None    Discharge DME: Oxygen    Discharge Transportation: family or friend will provide    Private pay costs discussed: Not applicable    PAS Confirmation Code: N/A   Patient/family educated on Medicare website which has current facility and service quality ratings: yes    Education Provided on the Discharge Plan: yes   Persons Notified of Discharge Plans: patient; daughter  Patient/Family in Agreement with the Plan: yes    Handoff Referral Completed: Yes    Additional Information:  9:52 AM  Plan is for Pt to discharge to [facility] today with Pt's daughter transport Pt at 2:30 PM. STEPHANIE sent a message to the ARU admissions liaison Sahra to confirm Pt will have a bed available today.     10:14 AM  STEPHANIE received a response from Sahra reporting that she cannot confirm the bed until 1-1:30 PM. Pt's daughter Enid called and STEPHANIE was able to provide her with an update with Pt's current discharge plan.    12:04 PM  STEPHANIE reached out to Sahra again for an update with the bed availability. Sahra responded she has not heard anything but will reach out when she does.     1:18 PM  Sahra confirmed the bed availability and reported Pt can come at 2:30 PM. STEPHANIE met with Pt and updated her on final discharge plans. STEPHANIE called and informed Enid of confirmed discharge plans. Enid denied having further questions from CM at this time. STEPHANIE updated Pt's care team. No further needs from CM desired or anticipated prior to discharge.     CM will sign off. Please contact CM if any additional needs arise.    BRY Limon

## 2023-05-03 NOTE — PLAN OF CARE
Occupational Therapy Discharge Summary    Reason for therapy discharge:    Discharged to acute rehabilitation facility.    Progress towards therapy goal(s). See goals on Care Plan in [facility] electronic health record for goal details.  Goals partially met.  Barriers to achieving goals:   discharge from facility.    Therapy recommendation(s):    Continued therapy is recommended.  Rationale/Recommendations:  to progress ADL's as able and assess safety/cognition for discharge planning needs .

## 2023-05-03 NOTE — PHARMACY-ADMISSION MEDICATION HISTORY
Please see Admission Medication History completed by pharmacist on 4/24/23 under previous encounter at [facility] for information regarding prior to admission medications.    Radha Hutson, PharmD   Phone #[phone]

## 2023-05-03 NOTE — PROGRESS NOTES
Physical Therapy Discharge Summary    Reason for therapy discharge:    Discharged to acute rehabilitation facility.    Progress towards therapy goal(s). See goals on Care Plan in [facility] electronic health record for goal details.  Goals partially met.  Barriers to achieving goals:   discharge from facility.    Therapy recommendation(s):    Continued therapy is recommended.  Rationale/Recommendations:  ARU for continued progression of mobility, endurance, full return to PLOF.

## 2023-05-03 NOTE — PLAN OF CARE
"  Problem: Plan of Care - These are the overarching goals to be used throughout the patient stay.    Goal: Plan of Care Review  Description: The Plan of Care Review/Shift note should be completed every shift.  The Outcome Evaluation is a brief statement about your assessment that the patient is improving, declining, or no change.  This information will be displayed automatically on your shift note.  Outcome: Met  Goal: Patient-Specific Goal (Individualized)  Description: You can add care plan individualizations to a care plan. Examples of Individualization might be:  \"Parent requests to be called daily at 9am for status\", \"I have a hard time hearing out of my right ear\", or \"Do not touch me to wake me up as it startles me\".  Outcome: Met  Goal: Absence of Hospital-Acquired Illness or Injury  Outcome: Met  Intervention: Identify and Manage Fall Risk  Recent Flowsheet Documentation  Taken 5/3/2023 0900 by Tracey Peres RN  Safety Promotion/Fall Prevention:    activity supervised    supervised activity    toileting scheduled  Goal: Optimal Comfort and Wellbeing  Outcome: Met  Goal: Readiness for Transition of Care  Outcome: Met     Problem: Risk for Delirium  Goal: Optimal Coping  Outcome: Met  Goal: Improved Behavioral Control  Outcome: Met  Intervention: Minimize Safety Risk  Recent Flowsheet Documentation  Taken 5/3/2023 0900 by Tracey Peres RN  Enhanced Safety Measures: other (see comments)  Goal: Improved Attention and Thought Clarity  Outcome: Met  Intervention: Maximize Cognitive Function  Recent Flowsheet Documentation  Taken 5/3/2023 0900 by Tracey Peres RN  Sensory Stimulation Regulation: care clustered  Reorientation Measures: clock in view  Goal: Improved Sleep  Outcome: Met     Problem: Orthopaedic Fracture  Goal: Absence of Bleeding  Outcome: Met  Goal: Effective Bowel Elimination  Outcome: Met  Goal: Absence of Embolism Signs and Symptoms  Outcome: Met  Goal: Fracture " Stability  Outcome: Met  Goal: Optimal Functional Ability  Outcome: Met  Intervention: Optimize Functional Ability  Recent Flowsheet Documentation  Taken 5/3/2023 0900 by Tracey Peres RN  Activity Management:    activity adjusted per tolerance    up in chair  Goal: Absence of Infection Signs and Symptoms  Outcome: Met  Intervention: Prevent or Manage Infection  Recent Flowsheet Documentation  Taken 5/3/2023 0900 by Tracey Peres RN  Isolation Precautions: enteric precautions maintained  Goal: Effective Tissue Perfusion  Outcome: Met  Goal: Optimal Pain Control and Function  Outcome: Met  Goal: Effective Oxygenation and Ventilation  Outcome: Met  Intervention: Promote Airway Secretion Clearance  Recent Flowsheet Documentation  Taken 5/3/2023 0900 by Tracey Peres RN  Activity Management:    activity adjusted per tolerance    up in chair     Problem: COPD (Chronic Obstructive Pulmonary Disease) Comorbidity  Goal: Maintenance of COPD Symptom Control  Outcome: Met  Intervention: Maintain COPD-Symptom Control  Recent Flowsheet Documentation  Taken 5/3/2023 0900 by Tracey Peres RN  Medication Review/Management: medications reviewed     Problem: Pulmonary Impairment  Goal: Improved Activity Tolerance  Outcome: Met  Goal: Effective Airway Clearance  Outcome: Met  Goal: Optimal Gas Exchange  Outcome: Met     Problem: Urinary Retention  Goal: Effective Urinary Elimination  Outcome: Met     Problem: Chest Pain  Goal: Resolution of Chest Pain Symptoms  Outcome: Met     Problem: Nausea and Vomiting  Goal: Nausea and Vomiting Relief  Outcome: Met     Problem: Sleep Disturbance  Goal: Adequate Sleep/Rest  Outcome: Met   Goal Outcome Evaluation:       Plan is to discharge to [facility]. Report given to RN over there. Patient was able to urinate on her own 250ml output. Brandon removed at 0900. Daughter to transport to facility .      Discharged at 1440

## 2023-05-03 NOTE — PROGRESS NOTES
Patient on 1 lpm NC. Pt did well on flutter Valve. No respiratory distress noted at this time. RT will follow.

## 2023-05-03 NOTE — PLAN OF CARE
FOCUS/GOAL  Bowel management, Bladder management, Pain management, Mobility, Skin integrity, and Safety management    ASSESSMENT, INTERVENTIONS AND CONTINUING PLAN FOR GOAL:  Patient was admitted from [facility]. Was admitted with L hip fracture but no surgery, C-diff, HTN, COPD, HF, CKD, broken numerous toes to the right foot, right foot pain, dyspnea, productive cough, and worsening leg edema. Patient is alert and oriented x 4. Is able to use a call light and make her needs known. Is on isolation r/t C-diff. Is assist of x 1 with walker. Patient is on O2 2 lpm r/t low sats when lying down. Continent of B/B, last BM was 5/2/23. On regular diet with thin liquids, takes pills whole with applesauce. Nursing staff will continue with poc.    Goal Outcome Evaluation:

## 2023-05-03 NOTE — DISCHARGE SUMMARY
[facility]  Hospitalist Discharge Summary      Date of Admission:  4/24/2023  Date of Discharge:  5/3/2023  Discharging Provider: Bon Rosales MD  Discharge Service: Hospitalist Service    Discharge Diagnoses   Acute respiratory failure with hypoxia  Advanced COPD with acute exacerbation  Moderate right pleural effusion s/p thoracentesis    Follow-ups Needed After Discharge   Follow-up Appointments     Follow Up and recommended labs and tests      Follow up with Nursing home physician.  No follow up labs or test are   needed.             Unresulted Labs Ordered in the Past 30 Days of this Admission     No orders found from 3/25/2023 to 4/25/2023.      These results will be followed up by PCP    Discharge Disposition   Discharged to short-term care facility  Condition at discharge: Stable      Hospital Course   Fatuma Henry is a 76 yo medically complex lady with hx of Parox afib, htn, lipids, pulm htn (on chronic O2), diastolic HF, CKD stage 3, left ICA occlusion, RA, hx of gout, gerd, recent hospitalization for left hip fracture after fall at home, and completed TCU and recently discharge home. She fell at home prior to arrival, broke numerous toes on the right foot, presented with right foot pain, dyspnea, productive cough, and worsening leg edema.     Admitted with acute on chronic respiratory failure with hypoxia attributed to COPD exacerbation and right-sided pleural effusion.  Underwent thoracentesis with 750 cc fluid removed from the right lung.  He has been off oxygen for several days.  She improved significantly with the frequent nebulizers and oral prednisone.  Lasix was transitioned to oral 40 mg daily as well.  She has been waiting to be placed at a TCU due to significant deconditioning and weakness. Difficulty getting insurance approval was the biggest barrier to discharge since she used up all her 100 days of rehab for 2023.  Fortunately she was accepted to [facility]  for today 5/3, and her daughter Enid will be transporting her this afternoon.      Medications were adjusted to better optimize rate control for her atrial fibrillation with labile heart rate.  Diltiazem CD increased to 240 mg and metoprolol was decreased to 50 mg.  Heart rate has been better controlled with the regimen heart rate in the 70s to 80s at rest.  Denies chest pain, dizziness, and palpitations.  She completed her prednisone taper and will resume her oral prednisone 5 mg daily on discharge.  She completed her treatment for recurrent C. difficile colitis.  Her stool is formed and she is no longer contagious and be removed from isolation.  As are for right foot fractures, she will need to wear a cam boot and she is weightbearing as tolerated.  Of note she did also have a recent left total hip arthroplasty, repeat x-ray done on 4/28 shows hardware in place and healing.  Orthopedic will continue to follow-up with her outpatient.     Acute on chronic respiratory failure with hypoxia, resolved  COPD with acute exacerbation, resolved, currently stable  Right-sided pleural effusion, status post  750 cc fluid removed  Diastolic CHF with acute exacerbation.  Resolved, now stable  Pulmonary hypertension, stable  Significant wheezing on admission, subsided with few rounds of nebulizer/Solu-Medrol  - DuoNeb 4 times daily, albuterol neb every 2 hours as needed, prednisone taper  - Transition to oral Lasix 40 mg daily since not on diuretics at home  - Status post thoracentesis 750 cc fluid removed, and is transudate.    - Pleural fluid cultures have been negative so far  - Peripheral cultures negative.    Chronic atrial fibrillation, HR 's  Difficult to rate control due low BP (SBP 's) on Cardizem and metoprolol  Anticoagulated on Xarelto- restarted to AC on 4/26.   Metoprolol 50 mg daily, and Cardizem increased to 240 mg on 4/30.  BPs remain soft SBP low 100-110's, resting HR 70- 80s.    Recurrent c diff  colitis--severe, now this is 4th time, now resolved  -Complete taper Dificid started on 4/7 per ID every other day on 5/2/23.   -Pt referred the patient to [facility] for FMT consultation appointment scheduled for November.  -Avoid antibiotics by all cost   -Off enteric isolation on 5/6    Recurrent falls,    S/p Hip fracture S/p closed reduction with percutaneous pinning 3/14/23  Multiple toes fracture of the right foot: 3, 4, 5th, 1st & 3rd metatarsal bone.  Completed rehabilitation at TCU, return home couple days ago. She fell at home prior to arrival, reportedly no syncope or near syncope. Patient fell while getting up from the chair by the table, with daughter being nearby.  Foot x-rays demonstrated above fractures.     -ER MD d/w B6, recommended using right foot CAM when ambulating and weightbearing as tolerated  -PT/OT recommending TCU  -Pain management with Tylenol.    CKD stage 3  -Avoid nephrotoxins and hypotension, monitor closely renal function while diuresing    Essential HTN  -Continue PTA Cardizem and metoprolol, with holding parameters.     Left ICA occlusion  -To follow with vascular as outpt     GERD  -Con pepcid     Depression  -On Cymbalta     H/O pulm nodules with positive PET  Stable 17 x 19 mm groundglass nodule in the lingula since 07/21/2022.   Needs follow up with pulm clinic as outpt and serial imaging 3 to 6-month    H/O RA  -On chronic pred  -Hx of methotrexate--was discontinued during recent hospitalization.  No exacerbation of RA off methotrexate.    Diet: Cardiac diet  DVT Prophylaxis: DOAC-holding for now  Brandon Catheter: Not present  Lines: None     Cardiac Monitoring: None  Code Status:  DNR/DNI      Consultations This Hospital Stay   CARE MANAGEMENT / SOCIAL WORK IP CONSULT  CARE MANAGEMENT / SOCIAL WORK IP CONSULT  CORE CLINIC EVALUATION IP CONSULT  OCCUPATIONAL THERAPY ADULT IP CONSULT  NUTRITION SERVICES ADULT IP CONSULT  CARE MANAGEMENT / SOCIAL WORK IP CONSULT  CARE  MANAGEMENT / SOCIAL WORK IP CONSULT  PHYSICAL THERAPY ADULT IP CONSULT  OCCUPATIONAL THERAPY ADULT IP CONSULT  OCCUPATIONAL THERAPY ADULT IP CONSULT  PHYSICAL THERAPY ADULT IP CONSULT  CARDIOLOGY IP CONSULT  CARDIOLOGY IP CONSULT  PHYSICAL THERAPY ADULT IP CONSULT  OCCUPATIONAL THERAPY ADULT IP CONSULT    Code Status   No CPR- Do NOT Intubate    Time Spent on this Encounter   Bon MENARD MD, personally saw the patient today and spent greater than 30 minutes discharging this patient.       Bon Rosales MD  [facility] [address] [address]  Phone: [phone]  Fax: [phone]  ______________________________________________________________________    Physical Exam   Vital Signs: Temp: 97.8  F (36.6  C) Temp src: Oral BP: 100/55 Pulse: 78   Resp: 18 SpO2: 95 % O2 Device: None (Room air) Oxygen Delivery: 1 LPM  Weight: 160 lbs 1.6 oz  General: AO X 3, lying comfortably in bed in no apparent distress  HEENT: pupils equal and reactive to light and accommodation, extraocular movements are intact; oral mucosa moist  Neck: Supple no raised JVD, no rigidity  Respiratory: Diminished breath sounds bilateral, no wheezing or crepitations  CVS: nl s1 s2, regular rate and rhythm, no murmur  P/A: soft, nt,nd, no guarding rigidity or rebound tenderness  Ext: no edema  Neuro: no focal neurological deficits noted, cranial nerves 2-12 grossly intact  Psychiatry: normal mood and affect  Skin: No obvious skin rashes or ulcers         Primary Care Physician   Tory Wise    Discharge Orders      Medication Therapy Management Referral      General info for SNF    Length of Stay Estimate: Short Term Care: Estimated # of Days <30  Condition at Discharge: Stable  Level of care:skilled   Rehabilitation Potential: Excellent  Admission H&P remains valid and up-to-date: Yes  Recent Chemotherapy: N/A  Use Nursing Home Standing Orders: Yes     Mantoux instructions    Give two-step Mantoux  (PPD) Per Facility Policy Yes     Follow Up and recommended labs and tests    Follow up with Nursing home physician.  No follow up labs or test are needed.     Reason for your hospital stay    Acute respiratory failure with hypoxia  Right pleural effusion status post thoracentesis  Acute COPD exacerbation     Activity - Up with assistive device     Activity - Up with nursing assistance     Physical Therapy Adult Consult    Evaluate and treat as clinically indicated.    Reason:  weakness     Occupational Therapy Adult Consult    Evaluate and treat as clinically indicated.    Reason:  weakness     Contact Isolation    Isolation will end on 5/6/2023.  She completed treatment for C. difficile.  Diarrhea noted last several days while here.     Fall precautions     Diet    Follow this diet upon discharge: Orders Placed This Encounter      Snacks/Supplements Adult: Magic Cup; With Meals      Snacks/Supplements Adult: Gelatein Plus; With Meals      Combination Diet Low Saturated Fat Na <2400mg Diet       Significant Results and Procedures   Results for orders placed or performed during the hospital encounter of 04/24/23   CT Head w/o Contrast    Narrative    EXAM: CT HEAD W/O CONTRAST  LOCATION: [facility]  DATE/TIME: 4/24/2023 1:12 PM CDT    INDICATION: head trauma fall. Injury. Pain.  COMPARISON: CT brain 03/24/2023  TECHNIQUE: Routine CT Head without IV contrast. Multiplanar reformats. Dose reduction techniques were used.    FINDINGS:  INTRACRANIAL CONTENTS: No intracranial hemorrhage, extraaxial collection, or mass effect.  No CT evidence of acute infarct. Mild presumed chronic small vessel ischemic changes. Mild generalized volume loss. No hydrocephalus.     VISUALIZED ORBITS/SINUSES/MASTOIDS: Prior bilateral cataract surgery. Visualized portions of the orbits are otherwise unremarkable. No paranasal sinus mucosal disease. No middle ear or mastoid effusion.    BONES/SOFT TISSUES: No acute  abnormality.      Impression    IMPRESSION:  1.  No CT evidence for acute intracranial process.  2.  Brain atrophy and presumed chronic microvascular ischemic changes as above.   CT Cervical Spine w/o Contrast    Narrative    EXAM: CT CERVICAL SPINE W/O CONTRAST  LOCATION: [facility]  DATE/TIME: 4/24/2023 1:13 PM CDT    INDICATION: head trauma fall. Injury cervical spine pain.  COMPARISON: None.  TECHNIQUE: Routine CT Cervical Spine without IV contrast. Multiplanar reformats. Dose reduction techniques were used.    FINDINGS:  VERTEBRA: Normal vertebral body heights. Mid cervical leftward curvature centered at C4. No congenital spine anomaly. No fracture or posttraumatic subluxation.     CANAL/FORAMINA:     C6-C7: Minimal disc osteophyte. Patent central canal and foramen.    T1-T2: 2 mm degenerative anterolisthesis. Moderate bilateral facet arthropathy. Mild right foraminal stenosis.    PARASPINAL: Moderate-sized right pleural effusion      Impression    IMPRESSION:  1.  No fracture or posttraumatic subluxation.  2.  No high-grade spinal canal or neural foraminal stenosis. Cervical spondylosis.    3.  Development of right pleural effusion.   CT Chest Abdomen Pelvis w/o Contrast    Narrative    EXAM: CT CHEST ABDOMEN PELVIS W/O CONTRAST  LOCATION: [facility]  DATE/TIME: 4/24/2023 1:13 PM CDT    INDICATION: Right chest wall and abdominal pain after fall, on blood thinners, age 77.  COMPARISON: CTs chest 04/07/2023 and 07/21/2022 and CT AP 04/06/2023 and 03/22/2023.  TECHNIQUE: CT scan of the chest, abdomen, and pelvis was performed without IV contrast. Multiplanar reformats were obtained. Dose reduction techniques were used.   CONTRAST: None.    FINDINGS:   LUNGS AND PLEURA: The two subsolid nodular opacities in the lingula are unchanged at 2.0 cm and 0.9 cm (series 4 images 140 and 133, respectively). Near-complete right middle lobe atelectasis has developed. Moderate  volume right pleural effusion layering   posteriorly and the associated atelectasis in the right lower lobe posteriorly have increased and the minimal left posterior basilar pleural fluid and atelectasis are unchanged. No pneumothorax.    MEDIASTINUM/AXILLAE: Mild biatrial cardiac enlargement with aortic valve leaflet, mitral annular and three-vessel coronary artery calcification. No pericardial effusion. Normal-caliber thoracic aorta and central pulmonary arteries. Decreased density   intracardiac blood pool consistent with nonspecific anemia. No lymphadenopathy.    CORONARY ARTERY CALCIFICATION: Severe.    HEPATOBILIARY: Cholecystectomy.  Liver and bile ducts unremarkable.    PANCREAS: Normal.    SPLEEN: Normal.    ADRENAL GLANDS: Normal.    KIDNEYS/BLADDER: No significant mass, stones, or hydronephrosis. There are simple or benign cysts. No follow up is needed.    BOWEL: No obstruction or inflammatory change.    LYMPH NODES: No lymphadenopathy.    VASCULATURE: Moderate to severe calcified atheromatous plaque throughout the normal caliber abdominal aorta, iliofemoral arteries and at the origin of the renal and mesenteric arteries.    PELVIC ORGANS: Normal.    MUSCULOSKELETAL: Generalized subcutaneous edema has increased. Mild compression superior endplate T12 vertebral body unchanged. Chronic insufficiency fracture left sacral ala unchanged. Intertrochanteric fracture proximal left femur status post dynamic   screw and plate fixation unchanged. Spondylotic change lumbar spine. No new acute fractures identified.      Impression    IMPRESSION:  1.  No acute posttraumatic findings identified.  2.  Mild biatrial cardiac enlargement with aortic valve leaflet, mitral annular and three-vessel coronary artery calcification.  3.  Decreased density intracardiac blood pool consistent with nonspecific anemia  4.  Moderate right pleural effusion and right middle and lower lobe atelectasis have increased.  5.  Generalized  subcutaneous edema has increased.  6.  The two subsolid nodular opacities in the lingula are unchanged at 2.0 cm and 0.9 cm. Recommend 12-24-month follow-up CT chest.    REFERENCE:  Guidelines for Management of Incidental Pulmonary Nodules Detected on CT Images: From the Fleischner Society 2017.   Guidelines apply to incidental nodules in patients who are 35 years or older.  Guidelines do not apply to lung cancer screening, patients with immunosuppression, or patients with known primary cancer.    SUBSOLID NODULES    Nodule size 6 mm or greater  CT at 6-12 months to confirm persistence, then CT every 2 years until 5 years.   Foot  XR, G/E 3 views, right    Narrative    EXAM: XR FOOT RIGHT G/E 3 VIEWS  LOCATION: [facility]  DATE/TIME: 4/24/2023 1:08 PM CDT    INDICATION: Right medial distal forefoot abrasion and pain after fall.  COMPARISON: 10/12/22.      Impression    IMPRESSION:   1. Minimally displaced fracture at the base of the proximal phalanx of the fifth toe, new since the prior study. This is probably acute or subacute.  2. Nondisplaced fracture of the distal shaft of the fourth metatarsal, new. This is also probably acute or subacute.  3. Minimally displaced fracture in the distal end of the third and fourth proximal phalanges, new since the prior study but probably subacute or old.  4. At the base of the proximal phalanx of the second toe there is a mildly displaced fracture which shows some interval healing.  5. In the distal shaft of the third metatarsal, there is a nondisplaced fracture, age indeterminate but not present previously.  5. Osteoarthrosis in the interphalangeal joints of the toes, particularly the great toe, unchanged.   US Thoracentesis    Narrative    EXAM:   1. RIGHT THORACENTESIS  2. ULTRASOUND GUIDANCE  LOCATION: [facility]  DATE/TIME: 4/25/2023 1:06 PM CDT    INDICATION: Pleural effusion.    PROCEDURE: Informed consent obtained. Time  out performed. The chest was prepped and draped in sterile fashion. 10 mL of 1 % lidocaine was infused into the local soft tissues. Under direct ultrasound guidance, a 5 Macedonian catheter system was placed into   the pleural effusion.     0.95 liters of clear yellow fluid were removed and sent to lab, if requested.    Patient tolerated procedure well.    Ultrasound imaging was obtained and placed in the patient's permanent medical record.      Impression    IMPRESSION:  Status post right ultrasound-guided thoracentesis.    Reference CPT Code: 44964   XR Humerus Left G/E 2 Views    Narrative    EXAM: XR FOREARM LEFT 2 VIEWS, XR HUMERUS LEFT G/E 2 VIEWS  LOCATION: [facility]  DATE/TIME: 4/27/2023 9:00 PM CDT    INDICATION: Swelling, pain, and trauma  COMPARISON: None.      Impression    IMPRESSION: Bones are demineralized. There is no acute displaced fracture of the left humerus or forearm. No dislocation. No definitive elbow joint effusion.   XR Forearm Left 2 Views    Narrative    EXAM: XR FOREARM LEFT 2 VIEWS, XR HUMERUS LEFT G/E 2 VIEWS  LOCATION: [facility]  DATE/TIME: 4/27/2023 9:00 PM CDT    INDICATION: Swelling, pain, and trauma  COMPARISON: None.      Impression    IMPRESSION: Bones are demineralized. There is no acute displaced fracture of the left humerus or forearm. No dislocation. No definitive elbow joint effusion.   US Upper Extremity Venous Duplex Bilat    Narrative    EXAM: US UPPER EXTREMITY VENOUS DUPLEX BILATERAL  LOCATION: [facility]  DATE/TIME: 4/27/2023 6:45 PM CDT    INDICATION: arm swelling  COMPARISON: None.  TECHNIQUE: Venous Duplex ultrasound of both upper extremities with (when possible) and without compression, augmentation, and duplex. Color flow and spectral Doppler with waveform analysis performed.    FINDINGS: Ultrasound includes evaluation of the internal jugular veins, innominate veins, subclavian veins,  axillary veins, and brachial veins. The superficial cephalic and basilic veins were also evaluated where seen.    RIGHT: No deep venous thrombosis. No superficial thrombophlebitis.    LEFT: No deep venous thrombosis. No superficial thrombophlebitis.       Impression    IMPRESSION:  1.  No deep venous thrombosis in the bilateral upper extremities.   XR Pelvis and Hip Left 2 Views    Narrative    EXAM: XR PELVIS AND HIP LEFT 2 VIEWS  LOCATION: [facility]  DATE/TIME: 4/28/2023 12:47 PM CDT    INDICATION: S p left hip pinning following femoral neck fracture.  COMPARISON: 03/14/2023, 03/25/2023.      Impression    IMPRESSION: Internal fixation hardware securing a left femoral neck fracture. Hardware is in place and alignment is near-anatomic. Mild degenerative changes of underlying left hip. Bones are demineralized.     Subtle sclerosis along the left sacral ala in keeping with a subacute healing sacral insufficiency fracture.           XR Chest Port 1 View    Narrative    EXAM: XR CHEST PORT 1 VIEW  LOCATION: [facility]  DATE/TIME: 4/30/2023 4:25 PM CDT    INDICATION: Dyspnea  COMPARISON: 04/06/2023 chest x-ray. CT 04/24/2023.      Impression    IMPRESSION: Small right pleural effusion appears slightly smaller when compared to 04/24/2023 CT. No new findings.   XR Chest 2 Views    Narrative    EXAM: XR CHEST 2 VIEWS  LOCATION: [facility]  DATE/TIME: 5/2/2023 4:47 PM CDT    INDICATION: sob  COMPARISON: 01/14/2023      Impression    IMPRESSION: Heart size is enlarged but stable. There is a new small to moderate right pleural effusion with right basilar consolidation favored to represent compressive atelectasis.. Left lung is clear. No pneumothorax. No acute bony abnormality.   Echocardiogram Limited     Value    LVEF  60-65%    Narrative    [id number]  [id number]  [id number]  [id number]^ALI^YAMILETH     [address]  [address]     Name: HEATHER DOYLE  MRN: [id number]  : 1945  Study Date: 2023 12:31 PM  Age: 77 yrs  Gender: Female  Patient Location: [facility]  Reason For Study: Abn EKG  Ordering Physician: YAMILETH FARAH  Performed By: ACE     BSA: 1.9 m2  Height: 65 in  Weight: 174 lb  HR: 96  BP: 111/63 mmHg  ______________________________________________________________________________  Procedure  Limited Echo Adult.  ______________________________________________________________________________  Interpretation Summary     Left ventricular size, wall motion and function are normal. The ejection  fraction is 60-65%.  Normal right ventricle size and systolic function.  The left atrium is mild to moderately dilated.  The right atrium is mild to moderately dilated.  Mild valvular aortic stenosis.  There is mild (1+) aortic regurgitation.  There is mild (1+) mitral regurgitation.  There is mild (1+) tricuspid regurgitation.  The right ventricular systolic pressure is approximated at 52 mmHg.     When compared to previous study on 3-, there is no significant interval  change.  .  ______________________________________________________________________________  I      WMSI = 1.00     % Normal = 100     X - Cannot   0 -                      (2) - Mildly 2 -          Segments  Size  Interpret    Hyperkinetic 1 - Normal  Hypokinetic  Hypokinetic  1-2     small                                                     7 -          3-5      moderate  3 - Akinetic 4 -          5 -         6 - Akinetic Dyskinetic   6-14    large               Dyskinetic   Aneurysmal  w/scar       w/scar       15-16   diffuse     Left Ventricle  Left ventricular size, wall motion and function are normal. The ejection  fraction is 60-65%. There is normal left ventricular wall thickness. Normal  left ventricular wall motion.     Right Ventricle  Normal right ventricle size and systolic function.     Atria  The left atrium is mild to moderately dilated. The  right atrium is mild to  moderately dilated. There is no atrial shunt seen.     Mitral Valve  Thickened mitral valve anterior leaflet. There is mild mitral annular  calcification. There is mild (1+) mitral regurgitation. There is no mitral  valve stenosis.     Tricuspid Valve  Tricuspid leaflets are thickened. There is mild (1+) tricuspid regurgitation.  The right ventricular systolic pressure is approximated at 49mmHg plus the  right atrial pressure. There is no tricuspid stenosis.     Aortic Valve  Mild aortic valve calcification is present. There is mild (1+) aortic  regurgitation. Mild valvular aortic stenosis.     Pulmonic Valve  The pulmonic valve is not well seen, but is grossly normal.     Vessels  The aorta root is normal. IVC diameter <2.1 cm collapsing >50% with sniff  suggests a normal RA pressure of 3 mmHg.     Pericardium  There is no pericardial effusion.     ______________________________________________________________________________  MMode/2D Measurements & Calculations  IVSd: 0.66 cm  LVIDd: 3.5 cm  LVIDs: 2.2 cm  LVPWd: 0.72 cm  FS: 36.3 %  LV mass(C)d: 61.5 grams  LV mass(C)dI: 33.0 grams/m2  RWT: 0.41  TAPSE: 1.1 cm     Time Measurements  MM HR: 105.0 BPM     Doppler Measurements & Calculations  Ao V2 max: 210.4 cm/sec  Ao max P.0 mmHg  Ao V2 mean: 140.3 cm/sec  Ao mean P.4 mmHg  Ao V2 VTI: 38.0 cm     ______________________________________________________________________________  Report approved by: Derrick Carlin 2023 02:44 PM               Discharge Medications   Current Discharge Medication List      START taking these medications    Details   cyclobenzaprine (FLEXERIL) 5 MG tablet Take 1 tablet (5 mg) by mouth every 8 hours as needed for muscle spasms    Associated Diagnoses: Muscle spasm      furosemide (LASIX) 40 MG tablet Take 1 tablet (40 mg) by mouth daily    Associated Diagnoses: Pleural effusion on right      guaiFENesin (MUCINEX) 600 MG 12 hr tablet Take 1 tablet  (600 mg) by mouth 2 times daily    Associated Diagnoses: COPD exacerbation (H)      ipratropium - albuterol 0.5 mg/2.5 mg/3 mL (DUONEB) 0.5-2.5 (3) MG/3ML neb solution Take 1 vial (3 mLs) by nebulization every 6 hours as needed for shortness of breath, wheezing or cough  Refills: 0    Associated Diagnoses: COPD exacerbation (H)      magic mouthwash suspension, diphenhydrAMINE, lidocaine, aluminum-magnesium & simethicone, (FIRST-MOUTHWASH BLM) compounding kit Swish and swallow 10 mLs in mouth every 6 hours as needed for mouth sores    Associated Diagnoses: Mucositis         CONTINUE these medications which have CHANGED    Details   diltiazem ER COATED BEADS (CARDIZEM CD/CARTIA XT) 240 MG 24 hr capsule Take 1 capsule (240 mg) by mouth daily  Refills: 0    Associated Diagnoses: Paroxysmal atrial fibrillation (H)      metoprolol succinate ER (TOPROL XL) 50 MG 24 hr tablet Take 1 tablet (50 mg) by mouth daily  Refills: 0    Associated Diagnoses: Paroxysmal atrial fibrillation (H)         CONTINUE these medications which have NOT CHANGED    Details   acetaminophen (TYLENOL) 325 MG tablet Take 1 tablet (325 mg) by mouth every 4 hours as needed for mild pain or fever    Associated Diagnoses: Acute right-sided low back pain with right-sided sciatica      albuterol (PROAIR HFA/PROVENTIL HFA/VENTOLIN HFA) 108 (90 Base) MCG/ACT inhaler Inhale 2 puffs into the lungs every 4 hours as needed for shortness of breath / dyspnea or wheezing      allopurinol (ZYLOPRIM) 300 MG tablet Take 1 tablet (300 mg) by mouth daily  Qty: 60 tablet, Refills: 0    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      budesonide-formoterol (SYMBICORT) 160-4.5 MCG/ACT Inhaler Inhale 2 puffs into the lungs 2 times daily    Associated Diagnoses: Pulmonary emphysema, unspecified emphysema type (H)      cholestyramine (QUESTRAN) 4 g packet Take 1 packet by mouth 3 times daily (with meals)      DULoxetine (CYMBALTA) 20 MG capsule Take 20 mg by  mouth daily      famotidine (PEPCID) 20 MG tablet Take 20 mg by mouth daily      folic acid (FOLVITE) 1 MG tablet Take 1 tablet (1 mg) by mouth daily  Qty: 90 tablet, Refills: 0    Associated Diagnoses: Seropositive rheumatoid arthritis (H)      Lactobacillus-Inulin ([facility]IGEST[facility]) CAPS Take 1 capsule by mouth 2 times daily      pravastatin (PRAVACHOL) 20 MG tablet Take 20 mg by mouth every evening      predniSONE (DELTASONE) 5 MG tablet Take 5 mg by mouth daily      rivaroxaban ANTICOAGULANT (XARELTO) 15 MG TABS tablet Take 1 tablet (15 mg) by mouth daily (with dinner)  Qty: 90 tablet, Refills: 3    Associated Diagnoses: Paroxysmal atrial fibrillation (H)      Vitamin D3 (CHOLECALCIFEROL) 125 MCG (5000 UT) tablet Take 1 tablet by mouth daily      compressor, for nebulizer Saran [COMPRESSOR, FOR NEBULIZER SARAN] Nebulizer treatment qid prn  Qty: 1 Device, Refills: 0    Associated Diagnoses: Reactive airway disease         STOP taking these medications       fidaxomicin (DIFICID) 200 MG tablet Comments:   Reason for Stopping:             Allergies   Allergies   Allergen Reactions     Codeine Nausea and Vomiting     Fosamax [Alendronate] Diarrhea     Morphine Nausea and Vomiting     Other reaction(s): Vomiting

## 2023-05-04 NOTE — PROGRESS NOTES
05/04/23 1058   Appointment Info   Signing Clinician's Name / Credentials (PT) Nely Deyfátima DPT   Living Environment   People in Home spouse   Current Living Arrangements condominium   Home Accessibility no concerns   Transportation Anticipated family or friend will provide;car, drives self   Living Environment Comments Lives in condo, no stairs, tub shower with GB by tub and toilet, shower chair.  is confined to home due to pulmonary so may not be able to assist much. Daughter lives in same complex and can assist.   Self-Care   Usual Activity Tolerance moderate   Current Activity Tolerance fair   Equipment Currently Used at Home grab bar, toilet;grab bar, tub/shower;walker, rolling;walker, standard;wheelchair, manual;shower chair   Fall history within last six months yes   Number of times patient has fallen within last six months 6   Activity/Exercise/Self-Care Comment has 4WW, FWW, tranport wc, manual wc, Pt uses a walker at [facility]. Pt has alot of falls stating thather legs given out. Pt was driving, doing all errands and IADL prior to her femur fracture. Pt was home for a day after being in a TCU and fell.   Post-Acute Assessment Only   Post-Acute Functional Assessment See below   Previous Level of Function/Home Environm   Bathing/Grooming, Premorbid Functional Level uses device or equipment   Dressing, Premorbid Functional Level independent   Eating/Feeding, Premorbid Functional Level independent   Toileting, Premorbid Functional Level uses device or equipment   BADLs, Premorbid Functional Level uses device or equipment   IADLs, Premorbid Functional Level uses device or equipment   Bed Mobility, Premorbid Functional Level independent   Transfers, Premorbid Functional Level uses device or equipment   Household Ambulation, Premorbid Functional Level uses device or equipment   General Information   Onset of Illness/Injury or Date of Surgery 04/24/23   Referring Physician Dawna Edwards PA-C  "  Patient/Family Therapy Goals Statement (PT) \"to go home\" \"keep my balance\"   Pertinent History of Current Problem (include personal factors and/or comorbidities that impact the POC) paroxysmal atrial fibrillation (on Xarelto), CKD3, COPD, HFpEF, hyperlipidemia, hypertension, recurrent C diff colitis, frequent falls, RA, left ICA occlusion, anemia, depression, gout, chronic lower extremity edema, GERD, and recent left femur fracture 2/2 mechanical fall s/p closed reduction with percutaneous pinning on 3/14/23 then discharged to TCU on 3/27/23, then readmitted 4/6/23-4/13/23 with afib with RVR, recurrent C diff, LLE cellulitis, lactic acidosis, and encephalopathy, then returned to TCU from which she discharged home on 4/17/23 until she re-presented on 4/24/23 after recurrent fall at home, found to have multiple acute/subactue fractures of right foot including proximal phalanx of fifth toe, distal shaft of fourth metatarsal, distal third and fourth proximal phalanges, second proximal phalanx, and distal third metatarsal.  Also noted to have increased shortness of breath 2/2 acute exacerbations of both COPD and HFpEF. Pt has been declinging in medical and functional status past 6 months. at least 6 falls   Existing Precautions/Restrictions fall;oxygen therapy device and L/min   Weight-Bearing Status - RLE weight-bearing as tolerated   General Observations dtr present baseline, supportive and helpful in PLOF.   Cognition   Orientation Status (Cognition) person;situation;place   Cognitive Status Comments SLUMS 12/30 in hospital   Pain Assessment   Patient Currently in Pain No   Integumentary/Edema   Integumentary/Edema Comments +1 lower LLE, RLE ~2-3\" wider circumference, fragile skin   Posture    Posture Forward head position;Protracted shoulders;Kyphosis   Range of Motion (ROM)   Range of Motion ROM is WFL   Strength (Manual Muscle Testing)   Strength Comments grossly ~4/5   Balance   Balance Comments needs UE support " in standing, CAM boot adds level of uneveness   Clinical Impression   Criteria for Skilled Therapeutic Intervention Yes, treatment indicated   PT Diagnosis (PT) generalize weakness, deconditioned, frailty   Influenced by the following impairments cognition, balance, strength, respiratory function, activity tolerance, orthostatic BP   Functional limitations due to impairments transfers, gait, household and community mobility, ability to drive   Clinical Presentation (PT Evaluation Complexity) Evolving/Changing   Clinical Presentation Rationale pt with > 4 personal factors limiting independence with functionlal mobility, complex medical   Clinical Decision Making (Complexity) high complexity   Planned Therapy Interventions (PT) balance training;bed mobility training;gait training;groups;home exercise program;patient/family education;strengthening;transfer training;wheelchair management/propulsion training;risk factor education;home program guidelines   Risk & Benefits of therapy have been explained care plan/treatment goals reviewed;risks/benefits reviewed;evaluation/treatment results reviewed;current/potential barriers reviewed;participants voiced agreement with care plan;participants included;patient;daughter   Clinical Impression Comments PLOF pt was MOD I though with signifcant h/o falls. Currently CGA-MIN A for transfers and gait, gait limited to 20' max. Limited by strength, balance, orthostatic BP, cognition. To benefit from skilled PT services to maximize independence with functional mobility and decrease care giver burden.   PT Total Evaluation Time   PT Eval, High Complexity Minutes (60600) 18   Physical Therapy Goals   PT Frequency Other (see comments)  (60-90 minutes daily)   PT Predicted Duration/Target Date for Goal Attainment 05/15/23   PT Goals Bed Mobility;Transfers;Gait;Wheelchair Mobility;Aerobic Activity;PT Goal 1;PT Goal 2   PT: Bed Mobility Modified independent   PT: Transfers  Supervision/stand-by assist;Assistive device;Bed to/from chair;Sit to/from stand  (using FWW for home negotiation)   PT: Gait Supervision/stand-by assist;50 feet;Rolling walker  (for home negotiation)   PT: Wheelchair Mobility 150 feet;manual wheelchair   PT: Perform aerobic activity with stable cardiovascular response intermittent activity;15 minutes;NuStep   PT: Goal 1 car transfer CGA to access personal transportation   PT: Goal 2 pt/family to verbalize three or > falls prevention strategies post education to promote safety in home environment.   Therapeutic Activity   Therapeutic Activities: dynamic activities to improve functional performance Minutes (02806) 52   Treatment Detail/Skilled Intervention PT: obtained wc for transoprt on and off unit, bedside chair to promote OOB, educated pt and dtr on importance of increasd PO intake, drinking. Fit pt with size F spandigrip for edema mgmt as well as d/t orthostatis,though my benefit from size E. Education on likely 24/7 support upon d/c. Education on importance of eccentric control during sit>stand   Total Session Time   Timed Code Treatment Minutes 52   Total Session Time (sum of timed and untimed services) 70   Post Acute Settings Only   What unit is patient on? Acute Rehab   PT - Acute Rehab Center Time   Individual Time (minutes) - enter zero if not applicable - PT 70   Group Time (minutes) - enter zero if not applicable  - PT 0   Concurrent Time (minutes) - enter zero if not applicable  - PT 0   Co-Treatment Time (minutes) - enter zero if not applicable  - PT 0   ARC Total Session Time (minutes) - PT 70   Toilet Hygiene   Complete independence Toileting Task no   Toilet Transfer   Complete independence with getting on and off a toilet or commode no   Chair/bed-to-chair Transfer   Describe performance MIN A once standing   Complete independence for chair-bed-to-chair transfer no   Lying to Sitting on Side of Bed   Assistance Needed Adaptive equipment   Comment  "HOB raised, use of bedrail   Lying to Sitting CARE Score 6   Sit to Stand   Comment PT; MAX A from 18\" bedside chair   1 Step (Curb)   Reason if not Attempted Activity not applicable   1 Step CARE Score 9   4 Steps   Reason if not Attempted Activity not applicable   4 Steps CARE Score 9   12 Steps   Reason if not Attempted Activity not applicable   12 Steps CARE Score 9   Picking Up Object   Reason if not Attempted Safety concerns    CARE Score 88     "

## 2023-05-04 NOTE — PLAN OF CARE
Goal Outcome Evaluation:    Care plan reviewed with: Patient    Overall patient progress; No change    Alert and oriented x 4, denies headache, denies numbness or tingling. Has sob at baseline due to her dx of COPD, currently on continuous o2 @ 2L, Is encouraged. Had some soft SBP's today with PT, denies any symptoms. Amb to the BR with CGA 1 with walker, voided 200 ml, pvr for 0. Ate 50% of her meals, cam boot on when oob, daughter at bedside most of the shift. Will continue Poc

## 2023-05-04 NOTE — PLAN OF CARE
Goal Outcome Evaluation:    Discharge Planner Post-Acute Rehab PT:     Discharge Plan: home with 24/7 support, home PT    Precautions: falls, R CAM boot OOB, prn O2 (more so when lying),     Current Status:  Bed Mobility: SBA  Transfer: MIN A with FWW  Gait: MIN A up to 20' with FWW  Stairs: NT- not a goal for home  Balance: able to manage clothing Bi-manally  Egan 5/5 = **  TUG 5/5 = **    Assessment:  PLOF pt was MOD I though with signifcant h/o falls. Currently CGA-MIN A for transfers and gait, gait limited to 20' max. Limited by strength, balance, orthostatic BP, cognition. To benefit from skilled PT services to maximize independence with functional mobility and decrease care giver burden.    Other Barriers to Discharge (DME, Family Training, etc):   Cognition  Extensive falls h/o, 6 month decline in medical and functional status  Both pt and  are frail and in poor health  Pt has: FWW, 4WW, transport and manual wc

## 2023-05-04 NOTE — PROGRESS NOTES
05/04/23 0641   Appointment Info   Signing Clinician's Name / Credentials (OT) Ivelisse Burrell   Rehab Comments (OT) eval   Living Environment   People in Home spouse   Current Living Arrangements condominium   Transportation Anticipated family or friend will provide;car, drives self   Living Environment Comments Lives in condo, no stairs, tub shower with GB by tub and toilet, shower chair.  is confined to home due to pulmonary so may not be able to assist much. Daughter lives in same complex and can assist.   Self-Care   Usual Activity Tolerance moderate   Current Activity Tolerance fair   Equipment Currently Used at Home grab bar, toilet;grab bar, tub/shower;walker, rolling;walker, standard;wheelchair, manual;shower chair   Fall history within last six months yes   Number of times patient has fallen within last six months 6   Activity/Exercise/Self-Care Comment Pt uses a walker at [facility]. Pt has alot of falls stating thather legs given out. Pt was driving, doing all errands and IADL prior to her femur fracture. Pt was home for a day after being in a TCU and fell.   Previous Level of Function/Home Environm   Bathing/Grooming, Premorbid Functional Level uses device or equipment   Dressing, Premorbid Functional Level independent   Eating/Feeding, Premorbid Functional Level independent   Toileting, Premorbid Functional Level uses device or equipment   BADLs, Premorbid Functional Level uses device or equipment   IADLs, Premorbid Functional Level uses device or equipment   Bed Mobility, Premorbid Functional Level independent   Transfers, Premorbid Functional Level uses device or equipment   Household Ambulation, Premorbid Functional Level uses device or equipment   General Information   Onset of Illness/Injury or Date of Surgery 04/24/23   Referring Physician Dawna VALERIO   Patient/Family Therapy Goal Statement (OT) to go home   Additional Occupational Profile Info/Pertinent History of Current  "Problem per chart \"78 yo medically complex female with hx of paroxysmal Afib, HTN, COPD, pulm HTN (on chronic O2), diastolic HF, CKD stage 3, left ICA occlusion, RA, and recent hospitalization for left hip fracture s/p closed reduction percutaneous pinning on 3/15/23 after fall at home for which she went to a TCU. She discharged home from that TCU 4/17 and readmitted to the hospital 4/24 after she fell at home. She had broken numerous toes on the right foot, presented with right foot pain, dyspnea, productive cough, and worsening leg edema. She has required medical mgmt of her acute on chronic hypoxic respiratory failure (including thoracentesis), right pleural effusion, chronic a-fib, severe recurrent C Diff coliits, and hypotension.\"   Existing Precautions/Restrictions fall;oxygen therapy device and L/min  (2L O2, CAM boot for RLE, c diff)   Right Lower Extremity (Weight-bearing Status) weight-bearing as tolerated (WBAT)  (WBAT and wear CAM boot for ambulation)   Cognitive Status Examination   Orientation Status person   Cognitive Status Comments Completed SLUMS at hospital and scored a 12/30 need to assess this more. Pt states she is at [facility] and the month is jan. Pt recognizes thather memory is not good now. Will continue to assess   Visual Perception   Impact of Vision Impairment on Function (Vision) wears glasses.   Sensory   Sensory Comments numness in feet and hands   Pain Assessment   Patient Currently in Pain No   Range of Motion Comprehensive   Comment, General Range of Motion BUE WFL   Strength Comprehensive (MMT)   Comment, General Manual Muscle Testing (MMT) Assessment BUE WFL   Coordination   Coordination Comments coordination impacted by nuimbness in fingers. Pt reports droppig things and difficulty picking items up   Clinical Impression   Criteria for Skilled Therapeutic Interventions Met (OT) Yes, treatment indicated   OT Diagnosis ADL and mobility deficits   Influenced by the following " impairments deconditiioning, cognition, sensation, endurance   OT Problem List-Impairments impacting ADL problems related to;activity tolerance impaired;balance;cognition;coordination;mobility;sensation;strength   ADL comments/analysis deficits impact IND ith ADL and safety with mobility   Assessment of Occupational Performance 5 or more Performance Deficits   Identified Performance Deficits deficits impact IND ith ADL and safety with mobility   Planned Therapy Interventions (OT) ADL retraining;cognition;transfer training   Clinical Decision Making Complexity (OT) moderate complexity   Anticipated Equipment Needs Upon Discharge (OT)   (TBD)   Risk & Benefits of therapy have been explained evaluation/treatment results reviewed;care plan/treatment goals reviewed   Clinical Impression Comments Pt is below baseline with multiple falls resulting in injury. Pt also limited by cognition and will need to assess this further. Pt will  benefit from continued therapy, faily training and determination of DME.   OT Total Evaluation Time   OT Eval, Moderate Complexity Minutes (09055) 15   OT Goals   Therapy Frequency (OT) Daily   OT Predicted Duration/Target Date for Goal Attainment 05/15/23   OT: Hygiene/Grooming supervision/stand-by assist   OT: Upper Body Dressing Supervision/stand-by assist   OT: Lower Body Dressing Supervision/stand-by assist   OT: Upper Body Bathing Supervision/stand-by assist   OT: Lower Body Bathing Supervision/stand-by assist   OT: Transfer Supervision/stand-by assist  (tub bench)   OT: Toilet Transfer/Toileting toilet transfer;cleaning and garment management;Supervision/stand-by assist   OT: Cognitive Patient/caregiver will verbalize understanding of cognitive assessment results/recommendations as needed for safe discharge planning   OT: Goal 1 Caregiver training will be completed and family will demonstrate ability to assist pt with mobiltiy and ADL as needed.   Self-Care/Home Management    Self-Care/Home Mgmt/ADL, Compensatory, Meal Prep Minutes (06803) 45   Treatment Detail/Skilled Intervention Educated pt on OT role  and POC. ADL completed as well as education on wearing cam boot.   OT Discharge Planning   OT Plan ace 111 cog screen   Total Session Time   Timed Code Treatment Minutes 45   Total Session Time (sum of timed and untimed services) 60   Post Acute Settings Only   What unit is patient on? Acute Rehab   OT - Acute Rehab Center Time   Individual Time (minutes) - enter zero if not applicable - OT 60   Group Time (minutes) - enter zero if not applicable  - OT 0   Concurrent Time (minutes) - enter zero if not applicable  - OT 0   Co-Treatment Time (minutes) - enter zero if not applicable  - OT 0   ARC Total Session Time (minutes) - OT 60   Problem Solving   Problem Solving Comment cues and assisrt for managing O2 line, confused about when to wear her CAM boot   Memory   Memory Comment forgetful, thought she was at [facility] still and that is is wes   Oral Hygiene   Describe performance reports she is set up seated   Grooming (except oral cares)   Grooming Comment reports set up seated   Upper Body Dressing   Describe performance set up and assist for managing O2 tube   Lower Body Dressing (Pants/Undergarments)   Describe performance assist and cues to thread with socks on, able to pull up with CGA   Lower Body Dressing putting on/taking off footwear   Describe performance set up for socks and shoe, assist for cam boot   Toilet Hygiene   Describe performance assist for clothing   Toilet Transfer   Describe performance Ax1 to commode at bedside   Chair/bed-to-chair Transfer   Describe performance max A from low bed, min A with elevated bed height for sitto stand and CGA for pivot with walker

## 2023-05-04 NOTE — PLAN OF CARE
Discharge Planner Post-Acute Rehab OT:     Discharge Plan: Home with 24/7 assist and HH OT    Precautions: Fall, c diff, CAM boot when out of  bed, 2L O2    Current Status:  ADLs:    Mobility: Ax1 for stand pivot using walker with CAM boot on    Grooming: Setup seated    Dressing: UB with setup, LB with min A    Bathing: TBD    Toileting: Ax1 to commode at bedside, A for cares  IADLs: Pt usually mod I with IADL  Vision/Cognition: vision intact, cognitive deficits noted and to be further assessed. Pt scored a 12/30 on the SLUMS at the hospital    Assessment: Pt is below baseline with multiple falls resulting in injury. Pt also limited by cognition and will need to assess this further. Pt will benefit from continued therapy, family training and determination of DME. Pt's daughter present at second session. Discussed goals and safe discharge recommendation of having 24/7 support from family members not just including the pt's .      Other Barriers to Discharge (DME, Family Training, etc): DME, family training, cognition

## 2023-05-04 NOTE — PROGRESS NOTES
[facility]   Acute Rehabilitation Unit  Daily progress note    INTERVAL HISTORY  Fatuma Henry was seen and examined at bedside this morning.  No acute events reported overnight.  Patient reports that is feeling ok this morning, though fatigued.  She continues to have difficulty sleeping, both with falling asleep and waking frequently during the night.  She has ongoing shortness of breath with activity, feels this is unchanged.  Notes chronic cough to be stable.  She denies pain at rest, does have some right foot pain with weightbearing.  She is observed to have right eye redness, but denies pain, itching, visual change, discharge.  She reports 1 BM so far today, remains loose, but denies any ongoing abdominal pain or cramping or nausea.  Feels she is urinating without any issues.  Denies chest pain, palpitations, dizziness.  Denies other concerns or questions at this time.    Functionally, therapy evals underway today.     MEDICATIONS    allopurinol  300 mg Oral Daily     cholestyramine  1 packet Oral TID w/meals     diltiazem ER COATED BEADS  240 mg Oral Daily     DULoxetine  20 mg Oral Daily     famotidine  20 mg Oral Daily     fluticasone-vilanterol  1 puff Inhalation Daily     folic acid  1 mg Oral Daily     furosemide  40 mg Oral Daily     guaiFENesin  600 mg Oral BID     lactobacillus rhamnosus (GG)  1 capsule Oral BID     melatonin  3 mg Oral At Bedtime     metoprolol succinate ER  50 mg Oral Daily     pravastatin  20 mg Oral QPM     predniSONE  5 mg Oral Daily     rivaroxaban ANTICOAGULANT  15 mg Oral Daily with supper     Vitamin D3  125 mcg Oral Daily        acetaminophen, albuterol, cyclobenzaprine, ipratropium - albuterol 0.5 mg/2.5 mg/3 mL, magic mouthwash suspension (diphenhydrAMINE, lidocaine, aluminum-magnesium & simethicone), - MEDICATION INSTRUCTIONS -, polyethylene glycol, senna-docusate     PHYSICAL EXAM  /64 (BP Location: Left arm)   Pulse 78    "Temp (!) 96.1  F (35.6  C) (Axillary)   Resp 16   Ht 1.676 m (5' 6\")   Wt 71.9 kg (158 lb 8.2 oz)   SpO2 97%   BMI 25.58 kg/m    Gen: NAD  HEENT: MMM  Cardio: RRR, no murmurs appreciated  Pulm: non-labored on 2L O2 by NC, lung sounds diminished on right, no wheezes or crackles  Abd: soft, non-tender, non-distended, bowel sounds present  Ext: 1+ pitting edema in bilateral lower extremities  Neuro/MSK: awake, alert, PERRL, EOMI, face symmetric, speech clear/fluent, moving all extremities in bed    LABS  CBC RESULTS:   Recent Labs   Lab Test 05/04/23 0612 04/28/23 0554 04/27/23 0445   WBC 10.7 9.8 10.1   RBC 2.97* 2.69* 2.84*   HGB 8.7* 7.9* 8.4*   HCT 27.7* 26.1* 27.1*   MCV 93 97 95   MCH 29.3 29.4 29.6   MCHC 31.4* 30.3* 31.0*   RDW 17.5* 17.1* 17.3*    349 358       Last Basic Metabolic Panel:  Recent Labs   Lab Test 05/04/23 0612 05/03/23 0444 05/02/23 0443 04/29/23 0444 04/28/23 0554 04/27/23 0445     --   --   --  137 139   POTASSIUM 3.6 4.4 4.2   < > 4.4 4.0   CHLORIDE 102  --   --   --  103 104   CO2 24  --   --   --  23 25   ANIONGAP 12  --   --   --  11 10   GLC 93  --   --   --  114* 125*   BUN 33.5*  --   --   --  41.1* 36.7*   CR 1.12*  --   --   --  1.29* 1.31*   GFRESTIMATED 51*  --   --   --  43* 42*   SUNI 10.0  --   --   --  9.8 9.9    < > = values in this interval not displayed.       Rehabilitation - continue comprehensive acute inpatient rehabilitation program with multidisciplinary approach including therapies, rehab nursing, and physiatry following. See interval history for updates.      ASSESSMENT AND PLAN  Fatuma Henry is a 77 year old with a past medical history of paroxysmal atrial fibrillation (on Xarelto), CKD3, COPD, HFpEF, hyperlipidemia, hypertension, recurrent C diff colitis, frequent falls, RA, left ICA occlusion, anemia, depression, gout, chronic lower extremity edema, GERD, and recent left femur fracture 2/2 mechanical fall s/p closed reduction with " percutaneous pinning on 3/14/23 who was admitted on 4/17/23 after recurrent fall, found to have multiple fractures of right foot as well as COPD exacerbation and HF exacerbation with hospital course complicated by pleural effusion s/p multiple thoracentesis, ongoing symptoms associated with recurrent C diff, pain, dyspnea, and suboptimal heart rate control.  She is now admitted to ARU on 5/3/23 for multidisciplinary rehabilitation and ongoing medical management.        Admission to acute inpatient rehab 05/03/23.    Impairment group code: Orthopaedic Disorders 08.2 Femur (Shaft) Fracture: s/p closed reduction internal fixation of left femur 3/14/2023, now with multiple closed fractures of right foot        1. PT and OT 90 minutes of each on a daily basis, in addition to rehab nursing and close management of physiatrist.       2. Impairment of ADL's: Noted to have pain, weakness, fatigue, impaired balance, dyspnea on exertion, impaired cognition, and impaired safety awareness, all affecting her ability to safely and independently perform basic ADLs.  Goal for mod I with basic ADLs.     3. Impairment of mobility:  Noted to have pain, weakness, fatigue, impaired balance, dyspnea on exertion, impaired cognition, and impaired safety awareness, all affecting her ability to safely and independently perform basic mobility.  Goal for mod I with basic mobility.     4. Medical Conditions  New actions/orders/updates for today are in blue.     Debility  Recurrent falls  Multiple fractures of the right foot: 3rd, 4th, 5th phalanges and 1st & 3rd metatarsals, managed non-operatively  Left minimally displaced impacted femoral neck fracture s/p closed reduction with percutaneous pinning on 3/14/23 with Dr. Mckeon  Completed rehabilitation at [facility], return home couple days ago. She fell at home prior to arrival, reportedly no syncope or near syncope. Patient fell while getting up from the chair by the table, with daughter being  nearby.  Foot x-rays demonstrated above fractures.   - Right foot CAM when ambulating and weightbearing as tolerated  - Pain management: Tylenol PRN  - Continue PT/OT  - Follow up with [place] [facility] - foot and ankle provider for foot fractures and Dr. Mckeon for hip fracture     Acute on chronic respiratory failure with hypoxia, resolved  COPD with acute exacerbation, resolved, currently stable  Right-sided pleural effusion, s/p thoracentesis on 4/25 and again 5/3  HFpEF with acute exacerbation.  Resolved, now stable  Pulmonary hypertension, stable  [PTA meds: albuterol 2 puffs q4h PRN, budesonide-formoterol 160-4.5 mcg inhaler]  Exacerbations of CHF and COPD this admission.  Completed prednisone taper, now returned to PTA dose.  Initially on IV diuretics, transitioned to oral.  Underwent thoracentesis x2.  - Goal spO2 is 88-92%.  Oxygen by NC PRN to maintain sats.  - Breo Ellipta as formulary sub for PTA Symbicort  - Continue albuterol inhaler and Duoneb PRN  - Continue prednisone 5 mg (PTA dose for RA)  - Continue Lasix 40 mg daily.  Monitor daily weights, labs, volume status.  - Follow up pulmonology     Chronic atrial fibrillation  Difficult to rate control due low BP.  Cardiology consulted this admission and made some medication changes, felt HR better-controlled.  PTA anticoagulation held briefly for thoracentesis but resumed on 4/26.  - Continue Xarelto 15 mg daily  - Continue metoprolol 50 mg daily (reduced on 4/28)  - Continue diltiazem  mg (increased on 4/30)  - Monitor BP, HR.  BP trending 100-120, HR well-controlled in 70s.     Recurrent c diff colitis, resolved  Severe, 4th episode.  Off vancomycin since 4/6.  Completed taper Dificid per ID/GI recs (started last admission) as of 5/2/23.  - Avoid antibiotics   - Continue probiotics  - Monitor bowel pattern  - Previously had been referred to [facility] for FMT consultation - appointment scheduled for November     CKD stage 3  Recent Cr in 1.1-1.3  range.  - Avoid nephrotoxins and hypotension  - Continue to monitor renal function while diuresing.  5/4: Cr stable at 1.12     Essential HTN  - Continue PTA Cardizem and metoprolol, with holding parameters  - BP has been on soft side recently, med changes as above per cardiology  - Monitor BP     Acute on chronic anemia  Baseline Hgb 9-10 range.  Most recent Hgb 7.9 on 4/28.  - Continue folic acid 1 mg daily  - Trend CBC every M/Th.  5/4: Hgb stable at 8.7     Left ICA occlusion  Had been noted in 12/22, asymptomatic.  - On Xarelto and statin as above  - Outpatient vascular surgery follow-up recommended     Hx GERD  - Continue PTA pepcid      Hx depression  - Continue PTA Cymbalta  - Monitor mood, consult psychology as indicated     Hx pulm nodules with positive PET  Stable 17 x 19 mm groundglass nodule in the lingula since 07/21/2022.   - Follow up with pulm clinic as outpt and serial imaging 3 to 6 month     Hx rheumatoid arthritis  Hx of methotrexate--was discontinued during recent hospitalization.  No exacerbation of RA off methotrexate.  - Continue prednisone 5 mg daily (chronic dose, recently completed taper due to COPD exacerbation)     Hx gout  - Continue PTA allopurinol 300 mg daily    Hypomagnesemia  Mag slightly low on 5/4 at 1.6.  No replacement indicated per protocol.  - Recheck tomorrow       5. Adjustment to disability:  Clinical psychology to eval and treat if indicated  6. FEN: low sat fat, Na <2400mg  7. Bowel: continent, ongoing loose stools in setting of recent recurrent C diff, monitor  8. Bladder: continent/incontinent, evangelista just removed on 5/3, PVRs at admission without evidence of retention  9. DVT Prophylaxis: Xarelto  10. GI Prophylaxis: Pepcid  11. Code: no CPR, do not intubate  12. Disposition: goal for home with family assist, though suspect would benefit from more supportive environment given recurrent falls, hospitalizations, chronic medical comorbidities  13. ELOS:  12 days pending  therapy evals  14. Follow up Appointments on Discharge: PCP in 1-2 weeks, ortho ([place] - 2 different providers: foot/ankle for foot fractures, and Dr. Mckeon for left femur fracture s/p repair), pulmonology (currently scheduled 5/17), cardiology (currently scheduled 5/10), vascular medicine (ICA occlusion, scheduled 5/18), GI (FMT, scheduled 11/1)        Patient was discussed with Dr. Frank Rizvi, PM&R Staff Physician    Dawna Edwards PA-C  Physical Medicine & Rehabilitation

## 2023-05-04 NOTE — PROGRESS NOTES
"CLINICAL NUTRITION SERVICES - ASSESSMENT NOTE     Nutrition Prescription    Malnutrition Status:    Patient does not meet two of the established criteria necessary for diagnosing malnutrition but is at risk for malnutrition    Recommendations already ordered by Registered Dietitian (RD):  None at this time    Future/Additional Recommendations:  Monitor PO intake, need for scheduled snacks/supplements, and weight trends     REASON FOR ASSESSMENT  Fatuma Henry is a/an 77 year old female assessed by the dietitian for Provider Order - assess/optimize nutrition, order supplements    PMH  Past medical history of paroxysmal atrial fibrillation (on Xarelto), CKD3, COPD, HFpEF, hyperlipidemia, hypertension, recurrent C diff colitis, frequent falls, RA, left ICA occlusion, anemia, depression, gout, chronic lower extremity edema, GERD, and recent left femur fracture 2/2 mechanical fall s/p closed reduction with percutaneous pinning on 3/14/23 who was admitted on 4/17/23 after recurrent fall, found to have multiple fractures of right foot as well as COPD exacerbation and HF exacerbation with hospital course complicated by pleural effusion s/p multiple thoracentesis, ongoing symptoms associated with recurrent C diff, pain, dyspnea, and suboptimal heart rate control.  She is now admitted to ARU on 5/3/23 for multidisciplinary rehabilitation and ongoing medical management.    NUTRITION HISTORY  Met with pt in room. She reports eating well. She follows a low sodium diet at home and tries to eat 3 meals/day + snacks. She does not recall receiving any nutrition supplements during her hospital stay. She does not know her UBW. Pt's family member stated it is around 150 lbs.     Per RD note 5/1, \"Pt states she follows a low sodium diet at home. She states she's eating fine but not eating 75% of meals (she thinks she's eating more like 50% of meals)\"    On 5/1 RD ordered trial of  magic cup (orange) and gelatein plus dialy to help pt " "meet her   Needs.    Per RD note , \"Pt states she and her  have been following a low sodium diet and still has written information at home.  She declines diet education and written materials.\"    CURRENT NUTRITION ORDERS  Diet: Low Saturated Fat/2400 mg Sodium  Intake/Tolerance: 50% per flowsheets    Pt feels like her intake has been adequate.    LABS  Labs reviewed  BUN: 33.5 (H)  Cr: 1.12 (H)  M.6 (L)  Hemoglobin A1C: 5.9 (3/15)    MEDICATIONS  Medications reviewed  Cholestyramine  Pepcid  Folic acid  Lasix  Culturell  Prednisone  Vitamin D3    ANTHROPOMETRICS  Height: 167.6 cm (5' 6\")  Most Recent Weight: 71.9 kg (158 lb 8.2 oz)    IBW: 59.3 kg  BMI: Overweight BMI 25-29.9  Weight History:   Wt Readings from Last 30 Encounters:   23 71.9 kg (158 lb 8.2 oz)   23 72.6 kg (160 lb 1.6 oz)   23 83.3 kg (183 lb 9.6 oz)   23 83 kg (183 lb)   23 83.7 kg (184 lb 8 oz)   23 78.9 kg (174 lb)   23 69.6 kg (153 lb 7 oz)   23 69.2 kg (152 lb 9.6 oz)   23 71.4 kg (157 lb 4.8 oz)   23 70.2 kg (154 lb 12.8 oz)   23 70.9 kg (156 lb 3.2 oz)   22 71.2 kg (157 lb)   22 71.2 kg (157 lb)   22 70.5 kg (155 lb 8 oz)   22 69.9 kg (154 lb)   22 66.4 kg (146 lb 4.8 oz)   22 67.7 kg (149 lb 3.2 oz)   22 68.4 kg (150 lb 12.8 oz)   22 68.1 kg (150 lb 1.6 oz)   22 68.9 kg (152 lb)   22 69.4 kg (152 lb 14.4 oz)   22 72.6 kg (160 lb)   11/10/22 72.7 kg (160 lb 3.2 oz)   22 70.3 kg (155 lb)   10/12/22 69.4 kg (153 lb)   22 69.4 kg (153 lb)   22 69.6 kg (153 lb 6.4 oz)   22 69.5 kg (153 lb 4.8 oz)   22 68 kg (150 lb)   22 70.8 kg (156 lb)   Current weight in line with UBW and previous weights. Some increase in weight end of March - April. Weight has now trended back down. Some of this is likely fluid related. Pt has been receiving lasix.    Per RD note , \"Per pt's " "family member-pt had gained weight and has lost the weight she gained.\"    Dosing Weight: 62.5 kg (adjusted BW)    ASSESSED NUTRITION NEEDS  Estimated Energy Needs: 9667-0689 kcals/day (25 - 30 kcals/kg)  Justification: Maintenance  Estimated Protein Needs: 50-63 grams protein/day (0.8 - 1 grams of pro/kg)  Justification: CKD and Maintenance (monitor renal labs)  Estimated Fluid Needs: 1 mL/kcal  Justification: Maintenance    PHYSICAL FINDINGS  See malnutrition section below.    MALNUTRITION  % Intake: Decreased intake does not meet criteria  % Weight Loss: Weight loss does not meet criteria  Subcutaneous Fat Loss: None observed  Muscle Loss: None observed  Fluid Accumulation/Edema: None noted  Malnutrition Diagnosis: Patient does not meet two of the established criteria necessary for diagnosing malnutrition but is at risk for malnutrition    NUTRITION DIAGNOSIS  Predicted inadequate nutrient intake (kcal/pro) related to potential for menu fatigue 2/2 LOS as evidenced by pt reliant on PO intake to meet 100% of nutrition needs.       INTERVENTIONS  Implementation  Nutrition Education: Provided education on role of RD in care. Discussed available snacks/supplements to increase oral intake. Pt declined need for these at this time. Discussed family/friends may bring in food from outside/home.     Goals  Patient to consume % of nutritionally adequate meal trays TID, or the equivalent with supplements/snacks.     Monitoring/Evaluation  Progress toward goals will be monitored and evaluated per protocol.    Arti Riley RD, RADHA  ARU RD pager: [phone]  Weekend/Holiday RD pager: [phone]          "

## 2023-05-04 NOTE — CONSULTS
Per pt chart, back and fourth from hospital, TCU, and home since 2022. This writer only reviewed SW notes from Dec 2022 and later, due to the amount of SW notes in pt chart. Per RN CM note 23, family inquired about hospice at one point and expressed interest in hiring help with [facility], who was providing HC services PTA. Dtr working with the [facility] to get financial and other assistance in place. Dtr given copy of MA iván, unsure status on that, SW will f/u and assist.     OUT OF TCU DAYS with insurance.     --------------------------------------------------------------------------------------------------    Social Work: Initial Assessment with Discharge Plan    Patient Name: Fatuma Henry  : 1945  Age: 77 year old  MRN: [id number]  Completed assessment with: Chart review and interview with patient and pt's daughter, Enid  Admitted to ARU: 2023    Presenting Information   Date of SW assessment: May 5, 2023  Health Care Directive: Copy in Chart  Primary Health Care Agent: Patient/self   Secondary Health Care Agent:  B[place], Dtr Enid, and Dtr Lisbeth (listed on HCD in chart)   Living Situation: Lives in a condo in [place], [place] with spouse. No stairs, tub shower, GB by tub and toilet, shower chair available, and dtr/NEYMAR live in same complex on a different floor.   Previous Functional Status: Pt was recently in TCU and D/C'd home with HC. Was getting assistance from family for ADLs and using wheelchair. Working with HC to advance mobility with walker. Many falls at home PTA. Per OT kavin note-Completed SLUMS at hospital and scored a 12/30 need to assess this more.  DME available: grab bars, RW, W/C, and shower chair. Oxygen Tubing/Supplies (through  [employer]), Nebulizer tubing (company unknown). Portable O2 tanks were left at home.  Patient and family understanding of hospitalization: Appropriate and pleasant.  Cultural/Language/Spiritual Considerations: 76 y/o female, ,  english-speaking, , and Yazidism not listed.     Physical Health  Reason for admission: Orthopaedic Disorders 08.2 Femur (Shaft) Fracture: s/p closed reduction internal fixation of left femur 3/14/2023, now with multiple closed fractures of right foot      Justification for Acute Inpatient Rehabilitation  Fatuma Henry is a 76 yo medically complex female with hx of paroxysmal Afib, HTN, COPD, pulm HTN (on chronic O2), diastolic HF, CKD stage 3, left ICA occlusion, RA, and recent hospitalization for left hip fracture s/p closed reduction percutaneous pinning on 3/15/23 after fall at home for which she went to a TCU. She discharged home from that TCU 4/17 and readmitted to the hospital 4/24 after she fell at home. She had broken numerous toes on the right foot, presented with right foot pain, dyspnea, productive cough, and worsening leg edema. She has required medical mgmt of her acute on chronic hypoxic respiratory failure (including thoracentesis), right pleural effusion, chronic a-fib, severe recurrent C Diff coliits, and hypotension. She is now medically stable and ready to discharge to acute inpatient rehab for her ongoing medical mgmt and intensive rehab needs.   The patient requires an intensive inpatient rehab program to address the following acute impairments: pain, weakness, fatigue, impaired balance, dyspnea on exertion, impaired cognition, and impaired safety awareness. These impairments are contributing to functional limitations impacting her safety and independence w/ bed mobility, transfers, gait, stairs, ADLs, as well as IADLs.     Provider Information   Primary Care Physician:Tory Wise   formerly [facility] will schedule PCP apt at discharge.   : Emilie Lindsay [facility]   Social Work Care Coordinator - [facility]   [email or web address] Cell Phone: [phone]    Mental Health/Chemical Dependency:   Diagnosis: Depression and anxiety reported - pt says  providers are 'working on this'.  Alcohol/Tobacco/Narcotis: Long hx of tobacco use. Quit 5 years ago. No alcohol use  Support/Services in Place: None reported.  Services Needed/Recommended: Sandy and Health Psychology support while on ARU available.   Sexuality/Intimacy: Not discussed     Support System  Marital Status: Spouse is homebound due to pulmonary issues, per pt chart.  can't provide physical assistance but can be there for support and supervision.   Family support: Patient's daughter and son[facility]in[facility] on [facility] floor so they are available to assist as needed. Pt's son-in-law does not work so is available 24/7, along with patient's spouse. Pt's daughter reports her  (NEYMAR who lives in their building) had a stroke 10+ years ago and is on disability, so he couldn't provide physical support but could provide IADL support.  Other support available: None reported.    Community Resources  Current in home services: [facility] for RN, PT, OT, and HA (PH: [phone], Fax: [phone]). Would prefer to use them going forward.  Previous services: [facility] TCU end of 2022. [facility]CU 2023. [facility] RN, PT, and OT (Tashia PH: [phone]). [facility] TCU 03/23 and 04/23.     Financial/Employment/Education  Employment Status: Retired   Income Source: Social Security and pension  Education: Not discussed   Financial Concerns: None reported  Insurance: [employer] MEDICARE ADVANTAGE    Discharge Plan   Patient and family discharge goal: TBD, pending progress  Provided Education on discharge plan: Evaluations and discharge recommendations pending.   Patient agreeable to discharge plan:  Pending further discussion. Evaluations and discharge recommendations pending.   Provided education and attained signature for Medicare IM and IRF Patient Rights and Privacy Information provided to patient : YES  Provided patient  "with [facility] Resources: N/A  Barriers to discharge: Support and assistance     Discharge Recommendations   Disposition: See above   Transportation Needs: Patient, family/friends, paid transport, insurance transport (if applicable)     Additional comments   Discharge GUZMAN MENDOZA 14 days.     SW will remain available and continue to follow as needs arise.     -------------------------------------------------------------------------------------------------------------  HUMA Pain Assessment    Pain Effect on Sleep  Over the past 5 days, how much of the time has pain made it hard for you to sleep at night?\"    0. Does not apply - I have not had any pain or hurting in the past 5 days    -------------------------------------------------------------------------------------------------------------    BRY Altamirano  Post Acute Float   ARU/ROB/HASEEB    Phone: [phone]  Fax: [phone]      "

## 2023-05-04 NOTE — PLAN OF CARE
"Goal Outcome Evaluation:      Plan of Care Reviewed With: patient        VS: /64 (BP Location: Left arm)   Pulse 78   Temp (!) 96.1  F (35.6  C) (Axillary)   Resp 16   Ht 1.676 m (5' 6\")   Wt 71.9 kg (158 lb 8.2 oz)   SpO2 97%   BMI 25.58 kg/m       O2: SpO2 >90% and stable on 2 L NC.  Denies chest pain but SOB with activities.   Output: Voiding without difficulty, on bedside commode    Last BM: 5/3, denies abdominal discomfort.    Activity: Up with Assist  X 1, stand pivot transfer   Skin: Multiple  bruises on bilat. Upper extremities and  of right toes    Pain: Denies pain   CMS: Intact, AOx4. Denies numbness and tingling.   Dressing: NA   Diet: Regular diet; combination diet, low saturated fat, Na diet.    LDA: No PIV   Equipment: IV pole, personal belongings,    Plan: C.diff. precautions maintained. Continue with plan of care. Call light within reach, pt able to make needs known.    Additional Info:                 "

## 2023-05-05 NOTE — PLAN OF CARE
FOCUS/GOAL  Wound care management    ASSESSMENT, INTERVENTIONS AND CONTINUING PLAN FOR GOAL:  New skin tear  to the right Knee reported by therapist this morning.  Added to LDA, cleaned with wound cleanser, foam dressing applied.   Goal Outcome Evaluation:      Plan of Care Reviewed With: patient, other (see comments)          Outcome Evaluation: No change this shift.

## 2023-05-05 NOTE — PROGRESS NOTES
"  [facility]   Acute Rehabilitation Unit  Daily progress note    INTERVAL HISTORY  Fatuma Henry was seen and examined this AM.  No acute events overnight.  Denies chest pain, shortness of breath, no fever or chills.  Good therapy participation thus far.  Will monitor Magnesium.      Functional  OT:  ADLs:    Mobility: Ax1 for stand pivot using walker with CAM boot on    Grooming: Setup seated    Dressing: UB with setup, LB with min A    Bathing: TBD    Toileting: Ax1 to commode at bedside, A for cares  IADLs: Pt usually mod I with IADL  Vision/Cognition: vision intact, cognitive deficits noted and to be further assessed. Pt scored a 12/30 on the SLUMS at the hospital       ROS: 10 point ROS neg other than the symptoms noted above in the HPI.        MEDICATIONS    allopurinol  300 mg Oral Daily     cholestyramine  1 packet Oral TID w/meals     diltiazem ER COATED BEADS  240 mg Oral Daily     DULoxetine  20 mg Oral Daily     famotidine  20 mg Oral Daily     fluticasone-vilanterol  1 puff Inhalation Daily     folic acid  1 mg Oral Daily     furosemide  40 mg Oral Daily     guaiFENesin  600 mg Oral BID     lactobacillus rhamnosus (GG)  1 capsule Oral BID     melatonin  3 mg Oral At Bedtime     metoprolol succinate ER  50 mg Oral Daily     pravastatin  20 mg Oral QPM     predniSONE  5 mg Oral Daily     rivaroxaban ANTICOAGULANT  15 mg Oral Daily with supper     Vitamin D3  125 mcg Oral Daily        acetaminophen, albuterol, cyclobenzaprine, ipratropium - albuterol 0.5 mg/2.5 mg/3 mL, magic mouthwash suspension (diphenhydrAMINE, lidocaine, aluminum-magnesium & simethicone), - MEDICATION INSTRUCTIONS -, polyethylene glycol, senna-docusate     PHYSICAL EXAM  /56 (BP Location: Right arm, Patient Position: Chair, Cuff Size: Adult Regular)   Pulse 72   Temp (!) 95.2  F (35.1  C) (Axillary)   Resp 16   Ht 1.676 m (5' 6\")   Wt 71.9 kg (158 lb 8.2 oz)   SpO2 94%   BMI 25.58 " kg/m    Gen: NAD, up in chair  HEENT: MMM, NCAT  Cardio: 2+ radial pulse, well perfused   Pulm: non-labored on 2L O2 by NC, no wheezes or crackles  Abd: soft, non-tender, non-distended  Ext: 1+ pitting edema in bilateral lower extremities, CAM boot on R  Neuro/MSK: awake, alert, PERRL, EOMI, face symmetric, speech clear/fluent, moving all extremities actively     LABS  CBC RESULTS:   Recent Labs   Lab Test 05/04/23 0612 04/28/23 0554 04/27/23 0445   WBC 10.7 9.8 10.1   RBC 2.97* 2.69* 2.84*   HGB 8.7* 7.9* 8.4*   HCT 27.7* 26.1* 27.1*   MCV 93 97 95   MCH 29.3 29.4 29.6   MCHC 31.4* 30.3* 31.0*   RDW 17.5* 17.1* 17.3*    349 358       Last Basic Metabolic Panel:  Recent Labs   Lab Test 05/04/23 0612 05/03/23 0444 05/02/23 0443 04/29/23 0444 04/28/23 0554 04/27/23 0445     --   --   --  137 139   POTASSIUM 3.6 4.4 4.2   < > 4.4 4.0   CHLORIDE 102  --   --   --  103 104   CO2 24  --   --   --  23 25   ANIONGAP 12  --   --   --  11 10   GLC 93  --   --   --  114* 125*   BUN 33.5*  --   --   --  41.1* 36.7*   CR 1.12*  --   --   --  1.29* 1.31*   GFRESTIMATED 51*  --   --   --  43* 42*   SUNI 10.0  --   --   --  9.8 9.9    < > = values in this interval not displayed.       Rehabilitation - continue comprehensive acute inpatient rehabilitation program with multidisciplinary approach including therapies, rehab nursing, and physiatry following. See interval history for updates.      ASSESSMENT AND PLAN  Fatuma Henry is a 77 year old with a past medical history of paroxysmal atrial fibrillation (on Xarelto), CKD3, COPD, HFpEF, hyperlipidemia, hypertension, recurrent C diff colitis, frequent falls, RA, left ICA occlusion, anemia, depression, gout, chronic lower extremity edema, GERD, and recent left femur fracture 2/2 mechanical fall s/p closed reduction with percutaneous pinning on 3/14/23 who was admitted on 4/17/23 after recurrent fall, found to have multiple fractures of right foot as well as COPD  exacerbation and HF exacerbation with hospital course complicated by pleural effusion s/p multiple thoracentesis, ongoing symptoms associated with recurrent C diff, pain, dyspnea, and suboptimal heart rate control.  She is now admitted to ARU on 5/3/23 for multidisciplinary rehabilitation and ongoing medical management.        Admission to acute inpatient rehab 05/03/23.    Impairment group code: Orthopaedic Disorders 08.2 Femur (Shaft) Fracture: s/p closed reduction internal fixation of left femur 3/14/2023, now with multiple closed fractures of right foot        1. PT and OT 90 minutes of each on a daily basis, in addition to rehab nursing and close management of physiatrist.       2. Impairment of ADL's: Noted to have pain, weakness, fatigue, impaired balance, dyspnea on exertion, impaired cognition, and impaired safety awareness, all affecting her ability to safely and independently perform basic ADLs.  Goal for mod I with basic ADLs.     3. Impairment of mobility:  Noted to have pain, weakness, fatigue, impaired balance, dyspnea on exertion, impaired cognition, and impaired safety awareness, all affecting her ability to safely and independently perform basic mobility.  Goal for mod I with basic mobility.     4. Medical Conditions  New actions/orders/updates for today are in blue.     Debility  Recurrent falls  Multiple fractures of the right foot: 3rd, 4th, 5th phalanges and 1st & 3rd metatarsals, managed non-operatively  Left minimally displaced impacted femoral neck fracture s/p closed reduction with percutaneous pinning on 3/14/23 with Dr. Mckeon  Completed rehabilitation at TCU, return home couple days ago. She fell at home prior to arrival, reportedly no syncope or near syncope. Patient fell while getting up from the chair by the table, with daughter being nearby.  Foot x-rays demonstrated above fractures.   - Right foot CAM when ambulating and weightbearing as tolerated  - Pain management: Tylenol  PRN  - Continue PT/OT  - Follow up with [facility] - foot and ankle provider for foot fractures and Dr. Mckeon for hip fracture     Acute on chronic respiratory failure with hypoxia, resolved  COPD with acute exacerbation, resolved, currently stable  Right-sided pleural effusion, s/p thoracentesis on 4/25 and again 5/3  HFpEF with acute exacerbation.  Resolved, now stable  Pulmonary hypertension, stable  [PTA meds: albuterol 2 puffs q4h PRN, budesonide-formoterol 160-4.5 mcg inhaler]  Exacerbations of CHF and COPD this admission.  Completed prednisone taper, now returned to PTA dose.  Initially on IV diuretics, transitioned to oral.  Underwent thoracentesis x2.  - Goal spO2 is 88-92%.  Oxygen by NC PRN to maintain sats.  - Dio Duarte as formulary sub for PTA Symbicort  - Continue albuterol inhaler and Duoneb PRN  - Continue prednisone 5 mg (PTA dose for RA)  - Continue Lasix 40 mg daily.  Monitor daily weights, labs, volume status.  - Follow up pulmonology     Chronic atrial fibrillation  Difficult to rate control due low BP.  Cardiology consulted this admission and made some medication changes, felt HR better-controlled.  PTA anticoagulation held briefly for thoracentesis but resumed on 4/26.  - Continue Xarelto 15 mg daily  - Continue metoprolol 50 mg daily (reduced on 4/28)  - Continue diltiazem  mg (increased on 4/30)  - Monitor BP, HR.  BP trending 100-120, HR well-controlled in 70s.     Recurrent c diff colitis, resolved  Severe, 4th episode.  Off vancomycin since 4/6.  Completed taper Dificid per ID/GI recs (started last admission) as of 5/2/23.  - Avoid antibiotics   - Continue probiotics  - Monitor bowel pattern  - Previously had been referred to [facility] for FMT consultation - appointment scheduled for November     CKD stage 3  Recent Cr in 1.1-1.3 range.  - Avoid nephrotoxins and hypotension  - Continue to monitor renal function while diuresing.  5/4: Cr stable at 1.12     Essential HTN  -  Continue PTA Cardizem and metoprolol, with holding parameters  - BP has been on soft side recently, med changes as above per cardiology  - Monitor BP     Acute on chronic anemia  Baseline Hgb 9-10 range.  Most recent Hgb 7.9 on 4/28.  - Continue folic acid 1 mg daily  - Trend CBC every M/Th.  5/4: Hgb stable at 8.7     Left ICA occlusion  Had been noted in 12/22, asymptomatic.  - On Xarelto and statin as above  - Outpatient vascular surgery follow-up recommended     Hx GERD  - Continue PTA pepcid      Hx depression  - Continue PTA Cymbalta  - Monitor mood, consult psychology as indicated     Hx pulm nodules with positive PET  Stable 17 x 19 mm groundglass nodule in the lingula since 07/21/2022.   - Follow up with pulm clinic as outpt and serial imaging 3 to 6 month     Hx rheumatoid arthritis  Hx of methotrexate--was discontinued during recent hospitalization.  No exacerbation of RA off methotrexate.  - Continue prednisone 5 mg daily (chronic dose, recently completed taper due to COPD exacerbation)     Hx gout  - Continue PTA allopurinol 300 mg daily    Hypomagnesemia  Mag slightly low on 5/4 at 1.6.  No replacement indicated per protocol.  - Recheck over weekend       5. Adjustment to disability:  Clinical psychology to eval and treat if indicated  6. FEN: low sat fat, Na <2400mg  7. Bowel: continent, ongoing loose stools in setting of recent recurrent C diff, monitor  8. Bladder: continent/incontinent, evangelista just removed on 5/3, PVRs at admission without evidence of retention  9. DVT Prophylaxis: Xarelto  10. GI Prophylaxis: Pepcid  11. Code: no CPR, do not intubate  12. Disposition: goal for home with family assist, though suspect would benefit from more supportive environment given recurrent falls, hospitalizations, chronic medical comorbidities  13. ELOS:  12 days pending therapy evals  14. Follow up Appointments on Discharge: PCP in 1-2 weeks, ortho ([place] - 2 different providers: foot/ankle for foot  fractures, and Dr. Mckeon for left femur fracture s/p repair), pulmonology (currently scheduled 5/17), cardiology (currently scheduled 5/10), vascular medicine (ICA occlusion, scheduled 5/18), GI (FMT, scheduled 11/1)        Frank Rizvi, DO  Physical Medicine & Rehabilitation         I spent a total of 25 minutes face to face and coordinating care of Fatuma Henry. Over 50% of my time on the unit was spent counseling the patient and /or coordinating care regarding gait instability post femur fx and R foot fx .

## 2023-05-05 NOTE — PLAN OF CARE
Discharge Planner Post-Acute Rehab OT:     Discharge Plan: Home with 24/7 assist and HH OT    Precautions: Fall, c diff, CAM boot when out of  bed, 2L O2    Current Status:  ADLs:    Mobility: Ax1 for stand pivot using walker with CAM boot on    Grooming: Setup seated    Dressing: UB pull over shirt :sitting in low chair no verbal cues or physical assist    L/B:Pants;Underwear;sitting in low chair no verbal cues or physical assist    Bathing: pt able to complete 8/10 tasks with assist with pericates and feet pt able to sequence shower without assist. w/c to extended tubbench min a    Toileting: Ax1 to commode at bedside, A for cares  IADLs: Pt usually mod I with IADL  Vision/Cognition: vision intact, cognitive deficits noted and to be further assessed. Pt scored a 12/30 on the SLUMS at the hospital    Assessment:pt able to perform automatic adl task with sequencing without assist min a for shower and pt able to dress sba  Will assess cog further     Other Barriers to Discharge (DME, Family Training, etc): DME, family training, cognition

## 2023-05-05 NOTE — PROGRESS NOTES
05/05/23 0900   Signing Clinician's Name / Credentials   Signing clinician's name / credentials Rachel Brody, PT   Vincent Balance Scale (VINCENT K, TARYN S, HARDY HUFFMAN, ALISHA BOWSER: MEASURING BALANCE IN THE ELDERLY: VALIDATION OF AN INSTRUMENT. CAN. J. PUB. HEALTH, JULY/AUGUST SUPPLEMENT 2:S7-11, 1992.)   Sit To Stand 2   Standing Unsupported 3   Sitting Unsupported 4   Stand to Sit 1   Transfers 1   Standing with Eyes Closed 2   Standing Unsupported, Feet Together 0   Reach Forward With Outstretched Arm 1   Retrieve Object From Floor 0   Turning to Look Behind 2   Turn 360 Degrees 0   Placing Alternate Foot on Stool (4-6 inches) 0   Unsupported Tandem Stand (Demonstrate to Subject) 0   One Leg Stand 0   Total Score (A score of 45 or less has been correlated with an increased risk of falls)   Total Score (out of 56) 16     Vincent Balance Scale (BBS) Cutoff Scores: A score of < 45 /56 indicates an increased risk for falls.     The BBS is a measure of static and dynamic standing balance that has been validated in community dwelling elderly individuals and individuals who have Parkinson's Disease, MS, and those who are s/p CVA and TBI. The test is administered without an assistive device. Scores from the Vincent are used to determine the probability of falling based on the patient's previous history of falls and their test performance.     Minimal Detectable Change = 6.5   & Minimal Detectable Change (Parkinson's Disease) = 5 according to Terry & Segundoey 2008    Assessment (rationale for performing, application to patient s function & care plan): Continue w/ chair alarm, bed alarm, walker and assist for all mobility; High falls risk, needs physical assist w/ all mobility at this time.  Minutes billed as physical performance test: 10

## 2023-05-05 NOTE — PLAN OF CARE
FOCUS/GOAL  Medical management    ASSESSMENT, INTERVENTIONS AND CONTINUING PLAN FOR GOAL:  Patient slept throughout the night w/o concerns. On 2L o2 via NC with satisfying SpO2.  No S/S of pain noted. Remain on Enteric precautions. Will continue with POC.  Goal Outcome Evaluation:      Plan of Care Reviewed With: patient, other (see comments)          Outcome Evaluation: No change this shift.

## 2023-05-05 NOTE — PROGRESS NOTES
"   05/05/23 2567   Appointment Info   Signing Clinician's Name / Credentials (SLP) Evelyne Carrillo MS [facility]-SLP   Rehab Comments (SLP) -10 blood draw   General Information   Referring Physician Dawna Edwards PA-C   Pertinent History of Current Problem per H&P:\"77 year old female with past medical history of paroxysmal atrial fibrillation (on Xarelto), CKD3, COPD, HFpEF, hyperlipidemia, hypertension, recurrent C diff colitis, frequent falls, RA, left ICA occlusion, anemia, depression, gout, chronic lower extremity edema, GERD, and recent left femur fracture 2/2 mechanical fall s/p closed reduction with percutaneous pinning on 3/14/23 then discharged to TCU on 3/27/23, then readmitted 4/6/23-4/13/23 with afib with RVR, recurrent C diff, LLE cellulitis, lactic acidosis, and encephalopathy, then returned to TCU from which she discharged home on 4/17/23 until she re-presented on 4/24/23 after recurrent fall at home, found to have multiple acute/subactue fractures of right foot including proximal phalanx of fifth toe, distal shaft of fourth metatarsal, distal third and fourth proximal phalanges, second proximal phalanx, and distal third metatarsal.  Also noted to have increased shortness of breath 2/2 acute exacerbations of both COPD and HFpEF.  Chest imaging also revealed known right-sided pleural effusion and known/stable groundglass nodule in lingula\" SLP consulted to assess cognition   General Observations Pt is alert and with dtr upon arrival. Not seen by SLP during any hospitalizations per chart review. Pt and dtr endorse new increased difficulty with orientation and short term memory especially within conversations. Pt reporting this happening during initial hosptializations back in November 2022.   Pain Assessment   Patient Currently in Pain No   Type of Evaluation   Type of Evaluation Speech, Language, Cognition   Motor Speech   Speech Intelligibility (Motor Speech) WNL   Auditory Comprehension   Follows " Commands (Auditory Comprehension) multi-step commands   Picture Identification (Auditory Comprehension) WNL   Object Identification (Auditory Comprehension) WNL   Multi-Step, Follows Commands (Auditory Comprehension) 50-74% accuracy   Verbal Expression   Word Finding Skills (Verbal Expression) generative naming   Generative Naming, Word Finding (Verbal Expression) impaired  (likely d/t processing)   Cognition   Cognitive Function attention deficit;executive function deficit;memory deficit   Cognitive Status moderate-severe cognitive impairment   Affect/Mental Status (Cognition) WNL   Executive Function Deficit (Cognition) moderate deficit;severe deficit   Attention Deficit (Cognition) moderate deficit;severe deficit   Memory Deficit (Cognition) moderate deficit;severe deficit   General Therapy Interventions   Planned Therapy Interventions Cognitive Treatment   Cognitive treatment External memory strategy training   Clinical Impression   Criteria for Skilled Therapeutic Interventions Met (SLP Eval) Yes, treatment indicated   SLP Diagnosis mod-severe cognitive impairment   Functional Limitations Related to Problem List (SLP) IADLs   Risks & Benefits of therapy have been explained evaluation/treatment results reviewed;care plan/treatment goals reviewed;current/potential barriers reviewed;participants voiced agreement with care plan;participants included;patient;daughter   Clinical Impression Comments SLP: Pt seen on ARU for cognitive assessment following reports of increased difficulty with cognition and 12/30 SLUMS score. Pt and dtr endorse increased difficulty began November 2022 and persisted since. Pt given CLQT, unable to complete full assessment this date d/t pt needing blood draw and staff not arriving at recommended time. Thus far, pt presenting with mod-severe cognitive impairments with deficit re: attention, memory, exeuctive function, visuospatial skills. At baseline (prior to Nov. 2022), pt and dtr report  pt was IND with all IADLs and driving. Since then, pt dtr completing all IADLs. Pt would benefit from skilled SLP to introduce compensatory strategies and improve overall functional level IND. Pt would also benefit from neuropsych consult for further cognitive diagnostic evaluation.   SLP Total Evaluation Time   Additional SLP Eval Charges Cognitive Performance Testing   Cognitive  Performance Testing Minutes, per hour - includes time for administering test, interp results & prep report ([id number]) 50   SLP Goals   Therapy Frequency (SLP Eval) daily   SLP Predicted Duration/Target Date for Goal Attainment 05/26/23   SLP Goals SLP Goal 1;SLP Goal 2   SLP: Goal 1 Pt will recall novel and functional information of basic-mod complexity with min visual cues 90% accuracy   SLP: Goal 2 Pt will demonstrate and initiate a plan of mod complexity with min cues 90% accuracy   SLP Discharge Planning   SLP Plan SLP: Finish CLQT subtests, score, pull to note. LORI subtests as task. orientation via visuals. intro memory strategies. basic-mod reasoning   Total Session Time   Timed Code Treatment Minutes 50   Total Session Time (sum of timed and untimed services) 50   SLP - Acute Rehab Center Time   Individual Time (minutes) - enter zero if not applicable - SLP 50   Group Time (minutes) - enter zero if not applicable  - SLP 0   Concurrent Time (minutes) - enter zero if not applicable  - SLP 0   Co-Treatment Time (minutes) - enter zero if not applicable  - SLP 0   ARC Total Session Time (minutes) - SLP 50

## 2023-05-05 NOTE — PLAN OF CARE
FOCUS/GOAL  Bowel management, Bladder management, Pain management, Mobility, Skin integrity, and Safety management    ASSESSMENT, INTERVENTIONS AND CONTINUING PLAN FOR GOAL:  Patient is alert and oriented x 4 with some forgetfulness. Is able to use a call light and make her needs known. Patient has L hip fracture but no surgery, C-diff, HTN, COPD, HF, CKD, broken numerous toes to the right foot, right foot pain was given Tylenol with relief. Is on isolation r/t C-diff. Is assist of x 1 with walker. Patient is on O2 2 lpm r/t SOB especially with activities or lyin down. Patient's HOB was elevated to help decrease SOB. Is continent of B/B, last BM was 5/2/23. On regular diet with thin liquids, takes pills whole with applesauce. Nursing staff will continue with poc.    Goal Outcome Evaluation:

## 2023-05-05 NOTE — PROGRESS NOTES
Timed Up and Go (TUG): TUG is a test of basic mobility skills. It is used to screen individuals prone to falls.  Gait assistive device used: ww, physical assist of one    Patient score 75 seconds  ?13.5 seconds indicate at risk for falls in older adults according to Thelma et al 2000.  ?30 seconds - indicates dependency in most ADL and mobility skills according to Posjanice & Gwyn 1991  >11.5 seconds indicate at risk for falls in adults with Parkinson's Disease    Minimal Detectable Change for patients with Alzheimer s = 4.09 sec   Minimal Detectable Change for patients with Parkinson s Disease = 3.5 sec   according to Rupert & Wyatt Craft 2011    Normative Data from Tobin UT, Stalin D, Ghislaine M, Keith E, Jorge. 2017  Age                 Average Time (in seconds)  20-39                     5.9-7.4         40-59                     6.3-7.8   60-69                     7.1-9.0  70-79                     8.2-10.2  80-99                    10.0-12.7            Assessment (rationale for performing, application to patient s function & care plan): Continue w/ chair alarm, bed alarm, walker and assist for all mobility; High falls risk, needs physical assist w/ all mobility at this time.    (Minutes billed as physical performance test:  3

## 2023-05-05 NOTE — PLAN OF CARE
Discharge Planner Post-Acute Rehab SLP:     Discharge Plan: home with 24/7    Precautions: fall    Current Status:  Hearing: WFL  Vision: glasses  Communication: WFL  Cognition: Moderate-severe cognitive impairments with deficits re: attention, memory, exeuctive function, visuospatial skills.   Swallow: regular, thin (0). Not formally assessed    Assessment: Pt seen on ARU for cognitive assessment following reports of increased difficulty with cognition and 12/30 SLUMS score. Pt and dtr endorse increased difficulty began November 2022 and persisted since. Pt given CLQT, unable to complete full assessment this date d/t pt needing blood draw and staff not arriving at recommended time. Thus far, pt presenting with mod-severe cognitive impairments with deficit re: attention, memory, exeuctive function, visuospatial skills. At baseline (prior to Nov. 2022), pt and dtr report pt was IND with all IADLs and driving. Since then, pt dtr completing all IADLs. Pt would benefit from skilled SLP to introduce compensatory strategies and improve overall functional level IND. Pt would also benefit from neuropsych consult for further cognitive diagnostic evaluation.    Other Barriers to Discharge (Family Training, etc):  Family training re: memory strategies and home visual aids

## 2023-05-05 NOTE — PLAN OF CARE
Goal Outcome Evaluation:    Discharge Planner Post-Acute Rehab PT:     Discharge Plan: home with 24/7 support, home PT    Precautions: falls, R CAM boot OOB, prn O2 (more so when lying),     Current Status:  Bed Mobility: SBA  Transfer: MIN A with FWW  Gait: MIN A up to 20' with FWW  Stairs: NT- not a goal for home  Balance: able to manage clothing Bi-manally  Egan 5/5 = 16/56  TUG 5/5 = 75 seconds, ww, 1 assist, lifting assist to rise from chair    Assessment:  TUG time increased due to her inability to recall the directions to sit down. Deconditioning AND COPD, cognition overlay impacts safety and mobility perseverance.   Other Barriers to Discharge (DME, Family Training, etc):   Cognition  Extensive falls h/o, 6 month decline in medical and functional status  Both pt and  are frail and in poor health  Pt has: FWW, 4WW, transport and manual wc

## 2023-05-06 NOTE — PLAN OF CARE
Discharge Planner Post-Acute Rehab SLP:     Discharge Plan: home with 24/7    Precautions: fall    Current Status:  Hearing: WFL  Vision: glasses  Communication: WFL  Cognition: Moderate-severe cognitive impairments with deficits re: attention, memory, exeuctive function, visuospatial skills.   Swallow: regular, thin (0). Not formally assessed    Assessment:  Completed CLQT, see below for details. Pt also participated in basic x3-4 step sequencing with daily tasks. Pt with significant difficulty comprehension of directions and organizing thought process. Required mod-max cues    SUMMARY OF TEST:    The CLQT assesses visual attention and perception, working memory and language output skills, as well as auditory memory and comprehension.  Non-linguistic tasks can help assess planning, and self-monitoring, visual discrimination and analysis, as well as creativity and mental flexibility.   Together, these subtests assess the cognitive domains of attention, memory, executive function, language, and visuospatial skills using a severity rating of either WNL (within normal limits), Mild, Moderate or Severe.        Cognitive Domain                   Severity Rating/Score  Attention                                   Severe 26  Memory                                    Mod 105  Executive Functions   Severe 6  Language                                 mild26  Visuospatial Skills                    severe13  Composite Severity Rating        Mod-severe  Clock Drawing Severity Rating    Severe 6    INTERPRETATION OF TEST RESULTS: Presents with mod-severe cognitive linguistic impairments with above listed deficits.     TIME FOR INTERPRETATION AND PREPARATION OF REPORT: 5  TOTAL TIME: 35    Reference:  Asiya Tafoya, Abigail, CCC-SLP, (2001) [employer]          Other Barriers to Discharge (Family Training, etc):  Family training re: memory strategies and home visual aids

## 2023-05-06 NOTE — PROGRESS NOTES
[facility]   Acute Rehabilitation Unit  Daily progress note    INTERVAL HISTORY  Fatuma Henry was seen and examined this AM.  No acute events overnight.  Denies chest pain, shortness of breath, no fever or chills.  Good participation thus far.  Trending Magnesium.  Better. SOB +    Functional:    In addition to above statements address, Patient requires intensive active and ongoing therapeutic intervention and multiple therapies; Patient requires medical supervision; Expected to actively participate in the intensive rehab program; Sufficiently stable to actively participate; Expectation for measurable improvement in functional capacity or adaption to impairments.  OT:  ADLs:    Mobility: Ax1 for stand pivot using walker with CAM boot on    Grooming: Setup seated    Dressing: UB with setup, LB with min A    Bathing: TBD    Toileting: Ax1 to commode at bedside, A for cares  IADLs: Pt usually mod I with IADL  Vision/Cognition: vision intact, cognitive deficits noted and to be further assessed. Pt scored a 12/30 on the SLUMS at the hospital     ROS: 10 point ROS neg other than the symptoms noted above in the HPI.    MEDICATIONS    allopurinol  300 mg Oral Daily     cholestyramine  1 packet Oral TID w/meals     diltiazem ER COATED BEADS  240 mg Oral Daily     DULoxetine  20 mg Oral Daily     famotidine  20 mg Oral Daily     fluticasone-vilanterol  1 puff Inhalation Daily     folic acid  1 mg Oral Daily     furosemide  40 mg Oral Daily     guaiFENesin  600 mg Oral BID     lactobacillus rhamnosus (GG)  1 capsule Oral BID     melatonin  3 mg Oral At Bedtime     metoprolol succinate ER  50 mg Oral Daily     pravastatin  20 mg Oral QPM     predniSONE  5 mg Oral Daily     rivaroxaban ANTICOAGULANT  15 mg Oral Daily with supper     Vitamin D3  125 mcg Oral Daily        acetaminophen, albuterol, cyclobenzaprine, ipratropium - albuterol 0.5 mg/2.5 mg/3 mL, magic mouthwash suspension  "(diphenhydrAMINE, lidocaine, aluminum-magnesium & simethicone), - MEDICATION INSTRUCTIONS -, polyethylene glycol, senna-docusate     PHYSICAL EXAM  /46 (BP Location: Left arm)   Pulse 79   Temp 97  F (36.1  C) (Oral)   Resp 18   Ht 1.676 m (5' 6\")   Wt 72.8 kg (160 lb 9.6 oz)   SpO2 98%   BMI 25.92 kg/m    Gen: NAD, up in chair  HEENT: MMM, NCAT  Cardio: 2+ radial pulse, well perfused   Pulm: non-labored on 2L O2 by NC, no wheezes or crackles  Abd: soft, non-tender, non-distended  Ext: 1+ pitting edema in bilateral lower extremities, CAM boot on R  Neuro/MSK: awake, alert, PERRL, EOMI, face symmetric, speech clear/fluent, moving all extremities actively     LABS  CBC RESULTS:   Recent Labs   Lab Test 05/04/23 0612 04/28/23 0554 04/27/23 0445   WBC 10.7 9.8 10.1   RBC 2.97* 2.69* 2.84*   HGB 8.7* 7.9* 8.4*   HCT 27.7* 26.1* 27.1*   MCV 93 97 95   MCH 29.3 29.4 29.6   MCHC 31.4* 30.3* 31.0*   RDW 17.5* 17.1* 17.3*    349 358       Last Basic Metabolic Panel:  Recent Labs   Lab Test 05/04/23 0612 05/03/23 0444 05/02/23 0443 04/29/23 0444 04/28/23 0554 04/27/23 0445     --   --   --  137 139   POTASSIUM 3.6 4.4 4.2   < > 4.4 4.0   CHLORIDE 102  --   --   --  103 104   CO2 24  --   --   --  23 25   ANIONGAP 12  --   --   --  11 10   GLC 93  --   --   --  114* 125*   BUN 33.5*  --   --   --  41.1* 36.7*   CR 1.12*  --   --   --  1.29* 1.31*   GFRESTIMATED 51*  --   --   --  43* 42*   SUNI 10.0  --   --   --  9.8 9.9    < > = values in this interval not displayed.     Mag 1.7    Rehabilitation - continue comprehensive acute inpatient rehabilitation program with multidisciplinary approach including therapies, rehab nursing, and physiatry following. See interval history for updates.      ASSESSMENT AND PLAN  Fatuma Henry is a 77 year old with a past medical history of paroxysmal atrial fibrillation (on Xarelto), CKD3, COPD, HFpEF, hyperlipidemia, hypertension, recurrent C diff colitis, " frequent falls, RA, left ICA occlusion, anemia, depression, gout, chronic lower extremity edema, GERD, and recent left femur fracture 2/2 mechanical fall s/p closed reduction with percutaneous pinning on 3/14/23 who was admitted on 4/17/23 after recurrent fall, found to have multiple fractures of right foot as well as COPD exacerbation and HF exacerbation with hospital course complicated by pleural effusion s/p multiple thoracentesis, ongoing symptoms associated with recurrent C diff, pain, dyspnea, and suboptimal heart rate control.  She is now admitted to ARU on 5/3/23 for multidisciplinary rehabilitation and ongoing medical management.        Admission to acute inpatient rehab 05/03/23.    Impairment group code: Orthopaedic Disorders 08.2 Femur (Shaft) Fracture: s/p closed reduction internal fixation of left femur 3/14/2023, now with multiple closed fractures of right foot        1. PT 60-90 and OT 90 minutes. SLP 30 minutes, on a daily basis, in addition to rehab nursing and close management of physiatrist.       2. Impairment of ADL's: Noted to have pain, weakness, fatigue, impaired balance, dyspnea on exertion, impaired cognition, and impaired safety awareness, all affecting her ability to safely and independently perform basic ADLs.  Goal for mod I with basic ADLs.     3. Impairment of mobility:  Noted to have pain, weakness, fatigue, impaired balance, dyspnea on exertion, impaired cognition, and impaired safety awareness, all affecting her ability to safely and independently perform basic mobility.  Goal for mod I with basic mobility.     4. Medical Conditions  New actions/orders/updates for today are in blue.     Debility  Recurrent falls  Multiple fractures of the right foot: 3rd, 4th, 5th phalanges and 1st & 3rd metatarsals, managed non-operatively  Left minimally displaced impacted femoral neck fracture s/p closed reduction with percutaneous pinning on 3/14/23 with Dr. Mckeon  Completed rehabilitation  at TCU, return home couple days ago. She fell at home prior to arrival, reportedly no syncope or near syncope. Patient fell while getting up from the chair by the table, with daughter being nearby.  Foot x-rays demonstrated above fractures.   - Right foot CAM when ambulating and weightbearing as tolerated  - Pain management: Tylenol PRN  - Continue PT/OT  - Follow up with [facility] [facility] - foot and ankle provider for foot fractures and Dr. Mckeon for hip fracture     Acute on chronic respiratory failure with hypoxia, resolved  COPD with acute exacerbation, resolved, currently stable  Right-sided pleural effusion, s/p thoracentesis on 4/25 and again 5/3  HFpEF with acute exacerbation.  Resolved, now stable  Pulmonary hypertension, stable  [PTA meds: albuterol 2 puffs q4h PRN, budesonide-formoterol 160-4.5 mcg inhaler]  Exacerbations of CHF and COPD this admission.  Completed prednisone taper, now returned to PTA dose.  Initially on IV diuretics, transitioned to oral.  Underwent thoracentesis x2.  - Goal spO2 is 88-92%.  Oxygen by NC PRN to maintain sats.  - Dio Duarte as formulary sub for PTA Symbicort  - Continue albuterol inhaler and Duoneb PRN  - Continue prednisone 5 mg (PTA dose for RA)  - Continue Lasix 40 mg daily.  Monitor daily weights, labs, volume status.  - Follow up pulmonology     Chronic atrial fibrillation  Difficult to rate control due low BP.  Cardiology consulted this admission and made some medication changes, felt HR better-controlled.  PTA anticoagulation held briefly for thoracentesis but resumed on 4/26.  - Continue Xarelto 15 mg daily  - Continue metoprolol 50 mg daily (reduced on 4/28)  - Continue diltiazem  mg (increased on 4/30)  - Monitor BP, HR.  BP trending 100-120, HR well-controlled in 70s.     Recurrent c diff colitis, resolved  Severe, 4th episode.  Off vancomycin since 4/6.  Completed taper Dificid per ID/GI recs (started last admission) as of 5/2/23.  - Avoid antibiotics    - Continue probiotics  - Monitor bowel pattern  - Previously had been referred to [facility] for FMT consultation - appointment scheduled for November     CKD stage 3  Recent Cr in 1.1-1.3 range.  - Avoid nephrotoxins and hypotension  - Continue to monitor renal function while diuresing.  5/4: Cr stable at 1.12     Essential HTN  - Continue PTA Cardizem and metoprolol, with holding parameters  - BP has been on soft side recently, med changes as above per cardiology  - Monitor BP     Acute on chronic anemia  Baseline Hgb 9-10 range.  Most recent Hgb 7.9 on 4/28.  - Continue folic acid 1 mg daily  - Trend CBC every M/Th.  5/4: Hgb stable at 8.7     Left ICA occlusion  Had been noted in 12/22, asymptomatic.  - On Xarelto and statin as above  - Outpatient vascular surgery follow-up recommended     Hx GERD  - Continue PTA pepcid      Hx depression  - Continue PTA Cymbalta  - Monitor mood, consult psychology as indicated     Hx pulm nodules with positive PET  Stable 17 x 19 mm groundglass nodule in the lingula since 07/21/2022.   - Follow up with pulm clinic as outpt and serial imaging 3 to 6 month     Hx rheumatoid arthritis  Hx of methotrexate--was discontinued during recent hospitalization.  No exacerbation of RA off methotrexate.  - Continue prednisone 5 mg daily (chronic dose, recently completed taper due to COPD exacerbation)     Hx gout  - Continue PTA allopurinol 300 mg daily    Hypomagnesemia  Mag slightly low on 5/4 at 1.6.  No replacement indicated per protocol.  - Recheck over weekend       5. Adjustment to disability:  Clinical psychology to eval and treat if indicated  6. FEN: low sat fat, Na <2400mg  7. Bowel: continent, ongoing loose stools in setting of recent recurrent C diff, monitor  8. Bladder: continent/incontinent, evangelista just removed on 5/3, PVRs at admission without evidence of retention  9. DVT Prophylaxis: Xarelto  10. GI Prophylaxis: Pepcid  11. Code: no CPR, do not intubate  12. Disposition:  goal for home with family assist, though suspect would benefit from more supportive environment given recurrent falls, hospitalizations, chronic medical comorbidities  13. ELOS:  12 days pending therapy evals  14. Follow up Appointments on Discharge: PCP in 1-2 weeks, ortho ([facility] - 2 different providers: foot/ankle for foot fractures, and Dr. Mckeon for left femur fracture s/p repair), pulmonology (currently scheduled 5/17), cardiology (currently scheduled 5/10), vascular medicine (ICA occlusion, scheduled 5/18), GI (FMT, scheduled 11/1)    SLP following for cognitive deficits.    Doing well. Discussed with team. Continue cares and plans outlined.     Frankie Hassan MD

## 2023-05-06 NOTE — PLAN OF CARE
Goal Outcome Evaluation:      Plan of Care Reviewed With: patient    Overall Patient Progress: no changeOverall Patient Progress: no change    Outcome Evaluation: Pt denies pain this shift. Remains continent of bladder, using toilet. LBM 5/3, denies any stool softeners at this time. Still assist of 1 with walker in the room. Wearing 2.5 liters of O2 via nasal cannula. No new skin issues noted.

## 2023-05-06 NOTE — PLAN OF CARE
Goal Outcome Evaluation:    Discharge Planner Post-Acute Rehab PT:     Discharge Plan: home with 24/7 support, home PT    Precautions: falls, R CAM boot OOB, prn O2 (more so when lying),     Current Status:  Bed Mobility: SBA  Transfer: MIN A with FWW  Gait: CGA up to 75' with FWW  Stairs: NT- not a goal for home  Balance: able to manage clothing Bi-manally  Egan 5/5 = 16/56  TUG 5/5 = 75 seconds, ww, 1 assist, lifting assist to rise from chair    Assessment:  Limited significantly by SOB throughout session, but O2 sat maintained >98% on 2L of O2.  Noted LE pain, but no visible effects with functional activities.       Other Barriers to Discharge (DME, Family Training, etc):   Cognition  Extensive falls h/o, 6 month decline in medical and functional status  Both pt and  are frail and in poor health  Pt has: FWW, 4WW, transport and manual wc

## 2023-05-06 NOTE — PLAN OF CARE
Goal Outcome Evaluation:    Discharge Planner Post-Acute Rehab PT:     Discharge Plan: home with 24/7 support, home PT    Precautions: falls, R CAM boot OOB, prn O2 (more so when lying),     Current Status:  Bed Mobility: SBA  Transfer: MIN A with FWW  Gait: CGA up to 75' with FWW  Stairs: NT- not a goal for home  Balance: able to manage clothing Bi-manally  Egan 5/5 = 16/56  TUG 5/5 = 75 seconds, ww, 1 assist, lifting assist to rise from chair    Assessment:  Limited significantly by SOB throughout session, but O2 sat maintained >98% on 2L of O2.  Pt would benefit from lowering to 1L or no O2 supplement during PT will vitals monitoring.  Noted LE pain, but no visible effects with functional activities.       Other Barriers to Discharge (DME, Family Training, etc):   Cognition  Extensive falls h/o, 6 month decline in medical and functional status  Both pt and  are frail and in poor health  Pt has: FWW, 4WW, transport and manual wc

## 2023-05-06 NOTE — PLAN OF CARE
Individualized Overall Plan Of Care (IOPOC)      Rehab diagnosis/Impairment Group Code: Orthopaedic disorders 08.2 femur (shaft) fracture: s/p closed reduction internal fixation of left femur 3/14/2023, now with multiple closed fractures of right foot  Foot fracture, right       Expected functional outcome: * Mod I with mobility, transfers, and ADLs and anticipate assist from family    Clinical Impression Comments: 76 yo medically complex female with hx of paroxysmal Afib, HTN, COPD, pulm HTN (on chronic O2), diastolic HF, CKD stage 3, left ICA occlusion, RA, and recent hospitalization for left hip fracture s/p closed reduction percutaneous pinning on 3/15/23 after fall at home for which she went to a TCU. She discharged home from that TCU 4/17 and readmitted to the hospital 4/24 after she fell at home. She had broken numerous toes on the right foot, presented with right foot pain, dyspnea, productive cough, and worsening leg edema. She has required medical mgmt of her acute on chronic hypoxic respiratory failure (including thoracentesis), right pleural effusion, chronic a-fib, severe recurrent C Diff coliits, and hypotension.    Mobility: PLOF pt was MOD I though with signifcant h/o falls. Currently CGA-MIN A for transfers and gait, gait limited to 20' max. Limited by strength, balance, orthostatic BP, cognition. To benefit from skilled PT services to maximize independence with functional mobility and decrease care giver burden.    ADL: Pt is below baseline with multiple falls resulting in injury. Pt also limited by cognition and will need to assess this further. Pt will  benefit from continued therapy, faily training and determination of DME.    Communication/Cognition/Swallow: SLP: Pt seen on ARU for cognitive assessment following reports of increased difficulty with cognition and 12/30 SLUMS score. Pt and dtr endorse increased difficulty began November 2022 and persisted since. Pt given CLQT, unable to complete  full assessment this date d/t pt needing blood draw and staff not arriving at recommended time. Thus far, pt presenting with mod-severe cognitive impairments with deficit re: attention, memory, exeuctive function, visuospatial skills. At baseline (prior to Nov. 2022), pt and dtr report pt was IND with all IADLs and driving. Since then, pt dtr completing all IADLs. Pt would benefit from skilled SLP to introduce compensatory strategies and improve overall functional level IND. Pt would also benefit from neuropsych consult for further cognitive diagnostic evaluation.     Intensity of therapy:   PT 60-90 minutes, Other (see comments) (60-90 minutes daily), for 12 days  OT 90 minutes, Daily, for 12 days  SLP 30 minutes, daily, for 12 days    Orthotics None  Education medication education, carryover of new rehab techniques, care coordination, skin integrity, pain management and provide safe environment for patient at falls risk.  Neuropsychology Testing: No  Other:  None      Medical Prognosis: Fair      Physician summary statement: In addition to above statements address, Patient requires intensive active and ongoing therapeutic intervention and multiple therapies; Patient requires medical supervision; Expected to actively participate in the intensive rehab program; Sufficiently stable to actively participate; Expectation for measurable improvement in functional capacity or adaption to impairments.    Discharge destination: prior home  Discharge rehabilitation needs: home care      Estimated length of stay: 12 days      Rehabilitation Physician Frankie Hassan MD

## 2023-05-06 NOTE — PLAN OF CARE
Goal Outcome Evaluation:    Overall Patient Progress: no changeOverall Patient Progress: no change    Patient is alert and oriented x4. Calls for needs. Denies pain, sob, dizziness, fever or any new weakness. Transfers as A1 with the walker. Continent of bladder using the bathroom. Remains on enteric isolation. Appears asleep during rounds. No concerns overnight. On O2 at 2.5 LPM - NC, night time only. . Continue poc.

## 2023-05-06 NOTE — PLAN OF CARE
Discharge Planner Post-Acute Rehab SLP:     Discharge Plan: home with 24/7    Precautions: fall    Current Status:  Hearing: WFL  Vision: glasses  Communication: WFL  Cognition: Moderate-severe cognitive impairments with deficits re: attention, memory, exeuctive function, visuospatial skills.   Swallow: regular, thin (0). Not formally assessed    Assessment:  Completed CLQT, see below for details. Pt also participated in basic x3-4 step sequencing with daily tasks. Pt with significant difficulty comprehension of directions and organizing thought process. Required mod-max cues    SUMMARY OF TEST:    The CLQT assesses visual attention and perception, working memory and language output skills, as well as auditory memory and comprehension.  Non-linguistic tasks can help assess planning, and self-monitoring, visual discrimination and analysis, as well as creativity and mental flexibility.   Together, these subtests assess the cognitive domains of attention, memory, executive function, language, and visuospatial skills using a severity rating of either WNL (within normal limits), Mild, Moderate or Severe.        Cognitive Domain                   Severity Rating/Score  Attention                                   Severe 26  Memory                                    Mod 105  Executive Functions   Severe 6  Language                                 mild26  Visuospatial Skills                    severe13  Composite Severity Rating        Mod-severe  Clock Drawing Severity Rating    Severe 6    INTERPRETATION OF TEST RESULTS: Presents with mod-severe cognitive linguistic impairments with above listed deficits.     TIME FOR INTERPRETATION AND PREPARATION OF REPORT: 5  TOTAL TIME: 35    Reference:  Asiya Tafoya, Abigail, CCC-SLP, (2001) [employer]    PM: Pt participated in continuation of sequencing task from AM. Pt with ongoing signficant difficulty reasoning through information, procedural recall, working  memory, organization. Noting instances with max cues, pt would be able to verbalize correct target but would then ariel incorrectly on paper. Upon feedback, pt unaware of errors or any difficulty. Pt dtr present and expressing greater concerns re: cognition. Pt participated in subtests LORI as task. Subtests completed and scores are as follows: calendar 80% IND, telling time 90% IND      Other Barriers to Discharge (Family Training, etc):  Family training re: memory strategies and home visual aids

## 2023-05-07 NOTE — PLAN OF CARE
Goal Outcome Evaluation:    Discharge Planner Post-Acute Rehab PT:     Discharge Plan: home with 24/7 support, home PT    Precautions: falls, R CAM boot OOB, prn O2 (more so when lying),     Current Status:  Bed Mobility: SBA  Transfer: MIN A with FWW  Gait: CGA up to 75' with FWW  Stairs: NT- not a goal for home  Balance: able to manage clothing Bi-manally  Egan 5/5 = 16/56  TUG 5/5 = 75 seconds, ww, 1 assist, lifting assist to rise from chair    Assessment:  Decreased O2 to 1 L during session and was able to maintain O2sat >97%.  Limited by dizziness and low BP with average at 100/40.  Symptoms present with exertion both sitting and standing.  Encouraged water and may benefit from compression if low BP persists.       Other Barriers to Discharge (DME, Family Training, etc):   Cognition  Extensive falls h/o, 6 month decline in medical and functional status  Both pt and  are frail and in poor health  Pt has: FWW, 4WW, transport and manual wc

## 2023-05-07 NOTE — PROGRESS NOTES
[facility]   Acute Rehabilitation Unit  Daily progress note    INTERVAL HISTORY  Fatuma Henry was seen and examined this AM.  No acute events overnight.  Denies chest pain, shortness of breath, no fever or chills.  Participating well.  Trending Magnesium.  Now 1.7. Better. Less SOB +    Functional:    ADLs:    Mobility: Ax1 for stand pivot using walker with CAM boot on    Grooming: Setup seated    Dressing: UB pull over shirt :sitting in low chair no verbal cues or physical assist    L/B:Pants;Underwear;sitting in low chair no verbal cues or physical assist    Bathing: pt able to complete 8/10 tasks with assist with pericates and feet pt able to sequence shower without assist. w/c to extended tubbench min a    Toileting: Ax1 to commode at bedside, A for cares  IADLs: Pt usually mod I with IADL  Vision/Cognition: ACEIII 52/100, deficits observed in language fluency, memory, and visuospatial abilities. Pt scored a 12/30 on the SLUMS at the hospital     Assessment: Pt's BP monitored throughout the OT session. BP staying on lower end and dropping slightly when standing. Pt c/o dizziness. ACE-III completed.   Other Barriers to Discharge (DME, Family Training, etc): DME, family training, cognition   ROS: 10 point ROS neg other than the symptoms noted above in the HPI.    MEDICATIONS    allopurinol  300 mg Oral Daily     cholestyramine  1 packet Oral TID w/meals     diltiazem ER COATED BEADS  240 mg Oral Daily     DULoxetine  20 mg Oral Daily     famotidine  20 mg Oral Daily     fluticasone-vilanterol  1 puff Inhalation Daily     folic acid  1 mg Oral Daily     furosemide  40 mg Oral Daily     guaiFENesin  600 mg Oral BID     lactobacillus rhamnosus (GG)  1 capsule Oral BID     melatonin  3 mg Oral At Bedtime     metoprolol succinate ER  50 mg Oral Daily     pravastatin  20 mg Oral QPM     predniSONE  5 mg Oral Daily     rivaroxaban ANTICOAGULANT  15 mg Oral Daily with supper      "Vitamin D3  125 mcg Oral Daily        acetaminophen, albuterol, cyclobenzaprine, ipratropium - albuterol 0.5 mg/2.5 mg/3 mL, magic mouthwash suspension (diphenhydrAMINE, lidocaine, aluminum-magnesium & simethicone), - MEDICATION INSTRUCTIONS -, polyethylene glycol, senna-docusate     PHYSICAL EXAM  BP 91/66 (BP Location: Left arm)   Pulse 79   Temp (!) 96.3  F (35.7  C) (Oral)   Resp 18   Ht 1.676 m (5' 6\")   Wt 72.5 kg (159 lb 14.4 oz)   SpO2 95%   BMI 25.81 kg/m    Gen: NAD, up in chair  HEENT: MMM, NCAT  Cardio: 2+ radial pulse, well perfused   Pulm: non-labored on 2L O2 by NC, no wheezes or crackles  Abd: soft, non-tender, non-distended  Ext: 1+ pitting edema in bilateral lower extremities, CAM boot on R  Neuro/MSK: awake, alert, PERRL, EOMI, face symmetric, speech clear/fluent, moving all extremities actively     LABS  CBC RESULTS:   Recent Labs   Lab Test 05/04/23  0612 04/28/23  0554 04/27/23 0445   WBC 10.7 9.8 10.1   RBC 2.97* 2.69* 2.84*   HGB 8.7* 7.9* 8.4*   HCT 27.7* 26.1* 27.1*   MCV 93 97 95   MCH 29.3 29.4 29.6   MCHC 31.4* 30.3* 31.0*   RDW 17.5* 17.1* 17.3*    349 358       Last Basic Metabolic Panel:  Recent Labs   Lab Test 05/04/23  0612 05/03/23 0444 05/02/23  0443 04/29/23  0444 04/28/23  0554 04/27/23 0445     --   --   --  137 139   POTASSIUM 3.6 4.4 4.2   < > 4.4 4.0   CHLORIDE 102  --   --   --  103 104   CO2 24  --   --   --  23 25   ANIONGAP 12  --   --   --  11 10   GLC 93  --   --   --  114* 125*   BUN 33.5*  --   --   --  41.1* 36.7*   CR 1.12*  --   --   --  1.29* 1.31*   GFRESTIMATED 51*  --   --   --  43* 42*   SUNI 10.0  --   --   --  9.8 9.9    < > = values in this interval not displayed.     Mag 1.7    Rehabilitation - continue comprehensive acute inpatient rehabilitation program with multidisciplinary approach including therapies, rehab nursing, and physiatry following. See interval history for updates.      ASSESSMENT AND PLAN  Fatuma Henry is a 77 year " old with a past medical history of paroxysmal atrial fibrillation (on Xarelto), CKD3, COPD, HFpEF, hyperlipidemia, hypertension, recurrent C diff colitis, frequent falls, RA, left ICA occlusion, anemia, depression, gout, chronic lower extremity edema, GERD, and recent left femur fracture 2/2 mechanical fall s/p closed reduction with percutaneous pinning on 3/14/23 who was admitted on 4/17/23 after recurrent fall, found to have multiple fractures of right foot as well as COPD exacerbation and HF exacerbation with hospital course complicated by pleural effusion s/p multiple thoracentesis, ongoing symptoms associated with recurrent C diff, pain, dyspnea, and suboptimal heart rate control.  She is now admitted to ARU on 5/3/23 for multidisciplinary rehabilitation and ongoing medical management.        Admission to acute inpatient rehab 05/03/23.    Impairment group code: Orthopaedic Disorders 08.2 Femur (Shaft) Fracture: s/p closed reduction internal fixation of left femur 3/14/2023, now with multiple closed fractures of right foot        1. PT 60-90 and OT 90 minutes. SLP 30 minutes, on a daily basis, in addition to rehab nursing and close management of physiatrist.       2. Impairment of ADL's: Noted to have pain, weakness, fatigue, impaired balance, dyspnea on exertion, impaired cognition, and impaired safety awareness, all affecting her ability to safely and independently perform basic ADLs.  Goal for mod I with basic ADLs.     3. Impairment of mobility:  Noted to have pain, weakness, fatigue, impaired balance, dyspnea on exertion, impaired cognition, and impaired safety awareness, all affecting her ability to safely and independently perform basic mobility.  Goal for mod I with basic mobility.     4. Medical Conditions  New actions/orders/updates for today are in blue.     Debility  Recurrent falls  Multiple fractures of the right foot: 3rd, 4th, 5th phalanges and 1st & 3rd metatarsals, managed  non-operatively  Left minimally displaced impacted femoral neck fracture s/p closed reduction with percutaneous pinning on 3/14/23 with Dr. Mckeon  Completed rehabilitation at [facility], return home couple days ago. She fell at home prior to arrival, reportedly no syncope or near syncope. Patient fell while getting up from the chair by the table, with daughter being nearby.  Foot x-rays demonstrated above fractures.   - Right foot CAM when ambulating and weightbearing as tolerated  - Pain management: Tylenol PRN  - Continue PT/OT  - Follow up with [facility] - foot and ankle provider for foot fractures and Dr. Mckeon for hip fracture     Acute on chronic respiratory failure with hypoxia, resolved  COPD with acute exacerbation, resolved, currently stable  Right-sided pleural effusion, s/p thoracentesis on 4/25 and again 5/3  HFpEF with acute exacerbation.  Resolved, now stable  Pulmonary hypertension, stable  [PTA meds: albuterol 2 puffs q4h PRN, budesonide-formoterol 160-4.5 mcg inhaler]  Exacerbations of CHF and COPD this admission.  Completed prednisone taper, now returned to PTA dose.  Initially on IV diuretics, transitioned to oral.  Underwent thoracentesis x2.  - Goal spO2 is 88-92%.  Oxygen by NC PRN to maintain sats.  - Breo Ellipta as formulary sub for PTA Symbicort  - Continue albuterol inhaler and Duoneb PRN  - Continue prednisone 5 mg (PTA dose for RA)  - Continue Lasix 40 mg daily.  Monitor daily weights, labs, volume status.  - Follow up pulmonology     Chronic atrial fibrillation  Difficult to rate control due low BP.  Cardiology consulted this admission and made some medication changes, felt HR better-controlled.  PTA anticoagulation held briefly for thoracentesis but resumed on 4/26.  - Continue Xarelto 15 mg daily  - Continue metoprolol 50 mg daily (reduced on 4/28)  - Continue diltiazem  mg (increased on 4/30)  - Monitor BP, HR.  BP trending better, HR well-controlled in 70s.     Recurrent c  diff colitis, resolved  Severe, 4th episode.  Off vancomycin since 4/6.  Completed taper Dificid per ID/GI recs (started last admission) as of 5/2/23.  - Avoid antibiotics   - Continue probiotics  - Monitor bowel pattern  - Previously had been referred to [facility] for FMT consultation - appointment scheduled for November     CKD stage 3  Recent Cr in 1.1-1.3 range.  - Avoid nephrotoxins and hypotension  - Continue to monitor renal function while diuresing.  5/4: Cr stable at 1.12     Essential HTN  - Continue PTA Cardizem and metoprolol, with holding parameters  - BP has been on soft side recently, med changes as above per cardiology  - Monitor BP     Acute on chronic anemia  Baseline Hgb 9-10 range.  Most recent Hgb 7.9 on 4/28.  - Continue folic acid 1 mg daily  - Trend CBC every M/Th.  5/4: Hgb stable at 8.7     Left ICA occlusion  Had been noted in 12/22, asymptomatic.  - On Xarelto and statin as above  - Outpatient vascular surgery follow-up recommended     Hx GERD  - Continue PTA pepcid      Hx depression  - Continue PTA Cymbalta  - Monitor mood, consult psychology as indicated     Hx pulm nodules with positive PET  Stable 17 x 19 mm groundglass nodule in the lingula since 07/21/2022.   - Follow up with pulm clinic as outpt and serial imaging 3 to 6 month     Hx rheumatoid arthritis  Hx of methotrexate--was discontinued during recent hospitalization.  No exacerbation of RA off methotrexate.  - Continue prednisone 5 mg daily (chronic dose, recently completed taper due to COPD exacerbation)     Hx gout  - Continue PTA allopurinol 300 mg daily    Hypomagnesemia  Mag slightly low on 5/4 at 1.6.  No replacement indicated per protocol.  - Recheck over weekend: 1.7       5. Adjustment to disability:  Clinical psychology to eval and treat if indicated  6. FEN: low sat fat, Na <2400mg  7. Bowel: continent, ongoing loose stools in setting of recent recurrent C diff, monitor  8. Bladder: continent/incontinent, evangelista  just removed on 5/3, PVRs at admission without evidence of retention  9. DVT Prophylaxis: Xarelto  10. GI Prophylaxis: Pepcid  11. Code: no CPR, do not intubate  12. Disposition: goal for home with family assist, though suspect would benefit from more supportive environment given recurrent falls, hospitalizations, chronic medical comorbidities  13. ELOS:  12 days pending therapy evals  14. Follow up Appointments on Discharge: PCP in 1-2 weeks, ortho ([facility] - 2 different providers: foot/ankle for foot fractures, and Dr. Mckeon for left femur fracture s/p repair), pulmonology (currently scheduled 5/17), cardiology (currently scheduled 5/10), vascular medicine (ICA occlusion, scheduled 5/18), GI (FMT, scheduled 11/1)    SLP following for cognitive deficits.    Doing well. Discussed with team. Continue cares and plans outlined.     Frankie Hassan MD

## 2023-05-07 NOTE — PLAN OF CARE
"Goal Outcome Evaluation:          VS: /44 (BP Location: Left arm)   Pulse 77   Temp (!) 95.7  F (35.4  C) (Oral)   Resp 18   Ht 1.676 m (5' 6\")   Wt 72.8 kg (160 lb 9.6 oz)   SpO2 100%   BMI 25.92 kg/m     O2: 2.5L NC   Output: Voids spontaneoulsy without difficulty   Last BM: 5/3   Activity: Assist of 1 walker   Pain: Denies   CMS: Intact, AOx4.   Dressing: R knee skin tear   Diet: Regular diet, pills whole with applesauce   LDA: camboot   Equipment: IV pole, personal belongings,    Plan: Continue POC   Additional Info:                     "

## 2023-05-07 NOTE — PLAN OF CARE
Goal Outcome Evaluation:    Overall Patient Progress: no changeOverall Patient Progress: no change    Patient is alert and oriented x4. Uses the call light appropriately. Transfers as A1 w/ the walker then to sit to the wheelchair. A mix continence of bladder, used the bathroom. No complaints of pain, dizziness, fever or any new weakness. Sob with exertion. O2 at 2.5 lpm - nc, satting at  95%. Slept well tonight. Continue poc.

## 2023-05-07 NOTE — PLAN OF CARE
Discharge Planner Post-Acute Rehab SLP:     Discharge Plan: home with 24/7    Precautions: fall    Current Status:  Hearing: WFL  Vision: glasses  Communication: WFL  Cognition: Moderate-severe cognitive impairments with deficits re: attention, memory, exeuctive function, visuospatial skills.   Swallow: regular, thin (0). Not formally assessed    Assessment:  Pt participated in ongoing subtests of LORI as task. subtests and scores are as follows: medication label 60% IND, 100% ,max cues. notable difficulty with placement of pills into correct sections of day and mild difficulty with label instruction meaning. Able to orient to all other features on label IND. Will need assist with medication management upon dc    Other Barriers to Discharge (Family Training, etc):  Family training re: memory strategies and home visual aids

## 2023-05-07 NOTE — PLAN OF CARE
Goal Outcome Evaluation:      Plan of Care Reviewed With: patient    Overall Patient Progress: no changeOverall Patient Progress: no change    Pt A/O. 1x/stand pivot to w/c. Continent of B/B this shift, voiding in toilet. Had large BM today. Boot on R foot when OOB. Reg/thin takes pills in applesauce. Endorses dyspnea with exertion and at rest, pt on 2.5L O2. Continue with current POC.    RN Shift [phone]

## 2023-05-07 NOTE — PLAN OF CARE
Discharge Planner Post-Acute Rehab OT:      Discharge Plan: Home with 24/7 assist and HH OT     Precautions: Fall, c diff, CAM boot when out of  bed, 2L O2     Current Status:  ADLs:    Mobility: Ax1 for stand pivot using walker with CAM boot on    Grooming: Setup seated    Dressing: UB pull over shirt :sitting in low chair no verbal cues or physical assist    L/B:Pants;Underwear;sitting in low chair no verbal cues or physical assist    Bathing: pt able to complete 8/10 tasks with assist with pericates and feet pt able to sequence shower without assist. w/c to extended tubbench min a    Toileting: Ax1 to commode at bedside, A for cares  IADLs: Pt usually mod I with IADL  Vision/Cognition: ACEIII 45/100, attention 8/18, memory 4/26, Fluency 4/14, Language 21/26, visuospatial 8/16. Pt scored a 12/30 on the SLUMS at the hospital     Assessment: Pt's BP monitored throughout the OT session. BP staying on lower end and dropping slightly when standing. Pt c/o dizziness. ACE-III completed.   Other Barriers to Discharge (DME, Family Training, etc): DME, family training, cognition

## 2023-05-08 NOTE — PROGRESS NOTES
Referral sent to [facility] to resume care at discharge. Will talk to IDT and pt/pt family about discharge more this week at team rounds and continue to follow. Pt is MA pending and targeting discharge Monday 05/15/23.     ADDENDUM: Sonam Chung accepted. Need to confirm if HC SLP needed, update HC, add HC info to AVS, and update pt/pt family.     [facility]   Direct Phone: [phone]  I   Pager: [phone]  I  Fax: [phone]

## 2023-05-08 NOTE — PROGRESS NOTES
[facility]   Acute Rehabilitation Unit  Daily progress note    INTERVAL HISTORY  Weekend and therapy notes reviewed, no acute events reported.      Fatuma Henry was seen and examined at bedside this morning.  Rapid response was called due to increased dyspnea and hypoxia, see their notes for additional details.  Patient reports that she woke with increased shortness of breath, though she also noted some worsening of her symptoms over the past several days.  Reports chronic cough has been stable.  She has had ongoing dizziness this morning, especially with position changes.  She also notes ongoing fatigue.  She denies chest pain, palpitations, nasal congestion, sore throat, fever/chills, abdominal pain.  She reports having daily BMs, improving.  Denies urinary symptoms other than ongoing frequency.    Functionally, she is needing set-up for seated grooming.  This AM, completed ADLs at sink, some sitting and some standing due to fatigue, dizziness, and shortness of breath.    MEDICATIONS    allopurinol  300 mg Oral Daily     cholestyramine  1 packet Oral TID w/meals     diltiazem ER COATED BEADS  240 mg Oral Daily     DULoxetine  20 mg Oral Daily     famotidine  20 mg Oral Daily     fluticasone-vilanterol  1 puff Inhalation Daily     folic acid  1 mg Oral Daily     furosemide  40 mg Oral Daily     guaiFENesin  600 mg Oral BID     lactobacillus rhamnosus (GG)  1 capsule Oral BID     melatonin  3 mg Oral At Bedtime     metoprolol succinate ER  50 mg Oral Daily     pravastatin  20 mg Oral QPM     predniSONE  5 mg Oral Daily     rivaroxaban ANTICOAGULANT  15 mg Oral Daily with supper     Vitamin D3  125 mcg Oral Daily        acetaminophen, albuterol, cyclobenzaprine, ipratropium - albuterol 0.5 mg/2.5 mg/3 mL, magic mouthwash suspension (diphenhydrAMINE, lidocaine, aluminum-magnesium & simethicone), - MEDICATION INSTRUCTIONS -, polyethylene glycol, senna-docusate     PHYSICAL  "EXAM  /70 (BP Location: Left arm, Patient Position: Semi-Sy's, Cuff Size: Adult Regular)   Pulse 94   Temp 98.6  F (37  C) (Oral)   Resp 17   Ht 1.676 m (5' 6\")   Wt 72.5 kg (159 lb 14.4 oz)   SpO2 94%   BMI 25.81 kg/m     Gen: NAD, up in chair  HEENT: NC/AT, MMM  Cardio: RRR, no murmurs  Pulm: non-labored on 6L->3L by NC, some pursed lip breathing, expiratory wheezes in bilateral upper lung fields, lung sounds slightly diminished on right  Abd: soft, non-tender, non-distended, bowel sounds present  Ext: 2+ pitting edema in bilateral lower extremities, CAM boot on R  Neuro/MSK: awake, alert, PERRL, EOMI, face symmetric, speech clear/fluent, moving all extremities actively     LABS  CBC RESULTS:   Recent Labs   Lab Test 05/08/23  0701 05/04/23  0612 04/28/23  0554   WBC 12.1* 10.7 9.8   RBC 3.24* 2.97* 2.69*   HGB 9.5* 8.7* 7.9*   HCT 30.6* 27.7* 26.1*   MCV 94 93 97   MCH 29.3 29.3 29.4   MCHC 31.0* 31.4* 30.3*   RDW 17.8* 17.5* 17.1*    354 349       Last Basic Metabolic Panel:  Recent Labs   Lab Test 05/08/23  0701 05/04/23  0612 05/03/23 0444 04/29/23  0444 04/28/23  0554    138  --   --  137   POTASSIUM 4.1 3.6 4.4   < > 4.4   CHLORIDE 107 102  --   --  103   CO2 21* 24  --   --  23   ANIONGAP 10 12  --   --  11   GLC 88 93  --   --  114*   BUN 21.7 33.5*  --   --  41.1*   CR 0.90 1.12*  --   --  1.29*   GFRESTIMATED 66 51*  --   --  43*   SUNI 9.8 10.0  --   --  9.8    < > = values in this interval not displayed.     Mag 1.7    Rehabilitation - continue comprehensive acute inpatient rehabilitation program with multidisciplinary approach including therapies, rehab nursing, and physiatry following. See interval history for updates.      ASSESSMENT AND PLAN  Fatuma Henry is a 77 year old with a past medical history of paroxysmal atrial fibrillation (on Xarelto), CKD3, COPD, HFpEF, hyperlipidemia, hypertension, recurrent C diff colitis, frequent falls, RA, left ICA occlusion, anemia, " depression, gout, chronic lower extremity edema, GERD, and recent left femur fracture 2/2 mechanical fall s/p closed reduction with percutaneous pinning on 3/14/23 who was admitted on 4/17/23 after recurrent fall, found to have multiple fractures of right foot as well as COPD exacerbation and HF exacerbation with hospital course complicated by pleural effusion s/p multiple thoracentesis, ongoing symptoms associated with recurrent C diff, pain, dyspnea, and suboptimal heart rate control.  She is now admitted to ARU on 5/3/23 for multidisciplinary rehabilitation and ongoing medical management.        Admission to acute inpatient rehab 05/03/23.    Impairment group code: Orthopaedic Disorders 08.2 Femur (Shaft) Fracture: s/p closed reduction internal fixation of left femur 3/14/2023, now with multiple closed fractures of right foot        1. PT 60-90 and OT 90 minutes. SLP 30 minutes, on a daily basis, in addition to rehab nursing and close management of physiatrist.       2. Impairment of ADL's: Noted to have pain, weakness, fatigue, impaired balance, dyspnea on exertion, impaired cognition, and impaired safety awareness, all affecting her ability to safely and independently perform basic ADLs.  Goal for mod I with basic ADLs.     3. Impairment of mobility:  Noted to have pain, weakness, fatigue, impaired balance, dyspnea on exertion, impaired cognition, and impaired safety awareness, all affecting her ability to safely and independently perform basic mobility.  Goal for mod I with basic mobility.     4. Medical Conditions  New actions/orders/updates for today are in blue.     Debility  Recurrent falls  Multiple fractures of the right foot: 3rd, 4th, 5th phalanges and 1st & 3rd metatarsals, managed non-operatively  Left minimally displaced impacted femoral neck fracture s/p closed reduction with percutaneous pinning on 3/14/23 with Dr. Mckeon  Completed rehabilitation at [facility], return home couple days ago. She fell at  "home prior to arrival, reportedly no syncope or near syncope. Patient fell while getting up from the chair by the table, with daughter being nearby.  Foot x-rays demonstrated above fractures.   - Right foot CAM when ambulating and weightbearing as tolerated  - Pain management: Tylenol PRN  - Continue PT/OT  - Follow up with [facility] - foot and ankle provider for foot fractures and Dr. Mckeon for hip fracture    Moderate-severe cognitive impairment  Increased difficulties with cognition since ~Nov 2022 per patient/family.  Prior to that time, had been independent with all IADLs and driving, but since then has required family assist for those tasks.  SLUMS this admission 12/30.  SLP consulted at ARU to assist with evaluation/treatment.  Deficits in areas of attention, executive function, memory noted per SLP.  - Continue SLP/OT  - Consider neuropsych testing to help better delineate cognitive deficits     Acute on chronic respiratory failure with hypoxia  COPD with acute exacerbation, resolved, currently stable  Right-sided pleural effusion, s/p thoracentesis on 4/25 and again 5/3  HFpEF with acute exacerbation.  Resolved, now stable  Pulmonary hypertension, stable  [PTA meds: albuterol 2 puffs q4h PRN, budesonide-formoterol 160-4.5 mcg inhaler]  Exacerbations of CHF and COPD this admission.  Completed prednisone taper, now returned to PTA dose.  Initially on IV diuretics, transitioned to oral.  Underwent thoracentesis x2.  - Goal spO2 is 88-92%.  Oxygen by NC PRN to maintain sats.  - Noted to have increased dyspnea this AM, sats dropping to high 70s, increased oxygen needs up to 6L.  Patient was evaluated by rapid response team, see their note for additional details.  Labs with mild leukocytosis, CRP only mildly elevated at 12, BNP 1400 (previously >4000), procal 0.04.  CXR with \"decreased size of the now small right pleural effusion with  overlying compressive atelectasis; cardiomegaly with engorged pulmonary " "vasculature\".  EKG not great reading but with atrial fibrillation, incomplete RBBB.    - Patient was given Duoneb per RT, who also obtained alternative pulse ox, reading improved to high 90s back on baseline 2-3L O2 at rest.  Discussed VBG, labs, imaging with rapid response team, who returned to evaluate patient per protocol due to lactic acid 2.2.  Patient clinically improve and resumed on prior level of oxygen.  CXR overall improved.  Did not feel any change to current plan of care was indicated.  Offered IVF but patient preferred to increase oral intake.  See their note for additional details.   - Breo Ellipta as formulary sub for PTA Symbicort  - Continue albuterol inhaler and Duoneb PRN  - Continue prednisone 5 mg (PTA dose for RA)  - Continue Lasix 40 mg daily.  Monitor daily weights, labs, volume status.  - Follow up pulmonology     Chronic atrial fibrillation  Difficult to rate control due low BP.  Cardiology consulted this admission and made some medication changes, felt HR better-controlled.  PTA anticoagulation held briefly for thoracentesis but resumed on 4/26.  - Continue Xarelto 15 mg daily  - Continue metoprolol 50 mg daily (reduced on 4/28)  - Continue diltiazem  mg (increased on 4/30)  - Monitor BP, HR     Recurrent c diff colitis, resolved  Severe, 4th episode.  Off vancomycin since 4/6.  Completed taper Dificid per ID/GI recs (started last admission) as of 5/2/23.  - Avoid antibiotics   - Continue probiotics  - Monitor bowel pattern  - Previously had been referred to [facility] for FMT consultation - appointment scheduled for November     CKD stage 3  Recent Cr in 1.1-1.3 range.  - Avoid nephrotoxins and hypotension  - Continue to monitor renal function while diuresing.  5/8: Cr stable/improved at 0.90     Essential HTN  - Continue PTA Cardizem and metoprolol, with holding parameters  - BP has been on soft side recently, med changes as above per cardiology  - Monitor BP     Acute on chronic " anemia  Baseline Hgb 9-10 range.  Most recent Hgb 7.9 on 4/28.  - Continue folic acid 1 mg daily  - Trend CBC every M/Th.  5/8: Hgb stable/improving at 9.5     Left ICA occlusion  Had been noted in 12/22, asymptomatic.  - On Xarelto and statin as above  - Outpatient vascular surgery follow-up recommended     Hx GERD  - Continue PTA pepcid      Hx depression  - Continue PTA Cymbalta  - Monitor mood, consult psychology as indicated     Hx pulm nodules with positive PET  Stable 17 x 19 mm groundglass nodule in the lingula since 07/21/2022.   - Follow up with pulm clinic as outpt and serial imaging 3 to 6 month     Hx rheumatoid arthritis  Hx of methotrexate--was discontinued during recent hospitalization.  No exacerbation of RA off methotrexate.  - Continue prednisone 5 mg daily (chronic dose, recently completed taper due to COPD exacerbation)     Hx gout  - Continue PTA allopurinol 300 mg daily    Hypomagnesemia  Mag slightly low on 5/4 at 1.6.  No replacement indicated per protocol.  - 5/8: mag remains low at 1.5.  Replace mag sulfate 4g IV x1 per protocol (avoiding PO magnesium given recent diarrhea), repeat level tomorrow.    Leukocytosis  Is on chronic steroids but WBC previously WNL.  WBC slightly elevated at 12.1 on 5/8.  Afebrile, VSS/WNL with exception of worsened hypoxia as above, improved after nebulizer and with alternative probe.   - Obtain UA/UC  - CRP only mildly elevated at 12.03, procal 0.04  - CXR without evidence of infection  - Continue to monitor clinically, repeat CBC tomorrow to trend     5. Adjustment to disability:  Clinical psychology to eval and treat if indicated  6. FEN: low sat fat, Na <2400mg  7. Bowel: continent, ongoing loose stools in setting of recent recurrent C diff, monitor  8. Bladder: continent/incontinent, evangelista just removed on 5/3, PVRs at admission without evidence of retention  9. DVT Prophylaxis: Xarelto  10. GI Prophylaxis: Pepcid  11. Code: no CPR, do not  intubate  12. Disposition: goal for home with family assist, though suspect would benefit from more supportive environment given recurrent falls, hospitalizations, chronic medical comorbidities  13. ELOS:  target 5/15/23  14. Follow up Appointments on Discharge: PCP in 1-2 weeks, ortho ([facility] - 2 different providers: foot/ankle for foot fractures, and Dr. Mckeon for left femur fracture s/p repair), pulmonology (currently scheduled 5/17), cardiology (currently scheduled 5/10), vascular medicine (ICA occlusion, scheduled 5/18), GI (FMT, scheduled 11/1)      Patient was discussed with Dr. Frank Rizvi, PM&R staff physician     IMAN Mai-C  Physical Medicine & Rehabilitation

## 2023-05-08 NOTE — PLAN OF CARE
Goal Outcome Evaluation:         Pt first seen at 0745.   A&O x4 sating 82% on 2.5L via NC. Complains of SOB. Oxygen increased to 4L - now sating 94%.   0900 writer called in by OT for low O2 w/ pt feeling light headed when standing/ washing hands by sink. Oxygen sating low 80s. Rechecked with both finger and ear pulse Oximeter, oxygen sating 79-83%, increased to 6L via NC, pt told to deep breath and increased to 90-94%. Pt spot checked every 15min afterwards, when pt breathing normally -Oxygen 78-84%. increased to 9L with no improvement. Provider notified. Rapid response called. Duo Neb and Breo Ellipta inhaler given by RT. Sticky pulse Ox placed on pt. Oxygen now sating 88-99%. RT thinks this is a possible perfusion issue.     New orders for EKG, Chest X-ray, MG IV replacement, and Labs. IV placed by flyer in L lower forearm.       Lactic Acid 2.2 - code sepsis called, Temp: 96.9  F (36.1  C) Temp src: Oral BP: 111/56 Pulse: 53   Resp: 17 SpO2: 90 % O2 Device: Nasal cannula Oxygen Delivery: 3 LPM Pt appears stable and assessed by critical response team at bedside.     Ate with fair appetite- on combinateion <2400 Na/ low saturated fat diet. Takes pills whole with applesauce. Assist x1 with GB and walker. Continent of B&B. LBM 5/7. CAM boots on RLE. Unable to fully complete OT.  Declined PT this afternoon.  Pt resting in bed, call light within reach.

## 2023-05-08 NOTE — PLAN OF CARE
FOCUS/GOAL  Medical management    ASSESSMENT, INTERVENTIONS AND CONTINUING PLAN FOR GOAL:  Pt is alert and oriented. On isolation precaution. Calls appropriately for needs. A1 SPT wheelchair for mobility to the BR. Continent of bladder. Urine looks cloudy but pt denies pain on urination. A1 w/ ramandeep cares and clothing. On 1500ml fluid restriction. On oxygen at 2.5 L/nc. Oxygen saturation is at 99%-100%. Edema to LE. Seen sleeping during safety round checks. This morning, her oxygen tubings was out from her nose while waking her up for weights. Pt looks pale but denies dizziness. Her oxygen was 88% RA. Bump up oxygen to 5L/nc then tirtrate down until pt is stable. Use the commode for urination. Her urine is cloudy and w/ strong odor. Pt is alert per baseline. 98% oxygen saturation at 2.5L/nc. Pt is doing breathing exercise.  Weight is not done this morning.Will continue to monitor.  Goal Outcome Evaluation:

## 2023-05-08 NOTE — PROGRESS NOTES
Responded to Rapid Response call. RRT was called due to change in cardiorespiratory status. Patient was on 8L NC }, RR 20/min, SpO2 84%. Respiratory interventions included Nebulized given, and adjust oxygen. Patient is 3LNC and sats 95% }. Patient remained on the floor. .    Nma Cates, RT, RT  5/8/2023 10:09 AM

## 2023-05-08 NOTE — CODE/RAPID RESPONSE
[facility] Critical Response Team   Sepsis Alert Note  5/8/2023   Time Called: 1140    RRT called for: Elevated lactate (2.2)     Assessment & Plan      # Leukocytosis   # C/f respiratory insufficiency 2/2 right pleural effusion   # Possible COPD exacerbation   # Elevated lactate (2.2)   - WBC rise from 10.7 to 12.1 this AM. CXR stat from rapid response showing small right effusion, much improved from prior. CXR otherwise unrevealing.   - Patient requires O2 ~1.5-2 L daily for baseline COPD               - SpO2 levels now in high 90s with new finger probe    - Lactate ordered from rapid response resulted at 2.2   - Patient clinically appears stable after scheduled neb treatments received today   - Vitals reviewed and are stable    - Patient afebrile   - Lactate is mildly elevated likely from resolving c diff, COPD and HF exacerbations   - Recommend holding off on antibiotics but low threshold if patient spike fever or white count continues to rise   - Patient agreeable to increasing PO intake today rather than giving IVF currently       INTERVENTIONS:  - Evaluation, chart review and education   - Labs, imaging reviewing     At the end of the RRT: Patient stable to remain in ARU     Discussed with and defer further cares to Dawna Edwards PA-C     Interval History     Fatuma Henry is a 77 year old with a past medical history of paroxysmal atrial fibrillation (on Xarelto), CKD3, COPD, HFpEF, hyperlipidemia, hypertension, recurrent C diff colitis, frequent falls, RA, left ICA occlusion, anemia, depression, gout, chronic lower extremity edema, GERD, and recent left femur fracture 2/2 mechanical fall s/p closed reduction with percutaneous pinning on 3/14/23 who was admitted on 4/17/23 after recurrent fall, found to have multiple fractures of right foot as well as COPD exacerbation and HF exacerbation with hospital course complicated by pleural effusion s/p  "multiple thoracentesis, ongoing symptoms associated with recurrent C diff, pain, dyspnea, and suboptimal heart rate control.  She is now admitted to ARU on 5/3/23 for multidisciplinary rehabilitation and ongoing medical management.      Rapid response called this am for acute worsening of patient's SpO2 with rising oxygen requirements. Patient appears symptomatic of hypoxia as well with worsening lightheadedness, dizziness upon standing this AM. Patient complaining of mild worsening of her SOB, WOB and chest \"fullness\". Course improved after additional neb treatments given and new pulse ox placed showing stable oxygenation. Lactate ordered as part of work-up, which was elevated. Now sepsis alert was called.       Code Status: No CPR- Do NOT Intubate    Allergies   Allergies   Allergen Reactions     Codeine Nausea and Vomiting     Fosamax [Alendronate] Diarrhea     Morphine Nausea and Vomiting     Other reaction(s): Vomiting       Physical Exam   Vital Signs with Ranges:  Temp:  [96.9  F (36.1  C)-98.6  F (37  C)] 96.9  F (36.1  C)  Pulse:  [53-94] 53  Resp:  [16-18] 17  BP: (111-131)/(28-70) 111/56  SpO2:  [90 %-99 %] 90 %  I/O last 3 completed shifts:  In: -   Out: 1210 [Urine:1210]    Constitutional: Alert, no acute distress   ENT: Subtle, intact, atraumatic    Pulmonary: CTA apically, diminished over right lower lobe; No wheezing, cough. No rhonchi.   Cardiovascular: Strong pulses x 4. No murmurs or rubs.   GI: Soft, nontender. No masses  Skin/Integumen: scattered bruising   Neuro: AO x 4. LORENZ equally. FSC x 4.   Extremities: RLE in splint.     Data   ABG:  -  Recent Labs   Lab 05/08/23  1022   O2PER 92       Troponin:    No lab results found.    IMAGING: (X-ray/CT/MRI)   Recent Results (from the past 24 hour(s))   XR Chest 2 Views    Narrative    EXAM: XR CHEST 2 VIEWS  5/8/2023 11:30 AM     HISTORY:  worsened hypoxia and dyspnea, new leukocytosis; hx COPD,  CHF, effusion       COMPARISON:  Chest radiograph " 5/2/2023    TECHNIQUE: AP and lateral views the chest    FINDINGS:   The trachea is midline. The cardiomediastinal silhouette is mildly  enlarged, stable. Atherosclerotic desiccation the aortic arch.  Engorged pulmonary vasculature.. No appreciable pneumothorax. Small  right pleural effusion, decreased from 5-23. No left pleural effusion.  Hazy right basilar opacities. No focal airspace consolidation. No  acute osseous abnormality. Decreased gaseous distention of the stomach  and bowel in the upper abdomen. Bones appear somewhat demineralized.      Impression    IMPRESSION:  1. Decreased size of the now small right pleural effusion with  overlying compressive atelectasis.  2. Cardiomegaly with engorged pulmonary vasculature.    I have personally reviewed the examination and initial interpretation  and I agree with the findings.    CRISTI SORTO MD         SYSTEM ID:  [id number]       CBC with Diff:  Recent Labs   Lab Test 05/08/23  0701 05/04/23  0612 04/28/23  0554   WBC 12.1*   < > 9.8   HGB 9.5*   < > 7.9*   MCV 94   < > 97      < > 349   INR  --   --  2.59*    < > = values in this interval not displayed.        Lactic Acid:    Lab Results   Component Value Date    LACT 2.2 05/08/2023           Comprehensive Metabolic Panel:  Recent Labs   Lab 05/08/23  0701 05/04/23  0612 05/03/23  0444      < >  --    POTASSIUM 4.1   < > 4.4   CHLORIDE 107   < >  --    CO2 21*   < >  --    ANIONGAP 10   < >  --    GLC 88   < >  --    BUN 21.7   < >  --    CR 0.90   < >  --    GFRESTIMATED 66   < >  --    SUNI 9.8   < >  --    MAG 1.5*   < > 1.8   PHOS 2.6   < >  --    PROTTOTAL  --   --  5.9*    < > = values in this interval not displayed.       INR:    Recent Labs   Lab Test 04/28/23  0554   INR 2.59*       D-DIMER:  No results found for: DIMER    BNP:  Lab Results   Component Value Date     (H) 09/13/2022       UA:  No results for input(s): COLOR, APPEARANCE, URINEGLC, URINEBILI, URINEKETONE, SG, UBLD,  URINEPH, PROTEIN, UROBILINOGEN, NITRITE, LEUKEST, RBCU, WBCU in the last 168 hours.    Time Spent on this Encounter   I spent 15 minutes on the unit/floor managing the care of Fatuma Henry. Over 50% of my time was spent counseling the patient and/or coordinating care regarding services listed in this note.      Gibson Chandra PA-C   05/08/23  12:35 PM

## 2023-05-08 NOTE — CODE/RAPID RESPONSE
[facility] Critical Response Team   RRT Note  5/8/2023   Time Called: 0919     RRT called for: Rising oxygen requirements, c/f worsening hypoxia     Assessment & Plan     # Hypoxia   # Leukocytosis   # C/f worsening respiratory insufficiency   # Possible COPD exacerbation   # C/f re-accumulation of R pleural effusion   - WBC rise from 10.7 to 12.1 this AM. CXR stat pending.   - Patient requires O2 ~1.5-2 L daily for baseline COPD    - O2 levels sustained in low 80s with NC up to 8 this AM    - With switching pulse ox to ear lobe probe, SpO2 reading 93% on 8L NC.   - Patient complaining of worsening SOB, WOB today   - Lactate, VBG, procal added onto labs   - CXR pending   - NC weaned to 6L at end of evaluation, however RT placed new finger probe with SpO2 now reading 100% on 3LNC.    - Patient to continue scheduled duonebs for time being  - Recommend thoracics consult vs IR thoracentesis again if CXR shows worsened/repeat effusion   - Recommend EKG and trop check; Primary ordered BNP   - Primary team to hold off on transfer pending CXR and further lab work-up; Will plan to touch base later this morning or sooner if escalation of care required       INTERVENTIONS:  - Evaluation, chart review and education   -Dispo assessment and planning   -Labs, imaging orders     At the end of the RRT: Patient to remain in ARU pending work-up with possible IR consult vs transfer to medicine if indicated by lab/imaging results this AM.     Discussed with and defer further cares to: Dawna Edwards PA-C     Interval History     Fatuma Henry is a 77 year old with a past medical history of paroxysmal atrial fibrillation (on Xarelto), CKD3, COPD, HFpEF, hyperlipidemia, hypertension, recurrent C diff colitis, frequent falls, RA, left ICA occlusion, anemia, depression, gout, chronic lower extremity edema, GERD, and recent left femur fracture 2/2 mechanical fall s/p closed  "reduction with percutaneous pinning on 3/14/23 who was admitted on 4/17/23 after recurrent fall, found to have multiple fractures of right foot as well as COPD exacerbation and HF exacerbation with hospital course complicated by pleural effusion s/p multiple thoracentesis, ongoing symptoms associated with recurrent C diff, pain, dyspnea, and suboptimal heart rate control.  She is now admitted to ARU on 5/3/23 for multidisciplinary rehabilitation and ongoing medical management.     Rapid response called this am for acute worsening of patient's SpO2 with rising oxygen requirements. Patient appears symptomatic of hypoxia as well with worsening lightheadedness, dizziness upon standing this AM. Patient complaining of mild worsening of her SOB, WOB and chest \"fullness\". History reviewed and notable for thoracentesis last on 5/3, which concerns me for possible re accumulation of pleural effusion. Labs also notable for rising in WBC count today, which, in addition to rising oxygen requirements, is concerning that patient should be monitored with continuous pulse ox pending further evaluation, work-up and care planning. However, improvement noted following additional neb treatment and new finger pulse ox.       Code Status: No CPR- Do NOT Intubate    Allergies   Allergies   Allergen Reactions     Codeine Nausea and Vomiting     Fosamax [Alendronate] Diarrhea     Morphine Nausea and Vomiting     Other reaction(s): Vomiting       Physical Exam   Vital Signs with Ranges:  Temp:  [98.6  F (37  C)] 98.6  F (37  C)  Pulse:  [72-94] 94  Resp:  [16-18] 17  BP: (124-131)/(47-70) 125/70  SpO2:  [94 %-99 %] 95 %  I/O last 3 completed shifts:  In: -   Out: 1210 [Urine:1210]    Constitutional: Alert, no acute distress   ENT: Subtle, intact, atraumatic    Pulmonary: CTA apically, diminished over right lower lobe; No wheezing, cough. No rhonchi.   Cardiovascular: Strong pulses x 4. No murmurs or rubs.   GI: Soft, nontender. No " masses  Skin/Integumen: scattered bruising   Neuro: AO x 4. LORENZ equally. FSC x 4.   Extremities: RLE in splint.     Data     ABG:  -No lab results found in last 7 days.    Troponin:    No lab results found.    IMAGING: (X-ray/CT/MRI)   No results found for this or any previous visit (from the past 24 hour(s)).    CBC with Diff:  Recent Labs   Lab Test 05/08/23  0701 05/04/23  0612 04/28/23  0554   WBC 12.1*   < > 9.8   HGB 9.5*   < > 7.9*   MCV 94   < > 97      < > 349   INR  --   --  2.59*    < > = values in this interval not displayed.        Lactic Acid:    Lab Results   Component Value Date    LACT 1.0 04/10/2023           Comprehensive Metabolic Panel:  Recent Labs   Lab 05/08/23  0701 05/04/23  0612 05/03/23  0444      < >  --    POTASSIUM 4.1   < > 4.4   CHLORIDE 107   < >  --    CO2 21*   < >  --    ANIONGAP 10   < >  --    GLC 88   < >  --    BUN 21.7   < >  --    CR 0.90   < >  --    GFRESTIMATED 66   < >  --    SUNI 9.8   < >  --    MAG 1.5*   < > 1.8   PHOS 2.6   < >  --    PROTTOTAL  --   --  5.9*    < > = values in this interval not displayed.       INR:    Recent Labs   Lab Test 04/28/23  0554   INR 2.59*       D-DIMER:  No results found for: DIMER    BNP:  Lab Results   Component Value Date     (H) 09/13/2022       UA:  No results for input(s): COLOR, APPEARANCE, URINEGLC, URINEBILI, URINEKETONE, SG, UBLD, URINEPH, PROTEIN, UROBILINOGEN, NITRITE, LEUKEST, RBCU, WBCU in the last 168 hours.    Time Spent on this Encounter   I spent 30 minutes on the unit/floor managing the care of Fatuma Henry. Over 50% of my time was spent counseling the patient and/or coordinating care regarding services listed in this note.      Gibson Chandra PA-C   05/08/23  10:07 AM

## 2023-05-08 NOTE — PLAN OF CARE
Discharge Planner Post-Acute Rehab PT:     Discharge Plan: home with 24/7 support, home PT    Precautions: falls, R CAM boot OOB, prn O2 (more so when lying),     Current Status:  Bed Mobility: SBA  Transfer: MIN A with FWW  Gait: CGA up to 75' with FWW  Stairs: NT- not a goal for home  Balance:     Egan/5 = 16/56    = **  TU/5 = 75 seconds, ww, 1 assist, lifting assist to rise from chair    = **    Assessment: -65 d/t medical status and pt refusal in PM related to fatigue.      Other Barriers to Discharge (DME, Family Training, etc):   Impaired cognition    Extensive falls h/o, 6 month decline in medical and functional status    Both pt and  are frail and in poor health. Need to confirm plan for 24/7 support.     DME: Pt owns FWW, 4WW, transport and manual wc

## 2023-05-08 NOTE — PLAN OF CARE
Discharge Planner Post-Acute Rehab OT:      Discharge Plan: Home with 24/7 assist and HH OT     Precautions: Fall, c diff, CAM boot when out of  bed, 2L O2     Current Status:  ADLs:    Mobility: Ax1 for stand pivot using walker with CAM boot on    Grooming: Setup seated    Dressing: UB pull over shirt :sitting in low chair no verbal cues or physical assist    L/B:Pants;Underwear;sitting in low chair no verbal cues or physical assist    Bathing: pt able to complete 8/10 tasks with assist with pericates and feet pt able to sequence shower without assist. w/c to extended tubbench min a    Toileting: Ax1 to commode at bedside, A for cares  IADLs: Pt usually mod I with IADL  Vision/Cognition: ACEIII 45/100, attention 8/18, memory 4/26, Fluency 4/14, Language 21/26, visuospatial 8/16. Pt scored a 12/30 on the SLUMS at the hospital     Assessment: ADL completed at the sink with some standing and sitting due to fatigue, dizziness and SOB. Symptoms decrease with rest. Pt on 4L O2 upon arrival at rest and with ADL. Pt very difficult to get a read on O2. Nurse aware and present for parts of session trying to get read on O2.    Other Barriers to Discharge (DME, Family Training, etc): DME, family training, cognition

## 2023-05-09 NOTE — PLAN OF CARE
Paged at 1:43 am about UA results. + for UTI. Patient symptomatic per nursing.Hx of CKD though most recent Cr Clearance >30 (~54). Will start bactrim DS BID x3 days and await final sensitivities, primary team to adjust accordingly.

## 2023-05-09 NOTE — PLAN OF CARE
Discharge Planner Post-Acute Rehab SLP:     Discharge Plan: home with 24/7    Precautions: fall    Current Status:  Hearing: WFL  Vision: glasses  Communication: WFL  Cognition: Moderate-severe cognitive impairments with deficits re: attention, memory, exeuctive function, visuospatial skills.   Swallow: regular, thin (0). Not formally assessed    Assessment:  Patient engaged in money counting task. Patient completed with 30\% accuracy. Improvement noted with encouraged to count out loud. Significant memory impairment limiting success as she required repetition of the information multiple times.    Other Barriers to Discharge (Family Training, etc):  Family training re: memory strategies and home visual aids

## 2023-05-09 NOTE — PLAN OF CARE
Discharge Planner Post-Acute Rehab OT:      Discharge Plan: Home with 24/7 assist and HH OT     Precautions: Fall, c diff, CAM boot when out of  bed, 2L O2     Current Status:  ADLs:    Mobility: Ax1 for stand pivot using walker with CAM boot on    Grooming: Setup seated    Dressing: UB pull over shirt :sitting in low chair no verbal cues or physical assist    L/B:Pants;Underwear;sitting in low chair no verbal cues or physical assist    Bathing: pt able to complete 8/10 tasks with assist with pericates and feet pt able to sequence shower without assist. w/c to extended tubbench min a    Toileting: Ax1 to commode over toilet, A for cares  IADLs: Pt usually mod I with IADL  Vision/Cognition: ACEIII 45/100, attention 8/18, memory 4/26, Fluency 4/14, Language 21/26, visuospatial 8/16. Pt scored a 12/30 on the SLUMS at the hospital     Assessment: ADL completed at the sink with some standing . Pt c/o sudden onset of headache, pt also SOB with activity. Once back to bed checked vitals again. BP had descreased to 90/39, O2 on 2L was 91%. Nurse aware. -25 min    Other Barriers to Discharge (DME, Family Training, etc): DME, family training, cognition

## 2023-05-09 NOTE — PLAN OF CARE
"Acute Rehab Care Conference/Team Rounds    Type: Team Rounds    Present: Dr. Frank Rizvi, Dawna Edwards PA, Dr. Rosaline Dimas Neuropsychologist, Naya Gaxiola PT, Ivelisse Lea OT, Evelyne Carrillo SLP, Lucy Way LICSW, Arti Riley RD, Jacy Del Rio RN, and Fatuma \"Gert\" Henry Patient.     Discharge Barriers/Treatment/Education    Rehab Diagnosis:  Debility/deconditioning post prolonged medical course     Active Medical Co-morbidities/Prognosis:     Patient Active Problem List   Diagnosis Code     Seropositive rheumatoid arthritis (H) M05.9     Osteoporosis dxa 2006  M81.0     High risk medication use Z79.899     Rheumatoid arthritis of multiple sites without rheumatoid factor (H) M06.09     Dyspnea R06.00     Reactive airway disease J45.909     Paroxysmal atrial fibrillation (H) I48.0     Nonsustained ventricular tachycardia (H) I47.29     CRF (chronic renal failure), stage 3 (moderate) (H) N18.30     Primary osteoarthritis involving multiple joints M15.9     Benign essential hypertension I10     Pulmonary nodules R91.8     COPD (chronic obstructive pulmonary disease) (H) J44.9     Asthma without status asthmaticus J45.909     CKD (chronic kidney disease) stage 3, GFR 30-59 ml/min (H) N18.30     History of 2019 novel coronavirus disease (COVID-19) Z86.16     DNR (do not resuscitate) Z66     Chronic anticoagulation Z79.01     Personal history of immunosuppressive therapy Z92.25     Hypokalemia E87.6     Hypomagnesemia E83.42     Diarrhea, unspecified type R19.7     Acute on chronic congestive heart failure, unspecified heart failure type (H) I50.9     SOB (shortness of breath) R06.02     Hypoxia R09.02     Rapid atrial fibrillation (H) I48.91     Sepsis, due to unspecified organism, unspecified whether acute organ dysfunction present (H) A41.9     Infection due to 2019 novel coronavirus U07.1     Syncope R55     Recurrent falls R29.6     Pain in limb M79.609     Hypertension I10     " Generalized muscle weakness M62.81     Acute kidney injury (H) N17.9     Acute blood loss anemia D62     Respiratory failure, unspecified chronicity, unspecified whether with hypoxia or hypercapnia (H) J96.90     Malaise and fatigue R53.81, R53.83     Stomatitis and mucositis K12.1, K12.30     Acute on chronic respiratory failure with hypoxia (H) J96.21     Macrocytic anemia D53.9     Community acquired pneumonia of right lower lobe of lung J18.9     Acute cystitis with hematuria N30.01     Recurrent Clostridioides difficile diarrhea A04.71     Leukocytosis D72.829     HLD (hyperlipidemia) E78.5     Gastroesophageal reflux disease without esophagitis K21.9     Gout M10.9     Mood disorder (H) F39     JOHANNA (obstructive sleep apnea) G47.33     Hyponatremia E87.1     Internal carotid artery stenosis, left I65.22     Oxygen dependent Z99.81     Fracture of femur (H) S72.90XA     Atrial fibrillation with RVR (H) I48.91     Fall, initial encounter W19.XXXA     Abnormal CT of spine R93.7     Acute on chronic diastolic (congestive) heart failure (H) I50.33     Cervical herniated disc M50.20     Gout of right foot, unspecified cause, unspecified chronicity M10.9     Insomnia, unspecified type G47.00     Metabolic encephalopathy G93.41     Acute right-sided low back pain with right-sided sciatica M54.41     Rash and nonspecific skin eruption R21     Loose stools R19.5     Cellulitis of left leg L03.116     Elevated alkaline phosphatase level R74.8     Dyspnea, unspecified type R06.00     Chronic renal impairment, unspecified CKD stage N18.9     Fracture of unspecified part of neck of left femur, subsequent encounter for closed fracture with routine healing S72.002D     Lumbago with sciatica, right side M54.41     Muscle wasting and atrophy, not elsewhere classified, unspecified site M62.50     Need for assistance with personal care Z74.1     Other abnormalities of gait and mobility R26.89     Repeated falls R29.6     COPD  exacerbation (H) J44.1     Pleural effusion on right J90     Acute respiratory failure with hypoxia (H) J96.01     Closed nondisplaced fracture of third metatarsal bone of right foot, initial encounter S92.334A     Closed displaced fracture of proximal phalanx of lesser toe of right foot, initial encounter S92.511A     Abrasion of right foot, initial encounter S90.811A     Abrasion of right upper extremity, initial encounter S40.811A     Anemia in other chronic diseases classified elsewhere D63.8     Nondisplaced fracture of fourth metatarsal bone, right foot, initial encounter for closed fracture S92.344A     Closed fracture of phalanx of right fourth toe, initial encounter S92.501A     Closed fracture of phalanx of right fifth toe, initial encounter S92.501A     Closed fracture of phalanx of right third toe, initial encounter S92.501A     Closed fracture of phalanx of right second toe, initial encounter S92.501A     Unsteady gait when walking R26.81     Personal history of fall Z91.81     Foot fracture, right S92.901A         Safety: Pt is alert and oriented. Use call light appropriately. A1 SPT wheelchair for mobility. On continues oxygen at 2L/nc, try to wean as able.  Her oxygen was out sometime during the night. Oxygen saturation goes down down. Hourly check done. Left nostril is dry and w/ scant amount of bleeding. Humidifier is on for her oxygen therapy. Pt to start on Bactrim today for UTI.     Pain: Denies pain.    Medications, Skin, Tubes/Lines: Can take mdications whole w/ apple sauce. Fragile skin, no other issues. No lines/no tubes.    Swallowing/Nutrition: regular, thin     Bowel/Bladder: Continent of B/B. LBM 5/8/23.    Psychosocial: . Lives with  who is homebound. Adult children and family supportive. HC PTA. No more TCU days left under insurance. Dtr applied for MA. No substance abuse or financial concerns reported at this time.     ADLs/IADLs: Pt is SBA/CGA for dressing, grooming,  min A for toileting and bathing. Pt is CGA for transfers and short distance amb with walker and CAM boot. Pt limited by decreased activity endurance and cognition. Pt will require 24/7 assist at home as she is a high fall risk and high risk for readmission given her history. Pt will require more hands on assist for mobility and assist with all IADL including meds. Pt has a spouse who is present at home but may notbe best person for providing physical assist. Will need to confirm with family the plan for providing the increased support. Recommend HH OT.    Mobility: Pt is SBA for bed mobility. Requires North>CGA w/ FWW for transfers and gait up to 75' with R CAM boot. Egan on 5/5 = 16/56. TUG on 5/5 = 75 seconds w/ FWW Ax1. Outcome measures indicating pt at continued high fall risk. Recent interventions limited d/t unstable vitals and fatigue. Patient will need assist with transfers and amb at home, family to establish plan for 24/7 support.  likely not safe to perform 24/7. HH PT.     Cognition/Language: Moderate-severe cognitive impairments with deficits re: attention, memory, exeuctive function, visuospatial skills. Will need total assist with IADLs from daughter upon discharge. Will benefit from neuropsych assessment for further diagnostic cognitive evaluation.  SLP     Community Re-Entry: Recommending ongoing therapies for improved safety and ind with mobility.     Transportation: Car transfer not a barrier. Family will provide.     Decision maker: self    Plan of Care and goals reviewed and updated.    Discharge Plan/Recommendations    Fall Precautions: continue    Patient/Family input to goals: yes     Estimated length of stay: 18 days     Overall plan for the patient: reach a level of mod I       Utilization Review and Continued Stay Justification    Medical Necessity Criteria:    For any criteria that is not met, please document reason and plan for discharge, transfer, or modification of plan of care  to address.    Requires intensive rehabilitation program to treat functional deficits?: Yes    Requires 3x per week or greater involvement of rehabilitation physician to oversee rehabilitation program?: Yes    Requires rehabilitation nursing interventions?: Yes    Patient is making functional progress?: Yes    There is a potential for additional functional progress? Yes    Patient is participating in therapy 3 hours per day a minimum of 5 days per week or 15 hours per week in 7 day period?:Yes    Has discharge needs that require coordinated discharge planning approach?:Yes      Barriers/Concerns related to meeting medical necessity criteria:  None     Team Plan to Address Concern:  As needed       Final Physician Sign off    Statement of Approval:  Frank Rizvi, DO      Patient Goals  Social Work Goals: Confirm discharge recommendations with therapy, coordinate safe discharge plan and remain available to support and assist as needed.    OT Predicted Duration/Target Date for Goal Attainment: 05/15/23  Therapy Frequency (OT): Daily  OT: Hygiene/Grooming: supervision/stand-by assist  OT: Upper Body Dressing: Supervision/stand-by assist  OT: Lower Body Dressing: Supervision/stand-by assist  OT: Upper Body Bathing: Supervision/stand-by assist  OT: Lower Body Bathing: Supervision/stand-by assist     OT: Transfer: Supervision/stand-by assist (tub bench)  OT: Toilet Transfer/Toileting: toilet transfer, cleaning and garment management, Supervision/stand-by assist        OT: Cognitive: Patient/caregiver will verbalize understanding of cognitive assessment results/recommendations as needed for safe discharge planning     OT: Goal 1: Caregiver training will be completed and family will demonstrate ability to assist pt with mobiltiy and ADL as needed.      PT Predicted Duration/Target Date for Goal Attainment: 05/15/23  PT Frequency: Other (see comments) (60-90 minutes daily)  PT: Bed Mobility: Modified independent  PT:  Transfers: Supervision/stand-by assist, Assistive device, Bed to/from chair, Sit to/from stand (using FWW for home negotiation)  PT: Gait: Supervision/stand-by assist, 50 feet, Rolling walker (for home negotiation)  PT: Wheelchair Mobility: 150 feet, manual wheelchair  PT: Perform aerobic activity with stable cardiovascular response: intermittent activity, 15 minutes, NuStep  PT: Goal 1: car transfer CGA to access personal transportation  PT: Goal 2: pt/family to verbalize three or > falls prevention strategies post education to promote safety in home environment.         SLP Predicted Duration/Target Date for Goal Attainment: 05/26/23  Therapy Frequency (SLP Eval): daily  SLP: Goal 1: Pt will recall novel and functional information of basic-mod complexity with min visual cues 90% accuracy  SLP: Goal 2: Pt will demonstrate and initiate a plan of mod complexity with min cues 90% accuracy    Nursing Goals  Bowel and Bladder care  Fall prevention   Medication Education  Skin Care protection         Patient Goal:  Pain Management: Patient's pain will be managed well while at [facility]  Patient/Family Goal: Bowel: Patient will be free from incontinency r/t c-diff  Patient/Family Goal: Bladder: Patient will be free from bladder incontincy  Goal: Mobility: Patient will free from any skin breakdown while at [facility]  Goal: Skin Integrity: Patient will be free from any skin brakdown while at [facility]

## 2023-05-09 NOTE — PLAN OF CARE
OT/PT post rounds phone call completed with pt's daughter Enid. Gave up date regarding pt's progress and limitations to improving her independence. Reviewed recommendation for 24/7 physical assist for safe discharge home and Enid reports that this is not possible. Enid reports that the pt's  is also at the hospital right now and that she herself cannot provide assist or 24/7 support since she already took her FMLA and is now back to work. Family will need social work support in figuring out the next steps for the pt since she will not have the proper support to go home from [facility].

## 2023-05-09 NOTE — PROGRESS NOTES
"Per therapy, therapy called pt's dtr who is requesting LTC placement since pt family can not provide level of care at home. Called Enid (pt dtr) to discuss further. Enid confirmed that a MA iván was completed and she is unsure of the status. Enid stated that she was working with Bryce at [facility] PH: [phone] RE: the MA iván. Enid shared that they are waiting on information about \"an assessment\". Asked Enid if \"assessment\" meaning EW waiver and Enid stated that she is not sure and it's \"all confusing\". SWer provided basic education about MA insurance, LTC coverage, waivers, 24/7, and additional services at home. Enid mentioned [facility] and that Enid is [place] and other dtr is closer to [facility] facility. Other options and locations not discussed at this time.     Enid expressed wanting her and her sister (pt other dtr) to come meet with  in person to talk further. SWer provided available times. Enid called back and confirmed 3pm for tomorrow. STEPHANIE will call [facility] to learn more and plan to discuss options further tomorrow with pt two dtrs.     Final discharge plan and date TBD. Will continue to follow and remain available.     Called and left vm for Bryce w/ [facility] PH: [phone] and requested a return call with more information.     ADDENDUM: Bryce called back shortly after and confirmed that Bryce has been in contact with family about [employer] assessment for EW. Bryce unsure about status of medical assistance application. Suggested SW call Christie with the [facility] MA Dept PH: [phone] (christie's direct). Per Bryce, she can see that additional verifications are needed and notice of that was sent to the family yesterday regarding this. STEPHANIE will call Christie tomorrow morning and f/u.     MATEO Grayson   [facility]   Direct Phone: [phone]  I   Pager: [phone]  I  Fax: [phone]        "

## 2023-05-09 NOTE — PLAN OF CARE
FOCUS/GOAL  Medical management    ASSESSMENT, INTERVENTIONS AND CONTINUING PLAN FOR GOAL:  Orthostatic BP reported this shift while in therapy. Patient to wear abdominal binder when up.  Continent of bladder. No BM to report this shift.  Good appetite. O2  at 1 L this shift with satisfying SpO2.  Mag 2.3 this morning. No need to schedule lab for tomorrow morning per pharmacist.  Denied pain. Will continue with POC.  Goal Outcome Evaluation:      Plan of Care Reviewed With: patient    Overall Patient Progress: no changeOverall Patient Progress: no change

## 2023-05-09 NOTE — PLAN OF CARE
Discharge Planner Post-Acute Rehab PT:     Discharge Plan: home with 24/7 support vs long term care option     Precautions: falls, R CAM boot OOB, prn O2, abd binder and spandigrip OOB    Current Status:  Bed Mobility: SBA  Transfer: MIN A with FWW from low surfaces  Gait: CGA up to 75' with FWW  Stairs: NT- not a goal for home  Balance:     Egan/5 = 16/56    = **  TU/5 = 75 seconds, ww, 1 assist, lifting assist to rise from chair    = **    Assessment: Overall poor tolerance to activity. Orthostatic in standing in AM. Provided abd binder and spandigrip per PA approval, edu on orthostatic physical countermeasures and encouraged hydration. Improved symptoms and vital response in PM.      Other Barriers to Discharge (DME, Family Training, etc):   Impaired cognition    Extensive falls h/o, 6 month decline in medical and functional status    Both pt and  are frail and in poor health. Need to confirm plan for 24/7 support.     DME: Pt owns FWW, 4WW, transport and manual wc

## 2023-05-09 NOTE — PLAN OF CARE
Pt is alert and oriented x4. Able to make needs known. Denies cp or SOB. Was on O2 3L via NC sating 100%, weaned down to 2L, sats >95%. Had low sats earlier in the day, RRT was involved, pt also had code sepsis, see notes. A1 stand pivot to w/c. Wears Cam boot when OOB. Takes pills whole with apple sauce. Continent of bowel and bladder, LBM 5/7. Daughter visited. L PIV fell off accidentally per pt, site found bleeding, pt is on blood thinners, dry gauze and pressure tape applied. Mg 1.5 today, replaced in the day shift, recheck lab scheduled for tomorrow morning. Unable to collect UA sample this shift, pt has mixed continent of stool and urine in the collection hat, oncoming RN updated.Pt is pleasant and cooperative, call light is in reach, continue to monitor.

## 2023-05-09 NOTE — PROGRESS NOTES
[facility]   Acute Rehabilitation Unit  Daily progress note    INTERVAL HISTORY  Fatuma Henry was seen and examined at bedside this morning during team rounds.  No acute events reported overnight.  Patient reports that she is feeling much better this morning than she did yesterday.  Still limited at times by fatigue, shortness of breath, and dizziness.  Therapies continue to note orthostatic hypotension, added abdominal binder and compression today.  Nursing noted some nasal dryness and mild bleeding, added saline spray PRN.    Functionally, she continues to be limited by poor activity tolerance, dyspnea, and symptomatic orthostatic hypotension.  Anticipate she will require 24/7 assist/supervision at discharge, therapy team will reach out to family to continue to discuss.  SLP noting severe cognitive deficits, recommended neuropsych testing to further delineate and to help educate family.  For full functional updates, see team rounds note from today.    MEDICATIONS    allopurinol  300 mg Oral Daily     cholestyramine  1 packet Oral TID w/meals     diltiazem ER COATED BEADS  240 mg Oral Daily     DULoxetine  20 mg Oral Daily     famotidine  20 mg Oral Daily     fluticasone-vilanterol  1 puff Inhalation Daily     folic acid  1 mg Oral Daily     furosemide  40 mg Oral Daily     guaiFENesin  600 mg Oral BID     lactobacillus rhamnosus (GG)  1 capsule Oral BID     melatonin  3 mg Oral At Bedtime     metoprolol succinate ER  50 mg Oral Daily     pravastatin  20 mg Oral QPM     predniSONE  5 mg Oral Daily     rivaroxaban ANTICOAGULANT  15 mg Oral Daily with supper     sulfamethoxazole-trimethoprim  1 tablet Oral BID     Vitamin D3  125 mcg Oral Daily        acetaminophen, albuterol, cyclobenzaprine, ipratropium - albuterol 0.5 mg/2.5 mg/3 mL, lidocaine 4%, lidocaine (buffered or not buffered), magic mouthwash suspension (diphenhydrAMINE, lidocaine, aluminum-magnesium & simethicone),  "- MEDICATION INSTRUCTIONS -, polyethylene glycol, senna-docusate, sodium chloride     PHYSICAL EXAM  /65 (BP Location: Left arm, Patient Position: Supine, Cuff Size: Adult Regular)   Pulse 80   Temp 97.1  F (36.2  C) (Oral)   Resp 16   Ht 1.676 m (5' 6\")   Wt 72.7 kg (160 lb 4.8 oz)   SpO2 100%   BMI 25.87 kg/m     Gen: NAD, in bed  HEENT: NC/AT, MMM  Cardio: appears well-perfused  Pulm: non-labored on 2L by NC  Abd: soft, non-tender, non-distended  Ext: 2+ pitting edema in bilateral lower extremities, tubigrip on left  Neuro/MSK: awake, alert, PERRL, EOMI, face symmetric, speech clear/fluent, moving all extremities in bed  *Full exam deferred today for conversation     LABS  CBC RESULTS:   Recent Labs   Lab Test 05/09/23  0659 05/08/23  0701 05/04/23  0612   WBC 12.2* 12.1* 10.7   RBC 3.01* 3.24* 2.97*   HGB 8.8* 9.5* 8.7*   HCT 29.0* 30.6* 27.7*   MCV 96 94 93   MCH 29.2 29.3 29.3   MCHC 30.3* 31.0* 31.4*   RDW 18.2* 17.8* 17.5*    341 354       Last Basic Metabolic Panel:  Recent Labs   Lab Test 05/08/23  0701 05/04/23  0612 05/03/23 0444 04/29/23 0444 04/28/23  0554    138  --   --  137   POTASSIUM 4.1 3.6 4.4   < > 4.4   CHLORIDE 107 102  --   --  103   CO2 21* 24  --   --  23   ANIONGAP 10 12  --   --  11   GLC 88 93  --   --  114*   BUN 21.7 33.5*  --   --  41.1*   CR 0.90 1.12*  --   --  1.29*   GFRESTIMATED 66 51*  --   --  43*   SUNI 9.8 10.0  --   --  9.8    < > = values in this interval not displayed.     Magnesium   Date Value Ref Range Status   05/09/2023 2.3 1.7 - 2.3 mg/dL Final       Rehabilitation - continue comprehensive acute inpatient rehabilitation program with multidisciplinary approach including therapies, rehab nursing, and physiatry following. See interval history for updates.      ASSESSMENT AND PLAN  Fatuma Henry is a 77 year old with a past medical history of paroxysmal atrial fibrillation (on Xarelto), CKD3, COPD, HFpEF, hyperlipidemia, hypertension, " recurrent C diff colitis, frequent falls, RA, left ICA occlusion, anemia, depression, gout, chronic lower extremity edema, GERD, and recent left femur fracture 2/2 mechanical fall s/p closed reduction with percutaneous pinning on 3/14/23 who was admitted on 4/17/23 after recurrent fall, found to have multiple fractures of right foot as well as COPD exacerbation and HF exacerbation with hospital course complicated by pleural effusion s/p multiple thoracentesis, ongoing symptoms associated with recurrent C diff, pain, dyspnea, and suboptimal heart rate control.  She is now admitted to ARU on 5/3/23 for multidisciplinary rehabilitation and ongoing medical management.        Admission to acute inpatient rehab 05/03/23.    Impairment group code: Orthopaedic Disorders 08.2 Femur (Shaft) Fracture: s/p closed reduction internal fixation of left femur 3/14/2023, now with multiple closed fractures of right foot        1. PT 60-90 and OT 90 minutes. SLP 30 minutes, on a daily basis, in addition to rehab nursing and close management of physiatrist.       2. Impairment of ADL's: Noted to have pain, weakness, fatigue, impaired balance, dyspnea on exertion, impaired cognition, and impaired safety awareness, all affecting her ability to safely and independently perform basic ADLs.  Goal for mod I with basic ADLs.     3. Impairment of mobility:  Noted to have pain, weakness, fatigue, impaired balance, dyspnea on exertion, impaired cognition, and impaired safety awareness, all affecting her ability to safely and independently perform basic mobility.  Goal for mod I with basic mobility.     4. Medical Conditions  New actions/orders/updates for today are in blue.     Debility  Recurrent falls  Multiple fractures of the right foot: 3rd, 4th, 5th phalanges and 1st & 3rd metatarsals, managed non-operatively  Left minimally displaced impacted femoral neck fracture s/p closed reduction with percutaneous pinning on 3/14/23 with   "Mike  Completed rehabilitation at [facility], return home couple days ago. She fell at home prior to arrival, reportedly no syncope or near syncope. Patient fell while getting up from the chair by the table, with daughter being nearby.  Foot x-rays demonstrated above fractures.   - Right foot CAM when ambulating and weightbearing as tolerated  - Pain management: Tylenol PRN  - Continue PT/OT  - Follow up with [place] [facility] - foot and ankle provider for foot fractures and Dr. Mckeon for hip fracture    Moderate-severe cognitive impairment  Increased difficulties with cognition since ~Nov 2022 per patient/family.  Prior to that time, had been independent with all IADLs and driving, but since then has required family assist for those tasks.  SLUMS this admission 12/30.  SLP consulted at ARU to assist with evaluation/treatment.  Deficits in areas of attention, executive function, memory noted per SLP.  - Continue SLP/OT  - Consult for neuropsych testing to help better delineate cognitive deficits and to educate family     Acute on chronic respiratory failure with hypoxia  COPD with acute exacerbation, resolved, currently stable  Right-sided pleural effusion, s/p thoracentesis on 4/25 and again 5/3  HFpEF with acute exacerbation.  Resolved, now stable  Pulmonary hypertension, stable  [PTA meds: albuterol 2 puffs q4h PRN, budesonide-formoterol 160-4.5 mcg inhaler]  Exacerbations of CHF and COPD this admission.  Completed prednisone taper, now returned to PTA dose.  Initially on IV diuretics, transitioned to oral.  Underwent thoracentesis x2.  - Goal spO2 is 88-92%.  Oxygen by NC PRN to maintain sats.  - Noted to have increased dyspnea 5/8 AM, sats dropping to high 70s, increased oxygen needs up to 6L.  Patient was evaluated by rapid response team, see their note for additional details.  Labs with mild leukocytosis, CRP only mildly elevated at 12, BNP 1400 (previously >4000), procal 0.04.  CXR with \"decreased size of the now " "small right pleural effusion with  overlying compressive atelectasis; cardiomegaly with engorged pulmonary vasculature\".  EKG not great reading but with atrial fibrillation, incomplete RBBB.  Patient was given Duoneb per RT, who also obtained alternative pulse ox, reading improved to high 90s back on baseline 2-3L O2 at rest.  Discussed VBG, labs, imaging with rapid response team, who returned to evaluate patient per protocol due to lactic acid 2.2.  Patient clinically improve and resumed on prior level of oxygen.  CXR overall improved.  Did not feel any change to current plan of care was indicated.  Offered IVF but patient preferred to increase oral intake.  See their note for additional details.   - Respiratory status/symptoms improved, returned to prior baseline today.  Continue to monitor.  - [employer] Ellipta as formulary sub for PTA Symbicort  - Continue albuterol inhaler and Duoneb PRN  - Continue prednisone 5 mg (PTA dose for RA)  - Continue Lasix 40 mg daily.  Monitor daily weights, labs, volume status.  - Follow up pulmonology     Chronic atrial fibrillation  Difficult to rate control due low BP.  Cardiology consulted this admission and made some medication changes, felt HR better-controlled.  PTA anticoagulation held briefly for thoracentesis but resumed on 4/26.  - Continue Xarelto 15 mg daily  - Continue metoprolol 50 mg daily (reduced on 4/28)  - Continue diltiazem  mg (increased on 4/30)  - Monitor BP, HR     Recurrent c diff colitis, resolved  Severe, 4th episode.  Off vancomycin since 4/6.  Completed taper Dificid per ID/GI recs (started last admission) as of 5/2/23.  - Avoid antibiotics   - Continue probiotics  - Monitor bowel pattern  - Previously had been referred to [facility] for FMT consultation - appointment scheduled for November      CKD stage 3  Recent Cr in 1.1-1.3 range.  - Avoid nephrotoxins and hypotension  - Continue to monitor BMP every M/Th.  5/8: Cr stable/improved at " 0.90     Recent orthostatic hypotension  Hx HTN   - Continue PTA Cardizem and metoprolol, with holding parameters  - BP has been on soft side recently, med changes as above per cardiology  - Continue to have symptomatic orthostatic hypotension intermittently.  Add abdominal binder, compression stockings.  Continue to monitor in case med changes warranted.  - Monitor BP     Acute on chronic anemia  Baseline Hgb 9-10 range.  Most recent Hgb 7.9 on 4/28.  - Continue folic acid 1 mg daily  - Trend CBC every M/Th.  5/9: Hgb stable at 8.8     Left ICA occlusion  Had been noted in 12/22, asymptomatic.  - On Xarelto and statin as above  - Outpatient vascular surgery follow-up recommended     Hx GERD  - Continue PTA pepcid      Hx depression  - Continue PTA Cymbalta  - Monitor mood, consult psychology as indicated     Hx pulm nodules with positive PET  Stable 17 x 19 mm groundglass nodule in the lingula since 07/21/2022.   - Follow up with pulm clinic as outpt and serial imaging 3 to 6 month     Hx rheumatoid arthritis  Hx of methotrexate--was discontinued during recent hospitalization.  No exacerbation of RA off methotrexate.  - Continue prednisone 5 mg daily (chronic dose, recently completed taper due to COPD exacerbation)     Hx gout  - Continue PTA allopurinol 300 mg daily    Hypomagnesemia  Mag slightly low on 5/8 at 1.6.  Replaced with mag sulfate 4g IV x1 (avoided PO given recent diarrhea).  - 5/9: mag WNL 2.3.  Repeat labs on Thurs    Leukocytosis  Abnormal UA  Is on chronic steroids but WBC previously WNL.  WBC slightly elevated at 12.1 on 5/8.  Afebrile, VSS/WNL with exception of worsened hypoxia as above, improved after nebulizer and with alternative probe. CRP only mildly elevated at 12.03, procal 0.04.  CXR without evidence of infection.  - UA with large leuk esterase, 73 WBC.  Started on empiric Bactrim by on-call provider.    - UC in process, can adjust antibiotics accordingly once resulted.   - CBC today with  WBC stable at 12.2       5. Adjustment to disability:  Clinical psychology to eval and treat if indicated  6. FEN: discontinue low sat fat, continue on low Na (3g) diet to encourage appropriate intake and allow greater menu options at ARU  7. Bowel: continent, ongoing loose stools in setting of recent recurrent C diff, improving, monitor  8. Bladder: continent/incontinent, evangelista removed on 5/3, PVRs at admission without evidence of retention  9. DVT Prophylaxis: Xarelto  10. GI Prophylaxis: Pepcid  11. Code: no CPR, do not intubate  12. Disposition: goal for home with family assist, though suspect would benefit from more supportive environment longer term given recurrent falls, hospitalizations, chronic medical comorbidities  13. ELOS:  target 5/15/23  14. Follow up Appointments on Discharge: PCP in 1-2 weeks, ortho ([facility] - 2 different providers: foot/ankle for foot fractures, and Dr. Mckeon for left femur fracture s/p repair), pulmonology (currently scheduled 5/17), cardiology (currently scheduled 5/10), vascular medicine (ICA occlusion, scheduled 5/18), GI (FMT, scheduled 11/1)      Patient was seen and discussed with Dr. Frank Rizvi, PM&R staff physician     IMAN Mai-RAFA  Physical Medicine & Rehabilitation

## 2023-05-09 NOTE — PLAN OF CARE
Pt is alert and oriented x4. Able to make needs known. Denies cp or SOB.  On O2 2L via NC, sats >95%. A1 stand pivot to w/c. Wears Cam boot when OOB. Takes pills whole with apple sauce. Continent of bowel and bladder, LBM 5/9. Declined shower tonight. Pt is pleasant and cooperative, call light is in reach, continue to monitor.

## 2023-05-10 NOTE — PLAN OF CARE
Goal Outcome Evaluation:      Plan of Care Reviewed With: patient    Patient mostly asleep on safety rounds, no respiratory distress noted, wearing O2 at 2L NC, changes position independently in bed. No pain reported this shift. No other unusualities noted. O2 Sat 100% this AM. Call light in reach, bed alarm on, safety checks completed, isolation precaution maintained, needs attended.    Continue with POC.

## 2023-05-10 NOTE — PROGRESS NOTES
"  [facility]   Acute Rehabilitation Unit  Daily progress note    INTERVAL HISTORY  Fatuma Henry was seen and examined at bedside this morning.  No acute events reported overnight.  She reports that she is feeling pretty good this morning.  She does note her breathing is \"harder\" than usual.  Notes infrequent cough.  No recurrent epistaxis.  She reports persistent dizziness/lightheadedness with upright activity.  Stats she has had this for quite some time (many weeks) and overall is unchanged.  She denies chest pain, palpitations, nausea.  She also denies dysuria, urinary frequency, urgency, abdominal/pelvic pain.  Notes ongoing right foot pain, aggravated by weightbearing.    Functionally, she is currently needing SBA for bed mobility, min A for transfers with FWW from low surfaces, CGA to ambulate up to 75' with FWW.  PT added abdominal binder and LE compression yesterday for orthostasis, also instructed on countermeasures.  Noted improved symptoms and vital response during after session.    MEDICATIONS    allopurinol  300 mg Oral Daily     cholestyramine  1 packet Oral TID w/meals     diltiazem ER COATED BEADS  240 mg Oral Daily     DULoxetine  20 mg Oral Daily     famotidine  20 mg Oral Daily     fluticasone-vilanterol  1 puff Inhalation Daily     folic acid  1 mg Oral Daily     furosemide  40 mg Oral Daily     guaiFENesin  600 mg Oral BID     lactobacillus rhamnosus (GG)  1 capsule Oral BID     melatonin  3 mg Oral At Bedtime     metoprolol succinate ER  50 mg Oral Daily     pravastatin  20 mg Oral QPM     predniSONE  5 mg Oral Daily     rivaroxaban ANTICOAGULANT  15 mg Oral Daily with supper     sulfamethoxazole-trimethoprim  1 tablet Oral BID     Vitamin D3  125 mcg Oral Daily        acetaminophen, albuterol, cyclobenzaprine, ipratropium - albuterol 0.5 mg/2.5 mg/3 mL, lidocaine 4%, lidocaine (buffered or not buffered), magic mouthwash suspension (diphenhydrAMINE, " "lidocaine, aluminum-magnesium & simethicone), - MEDICATION INSTRUCTIONS -, polyethylene glycol, senna-docusate, sodium chloride     PHYSICAL EXAM  /58 (BP Location: Left arm)   Pulse 77   Temp (!) 96.3  F (35.7  C) (Oral)   Resp 16   Ht 1.676 m (5' 6\")   Wt 71.3 kg (157 lb 3 oz)   SpO2 100%   BMI 25.37 kg/m     Gen: NAD, in bed  HEENT: NC/AT, MMM  Cardio: irregularly irregular, no murmurs auscultated  Pulm: non-labored on 1.5L by NC, lung sounds slightly diminished on right but no crackles or wheezes  Abd: soft, non-tender, non-distended, bowel sounds present  Ext: 2+ pitting edema in bilateral lower extremities, tubigrip bilat, CAM boot on right  Neuro/MSK: awake, alert, PERRL, EOMI, face symmetric, speech clear/fluent, moving all extremities in bed       LABS  CBC RESULTS:   Recent Labs   Lab Test 05/09/23  0659 05/08/23  0701 05/04/23  0612   WBC 12.2* 12.1* 10.7   RBC 3.01* 3.24* 2.97*   HGB 8.8* 9.5* 8.7*   HCT 29.0* 30.6* 27.7*   MCV 96 94 93   MCH 29.2 29.3 29.3   MCHC 30.3* 31.0* 31.4*   RDW 18.2* 17.8* 17.5*    341 354       Last Basic Metabolic Panel:  Recent Labs   Lab Test 05/10/23  0610 05/08/23  0701 05/04/23  0612    138 138   POTASSIUM 4.1 4.1 3.6   CHLORIDE 106 107 102   CO2 22 21* 24   ANIONGAP 11 10 12   GLC 90 88 93   BUN 27.0* 21.7 33.5*   CR 1.12* 0.90 1.12*   GFRESTIMATED 51* 66 51*   SUNI 10.1 9.8 10.0     Magnesium   Date Value Ref Range Status   05/09/2023 2.3 1.7 - 2.3 mg/dL Final       Rehabilitation - continue comprehensive acute inpatient rehabilitation program with multidisciplinary approach including therapies, rehab nursing, and physiatry following. See interval history for updates.      ASSESSMENT AND PLAN  Fatumatai Henry is a 77 year old with a past medical history of paroxysmal atrial fibrillation (on Xarelto), CKD3, COPD, HFpEF, hyperlipidemia, hypertension, recurrent C diff colitis, frequent falls, RA, left ICA occlusion, anemia, depression, gout, " chronic lower extremity edema, GERD, and recent left femur fracture 2/2 mechanical fall s/p closed reduction with percutaneous pinning on 3/14/23 who was admitted on 4/17/23 after recurrent fall, found to have multiple fractures of right foot as well as COPD exacerbation and HF exacerbation with hospital course complicated by pleural effusion s/p multiple thoracentesis, ongoing symptoms associated with recurrent C diff, pain, dyspnea, and suboptimal heart rate control.  She is now admitted to ARU on 5/3/23 for multidisciplinary rehabilitation and ongoing medical management.        Admission to acute inpatient rehab 05/03/23.    Impairment group code: Orthopaedic Disorders 08.2 Femur (Shaft) Fracture: s/p closed reduction internal fixation of left femur 3/14/2023, now with multiple closed fractures of right foot        1. PT 60-90 and OT 90 minutes. SLP 30 minutes, on a daily basis, in addition to rehab nursing and close management of physiatrist.       2. Impairment of ADL's: Noted to have pain, weakness, fatigue, impaired balance, dyspnea on exertion, impaired cognition, and impaired safety awareness, all affecting her ability to safely and independently perform basic ADLs.  Goal for mod I with basic ADLs.     3. Impairment of mobility:  Noted to have pain, weakness, fatigue, impaired balance, dyspnea on exertion, impaired cognition, and impaired safety awareness, all affecting her ability to safely and independently perform basic mobility.  Goal for mod I with basic mobility.     4. Medical Conditions  New actions/orders/updates for today are in blue.     Debility  Recurrent falls  Multiple fractures of the right foot: 3rd, 4th, 5th phalanges and 1st & 3rd metatarsals, managed non-operatively  Left minimally displaced impacted femoral neck fracture s/p closed reduction with percutaneous pinning on 3/14/23 with Dr. Mckeon  Completed rehabilitation at U, return home couple days ago. She fell at home prior to  "arrival, reportedly no syncope or near syncope. Patient fell while getting up from the chair by the table, with daughter being nearby.  Foot x-rays demonstrated above fractures.   - Right foot CAM when ambulating and weightbearing as tolerated  - Pain management: Tylenol PRN  - Continue PT/OT  - Follow up with [place] [facility] - foot and ankle provider for foot fractures and Dr. Mckeon for hip fracture    Moderate-severe cognitive impairment  Increased difficulties with cognition since ~Nov 2022 per patient/family.  Prior to that time, had been independent with all IADLs and driving, but since then has required family assist for those tasks.  SLUMS this admission 12/30.  SLP consulted at ARU to assist with evaluation/treatment.  Deficits in areas of attention, executive function, memory noted per SLP.  - Continue SLP/OT  - Consult placed 5/9 for neuropsych testing to help better delineate cognitive deficits and to educate family     Acute on chronic respiratory failure with hypoxia  COPD with acute exacerbation, resolved, currently stable  Right-sided pleural effusion, s/p thoracentesis on 4/25 and again 5/3  HFpEF with acute exacerbation.  Resolved, now stable  Pulmonary hypertension, stable  [PTA meds: albuterol 2 puffs q4h PRN, budesonide-formoterol 160-4.5 mcg inhaler]  Exacerbations of CHF and COPD this admission.  Completed prednisone taper, now returned to PTA dose.  Initially on IV diuretics, transitioned to oral.  Underwent thoracentesis x2.  * Noted to have increased dyspnea 5/8 AM, sats dropping to high 70s, increased oxygen needs up to 6L.  Patient was evaluated by rapid response team, see their note for additional details.  Labs with mild leukocytosis, CRP only mildly elevated at 12, BNP 1400 (previously >4000), procal 0.04.  CXR with \"decreased size of the now small right pleural effusion with  overlying compressive atelectasis; cardiomegaly with engorged pulmonary vasculature\".  EKG not great reading " but with atrial fibrillation, incomplete RBBB.  Patient was given Duoneb per RT, who also obtained alternative pulse ox, reading improved to high 90s back on baseline 2-3L O2 at rest.  Discussed VBG, labs, imaging with rapid response team, who returned to evaluate patient per protocol due to lactic acid 2.2.  Patient clinically improve and resumed on prior level of oxygen.  CXR overall improved.  Did not feel any change to current plan of care was indicated.  Offered IVF but patient preferred to increase oral intake.  See their note for additional details. Respiratory status/symptoms improved, returned to prior baseline.  - Goal spO2 is 88-92%.  Oxygen by NC PRN to maintain sats.  - Breo Ellipta as formulary sub for PTA Symbicort  - Continue albuterol inhaler and Duoneb PRN  - Continue prednisone 5 mg (PTA dose for RA)  - Continue Lasix 40 mg daily.  Monitor daily weights, labs, volume status.  Weight overall has been stable, persistent lower extremity edema, respiratory symptoms wax and wane.  - Follow up pulmonology     Chronic atrial fibrillation  Difficult to rate control due low BP.  Cardiology consulted this admission and made some medication changes, felt HR better-controlled.  PTA anticoagulation held briefly for thoracentesis but resumed on 4/26.  - Continue Xarelto 15 mg daily  - Given concerns with ongoing symptomatic orthostatic hypotension, will reduce metoprolol to 25 mg daily (last reduced on 4/28).  Monitor BP and HR.  - Continue diltiazem  mg (increased on 4/30)  - Monitor BP, HR     Recurrent c diff colitis, resolved  Severe, 4th episode.  Off vancomycin since 4/6.  Completed taper Dificid per ID/GI recs (started last admission) as of 5/2/23.  - Avoid antibiotics   - Continue probiotics  - Monitor bowel pattern  - Previously had been referred to [facility] for FMT consultation - appointment scheduled for November      CKD stage 3  Recent Cr in 1.1-1.3 range.  - Avoid nephrotoxins and  hypotension  - Continue to monitor BMP every M/Th.  5/10: Cr up slightly to 1.12 (0.90, though previously in this range).  Could be related to Bactrim though has been stable in this range over time.  Stopping Bactrim as below.  Repeat Cr tomorrow.     Recent orthostatic hypotension  Hx HTN   BP has been on soft side during this admission, med changes as above per cardiology.  - Continue PTA Cardizem and metoprolol, with holding parameters  - Continue abdominal binder, compression stockings  - Continues to have symptomatic orthostatic hypotension intermittently, high falls risk.  Reluctant to reduce Lasix given LE edema and respiratory symptoms.  As above, will decrease metoprolol dose.    - Monitor BP     Acute on chronic anemia  Baseline Hgb 9-10 range.  Most recent Hgb 7.9 on 4/28.  - Continue folic acid 1 mg daily  - Trend CBC every M/Th.  5/9: Hgb stable at 8.8     Left ICA occlusion  Had been noted in 12/22, asymptomatic.  - On Xarelto and statin as above  - Outpatient vascular surgery follow-up recommended     Hx GERD  - Continue PTA pepcid      Hx depression  - Continue PTA Cymbalta  - Monitor mood, consult psychology as indicated     Hx pulm nodules with positive PET  Stable 17 x 19 mm groundglass nodule in the lingula since 07/21/2022.   - Follow up with pulm clinic as outpt and serial imaging 3 to 6 month     Hx rheumatoid arthritis  Hx of methotrexate--was discontinued during recent hospitalization.  No exacerbation of RA off methotrexate.  - Continue prednisone 5 mg daily (chronic dose, recently completed taper due to COPD exacerbation)     Hx gout  - Continue PTA allopurinol 300 mg daily    Hypomagnesemia  Mag slightly low on 5/8 at 1.6.  Replaced with mag sulfate 4g IV x1 (avoided PO given recent diarrhea).  - 5/9: mag WNL 2.3.  Repeat labs Thurs    Leukocytosis  Abnormal UA  Is on chronic steroids but WBC previously WNL.  WBC slightly elevated at 12.1 on 5/8.  Afebrile, VSS/WNL with exception of  worsened hypoxia as above, improved after nebulizer and with alternative probe. CRP only mildly elevated at 12.03, procal 0.04.  CXR without evidence of infection.  UA with large leuk esterase, 73 WBC.  Started on empiric Bactrim by on-call provider.  CBC on 5/9 with WBC stable at 12.2.  - UC with mixed urogenital jamee.  Per lab, no predominant organism, growth consistent with probable contamination during collection. Patient denies any urinary symptoms now or at time of urine collection.  Given asymptomatic, no clear pathogen on UC, increase in Cr on Bactrim, goal to minimize antibiotic exposure due to recurrent C diff, will stop Bactrim and monitor clinically.  If develops fevers or worsening leukocytosis, could consider repeat urine collection.       5. Adjustment to disability:  Clinical psychology to eval and treat if indicated  6. FEN: low Na (3g) diet   7. Bowel: continent, ongoing loose stools in setting of recent recurrent C diff, improving, monitor  8. Bladder: continent/incontinent, evangelista removed on 5/3, PVRs at admission without evidence of retention  9. DVT Prophylaxis: Xarelto  10. GI Prophylaxis: Pepcid  11. Code: no CPR, do not intubate  12. Disposition: would benefit from more supportive environment longer term given recurrent falls, hospitalizations, chronic medical comorbidities.  Family unable to provide recommended 24/7 assist at discharge, working with SW to determine alternative long-term options.  13. ELOS: pending discharge location, availability, insurance, etc.  14. Follow up Appointments on Discharge: PCP in 1-2 weeks, ortho ([facility] - 2 different providers: foot/ankle for foot fractures, and Dr. Mckeon for left femur fracture s/p repair), pulmonology (currently scheduled 5/17), cardiology (currently scheduled 5/10), vascular medicine (ICA occlusion, scheduled 5/18), GI (FMT, scheduled 11/1)      Patient was discussed with Dr. Frank Rizvi, PM&R staff physician     Dawna Edwards,  PA-C  Physical Medicine & Rehabilitation

## 2023-05-10 NOTE — PLAN OF CARE
FOCUS/GOAL  Medical management    ASSESSMENT, INTERVENTIONS AND CONTINUING PLAN FOR GOAL:  Vitals stable this morning. Orthostatic with therapy new order for orthostatic BP daily. Denied pain. On 1L O2 via NC with satisfying SpO2. Denied pain. Good appetite. Will continue with POC.  Goal Outcome Evaluation:      Plan of Care Reviewed With: patient    Overall Patient Progress: no changeOverall Patient Progress: no change    Outcome Evaluation: Reported orthostatic BP during therapy although  binder and ADRIANNA in place. Slow response due to cognitive impairement. Denied pain.

## 2023-05-10 NOTE — PROGRESS NOTES
"Called and spoke with Christie with the [facility] MA Dept PH: [phone], F: [phone], E: [email or web address]..     The MA iván was received by Christie on 05/02/23. Christie sent request to the pt/pt family for additional information 05/08/23. Verifications needed: current bank statements 04/01/23-05/08/23, client and spouse pension award letter (for this current year), need required minimal withdrawal notices for ELLY for pt and , a clear copy of health partners and Medicare card, monthly cost of pt's HP premium, and annuity contract/policy. There was also two forms mailed out to pt and pt family that need to be filled out and send back in (something from [employer] and something relating to annuity).     Will discuss this with pt family this afternoon and try to assist with getting information to Christie t get processed. Will addend this note throughout the day with additional information.     ------------------------------------------------------------  Later this afternoon, Waleska received a call from Bruce, pt's son-in-law confirming the time to meet with this writer. Bruce stated that pt's dtr Enid is in \"denial\" about pt's needs and that he and his wife are hoping Waleska can reiterate the cognitive concerns and need for 24/7 support. Waleska explained to Bruce that SW can provide information, based on therapy reports, and that this was discussed with dtr Enid yesterday on the phone. Bruce stated, \"I think she is finally getting it\". Discussed grief and caregiver role of children.     Went on to discuss discharge plans and LTC placement. Explained the information about to Bruce. Bruce stated that he is close acquaintances with someone \"high up\" at [facility] and that he is aware that they have a bed there and would like pt to go to that location. Bruce stated that while MA is pending and in process, they are willing and able to pay OOP if needed, while they continue to work on MA ivánChristine Rossi" expressed this location as first choice as well. Will discuss later today with family all together.     Referral was faxed via [employer] to Lucie (Liaison) with [facility] for LTC placement. Pending response. Neuropsych testing ordered. Spoke with Dr. Dimas, testing scheduled for tomorrow 05/11/23. Will update the family.   ------------------------------------------------------------  Spent an hour with pt two dtrs and son-in-law Bruce. Dawna VALERIO, Dr. Dimas Neuropsychologist, and Capri Clifford SLP joined conversation throughout. Discussed all information above. Emailed with financial counselor contact sent to Bruce, son-in-law ([email or web address]). Family all in agreement with plan. SW will continue to follow. No updates from [facility] at end of the day.     MATEO Grayson   [facility]   Direct Phone: [phone]  I   Pager: [phone]  I  Fax: [phone]

## 2023-05-10 NOTE — PLAN OF CARE
Discharge Planner Post-Acute Rehab SLP:     Discharge Plan: home with 24/7    Precautions: fall    Current Status:  Hearing: WFL  Vision: glasses  Communication: WFL  Cognition: Moderate-severe cognitive impairments with deficits re: attention, memory, exeuctive function, visuospatial skills.   Swallow: regular, thin (0). Not formally assessed    Assessment:  Pt edu in association internal memory strategy. Facilitated structured practice with delayed word recall. with cued use of straegy pt recalled 60% concepts. With association cue from SLP pt recalled 100% concepts.    Other Barriers to Discharge (Family Training, etc):  Family training re: memory strategies and home visual aids

## 2023-05-10 NOTE — PLAN OF CARE
Discharge Planner Post-Acute Rehab PT:     Discharge Plan: home with 24/7 support vs long term care option     Precautions: falls, R CAM boot OOB, prn O2, abd binder and spandigrip OOB    Current Status:  Bed Mobility: SBA  Transfer: MIN A with FWW from low surfaces  Gait: CGA up to 75' with FWW  Stairs: NT- not a goal for home  Balance:   Egan/5 = 16/56    = **  TU/5 = 75 seconds, ww, 1 assist, lifting assist to rise from chair    = **    Assessment: continues with overall poor tolerance to activity. Orthostatic and SOB with upright activity.      Other Barriers to Discharge (DME, Family Training, etc):   Impaired cognition    Extensive falls h/o, 6 month decline in medical and functional status    Both pt and  are frail and in poor health. Need to confirm plan for 24/7 support.     DME: Pt owns FWW, 4WW, transport and manual wc

## 2023-05-10 NOTE — PLAN OF CARE
Discharge Planner Post-Acute Rehab OT:      Discharge Plan: Home with 24/7 assist and HH OT     Precautions: Fall, c diff, CAM boot when out of bed, 2L O2     Current Status:  ADLs:    Mobility: Ax1 for stand pivot using walker with CAM boot on    Grooming: Setup seated    Dressing: UB pull over shirt :sitting in low chair no verbal cues or physical assist    L/B:Pants;Underwear;sitting in low chair no verbal cues or physical assist    Bathing: pt able to complete 8/10 tasks with assist with pericates and feet pt able to sequence shower without assist. w/c to extended tubbench min a    Toileting: Ax1 to commode over toilet, A for cares  IADLs: Pt usually mod I with IADL  Vision/Cognition: ACEIII 45/100, attention 8/18, memory 4/26, Fluency 4/14, Language 21/26, visuospatial 8/16. Pt scored a 12/30 on the SLUMS at the hospital     Assessment: Pt continues to demonstrate poor activity tolerance. Stood for 2 minutes to participate in OOB activity before requesting to return to bed for rest. Continue w/POC to maximize IND w/functional mobility and ADLs.      -40 min     Other Barriers to Discharge (DME, Family Training, etc): DME, family training, cognition

## 2023-05-11 NOTE — PLAN OF CARE
Discharge Planner Post-Acute Rehab SLP:     Discharge Plan: home with 24/7    Precautions: fall    Current Status:  Hearing: WFL  Vision: glasses  Communication: WFL  Cognition: Moderate-severe cognitive impairments with deficits re: attention, memory, exeuctive function, visuospatial skills.   Swallow: regular, thin (0). Not formally assessed    Assessment:  Pt participated in mod-complex level visual 'odd one out'. Pt required overall mod cues to determine/provide rationale for target difference. able to locate target with 80% accuracy IND increasing to 100% with mod cues    Other Barriers to Discharge (Family Training, etc):  Family training re: memory strategies and home visual aids

## 2023-05-11 NOTE — PROGRESS NOTES
"  [facility]   Acute Rehabilitation Unit  Daily progress note    INTERVAL HISTORY  Fatuma Henry was seen and examined at bedside this afternoon.  No acute events reported overnight.  Patient reports that she is feeling good though tired after a full day.  Reports she did not sleep well, though this is normal for her.  States breathing is \"hard\" today, more than usual. Notes some intermittent palpitations.  Also with ongoing dizziness with position changes, unchanged from prior days per her report.  Notes persistent loose stools, about 3x/day but no abdominal pain/cramping or nausea.  Feels she is eating and drinking ok, though encouraged hydration.    Functionally, she is needing SBA for bed mobility, min A for transfers with FWW from low surfaces, CGA to ambulate up to 75' with FWW.    MEDICATIONS    allopurinol  300 mg Oral Daily     cholestyramine  1 packet Oral TID w/meals     diltiazem ER COATED BEADS  240 mg Oral Daily     DULoxetine  20 mg Oral Daily     famotidine  20 mg Oral Daily     fluticasone-vilanterol  1 puff Inhalation Daily     folic acid  1 mg Oral Daily     furosemide  40 mg Oral Daily     guaiFENesin  600 mg Oral BID     lactobacillus rhamnosus (GG)  1 capsule Oral BID     melatonin  3 mg Oral At Bedtime     metoprolol succinate ER  25 mg Oral Daily     pravastatin  20 mg Oral QPM     predniSONE  5 mg Oral Daily     rivaroxaban ANTICOAGULANT  15 mg Oral Daily with supper     Vitamin D3  125 mcg Oral Daily        acetaminophen, albuterol, cyclobenzaprine, ipratropium - albuterol 0.5 mg/2.5 mg/3 mL, lidocaine 4%, lidocaine (buffered or not buffered), magic mouthwash suspension (diphenhydrAMINE, lidocaine, aluminum-magnesium & simethicone), - MEDICATION INSTRUCTIONS -, polyethylene glycol, senna-docusate, sodium chloride     PHYSICAL EXAM  /49 (BP Location: Left arm, Patient Position: Sitting, Cuff Size: Adult Regular)   Pulse 77   Temp (!) 96  F (35.6 " " C) (Oral)   Resp 16   Ht 1.676 m (5' 6\")   Wt 69.8 kg (153 lb 14.4 oz)   SpO2 91%   BMI 24.84 kg/m     Gen: NAD, in bed  HEENT: NC/AT, MMM  Cardio: irregularly irregular, no murmurs auscultated  Pulm: non-labored on 3L by NC, lung sounds slightly diminished on right but no crackles or wheezes  Abd: soft, non-tender, non-distended, bowel sounds present  Ext: 2+ pitting edema in bilateral lower extremities, tubigrip bilat  Neuro/MSK: awake, alert, PERRL, EOMI, face symmetric, speech clear/fluent, moving all extremities in bed       LABS  CBC RESULTS:   Recent Labs   Lab Test 05/11/23  0602 05/09/23  0659 05/08/23  0701   WBC 10.7 12.2* 12.1*   RBC 2.84* 3.01* 3.24*   HGB 8.2* 8.8* 9.5*   HCT 26.8* 29.0* 30.6*   MCV 94 96 94   MCH 28.9 29.2 29.3   MCHC 30.6* 30.3* 31.0*   RDW 17.8* 18.2* 17.8*    311 341       Last Basic Metabolic Panel:  Recent Labs   Lab Test 05/11/23  0602 05/10/23  0610 05/08/23  0701    139 138   POTASSIUM 4.2 4.1 4.1   CHLORIDE 105 106 107   CO2 21* 22 21*   ANIONGAP 13 11 10   GLC 84 90 88   BUN 25.0* 27.0* 21.7   CR 1.17* 1.12* 0.90   GFRESTIMATED 48* 51* 66   SUNI 10.3* 10.1 9.8     Magnesium   Date Value Ref Range Status   05/11/2023 2.5 (H) 1.7 - 2.3 mg/dL Final       Rehabilitation - continue comprehensive acute inpatient rehabilitation program with multidisciplinary approach including therapies, rehab nursing, and physiatry following. See interval history for updates.      ASSESSMENT AND PLAN  Fatuma Henry is a 77 year old with a past medical history of paroxysmal atrial fibrillation (on Xarelto), CKD3, COPD, HFpEF, hyperlipidemia, hypertension, recurrent C diff colitis, frequent falls, RA, left ICA occlusion, anemia, depression, gout, chronic lower extremity edema, GERD, and recent left femur fracture 2/2 mechanical fall s/p closed reduction with percutaneous pinning on 3/14/23 who was admitted on 4/17/23 after recurrent fall, found to have multiple fractures of right " foot as well as COPD exacerbation and HF exacerbation with hospital course complicated by pleural effusion s/p multiple thoracentesis, ongoing symptoms associated with recurrent C diff, pain, dyspnea, and suboptimal heart rate control.  She is now admitted to ARU on 5/3/23 for multidisciplinary rehabilitation and ongoing medical management.        Admission to acute inpatient rehab 05/03/23.    Impairment group code: debility in setting of multifactorial acute on chronic respiratory failure        1. PT 60-90 and OT 90 minutes. SLP 30 minutes, on a daily basis, in addition to rehab nursing and close management of physiatrist.       2. Impairment of ADL's: Noted to have pain, weakness, fatigue, impaired balance, dyspnea on exertion, impaired cognition, and impaired safety awareness, all affecting her ability to safely and independently perform basic ADLs.  Goal for mod I with basic ADLs.     3. Impairment of mobility:  Noted to have pain, weakness, fatigue, impaired balance, dyspnea on exertion, impaired cognition, and impaired safety awareness, all affecting her ability to safely and independently perform basic mobility.  Goal for mod I with basic mobility.     4. Medical Conditions  New actions/orders/updates for today are in blue.     Debility  Recurrent falls  Multiple fractures of the right foot: 3rd, 4th, 5th phalanges and 1st & 3rd metatarsals, managed non-operatively  Left minimally displaced impacted femoral neck fracture s/p closed reduction with percutaneous pinning on 3/14/23 with Dr. Mckeon  Completed rehabilitation at [facility], return home couple days ago. She fell at home prior to arrival, reportedly no syncope or near syncope. Patient fell while getting up from the chair by the table, with daughter being nearby.  Foot x-rays demonstrated above fractures.   - Right foot CAM when ambulating and weightbearing as tolerated  - Pain management: Tylenol PRN  - Continue PT/OT  - Follow up with [facility] -  "foot and ankle provider for foot fractures and Dr. Mckeon for hip fracture    Moderate-severe cognitive impairment  Increased difficulties with cognition since ~Nov 2022 per patient/family.  Prior to that time, had been independent with all IADLs and driving, but since then has required family assist for those tasks.  SLUMS this admission 12/30.  SLP consulted at ARU to assist with evaluation/treatment.  Deficits in areas of attention, executive function, memory noted per SLP.  - Continue SLP/OT  - Neuropsych testing completed today 5/11, will await final report     Acute on chronic respiratory failure with hypoxia  COPD with acute exacerbation, resolved, currently stable  Right-sided pleural effusion, s/p thoracentesis on 4/25 and again 5/3  HFpEF with acute exacerbation.  Resolved, now stable  Pulmonary hypertension, stable  [PTA meds: albuterol 2 puffs q4h PRN, budesonide-formoterol 160-4.5 mcg inhaler]  Exacerbations of CHF and COPD this admission.  Completed prednisone taper, now returned to PTA dose.  Initially on IV diuretics, transitioned to oral.  Underwent thoracentesis x2.  * Noted to have increased dyspnea 5/8 AM, sats dropping to high 70s, increased oxygen needs up to 6L.  Patient was evaluated by rapid response team, see their note for additional details.  Labs with mild leukocytosis, CRP only mildly elevated at 12, BNP 1400 (previously >4000), procal 0.04.  CXR with \"decreased size of the now small right pleural effusion with  overlying compressive atelectasis; cardiomegaly with engorged pulmonary vasculature\".  EKG not great reading but with atrial fibrillation, incomplete RBBB.  Patient was given Duoneb per RT, who also obtained alternative pulse ox, reading improved to high 90s back on baseline 2-3L O2 at rest.  Discussed VBG, labs, imaging with rapid response team, who returned to evaluate patient per protocol due to lactic acid 2.2.  Patient clinically improve and resumed on prior level of " oxygen.  CXR overall improved.  Did not feel any change to current plan of care was indicated.  Offered IVF but patient preferred to increase oral intake.  See their note for additional details. Respiratory status/symptoms improved, returned to prior baseline.  - Goal spO2 is 88-92%.  Oxygen by NC PRN to maintain sats.  - Breo Ellipta as formulary sub for PTA Symbicort  - Continue albuterol inhaler and Duoneb PRN  - Continue prednisone 5 mg (PTA dose for RA)  - Continue Lasix 40 mg daily.  Monitor daily weights, labs, volume status.  Weight overall has been stable, persistent lower extremity edema, respiratory symptoms wax and wane.  - Follow up pulmonology     Chronic atrial fibrillation  Difficult to rate control due low BP.  Cardiology consulted this admission and made some medication changes, felt HR better-controlled.  PTA anticoagulation held briefly for thoracentesis but resumed on 4/26.  - Continue Xarelto 15 mg daily  - Continue metoprolol 25 mg daily (reduced on 5/10 due to ongoing symptomatic orthostatic hypotension).  Monitor BP and HR.  - Continue diltiazem  mg (increased on 4/30)  - Monitor BP, HR     Recurrent c diff colitis, resolved  Severe, 4th episode.  Off vancomycin since 4/6.  Completed taper Dificid per ID/GI recs (started last admission) as of 5/2/23.  - Avoid antibiotics   - Continue probiotics  - Monitor bowel pattern  - Previously had been referred to [facility] for FMT consultation - appointment scheduled for November      CKD stage 3  Recent Cr in 1.1-1.3 range.  - Avoid nephrotoxins and hypotension  - Continue to monitor BMP every M/Th.  5/11: Cr overall stable at 1.17 today     Recent orthostatic hypotension  Hx HTN   BP has been on soft side during this admission, med changes as above per cardiology.  - Continue PTA Cardizem and metoprolol, with holding parameters.  Metoprolol dose reduced on 5/10 due to ongoing symptomatic orthostatic hypotension.  - Continue abdominal binder,  compression stockings  - Still some orthostatic hypotension, meds just adjusted as above, continue to monitor.  - Monitor BP     Acute on chronic anemia  Baseline Hgb 9-10 range.  Most recent Hgb 7.9 on 4/28.  - Continue folic acid 1 mg daily  - Trend CBC every M/Th.  5/11: Hgb stable at 8.2     Left ICA occlusion  Had been noted in 12/22, asymptomatic.  - On Xarelto and statin as above  - Outpatient vascular surgery follow-up recommended     Hx GERD  - Continue PTA pepcid      Hx depression  - Continue PTA Cymbalta  - Monitor mood, consult psychology as indicated     Hx pulm nodules with positive PET  Stable 17 x 19 mm groundglass nodule in the lingula since 07/21/2022.   - Follow up with pulm clinic as outpt and serial imaging 3 to 6 month     Hx rheumatoid arthritis  Hx of methotrexate--was discontinued during recent hospitalization.  No exacerbation of RA off methotrexate.  - Continue prednisone 5 mg daily (chronic dose, recently completed taper due to COPD exacerbation)     Hx gout  - Continue PTA allopurinol 300 mg daily    Hypomagnesemia  Mag slightly low on 5/8 at 1.6.  Replaced with mag sulfate 4g IV x1 (avoided PO given recent diarrhea).  - 5/11: mag slightly elevated 2.5  - Continue to trend every M/Th    Leukocytosis, resolved  Abnormal UA  Is on chronic steroids but WBC previously WNL.  WBC slightly elevated at 12.1 on 5/8.  Afebrile, VSS/WNL with exception of worsened hypoxia as above, improved after nebulizer and with alternative probe. CRP only mildly elevated at 12.03, procal 0.04.  CXR without evidence of infection.  UA with large leuk esterase, 73 WBC.  Started on empiric Bactrim by on-call provider.  CBC on 5/9 with WBC stable at 12.2.  UC with mixed urogenital jamee.  Per lab, no predominant organism, growth consistent with probable contamination during collection. Patient denies any urinary symptoms now or at time of urine collection.  Given asymptomatic, no clear pathogen on UC, increase in Cr  on Bactrim, goal to minimize antibiotic exposure due to recurrent C diff, Bactrim discontinued.  - WBC WNL today at 10.7, no urinary symptoms.  Continue to monitor clinically.  If develops fevers or worsening leukocytosis, could consider repeat urine collection.       5. Adjustment to disability:  Clinical psychology to eval and treat if indicated  6. FEN: low Na (3g) diet   7. Bowel: continent, ongoing loose stools in setting of recent recurrent C diff, improving, monitor  8. Bladder: continent/incontinent, evangelista removed on 5/3, PVRs at admission without evidence of retention  9. DVT Prophylaxis: Xarelto  10. GI Prophylaxis: Pepcid  11. Code: no CPR, do not intubate  12. Disposition: would benefit from more supportive environment longer term given recurrent falls, hospitalizations, chronic medical comorbidities.  Family unable to provide recommended 24/7 assist at discharge, working with SW to determine alternative long-term options.  13. ELOS: pending discharge location, availability, insurance, etc.  14. Follow up Appointments on Discharge: PCP in 1-2 weeks, ortho ([place] - 2 different providers: foot/ankle for foot fractures, and Dr. Mckeon for left femur fracture s/p repair), pulmonology (currently scheduled 5/17), cardiology (currently scheduled 5/10), vascular medicine (ICA occlusion, scheduled 5/18), GI (FMT, scheduled 11/1)      Patient was discussed with Dr. Frank Rizvi, PM&R staff physician     Dawna Edwards PA-C  Physical Medicine & Rehabilitation

## 2023-05-11 NOTE — PLAN OF CARE
FOCUS/GOAL  Medical management    ASSESSMENT, INTERVENTIONS AND CONTINUING PLAN FOR GOAL:  Patient at baseline. Dyspnea on exertion noted while in therapy. O2 L adjusted to 2 L with good effect.  Good appetite. Denied pain. Continent of bladder up to the toilet. No BM to report this shift. Will continue with POC.  Goal Outcome Evaluation:      Plan of Care Reviewed With: patient    Overall Patient Progress: no changeOverall Patient Progress: no change    Outcome Evaluation: Slow to process information and tasks execution. Extra time allowed. Denied pain. Skin tear to knee scabbing.

## 2023-05-11 NOTE — PLAN OF CARE
Goal Outcome Evaluation:      Plan of Care Reviewed With: patient    Patient mostly asleep on safety rounds, denies SOB this shift, wearing O2 at 1L NC at the start of shift, O2 Sat 98%, changes position independently in bed. No pain reported this shift. No other unusualities noted. Call light in reach, bed alarm on, safety checks completed, isolation precaution maintained, needs attended.     Continue with POC.

## 2023-05-11 NOTE — PROGRESS NOTES
Reached out to Lucie, liaison at [facility]. Per Lucie, no response from the facility at this time and decision pending.     Family working on getting other documents needed for MA application.     SW will continue to follow.     [address] [facility]   Direct Phone: [phone]  I   Pager: [phone]  I  Fax: [phone]

## 2023-05-11 NOTE — PLAN OF CARE
Goal Outcome Evaluation:           Overall Patient Progress: no changeOverall Patient Progress: no change    Outcome Evaluation: Patient alert, cooperative with cares and assessments, able to make needs known. Appetite good, meds taken with AS, one at a time. Able to make needs known, call light used appropriately. Isolation precautions maintained.

## 2023-05-11 NOTE — CONSULTS
"CONFIDENTIAL INPATIENT NEUROPSYCHOLOGICAL EVALUATION  **This is a medical document intended to be read within the context of the larger medical chart and for communication with the referring provider.  It is written in medical language and may contain abbreviations that are unfamiliar.  Please consult the provider for further clarification.**    Name:  Fatuma Henry  (Piotr)  YOB: 1945  Date of Assessment: 5/10 & 5/11/2023  Referring Provider: Dawna Edwards PA-C/Inpatient Rehab Team  Occupation: Retired  Education: 11 yrs  Handedness: right handed    Reason for Referral: Fatuma Henry is a 77 year old female  who was referred for a neuropsychological evaluation for assessment of her cognitive status within the context of complex discharge planning and familial understanding.        SUMMARY AND CLINICAL IMPRESSIONS: See below for relevant background information and detailed test results.  See separate abstract for a list of neuropsychological measures and test scores.       Piotr Henry was recommended for a neuropsychological evaluation given concerns about cognitive impairment within the context of an upcoming discharge from the acute rehabilitation unit.  Of note, her family reported that she \"failed\" testing like this before.  Of the records available, it appears that she performed in the impaired range on the SLUMS (12-17/30), and her cognitive performance appears to be dependent on her level of fatigue.     The results of this evaluation echo the results of prior evaluations and screeners, which is to say that the results are abnormal.  Within the context of borderline to below average pre-morbid functioning, Piotr performed below expectations on most domains assessed.  She demonstrated impairments in new learning, independent memory retrieval, visuospatial functioning, attention and working memory, slow processes speed, and all cognitive aspects of executive functioning. In terms of \"whats good " "on board,\"  Piotr performed within expected ranges on tasks related to language (confrontation naming), and recognition memory.  This suggests that Piotr is not forgetting information per se, as would be expected in an amnestic/Alzhiemer's presentation, however, she is not able to independently pull information from her memory to use and apply to her daily life such as what's needed in rehabilitation.  It was explained to the family that because language is an area of strength for Piotr, it is likely her good language skills can \"fool\" other people, causing them to over estimate what she is actually capable of doing independently, and in a novel environment.     Deficits in attention/working memory, slowed processing speed, visuospatial perception and reasoning, retrieval, and executive functioning indicate dysfunction of the frontal-subcortical cognitive loops, and etiological considerations would be a cerebrovascular in nature.  This is in line with medical history including difficulties in oxygenation and on-going need for oxygen (hx COPD), HTN, and HLD, as well as brain imaging findings (e.g., mild chronic small vessel ischemic changes, mild generalized volume loss).  The results are not consistent with other types of dementia (e.g., Alzheimer's disease, PDD, LBD, FTD, etc).     Looking forward, it's unlikely that Piotr will return to independence or her cognitive baseline.  It was explained to the family that Piotr should receive at lest 24/7 indirect supervision, and she will likely require increased dependence on other for instrumental ADLs (please see PT/OT/SLP notes for additional information).  Therapies are recommending constant supervision due to falls risk and difficulties in cognition regarding ADLS (e.g., sequencing, using equipment, CAM boot) At this time, a follow-up neuropsychological evaluation is not indicated, however, I'm happy to consult with the family as needed.     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   I hope this report is helpful to all those involved.  I would be happy to discuss these results in detail or answer any other questions.      Rosaline Dimas PsyD, MARYJANE  Clinical Neuropsychologist    RELEVANT BACKGROUND INFORMATION  Informed consent  was obtained after discussing the purpose and procedures of the evaluation, as well as aspects of confidentiality and effort/engagement.  Background information was obtained from a clinical interview with Piotr as well as review of available medical records.     HISTORY OF PRESENTING ILLNESS:Fatuma Henry is a 77 year old with a past medical history of paroxysmal atrial fibrillation (on Xarelto), CKD3, COPD, HFpEF, hyperlipidemia, hypertension, recurrent C diff colitis, frequent falls, RA, left ICA occlusion, anemia, depression, gout, chronic lower extremity edema, GERD, and recent left femur fracture 2/2 mechanical fall s/p closed reduction with percutaneous pinning on 3/14/23 who was admitted on 4/17/23 after recurrent fall, found to have multiple fractures of right foot as well as COPD exacerbation and HF exacerbation with hospital course complicated by pleural effusion s/p multiple thoracentesis, ongoing symptoms associated with recurrent C diff, pain, dyspnea, and suboptimal heart rate control.  She is now admitted to ARU on 5/3/23 for multidisciplinary rehabilitation and ongoing medical management.    Per review of SLP notes, Piotr's daughter reported increased concerns with orientation and short-term memory - particularly in conversations, starting in November 2022. Piotr did not receive any inpatient SLP services during her acute hospitalizations. Initally assessment with [facility] revealed cognitive impairment (SLUMS =12/30 on 4/28/2023), however, her cognition appears to be greatly affected by recurrent c.diff infections and resultant fatigue.  "    Per available records, Piotr was independent in all basic and instrumental activities of daily living prior to her hospitalizations in November 2022, including medication and financial management, as well as driving.     Current Clinical Interview:    Piotr initially denied noting any cognitive challenges.  Upon further questioning, she endorsed that she is not as quick as she used to be and can make attentional errors at times.  She noted she has to intentionally focus on the task and generate a motor plan - such as when she is walking in a crowded environment. She has noted that fatigue is a huge contributor to how she is feeling and performing, and it's been challenging since she has had a large number of c-diff infections.  She generally feels wiped out, which is also exacerbated by shallow/difficulties breathing.  She stated this started about 2-3 weeks ago.      During a short interview with the family, they reported some cognitive changes over the last couple of months including difficulties in recalling names of familiar individuals.  However, it sounds as if Piotr led a highly routinzed and scheduled lifestyle without significant deviation.  It was explained that we are likely seeing more cognitive challenges due to changing environments, taking her out of her routine, as well as coping with major medical events.     HEALTH HABITS, BEHAVIORS AND MOOD:   Description of current mood:  \"I feel fine.\"  She denied any history of depression or anxiety.  She stated she \"might feel blue\" once in a while, but nothing chronic or longstanding. However, she later endorsed that she has seen psychiatry prior for medications (\"I think I'm on meds.\").  She is currently taking Cymbalta.     Appetite: Denied any changes in appetite or sense of smell/taste.    Sleep/Energy:  She denied any challenges with sleep.  She never had a sleep study (and she is not interest in one).  She reported she is \"napping all the time.\"  "     Exercise: Engaging in Pt and OT.     Chronic Pain: Headaches.  No pain from prior ortho injuries.     Tobacco use:  Smoked from teenage years until about 5-6 years ago.  She smoked about 1-1.5 ppd.     Alcohol use:  Rare to none.     Other Drugs: Denied.     Hallucinations: Denied.     Suicidal ideation: Denied.     MEDICAL/PSYCHIATRIC/SURGICAL HISTORY:   Patient Active Problem List   Diagnosis     Seropositive rheumatoid arthritis (H)     Osteoporosis dxa 2006      High risk medication use     Rheumatoid arthritis of multiple sites without rheumatoid factor (H)     Dyspnea     Reactive airway disease     Paroxysmal atrial fibrillation (H)     Nonsustained ventricular tachycardia (H)     CRF (chronic renal failure), stage 3 (moderate) (H)     Primary osteoarthritis involving multiple joints     Benign essential hypertension     Pulmonary nodules     COPD (chronic obstructive pulmonary disease) (H)     Asthma without status asthmaticus     CKD (chronic kidney disease) stage 3, GFR 30-59 ml/min (H)     History of 2019 novel coronavirus disease (COVID-19)     DNR (do not resuscitate)     Chronic anticoagulation     Personal history of immunosuppressive therapy     Hypokalemia     Hypomagnesemia     Diarrhea, unspecified type     Acute on chronic congestive heart failure, unspecified heart failure type (H)     SOB (shortness of breath)     Hypoxia     Rapid atrial fibrillation (H)     Sepsis, due to unspecified organism, unspecified whether acute organ dysfunction present (H)     Infection due to 2019 novel coronavirus     Syncope     Recurrent falls     Pain in limb     Hypertension     Generalized muscle weakness     Acute kidney injury (H)     Acute blood loss anemia     Respiratory failure, unspecified chronicity, unspecified whether with hypoxia or hypercapnia (H)     Malaise and fatigue     Stomatitis and mucositis     Acute on chronic respiratory failure with hypoxia (H)     Macrocytic anemia     Community  acquired pneumonia of right lower lobe of lung     Acute cystitis with hematuria     Recurrent Clostridioides difficile diarrhea     Leukocytosis     HLD (hyperlipidemia)     Gastroesophageal reflux disease without esophagitis     Gout     Mood disorder (H)     JOHANNA (obstructive sleep apnea)     Hyponatremia     Internal carotid artery stenosis, left     Oxygen dependent     Fracture of femur (H)     Atrial fibrillation with RVR (H)     Fall, initial encounter     Abnormal CT of spine     Acute on chronic diastolic (congestive) heart failure (H)     Cervical herniated disc     Gout of right foot, unspecified cause, unspecified chronicity     Insomnia, unspecified type     Metabolic encephalopathy     Acute right-sided low back pain with right-sided sciatica     Rash and nonspecific skin eruption     Loose stools     Cellulitis of left leg     Elevated alkaline phosphatase level     Dyspnea, unspecified type     Chronic renal impairment, unspecified CKD stage     Fracture of unspecified part of neck of left femur, subsequent encounter for closed fracture with routine healing     Lumbago with sciatica, right side     Muscle wasting and atrophy, not elsewhere classified, unspecified site     Need for assistance with personal care     Other abnormalities of gait and mobility     Repeated falls     COPD exacerbation (H)     Pleural effusion on right     Acute respiratory failure with hypoxia (H)     Closed nondisplaced fracture of third metatarsal bone of right foot, initial encounter     Closed displaced fracture of proximal phalanx of lesser toe of right foot, initial encounter     Abrasion of right foot, initial encounter     Abrasion of right upper extremity, initial encounter     Anemia in other chronic diseases classified elsewhere     Nondisplaced fracture of fourth metatarsal bone, right foot, initial encounter for closed fracture     Closed fracture of phalanx of right fourth toe, initial encounter     Closed  "fracture of phalanx of right fifth toe, initial encounter     Closed fracture of phalanx of right third toe, initial encounter     Closed fracture of phalanx of right second toe, initial encounter     Unsteady gait when walking     Personal history of fall     Foot fracture, right       Past Medical History:   Diagnosis Date     Atrial fibrillation (H)      CKD (chronic kidney disease) stage 3, GFR 30-59 ml/min (H)      COPD (chronic obstructive pulmonary disease) (H)     nocturnal oxygen     CRF (chronic renal failure)      GERD (gastroesophageal reflux disease)      Hypertension      Hypovolemic shock (H)      Influenza A 12/01/2017     Pulmonary hypertension (H)      Pulmonary nodules      Rheumatoid arthritis (H)      Sepsis (H) 12/24/2017       Past Surgical History:   Procedure Laterality Date     APPENDECTOMY       BLADDER SUSPENSION       CHOLECYSTECTOMY       CLOSED REDUCTION, PERCUTANEOUS PINNING HIP Left 3/15/2023    Procedure: CLOSED REDUCTION, HIP, WITH PERCUTANEOUS PINNING;  Surgeon: Denton Mckeon MD;  Location: [facility] OR     PICC TRIPLE LUMEN PLACEMENT  12/13/2022            Family History:    Family History   Problem Relation Age of Onset     Heart Failure Mother      Cerebrovascular Disease Father      Kidney failure Sister      Cerebrovascular Disease Brother      Diabetes Type 2  Brother        IMAGING:  Brain CT Completed 3/24/2023  \"No intracranial hemorrhage, extraaxial collection, or mass effect.  No CT evidence of acute infarct. Mild presumed chronic small vessel ischemic changes. Mild generalized volume loss. No hydrocephalus. \"    MEDICATIONS:   Current Facility-Administered Medications:      acetaminophen (TYLENOL) tablet 325-650 mg, 325-650 mg, Oral, Q4H PRN, Dawna Edwards PA-C, 325 mg at 05/04/23 2040     albuterol (PROVENTIL HFA/VENTOLIN HFA) inhaler, 2 puff, Inhalation, Q4H PRN, Dawna Edwards PA-C     allopurinol (ZYLOPRIM) tablet 300 mg, 300 mg, Oral, " Daily, Dawna Edwards PA-C, 300 mg at 05/11/23 0750     cholestyramine (QUESTRAN) Packet 4 g, 1 packet, Oral, TID w/meals, Dawna Edwards PA-C, 4 g at 05/11/23 0754     cyclobenzaprine (FLEXERIL) tablet 5 mg, 5 mg, Oral, Q8H PRN, Dawna Edwards PA-C     diltiazem ER COATED BEADS (CARDIZEM CD/CARTIA XT) 24 hr capsule 240 mg, 240 mg, Oral, Daily, Dawna Edwards PA-RAFA, 240 mg at 05/11/23 0752     DULoxetine (CYMBALTA) DR capsule 20 mg, 20 mg, Oral, Daily, Dawna Edwards PA-RAFA, 20 mg at 05/11/23 0750     famotidine (PEPCID) tablet 20 mg, 20 mg, Oral, Daily, Dawna Edwards PA-C, 20 mg at 05/11/23 0751     fluticasone-vilanterol (BREO ELLIPTA) 200-25 MCG/ACT inhaler 1 puff, 1 puff, Inhalation, Daily, Dawna Edwards PA-C, 1 puff at 05/11/23 0746     folic acid (FOLVITE) tablet 1 mg, 1 mg, Oral, Daily, Dawna Edwards PA-RAFA, 1 mg at 05/11/23 0747     furosemide (LASIX) tablet 40 mg, 40 mg, Oral, Daily, Dawna Edwards PA-C, 40 mg at 05/11/23 0753     guaiFENesin (MUCINEX) 12 hr tablet 600 mg, 600 mg, Oral, BID, Dawna Edwards PA-C, 600 mg at 05/11/23 0758     ipratropium - albuterol 0.5 mg/2.5 mg/3 mL (DUONEB) neb solution 3 mL, 1 vial, Nebulization, Q6H PRN, Dawna Edwards PA-C, 3 mL at 05/08/23 0938     lactobacillus rhamnosus (GG) (CULTURELL) capsule 1 capsule, 1 capsule, Oral, BID, Dawna Edwards PA-C, 1 capsule at 05/11/23 0755     lidocaine (LMX4) cream, , Topical, Q1H PRN, Dawna Edwards PA-C     lidocaine 1 % 0.1-1 mL, 0.1-1 mL, Other, Q1H PRN, Dawna Edwards PA-C     magic mouthwash suspension (diphenhydramine, lidocaine, aluminum-magnesium & simethicone), 10 mL, Swish & Swallow, Q6H PRN, Dawna Edwards PA-C     melatonin tablet 3 mg, 3 mg, Oral, At Bedtime, Dawna Edwards PA-C, 3 mg at 05/10/23 4403     metoprolol succinate ER (TOPROL XL) 24 hr tablet 25 mg, 25 mg, Oral, Daily, Dawna Edwards PA-C, 25 mg at 05/11/23  "0752     Patient is already receiving anticoagulation with heparin, enoxaparin (LOVENOX), warfarin (COUMADIN)  or other anticoagulant medication, , Does not apply, Continuous PRN, Dawna Edwards PA-C     polyethylene glycol (MIRALAX) Packet 17 g, 17 g, Oral, Daily PRN, Dawna Edwards PA-C     pravastatin (PRAVACHOL) tablet 20 mg, 20 mg, Oral, QPM, Dawna Edwards PA-C, 20 mg at 05/10/23 2142     predniSONE (DELTASONE) tablet 5 mg, 5 mg, Oral, Daily, Dawna Edwards PA-C, 5 mg at 05/11/23 0753     rivaroxaban ANTICOAGULANT (XARELTO) tablet 15 mg, 15 mg, Oral, Daily with supper, Dawna Edwards PA-C, 15 mg at 05/10/23 1744     senna-docusate (SENOKOT-S/PERICOLACE) 8.6-50 MG per tablet 1-2 tablet, 1-2 tablet, Oral, BID PRN, Dawna Edwards PA-C     sodium chloride (OCEAN) 0.65 % nasal spray 1 spray, 1 spray, Both Nostrils, Q1H PRN, Dawna Edwards PA-C     Vitamin D3 (CHOLECALCIFEROL) tablet 125 mcg, 125 mcg, Oral, Daily, Dawna Edwards PA-C, 125 mcg at 05/11/23 0748    SOCIAL/DEVELOPMENTAL/OCCUPATIONAL HISTORY:  Piotr was born and raised in the [facility] area. She noted that what she was born, she suspects that her mother had complications with her gall bladder during the birth.  She was born at home and required \"saving\" from her aunt. She did not spend any time in the NICU. She reportedly met all developmental milestones on time. She denied any history of learning disabilities, but dropped out of high school in her senior year (11 years of education).  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  She reportedly retired about 5 years ago (although I'm unsure about the veracity of this).  She has two grown daughters.        BEHAVIORAL OBSERVATIONS: Piotr was seen in [facility] hospital room, so there was no chance to observe her gait (please see PT notes for this). She was wearing oxygen for the clinical interview and still appeared short of breath. She was seen " "across two sessions, and she did not recall talking with me (Dr. Dimas) the next day, despite the clinical interview taking over 30 minutes.  Speech was slowed but otherwise normal.  Thoughts were generally goal-directed and linear, although quite concrete in nature and limited in scope.  There was no SI or HI noted.  She was not appropriately oriented to the year, day of the week, or time of day.     With regards to testing behaviors, Piotr required significant support to retain and apply task directions. She required significant \"hand-holding\" for a basic \"connect-the-dots\" like test - she would draw lines inappropriately, or could not connect the intended dots. She made a number of paraphasic errors such as \"grazilla\" for gorilla, and \"phix\" for sphinx, despite phonemic cuing for word-finding. She appeared to engage in testing to the best of her ability - although this was limited by fatigue and shortness of breath.     TEST RESULTS: Please use the following table for reference.   Percentile Ranks Descriptor   <2nd Percentile Extremely Low Range   3rd - 9th Percentile Borderline Range   10th - 16th Percentile Below Average Range   17th - 24th Percentile Low Average   25th - 75th Percentile Average   76th - 90th Percentile Above Average   91st - 97th Percentile Superior   >98th Percentile Very Superior      Performance Validity:  Piotr performed lower than expected on embedded measures of performance validity - however, this appears to be related to genuine cognitive impairment rather than poor effort.  Taken together with the behavioral observations above, the following results are considered an accurate representation of her current neuropsychological functioning.     Pre-Morbid Functioning: Piotr performed in the borderline range on a task of single-word reading.  Taken together with pre-morbid factors, it is estimated that Piotr's pre-morbid functioning is within the borderline to below average ranges. " "    Language Skills: Receptive Language is below expectations. She was not able to hold in mind and carry out complex commands such as \"before, first\" etc. Confrontation naming is average across multiple measures.  Verbal fluencies to phonemic and semantic cues were in the extremely low ranges.     Visuospatial: Construction of a clock was impaired for both independent construction as well as copying.  She demonstrated impairments on planning and executive of this familiar object.  Abstraction was also poor and indiciative of significant confusion (e.g., she maria isabel extremely short hands that were not in relation to a typical hand placement).  Basic judgment of line orientation was extremely low.  Her copy of a complex figure was also extremely low.  Qualitative review of her copy revealed significant visuospatial impairments including missing and misplaced details.     Attention/Working Memory/Processing Speed: Simple attention was estimated to be within the borderline range.  Working memory was also estimated to be within the extremely low range.  Processing speed was extremely low across multiple measures and domains.     Learning/Memory: New learning for a list of words was extremely low.  Independent retrieval for the learned information was zero.  However,  Recognition hints and cues greatly improved recall. New learning and delayed retrieval for narrative information was extremely low.  incidental learning for a complex figure was extremely low.     Executive Functioning: A task of rapid alternating attention was discontinued early due to profound difficulties in sustaining mental set and understanding task directions.  Piotr performed within the extremely low range on a task of novel problem solving. She had notable difficulties in sustaining/holding task directions, and in general responded in a haphazard/unintentional way. Her responses on a health and safety questionnaire were incredibly vague and not " indicative of higher-level reasoning (e.g., why shouldn't you leave a child at home?  A:  It's dangerous).  While this level of responding could keep her safe, it doesn't reveal a significant level of problem-solving that could help her in other less-straight forward situations.       Psychological Functioning:  Gert endorsed clinically relevant levels of anxiety, but normal to mild levels of depression and stress.     Procedures Administered by Psychologist: Clinical Interview with Fatuma ad her family, review of available medical records, test selection, interpretation, preparation of written report, and feedback. Please see nursing note for procedures administered by psychometrist.      Tests Administered:  Performance Validity Measures, NAB Orientation/ Screening Auditory Comprehension/Judgment, ACS TOPF, WAIS-IV (Selected Subtests), RBANS, BNT, COWAT/Animals, Clock Drawing, Trails A&B, WCST-64, MIKE-21      Activities included in this evaluation CPT Code Time Units   Psychological Diagnostic Interview 60063 - 1   Neuropsychology Professional Time [id number] 121 1   Add on 01886  1   Neuropsychologist Admin/Scoring Tests [id number]     Add On 72351     Psychometrician Time - Test [id number] 215 1   Admin/Scoring [id number]  6   DX:

## 2023-05-11 NOTE — PLAN OF CARE
Discharge Planner Post-Acute Rehab OT:      Discharge Plan: Home with 24/7 assist and HH OT     Precautions: Fall, c diff, CAM boot when out of bed, 2L O2     Current Status:  ADLs:    Mobility: Ax1 for stand pivot using walker with CAM boot on    Grooming: Setup seated    Dressing: UB pull over shirt :sitting in low chair no verbal cues or physical assist    L/B:Pants;Underwear;sitting in low chair no verbal cues or physical assist    Bathing: pt able to complete 8/10 tasks with assist with pericates and feet pt able to sequence shower without assist. w/c to extended tubbench min a    Toileting: Ax1 to commode over toilet, A for cares  IADLs: Pt usually mod I with IADL  Vision/Cognition: ACEIII 45/100, attention 8/18, memory 4/26, Fluency 4/14, Language 21/26, visuospatial 8/16. Pt scored a 12/30 on the SLUMS at the hospital     Assessment: Focus on activity tolerance and functional cognition. Pt completed activity out of the room seated in WC. Pt asymptomatic and able to tolerate out of bed.     Other Barriers to Discharge (DME, Family Training, etc): DME, family training, cognition

## 2023-05-11 NOTE — PLAN OF CARE
Discharge Planner Post-Acute Rehab PT:     Discharge Plan: home with 24/7 support vs long term care option     Precautions: falls, R CAM boot OOB, prn O2, abd binder and spandigrip OOB    Current Status:  Bed Mobility: SBA  Transfer: MIN A with FWW from low surfaces  Gait: CGA up to 75' with FWW  Stairs: NT- not a goal for home  Balance:   Egan/5 = 16/56    = **  TU/5 = 75 seconds, ww, 1 assist, lifting assist to rise from chair    = **    Assessment: Pt with lower BP to start, but improved with seated warm up activity and tolerated short distance amb with fww , and another helper for O2 and wc follow, denied feeling lightheaded, but dupont by LAGUNAS.       Other Barriers to Discharge (DME, Family Training, etc):   Impaired cognition    Extensive falls h/o, 6 month decline in medical and functional status    Both pt and  are frail and in poor health. Need to confirm plan for 24/7 support.     DME: Pt owns FWW, 4WW, transport and manual wc

## 2023-05-12 NOTE — PLAN OF CARE
Discharge Planner Post-Acute Rehab PT:     Discharge Plan: home with 24/7 support vs long term care option     Precautions: falls, R CAM boot OOB, prn O2, abd binder and spandigrip OOB    Current Status:  Bed Mobility: SBA  Transfer: MIN A with FWW from low surfaces  Gait: CGA up to 75' with FWW  Stairs: NT- not a goal for home  Balance:   Vincent/5 = 16/ = 14  TU/5 = 75 seconds, ww, 1 assist, lifting assist to rise from chair    = 40 seconds    Assessment: Pt participates in objective measure testing including TUG and VINCENT balance scale. Pt demonstrates statistically significant improvement in TUG, pt utilizes 2-3 L O2 throughout session. VINCENT balance scale relatively unchanged in score, pt continues to demonstrate elevated risk of falls.     Other Barriers to Discharge (DME, Family Training, etc):   Impaired cognition    Extensive falls h/o, 6 month decline in medical and functional status    Both pt and  are frail and in poor health. Need to confirm plan for 24/7 support.     DME: Pt owns FWW, 4WW, transport and manual wc

## 2023-05-12 NOTE — PLAN OF CARE
Discharge Planner Post-Acute Rehab OT:      Discharge Plan: LTC     Precautions: Fall, c diff, CAM boot when out of bed, 2L O2     Current Status:  ADLs:    Mobility: Ax1 for stand pivot using walker with CAM boot on    Grooming: Setup seated    Dressing: UB pull over shirt :sitting in low chair no verbal cues or physical assist    L/B:Pants;Underwear;sitting in low chair no verbal cues or physical assist    Bathing: pt able to complete 8/10 tasks with assist with pericates and feet pt able to sequence shower without assist. w/c to extended tubbench min a    Toileting: Ax1 to commode over toilet, A for cares  IADLs: Pt usually mod I with IADL  Vision/Cognition: ACEIII 45/100, attention 8/18, memory 4/26, Fluency 4/14, Language 21/26, visuospatial 8/16. Pt scored a 12/30 on the SLUMS at the hospital     Assessment: Focus on activity tolerance , pt had good participation.       -30 pt declined PM session, pt reports a fall with nursing. Recommend using WC for transport to the bathroom as needed.     Other Barriers to Discharge (DME, Family Training, etc): DME, family training, cognition

## 2023-05-12 NOTE — PLAN OF CARE
FOCUS/GOAL  Medical management    ASSESSMENT, INTERVENTIONS AND CONTINUING PLAN FOR GOAL:  Pt is alert and oriented. No complaints of pain. Assist of 1 with walker. CAM boot on when ambulating. 2 L O2 NC in place. Appeared to be sleeping on rounds.

## 2023-05-12 NOTE — PLAN OF CARE
FOCUS/GOAL  Bowel management, Bladder management, Pain management, Medical management, and Mobility    ASSESSMENT, INTERVENTIONS AND CONTINUING PLAN FOR GOAL:    11am-3pm  Pt alert and grossly oriented but forgetful. C/o chronic SOB. Denied pain or chest pain.   O2 sats 87-98% fluctuating constantly on O2 1-2L/NC, difficult to titrate Oxygen to keep SPO2 goal of 88-92%.   Pt fell while ambulating to the bathroom with Ax1 and walker at 1300. Pt landed on the floor toward left side of hip, sitting. Pt reported feeling dizzy when fell. Pt didn't hit head and denied pain from fall and no apparent injury noted. VSS. Provider assessed pt and family ( and daughter) notified. When provider assessed pt, pt stated, she fell twice today although there was no report of another fall.    Plan to use W/C for toileting. Bed alarm on.

## 2023-05-12 NOTE — PROGRESS NOTES
"  [facility]   Acute Rehabilitation Unit  Daily progress note    INTERVAL HISTORY  Fatuma Henry was seen and examined at bedside this morning.  No acute events reported overnight.  Patient reports that she is feeling about the same.  Still with poor sleep, which has been typical for her.  Feels breathing is stable, \"hard\".  Ongoing loose stools though no BM yet this morning.  Denies abdominal pain/cramping or nausea.  She denies any dizziness yet this morning, but has not been out of bed.  Denies other concerns or changes at this time.    Functionally, she is needing SBA for bed mobility, min A for transfers with FWW, CGA for ambulating up to 75' with FWW.  With SLP, patient participated in task using visuals to follow multi step directions. Pt with significantly impaired procedural recall, working memory, task initiation, problem solving, alternating attention. Pt required overall max cues to recall material and follow through with direction correctly. Significant difficulty with follow through of direction despite immediately reading in outloud.    MEDICATIONS    allopurinol  300 mg Oral Daily     cholestyramine  1 packet Oral TID w/meals     diltiazem ER COATED BEADS  240 mg Oral Daily     DULoxetine  20 mg Oral Daily     famotidine  20 mg Oral Daily     fluticasone-vilanterol  1 puff Inhalation Daily     folic acid  1 mg Oral Daily     furosemide  40 mg Oral Daily     guaiFENesin  600 mg Oral BID     lactobacillus rhamnosus (GG)  1 capsule Oral BID     melatonin  3 mg Oral At Bedtime     metoprolol succinate ER  25 mg Oral Daily     pravastatin  20 mg Oral QPM     predniSONE  5 mg Oral Daily     rivaroxaban ANTICOAGULANT  15 mg Oral Daily with supper     Vitamin D3  125 mcg Oral Daily        acetaminophen, albuterol, cyclobenzaprine, ipratropium - albuterol 0.5 mg/2.5 mg/3 mL, lidocaine 4%, lidocaine (buffered or not buffered), magic mouthwash suspension (diphenhydrAMINE, " "lidocaine, aluminum-magnesium & simethicone), - MEDICATION INSTRUCTIONS -, polyethylene glycol, senna-docusate, sodium chloride     PHYSICAL EXAM  /56 (BP Location: Right arm, Patient Position: Sitting, Cuff Size: Adult Regular)   Pulse 88   Temp (!) 96  F (35.6  C) (Oral)   Resp 16   Ht 1.676 m (5' 6\")   Wt 70.5 kg (155 lb 6.4 oz)   SpO2 100%   BMI 25.08 kg/m     Gen: NAD, in bed  HEENT: NC/AT, MMM  Cardio: irregularly irregular, no murmurs auscultated  Pulm: non-labored on 3L by NC, satting 93%, lungs CTA bilaterally  Abd: soft, non-tender, non-distended, bowel sounds present  Ext: 2+ pitting edema in bilateral lower extremities, tubigrip bilat  Neuro/MSK: awake, alert, PERRL, EOMI, face symmetric, speech clear/fluent, moving all extremities in bed       LABS  CBC RESULTS:   Recent Labs   Lab Test 05/11/23  0602 05/09/23  0659 05/08/23  0701   WBC 10.7 12.2* 12.1*   RBC 2.84* 3.01* 3.24*   HGB 8.2* 8.8* 9.5*   HCT 26.8* 29.0* 30.6*   MCV 94 96 94   MCH 28.9 29.2 29.3   MCHC 30.6* 30.3* 31.0*   RDW 17.8* 18.2* 17.8*    311 341       Last Basic Metabolic Panel:  Recent Labs   Lab Test 05/11/23  0602 05/10/23  0610 05/08/23  0701    139 138   POTASSIUM 4.2 4.1 4.1   CHLORIDE 105 106 107   CO2 21* 22 21*   ANIONGAP 13 11 10   GLC 84 90 88   BUN 25.0* 27.0* 21.7   CR 1.17* 1.12* 0.90   GFRESTIMATED 48* 51* 66   SUNI 10.3* 10.1 9.8     Magnesium   Date Value Ref Range Status   05/11/2023 2.5 (H) 1.7 - 2.3 mg/dL Final       Rehabilitation - continue comprehensive acute inpatient rehabilitation program with multidisciplinary approach including therapies, rehab nursing, and physiatry following. See interval history for updates.        ASSESSMENT AND PLAN  Fatuma Henry is a 77 year old with a past medical history of paroxysmal atrial fibrillation (on Xarelto), CKD3, COPD, HFpEF, hyperlipidemia, hypertension, recurrent C diff colitis, frequent falls, RA, left ICA occlusion, anemia, depression, " gout, chronic lower extremity edema, GERD, and recent left femur fracture 2/2 mechanical fall s/p closed reduction with percutaneous pinning on 3/14/23 who was admitted on 4/17/23 after recurrent fall, found to have multiple fractures of right foot as well as COPD exacerbation and HF exacerbation with hospital course complicated by pleural effusion s/p multiple thoracentesis, ongoing symptoms associated with recurrent C diff, pain, dyspnea, and suboptimal heart rate control.  She is now admitted to ARU on 5/3/23 for multidisciplinary rehabilitation and ongoing medical management.        Admission to acute inpatient rehab 05/03/23.    Impairment group code: debility in setting of multifactorial acute on chronic respiratory failure        1. PT 60-90 and OT 90 minutes. SLP 30 minutes, on a daily basis, in addition to rehab nursing and close management of physiatrist.       2. Impairment of ADL's: Noted to have pain, weakness, fatigue, impaired balance, dyspnea on exertion, impaired cognition, and impaired safety awareness, all affecting her ability to safely and independently perform basic ADLs.  Goal for mod I with basic ADLs.     3. Impairment of mobility:  Noted to have pain, weakness, fatigue, impaired balance, dyspnea on exertion, impaired cognition, and impaired safety awareness, all affecting her ability to safely and independently perform basic mobility.  Goal for mod I with basic mobility.     4. Medical Conditions  New actions/orders/updates for today are in blue.     Debility  Recurrent falls  Multiple fractures of the right foot: 3rd, 4th, 5th phalanges and 1st & 3rd metatarsals, managed non-operatively  Left minimally displaced impacted femoral neck fracture s/p closed reduction with percutaneous pinning on 3/14/23 with Dr. Mckeon  Completed rehabilitation at U, return home couple days ago. She fell at home prior to arrival, reportedly no syncope or near syncope. Patient fell while getting up from the  "chair by the table, with daughter being nearby.  Foot x-rays demonstrated above fractures.   - Right foot CAM when ambulating and weightbearing as tolerated  - Pain management: Tylenol PRN  - Continue PT/OT  - Follow up with [facility] - foot and ankle provider for foot fractures and Dr. Mckeon for hip fracture    Moderate-severe cognitive impairment  Increased difficulties with cognition since ~Nov 2022 per patient/family.  Prior to that time, had been independent with all IADLs and driving, but since then has required family assist for those tasks.  SLUMS this admission 12/30.  SLP consulted at ARU to assist with evaluation/treatment.  Deficits in areas of attention, executive function, memory noted per SLP.  - Continue SLP/OT  - Neuropsych testing completed 5/11, will await final report     Acute on chronic respiratory failure with hypoxia  COPD with acute exacerbation, resolved, currently stable  Right-sided pleural effusion, s/p thoracentesis on 4/25 and again 5/3  HFpEF with acute exacerbation.  Resolved, now stable  Pulmonary hypertension, stable  [PTA meds: albuterol 2 puffs q4h PRN, budesonide-formoterol 160-4.5 mcg inhaler]  Exacerbations of CHF and COPD this admission.  Completed prednisone taper, now returned to PTA dose.  Initially on IV diuretics, transitioned to oral.  Underwent thoracentesis x2.  * Noted to have increased dyspnea 5/8 AM, sats dropping to high 70s, increased oxygen needs up to 6L.  Patient was evaluated by rapid response team, see their note for additional details.  Labs with mild leukocytosis, CRP only mildly elevated at 12, BNP 1400 (previously >4000), procal 0.04.  CXR with \"decreased size of the now small right pleural effusion with  overlying compressive atelectasis; cardiomegaly with engorged pulmonary vasculature\".  EKG not great reading but with atrial fibrillation, incomplete RBBB.  Patient was given Duoneb per RT, who also obtained alternative pulse ox, reading improved " to high 90s back on baseline 2-3L O2 at rest.  Discussed VBG, labs, imaging with rapid response team, who returned to evaluate patient per protocol due to lactic acid 2.2.  Patient clinically improve and resumed on prior level of oxygen.  CXR overall improved.  Did not feel any change to current plan of care was indicated.  Offered IVF but patient preferred to increase oral intake.  See their note for additional details. Respiratory status/symptoms improved, returned to prior baseline.  - Goal spO2 is 88-92%.  Oxygen by NC PRN to maintain sats.  - Breo Ellipta as formulary sub for PTA Symbicort  - Continue albuterol inhaler and Duoneb PRN  - Continue prednisone 5 mg (PTA dose for RA)  - Continue Lasix 40 mg daily.  Monitor daily weights, labs, volume status.  Weight overall has been stable, persistent lower extremity edema, respiratory symptoms wax and wane.  - Follow up pulmonology     Chronic atrial fibrillation  Difficult to rate control due low BP.  Cardiology consulted this admission and made some medication changes, felt HR better-controlled.  PTA anticoagulation held briefly for thoracentesis but resumed on 4/26.  - Continue Xarelto 15 mg daily  - Continue metoprolol 25 mg daily (reduced on 5/10 due to ongoing symptomatic orthostatic hypotension).  Monitor BP and HR.  - Continue diltiazem  mg (increased on 4/30)  - Monitor BP, HR     Recurrent c diff colitis, resolved  Severe, 4th episode.  Off vancomycin since 4/6.  Completed taper Dificid per ID/GI recs (started last admission) as of 5/2/23.  - Avoid antibiotics   - Continue probiotics  - Monitor bowel pattern  - Previously had been referred to [facility] for FMT consultation - appointment scheduled for November      CKD stage 3  Recent Cr in 1.1-1.3 range.  - Avoid nephrotoxins and hypotension  - Continue to monitor BMP every M/Th.  5/11: Cr overall stable at 1.17     Recent orthostatic hypotension  Hx HTN   BP has been on soft side during this  admission, med changes as above per cardiology.  - Continue PTA Cardizem and metoprolol, with holding parameters.  Metoprolol dose reduced on 5/10 due to ongoing symptomatic orthostatic hypotension.  - Continue abdominal binder, compression stockings  - Monitor BP     Acute on chronic anemia  Baseline Hgb 9-10 range.  Most recent Hgb 7.9 on 4/28.  - Continue folic acid 1 mg daily  - Trend CBC every M/Th.  5/11: Hgb stable at 8.2     Left ICA occlusion  Had been noted in 12/22, asymptomatic.  - On Xarelto and statin as above  - Outpatient vascular surgery follow-up recommended     Hx GERD  - Continue PTA pepcid      Hx depression  - Continue PTA Cymbalta  - Monitor mood, consult psychology as indicated     Hx pulm nodules with positive PET  Stable 17 x 19 mm groundglass nodule in the lingula since 07/21/2022.   - Follow up with pulm clinic as outpt and serial imaging 3 to 6 month     Hx rheumatoid arthritis  Hx of methotrexate--was discontinued during recent hospitalization.  No exacerbation of RA off methotrexate.  - Continue prednisone 5 mg daily (chronic dose, recently completed taper due to COPD exacerbation)     Hx gout  - Continue PTA allopurinol 300 mg daily    Hypomagnesemia  Mag slightly low on 5/8 at 1.6.  Replaced with mag sulfate 4g IV x1 (avoided PO given recent diarrhea).  - 5/11: mag slightly elevated 2.5  - Continue to trend every M/Th    Leukocytosis, resolved  Abnormal UA  Is on chronic steroids but WBC previously WNL.  WBC slightly elevated at 12.1 on 5/8.  Afebrile, VSS/WNL with exception of worsened hypoxia as above, improved after nebulizer and with alternative probe. CRP only mildly elevated at 12.03, procal 0.04.  CXR without evidence of infection.  UA with large leuk esterase, 73 WBC.  Started on empiric Bactrim by on-call provider.  CBC on 5/9 with WBC stable at 12.2.  UC with mixed urogenital jamee.  Per lab, no predominant organism, growth consistent with probable contamination during  collection. Patient denies any urinary symptoms now or at time of urine collection.  Given asymptomatic, no clear pathogen on UC, increase in Cr on Bactrim, goal to minimize antibiotic exposure due to recurrent C diff, Bactrim discontinued.  - WBC WNL on 5/11 at 10.7, no urinary symptoms.    - Continue to monitor clinically.  If develops fevers or worsening leukocytosis, could consider repeat urine collection.       5. Adjustment to disability:  Clinical psychology to eval and treat if indicated  6. FEN: low Na (3g) diet   7. Bowel: continent, ongoing loose stools in setting of recent recurrent C diff, improving, monitor  8. Bladder: continent/incontinent, evangelista removed on 5/3, PVRs at admission without evidence of retention  9. DVT Prophylaxis: Xarelto  10. GI Prophylaxis: Pepcid  11. Code: no CPR, do not intubate  12. Disposition: would benefit from more supportive environment longer term given recurrent falls, hospitalizations, chronic medical comorbidities.  Family unable to provide recommended 24/7 assist at discharge, working with SW to determine alternative long-term options.  13. ELOS: pending discharge location, availability, insurance, etc.  14. Follow up Appointments on Discharge: PCP in 1-2 weeks, ortho ([facility] - 2 different providers: foot/ankle for foot fractures, and Dr. Mckeon for left femur fracture s/p repair), pulmonology (currently scheduled 5/17), cardiology (currently scheduled 5/10), vascular medicine (ICA occlusion, scheduled 5/18), GI (FMT, scheduled 11/1)      Patient was discussed with Dr. Frankie Hassan, PM&R staff physician     Dawna Edwards PA-C  Physical Medicine & Rehabilitation      Addendum:  Fall  Called to evaluate patient this afternoon after a fall while ambulating with nursing to bathroom.  Nurse reports that patient was ambulating with walker, nurse just briefly removed hands to move something out of the path and patient fell backwards landing on her buttocks.   Patient reports that she was feeling dizzy before falling.  She denies any pain or injury.  No ecchymoses or abrasions noted.  Non-tender to palpation over low back, buttock, hips.  No change in strength or sensation on exam.  No indication for imaging at this time.    - If develops any pain or evidence of injury, would xray given multiple recent fractures related to falls.   - Continue fall precautions including alarms  - Orthostatic hypotension measures as above

## 2023-05-12 NOTE — PLAN OF CARE
Discharge Planner Post-Acute Rehab SLP:     Discharge Plan: LTC    Precautions: fall    Current Status:  Hearing: WFL  Vision: glasses  Communication: WFL  Cognition: Moderate-severe cognitive impairments with deficits re: attention, memory, exeuctive function, visuospatial skills.   Swallow: regular, thin (0). Not formally assessed    Assessment:  Pt participated in task using visuals to follow multi step directions. Pt with significantly impaired procedural recall, working memory, task initiation, problem solving, alternating attention. Pt required overall max cues to recall material and follow through with direction correctly. Significant difficulty with follow through of direction despite immediately reading in outloud. Will need total assist IADLs    Other Barriers to Discharge (Family Training, etc):  Family training re: memory strategies, home visual aids, IADL support

## 2023-05-12 NOTE — PROGRESS NOTES
Clinic Care Coordination Contact  Care Team Conversations    Patient identified for care management outreach, however patient is not on a value based contract so cannot complete outreach. Will escalate to clinic staff if specific needs or resources are indicated. HARMEET BROWN connected with with the pt's PCP to update the team that the patient is transferring to long term care in [place], [place] with family support and closer to family, HARMEET BROWN was given warm hand off from inpatient rehab care team.       Emilie Lindsay, [facility]  Social Work Care Coordinator - [facility]  Care Coordination  [email or web address]  Cell Phone: [phone]  Gender pronouns: she/her  Employed by [employer]

## 2023-05-12 NOTE — PROGRESS NOTES
"  --Spoke with LAYTON Packer at [facility], pt clinically and financially accepted with a bed available Monday 05/15/23. Answered some of Birdie's questions. Will plan to fax discharge orders on the day of discharge to [facility] on [facility] Lucie. Lucie aware and updated as well.     --Called and updated pt NEYMAR Barrera, as designated point-person. Bruce in agreement with plan and expressed appreciation for the team's care while on ARU. Bruce stated that family prefers HE ride at discharge (those details are below). Bruce stated that family will come visit with pt this weekend and also collect some of her items ahead of discharge. Bruce stated that he told pt on Wednesday (after meeting with SW) that she would not be going home and working on LTC placement. Bruce stated that he felt pt understood.     --[id number] completed, printed, and scanned/emailed to Birdie (LAYTON at [facility], per request).     --[facility] HC updated via Epic and HC referral cancelled.    --Called [facility] and set up a ride for Monday 05/15/23 with a  between 10:40am-11:25am. [facility] will bring a wheelchair and portable O2 tank for the ride. Family aware of potential OOP cost and back-pay/coverage once MA in place.     --Therapy schedulers updated and indep day scheduled for Sunday 05/14/23    --Updated Bryce with [facility] and PCP HARMEET Phan of discharge plans.   Bryce holguin/ [facility] PH: [phone]   Emilie holguin/ [email or web address], PH: [phone]     --Met with pt and provided update. Pt expressed, \"oh good\". Pt denied questions or concerns. Email sent to NEYMAR Barrera with discharge details, as requested.     STEPHANIE will fax orders and complete IRF and IMM with pt on day of discharge and remain available if additional needs arise.     [facility]ates at [facility] on [facility]--Monday 05/15/23, HE w/c (and O2) ride between 10:40am-11:25am.   Long Term Care Facility   [address] [place] [address], [place] " [id number]  PH: [phone]  F: [phone]  Lucie Carballo PH: [phone], F: [phone], E: [email or web address]  LAYTON Birdie PH: [phone], E: [email or web address]    MATEO Grayson   [facility]   Direct Phone: [phone]  I   Pager: [phone]  I  Fax: [phone]

## 2023-05-12 NOTE — PROGRESS NOTES
CLINICAL NUTRITION SERVICES - REASSESSMENT NOTE     Nutrition Prescription    Malnutrition Status:    Patient does not meet two of the established criteria necessary for diagnosing malnutrition but is at risk for malnutrition    Recommendations already ordered by Registered Dietitian (RD):  None at this time.    Future/Additional Recommendations:  Monitor PO intake, need for scheduled snacks/supplements, and weight trends     EVALUATION OF THE PROGRESS TOWARD GOALS   Diet: 3 g Sodium  - Room service with assist    Intake: % per flowsheets over past week, most intakes %  - Good appetite per RN notes  - Per [employer], pt ordering 3 meals/day from room service. Ordered 3-day average of 1412 kcal and 64 g protein. This meets 90% of low-end estimated energy needs and 100% of estimated protein needs.      NEW FINDINGS   Pt unavailable x2 today. Pt planning on discharging to LTC on Monday 5/15.    Weight:   Wt Readings from Last 40 Encounters:   05/12/23 70.5 kg (155 lb 6.4 oz)   05/03/23 72.6 kg (160 lb 1.6 oz)   04/17/23 83.3 kg (183 lb 9.6 oz)   04/14/23 83 kg (183 lb)   04/13/23 83.7 kg (184 lb 8 oz)   03/28/23 78.9 kg (174 lb)   03/23/23 69.6 kg (153 lb 7 oz)   01/12/23 69.2 kg (152 lb 9.6 oz)   01/09/23 71.4 kg (157 lb 4.8 oz)   01/05/23 70.2 kg (154 lb 12.8 oz)   01/03/23 70.9 kg (156 lb 3.2 oz)   12/26/22 71.2 kg (157 lb)   12/23/22 71.2 kg (157 lb)   12/21/22 70.5 kg (155 lb 8 oz)   12/18/22 69.9 kg (154 lb)   12/12/22 66.4 kg (146 lb 4.8 oz)   12/08/22 67.7 kg (149 lb 3.2 oz)   12/05/22 68.4 kg (150 lb 12.8 oz)   12/04/22 68.1 kg (150 lb 1.6 oz)   11/29/22 68.9 kg (152 lb)   11/25/22 69.4 kg (152 lb 14.4 oz)   11/22/22 72.6 kg (160 lb)   11/10/22 72.7 kg (160 lb 3.2 oz)   11/02/22 70.3 kg (155 lb)   10/12/22 69.4 kg (153 lb)   09/28/22 69.4 kg (153 lb)   09/21/22 69.6 kg (153 lb 6.4 oz)   09/19/22 69.5 kg (153 lb 4.8 oz)   09/16/22 68 kg (150 lb)   09/13/22 70.8 kg (156 lb)   08/29/22 68.9 kg (152  lb)   22 72.7 kg (160 lb 3.2 oz)   22 75.8 kg (167 lb)   22 73 kg (161 lb)   22 73.4 kg (161 lb 14.4 oz)   22 75.3 kg (165 lb 14.4 oz)   22 73.6 kg (162 lb 4.8 oz)   21 73.4 kg (161 lb 12.8 oz)   21 75.8 kg (167 lb 3.2 oz)   20 78 kg (172 lb)   Current weight in line with UBW and previous weights. Some increase in weight end of March - April. Weight has now trended back down. Some of this is likely fluid related. Pt has been receiving lasix.    Weight has been fairly stable this admission. Admit wt: 71.9 kg, current wt: 70.5 kg    Labs:   BUN: 25.0 (H)  Cr: 1.17 (H)  M.5 (H)    Meds:  Pepcid  Folic acid  Lasix  Culturell  Prednisone  Vitamin D3    GI:   - Per PA note, ongoing loose stools  - Per I/Os, 0-1 BM/day for past 5 days    MALNUTRITION  % Intake: Decreased intake does not meet criteria  % Weight Loss: None noted  Subcutaneous Fat Loss: None observed  Muscle Loss: None observed  Fluid Accumulation/Edema: None noted  Malnutrition Diagnosis: Patient does not meet two of the established criteria necessary for diagnosing malnutrition but is at risk for malnutrition    Previous Goals   Patient to consume % of nutritionally adequate meal trays TID, or the equivalent with supplements/snacks.  Evaluation: Met    Previous Nutrition Diagnosis  Predicted inadequate nutrient intake (kcal/pro) related to potential for menu fatigue 2/2 LOS as evidenced by pt reliant on PO intake to meet 100% of nutrition needs.     Evaluation: No change    CURRENT NUTRITION DIAGNOSIS  Predicted inadequate nutrient intake (kcal/pro) related to potential for menu fatigue 2/2 LOS as evidenced by pt reliant on PO intake to meet 100% of nutrition needs.       INTERVENTIONS  Implementation  Collaboration with other providers - discussed in team rounds earlier this week    Goals  Patient to consume % of nutritionally adequate meal trays TID, or the equivalent with  supplements/snacks.    Monitoring/Evaluation  Progress toward goals will be monitored and evaluated per protocol.    Arti Riley RD, RADHA  ARU RD pager: [phone]  Weekend/Holiday RD pager: [phone]

## 2023-05-12 NOTE — DISCHARGE INSTRUCTIONS
Nursing facility please schedule PCP in 1-2 weeks, ortho ([place] - 2 different providers: foot/ankle for foot fractures, and Dr. Mckeon for left femur fracture s/p repair)  Patient has pulmonology appointment scheduled 5/17 at 11am   vascular medicine (ICA occlusion, scheduled 5/18 at 8am)  GI (FMT, scheduled 11/1 at 10am)

## 2023-05-12 NOTE — PROGRESS NOTES
05/12/23 1147   Signing Clinician's Name / Credentials   Signing clinician's name / credentials Yamilet Uribe DPT   Egan Balance Scale (MELISA VYAS, TARYN S, HARDY HUFFMAN, ALISHA BOWSER: MEASURING BALANCE IN THE ELDERLY: VALIDATION OF AN INSTRUMENT. CAN. J. PUB. HEALTH, JULY/AUGUST SUPPLEMENT 2:S7-11, 1992.)   Sit To Stand 2   Standing Unsupported 2   Sitting Unsupported 4   Stand to Sit 1   Transfers 1   Standing with Eyes Closed 0   Standing Unsupported, Feet Together 1   Reach Forward With Outstretched Arm 1   Retrieve Object From Floor 0   Turning to Look Behind 1   Turn 360 Degrees 0   Placing Alternate Foot on Stool (4-6 inches) 0   Unsupported Tandem Stand (Demonstrate to Subject) 1   One Leg Stand 0   Total Score (A score of 45 or less has been correlated with an increased risk of falls)   Total Score (out of 56) 14   Egan Balance Scale (BBS) Cutoff Scores: A score of < 45 /56 indicates an increased risk for falls.     The BBS is a measure of static and dynamic standing balance that has been validated in community dwelling elderly individuals and individuals who have Parkinson's Disease, MS, and those who are s/p CVA and TBI. The test is administered without an assistive device. Scores from the Egan are used to determine the probability of falling based on the patient's previous history of falls and their test performance.     Minimal Detectable Change = 6.5   & Minimal Detectable Change (Parkinson's Disease) = 5 according to Terry & Segundoey 2008    Assessment (rationale for performing, application to patient s function & care plan): Pt require CGA throughout w/ FWW.    Timed Up and Go (TUG): TUG is a test of basic mobility skills. It is used to screen individuals prone to falls.  Gait assistive device used: FWW     Patient score 40 seconds  ?13.5 seconds indicate at risk for falls in older adults according to Thelma et al 2000.  ?30 seconds - indicates dependency in most ADL and mobility skills  according to Priya & Gwyn 1991  >11.5 seconds indicate at risk for falls in adults with Parkinson's Disease    Minimal Detectable Change for patients with Alzheimer s = 4.09 sec   Minimal Detectable Change for patients with Parkinson s Disease = 3.5 sec   according to Rupert & Wyatt Craft 2011    Normative Data from [place], Stalin D, Ghislaine M, Keith E, Jorge. 2017  Age                 Average Time (in seconds)  20-39                     5.9-7.4         40-59                     6.3-7.8   60-69                     7.1-9.0  70-79                     8.2-10.2  80-99                    10.0-12.7            Assessment: pt requires use of FWW and 2-3L O2 during testing

## 2023-05-12 NOTE — PLAN OF CARE
FOCUS/GOAL  Medical management    ASSESSMENT, INTERVENTIONS AND CONTINUING PLAN FOR GOAL:  Patient stable.  O2 at 2L via NC with satisfying SpO2. Continent of bladder up to the toilet. No BM this shift. Denied pain. Had a skin tear on L knee. Area cleansed. Foam dressing applied for better protection.  Declined Guaifenesin this shift. Stated the med made her really sick  once. Will continue with POC.   Goal Outcome Evaluation:      Plan of Care Reviewed With: patient    Overall Patient Progress: no changeOverall Patient Progress: no change    Outcome Evaluation: No change this shift.

## 2023-05-13 NOTE — PLAN OF CARE
Goal Outcome Evaluation:  Pt has ongoing back pain, she claimed it got worse after the fall she had yesterday. X ray of the lumbar spine was ordered to r/o fracture, see result. She had 650 mg prn Tylenol this morning and now it is scheduled. Mepilex dressing to the right upper back was changed with minimal serosanguinous drainage. She also has multiple skin tears covered with mepilex and scattered bruises on back and arms. We will continue to monirtor pain while assisting with activity of daily livings.

## 2023-05-13 NOTE — PLAN OF CARE
Discharge Planner Post-Acute Rehab OT:      Discharge Plan: LTC     Precautions: Fall, c diff, CAM boot when out of bed, 2L O2     Current Status:  ADLs:    Mobility: Ax1 for stand pivot using walker with CAM boot on    Grooming: Setup seated    Dressing: UB pull over shirt :sitting in low chair no verbal cues or physical assist    L/B:Pants;Underwear;sitting in low chair no verbal cues or physical assist    Bathing: pt able to complete 8/10 tasks with assist with pericates and feet pt able to sequence shower without assist. w/c to extended tubbench min a    Toileting: Ax1 to commode over toilet, A for cares  IADLs: Pt usually mod I with IADL  Vision/Cognition: ACEIII 45/100, attention 8/18, memory 4/26, Fluency 4/14, Language 21/26, visuospatial 8/16. Pt scored a 12/30 on the SLUMS at the hospital     Assessment: Pt with bruising on cocyx area from fall on 5/12, had x-ray today. Pt unable to recall any details of fall. Daughter present during session, supportive. Pt able to complete stand pivot transfers today w/ CGA and FWW.     Other Barriers to Discharge (DME, Family Training, etc): DME, family training, cognition

## 2023-05-13 NOTE — PLAN OF CARE
Discharge Planner Post-Acute Rehab SLP:     Discharge Plan: LTC    Precautions: fall    Current Status:  Hearing: WFL  Vision: glasses  Communication: WFL  Cognition: Moderate-severe cognitive impairments with deficits re: attention, memory, exeuctive function, visuospatial skills.   Swallow: regular, thin (0). Not formally assessed    Assessment: Patient engaged in card sorting task requiring him to decide which card didn't belong in a set. Patient able to complete at easy level with just slow processing. When difficulty was increased, required max verbal cues in order to be successful. Able to be engaged throughout the full session.    Other Barriers to Discharge (Family Training, etc):  Family training re: memory strategies, home visual aids, IADL support

## 2023-05-13 NOTE — PROGRESS NOTES
[facility]   Acute Rehabilitation Unit  Daily progress note    INTERVAL HISTORY  Fatuma Henry was seen and examined at bedside this morning.  Patient sustained a witnessed fall yesterday landing on her lower back. Parent team is monitoring her back pain. No acute events reported overnight.  Patient reports that she is feeling about the same. Reports no BM yet this morning but just got her bowel meds. Denies other concerns or changes at this time.    Functionally,  she is needing SBA for bed mobility, min A for transfers with FWW, CGA for ambulating up to 75' with FWW.  With SLP, patient participated in task using visuals to follow multi step directions. Pt with significantly impaired procedural recall, working memory, task initiation, problem solving, alternating attention. Pt required overall max cues to recall material and follow through with direction correctly. Significant difficulty with follow through of direction despite immediately reading in outloud.    MEDICATIONS    allopurinol  300 mg Oral Daily     cholestyramine  1 packet Oral TID w/meals     diltiazem ER COATED BEADS  240 mg Oral Daily     DULoxetine  20 mg Oral Daily     famotidine  20 mg Oral Daily     fluticasone-vilanterol  1 puff Inhalation Daily     folic acid  1 mg Oral Daily     furosemide  40 mg Oral Daily     guaiFENesin  600 mg Oral BID     lactobacillus rhamnosus (GG)  1 capsule Oral BID     melatonin  3 mg Oral At Bedtime     metoprolol succinate ER  25 mg Oral Daily     pravastatin  20 mg Oral QPM     predniSONE  5 mg Oral Daily     rivaroxaban ANTICOAGULANT  15 mg Oral Daily with supper     Vitamin D3  125 mcg Oral Daily        acetaminophen, albuterol, cyclobenzaprine, ipratropium - albuterol 0.5 mg/2.5 mg/3 mL, lidocaine 4%, lidocaine (buffered or not buffered), magic mouthwash suspension (diphenhydrAMINE, lidocaine, aluminum-magnesium & simethicone), - MEDICATION INSTRUCTIONS -, polyethylene  "glycol, senna-docusate, sodium chloride     PHYSICAL EXAM  BP (!) 145/57 (BP Location: Left arm)   Pulse 86   Temp (!) 96.4  F (35.8  C) (Oral)   Resp 16   Ht 1.676 m (5' 6\")   Wt 70.5 kg (155 lb 6.4 oz)   SpO2 97%   BMI 25.08 kg/m     Gen: NAD, in bed  HEENT: NC/AT, MMM  Cardio: irregularly irregular, no murmurs auscultated  Pulm: non-labored on 3L by NC, satting 93%, lungs CTA bilaterally  Abd: soft, non-tender, non-distended, bowel sounds present  Neuro/MSK: awake, alert, PERRL, EOMI, face symmetric, speech clear/fluent, moving all extremities in bed       LABS  CBC RESULTS:   Recent Labs   Lab Test 05/11/23  0602 05/09/23  0659 05/08/23  0701   WBC 10.7 12.2* 12.1*   RBC 2.84* 3.01* 3.24*   HGB 8.2* 8.8* 9.5*   HCT 26.8* 29.0* 30.6*   MCV 94 96 94   MCH 28.9 29.2 29.3   MCHC 30.6* 30.3* 31.0*   RDW 17.8* 18.2* 17.8*    311 341       Last Basic Metabolic Panel:  Recent Labs   Lab Test 05/11/23  0602 05/10/23  0610 05/08/23  0701    139 138   POTASSIUM 4.2 4.1 4.1   CHLORIDE 105 106 107   CO2 21* 22 21*   ANIONGAP 13 11 10   GLC 84 90 88   BUN 25.0* 27.0* 21.7   CR 1.17* 1.12* 0.90   GFRESTIMATED 48* 51* 66   SUNI 10.3* 10.1 9.8     Magnesium   Date Value Ref Range Status   05/11/2023 2.5 (H) 1.7 - 2.3 mg/dL Final       Rehabilitation - continue comprehensive acute inpatient rehabilitation program with multidisciplinary approach including therapies, rehab nursing, and physiatry following. See interval history for updates.        ASSESSMENT AND PLAN  Fatuma Henry is a 77 year old with a past medical history of paroxysmal atrial fibrillation (on Xarelto), CKD3, COPD, HFpEF, hyperlipidemia, hypertension, recurrent C diff colitis, frequent falls, RA, left ICA occlusion, anemia, depression, gout, chronic lower extremity edema, GERD, and recent left femur fracture 2/2 mechanical fall s/p closed reduction with percutaneous pinning on 3/14/23 who was admitted on 4/17/23 after recurrent fall, found to " have multiple fractures of right foot as well as COPD exacerbation and HF exacerbation with hospital course complicated by pleural effusion s/p multiple thoracentesis, ongoing symptoms associated with recurrent C diff, pain, dyspnea, and suboptimal heart rate control.  She is now admitted to ARU on 5/3/23 for multidisciplinary rehabilitation and ongoing medical management.        Admission to acute inpatient rehab 05/03/23.    Impairment group code: debility in setting of multifactorial acute on chronic respiratory failure        1. PT 60-90 and OT 90 minutes. SLP 30 minutes, on a daily basis, in addition to rehab nursing and close management of physiatrist.       2. Impairment of ADL's: Noted to have pain, weakness, fatigue, impaired balance, dyspnea on exertion, impaired cognition, and impaired safety awareness, all affecting her ability to safely and independently perform basic ADLs.  Goal for mod I with basic ADLs.     3. Impairment of mobility:  Noted to have pain, weakness, fatigue, impaired balance, dyspnea on exertion, impaired cognition, and impaired safety awareness, all affecting her ability to safely and independently perform basic mobility.  Goal for mod I with basic mobility.     4. Medical Conditions  New actions/orders/updates for today are in Blue     Debility  Recurrent falls  Last wintnessed fall yesterday. Will do L-S x-ray to r/out fractures. Tylenol Q8H for pain control.  Multiple fractures of the right foot: 3rd, 4th, 5th phalanges and 1st & 3rd metatarsals, managed non-operatively  Left minimally displaced impacted femoral neck fracture s/p closed reduction with percutaneous pinning on 3/14/23 with Dr. Mckeon  Completed rehabilitation at U, return home couple days ago. She fell at home prior to arrival, reportedly no syncope or near syncope. Patient fell while getting up from the chair by the table, with daughter being nearby.  Foot x-rays demonstrated above fractures.   - Right foot  "CAM when ambulating and weightbearing as tolerated  - Pain management: Tylenol PRN  - Continue PT/OT  - Follow up with [facility] ortho - foot and ankle provider for foot fractures and Dr. Mckeon for hip fracture    Moderate-severe cognitive impairment  Increased difficulties with cognition since ~Nov 2022 per patient/family.  Prior to that time, had been independent with all IADLs and driving, but since then has required family assist for those tasks.  SLUMS this admission 12/30.  SLP consulted at ARU to assist with evaluation/treatment.  Deficits in areas of attention, executive function, memory noted per SLP.  - Continue SLP/OT  - Neuropsych testing completed 5/11, will await final report     Acute on chronic respiratory failure with hypoxia  COPD with acute exacerbation, resolved, currently stable  Right-sided pleural effusion, s/p thoracentesis on 4/25 and again 5/3  HFpEF with acute exacerbation.  Resolved, now stable  Pulmonary hypertension, stable  [PTA meds: albuterol 2 puffs q4h PRN, budesonide-formoterol 160-4.5 mcg inhaler]  Exacerbations of CHF and COPD this admission.  Completed prednisone taper, now returned to PTA dose.  Initially on IV diuretics, transitioned to oral.  Underwent thoracentesis x2.  * Noted to have increased dyspnea 5/8 AM, sats dropping to high 70s, increased oxygen needs up to 6L.  Patient was evaluated by rapid response team, see their note for additional details.  Labs with mild leukocytosis, CRP only mildly elevated at 12, BNP 1400 (previously >4000), procal 0.04.  CXR with \"decreased size of the now small right pleural effusion with  overlying compressive atelectasis; cardiomegaly with engorged pulmonary vasculature\".  EKG not great reading but with atrial fibrillation, incomplete RBBB.  Patient was given Duoneb per RT, who also obtained alternative pulse ox, reading improved to high 90s back on baseline 2-3L O2 at rest.  Discussed VBG, labs, imaging with rapid response team, who " returned to evaluate patient per protocol due to lactic acid 2.2.  Patient clinically improve and resumed on prior level of oxygen.  CXR overall improved.  Did not feel any change to current plan of care was indicated.  Offered IVF but patient preferred to increase oral intake.  See their note for additional details. Respiratory status/symptoms improved, returned to prior baseline.  - Goal spO2 is 88-92%.  Oxygen by NC PRN to maintain sats.  - Breo Ellipta as formulary sub for PTA Symbicort  - Continue albuterol inhaler and Duoneb PRN  - Continue prednisone 5 mg (PTA dose for RA)  - Continue Lasix 40 mg daily.  Monitor daily weights, labs, volume status.  Weight overall has been stable, persistent lower extremity edema, respiratory symptoms wax and wane.  - Follow up pulmonology     Chronic atrial fibrillation  Difficult to rate control due low BP.  Cardiology consulted this admission and made some medication changes, felt HR better-controlled.  PTA anticoagulation held briefly for thoracentesis but resumed on 4/26.  - Continue Xarelto 15 mg daily  - Continue metoprolol 25 mg daily (reduced on 5/10 due to ongoing symptomatic orthostatic hypotension).  Monitor BP and HR.  - Continue diltiazem  mg (increased on 4/30)  - Monitor BP, HR     Recurrent c diff colitis, resolved  Severe, 4th episode.  Off vancomycin since 4/6.  Completed taper Dificid per ID/GI recs (started last admission) as of 5/2/23.  - Avoid antibiotics   - Continue probiotics  - Monitor bowel pattern  - Previously had been referred to [facility] for FMT consultation - appointment scheduled for November      CKD stage 3  Recent Cr in 1.1-1.3 range.  - Avoid nephrotoxins and hypotension  - Continue to monitor BMP every M/Th.  5/11: Cr overall stable at 1.17     Recent orthostatic hypotension  Hx HTN   BP has been on soft side during this admission, med changes as above per cardiology.  - Continue PTA Cardizem and metoprolol, with holding  parameters.  Metoprolol dose reduced on 5/10 due to ongoing symptomatic orthostatic hypotension.  - Continue abdominal binder, compression stockings  - Monitor BP     Acute on chronic anemia  Baseline Hgb 9-10 range.  Most recent Hgb 7.9 on 4/28.  - Continue folic acid 1 mg daily  - Trend CBC every M/Th.  5/11: Hgb stable at 8.2     Left ICA occlusion  Had been noted in 12/22, asymptomatic.  - On Xarelto and statin as above  - Outpatient vascular surgery follow-up recommended     Hx GERD  - Continue PTA pepcid      Hx depression  - Continue PTA Cymbalta  - Monitor mood, consult psychology as indicated     Hx pulm nodules with positive PET  Stable 17 x 19 mm groundglass nodule in the lingula since 07/21/2022.   - Follow up with pulm clinic as outpt and serial imaging 3 to 6 month     Hx rheumatoid arthritis  Hx of methotrexate--was discontinued during recent hospitalization.  No exacerbation of RA off methotrexate.  - Continue prednisone 5 mg daily (chronic dose, recently completed taper due to COPD exacerbation)     Hx gout  - Continue PTA allopurinol 300 mg daily    Hypomagnesemia  Mag slightly low on 5/8 at 1.6.  Replaced with mag sulfate 4g IV x1 (avoided PO given recent diarrhea).  - 5/11: mag slightly elevated 2.5  - Continue to trend every M/Th    Leukocytosis, resolved  Abnormal UA  Is on chronic steroids but WBC previously WNL.  WBC slightly elevated at 12.1 on 5/8.  Afebrile, VSS/WNL with exception of worsened hypoxia as above, improved after nebulizer and with alternative probe. CRP only mildly elevated at 12.03, procal 0.04.  CXR without evidence of infection.  UA with large leuk esterase, 73 WBC.  Started on empiric Bactrim by on-call provider.  CBC on 5/9 with WBC stable at 12.2.  UC with mixed urogenital jamee.  Per lab, no predominant organism, growth consistent with probable contamination during collection. Patient denies any urinary symptoms now or at time of urine collection.  Given asymptomatic,  no clear pathogen on UC, increase in Cr on Bactrim, goal to minimize antibiotic exposure due to recurrent C diff, Bactrim discontinued.  - WBC WNL on 5/11 at 10.7, no urinary symptoms.    - Continue to monitor clinically.  If develops fevers or worsening leukocytosis, could consider repeat urine collection.       5. Adjustment to disability:  Clinical psychology to eval and treat if indicated  6. FEN: low Na (3g) diet   7. Bowel: continent, ongoing loose stools in setting of recent recurrent C diff, improving, monitor  8. Bladder: continent/incontinent, evangelista removed on 5/3, PVRs at admission without evidence of retention  9. DVT Prophylaxis: Xarelto  10. GI Prophylaxis: Pepcid  11. Code: no CPR, do not intubate  12. Disposition: would benefit from more supportive environment longer term given recurrent falls, hospitalizations, chronic medical comorbidities.  Family unable to provide recommended 24/7 assist at discharge, working with SW to determine alternative long-term options.  13. ELOS: pending discharge location, availability, insurance, etc.  14. Follow up Appointments on Discharge: PCP in 1-2 weeks, ortho ([facility] - 2 different providers: foot/ankle for foot fractures, and Dr. Mckeon for left femur fracture s/p repair), pulmonology (currently scheduled 5/17), cardiology (currently scheduled 5/10), vascular medicine (ICA occlusion, scheduled 5/18), GI (FMT, scheduled 11/1)

## 2023-05-13 NOTE — PLAN OF CARE
Discharge Planner Post-Acute Rehab SLP:     Discharge Plan: LTC    Precautions: fall    Current Status:  Hearing: WFL  Vision: glasses  Communication: WFL  Cognition: Moderate-severe cognitive impairments with deficits re: attention, memory, exeuctive function, visuospatial skills.   Swallow: regular, thin (0). Not formally assessed    Assessment: Pt reports that daughter has been taking care of finances and medications for the last few months but is very interested in returning to being able to manage them as much as possible. Pt participated in a 4 step sequencing task for common tasks and performed x 80% accuracy independently, increasing to 100% with min cues.    Other Barriers to Discharge (Family Training, etc):  Family training re: memory strategies, home visual aids, IADL support

## 2023-05-13 NOTE — PLAN OF CARE
Discharge Planner Post-Acute Rehab PT:     Discharge Plan: home with 24/7 support vs long term care option     Precautions: falls, R CAM boot OOB, prn O2, abd binder and spandigrip OOB    Current Status:  Bed Mobility: SBA  Transfer: MIN A with FWW from low surfaces  Gait: CGA up to 75' with FWW  Stairs: NT- not a goal for home  Balance:   Egan/5 = 16 = 14  TU/5 = 75 seconds, ww, 1 assist, lifting assist to rise from chair    = 40 seconds    Assessment: -60 minutes due to pt declining therapy secondary to not feeling well and back pain from fall yesterday.  Pt wants to speak to MD first and see if there is any structural damage in back from fall before completing any PT sessions.     Other Barriers to Discharge (DME, Family Training, etc):   Impaired cognition    Extensive falls h/o, 6 month decline in medical and functional status    Both pt and  are frail and in poor health. Need to confirm plan for 24/7 support.     DME: Pt owns FWW, 4WW, transport and manual wc

## 2023-05-13 NOTE — PLAN OF CARE
FOCUS/GOAL  Medical management    ASSESSMENT, INTERVENTIONS AND CONTINUING PLAN FOR GOAL:  Pt is alert, disoriented to time. Pt believed it was Sunday. Pt is forgetful. No complaints of pain. Assist of 2 w/c to BR overnight to promote safety and prevent further falls. Continent of bladder. Total assist with ramandeep cares. Noticed bruising to coccyx. Unsure if it was present before or if it occurred after fall yesterday. 2 L O2 NC in place satting 89-91%. Appeared to be sleeping well on rounds.

## 2023-05-13 NOTE — PLAN OF CARE
FOCUS/GOAL  Pain management, Wound care management, Medical management, Mobility, and Skin integrity    ASSESSMENT, INTERVENTIONS AND CONTINUING PLAN FOR GOAL:    Orientation: alert and grossly oriented, but very forgetful. Pt didn't remember this writer from this afternoon and kept saying that she fell x2 today. Pt used call light but stated she didn't call when NA answered call light x2.    VS: stable, on O2 2L/NC  Pain: c/o pain in right side of mid back, prn tylenol given without much relief per pt  Ambulation/ Transfers: Ax1 pivot to W/C and then to toilet, pt denied dizziness during transfers. Pt moves all extremities without c/o pain.   Bowel: continent, LBM today  Bladder: continent, some dribbling noted x1.  Diet/ Liquids: 3gm Na, taking pills whole  Skin: old skin tears to right knee and right elbow mepilex dressings changed. New skin tear to right side of mid back noted. Pt stated, abdominal binder was too tight. Mepilex dressing applied.       Pt stated that she does not need alarms anymore per provider. Pt was explained the need of alarms to prevent falls, pt was agreeable. Call light within reach. Continue with POC.

## 2023-05-14 NOTE — PLAN OF CARE
FOCUS/GOAL  Medical management    ASSESSMENT, INTERVENTIONS AND CONTINUING PLAN FOR GOAL:  Pt is alert and oriented. No complaints of pain. Assist of 1 pivot to w/c for toileting. Continent of bladder using toilet in the bathroom. Pt SOB on exertion. 2 L O2 NC overnight satting 93%. Pt not using call light, staff anticipating needs. Appeared to be sleeping on rounds.

## 2023-05-14 NOTE — PLAN OF CARE
Occupational Therapy Discharge Summary    Reason for therapy discharge:    Discharged to long term care facility.    Progress towards therapy goal(s). See goals on Care Plan in [facility] electronic health record for goal details.  Goals partially met.  Barriers to achieving goals:   limited tolerance for therapy and discharge from facility.    Therapy recommendation(s):    Continued therapy is recommended.  Rationale/Recommendations:  Pt requires CGA for all standing tasks and CGA/MIN A for all short distance ambulation and transfers with the walker. Pt does require assist for donning compression and cam boot and with bathing. Pt should not be left alone and should have CGA at all times as she is a high fall risk. Pt requires assist for managing her O2 tubing with all mobility. Pt has been using a RTS to elevate the toilet and will need either a shower chair or tub bench depending on the style of shower at facility. Pt also has significant cognitive deficits and will thus not be reliable to remember that she needs to wear her CAM boot when she gets up and will require assist with all other IADL. Pt is discharging to a LTC due to family inability to provide the needed 24/7 assist for the pt. Pt will benefit from continued therapy to build activity tolerance as she is frequently limited by fatigue, SOB and dizziness.

## 2023-05-14 NOTE — PLAN OF CARE
Discharge Planner Post-Acute Rehab SLP:     Discharge Plan: LTC    Precautions: fall    Current Status:  Hearing: WFL  Vision: glasses  Communication: WFL  Cognition: Moderate-severe cognitive impairments with deficits re: attention, memory, exeuctive function, visuospatial skills.   Swallow: regular, thin (0). Not formally assessed    Assessment:  Pt performed a 4 step sequencing task related to various home scenarios x 70% independently.  With moderate cues, accuracy improved to 90%.    Other Barriers to Discharge (Family Training, etc):  Family training re: memory strategies, home visual aids, IADL support

## 2023-05-14 NOTE — PROGRESS NOTES
.    [facility]   Acute Rehabilitation Unit  Daily progress note    INTERVAL HISTORY  Fatuma Henry was seen and examined at bedside this morning.  Patient sustained a witnessed fall on Friday landing on her lower back. L-S x-ray was done and reported no acute changes compared to the previous imaging. Tylenol Q8H scheduled resulted in better pain control.  Today, patient denies other concerns or changes at this time.     Functionally,  she is needing SBA for bed mobility, min A for transfers with FWW, CGA for ambulating up to 75' with FWW.  With SLP, patient participated in task using visuals to follow multi step directions. Pt with significantly impaired procedural recall, working memory, task initiation, problem solving, alternating attention. Pt required overall max cues to recall material and follow through with direction correctly. Significant difficulty with follow through of direction despite immediately reading in outloud.           MEDICATIONS    acetaminophen  325-650 mg Oral Q8H     allopurinol  300 mg Oral Daily     cholestyramine  1 packet Oral TID w/meals     diltiazem ER COATED BEADS  240 mg Oral Daily     DULoxetine  20 mg Oral Daily     famotidine  20 mg Oral Daily     fluticasone-vilanterol  1 puff Inhalation Daily     folic acid  1 mg Oral Daily     furosemide  40 mg Oral Daily     guaiFENesin  600 mg Oral BID     lactobacillus rhamnosus (GG)  1 capsule Oral BID     melatonin  3 mg Oral At Bedtime     metoprolol succinate ER  25 mg Oral Daily     pravastatin  20 mg Oral QPM     predniSONE  5 mg Oral Daily     rivaroxaban ANTICOAGULANT  15 mg Oral Daily with supper     Vitamin D3  125 mcg Oral Daily        albuterol, cyclobenzaprine, ipratropium - albuterol 0.5 mg/2.5 mg/3 mL, lidocaine 4%, lidocaine (buffered or not buffered), magic mouthwash suspension (diphenhydrAMINE, lidocaine, aluminum-magnesium & simethicone), - MEDICATION INSTRUCTIONS -, polyethylene  "glycol, senna-docusate, sodium chloride     PHYSICAL EXAM  /44 (BP Location: Left arm)   Pulse 82   Temp (!) 95.1  F (35.1  C) (Oral)   Resp (!) 116   Ht 1.676 m (5' 6\")   Wt (P) 72.9 kg (160 lb 11.5 oz)   SpO2 100%   BMI (P) 25.94 kg/m    Gen: NAD, sitting comfortably, on room air  HEENT: MMM, AT/NC  Cardio: audible S1/S2, no murmurs  Pulm: equal bilateral air entry, no crackles or wheezes  Abd: soft non-tender, audible bowel sounds  Ext: no pitting edema   Neuro/MSK: answers questions appropriately, moves limbs spontaneously   Skin:    LABS  CBC RESULTS:   Recent Labs   Lab Test 05/11/23  0602   WBC 10.7   RBC 2.84*   HGB 8.2*   HCT 26.8*   MCV 94   MCH 28.9   MCHC 30.6*   RDW 17.8*         Last Comprehensive Metabolic Panel:  Sodium   Date Value Ref Range Status   05/11/2023 139 136 - 145 mmol/L Final     Potassium   Date Value Ref Range Status   05/11/2023 4.2 3.4 - 5.3 mmol/L Final   11/10/2022 4.6 3.5 - 5.0 mmol/L Final     Chloride   Date Value Ref Range Status   05/11/2023 105 98 - 107 mmol/L Final   11/10/2022 109 (H) 98 - 107 mmol/L Final     Carbon Dioxide (CO2)   Date Value Ref Range Status   05/11/2023 21 (L) 22 - 29 mmol/L Final   11/10/2022 20 (L) 22 - 31 mmol/L Final     Anion Gap   Date Value Ref Range Status   05/11/2023 13 7 - 15 mmol/L Final   11/10/2022 9 5 - 18 mmol/L Final     Glucose   Date Value Ref Range Status   05/11/2023 84 70 - 99 mg/dL Final   11/10/2022 86 70 - 125 mg/dL Final     GLUCOSE BY METER POCT   Date Value Ref Range Status   04/09/2023 109 (H) 70 - 99 mg/dL Final     Urea Nitrogen   Date Value Ref Range Status   05/11/2023 25.0 (H) 8.0 - 23.0 mg/dL Final   11/10/2022 13 8 - 28 mg/dL Final     Creatinine   Date Value Ref Range Status   05/11/2023 1.17 (H) 0.51 - 0.95 mg/dL Final     GFR Estimate   Date Value Ref Range Status   05/11/2023 48 (L) >60 mL/min/1.73m2 Final     Comment:     eGFR calculated using 2021 CKD-EPI equation.   06/02/2021 48 (L) >60 " mL/min/1.73m2 Final     Calcium   Date Value Ref Range Status   05/11/2023 10.3 (H) 8.8 - 10.2 mg/dL Final     Bilirubin Total   Date Value Ref Range Status   04/24/2023 0.4 <=1.2 mg/dL Final     Alkaline Phosphatase   Date Value Ref Range Status   04/24/2023 170 (H) 35 - 104 U/L Final     ALT   Date Value Ref Range Status   04/24/2023 22 10 - 35 U/L Final     AST   Date Value Ref Range Status   04/24/2023 22 10 - 35 U/L Final       X-ray  L-S area done:                                                                     IMPRESSION: Trace thoracolumbar levocurvature is similar to prior. Grade 1 anterolisthesis of L4 on L5.. There is diffuse osteopenia. Age-indeterminate T12 superior endplate compression fracture with 25% height loss. Age-indeterminate T11 compression   fracture with 10% height loss. Age-indeterminate compression fracture at T10 with 10% height loss. Lumbar vertebral body heights are maintained. There is moderate multilevel disc height loss. Moderately advanced facet hypertrophy at L5-S1. Aortic   atherosclerosis is present.      ASSESSMENT     Fatuma Henry is a 77 year old with a past medical history of paroxysmal atrial fibrillation (on Xarelto), CKD3, COPD, HFpEF, hyperlipidemia, hypertension, recurrent C diff colitis, frequent falls, RA, left ICA occlusion, anemia, depression, gout, chronic lower extremity edema, GERD, and recent left femur fracture 2/2 mechanical fall s/p closed reduction with percutaneous pinning on 3/14/23 who was admitted on 4/17/23 after recurrent fall, found to have multiple fractures of right foot as well as COPD exacerbation and HF exacerbation with hospital course complicated by pleural effusion s/p multiple thoracentesis, ongoing symptoms associated with recurrent C diff, pain, dyspnea, and suboptimal heart rate control.  She is now admitted to ARU on 5/3/23 for multidisciplinary rehabilitation and ongoing medical management.        Admission to acute inpatient  rehab 05/03/23.    Impairment group code: debility in setting of multifactorial acute on chronic respiratory failure        1. PT 60-90 and OT 90 minutes. SLP 30 minutes, on a daily basis, in addition to rehab nursing and close management of physiatrist.       2. Impairment of ADL's: Noted to have pain, weakness, fatigue, impaired balance, dyspnea on exertion, impaired cognition, and impaired safety awareness, all affecting her ability to safely and independently perform basic ADLs.  Goal for mod I with basic ADLs.     3. Impairment of mobility:  Noted to have pain, weakness, fatigue, impaired balance, dyspnea on exertion, impaired cognition, and impaired safety awareness, all affecting her ability to safely and independently perform basic mobility.  Goal for mod I with basic mobility.     4. Medical Conditions  New actions/orders/updates for today are in Blue     Debility  Recurrent falls  X-ray showed no acute changes. Tylenol Q8H for pain control.  Multiple fractures of the right foot: 3rd, 4th, 5th phalanges and 1st & 3rd metatarsals, managed non-operatively  Left minimally displaced impacted femoral neck fracture s/p closed reduction with percutaneous pinning on 3/14/23 with Dr. Mckeon  Completed rehabilitation at [facility], return home couple days ago. She fell at home prior to arrival, reportedly no syncope or near syncope. Patient fell while getting up from the chair by the table, with daughter being nearby.  Foot x-rays demonstrated above fractures.   - Right foot CAM when ambulating and weightbearing as tolerated  - Pain management: Tylenol PRN  - Continue PT/OT  - Follow up with [place] [facility] - foot and ankle provider for foot fractures and Dr. Mckeon for hip fracture     Moderate-severe cognitive impairment  Increased difficulties with cognition since ~Nov 2022 per patient/family.  Prior to that time, had been independent with all IADLs and driving, but since then has required family assist for those tasks.  " SLUMS this admission 12/30.  SLP consulted at ARU to assist with evaluation/treatment.  Deficits in areas of attention, executive function, memory noted per SLP.  - Continue SLP/OT  - Neuropsych testing completed 5/11, will await final report     Acute on chronic respiratory failure with hypoxia  COPD with acute exacerbation, resolved, currently stable  Right-sided pleural effusion, s/p thoracentesis on 4/25 and again 5/3  HFpEF with acute exacerbation.  Resolved, now stable  Pulmonary hypertension, stable  [PTA meds: albuterol 2 puffs q4h PRN, budesonide-formoterol 160-4.5 mcg inhaler]  Exacerbations of CHF and COPD this admission.  Completed prednisone taper, now returned to PTA dose.  Initially on IV diuretics, transitioned to oral.  Underwent thoracentesis x2.  * Noted to have increased dyspnea 5/8 AM, sats dropping to high 70s, increased oxygen needs up to 6L.  Patient was evaluated by rapid response team, see their note for additional details.  Labs with mild leukocytosis, CRP only mildly elevated at 12, BNP 1400 (previously >4000), procal 0.04.  CXR with \"decreased size of the now small right pleural effusion with  overlying compressive atelectasis; cardiomegaly with engorged pulmonary vasculature\".  EKG not great reading but with atrial fibrillation, incomplete RBBB.  Patient was given Duoneb per RT, who also obtained alternative pulse ox, reading improved to high 90s back on baseline 2-3L O2 at rest.  Discussed VBG, labs, imaging with rapid response team, who returned to evaluate patient per protocol due to lactic acid 2.2.  Patient clinically improve and resumed on prior level of oxygen.  CXR overall improved.  Did not feel any change to current plan of care was indicated.  Offered IVF but patient preferred to increase oral intake.  See their note for additional details. Respiratory status/symptoms improved, returned to prior baseline.  - Goal spO2 is 88-92%.  Oxygen by NC PRN to maintain sats.  - Breo " Ellipta as formulary sub for PTA Symbicort  - Continue albuterol inhaler and Duoneb PRN  - Continue prednisone 5 mg (PTA dose for RA)  - Continue Lasix 40 mg daily.  Monitor daily weights, labs, volume status.  Weight overall has been stable, persistent lower extremity edema, respiratory symptoms wax and wane.  - Follow up pulmonology     Chronic atrial fibrillation  Difficult to rate control due low BP.  Cardiology consulted this admission and made some medication changes, felt HR better-controlled.  PTA anticoagulation held briefly for thoracentesis but resumed on 4/26.  - Continue Xarelto 15 mg daily  - Continue metoprolol 25 mg daily (reduced on 5/10 due to ongoing symptomatic orthostatic hypotension).  Monitor BP and HR.  - Continue diltiazem  mg (increased on 4/30)  - Monitor BP, HR     Recurrent c diff colitis, resolved  Severe, 4th episode.  Off vancomycin since 4/6.  Completed taper Dificid per ID/GI recs (started last admission) as of 5/2/23.  - Avoid antibiotics   - Continue probiotics  - Monitor bowel pattern  - Previously had been referred to [facility] for FMT consultation - appointment scheduled for November      CKD stage 3  Recent Cr in 1.1-1.3 range.  - Avoid nephrotoxins and hypotension  - Continue to monitor BMP every M/Th.  5/11: Cr overall stable at 1.17     Recent orthostatic hypotension  Hx HTN   BP has been on soft side during this admission, med changes as above per cardiology.  - Continue PTA Cardizem and metoprolol, with holding parameters.  Metoprolol dose reduced on 5/10 due to ongoing symptomatic orthostatic hypotension.  - Continue abdominal binder, compression stockings  - Monitor BP     Acute on chronic anemia  Baseline Hgb 9-10 range.  Most recent Hgb 7.9 on 4/28.  - Continue folic acid 1 mg daily  - Trend CBC every M/Th.  5/11: Hgb stable at 8.2     Left ICA occlusion  Had been noted in 12/22, asymptomatic.  - On Xarelto and statin as above  - Outpatient vascular surgery  follow-up recommended     Hx GERD  - Continue PTA pepcid      Hx depression  - Continue PTA Cymbalta  - Monitor mood, consult psychology as indicated     Hx pulm nodules with positive PET  Stable 17 x 19 mm groundglass nodule in the lingula since 07/21/2022.   - Follow up with pulm clinic as outpt and serial imaging 3 to 6 month     Hx rheumatoid arthritis  Hx of methotrexate--was discontinued during recent hospitalization.  No exacerbation of RA off methotrexate.  - Continue prednisone 5 mg daily (chronic dose, recently completed taper due to COPD exacerbation)     Hx gout  - Continue PTA allopurinol 300 mg daily     Hypomagnesemia  Mag slightly low on 5/8 at 1.6.  Replaced with mag sulfate 4g IV x1 (avoided PO given recent diarrhea).  - 5/11: mag slightly elevated 2.5  - Continue to trend every M/Th     Leukocytosis, resolved  Abnormal UA  Is on chronic steroids but WBC previously WNL.  WBC slightly elevated at 12.1 on 5/8.  Afebrile, VSS/WNL with exception of worsened hypoxia as above, improved after nebulizer and with alternative probe. CRP only mildly elevated at 12.03, procal 0.04.  CXR without evidence of infection.  UA with large leuk esterase, 73 WBC.  Started on empiric Bactrim by on-call provider.  CBC on 5/9 with WBC stable at 12.2.  UC with mixed urogenital jamee.  Per lab, no predominant organism, growth consistent with probable contamination during collection. Patient denies any urinary symptoms now or at time of urine collection.  Given asymptomatic, no clear pathogen on UC, increase in Cr on Bactrim, goal to minimize antibiotic exposure due to recurrent C diff, Bactrim discontinued.  - WBC WNL on 5/11 at 10.7, no urinary symptoms.    - Continue to monitor clinically.  If develops fevers or worsening leukocytosis, could consider repeat urine collection.        5. Adjustment to disability:  Clinical psychology to eval and treat if indicated  6. FEN: low Na (3g) diet   7. Bowel: continent, ongoing  loose stools in setting of recent recurrent C diff, improving, monitor  8. Bladder: continent/incontinent, evangelista removed on 5/3, PVRs at admission without evidence of retention  9. DVT Prophylaxis: Xarelto  10. GI Prophylaxis: Pepcid  11. Code: no CPR, do not intubate  12. Disposition: would benefit from more supportive environment longer term given recurrent falls, hospitalizations, chronic medical comorbidities.  Family unable to provide recommended 24/7 assist at discharge, working with SW to determine alternative long-term options.  13. ELOS: pending discharge location, availability, insurance, etc.  14. Follow up Appointments on Discharge: PCP in 1-2 weeks, ortho ([place] - 2 different providers: foot/ankle for foot fractures, and Dr. Mckeon for left femur fracture s/p repair), pulmonology (currently scheduled 5/17), cardiology (currently scheduled 5/10), vascular medicine (ICA occlusion, scheduled 5/18), GI (FMT, scheduled 11/1)        A phone call was placed with the daughter re: x-ray results. I conveyed to the daughter the x-ray results and the scheduled Tylenol to control the pain. Daughter requested to x-ray patient's hip as well.   I explained to the daughter that given the patient's clinical presentation, there is no indication to x-ray the hip. Patient's pain is controlled with more participation with therapy and no radiation of pain to the hip. I do not see the need to expose the patient to more radiation by x-raying the hip if not clinically indicated. Daughter voiced understanding.

## 2023-05-14 NOTE — PLAN OF CARE
Goal Outcome Evaluation:  Pt has ongoing back pain, she is on scheduled Tylenol 650 mg every 8 hrs. Her function and facial expression looks better but she said the pain is the same. Pt is on 2 litre Oxygen per nasal canula, she stayed above 90% saturation. She still has dyspnea on exertion. She has multiple bruising and mepiles dressings on the right back, right elbow and right knee. A new one was applied to the right knee, the rest is clean, dry, and intact. We will continue to monirtor pain and skin condition while assisting with activity of daily livings.

## 2023-05-14 NOTE — PROGRESS NOTES
Speech Language Therapy Discharge Summary    Reason for therapy discharge:    Discharged to long term care facility.    Progress towards therapy goal(s). See goals on Care Plan in Epic electronic health record for goal details.  Goals partially met.  Barriers to achieving goals:   discharge from facility.    Therapy recommendation(s):    Continued therapy is recommended.  Rationale/Recommendations:  SLP tx was focused on cognitive/communication function that would improve safety in current and next care environment. Pt with significantly impaired procedural recall, working memory, task initiation, problem solving, alternating attention which can impair safe transition to next level of care.  Pt will benefit to further address areas of impairment to maximize success in transition.

## 2023-05-14 NOTE — PLAN OF CARE
Physical Therapy Discharge Summary    Reason for therapy discharge:    No further expectations of functional progress.    Progress towards therapy goal(s). See goals on Care Plan in [facility] electronic health record for goal details.  Goals partially met.  Barriers to achieving goals:   limited tolerance for therapy.    Therapy recommendation(s):    Continued therapy is recommended.  Rationale/Recommendations:  . . Minimal progress while on ARU. Progress limited d/t impaired cognition, orthostasis, and O2 sats. Currently tolerates activity on 2-3L supplemental O2. Pt will benefit from ongoing therapy to promote improved safety and ind with mobility and to decrease risk for future falls.     Current Status:  Bed Mobility: SBA  Transfer: MIN A with FWW from low surfaces  Gait: CGA up to 75' with FWW  Stairs: NT- not a goal for home  Balance:   Egan/5 = 16 = 14  TU/5 = 75 seconds, ww, 1 assist, lifting assist to rise from chair               = 40 seconds

## 2023-05-14 NOTE — PLAN OF CARE
FOCUS/GOAL  Bowel management, Bladder management, Mobility, Skin integrity, and Safety management    ASSESSMENT, INTERVENTIONS AND CONTINUING PLAN FOR GOAL:  Patient is alert with some forgetfulness. Is able to use a call light and make her needs known but sometimes calls with out knowing that she called. Is assist of x 1 with a walker. Is on regular diet with thin liquids, takes pills whole with applesauce. Is continent of B/B. Lbm was 5/13/23. Is WB AT r/t fracture to the right foot toes. Patient's pain is managed by Tylenol 650 mg scheduled with relief. Patient has multiple skin tears covered with mepilex and scattered bruises to the back and arms. Patient is on Oxygen 2 lpm continues. Patient had a shower and no new skin concern was noted. Nursing staff will continue with poc.

## 2023-05-15 NOTE — PLAN OF CARE
"A/Ox 4 but can be forgetful. Able to make needs known. On 2 LPM via NC. Denies SOB, N/V, and CP. Continent of B/B. LBM 5/14/2023 per pt report. Multiple skin tears that's covered with mepilex, CDI. Pt refused PM scheduled medications and states, \"I am discharging tomorrow. I don't want any medications.\" No reports of pain. Call light within reach. Continue with POC.  "

## 2023-05-15 NOTE — PROGRESS NOTES
"Spoke with Birdie TRAYLOR this morning about discharge meds and PA updated. Met with pt at bedside, completed IRF and IMM. Pt denied questions or concerns.      Faxed discharge orders and summary to Birdie TRAYLOR and Lucie [facility] Liaison. Other details below. No additional SW needs.      [facility] (LONG [facility]M CARE FACILITY)  TODAY Monday 05/15/23, HE w/c (and O2) ride between 10:40am-11:25am.   [address] [place] [address]  PH: [phone]  F: [phone]  Lucie Carballo PH: [phone], F: [phone], E: [email or web address]  Birdie TRAYLOR PH: [phone], E: [email or web address]  [id number]    IRF-HUMA Pain Assessment  Pain Effect on Sleep  \"Over the past 5 days, how much of the time has pain made it hard for you to sleep at night?\"    0. Does not apply - I have not had any pain or hurting in the past 5 days     [facility]   Direct Phone: [phone]  I   Pager: [phone]  I  Fax: [phone]    "

## 2023-05-15 NOTE — PLAN OF CARE
"FOCUS/GOAL  Bowel management, Bladder management, Pain management, Mobility, Skin integrity, and Safety management    ASSESSMENT, INTERVENTIONS AND CONTINUING PLAN FOR GOAL:  Patient is alert and oriented x 4 with some forgetfulness. Able to make needs known. Is on O2 2 LPM via NC. Continent of B/B. LBM 5/14/2023. Patient refused 0100 scheduled Tylenol, stating; \"I am discharging tomorrow.\" Denies any c/o pain. Call light within reach. Nursing staff will continue with POC.                          "

## 2023-05-15 NOTE — CONFIDENTIAL NOTE
Provider: AL      Tech: JEFFERSON      Patient Name: Fatuma Henry      : 1945      MRN: [id number]      LAGUNAS: 2023      Age: 77      Education: 11      Ethnicity: C      Handedness: Right      Station: OP             NEUROPSYCHOLOGICAL TESTS RAW SCORE STANDARDIZED SCORE* %emerson   COGNITIVE STATUS       NAB Orientation (/) 26 --     RBANS Total Score (Form A) -- SS= 53 <1%        Immediate Memory Index -- SS= 57 1%        Visuospatial/Constructional Index -- SS= 58 1%        Language Index -- SS= 85 16%        Attention Index -- SS= 49 <1%        Delayed Memory Index -- SS= 60 1%   PREMORBID ESTIMATE RAW SCORE STANDARDIZED SCORE* %emerson   Test of Premorbid Functioning (TOPF)  Actual SS= 80 9%    18 Pred SS= 88 --     E-FSIQ= 83 --   ATTENTION/CONC. & PROC SPEED RAW SCORE STANDARDIZED SCORE* %emerson   WAIS-IV       Digit Span Forward (LDSF=4) 6 ss= 6 9%   Digit Span Backward (LDSB=2) 2 ss= 2 <1%   Reliable Digit Span (RDS) 6 --  --   RBANS Attention Index -- SS= 49 <1%   Digit Span 6 ss= 4 2%   Coding 5 ss= 1 <1%   MEMORY RAW SCORE STANDARDIZED SCORE* %emerson   Verbal       RBANS Immediate Memory (Form A)       List Learning 12 ss= 2 <1%   Story Memory 9 ss= 4 2%   RBANS Delayed Memory (Form A)       List Recall 0 --  <2   List Recognition 18 --  17-25%   Story Recall 3 ss= 4 2%   Visual        RBANS Delayed Memory (Form A)       Figure Recall 0 ss= 1 <1%   LANGUAGE RAW SCORE STANDARDIZED SCORE* %emerson   [place] Naming Test s,r,e 52 TS= 53 62%   Phonemic Cue (Correct; Administered) 3; 7      NAB Auditory Comprehension 52 TS= 31 3%   Colors 13 C. %ile= 100    Shapes 21 C. %ile= 10    CSN 18 C. %ile= 1    COWAT (FAS) 10 TS= 24 <1%   Repetition Errors 1      Set Loss Errors 2      Animal Naming 8 TS= 27 1%   Repetition Errors 2      Set Loss Errors 1      RBANS Language (Form A)       Naming 10 --  51-75%   Semantic Fluency 11 ss= 4 2%   VISUOSPATIAL RAW SCORE STANDARDIZED SCORE* %emerson   Clock Drawing Test      "  Construction MODERATE IMPAIRMENT  --   Abstraction MILD IMPAIRMENT  --   RBANS Visuospat./Constr.       Line Orientation 8 --  <2   Figure Copy 7 ss= 1 <1%   EXECUTIVE FUNCTIONS RAW SCORE STANDARDIZED SCORE* %emerson   Trail Making Test (Errors; Time)        Part A s,r,e 2; 158 TS= 24 <1%   Part B s,r,e 1; DC@300\" TS= -    [place] Card Sorting Test (WCST-64)       Categories Completede 0 --  <1   Trials to Complete 1st Category 65 --  2-5   Total Errorse 47 TS= 25 1%   Perseverative Responsese 31 TS= 31 3%   Failure To Maintain Sete - --  -   % Conceptual Level Responses 0 TS= 0 0%   # Conceptual Level Responses 3 TS= 26 1%   NAB Executive Functioning Module       Judgement 7 TS= 25 1%   MOOD AND PERSONALITY RAW SCORE INTERPRETATION %emerson   Depression, Anxiety, Stress Scale (MIKE-21)       Depression 6 NORMAL  --   Anxiety 12 MODERATE  --   Stress 10 NORMAL  --   ADAPTIVE FUNCTIONING RAW SCORE STANDARDIZED SCORE* %emerson   NAB Judgement 7 TS= 25 1%          * All standardized scores are adjusted for age. Superscripts denote additional adjustment for (s)ex, (r)ace/ethnicity, (l)anguage, and/or (e)ducation.   ** Descriptive ranges are based on American Academy of Clinical Neuropsychology (2020) consensus guidelines, or test manuals where appropriate.    WNL = within normal limits. DC = discontinued due to patient s inability to complete the test.     Standardized scores: TS = T-score; mean = 50, standard deviation =10; z = z-score; mean = 0, standard deviation = 1; ss = scaled score; mean = 10, standard deviation = 3; MAS = [place] Older Adult Normative Study age adjusted scaled score; mean = 10, standard deviation = 3; SS = standard score; mean = 100, standard deviation = 15.       "

## 2023-05-15 NOTE — DISCHARGE SUMMARY
[facility]   Acute Rehabilitation Unit  Discharge summary     Date of Admission: 5/3/2023  Date of Discharge: 5/15/2023  Disposition: [place] at [facility]  Primary Care Physician: Tory Wise  Attending physician: Frank Rizvi,       DISCHARGE DIAGNOSIS      Debility in setting of acute on chronic respiratory failure, recent exacerbations of COPD and HF    Recurrent falls with recent left femoral neck fracture s/p pinning 3/14 and recent multiple fractures of right foot     Moderate-severe cognitive impairment    Acute on chronic respiratory with hypoxia    COPD    Right-sided pleural effusion, improved    HFpEF    Pulmonary hypertension     Chronic atrial fibrillation    Recurrent c diff colitis    CKD stage 3    Recent orthostatic hypotension with hx hypertension    Chronic anemia    Left ICA occlusion    Hx GERD    Hx depression    Hx rheumatoid arthritis    Hx pulmonary nodules    Hx gout    Hypomagnesemia      BRIEF SUMMARY  Fatuma Henry is a 77 year old female with a past medical history of paroxysmal atrial fibrillation (on Xarelto), CKD3, COPD, HFpEF, hyperlipidemia, hypertension, recurrent C diff colitis, frequent falls, RA, left ICA occlusion, anemia, depression, gout, chronic lower extremity edema, GERD, and recent left femur fracture 2/2 mechanical fall s/p closed reduction with percutaneous pinning on 3/14/23 who was admitted on 4/17/23 after recurrent fall, found to have multiple fractures of right foot as well as COPD exacerbation and HF exacerbation with hospital course complicated by pleural effusion s/p multiple thoracentesis, ongoing symptoms associated with recurrent C diff, pain, dyspnea, and suboptimal heart rate control.  She was admitted to ARU on 5/3/23 for multidisciplinary rehabilitation and ongoing medical management.  Rehab and medical course at ARU as below.    REHABILITATION COURSE  PT: No further  expectations of functional progress.     Progress towards therapy goal(s). See goals on Care Plan in [facility] electronic health record for goal details.  Goals partially met.  Barriers to achieving goals:   limited tolerance for therapy.     Therapy recommendation(s):    Continued therapy is recommended.  Rationale/Recommendations:  . . Minimal progress while on ARU. Progress limited d/t impaired cognition, orthostasis, and O2 sats. Currently tolerates activity on 2-3L supplemental O2. Pt will benefit from ongoing therapy to promote improved safety and ind with mobility and to decrease risk for future falls.      Current Status:  Bed Mobility: SBA  Transfer: MIN A with FWW from low surfaces  Gait: CGA up to 75' with FWW  Stairs: NT- not a goal for home  Balance:   Egan/5 = 1656               =   TU/5 = 75 seconds, ww, 1 assist, lifting assist to rise from chair               = 40 seconds    OT: Discharged to long term care facility.     Progress towards therapy goal(s). See goals on Care Plan in [facility] electronic health record for goal details.  Goals partially met.  Barriers to achieving goals:   limited tolerance for therapy and discharge from facility.     Therapy recommendation(s):    Continued therapy is recommended.  Rationale/Recommendations:  Pt requires CGA for all standing tasks and CGA/MIN A for all short distance ambulation and transfers with the walker. Pt does require assist for donning compression and cam boot and with bathing. Pt should not be left alone and should have CGA at all times as she is a high fall risk. Pt requires assist for managing her O2 tubing with all mobility. Pt has been using a RTS to elevate the toilet and will need either a shower chair or tub bench depending on the style of shower at facility. Pt also has significant cognitive deficits and will thus not be reliable to remember that she needs to wear her CAM boot when she gets up and will require assist with  all other IADL. Pt is discharging to a LTC due to family inability to provide the needed 24/7 assist for the pt. Pt will benefit from continued therapy to build activity tolerance as she is frequently limited by fatigue, SOB and dizziness.    SLP: Discharged to long term care facility.     Progress towards therapy goal(s). See goals on Care Plan in [facility] electronic health record for goal details.  Goals partially met.  Barriers to achieving goals:   discharge from facility.     Therapy recommendation(s):    Continued therapy is recommended.  Rationale/Recommendations:  SLP tx was focused on cognitive/communication function that would improve safety in current and next care environment. Pt with significantly impaired procedural recall, working memory, task initiation, problem solving, alternating attention which can impair safe transition to next level of care.  Pt will benefit to further address areas of impairment to maximize success in transition.    MEDICAL COURSE    Debility  Recurrent falls  Multiple fractures of the right foot: 3rd, 4th, 5th phalanges and 1st & 3rd metatarsals, managed non-operatively  Left minimally displaced impacted femoral neck fracture s/p closed reduction with percutaneous pinning on 3/14/23 with Dr. Mckeon  Completed rehabilitation at [facility], return home couple days ago. She fell at home prior to arrival, reportedly no syncope or near syncope. Patient fell while getting up from the chair by the table, with daughter being nearby.  Foot x-rays demonstrated above fractures.   - Right foot CAM when ambulating and weightbearing as tolerated  - Pain management: Tylenol PRN  - Recurrent fall on 5/12, witnessed by staff, landed on buttocks.  Lumbar spine xray on 5/13 with no acute changes.  Continue fall precautions.  - Continue PT/OT  - Follow up with [place] [facility] - foot and ankle provider for foot fractures and Dr. Mcekon for hip fracture     Moderate-severe cognitive impairment  Increased  "difficulties with cognition since ~Nov 2022 per patient/family.  Prior to that time, had been independent with all IADLs and driving, but since then has required family assist for those tasks.  SLUMS this admission 12/30.  SLP consulted at ARU to assist with evaluation/treatment.  Deficits in areas of attention, executive function, memory noted per SLP.  - Continue SLP/OT  - Neuropsych testing completed 5/11, will await final report     Acute on chronic respiratory failure with hypoxia  COPD with acute exacerbation, resolved, currently stable  Right-sided pleural effusion, s/p thoracentesis on 4/25 and again 5/3  HFpEF with acute exacerbation.  Resolved, now stable  Pulmonary hypertension, stable  [PTA meds: albuterol 2 puffs q4h PRN, budesonide-formoterol 160-4.5 mcg inhaler]  Exacerbations of CHF and COPD this admission.  Completed prednisone taper, now returned to PTA dose.  Initially on IV diuretics, transitioned to oral.  Underwent thoracentesis x2.  * Noted to have increased dyspnea 5/8 AM, sats dropping to high 70s, increased oxygen needs up to 6L.  Patient was evaluated by rapid response team, see their note for additional details.  Labs with mild leukocytosis, CRP only mildly elevated at 12, BNP 1400 (previously >4000), procal 0.04.  CXR with \"decreased size of the now small right pleural effusion with  overlying compressive atelectasis; cardiomegaly with engorged pulmonary vasculature\".  EKG not great reading but with atrial fibrillation, incomplete RBBB.  Patient was given Duoneb per RT, who also obtained alternative pulse ox, reading improved to high 90s back on baseline 2-3L O2 at rest.  Discussed VBG, labs, imaging with rapid response team, who returned to evaluate patient per protocol due to lactic acid 2.2.  Patient clinically improve and resumed on prior level of oxygen.  CXR overall improved.  Did not feel any change to current plan of care was indicated.  Offered IVF but patient preferred to " increase oral intake.  See their note for additional details. Respiratory status/symptoms improved, returned to prior baseline.  - Goal spO2 is 88-92%.  Oxygen by NC PRN to maintain sats.  - Continue PTA Symbicort  - Continue albuterol inhaler and Duoneb PRN  - Continue prednisone 5 mg (PTA dose for RA)  - Continue Lasix 40 mg daily  - Follow up pulmonology     Chronic atrial fibrillation  Difficult to rate control due low BP.  Cardiology consulted this admission and made some medication changes, felt HR better-controlled.  PTA anticoagulation held briefly for thoracentesis but resumed on 4/26.  - Continue Xarelto 15 mg daily  - Continue metoprolol 25 mg daily (reduced on 5/10 due to ongoing symptomatic orthostatic hypotension)  - Continue diltiazem  mg (increased on 4/30)  - Monitor BP, HR     Recurrent c diff colitis, resolved  Severe, 4th episode.  Off vancomycin since 4/6.  Completed taper Dificid per ID/GI recs (started last admission) as of 5/2/23.  - Avoid antibiotics if at all possible  - Continue probiotics  - Monitor bowel pattern  - Previously had been referred to [facility] for FMT consultation - appointment scheduled for November      CKD stage 3  Recent Cr in 1.1-1.3 range.  Most recent Cr at 0.99 on 5/15  - Avoid nephrotoxins and hypotension  - Repeat BMP in 1-2 weeks     Recent orthostatic hypotension  Hx HTN   BP has been on soft side during this admission, med changes as above per cardiology.  - Continue PTA Cardizem and metoprolol, with holding parameters.  Metoprolol dose reduced on 5/10 due to ongoing symptomatic orthostatic hypotension.  - Continue abdominal binder, compression stockings  - Monitor BP     Acute on chronic anemia  Baseline Hgb 9-10 range.  Most recent Hgb 9.1 on 5/15.  - Continue folic acid 1 mg daily     Left ICA occlusion  Had been noted in 12/22, asymptomatic.  - On Xarelto and statin as above  - Outpatient vascular surgery follow-up recommended     Hx GERD  - Continue  PTA pepcid      Hx depression  - Continue PTA Cymbalta     Hx pulm nodules with positive PET  Stable 17 x 19 mm groundglass nodule in the lingula since 07/21/2022.   - Follow up with pulm clinic as outpt and serial imaging 3 to 6 month     Hx rheumatoid arthritis  Hx of methotrexate--was discontinued during recent hospitalization.  No exacerbation of RA off methotrexate.  - Continue prednisone 5 mg daily (chronic dose, recently completed taper due to COPD exacerbation)     Hx gout  - Continue PTA allopurinol 300 mg daily     Hypomagnesemia  Mag slightly low on 5/8 at 1.6.  Replaced with mag sulfate 4g IV x1 (avoided PO given recent diarrhea).  Mag recurrent low on 5/15 at 1.5.  Given mag-oxide 400 mg PO.  - Repeat mag within 1 week     Leukocytosis, resolved  Abnormal UA  Is on chronic steroids but WBC previously WNL.  WBC slightly elevated at 12.1 on 5/8. Afebrile, VSS/WNL with exception of worsened hypoxia as above, improved after nebulizer and with alternative probe. CRP only mildly elevated at 12.03, procal 0.04.  CXR without evidence of infection.  UA with large leuk esterase, 73 WBC.  Started on empiric Bactrim by on-call provider.  CBC on 5/9 with WBC stable at 12.2.  UC with mixed urogenital jamee.  Per lab, no predominant organism, growth consistent with probable contamination during collection. Patient denies any urinary symptoms now or at time of urine collection.  Given asymptomatic, no clear pathogen on UC, increase in Cr on Bactrim, goal to minimize antibiotic exposure due to recurrent C diff, Bactrim discontinued.  WBC WNL on 5/11 at 10.7, no urinary symptoms.        DISCHARGE MEDICATIONS  Current Discharge Medication List      START taking these medications    Details   melatonin 3 MG tablet Take 1 tablet (3 mg) by mouth At Bedtime  Qty: 30 tablet, Refills: 0    Associated Diagnoses: Malaise and fatigue         CONTINUE these medications which have CHANGED    Details   acetaminophen (TYLENOL) 325 MG  tablet Take 1-2 tablets (325-650 mg) by mouth every 8 hours    Associated Diagnoses: Closed fracture of right foot, initial encounter      guaiFENesin (MUCINEX) 600 MG 12 hr tablet Take 1 tablet (600 mg) by mouth 2 times daily as needed for congestion    Associated Diagnoses: COPD exacerbation (H)      metoprolol succinate ER (TOPROL XL) 50 MG 24 hr tablet Take 0.5 tablets (25 mg) by mouth daily  Refills: 0    Associated Diagnoses: Paroxysmal atrial fibrillation (H)         CONTINUE these medications which have NOT CHANGED    Details   albuterol (PROAIR HFA/PROVENTIL HFA/VENTOLIN HFA) 108 (90 Base) MCG/ACT inhaler Inhale 2 puffs into the lungs every 4 hours as needed for shortness of breath / dyspnea or wheezing      allopurinol (ZYLOPRIM) 300 MG tablet Take 1 tablet (300 mg) by mouth daily  Qty: 60 tablet, Refills: 0    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      budesonide-formoterol (SYMBICORT) 160-4.5 MCG/ACT Inhaler Inhale 2 puffs into the lungs 2 times daily    Associated Diagnoses: Pulmonary emphysema, unspecified emphysema type (H)      cholestyramine (QUESTRAN) 4 g packet Take 1 packet by mouth 3 times daily (with meals)      compressor, for nebulizer Saran [COMPRESSOR, FOR NEBULIZER SARAN] Nebulizer treatment qid prn  Qty: 1 Device, Refills: 0    Associated Diagnoses: Reactive airway disease      diltiazem ER COATED BEADS (CARDIZEM CD/CARTIA XT) 240 MG 24 hr capsule Take 1 capsule (240 mg) by mouth daily  Refills: 0    Associated Diagnoses: Paroxysmal atrial fibrillation (H)      DULoxetine (CYMBALTA) 20 MG capsule Take 20 mg by mouth daily      famotidine (PEPCID) 20 MG tablet Take 20 mg by mouth daily      folic acid (FOLVITE) 1 MG tablet Take 1 tablet (1 mg) by mouth daily  Qty: 90 tablet, Refills: 0    Associated Diagnoses: Seropositive rheumatoid arthritis (H)      furosemide (LASIX) 40 MG tablet Take 1 tablet (40 mg) by mouth daily    Associated Diagnoses: Pleural effusion on right       ipratropium - albuterol 0.5 mg/2.5 mg/3 mL (DUONEB) 0.5-2.5 (3) MG/3ML neb solution Take 1 vial (3 mLs) by nebulization every 6 hours as needed for shortness of breath, wheezing or cough  Refills: 0    Associated Diagnoses: COPD exacerbation (H)      Lactobacillus-Inulin ([facility] DIGESTIVE HEALTH) CAPS Take 1 capsule by mouth 2 times daily      pravastatin (PRAVACHOL) 20 MG tablet Take 20 mg by mouth every evening      predniSONE (DELTASONE) 5 MG tablet Take 5 mg by mouth daily      rivaroxaban ANTICOAGULANT (XARELTO) 15 MG TABS tablet Take 1 tablet (15 mg) by mouth daily (with dinner)  Qty: 90 tablet, Refills: 3    Associated Diagnoses: Paroxysmal atrial fibrillation (H)      Vitamin D3 (CHOLECALCIFEROL) 125 MCG (5000 UT) tablet Take 1 tablet by mouth daily         STOP taking these medications       cyclobenzaprine (FLEXERIL) 5 MG tablet Comments:   Reason for Stopping:         magic mouthwash suspension, diphenhydrAMINE, lidocaine, aluminum-magnesium & simethicone, (FIRST-MOUTHWASH BLM) compounding kit Comments:   Reason for Stopping:                 DISCHARGE INSTRUCTIONS AND FOLLOW UP  Discharge Procedure Orders   General info for SNF   Order Comments: Length of Stay Estimate: Long Term Care  Condition at Discharge: Stable  Level of care:skilled   Rehabilitation Potential: Fair  Admission H&P remains valid and up-to-date: Yes  Recent Chemotherapy: N/A  Use Nursing Home Standing Orders: Yes     Mantoux instructions   Order Comments: Give two-step Mantoux (PPD) Per Facility Policy Yes     Follow Up and recommended labs and tests   Order Comments: Follow up with assisted physician.  The following labs/tests are recommended: CBC, BMP.    Follow up with ortho ([place] - 2 different providers: foot/ankle for foot fractures, and Dr. Mckeon for left femur fracture s/p repair).    Follow up with pulmonology (currently scheduled 5/17).    Follow up with cardiology (needs to be rescheduled from  5/10).    Follow up with vascular medicine (ICA occlusion, scheduled 5/18).    Follow up with GI (FMT, scheduled 11/1).     Reason for your hospital stay   Order Comments: You were admitted for rehabilitation with debility and recurrent falls resulting in injuries to your left hip and right foot, with recent COPD and heart failure exacerbations.     Activity - Up with nursing assistance     Order Specific Question Answer Comments   Is discharge order? Yes      Weight bearing status   Order Comments: WBAT to right lower extremity.  Right CAM boot on at all times when up.     Additional Discharge Instructions   Order Comments: Recommend abdominal binder and compression stockings on before getting out of bed to help prevent orthostatic hypotension.     No CPR- Do NOT Intubate     Order Specific Question Answer Comments   Code status determined by: Discussion with patient/ legal decision maker      Physical Therapy Adult Consult   Order Comments: Evaluate and treat as clinically indicated.    Reason:  debility, recurrent falls     Occupational Therapy Adult Consult   Order Comments: Evaluate and treat as clinically indicated.    Reason:  debility, recurrent falls     Speech Language Path Adult Consult   Order Comments: Evaluate and treat as clinically indicated.    Reason:  cognitive deficits     Oxygen (SNF/TCU) Discharge     Order Specific Question Answer Comments   Oxygen device Nasal cannula    Oxygen Administration Continuous    Keep SaO2 above Other    Keep SaO2 % above 88    Liters per minute Titrate    FIO2 Wean as tolerated      Fall precautions     Diet   Order Comments: Follow this diet upon discharge: 3 gm NA Diet     Order Specific Question Answer Comments   Is discharge order? Yes           PHYSICAL EXAMINATION    Most recent Vital Signs:   Vitals:    05/14/23 1019 05/14/23 1546 05/15/23 0826 05/15/23 0827   BP: (!) 113/39 114/44 112/40 112/40   BP Location: Left arm Right arm  Left arm   Patient Position:  Sitting Sitting  Sitting   Cuff Size: Adult Regular Adult Regular  Adult Regular   Pulse:  56 96 96   Resp:  16  16   Temp:  96.8  F (36  C)  (!) 96  F (35.6  C)   TempSrc:  Oral  Oral   SpO2:  96%  99%   Weight:       Height:           Gen: NAD, seated upright at edge of bed  HEENT: NC/AT, MMM  Cardio: RRR, no murmurs  Pulm: non-labored on 2L, lung sounds diminished on right, no crackles or wheezes bilat  Abd: soft, non-tender, non-distended, bowel sounds present  Extr: 2+ edema in bilat feet, no calf tenderness in bilateral lower extremities  Neuro/MSK: awake, alert, some generalized weakness, 4/5 throughout    35 minutes spent in discharge, including >50% in counseling and coordination of care, medication review and plan of care recommended on follow up.     Discharge summary was forwarded to Tory Wise (PCP) at the time of discharge, so as to bridge from hospital to outpatient care.     It was our pleasure to care for Fatuma Henry during this hospitalization. Please do not hesitate to contact me should there be questions regarding the hospital course or discharge plan.          Dawna Edwards PA-C  Physical Medicine and Rehabilitation

## 2023-05-17 PROBLEM — A04.71 ENTEROCOLITIS DUE TO CLOSTRIDIUM DIFFICILE, RECURRENT: Status: ACTIVE | Noted: 2022-01-01

## 2023-05-17 PROBLEM — J96.11 CHRONIC RESPIRATORY FAILURE WITH HYPOXIA (H): Status: ACTIVE | Noted: 2022-01-01

## 2023-05-17 NOTE — PROGRESS NOTES
[facility] Admission  PCP & CLINIC: Tory Wise MD, [address] [facility] / [facility] [address]  Chief Complaint   Patient presents with     Hospital F/U   [place] MRN: [id number]. Place of Service where encounter took place:  [facility] (TCU) [[id number]] Fatuma Henry  is a 77 year old  (1945), admitted to the above facility from  [facility]. Hospital stay 5/3 through 5/15. Admitted to this facility for  rehab, medical management, and nursing care. HPI information obtained from: facility chart records, facility staff, patient report, [facility] chart review, and Care Everywhere [facility] chart review.     Brief Summary of Hospital Course: Piotr presented to [facility] rehab center on 5/3 s/p hospitalization in April after fall w/ right foot fractures, COPD exacerbation, CHF exacerbation. She developed CDIFF, required multiple thoracentesis. Previously, she underwent left femoral neck fracture on 3/14 and was s/p pinning as well. Overall, she minimally tolerated therapy due to cognition and limited activity tolerance.    Updates since admission to transitional care unit: Piotr presented to TCU on 5/15 s/p the above hospitalization. Today, Piotr says that she is doing okay.  She is adjusting to her new environment, and has changed rooms a couple of times.  She says her appetite is fair, and she is not having any nausea or heartburn.  She still has loose stools, but does not have any indigestion or pain.  She does have shortness of breath, and this is on and off and sometimes associated with dyspnea.  This wakes her up at night as well, and she does have obstructive sleep apnea, but does not know if she has a CPAP or not.  She denies any headaches, but does endorse some lightheadedness.  She denies any ongoing hip pain, but is pretty sick of wearing her CAM boot and is wondering when she can get rid of this.  She denies any bladder issues.  She denies  palpitations.  She also endorses a little bit of leg cramping at night, and wonders about her magnesium level.    CODE STATUS/ADVANCE DIRECTIVES DISCUSSION: DNR / DNI. Patient's living condition: lives alone. ALLERGIES: Codeine, Fosamax [alendronate], and Morphine PAST MEDICAL HISTORY:  has a past medical history of Atrial fibrillation (H), CKD (chronic kidney disease) stage 3, GFR 30-59 ml/min (H), COPD (chronic obstructive pulmonary disease) (H), CRF (chronic renal failure), GERD (gastroesophageal reflux disease), Hypertension, Hypovolemic shock (H), Influenza A (12/01/2017), Pulmonary hypertension (H), Pulmonary nodules, Rheumatoid arthritis (H), and Sepsis (H) (12/24/2017).. PAST SURGICAL HISTORY:   has a past surgical history that includes Cholecystectomy; appendectomy; Bladder Suspension; PICC/Midline Placement (12/13/2022); and Closed reduction, percutaneous pinning hip (Left, 3/15/2023).. FAMILY HISTORY: family history includes Cerebrovascular Disease in her brother and father; Diabetes Type 2  in her brother; Heart Failure in her mother; Kidney failure in her sister.. SOCIAL HISTORY:   reports that she quit smoking about 5 years ago. Her smoking use included cigarettes. She smoked an average of .5 packs per day. She has never used smokeless tobacco. She reports that she does not drink alcohol and does not use drugs.  Post Discharge Medication Reconciliation Status: discharge medications reconciled and changed, per note/orders.  Current Outpatient Medications   Medication Sig Dispense Refill     acetaminophen (TYLENOL) 500 MG tablet Take 1,000 mg by mouth 2 times daily + 650mg PO BID PRN       calcium polycarbophil (FIBERCON) 625 MG tablet Take 2 tablets by mouth 2 times daily       methocarbamol (ROBAXIN) 500 MG tablet Take 500 mg by mouth 2 times daily as needed       albuterol (PROAIR HFA/PROVENTIL HFA/VENTOLIN HFA) 108 (90 Base) MCG/ACT inhaler Inhale 2 puffs into the lungs every 4 hours as needed for  "shortness of breath / dyspnea or wheezing       allopurinol (ZYLOPRIM) 300 MG tablet Take 1 tablet (300 mg) by mouth daily 60 tablet 0     budesonide-formoterol (SYMBICORT) 160-4.5 MCG/ACT Inhaler Inhale 2 puffs into the lungs 2 times daily       diltiazem ER COATED BEADS (CARDIZEM CD/CARTIA XT) 240 MG 24 hr capsule Take 1 capsule (240 mg) by mouth daily  0     DULoxetine (CYMBALTA) 20 MG capsule Take 20 mg by mouth daily       famotidine (PEPCID) 20 MG tablet Take 20 mg by mouth daily       folic acid (FOLVITE) 1 MG tablet Take 1 tablet (1 mg) by mouth daily 90 tablet 0     ipratropium - albuterol 0.5 mg/2.5 mg/3 mL (DUONEB) 0.5-2.5 (3) MG/3ML neb solution Take 1 vial (3 mLs) by nebulization every 6 hours as needed for shortness of breath, wheezing or cough  0     metoprolol succinate ER (TOPROL XL) 50 MG 24 hr tablet Take 0.5 tablets (25 mg) by mouth daily  0     pravastatin (PRAVACHOL) 20 MG tablet Take 20 mg by mouth every evening       predniSONE (DELTASONE) 5 MG tablet Take 5 mg by mouth daily       rivaroxaban ANTICOAGULANT (XARELTO) 15 MG TABS tablet Take 1 tablet (15 mg) by mouth daily (with dinner) 90 tablet 3     Vitamin D3 (CHOLECALCIFEROL) 125 MCG (5000 UT) tablet Take 1 tablet by mouth daily       ROS: Limited secondary to cognitive impairment but today pt reports the above and 10 point ROS of systems including Constitutional, Eyes, Respiratory, Cardiovascular, Gastroenterology, Genitourinary, Integumentary, Musculoskeletal, Psychiatric were all negative except for pertinent positives noted in my HPI.    Vitals: /51   Pulse 90   Temp 97.9  F (36.6  C)   Resp 18   Ht 1.651 m (5' 5\")   Wt 70.8 kg (156 lb)   SpO2 97%   BMI 25.96 kg/m    Exam:  GENERAL APPEARANCE: Alert, in no distress, cooperative.   ENT: Mouth/posterior oropharynx intact w/ moist mucous membranes, hearing acuity [place].  EYES: EOM, conjunctivae, lids, pupils and irises normal, PERRL2.   RESP: Respiratory effort fair, no " respiratory distress, Lung sounds clear/diminished.  On 2LO2 via NC.  CV: Auscultation of heart reveals S1, S2, rate controlled and rhythm irregular, no murmur, no rub or gallop, Edema 1+ BLE. Peripheral pulses are 2+.  ABDOMEN: Normal bowel sounds, soft, non-tender abdomen, and no masses palpated.  SKIN: Inspection/Palpation of skin and subcutaneous tissue baseline w/ fragility. No wounds/rashes noted.   NEURO: CN II-XII at patient's baseline, sensation baseline PPS.  PSYCH: Insight, judgement, and memory are impaired at baseline, affect and mood are happy/engaged.    Lab/Diagnostic data: Recent labs in [facility] reviewed by me today.     ASSESSMENT/PLAN:  Closed nondisplaced fracture of third metatarsal bone of right foot with routine healing, subsequent encounter  Closed displaced fracture of proximal phalanx of lesser toe of right foot with routine healing, subsequent encounter  Personal history of fall  S/P ORIF (open reduction internal fixation) fracture  Rheumatoid arthritis, involving unspecified site, unspecified whether rheumatoid factor present (H)  Acute on chronic congestive heart failure, unspecified heart failure type (H)  Paroxysmal atrial fibrillation (H)  Chronic obstructive pulmonary disease, unspecified COPD type (H)  Acute on chronic respiratory failure with hypoxia (H)  Stage 3 chronic kidney disease, unspecified whether stage 3a or 3b CKD (H)  Colitis due to Clostridium difficile  Mood disorder (H)  Subacute on chronic.  Complex/Tenuous.    Unclear current standing of CAM boot and previous fractures.  Provider will coordinate care with orthopedics and investigate via chart review further.  Patient is to continue with therapy with current orders.    Pain control appears to be manageable, but some muscle spasms.  Will obtain blood work to rule out electrolyte disturbance, and allow for as needed Robaxin (which was previously used).  We will also schedule Tylenol as this may assist with her  therapy.    Noting underlying RA, with chronic steroid dosing.  Steroid dosing is very low dose, but does contribute to bleeding risk in conjunction with Xarelto.  Patient is not symptomatic for GIB/PUD, and is utilizing Pepcid.    Reports of some loose stools ongoing, and noting use of Questran is a difficult for patient.  Will discontinue, and trial FiberCon capsules instead.  No evidence of ongoing C. difficile, and no current antibiotic use.  Culturelle is not clinically indicated at this time, and therefore will discontinue.    Extensive record review shows previous dosing with methotrexate for RA, but this has since been stopped.  This is likely reasoning behind folic acid dosing, but no folic acid level has all ever been obtained.  No macrocytosis is noted.  Will discontinue folic acid and obtain blood level.    Noting chronic respiratory failure with obstructive sleep apnea.  Patient is unclear if she has a CPAP, and given her report of sleep disturbance with waking up and air hunger, would highly recommend CPAP use.  Nursing to follow-up to locate or investigate further.  Melatonin is not going to relieve this issue.  Will discontinue.    Patient does not have a cough surprisingly, and therefore will discontinue guaifenesin.  This is available on standing house orders should she need it.  Follow up w/in 1 week or as needed.    Orders:  1. Discontinue Questran.  2. FiberCon 2 caps PO BID. Dx: loose stools.  3. Discontinue Culturelle.   4. Discontinue Folic acid.   5. Discontinue Melatonin.  6. Hgb, BMP, Mag, Folic acid level x1 on 5/22. Dx: fatigue.   7. Locate CPAP.  8. Robaxin 500mg PO BID PRN. Dx: muscle spasms.   9. Change Tylenol to 1000mg PO BID. Dx: pain.  10. Change PRN Tylenol to 650mg PO BID PRN. Dx: pain/fever.  11. Discontinue Guaifenesin (use MARIUM).     Electronically signed by:  Dr. Ana Rosa Reyna, APRN CNP DNP

## 2023-05-17 NOTE — PROGRESS NOTES
[facility]  PRIMARY CARE PROVIDER AND CLINIC:  Tory Wise MD, [address] [facility] / [facility] [address]  Chief Complaint   Patient presents with     [facility]     [place] Medical Record Number:  [id number]  Place of Service where encounter took place:  [facility] [place] () [[id number]]    Fatuma Henry  is a 77 year old  (1945), admitted to the above facility from  [facility]. Hospital stay 5/3/23 through 5/15/23..  Admitted to this facility for  rehab, medical management and nursing care.    HPI:    HPI information obtained from: facility chart records, facility staff, patient report and [facility] chart review.   Brief Summary:  Patient with PMH pertinent for HFpEF, COPD, chronic A-fib, HTN, recurrent C. difficile colitis, dementia.   3/14/23: fall resulted in left femur fracture status post closed reduction with percutaneous pinning   4/17/2023:Multiple fracture of right foot, AE-COPD, CHF exacerbation, pleural effusion status post multiple thoracentesis, recurrent C. Difficile, suboptimal HR controlled, hypoxia placed on O2 supplement.   5/3/23: transferred to acute rehab hospital, but was discharged on 5/15 due to intolerance given debility and cognition deficit, to this facility as an LTC Resident.     TODAY  -------------  -Heart:  -Lung:  Reprots uses O2 at home pretty much so.  Reports hard time to breath. Does cough sometimes but no wheezing  -Foot fracture: no pain at all.  Able to put weight on it.   -Left  hip fracture:  No pain.   - Rehab: reports  Going well, does something every day.  Walked today 2 FW walker  -Cognition: think she broke her foot in Nov 2023.     ===============================================  CODE STATUS/ADVANCE DIRECTIVES DISCUSSION:   DNR   Patient's living condition: lives with spouse  ALLERGIES: Codeine, Fosamax [alendronate], and Morphine  PAST MEDICAL HISTORY:  has a past medical history of  Atrial fibrillation (H), CKD (chronic kidney disease) stage 3, GFR 30-59 ml/min (H), COPD (chronic obstructive pulmonary disease) (H), CRF (chronic renal failure), GERD (gastroesophageal reflux disease), Hypertension, Hypovolemic shock (H), Influenza A (12/01/2017), Pulmonary hypertension (H), Pulmonary nodules, Rheumatoid arthritis (H), and Sepsis (H) (12/24/2017).  PAST SURGICAL HISTORY:   has a past surgical history that includes Cholecystectomy; appendectomy; Bladder Suspension; PICC/Midline Placement (12/13/2022); and Closed reduction, percutaneous pinning hip (Left, 3/15/2023).  FAMILY HISTORY: family history includes Cerebrovascular Disease in her brother and father; Diabetes Type 2  in her brother; Heart Failure in her mother; Kidney failure in her sister.  SOCIAL HISTORY:   reports that she quit smoking about 5 years ago. Her smoking use included cigarettes. She smoked an average of .5 packs per day. She has never used smokeless tobacco. She reports that she does not drink alcohol and does not use drugs.    Post Discharge Medication Reconciliation Status: discharge medications reconciled and changed, per note/orders  Current Outpatient Medications   Medication Sig Dispense Refill     acetaminophen (TYLENOL) 500 MG tablet Take 1,000 mg by mouth 2 times daily + 650mg PO BID PRN       albuterol (PROAIR HFA/PROVENTIL HFA/VENTOLIN HFA) 108 (90 Base) MCG/ACT inhaler Inhale 2 puffs into the lungs every 4 hours as needed for shortness of breath / dyspnea or wheezing       allopurinol (ZYLOPRIM) 300 MG tablet Take 1 tablet (300 mg) by mouth daily 60 tablet 0     budesonide-formoterol (SYMBICORT) 160-4.5 MCG/ACT Inhaler Inhale 2 puffs into the lungs 2 times daily       calcium polycarbophil (FIBERCON) 625 MG tablet Take 2 tablets by mouth 2 times daily       diltiazem ER COATED BEADS (CARDIZEM CD/CARTIA XT) 240 MG 24 hr capsule Take 1 capsule (240 mg) by mouth daily  0     DULoxetine (CYMBALTA) 20 MG capsule Take 20  "mg by mouth daily       famotidine (PEPCID) 20 MG tablet Take 20 mg by mouth daily       folic acid (FOLVITE) 1 MG tablet Take 1 tablet (1 mg) by mouth daily 90 tablet 0     ipratropium - albuterol 0.5 mg/2.5 mg/3 mL (DUONEB) 0.5-2.5 (3) MG/3ML neb solution Take 1 vial (3 mLs) by nebulization every 6 hours as needed for shortness of breath, wheezing or cough  0     methocarbamol (ROBAXIN) 500 MG tablet Take 500 mg by mouth 2 times daily as needed       metoprolol succinate ER (TOPROL XL) 50 MG 24 hr tablet Take 0.5 tablets (25 mg) by mouth daily  0     pravastatin (PRAVACHOL) 20 MG tablet Take 20 mg by mouth every evening       predniSONE (DELTASONE) 5 MG tablet Take 5 mg by mouth daily       rivaroxaban ANTICOAGULANT (XARELTO) 15 MG TABS tablet Take 1 tablet (15 mg) by mouth daily (with dinner) 90 tablet 3     Vitamin D3 (CHOLECALCIFEROL) 125 MCG (5000 UT) tablet Take 1 tablet by mouth daily       ROS:  Limited secondary to cognitive impairment but today pt reports- see HPI    Vitals:  /51   Pulse 90   Temp 97.7  F (36.5  C)   Resp 18   Ht 1.651 m (5' 5\")   Wt 70.8 kg (156 lb)   SpO2 98%   BMI 25.96 kg/m    Exam:  GENERAL APPEARANCE:  in no distress,   ENT: NC in place  RESP:  Unlabored breathing. CTA b/l.   CV:  S1S2 audible, regular HR, no murmur appreciated.   ABDOMEN:  soft, NT/ND, BS audible.   M/S: no edema in extremities.  CAM boot next the wheel chair.   SKIN:  Dry skin over legs.   NEURO:   No NFD appreciated on observation.   PSYCH:  affect and mood normal. AAOx person, time (D,M,Y), facility type).       Lab/Diagnostic data: Reviewed in the chart and EHR.        ASSESSMENT/PLAN:  (I50.32) Chronic heart failure with preserved ejection fraction (H)  (primary encounter diagnosis)  (I48.21) Permanent atrial fibrillation (H)  (I10) Primary hypertension  (E78.5) Hyperlipidemia, unspecified hyperlipidemia type  (I65.22) ICAO (internal carotid artery occlusion), left  (Z87.09) hx of pleural effusion " s/p thoracentesis (May 2023)  - Compensated. continue current regiment  - cardiology follow up  - CVR on diltiazem, continue  - on Xarelto for CVA ppx. Continue      (J44.9) Chronic obstructive pulmonary disease, unspecified COPD type (H)  (J96.11,  J96.12) Chronic respiratory failure with hypoxia and hypercapnia (H)  - compensated. continue current regiment. O2 continuous  - Pulmonology follow up      (F03.C0) severe dementia w/o behavioral disturbance, psychotic disturbance, mood disturbance, or anxiety, unspecified dementia type.   - SLUM 12/30 at rehab center.   - Continue to anticipate needs. Chronic condition, ongoing decline expected.   -  Continue to provide redirection and reassurance as needed. Maintain safe living situation with goals focused on comfort.    (M06.9) Rheumatoid arthritis, involving unspecified site, unspecified whether rheumatoid factor present (H)  - stable. On prednisone. Continue. Rheumatology follow up    (N18.31) Stage 3a chronic kidney disease (H)  -- Avoid nephrotoxic  medications. Renally dose medications. Monitor electrolytes, and dehydration status      (D64.9) Anemia, unspecified type  - stable.     (S92.334D) Closed nondisplaced fracture of third metatarsal bone of right foot with routine healing, subsequent (S92.511D) Closed displaced fracture of proximal phalanx of lesser toe of right foot with routine healing, -- Analgesia optimal with the current regimen. Continue present plan and medications.  - Followed by Orthopedic Team. Follow on the recommendations / instructions.   - DVT prophylaxis according to Orthopedic team recommendations  - Started rehab program, making a progress, continue until desired goal is achieved.       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     Electronically signed by:  Julia Weems MD

## 2023-05-17 NOTE — LETTER
5/17/2023        RE: Fatuma Henry  [address] [facility] [address] [facility] [address] [facility]  [address]  [place] [address]        [facility] Admission  PCP & CLINIC: Tory Wise MD, [address] [facility] [address]  Chief Complaint   Patient presents with     Hospital F/U   Janet MRN: [id number]. Place of Service where encounter took place:  [facility] [place] (TCU) [[id number]] Fatuma Henry  is a 77 year old  (1945), admitted to the above facility from  [facility]. Hospital stay 5/3 through 5/15. Admitted to this facility for  rehab, medical management, and nursing care. HPI information obtained from: facility chart records, facility staff, patient report, [facility] chart review, and Care Everywhere [facility] chart review.     Brief Summary of Hospital Course: Piotr presented to [facility] rehab center on 5/3 s/p hospitalization in April after fall w/ right foot fractures, COPD exacerbation, CHF exacerbation. She developed CDIFF, required multiple thoracentesis. Previously, she underwent left femoral neck fracture on 3/14 and was s/p pinning as well. Overall, she minimally tolerated therapy due to cognition and limited activity tolerance.    Updates since admission to transitional care unit: Piotr presented to TCU on 5/15 s/p the above hospitalization. Today, Piotr says that she is doing okay.  She is adjusting to her new environment, and has changed rooms a couple of times.  She says her appetite is fair, and she is not having any nausea or heartburn.  She still has loose stools, but does not have any indigestion or pain.  She does have shortness of breath, and this is on and off and sometimes associated with dyspnea.  This wakes her up at night as well, and she does have obstructive sleep apnea, but does not know if she has a CPAP or not.  She denies any headaches, but does endorse some lightheadedness.  She denies any ongoing hip pain, but is pretty sick of wearing her CAM  boot and is wondering when she can get rid of this.  She denies any bladder issues.  She denies palpitations.  She also endorses a little bit of leg cramping at night, and wonders about her magnesium level.    CODE STATUS/ADVANCE DIRECTIVES DISCUSSION: DNR / DNI. Patient's living condition: lives alone. ALLERGIES: Codeine, Fosamax [alendronate], and Morphine PAST MEDICAL HISTORY:  has a past medical history of Atrial fibrillation (H), CKD (chronic kidney disease) stage 3, GFR 30-59 ml/min (H), COPD (chronic obstructive pulmonary disease) (H), CRF (chronic renal failure), GERD (gastroesophageal reflux disease), Hypertension, Hypovolemic shock (H), Influenza A (12/01/2017), Pulmonary hypertension (H), Pulmonary nodules, Rheumatoid arthritis (H), and Sepsis (H) (12/24/2017).. PAST SURGICAL HISTORY:   has a past surgical history that includes Cholecystectomy; appendectomy; Bladder Suspension; PICC/Midline Placement (12/13/2022); and Closed reduction, percutaneous pinning hip (Left, 3/15/2023).. FAMILY HISTORY: family history includes Cerebrovascular Disease in her brother and father; Diabetes Type 2  in her brother; Heart Failure in her mother; Kidney failure in her sister.. SOCIAL HISTORY:   reports that she quit smoking about 5 years ago. Her smoking use included cigarettes. She smoked an average of .5 packs per day. She has never used smokeless tobacco. She reports that she does not drink alcohol and does not use drugs.  Post Discharge Medication Reconciliation Status: discharge medications reconciled and changed, per note/orders.  Current Outpatient Medications   Medication Sig Dispense Refill     acetaminophen (TYLENOL) 500 MG tablet Take 1,000 mg by mouth 2 times daily + 650mg PO BID PRN       calcium polycarbophil (FIBERCON) 625 MG tablet Take 2 tablets by mouth 2 times daily       methocarbamol (ROBAXIN) 500 MG tablet Take 500 mg by mouth 2 times daily as needed       albuterol (PROAIR HFA/PROVENTIL HFA/VENTOLIN  "HFA) 108 (90 Base) MCG/ACT inhaler Inhale 2 puffs into the lungs every 4 hours as needed for shortness of breath / dyspnea or wheezing       allopurinol (ZYLOPRIM) 300 MG tablet Take 1 tablet (300 mg) by mouth daily 60 tablet 0     budesonide-formoterol (SYMBICORT) 160-4.5 MCG/ACT Inhaler Inhale 2 puffs into the lungs 2 times daily       diltiazem ER COATED BEADS (CARDIZEM CD/CARTIA XT) 240 MG 24 hr capsule Take 1 capsule (240 mg) by mouth daily  0     DULoxetine (CYMBALTA) 20 MG capsule Take 20 mg by mouth daily       famotidine (PEPCID) 20 MG tablet Take 20 mg by mouth daily       folic acid (FOLVITE) 1 MG tablet Take 1 tablet (1 mg) by mouth daily 90 tablet 0     ipratropium - albuterol 0.5 mg/2.5 mg/3 mL (DUONEB) 0.5-2.5 (3) MG/3ML neb solution Take 1 vial (3 mLs) by nebulization every 6 hours as needed for shortness of breath, wheezing or cough  0     metoprolol succinate ER (TOPROL XL) 50 MG 24 hr tablet Take 0.5 tablets (25 mg) by mouth daily  0     pravastatin (PRAVACHOL) 20 MG tablet Take 20 mg by mouth every evening       predniSONE (DELTASONE) 5 MG tablet Take 5 mg by mouth daily       rivaroxaban ANTICOAGULANT (XARELTO) 15 MG TABS tablet Take 1 tablet (15 mg) by mouth daily (with dinner) 90 tablet 3     Vitamin D3 (CHOLECALCIFEROL) 125 MCG (5000 UT) tablet Take 1 tablet by mouth daily       ROS: Limited secondary to cognitive impairment but today pt reports the above and 10 point ROS of systems including Constitutional, Eyes, Respiratory, Cardiovascular, Gastroenterology, Genitourinary, Integumentary, Musculoskeletal, Psychiatric were all negative except for pertinent positives noted in my HPI.    Vitals: /51   Pulse 90   Temp 97.9  F (36.6  C)   Resp 18   Ht 1.651 m (5' 5\")   Wt 70.8 kg (156 lb)   SpO2 97%   BMI 25.96 kg/m    Exam:  GENERAL APPEARANCE: Alert, in no distress, cooperative.   ENT: Mouth/posterior oropharynx intact w/ moist mucous membranes, hearing acuity [place].  EYES: EOM, " conjunctivae, lids, pupils and irises normal, PERRL2.   RESP: Respiratory effort fair, no respiratory distress, Lung sounds clear/diminished.  On 2LO2 via NC.  CV: Auscultation of heart reveals S1, S2, rate controlled and rhythm irregular, no murmur, no rub or gallop, Edema 1+ BLE. Peripheral pulses are 2+.  ABDOMEN: Normal bowel sounds, soft, non-tender abdomen, and no masses palpated.  SKIN: Inspection/Palpation of skin and subcutaneous tissue baseline w/ fragility. No wounds/rashes noted.   NEURO: CN II-XII at patient's baseline, sensation baseline PPS.  PSYCH: Insight, judgement, and memory are impaired at baseline, affect and mood are happy/engaged.    Lab/Diagnostic data: Recent labs in [facility] reviewed by me today.     ASSESSMENT/PLAN:  Closed nondisplaced fracture of third metatarsal bone of right foot with routine healing, subsequent encounter  Closed displaced fracture of proximal phalanx of lesser toe of right foot with routine healing, subsequent encounter  Personal history of fall  S/P ORIF (open reduction internal fixation) fracture  Rheumatoid arthritis, involving unspecified site, unspecified whether rheumatoid factor present (H)  Acute on chronic congestive heart failure, unspecified heart failure type (H)  Paroxysmal atrial fibrillation (H)  Chronic obstructive pulmonary disease, unspecified COPD type (H)  Acute on chronic respiratory failure with hypoxia (H)  Stage 3 chronic kidney disease, unspecified whether stage 3a or 3b CKD (H)  Colitis due to Clostridium difficile  Mood disorder (H)  Subacute on chronic.  Complex/Tenuous.    Unclear current standing of CAM boot and previous fractures.  Provider will coordinate care with orthopedics and investigate via chart review further.  Patient is to continue with therapy with current orders.    Pain control appears to be manageable, but some muscle spasms.  Will obtain blood work to rule out electrolyte disturbance, and allow for as needed Robaxin (which  was previously used).  We will also schedule Tylenol as this may assist with her therapy.    Noting underlying RA, with chronic steroid dosing.  Steroid dosing is very low dose, but does contribute to bleeding risk in conjunction with Xarelto.  Patient is not symptomatic for GIB/PUD, and is utilizing Pepcid.    Reports of some loose stools ongoing, and noting use of Questran is a difficult for patient.  Will discontinue, and trial FiberCon capsules instead.  No evidence of ongoing C. difficile, and no current antibiotic use.  Culturelle is not clinically indicated at this time, and therefore will discontinue.    Extensive record review shows previous dosing with methotrexate for RA, but this has since been stopped.  This is likely reasoning behind folic acid dosing, but no folic acid level has all ever been obtained.  No macrocytosis is noted.  Will discontinue folic acid and obtain blood level.    Noting chronic respiratory failure with obstructive sleep apnea.  Patient is unclear if she has a CPAP, and given her report of sleep disturbance with waking up and air hunger, would highly recommend CPAP use.  Nursing to follow-up to locate or investigate further.  Melatonin is not going to relieve this issue.  Will discontinue.    Patient does not have a cough surprisingly, and therefore will discontinue guaifenesin.  This is available on standing house orders should she need it.  Follow up w/in 1 week or as needed.    Orders:  1. Discontinue Questran.  2. FiberCon 2 caps PO BID. Dx: loose stools.  3. Discontinue Culturelle.   4. Discontinue Folic acid.   5. Discontinue Melatonin.  6. Hgb, BMP, Mag, Folic acid level x1 on 5/22. Dx: fatigue.   7. Locate CPAP.  8. Robaxin 500mg PO BID PRN. Dx: muscle spasms.   9. Change Tylenol to 1000mg PO BID. Dx: pain.  10. Change PRN Tylenol to 650mg PO BID PRN. Dx: pain/fever.  11. Discontinue Guaifenesin (use MARIUM).     Electronically signed by:  Dr. nAa Rosa Reyna, FREDY CNP  DNP                          Sincerely,        FREDY Carroll CNP

## 2023-05-18 NOTE — LETTER
5/18/2023        RE: Fatuma Henry  [address]  [address]  [place] [address]        [place] [facility]  PRIMARY CARE PROVIDER AND CLINIC:  Tory Wise MD, [address] [facility] [address]  Chief Complaint   Patient presents with     Establish Care     [facility] Record Number:  [id number]  Place of Service where encounter took place:  [facility] [place] () [[id number]]    Fatuma Henry  is a 77 year old  (1945), admitted to the above facility from  [facility]. Hospital stay 5/3/23 through 5/15/23..  Admitted to this facility for  rehab, medical management and nursing care.    HPI:    HPI information obtained from: facility chart records, facility staff, patient report and [facility] chart review.   Brief Summary:  Patient with PMH pertinent for HFpEF, COPD, chronic A-fib, HTN, recurrent C. difficile colitis, dementia.   3/14/23: fall resulted in left femur fracture status post closed reduction with percutaneous pinning   4/17/2023:Multiple fracture of right foot, AE-COPD, CHF exacerbation, pleural effusion status post multiple thoracentesis, recurrent C. Difficile, suboptimal HR controlled, hypoxia placed on O2 supplement.   5/3/23: transferred to acute rehab hospital, but was discharged on 5/15 due to intolerance given debility and cognition deficit, to this facility as an LTC Resident.     TODAY  -------------  -Heart:  -Lung:  Reprots uses O2 at home pretty much so.  Reports hard time to breath. Does cough sometimes but no wheezing  -Foot fracture: no pain at all.  Able to put weight on it.   -Left  hip fracture:  No pain.   - Rehab: reports  Going well, does something every day.  Walked today 2 FW walker  -Cognition: think she broke her foot in Nov 2023.     ===============================================  CODE STATUS/ADVANCE DIRECTIVES DISCUSSION:   DNR   Patient's living condition: lives with  spouse  ALLERGIES: Codeine, Fosamax [alendronate], and Morphine  PAST MEDICAL HISTORY:  has a past medical history of Atrial fibrillation (H), CKD (chronic kidney disease) stage 3, GFR 30-59 ml/min (H), COPD (chronic obstructive pulmonary disease) (H), CRF (chronic renal failure), GERD (gastroesophageal reflux disease), Hypertension, Hypovolemic shock (H), Influenza A (12/01/2017), Pulmonary hypertension (H), Pulmonary nodules, Rheumatoid arthritis (H), and Sepsis (H) (12/24/2017).  PAST SURGICAL HISTORY:   has a past surgical history that includes Cholecystectomy; appendectomy; Bladder Suspension; PICC/Midline Placement (12/13/2022); and Closed reduction, percutaneous pinning hip (Left, 3/15/2023).  FAMILY HISTORY: family history includes Cerebrovascular Disease in her brother and father; Diabetes Type 2  in her brother; Heart Failure in her mother; Kidney failure in her sister.  SOCIAL HISTORY:   reports that she quit smoking about 5 years ago. Her smoking use included cigarettes. She smoked an average of .5 packs per day. She has never used smokeless tobacco. She reports that she does not drink alcohol and does not use drugs.    Post Discharge Medication Reconciliation Status: discharge medications reconciled and changed, per note/orders  Current Outpatient Medications   Medication Sig Dispense Refill     acetaminophen (TYLENOL) 500 MG tablet Take 1,000 mg by mouth 2 times daily + 650mg PO BID PRN       albuterol (PROAIR HFA/PROVENTIL HFA/VENTOLIN HFA) 108 (90 Base) MCG/ACT inhaler Inhale 2 puffs into the lungs every 4 hours as needed for shortness of breath / dyspnea or wheezing       allopurinol (ZYLOPRIM) 300 MG tablet Take 1 tablet (300 mg) by mouth daily 60 tablet 0     budesonide-formoterol (SYMBICORT) 160-4.5 MCG/ACT Inhaler Inhale 2 puffs into the lungs 2 times daily       calcium polycarbophil (FIBERCON) 625 MG tablet Take 2 tablets by mouth 2 times daily       diltiazem ER COATED BEADS (CARDIZEM  "CD/CARTIA XT) 240 MG 24 hr capsule Take 1 capsule (240 mg) by mouth daily  0     DULoxetine (CYMBALTA) 20 MG capsule Take 20 mg by mouth daily       famotidine (PEPCID) 20 MG tablet Take 20 mg by mouth daily       folic acid (FOLVITE) 1 MG tablet Take 1 tablet (1 mg) by mouth daily 90 tablet 0     ipratropium - albuterol 0.5 mg/2.5 mg/3 mL (DUONEB) 0.5-2.5 (3) MG/3ML neb solution Take 1 vial (3 mLs) by nebulization every 6 hours as needed for shortness of breath, wheezing or cough  0     methocarbamol (ROBAXIN) 500 MG tablet Take 500 mg by mouth 2 times daily as needed       metoprolol succinate ER (TOPROL XL) 50 MG 24 hr tablet Take 0.5 tablets (25 mg) by mouth daily  0     pravastatin (PRAVACHOL) 20 MG tablet Take 20 mg by mouth every evening       predniSONE (DELTASONE) 5 MG tablet Take 5 mg by mouth daily       rivaroxaban ANTICOAGULANT (XARELTO) 15 MG TABS tablet Take 1 tablet (15 mg) by mouth daily (with dinner) 90 tablet 3     Vitamin D3 (CHOLECALCIFEROL) 125 MCG (5000 UT) tablet Take 1 tablet by mouth daily       ROS:  Limited secondary to cognitive impairment but today pt reports- see HPI    Vitals:  /51   Pulse 90   Temp 97.7  F (36.5  C)   Resp 18   Ht 1.651 m (5' 5\")   Wt 70.8 kg (156 lb)   SpO2 98%   BMI 25.96 kg/m    Exam:  GENERAL APPEARANCE:  in no distress,   ENT: NC in place  RESP:  Unlabored breathing. CTA b/l.   CV:  S1S2 audible, regular HR, no murmur appreciated.   ABDOMEN:  soft, NT/ND, BS audible.   M/S: no edema in extremities.  CAM boot next the wheel chair.   SKIN:  Dry skin over legs.   NEURO:   No NFD appreciated on observation.   PSYCH:  affect and mood normal. AAOx person, time (D,M,Y), facility type).       Lab/Diagnostic data: Reviewed in the chart and EHR.        ASSESSMENT/PLAN:  (I50.32) Chronic heart failure with preserved ejection fraction (H)  (primary encounter diagnosis)  (I48.21) Permanent atrial fibrillation (H)  (I10) Primary hypertension  (E78.5) " Hyperlipidemia, unspecified hyperlipidemia type  (I65.22) ICAO (internal carotid artery occlusion), left  (Z87.09) hx of pleural effusion s/p thoracentesis (May 2023)  - Compensated. continue current regiment  - cardiology follow up  - CVR on diltiazem, continue  - on Xarelto for CVA ppx. Continue      (J44.9) Chronic obstructive pulmonary disease, unspecified COPD type (H)  (J96.11,  J96.12) Chronic respiratory failure with hypoxia and hypercapnia (H)  - compensated. continue current regiment. O2 continuous  - Pulmonology follow up      (F03.C0) severe dementia w/o behavioral disturbance, psychotic disturbance, mood disturbance, or anxiety, unspecified dementia type.   - SLUM 12/30 at rehab center.   - Continue to anticipate needs. Chronic condition, ongoing decline expected.   -  Continue to provide redirection and reassurance as needed. Maintain safe living situation with goals focused on comfort.    (M06.9) Rheumatoid arthritis, involving unspecified site, unspecified whether rheumatoid factor present (H)  - stable. On prednisone. Continue. Rheumatology follow up    (N18.31) Stage 3a chronic kidney disease (H)  -- Avoid nephrotoxic  medications. Renally dose medications. Monitor electrolytes, and dehydration status      (D64.9) Anemia, unspecified type  - stable.     (S92.334D) Closed nondisplaced fracture of third metatarsal bone of right foot with routine healing, subsequent (S92.511D) Closed displaced fracture of proximal phalanx of lesser toe of right foot with routine healing, -- Analgesia optimal with the current regimen. Continue present plan and medications.  - Followed by Orthopedic Team. Follow on the recommendations / instructions.   - DVT prophylaxis according to Orthopedic team recommendations  - Started rehab program, making a progress, continue until desired goal is achieved.       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     Electronically  signed by:  Julia Weems MD           Sincerely,        Julia Weems MD

## 2023-05-23 NOTE — PROGRESS NOTES
"[employer] [employer] GERIATRIC SERVICE  Episodic/Acute/Follow-Up  [place] MRN: [id number]. Place of Service where encounter took place:  [facility] [place] [4002]   Chief Complaint   Patient presents with     RECHECK    HPI: Fatuma Henry  is a 77 year old (1945), who is being seen today for an episodic care visit. Today's concern is:    Piotr seen today on routine follow up as she continues to rehab in [facility]CU. Last week, she admitted and her care plan was simplified. Questran was changed to FiberCon, Culturelle and folic acid was stopped, Melatonin was stopped and several other changes to promote comfort.     Today, nursing reports that yesterday evening Piotr fell while in her room. Piotr describes the events as reaching for something on the dresser, and toppling forward.  She recalls hitting her left hip and bumping her head a little bit (points to her left eye shiner).  She denies having any profound dizziness or new syncopal episode, just says that she lost her balance.  She does get dizzy from time to time, but does not recall that at this time.  She denies any ongoing headache, visual change, or new facial pain.  She does report new pain in the left hip.  She has a previous fracture here and is postoperative.  She points to some large bruising in this area, and of note she is anticoagulated with Xarelto.  She denies feeling a fever, new shortness of breath or new changes that has caused her to fall or feel out of sorts.  She says her sleep habits are variable.  She denies chest pain/palpitations, or new edema.  She also does not endorse any new pain to her feet/ankles, but is really sick of her cam boot.  Her appetite is fair.    Past Medical and Surgical History reviewed in Epic today.  MEDICATIONS:  Current Outpatient Medications   Medication Sig Dispense Refill     acetaminophen (TYLENOL) 500 MG tablet Take 1,000 mg by mouth 2 times daily + 650mg PO BID PRN       albuterol (PROAIR HFA/PROVENTIL  "HFA/VENTOLIN HFA) 108 (90 Base) MCG/ACT inhaler Inhale 2 puffs into the lungs every 4 hours as needed for shortness of breath / dyspnea or wheezing       allopurinol (ZYLOPRIM) 300 MG tablet Take 1 tablet (300 mg) by mouth daily 60 tablet 0     budesonide-formoterol (SYMBICORT) 160-4.5 MCG/ACT Inhaler Inhale 2 puffs into the lungs 2 times daily       calcium polycarbophil (FIBERCON) 625 MG tablet Take 2 tablets by mouth 2 times daily       diltiazem ER COATED BEADS (CARDIZEM CD/CARTIA XT) 240 MG 24 hr capsule Take 1 capsule (240 mg) by mouth daily  0     DULoxetine (CYMBALTA) 20 MG capsule Take 20 mg by mouth daily       famotidine (PEPCID) 20 MG tablet Take 20 mg by mouth daily       ipratropium - albuterol 0.5 mg/2.5 mg/3 mL (DUONEB) 0.5-2.5 (3) MG/3ML neb solution Take 1 vial (3 mLs) by nebulization every 6 hours as needed for shortness of breath, wheezing or cough  0     methocarbamol (ROBAXIN) 500 MG tablet Take 500 mg by mouth 2 times daily as needed       metoprolol succinate ER (TOPROL XL) 50 MG 24 hr tablet Take 0.5 tablets (25 mg) by mouth daily  0     pravastatin (PRAVACHOL) 20 MG tablet Take 20 mg by mouth every evening       predniSONE (DELTASONE) 5 MG tablet Take 5 mg by mouth daily       rivaroxaban ANTICOAGULANT (XARELTO) 15 MG TABS tablet Take 1 tablet (15 mg) by mouth daily (with dinner) 90 tablet 3     Vitamin D3 (CHOLECALCIFEROL) 125 MCG (5000 UT) tablet Take 1 tablet by mouth daily       Objective: /60   Pulse 84   Temp 98.6  F (37  C)   Resp 20   Ht 1.651 m (5' 5\")   Wt 65 kg (143 lb 3.2 oz)   SpO2 92%   BMI 23.83 kg/m    Exam:  GENERAL APPEARANCE: Alert, in no distress, cooperative.   RESP: Respiratory effort fair, no respiratory distress, Lung sounds clear/diminished.  On 2LO2 via NC.  CV: Auscultation of heart reveals S1, S2, rate controlled and rhythm irregular, no murmur, no rub or gallop, Edema 1+ BLE. Peripheral pulses are 2+.  MSK: Left hip as pictured here. No reduced ROM, " but clear bruising and mild hematoma present. Tenderness to palpation.     PSYCH: Insight, judgement, and memory are impaired at baseline, affect and mood are happy/engaged.    Labs: Labs done in facility are in EPIC. Please refer to them using [employer]/Care Everywhere.    ASSESSMENT/PLAN:  Chronic heart failure with preserved ejection fraction (H)  Permanent atrial fibrillation (H)  Primary hypertension  Chronic obstructive pulmonary disease, unspecified COPD type (H)  Chronic respiratory failure with hypoxia and hypercapnia (H)  Severe dementia without behavioral disturbance, psychotic disturbance, mood disturbance, or anxiety, unspecified dementia type (H)  Stage 3a chronic kidney disease (H)  Closed nondisplaced fracture of third metatarsal bone of right foot with routine healing, subsequent encounter  Personal history of fall  S/P ORIF (open reduction internal fixation) fracture  Acute on chronic.  Tenuous.    Noting high risk for repeat falls, status post ORIF of the left hip, and postoperative right ankle as well from previous falls.  Newfound pain in the left hip, and she was unable to attend her rehab session today due to this new pain.  As pictured above, concern for hematoma given her anticoagulant use.  Range of motion is WDL, but patient is having pain and point tenderness.  Will obtain stat x-ray of the left hip.    Will again ask nursing to follow-up with orthopedics regarding cam boot and duration of use.    No concern for neurological compromise, though patient does have a little bit of redness around left periorbital region.  There is no instability of facial bones, no profound swelling or ecchymosis here.  Mechanics of her fall sound to be focused more on the left hip.    No clear orthostasis seen on blood pressure review and all other vital signs appear within defined limits.  Patient appears nontoxic and is more embarrassed at her fall than anything else.    CHF and COPD are always tenuous as her  disease is quite advanced, but exacerbations do not appear to be present or causative of fall at this time.  Should continue plan of care but judiciously monitor.  Follow up with results, within 1 week, or as needed    Orders:  1. 2-view XR left hip x1, stat. Dx: fall/pain.    Electronically signed by:  Dr. Ana Rosa Reyna, APRN CNP DNP

## 2023-05-23 NOTE — LETTER
5/23/2023        RE: Fatuma Henry  [address] [place] [address]        [facility] GERIATRIC SERVICE  Episodic/Acute/Follow-Up  [place] MRN: [id number]. Place of Service where encounter took place:  [facility] ([facility]) [4002]   Chief Complaint   Patient presents with     RECHECK    HPI: Fatuma Henry  is a 77 year old (1945), who is being seen today for an episodic care visit. Today's concern is:    Piotr seen today on routine follow up as she continues to rehab in TCU. Last week, she admitted and her care plan was simplified. Questran was changed to FiberCon, Culturelle and folic acid was stopped, Melatonin was stopped and several other changes to promote comfort.     Today, nursing reports that yesterday evening Piotr fell while in her room. Piotr describes the events as reaching for something on the dresser, and toppling forward.  She recalls hitting her left hip and bumping her head a little bit (points to her left eye shiner).  She denies having any profound dizziness or new syncopal episode, just says that she lost her balance.  She does get dizzy from time to time, but does not recall that at this time.  She denies any ongoing headache, visual change, or new facial pain.  She does report new pain in the left hip.  She has a previous fracture here and is postoperative.  She points to some large bruising in this area, and of note she is anticoagulated with Xarelto.  She denies feeling a fever, new shortness of breath or new changes that has caused her to fall or feel out of sorts.  She says her sleep habits are variable.  She denies chest pain/palpitations, or new edema.  She also does not endorse any new pain to her feet/ankles, but is really sick of her cam boot.  Her appetite is fair.    Past Medical and Surgical History reviewed in Epic today.  MEDICATIONS:  Current Outpatient Medications   Medication Sig Dispense Refill     acetaminophen (TYLENOL) 500 MG tablet  "Take 1,000 mg by mouth 2 times daily + 650mg PO BID PRN       albuterol (PROAIR HFA/PROVENTIL HFA/VENTOLIN HFA) 108 (90 Base) MCG/ACT inhaler Inhale 2 puffs into the lungs every 4 hours as needed for shortness of breath / dyspnea or wheezing       allopurinol (ZYLOPRIM) 300 MG tablet Take 1 tablet (300 mg) by mouth daily 60 tablet 0     budesonide-formoterol (SYMBICORT) 160-4.5 MCG/ACT Inhaler Inhale 2 puffs into the lungs 2 times daily       calcium polycarbophil (FIBERCON) 625 MG tablet Take 2 tablets by mouth 2 times daily       diltiazem ER COATED BEADS (CARDIZEM CD/CARTIA XT) 240 MG 24 hr capsule Take 1 capsule (240 mg) by mouth daily  0     DULoxetine (CYMBALTA) 20 MG capsule Take 20 mg by mouth daily       famotidine (PEPCID) 20 MG tablet Take 20 mg by mouth daily       ipratropium - albuterol 0.5 mg/2.5 mg/3 mL (DUONEB) 0.5-2.5 (3) MG/3ML neb solution Take 1 vial (3 mLs) by nebulization every 6 hours as needed for shortness of breath, wheezing or cough  0     methocarbamol (ROBAXIN) 500 MG tablet Take 500 mg by mouth 2 times daily as needed       metoprolol succinate ER (TOPROL XL) 50 MG 24 hr tablet Take 0.5 tablets (25 mg) by mouth daily  0     pravastatin (PRAVACHOL) 20 MG tablet Take 20 mg by mouth every evening       predniSONE (DELTASONE) 5 MG tablet Take 5 mg by mouth daily       rivaroxaban ANTICOAGULANT (XARELTO) 15 MG TABS tablet Take 1 tablet (15 mg) by mouth daily (with dinner) 90 tablet 3     Vitamin D3 (CHOLECALCIFEROL) 125 MCG (5000 UT) tablet Take 1 tablet by mouth daily       Objective: /60   Pulse 84   Temp 98.6  F (37  C)   Resp 20   Ht 1.651 m (5' 5\")   Wt 65 kg (143 lb 3.2 oz)   SpO2 92%   BMI 23.83 kg/m    Exam:  GENERAL APPEARANCE: Alert, in no distress, cooperative.   RESP: Respiratory effort fair, no respiratory distress, Lung sounds clear/diminished.  On 2LO2 via NC.  CV: Auscultation of heart reveals S1, S2, rate controlled and rhythm irregular, no murmur, no rub or " gallop, Edema 1+ BLE. Peripheral pulses are 2+.  MSK: Left hip as pictured here. No reduced ROM, but clear bruising and mild hematoma present. Tenderness to palpation.     PSYCH: Insight, judgement, and memory are impaired at baseline, affect and mood are happy/engaged.    Labs: Labs done in facility are in EPIC. Please refer to them using [employer]/Care Everywhere.    ASSESSMENT/PLAN:  Chronic heart failure with preserved ejection fraction (H)  Permanent atrial fibrillation (H)  Primary hypertension  Chronic obstructive pulmonary disease, unspecified COPD type (H)  Chronic respiratory failure with hypoxia and hypercapnia (H)  Severe dementia without behavioral disturbance, psychotic disturbance, mood disturbance, or anxiety, unspecified dementia type (H)  Stage 3a chronic kidney disease (H)  Closed nondisplaced fracture of third metatarsal bone of right foot with routine healing, subsequent encounter  Personal history of fall  S/P ORIF (open reduction internal fixation) fracture  Acute on chronic.  Tenuous.    Noting high risk for repeat falls, status post ORIF of the left hip, and postoperative right ankle as well from previous falls.  Newfound pain in the left hip, and she was unable to attend her rehab session today due to this new pain.  As pictured above, concern for hematoma given her anticoagulant use.  Range of motion is WDL, but patient is having pain and point tenderness.  Will obtain stat x-ray of the left hip.    Will again ask nursing to follow-up with orthopedics regarding cam boot and duration of use.    No clear orthostasis seen on blood pressure review and all other vital signs appear within defined limits.  Patient appears nontoxic and is more embarrassed at her fall than anything else.    CHF and COPD are always tenuous as her disease is quite advanced, but exacerbations do not appear to be present or causative of fall at this time.  Should continue plan of care but judiciously monitor.  Follow up  with results, within 1 week, or as needed    Orders:  1. 2-view XR left hip x1, stat. Dx: fall/pain.    Electronically signed by:  FREDY Romeo CNP [facility]          Sincerely,        FREDY Carroll CNP

## 2023-05-30 NOTE — LETTER
5/30/2023        RE: Fatuma Henry  [address] [place] [address]        [facility] GERIATRIC SERVICE  Episodic/Acute/Follow-Up  [place] MRN: [id number]. Place of Service where encounter took place:  [facility] ([facility]) [4002]   Chief Complaint   Patient presents with     RECHECK    HPI: Fatuma Henry  is a 77 year old (1945), who is being seen today for an episodic care visit. Today's concern is:    Piotr seen today on routine follow up as she continues to rehab in [facility]. Today, Amie denies any new aches or pains, but the nursing reports a painful mouth with sores.  She thinks her dentures fit just fine, and does not think that this is the problem.  She says it is difficult to eat sometimes.  She denies headache or dizziness, she says her left hip feels a lot better than it did last week and the bruising is much better as well.  She denies any new constipation or diarrhea.  She denies new shortness of breath, but says that she still has shortness of breath with activity and wakes up gasping for air at night.  She is not sure about a sleep study, and nursing investigated and was unable to ascertain if the CPAP has ever been ordered.    Past Medical and Surgical History reviewed in Epic today.  MEDICATIONS:  Current Outpatient Medications   Medication Sig Dispense Refill     benzocaine (ANBESOL) 10 % gel Take by mouth 3 times daily Before meals and then QDay PRN       acetaminophen (TYLENOL) 500 MG tablet Take 1,000 mg by mouth 2 times daily + 650mg PO BID PRN       albuterol (PROAIR HFA/PROVENTIL HFA/VENTOLIN HFA) 108 (90 Base) MCG/ACT inhaler Inhale 2 puffs into the lungs every 4 hours as needed for shortness of breath / dyspnea or wheezing       allopurinol (ZYLOPRIM) 300 MG tablet Take 1 tablet (300 mg) by mouth daily 60 tablet 0     budesonide-formoterol (SYMBICORT) 160-4.5 MCG/ACT Inhaler Inhale 2 puffs into the lungs 2 times daily       calcium polycarbophil  "(FIBERCON) 625 MG tablet Take 2 tablets by mouth 2 times daily       diltiazem ER COATED BEADS (CARDIZEM CD/CARTIA XT) 240 MG 24 hr capsule Take 1 capsule (240 mg) by mouth daily  0     DULoxetine (CYMBALTA) 20 MG capsule Take 20 mg by mouth daily       famotidine (PEPCID) 20 MG tablet Take 20 mg by mouth daily       ipratropium - albuterol 0.5 mg/2.5 mg/3 mL (DUONEB) 0.5-2.5 (3) MG/3ML neb solution Take 1 vial (3 mLs) by nebulization every 6 hours as needed for shortness of breath, wheezing or cough  0     metoprolol succinate ER (TOPROL XL) 50 MG 24 hr tablet Take 0.5 tablets (25 mg) by mouth daily  0     pravastatin (PRAVACHOL) 20 MG tablet Take 20 mg by mouth every evening       predniSONE (DELTASONE) 5 MG tablet Take 5 mg by mouth daily       rivaroxaban ANTICOAGULANT (XARELTO) 15 MG TABS tablet Take 1 tablet (15 mg) by mouth daily (with dinner) 90 tablet 3     Vitamin D3 (CHOLECALCIFEROL) 125 MCG (5000 UT) tablet Take 1 tablet by mouth daily       Objective: /65   Pulse 84   Temp 98.6  F (37  C)   Resp 20   Ht 1.651 m (5' 5\")   Wt 65.5 kg (144 lb 4.8 oz)   SpO2 98%   BMI 24.01 kg/m    Exam:  GENERAL APPEARANCE: Alert, in no distress, cooperative.   ENT: lower lip inner frenulum has small leukoplakia, and then lower left gumline (adenturous) has a moderate sized leukoplakia causing discomfort.   RESP: Respiratory effort good, no respiratory distress, Lung sounds diminished. On 2LO2 via NC.  CV: Auscultation of heart reveals S1, S2, rate controlled and rhythm irregular, no murmur, no rub or gallop, Edema trace BLE. Peripheral pulses are 2+.  PSYCH: Insight, judgement, and memory are baseline impaired, affect and mood are happy/engaged.    Labs: Labs done in facility are in EPIC. Please refer to them using [employer]/Care Everywhere.    ASSESSMENT/PLAN:  Chronic heart failure with preserved ejection fraction (H)  Permanent atrial fibrillation (H)  Chronic obstructive pulmonary disease, unspecified COPD " type (H)  Severe dementia without behavioral disturbance, psychotic disturbance, mood disturbance, or anxiety, unspecified dementia type (H)  Closed nondisplaced fracture of third metatarsal bone of right foot with routine healing, subsequent encounter  Acute on chronic. Ongoing.    Noting advanced COPD, insomnia, and either orthopnea or JOHANNA which is undiagnosed.  Would highly recommend a sleep medicine referral.    No use of as needed Robaxin.  Will discontinue.    Leukoplakia as noted above, and likely ill fitting dentures given weight loss.  Would recommend patient see in-house dentist, and will start Orajel to provide topical relief and aid with intake.    Reports of bruising and activity improving.  Will trend hemoglobin as noted below.    Provider coordinated care with nursing surrounding patient's cam boot.  Nursing to call [facility] [phone], and request follow-up with ankle/foot specialist.  They may even be able to give a verbal over the phone surrounding this.  Follow up w/in 1-2 weeks or as needed.     Orders:  1. Recheck Hgb x1 on 5/31. Dx: bruising.   2. Discontinue Robaxin.  3. Sleep medicine referral x1, routine. Dx: severe COPD w/ insomnia.  4. Orajel OTC, apply to lower/inner denture line on the left and medial areas TID before meals and Qday PRN. Dx: pain.    Electronically signed by:  Dr. Ana Rosa Reyna, APRN CNP DNP          Sincerely,        Ana Rosa Reyna, APRALAN CNP

## 2023-05-30 NOTE — PROGRESS NOTES
[facility] GERIATRIC SERVICE  Episodic/Acute/Follow-Up  [place] MRN: [id number]. Place of Service where encounter took place:  [facility] [place] ([facility]) [4002]   Chief Complaint   Patient presents with     RECHECK    HPI: Fatuma Henry  is a 77 year old (1945), who is being seen today for an episodic care visit. Today's concern is:    Gerheather seen today on routine follow up as she continues to rehab in [facility]. Today, Amie denies any new aches or pains, but the nursing reports a painful mouth with sores.  She thinks her dentures fit just fine, and does not think that this is the problem.  She says it is difficult to eat sometimes.  She denies headache or dizziness, she says her left hip feels a lot better than it did last week and the bruising is much better as well.  She denies any new constipation or diarrhea.  She denies new shortness of breath, but says that she still has shortness of breath with activity and wakes up gasping for air at night.  She is not sure about a sleep study, and nursing investigated and was unable to ascertain if the CPAP has ever been ordered.    Past Medical and Surgical History reviewed in Epic today.  MEDICATIONS:  Current Outpatient Medications   Medication Sig Dispense Refill     benzocaine (ANBESOL) 10 % gel Take by mouth 3 times daily Before meals and then QDay PRN       acetaminophen (TYLENOL) 500 MG tablet Take 1,000 mg by mouth 2 times daily + 650mg PO BID PRN       albuterol (PROAIR HFA/PROVENTIL HFA/VENTOLIN HFA) 108 (90 Base) MCG/ACT inhaler Inhale 2 puffs into the lungs every 4 hours as needed for shortness of breath / dyspnea or wheezing       allopurinol (ZYLOPRIM) 300 MG tablet Take 1 tablet (300 mg) by mouth daily 60 tablet 0     budesonide-formoterol (SYMBICORT) 160-4.5 MCG/ACT Inhaler Inhale 2 puffs into the lungs 2 times daily       calcium polycarbophil (FIBERCON) 625 MG tablet Take 2 tablets by mouth 2 times daily       diltiazem ER COATED BEADS (CARDIZEM  "CD/CARTIA XT) 240 MG 24 hr capsule Take 1 capsule (240 mg) by mouth daily  0     DULoxetine (CYMBALTA) 20 MG capsule Take 20 mg by mouth daily       famotidine (PEPCID) 20 MG tablet Take 20 mg by mouth daily       ipratropium - albuterol 0.5 mg/2.5 mg/3 mL (DUONEB) 0.5-2.5 (3) MG/3ML neb solution Take 1 vial (3 mLs) by nebulization every 6 hours as needed for shortness of breath, wheezing or cough  0     metoprolol succinate ER (TOPROL XL) 50 MG 24 hr tablet Take 0.5 tablets (25 mg) by mouth daily  0     pravastatin (PRAVACHOL) 20 MG tablet Take 20 mg by mouth every evening       predniSONE (DELTASONE) 5 MG tablet Take 5 mg by mouth daily       rivaroxaban ANTICOAGULANT (XARELTO) 15 MG TABS tablet Take 1 tablet (15 mg) by mouth daily (with dinner) 90 tablet 3     Vitamin D3 (CHOLECALCIFEROL) 125 MCG (5000 UT) tablet Take 1 tablet by mouth daily       Objective: /65   Pulse 84   Temp 98.6  F (37  C)   Resp 20   Ht 1.651 m (5' 5\")   Wt 65.5 kg (144 lb 4.8 oz)   SpO2 98%   BMI 24.01 kg/m    Exam:  GENERAL APPEARANCE: Alert, in no distress, cooperative.   ENT: lower lip inner frenulum has small leukoplakia, and then lower left gumline (adenturous) has a moderate sized leukoplakia causing discomfort.   RESP: Respiratory effort good, no respiratory distress, Lung sounds diminished. On 2LO2 via NC.  CV: Auscultation of heart reveals S1, S2, rate controlled and rhythm irregular, no murmur, no rub or gallop, Edema trace BLE. Peripheral pulses are 2+.  PSYCH: Insight, judgement, and memory are baseline impaired, affect and mood are happy/engaged.    Labs: Labs done in facility are in EPIC. Please refer to them using [employer]/Care Everywhere.    ASSESSMENT/PLAN:  Chronic heart failure with preserved ejection fraction (H)  Permanent atrial fibrillation (H)  Chronic obstructive pulmonary disease, unspecified COPD type (H)  Severe dementia without behavioral disturbance, psychotic disturbance, mood disturbance, or " anxiety, unspecified dementia type (H)  Closed nondisplaced fracture of third metatarsal bone of right foot with routine healing, subsequent encounter  Acute on chronic. Ongoing.    Noting advanced COPD, insomnia, and either orthopnea or JOHANNA which is undiagnosed.  Would highly recommend a sleep medicine referral.    No use of as needed Robaxin.  Will discontinue.    Leukoplakia as noted above, and likely ill fitting dentures given weight loss.  Would recommend patient see in-house dentist, and will start Orajel to provide topical relief and aid with intake.    Reports of bruising and activity improving.  Will trend hemoglobin as noted below.    Provider coordinated care with nursing surrounding patient's cam boot.  Nursing to call [facility] [phone], and request follow-up with ankle/foot specialist.  They may even be able to give a verbal over the phone surrounding this.  Follow up w/in 1-2 weeks or as needed.     Orders:  1. Recheck Hgb x1 on 5/31. Dx: bruising.   2. Discontinue Robaxin.  3. Sleep medicine referral x1, routine. Dx: severe COPD w/ insomnia.  4. Orajel OTC, apply to lower/inner denture line on the left and medial areas TID before meals and Qday PRN. Dx: pain.    Electronically signed by:  FREDY Romeo CNP DNP

## 2023-06-08 NOTE — PROGRESS NOTES
[facility]  Episodic/Acute/Follow-Up  [place] MRN: [id number]. Place of Service where encounter took place:  [facility] ([facility]) [4002]   Chief Complaint   Patient presents with     Nursing Home Acute    HPI: Fatuma Henry  is a 77 year old (1945), who is being seen today for an episodic care visit. Today's concern is:    Fatuma seen on routine follow-up as she is residing in [facility] as a long-term care patient.  She is awaiting a long-term care bed at this facility.  Last week, provider ordered Orajel to help assist with mouth sores related to her dentures.  We also had ordered a sleep study/sleep medicine referral, but nursing reported back to provider that patient is refusing this.    Today, Fatuma says that she does not want more testing and she does not want to go through a sleep study.  She continues to complain about her sleep and her breathing.  She is using the same amount of oxygen, and denies any other changes.  She says her mouth sores are still there, and she does not like the gel application (even saying that it hurts to apply it).  Her dentures are in place at this time.  She denies fever, chills, new aches or pains, and her left hip is feeling pretty good today.    Past Medical and Surgical History reviewed in Epic today.  MEDICATIONS:  Current Outpatient Medications   Medication Sig Dispense Refill     magic mouthwash suspension (diphenhydrAMINE, lidocaine, aluminum-magnesium & simethicone) Swish and spit 5 mLs in mouth 4 times daily (before meals and nightly) 100 mL 1     acetaminophen (TYLENOL) 500 MG tablet Take 1,000 mg by mouth 2 times daily + 650mg PO BID PRN       albuterol (PROAIR HFA/PROVENTIL HFA/VENTOLIN HFA) 108 (90 Base) MCG/ACT inhaler Inhale 2 puffs into the lungs every 4 hours as needed for shortness of breath / dyspnea or wheezing       allopurinol (ZYLOPRIM) 300 MG tablet Take 1 tablet (300 mg) by mouth daily 60 tablet 0     budesonide-formoterol  "(SYMBICORT) 160-4.5 MCG/ACT Inhaler Inhale 2 puffs into the lungs 2 times daily       calcium polycarbophil (FIBERCON) 625 MG tablet Take 2 tablets by mouth 2 times daily       diltiazem ER COATED BEADS (CARDIZEM CD/CARTIA XT) 240 MG 24 hr capsule Take 1 capsule (240 mg) by mouth daily  0     DULoxetine (CYMBALTA) 20 MG capsule Take 20 mg by mouth daily       famotidine (PEPCID) 20 MG tablet Take 20 mg by mouth daily       ipratropium - albuterol 0.5 mg/2.5 mg/3 mL (DUONEB) 0.5-2.5 (3) MG/3ML neb solution Take 1 vial (3 mLs) by nebulization every 6 hours as needed for shortness of breath, wheezing or cough  0     metoprolol succinate ER (TOPROL XL) 50 MG 24 hr tablet Take 0.5 tablets (25 mg) by mouth daily  0     pravastatin (PRAVACHOL) 20 MG tablet Take 20 mg by mouth every evening       predniSONE (DELTASONE) 5 MG tablet Take 5 mg by mouth daily       rivaroxaban ANTICOAGULANT (XARELTO) 15 MG TABS tablet Take 1 tablet (15 mg) by mouth daily (with dinner) 90 tablet 3     Vitamin D3 (CHOLECALCIFEROL) 125 MCG (5000 UT) tablet Take 1 tablet by mouth daily       Objective: /68   Pulse 88   Temp 97.9  F (36.6  C)   Resp 16   Ht 1.651 m (5' 5\")   Wt 67.1 kg (148 lb)   SpO2 95%   BMI 24.63 kg/m    Exam:  GENERAL APPEARANCE: Alert, in no distress, cooperative.   ENT: Leukoplakia persist as previously noted last visit.   RESP: Respiratory effort fair, no respiratory distress, On 2LO2 via NC.  CV:Edema 0+ BLE. Peripheral pulses are 2+.  PSYCH: Insight, judgement, and memory are impaired at baseline, affect and mood are happy/engaged.    Labs: Labs done in facility are in EPIC. Please refer to them using [facility] Everywhere.    ASSESSMENT/PLAN:  Mouth sores  Leukoplakia of oral cavity  Severe dementia without behavioral disturbance, psychotic disturbance, mood disturbance, or anxiety, unspecified dementia type (H)  Chronic respiratory failure with hypoxia and hypercapnia (H)  Chronic obstructive pulmonary " disease, unspecified COPD type (H)  Acute on chronic. Ongoing.    We will discontinue Orajel, as this is not helping to heal sores which were visualized, and is uncomfortable during application.    We will trial Magic mouthwash standard formulation, though this is sometimes difficult to obtain from pharmacy.  Will order as noted below on a scheduled basis in order to facilitate some relief.  May be able to change this to as needed in the future if sores can start to heal.    Provider counseled Fatuma that she could sleep better with an appliance that could help her if obstructive sleep apnea is present.  That she could have more energy, that her breathing may improve.  She is going to think about this, but will cancel sleep referral in the meantime.  Follow up w/in 1 month or as needed.    Orders:  1. Discontinue Orajel.  2. Magic Mouthwash standard formula w/ 1:1:1 suspension. QID swish and spit. Dx: leukoplakia/mouth sores.   3. Discontinue Sleep study referral.     Electronically signed by:  Dr. Ana Rosa Reyna, APRN CNP DNP

## 2023-06-08 NOTE — LETTER
6/8/2023        RE: Fatuma Henry  [address] [place] [address]        [facility]  Episodic/Acute/Follow-Up  [place] MRN: [id number]. Place of Service where encounter took place:  [facility] ([facility]) [4002]   Chief Complaint   Patient presents with     Nursing Home Acute    HPI: Fatuma Henry  is a 77 year old (1945), who is being seen today for an episodic care visit. Today's concern is:    Fatuma seen on routine follow-up as she is residing in [facility] as a long-term care patient.  She is awaiting a long-term care bed at this facility.  Last week, provider ordered Orajel to help assist with mouth sores related to her dentures.  We also had ordered a sleep study/sleep medicine referral, but nursing reported back to provider that patient is refusing this.    Today, Fatuma says that she does not want more testing and she does not want to go through a sleep study.  She continues to complain about her sleep and her breathing.  She is using the same amount of oxygen, and denies any other changes.  She says her mouth sores are still there, and she does not like the gel application (even saying that it hurts to apply it).  Her dentures are in place at this time.  She denies fever, chills, new aches or pains, and her left hip is feeling pretty good today.    Past Medical and Surgical History reviewed in Epic today.  MEDICATIONS:  Current Outpatient Medications   Medication Sig Dispense Refill     magic mouthwash suspension (diphenhydrAMINE, lidocaine, aluminum-magnesium & simethicone) Swish and spit 5 mLs in mouth 4 times daily (before meals and nightly) 100 mL 1     acetaminophen (TYLENOL) 500 MG tablet Take 1,000 mg by mouth 2 times daily + 650mg PO BID PRN       albuterol (PROAIR HFA/PROVENTIL HFA/VENTOLIN HFA) 108 (90 Base) MCG/ACT inhaler Inhale 2 puffs into the lungs every 4 hours as needed for shortness of breath / dyspnea or wheezing       allopurinol  "(ZYLOPRIM) 300 MG tablet Take 1 tablet (300 mg) by mouth daily 60 tablet 0     budesonide-formoterol (SYMBICORT) 160-4.5 MCG/ACT Inhaler Inhale 2 puffs into the lungs 2 times daily       calcium polycarbophil (FIBERCON) 625 MG tablet Take 2 tablets by mouth 2 times daily       diltiazem ER COATED BEADS (CARDIZEM CD/CARTIA XT) 240 MG 24 hr capsule Take 1 capsule (240 mg) by mouth daily  0     DULoxetine (CYMBALTA) 20 MG capsule Take 20 mg by mouth daily       famotidine (PEPCID) 20 MG tablet Take 20 mg by mouth daily       ipratropium - albuterol 0.5 mg/2.5 mg/3 mL (DUONEB) 0.5-2.5 (3) MG/3ML neb solution Take 1 vial (3 mLs) by nebulization every 6 hours as needed for shortness of breath, wheezing or cough  0     metoprolol succinate ER (TOPROL XL) 50 MG 24 hr tablet Take 0.5 tablets (25 mg) by mouth daily  0     pravastatin (PRAVACHOL) 20 MG tablet Take 20 mg by mouth every evening       predniSONE (DELTASONE) 5 MG tablet Take 5 mg by mouth daily       rivaroxaban ANTICOAGULANT (XARELTO) 15 MG TABS tablet Take 1 tablet (15 mg) by mouth daily (with dinner) 90 tablet 3     Vitamin D3 (CHOLECALCIFEROL) 125 MCG (5000 UT) tablet Take 1 tablet by mouth daily       Objective: /68   Pulse 88   Temp 97.9  F (36.6  C)   Resp 16   Ht 1.651 m (5' 5\")   Wt 67.1 kg (148 lb)   SpO2 95%   BMI 24.63 kg/m    Exam:  GENERAL APPEARANCE: Alert, in no distress, cooperative.   ENT: Leukoplakia persist as previously noted last visit.   RESP: Respiratory effort fair, no respiratory distress, On 2LO2 via NC.  CV:Edema 0+ BLE. Peripheral pulses are 2+.  PSYCH: Insight, judgement, and memory are impaired at baseline, affect and mood are happy/engaged.    Labs: Labs done in facility are in EPIC. Please refer to them using [facility] Everywhere.    ASSESSMENT/PLAN:  Mouth sores  Leukoplakia of oral cavity  Severe dementia without behavioral disturbance, psychotic disturbance, mood disturbance, or anxiety, unspecified dementia type " (H)  Chronic respiratory failure with hypoxia and hypercapnia (H)  Chronic obstructive pulmonary disease, unspecified COPD type (H)  Acute on chronic. Ongoing.    We will discontinue Orajel, as this is not helping to heal sores which were visualized, and is uncomfortable during application.    We will trial Magic mouthwash standard formulation, though this is sometimes difficult to obtain from pharmacy.  Will order as noted below on a scheduled basis in order to facilitate some relief.  May be able to change this to as needed in the future if sores can start to heal.    Provider counseled Fatuma that she could sleep better with an appliance that could help her if obstructive sleep apnea is present.  That she could have more energy, that her breathing may improve.  She is going to think about this, but will cancel sleep referral in the meantime.  Follow up w/in 1 month or as needed.    Orders:  1. Discontinue Orajel.  2. Magic Mouthwash standard formula w/ 1:1:1 suspension. QID swish and spit. Dx: leukoplakia/mouth sores.   3. Discontinue Sleep study referral.     Electronically signed by:  Dr. Ana Rosa Reyna, APRN CNP DNP          Sincerely,        Ana Rosa Reyna, FREDY CNP

## 2023-06-11 NOTE — TELEPHONE ENCOUNTER
[facility] TRIAGE ENCOUNTER    Chief Complaint   Patient presents with     Diarrhea       Fatumaann Henry is a 77 year old  (1945), Nurse called today to report: patient having loose stools this AM. History of c diff    ASSESSMENT/PLAN    OK to send sample for c diff    Assess and take vitals every shift for 3 days    Monitor symptoms and call if worse    Electronically signed by:   Tory Rey NP

## 2023-06-16 NOTE — ED PROVIDER NOTES
HPI   Patient is a 77-year-old female presenting by EMS transport for respiratory distress.  She has a known history of COPD and asthma.  She takes Symbicort, DuoNebs, and prednisone 5 mg daily.  She is on nasal cannula oxygen supplementation at home, 2 L/min 24 hours a day.  She has a history of atrial fibrillation and hypertension.  She takes diltiazem, metoprolol, Xarelto.  She has hyperlipidemia and takes Pravachol.  She has a history of rheumatoid arthritis.  I am not sure if this is the reason why she is taking prednisone or if its her COPD.  I do not see other immunomodulatory medicines for the rheumatoid arthritis.  She has a history of CKD stage III.  She quit smoking in 2017.  No drugs of abuse.  No alcohol.    The patient reports progressive shortness of breath over the past 3 days.  Today she got to the point where she simply needed help.  She is known to be DNR/DNI and had requested no BiPAP but this was overruled by the patient on the way here.  She comes in with BiPAP at 8/6 and 100% FiO2.  She tells me that she has had some chest pain today though I cannot obtain a more detailed history than that.  She does not have pain currently.  She tells me that her peripheral edema is at baseline, not recently worsened.  No calf pain.  No cough or congestion.  No fever.        Allergies:  Allergies   Allergen Reactions     Codeine Nausea and Vomiting     Fosamax [Alendronate] Diarrhea     Morphine Nausea and Vomiting     Other reaction(s): Vomiting     Problem List:    Patient Active Problem List    Diagnosis Date Noted     Foot fracture, right 05/03/2023     Priority: Medium     Unsteady gait when walking 04/30/2023     Priority: Medium     Personal history of fall 04/30/2023     Priority: Medium     COPD exacerbation (H) 04/24/2023     Priority: Medium     Pleural effusion on right 04/24/2023     Priority: Medium     Acute respiratory failure with hypoxia (H) 04/24/2023     Priority: Medium     Closed  nondisplaced fracture of third metatarsal bone of right foot, initial encounter 04/24/2023     Priority: Medium     Closed displaced fracture of proximal phalanx of lesser toe of right foot, initial encounter 04/24/2023     Priority: Medium     Abrasion of right foot, initial encounter 04/24/2023     Priority: Medium     Abrasion of right upper extremity, initial encounter 04/24/2023     Priority: Medium     Anemia in other chronic diseases classified elsewhere 04/24/2023     Priority: Medium     Nondisplaced fracture of fourth metatarsal bone, right foot, initial encounter for closed fracture 04/24/2023     Priority: Medium     Closed fracture of phalanx of right fourth toe, initial encounter 04/24/2023     Priority: Medium     Closed fracture of phalanx of right fifth toe, initial encounter 04/24/2023     Priority: Medium     Closed fracture of phalanx of right third toe, initial encounter 04/24/2023     Priority: Medium     Closed fracture of phalanx of right second toe, initial encounter 04/24/2023     Priority: Medium     Other abnormalities of gait and mobility 04/07/2023     Priority: Medium     Rash and nonspecific skin eruption 04/06/2023     Priority: Medium     Loose stools 04/06/2023     Priority: Medium     Cellulitis of left leg 04/06/2023     Priority: Medium     Elevated alkaline phosphatase level 04/06/2023     Priority: Medium     Dyspnea, unspecified type 04/06/2023     Priority: Medium     Chronic renal impairment, unspecified CKD stage 04/06/2023     Priority: Medium     Acute on chronic diastolic (congestive) heart failure (H) 03/28/2023     Priority: Medium     Cervical herniated disc 03/28/2023     Priority: Medium     Gout of right foot, unspecified cause, unspecified chronicity 03/28/2023     Priority: Medium     Insomnia, unspecified type 03/28/2023     Priority: Medium     Metabolic encephalopathy 03/28/2023     Priority: Medium     Acute right-sided low back pain with right-sided  sciatica 03/28/2023     Priority: Medium     Fracture of unspecified part of neck of left femur, subsequent encounter for closed fracture with routine healing 03/28/2023     Priority: Medium     Lumbago with sciatica, right side 03/28/2023     Priority: Medium     Muscle wasting and atrophy, not elsewhere classified, unspecified site 03/27/2023     Priority: Medium     Need for assistance with personal care 03/27/2023     Priority: Medium     Oxygen dependent 03/14/2023     Priority: Medium     Fracture of femur (H) 03/14/2023     Priority: Medium     Atrial fibrillation with RVR (H) 03/14/2023     Priority: Medium     Fall, initial encounter 03/14/2023     Priority: Medium     Abnormal CT of spine 03/14/2023     Priority: Medium     Internal carotid artery stenosis, left 12/29/2022     Priority: Medium     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     Priority: Medium     Leukocytosis 12/28/2022     Priority: Medium     HLD (hyperlipidemia) 12/28/2022     Priority: Medium     Gastroesophageal reflux disease without esophagitis 12/28/2022     Priority: Medium     Gout 12/28/2022     Priority: Medium     Mood disorder (H) 12/28/2022     Priority: Medium     JOHANNA (obstructive sleep apnea) 12/28/2022     Priority: Medium     Hyponatremia 12/28/2022     Priority: Medium     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  Created by Conversion       Respiratory failure, unspecified chronicity, unspecified whether with hypoxia or hypercapnia (H) 12/23/2022     Priority: Medium     Malaise and fatigue 12/23/2022     Priority: Medium     Stomatitis and mucositis 12/23/2022     Priority: Medium     Chronic respiratory failure with hypoxia (H) 12/23/2022     Priority: Medium     Macrocytic anemia 12/23/2022     Priority: Medium     Community acquired pneumonia of  right lower lobe of lung 12/23/2022     Priority: Medium     Acute cystitis with hematuria 12/23/2022     Priority: Medium     SOB (shortness of breath) 12/13/2022     Priority: Medium     Hypoxia 12/13/2022     Priority: Medium     Rapid atrial fibrillation (H) 12/13/2022     Priority: Medium     Sepsis, due to unspecified organism, unspecified whether acute organ dysfunction present (H) 12/13/2022     Priority: Medium     Infection due to 2019 novel coronavirus 12/13/2022     Priority: Medium     Acute on chronic congestive heart failure, unspecified heart failure type (H) 12/01/2022     Priority: Medium     Paroxysmal atrial fibrillation (H) 11/17/2022     Priority: Medium     Syncope 11/17/2022     Priority: Medium     Recurrent falls 11/17/2022     Priority: Medium     Generalized muscle weakness 11/17/2022     Priority: Medium     Acute kidney injury (H) 11/17/2022     Priority: Medium     Acute blood loss anemia 11/17/2022     Priority: Medium     Repeated falls 11/17/2022     Priority: Medium     Hypokalemia 11/07/2022     Priority: Medium     Hypomagnesemia 11/07/2022     Priority: Medium     Diarrhea, unspecified type 11/07/2022     Priority: Medium     Asthma without status asthmaticus 04/22/2022     Priority: Medium     CKD (chronic kidney disease) stage 3, GFR 30-59 ml/min (H) 04/22/2022     Priority: Medium     History of 2019 novel coronavirus disease (COVID-19) 04/22/2022     Priority: Medium     DNR (do not resuscitate) 04/22/2022     Priority: Medium     Chronic anticoagulation 04/22/2022     Priority: Medium     Eliquis- parox atrial fib       Personal history of immunosuppressive therapy 04/22/2022     Priority: Medium     Cimzia for RA;   certolizumab pegol (CIMZIA) injection 400 mg  subcut every 28 days             Pulmonary nodules 01/17/2022     Priority: Medium     COPD (chronic obstructive pulmonary disease) (H) 01/17/2022     Priority: Medium     Benign essential hypertension 05/28/2020      Priority: Medium     Primary osteoarthritis involving multiple joints 06/10/2019     Priority: Medium     CRF (chronic renal failure), stage 3 (moderate) (H) 05/17/2018     Priority: Medium     Nonsustained ventricular tachycardia (H) 01/12/2018     Priority: Medium     Reactive airway disease      Priority: Medium     Dyspnea      Priority: Medium     High risk medication use 10/07/2015     Priority: Medium     Osteoporosis dxa 2006  09/22/2015     Priority: Medium     Seropositive rheumatoid arthritis (H) 03/02/2015     Priority: Medium     Rheumatoid arthritis of multiple sites without rheumatoid factor (H) 10/30/2014     Priority: Medium     Hypertension 10/30/2014     Priority: Medium      Past Medical History:    Past Medical History:   Diagnosis Date     Atrial fibrillation (H)      CKD (chronic kidney disease) stage 3, GFR 30-59 ml/min (H)      COPD (chronic obstructive pulmonary disease) (H)      CRF (chronic renal failure)      GERD (gastroesophageal reflux disease)      Hypertension      Hypovolemic shock (H)      Influenza A 12/01/2017     Pulmonary hypertension (H)      Pulmonary nodules      Rheumatoid arthritis (H)      Sepsis (H) 12/24/2017     Past Surgical History:    Past Surgical History:   Procedure Laterality Date     APPENDECTOMY       BLADDER SUSPENSION       CHOLECYSTECTOMY       CLOSED REDUCTION, PERCUTANEOUS PINNING HIP Left 3/15/2023    Procedure: CLOSED REDUCTION, HIP, WITH PERCUTANEOUS PINNING;  Surgeon: Denton Mckeon MD;  [place] [facility] [place]     PICC TRIPLE LUMEN PLACEMENT  12/13/2022          Family History:    Family History   Problem Relation Age of Onset     Heart Failure Mother      Cerebrovascular Disease Father      Kidney failure Sister      Cerebrovascular Disease Brother      Diabetes Type 2  Brother      Social History:  Marital Status:   [2]  Social History     Tobacco Use     Smoking status: Former     Packs/day: 0.50     Types: Cigarettes      "Quit date: 2017     Years since quittin.4     Smokeless tobacco: Never   Substance Use Topics     Alcohol use: No     Drug use: No      Medications:    acetaminophen (TYLENOL) 500 MG tablet  albuterol (PROAIR HFA/PROVENTIL HFA/VENTOLIN HFA) 108 (90 Base) MCG/ACT inhaler  allopurinol (ZYLOPRIM) 300 MG tablet  budesonide-formoterol (SYMBICORT) 160-4.5 MCG/ACT Inhaler  calcium polycarbophil (FIBERCON) 625 MG tablet  diltiazem ER COATED BEADS (CARDIZEM CD/CARTIA XT) 240 MG 24 hr capsule  DULoxetine (CYMBALTA) 20 MG capsule  famotidine (PEPCID) 20 MG tablet  ipratropium - albuterol 0.5 mg/2.5 mg/3 mL (DUONEB) 0.5-2.5 (3) MG/3ML neb solution  magic mouthwash suspension (diphenhydrAMINE, lidocaine, aluminum-magnesium & simethicone)  metoprolol succinate ER (TOPROL XL) 50 MG 24 hr tablet  pravastatin (PRAVACHOL) 20 MG tablet  predniSONE (DELTASONE) 5 MG tablet  rivaroxaban ANTICOAGULANT (XARELTO) 15 MG TABS tablet  Vitamin D3 (CHOLECALCIFEROL) 125 MCG (5000 UT) tablet      Review of Systems   Reason unable to perform ROS: unable to get secondary to severity of medical condition and need for BIPAP.       PE   BP: 101/69  Pulse: (!) 146  Temp: 97.4  F (36.3  C)  Resp: (!) 86  Height: 165.1 cm (5' 5\")  Weight: 72.8 kg (160 lb 6.4 oz)  SpO2: 90 %  Physical Exam  Vitals reviewed.   Constitutional:       Appearance: She is well-developed.      Comments: Coming in on BiPAP.  Not with severe respiratory distress.  She does seem to be answering my questions appropriately with yes and no shaking of her head or mouthing the same.  She does not seem to be confused.   HENT:      Head: Normocephalic and atraumatic.      Right Ear: External ear normal.      Left Ear: External ear normal.      Nose: Nose normal.      Mouth/Throat:      Mouth: Mucous membranes are moist.      Pharynx: Oropharynx is clear.   Eyes:      Extraocular Movements: Extraocular movements intact.      Conjunctiva/sclera: Conjunctivae normal.      Pupils: " Pupils are equal, round, and reactive to light.   Cardiovascular:      Rate and Rhythm: Regular rhythm. Tachycardia present.   Pulmonary:      Effort: Pulmonary effort is normal.      Comments: Wheezing/decreased breath sounds bilaterally.  Mild to moderate respiratory distress.  Abdominal:      Palpations: Abdomen is soft.      Tenderness: There is no abdominal tenderness.   Musculoskeletal:         General: Normal range of motion.      Cervical back: Normal range of motion.      Right lower leg: No tenderness. Edema present.      Left lower leg: No tenderness. Edema present.   Skin:     General: Skin is warm and dry.   Neurological:      Mental Status: She is alert and oriented to person, place, and time.   Psychiatric:         Behavior: Behavior normal.         ED COURSE and MDM   1712.  Patient has symptoms and signs as described above.  Patient presenting with respiratory failure and hypoxemia.  Her O2 saturation was in the 60s on EMS arrival.  Her O2 saturation here is 85- 89% on 100% FiO2 and 16/8 BiPAP settings.  Chest x-ray viewed by me shows no effusion, no pneumonia, no pneumothorax.  VBG, CBC, basic metabolic panel, troponin, BNP ordered.  Small fluid bolus will be given.  Blood pressure 101 systolic.  Still tachycardic with atrial fibrillation at 111 bpm.  Patient will need admission for ICU care given her BiPAP needs.  Still DNR/DNI.    1852.  Patient accepted by the medical team, Dr. Acevedo.    EKG  (1717)   Interpretation performed by me.  Rate: 111     Rhythm: AF     Axis: nl  Intervals: MN (12-2) -, QRS (<12) 124, QTc (>5) 415  P wave: -     QRS complex: RBBB   ST segment / T-wave: ST depression in leads V1 to V4.  Conclusion: Atrial fibrillation with rapid rate, right bundle branch block, ST depression in the anterior septal leads, appears to be new compared to recent EKGs.    Electronic medical chart reviewed, including medical problems, medications, medical allergies, social history.  Recent  hospitalizations and surgical procedures reviewed.  Recent clinic visits and consultations reviewed.  Recent labs and test results reviewed.  Nursing notes reviewed.    The patient, their parent if applicable, and/or their medical decision maker(s) and I have reviewed all of the available historical information, applicable PMH, physical exam findings, and objective diagnostic data gathered during this ED visit.  We then discussed all work-up options and then together agreed upon the course taken during this visit.  The ultimate disposition and plan was a cooperative decision made between myself and the patient, their parent if applicable, and/or their legal decision maker(s).  The risks and benefits of all decisions made during this visit were discussed to the best of my abilities given the circumstances, and all parties are understanding of the pertinent ramifications of these decisions.      LABS  Labs Ordered and Resulted from Time of ED Arrival to Time of ED Departure   BLOOD GAS VENOUS - Abnormal       Result Value    pH Venous 7.34      pCO2 Venous 52 (*)     pO2 Venous 20 (*)     Bicarbonate Venous 28      Base Excess/Deficit (+/-) 1.8      FIO2 100     BASIC METABOLIC PANEL - Abnormal    Sodium 132 (*)     Potassium 4.1      Chloride 96 (*)     Carbon Dioxide (CO2) 22      Anion Gap 14      Urea Nitrogen 26.9 (*)     Creatinine 1.13 (*)     Calcium 9.5      Glucose 89      GFR Estimate 50 (*)    NT PROBNP INPATIENT - Abnormal    N terminal Pro BNP Inpatient 4,517 (*)    TROPONIN T, HIGH SENSITIVITY - Abnormal    Troponin T, High Sensitivity 33 (*)    CBC WITH PLATELETS AND DIFFERENTIAL - Abnormal    WBC Count 31.0 (*)     RBC Count 3.40 (*)     Hemoglobin 9.5 (*)     Hematocrit 30.9 (*)     MCV 91      MCH 27.9      MCHC 30.7 (*)     RDW 17.5 (*)     Platelet Count 602 (*)     % Neutrophils 92      % Lymphocytes 2      % Monocytes 5      % Eosinophils 0      % Basophils 0      % Immature Granulocytes 1       NRBCs per 100 WBC 0      Absolute Neutrophils 28.4 (*)     Absolute Lymphocytes 0.5 (*)     Absolute Monocytes 1.5 (*)     Absolute Eosinophils 0.1      Absolute Basophils 0.1      Absolute Immature Granulocytes 0.4      Absolute NRBCs 0.0         IMAGING  Images reviewed by me.  Radiology report also reviewed.  XR Chest Port 1 View   Final Result   IMPRESSION: Small right pleural effusion, slightly increased from prior. Left lung and pleural space are clear. No pneumothorax. Unchanged borderline enlarged cardiomediastinal silhouette.          Procedures    Medications   methylPREDNISolone sodium succinate (solu-MEDROL) injection 125 mg (125 mg Intravenous $Given 6/16/23 1736)   ipratropium - albuterol 0.5 mg/2.5 mg/3 mL (DUONEB) neb solution 3 mL (3 mLs Nebulization $Given 6/16/23 1732)   azithromycin (ZITHROMAX) 500 mg in sodium chloride 0.9 % 250 mL intermittent infusion (500 mg Intravenous $New Bag 6/16/23 175)         IMPRESSION       ICD-10-CM    1. COPD exacerbation (H)  J44.1       2. Acute respiratory failure with hypoxia (H)  J96.01                Medication List      There are no discharge medications for this visit.                     Critical Care Documentation  My initial assessment included review of prehospital provider report, review of nursing observations, review of vital signs, focused history, physical exam, review of cardiac rhythm monitor and 12 lead ECG analysis.  It was determined that the patient had acute respiratory failure with hypoxemia.  This required immediate intervention and critical care decision-making.     Critical care time: 72 minutes.        Wolfgang Mtz MD  06/16/23 1641

## 2023-06-16 NOTE — TELEPHONE ENCOUNTER
[facility] Triage Nurse Telephone Encounter    Provider: FREDY Nielsen CNP, DNP  Facility: [facility] Facility Type:  TCU    Caller: Bridget  Call Back Number: [phone]    Allergies:    Allergies   Allergen Reactions     Codeine Nausea and Vomiting     Fosamax [Alendronate] Diarrhea     Morphine Nausea and Vomiting     Other reaction(s): Vomiting        Reason for call: Nurse is calling to report that patient is having coarse crackles in upper and lower lung fields.  She also has a wet non-productive cough and SOB.  Patient has had 4 loose stools today and has been having loose stools all week.  VS:  T=98.2, P=54, R=24, BP=80/64, O2=73% 3L/min.  Patient is unable to tolerate the mask for a nebulizer due to SOB.  Patient does have dementia.      Verbal Order/Direction given by Provider: Send patient to the ER due to hypoxia, SOB, hypotension, and diarrhea.      Provider giving Order:  FREDY Nielsen CNP, DNP    Verbal Order given to: Bridget Amin RN

## 2023-06-16 NOTE — ED TRIAGE NOTES
Pt arrives to ED via EMS from home with c/o SOB x 3 days, pt arrives on BiPap per EMS. Pt SpO2 60% per EMS, slightly improved with BiPap. Pt on arrival is alert and answering questions. RRT called on arrival.      Triage Assessment     Row Name 06/16/23 9583       Triage Assessment (Adult)    Airway WDL WDL       Respiratory WDL    Respiratory WDL X;rhythm/pattern    Rhythm/Pattern, Respiratory shortness of breath;tachypneic       Skin Circulation/Temperature WDL    Skin Circulation/Temperature WDL X;circulation    Skin Circulation --  poor peripheral circulation       Cardiac WDL    Cardiac WDL X;rhythm    Cardiac Rhythm Atrial fibrillation       Peripheral/Neurovascular WDL    Peripheral Neurovascular WDL WDL       Cognitive/Neuro/Behavioral WDL    Cognitive/Neuro/Behavioral WDL WDL

## 2023-06-17 PROBLEM — I50.9 ACUTE ON CHRONIC CONGESTIVE HEART FAILURE, UNSPECIFIED HEART FAILURE TYPE (H): Status: RESOLVED | Noted: 2022-01-01 | Resolved: 2023-01-01

## 2023-06-17 PROBLEM — U07.1 INFECTION DUE TO 2019 NOVEL CORONAVIRUS: Status: RESOLVED | Noted: 2022-01-01 | Resolved: 2023-01-01

## 2023-06-17 PROBLEM — S72.002D FRACTURE OF UNSPECIFIED PART OF NECK OF LEFT FEMUR, SUBSEQUENT ENCOUNTER FOR CLOSED FRACTURE WITH ROUTINE HEALING: Status: RESOLVED | Noted: 2023-01-01 | Resolved: 2023-01-01

## 2023-06-17 PROBLEM — M10.9 GOUT: Status: ACTIVE | Noted: 2022-01-01

## 2023-06-17 PROBLEM — I48.91 RAPID ATRIAL FIBRILLATION (H): Status: RESOLVED | Noted: 2022-01-01 | Resolved: 2023-01-01

## 2023-06-17 PROBLEM — S72.90XA FRACTURE OF FEMUR (H): Status: RESOLVED | Noted: 2023-01-01 | Resolved: 2023-01-01

## 2023-06-17 PROBLEM — E83.42 HYPOMAGNESEMIA: Status: RESOLVED | Noted: 2022-01-01 | Resolved: 2023-01-01

## 2023-06-17 PROBLEM — J96.11 CHRONIC RESPIRATORY FAILURE WITH HYPOXIA (H): Chronic | Status: ACTIVE | Noted: 2022-01-01

## 2023-06-17 PROBLEM — E87.1 HYPONATREMIA: Status: RESOLVED | Noted: 2022-01-01 | Resolved: 2023-01-01

## 2023-06-17 PROBLEM — S92.334A CLOSED NONDISPLACED FRACTURE OF THIRD METATARSAL BONE OF RIGHT FOOT, INITIAL ENCOUNTER: Status: RESOLVED | Noted: 2023-01-01 | Resolved: 2023-01-01

## 2023-06-17 PROBLEM — R55 SYNCOPE: Status: RESOLVED | Noted: 2022-01-01 | Resolved: 2023-01-01

## 2023-06-17 PROBLEM — D53.9 MACROCYTIC ANEMIA: Status: RESOLVED | Noted: 2022-01-01 | Resolved: 2023-01-01

## 2023-06-17 PROBLEM — D72.829 LEUKOCYTOSIS: Status: RESOLVED | Noted: 2022-01-01 | Resolved: 2023-01-01

## 2023-06-17 PROBLEM — S92.501A: Status: RESOLVED | Noted: 2023-01-01 | Resolved: 2023-01-01

## 2023-06-17 PROBLEM — M10.9 GOUT: Chronic | Status: ACTIVE | Noted: 2022-01-01

## 2023-06-17 PROBLEM — I48.20 CHRONIC ATRIAL FIBRILLATION (H): Chronic | Status: ACTIVE | Noted: 2022-01-01

## 2023-06-17 PROBLEM — M54.41 ACUTE RIGHT-SIDED LOW BACK PAIN WITH RIGHT-SIDED SCIATICA: Status: RESOLVED | Noted: 2023-01-01 | Resolved: 2023-01-01

## 2023-06-17 PROBLEM — A04.71 ENTEROCOLITIS DUE TO CLOSTRIDIUM DIFFICILE, RECURRENT: Status: RESOLVED | Noted: 2022-01-01 | Resolved: 2023-01-01

## 2023-06-17 PROBLEM — R21 RASH AND NONSPECIFIC SKIN ERUPTION: Status: RESOLVED | Noted: 2023-01-01 | Resolved: 2023-01-01

## 2023-06-17 PROBLEM — S92.501A CLOSED FRACTURE OF PHALANX OF RIGHT FIFTH TOE, INITIAL ENCOUNTER: Status: RESOLVED | Noted: 2023-01-01 | Resolved: 2023-01-01

## 2023-06-17 PROBLEM — K12.1 STOMATITIS AND MUCOSITIS: Status: RESOLVED | Noted: 2022-01-01 | Resolved: 2023-01-01

## 2023-06-17 PROBLEM — N17.9 ACUTE KIDNEY INJURY (H): Status: RESOLVED | Noted: 2022-01-01 | Resolved: 2023-01-01

## 2023-06-17 PROBLEM — Z86.19 HISTORY OF CLOSTRIDIOIDES DIFFICILE INFECTION: Chronic | Status: ACTIVE | Noted: 2023-01-01

## 2023-06-17 PROBLEM — I48.20 CHRONIC ATRIAL FIBRILLATION (H): Status: ACTIVE | Noted: 2022-01-01

## 2023-06-17 PROBLEM — S90.811A ABRASION OF RIGHT FOOT, INITIAL ENCOUNTER: Status: RESOLVED | Noted: 2023-01-01 | Resolved: 2023-01-01

## 2023-06-17 PROBLEM — A41.9 SEPSIS, DUE TO UNSPECIFIED ORGANISM, UNSPECIFIED WHETHER ACUTE ORGAN DYSFUNCTION PRESENT (H): Status: RESOLVED | Noted: 2022-01-01 | Resolved: 2023-01-01

## 2023-06-17 PROBLEM — I47.29 NONSUSTAINED VENTRICULAR TACHYCARDIA (H): Chronic | Status: ACTIVE | Noted: 2018-01-12

## 2023-06-17 PROBLEM — F39 MOOD DISORDER (H): Chronic | Status: ACTIVE | Noted: 2022-01-01

## 2023-06-17 PROBLEM — K12.30 STOMATITIS AND MUCOSITIS: Status: RESOLVED | Noted: 2022-01-01 | Resolved: 2023-01-01

## 2023-06-17 PROBLEM — J18.9 COMMUNITY ACQUIRED PNEUMONIA OF RIGHT LOWER LOBE OF LUNG: Status: RESOLVED | Noted: 2022-01-01 | Resolved: 2023-01-01

## 2023-06-17 PROBLEM — R53.83 MALAISE AND FATIGUE: Status: ACTIVE | Noted: 2022-01-01

## 2023-06-17 PROBLEM — I48.91 ATRIAL FIBRILLATION WITH RVR (H): Status: RESOLVED | Noted: 2023-01-01 | Resolved: 2023-01-01

## 2023-06-17 PROBLEM — R53.81 MALAISE AND FATIGUE: Status: ACTIVE | Noted: 2022-01-01

## 2023-06-17 PROBLEM — J44.9 COPD (CHRONIC OBSTRUCTIVE PULMONARY DISEASE) (H): Chronic | Status: ACTIVE | Noted: 2022-01-17

## 2023-06-17 PROBLEM — I50.33 ACUTE ON CHRONIC HEART FAILURE WITH PRESERVED EJECTION FRACTION (H): Status: ACTIVE | Noted: 2023-01-01

## 2023-06-17 PROBLEM — K21.9 GASTROESOPHAGEAL REFLUX DISEASE WITHOUT ESOPHAGITIS: Status: ACTIVE | Noted: 2022-01-01

## 2023-06-17 PROBLEM — D62 ACUTE BLOOD LOSS ANEMIA: Status: RESOLVED | Noted: 2022-01-01 | Resolved: 2023-01-01

## 2023-06-17 PROBLEM — Z86.16 HISTORY OF 2019 NOVEL CORONAVIRUS DISEASE (COVID-19): Status: RESOLVED | Noted: 2022-04-22 | Resolved: 2023-01-01

## 2023-06-17 PROBLEM — G93.41 METABOLIC ENCEPHALOPATHY: Status: RESOLVED | Noted: 2023-01-01 | Resolved: 2023-01-01

## 2023-06-17 PROBLEM — E78.5 HLD (HYPERLIPIDEMIA): Chronic | Status: ACTIVE | Noted: 2022-01-01

## 2023-06-17 PROBLEM — E87.6 HYPOKALEMIA: Status: RESOLVED | Noted: 2022-01-01 | Resolved: 2023-01-01

## 2023-06-17 PROBLEM — I50.33 ACUTE ON CHRONIC DIASTOLIC (CONGESTIVE) HEART FAILURE (H): Status: RESOLVED | Noted: 2023-01-01 | Resolved: 2023-01-01

## 2023-06-17 PROBLEM — L03.116 CELLULITIS OF LEFT LEG: Status: RESOLVED | Noted: 2023-01-01 | Resolved: 2023-01-01

## 2023-06-17 PROBLEM — I65.22 INTERNAL CAROTID ARTERY STENOSIS, LEFT: Chronic | Status: ACTIVE | Noted: 2022-01-01
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
## 2023-06-17 PROBLEM — N18.30 CKD (CHRONIC KIDNEY DISEASE) STAGE 3, GFR 30-59 ML/MIN (H): Chronic | Status: ACTIVE | Noted: 2022-04-22

## 2023-06-17 PROBLEM — N30.01 ACUTE CYSTITIS WITH HEMATURIA: Status: RESOLVED | Noted: 2022-01-01 | Resolved: 2023-01-01

## 2023-06-17 PROBLEM — S92.344A NONDISPLACED FRACTURE OF FOURTH METATARSAL BONE, RIGHT FOOT, INITIAL ENCOUNTER FOR CLOSED FRACTURE: Status: RESOLVED | Noted: 2023-01-01 | Resolved: 2023-01-01

## 2023-06-17 PROBLEM — I10 BENIGN ESSENTIAL HYPERTENSION: Chronic | Status: ACTIVE | Noted: 2020-05-28

## 2023-06-17 PROBLEM — R19.7 DIARRHEA, UNSPECIFIED TYPE: Status: RESOLVED | Noted: 2022-01-01 | Resolved: 2023-01-01

## 2023-06-17 PROBLEM — J90 PLEURAL EFFUSION ON RIGHT: Status: RESOLVED | Noted: 2023-01-01 | Resolved: 2023-01-01

## 2023-06-17 PROBLEM — I48.0 PAROXYSMAL ATRIAL FIBRILLATION (H): Chronic | Status: ACTIVE | Noted: 2022-01-01

## 2023-06-17 PROBLEM — R19.7 DIARRHEA, UNSPECIFIED TYPE: Status: ACTIVE | Noted: 2022-01-01

## 2023-06-17 PROBLEM — R91.8 PULMONARY NODULES: Chronic | Status: ACTIVE | Noted: 2022-01-17

## 2023-06-17 PROBLEM — K21.9 GASTROESOPHAGEAL REFLUX DISEASE WITHOUT ESOPHAGITIS: Chronic | Status: ACTIVE | Noted: 2022-01-01

## 2023-06-17 PROBLEM — I50.32 CHRONIC HEART FAILURE WITH PRESERVED EJECTION FRACTION (H): Chronic | Status: ACTIVE | Noted: 2023-01-01

## 2023-06-17 PROBLEM — S40.811A ABRASION OF RIGHT UPPER EXTREMITY, INITIAL ENCOUNTER: Status: RESOLVED | Noted: 2023-01-01 | Resolved: 2023-01-01

## 2023-06-17 PROBLEM — S92.511A CLOSED DISPLACED FRACTURE OF PROXIMAL PHALANX OF LESSER TOE OF RIGHT FOOT, INITIAL ENCOUNTER: Status: RESOLVED | Noted: 2023-01-01 | Resolved: 2023-01-01

## 2023-06-17 PROBLEM — J96.90 RESPIRATORY FAILURE, UNSPECIFIED CHRONICITY, UNSPECIFIED WHETHER WITH HYPOXIA OR HYPERCAPNIA (H): Status: RESOLVED | Noted: 2022-01-01 | Resolved: 2023-01-01

## 2023-06-17 NOTE — PROGRESS NOTES
Care Management Initial Consult    General Information  Assessment completed with: Patient, Daughter-Enid,    Type of CM/SW Visit: Offer D/C Planning    Primary Care Provider verified and updated as needed:     Readmission within the last 30 days:   No     Reason for Consult: discharge planning  Advance Care Planning: Advance Care Planning Reviewed: present on chart          Communication Assessment  Patient's communication style: spoken language (English or Bilingual)    Hearing Difficulty or Deaf: no   Wear Glasses or Blind: yes    Cognitive  Cognitive/Neuro/Behavioral: .WDL except                      Living Environment:   People in home: spouse  [place]  Current living Arrangements: condominium      Able to return to prior arrangements: yes      Family/Social Support:  Care provided by: self, spouse/significant other  Provides care for: no one, unable/limited ability to care for self  Marital Status:   , Children  [place]       Description of Support System: Supportive, Involved    Support Assessment: Adequate family and caregiver support, Adequate social supports    Current Resources:   Patient receiving home care services: No  Community Resources: [facility] Programs, [facility] Worker, Transitional Care  Equipment currently used at home: grab bar, toilet, grab bar, tub/shower, walker, rolling, walker, standard, wheelchair, manual, shower chair  Supplies currently used at home:      Employment/Financial:  Employment Status: retired        Financial Concerns:pending MA application with [facility]      Does the patient's insurance plan have a 3 day qualifying hospital stay waiver?  Yes   Will the waiver be used for post-acute placement? No    Lifestyle & Psychosocial Needs:  Social Determinants of Health     Tobacco Use: Medium Risk (6/17/2023)    Patient History      Smoking Tobacco Use: Former      Smokeless Tobacco Use: Never      Passive Exposure: Not on file   Alcohol Use: Not on file   Financial  Resource Strain: Not on file   Food Insecurity: Not on file   Transportation Needs: Not on file   Physical Activity: Not on file   Stress: Not on file   Social Connections: Not on file   Intimate Partner Violence: Not on file   Depression: Not on file   Housing Stability: Not on file       Functional Status:  Prior to admission patient needed assistance:   Dependent ADLs:: Wheelchair-with assist, Bathing, Dressing, Grooming, Transfers, Toileting  Dependent IADLs:: Cleaning, Cooking, Laundry, Shopping, Meal Preparation, Medication Management, Money Management, Transportation  Assesssment of Functional Status: Needs placement in a SNF/TCF for rehabilitation    Mental Health Status:  Mental Health Status: No Current Concerns       Chemical Dependency Status:  Chemical Dependency Status: No Current Concerns           Values/Beliefs:  Spiritual, Cultural Beliefs, [employer] Practices, Values that affect care: no             Additional Information:  Referral received as Pt resides in a LTC facility    CM spoke with the Pt's daughter Enid # [phone]. Introduced self/role, completed assessment and discussed transport plans for return back to [facility].     Enid verbalized plan for the pt to return back to [facility] upon discharge. [facility] Pt has a bed hold in place.     MA application remains pending at the [facility].     Discussed transport options. Family vs [facility] wheelchair with O2.   Dtr aware of private pay charges. Reviewed both and decided to elect [facility] transport.     PLAN: Discharge back to [facility]--LTC when medically stable   Main: [phone] Admissions: [phone] Fax: [phone]      BRY Young  [facility] [phone]   [facility]  [phone]

## 2023-06-17 NOTE — PROGRESS NOTES
[facility]    Medicine Progress Note - Hospitalist Service    Date of Admission:  6/16/2023    Assessment & Plan   Fatuma Henry is a 77 year old female admitted on 6/16/2023. She is brought to ED due to respiratory distress, 3 day history of worsened shortness of breath.     Acute on chronic hypoxic respiratory failure  COPD exacerbation  Pulmonary hypertension  On chronic prednisone 5 mg (unclear if for RA or COPD).   On admission chest x-ray demonstrates a small right pleural effusion, slightly increased from prior.  Left lung and pleural space are clear.  No pneumothorax. Patient is on 2 L at baseline. VBG 7.34/52/20. Patient was placed on BiPAP and 100% o2 by EMS.      On usual O2 2 LPM. Rapid improvement for unclear reasons. Treated with steroids, nebs, BiPAP  Difficulty attaining good Sao2 readings on patient   - Stop azithromycin due to history of severe C. dificile   - Continue IV Solu-Medrol   - DuoNebs every 4 hours while awake  -  Hold Symbicort and usual prednisone 5    Acute on chronic diastolic heart failure  NSTEMI type II  Right pleural effusion  Echo 12/2022 - Normal left ventricular size and systolic performance with a visually estimated ejection fraction of 60-65% There is mild aortic stenosis. There is mild aortic insufficiency. Mild right ventricular enlargement with normal right ventricular systolic performance. There is moderate left atrial enlargement. There is mild right atrial enlargement. Right ventricular systolic pressure relative to right atrial pressure is moderately increased. The pulmonary artery pressure is estimated to be 50 mmHg plus right atrial pressure (the IVC is of normal caliber).     Limited echocardiogram on 4/27/2023 demonstrates normal LV size and function, EF 60 to 65%, normal RV size and function, mild to moderate LAE and PRANAY, mild AS, mild AR and MR and TR. Troponin 33 ? 32. BNP 4517.     On admission Patient has 1+ lower extremity edema  and only mild pulmonary vascular congestion on chest x-ray. Katerine does not appear to be up significantly from previously     Ordered IV Lasix 20 mg IV x2 doses, 1st dose not given until 0800 today     Improved with steroids, BiPAP  - Daily weights, I/o  - Repeat IV lasix 1600, reassess tomorrow   - Hold usual oral Lasix 40    Right pleural effusion   s/p thoracentesis on 4/25 and again 5/3/23  - Review results when able    Atrial fibrillation  She was diagnosed with paroxysmal atrial fibrillation during a hospitalization for influenza and hypovolemic shock over Christmas 2017 with which she was asymptomatic. She appears to have had persistent atrial fibrillation since 12/28/23,     Heart rates are mildly elevated,  on telemetry   - Continue PTA Toprol and diltiazem for rate control  - Continue anticoagulation with Xarelto  - Telemetry    Hypervolemic hyponatremia  Mild  - Follow    Chronic kidney disease stage IIIa  Creatinine on admission is 1.1, which is at patient's baseline  -Avoid nephrotoxins and hypotension, monitor closely renal function while diuresing    Chronic normocytic anemia  HGB ranging 9-10 for last 3 months (mostly 8-9)  On admission Hemoglobin stable at 9.5.  - Follow  - Check nutritional studies    Hyperlipidemia  - Continue PTA Pravachol     Essential HTN  -Continue PTA Cardizem and metoprolol     History of recurrent C. difficile  4 episodes, now resolved. Complete taper Dificid started on 4/7 per ID every other day on 5/2/23.   She has been referred to [facility] for fecal microbiota transplant, appointment is scheduled for November 1st.  - Avoid antibiotics      Recurrent falls  S/p Hip fracture 3/2023  Multiple toes fracture of the right foot: 3, 4, 5th, 1st & 3rd metatarsal bone 4/2023  Generalized weakness  Cognitive impairment  S/p closed reduction with percutaneous pinning 3/14/23. Completed rehabilitation at TCU, return home couple days and patient fell while getting up from the  chair by the table, with daughter being nearby readmitted 4/24/23.  Foot x-rays demonstrated above fractures. Discharged to rehab, then TCU again. Had neuropsych evaluation 5/11/23, but cannot see results. PTA bhupinder spends most of her time in a wheelchair.  She currently lives at [facility].  - Consult PT and OT once patient weaned off BiPAP    Gout  No active gouty arthritis  - Continue allopurinol     Left ICA occlusion  -To follow with vascular as outpt     GERD  -Continue prior to admission pepcid     Depression  -On Cymbalta     H/O pulm nodules with positive PET  Stable 17 x 19 mm groundglass nodule in the lingula since 07/21/2022.   Needs follow up with pulm clinic as outpt and serial imaging 3 to 6-month     H/O RA  On chronic prednisone 5 mg (unclear if for RA or COPD). Hx of methotrexate--was discontinued during recent hospitalization.  No exacerbation of RA off methotrexate.       Diet: Regular Diet Adult    DVT Prophylaxis: DOAC  Brandon Catheter: Not present  Lines: None     Cardiac Monitoring: ACTIVE order. Indication: ICU  Code Status: No CPR- Do NOT Intubate      Clinically Significant Risk Factors Present on Admission               # Drug Induced Coagulation Defect: home medication list includes an anticoagulant medication                 Disposition Plan      Expected Discharge Date: 06/18/2023                  Kareem Matta MD  Hospitalist Service  [facility]  Securely message with [employer] (more info)  Text page via [facility] Paging/Directory   ______________________________________________________________________    Interval History   Feels better      Intake/Output Summary (Last 24 hours) at 6/17/2023 0916  Last data filed at 6/17/2023 0500  Gross per 24 hour   Intake 240 ml   Output 200 ml   Net 40 ml     Vitals:    06/16/23 1706 06/16/23 2100   Weight: 72.8 kg (160 lb 6.4 oz) 68.1 kg (150 lb 2.1 oz)     Wt Readings from Last 6 Encounters:   06/16/23 68.1 kg (150 lb 2.1  oz)   06/08/23 67.1 kg (148 lb)   05/30/23 65.5 kg (144 lb 4.8 oz)   05/23/23 65 kg (143 lb 3.2 oz)   05/18/23 70.8 kg (156 lb)   05/17/23 70.8 kg (156 lb)       Physical Exam   Vital Signs: Temp: 98.1  F (36.7  C) Temp src: Oral BP: 104/55 Pulse: 105   Resp: 12 SpO2: (!) 65 % O2 Device: Nasal cannula Oxygen Delivery: 2 LPM  Weight: 150 lbs 2.13 oz    Constitutional: awake, alert, cooperative, no apparent distress, and appears stated age  Respiratory: No increased work of breathing, good air exchange, clear to auscultation bilaterally, no crackles or wheezing  Musculoskeletal: 1+ lower extremity pitting edema present    Medical Decision Making       >90 MINUTES SPENT BY ME on the date of service doing chart review, history, exam, documentation & further activities per the note.      Data        Lab Results   Component Value Date    TSH 3.00 03/13/2023    TSH 0.93 06/04/2020        Inpatient:   Lab Results   Component Value Date    NTBNPI 4,517 (H) 06/16/2023    NTBNPI 1,407 05/08/2023    NTBNPI 4,340 (H) 04/24/2023    NTBNPI 3,870 (H) 04/06/2023    NTBNPI 2,308 (H) 03/25/2023     Venous Blood Gas  Recent Labs   Lab 06/17/23 0642 06/16/23 2017 06/16/23  1723   PHV 7.37 7.39 7.34   PCO2V 48 45 52*   PO2V 21* 21* 20*   HCO3V 28 27 28   SOPHIE 2.0* 2.1* 1.8   O2PER 28 100 100         CMPRecent Labs   Lab 06/17/23  0642 06/16/23  1723   * 132*   POTASSIUM 4.9 4.1   CHLORIDE 98 96*   CO2 23 22   ANIONGAP 13 14   * 89   BUN 29.0* 26.9*   CR 1.29* 1.13*   GFRESTIMATED 43* 50*   SUNI 9.2 9.5     CBC  Recent Labs   Lab 06/17/23 0642 06/16/23  1723   WBC 23.3* 31.0*   RBC 3.10* 3.40*   HGB 8.7* 9.5*   HCT 28.8* 30.9*   MCV 93 91   MCH 28.1 27.9   MCHC 30.2* 30.7*   RDW 17.5* 17.5*   * 602*     INRNo lab results found in last 7 days.  Arterial Blood Gas  Recent Labs   Lab 06/17/23  0642 06/16/23 2017 06/16/23  1723   O2PER 28 100 100

## 2023-06-17 NOTE — CONSULTS
Care Management Initial Consult    Received referral due to elevated unplanned readmission risk.  Upon EMR review, SW noted that pt was sent to the hospital from [facility], not home.    SW notified [facility] staff that pt at [facility] for cares & requested information on a bed-hold.  Awaiting notification.    Care Management team to follow for discharge plans.      BRY Tucker

## 2023-06-17 NOTE — ED NOTES
"[facility]   Admission Handoff    The patient is Fatuma Henry, 77 year old who arrived in the ED by AMBULANCE from home with a complaint of Shortness of Breath  . The patient's current symptoms are an exacerbation of a chronic illness and during this time the symptoms have remained the same. In the ED, patient was diagnosed with   Final diagnoses:   COPD exacerbation (H)   Acute respiratory failure with hypoxia (H)         Needed?: No    Allergies:    Allergies   Allergen Reactions    Codeine Nausea and Vomiting    Fosamax [Alendronate] Diarrhea    Morphine Nausea and Vomiting     Other reaction(s): Vomiting       Past Medical Hx:   Past Medical History:   Diagnosis Date    Atrial fibrillation (H)     CKD (chronic kidney disease) stage 3, GFR 30-59 ml/min (H)     COPD (chronic obstructive pulmonary disease) (H)     nocturnal oxygen    CRF (chronic renal failure)     GERD (gastroesophageal reflux disease)     Hypertension     Hypovolemic shock (H)     Influenza A 12/01/2017    Pulmonary hypertension (H)     Pulmonary nodules     Rheumatoid arthritis (H)     Sepsis (H) 12/24/2017       Initial vitals were: BP: 101/69  Pulse: (!) 146  Temp: 97.4  F (36.3  C)  Resp: (!) 86  Height: 165.1 cm (5' 5\")  Weight: 72.8 kg (160 lb 6.4 oz)  SpO2: 90 %   Recent vital Signs: BP 92/59   Pulse 109   Temp 97.4  F (36.3  C) (Tympanic)   Resp 21   Ht 1.651 m (5' 5\")   Wt 72.8 kg (160 lb 6.4 oz)   SpO2 96%   BMI 26.69 kg/m      Elimination Status: Continent: Yes     Activity Level: Total assist/lift    Fall Status: Reason for falls risk:  Mobility and Reason for falls risk: Cognition  nonskid shoes/slippers when out of bed, arm band in place, patient and family education, and toileting schedule implemented    Baseline Mental status: mild dementia  Current Mental Status changes: at basesline    Infection present or suspected this encounter: no  Sepsis suspected: No    Isolation type: " none    Bariatric equipment needed?: No    In the ED these meds were given:   Medications   methylPREDNISolone sodium succinate (solu-MEDROL) injection 125 mg (125 mg Intravenous $Given 6/16/23 1736)   ipratropium - albuterol 0.5 mg/2.5 mg/3 mL (DUONEB) neb solution 3 mL (3 mLs Nebulization $Given 6/16/23 1732)   azithromycin (ZITHROMAX) 500 mg in sodium chloride 0.9 % 250 mL intermittent infusion (0 mg Intravenous Stopped 6/16/23 0886)       Drips running?  No    Home pump  No    Current LDAs: Peripheral IV: Site R forearm; Gauge 20  none     Results:   Labs/Imaging  Ordered and Resulted from Time of ED Arrival Up to the Time of Departure from the ED  Results for orders placed or performed during the hospital encounter of 06/16/23 (from the past 24 hour(s))   XR Chest Port 1 View    Narrative    EXAM: XR CHEST PORT 1 VIEW  LOCATION: [facility]  DATE: 6/16/2023    INDICATION: sob  COMPARISON: 05/08/2023      Impression    IMPRESSION: Small right pleural effusion, slightly increased from prior. Left lung and pleural space are clear. No pneumothorax. Unchanged borderline enlarged cardiomediastinal silhouette.   Blood gas venous   Result Value Ref Range    pH Venous 7.34 7.32 - 7.43    pCO2 Venous 52 (H) 40 - 50 mm Hg    pO2 Venous 20 (L) 25 - 47 mm Hg    Bicarbonate Venous 28 21 - 28 mmol/L    Base Excess/Deficit (+/-) 1.8 -7.7 - 1.9 mmol/L    FIO2 100    CBC with Platelets & Differential    Narrative    The following orders were created for panel order CBC with Platelets & Differential.  Procedure                               Abnormality         Status                     ---------                               -----------         ------                     CBC with platelets and d...[[id number]]  Abnormal            Final result                 Please view results for these tests on the individual orders.   Basic metabolic panel   Result Value Ref Range    Sodium 132 (L) 136 - 145 mmol/L     Potassium 4.1 3.4 - 5.3 mmol/L    Chloride 96 (L) 98 - 107 mmol/L    Carbon Dioxide (CO2) 22 22 - 29 mmol/L    Anion Gap 14 7 - 15 mmol/L    Urea Nitrogen 26.9 (H) 8.0 - 23.0 mg/dL    Creatinine 1.13 (H) 0.51 - 0.95 mg/dL    Calcium 9.5 8.8 - 10.2 mg/dL    Glucose 89 70 - 99 mg/dL    GFR Estimate 50 (L) >60 mL/min/1.73m2   NT pro BNP   Result Value Ref Range    N terminal Pro BNP Inpatient 4,517 (H) 0 - 1,800 pg/mL   Troponin T, High Sensitivity   Result Value Ref Range    Troponin T, High Sensitivity 33 (H) <=14 ng/L   CBC with platelets and differential   Result Value Ref Range    WBC Count 31.0 (H) 4.0 - 11.0 10e3/uL    RBC Count 3.40 (L) 3.80 - 5.20 10e6/uL    Hemoglobin 9.5 (L) 11.7 - 15.7 g/dL    Hematocrit 30.9 (L) 35.0 - 47.0 %    MCV 91 78 - 100 fL    MCH 27.9 26.5 - 33.0 pg    MCHC 30.7 (L) 31.5 - 36.5 g/dL    RDW 17.5 (H) 10.0 - 15.0 %    Platelet Count 602 (H) 150 - 450 10e3/uL    % Neutrophils 92 %    % Lymphocytes 2 %    % Monocytes 5 %    % Eosinophils 0 %    % Basophils 0 %    % Immature Granulocytes 1 %    NRBCs per 100 WBC 0 <1 /100    Absolute Neutrophils 28.4 (H) 1.6 - 8.3 10e3/uL    Absolute Lymphocytes 0.5 (L) 0.8 - 5.3 10e3/uL    Absolute Monocytes 1.5 (H) 0.0 - 1.3 10e3/uL    Absolute Eosinophils 0.1 0.0 - 0.7 10e3/uL    Absolute Basophils 0.1 0.0 - 0.2 10e3/uL    Absolute Immature Granulocytes 0.4 <=0.4 10e3/uL    Absolute NRBCs 0.0 10e3/uL   [place] Draw    Narrative    The following orders were created for panel order [place] Draw.  Procedure                               Abnormality         Status                     ---------                               -----------         ------                     Extra Blue Top Tube[[id number]]                              Final result                 Please view results for these tests on the individual orders.   Extra Blue Top Tube   Result Value Ref Range    Hold Specimen JIC        For the majority of the shift this patient's behavior was  Green     Cardiac Rhythm: Atrial rhythm  Pt needs tele? Yes  Skin/wound Issues:  None identified - friable skin    Code Status: DNR / DNI    Pain control: pt had none    Nausea control: pt had none    Abnormal labs/tests/findings requiring intervention: See above    Patient tested for COVID 19 prior to admission: NO - recent negative test at LTC    OBS brochure/video discussed/provided to patient/family: N/A     Family present during ED course? Yes     Family Comments/Social Situation comments: Lives in LTC - daughter present during ED visit    Tasks needing completion: None    Toya Phillip RN

## 2023-06-17 NOTE — H&P
[facility]    History and Physical - Hospitalist Service       Date of Admission:  6/16/2023    Assessment & Plan   Fatuma Henry is a 77 year old female admitted on 6/16/2023. She is admitted with acute hypoxic respiratory failure due to COPD exacerbation.    Acute on chronic hypoxic respiratory failure  Acute hypercapnic respiratory failure  COPD exacerbation  Pulmonary hypertension  Chest x-ray demonstrates a small right pleural effusion, slightly increased from prior.  Left lung and pleural space are clear.  No pneumothorax.    - Patient is on 2 L at baseline  - Patient was placed on BiPAP in the emergency department.  Will admit to ICU with telemetry and continuous pulse oximetry due to significant risk for decompensation.  - Continue IV Solu-Medrol and azithromycin  - DuoNebs every 4 hours while awake  - Hold Symbicort and prednisone    Acute on chronic diastolic heart failure  NSTEMI type II  Right pleural effusion  Limited echocardiogram on 4/27/2023 demonstrates normal LV size and function, EF 60 to 65%, normal RV size and function, mild to moderate LAE and PRANAY, mild AS, mild AR and MR and TR.  - Troponin is 33, likely due to increased myocardial demand, will repeat  - BNP is 4517  - Patient has 1+ lower extremity edema and only mild pulmonary vascular congestion on chest x-ray.  - Treat with IV Lasix 20 mg IV x2 doses and reassess in the morning.    History of recurrent C. difficile infections  No current diarrhea to suggest active C. difficile infection.  - She has been off vancomycin since 4/6/2023 and completed taper of fidaxomicin on 5/2/2023.  - She has been referred to [facility] for fecal microbiota transplant, appointment is scheduled for November 1st.  - Isolation protocol    Chronic atrial fibrillation  - Continue PTA Toprol and diltiazem for rate control  - Continue anticoagulation with Xarelto    Hypervolemic hyponatremia  - Repeat BMP in the morning after  "diuresis    Chronic kidney disease stage IIIa  Creatinine today is 1.13, which is at patient's baseline  - Repeat BMP in the morning and follow closely during diuresis    Chronic anemia  Hemoglobin stable at 9.5.  - Repeat CBC in the morning    Hyperlipidemia  - Continue PTA Pravachol    Gout  No active gouty arthritis  - Continue allopurinol    Generalized weakness  Cognitive impairment  Patient spends most of her time in a wheelchair.  She [facility] at [facility].  - Consult PT and OT once patient weaned off BiPAP    Clinically Significant Risk Factors Present on Admission               # Drug Induced Coagulation Defect: home medication list includes an anticoagulant medication    # Hypertension: Noted on problem list   # Non-Invasive mechanical ventilation: current O2 Device: BiPAP/CPAP  # Acute hypoxic respiratory failure: continue supplemental O2 as needed     # Overweight: Estimated body mass index is 26.69 kg/m  as calculated from the following:    Height as of this encounter: 1.651 m (5' 5\").    Weight as of this encounter: 72.8 kg (160 lb 6.4 oz).             Diet:  NPO except meds  DVT Prophylaxis: DOAC  Brandon Catheter: Not present  Code Status:  DNR/DNI as confirmed with patient and daughter    Disposition Plan   Patient will likely require several days in the hospital for treatment of COPD exacerbation.    Entered: aJke Acevedo MD 06/16/2023, 7:14 PM       Jake Acevedo MD  [facility]    ______________________________________________________________________    Chief Complaint   Chief Complaint   Patient presents with     Shortness of Breath        History is obtained from the patient    History of Present Illness   Fatuma Henry is a 77 year old female who presents with shortness of breath.  Patient states that she has been feeling rundown over the past week.  She notes poor appetite and lightheadedness.  She denies nausea, vomiting, or abdominal pain.  She " had a few episodes of diarrhea on Monday, but none since.  She denies dysuria or hematuria.  She denies nasal congestion, sore throat, runny nose, wheezing, cough, or shortness of breath.  She denies fevers or chills.  She denies sick contacts.  She denies syncope, fall, or trauma.  She denies chest pain, chest pressure, or palpitations.  She has chronic lower extremity edema which she believes is unchanged.  She denies new headache, neck pain, or back pain.  Patient denies diplopia, dysarthria, dysphagia, incoordination, focal numbness, or unilateral weakness.  She noted increased shortness of breath and wheezing today and so EMS transported to the emergency room for further evaluation.    Review of Systems    The 10 point Review of Systems is negative other than noted in the HPI or here.     Past Medical History    I have reviewed this patient's medical history and updated it with pertinent information if needed.   Past Medical History:   Diagnosis Date     Atrial fibrillation (H)      CKD (chronic kidney disease) stage 3, GFR 30-59 ml/min (H)      COPD (chronic obstructive pulmonary disease) (H)     nocturnal oxygen     CRF (chronic renal failure)      GERD (gastroesophageal reflux disease)      Hypertension      Hypovolemic shock (H)      Influenza A 12/01/2017     Pulmonary hypertension (H)      Pulmonary nodules      Rheumatoid arthritis (H)      Sepsis (H) 12/24/2017       Patient Active Problem List    Diagnosis Date Noted     Foot fracture, right 05/03/2023     Priority: Medium     Unsteady gait when walking 04/30/2023     Priority: Medium     Personal history of fall 04/30/2023     Priority: Medium     COPD exacerbation (H) 04/24/2023     Priority: Medium     Pleural effusion on right 04/24/2023     Priority: Medium     Acute respiratory failure with hypoxia (H) 04/24/2023     Priority: Medium     Closed nondisplaced fracture of third metatarsal bone of right foot, initial encounter 04/24/2023      Priority: Medium     Closed displaced fracture of proximal phalanx of lesser toe of right foot, initial encounter 04/24/2023     Priority: Medium     Abrasion of right foot, initial encounter 04/24/2023     Priority: Medium     Abrasion of right upper extremity, initial encounter 04/24/2023     Priority: Medium     Anemia in other chronic diseases classified elsewhere 04/24/2023     Priority: Medium     Nondisplaced fracture of fourth metatarsal bone, right foot, initial encounter for closed fracture 04/24/2023     Priority: Medium     Closed fracture of phalanx of right fourth toe, initial encounter 04/24/2023     Priority: Medium     Closed fracture of phalanx of right fifth toe, initial encounter 04/24/2023     Priority: Medium     Closed fracture of phalanx of right third toe, initial encounter 04/24/2023     Priority: Medium     Closed fracture of phalanx of right second toe, initial encounter 04/24/2023     Priority: Medium     Other abnormalities of gait and mobility 04/07/2023     Priority: Medium     Rash and nonspecific skin eruption 04/06/2023     Priority: Medium     Loose stools 04/06/2023     Priority: Medium     Cellulitis of left leg 04/06/2023     Priority: Medium     Elevated alkaline phosphatase level 04/06/2023     Priority: Medium     Dyspnea, unspecified type 04/06/2023     Priority: Medium     Chronic renal impairment, unspecified CKD stage 04/06/2023     Priority: Medium     Acute on chronic diastolic (congestive) heart failure (H) 03/28/2023     Priority: Medium     Cervical herniated disc 03/28/2023     Priority: Medium     Gout of right foot, unspecified cause, unspecified chronicity 03/28/2023     Priority: Medium     Insomnia, unspecified type 03/28/2023     Priority: Medium     Metabolic encephalopathy 03/28/2023     Priority: Medium     Acute right-sided low back pain with right-sided sciatica 03/28/2023     Priority: Medium     Fracture of unspecified part of neck of left femur,  subsequent encounter for closed fracture with routine healing 03/28/2023     Priority: Medium     Lumbago with sciatica, right side 03/28/2023     Priority: Medium     Muscle wasting and atrophy, not elsewhere classified, unspecified site 03/27/2023     Priority: Medium     Need for assistance with personal care 03/27/2023     Priority: Medium     Oxygen dependent 03/14/2023     Priority: Medium     Fracture of femur (H) 03/14/2023     Priority: Medium     Atrial fibrillation with RVR (H) 03/14/2023     Priority: Medium     Fall, initial encounter 03/14/2023     Priority: Medium     Abnormal CT of spine 03/14/2023     Priority: Medium     Internal carotid artery stenosis, left 12/29/2022     Priority: Medium     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     Priority: Medium     Leukocytosis 12/28/2022     Priority: Medium     HLD (hyperlipidemia) 12/28/2022     Priority: Medium     Gastroesophageal reflux disease without esophagitis 12/28/2022     Priority: Medium     Gout 12/28/2022     Priority: Medium     Mood disorder (H) 12/28/2022     Priority: Medium     JOHANNA (obstructive sleep apnea) 12/28/2022     Priority: Medium     Hyponatremia 12/28/2022     Priority: Medium     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  Created by Conversion       Respiratory failure, unspecified chronicity, unspecified whether with hypoxia or hypercapnia (H) 12/23/2022     Priority: Medium     Malaise and fatigue 12/23/2022     Priority: Medium     Stomatitis and mucositis 12/23/2022     Priority: Medium     Chronic respiratory failure with hypoxia (H) 12/23/2022     Priority: Medium     Macrocytic anemia 12/23/2022     Priority: Medium     Community acquired pneumonia of right lower lobe of lung 12/23/2022     Priority: Medium     Acute cystitis with hematuria  12/23/2022     Priority: Medium     SOB (shortness of breath) 12/13/2022     Priority: Medium     Hypoxia 12/13/2022     Priority: Medium     Rapid atrial fibrillation (H) 12/13/2022     Priority: Medium     Sepsis, due to unspecified organism, unspecified whether acute organ dysfunction present (H) 12/13/2022     Priority: Medium     Infection due to 2019 novel coronavirus 12/13/2022     Priority: Medium     Acute on chronic congestive heart failure, unspecified heart failure type (H) 12/01/2022     Priority: Medium     Paroxysmal atrial fibrillation (H) 11/17/2022     Priority: Medium     Syncope 11/17/2022     Priority: Medium     Recurrent falls 11/17/2022     Priority: Medium     Generalized muscle weakness 11/17/2022     Priority: Medium     Acute kidney injury (H) 11/17/2022     Priority: Medium     Acute blood loss anemia 11/17/2022     Priority: Medium     Repeated falls 11/17/2022     Priority: Medium     Hypokalemia 11/07/2022     Priority: Medium     Hypomagnesemia 11/07/2022     Priority: Medium     Diarrhea, unspecified type 11/07/2022     Priority: Medium     Asthma without status asthmaticus 04/22/2022     Priority: Medium     CKD (chronic kidney disease) stage 3, GFR 30-59 ml/min (H) 04/22/2022     Priority: Medium     History of 2019 novel coronavirus disease (COVID-19) 04/22/2022     Priority: Medium     DNR (do not resuscitate) 04/22/2022     Priority: Medium     Chronic anticoagulation 04/22/2022     Priority: Medium     Eliquis- parox atrial fib       Personal history of immunosuppressive therapy 04/22/2022     Priority: Medium     Cimzia for RA;   certolizumab pegol (CIMZIA) injection 400 mg  subcut every 28 days             Pulmonary nodules 01/17/2022     Priority: Medium     COPD (chronic obstructive pulmonary disease) (H) 01/17/2022     Priority: Medium     Benign essential hypertension 05/28/2020     Priority: Medium     Primary osteoarthritis involving multiple joints 06/10/2019      Priority: Medium     CRF (chronic renal failure), stage 3 (moderate) (H) 2018     Priority: Medium     Nonsustained ventricular tachycardia (H) 2018     Priority: Medium     Reactive airway disease      Priority: Medium     Dyspnea      Priority: Medium     High risk medication use 10/07/2015     Priority: Medium     Osteoporosis dxa 2006  2015     Priority: Medium     Seropositive rheumatoid arthritis (H) 2015     Priority: Medium     Rheumatoid arthritis of multiple sites without rheumatoid factor (H) 10/30/2014     Priority: Medium     Hypertension 10/30/2014     Priority: Medium        Past Surgical History   I have reviewed this patient's surgical history and updated it with pertinent information if needed.  Past Surgical History:   Procedure Laterality Date     APPENDECTOMY       BLADDER SUSPENSION       CHOLECYSTECTOMY       CLOSED REDUCTION, PERCUTANEOUS PINNING HIP Left 3/15/2023    Procedure: CLOSED REDUCTION, HIP, WITH PERCUTANEOUS PINNING;  Surgeon: Denton Mckeon MD;  Location: [facility] OR     PICC TRIPLE LUMEN PLACEMENT  2022            Social History   I have reviewed this patient's social history and updated it with pertinent information if needed.  Social History     Tobacco Use     Smoking status: Former     Packs/day: 0.50     Types: Cigarettes     Quit date: 2017     Years since quittin.4     Smokeless tobacco: Never   Substance Use Topics     Alcohol use: No     Drug use: No       Family History   I have reviewed this patient's family history and updated it with pertinent information if needed.   Family History   Problem Relation Age of Onset     Heart Failure Mother      Cerebrovascular Disease Father      Kidney failure Sister      Cerebrovascular Disease Brother      Diabetes Type 2  Brother        Prior to Admission Medications   Prior to Admission Medications   Prescriptions Last Dose Informant Patient Reported? Taking?   DULoxetine  (CYMBALTA) 20 MG capsule   Yes No   Sig: Take 20 mg by mouth daily   Vitamin D3 (CHOLECALCIFEROL) 125 MCG (5000 UT) tablet   Yes No   Sig: Take 1 tablet by mouth daily   acetaminophen (TYLENOL) 500 MG tablet   Yes No   Sig: Take 1,000 mg by mouth 2 times daily + 650mg PO BID PRN   albuterol (PROAIR HFA/PROVENTIL HFA/VENTOLIN HFA) 108 (90 Base) MCG/ACT inhaler   Yes No   Sig: Inhale 2 puffs into the lungs every 4 hours as needed for shortness of breath / dyspnea or wheezing   allopurinol (ZYLOPRIM) 300 MG tablet   No No   Sig: Take 1 tablet (300 mg) by mouth daily   budesonide-formoterol (SYMBICORT) 160-4.5 MCG/ACT Inhaler   No No   Sig: Inhale 2 puffs into the lungs 2 times daily   calcium polycarbophil (FIBERCON) 625 MG tablet   Yes No   Sig: Take 2 tablets by mouth 2 times daily   diltiazem ER COATED BEADS (CARDIZEM CD/CARTIA XT) 240 MG 24 hr capsule   No No   Sig: Take 1 capsule (240 mg) by mouth daily   famotidine (PEPCID) 20 MG tablet   Yes No   Sig: Take 20 mg by mouth daily   ipratropium - albuterol 0.5 mg/2.5 mg/3 mL (DUONEB) 0.5-2.5 (3) MG/3ML neb solution   No No   Sig: Take 1 vial (3 mLs) by nebulization every 6 hours as needed for shortness of breath, wheezing or cough   magic mouthwash suspension (diphenhydrAMINE, lidocaine, aluminum-magnesium & simethicone)   No No   Sig: Swish and spit 5 mLs in mouth 4 times daily (before meals and nightly)   metoprolol succinate ER (TOPROL XL) 50 MG 24 hr tablet   No No   Sig: Take 0.5 tablets (25 mg) by mouth daily   pravastatin (PRAVACHOL) 20 MG tablet   Yes No   Sig: Take 20 mg by mouth every evening   predniSONE (DELTASONE) 5 MG tablet   Yes No   Sig: Take 5 mg by mouth daily   rivaroxaban ANTICOAGULANT (XARELTO) 15 MG TABS tablet   No No   Sig: Take 1 tablet (15 mg) by mouth daily (with dinner)      Facility-Administered Medications: None     Allergies   Allergies   Allergen Reactions     Codeine Nausea and Vomiting     Fosamax [Alendronate] Diarrhea      Morphine Nausea and Vomiting     Other reaction(s): Vomiting       Physical Exam   Vital Signs: Temp: 97.4  F (36.3  C) Temp src: Tympanic BP: 92/59 Pulse: 109   Resp: 21 SpO2: 96 % O2 Device: BiPAP/CPAP    Weight: 160 lbs 6.4 oz    Gen: Debilitated elderly female, resting comfortably on BiPAP, no acute distress  Head: atraumatic normocephalic; sclera non-injected, anicterric  Neck: supple without spinal abnormality  Chest: Basilar rales bilaterally, no wheezes, no rhonchi  Cardiovascular: irregular rhythm, no gallops or rubs, no murmurs, 1+ BLE edema  Abdomen: bowel sounds normal, no hepatosplenomegaly, no masses, non-tender, non-distended, no guarding, no rebound tenderness  Musculoskeletal: Decreased muscle mass, normal muscle tone  Skin: no rashes, no chronic venous stasis  Lymph: no lymphadenopathy.  Neuro: cranial nerves II-XII intact, strength in all four extremities normal, reflexes normal, coordination normal.    Data   Data reviewed today: I reviewed all medications, new labs and imaging results over the last 24 hours. I personally reviewed the EKG tracing showing Atrial fibrillation with right bundle branch block and the chest x-ray image(s) showing Small right pleural effusion with mild pulmonary vascular congestion.    Recent Labs   Lab 06/16/23  1723   WBC 31.0*   HGB 9.5*   MCV 91   *   *   POTASSIUM 4.1   CHLORIDE 96*   CO2 22   BUN 26.9*   CR 1.13*   ANIONGAP 14   SUNI 9.5   GLC 89         Recent Results (from the past 24 hour(s))   XR Chest Port 1 View    Narrative    EXAM: XR CHEST PORT 1 VIEW  LOCATION: [facility]  DATE: 6/16/2023    INDICATION: sob  COMPARISON: 05/08/2023      Impression    IMPRESSION: Small right pleural effusion, slightly increased from prior. Left lung and pleural space are clear. No pneumothorax. Unchanged borderline enlarged cardiomediastinal silhouette.

## 2023-06-17 NOTE — PROGRESS NOTES
Pt remains on 2 lpm nasal cannula satting 95%  BS diminished and clear  DUO neb given as scheduled this shift, no change after treatment.  RT to monitor and assess as needed.    Aminta Olvera, RT on 6/17/2023 at 6:38 PM

## 2023-06-17 NOTE — PROGRESS NOTES
End Of Shift Note    Neuro: A&O x3    Cardiac: Afib ~ . Softer pressures. One 250 ml NS bolus given. BLE edema.    Resp: LS clear to mild expiratory wheezes. Declined Bipap. On 2L NC satting high 90's. No increased WOB at rest, slightly tachypneic with activity.    GI/: NPO, no BM, commode void 200 ml urine.    MSK: Ax1, walker and gait belt.    LDAs: PIV SL.    Declines pain. Updated daughter Enid per pt request.

## 2023-06-17 NOTE — PROGRESS NOTES
"[place] [facility] ADMISSION NOTE    Patient admitted to room 1006 at approximately 2100 via cart from emergency room. Patient was accompanied by nurse.     Verbal SBAR report received from Rony GUADARRAMA prior to patient arrival.     Patient trasferred to bed via air clive. Patient alert and oriented X 3. The patient is not having any pain. Admission vital signs: Blood pressure 88/62, pulse 97, temperature 98  F, temperature source Oral, resp. rate 20, height (5' 5\"), weight 68.1 kg, SpO2 98 %. Patient was oriented to plan of care, call light, bed controls, tv, telephone, bathroom and visiting hours.      Risk Assessment    The following safety risks were identified during admission: fall. Yellow risk band applied: YES.     Skin Initial Assessment    This writer admitted this patient and completed a full skin assessment and Dmitriy score in the Adult PCS flowsheet. Appropriate interventions initiated as needed.     Secondary skin check completed by Eli GUADARRAMA.    Dmitriy Risk Assessment  Sensory Perception: 4-->no impairment  Moisture: 4-->rarely moist  Activity: 3-->walks occasionally  Mobility: 4-->no limitation  Nutrition: 3-->adequate  Friction and Shear: 3-->no apparent problem  Dmitriy Score: 21    Education    Patient has a [place] to Observation order: No  Observation education completed and documented: N/A      "

## 2023-06-17 NOTE — PROGRESS NOTES
Assumed Care: 0700 to 1900    Neuro:  A&O to year, month, date, president, very forgetful.     Cardiac: remains in a-fib, rate between high 90's and one teens. LE +2 edema  Blood pressure low in left UE, 250cc ff given, has remained above 90 systolic in RUE. 1600 lasix given.     Respiratory: sats have remained stable on home oxygen level of 2L NC, continues to have dyspnea on exertion with minimal activity, recovers quickly with rest    GI/:  Has voided 225 ml of cloudy yellow urine this shift, 50cc after 1600 lasix given, post void residual was 15 ml. Cr+ 1.29 this am.   Glucoses slightly elevated today, Dr. Matta aware.     Diet/Appetite: on a reg diet, good appetite.     Activity: up to chair and sitting at edge of bed today, weak, requires assistance of one with walker.    Pain: denies pain    Skin:  Fingers and toes dusky, cool, pt states this is her norm, difficult to get accurate sat 100% of the time.     LDA's:  PIV X1      Plan:

## 2023-06-17 NOTE — MEDICATION SCRIBE - ADMISSION MEDICATION HISTORY
Medication Scribe Admission Medication History    Admission medication history is complete. The information provided in this note is only as accurate as the sources available at the time of the update.    Medication reconciliation/reorder completed by provider prior to medication history? Yes    Information Source(s): Facility (TCU/NH/) medication list/MAR via N/A    Pertinent Information: None    Changes made to PTA medication list:    Added: Furosemid 40 mg, Naloxone Injection, Tylenol 650 mg PRN     Deleted: None    Changed: None    Medication Affordability:  Not including over the counter (OTC) medications, was there a time in the past 3 months when you did not take your medications as prescribed because of cost?: Unable to Assess (TCU Patient)    Allergies reviewed with patient and updates made in EHR: yes    Medication History Completed By: Yoli York 6/17/2023 9:05 AM    Prior to Admission medications    Medication Sig Last Dose Taking? Auth Provider Long Term End Date   acetaminophen (TYLENOL) 500 MG tablet Take 1,000 mg by mouth 2 times daily 6/16/2023 at 0800 Yes Reported, Patient No    acetaminophen (TYLENOL) 650 MG CR tablet Take 650 mg by mouth every 8 hours as needed for mild pain or fever Unknown at prn Yes Reported, Patient No    albuterol (PROAIR HFA/PROVENTIL HFA/VENTOLIN HFA) 108 (90 Base) MCG/ACT inhaler Inhale 2 puffs into the lungs every 4 hours as needed for shortness of breath / dyspnea or wheezing 6/16/2023 at 0800 Yes Reported, Patient Yes    allopurinol (ZYLOPRIM) 300 MG tablet Take 1 tablet (300 mg) by mouth daily Past Week Yes Bobby Rush MBBS     budesonide-formoterol (SYMBICORT) 160-4.5 MCG/ACT Inhaler Inhale 2 puffs into the lungs 2 times daily 6/16/2023 at 0800 Yes El Juarez, DO Yes    calcium polycarbophil (FIBERCON) 625 MG tablet Take 2 tablets by mouth 2 times daily 6/16/2023 at 0800 Yes Reported, Patient     diltiazem ER COATED BEADS (CARDIZEM CD/CARTIA XT) 240  MG 24 hr capsule Take 1 capsule (240 mg) by mouth daily 6/16/2023 at 0800 Yes Bon Rosales MD Yes    DULoxetine (CYMBALTA) 20 MG capsule Take 20 mg by mouth daily 6/16/2023 at 0800 Yes Unknown, Entered By History Yes    famotidine (PEPCID) 20 MG tablet Take 20 mg by mouth daily 6/16/2023 at 0800 Yes Unknown, Entered By History     furosemide (LASIX) 40 MG tablet Take 40 mg by mouth daily 6/16/2023 Yes Reported, Patient No    ipratropium - albuterol 0.5 mg/2.5 mg/3 mL (DUONEB) 0.5-2.5 (3) MG/3ML neb solution Take 1 vial (3 mLs) by nebulization every 6 hours as needed for shortness of breath, wheezing or cough Past Week Yes Bon Rosales MD Yes    magic mouthwash suspension (diphenhydrAMINE, lidocaine, aluminum-magnesium & simethicone) Swish and spit 5 mLs in mouth 4 times daily (before meals and nightly) 6/16/2023 at 1130 Yes Ana Rosa Reyna, APRN CNP No    metoprolol succinate ER (TOPROL XL) 50 MG 24 hr tablet Take 0.5 tablets (25 mg) by mouth daily 6/16/2023 at 0800 Yes Dawna Edwards PA-C Yes    Naloxone HCl 2 MG/0.4ML SOAJ Inject 1 mL as directed as needed Unknown at prn Yes Reported, Patient     pravastatin (PRAVACHOL) 20 MG tablet Take 20 mg by mouth every evening 6/16/2023 at 0800 Yes Provider, Historical     predniSONE (DELTASONE) 5 MG tablet Take 5 mg by mouth daily 6/16/2023 at 0800 Yes Lydia Gold MD     rivaroxaban ANTICOAGULANT (XARELTO) 15 MG TABS tablet Take 1 tablet (15 mg) by mouth daily (with dinner) 6/15/2023 at 1800 Yes Teressa Cespedes, APRALAN CNP Yes    Vitamin D3 (CHOLECALCIFEROL) 125 MCG (5000 UT) tablet Take 1 tablet by mouth daily 6/16/2023 at 0800 Yes Reported, Patient No

## 2023-06-17 NOTE — PROGRESS NOTES
Skin affirmation note    Admitting nurse completed full skin assessment, Dmitriy score and Dmitriy interventions. This writer agrees with the initial skin assessment findings.    Eli Nguyễn RN

## 2023-06-18 PROBLEM — N17.9 AKI (ACUTE KIDNEY INJURY) (H): Status: ACTIVE | Noted: 2023-01-01

## 2023-06-18 NOTE — PROGRESS NOTES
End Of Shift Note    Neuro: A&O but forgetful and impulsive. Tried to get up to bathroom 3 times without call light. Pulling off O2. Bed alarm on    Cardiac: Afib ~ 100. SBP mostly 90-100s    Resp: 2L NC, LS dim, nonproductive cough    GI/: 2 small BM's. Urine remains marginal ~ 175 ml out on nights    Endo: BG elevated, on steroids    MSK: A x 1 to commode twice    Skin: flaky. Some skin tears on right arm, mepilex applied    LDAs: PIV SL

## 2023-06-18 NOTE — PROGRESS NOTES
[facility]    Medicine Progress Note - Hospitalist Service    Date of Admission:  6/16/2023    Assessment & Plan   Fatuma Henry is a 77 year old female admitted on 6/16/2023. She is brought to ED due to respiratory distress, 3 day history of worsened shortness of breath.     Acute on chronic hypoxic respiratory failure  COPD exacerbation  Pulmonary hypertension  On chronic prednisone 5 mg (unclear if for RA or COPD).   On admission chest x-ray demonstrates a small right pleural effusion, slightly increased from prior.  Left lung and pleural space are clear.  No pneumothorax. Patient is on 2 L at baseline. VBG 7.34/52/20. Patient was placed on BiPAP and 100% o2 by EMS.  Rapid improvement after admission for unclear reason. Treated with steroids, nebs, BiPAP. Stopped azithromycin 6/17/2023 due to history of severe C. dificile     On usual O2 2 LPM. Breathing is good  Difficulty attaining good Sao2 readings on patient likely related to peripheral vascular disease   - Change to PO prednisone 40  - DuoNebs every 4 hours while awake  - Hold Symbicort and usual prednisone 5      Chronic heart failure with preserved EF   Right transudative pleural effusion s/p thoracentesis on 4/25 and again 5/3/23  Echo 12/2022 - Normal left ventricular size and systolic performance with a visually estimated ejection fraction of 60-65% There is mild aortic stenosis. There is mild aortic insufficiency. Mild right ventricular enlargement with normal right ventricular systolic performance. There is moderate left atrial enlargement. There is mild right atrial enlargement. Right ventricular systolic pressure relative to right atrial pressure is moderately increased. The pulmonary artery pressure is estimated to be 50 mmHg plus right atrial pressure (the IVC is of normal caliber).   Limited echocardiogram 4/27/2023 demonstrates normal LV size and function, EF 60 to 65%, normal RV size and function, mild to moderate  LAE and PRANAY, mild AS, mild AR and MR and TR.   Has right pleural effusion 1st noted 3/2023. She underwent thoracentesis on 4/25/23 with 750 mL removed (transudative), with repeat thoracentesis on 5/3/23 just prior to admission in May with another 1000 mL removed.     Previous pleural fluid results 4/25, 5/3 consistent with transudate  Protein 1.3/5.6., 1.5/5.9  /218, 75/305  Cytology negative, megative  Cells 129 (40% Lymphs, 39% monos), 304 (79% PMNs)    On admission CXR- Small right pleural effusion, slightly increased from prior. Left lung and pleural space are clear. No pneumothorax. Unchanged borderline enlarged cardiomediastinal silhouette. Troponin 33 ? 32 (appears to be chronically elevated). BNP 4517. Patient has 1+ lower extremity edema and only mild pulmonary vascular congestion on chest x-ray. Weight does not appear to be up significantly from previously     Iniitally suspected acute congestive heart failure. Ordered IV Lasix 20 mg IV x2 doses, 1st dose not given until 0800 6/17/2023    Improved quickly with steroids, BiPAP, suspect COPD is primary issue  - Daily weights, I/o  - No further IV lasix   - Hold usual oral Lasix 40 due to acute kidney injury     Atrial fibrillation  She was diagnosed with paroxysmal atrial fibrillation during a hospitalization for influenza and hypovolemic shock over Christmas 2017 with which she was asymptomatic. She appears to have had persistent atrial fibrillation since 12/28/23,     Heart rates are mildly elevated, HR  on telemetry   - Continue PTA Toprol and diltiazem for rate control  - Continue anticoagulation with Xarelto  -  ContinueTelemetry  - Consider increasing toprol if HR remains elevated after fluid challenge today     Hypervolemic hyponatremia  Mild  - Follow    Chronic kidney disease stage IIIa  Acute kidney injury 6/18/2023   Creatinine on admission is 1.1, which is at patient's baseline    Creatinine increasing 1.1 ? 1.2 ? 1.6 after attempted  diuresis  - Start IVF  - Check UA    - Avoid nephrotoxins and hypotension, monitor closely renal function     Chronic normocytic anemia  HGB ranging 9-10 for last 3 months (mostly 8-9)  B12 1257 Ferritin 146, %iron sat suggestive of iron deficiency +/-chronic illness    On admission Hemoglobin 9.5 ? 8.7 ? 8.6   No evidence acute blood loss   - Follow  - Start iron   - Consider colonoscopy as outpatient      Essential HTN  BPs low but suspect some related to peripheral vascular disease   -Continue PTA Cardizem and metoprolol     History of recurrent C. difficile  4 episodes, now resolved. Complete taper Dificid started on 4/7 per ID every other day on 5/2/23.   She has been referred to [facility] for fecal microbiota transplant, appointment is scheduled for November 1st.  - Avoid antibiotics      Recurrent falls  S/p Hip fracture 3/2023  Multiple toes fracture of the right foot: 3, 4, 5th, 1st & 3rd metatarsal bone 4/2023  Generalized weakness  Cognitive impairment  S/p closed reduction with percutaneous pinning 3/14/23. Completed rehabilitation at TCU, return home couple days and patient fell while getting up from the chair by the table with daughter being nearby, readmitted 4/24/23.  Foot x-rays demonstrated above fractures. Discharged to rehab, then TCU again. Had neuropsych evaluation 5/11/23, but cannot see results. PTA patient spends most of her time in a wheelchair.  She currently lives at [facility].  - PT and OT     Left ICA occlusion  Asymmetric BPS left arm< right arm   -To follow with vascular as outpt     GERD  -Continue prior to admission pepcid     Depression  -On Cymbalta     H/O pulm nodules with positive PET  Stable 17 x 19 mm groundglass nodule in the lingula since 07/21/2022.   Needs follow up with pulm clinic as outpt and serial imaging 3 to 6-month     H/O RA  On chronic prednisone 5 mg (unclear if for RA or COPD). Hx of methotrexate--was discontinued during recent hospitalization.  No  "exacerbation of RA off methotrexate.    Gout  No active gouty arthritis  - Continue allopurinol    Hyperlipidemia  - Continue PTA Pravachol    DISPO: back to [facility] after acute kidney injury resolves    Called Enid Dove ([phone]) for update        Diet: Regular Diet Adult    DVT Prophylaxis: DOAC  Brandon Catheter: Not present  Lines: None     Cardiac Monitoring: ACTIVE order. Indication: ICU  Code Status: No CPR- Do NOT Intubate      Clinically Significant Risk Factors           # Hypercalcemia: corrected calcium is >10.1, will monitor as appropriate    # Hypoalbuminemia: Lowest albumin = 3 g/dL at 6/18/2023  6:13 AM, will monitor as appropriate    # Acute Kidney Injury, unspecified: based on a >150% or 0.3 mg/dL increase in last creatinine compared to past 90 day average, will monitor renal function  # Hypertension: Noted on problem list        # Overweight: Estimated body mass index is 25.57 kg/m  as calculated from the following:    Height as of this encounter: 1.651 m (5' 5\").    Weight as of this encounter: 69.7 kg (153 lb 10.6 oz)., PRESENT ON ADMISSION          Disposition Plan     Expected Discharge Date: 06/18/2023      Destination: long-term care facility            Kareem Matta MD  Hospitalist Service  [facility]  Securely message with [employer] (more info)  Text page via [employer] Paging/Directory   ______________________________________________________________________    Interval History   No events    Physical Exam   Vital Signs: Temp: 97.8  F (36.6  C) Temp src: Oral BP: 112/64 Pulse: 98   Resp: 15 SpO2: (!) 29 % O2 Device: Nasal cannula Oxygen Delivery: 2 LPM  Weight: 153 lbs 10.57 oz    Constitutional: awake, alert, cooperative, no apparent distress, and appears stated age    Medical Decision Making       40 MINUTES SPENT BY ME on the date of service doing chart review, history, exam, documentation & further activities per the note.      Data      Lab Results "   Component Value Date    A1C 5.9 03/15/2023        CMPRecent Labs   Lab 06/18/23  1056 06/18/23  0713 06/18/23  0613 06/17/23  2213 06/17/23  1119 06/17/23 0642 06/16/23  1723   NA  --   --  131*  --   --  134* 132*   POTASSIUM  --   --  4.6  --   --  4.9 4.1   CHLORIDE  --   --  96*  --   --  98 96*   CO2  --   --  26  --   --  23 22   ANIONGAP  --   --  9  --   --  13 14   * 152* 149* 234*   < > 159* 89   BUN  --   --  44.7*  --   --  29.0* 26.9*   CR  --   --  1.68*  --   --  1.29* 1.13*   GFRESTIMATED  --   --  31*  --   --  43* 50*   SUNI  --   --  9.7  --   --  9.2 9.5   PROTTOTAL  --   --  5.3*  --   --   --   --    ALBUMIN  --   --  3.0*  --   --   --   --    BILITOTAL  --   --  0.2  --   --   --   --    ALKPHOS  --   --  190*  --   --   --   --    AST  --   --  15  --   --   --   --    ALT  --   --  14  --   --   --   --     < > = values in this interval not displayed.     CBC  Recent Labs   Lab 06/18/23 0613 06/17/23 0642 06/16/23  1723   WBC 21.4* 23.3* 31.0*   RBC 3.03* 3.10* 3.40*   HGB 8.6* 8.7* 9.5*   HCT 27.6* 28.8* 30.9*   MCV 91 93 91   MCH 28.4 28.1 27.9   MCHC 31.2* 30.2* 30.7*   RDW 17.4* 17.5* 17.5*   * 521* 602*     INRNo lab results found in last 7 days.  Arterial Blood Gas  Recent Labs   Lab 06/18/23 0613 06/17/23 0642 06/16/23 2017 06/16/23  1723   O2PER 28 28 100 100

## 2023-06-18 NOTE — PROGRESS NOTES
Assumed Care:  0700 to 1900    Neuro: alert oriented X2, forgetful, alarms on for safety    Cardiac: a-fib, +3 edema    Respiratory: 2L NC, dyspnea on exertion, recovers quickly with rest.     GI/: urine cx sent, urine out remains marginal 270 ml this shift, IVF 75 ml/hr, Cr+ 1.68    Diet/Appetite: appetite good    Activity: ambulated to bathroom with stand by assist and walker    Pain: denies pain    Skin: skin very fragile, multiple skin tears, bruising on UE, mepilex on and applied    LDA's: PIV X1,

## 2023-06-19 NOTE — PROGRESS NOTES
Return call from CHIARA Browne at [facility]. All questions and concerns addressed/answered by this RN. Pt's dtr to be here at 1300 to  patient.   Dawna Acosta RN BSN

## 2023-06-19 NOTE — DISCHARGE SUMMARY
[facility] Discharge Summary    Fatuma Henry MRN# [id number]   Age: 77 year old YOB: 1945     Date of Admission:  6/16/2023  Date of Discharge::  6/19/2023  Admitting Physician:  Jake Acevedo MD  Discharge Physician:  Hunter Ayala MD, MD             Admission Diagnoses:   COPD exacerbation (H) [J44.1]  Acute respiratory failure with hypoxia (H) [J96.01]          Principle Discharge Diagnosis:       Acute on chronic hypoxic respiratory failure due to COPD exacerbation    See hospital course for further active diagnoses addressed during this admission.              Medications Prior to Admission:     Medications Prior to Admission   Medication Sig Dispense Refill Last Dose     acetaminophen (TYLENOL) 500 MG tablet Take 1,000 mg by mouth 2 times daily   6/16/2023 at 0800     acetaminophen (TYLENOL) 650 MG CR tablet Take 650 mg by mouth every 8 hours as needed for mild pain or fever   Unknown at prn     albuterol (PROAIR HFA/PROVENTIL HFA/VENTOLIN HFA) 108 (90 Base) MCG/ACT inhaler Inhale 2 puffs into the lungs every 4 hours as needed for shortness of breath / dyspnea or wheezing   6/16/2023 at 0800     allopurinol (ZYLOPRIM) 300 MG tablet Take 1 tablet (300 mg) by mouth daily 60 tablet 0 Past Week     budesonide-formoterol (SYMBICORT) 160-4.5 MCG/ACT Inhaler Inhale 2 puffs into the lungs 2 times daily   6/16/2023 at 0800     calcium polycarbophil (FIBERCON) 625 MG tablet Take 2 tablets by mouth 2 times daily   6/16/2023 at 0800     diltiazem ER COATED BEADS (CARDIZEM CD/CARTIA XT) 240 MG 24 hr capsule Take 1 capsule (240 mg) by mouth daily  0 6/16/2023 at 0800     DULoxetine (CYMBALTA) 20 MG capsule Take 20 mg by mouth daily   6/16/2023 at 0800     famotidine (PEPCID) 20 MG tablet Take 20 mg by mouth daily   6/16/2023 at 0800     ipratropium - albuterol 0.5 mg/2.5 mg/3 mL (DUONEB) 0.5-2.5 (3) MG/3ML neb solution Take 1 vial (3 mLs) by nebulization every 6 hours as needed for shortness  of breath, wheezing or cough  0 Past Week     magic mouthwash suspension (diphenhydrAMINE, lidocaine, aluminum-magnesium & simethicone) Swish and spit 5 mLs in mouth 4 times daily (before meals and nightly) 100 mL 1 6/16/2023 at 1130     metoprolol succinate ER (TOPROL XL) 50 MG 24 hr tablet Take 0.5 tablets (25 mg) by mouth daily  0 6/16/2023 at 0800     Naloxone HCl 2 MG/0.4ML SOAJ Inject 1 mL as directed as needed   Unknown at prn     pravastatin (PRAVACHOL) 20 MG tablet Take 20 mg by mouth every evening   6/16/2023 at 0800     rivaroxaban ANTICOAGULANT (XARELTO) 15 MG TABS tablet Take 1 tablet (15 mg) by mouth daily (with dinner) 90 tablet 3 6/15/2023 at 1800     Vitamin D3 (CHOLECALCIFEROL) 125 MCG (5000 UT) tablet Take 1 tablet by mouth daily   6/16/2023 at 0800     [DISCONTINUED] furosemide (LASIX) 40 MG tablet Take 40 mg by mouth daily   6/16/2023     [DISCONTINUED] predniSONE (DELTASONE) 5 MG tablet Take 5 mg by mouth daily   6/16/2023 at 0800             Discharge Medications:     Current Discharge Medication List      START taking these medications    Details   ferrous sulfate (FEROSUL) 325 (65 Fe) MG tablet Take 1 tablet (325 mg) by mouth every other day    Associated Diagnoses: Other iron deficiency anemia      sulfamethoxazole-trimethoprim (BACTRIM DS) 800-160 MG tablet Take 1 tablet by mouth 2 times daily for 5 doses    Comments: First dose pm 6/19  Associated Diagnoses: Acute cystitis without hematuria         CONTINUE these medications which have CHANGED    Details   furosemide (LASIX) 40 MG tablet Hold off on lasix x 3 days, then resume baseline dose of 40 mg by mouth daily on 6/23/23    Associated Diagnoses: Chronic heart failure with preserved ejection fraction (H)      predniSONE (DELTASONE) 10 MG tablet Take 4 tabs by mouth daily x 5 days, then 3 tabs daily x 3 days, then 2 tabs daily x 3 days then resume baseline 5 mg by mouth daily thereafter  Qty: 38 tablet, Refills: 0    Associated  Diagnoses: COPD exacerbation (H)         CONTINUE these medications which have NOT CHANGED    Details   acetaminophen (TYLENOL) 500 MG tablet Take 1,000 mg by mouth 2 times daily      acetaminophen (TYLENOL) 650 MG CR tablet Take 650 mg by mouth every 8 hours as needed for mild pain or fever      albuterol (PROAIR HFA/PROVENTIL HFA/VENTOLIN HFA) 108 (90 Base) MCG/ACT inhaler Inhale 2 puffs into the lungs every 4 hours as needed for shortness of breath / dyspnea or wheezing      allopurinol (ZYLOPRIM) 300 MG tablet Take 1 tablet (300 mg) by mouth daily  Qty: 60 tablet, Refills: 0    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      budesonide-formoterol (SYMBICORT) 160-4.5 MCG/ACT Inhaler Inhale 2 puffs into the lungs 2 times daily    Associated Diagnoses: Pulmonary emphysema, unspecified emphysema type (H)      calcium polycarbophil (FIBERCON) 625 MG tablet Take 2 tablets by mouth 2 times daily      diltiazem ER COATED BEADS (CARDIZEM CD/CARTIA XT) 240 MG 24 hr capsule Take 1 capsule (240 mg) by mouth daily  Refills: 0    Associated Diagnoses: Paroxysmal atrial fibrillation (H)      DULoxetine (CYMBALTA) 20 MG capsule Take 20 mg by mouth daily      famotidine (PEPCID) 20 MG tablet Take 20 mg by mouth daily      ipratropium - albuterol 0.5 mg/2.5 mg/3 mL (DUONEB) 0.5-2.5 (3) MG/3ML neb solution Take 1 vial (3 mLs) by nebulization every 6 hours as needed for shortness of breath, wheezing or cough  Refills: 0    Associated Diagnoses: COPD exacerbation (H)      magic mouthwash suspension (diphenhydrAMINE, lidocaine, aluminum-magnesium & simethicone) Swish and spit 5 mLs in mouth 4 times daily (before meals and nightly)  Qty: 100 mL, Refills: 1    Associated Diagnoses: Mouth sores      metoprolol succinate ER (TOPROL XL) 50 MG 24 hr tablet Take 0.5 tablets (25 mg) by mouth daily  Refills: 0    Associated Diagnoses: Paroxysmal atrial fibrillation (H)      Naloxone HCl 2 MG/0.4ML SOAJ Inject 1 mL as directed  "as needed      pravastatin (PRAVACHOL) 20 MG tablet Take 20 mg by mouth every evening      rivaroxaban ANTICOAGULANT (XARELTO) 15 MG TABS tablet Take 1 tablet (15 mg) by mouth daily (with dinner)  Qty: 90 tablet, Refills: 3    Associated Diagnoses: Paroxysmal atrial fibrillation (H)      Vitamin D3 (CHOLECALCIFEROL) 125 MCG (5000 UT) tablet Take 1 tablet by mouth daily                        Brief History of Illness on admission:     From Admission H+P:   Fatuma Henry is a 77 year old female who presents with shortness of breath.  Patient states that she has been feeling rundown over the past week.  She notes poor appetite and lightheadedness.  She denies nausea, vomiting, or abdominal pain.  She had a few episodes of diarrhea on Monday, but none since.  She denies dysuria or hematuria.  She denies nasal congestion, sore throat, runny nose, wheezing, cough, or shortness of breath.  She denies fevers or chills.  She denies sick contacts.  She denies syncope, fall, or trauma.  She denies chest pain, chest pressure, or palpitations.  She has chronic lower extremity edema which she believes is unchanged.  She denies new headache, neck pain, or back pain.  Patient denies diplopia, dysarthria, dysphagia, incoordination, focal numbness, or unilateral weakness.  She noted increased shortness of breath and wheezing today and so EMS transported to the emergency room for further evaluation.            TODAY:     Subjective:  Much improved  Still feels weak and easily short of breath, but doing well on baseline 2LNC and feels ready for discharge to TCU today.  Feels \"about usual\" for her.  No new or worsening symptoms.  No fever or chills. No pain.         ROS:   ROS: 10 point ROS neg other than the symptoms noted above in the HPI.   BP 99/57 (BP Location: Left arm)   Pulse 59   Temp 97.4  F (36.3  C) (Oral)   Resp 16   Ht 1.651 m (5' 5\")   Wt 70.4 kg (155 lb 3.3 oz)   SpO2 99%   BMI 25.83 kg/m     EXAM:  General: " awake and alert, NAD, oriented x 3  Head: normocephalic  Neck: unremarkable, no lymphadenopathy   HEENT: oropharynx pink and moist    Heart: Regular rate and rhythm, no murmurs, rubs, or gallops  Lungs: slight wheezing especially with coughing, but good air movement throughout with no focal changes and no distress   Abdomen: soft, non-tender, no masses or organomegaly  Extremities: no edema in lower extremities   Skin unremarkable.       Hospital Course:      Fatuma Henry is a 77 year old female admitted on 6/16/2023. She is brought to ED due to respiratory distress, 3 day history of worsened shortness of breath.      Acute on chronic hypoxic respiratory failure due to COPD exacerbation  Pulmonary hypertension  On chronic prednisone 5 mg (unclear if for RA or COPD).   On admission chest x-ray demonstrates a small right pleural effusion, slightly increased from prior.  Left lung and pleural space are clear.  No pneumothorax. Patient is on 2 L at baseline. VBG 7.34/52/20. Patient was placed on BiPAP and 100% o2 by EMS.  Rapid improvement after admission for unclear reason. Treated with steroids, nebs, BiPAP. Stopped azithromycin 6/17/2023 due to history of severe C. dificile  - DuoNebs every 4 hours while awake   - Held Symbicort while inpatient, continue on discharge   - Changed to PO prednisone 40 6/18 - plan for taper down to baseline 5mg on discharge         Chronic heart failure with preserved EF   Right transudative pleural effusion s/p thoracentesis on 4/25 and again 5/3/23  Echo 12/2022 - Normal left ventricular size and systolic performance with a visually estimated ejection fraction of 60-65% There is mild aortic stenosis. There is mild aortic insufficiency. Mild right ventricular enlargement with normal right ventricular systolic performance. There is moderate left atrial enlargement. There is mild right atrial enlargement. Right ventricular systolic pressure relative to right atrial pressure is  moderately increased. The pulmonary artery pressure is estimated to be 50 mmHg plus right atrial pressure (the IVC is of normal caliber).   Limited echocardiogram 4/27/2023 demonstrates normal LV size and function, EF 60 to 65%, normal RV size and function, mild to moderate LAE and PRANAY, mild AS, mild AR and MR and TR.   Has right pleural effusion 1st noted 3/2023. She underwent thoracentesis on 4/25/23 with 750 mL removed (transudative), with repeat thoracentesis on 5/3/23 just prior to her admission in May with another 1000 mL removed.      Previous pleural fluid results 4/25, 5/3 consistent with transudate  Protein 1.3/5.6., 1.5/5.9  /218, 75/305  Cytology negative, megative  Cells 129 (40% Lymphs, 39% monos), 304 (79% PMNs)     On admission CXR- Small right pleural effusion, slightly increased from prior. Left lung and pleural space are clear. No pneumothorax. Unchanged borderline enlarged cardiomediastinal silhouette. Troponin 33 ? 32 (appears to be chronically elevated). BNP 4517. Patient has 1+ lower extremity edema and only mild pulmonary vascular congestion on chest x-ray. Weight does not appear to be up significantly from previously   -- Iniitally suspected acute congestive heart failure. Gave Lasix 20 mg IV x2 doses, 1st dose not given until 0800 6/17/2023  Improved quickly with steroids, BiPAP, suspect COPD is primary issue  - had YESICA as below likely secondary to this, improving again 6/19.    - Held usual oral Lasix 40 due to acute kidney injury and plan to hold home lasix for 3 days, then resume home lasix 40 mg daily on 6/23 and recheck BMP and volume status at follow-up with provider at TCU in 1 week        Atrial fibrillation  She was diagnosed with paroxysmal atrial fibrillation during a hospitalization for influenza and hypovolemic shock over Christmas 2017 with which she was asymptomatic. She appears to have had persistent atrial fibrillation since 12/28/23,      Heart rates are mildly  elevated, HR  on telemetry   - Continue PTA Toprol and diltiazem for rate control  - Continue anticoagulation with Xarelto  -  ContinueTelemetry  - did well with this, ok to continue prior to admission medications on discharge.       Hypervolemic hyponatremia  Mild- near normal on discharge - recheck BMP at follow-up in 1 week       Chronic kidney disease stage IIIa with Acute kidney injury 6/18/2023 apparently due to diuresis as above    Creatinine on admission is 1.1, which is at patient's baseline  Creatinine increasing 1.1 ? 1.2 ? 1.6 after attempted diuresis ? 1.35 on 6/19.    - Started  IVF @ 75/h 6/18, saline locked 6/19.    - as above, holding home laisx x 3 days, then ok to resume and recheck BMP in 1 week.        Chronic normocytic anemia  HGB ranging 9-10 for last 3 months (mostly 8-9)  B12 1257 Ferritin 146, %iron sat suggestive of iron deficiency +/-chronic illness  On admission Hemoglobin 9.5 ? 8.7 ? 8.6 ? 8.1   No evidence acute blood loss   - Started iron every other day - continued on discharge   - recheck CBC and reassess iron supplement at follow-up in 1 week   - Consider colonoscopy as outpatient      Essential HTN  BPs low but suspect some related to peripheral vascular disease   -Continue PTA Cardizem and metoprolol      Suspected UTI  Urine culture positive for E. Coli - will treat with just 3 days of bactrim based on most recent urine culture result and given minimal symptoms and CDiff history - susceptibilities pending, follow-up on final results at follow-up with provider at TCU.      History of recurrent C. difficile  4 episodes, now resolved. Complete taper Dificid started on 4/7 per ID every other day on 5/2/23.   She has been referred to [facility] for fecal microbiota transplant, appointment is scheduled for November 1st.  - attempting to minimize antibiotic use as much as possible.       Recurrent falls  S/p Hip fracture 3/2023  Multiple toes fracture of the right foot: 3, 4,  5th, 1st & 3rd metatarsal bone 4/2023  Generalized weakness  Cognitive impairment  S/p closed reduction with percutaneous pinning 3/14/23. Completed rehabilitation at TCU, return home couple days and patient fell while getting up from the chair by the table with daughter being nearby, readmitted 4/24/23.  Foot x-rays demonstrated above fractures. Discharged to rehab, then TCU again. Had neuropsych evaluation 5/11/23, but cannot see results. PTA patient spends most of her time in a wheelchair.  She currently lives at [facility].  - PT and OT while here - return to TCU on discharge.       Left ICA occlusion  Asymmetric BPS left arm< right arm   -To follow with vascular as outpt     GERD  -Continue prior to admission pepcid     Depression  -On Cymbalta     H/O pulm nodules with positive PET  Stable 17 x 19 mm groundglass nodule in the lingula since 07/21/2022.   Needs follow up with pulm clinic as outpt and serial imaging 3 to 6-month     H/O RA  On chronic prednisone 5 mg (unclear if for RA or COPD). Hx of methotrexate--was discontinued during recent hospitalization.  No exacerbation of RA off methotrexate.     Gout  No active gouty arthritis  - Continue allopurinol     Hyperlipidemia  - Continue PTA Pravachol     DISPO: back to [facility] after acute kidney injury resolves     Called Enid Dove ([phone]) for update            Diet: Regular Diet Adult    DVT Prophylaxis: DOAC  Brandon Catheter: Not present  Lines: None     Cardiac Monitoring: ACTIVE order. Indication: ICU  Code Status: No CPR- Do NOT Intubate          Clinically Significant Risk Factors           # Hypercalcemia: corrected calcium is >10.1, will monitor as appropriate    # Hypoalbuminemia: Lowest albumin = 3 g/dL at 6/18/2023  6:13 AM, will monitor as appropriate    # Acute Kidney Injury, unspecified: based on a >150% or 0.3 mg/dL increase in last creatinine compared to past 90 day average, will monitor renal function  # Hypertension: Noted on  "problem list        # Overweight: Estimated body mass index is 25.57 kg/m  as calculated from the following:    Height as of this encounter: 1.651 m (5' 5\").    Weight as of this encounter: 69.7 kg (153 lb 10.6 oz)., PRESENT ON ADMISSION                    Discharge Instructions and Follow-Up:      Discharge diet: Orders Placed This Encounter      Regular Diet Adult         Discharge activity: Up with assist    Discharge follow-up: Follow-up with provider at TCU in about 1 week.  At that time:  - recheck CBC and BMP  - follow-up on final urine culture results   - reassess lasix dosage, COPD and iron supplement.           Discharge Disposition:     Discharged to TCU      Attestation:  Amount of time performed on this discharge summary: 70 minutes.    Hunter Ayala MD, MD       "

## 2023-06-19 NOTE — PROGRESS NOTES
[facility] DISCHARGE NOTE    Patient discharged to long term care facility at 1:45 PM via wheel chair. Accompanied by daughter and staff. Discharge instructions reviewed with Nurse Browne at [facility], opportunity offered to ask questions. Prescriptions sent to patients preferred pharmacy. All belongings sent with patient.    Dawna Acosta RN

## 2023-06-19 NOTE — PROGRESS NOTES
Care Management Discharge Note    Discharge Date: 06/19/2023       Discharge Disposition: Long Term Care    Discharge Services: None    Discharge DME: Oxygen, Wheelchair    Discharge Transportation: agency    Private pay costs discussed: Not applicable    Does the patient's insurance plan have a 3 day qualifying hospital stay waiver?  No    PAS Confirmation Code: N/A  Patient/family educated on Medicare website which has current facility and service quality ratings:  NA    Education Provided on the Discharge Plan: Yes  Persons Notified of Discharge Plans: pt, daughter- [place] & [facility] admission staff  Patient/Family in Agreement with the Plan: yes    Handoff Referral Completed: Yes    Additional Information:  Per IDT team, pt is medically stable for discharge today.  She will return to [facility] (Main: [phone]/ Fax: [phone]) TCU to LTC.    Daughter Enid to transport today around 1pm & will bring portable oxygen tank.    [facility] admission team aware of pt's return today.    BRY Sandy  Care Transitions   Tele: [phone]

## 2023-06-20 NOTE — PROGRESS NOTES
[facility]    Background: Transitional Care Management program identified per system criteria and reviewed by Connected Care Resource Center team for possible outreach.    Assessment: Upon chart review, CCRC Team member will not proceed with patient outreach related to this episode of Transitional Care Management program due to reason below:    Non-[facility] TCU: CCRC team member noted patient discharged to TCU/ARU/LTACH. Patient is not established with a [facility] currently supported by Primary Care-Care Coordination therefore handoff to Primary Care-Care Coordination is not appropriate at this time.     Discharged to TCU    Plan: Transitional Care Management episode addressed appropriately per reason noted above.      GLORIA Parra  Connected Care Resource [place]    *Connected Care Resource Team does NOT follow patient ongoing. Referrals are identified based on internal discharge reports and the outreach is to ensure patient has an understanding of their discharge instructions.

## 2023-06-21 NOTE — PROGRESS NOTES
"[facility] Follow Up  PCP & CLINIC: Ana Rosa Reyna, FREDY CNP, [address] [facility] [address] / [facility] [id number]  Chief Complaint   Patient presents with     REEMA Gonzales MRN: [id number]. Place of Service where encounter took place:  [facility] [4002] Fatuma Henry  is a 77 year old  (1945), admitted to the above facility from  [facility]. Hospital stay 6/16 through 6/19. Admitted to this facility for  rehab, medical management, and nursing care. HPI information obtained from: facility chart records, facility staff, patient report, [facility] chart review, and Care Everywhere [facility] chart review.     Brief Summary of Hospital Course: Piotr presented to [facility] from TCU on 6/16 w/ SOB, hypoxia. She was found to have COPD exacerbation and have a small right pleural effusion. She was given nebs, increased steroids, BiPAPing. CHF issue ruled out after 2 doses of Lasix were given, and patient did develop YESICA. Home Lasix needs to be held w/ plan to restart and trend BMP. UTI also found, given 3 days of Bactrim.    Updates since return to transitional care unit: Piotr returned to TCU on 6/19 s/p the above hospitalization. Today, Fatuma seen immediately after nursing reported new edema to lower extremities, new shortness of breath, and overall malaise.  They reported some bradycardia, hypotension, and increased oxygen needs.  Fatuma says she just does not feel good.  She is a little short of breath, she feels feverish, and chilly at the same time.  She does note swelling in lower extremities.  She has ongoing diarrhea.  She denies any chest pain, headaches, but she is very tired.  Provider initiated advanced care planning discussion, but she repeatedly said \"I do not know what to do\" and deferred to her daughter Enid.    CODE STATUS/ADVANCE DIRECTIVES DISCUSSION: DNR/DNI. Patient's living condition: lives in a skilled nursing facility. ALLERGIES: Codeine, " Fosamax [alendronate], and Morphine PAST MEDICAL HISTORY:  has a past medical history of Abrasion of right foot, initial encounter (04/24/2023), Abrasion of right upper extremity, initial encounter (04/24/2023), Acute blood loss anemia (11/17/2022), Acute cystitis with hematuria (12/23/2022), Acute kidney injury (H) (11/17/2022), Acute on chronic congestive heart failure, unspecified heart failure type (H) (12/01/2022), Acute on chronic diastolic (congestive) heart failure (H) (03/28/2023), Acute right-sided low back pain with right-sided sciatica (03/28/2023), Atrial fibrillation (H), Atrial fibrillation with RVR (H) (03/14/2023), Cellulitis of left leg (04/06/2023), CKD (chronic kidney disease) stage 3, GFR 30-59 ml/min (H), Closed displaced fracture of proximal phalanx of lesser toe of right foot, initial encounter (04/24/2023), Closed fracture of phalanx of right fifth toe, initial encounter (04/24/2023), Closed fracture of phalanx of right fourth toe, initial encounter (04/24/2023), Closed fracture of phalanx of right second toe, initial encounter (04/24/2023), Closed fracture of phalanx of right third toe, initial encounter (04/24/2023), Closed nondisplaced fracture of third metatarsal bone of right foot, initial encounter (04/24/2023), Community acquired pneumonia of right lower lobe of lung (12/23/2022), COPD (chronic obstructive pulmonary disease) (H), CRF (chronic renal failure), 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, recurrent (12/28/2022), Fall, initial encounter (03/14/2023), Fracture of femur (H) (03/14/2023), GERD (gastroesophageal reflux disease), High risk medication use (10/07/2015), Hypertension, Hyponatremia (12/28/2022), Infection due to 2019 novel coronavirus (12/13/2022), Influenza A (12/01/2017), Macrocytic anemia (12/23/2022), Metabolic encephalopathy (03/28/2023), Nondisplaced fracture of fourth metatarsal bone, right foot, initial encounter for closed fracture (04/24/2023), Pulmonary  hypertension (H), Rapid atrial fibrillation (H) (12/13/2022), Rash and nonspecific skin eruption (04/06/2023), Respiratory failure, unspecified chronicity, unspecified whether with hypoxia or hypercapnia (H) (12/23/2022), Rheumatoid arthritis (H), Sepsis (H) (12/24/2017), Sepsis, due to unspecified organism, unspecified whether acute organ dysfunction present (H) (12/13/2022), Stomatitis and mucositis (12/23/2022), and Syncope (11/17/2022).. PAST SURGICAL HISTORY:   has a past surgical history that includes Cholecystectomy; appendectomy; Bladder Suspension; PICC/Midline Placement (12/13/2022); and Closed reduction, percutaneous pinning hip (Left, 3/15/2023).. FAMILY HISTORY: family history includes Cerebrovascular Disease in her brother and father; Diabetes Type 2  in her brother; Heart Failure in her mother; Kidney failure in her sister.. SOCIAL HISTORY:   reports that she quit smoking about 5 years ago. Her smoking use included cigarettes. She smoked an average of .5 packs per day. She has never used smokeless tobacco. She reports that she does not drink alcohol and does not use drugs.  Post Discharge Medication Reconciliation Status: discharge medications reconciled and changed, per note/orders.  Current Outpatient Medications   Medication Sig Dispense Refill     cefdinir (OMNICEF) 300 MG capsule Take 300 mg by mouth 2 times daily X 5 days, ending 6/27/23.       citalopram (CELEXA) 10 MG tablet Take 5 mg by mouth daily       fidaxomicin (DIFICID) 200 MG tablet Take 200 mg by mouth 2 times daily X 10 days, ending 7/2/23       hydrOXYzine (VISTARIL) 25 MG capsule Take 25 mg by mouth 3 times daily as needed       acetaminophen (TYLENOL) 500 MG tablet Take 1,000 mg by mouth 2 times daily       acetaminophen (TYLENOL) 650 MG CR tablet Take 650 mg by mouth every 8 hours as needed for mild pain or fever       albuterol (PROAIR HFA/PROVENTIL HFA/VENTOLIN HFA) 108 (90 Base) MCG/ACT inhaler Inhale 2 puffs into the lungs  every 4 hours as needed for shortness of breath / dyspnea or wheezing       allopurinol (ZYLOPRIM) 300 MG tablet Take 1 tablet (300 mg) by mouth daily 60 tablet 0     budesonide-formoterol (SYMBICORT) 160-4.5 MCG/ACT Inhaler Inhale 2 puffs into the lungs 2 times daily       calcium polycarbophil (FIBERCON) 625 MG tablet Take 2 tablets by mouth 2 times daily       diltiazem ER COATED BEADS (CARDIZEM CD/CARTIA XT) 240 MG 24 hr capsule Take 1 capsule (240 mg) by mouth daily  0     famotidine (PEPCID) 20 MG tablet Take 20 mg by mouth daily       ferrous sulfate (FEROSUL) 325 (65 Fe) MG tablet Take 1 tablet (325 mg) by mouth every other day       furosemide (LASIX) 40 MG tablet Hold off on lasix x 3 days, then resume baseline dose of 40 mg by mouth daily on 6/23/23       ipratropium - albuterol 0.5 mg/2.5 mg/3 mL (DUONEB) 0.5-2.5 (3) MG/3ML neb solution Take 1 vial (3 mLs) by nebulization every 6 hours as needed for shortness of breath, wheezing or cough  0     magic mouthwash suspension (diphenhydrAMINE, lidocaine, aluminum-magnesium & simethicone) Swish and spit 5 mLs in mouth 4 times daily (before meals and nightly) 100 mL 1     metoprolol succinate ER (TOPROL XL) 50 MG 24 hr tablet Take 0.5 tablets (25 mg) by mouth daily  0     Naloxone HCl 2 MG/0.4ML SOAJ Inject 1 mL as directed as needed       pravastatin (PRAVACHOL) 20 MG tablet Take 20 mg by mouth every evening       predniSONE (DELTASONE) 10 MG tablet Take 4 tabs by mouth daily x 5 days, then 3 tabs daily x 3 days, then 2 tabs daily x 3 days then resume baseline 5 mg by mouth daily thereafter 38 tablet 0     rivaroxaban ANTICOAGULANT (XARELTO) 15 MG TABS tablet Take 1 tablet (15 mg) by mouth daily (with dinner) 90 tablet 3     Vitamin D3 (CHOLECALCIFEROL) 125 MCG (5000 UT) tablet Take 1 tablet by mouth daily       ROS: Limited secondary to cognitive impairment but today pt reports the above and 10 point ROS of systems including Constitutional, Eyes, Respiratory,  "Cardiovascular, Gastroenterology, Genitourinary, Integumentary, Musculoskeletal, Psychiatric were all negative except for pertinent positives noted in my HPI.    Vitals: BP 90/43   Pulse 81   Temp 97.6  F (36.4  C)   Resp 17   Ht 1.651 m (5' 5\")   Wt 69.4 kg (152 lb 14.4 oz)   SpO2 95%   BMI 25.44 kg/m    Exam:  GENERAL APPEARANCE: Alert, in no distress, cooperative.   ENT: Mouth/posterior oropharynx intact w/ moist mucous membranes, hearing acuity [place].  EYES: EOM, conjunctivae, lids, pupils and irises normal, PERRL2.   RESP: Respiratory effort poor, no respiratory distress, Lung sounds diminished. On 4LO2 via simple mask.  CV: Auscultation of heart reveals S1, S2, rate controlled and rhythm irregular, no murmur, no rub or gallop, Edema 3+ BLE. Peripheral pulses are 2+.  ABDOMEN: Normal bowel sounds, soft, non-tender abdomen, and no masses palpated.  SKIN: Inspection/Palpation of skin and subcutaneous tissue baseline w/ fragility. No wounds/rashes noted except scattered bruising.   NEURO: CN II-XII at patient's baseline, sensation baseline PPS.  PSYCH: Insight, judgement, and memory are impaired, affect and mood are withdrawn/exhausted.    Lab/Diagnostic data: Recent labs in [facility] reviewed by me today.     ASSESSMENT/PLAN:  COPD exacerbation (H)  Chronic obstructive pulmonary disease, unspecified COPD type (H)  Chronic respiratory failure with hypoxia and hypercapnia (H)  Chronic heart failure with preserved ejection fraction (H)  Permanent atrial fibrillation (H)  Severe dementia without behavioral disturbance, psychotic disturbance, mood disturbance, or anxiety, unspecified dementia type (H)  Anxiety  Diarrhea, unspecified type  Leukocytosis  Acute on chronic. Excerbated.     Provider initiated discussion with Fatuma around advance care planning.  Noting before she went to the hospital this last time, she refused to leave the facility.  She did not want to be hospitalized, but it was felt it was in her " best interest.  She returned from the hospital on Monday, and when asked if she would like to go back today, she was not sure.    Noting hospital discharge on 6/19 with white count greater than 23.  Thought to have been from UTI, which was treated with 3 days of Bactrim.  Also on chronic steroids which could contribute to leukocytosis.  Noting Bactrim was not renally dosed at the time.  Noting that hospital did rule out heart failure picture, though we have significant edema today and hypoxia.    Provider coordinated care with nursing, who reported Fatuma refused all of her medications this morning, she simply does not feel well enough to take them.    Provider coordinated care with patient's next of kin/POA daughter Enid.  Provider offered hospitalization once again as this patient could be septic, in CHF exacerbation, or have other acute illness that cannot be quickly evaluated in-house.  Provider also offered evaluation in-house.  Provider initiated discussion around hospice services and comfort focused care, as a lot of interventions may ultimately be futile for Fatuma.  Enid was receptive to hospice services, and recommendation made in pursuance of this.  Enid coordinated with her siblings, and agreed to enroll in [facility] hospice.  Will write referral below, and still treat this patient.    Noting Fatuma may have a treatable illness here with an ongoing UTI versus pyelonephritis versus sepsis.  Third spacing may be secondary to this.  Patient is already on Lasix, and had an YESICA while inpatient.      Provider coordinated care with pharmacist.  Extensive history of C. difficile, CKD, and recent overtreatment with Bactrim, but only for 3 days (this was susceptible on UCx sensitivities).  Noting underlying allergy profile and most recent creatinine clearance on the edge of 30.  Will dose cefdinir as it has good bladder penetration but also broad-spectrum coverage.  We will start with 5-day course given her  C. difficile history, but consider extension.    Noting ongoing diarrhea, concern for C. difficile once again.  Provider ordered sample, which did return positive.  Will order Dificid per family request.    Family has also requested assistance with anxiety control.  Noting Cymbalta can sometimes worsen anxiety and at this low dose may not be effective at all for Fatuma.  Given low dose, can simply discontinue Cymbalta, and start Celexa.  Will adjunct with Vistaril which may help with any underlying pain as well. Noted initial PRN orders for non antipsychotic psychotropic medications are limited to 14 days. Start new psychotropic medication x 14 days for sporadic anxiety and end-stage palliative care goals.     Unknown when Fatuma will enroll in hospice, and therefore will trend CBC and BMP to determine if leukocytosis is ongoing, if CKD/YESICA has improved or if electrolyte disturbance is present.    We will forego chest x-ray at this time, as her pulmonary status was significantly worked up inpatient, and she is still undergoing treatment for COPD exacerbation.    Her situation is very tenuous.  Hopeful for [facility] hosp[facility] enrollment, and a comfort focus moving forward which would avoid hospitalization.  Follow up w/in 1 week or as needed.    Orders:  1. CBC, BMP on 6/26. Dx: COPD exacerbation.  2. Imodium 2mg PO BID PRN. Dx: diarrhea (only if CDIFF negative).   3. Vistaril 25mg PO TID PRN x 14 days. Dx: anxiety.  4. Discontinue Cymbalta.   5. Start Celexa 5mg PO Qday. Dx: KASSIE/MDD.   6. Cefdinir 300mg PO BID x 5 days. Dx:   7. [facility] eval/tx x1. Dx: COPD/sepsis.   8. Dificid 200mg PO BID x 10 days. Dx: CDIFF.     Total time spent with patient visit was 55 minutes including patient visit, review of past records, phone call to patient contact and care coordination w/ pharmacist and nursing.     Electronically signed by:  Dr. Ana Rosa Reyna, APRN CNP DNP

## 2023-06-22 NOTE — LETTER
"    6/22/2023        RE: Fatuma Henry  [address]  [address]  [place] [address]        [facility] Follow Up  PCP & CLINIC: FREDY Carroll CNP, [address]. / [facility] [id number]  Chief Complaint   Patient presents with     RECHECK   Janet MRN: [id number]. Place of Service where encounter took place:  [facility], [facility] [place] (TCU) [[id number]] Fatuma Henry  is a 77 year old  (1945), admitted to the above facility from  [facility]. Hospital stay 6/16 through 6/19. Admitted to this facility for  rehab, medical management, and nursing care. HPI information obtained from: facility chart records, facility staff, patient report, [facility] chart review, and Care Everywhere [facility] chart review.     Brief Summary of Hospital Course: Piotr presented to [facility] from TCU on 6/16 w/ SOB, hypoxia. She was found to have COPD exacerbation and have a small right pleural effusion. She was given nebs, increased steroids, BiPAPing. CHF issue ruled out after 2 doses of Lasix were given, and patient did develop YESICA. Home Lasix needs to be held w/ plan to restart and trend BMP. UTI also found, given 3 days of Bactrim.    Updates since return to transitional care unit: Piotr returned to TCU on 6/19 s/p the above hospitalization. Today, Fatuma seen immediately after nursing reported new edema to lower extremities, new shortness of breath, and overall malaise.  They reported some bradycardia, hypotension, and increased oxygen needs.  Fatuma says she just does not feel good.  She is a little short of breath, she feels feverish, and chilly at the same time.  She does note swelling in lower extremities.  She has ongoing diarrhea.  She denies any chest pain, headaches, but she is very tired.  Provider initiated advanced care planning discussion, but she repeatedly said \"I do not know what to do\" and deferred to her daughter Enid.    CODE STATUS/ADVANCE DIRECTIVES " DISCUSSION: DNR/DNI. Patient's living condition: lives in a skilled nursing facility. ALLERGIES: Codeine, Fosamax [alendronate], and Morphine PAST MEDICAL HISTORY:  has a past medical history of Abrasion of right foot, initial encounter (04/24/2023), Abrasion of right upper extremity, initial encounter (04/24/2023), Acute blood loss anemia (11/17/2022), Acute cystitis with hematuria (12/23/2022), Acute kidney injury (H) (11/17/2022), Acute on chronic congestive heart failure, unspecified heart failure type (H) (12/01/2022), Acute on chronic diastolic (congestive) heart failure (H) (03/28/2023), Acute right-sided low back pain with right-sided sciatica (03/28/2023), Atrial fibrillation (H), Atrial fibrillation with RVR (H) (03/14/2023), Cellulitis of left leg (04/06/2023), CKD (chronic kidney disease) stage 3, GFR 30-59 ml/min (H), Closed displaced fracture of proximal phalanx of lesser toe of right foot, initial encounter (04/24/2023), Closed fracture of phalanx of right fifth toe, initial encounter (04/24/2023), Closed fracture of phalanx of right fourth toe, initial encounter (04/24/2023), Closed fracture of phalanx of right second toe, initial encounter (04/24/2023), Closed fracture of phalanx of right third toe, initial encounter (04/24/2023), Closed nondisplaced fracture of third metatarsal bone of right foot, initial encounter (04/24/2023), Community acquired pneumonia of right lower lobe of lung (12/23/2022), COPD (chronic obstructive pulmonary disease) (H), CRF (chronic renal failure), 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, recurrent (12/28/2022), Fall, initial encounter (03/14/2023), Fracture of femur (H) (03/14/2023), GERD (gastroesophageal reflux disease), High risk medication use (10/07/2015), Hypertension, Hyponatremia (12/28/2022), Infection due to 2019 novel coronavirus (12/13/2022), Influenza A (12/01/2017), Macrocytic anemia (12/23/2022), Metabolic encephalopathy (03/28/2023), Nondisplaced  fracture of fourth metatarsal bone, right foot, initial encounter for closed fracture (04/24/2023), Pulmonary hypertension (H), Rapid atrial fibrillation (H) (12/13/2022), Rash and nonspecific skin eruption (04/06/2023), Respiratory failure, unspecified chronicity, unspecified whether with hypoxia or hypercapnia (H) (12/23/2022), Rheumatoid arthritis (H), Sepsis (H) (12/24/2017), Sepsis, due to unspecified organism, unspecified whether acute organ dysfunction present (H) (12/13/2022), Stomatitis and mucositis (12/23/2022), and Syncope (11/17/2022).. PAST SURGICAL HISTORY:   has a past surgical history that includes Cholecystectomy; appendectomy; Bladder Suspension; PICC/Midline Placement (12/13/2022); and Closed reduction, percutaneous pinning hip (Left, 3/15/2023).. FAMILY HISTORY: family history includes Cerebrovascular Disease in her brother and father; Diabetes Type 2  in her brother; Heart Failure in her mother; Kidney failure in her sister.. SOCIAL HISTORY:   reports that she quit smoking about 5 years ago. Her smoking use included cigarettes. She smoked an average of .5 packs per day. She has never used smokeless tobacco. She reports that she does not drink alcohol and does not use drugs.  Post Discharge Medication Reconciliation Status: discharge medications reconciled and changed, per note/orders.  Current Outpatient Medications   Medication Sig Dispense Refill     cefdinir (OMNICEF) 300 MG capsule Take 300 mg by mouth 2 times daily X 5 days, ending 6/27/23.       citalopram (CELEXA) 10 MG tablet Take 5 mg by mouth daily       fidaxomicin (DIFICID) 200 MG tablet Take 200 mg by mouth 2 times daily X 10 days, ending 7/2/23       hydrOXYzine (VISTARIL) 25 MG capsule Take 25 mg by mouth 3 times daily as needed       acetaminophen (TYLENOL) 500 MG tablet Take 1,000 mg by mouth 2 times daily       acetaminophen (TYLENOL) 650 MG CR tablet Take 650 mg by mouth every 8 hours as needed for mild pain or fever        albuterol (PROAIR HFA/PROVENTIL HFA/VENTOLIN HFA) 108 (90 Base) MCG/ACT inhaler Inhale 2 puffs into the lungs every 4 hours as needed for shortness of breath / dyspnea or wheezing       allopurinol (ZYLOPRIM) 300 MG tablet Take 1 tablet (300 mg) by mouth daily 60 tablet 0     budesonide-formoterol (SYMBICORT) 160-4.5 MCG/ACT Inhaler Inhale 2 puffs into the lungs 2 times daily       calcium polycarbophil (FIBERCON) 625 MG tablet Take 2 tablets by mouth 2 times daily       diltiazem ER COATED BEADS (CARDIZEM CD/CARTIA XT) 240 MG 24 hr capsule Take 1 capsule (240 mg) by mouth daily  0     famotidine (PEPCID) 20 MG tablet Take 20 mg by mouth daily       ferrous sulfate (FEROSUL) 325 (65 Fe) MG tablet Take 1 tablet (325 mg) by mouth every other day       furosemide (LASIX) 40 MG tablet Hold off on lasix x 3 days, then resume baseline dose of 40 mg by mouth daily on 6/23/23       ipratropium - albuterol 0.5 mg/2.5 mg/3 mL (DUONEB) 0.5-2.5 (3) MG/3ML neb solution Take 1 vial (3 mLs) by nebulization every 6 hours as needed for shortness of breath, wheezing or cough  0     magic mouthwash suspension (diphenhydrAMINE, lidocaine, aluminum-magnesium & simethicone) Swish and spit 5 mLs in mouth 4 times daily (before meals and nightly) 100 mL 1     metoprolol succinate ER (TOPROL XL) 50 MG 24 hr tablet Take 0.5 tablets (25 mg) by mouth daily  0     Naloxone HCl 2 MG/0.4ML SOAJ Inject 1 mL as directed as needed       pravastatin (PRAVACHOL) 20 MG tablet Take 20 mg by mouth every evening       predniSONE (DELTASONE) 10 MG tablet Take 4 tabs by mouth daily x 5 days, then 3 tabs daily x 3 days, then 2 tabs daily x 3 days then resume baseline 5 mg by mouth daily thereafter 38 tablet 0     rivaroxaban ANTICOAGULANT (XARELTO) 15 MG TABS tablet Take 1 tablet (15 mg) by mouth daily (with dinner) 90 tablet 3     Vitamin D3 (CHOLECALCIFEROL) 125 MCG (5000 UT) tablet Take 1 tablet by mouth daily       ROS: Limited secondary to cognitive  "impairment but today pt reports the above and 10 point ROS of systems including Constitutional, Eyes, Respiratory, Cardiovascular, Gastroenterology, Genitourinary, Integumentary, Musculoskeletal, Psychiatric were all negative except for pertinent positives noted in my HPI.    Vitals: BP 90/43   Pulse 81   Temp 97.6  F (36.4  C)   Resp 17   Ht 1.651 m (5' 5\")   Wt 69.4 kg (152 lb 14.4 oz)   SpO2 95%   BMI 25.44 kg/m    Exam:  GENERAL APPEARANCE: Alert, in no distress, cooperative.   ENT: Mouth/posterior oropharynx intact w/ moist mucous membranes, hearing acuity [place].  EYES: EOM, conjunctivae, lids, pupils and irises normal, PERRL2.   RESP: Respiratory effort poor, no respiratory distress, Lung sounds diminished. On 4LO2 via simple mask.  CV: Auscultation of heart reveals S1, S2, rate controlled and rhythm irregular, no murmur, no rub or gallop, Edema 3+ BLE. Peripheral pulses are 2+.  ABDOMEN: Normal bowel sounds, soft, non-tender abdomen, and no masses palpated.  SKIN: Inspection/Palpation of skin and subcutaneous tissue baseline w/ fragility. No wounds/rashes noted except scattered bruising.   NEURO: CN II-XII at patient's baseline, sensation baseline PPS.  PSYCH: Insight, judgement, and memory are impaired, affect and mood are withdrawn/exhausted.    Lab/Diagnostic data: Recent labs in [facility] reviewed by me today.     ASSESSMENT/PLAN:  COPD exacerbation (H)  Chronic obstructive pulmonary disease, unspecified COPD type (H)  Chronic respiratory failure with hypoxia and hypercapnia (H)  Chronic heart failure with preserved ejection fraction (H)  Permanent atrial fibrillation (H)  Severe dementia without behavioral disturbance, psychotic disturbance, mood disturbance, or anxiety, unspecified dementia type (H)  Anxiety  Diarrhea, unspecified type  Leukocytosis  Acute on chronic. Excerbated.     Provider initiated discussion with Fatuma around advance care planning.  Noting before she went to the hospital this " last time, she refused to leave the facility.  She did not want to be hospitalized, but it was felt it was in her best interest.  She returned from the hospital on Monday, and when asked if she would like to go back today, she was not sure.    Noting hospital discharge on 6/19 with white count greater than 23.  Thought to have been from UTI, which was treated with 3 days of Bactrim.  Also on chronic steroids which could contribute to leukocytosis.  Noting Bactrim was not renally dosed at the time.  Noting that hospital did rule out heart failure picture, though we have significant edema today and hypoxia.    Provider coordinated care with nursing, who reported Fatuma refused all of her medications this morning, she simply does not feel well enough to take them.    Provider coordinated care with patient's next of kin/POA daughter Enid.  Provider offered hospitalization once again as this patient could be septic, in CHF exacerbation, or have other acute illness that cannot be quickly evaluated in-house.  Provider also offered evaluation in-house.  Provider initiated discussion around hospice services and comfort focused care, as a lot of interventions may ultimately be futile for Fatuma.  Enid was receptive to hospice services, and recommendation made in pursuance of this.  Enid coordinated with her siblings, and agreed to enroll in [facility] hospice.  Will write referral below, and still treat this patient.    Noting Fatuma may have a treatable illness here with an ongoing UTI versus pyelonephritis versus sepsis.  Third spacing may be secondary to this.  Patient is already on Lasix, and had an YESICA while inpatient.      Provider coordinated care with pharmacist.  Extensive history of C. difficile, CKD, and recent overtreatment with Bactrim, but only for 3 days (this was susceptible on UCx sensitivities).  Noting underlying allergy profile and most recent creatinine clearance on the edge of 30.  Will dose  cefdinir as it has good bladder penetration but also broad-spectrum coverage.  We will start with 5-day course given her C. difficile history, but consider extension.    Noting ongoing diarrhea, concern for C. difficile once again.  Provider ordered sample, which did return positive.  Will order Dificid per family request.    Family has also requested assistance with anxiety control.  Noting Cymbalta can sometimes worsen anxiety and at this low dose may not be effective at all for Fatuma.  Given low dose, can simply discontinue Cymbalta, and start Celexa.  Will adjunct with Vistaril which may help with any underlying pain as well. Noted initial PRN orders for non antipsychotic psychotropic medications are limited to 14 days. Start new psychotropic medication x 14 days for sporadic anxiety and end-stage palliative care goals.     Unknown when Fatuma will enroll in hospice, and therefore will trend CBC and BMP to determine if leukocytosis is ongoing, if CKD/YESICA has improved or if electrolyte disturbance is present.    We will forego chest x-ray at this time, as her pulmonary status was significantly worked up inpatient, and she is still undergoing treatment for COPD exacerbation.    Her situation is very tenuous.  Hopeful for [facility] hosp[facility] enrollment, and a comfort focus moving forward which would avoid hospitalization.  Follow up w/in 1 week or as needed.    Orders:  1. CBC, BMP on 6/26. Dx: COPD exacerbation.  2. Imodium 2mg PO BID PRN. Dx: diarrhea (only if CDIFF negative).   3. Vistaril 25mg PO TID PRN x 14 days. Dx: anxiety.  4. Discontinue Cymbalta.   5. Start Celexa 5mg PO Qday. Dx: KASSIE/MDD.   6. Cefdinir 300mg PO BID x 5 days. Dx:   7. [facility] eval/tx x1. Dx: COPD/sepsis.   8. Dificid 200mg PO BID x 10 days. Dx: CDIFF.     Total time spent with patient visit was 55 minutes including patient visit, review of past records, phone call to patient contact and care coordination w/ pharmacist and nursing.      Electronically signed by:  Dr. Ana Rosa Reyna, APRN CNP DNP                          Sincerely,        FREDY Carroll CNP

## 2023-06-23 NOTE — TELEPHONE ENCOUNTER
Orders relayed to facility nurse, Bridget.      ----- Message from FREDY Garber CNP sent at 6/23/2023 10:03 AM CDT -----  Team - please call [facility].    1. Dificid (Fidaxomicin) 200mg PO BID x 10 days. Dx: CDIFF.     TY!  VK     room air

## 2023-06-29 NOTE — LETTER
6/29/2023        RE: Fatuma Henry  [address]  [address] [place] [address]        [facility]  Episodic/Acute/Follow-Up  [employer] MRN: [id number]. [id number]   Chief Complaint   Patient presents with     RECHECK    HPI: Fatuma Henry  is a 78 year old (1945), who is being seen today for an episodic care visit. Today's concern is:    Gert seen today on follow-up. Last week, she returned from hospital while still infectious w/ UTI and new found CDIFF. Cefdinir was started, Dificid was started, but ultimately she was so ill and uncomfortable that an ACP discussion was had with family. She did not want to return to the hospital. She subsequently enrolled in [facility] hospice.     Today, Piotr has changed considerably. She is lying comfortably in bed with her  at bedside. She is withdrawn completely, non-verbal, and appears to be transitioning.     Past Medical and Surgical History reviewed in Epic today.  MEDICATIONS:  Current Outpatient Medications   Medication Sig Dispense Refill     acetaminophen (TYLENOL) 500 MG tablet Take 1,000 mg by mouth 2 times daily       acetaminophen (TYLENOL) 650 MG CR tablet Take 650 mg by mouth every 8 hours as needed for mild pain or fever       albuterol (PROAIR HFA/PROVENTIL HFA/VENTOLIN HFA) 108 (90 Base) MCG/ACT inhaler Inhale 2 puffs into the lungs every 4 hours as needed for shortness of breath / dyspnea or wheezing       allopurinol (ZYLOPRIM) 300 MG tablet Take 1 tablet (300 mg) by mouth daily 60 tablet 0     budesonide-formoterol (SYMBICORT) 160-4.5 MCG/ACT Inhaler Inhale 2 puffs into the lungs 2 times daily       calcium polycarbophil (FIBERCON) 625 MG tablet Take 2 tablets by mouth 2 times daily       citalopram (CELEXA) 10 MG tablet Take 5 mg by mouth daily       diltiazem ER COATED BEADS (CARDIZEM CD/CARTIA XT) 240 MG 24 hr capsule Take 1 capsule (240 mg) by  "mouth daily  0     famotidine (PEPCID) 20 MG tablet Take 20 mg by mouth daily       ferrous sulfate (FEROSUL) 325 (65 Fe) MG tablet Take 1 tablet (325 mg) by mouth every other day       fidaxomicin (DIFICID) 200 MG tablet Take 200 mg by mouth 2 times daily X 10 days, ending 7/2/23       furosemide (LASIX) 40 MG tablet Hold off on lasix x 3 days, then resume baseline dose of 40 mg by mouth daily on 6/23/23       hydrOXYzine (VISTARIL) 25 MG capsule Take 25 mg by mouth 3 times daily as needed       ipratropium - albuterol 0.5 mg/2.5 mg/3 mL (DUONEB) 0.5-2.5 (3) MG/3ML neb solution Take 1 vial (3 mLs) by nebulization every 6 hours as needed for shortness of breath, wheezing or cough  0     magic mouthwash suspension (diphenhydrAMINE, lidocaine, aluminum-magnesium & simethicone) Swish and spit 5 mLs in mouth 4 times daily (before meals and nightly) 100 mL 1     metoprolol succinate ER (TOPROL XL) 50 MG 24 hr tablet Take 0.5 tablets (25 mg) by mouth daily  0     Naloxone HCl 2 MG/0.4ML SOAJ Inject 1 mL as directed as needed       pravastatin (PRAVACHOL) 20 MG tablet Take 20 mg by mouth every evening       predniSONE (DELTASONE) 10 MG tablet Take 4 tabs by mouth daily x 5 days, then 3 tabs daily x 3 days, then 2 tabs daily x 3 days then resume baseline 5 mg by mouth daily thereafter 38 tablet 0     rivaroxaban ANTICOAGULANT (XARELTO) 15 MG TABS tablet Take 1 tablet (15 mg) by mouth daily (with dinner) 90 tablet 3     Vitamin D3 (CHOLECALCIFEROL) 125 MCG (5000 UT) tablet Take 1 tablet by mouth daily       Objective: BP 90/43   Pulse 81   Temp 97.8  F (36.6  C)   Resp 17   Ht 1.651 m (5' 5\")   Wt 70 kg (154 lb 4.8 oz)   SpO2 98%   BMI 25.68 kg/m    Exam:  GENERAL APPEARANCE: withdrawn, in no distress.   RESP: Respiratory effort poor, no respiratory distress, On RA.   CV: Edema 0+ BLE. Peripheral pulses are 1+, mottling present.  PSYCH: affect and mood are withdrawn and in transition.    Labs: Labs done in facility " are in EPIC. Please refer to them using [employer]/Care Everywhere.    ASSESSMENT/PLAN:  Chronic respiratory failure with hypoxia and hypercapnia (H)  Chronic heart failure with preserved ejection fraction (H)  Leukocytosis, unspecified type  Colitis, Clostridium difficile  Acute cystitis with hematuria  Hospice care patient  Acute on chronic. Terminal/Progressing.     Hospice has discontinued all non-essential medications and deemed Gert to be transitioning out of this world.     Gert appears comfortable, in part due to lorazepam and other comfort medications. Will place parameter per CMS guidelines. Noted initial PRN orders for non antipsychotic psychotropic medications are limited to 14 days. Start new psychotropic medication x 14 days for sporadic anxiety.  Follow up only as needed.    Orders:  1. RENEW PRN Lorazepam x 14 days. Dx: anxiety.     Electronically signed by:  FREDY Romeo CNP DNP          Sincerely,        FREDY Carroll CNP

## 2023-06-29 NOTE — PROGRESS NOTES
[facility]  Episodic/Acute/Follow-Up  [place] MRN: [id number]. Place of Service where encounter took place:  [facility] [place] ([facility]) [[id number]]   Chief Complaint   Patient presents with     RECHECK    HPI: Fatuma Henry  is a 78 year old (1945), who is being seen today for an episodic care visit. Today's concern is:    Gert seen today on follow-up. Last week, she returned from hospital while still infectious w/ UTI and new found CDIFF. Cefdinir was started, Dificid was started, but ultimately she was so ill and uncomfortable that an ACP discussion was had with family. She did not want to return to the hospital. She subsequently enrolled in [facility] hosp[facility].     Today, Piotr has changed considerably. She is lying comfortably in bed with her  at bedside. She is withdrawn completely, non-verbal, and appears to be transitioning.     Past Medical and Surgical History reviewed in Epic today.  MEDICATIONS:  Current Outpatient Medications   Medication Sig Dispense Refill     acetaminophen (TYLENOL) 500 MG tablet Take 1,000 mg by mouth 2 times daily       acetaminophen (TYLENOL) 650 MG CR tablet Take 650 mg by mouth every 8 hours as needed for mild pain or fever       albuterol (PROAIR HFA/PROVENTIL HFA/VENTOLIN HFA) 108 (90 Base) MCG/ACT inhaler Inhale 2 puffs into the lungs every 4 hours as needed for shortness of breath / dyspnea or wheezing       allopurinol (ZYLOPRIM) 300 MG tablet Take 1 tablet (300 mg) by mouth daily 60 tablet 0     budesonide-formoterol (SYMBICORT) 160-4.5 MCG/ACT Inhaler Inhale 2 puffs into the lungs 2 times daily       calcium polycarbophil (FIBERCON) 625 MG tablet Take 2 tablets by mouth 2 times daily       citalopram (CELEXA) 10 MG tablet Take 5 mg by mouth daily       diltiazem ER COATED BEADS (CARDIZEM CD/CARTIA XT) 240 MG 24 hr capsule Take 1 capsule (240 mg) by mouth daily  0     famotidine (PEPCID) 20 MG tablet Take 20 mg by mouth daily       ferrous sulfate  "(FEROSUL) 325 (65 Fe) MG tablet Take 1 tablet (325 mg) by mouth every other day       fidaxomicin (DIFICID) 200 MG tablet Take 200 mg by mouth 2 times daily X 10 days, ending 7/2/23       furosemide (LASIX) 40 MG tablet Hold off on lasix x 3 days, then resume baseline dose of 40 mg by mouth daily on 6/23/23       hydrOXYzine (VISTARIL) 25 MG capsule Take 25 mg by mouth 3 times daily as needed       ipratropium - albuterol 0.5 mg/2.5 mg/3 mL (DUONEB) 0.5-2.5 (3) MG/3ML neb solution Take 1 vial (3 mLs) by nebulization every 6 hours as needed for shortness of breath, wheezing or cough  0     magic mouthwash suspension (diphenhydrAMINE, lidocaine, aluminum-magnesium & simethicone) Swish and spit 5 mLs in mouth 4 times daily (before meals and nightly) 100 mL 1     metoprolol succinate ER (TOPROL XL) 50 MG 24 hr tablet Take 0.5 tablets (25 mg) by mouth daily  0     Naloxone HCl 2 MG/0.4ML SOAJ Inject 1 mL as directed as needed       pravastatin (PRAVACHOL) 20 MG tablet Take 20 mg by mouth every evening       predniSONE (DELTASONE) 10 MG tablet Take 4 tabs by mouth daily x 5 days, then 3 tabs daily x 3 days, then 2 tabs daily x 3 days then resume baseline 5 mg by mouth daily thereafter 38 tablet 0     rivaroxaban ANTICOAGULANT (XARELTO) 15 MG TABS tablet Take 1 tablet (15 mg) by mouth daily (with dinner) 90 tablet 3     Vitamin D3 (CHOLECALCIFEROL) 125 MCG (5000 UT) tablet Take 1 tablet by mouth daily       Objective: BP 90/43   Pulse 81   Temp 97.8  F (36.6  C)   Resp 17   Ht 1.651 m (5' 5\")   Wt 70 kg (154 lb 4.8 oz)   SpO2 98%   BMI 25.68 kg/m    Exam:  GENERAL APPEARANCE: withdrawn, in no distress.   RESP: Respiratory effort poor, no respiratory distress, On RA.   CV: Edema 0+ BLE. Peripheral pulses are 1+, mottling present.  PSYCH: affect and mood are withdrawn and in transition.    Labs: Labs done in facility are in EPIC. Please refer to them using EPIC/Care Everywhere.    ASSESSMENT/PLAN:  Chronic respiratory " failure with hypoxia and hypercapnia (H)  Chronic heart failure with preserved ejection fraction (H)  Leukocytosis, unspecified type  Colitis, Clostridium difficile  Acute cystitis with hematuria  Hospice care patient  Acute on chronic. Terminal/Progressing.     Hospice has discontinued all non-essential medications and deemed Gert to be transitioning out of this world.     Gert appears comfortable, in part due to lorazepam and other comfort medications. Will place parameter per CMS guidelines. Noted initial PRN orders for non antipsychotic psychotropic medications are limited to 14 days. Start new psychotropic medication x 14 days for sporadic anxiety.  Follow up only as needed.    Orders:  1. RENEW PRN Lorazepam x 14 days. Dx: anxiety.     Electronically signed by:  Dr. Ana Rosa Reyna, APRN CNP DNP
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
## (undated) DEVICE — SUTURE VICRYL+ 2-0 27IN CT-1 UND VCP259H

## (undated) DEVICE — SOL WATER IRRIG 1000ML BOTTLE 2F7114

## (undated) DEVICE — DRESSING MEPILEX BORDER POST-OP 4X8

## (undated) DEVICE — PACK MINOR SINGLE BASIN SSK3001

## (undated) DEVICE — DRSG PRIMAPORE 02X3"

## (undated) DEVICE — GLOVE BIOGEL PI ULTRATOUCH G SZ 7.5 42175

## (undated) DEVICE — GLOVE UNDER INDICATOR PI SZ 7.0 LF 41670

## (undated) DEVICE — SYR BULB IRRIG DOVER 60 ML LATEX FREE 67000

## (undated) DEVICE — MAT FLOOR SURGICAL 40X38 0702140238

## (undated) DEVICE — SUTURE VICRYL+ 2-0 CT-2 27" UND VCP269H

## (undated) DEVICE — DRAPE IOBAN ISOLATION VERTICAL 320X21CM 6617

## (undated) DEVICE — STPL SKIN PROXIMATE 35 WIDE PMW35

## (undated) DEVICE — DRAPE STERI U 1015

## (undated) DEVICE — GLOVE BIOGEL PI ULTRATOUCH G SZ 6.5 42165

## (undated) DEVICE — SUCTION TIP YANKAUER FLEX ORTHO K62

## (undated) DEVICE — TUBING SUCTION MEDI-VAC 1/4"X20' N620A

## (undated) DEVICE — WIRE GUIDE 3.2X400MM  357.399

## (undated) DEVICE — PADDING CAST 4IN WEBRIL STRL 2502

## (undated) DEVICE — DRAPE C-ARMOR 5 SIDED 5523

## (undated) DEVICE — CUSTOM PACK GEN MAJOR SBA5BGMHEA

## (undated) DEVICE — SUTURE VICRYL+ 0 27IN CT-1 UND VCP260H

## (undated) DEVICE — DRAPE C-ARM 60X42" 1013

## (undated) DEVICE — SOL NACL 0.9% IRRIG 1000ML BOTTLE 2F7124

## (undated) DEVICE — PREP CHLORAPREP 26ML TINTED HI-LITE ORANGE 930815

## (undated) DEVICE — DRESSING XEROFORM PETROLATUM 5X9 33605

## (undated) DEVICE — ESU PENCIL SMOKE EVAC W/ROCKER SWITCH 0703-047-000

## (undated) DEVICE — KIT PATIENT CARE HANA TABLE PROFX SUPINE 6855

## (undated) DEVICE — GLOVE BIOGEL INDICATOR 7.5 LF 41675

## (undated) DEVICE — PLATE GROUNDING ADULT W/CORD 9165L

## (undated) DEVICE — BIT DRL 413MM 4.3MM

## (undated) RX ORDER — FENTANYL CITRATE-0.9 % NACL/PF 10 MCG/ML
PLASTIC BAG, INJECTION (ML) INTRAVENOUS
Status: DISPENSED
Start: 2023-01-01

## (undated) RX ORDER — VASOPRESSIN IN 0.9 % NACL 2 UNIT/2ML
SYRINGE (ML) INTRAVENOUS
Status: DISPENSED
Start: 2023-01-01

## (undated) RX ORDER — VANCOMYCIN HYDROCHLORIDE 1 G/20ML
INJECTION, POWDER, LYOPHILIZED, FOR SOLUTION INTRAVENOUS
Status: DISPENSED
Start: 2023-01-01

## (undated) RX ORDER — FENTANYL CITRATE 50 UG/ML
INJECTION, SOLUTION INTRAMUSCULAR; INTRAVENOUS
Status: DISPENSED
Start: 2023-01-01